# Patient Record
Sex: MALE | Race: OTHER | HISPANIC OR LATINO
[De-identification: names, ages, dates, MRNs, and addresses within clinical notes are randomized per-mention and may not be internally consistent; named-entity substitution may affect disease eponyms.]

---

## 2017-01-05 ENCOUNTER — LABORATORY RESULT (OUTPATIENT)
Age: 42
End: 2017-01-05

## 2017-01-05 LAB
INR PPP: 1.62 RATIO
PT BLD: 18.4 SEC

## 2017-01-06 ENCOUNTER — RX RENEWAL (OUTPATIENT)
Age: 42
End: 2017-01-06

## 2017-01-06 LAB
ALBUMIN SERPL ELPH-MCNC: 2.6 G/DL
ALP BLD-CCNC: 213 U/L
ALT SERPL-CCNC: 31 U/L
ANION GAP SERPL CALC-SCNC: 10 MMOL/L
AST SERPL-CCNC: 74 U/L
BASOPHILS # BLD AUTO: 0.05 K/UL
BASOPHILS NFR BLD AUTO: 0.7 %
BILIRUB SERPL-MCNC: 7.2 MG/DL
BUN SERPL-MCNC: 5 MG/DL
CALCIUM SERPL-MCNC: 7.9 MG/DL
CHLORIDE SERPL-SCNC: 101 MMOL/L
CO2 SERPL-SCNC: 24 MMOL/L
CREAT SERPL-MCNC: 0.67 MG/DL
EOSINOPHIL # BLD AUTO: 0.28 K/UL
EOSINOPHIL NFR BLD AUTO: 4 %
HCT VFR BLD CALC: 36.1 %
HGB BLD-MCNC: 12.4 G/DL
IMM GRANULOCYTES NFR BLD AUTO: 0.1 %
LYMPHOCYTES # BLD AUTO: 1.1 K/UL
LYMPHOCYTES NFR BLD AUTO: 15.7 %
MAN DIFF?: NORMAL
MCHC RBC-ENTMCNC: 32.9 PG
MCHC RBC-ENTMCNC: 34.3 GM/DL
MCV RBC AUTO: 95.8 FL
MONOCYTES # BLD AUTO: 0.62 K/UL
MONOCYTES NFR BLD AUTO: 8.9 %
NEUTROPHILS # BLD AUTO: 4.93 K/UL
NEUTROPHILS NFR BLD AUTO: 70.6 %
PLATELET # BLD AUTO: 83 K/UL
POTASSIUM SERPL-SCNC: 3.8 MMOL/L
PROT SERPL-MCNC: 7.6 G/DL
RBC # BLD: 3.77 M/UL
RBC # FLD: 16.2 %
SODIUM SERPL-SCNC: 135 MMOL/L
WBC # FLD AUTO: 6.99 K/UL

## 2017-01-09 ENCOUNTER — LABORATORY RESULT (OUTPATIENT)
Age: 42
End: 2017-01-09

## 2017-01-09 ENCOUNTER — APPOINTMENT (OUTPATIENT)
Age: 42
End: 2017-01-09

## 2017-01-09 VITALS
RESPIRATION RATE: 17 BRPM | WEIGHT: 206 LBS | SYSTOLIC BLOOD PRESSURE: 122 MMHG | HEIGHT: 68 IN | BODY MASS INDEX: 31.22 KG/M2 | TEMPERATURE: 98.3 F | DIASTOLIC BLOOD PRESSURE: 75 MMHG | HEART RATE: 69 BPM

## 2017-01-09 LAB — AFP-TM SERPL-MCNC: 5.9 NG/ML

## 2017-01-10 LAB
ALBUMIN SERPL ELPH-MCNC: 2.6 G/DL
ALP BLD-CCNC: 241 U/L
ALT SERPL-CCNC: 29 U/L
ANION GAP SERPL CALC-SCNC: 9 MMOL/L
AST SERPL-CCNC: 73 U/L
BASOPHILS # BLD AUTO: 0.05 K/UL
BASOPHILS NFR BLD AUTO: 0.7 %
BILIRUB SERPL-MCNC: 6.3 MG/DL
BUN SERPL-MCNC: 6 MG/DL
CALCIUM SERPL-MCNC: 8 MG/DL
CHLORIDE SERPL-SCNC: 102 MMOL/L
CO2 SERPL-SCNC: 24 MMOL/L
CREAT SERPL-MCNC: 0.65 MG/DL
EOSINOPHIL # BLD AUTO: 0.39 K/UL
EOSINOPHIL NFR BLD AUTO: 5.1 %
HCT VFR BLD CALC: 38.3 %
HGB BLD-MCNC: 12.7 G/DL
IMM GRANULOCYTES NFR BLD AUTO: 0.4 %
INR PPP: 1.58 RATIO
LYMPHOCYTES # BLD AUTO: 0.93 K/UL
LYMPHOCYTES NFR BLD AUTO: 12.2 %
MAN DIFF?: NORMAL
MCHC RBC-ENTMCNC: 32.8 PG
MCHC RBC-ENTMCNC: 33.2 GM/DL
MCV RBC AUTO: 99 FL
MONOCYTES # BLD AUTO: 0.53 K/UL
MONOCYTES NFR BLD AUTO: 7 %
NEUTROPHILS # BLD AUTO: 5.68 K/UL
NEUTROPHILS NFR BLD AUTO: 74.6 %
PLATELET # BLD AUTO: 90 K/UL
POTASSIUM SERPL-SCNC: 4.5 MMOL/L
PROT SERPL-MCNC: 7.8 G/DL
PT BLD: 17.9 SEC
RBC # BLD: 3.87 M/UL
RBC # FLD: 17 %
SODIUM SERPL-SCNC: 135 MMOL/L
WBC # FLD AUTO: 7.61 K/UL

## 2017-01-12 ENCOUNTER — RX RENEWAL (OUTPATIENT)
Age: 42
End: 2017-01-12

## 2017-01-25 ENCOUNTER — FORM ENCOUNTER (OUTPATIENT)
Age: 42
End: 2017-01-25

## 2017-01-26 ENCOUNTER — APPOINTMENT (OUTPATIENT)
Dept: ULTRASOUND IMAGING | Facility: IMAGING CENTER | Age: 42
End: 2017-01-26

## 2017-01-26 ENCOUNTER — APPOINTMENT (OUTPATIENT)
Dept: RADIOLOGY | Facility: IMAGING CENTER | Age: 42
End: 2017-01-26

## 2017-01-26 ENCOUNTER — OUTPATIENT (OUTPATIENT)
Dept: OUTPATIENT SERVICES | Facility: HOSPITAL | Age: 42
LOS: 1 days | End: 2017-01-26
Payer: COMMERCIAL

## 2017-01-26 DIAGNOSIS — K70.11 ALCOHOLIC HEPATITIS WITH ASCITES: ICD-10-CM

## 2017-01-26 PROCEDURE — 76700 US EXAM ABDOM COMPLETE: CPT

## 2017-01-26 PROCEDURE — 71046 X-RAY EXAM CHEST 2 VIEWS: CPT

## 2017-01-30 ENCOUNTER — APPOINTMENT (OUTPATIENT)
Dept: INTERNAL MEDICINE | Facility: CLINIC | Age: 42
End: 2017-01-30

## 2017-01-30 VITALS
WEIGHT: 208 LBS | HEIGHT: 68 IN | BODY MASS INDEX: 31.52 KG/M2 | OXYGEN SATURATION: 95 % | SYSTOLIC BLOOD PRESSURE: 126 MMHG | HEART RATE: 85 BPM | DIASTOLIC BLOOD PRESSURE: 74 MMHG

## 2017-02-01 ENCOUNTER — LABORATORY RESULT (OUTPATIENT)
Age: 42
End: 2017-02-01

## 2017-02-01 ENCOUNTER — APPOINTMENT (OUTPATIENT)
Age: 42
End: 2017-02-01

## 2017-02-01 VITALS
TEMPERATURE: 98.2 F | HEART RATE: 84 BPM | DIASTOLIC BLOOD PRESSURE: 74 MMHG | HEIGHT: 68 IN | BODY MASS INDEX: 31.07 KG/M2 | SYSTOLIC BLOOD PRESSURE: 136 MMHG | WEIGHT: 205 LBS | RESPIRATION RATE: 14 BRPM

## 2017-02-01 LAB
ALBUMIN SERPL ELPH-MCNC: 2.8 G/DL
ALP BLD-CCNC: 217 U/L
ALT SERPL-CCNC: 32 U/L
ANION GAP SERPL CALC-SCNC: 14 MMOL/L
AST SERPL-CCNC: 63 U/L
BILIRUB SERPL-MCNC: 6.2 MG/DL
BUN SERPL-MCNC: 4 MG/DL
CALCIUM SERPL-MCNC: 7.8 MG/DL
CHLORIDE SERPL-SCNC: 100 MMOL/L
CO2 SERPL-SCNC: 21 MMOL/L
CREAT SERPL-MCNC: 0.66 MG/DL
INR PPP: 1.57 RATIO
POTASSIUM SERPL-SCNC: 3.7 MMOL/L
PROT SERPL-MCNC: 7.4 G/DL
PT BLD: 17.8 SEC
SODIUM SERPL-SCNC: 135 MMOL/L

## 2017-02-02 LAB
BASOPHILS # BLD AUTO: 0.03 K/UL
BASOPHILS NFR BLD AUTO: 0.4 %
EOSINOPHIL # BLD AUTO: 0.16 K/UL
EOSINOPHIL NFR BLD AUTO: 2.2 %
HCT VFR BLD CALC: 38.1 %
HGB BLD-MCNC: 12.6 G/DL
IMM GRANULOCYTES NFR BLD AUTO: 0.4 %
LYMPHOCYTES # BLD AUTO: 1 K/UL
LYMPHOCYTES NFR BLD AUTO: 13.9 %
MAN DIFF?: NORMAL
MCHC RBC-ENTMCNC: 32.8 PG
MCHC RBC-ENTMCNC: 33.1 GM/DL
MCV RBC AUTO: 99.2 FL
MONOCYTES # BLD AUTO: 0.73 K/UL
MONOCYTES NFR BLD AUTO: 10.2 %
NEUTROPHILS # BLD AUTO: 5.23 K/UL
NEUTROPHILS NFR BLD AUTO: 72.9 %
PLATELET # BLD AUTO: 76 K/UL
RBC # BLD: 3.84 M/UL
RBC # FLD: 16 %
WBC # FLD AUTO: 7.18 K/UL

## 2017-02-17 ENCOUNTER — APPOINTMENT (OUTPATIENT)
Dept: INTERNAL MEDICINE | Facility: CLINIC | Age: 42
End: 2017-02-17

## 2017-02-17 VITALS
HEIGHT: 68 IN | HEART RATE: 95 BPM | DIASTOLIC BLOOD PRESSURE: 80 MMHG | BODY MASS INDEX: 30.92 KG/M2 | SYSTOLIC BLOOD PRESSURE: 140 MMHG | TEMPERATURE: 98.7 F | OXYGEN SATURATION: 98 % | WEIGHT: 204 LBS

## 2017-02-22 ENCOUNTER — APPOINTMENT (OUTPATIENT)
Age: 42
End: 2017-02-22

## 2017-02-22 VITALS
TEMPERATURE: 98.8 F | HEART RATE: 91 BPM | RESPIRATION RATE: 14 BRPM | BODY MASS INDEX: 31.22 KG/M2 | WEIGHT: 206 LBS | HEIGHT: 68 IN | DIASTOLIC BLOOD PRESSURE: 70 MMHG | SYSTOLIC BLOOD PRESSURE: 122 MMHG

## 2017-02-23 ENCOUNTER — OUTPATIENT (OUTPATIENT)
Dept: OUTPATIENT SERVICES | Facility: HOSPITAL | Age: 42
LOS: 1 days | End: 2017-02-23
Payer: COMMERCIAL

## 2017-02-23 ENCOUNTER — APPOINTMENT (OUTPATIENT)
Dept: RADIOLOGY | Facility: IMAGING CENTER | Age: 42
End: 2017-02-23

## 2017-02-23 ENCOUNTER — APPOINTMENT (OUTPATIENT)
Dept: MRI IMAGING | Facility: IMAGING CENTER | Age: 42
End: 2017-02-23

## 2017-02-23 DIAGNOSIS — Z00.8 ENCOUNTER FOR OTHER GENERAL EXAMINATION: ICD-10-CM

## 2017-02-23 PROCEDURE — 74183 MRI ABD W/O CNTR FLWD CNTR: CPT

## 2017-02-23 PROCEDURE — A9585: CPT

## 2017-02-23 PROCEDURE — 82565 ASSAY OF CREATININE: CPT

## 2017-02-23 PROCEDURE — 77080 DXA BONE DENSITY AXIAL: CPT

## 2017-02-23 PROCEDURE — 71046 X-RAY EXAM CHEST 2 VIEWS: CPT

## 2017-02-25 ENCOUNTER — INPATIENT (INPATIENT)
Facility: HOSPITAL | Age: 42
LOS: 5 days | Discharge: HOME | DRG: 872 | End: 2017-03-03
Attending: INTERNAL MEDICINE | Admitting: HOSPITALIST
Payer: COMMERCIAL

## 2017-02-25 VITALS
RESPIRATION RATE: 18 BRPM | HEART RATE: 111 BPM | SYSTOLIC BLOOD PRESSURE: 130 MMHG | DIASTOLIC BLOOD PRESSURE: 73 MMHG | OXYGEN SATURATION: 93 % | TEMPERATURE: 100 F | HEIGHT: 68 IN | WEIGHT: 205.03 LBS

## 2017-02-25 LAB
ALBUMIN SERPL ELPH-MCNC: 2.7 G/DL — LOW (ref 3.3–5)
ALP SERPL-CCNC: 157 U/L — HIGH (ref 40–120)
ALT FLD-CCNC: 27 U/L RC — SIGNIFICANT CHANGE UP (ref 10–45)
ANION GAP SERPL CALC-SCNC: 13 MMOL/L — SIGNIFICANT CHANGE UP (ref 5–17)
APPEARANCE UR: ABNORMAL
APTT BLD: 43.6 SEC — HIGH (ref 27.5–37.4)
AST SERPL-CCNC: 50 U/L — HIGH (ref 10–40)
BACTERIA # UR AUTO: ABNORMAL /HPF
BASOPHILS # BLD AUTO: 0 K/UL — SIGNIFICANT CHANGE UP (ref 0–0.2)
BASOPHILS NFR BLD AUTO: 0.3 % — SIGNIFICANT CHANGE UP (ref 0–2)
BILIRUB SERPL-MCNC: 8.1 MG/DL — HIGH (ref 0.2–1.2)
BILIRUB UR-MCNC: ABNORMAL
BUN SERPL-MCNC: 11 MG/DL — SIGNIFICANT CHANGE UP (ref 7–23)
CALCIUM SERPL-MCNC: 7.4 MG/DL — LOW (ref 8.4–10.5)
CHLORIDE SERPL-SCNC: 99 MMOL/L — SIGNIFICANT CHANGE UP (ref 96–108)
CO2 SERPL-SCNC: 20 MMOL/L — LOW (ref 22–31)
COLOR SPEC: ABNORMAL
CREAT SERPL-MCNC: 0.73 MG/DL — SIGNIFICANT CHANGE UP (ref 0.5–1.3)
DIFF PNL FLD: ABNORMAL
EOSINOPHIL # BLD AUTO: 0 K/UL — SIGNIFICANT CHANGE UP (ref 0–0.5)
EOSINOPHIL NFR BLD AUTO: 0.2 % — SIGNIFICANT CHANGE UP (ref 0–6)
EPI CELLS # UR: SIGNIFICANT CHANGE UP /HPF
GAS PNL BLDV: SIGNIFICANT CHANGE UP
GLUCOSE SERPL-MCNC: 293 MG/DL — HIGH (ref 70–99)
GLUCOSE UR QL: >1000
HCT VFR BLD CALC: 37.6 % — LOW (ref 39–50)
HGB BLD-MCNC: 13.1 G/DL — SIGNIFICANT CHANGE UP (ref 13–17)
INR BLD: 1.94 RATIO — HIGH (ref 0.88–1.16)
KETONES UR-MCNC: ABNORMAL
LEUKOCYTE ESTERASE UR-ACNC: NEGATIVE — SIGNIFICANT CHANGE UP
LIDOCAIN IGE QN: 23 U/L — SIGNIFICANT CHANGE UP (ref 7–60)
LYMPHOCYTES # BLD AUTO: 1 K/UL — SIGNIFICANT CHANGE UP (ref 1–3.3)
LYMPHOCYTES # BLD AUTO: 7.8 % — LOW (ref 13–44)
MCHC RBC-ENTMCNC: 34.4 PG — HIGH (ref 27–34)
MCHC RBC-ENTMCNC: 35 GM/DL — SIGNIFICANT CHANGE UP (ref 32–36)
MCV RBC AUTO: 98.4 FL — SIGNIFICANT CHANGE UP (ref 80–100)
MONOCYTES # BLD AUTO: 0.8 K/UL — SIGNIFICANT CHANGE UP (ref 0–0.9)
MONOCYTES NFR BLD AUTO: 5.9 % — SIGNIFICANT CHANGE UP (ref 2–14)
NEUTROPHILS # BLD AUTO: 11.1 K/UL — HIGH (ref 1.8–7.4)
NEUTROPHILS NFR BLD AUTO: 85.7 % — HIGH (ref 43–77)
NITRITE UR-MCNC: NEGATIVE — SIGNIFICANT CHANGE UP
PH UR: 6 — SIGNIFICANT CHANGE UP (ref 4.8–8)
PLATELET # BLD AUTO: 45 K/UL — LOW (ref 150–400)
POTASSIUM SERPL-MCNC: 3.7 MMOL/L — SIGNIFICANT CHANGE UP (ref 3.5–5.3)
POTASSIUM SERPL-SCNC: 3.7 MMOL/L — SIGNIFICANT CHANGE UP (ref 3.5–5.3)
PROT SERPL-MCNC: 6.6 G/DL — SIGNIFICANT CHANGE UP (ref 6–8.3)
PROT UR-MCNC: 30 MG/DL
PROTHROM AB SERPL-ACNC: 21.1 SEC — HIGH (ref 10–13.1)
RAPID RVP RESULT: SIGNIFICANT CHANGE UP
RBC # BLD: 3.82 M/UL — LOW (ref 4.2–5.8)
RBC # FLD: 13.3 % — SIGNIFICANT CHANGE UP (ref 10.3–14.5)
RBC CASTS # UR COMP ASSIST: ABNORMAL /HPF (ref 0–2)
SODIUM SERPL-SCNC: 132 MMOL/L — LOW (ref 135–145)
SP GR SPEC: 1.03 — HIGH (ref 1.01–1.02)
UROBILINOGEN FLD QL: 1
WBC # BLD: 13 K/UL — HIGH (ref 3.8–10.5)
WBC # FLD AUTO: 13 K/UL — HIGH (ref 3.8–10.5)
WBC UR QL: SIGNIFICANT CHANGE UP /HPF (ref 0–5)

## 2017-02-25 PROCEDURE — 74177 CT ABD & PELVIS W/CONTRAST: CPT | Mod: 26

## 2017-02-25 PROCEDURE — 99285 EMERGENCY DEPT VISIT HI MDM: CPT

## 2017-02-25 PROCEDURE — 71010: CPT | Mod: 26

## 2017-02-25 RX ORDER — SODIUM CHLORIDE 9 MG/ML
500 INJECTION INTRAMUSCULAR; INTRAVENOUS; SUBCUTANEOUS ONCE
Qty: 0 | Refills: 0 | Status: COMPLETED | OUTPATIENT
Start: 2017-02-25 | End: 2017-02-25

## 2017-02-25 RX ORDER — CEFEPIME 1 G/1
2000 INJECTION, POWDER, FOR SOLUTION INTRAMUSCULAR; INTRAVENOUS ONCE
Qty: 0 | Refills: 0 | Status: COMPLETED | OUTPATIENT
Start: 2017-02-25 | End: 2017-02-25

## 2017-02-25 RX ORDER — MORPHINE SULFATE 50 MG/1
2 CAPSULE, EXTENDED RELEASE ORAL ONCE
Qty: 0 | Refills: 0 | Status: DISCONTINUED | OUTPATIENT
Start: 2017-02-25 | End: 2017-02-25

## 2017-02-25 RX ORDER — IBUPROFEN 200 MG
600 TABLET ORAL ONCE
Qty: 0 | Refills: 0 | Status: COMPLETED | OUTPATIENT
Start: 2017-02-25 | End: 2017-02-25

## 2017-02-25 RX ADMIN — Medication 600 MILLIGRAM(S): at 22:21

## 2017-02-25 RX ADMIN — CEFEPIME 100 MILLIGRAM(S): 1 INJECTION, POWDER, FOR SOLUTION INTRAMUSCULAR; INTRAVENOUS at 22:21

## 2017-02-25 RX ADMIN — SODIUM CHLORIDE 500 MILLILITER(S): 9 INJECTION INTRAMUSCULAR; INTRAVENOUS; SUBCUTANEOUS at 22:21

## 2017-02-25 RX ADMIN — MORPHINE SULFATE 2 MILLIGRAM(S): 50 CAPSULE, EXTENDED RELEASE ORAL at 22:21

## 2017-02-25 NOTE — ED PROVIDER NOTE - ATTENDING CONTRIBUTION TO CARE
Agree with resident history  Denies GIbleeding, occasional nose bleeds,  mild cough  no travel, sick contacts, abx, + diarrhea, + chills  No unsusal foods  Nohx of SBP  last tap was 2 years ago  on transplant list  On exam, low grade fever, cta bl, s1, s,2 rrr, soft nd adbomen, no tense ascites, ttp diffusely with no r/g/r no cvat, icteric sclera unchanged

## 2017-02-25 NOTE — ED PROVIDER NOTE - PROGRESS NOTE DETAILS
Trace moriah-hepatic fluid on POCUS Abd Ultrasound.  No appreciable collection of ascites for diagnostic sampling.  Given findings as well as CT, symptoms more likely attributed to colitits. Sign-out follow-up: Fever + Liver cirrohsis r/o SBP. +Colitis on CT a/p. Abx given. Ascites collection too small for bedside diagnostic paracentesis. Admitted to medicine. [facility].

## 2017-02-25 NOTE — ED ADULT NURSE NOTE - PMH
Alcoholic cirrhosis    Diabetes mellitus type 2, insulin dependent    Esophageal varices    Latent tuberculosis    Vitiligo

## 2017-02-25 NOTE — ED PROVIDER NOTE - OBJECTIVE STATEMENT
41M hx Alcoholic Cirrhosis c/b esophageal varices s/p banding, DM on insulin, remote hx latent TB s/p treatment presents with abdominal pain beginning today associated with fever.  Pt states fever has been intermittent over the past 5 weeks, beginning at night with cough.  Additionally reports diarrhea.

## 2017-02-25 NOTE — ED ADULT NURSE NOTE - OBJECTIVE STATEMENT
42 y/o male presents to ED c/o abdominal pain/diarrhea x 1 day. Pt states sudden onset abdominal pain for past day, assoc w/ fever. Pt has liver cirrhosis secondary to [facility] ETOH abuse. Pt abdomen tender, slightly distended. Pt states fever has been intermittent x 5 weeks. + nausea, no vomiting. Pt denies CP, SOB, numbness/tingling in extremities.

## 2017-02-26 DIAGNOSIS — I85.00 ESOPHAGEAL VARICES WITHOUT BLEEDING: ICD-10-CM

## 2017-02-26 DIAGNOSIS — Z41.8 ENCOUNTER FOR OTHER PROCEDURES FOR PURPOSES OTHER THAN REMEDYING HEALTH STATE: ICD-10-CM

## 2017-02-26 DIAGNOSIS — D69.6 THROMBOCYTOPENIA, UNSPECIFIED: ICD-10-CM

## 2017-02-26 DIAGNOSIS — K70.30 ALCOHOLIC CIRRHOSIS OF LIVER WITHOUT ASCITES: ICD-10-CM

## 2017-02-26 DIAGNOSIS — R10.9 UNSPECIFIED ABDOMINAL PAIN: ICD-10-CM

## 2017-02-26 DIAGNOSIS — R10.13 EPIGASTRIC PAIN: ICD-10-CM

## 2017-02-26 DIAGNOSIS — E87.1 HYPO-OSMOLALITY AND HYPONATREMIA: ICD-10-CM

## 2017-02-26 DIAGNOSIS — A41.9 SEPSIS, UNSPECIFIED ORGANISM: ICD-10-CM

## 2017-02-26 DIAGNOSIS — E11.9 TYPE 2 DIABETES MELLITUS WITHOUT COMPLICATIONS: ICD-10-CM

## 2017-02-26 DIAGNOSIS — R60.0 LOCALIZED EDEMA: ICD-10-CM

## 2017-02-26 DIAGNOSIS — K52.9 NONINFECTIVE GASTROENTERITIS AND COLITIS, UNSPECIFIED: ICD-10-CM

## 2017-02-26 LAB
ALBUMIN SERPL ELPH-MCNC: 2.3 G/DL — LOW (ref 3.3–5)
ALP SERPL-CCNC: 116 U/L — SIGNIFICANT CHANGE UP (ref 40–120)
ALT FLD-CCNC: 25 U/L RC — SIGNIFICANT CHANGE UP (ref 10–45)
ANION GAP SERPL CALC-SCNC: 7 MMOL/L — SIGNIFICANT CHANGE UP (ref 5–17)
AST SERPL-CCNC: 40 U/L — SIGNIFICANT CHANGE UP (ref 10–40)
BILIRUB DIRECT SERPL-MCNC: 3.9 MG/DL — HIGH (ref 0–0.2)
BILIRUB SERPL-MCNC: 8.1 MG/DL — HIGH (ref 0.2–1.2)
BUN SERPL-MCNC: 15 MG/DL — SIGNIFICANT CHANGE UP (ref 7–23)
C DIFF BY PCR RESULT: SIGNIFICANT CHANGE UP
C DIFF TOX GENS STL QL NAA+PROBE: SIGNIFICANT CHANGE UP
CALCIUM SERPL-MCNC: 7 MG/DL — LOW (ref 8.4–10.5)
CHLORIDE SERPL-SCNC: 103 MMOL/L — SIGNIFICANT CHANGE UP (ref 96–108)
CO2 SERPL-SCNC: 24 MMOL/L — SIGNIFICANT CHANGE UP (ref 22–31)
CREAT SERPL-MCNC: 0.58 MG/DL — SIGNIFICANT CHANGE UP (ref 0.5–1.3)
CULTURE RESULTS: SIGNIFICANT CHANGE UP
ETHANOL SERPL-MCNC: SIGNIFICANT CHANGE UP MG/DL (ref 0–10)
GLUCOSE SERPL-MCNC: 164 MG/DL — HIGH (ref 70–99)
GRAM STN FLD: SIGNIFICANT CHANGE UP
HCT VFR BLD CALC: 36.5 % — LOW (ref 39–50)
HGB BLD-MCNC: 12.1 G/DL — LOW (ref 13–17)
MAGNESIUM SERPL-MCNC: 1.5 MG/DL — LOW (ref 1.6–2.6)
MCHC RBC-ENTMCNC: 33.2 GM/DL — SIGNIFICANT CHANGE UP (ref 32–36)
MCHC RBC-ENTMCNC: 33.3 PG — SIGNIFICANT CHANGE UP (ref 27–34)
MCV RBC AUTO: 100 FL — SIGNIFICANT CHANGE UP (ref 80–100)
PHOSPHATE SERPL-MCNC: 2.8 MG/DL — SIGNIFICANT CHANGE UP (ref 2.5–4.5)
PLATELET # BLD AUTO: 40 K/UL — LOW (ref 150–400)
POTASSIUM SERPL-MCNC: 3.6 MMOL/L — SIGNIFICANT CHANGE UP (ref 3.5–5.3)
POTASSIUM SERPL-SCNC: 3.6 MMOL/L — SIGNIFICANT CHANGE UP (ref 3.5–5.3)
PROT SERPL-MCNC: 5.9 G/DL — LOW (ref 6–8.3)
RBC # BLD: 3.64 M/UL — LOW (ref 4.2–5.8)
RBC # FLD: 12.8 % — SIGNIFICANT CHANGE UP (ref 10.3–14.5)
SODIUM SERPL-SCNC: 134 MMOL/L — LOW (ref 135–145)
SPECIMEN SOURCE: SIGNIFICANT CHANGE UP
WBC # BLD: 7.4 K/UL — SIGNIFICANT CHANGE UP (ref 3.8–10.5)
WBC # FLD AUTO: 7.4 K/UL — SIGNIFICANT CHANGE UP (ref 3.8–10.5)

## 2017-02-26 PROCEDURE — 78227 HEPATOBIL SYST IMAGE W/DRUG: CPT | Mod: 26

## 2017-02-26 PROCEDURE — 99223 1ST HOSP IP/OBS HIGH 75: CPT | Mod: GC

## 2017-02-26 RX ORDER — SODIUM CHLORIDE 9 MG/ML
1000 INJECTION, SOLUTION INTRAVENOUS
Qty: 0 | Refills: 0 | Status: DISCONTINUED | OUTPATIENT
Start: 2017-02-26 | End: 2017-02-26

## 2017-02-26 RX ORDER — MAGNESIUM SULFATE 500 MG/ML
1 VIAL (ML) INJECTION ONCE
Qty: 0 | Refills: 0 | Status: COMPLETED | OUTPATIENT
Start: 2017-02-26 | End: 2017-02-26

## 2017-02-26 RX ORDER — INSULIN GLARGINE 100 [IU]/ML
20 INJECTION, SOLUTION SUBCUTANEOUS AT BEDTIME
Qty: 0 | Refills: 0 | Status: DISCONTINUED | OUTPATIENT
Start: 2017-02-26 | End: 2017-02-26

## 2017-02-26 RX ORDER — MORPHINE SULFATE 50 MG/1
4 CAPSULE, EXTENDED RELEASE ORAL EVERY 4 HOURS
Qty: 0 | Refills: 0 | Status: DISCONTINUED | OUTPATIENT
Start: 2017-02-26 | End: 2017-02-26

## 2017-02-26 RX ORDER — PANTOPRAZOLE SODIUM 20 MG/1
40 TABLET, DELAYED RELEASE ORAL
Qty: 0 | Refills: 0 | Status: DISCONTINUED | OUTPATIENT
Start: 2017-02-26 | End: 2017-03-03

## 2017-02-26 RX ORDER — INFLUENZA VIRUS VACCINE 15; 15; 15; 15 UG/.5ML; UG/.5ML; UG/.5ML; UG/.5ML
0.5 SUSPENSION INTRAMUSCULAR ONCE
Qty: 0 | Refills: 0 | Status: DISCONTINUED | OUTPATIENT
Start: 2017-02-26 | End: 2017-03-03

## 2017-02-26 RX ORDER — NADOLOL 80 MG/1
20 TABLET ORAL DAILY
Qty: 0 | Refills: 0 | Status: DISCONTINUED | OUTPATIENT
Start: 2017-02-26 | End: 2017-03-03

## 2017-02-26 RX ORDER — DEXTROSE 50 % IN WATER 50 %
25 SYRINGE (ML) INTRAVENOUS ONCE
Qty: 0 | Refills: 0 | Status: DISCONTINUED | OUTPATIENT
Start: 2017-02-26 | End: 2017-02-26

## 2017-02-26 RX ORDER — CEFOTAXIME SODIUM 1 G
2 VIAL (EA) INJECTION EVERY 8 HOURS
Qty: 0 | Refills: 0 | Status: DISCONTINUED | OUTPATIENT
Start: 2017-02-26 | End: 2017-02-26

## 2017-02-26 RX ORDER — INSULIN GLARGINE 100 [IU]/ML
30 INJECTION, SOLUTION SUBCUTANEOUS AT BEDTIME
Qty: 0 | Refills: 0 | Status: DISCONTINUED | OUTPATIENT
Start: 2017-02-26 | End: 2017-02-28

## 2017-02-26 RX ORDER — CEFOTAXIME SODIUM 1 G
VIAL (EA) INJECTION
Qty: 0 | Refills: 0 | Status: DISCONTINUED | OUTPATIENT
Start: 2017-02-26 | End: 2017-02-26

## 2017-02-26 RX ORDER — GLUCAGON INJECTION, SOLUTION 0.5 MG/.1ML
1 INJECTION, SOLUTION SUBCUTANEOUS ONCE
Qty: 0 | Refills: 0 | Status: DISCONTINUED | OUTPATIENT
Start: 2017-02-26 | End: 2017-02-26

## 2017-02-26 RX ORDER — MORPHINE SULFATE 50 MG/1
2 CAPSULE, EXTENDED RELEASE ORAL
Qty: 0 | Refills: 0 | Status: DISCONTINUED | OUTPATIENT
Start: 2017-02-26 | End: 2017-02-28

## 2017-02-26 RX ORDER — PIPERACILLIN AND TAZOBACTAM 4; .5 G/20ML; G/20ML
3.38 INJECTION, POWDER, LYOPHILIZED, FOR SOLUTION INTRAVENOUS EVERY 8 HOURS
Qty: 0 | Refills: 0 | Status: DISCONTINUED | OUTPATIENT
Start: 2017-02-26 | End: 2017-03-01

## 2017-02-26 RX ORDER — INSULIN LISPRO 100/ML
VIAL (ML) SUBCUTANEOUS EVERY 6 HOURS
Qty: 0 | Refills: 0 | Status: DISCONTINUED | OUTPATIENT
Start: 2017-02-26 | End: 2017-02-26

## 2017-02-26 RX ORDER — SODIUM CHLORIDE 9 MG/ML
1000 INJECTION INTRAMUSCULAR; INTRAVENOUS; SUBCUTANEOUS
Qty: 0 | Refills: 0 | Status: DISCONTINUED | OUTPATIENT
Start: 2017-02-26 | End: 2017-02-27

## 2017-02-26 RX ORDER — PIPERACILLIN AND TAZOBACTAM 4; .5 G/20ML; G/20ML
3.38 INJECTION, POWDER, LYOPHILIZED, FOR SOLUTION INTRAVENOUS ONCE
Qty: 0 | Refills: 0 | Status: COMPLETED | OUTPATIENT
Start: 2017-02-26 | End: 2017-02-26

## 2017-02-26 RX ORDER — MORPHINE SULFATE 50 MG/1
4 CAPSULE, EXTENDED RELEASE ORAL ONCE
Qty: 0 | Refills: 0 | Status: DISCONTINUED | OUTPATIENT
Start: 2017-02-26 | End: 2017-02-26

## 2017-02-26 RX ORDER — METRONIDAZOLE 500 MG
500 TABLET ORAL EVERY 8 HOURS
Qty: 0 | Refills: 0 | Status: DISCONTINUED | OUTPATIENT
Start: 2017-02-26 | End: 2017-02-26

## 2017-02-26 RX ORDER — PIPERACILLIN AND TAZOBACTAM 4; .5 G/20ML; G/20ML
3.38 INJECTION, POWDER, LYOPHILIZED, FOR SOLUTION INTRAVENOUS EVERY 8 HOURS
Qty: 0 | Refills: 0 | Status: DISCONTINUED | OUTPATIENT
Start: 2017-02-26 | End: 2017-02-26

## 2017-02-26 RX ORDER — GLUCAGON INJECTION, SOLUTION 0.5 MG/.1ML
1 INJECTION, SOLUTION SUBCUTANEOUS ONCE
Qty: 0 | Refills: 0 | Status: DISCONTINUED | OUTPATIENT
Start: 2017-02-26 | End: 2017-02-28

## 2017-02-26 RX ORDER — DEXTROSE 50 % IN WATER 50 %
12.5 SYRINGE (ML) INTRAVENOUS ONCE
Qty: 0 | Refills: 0 | Status: DISCONTINUED | OUTPATIENT
Start: 2017-02-26 | End: 2017-02-26

## 2017-02-26 RX ORDER — CEFOTAXIME SODIUM 1 G
2 VIAL (EA) INJECTION ONCE
Qty: 0 | Refills: 0 | Status: COMPLETED | OUTPATIENT
Start: 2017-02-26 | End: 2017-02-26

## 2017-02-26 RX ORDER — DEXTROSE 50 % IN WATER 50 %
25 SYRINGE (ML) INTRAVENOUS ONCE
Qty: 0 | Refills: 0 | Status: DISCONTINUED | OUTPATIENT
Start: 2017-02-26 | End: 2017-02-28

## 2017-02-26 RX ORDER — PIPERACILLIN AND TAZOBACTAM 4; .5 G/20ML; G/20ML
3.38 INJECTION, POWDER, LYOPHILIZED, FOR SOLUTION INTRAVENOUS ONCE
Qty: 0 | Refills: 0 | Status: DISCONTINUED | OUTPATIENT
Start: 2017-02-26 | End: 2017-02-26

## 2017-02-26 RX ORDER — INSULIN LISPRO 100/ML
VIAL (ML) SUBCUTANEOUS AT BEDTIME
Qty: 0 | Refills: 0 | Status: DISCONTINUED | OUTPATIENT
Start: 2017-02-26 | End: 2017-02-28

## 2017-02-26 RX ORDER — DEXTROSE 50 % IN WATER 50 %
1 SYRINGE (ML) INTRAVENOUS ONCE
Qty: 0 | Refills: 0 | Status: DISCONTINUED | OUTPATIENT
Start: 2017-02-26 | End: 2017-02-28

## 2017-02-26 RX ORDER — INSULIN LISPRO 100/ML
VIAL (ML) SUBCUTANEOUS
Qty: 0 | Refills: 0 | Status: DISCONTINUED | OUTPATIENT
Start: 2017-02-26 | End: 2017-02-28

## 2017-02-26 RX ORDER — DEXTROSE 50 % IN WATER 50 %
12.5 SYRINGE (ML) INTRAVENOUS ONCE
Qty: 0 | Refills: 0 | Status: DISCONTINUED | OUTPATIENT
Start: 2017-02-26 | End: 2017-02-28

## 2017-02-26 RX ORDER — DEXTROSE 50 % IN WATER 50 %
1 SYRINGE (ML) INTRAVENOUS ONCE
Qty: 0 | Refills: 0 | Status: DISCONTINUED | OUTPATIENT
Start: 2017-02-26 | End: 2017-02-26

## 2017-02-26 RX ORDER — SODIUM CHLORIDE 9 MG/ML
1000 INJECTION, SOLUTION INTRAVENOUS
Qty: 0 | Refills: 0 | Status: DISCONTINUED | OUTPATIENT
Start: 2017-02-26 | End: 2017-02-28

## 2017-02-26 RX ORDER — METRONIDAZOLE 500 MG
500 TABLET ORAL ONCE
Qty: 0 | Refills: 0 | Status: COMPLETED | OUTPATIENT
Start: 2017-02-26 | End: 2017-02-26

## 2017-02-26 RX ORDER — SODIUM CHLORIDE 9 MG/ML
1000 INJECTION INTRAMUSCULAR; INTRAVENOUS; SUBCUTANEOUS
Qty: 0 | Refills: 0 | Status: DISCONTINUED | OUTPATIENT
Start: 2017-02-26 | End: 2017-02-26

## 2017-02-26 RX ADMIN — PIPERACILLIN AND TAZOBACTAM 25 GRAM(S): 4; .5 INJECTION, POWDER, LYOPHILIZED, FOR SOLUTION INTRAVENOUS at 23:09

## 2017-02-26 RX ADMIN — SODIUM CHLORIDE 100 MILLILITER(S): 9 INJECTION INTRAMUSCULAR; INTRAVENOUS; SUBCUTANEOUS at 19:01

## 2017-02-26 RX ADMIN — NADOLOL 20 MILLIGRAM(S): 80 TABLET ORAL at 09:12

## 2017-02-26 RX ADMIN — Medication: at 09:10

## 2017-02-26 RX ADMIN — Medication 100 MILLIGRAM(S): at 03:42

## 2017-02-26 RX ADMIN — PIPERACILLIN AND TAZOBACTAM 200 GRAM(S): 4; .5 INJECTION, POWDER, LYOPHILIZED, FOR SOLUTION INTRAVENOUS at 14:31

## 2017-02-26 RX ADMIN — Medication 2: at 23:08

## 2017-02-26 RX ADMIN — Medication 1: at 18:26

## 2017-02-26 RX ADMIN — Medication 100 GRAM(S): at 17:30

## 2017-02-26 RX ADMIN — MORPHINE SULFATE 4 MILLIGRAM(S): 50 CAPSULE, EXTENDED RELEASE ORAL at 03:38

## 2017-02-26 RX ADMIN — MORPHINE SULFATE 2 MILLIGRAM(S): 50 CAPSULE, EXTENDED RELEASE ORAL at 00:43

## 2017-02-26 RX ADMIN — Medication 112 GRAM(S): at 06:21

## 2017-02-26 RX ADMIN — PANTOPRAZOLE SODIUM 40 MILLIGRAM(S): 20 TABLET, DELAYED RELEASE ORAL at 14:31

## 2017-02-26 RX ADMIN — Medication 600 MILLIGRAM(S): at 00:43

## 2017-02-26 RX ADMIN — INSULIN GLARGINE 30 UNIT(S): 100 INJECTION, SOLUTION SUBCUTANEOUS at 23:09

## 2017-02-26 RX ADMIN — MORPHINE SULFATE 4 MILLIGRAM(S): 50 CAPSULE, EXTENDED RELEASE ORAL at 08:03

## 2017-02-26 NOTE — H&P ADULT. - PROBLEM SELECTOR PLAN 9
- there is asymmetric left lower extremity edema concerning for thrombus as this swelling is acute, will investigate with left lower extremity US - chronic stable, no signs of bleeding   - likely due to cirrhosis and diminished liver capacity to produce thrombopoetin, will hold off on DVT prophylaxis

## 2017-02-26 NOTE — H&P ADULT. - PROBLEM SELECTOR PLAN 4
- resume home lantus but dose reduce from 30 qhs to 20 qhs while NPO   - hold his metformin   - ISS q6h while NPO - pain control with IV morphine 4mg q4h IVP prn moderate to severe with q2h breakthrough at 2mg

## 2017-02-26 NOTE — H&P ADULT. - PROBLEM SELECTOR PLAN 1
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   - resume Cefepime   - C diff negative   - will check stool culture and ova/parasites   - IV fluids should be used judiciously in his fluid overloaded state, albumin can be considered with  paracentesis   - follow up blood culture   - paracentesis for SBP - differential diagnosis includes SBP, GB disease, Colitis , PVT   - continue with Cefotaxime and Flagyl to cover enteric organisms   - US to rule out PVT in setting of elevated LFTs and Bili   - repeat C diff in case false negative   - HIDA scan with finding of GB distention in setting of RUQ/epigastric pain and elevated bilirubin - differential diagnosis includes SBP, GB disease, Colitis , PVT   - continue with Cefotaxime and Flagyl to cover enteric organisms   - US to rule out PVT in setting of elevated LFTs and Bili   - repeat C diff in case false negative   - HIDA scan with finding of GB distention in setting of RUQ/epigastric pain and elevated bilirubin  - pain control with IV morphine 4mg q4h IVP prn moderate to severe with q2h breakthrough at 2mg - differential diagnosis includes SBP, GB disease, Colitis , PVT   - continue with Cefotaxime and Flagyl to cover enteric organisms   - US to rule out PVT in setting of elevated LFTs and Bili   - check C diff  - HIDA scan with finding of GB distention in setting of RUQ/epigastric pain and elevated bilirubin  - pain control with IV morphine 4mg q4h IVP prn moderate to severe with q2h breakthrough at 2mg

## 2017-02-26 NOTE — H&P ADULT. - PROBLEM SELECTOR PLAN 10
- there is asymmetric left lower extremity edema concerning for thrombus as this swelling is acute, will investigate with left lower extremity US no AC due to thrombocytopenia

## 2017-02-26 NOTE — H&P ADULT. - PROBLEM SELECTOR PLAN 3
- pain control with IV morphine 4mg q4h IVP prn moderate to severe with q2h breakthrough at 2mg - hold NPO   - GI consultation   - continue with Cefepime   - follow up blood cultures , had E coli bacteremia in past - hold NPO   - GI consultation   - continue with Cefotaxime and flagyl as above  - C diff negative, but will repeat in case of false negative  - follow up blood cultures , had E coli bacteremia in past - hold NPO   - GI consultation   - continue with Cefotaxime and flagyl as above  - check c. diff  - follow up blood cultures , had E coli bacteremia in past

## 2017-02-26 NOTE — H&P ADULT. - LAB RESULTS AND INTERPRETATION
13 wbcs, neutrophil predominant and no bandemia   creatinine at baseline   chronic thrombocytopenia now at 43   hyponatremia Labs personally reviewed and interpreted:  13 wbcs, neutrophil predominant and no bandemia   creatinine at baseline   chronic thrombocytopenia now at 43   hyponatremia Labs personally reviewed and interpreted:  13 wbcs, neutrophil predominant and no bandemia   creatinine at baseline   chronic thrombocytopenia now at 43   hyponatremia  elevated LFTs, bilirubin 8.1

## 2017-02-26 NOTE — H&P ADULT. - ASSESSMENT
41 year old man with alcoholic cirrhosis presents with abdominal pain, distension and diarrhea with fever and leukocytosis in the setting of ascites. This presentation is highly suspicious for SBP and merits SBP coverage with IV antibiotics pending result of diagnostic paracentesis.

## 2017-02-26 NOTE — H&P ADULT. - RS GEN PE MLT RESP DETAILS PC
normal/breath sounds equal/airway patent/good air movement/clear to auscultation bilaterally/respirations non-labored

## 2017-02-26 NOTE — H&P ADULT. - OTHER
Old records reviewed:  MRI abd/pelv 2/23/17 no hepatocellular cancer, there is mild to moderate ascites.   CXR reviewed 2/23/16: no focal consolidation  US abdomen 1/2017 cirrotic liver, echogenic lesion on spleen. No ascites  Labs reviewed: August 2016 sodium was normal  EKG May 2016: sinus rhythm @102bpm  echo April 2016: nomal LV systolic function

## 2017-02-26 NOTE — H&P ADULT. - PROBLEM SELECTOR PLAN 8
- chronic stable, no signs of bleeding   - likely due to cirrhosis and diminished liver capacity to produce thrombopoetin, will hold off on DVT prophylaxis - likely due to decompensated cirrhosis, will continue to follow for the time being, if dropping lower below 130 can fluid restrict

## 2017-02-26 NOTE — ED ADULT NURSE REASSESSMENT NOTE - NS ED NURSE REASSESS COMMENT FT1
Patient resting comfortably in the stretcher with no complaints at this time.  Patient has no needs.  Explained plan of care to patient and that his mag will be repleted and why and what his level  is compared to the normal ranges.  Family member educated on contact precautions and given a gown.  Patient safety ensured.

## 2017-02-26 NOTE — H&P ADULT. - GIT ABD PE PAL DETAILS PC
distended/tender diffusely but worst in periumbillical area/ascites/guarding distended/ascites/guarding/tender epigstric area and in periumbillical area/bowel sounds hyperactive

## 2017-02-26 NOTE — H&P ADULT. - NEUROLOGICAL DETAILS
cranial nerves intact/responds to pain/alert and oriented x 3/sensation intact/responds to verbal commands

## 2017-02-26 NOTE — ED ADULT NURSE REASSESSMENT NOTE - NS ED NURSE REASSESS COMMENT FT1
Pt return from nuclear medicine. Pt return from nuclear medicine.  finger stick 146.  Stool sample and additional blood specimens sent to lab.

## 2017-02-26 NOTE — H&P ADULT. - PROBLEM SELECTOR PLAN 7
- likely due to decompensated cirrhosis, will continue to follow for the time being, if dropping lower below 130 can fluid restrict - likely underlying illness predisposing to current presentation - likely due to decompensated cirrhosis, will continue to follow for the time being, if dropping lower below 130 can fluid restrict  - monitor BMP

## 2017-02-26 NOTE — H&P ADULT. - NEGATIVE ENMT SYMPTOMS
no nasal discharge/no hearing difficulty/no nasal obstruction no nasal discharge/no hearing difficulty/no nasal obstruction/no tinnitus/no ear pain

## 2017-02-26 NOTE — H&P ADULT. - RADIOLOGY RESULTS AND INTERPRETATION
CT A: ascites, severe colitis CT A reviewed: ascites, severe colitis vs. portal hypertensive colopathy.   CXR reviewed: no focal consolidation

## 2017-02-26 NOTE — H&P ADULT. - MUSCULOSKELETAL
details… not examined no calf tenderness/ROM intact/no joint swelling/normal strength/normal detailed exam

## 2017-02-26 NOTE — H&P ADULT. - PROBLEM SELECTOR PLAN 2
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   - resume Cefepime   - C diff negative   - will check stool culture and ova/parasites   - IV fluids should be used judiciously in his fluid overloaded state, albumin can be considered with  paracentesis   - follow up blood culture   - paracentesis for SBP 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 diff   - HIDA scan  - will check stool culture and ova/parasites   - IV fluids should be used judiciously in his fluid overloaded state, albumin can be considered with  paracentesis   - follow up blood culture   - paracentesis for SBP if US shows large ascites

## 2017-02-26 NOTE — ED ADULT NURSE REASSESSMENT NOTE - NS ED NURSE REASSESS COMMENT FT1
Report received from REMBERTO Waddell.  Patient alert and oriented x4 sitting in the stretcher in no acute distress.  Patient denies pain at this time and resting with call bell within reach.  Patient reminded that we need a stool sample and a cup is at the bedside with toilet accessible.  Patient appears jaundice.  Safety ensured. Report received from REMBERTO Waddell.  Patient alert and oriented x4 sitting in the stretcher in no acute distress.  Patient denies pain at this time and resting with call bell within reach.  Patient reminded that we need a stool sample and a cup is at the bedside with toilet accessible.  Patient's last BM was yesterday at 2300 and was diarrhea.  Patient denies any bowel movements since and says BM was free of blood. Patient appears jaundice.  Safety ensured.

## 2017-02-26 NOTE — ED ADULT NURSE REASSESSMENT NOTE - NS ED NURSE REASSESS COMMENT FT1
Patient resting comfortably in the stretcher and given yellow jello and Gingerale.  Patient safety ensured.

## 2017-02-26 NOTE — H&P ADULT. - PROBLEM SELECTOR PLAN 6
- likely underlying illness predisposing to current presentation - resume nadalol   - no overt signs of bleeding

## 2017-02-26 NOTE — ED ADULT NURSE REASSESSMENT NOTE - NS ED NURSE REASSESS COMMENT FT1
Patient resting in stretcher and free of pain.  Patient in no acute distress and has no needs at this time.  Patient informed that we need a second stool sample and he verbalizes understanding.  Cup left next to his toilet bowl.  Safety ensured and call bell next to patient.  IV site free of redness, swelling, pain and flushing well with blood return.

## 2017-02-26 NOTE — H&P ADULT. - PROBLEM SELECTOR PLAN 5
- resume nadalol   - no overt signs of bleeding - resume home lantus but dose reduce from 30 qhs to 20 qhs while NPO   - hold his metformin   - ISS q6h while NPO

## 2017-02-26 NOTE — H&P ADULT. - NEGATIVE OPHTHALMOLOGIC SYMPTOMS
no blurred vision L/no blurred vision R no blurred vision R/no diplopia/no photophobia/no blurred vision L

## 2017-02-26 NOTE — ED ADULT NURSE REASSESSMENT NOTE - NS ED NURSE REASSESS COMMENT FT1
MD at bedside from admitting team.  Patient in no acute distress and states pain in his abdomen is 1/10 and he feels much better.  Safety ensured.  Patient still aware we need a stool sample.

## 2017-02-26 NOTE — H&P ADULT. - HISTORY OF PRESENT ILLNESS
41 year old man with past medical history of DM2, ETOH abuse ( last used 9/2015) , attendant cirrhosis with grade I varices, portal hypertensive gastropathy presents with severe 10/10 abdominal pain and a feeling of distension starting on Friday. He says that the pain is a tense sensation severe, intermittent, relieved by morphine and worse in the moriah-umbillical area. He denies any n/v/d, bleeding per rectum or hematemesis. He says he feels similar to how he felt when he was hospitalized in May of 2016 with C diff and E Coli bacteremia. Additionally he has had fevers and chills at home.     VS in the ED: 101F, 100BPM, 128/74 BP, 16 R , 97% on room air.   He is started on Cefepime and Flagyl by the ED as C diff results negative 41 year old man with past medical history of DM2, ETOH abuse ( last used 9/2015) , attendant cirrhosis with grade I varices, portal hypertensive gastropathy presents with severe 10/10 abdominal pain and a feeling of distension starting on Friday. He says that the pain is a tense sensation severe, intermittent, relieved by morphine and worse in the moriah-umbillical area. He denies any n/v/ but had had diarrhea with 9+ wattery bowel movements daily over the past 2 days, has had no bleeding per rectum or hematemesis. He says he feels similar to how he felt when he was hospitalized in May of 2016 with C diff and E Coli bacteremia. Additionally he has had fevers and chills at home.     VS in the ED: 101F, 100BPM, 128/74 BP, 16 R , 97% on room air.   He is started on Cefepime and Flagyl by the ED as C diff results negative

## 2017-02-27 LAB
ALBUMIN SERPL ELPH-MCNC: 2.2 G/DL — LOW (ref 3.3–5)
ALP SERPL-CCNC: 121 U/L — HIGH (ref 40–120)
ALT FLD-CCNC: 22 U/L RC — SIGNIFICANT CHANGE UP (ref 10–45)
ANION GAP SERPL CALC-SCNC: 11 MMOL/L — SIGNIFICANT CHANGE UP (ref 5–17)
APTT BLD: 48.3 SEC — HIGH (ref 27.5–37.4)
AST SERPL-CCNC: 34 U/L — SIGNIFICANT CHANGE UP (ref 10–40)
BASOPHILS # BLD AUTO: 0 K/UL — SIGNIFICANT CHANGE UP (ref 0–0.2)
BASOPHILS NFR BLD AUTO: 0.8 % — SIGNIFICANT CHANGE UP (ref 0–2)
BILIRUB SERPL-MCNC: 4.4 MG/DL — HIGH (ref 0.2–1.2)
BUN SERPL-MCNC: 9 MG/DL — SIGNIFICANT CHANGE UP (ref 7–23)
CALCIUM SERPL-MCNC: 7 MG/DL — LOW (ref 8.4–10.5)
CHLORIDE SERPL-SCNC: 103 MMOL/L — SIGNIFICANT CHANGE UP (ref 96–108)
CO2 SERPL-SCNC: 23 MMOL/L — SIGNIFICANT CHANGE UP (ref 22–31)
CREAT SERPL-MCNC: 0.57 MG/DL — SIGNIFICANT CHANGE UP (ref 0.5–1.3)
EOSINOPHIL # BLD AUTO: 0.1 K/UL — SIGNIFICANT CHANGE UP (ref 0–0.5)
EOSINOPHIL NFR BLD AUTO: 2.5 % — SIGNIFICANT CHANGE UP (ref 0–6)
GLUCOSE SERPL-MCNC: 197 MG/DL — HIGH (ref 70–99)
HBA1C BLD-MCNC: 7.2 % — HIGH (ref 4–5.6)
HCT VFR BLD CALC: 33.1 % — LOW (ref 39–50)
HGB BLD-MCNC: 11 G/DL — LOW (ref 13–17)
INR BLD: 1.96 RATIO — HIGH (ref 0.88–1.16)
LYMPHOCYTES # BLD AUTO: 0.9 K/UL — LOW (ref 1–3.3)
LYMPHOCYTES # BLD AUTO: 16.9 % — SIGNIFICANT CHANGE UP (ref 13–44)
MAGNESIUM SERPL-MCNC: 1.7 MG/DL — SIGNIFICANT CHANGE UP (ref 1.6–2.6)
MCHC RBC-ENTMCNC: 33.1 PG — SIGNIFICANT CHANGE UP (ref 27–34)
MCHC RBC-ENTMCNC: 33.2 GM/DL — SIGNIFICANT CHANGE UP (ref 32–36)
MCV RBC AUTO: 99.7 FL — SIGNIFICANT CHANGE UP (ref 80–100)
MONOCYTES # BLD AUTO: 0.4 K/UL — SIGNIFICANT CHANGE UP (ref 0–0.9)
MONOCYTES NFR BLD AUTO: 8.1 % — SIGNIFICANT CHANGE UP (ref 2–14)
NEUTROPHILS # BLD AUTO: 3.9 K/UL — SIGNIFICANT CHANGE UP (ref 1.8–7.4)
NEUTROPHILS NFR BLD AUTO: 71.6 % — SIGNIFICANT CHANGE UP (ref 43–77)
PHOSPHATE SERPL-MCNC: 2.2 MG/DL — LOW (ref 2.5–4.5)
PLATELET # BLD AUTO: 44 K/UL — LOW (ref 150–400)
POTASSIUM SERPL-MCNC: 3.5 MMOL/L — SIGNIFICANT CHANGE UP (ref 3.5–5.3)
POTASSIUM SERPL-SCNC: 3.5 MMOL/L — SIGNIFICANT CHANGE UP (ref 3.5–5.3)
PROT SERPL-MCNC: 5.3 G/DL — LOW (ref 6–8.3)
PROTHROM AB SERPL-ACNC: 21.5 SEC — HIGH (ref 10–13.1)
RBC # BLD: 3.32 M/UL — LOW (ref 4.2–5.8)
RBC # FLD: 12.9 % — SIGNIFICANT CHANGE UP (ref 10.3–14.5)
SODIUM SERPL-SCNC: 137 MMOL/L — SIGNIFICANT CHANGE UP (ref 135–145)
WBC # BLD: 5.4 K/UL — SIGNIFICANT CHANGE UP (ref 3.8–10.5)
WBC # FLD AUTO: 5.4 K/UL — SIGNIFICANT CHANGE UP (ref 3.8–10.5)

## 2017-02-27 PROCEDURE — 76700 US EXAM ABDOM COMPLETE: CPT | Mod: 26,59

## 2017-02-27 PROCEDURE — 99233 SBSQ HOSP IP/OBS HIGH 50: CPT | Mod: GC

## 2017-02-27 PROCEDURE — 93975 VASCULAR STUDY: CPT | Mod: 26

## 2017-02-27 RX ORDER — SODIUM,POTASSIUM PHOSPHATES 278-250MG
1 POWDER IN PACKET (EA) ORAL
Qty: 0 | Refills: 0 | Status: COMPLETED | OUTPATIENT
Start: 2017-02-27 | End: 2017-02-28

## 2017-02-27 RX ADMIN — NADOLOL 20 MILLIGRAM(S): 80 TABLET ORAL at 05:29

## 2017-02-27 RX ADMIN — PANTOPRAZOLE SODIUM 40 MILLIGRAM(S): 20 TABLET, DELAYED RELEASE ORAL at 05:29

## 2017-02-27 RX ADMIN — INSULIN GLARGINE 30 UNIT(S): 100 INJECTION, SOLUTION SUBCUTANEOUS at 21:59

## 2017-02-27 RX ADMIN — PIPERACILLIN AND TAZOBACTAM 25 GRAM(S): 4; .5 INJECTION, POWDER, LYOPHILIZED, FOR SOLUTION INTRAVENOUS at 13:41

## 2017-02-27 RX ADMIN — PIPERACILLIN AND TAZOBACTAM 25 GRAM(S): 4; .5 INJECTION, POWDER, LYOPHILIZED, FOR SOLUTION INTRAVENOUS at 21:59

## 2017-02-27 RX ADMIN — Medication 1 TABLET(S): at 21:59

## 2017-02-27 RX ADMIN — Medication 1 TABLET(S): at 18:10

## 2017-02-27 RX ADMIN — Medication 1 TABLET(S): at 12:58

## 2017-02-27 RX ADMIN — Medication 2: at 18:10

## 2017-02-27 RX ADMIN — PIPERACILLIN AND TAZOBACTAM 25 GRAM(S): 4; .5 INJECTION, POWDER, LYOPHILIZED, FOR SOLUTION INTRAVENOUS at 05:29

## 2017-02-27 RX ADMIN — Medication 2: at 22:42

## 2017-02-28 ENCOUNTER — APPOINTMENT (OUTPATIENT)
Dept: CV DIAGNOSTICS | Facility: HOSPITAL | Age: 42
End: 2017-02-28

## 2017-02-28 ENCOUNTER — TRANSCRIPTION ENCOUNTER (OUTPATIENT)
Age: 42
End: 2017-02-28

## 2017-02-28 LAB
-  AMIKACIN: SIGNIFICANT CHANGE UP
-  AMPICILLIN/SULBACTAM: SIGNIFICANT CHANGE UP
-  AMPICILLIN: SIGNIFICANT CHANGE UP
-  AZTREONAM: SIGNIFICANT CHANGE UP
-  CEFAZOLIN: SIGNIFICANT CHANGE UP
-  CEFEPIME: SIGNIFICANT CHANGE UP
-  CEFOXITIN: SIGNIFICANT CHANGE UP
-  CEFTAZIDIME: SIGNIFICANT CHANGE UP
-  CEFTRIAXONE: SIGNIFICANT CHANGE UP
-  CIPROFLOXACIN: SIGNIFICANT CHANGE UP
-  ERTAPENEM: SIGNIFICANT CHANGE UP
-  GENTAMICIN: SIGNIFICANT CHANGE UP
-  IMIPENEM: SIGNIFICANT CHANGE UP
-  LEVOFLOXACIN: SIGNIFICANT CHANGE UP
-  MEROPENEM: SIGNIFICANT CHANGE UP
-  PIPERACILLIN/TAZOBACTAM: SIGNIFICANT CHANGE UP
-  TOBRAMYCIN: SIGNIFICANT CHANGE UP
-  TRIMETHOPRIM/SULFAMETHOXAZOLE: SIGNIFICANT CHANGE UP
ALBUMIN SERPL ELPH-MCNC: 2.1 G/DL — LOW (ref 3.3–5)
ALP SERPL-CCNC: 148 U/L — HIGH (ref 40–120)
ALT FLD-CCNC: 20 U/L RC — SIGNIFICANT CHANGE UP (ref 10–45)
ANION GAP SERPL CALC-SCNC: 8 MMOL/L — SIGNIFICANT CHANGE UP (ref 5–17)
AST SERPL-CCNC: 36 U/L — SIGNIFICANT CHANGE UP (ref 10–40)
BILIRUB SERPL-MCNC: 3.5 MG/DL — HIGH (ref 0.2–1.2)
BUN SERPL-MCNC: 7 MG/DL — SIGNIFICANT CHANGE UP (ref 7–23)
CALCIUM SERPL-MCNC: 6.9 MG/DL — LOW (ref 8.4–10.5)
CHLORIDE SERPL-SCNC: 104 MMOL/L — SIGNIFICANT CHANGE UP (ref 96–108)
CO2 SERPL-SCNC: 23 MMOL/L — SIGNIFICANT CHANGE UP (ref 22–31)
CREAT SERPL-MCNC: 0.59 MG/DL — SIGNIFICANT CHANGE UP (ref 0.5–1.3)
CULTURE RESULTS: SIGNIFICANT CHANGE UP
GLUCOSE SERPL-MCNC: 265 MG/DL — HIGH (ref 70–99)
HCT VFR BLD CALC: 34.4 % — LOW (ref 39–50)
HGB BLD-MCNC: 11.2 G/DL — LOW (ref 13–17)
INR BLD: 1.71 RATIO — HIGH (ref 0.88–1.16)
MAGNESIUM SERPL-MCNC: 1.7 MG/DL — SIGNIFICANT CHANGE UP (ref 1.6–2.6)
MCHC RBC-ENTMCNC: 32.5 PG — SIGNIFICANT CHANGE UP (ref 27–34)
MCHC RBC-ENTMCNC: 32.7 GM/DL — SIGNIFICANT CHANGE UP (ref 32–36)
MCV RBC AUTO: 99.5 FL — SIGNIFICANT CHANGE UP (ref 80–100)
METHOD TYPE: SIGNIFICANT CHANGE UP
PHOSPHATE SERPL-MCNC: 2.2 MG/DL — LOW (ref 2.5–4.5)
PLATELET # BLD AUTO: 54 K/UL — LOW (ref 150–400)
POTASSIUM SERPL-MCNC: 4.2 MMOL/L — SIGNIFICANT CHANGE UP (ref 3.5–5.3)
POTASSIUM SERPL-SCNC: 4.2 MMOL/L — SIGNIFICANT CHANGE UP (ref 3.5–5.3)
PROT SERPL-MCNC: 5.6 G/DL — LOW (ref 6–8.3)
PROTHROM AB SERPL-ACNC: 18.6 SEC — HIGH (ref 10–13.1)
RBC # BLD: 3.46 M/UL — LOW (ref 4.2–5.8)
RBC # FLD: 13.1 % — SIGNIFICANT CHANGE UP (ref 10.3–14.5)
SODIUM SERPL-SCNC: 135 MMOL/L — SIGNIFICANT CHANGE UP (ref 135–145)
SPECIMEN SOURCE: SIGNIFICANT CHANGE UP
WBC # BLD: 4.3 K/UL — SIGNIFICANT CHANGE UP (ref 3.8–10.5)
WBC # FLD AUTO: 4.3 K/UL — SIGNIFICANT CHANGE UP (ref 3.8–10.5)

## 2017-02-28 PROCEDURE — 99233 SBSQ HOSP IP/OBS HIGH 50: CPT | Mod: GC

## 2017-02-28 PROCEDURE — 99254 IP/OBS CNSLTJ NEW/EST MOD 60: CPT | Mod: GC

## 2017-02-28 PROCEDURE — 99232 SBSQ HOSP IP/OBS MODERATE 35: CPT | Mod: GC

## 2017-02-28 RX ORDER — INSULIN LISPRO 100/ML
VIAL (ML) SUBCUTANEOUS
Qty: 0 | Refills: 0 | Status: DISCONTINUED | OUTPATIENT
Start: 2017-02-28 | End: 2017-03-02

## 2017-02-28 RX ORDER — INSULIN LISPRO 100/ML
VIAL (ML) SUBCUTANEOUS AT BEDTIME
Qty: 0 | Refills: 0 | Status: DISCONTINUED | OUTPATIENT
Start: 2017-02-28 | End: 2017-03-02

## 2017-02-28 RX ORDER — DEXTROSE 50 % IN WATER 50 %
25 SYRINGE (ML) INTRAVENOUS ONCE
Qty: 0 | Refills: 0 | Status: DISCONTINUED | OUTPATIENT
Start: 2017-02-28 | End: 2017-03-03

## 2017-02-28 RX ORDER — DEXTROSE 50 % IN WATER 50 %
1 SYRINGE (ML) INTRAVENOUS ONCE
Qty: 0 | Refills: 0 | Status: DISCONTINUED | OUTPATIENT
Start: 2017-02-28 | End: 2017-03-03

## 2017-02-28 RX ORDER — INSULIN LISPRO 100/ML
VIAL (ML) SUBCUTANEOUS AT BEDTIME
Qty: 0 | Refills: 0 | Status: DISCONTINUED | OUTPATIENT
Start: 2017-02-28 | End: 2017-02-28

## 2017-02-28 RX ORDER — SODIUM,POTASSIUM PHOSPHATES 278-250MG
1 POWDER IN PACKET (EA) ORAL
Qty: 0 | Refills: 0 | Status: COMPLETED | OUTPATIENT
Start: 2017-02-28 | End: 2017-02-28

## 2017-02-28 RX ORDER — INSULIN LISPRO 100/ML
VIAL (ML) SUBCUTANEOUS
Qty: 0 | Refills: 0 | Status: DISCONTINUED | OUTPATIENT
Start: 2017-02-28 | End: 2017-02-28

## 2017-02-28 RX ORDER — INSULIN GLARGINE 100 [IU]/ML
30 INJECTION, SOLUTION SUBCUTANEOUS AT BEDTIME
Qty: 0 | Refills: 0 | Status: DISCONTINUED | OUTPATIENT
Start: 2017-02-28 | End: 2017-03-03

## 2017-02-28 RX ORDER — DEXTROSE 50 % IN WATER 50 %
12.5 SYRINGE (ML) INTRAVENOUS ONCE
Qty: 0 | Refills: 0 | Status: DISCONTINUED | OUTPATIENT
Start: 2017-02-28 | End: 2017-03-03

## 2017-02-28 RX ORDER — GLUCAGON INJECTION, SOLUTION 0.5 MG/.1ML
1 INJECTION, SOLUTION SUBCUTANEOUS ONCE
Qty: 0 | Refills: 0 | Status: DISCONTINUED | OUTPATIENT
Start: 2017-02-28 | End: 2017-03-03

## 2017-02-28 RX ORDER — SODIUM CHLORIDE 9 MG/ML
1000 INJECTION, SOLUTION INTRAVENOUS
Qty: 0 | Refills: 0 | Status: DISCONTINUED | OUTPATIENT
Start: 2017-02-28 | End: 2017-03-03

## 2017-02-28 RX ADMIN — PANTOPRAZOLE SODIUM 40 MILLIGRAM(S): 20 TABLET, DELAYED RELEASE ORAL at 05:37

## 2017-02-28 RX ADMIN — Medication 1 TABLET(S): at 17:55

## 2017-02-28 RX ADMIN — Medication 1 TABLET(S): at 08:44

## 2017-02-28 RX ADMIN — PIPERACILLIN AND TAZOBACTAM 25 GRAM(S): 4; .5 INJECTION, POWDER, LYOPHILIZED, FOR SOLUTION INTRAVENOUS at 13:28

## 2017-02-28 RX ADMIN — NADOLOL 20 MILLIGRAM(S): 80 TABLET ORAL at 05:37

## 2017-02-28 RX ADMIN — Medication 2: at 21:52

## 2017-02-28 RX ADMIN — Medication 4: at 12:54

## 2017-02-28 RX ADMIN — Medication 2: at 17:55

## 2017-02-28 RX ADMIN — INSULIN GLARGINE 30 UNIT(S): 100 INJECTION, SOLUTION SUBCUTANEOUS at 21:52

## 2017-02-28 RX ADMIN — PIPERACILLIN AND TAZOBACTAM 25 GRAM(S): 4; .5 INJECTION, POWDER, LYOPHILIZED, FOR SOLUTION INTRAVENOUS at 21:51

## 2017-02-28 RX ADMIN — Medication 1 TABLET(S): at 21:54

## 2017-02-28 RX ADMIN — Medication 2: at 08:44

## 2017-02-28 RX ADMIN — PIPERACILLIN AND TAZOBACTAM 25 GRAM(S): 4; .5 INJECTION, POWDER, LYOPHILIZED, FOR SOLUTION INTRAVENOUS at 05:37

## 2017-02-28 RX ADMIN — Medication 1 TABLET(S): at 13:28

## 2017-02-28 NOTE — DISCHARGE NOTE ADULT - MEDICATION SUMMARY - MEDICATIONS TO TAKE
I will START or STAY ON the medications listed below when I get home from the hospital:    metFORMIN 500 mg oral tablet  -- 1 tab(s) by mouth 2 times a day  -- Indication: For Diabetes mellitus type 2, insulin dependent    nadolol 20 mg oral tablet  -- 1 tab(s) by mouth once a day  -- Indication: For Esophageal varices    Lasix 20 mg oral tablet  -- 1 tab(s) by mouth 2 times a day  -- Indication: For Alcoholic cirrhosis    pantoprazole 40 mg oral delayed release tablet  -- 1 tab(s) by mouth once a day  -- Indication: For Need for prophylactic measure    ciprofloxacin 500 mg oral tablet  -- 1 tab(s) by mouth every 12 hours  -- Indication: For Gram-    multivitamin  -- 1 tab(s) by mouth once a day  -- Indication: For Need for prophylactic measure

## 2017-02-28 NOTE — DISCHARGE NOTE ADULT - CARE PLAN
Principal Discharge DX:	Gram-negative bacteremia  Goal:	stable  Instructions for follow-up, activity and diet:	You were admitted to the hospital for abdominal pain and fevers. You were found to have a bacterial infection in your blood. You were treated with IV fluids and IV antibiotics. Your abdominal pain and fevers subsided. Please continue to take ciprofloxacin 500 two times a day until 03/12/17.  Secondary Diagnosis:	Diabetes mellitus type 2, insulin dependent  Goal:	stable  Instructions for follow-up, activity and diet:	Please continue taking metformin 500 and Lantus 30 U at bedtime  Secondary Diagnosis:	Secondary esophageal varices without bleeding  Goal:	stable  Instructions for follow-up, activity and diet:	Please continue your home Lasix and nadolol Principal Discharge DX:	Gram-negative bacteremia  Goal:	stable  Instructions for follow-up, activity and diet:	You were admitted to the hospital for abdominal pain and fevers. You were found to have a bacterial infection in your blood. You were treated with IV fluids and IV antibiotics. Your abdominal pain and fevers subsided. A procedure was done to find a source of your infection, but it was normal. No gallstones or infections were found. Imaging of your abdomen showed infection in your colon. Please continue to take ciprofloxacin 500 two times a day until 03/12/17. Please return to the ED if you have abdominal pain, fevers, diarrhea. Please follow up with Dr. Thakkar in 2-4 weeks.  Secondary Diagnosis:	Diabetes mellitus type 2, insulin dependent  Goal:	stable  Instructions for follow-up, activity and diet:	Please continue taking metformin 500 and Lantus 30 U at bedtime  Secondary Diagnosis:	Secondary esophageal varices without bleeding  Goal:	stable  Instructions for follow-up, activity and diet:	Please continue your home Lasix and nadolol

## 2017-02-28 NOTE — DISCHARGE NOTE ADULT - CARE PROVIDER_API CALL
Andre Thakkar), Gastroenterology; Internal Medicine  [address] [place] [address]  Phone: [phone]  Fax: [phone]

## 2017-02-28 NOTE — DISCHARGE NOTE ADULT - CARE PROVIDERS DIRECT ADDRESSES
[email or web address]

## 2017-02-28 NOTE — DISCHARGE NOTE ADULT - HOSPITAL COURSE
41 year old man with past medical history of DM2, ETOH abuse ( last used 9/2015) , attendant cirrhosis with grade I varices, portal hypertensive gastropathy presents with severe 10/10 abdominal pain and a feeling of distension starting on Friday. He says that the pain is a tense sensation severe, intermittent, relieved by morphine and worse in the moriah-umbillical area. He denies any n/v/ but had had diarrhea with 9+ wattery bowel movements daily over the past 2 days, has had no bleeding per rectum or hematemesis. He says he feels similar to how he felt when he was hospitalized in May of 2016 with C diff and E Coli bacteremia. Additionally he has had fevers and chills at home.     VS in the ED: 101F, 100BPM, 128/74 BP, 16 R , 97% on room air. Paracentesis was attempted, however, there was inadequate fluid in abdomen to get a specimen.   He was started on Cefepime and Flagyl by the ED as C diff results negative.     On the medicine floors patient was started on zosyn for blood cultures positive for gram negative bacteremia. CT A/P showed portal hypertensive colopathy versus colitis involving the ascending and proximal transverse colon, liver cirrhosis with evidence of portal hypertension and a small amount of ascites, unchanged from prior study, distended gallbladder, unchanged from prior study. HIDA scan was within normal limits. Abdominal Doppler was negative for PVT. Blood cultures grew E coli sensitive to ciprofloxacin. ID team was consulted and agreed to switch to ciprofloxacin. QTc 483.     Patient was continued on Lantus 30 at bedtime with a moderate insulin sliding scale. Metformin was held.     Patient remained afebrile and stable throughout hospital stay. 41 year old man with past medical history of DM2, ETOH abuse ( last used 9/2015) , attendant cirrhosis with grade I varices, portal hypertensive gastropathy presents with severe 10/10 abdominal pain and a feeling of distension starting on Friday. He says that the pain is a tense sensation severe, intermittent, relieved by morphine and worse in the moriah-umbillical area. He denies any n/v/ but had had diarrhea with 9+ wattery bowel movements daily over the past 2 days, has had no bleeding per rectum or hematemesis. He says he feels similar to how he felt when he was hospitalized in May of 2016 with C diff and E Coli bacteremia. Additionally he has had fevers and chills at home.     VS in the ED: 101F, 100BPM, 128/74 BP, 16 R , 97% on room air. Paracentesis was attempted, however, there was inadequate fluid in abdomen to get a specimen.   He was started on Cefepime and Flagyl by the ED as C diff results negative.     On the medicine floors patient was started on zosyn for blood cultures positive for gram negative bacteremia. CT A/P showed portal hypertensive colopathy versus colitis involving the ascending and proximal transverse colon, liver cirrhosis with evidence of portal hypertension and a small amount of ascites, unchanged from prior study, distended gallbladder, unchanged from prior study. HIDA scan was within normal limits. Abdominal Doppler was negative for PVT. Blood cultures grew E coli sensitive to ciprofloxacin. ID team was consulted and agreed to switch to ciprofloxacin. QTc 483. Patient was seen by GI team who recommended EUS and possible ERCP to see if a source of gram negative bactermia can be located. Hepatology team did not recommend SBP ppx at this time.     Patient was continued on Lantus 30 at bedtime with a moderate insulin sliding scale. Metformin was held.     Patient remained afebrile and stable throughout hospital stay. 41 year old man with past medical history of DM2, ETOH abuse ( last used 9/2015) , attendant cirrhosis with grade I varices, portal hypertensive gastropathy presents with severe 10/10 abdominal pain and a feeling of distension starting on Friday. He says that the pain is a tense sensation severe, intermittent, relieved by morphine and worse in the moriah-umbillical area. He denies any n/v/ but had had diarrhea with 9+ wattery bowel movements daily over the past 2 days, has had no bleeding per rectum or hematemesis. He says he feels similar to how he felt when he was hospitalized in May of 2016 with C diff and E Coli bacteremia. Additionally he has had fevers and chills at home.     VS in the ED: 101F, 100BPM, 128/74 BP, 16 R , 97% on room air. Paracentesis was attempted, however, there was inadequate fluid in abdomen to get a specimen. He was started on Cefepime and Flagyl by the ED as C diff results negative.     On the medicine floors patient was started on zosyn for blood cultures positive for gram negative bacteremia. CT A/P showed portal hypertensive colopathy versus colitis involving the ascending and proximal transverse colon, liver cirrhosis with evidence of portal hypertension and a small amount of ascites, unchanged from prior study, distended gallbladder, unchanged from prior study. HIDA scan was within normal limits. Abdominal Doppler was negative for PVT. Blood cultures grew E coli sensitive to ciprofloxacin. ID team was consulted and agreed to switch to ciprofloxacin. QTc 483. Patient was seen by GI team who recommended EUS and possible ERCP to see if a source of gram negative bactermia can be located. Hepatology team did not recommend SBP ppx at this time.     Patient was continued on Lantus 30 at bedtime with a moderate insulin sliding scale. Metformin was held.     Patient remained afebrile and stable throughout hospital stay. 41 year old man with past medical history of DM2, ETOH abuse ( last used 9/2015) , attendant cirrhosis with grade I varices, portal hypertensive gastropathy presents with severe 10/10 abdominal pain and a feeling of distension starting on Friday. He says that the pain is a tense sensation severe, intermittent, relieved by morphine and worse in the moriah-umbillical area. He denies any n/v/ but had had diarrhea with 9+ wattery bowel movements daily over the past 2 days, has had no bleeding per rectum or hematemesis. He says he feels similar to how he felt when he was hospitalized in May of 2016 with C diff and E Coli bacteremia. Additionally he has had fevers and chills at home.     VS in the ED: 101F, 100BPM, 128/74 BP, 16 R , 97% on room air. Paracentesis was attempted, however, there was inadequate fluid in abdomen to get a specimen. He was started on Cefepime and Flagyl by the ED as C diff results negative.     On the medicine floors patient was started on zosyn for blood cultures positive for gram negative bacteremia. CT A/P showed portal hypertensive colopathy versus colitis involving the ascending and proximal transverse colon, liver cirrhosis with evidence of portal hypertension and a small amount of ascites, unchanged from prior study, distended gallbladder, unchanged from prior study. HIDA scan was within normal limits. Abdominal Doppler was negative for PVT. Blood cultures grew E coli sensitive to ciprofloxacin. ID team was consulted and agreed to switch to ciprofloxacin. QTc 483. Patient was seen by GI team who recommended EUS and possible ERCP to see if a source of gram negative bactermia can be located. Hepatology team did not recommend SBP ppx at this time. Negative for gallstones or source of infection.     Patient was continued on Lantus 30 at bedtime with a moderate insulin sliding scale. Metformin was held.     Patient remained afebrile and stable throughout hospital stay.

## 2017-02-28 NOTE — DISCHARGE NOTE ADULT - PATIENT PORTAL LINK FT
“You can access the [employer] Patient Portal, offered by [facility], by registering with the following website: [email or web address]”

## 2017-02-28 NOTE — DISCHARGE NOTE ADULT - MEDICATION SUMMARY - MEDICATIONS TO STOP TAKING
I will STOP taking the medications listed below when I get home from the hospital:    Flagyl 500 mg oral tablet  -- 1 tab(s) by mouth every 8 hours until medication is finished.

## 2017-02-28 NOTE — DISCHARGE NOTE ADULT - PLAN OF CARE
stable You were admitted to the hospital for abdominal pain and fevers. You were found to have a bacterial infection in your blood. You were treated with IV fluids and IV antibiotics. Your abdominal pain and fevers subsided. Please continue to take ciprofloxacin 500 two times a day until 03/12/17. Please continue taking metformin 500 and Lantus 30 U at bedtime Please continue your home Lasix and nadolol You were admitted to the hospital for abdominal pain and fevers. You were found to have a bacterial infection in your blood. You were treated with IV fluids and IV antibiotics. Your abdominal pain and fevers subsided. A procedure was done to find a source of your infection, but it was normal. No gallstones or infections were found. Imaging of your abdomen showed infection in your colon. Please continue to take ciprofloxacin 500 two times a day until 03/12/17. Please return to the ED if you have abdominal pain, fevers, diarrhea. Please follow up with Dr. Thakkar in 2-4 weeks.

## 2017-03-01 LAB
ALBUMIN SERPL ELPH-MCNC: 2 G/DL — LOW (ref 3.3–5)
ALP SERPL-CCNC: 184 U/L — HIGH (ref 40–120)
ALT FLD-CCNC: 20 U/L RC — SIGNIFICANT CHANGE UP (ref 10–45)
ANION GAP SERPL CALC-SCNC: 9 MMOL/L — SIGNIFICANT CHANGE UP (ref 5–17)
AST SERPL-CCNC: 36 U/L — SIGNIFICANT CHANGE UP (ref 10–40)
BILIRUB SERPL-MCNC: 3 MG/DL — HIGH (ref 0.2–1.2)
BUN SERPL-MCNC: 6 MG/DL — LOW (ref 7–23)
CALCIUM SERPL-MCNC: 7.1 MG/DL — LOW (ref 8.4–10.5)
CHLORIDE SERPL-SCNC: 105 MMOL/L — SIGNIFICANT CHANGE UP (ref 96–108)
CO2 SERPL-SCNC: 22 MMOL/L — SIGNIFICANT CHANGE UP (ref 22–31)
CREAT SERPL-MCNC: 0.63 MG/DL — SIGNIFICANT CHANGE UP (ref 0.5–1.3)
GLUCOSE SERPL-MCNC: 230 MG/DL — HIGH (ref 70–99)
HCT VFR BLD CALC: 32.6 % — LOW (ref 39–50)
HGB BLD-MCNC: 11 G/DL — LOW (ref 13–17)
INR BLD: 1.76 RATIO — HIGH (ref 0.88–1.16)
MAGNESIUM SERPL-MCNC: 1.7 MG/DL — SIGNIFICANT CHANGE UP (ref 1.6–2.6)
MCHC RBC-ENTMCNC: 33.7 PG — SIGNIFICANT CHANGE UP (ref 27–34)
MCHC RBC-ENTMCNC: 33.8 GM/DL — SIGNIFICANT CHANGE UP (ref 32–36)
MCV RBC AUTO: 99.8 FL — SIGNIFICANT CHANGE UP (ref 80–100)
PHOSPHATE SERPL-MCNC: 2.7 MG/DL — SIGNIFICANT CHANGE UP (ref 2.5–4.5)
PLATELET # BLD AUTO: 55 K/UL — LOW (ref 150–400)
POTASSIUM SERPL-MCNC: 4.1 MMOL/L — SIGNIFICANT CHANGE UP (ref 3.5–5.3)
POTASSIUM SERPL-SCNC: 4.1 MMOL/L — SIGNIFICANT CHANGE UP (ref 3.5–5.3)
PROT SERPL-MCNC: 5.5 G/DL — LOW (ref 6–8.3)
PROTHROM AB SERPL-ACNC: 19.3 SEC — HIGH (ref 10–13.1)
RBC # BLD: 3.27 M/UL — LOW (ref 4.2–5.8)
RBC # FLD: 12.8 % — SIGNIFICANT CHANGE UP (ref 10.3–14.5)
SODIUM SERPL-SCNC: 136 MMOL/L — SIGNIFICANT CHANGE UP (ref 135–145)
WBC # BLD: 3.6 K/UL — LOW (ref 3.8–10.5)
WBC # FLD AUTO: 3.6 K/UL — LOW (ref 3.8–10.5)

## 2017-03-01 PROCEDURE — 99233 SBSQ HOSP IP/OBS HIGH 50: CPT | Mod: GC

## 2017-03-01 PROCEDURE — 99232 SBSQ HOSP IP/OBS MODERATE 35: CPT

## 2017-03-01 PROCEDURE — 99232 SBSQ HOSP IP/OBS MODERATE 35: CPT | Mod: GC

## 2017-03-01 RX ORDER — FUROSEMIDE 40 MG
20 TABLET ORAL DAILY
Qty: 0 | Refills: 0 | Status: DISCONTINUED | OUTPATIENT
Start: 2017-03-01 | End: 2017-03-03

## 2017-03-01 RX ORDER — CIPROFLOXACIN LACTATE 400MG/40ML
500 VIAL (ML) INTRAVENOUS EVERY 12 HOURS
Qty: 0 | Refills: 0 | Status: DISCONTINUED | OUTPATIENT
Start: 2017-03-01 | End: 2017-03-03

## 2017-03-01 RX ADMIN — PANTOPRAZOLE SODIUM 40 MILLIGRAM(S): 20 TABLET, DELAYED RELEASE ORAL at 05:19

## 2017-03-01 RX ADMIN — NADOLOL 20 MILLIGRAM(S): 80 TABLET ORAL at 05:18

## 2017-03-01 RX ADMIN — Medication 4: at 08:12

## 2017-03-01 RX ADMIN — Medication 4: at 22:02

## 2017-03-01 RX ADMIN — Medication 20 MILLIGRAM(S): at 11:58

## 2017-03-01 RX ADMIN — Medication 4: at 17:21

## 2017-03-01 RX ADMIN — PIPERACILLIN AND TAZOBACTAM 25 GRAM(S): 4; .5 INJECTION, POWDER, LYOPHILIZED, FOR SOLUTION INTRAVENOUS at 05:20

## 2017-03-01 RX ADMIN — Medication 500 MILLIGRAM(S): at 17:21

## 2017-03-01 RX ADMIN — Medication 4: at 11:58

## 2017-03-01 RX ADMIN — INSULIN GLARGINE 30 UNIT(S): 100 INJECTION, SOLUTION SUBCUTANEOUS at 22:01

## 2017-03-02 ENCOUNTER — RX RENEWAL (OUTPATIENT)
Age: 42
End: 2017-03-02

## 2017-03-02 LAB
ALBUMIN SERPL ELPH-MCNC: 2.1 G/DL — LOW (ref 3.3–5)
ALP SERPL-CCNC: 201 U/L — HIGH (ref 40–120)
ALT FLD-CCNC: 22 U/L RC — SIGNIFICANT CHANGE UP (ref 10–45)
ANION GAP SERPL CALC-SCNC: 12 MMOL/L — SIGNIFICANT CHANGE UP (ref 5–17)
APTT BLD: 42.8 SEC — HIGH (ref 27.5–37.4)
AST SERPL-CCNC: 39 U/L — SIGNIFICANT CHANGE UP (ref 10–40)
BILIRUB SERPL-MCNC: 3.1 MG/DL — HIGH (ref 0.2–1.2)
BUN SERPL-MCNC: 7 MG/DL — SIGNIFICANT CHANGE UP (ref 7–23)
CALCIUM SERPL-MCNC: 7.4 MG/DL — LOW (ref 8.4–10.5)
CHLORIDE SERPL-SCNC: 104 MMOL/L — SIGNIFICANT CHANGE UP (ref 96–108)
CO2 SERPL-SCNC: 23 MMOL/L — SIGNIFICANT CHANGE UP (ref 22–31)
CREAT SERPL-MCNC: 0.64 MG/DL — SIGNIFICANT CHANGE UP (ref 0.5–1.3)
CULTURE RESULTS: SIGNIFICANT CHANGE UP
GLUCOSE SERPL-MCNC: 247 MG/DL — HIGH (ref 70–99)
HCT VFR BLD CALC: 32.8 % — LOW (ref 39–50)
HGB BLD-MCNC: 11.1 G/DL — LOW (ref 13–17)
INR BLD: 1.66 RATIO — HIGH (ref 0.88–1.16)
MAGNESIUM SERPL-MCNC: 1.6 MG/DL — SIGNIFICANT CHANGE UP (ref 1.6–2.6)
MCHC RBC-ENTMCNC: 33.9 GM/DL — SIGNIFICANT CHANGE UP (ref 32–36)
MCHC RBC-ENTMCNC: 33.9 PG — SIGNIFICANT CHANGE UP (ref 27–34)
MCV RBC AUTO: 100 FL — SIGNIFICANT CHANGE UP (ref 80–100)
ORGANISM # SPEC MICROSCOPIC CNT: SIGNIFICANT CHANGE UP
ORGANISM # SPEC MICROSCOPIC CNT: SIGNIFICANT CHANGE UP
PHOSPHATE SERPL-MCNC: 3 MG/DL — SIGNIFICANT CHANGE UP (ref 2.5–4.5)
PLATELET # BLD AUTO: 53 K/UL — LOW (ref 150–400)
POTASSIUM SERPL-MCNC: 4.1 MMOL/L — SIGNIFICANT CHANGE UP (ref 3.5–5.3)
POTASSIUM SERPL-SCNC: 4.1 MMOL/L — SIGNIFICANT CHANGE UP (ref 3.5–5.3)
PROT SERPL-MCNC: 5.5 G/DL — LOW (ref 6–8.3)
PROTHROM AB SERPL-ACNC: 18 SEC — HIGH (ref 10–13.1)
RBC # BLD: 3.27 M/UL — LOW (ref 4.2–5.8)
RBC # FLD: 13.1 % — SIGNIFICANT CHANGE UP (ref 10.3–14.5)
SODIUM SERPL-SCNC: 139 MMOL/L — SIGNIFICANT CHANGE UP (ref 135–145)
SPECIMEN SOURCE: SIGNIFICANT CHANGE UP
WBC # BLD: 3.8 K/UL — SIGNIFICANT CHANGE UP (ref 3.8–10.5)
WBC # FLD AUTO: 3.8 K/UL — SIGNIFICANT CHANGE UP (ref 3.8–10.5)

## 2017-03-02 PROCEDURE — 99232 SBSQ HOSP IP/OBS MODERATE 35: CPT | Mod: GC

## 2017-03-02 PROCEDURE — 99233 SBSQ HOSP IP/OBS HIGH 50: CPT | Mod: GC

## 2017-03-02 PROCEDURE — 99232 SBSQ HOSP IP/OBS MODERATE 35: CPT

## 2017-03-02 PROCEDURE — 99231 SBSQ HOSP IP/OBS SF/LOW 25: CPT | Mod: GC

## 2017-03-02 PROCEDURE — 93970 EXTREMITY STUDY: CPT | Mod: 26

## 2017-03-02 RX ORDER — INSULIN LISPRO 100/ML
VIAL (ML) SUBCUTANEOUS EVERY 6 HOURS
Qty: 0 | Refills: 0 | Status: DISCONTINUED | OUTPATIENT
Start: 2017-03-02 | End: 2017-03-03

## 2017-03-02 RX ADMIN — Medication 500 MILLIGRAM(S): at 05:14

## 2017-03-02 RX ADMIN — Medication 8: at 17:09

## 2017-03-02 RX ADMIN — Medication 4: at 23:58

## 2017-03-02 RX ADMIN — Medication 500 MILLIGRAM(S): at 17:10

## 2017-03-02 RX ADMIN — Medication 4: at 06:13

## 2017-03-02 RX ADMIN — Medication 20 MILLIGRAM(S): at 05:14

## 2017-03-02 RX ADMIN — INSULIN GLARGINE 30 UNIT(S): 100 INJECTION, SOLUTION SUBCUTANEOUS at 23:58

## 2017-03-02 RX ADMIN — NADOLOL 20 MILLIGRAM(S): 80 TABLET ORAL at 05:15

## 2017-03-02 RX ADMIN — PANTOPRAZOLE SODIUM 40 MILLIGRAM(S): 20 TABLET, DELAYED RELEASE ORAL at 05:15

## 2017-03-03 VITALS
DIASTOLIC BLOOD PRESSURE: 70 MMHG | RESPIRATION RATE: 18 BRPM | OXYGEN SATURATION: 95 % | TEMPERATURE: 98 F | SYSTOLIC BLOOD PRESSURE: 105 MMHG | HEART RATE: 69 BPM

## 2017-03-03 LAB
ALBUMIN SERPL ELPH-MCNC: 2.2 G/DL — LOW (ref 3.3–5)
ALP SERPL-CCNC: 206 U/L — HIGH (ref 40–120)
ALT FLD-CCNC: 23 U/L RC — SIGNIFICANT CHANGE UP (ref 10–45)
ANION GAP SERPL CALC-SCNC: 10 MMOL/L — SIGNIFICANT CHANGE UP (ref 5–17)
AST SERPL-CCNC: 42 U/L — HIGH (ref 10–40)
BILIRUB SERPL-MCNC: 3.5 MG/DL — HIGH (ref 0.2–1.2)
BUN SERPL-MCNC: 6 MG/DL — LOW (ref 7–23)
CALCIUM SERPL-MCNC: 7.8 MG/DL — LOW (ref 8.4–10.5)
CHLORIDE SERPL-SCNC: 104 MMOL/L — SIGNIFICANT CHANGE UP (ref 96–108)
CO2 SERPL-SCNC: 23 MMOL/L — SIGNIFICANT CHANGE UP (ref 22–31)
CREAT SERPL-MCNC: 0.61 MG/DL — SIGNIFICANT CHANGE UP (ref 0.5–1.3)
CULTURE RESULTS: SIGNIFICANT CHANGE UP
GLUCOSE SERPL-MCNC: 247 MG/DL — HIGH (ref 70–99)
HCT VFR BLD CALC: 35 % — LOW (ref 39–50)
HGB BLD-MCNC: 12 G/DL — LOW (ref 13–17)
INR BLD: 1.61 RATIO — HIGH (ref 0.88–1.16)
MAGNESIUM SERPL-MCNC: 1.6 MG/DL — SIGNIFICANT CHANGE UP (ref 1.6–2.6)
MCHC RBC-ENTMCNC: 33.9 PG — SIGNIFICANT CHANGE UP (ref 27–34)
MCHC RBC-ENTMCNC: 34.4 GM/DL — SIGNIFICANT CHANGE UP (ref 32–36)
MCV RBC AUTO: 98.6 FL — SIGNIFICANT CHANGE UP (ref 80–100)
PHOSPHATE SERPL-MCNC: 3.5 MG/DL — SIGNIFICANT CHANGE UP (ref 2.5–4.5)
PLATELET # BLD AUTO: 60 K/UL — LOW (ref 150–400)
POTASSIUM SERPL-MCNC: 4.1 MMOL/L — SIGNIFICANT CHANGE UP (ref 3.5–5.3)
POTASSIUM SERPL-SCNC: 4.1 MMOL/L — SIGNIFICANT CHANGE UP (ref 3.5–5.3)
PROT SERPL-MCNC: 6 G/DL — SIGNIFICANT CHANGE UP (ref 6–8.3)
PROTHROM AB SERPL-ACNC: 17.6 SEC — HIGH (ref 10–13.1)
RBC # BLD: 3.55 M/UL — LOW (ref 4.2–5.8)
RBC # FLD: 13.5 % — SIGNIFICANT CHANGE UP (ref 10.3–14.5)
SODIUM SERPL-SCNC: 137 MMOL/L — SIGNIFICANT CHANGE UP (ref 135–145)
SPECIMEN SOURCE: SIGNIFICANT CHANGE UP
WBC # BLD: 4.5 K/UL — SIGNIFICANT CHANGE UP (ref 3.8–10.5)
WBC # FLD AUTO: 4.5 K/UL — SIGNIFICANT CHANGE UP (ref 3.8–10.5)

## 2017-03-03 PROCEDURE — 96374 THER/PROPH/DIAG INJ IV PUSH: CPT | Mod: XU

## 2017-03-03 PROCEDURE — 85014 HEMATOCRIT: CPT

## 2017-03-03 PROCEDURE — 80053 COMPREHEN METABOLIC PANEL: CPT

## 2017-03-03 PROCEDURE — 80307 DRUG TEST PRSMV CHEM ANLYZR: CPT

## 2017-03-03 PROCEDURE — 83036 HEMOGLOBIN GLYCOSYLATED A1C: CPT

## 2017-03-03 PROCEDURE — 87086 URINE CULTURE/COLONY COUNT: CPT

## 2017-03-03 PROCEDURE — 99239 HOSP IP/OBS DSCHRG MGMT >30: CPT

## 2017-03-03 PROCEDURE — 87045 FECES CULTURE AEROBIC BACT: CPT

## 2017-03-03 PROCEDURE — 87040 BLOOD CULTURE FOR BACTERIA: CPT

## 2017-03-03 PROCEDURE — 74177 CT ABD & PELVIS W/CONTRAST: CPT

## 2017-03-03 PROCEDURE — 81001 URINALYSIS AUTO W/SCOPE: CPT

## 2017-03-03 PROCEDURE — 99285 EMERGENCY DEPT VISIT HI MDM: CPT | Mod: 25

## 2017-03-03 PROCEDURE — 76700 US EXAM ABDOM COMPLETE: CPT

## 2017-03-03 PROCEDURE — 84295 ASSAY OF SERUM SODIUM: CPT

## 2017-03-03 PROCEDURE — 87046 STOOL CULTR AEROBIC BACT EA: CPT

## 2017-03-03 PROCEDURE — 82330 ASSAY OF CALCIUM: CPT

## 2017-03-03 PROCEDURE — 84100 ASSAY OF PHOSPHORUS: CPT

## 2017-03-03 PROCEDURE — 85027 COMPLETE CBC AUTOMATED: CPT

## 2017-03-03 PROCEDURE — 87486 CHLMYD PNEUM DNA AMP PROBE: CPT

## 2017-03-03 PROCEDURE — 87493 C DIFF AMPLIFIED PROBE: CPT

## 2017-03-03 PROCEDURE — 71045 X-RAY EXAM CHEST 1 VIEW: CPT

## 2017-03-03 PROCEDURE — 93970 EXTREMITY STUDY: CPT

## 2017-03-03 PROCEDURE — 85610 PROTHROMBIN TIME: CPT

## 2017-03-03 PROCEDURE — 82803 BLOOD GASES ANY COMBINATION: CPT

## 2017-03-03 PROCEDURE — 85730 THROMBOPLASTIN TIME PARTIAL: CPT

## 2017-03-03 PROCEDURE — 83735 ASSAY OF MAGNESIUM: CPT

## 2017-03-03 PROCEDURE — 87177 OVA AND PARASITES SMEARS: CPT

## 2017-03-03 PROCEDURE — 87581 M.PNEUMON DNA AMP PROBE: CPT

## 2017-03-03 PROCEDURE — 43237 ENDOSCOPIC US EXAM ESOPH: CPT | Mod: GC

## 2017-03-03 PROCEDURE — 78227 HEPATOBIL SYST IMAGE W/DRUG: CPT

## 2017-03-03 PROCEDURE — 84132 ASSAY OF SERUM POTASSIUM: CPT

## 2017-03-03 PROCEDURE — 82947 ASSAY GLUCOSE BLOOD QUANT: CPT

## 2017-03-03 PROCEDURE — A9537: CPT

## 2017-03-03 PROCEDURE — 93975 VASCULAR STUDY: CPT

## 2017-03-03 PROCEDURE — 96375 TX/PRO/DX INJ NEW DRUG ADDON: CPT | Mod: XU

## 2017-03-03 PROCEDURE — 82435 ASSAY OF BLOOD CHLORIDE: CPT

## 2017-03-03 PROCEDURE — 87633 RESP VIRUS 12-25 TARGETS: CPT

## 2017-03-03 PROCEDURE — 99232 SBSQ HOSP IP/OBS MODERATE 35: CPT | Mod: GC

## 2017-03-03 PROCEDURE — 99232 SBSQ HOSP IP/OBS MODERATE 35: CPT

## 2017-03-03 PROCEDURE — 87186 SC STD MICRODIL/AGAR DIL: CPT

## 2017-03-03 PROCEDURE — 83605 ASSAY OF LACTIC ACID: CPT

## 2017-03-03 PROCEDURE — 82248 BILIRUBIN DIRECT: CPT

## 2017-03-03 PROCEDURE — 87798 DETECT AGENT NOS DNA AMP: CPT

## 2017-03-03 PROCEDURE — 83690 ASSAY OF LIPASE: CPT

## 2017-03-03 RX ORDER — INSULIN GLARGINE 100 [IU]/ML
30 INJECTION, SOLUTION SUBCUTANEOUS
Qty: 0 | Refills: 0 | COMMUNITY

## 2017-03-03 RX ORDER — CIPROFLOXACIN LACTATE 400MG/40ML
1 VIAL (ML) INTRAVENOUS
Qty: 18 | Refills: 0 | OUTPATIENT
Start: 2017-03-03 | End: 2017-03-12

## 2017-03-03 RX ADMIN — Medication 500 MILLIGRAM(S): at 06:20

## 2017-03-03 RX ADMIN — PANTOPRAZOLE SODIUM 40 MILLIGRAM(S): 20 TABLET, DELAYED RELEASE ORAL at 06:20

## 2017-03-03 RX ADMIN — Medication 4: at 06:33

## 2017-03-03 RX ADMIN — Medication 6: at 17:06

## 2017-03-03 RX ADMIN — Medication 500 MILLIGRAM(S): at 17:06

## 2017-03-03 RX ADMIN — NADOLOL 20 MILLIGRAM(S): 80 TABLET ORAL at 06:20

## 2017-03-03 RX ADMIN — Medication 20 MILLIGRAM(S): at 06:20

## 2017-03-04 LAB
CULTURE RESULTS: SIGNIFICANT CHANGE UP
CULTURE RESULTS: SIGNIFICANT CHANGE UP
SPECIMEN SOURCE: SIGNIFICANT CHANGE UP
SPECIMEN SOURCE: SIGNIFICANT CHANGE UP

## 2017-03-13 ENCOUNTER — RX RENEWAL (OUTPATIENT)
Age: 42
End: 2017-03-13

## 2017-03-15 ENCOUNTER — LABORATORY RESULT (OUTPATIENT)
Age: 42
End: 2017-03-15

## 2017-03-15 LAB
ALBUMIN SERPL ELPH-MCNC: 2.7 G/DL
ALP BLD-CCNC: 282 U/L
ALT SERPL-CCNC: 30 U/L
ANION GAP SERPL CALC-SCNC: 13 MMOL/L
AST SERPL-CCNC: 60 U/L
BASOPHILS # BLD AUTO: 0.04 K/UL
BASOPHILS NFR BLD AUTO: 0.8 %
BILIRUB SERPL-MCNC: 4.8 MG/DL
BUN SERPL-MCNC: 7 MG/DL
CALCIUM SERPL-MCNC: 8.3 MG/DL
CHLORIDE SERPL-SCNC: 103 MMOL/L
CO2 SERPL-SCNC: 23 MMOL/L
CREAT SERPL-MCNC: 0.74 MG/DL
EOSINOPHIL # BLD AUTO: 0.13 K/UL
EOSINOPHIL NFR BLD AUTO: 2.8 %
HCT VFR BLD CALC: 36.2 %
HGB BLD-MCNC: 12.5 G/DL
IMM GRANULOCYTES NFR BLD AUTO: 0.2 %
INR PPP: 1.48 RATIO
LYMPHOCYTES # BLD AUTO: 0.97 K/UL
LYMPHOCYTES NFR BLD AUTO: 20.6 %
MAN DIFF?: NORMAL
MCHC RBC-ENTMCNC: 32.6 PG
MCHC RBC-ENTMCNC: 34.5 GM/DL
MCV RBC AUTO: 94.5 FL
MONOCYTES # BLD AUTO: 0.43 K/UL
MONOCYTES NFR BLD AUTO: 9.1 %
NEUTROPHILS # BLD AUTO: 3.13 K/UL
NEUTROPHILS NFR BLD AUTO: 66.5 %
PLATELET # BLD AUTO: 65 K/UL
POTASSIUM SERPL-SCNC: 4.7 MMOL/L
PROT SERPL-MCNC: 6.9 G/DL
PT BLD: 16.8 SEC
RBC # BLD: 3.83 M/UL
RBC # FLD: 15.1 %
SODIUM SERPL-SCNC: 139 MMOL/L
WBC # FLD AUTO: 4.71 K/UL

## 2017-03-16 ENCOUNTER — APPOINTMENT (OUTPATIENT)
Age: 42
End: 2017-03-16

## 2017-03-16 VITALS
DIASTOLIC BLOOD PRESSURE: 68 MMHG | SYSTOLIC BLOOD PRESSURE: 111 MMHG | TEMPERATURE: 99.2 F | WEIGHT: 198 LBS | BODY MASS INDEX: 30.01 KG/M2 | HEART RATE: 73 BPM | HEIGHT: 68 IN | RESPIRATION RATE: 14 BRPM

## 2017-03-22 ENCOUNTER — APPOINTMENT (OUTPATIENT)
Dept: INTERNAL MEDICINE | Facility: CLINIC | Age: 42
End: 2017-03-22

## 2017-03-22 ENCOUNTER — LABORATORY RESULT (OUTPATIENT)
Age: 42
End: 2017-03-22

## 2017-03-22 VITALS
BODY MASS INDEX: 30.77 KG/M2 | DIASTOLIC BLOOD PRESSURE: 70 MMHG | HEART RATE: 82 BPM | SYSTOLIC BLOOD PRESSURE: 118 MMHG | HEIGHT: 68 IN | WEIGHT: 203 LBS | TEMPERATURE: 98.3 F | OXYGEN SATURATION: 95 %

## 2017-03-22 DIAGNOSIS — Z87.09 PERSONAL HISTORY OF OTHER DISEASES OF THE RESPIRATORY SYSTEM: ICD-10-CM

## 2017-03-22 DIAGNOSIS — R49.0 DYSPHONIA: ICD-10-CM

## 2017-03-24 LAB
ALBUMIN SERPL ELPH-MCNC: 2.7 G/DL
ALP BLD-CCNC: 325 U/L
ALT SERPL-CCNC: 29 U/L
ANION GAP SERPL CALC-SCNC: 11 MMOL/L
AST SERPL-CCNC: 57 U/L
BACTERIA UR CULT: NORMAL
BASOPHILS # BLD AUTO: 0.04 K/UL
BASOPHILS NFR BLD AUTO: 1 %
BILIRUB SERPL-MCNC: 4.5 MG/DL
BUN SERPL-MCNC: 8 MG/DL
CALCIUM SERPL-MCNC: 7.7 MG/DL
CHLORIDE SERPL-SCNC: 98 MMOL/L
CO2 SERPL-SCNC: 22 MMOL/L
CREAT SERPL-MCNC: 0.8 MG/DL
EOSINOPHIL # BLD AUTO: 0.16 K/UL
EOSINOPHIL NFR BLD AUTO: 4 %
GLUCOSE SERPL-MCNC: 436 MG/DL
HBA1C MFR BLD HPLC: 8.4 %
HCT VFR BLD CALC: 35 %
HGB BLD-MCNC: 12 G/DL
IMM GRANULOCYTES NFR BLD AUTO: 0.3 %
LYMPHOCYTES # BLD AUTO: 0.76 K/UL
LYMPHOCYTES NFR BLD AUTO: 19 %
MAN DIFF?: NORMAL
MCHC RBC-ENTMCNC: 32.1 PG
MCHC RBC-ENTMCNC: 34.3 GM/DL
MCV RBC AUTO: 93.6 FL
MONOCYTES # BLD AUTO: 0.42 K/UL
MONOCYTES NFR BLD AUTO: 10.5 %
NEUTROPHILS # BLD AUTO: 2.6 K/UL
NEUTROPHILS NFR BLD AUTO: 65.2 %
PLATELET # BLD AUTO: 54 K/UL
POTASSIUM SERPL-SCNC: 4.4 MMOL/L
PROT SERPL-MCNC: 6.8 G/DL
RBC # BLD: 3.74 M/UL
RBC # FLD: 14.9 %
SODIUM SERPL-SCNC: 131 MMOL/L
WBC # FLD AUTO: 3.99 K/UL

## 2017-03-28 LAB — BACTERIA BLD CULT: NORMAL

## 2017-04-10 ENCOUNTER — APPOINTMENT (OUTPATIENT)
Dept: CV DIAGNOSITCS | Facility: HOSPITAL | Age: 42
End: 2017-04-10

## 2017-04-10 ENCOUNTER — OUTPATIENT (OUTPATIENT)
Dept: OUTPATIENT SERVICES | Facility: HOSPITAL | Age: 42
LOS: 1 days | End: 2017-04-10
Payer: COMMERCIAL

## 2017-04-10 DIAGNOSIS — I25.10 ATHEROSCLEROTIC HEART DISEASE OF NATIVE CORONARY ARTERY WITHOUT ANGINA PECTORIS: ICD-10-CM

## 2017-04-10 PROCEDURE — 93325 DOPPLER ECHO COLOR FLOW MAPG: CPT | Mod: 26

## 2017-04-10 PROCEDURE — 93320 DOPPLER ECHO COMPLETE: CPT | Mod: 26

## 2017-04-10 PROCEDURE — 93325 DOPPLER ECHO COLOR FLOW MAPG: CPT

## 2017-04-10 PROCEDURE — 93351 STRESS TTE COMPLETE: CPT

## 2017-04-10 PROCEDURE — 76376 3D RENDER W/INTRP POSTPROCES: CPT

## 2017-04-10 PROCEDURE — 93320 DOPPLER ECHO COMPLETE: CPT

## 2017-04-10 PROCEDURE — 93018 CV STRESS TEST I&R ONLY: CPT

## 2017-04-10 PROCEDURE — 93016 CV STRESS TEST SUPVJ ONLY: CPT

## 2017-04-10 PROCEDURE — 76376 3D RENDER W/INTRP POSTPROCES: CPT | Mod: 26

## 2017-04-10 PROCEDURE — 93350 STRESS TTE ONLY: CPT | Mod: 26

## 2017-04-12 ENCOUNTER — INPATIENT (INPATIENT)
Facility: HOSPITAL | Age: 42
LOS: 6 days | Discharge: HOME | DRG: 372 | End: 2017-04-19
Attending: INTERNAL MEDICINE | Admitting: EMERGENCY MEDICINE
Payer: COMMERCIAL

## 2017-04-12 VITALS
RESPIRATION RATE: 18 BRPM | DIASTOLIC BLOOD PRESSURE: 75 MMHG | OXYGEN SATURATION: 93 % | HEART RATE: 88 BPM | SYSTOLIC BLOOD PRESSURE: 137 MMHG | TEMPERATURE: 100 F

## 2017-04-12 PROCEDURE — 99285 EMERGENCY DEPT VISIT HI MDM: CPT | Mod: 25

## 2017-04-12 PROCEDURE — 93010 ELECTROCARDIOGRAM REPORT: CPT

## 2017-04-12 NOTE — ED ADULT NURSE NOTE - EENT WDL
Eyes with no visual disturbances. Jaundice noted to sclera.  Ears clean and dry and no hearing difficulties. Nose with pink mucosa and no drainage.  Mouth mucous membranes moist and pink.  No tenderness or swelling to throat or neck.

## 2017-04-12 NOTE — ED ADULT NURSE NOTE - OBJECTIVE STATEMENT
41 year old male patient presents to ED c/o generalized abd pain, and fever x 2 weeks. Patient d/c from [facility] following stay for similar presentation. Pt reports fever Tmax of 102F today, last took tylenol at 9pm. Pt reports multiple episodes of watery diarrhea since yesterday. Abdomen slightly distended, worse than usual according to patient and tender upon palpation throughout. VSS

## 2017-04-13 DIAGNOSIS — I85.00 ESOPHAGEAL VARICES WITHOUT BLEEDING: ICD-10-CM

## 2017-04-13 DIAGNOSIS — R50.9 FEVER, UNSPECIFIED: ICD-10-CM

## 2017-04-13 DIAGNOSIS — K70.31 ALCOHOLIC CIRRHOSIS OF LIVER WITH ASCITES: ICD-10-CM

## 2017-04-13 DIAGNOSIS — Z71.89 OTHER SPECIFIED COUNSELING: ICD-10-CM

## 2017-04-13 DIAGNOSIS — E80.6 OTHER DISORDERS OF BILIRUBIN METABOLISM: ICD-10-CM

## 2017-04-13 DIAGNOSIS — E11.9 TYPE 2 DIABETES MELLITUS WITHOUT COMPLICATIONS: ICD-10-CM

## 2017-04-13 DIAGNOSIS — K52.9 NONINFECTIVE GASTROENTERITIS AND COLITIS, UNSPECIFIED: ICD-10-CM

## 2017-04-13 LAB
ALBUMIN SERPL ELPH-MCNC: 2.6 G/DL — LOW (ref 3.3–5)
ALP SERPL-CCNC: 218 U/L — HIGH (ref 40–120)
ALT FLD-CCNC: 26 U/L RC — SIGNIFICANT CHANGE UP (ref 10–45)
ANION GAP SERPL CALC-SCNC: 13 MMOL/L — SIGNIFICANT CHANGE UP (ref 5–17)
APPEARANCE UR: CLEAR — SIGNIFICANT CHANGE UP
APTT BLD: 39.1 SEC — HIGH (ref 27.5–37.4)
AST SERPL-CCNC: 53 U/L — HIGH (ref 10–40)
BASOPHILS # BLD AUTO: 0 K/UL — SIGNIFICANT CHANGE UP (ref 0–0.2)
BASOPHILS NFR BLD AUTO: 0.4 % — SIGNIFICANT CHANGE UP (ref 0–2)
BILIRUB DIRECT SERPL-MCNC: 2.4 MG/DL — HIGH (ref 0–0.2)
BILIRUB SERPL-MCNC: 6 MG/DL — HIGH (ref 0.2–1.2)
BILIRUB UR-MCNC: NEGATIVE — SIGNIFICANT CHANGE UP
BUN SERPL-MCNC: 8 MG/DL — SIGNIFICANT CHANGE UP (ref 7–23)
CALCIUM SERPL-MCNC: 7.4 MG/DL — LOW (ref 8.4–10.5)
CHLORIDE SERPL-SCNC: 102 MMOL/L — SIGNIFICANT CHANGE UP (ref 96–108)
CO2 SERPL-SCNC: 20 MMOL/L — LOW (ref 22–31)
COLOR SPEC: YELLOW — SIGNIFICANT CHANGE UP
CREAT SERPL-MCNC: 0.68 MG/DL — SIGNIFICANT CHANGE UP (ref 0.5–1.3)
DIFF PNL FLD: ABNORMAL
EOSINOPHIL # BLD AUTO: 0.1 K/UL — SIGNIFICANT CHANGE UP (ref 0–0.5)
EOSINOPHIL NFR BLD AUTO: 0.7 % — SIGNIFICANT CHANGE UP (ref 0–6)
GLUCOSE SERPL-MCNC: 284 MG/DL — HIGH (ref 70–99)
GLUCOSE UR QL: 500
HCT VFR BLD CALC: 31.3 % — LOW (ref 39–50)
HGB BLD-MCNC: 11.1 G/DL — LOW (ref 13–17)
INR BLD: 1.61 RATIO — HIGH (ref 0.88–1.16)
KETONES UR-MCNC: NEGATIVE — SIGNIFICANT CHANGE UP
LEUKOCYTE ESTERASE UR-ACNC: ABNORMAL
LYMPHOCYTES # BLD AUTO: 1.2 K/UL — SIGNIFICANT CHANGE UP (ref 1–3.3)
LYMPHOCYTES # BLD AUTO: 13.5 % — SIGNIFICANT CHANGE UP (ref 13–44)
MCHC RBC-ENTMCNC: 35 PG — HIGH (ref 27–34)
MCHC RBC-ENTMCNC: 35.6 GM/DL — SIGNIFICANT CHANGE UP (ref 32–36)
MCV RBC AUTO: 98.5 FL — SIGNIFICANT CHANGE UP (ref 80–100)
MONOCYTES # BLD AUTO: 0.7 K/UL — SIGNIFICANT CHANGE UP (ref 0–0.9)
MONOCYTES NFR BLD AUTO: 8.5 % — SIGNIFICANT CHANGE UP (ref 2–14)
NEUTROPHILS # BLD AUTO: 6.8 K/UL — SIGNIFICANT CHANGE UP (ref 1.8–7.4)
NEUTROPHILS NFR BLD AUTO: 76.9 % — SIGNIFICANT CHANGE UP (ref 43–77)
NITRITE UR-MCNC: NEGATIVE — SIGNIFICANT CHANGE UP
PH UR: 6.5 — SIGNIFICANT CHANGE UP (ref 4.8–8)
PLATELET # BLD AUTO: 57 K/UL — LOW (ref 150–400)
POTASSIUM SERPL-MCNC: 4.2 MMOL/L — SIGNIFICANT CHANGE UP (ref 3.5–5.3)
POTASSIUM SERPL-SCNC: 4.2 MMOL/L — SIGNIFICANT CHANGE UP (ref 3.5–5.3)
PROT SERPL-MCNC: 6.2 G/DL — SIGNIFICANT CHANGE UP (ref 6–8.3)
PROT UR-MCNC: SIGNIFICANT CHANGE UP
PROTHROM AB SERPL-ACNC: 17.7 SEC — HIGH (ref 9.8–12.7)
RAPID RVP RESULT: SIGNIFICANT CHANGE UP
RBC # BLD: 3.18 M/UL — LOW (ref 4.2–5.8)
RBC # FLD: 14.1 % — SIGNIFICANT CHANGE UP (ref 10.3–14.5)
SODIUM SERPL-SCNC: 135 MMOL/L — SIGNIFICANT CHANGE UP (ref 135–145)
SP GR SPEC: 1.02 — SIGNIFICANT CHANGE UP (ref 1.01–1.02)
UROBILINOGEN FLD QL: NEGATIVE — SIGNIFICANT CHANGE UP
WBC # BLD: 8.8 K/UL — SIGNIFICANT CHANGE UP (ref 3.8–10.5)
WBC # FLD AUTO: 8.8 K/UL — SIGNIFICANT CHANGE UP (ref 3.8–10.5)

## 2017-04-13 PROCEDURE — 73130 X-RAY EXAM OF HAND: CPT | Mod: 26,LT

## 2017-04-13 PROCEDURE — 71020: CPT | Mod: 26

## 2017-04-13 PROCEDURE — 99223 1ST HOSP IP/OBS HIGH 75: CPT | Mod: GC

## 2017-04-13 PROCEDURE — 93971 EXTREMITY STUDY: CPT | Mod: 26

## 2017-04-13 PROCEDURE — 74177 CT ABD & PELVIS W/CONTRAST: CPT | Mod: 26

## 2017-04-13 RX ORDER — SODIUM CHLORIDE 9 MG/ML
3 INJECTION INTRAMUSCULAR; INTRAVENOUS; SUBCUTANEOUS ONCE
Qty: 0 | Refills: 0 | Status: COMPLETED | OUTPATIENT
Start: 2017-04-13 | End: 2017-04-13

## 2017-04-13 RX ORDER — GLUCAGON INJECTION, SOLUTION 0.5 MG/.1ML
1 INJECTION, SOLUTION SUBCUTANEOUS ONCE
Qty: 0 | Refills: 0 | Status: DISCONTINUED | OUTPATIENT
Start: 2017-04-13 | End: 2017-04-19

## 2017-04-13 RX ORDER — MORPHINE SULFATE 50 MG/1
2 CAPSULE, EXTENDED RELEASE ORAL ONCE
Qty: 0 | Refills: 0 | Status: DISCONTINUED | OUTPATIENT
Start: 2017-04-13 | End: 2017-04-13

## 2017-04-13 RX ORDER — METRONIDAZOLE 500 MG
500 TABLET ORAL ONCE
Qty: 0 | Refills: 0 | Status: COMPLETED | OUTPATIENT
Start: 2017-04-13 | End: 2017-04-13

## 2017-04-13 RX ORDER — ONDANSETRON 8 MG/1
4 TABLET, FILM COATED ORAL ONCE
Qty: 0 | Refills: 0 | Status: COMPLETED | OUTPATIENT
Start: 2017-04-13 | End: 2017-04-13

## 2017-04-13 RX ORDER — SODIUM CHLORIDE 9 MG/ML
1000 INJECTION INTRAMUSCULAR; INTRAVENOUS; SUBCUTANEOUS ONCE
Qty: 0 | Refills: 0 | Status: COMPLETED | OUTPATIENT
Start: 2017-04-13 | End: 2017-04-13

## 2017-04-13 RX ORDER — FUROSEMIDE 40 MG
20 TABLET ORAL
Qty: 0 | Refills: 0 | Status: DISCONTINUED | OUTPATIENT
Start: 2017-04-13 | End: 2017-04-19

## 2017-04-13 RX ORDER — INSULIN LISPRO 100/ML
VIAL (ML) SUBCUTANEOUS AT BEDTIME
Qty: 0 | Refills: 0 | Status: DISCONTINUED | OUTPATIENT
Start: 2017-04-13 | End: 2017-04-19

## 2017-04-13 RX ORDER — SODIUM CHLORIDE 9 MG/ML
1000 INJECTION, SOLUTION INTRAVENOUS
Qty: 0 | Refills: 0 | Status: DISCONTINUED | OUTPATIENT
Start: 2017-04-13 | End: 2017-04-19

## 2017-04-13 RX ORDER — DEXTROSE 50 % IN WATER 50 %
25 SYRINGE (ML) INTRAVENOUS ONCE
Qty: 0 | Refills: 0 | Status: DISCONTINUED | OUTPATIENT
Start: 2017-04-13 | End: 2017-04-19

## 2017-04-13 RX ORDER — PROPRANOLOL HCL 160 MG
20 CAPSULE, EXTENDED RELEASE 24HR ORAL
Qty: 0 | Refills: 0 | Status: DISCONTINUED | OUTPATIENT
Start: 2017-04-13 | End: 2017-04-14

## 2017-04-13 RX ORDER — MORPHINE SULFATE 50 MG/1
4 CAPSULE, EXTENDED RELEASE ORAL ONCE
Qty: 0 | Refills: 0 | Status: DISCONTINUED | OUTPATIENT
Start: 2017-04-13 | End: 2017-04-13

## 2017-04-13 RX ORDER — CEFEPIME 1 G/1
2000 INJECTION, POWDER, FOR SOLUTION INTRAMUSCULAR; INTRAVENOUS ONCE
Qty: 0 | Refills: 0 | Status: COMPLETED | OUTPATIENT
Start: 2017-04-13 | End: 2017-04-13

## 2017-04-13 RX ORDER — PANTOPRAZOLE SODIUM 20 MG/1
40 TABLET, DELAYED RELEASE ORAL
Qty: 0 | Refills: 0 | Status: DISCONTINUED | OUTPATIENT
Start: 2017-04-13 | End: 2017-04-19

## 2017-04-13 RX ORDER — DEXTROSE 50 % IN WATER 50 %
12.5 SYRINGE (ML) INTRAVENOUS ONCE
Qty: 0 | Refills: 0 | Status: DISCONTINUED | OUTPATIENT
Start: 2017-04-13 | End: 2017-04-19

## 2017-04-13 RX ORDER — INSULIN LISPRO 100/ML
VIAL (ML) SUBCUTANEOUS
Qty: 0 | Refills: 0 | Status: DISCONTINUED | OUTPATIENT
Start: 2017-04-13 | End: 2017-04-19

## 2017-04-13 RX ORDER — DEXTROSE 50 % IN WATER 50 %
1 SYRINGE (ML) INTRAVENOUS ONCE
Qty: 0 | Refills: 0 | Status: DISCONTINUED | OUTPATIENT
Start: 2017-04-13 | End: 2017-04-19

## 2017-04-13 RX ADMIN — MORPHINE SULFATE 2 MILLIGRAM(S): 50 CAPSULE, EXTENDED RELEASE ORAL at 06:20

## 2017-04-13 RX ADMIN — MORPHINE SULFATE 4 MILLIGRAM(S): 50 CAPSULE, EXTENDED RELEASE ORAL at 03:11

## 2017-04-13 RX ADMIN — Medication 1: at 13:12

## 2017-04-13 RX ADMIN — MORPHINE SULFATE 4 MILLIGRAM(S): 50 CAPSULE, EXTENDED RELEASE ORAL at 03:40

## 2017-04-13 RX ADMIN — MORPHINE SULFATE 2 MILLIGRAM(S): 50 CAPSULE, EXTENDED RELEASE ORAL at 07:05

## 2017-04-13 RX ADMIN — PANTOPRAZOLE SODIUM 40 MILLIGRAM(S): 20 TABLET, DELAYED RELEASE ORAL at 12:34

## 2017-04-13 RX ADMIN — Medication 100 MILLIGRAM(S): at 05:10

## 2017-04-13 RX ADMIN — SODIUM CHLORIDE 3 MILLILITER(S): 9 INJECTION INTRAMUSCULAR; INTRAVENOUS; SUBCUTANEOUS at 00:50

## 2017-04-13 RX ADMIN — ONDANSETRON 4 MILLIGRAM(S): 8 TABLET, FILM COATED ORAL at 01:06

## 2017-04-13 RX ADMIN — Medication 1: at 18:08

## 2017-04-13 RX ADMIN — Medication 20 MILLIGRAM(S): at 17:38

## 2017-04-13 RX ADMIN — SODIUM CHLORIDE 1000 MILLILITER(S): 9 INJECTION INTRAMUSCULAR; INTRAVENOUS; SUBCUTANEOUS at 00:50

## 2017-04-13 RX ADMIN — MORPHINE SULFATE 4 MILLIGRAM(S): 50 CAPSULE, EXTENDED RELEASE ORAL at 01:06

## 2017-04-13 RX ADMIN — CEFEPIME 100 MILLIGRAM(S): 1 INJECTION, POWDER, FOR SOLUTION INTRAMUSCULAR; INTRAVENOUS at 07:00

## 2017-04-13 RX ADMIN — Medication 1 TABLET(S): at 12:34

## 2017-04-13 RX ADMIN — MORPHINE SULFATE 4 MILLIGRAM(S): 50 CAPSULE, EXTENDED RELEASE ORAL at 02:00

## 2017-04-13 NOTE — ED PROVIDER NOTE - MEDICAL DECISION MAKING DETAILS
40 yo male with alcoholic cirrhosis and recurrent fevers/abd pain/diarrhea; similar recent admission for same with bacteremia without clear source.  Unable to perform paracentesis 2/2 small ascites volume and inpatient team/hepatology did not appear to pursue further.  Febrile but nontoxic on exam here.  Will check labs, CT abdomen/pelvis, repeat blood cultures, likely admit for further management.

## 2017-04-13 NOTE — H&P ADULT. - PROBLEM SELECTOR PLAN 1
DDx: includes infective colitis vs SBP  Pt not currently meeting septic criteria  ED unable to tap ascites, pt given antibiotics.  Will try to tap bedside, if not will consult IR for tap.   Will consult hepatology for further input.   Will continue treating with cefepime for possible SBP.  Blood and urine cultures pending.   Given recent GNR bacteremia, elevated direct bilirubin, some concern for endocarditis  Recent stress echo showed no valvular disease, but may consider KYA. DDx: includes infectious colitis vs SBP vs unknown etiology of previous bactermia  Pt not currently meeting septic criteria  ED unable to tap ascites, pt given antibiotics so yield for SBP will be low  Will try to tap bedside, if unsucessful, will consult IR for tap if still requested by hepatology   Will consult hepatology for further input.   Will continue treating with cefepime for possible SBP.  Blood and urine cultures pending.   -send cdif, stool cx, o and p  Recent stress echo showed no valvular disease, but may consider KYA if no other source found.

## 2017-04-13 NOTE — H&P ADULT. - LAB RESULTS AND INTERPRETATION
Significant for normal white count, thrombocytopenia, elevated alk phos, AST and Bilirubin of 6.0 (2.4 direct) Personally reviewed labs:  Significant for normal white count, thrombocytopenia (57 baseline) anemia (11.1), glucose 284, elevated alk phos 218, AST 53 and Bilirubin of 6.0 (2.4 direct), hypoalbumin 2.6  INR 1.6  RVP negative

## 2017-04-13 NOTE — ED PROVIDER NOTE - OBJECTIVE STATEMENT
41y Male PMH alcoholic cirrhosis, DM2, esophageal complaining of fever of 102.2 and generalized abdominal pain with swelling. +nausea and diarrhea, but not as severe as Cdiff in past. Possible fever for two weeks, but only measured today. Recent discharged in March for abdominal pain with 1/2 gram negative rods in blood culture. + increased BLE edema, on lasix, feeling fluid overloaded. Has decreased appetite. Denies vomiting, constipation, chest pain, or dyspnea. 41y Male PMH alcoholic cirrhosis, DM2, esophageal complaining of fever of 102.2 and generalized abdominal pain with swelling. +nausea and diarrhea, but not as severe as Cdiff in past. Possible fever for two weeks, but only measured today. Recent discharged in March for abdominal pain with 1/2 gram negative rods in blood culture. + increased BLE edema, on lasix, feeling fluid overloaded. Has decreased appetite. Denies vomiting, constipation, chest pain, or dyspnea.    PCP: Dr. Rj Woods  Hepatologist: Dr. Thakkar

## 2017-04-13 NOTE — ED PROVIDER NOTE - PROGRESS NOTE DETAILS
Patient without significant ascites, unable to perform a paracentesis for peritoneal fluid sample.  - Felix Rosa MD

## 2017-04-13 NOTE — H&P ADULT. - PROBLEM SELECTOR PLAN 2
Colitis shown on CT a/p. Will send for C. diff, stool O+P and stool cultures.   continue treating with cefepime. Will add flagyl if c. diff positive. -colitis shown on CT a/p.   -Will send for C. diff, stool O+P and stool cultures.   -continue treating with cefepime. Will add flagyl if c. diff positive or if rec by hepatology

## 2017-04-13 NOTE — ED PROVIDER NOTE - PHYSICAL EXAMINATION
PE: CONSTITUTIONAL: Nontoxic, in mild apparent distress. ENMT: Airway patent, nasal mucosa clear, mouth with normal mucosa. HEAD: NCAT EYES: PERRL, EOMI bilaterally CARDIAC: RRR, no m/r/g, 2+ pedal edema bilaterally RESPIRATORY: CTA bilaterally, no adventitious sounds GI: Abdomen distended, mild-moderate generalized tenderness to palpation MSK: Spine appears normal, range of motion is not limited, no muscle/joint tenderness NEURO: CNII-XII grossly intact, 5/5 strength, full sensation all extremities, gait intact SKIN: Skin tone normal in color, warm and dry. No evidence of rash.

## 2017-04-13 NOTE — H&P ADULT. - PROBLEM SELECTOR PLAN 5
small, stable. Will continue pt on non selective beta blocker.  Given thrombocytpenia -small, stable. Will continue pt on non selective beta blocker.  -Given thrombocytopenia and varices will give venodynes and ambulation

## 2017-04-13 NOTE — H&P ADULT. - RS GEN PE MLT RESP DETAILS PC
good air movement/airway patent/respirations non-labored/breath sounds equal/clear to auscultation bilaterally

## 2017-04-13 NOTE — H&P ADULT. - GASTROINTESTINAL DETAILS
no guarding/no rebound tenderness/no distention/soft/no masses palpable no rebound tenderness/soft/no guarding/no masses palpable

## 2017-04-13 NOTE — H&P ADULT. - PROBLEM SELECTOR PLAN 4
Pt has hasn't had a drink in 20 months.   He's being evaluated at [facility] for liver transplant, supposed to have appointment tomorrow.   Hepatology consulted.

## 2017-04-13 NOTE — H&P ADULT. - RADIOLOGY RESULTS AND INTERPRETATION
CXR shows clear lungs  Hepatic cirrhosis with sequela of portal hypertension.   Increase in intra-abdominal ascites, small volume.  Small bowel and right colonic wall thickening, personally reviewed CXR: shows clear lungs  CT abd/pelvis:  Hepatic cirrhosis with sequela of portal hypertension. Increase in intra-abdominal ascites, small volume. Small bowel and right colonic wall thickening, nonspecific. Differential includes enterocolitis versus portal enterocolopathy.

## 2017-04-13 NOTE — H&P ADULT. - HISTORY OF PRESENT ILLNESS
Patient is a 41 year old male with a history of alcoholic cirrhosis, esophageal varices, DM2, severe c. diff,  vitiligo, corneal transplant, recently admitted with GNR bactermia who is presenting with abdominal pain, diarrhea and fever. Per patient, fever began 2 weeks ago (although he never measured his temperature) and was accompanied by nausea and anorexia. He said that his symptoms improved with Tylenol, but they would return a couple days later. Two days ago patient began having chills and non-bloody diarrhea, and he experienced worsening sharp, epigastric pain. He states he had 6 BM per day, but hasn't had any since last night. He said the pain did not radiate anywhere, and it was worsened by walking. Due to the worsening pain and chills, he decided to come to the ED. Patient also endorses having lower leg edema for the last 1 week. Patient denies any recent travel, sick contacts, dietary changes, confusion, vision changes, chest pain, SOB, vomiting, dysuria, or recent alcohol use.   Patient also had L hand edema. Pt states accidently hit himself at work accident a few days ago.   Of note, he was admitted to the hospital (2/26-3/3) for gram-negative [place] bacteremia (E. Coli). Unable to tap ascites at that time, EUS only showed small esophageal varices. Pt was treated with Ciprofloxacin. GI did not recommend SBP ppx.   ED Course: VS- Temp 37.7, /75, HR 88, RR 18, pO2 93% (RA). Patient was given Cefepime IVPB 2000mg and Flagyl 500mg for presumed SBP coverage as well as 1L NS bolus. CMP returned significant for Ca 7.4, Alb 2.6, T-Bili 6.0, Alk Phos 218, and AST 53; CT A/P showed small bowel and right colonic wall thickening, suggestive of possible enterocolitis vs. enterocolopathy. ED team looked for pocket of fluid for drainage, but was unable to find adequate pocket with ultrasound.

## 2017-04-13 NOTE — H&P ADULT. - ASSESSMENT
Patient is a 41 year old male with a history of alcoholic cirrhosis, esophageal varices, DM2, severe c. diff,  vitiligo, corneal transplant, recently admitted with GNR bactermia who is presenting with abdominal pain, diarrhea and fever. Patient is a 41 year old male with a history of alcoholic cirrhosis, esophageal varices, DM2, hx of severe c. diff,  vitiligo, corneal transplant, recently admitted with GNR bactermia who is presenting with abdominal pain, diarrhea and fever.

## 2017-04-13 NOTE — H&P ADULT. - PROBLEM SELECTOR PLAN 3
likely related to ETOH cirrhosis consumption, but some concern for endocarditis.  Will continue to monitor. -likely related to ETOH cirrhosis, elevated from baseline  -hepatology follow

## 2017-04-13 NOTE — ED PROVIDER NOTE - NS ED ROS FT
ROS: GENERAL: + fevers, + chills, +malaise, + decreased appetite HEENT: no epistaxis, no eye pain, no ear pain, no throat pain CARDIAC: no chest pain, no shortness of breath PULM: no cough, no shortness of breath, +BLE edema GI: + nausea, no vomiting, + diarrhea, no abdominal pain, no hematemesis, no bright red blood per rectum : no dysuria, no hematuria EXTREMITIES: no arm pain, no leg pain, no back pain SKIN: no purpura, no petechiae NEURO: no headache, no neck pain, no loss of strength/sensation HEME: no easy bruising, no easy bleeding

## 2017-04-13 NOTE — ED PROVIDER NOTE - INTERPRETATION
normal sinus rhythm, Normal axis, Normal CT interval and QRS complex. There are no acute ischemic ST or T-wave changes.

## 2017-04-14 ENCOUNTER — RX RENEWAL (OUTPATIENT)
Age: 42
End: 2017-04-14

## 2017-04-14 LAB
ALBUMIN FLD-MCNC: <1 G/DL — SIGNIFICANT CHANGE UP
ALBUMIN SERPL ELPH-MCNC: 1.8 G/DL — LOW (ref 3.3–5)
ALP SERPL-CCNC: 137 U/L — HIGH (ref 40–120)
ALT FLD-CCNC: 22 U/L — SIGNIFICANT CHANGE UP (ref 10–45)
ANION GAP SERPL CALC-SCNC: 10 MMOL/L — SIGNIFICANT CHANGE UP (ref 5–17)
APTT BLD: 41.8 SEC — HIGH (ref 27.5–37.4)
AST SERPL-CCNC: 46 U/L — HIGH (ref 10–40)
B PERT IGG+IGM PNL SER: ABNORMAL
BILIRUB DIRECT SERPL-MCNC: 2.3 MG/DL — HIGH (ref 0–0.2)
BILIRUB SERPL-MCNC: 5.6 MG/DL — HIGH (ref 0.2–1.2)
BUN SERPL-MCNC: 7 MG/DL — SIGNIFICANT CHANGE UP (ref 7–23)
C DIFF BY PCR RESULT: SIGNIFICANT CHANGE UP
C DIFF TOX GENS STL QL NAA+PROBE: SIGNIFICANT CHANGE UP
CALCIUM SERPL-MCNC: 7.2 MG/DL — LOW (ref 8.4–10.5)
CHLORIDE SERPL-SCNC: 102 MMOL/L — SIGNIFICANT CHANGE UP (ref 96–108)
CO2 SERPL-SCNC: 20 MMOL/L — LOW (ref 22–31)
COLOR FLD: YELLOW — SIGNIFICANT CHANGE UP
CREAT SERPL-MCNC: 0.51 MG/DL — SIGNIFICANT CHANGE UP (ref 0.5–1.3)
CULTURE RESULTS: SIGNIFICANT CHANGE UP
FLUID INTAKE SUBSTANCE CLASS: SIGNIFICANT CHANGE UP
FLUID SEGMENTED GRANULOCYTES: 11 % — SIGNIFICANT CHANGE UP
GLUCOSE FLD-MCNC: 269 MG/DL — SIGNIFICANT CHANGE UP
GLUCOSE SERPL-MCNC: 148 MG/DL — HIGH (ref 70–99)
GRAM STN FLD: SIGNIFICANT CHANGE UP
HCT VFR BLD CALC: 29.4 % — LOW (ref 39–50)
HGB BLD-MCNC: 10.1 G/DL — LOW (ref 13–17)
LDH SERPL L TO P-CCNC: 48 U/L — SIGNIFICANT CHANGE UP
LYMPHOCYTES # FLD: 65 % — SIGNIFICANT CHANGE UP
MAGNESIUM SERPL-MCNC: 1.6 MG/DL — SIGNIFICANT CHANGE UP (ref 1.6–2.6)
MCHC RBC-ENTMCNC: 32.9 PG — SIGNIFICANT CHANGE UP (ref 27–34)
MCHC RBC-ENTMCNC: 34.4 GM/DL — SIGNIFICANT CHANGE UP (ref 32–36)
MCV RBC AUTO: 95.8 FL — SIGNIFICANT CHANGE UP (ref 80–100)
MONOS+MACROS # FLD: 24 % — SIGNIFICANT CHANGE UP
PHOSPHATE SERPL-MCNC: 3.3 MG/DL — SIGNIFICANT CHANGE UP (ref 2.5–4.5)
PLATELET # BLD AUTO: 55 K/UL — LOW (ref 150–400)
POTASSIUM SERPL-MCNC: 3.9 MMOL/L — SIGNIFICANT CHANGE UP (ref 3.5–5.3)
POTASSIUM SERPL-SCNC: 3.9 MMOL/L — SIGNIFICANT CHANGE UP (ref 3.5–5.3)
PROT FLD-MCNC: 0.6 G/DL — SIGNIFICANT CHANGE UP
PROT SERPL-MCNC: 5.4 G/DL — LOW (ref 6–8.3)
RBC # BLD: 3.07 M/UL — LOW (ref 4.2–5.8)
RBC # FLD: 15 % — HIGH (ref 10.3–14.5)
RCV VOL RI: 128 /UL — HIGH (ref 0–5)
SODIUM SERPL-SCNC: 133 MMOL/L — LOW (ref 135–145)
SPECIMEN SOURCE: SIGNIFICANT CHANGE UP
SPECIMEN SOURCE: SIGNIFICANT CHANGE UP
TOTAL NUCLEATED CELL COUNT, BODY FLUID: 366 /UL — HIGH (ref 0–5)
TUBE TYPE: SIGNIFICANT CHANGE UP
WBC # BLD: 4.82 K/UL — SIGNIFICANT CHANGE UP (ref 3.8–10.5)
WBC # FLD AUTO: 4.82 K/UL — SIGNIFICANT CHANGE UP (ref 3.8–10.5)

## 2017-04-14 PROCEDURE — 99233 SBSQ HOSP IP/OBS HIGH 50: CPT | Mod: GC

## 2017-04-14 PROCEDURE — 99222 1ST HOSP IP/OBS MODERATE 55: CPT | Mod: GC

## 2017-04-14 PROCEDURE — 49083 ABD PARACENTESIS W/IMAGING: CPT

## 2017-04-14 PROCEDURE — 88112 CYTOPATH CELL ENHANCE TECH: CPT | Mod: 26

## 2017-04-14 PROCEDURE — 88305 TISSUE EXAM BY PATHOLOGIST: CPT | Mod: 26

## 2017-04-14 RX ORDER — NADOLOL 80 MG/1
20 TABLET ORAL DAILY
Qty: 0 | Refills: 0 | Status: DISCONTINUED | OUTPATIENT
Start: 2017-04-14 | End: 2017-04-19

## 2017-04-14 RX ADMIN — Medication 2: at 17:55

## 2017-04-14 RX ADMIN — PANTOPRAZOLE SODIUM 40 MILLIGRAM(S): 20 TABLET, DELAYED RELEASE ORAL at 05:47

## 2017-04-14 RX ADMIN — Medication 20 MILLIGRAM(S): at 17:55

## 2017-04-14 RX ADMIN — Medication 1 TABLET(S): at 12:32

## 2017-04-14 RX ADMIN — Medication 20 MILLIGRAM(S): at 05:45

## 2017-04-14 RX ADMIN — Medication 1: at 12:32

## 2017-04-14 RX ADMIN — Medication 2: at 22:24

## 2017-04-15 ENCOUNTER — TRANSCRIPTION ENCOUNTER (OUTPATIENT)
Age: 42
End: 2017-04-15

## 2017-04-15 LAB
ALBUMIN SERPL ELPH-MCNC: 2.1 G/DL — LOW (ref 3.3–5)
ALP SERPL-CCNC: 171 U/L — HIGH (ref 40–120)
ALT FLD-CCNC: 21 U/L — SIGNIFICANT CHANGE UP (ref 10–45)
ANION GAP SERPL CALC-SCNC: 13 MMOL/L — SIGNIFICANT CHANGE UP (ref 5–17)
AST SERPL-CCNC: 41 U/L — HIGH (ref 10–40)
BASOPHILS # BLD AUTO: 0.02 K/UL — SIGNIFICANT CHANGE UP (ref 0–0.2)
BASOPHILS NFR BLD AUTO: 0.5 % — SIGNIFICANT CHANGE UP (ref 0–2)
BILIRUB SERPL-MCNC: 3.7 MG/DL — HIGH (ref 0.2–1.2)
BUN SERPL-MCNC: 7 MG/DL — SIGNIFICANT CHANGE UP (ref 7–23)
CALCIUM SERPL-MCNC: 7.3 MG/DL — LOW (ref 8.4–10.5)
CHLORIDE SERPL-SCNC: 99 MMOL/L — SIGNIFICANT CHANGE UP (ref 96–108)
CO2 SERPL-SCNC: 20 MMOL/L — LOW (ref 22–31)
CREAT SERPL-MCNC: 0.69 MG/DL — SIGNIFICANT CHANGE UP (ref 0.5–1.3)
EOSINOPHIL # BLD AUTO: 0.13 K/UL — SIGNIFICANT CHANGE UP (ref 0–0.5)
EOSINOPHIL NFR BLD AUTO: 3.5 % — SIGNIFICANT CHANGE UP (ref 0–6)
GLUCOSE SERPL-MCNC: 285 MG/DL — HIGH (ref 70–99)
HCT VFR BLD CALC: 29.2 % — LOW (ref 39–50)
HGB BLD-MCNC: 10.2 G/DL — LOW (ref 13–17)
IMM GRANULOCYTES NFR BLD AUTO: 0.5 % — SIGNIFICANT CHANGE UP (ref 0–1.5)
LYMPHOCYTES # BLD AUTO: 0.84 K/UL — LOW (ref 1–3.3)
LYMPHOCYTES # BLD AUTO: 22.8 % — SIGNIFICANT CHANGE UP (ref 13–44)
MAGNESIUM SERPL-MCNC: 1.7 MG/DL — SIGNIFICANT CHANGE UP (ref 1.6–2.6)
MCHC RBC-ENTMCNC: 33 PG — SIGNIFICANT CHANGE UP (ref 27–34)
MCHC RBC-ENTMCNC: 34.9 GM/DL — SIGNIFICANT CHANGE UP (ref 32–36)
MCV RBC AUTO: 94.5 FL — SIGNIFICANT CHANGE UP (ref 80–100)
MONOCYTES # BLD AUTO: 0.32 K/UL — SIGNIFICANT CHANGE UP (ref 0–0.9)
MONOCYTES NFR BLD AUTO: 8.7 % — SIGNIFICANT CHANGE UP (ref 2–14)
NEUTROPHILS # BLD AUTO: 2.36 K/UL — SIGNIFICANT CHANGE UP (ref 1.8–7.4)
NEUTROPHILS NFR BLD AUTO: 64 % — SIGNIFICANT CHANGE UP (ref 43–77)
NIGHT BLUE STAIN TISS: SIGNIFICANT CHANGE UP
PHOSPHATE SERPL-MCNC: 2.7 MG/DL — SIGNIFICANT CHANGE UP (ref 2.5–4.5)
PLATELET # BLD AUTO: 62 K/UL — LOW (ref 150–400)
POTASSIUM SERPL-MCNC: 4 MMOL/L — SIGNIFICANT CHANGE UP (ref 3.5–5.3)
POTASSIUM SERPL-SCNC: 4 MMOL/L — SIGNIFICANT CHANGE UP (ref 3.5–5.3)
PROT SERPL-MCNC: 5.5 G/DL — LOW (ref 6–8.3)
RBC # BLD: 3.09 M/UL — LOW (ref 4.2–5.8)
RBC # FLD: 14.8 % — HIGH (ref 10.3–14.5)
SODIUM SERPL-SCNC: 132 MMOL/L — LOW (ref 135–145)
SPECIMEN SOURCE: SIGNIFICANT CHANGE UP
WBC # BLD: 3.69 K/UL — LOW (ref 3.8–10.5)
WBC # FLD AUTO: 3.69 K/UL — LOW (ref 3.8–10.5)

## 2017-04-15 PROCEDURE — 99233 SBSQ HOSP IP/OBS HIGH 50: CPT | Mod: GC

## 2017-04-15 RX ORDER — INSULIN GLARGINE 100 [IU]/ML
5 INJECTION, SOLUTION SUBCUTANEOUS AT BEDTIME
Qty: 0 | Refills: 0 | Status: DISCONTINUED | OUTPATIENT
Start: 2017-04-15 | End: 2017-04-19

## 2017-04-15 RX ORDER — INSULIN LISPRO 100/ML
2 VIAL (ML) SUBCUTANEOUS
Qty: 0 | Refills: 0 | Status: DISCONTINUED | OUTPATIENT
Start: 2017-04-15 | End: 2017-04-16

## 2017-04-15 RX ADMIN — Medication 2: at 08:11

## 2017-04-15 RX ADMIN — Medication 1 TABLET(S): at 11:54

## 2017-04-15 RX ADMIN — Medication 3: at 11:54

## 2017-04-15 RX ADMIN — INSULIN GLARGINE 5 UNIT(S): 100 INJECTION, SOLUTION SUBCUTANEOUS at 21:52

## 2017-04-15 RX ADMIN — PANTOPRAZOLE SODIUM 40 MILLIGRAM(S): 20 TABLET, DELAYED RELEASE ORAL at 05:34

## 2017-04-15 RX ADMIN — Medication 20 MILLIGRAM(S): at 05:34

## 2017-04-15 RX ADMIN — Medication 2 UNIT(S): at 17:08

## 2017-04-15 RX ADMIN — NADOLOL 20 MILLIGRAM(S): 80 TABLET ORAL at 05:34

## 2017-04-15 RX ADMIN — Medication 2: at 21:52

## 2017-04-15 RX ADMIN — Medication 20 MILLIGRAM(S): at 17:08

## 2017-04-15 RX ADMIN — Medication 3: at 17:08

## 2017-04-15 NOTE — DISCHARGE NOTE ADULT - PATIENT PORTAL LINK FT
“You can access the [employer] Patient Portal, offered by [facility], by registering with the following website: [email or web address]”

## 2017-04-15 NOTE — DISCHARGE NOTE ADULT - CARE PLAN
Principal Discharge DX:	Fever  Goal:	Resolution of fever  Instructions for follow-up, activity and diet:	You came to the hospital for fever and diarrhea. No cause was found for your diarrhea, but the important causes of diarrhea as been ruled out. There was no bacterial cause for your diarrhea was found and it resolved. You continued to have fever while in the hospital. The main concern was for spontaneous bacterial peritonitis especially considering your history of this type of infection. You will complete an antibiotic course for SBP and be started on a daily antibiotic to help prevent this type of infection to in the future. Please follow up with the liver doctors to continue to monitor your progress while on this medication and for continued monitoring of your liver disease.  Secondary Diagnosis:	Alcoholic cirrhosis of liver with ascites  Goal:	Prevention of complications  Instructions for follow-up, activity and diet:	You have known complications of cirrhosis. Please continue to follow up with liver doctors upon discharge to monitor your progress. Please refrain from drinking alcohol as this will worsen your condition. If you experience any bleeding that is difficult to control, abdominal pain, worsening abdominal distension, please call your liver doctors or return to the ED for evaluation.  Secondary Diagnosis:	Diabetes mellitus type 2, insulin dependent  Goal:	Blood sugar control.  Instructions for follow-up, activity and diet:	You have a history of diabetes. Your metformin was not given in the hospital as it makes it more difficult to control your blood sugar while in the hospital. You have been well controlled on insulin while in the hospital. You can restart your metformin upon leaving the hospital. Please follow up with your primary care doctor for continued management of your diabetes.  Secondary Diagnosis:	Esophageal varices  Goal:	Prevention of bleeding from varicies.  Instructions for follow-up, activity and diet:	You were found to have esophageal varicies (enlargement of veins in the esophagus) on a previous admission. This is common complication of cirrhosis. Please continue to take nadolol as prescribed as this can help prevent these abnormal vessels from rupturing. Please follow up with your liver doctor for continued management of this condition. Principal Discharge DX:	Fever  Goal:	Resolution of fever  Instructions for follow-up, activity and diet:	You came to the hospital for fever and diarrhea. No cause was found for your diarrhea, but the important causes of diarrhea as been ruled out. There was no bacterial cause for your diarrhea was found and it resolved. You continued to have fever while in the hospital. The main concern was for spontaneous bacterial peritonitis (SBP) especially considering your history of this type of infection. You will complete an antibiotic course for SBP and be started on a daily antibiotic to help prevent this type of infection to in the future. Please follow up with the liver doctors to continue to monitor your progress while on this medication and for continued monitoring of your liver disease.  Secondary Diagnosis:	Alcoholic cirrhosis of liver with ascites  Goal:	Prevention of complications  Instructions for follow-up, activity and diet:	You have known complications of cirrhosis. Please continue to follow up with liver doctors upon discharge to monitor your progress. Please refrain from drinking alcohol as this will worsen your condition. If you experience any bleeding that is difficult to control, abdominal pain, worsening abdominal distension, please call your liver doctors or return to the ED for evaluation.  Secondary Diagnosis:	Diabetes mellitus type 2, insulin dependent  Goal:	Blood sugar control.  Instructions for follow-up, activity and diet:	You have a history of diabetes. Your metformin was not given in the hospital as it makes it more difficult to control your blood sugar while in the hospital. You have been well controlled on insulin while in the hospital. You can restart your metformin upon leaving the hospital. Please follow up with your primary care doctor for continued management of your diabetes.  Secondary Diagnosis:	Esophageal varices  Goal:	Prevention of bleeding from varicies.  Instructions for follow-up, activity and diet:	You were found to have esophageal varices (enlargement of veins in the esophagus) on a previous admission. This is common complication of cirrhosis. Please continue to take nadolol as prescribed as this can help prevent these abnormal vessels from rupturing. Please follow up with your liver doctor for continued management of this condition. Principal Discharge DX:	Fever  Goal:	Resolution of fever  Instructions for follow-up, activity and diet:	You came to the hospital for fever and diarrhea. No cause was found for your diarrhea, but the important causes of diarrhea as been ruled out. There was no bacterial cause for your diarrhea was found and it resolved. You continued to have fever while in the hospital. The main concern was for spontaneous bacterial peritonitis (SBP) especially considering your history of this type of infection. You will complete an antibiotic course for SBP and will be started on a daily antibiotic to help prevent this type of infection to in the future. Please follow up with the liver doctors to continue to monitor your progress while on this medication and for continued monitoring of your liver disease.  Secondary Diagnosis:	Alcoholic cirrhosis of liver with ascites  Goal:	Prevention of complications  Instructions for follow-up, activity and diet:	You have known complications of cirrhosis. Please continue to follow up with liver doctors upon discharge to monitor your progress. Please refrain from drinking alcohol as this will worsen your condition. If you experience any bleeding that is difficult to control, abdominal pain, worsening abdominal distension, please call your liver doctors or return to the ED for evaluation. You were found to have low magnesium due acute illness in liver disease. Please take the magnesium supplement as prescribed for 5 days.  Secondary Diagnosis:	Diabetes mellitus type 2, insulin dependent  Goal:	Blood sugar control.  Instructions for follow-up, activity and diet:	You have a history of diabetes. Your metformin was not given in the hospital as it makes it more difficult to control your blood sugar while in the hospital. You have been well controlled on insulin while in the hospital. You can restart your metformin upon leaving the hospital. Please follow up with your primary care doctor for continued management of your diabetes.  Secondary Diagnosis:	Esophageal varices  Goal:	Prevention of bleeding from varicies.  Instructions for follow-up, activity and diet:	You were found to have esophageal varices (enlargement of veins in the esophagus) on a previous admission. This is common complication of cirrhosis. Please continue to take nadolol as prescribed as this can help prevent these abnormal vessels from rupturing. Please follow up with your liver doctor for continued management of this condition.

## 2017-04-15 NOTE — DISCHARGE NOTE ADULT - CARE PROVIDERS DIRECT ADDRESSES
[email or web address]

## 2017-04-15 NOTE — DISCHARGE NOTE ADULT - PLAN OF CARE
Resolution of fever You came to the hospital for fever and diarrhea. No cause was found for your diarrhea, but the important causes of diarrhea as been ruled out. There was no bacterial cause for your diarrhea was found and it resolved. You continued to have fever while in the hospital. The main concern was for spontaneous bacterial peritonitis especially considering your history of this type of infection. You will complete an antibiotic course for SBP and be started on a daily antibiotic to help prevent this type of infection to in the future. Please follow up with the liver doctors to continue to monitor your progress while on this medication and for continued monitoring of your liver disease. Prevention of complications You have known complications of cirrhosis. Please continue to follow up with liver doctors upon discharge to monitor your progress. Please refrain from drinking alcohol as this will worsen your condition. If you experience any bleeding that is difficult to control, abdominal pain, worsening abdominal distension, please call your liver doctors or return to the ED for evaluation. Blood sugar control. You have a history of diabetes. Your metformin was not given in the hospital as it makes it more difficult to control your blood sugar while in the hospital. You have been well controlled on insulin while in the hospital. You can restart your metformin upon leaving the hospital. Please follow up with your primary care doctor for continued management of your diabetes. Prevention of bleeding from varicies. You were found to have esophageal varicies (enlargement of veins in the esophagus) on a previous admission. This is common complication of cirrhosis. Please continue to take nadolol as prescribed as this can help prevent these abnormal vessels from rupturing. Please follow up with your liver doctor for continued management of this condition. You came to the hospital for fever and diarrhea. No cause was found for your diarrhea, but the important causes of diarrhea as been ruled out. There was no bacterial cause for your diarrhea was found and it resolved. You continued to have fever while in the hospital. The main concern was for spontaneous bacterial peritonitis (SBP) especially considering your history of this type of infection. You will complete an antibiotic course for SBP and be started on a daily antibiotic to help prevent this type of infection to in the future. Please follow up with the liver doctors to continue to monitor your progress while on this medication and for continued monitoring of your liver disease. You were found to have esophageal varices (enlargement of veins in the esophagus) on a previous admission. This is common complication of cirrhosis. Please continue to take nadolol as prescribed as this can help prevent these abnormal vessels from rupturing. Please follow up with your liver doctor for continued management of this condition. You have known complications of cirrhosis. Please continue to follow up with liver doctors upon discharge to monitor your progress. Please refrain from drinking alcohol as this will worsen your condition. If you experience any bleeding that is difficult to control, abdominal pain, worsening abdominal distension, please call your liver doctors or return to the ED for evaluation. You were found to have low magnesium due acute illness in liver disease. Please take the magnesium supplement as prescribed for 5 days. You came to the hospital for fever and diarrhea. No cause was found for your diarrhea, but the important causes of diarrhea as been ruled out. There was no bacterial cause for your diarrhea was found and it resolved. You continued to have fever while in the hospital. The main concern was for spontaneous bacterial peritonitis (SBP) especially considering your history of this type of infection. You will complete an antibiotic course for SBP and will be started on a daily antibiotic to help prevent this type of infection to in the future. Please follow up with the liver doctors to continue to monitor your progress while on this medication and for continued monitoring of your liver disease.

## 2017-04-15 NOTE — DISCHARGE NOTE ADULT - HOSPITAL COURSE
Patient is a 41 year old male with a history of alcoholic cirrhosis, esophageal varices, DM2, severe c. diff,  vitiligo, corneal transplant, recently admitted with GNR bactermia who is presenting with abdominal pain, diarrhea and fever. Per patient, fever began 2 weeks ago (although he never measured his temperature) and was accompanied by nausea and anorexia. He said that his symptoms improved with Tylenol, but they would return a couple days later. Two days ago patient began having chills and non-bloody diarrhea, and he experienced worsening sharp, epigastric pain. He states he had 6 BM per day, but hasn't had any since last night. He said the pain did not radiate anywhere, and it was worsened by walking. Due to the worsening pain and chills, he decided to come to the ED. Patient also endorses having lower leg edema for the last 1 week. Patient denies any recent travel, sick contacts, dietary changes, confusion, vision changes, chest pain, SOB, vomiting, dysuria, or recent alcohol use.   Patient also had L hand edema. Pt states accidently hit himself at work accident a few days ago.   Of note, he was admitted to the hospital (2/26-3/3) for gram-negative [place] bacteremia (E. Coli). Unable to tap ascites at that time, EUS only showed small esophageal varices. Pt was treated with Ciprofloxacin. GI did not recommend SBP ppx.   ED Course: VS- Temp 37.7, /75, HR 88, RR 18, pO2 93% (RA). Patient was given Cefepime IVPB 2000mg and Flagyl 500mg for presumed SBP coverage as well as 1L NS bolus. CMP returned significant for Ca 7.4, Alb 2.6, T-Bili 6.0, Alk Phos 218, and AST 53; CT A/P showed small bowel and right colonic wall thickening, suggestive of possible enterocolitis vs. enterocolopathy. ED team looked for pocket of fluid for drainage, but was unable to find adequate pocket with ultrasound.    Pt admitted to medicine. Hepatology consulted. Medicine again tried to do diagnostic paracentesis, but was not able to find pocket. Antibiotics were held as per hepatology. Blood and urine cultures negative. C. diff was negative. IR was consulted for diagnostic paracentesis and were able to tap. PMN's were 90 and SBP was ruled out. SAAG >1.1 consistent with known cirrhosis. Hepatology was concerned for prostatitis, but prostate was smooth, not enlarged and non tender on exam. Pt continued to have diarrhea. Hepatology planned for colonoscopy for further work up of colitis. Patient is a 41 year old male with a history of alcoholic cirrhosis, esophageal varices, DM2, severe c. diff, vitiligo, corneal transplant, s/p recent admission for GNR bacteremia for which he received Cipro, who presented to the ED with 2 weeks of fever, nausea, anorexia, diarrhea, and sharp epigastric pain. Additionally, he was presenting with left hand edema after injuring himself at work. In the ED, he was afebrile and was given 1 dose of Cefepime and Flagyl for empiric SBP coverage. ED team looked for pocket of fluid for drainage, but was unable to find adequate pocket with ultrasound. CT A/P showed small bowel and right colonic wall thickening, suggestive of enterocolitis vs. portal enterocolopathy. Left hand X-ray returned negative for fractures.    Patient was admitted to medicine for further work-up. Blood and urine cultures were sent and came back negative, and C. diff was negative. Medicine attempted to do US-guided diagnostic paracentesis, but was unable to find a pocket. Hepatology was consulted and recommended discontinuation of antibiotics, prostate exam to r/o prostatitis, and colonoscopy for r/o enterocolitis. Prostate exam was unremarkable, and colonoscopy only showed mild portal colopathy. IR was consulted for diagnostic paracentesis, and ascites fluid sample PMNs were 90, inconsistent with SBP. SAAG > 1.1, consistent with known cirrhosis.  Cytology showed scattered benign reactive mesothelial cells and lymphocytes, but no organisms.     Patient had recurrence of fevers and malaise on hospital day 4, and was given one dose of cefepime and vancomycin. Repeat blood and urine cultures were negative. ID was consulted and recommended RVP, which returned positive for influenza B. Patient was started on Tamiflu 75 mg, and his symptoms improved by the next day. ID and hepatology suggested empiric treatment of SBP despite negative findings on ascites fluid, and patient was given cefepime initially and later switched to Cipro 500mg for PO treatment bid. Hepatology also recommended Cipro 500mg qd for SBP prophylaxis after treatment with current course. Additionally, patient’s Mg was mildly decreased (1.3-1.5) the last couple days, and he was given magnesium sulfate. He will resume his home medication regimen after discharge and re-schedule appointment for liver transplant work-up. 41yoM PMHx alcoholic cirrhosis, esophageal varices, DM2, severe c. diff, vitiligo, corneal transplant, recent admission for GNR bacteremia (or which he received Cipro) presented to the ED with 2 weeks of fever, nausea, anorexia, diarrhea, and sharp epigastric pain.Additionally, he was presenting with left hand edema after injuring himself at work. In the ED, he was afebrile and was given 1 dose of Cefepime and Flagyl for empiric SBP coverage. ED team looked for pocket of fluid for drainage, but was unable to find adequate pocket with ultrasound. CT A/P showed small bowel and right colonic wall thickening, suggestive of enterocolitis vs. portal enterocolopathy. Left hand X-ray returned negative for fractures.    Patient was admitted to medicine for further work-up. Blood and urine cultures were sent and came back negative, and C. diff was negative. Medicine attempted to do US-guided diagnostic paracentesis, but was unable to find a pocket. Hepatology was consulted and recommended discontinuation of antibiotics, prostate exam to r/o prostatitis, and colonoscopy for r/o enterocolitis. Prostate exam was unremarkable, and colonoscopy only showed mild portal colopathy. IR was consulted for diagnostic paracentesis, and PMNs on the ascites fluid sample were 90, inconsistent with SBP. SAAG > 1.1, consistent with known cirrhosis.  Cytology showed scattered benign reactive mesothelial cells and lymphocytes, but no organisms.     Patient had recurrence of fevers and malaise on hospital day 4, and was given one dose of cefepime and vancomycin. Repeat blood and urine cultures were negative. ID was consulted and recommended RVP, which returned positive for influenza B. Patient was started on Tamiflu 75 mg, and his symptoms began improving by the next day. ID and hepatology suggested empiric treatment of SBP despite negative findings on ascites fluid, and patient was given cefepime initially and later switched to Cipro 500mg BID for treatment. Hepatology recommended 1 tablet of Bactrim DS daily for SBP prophylaxis after treatment with current course. Additionally, patient’s magnesium level was mildly decreased (1.3-1.5) during the last few days of his admission, and he was given magnesium sulfate repletion - he will be discharged with magnesium oxide BID and should have his magnesium levels repeated at his primary care doctor's office. He will resume his home medication regimen after discharge and re-schedule appointment for liver transplant work-up. 41yoM PMHx alcoholic cirrhosis, esophageal varices, DM2, severe c. diff, vitiligo, corneal transplant, recent admission for GNR bacteremia (or which he received Cipro) presented to the ED with 2 weeks of fever, nausea, anorexia, diarrhea, and sharp epigastric pain.Additionally, he was presenting with left hand edema after injuring himself at work. In the ED, he was afebrile and was given 1 dose of Cefepime and Flagyl for empiric SBP coverage. ED team looked for pocket of fluid for drainage, but was unable to find adequate pocket with ultrasound. CT A/P showed small bowel and right colonic wall thickening, suggestive of enterocolitis vs. portal enterocolopathy. Left hand X-ray returned negative for fractures.    Patient was admitted to medicine for further work-up. Blood and urine cultures were sent and came back negative, and C. diff was negative. Medicine attempted to do US-guided diagnostic paracentesis, but was unable to find a pocket. Hepatology was consulted and recommended discontinuation of antibiotics, prostate exam to r/o prostatitis, and colonoscopy for r/o enterocolitis. Prostate exam was unremarkable, and colonoscopy only showed mild portal colopathy. IR was consulted for diagnostic paracentesis, and PMNs on the ascites fluid sample were 90, inconsistent with SBP. SAAG > 1.1, consistent with known cirrhosis.  Cytology showed scattered benign reactive mesothelial cells and lymphocytes, but no organisms.     Patient had recurrence of fevers and malaise on hospital day 4, and was given one dose of cefepime and vancomycin. Repeat blood and urine cultures were negative. ID was consulted and recommended RVP, which returned positive for influenza B. Patient was started on Tamiflu 75 mg, and his symptoms began improving by the next day. ID and hepatology suggested empiric treatment of SBP despite negative findings on ascites fluid, and patient was given cefepime initially and later switched to Cipro 500mg BID for treatment. Hepatology recommended 1 tablet of Bactrim DS daily for SBP prophylaxis after treatment with current course of Cipro (will complete 4/20/17) - this was discussed with Dr. Ribera from the hepatology team. Additionally, patient’s magnesium level was mildly decreased (1.3-1.5) during the last few days of his admission, and he was given magnesium sulfate repletion - he will be discharged with magnesium oxide BID and should have his magnesium levels repeated at his primary care doctor's office. He will resume his home medication regimen after discharge and re-schedule appointment for liver transplant work-up. 41yoM PMHx alcoholic cirrhosis, esophageal varices, DM2, severe c. diff, vitiligo, corneal transplant, recent admission for GNR bacteremia (or which he received Cipro) presented to the ED with 2 weeks of fever, nausea, anorexia, diarrhea, and sharp epigastric pain.Additionally, he was presenting with left hand edema after injuring himself at work. In the ED, he was afebrile and was given 1 dose of Cefepime and Flagyl for empiric SBP coverage. ED team looked for pocket of fluid for drainage, but was unable to find adequate pocket with ultrasound. CT A/P showed small bowel and right colonic wall thickening, suggestive of enterocolitis vs. portal enterocolopathy. Left hand X-ray returned negative for fractures.    Patient was admitted to medicine for further work-up. Blood and urine cultures were sent and came back negative, and C. diff was negative. Medicine attempted to do US-guided diagnostic paracentesis, but was unable to find a pocket. Hepatology was consulted and recommended discontinuation of antibiotics, prostate exam to r/o prostatitis, and colonoscopy for r/o enterocolitis. Prostate exam was unremarkable, and colonoscopy only showed mild portal colopathy. IR was consulted for diagnostic paracentesis, and PMNs on the ascites fluid sample were 90, inconsistent with SBP. SAAG > 1.1, consistent with known cirrhosis.  Cytology showed scattered benign reactive mesothelial cells and lymphocytes, but no organisms.     Patient had recurrence of fevers and malaise on hospital day 4, and was given one dose of cefepime and vancomycin. Repeat blood and urine cultures were negative. ID was consulted and recommended RVP, which returned positive for influenza B. Patient was started on Tamiflu 75 mg, and his symptoms began improving by the next day. ID and hepatology suggested empiric treatment of SBP despite negative findings on ascites fluid, and patient was given cefepime initially and later switched to Cipro 500mg BID for treatment. Hepatology recommended 1 tablet of Bactrim DS daily for SBP prophylaxis after treatment with current course of Cipro (will complete 4/20/17) - this was discussed with Dr. Ribera from the hepatology team. Additionally, patient’s magnesium level was mildly decreased (1.3-1.5) during the last few days of his admission, and he was given magnesium sulfate repletion - he will be discharged with magnesium oxide BID and should have his magnesium levels repeated at his primary care doctor's office. He will resume his home medication regimen after discharge and re-schedule appointment for liver transplant work-up.  Medicine addendum: 4/24/17 at 4.45 PM- Patient initially treated for suspected Spontaneous Bacterial peritonitis with IV cefepime and his abdominal pain  was improved. He was recommended by the GI for prophylactic antibiotic for SBP. Course was complicated by fever and running nose and he was found to have Influenza. Overall, he was improved and Hemodynamically stable to be discharged.

## 2017-04-15 NOTE — DISCHARGE NOTE ADULT - SECONDARY DIAGNOSIS.
Alcoholic cirrhosis of liver with ascites Diabetes mellitus type 2, insulin dependent Esophageal varices

## 2017-04-15 NOTE — DISCHARGE NOTE ADULT - CARE PROVIDER_API CALL
Andre Thakkar), Gastroenterology; Internal Medicine  [address] [place] [address]  Phone: [phone]  Fax: [phone]    Rj Woods), Medicine  Gen [address] [address]  Phone: [phone]  Fax: [phone]

## 2017-04-15 NOTE — DISCHARGE NOTE ADULT - CONDITIONS AT DISCHARGE
Vital signs, Temp 99.1, HR 75, /68, 95% room air. Dr. Hernandez aware of recent vital signs and patient stable for discharge.

## 2017-04-15 NOTE — DISCHARGE NOTE ADULT - MEDICATION SUMMARY - MEDICATIONS TO TAKE
I will START or STAY ON the medications listed below when I get home from the hospital:    metFORMIN 500 mg oral tablet  -- 1 tab(s) by mouth 2 times a day  -- Indication: For Diabetes mellitus    oseltamivir 75 mg oral capsule  -- 1 cap(s) by mouth 2 times a day  -- Indication: For Influenza    nadolol 20 mg oral tablet  -- 1 tab(s) by mouth once a day  -- Indication: For Esophageal varices    furosemide 20 mg oral tablet  -- 1 tab(s) by mouth 2 times a day  -- Indication: For Cirrhosis    Bactrim  mg-160 mg oral tablet  -- 1 tab(s) by mouth once a day. Please start on 4/21  -- Avoid prolonged or excessive exposure to direct and/or artificial sunlight while taking this medication.  Finish all this medication unless otherwise directed by prescriber.  Medication should be taken with plenty of water.    -- Indication: For SBP Prophylaxis    pantoprazole 40 mg oral delayed release tablet  -- 1 tab(s) by mouth once a day  -- Indication: For Esophageal varices    multivitamin  -- 1 tab(s) by mouth once a day  -- Indication: For Nutrition I will START or STAY ON the medications listed below when I get home from the hospital:    metFORMIN 500 mg oral tablet  -- 1 tab(s) by mouth 2 times a day  -- Indication: For Diabetes mellitus    oseltamivir 75 mg oral capsule  -- 1 cap(s) by mouth 2 times a day  -- Indication: For Influenza    nadolol 20 mg oral tablet  -- 1 tab(s) by mouth once a day  -- Indication: For Esophageal varices    furosemide 20 mg oral tablet  -- 1 tab(s) by mouth 2 times a day  -- Indication: For Cirrhosis    magnesium oxide 400 mg (241.3 mg elemental magnesium) oral tablet  -- 1 tab(s) by mouth 2 times a day  -- Indication: For Low magnessium    Bactrim  mg-160 mg oral tablet  -- 1 tab(s) by mouth once a day. Please start on 4/21  -- Avoid prolonged or excessive exposure to direct and/or artificial sunlight while taking this medication.  Finish all this medication unless otherwise directed by prescriber.  Medication should be taken with plenty of water.    -- Indication: For SBP Prophylaxis    pantoprazole 40 mg oral delayed release tablet  -- 1 tab(s) by mouth once a day  -- Indication: For Esophageal varices    ciprofloxacin 500 mg oral tablet  -- 1 tab(s) by mouth every 12 hours. Please start on 4/20  -- Indication: For Fever    multivitamin  -- 1 tab(s) by mouth once a day  -- Indication: For Nutrition

## 2017-04-16 LAB
ALBUMIN SERPL ELPH-MCNC: 2 G/DL — LOW (ref 3.3–5)
ALP SERPL-CCNC: 177 U/L — HIGH (ref 40–120)
ALT FLD-CCNC: 17 U/L — SIGNIFICANT CHANGE UP (ref 10–45)
ANION GAP SERPL CALC-SCNC: 9 MMOL/L — SIGNIFICANT CHANGE UP (ref 5–17)
AST SERPL-CCNC: 40 U/L — SIGNIFICANT CHANGE UP (ref 10–40)
BILIRUB SERPL-MCNC: 3.5 MG/DL — HIGH (ref 0.2–1.2)
BUN SERPL-MCNC: 7 MG/DL — SIGNIFICANT CHANGE UP (ref 7–23)
CALCIUM SERPL-MCNC: 7.5 MG/DL — LOW (ref 8.4–10.5)
CHLORIDE SERPL-SCNC: 103 MMOL/L — SIGNIFICANT CHANGE UP (ref 96–108)
CO2 SERPL-SCNC: 20 MMOL/L — LOW (ref 22–31)
CREAT SERPL-MCNC: 0.63 MG/DL — SIGNIFICANT CHANGE UP (ref 0.5–1.3)
GLUCOSE SERPL-MCNC: 295 MG/DL — HIGH (ref 70–99)
HCT VFR BLD CALC: 29.9 % — LOW (ref 39–50)
HGB BLD-MCNC: 10.3 G/DL — LOW (ref 13–17)
HIV 1+2 AB+HIV1 P24 AG SERPL QL IA: SIGNIFICANT CHANGE UP
MAGNESIUM SERPL-MCNC: 1.6 MG/DL — SIGNIFICANT CHANGE UP (ref 1.6–2.6)
MCHC RBC-ENTMCNC: 32.7 PG — SIGNIFICANT CHANGE UP (ref 27–34)
MCHC RBC-ENTMCNC: 34.4 GM/DL — SIGNIFICANT CHANGE UP (ref 32–36)
MCV RBC AUTO: 94.9 FL — SIGNIFICANT CHANGE UP (ref 80–100)
PHOSPHATE SERPL-MCNC: 3 MG/DL — SIGNIFICANT CHANGE UP (ref 2.5–4.5)
PLATELET # BLD AUTO: 71 K/UL — LOW (ref 150–400)
POTASSIUM SERPL-MCNC: 4.2 MMOL/L — SIGNIFICANT CHANGE UP (ref 3.5–5.3)
POTASSIUM SERPL-SCNC: 4.2 MMOL/L — SIGNIFICANT CHANGE UP (ref 3.5–5.3)
PROT SERPL-MCNC: 5.7 G/DL — LOW (ref 6–8.3)
RBC # BLD: 3.15 M/UL — LOW (ref 4.2–5.8)
RBC # FLD: 14.9 % — HIGH (ref 10.3–14.5)
SODIUM SERPL-SCNC: 132 MMOL/L — LOW (ref 135–145)
WBC # BLD: 4.23 K/UL — SIGNIFICANT CHANGE UP (ref 3.8–10.5)
WBC # FLD AUTO: 4.23 K/UL — SIGNIFICANT CHANGE UP (ref 3.8–10.5)

## 2017-04-16 PROCEDURE — 99232 SBSQ HOSP IP/OBS MODERATE 35: CPT | Mod: GC

## 2017-04-16 RX ORDER — VANCOMYCIN HCL 1 G
1250 VIAL (EA) INTRAVENOUS EVERY 12 HOURS
Qty: 0 | Refills: 0 | Status: DISCONTINUED | OUTPATIENT
Start: 2017-04-17 | End: 2017-04-17

## 2017-04-16 RX ORDER — CEFEPIME 1 G/1
1000 INJECTION, POWDER, FOR SOLUTION INTRAMUSCULAR; INTRAVENOUS EVERY 12 HOURS
Qty: 0 | Refills: 0 | Status: DISCONTINUED | OUTPATIENT
Start: 2017-04-17 | End: 2017-04-18

## 2017-04-16 RX ORDER — SOD SULF/SODIUM/NAHCO3/KCL/PEG
1000 SOLUTION, RECONSTITUTED, ORAL ORAL EVERY 4 HOURS
Qty: 0 | Refills: 0 | Status: COMPLETED | OUTPATIENT
Start: 2017-04-16 | End: 2017-04-16

## 2017-04-16 RX ORDER — INSULIN LISPRO 100/ML
4 VIAL (ML) SUBCUTANEOUS
Qty: 0 | Refills: 0 | Status: DISCONTINUED | OUTPATIENT
Start: 2017-04-16 | End: 2017-04-19

## 2017-04-16 RX ORDER — CEFEPIME 1 G/1
1000 INJECTION, POWDER, FOR SOLUTION INTRAMUSCULAR; INTRAVENOUS ONCE
Qty: 0 | Refills: 0 | Status: COMPLETED | OUTPATIENT
Start: 2017-04-16 | End: 2017-04-16

## 2017-04-16 RX ORDER — CEFEPIME 1 G/1
INJECTION, POWDER, FOR SOLUTION INTRAMUSCULAR; INTRAVENOUS
Qty: 0 | Refills: 0 | Status: DISCONTINUED | OUTPATIENT
Start: 2017-04-16 | End: 2017-04-18

## 2017-04-16 RX ORDER — VANCOMYCIN HCL 1 G
1250 VIAL (EA) INTRAVENOUS ONCE
Qty: 0 | Refills: 0 | Status: COMPLETED | OUTPATIENT
Start: 2017-04-16 | End: 2017-04-16

## 2017-04-16 RX ORDER — VANCOMYCIN HCL 1 G
VIAL (EA) INTRAVENOUS
Qty: 0 | Refills: 0 | Status: DISCONTINUED | OUTPATIENT
Start: 2017-04-16 | End: 2017-04-17

## 2017-04-16 RX ADMIN — Medication 2 UNIT(S): at 08:32

## 2017-04-16 RX ADMIN — CEFEPIME 100 MILLIGRAM(S): 1 INJECTION, POWDER, FOR SOLUTION INTRAMUSCULAR; INTRAVENOUS at 21:05

## 2017-04-16 RX ADMIN — Medication 4 UNIT(S): at 17:49

## 2017-04-16 RX ADMIN — PANTOPRAZOLE SODIUM 40 MILLIGRAM(S): 20 TABLET, DELAYED RELEASE ORAL at 05:02

## 2017-04-16 RX ADMIN — INSULIN GLARGINE 5 UNIT(S): 100 INJECTION, SOLUTION SUBCUTANEOUS at 22:44

## 2017-04-16 RX ADMIN — Medication 1 TABLET(S): at 12:00

## 2017-04-16 RX ADMIN — Medication 20 MILLIGRAM(S): at 05:02

## 2017-04-16 RX ADMIN — Medication 10 MILLIGRAM(S): at 17:49

## 2017-04-16 RX ADMIN — Medication 4: at 12:00

## 2017-04-16 RX ADMIN — Medication 20 MILLIGRAM(S): at 17:49

## 2017-04-16 RX ADMIN — Medication 1000 MILLILITER(S): at 17:49

## 2017-04-16 RX ADMIN — Medication 3: at 17:49

## 2017-04-16 RX ADMIN — Medication 2 UNIT(S): at 12:00

## 2017-04-16 RX ADMIN — Medication 1000 MILLILITER(S): at 21:06

## 2017-04-16 RX ADMIN — Medication 10 MILLIGRAM(S): at 21:05

## 2017-04-16 RX ADMIN — Medication 2: at 08:32

## 2017-04-16 RX ADMIN — Medication 166.67 MILLIGRAM(S): at 22:19

## 2017-04-16 RX ADMIN — NADOLOL 20 MILLIGRAM(S): 80 TABLET ORAL at 05:02

## 2017-04-17 LAB
ALBUMIN SERPL ELPH-MCNC: 2 G/DL — LOW (ref 3.3–5)
ALP SERPL-CCNC: 137 U/L — HIGH (ref 40–120)
ALT FLD-CCNC: 26 U/L — SIGNIFICANT CHANGE UP (ref 10–45)
ANION GAP SERPL CALC-SCNC: 13 MMOL/L — SIGNIFICANT CHANGE UP (ref 5–17)
APTT BLD: 40.5 SEC — HIGH (ref 27.5–37.4)
AST SERPL-CCNC: 54 U/L — HIGH (ref 10–40)
BILIRUB SERPL-MCNC: 4.4 MG/DL — HIGH (ref 0.2–1.2)
BUN SERPL-MCNC: 7 MG/DL — SIGNIFICANT CHANGE UP (ref 7–23)
CALCIUM SERPL-MCNC: 7.8 MG/DL — LOW (ref 8.4–10.5)
CHLORIDE SERPL-SCNC: 100 MMOL/L — SIGNIFICANT CHANGE UP (ref 96–108)
CO2 SERPL-SCNC: 22 MMOL/L — SIGNIFICANT CHANGE UP (ref 22–31)
CREAT SERPL-MCNC: 0.65 MG/DL — SIGNIFICANT CHANGE UP (ref 0.5–1.3)
CULTURE RESULTS: NO GROWTH — SIGNIFICANT CHANGE UP
FLUBV RNA SPEC QL NAA+PROBE: DETECTED
GLUCOSE SERPL-MCNC: 190 MG/DL — HIGH (ref 70–99)
HCT VFR BLD CALC: 30.1 % — LOW (ref 39–50)
HGB BLD-MCNC: 10.4 G/DL — LOW (ref 13–17)
INR BLD: 1.53 RATIO — HIGH (ref 0.88–1.16)
MAGNESIUM SERPL-MCNC: 1.3 MG/DL — LOW (ref 1.6–2.6)
MCHC RBC-ENTMCNC: 32.6 PG — SIGNIFICANT CHANGE UP (ref 27–34)
MCHC RBC-ENTMCNC: 34.6 GM/DL — SIGNIFICANT CHANGE UP (ref 32–36)
MCV RBC AUTO: 94.4 FL — SIGNIFICANT CHANGE UP (ref 80–100)
NON-GYNECOLOGICAL CYTOLOGY STUDY: SIGNIFICANT CHANGE UP
PHOSPHATE SERPL-MCNC: 3.4 MG/DL — SIGNIFICANT CHANGE UP (ref 2.5–4.5)
PLATELET # BLD AUTO: 67 K/UL — LOW (ref 150–400)
POTASSIUM SERPL-MCNC: 4.2 MMOL/L — SIGNIFICANT CHANGE UP (ref 3.5–5.3)
POTASSIUM SERPL-SCNC: 4.2 MMOL/L — SIGNIFICANT CHANGE UP (ref 3.5–5.3)
PROT SERPL-MCNC: 5.5 G/DL — LOW (ref 6–8.3)
PROTHROM AB SERPL-ACNC: 17.4 SEC — HIGH (ref 10–13.1)
RAPID RVP RESULT: DETECTED
RBC # BLD: 3.19 M/UL — LOW (ref 4.2–5.8)
RBC # FLD: 15.1 % — HIGH (ref 10.3–14.5)
SODIUM SERPL-SCNC: 135 MMOL/L — SIGNIFICANT CHANGE UP (ref 135–145)
SPECIMEN SOURCE: SIGNIFICANT CHANGE UP
WBC # BLD: 3.21 K/UL — LOW (ref 3.8–10.5)
WBC # FLD AUTO: 3.21 K/UL — LOW (ref 3.8–10.5)

## 2017-04-17 PROCEDURE — 99254 IP/OBS CNSLTJ NEW/EST MOD 60: CPT

## 2017-04-17 PROCEDURE — 45378 DIAGNOSTIC COLONOSCOPY: CPT | Mod: GC

## 2017-04-17 PROCEDURE — 99233 SBSQ HOSP IP/OBS HIGH 50: CPT | Mod: GC

## 2017-04-17 RX ORDER — MAGNESIUM SULFATE 500 MG/ML
2 VIAL (ML) INJECTION ONCE
Qty: 0 | Refills: 0 | Status: COMPLETED | OUTPATIENT
Start: 2017-04-17 | End: 2017-04-17

## 2017-04-17 RX ADMIN — CEFEPIME 100 MILLIGRAM(S): 1 INJECTION, POWDER, FOR SOLUTION INTRAMUSCULAR; INTRAVENOUS at 10:14

## 2017-04-17 RX ADMIN — Medication 50 GRAM(S): at 10:46

## 2017-04-17 RX ADMIN — Medication 20 MILLIGRAM(S): at 05:02

## 2017-04-17 RX ADMIN — Medication 20 MILLIGRAM(S): at 17:29

## 2017-04-17 RX ADMIN — Medication 4 UNIT(S): at 18:32

## 2017-04-17 RX ADMIN — Medication 1: at 18:32

## 2017-04-17 RX ADMIN — Medication 166.67 MILLIGRAM(S): at 15:27

## 2017-04-17 RX ADMIN — Medication 1 TABLET(S): at 15:27

## 2017-04-17 RX ADMIN — CEFEPIME 100 MILLIGRAM(S): 1 INJECTION, POWDER, FOR SOLUTION INTRAMUSCULAR; INTRAVENOUS at 21:46

## 2017-04-17 RX ADMIN — INSULIN GLARGINE 5 UNIT(S): 100 INJECTION, SOLUTION SUBCUTANEOUS at 21:45

## 2017-04-17 RX ADMIN — NADOLOL 20 MILLIGRAM(S): 80 TABLET ORAL at 05:02

## 2017-04-17 RX ADMIN — Medication 2: at 21:46

## 2017-04-17 RX ADMIN — PANTOPRAZOLE SODIUM 40 MILLIGRAM(S): 20 TABLET, DELAYED RELEASE ORAL at 05:02

## 2017-04-17 RX ADMIN — Medication 75 MILLIGRAM(S): at 21:52

## 2017-04-17 NOTE — DIETITIAN INITIAL EVALUATION ADULT. - PERTINENT LABORATORY DATA
Glucose 190, Total bilirubin 4.4, magnesium 1.3, finger sticks 127-319 (4/16-17), HbA1c 7.2 (2/27/17)

## 2017-04-17 NOTE — DIETITIAN INITIAL EVALUATION ADULT. - NS AS NUTRI INTERV ED CONTENT
Purpose of the nutrition education/Provided and discussed CHO counting and label reading. Reviewed pairing foods CHO +protein foods. Pt receptive to education, although stated it will be difficult outside of hospital.

## 2017-04-17 NOTE — DIETITIAN INITIAL EVALUATION ADULT. - PROBLEM SELECTOR PLAN 2
-colitis shown on CT a/p.   -Will send for C. diff, stool O+P and stool cultures.   -continue treating with cefepime. Will add flagyl if c. diff positive or if rec by hepatology

## 2017-04-17 NOTE — DIETITIAN INITIAL EVALUATION ADULT. - NUTRITION INTERVENTION
Vitamin/Meals and Snack/Nutrition Education/Collaboration and Referral of Nutrition Care/Feeding Assistance/Medical Food Supplements

## 2017-04-17 NOTE — DIETITIAN INITIAL EVALUATION ADULT. - OTHER INFO
Pt seen for HbA1c 7.2 consult. Pt reports UBW of 219-220, with recent wt of 205pound 1 month ago. Note most recent wt in chart 218.6 pounds, ?accuracy of bed weight d/t ascites. Pt reports decreased appetite x2 weeks with his wife encouraging him to eat. Pt denies any N/V or constipation. Pt endorsed diarrhea x2days (4/10-4/11). Pt most recent BM this morning. Pt denies any chewing/swallowing difficulties. Pt denies nutritional shake supplementation at this time.

## 2017-04-17 NOTE — DIETITIAN INITIAL EVALUATION ADULT. - ENERGY NEEDS
ht.68inches wt. 218.6pounds BMI:33.3 kg/m2 IBW:154pounds (+/-10%) %IBW: 142%  Pt is 40 yo M with hx of alcoholic cirrhosis, esophageal varices, T2DM, hx of severe c.diff, vitiligo, corneal transplant, recent admit with GNR Bacteriemia p/w abdominal pain, diarrhea and fever.   Pt s/p colonoscopy 4/17.  +2 left hand and BLE Edema noted  No Pressure Ulcers noted

## 2017-04-17 NOTE — DIETITIAN INITIAL EVALUATION ADULT. - ADHERENCE
Pt reports checking blood glucose levels 1x/day typically ranging 220-230 and takes insulin PTA. Pt reports financial reasons for poor diet compliance/poor

## 2017-04-17 NOTE — DIETITIAN INITIAL EVALUATION ADULT. - PROBLEM SELECTOR PLAN 4
Pt has hasn't had a drink in 20 months.   He's being evaluated at [facility] for liver transplant, supposed to have appointment tomorrow.   Hepatology consulted.

## 2017-04-17 NOTE — DIETITIAN INITIAL EVALUATION ADULT. - PROBLEM SELECTOR PLAN 1
DDx: includes infectious colitis vs SBP vs unknown etiology of previous bactermia  Pt not currently meeting septic criteria  ED unable to tap ascites, pt given antibiotics so yield for SBP will be low  Will try to tap bedside, if unsucessful, will consult IR for tap if still requested by hepatology   Will consult hepatology for further input.   Will continue treating with cefepime for possible SBP.  Blood and urine cultures pending.   -send cdif, stool cx, o and p  Recent stress echo showed no valvular disease, but may consider KYA if no other source found.

## 2017-04-17 NOTE — DIETITIAN INITIAL EVALUATION ADULT. - NS AS NUTRI INTERV MEALS SNACK
Continue with Consistent CHO diet with evening snacks. Encourage small frequent meals to promote good po intake./General/healthful diet

## 2017-04-18 LAB
ALBUMIN SERPL ELPH-MCNC: 2 G/DL — LOW (ref 3.3–5)
ALP SERPL-CCNC: 154 U/L — HIGH (ref 40–120)
ALT FLD-CCNC: 29 U/L — SIGNIFICANT CHANGE UP (ref 10–45)
ANION GAP SERPL CALC-SCNC: 12 MMOL/L — SIGNIFICANT CHANGE UP (ref 5–17)
AST SERPL-CCNC: 62 U/L — HIGH (ref 10–40)
BILIRUB SERPL-MCNC: 3.3 MG/DL — HIGH (ref 0.2–1.2)
BUN SERPL-MCNC: 7 MG/DL — SIGNIFICANT CHANGE UP (ref 7–23)
CALCIUM SERPL-MCNC: 7.3 MG/DL — LOW (ref 8.4–10.5)
CHLORIDE SERPL-SCNC: 100 MMOL/L — SIGNIFICANT CHANGE UP (ref 96–108)
CO2 SERPL-SCNC: 20 MMOL/L — LOW (ref 22–31)
CREAT SERPL-MCNC: 0.68 MG/DL — SIGNIFICANT CHANGE UP (ref 0.5–1.3)
CULTURE RESULTS: SIGNIFICANT CHANGE UP
CULTURE RESULTS: SIGNIFICANT CHANGE UP
GLUCOSE SERPL-MCNC: 273 MG/DL — HIGH (ref 70–99)
HCT VFR BLD CALC: 29.4 % — LOW (ref 39–50)
HGB BLD-MCNC: 10.2 G/DL — LOW (ref 13–17)
MAGNESIUM SERPL-MCNC: 1.5 MG/DL — LOW (ref 1.6–2.6)
MCHC RBC-ENTMCNC: 32.9 PG — SIGNIFICANT CHANGE UP (ref 27–34)
MCHC RBC-ENTMCNC: 34.7 GM/DL — SIGNIFICANT CHANGE UP (ref 32–36)
MCV RBC AUTO: 94.8 FL — SIGNIFICANT CHANGE UP (ref 80–100)
PHOSPHATE SERPL-MCNC: 3.6 MG/DL — SIGNIFICANT CHANGE UP (ref 2.5–4.5)
PLATELET # BLD AUTO: 64 K/UL — LOW (ref 150–400)
POTASSIUM SERPL-MCNC: 4.1 MMOL/L — SIGNIFICANT CHANGE UP (ref 3.5–5.3)
POTASSIUM SERPL-SCNC: 4.1 MMOL/L — SIGNIFICANT CHANGE UP (ref 3.5–5.3)
PROT SERPL-MCNC: 5.6 G/DL — LOW (ref 6–8.3)
RBC # BLD: 3.1 M/UL — LOW (ref 4.2–5.8)
RBC # FLD: 15.2 % — HIGH (ref 10.3–14.5)
SODIUM SERPL-SCNC: 132 MMOL/L — LOW (ref 135–145)
SPECIMEN SOURCE: SIGNIFICANT CHANGE UP
SPECIMEN SOURCE: SIGNIFICANT CHANGE UP
WBC # BLD: 3.74 K/UL — LOW (ref 3.8–10.5)
WBC # FLD AUTO: 3.74 K/UL — LOW (ref 3.8–10.5)

## 2017-04-18 PROCEDURE — 99233 SBSQ HOSP IP/OBS HIGH 50: CPT | Mod: GC

## 2017-04-18 PROCEDURE — 99232 SBSQ HOSP IP/OBS MODERATE 35: CPT

## 2017-04-18 PROCEDURE — 99232 SBSQ HOSP IP/OBS MODERATE 35: CPT | Mod: GC

## 2017-04-18 RX ORDER — CEFEPIME 1 G/1
1000 INJECTION, POWDER, FOR SOLUTION INTRAMUSCULAR; INTRAVENOUS EVERY 12 HOURS
Qty: 0 | Refills: 0 | Status: COMPLETED | OUTPATIENT
Start: 2017-04-18 | End: 2017-04-18

## 2017-04-18 RX ORDER — CIPROFLOXACIN LACTATE 400MG/40ML
500 VIAL (ML) INTRAVENOUS EVERY 12 HOURS
Qty: 0 | Refills: 0 | Status: DISCONTINUED | OUTPATIENT
Start: 2017-04-19 | End: 2017-04-19

## 2017-04-18 RX ORDER — MAGNESIUM SULFATE 500 MG/ML
2 VIAL (ML) INJECTION ONCE
Qty: 0 | Refills: 0 | Status: COMPLETED | OUTPATIENT
Start: 2017-04-18 | End: 2017-04-18

## 2017-04-18 RX ADMIN — Medication 2: at 18:08

## 2017-04-18 RX ADMIN — Medication 2: at 08:30

## 2017-04-18 RX ADMIN — Medication 4 UNIT(S): at 18:08

## 2017-04-18 RX ADMIN — PANTOPRAZOLE SODIUM 40 MILLIGRAM(S): 20 TABLET, DELAYED RELEASE ORAL at 04:33

## 2017-04-18 RX ADMIN — CEFEPIME 100 MILLIGRAM(S): 1 INJECTION, POWDER, FOR SOLUTION INTRAMUSCULAR; INTRAVENOUS at 09:57

## 2017-04-18 RX ADMIN — NADOLOL 20 MILLIGRAM(S): 80 TABLET ORAL at 04:33

## 2017-04-18 RX ADMIN — Medication 20 MILLIGRAM(S): at 04:33

## 2017-04-18 RX ADMIN — Medication 20 MILLIGRAM(S): at 17:16

## 2017-04-18 RX ADMIN — Medication 4 UNIT(S): at 13:19

## 2017-04-18 RX ADMIN — Medication 1 TABLET(S): at 13:19

## 2017-04-18 RX ADMIN — Medication 2: at 13:19

## 2017-04-18 RX ADMIN — Medication 75 MILLIGRAM(S): at 04:33

## 2017-04-18 RX ADMIN — Medication 4 UNIT(S): at 08:30

## 2017-04-18 RX ADMIN — INSULIN GLARGINE 5 UNIT(S): 100 INJECTION, SOLUTION SUBCUTANEOUS at 22:08

## 2017-04-18 RX ADMIN — CEFEPIME 100 MILLIGRAM(S): 1 INJECTION, POWDER, FOR SOLUTION INTRAMUSCULAR; INTRAVENOUS at 21:43

## 2017-04-18 RX ADMIN — Medication 50 GRAM(S): at 13:49

## 2017-04-18 RX ADMIN — Medication 75 MILLIGRAM(S): at 17:16

## 2017-04-18 NOTE — PROVIDER CONTACT NOTE (OTHER) - ACTION/TREATMENT ORDERED:
Dr Camarillo aware of above, tylenol and motrin not to be ordered/admin due to cirrhosis. ice packs to be used for fever.

## 2017-04-19 VITALS
RESPIRATION RATE: 18 BRPM | HEART RATE: 75 BPM | SYSTOLIC BLOOD PRESSURE: 112 MMHG | TEMPERATURE: 99 F | OXYGEN SATURATION: 95 % | DIASTOLIC BLOOD PRESSURE: 68 MMHG

## 2017-04-19 LAB
ALBUMIN SERPL ELPH-MCNC: 1.9 G/DL — LOW (ref 3.3–5)
ALP SERPL-CCNC: 155 U/L — HIGH (ref 40–120)
ALT FLD-CCNC: 31 U/L — SIGNIFICANT CHANGE UP (ref 10–45)
ANION GAP SERPL CALC-SCNC: 13 MMOL/L — SIGNIFICANT CHANGE UP (ref 5–17)
AST SERPL-CCNC: 72 U/L — HIGH (ref 10–40)
BILIRUB SERPL-MCNC: 3.1 MG/DL — HIGH (ref 0.2–1.2)
BUN SERPL-MCNC: 7 MG/DL — SIGNIFICANT CHANGE UP (ref 7–23)
CALCIUM SERPL-MCNC: 7 MG/DL — LOW (ref 8.4–10.5)
CHLORIDE SERPL-SCNC: 98 MMOL/L — SIGNIFICANT CHANGE UP (ref 96–108)
CO2 SERPL-SCNC: 20 MMOL/L — LOW (ref 22–31)
CREAT SERPL-MCNC: 0.64 MG/DL — SIGNIFICANT CHANGE UP (ref 0.5–1.3)
CULTURE RESULTS: SIGNIFICANT CHANGE UP
CULTURE RESULTS: SIGNIFICANT CHANGE UP
GLUCOSE SERPL-MCNC: 223 MG/DL — HIGH (ref 70–99)
HCT VFR BLD CALC: 30.4 % — LOW (ref 39–50)
HGB BLD-MCNC: 10.7 G/DL — LOW (ref 13–17)
MAGNESIUM SERPL-MCNC: 1.4 MG/DL — LOW (ref 1.6–2.6)
MCHC RBC-ENTMCNC: 32.9 PG — SIGNIFICANT CHANGE UP (ref 27–34)
MCHC RBC-ENTMCNC: 35.2 GM/DL — SIGNIFICANT CHANGE UP (ref 32–36)
MCV RBC AUTO: 93.5 FL — SIGNIFICANT CHANGE UP (ref 80–100)
PHOSPHATE SERPL-MCNC: 3 MG/DL — SIGNIFICANT CHANGE UP (ref 2.5–4.5)
PLATELET # BLD AUTO: 58 K/UL — LOW (ref 150–400)
POTASSIUM SERPL-MCNC: 3.8 MMOL/L — SIGNIFICANT CHANGE UP (ref 3.5–5.3)
POTASSIUM SERPL-SCNC: 3.8 MMOL/L — SIGNIFICANT CHANGE UP (ref 3.5–5.3)
PROT SERPL-MCNC: 5.8 G/DL — LOW (ref 6–8.3)
RBC # BLD: 3.25 M/UL — LOW (ref 4.2–5.8)
RBC # FLD: 15 % — HIGH (ref 10.3–14.5)
SODIUM SERPL-SCNC: 131 MMOL/L — LOW (ref 135–145)
SPECIMEN SOURCE: SIGNIFICANT CHANGE UP
SPECIMEN SOURCE: SIGNIFICANT CHANGE UP
WBC # BLD: 3.69 K/UL — LOW (ref 3.8–10.5)
WBC # FLD AUTO: 3.69 K/UL — LOW (ref 3.8–10.5)

## 2017-04-19 PROCEDURE — 87493 C DIFF AMPLIFIED PROBE: CPT

## 2017-04-19 PROCEDURE — 89051 BODY FLUID CELL COUNT: CPT

## 2017-04-19 PROCEDURE — 88305 TISSUE EXAM BY PATHOLOGIST: CPT

## 2017-04-19 PROCEDURE — 87116 MYCOBACTERIA CULTURE: CPT

## 2017-04-19 PROCEDURE — 87633 RESP VIRUS 12-25 TARGETS: CPT

## 2017-04-19 PROCEDURE — 71046 X-RAY EXAM CHEST 2 VIEWS: CPT

## 2017-04-19 PROCEDURE — 84157 ASSAY OF PROTEIN OTHER: CPT

## 2017-04-19 PROCEDURE — 85027 COMPLETE CBC AUTOMATED: CPT

## 2017-04-19 PROCEDURE — 88112 CYTOPATH CELL ENHANCE TECH: CPT

## 2017-04-19 PROCEDURE — 82042 OTHER SOURCE ALBUMIN QUAN EA: CPT

## 2017-04-19 PROCEDURE — 87486 CHLMYD PNEUM DNA AMP PROBE: CPT

## 2017-04-19 PROCEDURE — 87798 DETECT AGENT NOS DNA AMP: CPT

## 2017-04-19 PROCEDURE — C1729: CPT

## 2017-04-19 PROCEDURE — 87581 M.PNEUMON DNA AMP PROBE: CPT

## 2017-04-19 PROCEDURE — 83615 LACTATE (LD) (LDH) ENZYME: CPT

## 2017-04-19 PROCEDURE — 85610 PROTHROMBIN TIME: CPT

## 2017-04-19 PROCEDURE — 87086 URINE CULTURE/COLONY COUNT: CPT

## 2017-04-19 PROCEDURE — 87177 OVA AND PARASITES SMEARS: CPT

## 2017-04-19 PROCEDURE — 87015 SPECIMEN INFECT AGNT CONCNTJ: CPT

## 2017-04-19 PROCEDURE — 87070 CULTURE OTHR SPECIMN AEROBIC: CPT

## 2017-04-19 PROCEDURE — 87389 HIV-1 AG W/HIV-1&-2 AB AG IA: CPT

## 2017-04-19 PROCEDURE — 87205 SMEAR GRAM STAIN: CPT

## 2017-04-19 PROCEDURE — 87102 FUNGUS ISOLATION CULTURE: CPT

## 2017-04-19 PROCEDURE — 80053 COMPREHEN METABOLIC PANEL: CPT

## 2017-04-19 PROCEDURE — 84100 ASSAY OF PHOSPHORUS: CPT

## 2017-04-19 PROCEDURE — 87206 SMEAR FLUORESCENT/ACID STAI: CPT

## 2017-04-19 PROCEDURE — 96374 THER/PROPH/DIAG INJ IV PUSH: CPT | Mod: XU

## 2017-04-19 PROCEDURE — 73130 X-RAY EXAM OF HAND: CPT

## 2017-04-19 PROCEDURE — 85730 THROMBOPLASTIN TIME PARTIAL: CPT

## 2017-04-19 PROCEDURE — 96376 TX/PRO/DX INJ SAME DRUG ADON: CPT

## 2017-04-19 PROCEDURE — 93971 EXTREMITY STUDY: CPT

## 2017-04-19 PROCEDURE — 87040 BLOOD CULTURE FOR BACTERIA: CPT

## 2017-04-19 PROCEDURE — 99239 HOSP IP/OBS DSCHRG MGMT >30: CPT

## 2017-04-19 PROCEDURE — 99285 EMERGENCY DEPT VISIT HI MDM: CPT | Mod: 25

## 2017-04-19 PROCEDURE — 93005 ELECTROCARDIOGRAM TRACING: CPT

## 2017-04-19 PROCEDURE — 83735 ASSAY OF MAGNESIUM: CPT

## 2017-04-19 PROCEDURE — 82945 GLUCOSE OTHER FLUID: CPT

## 2017-04-19 PROCEDURE — 96375 TX/PRO/DX INJ NEW DRUG ADDON: CPT

## 2017-04-19 PROCEDURE — 87075 CULTR BACTERIA EXCEPT BLOOD: CPT

## 2017-04-19 PROCEDURE — 81001 URINALYSIS AUTO W/SCOPE: CPT

## 2017-04-19 PROCEDURE — 49083 ABD PARACENTESIS W/IMAGING: CPT

## 2017-04-19 PROCEDURE — 99232 SBSQ HOSP IP/OBS MODERATE 35: CPT

## 2017-04-19 PROCEDURE — 74177 CT ABD & PELVIS W/CONTRAST: CPT

## 2017-04-19 PROCEDURE — 82248 BILIRUBIN DIRECT: CPT

## 2017-04-19 RX ORDER — MAGNESIUM OXIDE 400 MG ORAL TABLET 241.3 MG
1 TABLET ORAL
Qty: 10 | Refills: 0 | OUTPATIENT
Start: 2017-04-19 | End: 2017-04-24

## 2017-04-19 RX ORDER — AZTREONAM 2 G
1 VIAL (EA) INJECTION
Qty: 10 | Refills: 0 | OUTPATIENT
Start: 2017-04-19 | End: 2017-04-29

## 2017-04-19 RX ORDER — FUROSEMIDE 40 MG
1 TABLET ORAL
Qty: 0 | Refills: 0 | COMMUNITY
Start: 2017-04-19

## 2017-04-19 RX ORDER — MAGNESIUM SULFATE 500 MG/ML
2 VIAL (ML) INJECTION ONCE
Qty: 0 | Refills: 0 | Status: COMPLETED | OUTPATIENT
Start: 2017-04-19 | End: 2017-04-19

## 2017-04-19 RX ADMIN — Medication 4 UNIT(S): at 08:51

## 2017-04-19 RX ADMIN — Medication 75 MILLIGRAM(S): at 17:34

## 2017-04-19 RX ADMIN — Medication 1 TABLET(S): at 11:44

## 2017-04-19 RX ADMIN — Medication 20 MILLIGRAM(S): at 17:34

## 2017-04-19 RX ADMIN — Medication 50 GRAM(S): at 09:38

## 2017-04-19 RX ADMIN — Medication 1: at 08:51

## 2017-04-19 RX ADMIN — NADOLOL 20 MILLIGRAM(S): 80 TABLET ORAL at 05:30

## 2017-04-19 RX ADMIN — Medication 2: at 12:48

## 2017-04-19 RX ADMIN — Medication 500 MILLIGRAM(S): at 05:30

## 2017-04-19 RX ADMIN — Medication 75 MILLIGRAM(S): at 05:30

## 2017-04-19 RX ADMIN — Medication 4 UNIT(S): at 12:48

## 2017-04-19 RX ADMIN — Medication 20 MILLIGRAM(S): at 05:30

## 2017-04-19 RX ADMIN — Medication 500 MILLIGRAM(S): at 17:34

## 2017-04-19 RX ADMIN — PANTOPRAZOLE SODIUM 40 MILLIGRAM(S): 20 TABLET, DELAYED RELEASE ORAL at 05:30

## 2017-04-20 RX ORDER — CIPROFLOXACIN LACTATE 400MG/40ML
1 VIAL (ML) INTRAVENOUS
Qty: 2 | Refills: 0 | OUTPATIENT
Start: 2017-04-20 | End: 2017-04-21

## 2017-04-21 RX ORDER — AZTREONAM 2 G
1 VIAL (EA) INJECTION
Qty: 10 | Refills: 0
Start: 2017-04-21 | End: 2017-05-01

## 2017-04-21 RX ORDER — AZTREONAM 2 G
1 VIAL (EA) INJECTION
Qty: 10 | Refills: 0 | OUTPATIENT
Start: 2017-04-21 | End: 2017-05-01

## 2017-05-01 ENCOUNTER — APPOINTMENT (OUTPATIENT)
Dept: INTERNAL MEDICINE | Facility: CLINIC | Age: 42
End: 2017-05-01

## 2017-05-01 VITALS
DIASTOLIC BLOOD PRESSURE: 76 MMHG | OXYGEN SATURATION: 94 % | BODY MASS INDEX: 32.58 KG/M2 | HEIGHT: 68 IN | HEART RATE: 65 BPM | TEMPERATURE: 98.5 F | SYSTOLIC BLOOD PRESSURE: 118 MMHG | WEIGHT: 215 LBS

## 2017-05-02 LAB
ALBUMIN SERPL ELPH-MCNC: 2.7 G/DL
ALP BLD-CCNC: 260 U/L
ALT SERPL-CCNC: 30 U/L
ANION GAP SERPL CALC-SCNC: 16 MMOL/L
AST SERPL-CCNC: 66 U/L
BILIRUB SERPL-MCNC: 4.4 MG/DL
BUN SERPL-MCNC: 9 MG/DL
CALCIUM SERPL-MCNC: 8.6 MG/DL
CHLORIDE SERPL-SCNC: 100 MMOL/L
CO2 SERPL-SCNC: 20 MMOL/L
CREAT SERPL-MCNC: 0.8 MG/DL
GLUCOSE SERPL-MCNC: 180 MG/DL
HBA1C MFR BLD HPLC: 7.1 %
MAGNESIUM SERPL-MCNC: 1.6 MG/DL
POTASSIUM SERPL-SCNC: 4.8 MMOL/L
PROT SERPL-MCNC: 6.9 G/DL
SODIUM SERPL-SCNC: 136 MMOL/L

## 2017-05-13 LAB
CULTURE RESULTS: SIGNIFICANT CHANGE UP
SPECIMEN SOURCE: SIGNIFICANT CHANGE UP

## 2017-05-20 ENCOUNTER — INPATIENT (INPATIENT)
Facility: HOSPITAL | Age: 42
LOS: 1 days | Discharge: HOME | DRG: 872 | End: 2017-05-22
Attending: HOSPITALIST | Admitting: INTERNAL MEDICINE
Payer: COMMERCIAL

## 2017-05-20 VITALS
HEART RATE: 127 BPM | RESPIRATION RATE: 20 BRPM | DIASTOLIC BLOOD PRESSURE: 53 MMHG | SYSTOLIC BLOOD PRESSURE: 102 MMHG | OXYGEN SATURATION: 90 % | TEMPERATURE: 99 F

## 2017-05-20 DIAGNOSIS — E11.9 TYPE 2 DIABETES MELLITUS WITHOUT COMPLICATIONS: ICD-10-CM

## 2017-05-20 DIAGNOSIS — N17.9 ACUTE KIDNEY FAILURE, UNSPECIFIED: ICD-10-CM

## 2017-05-20 DIAGNOSIS — K52.9 NONINFECTIVE GASTROENTERITIS AND COLITIS, UNSPECIFIED: ICD-10-CM

## 2017-05-20 DIAGNOSIS — K70.31 ALCOHOLIC CIRRHOSIS OF LIVER WITH ASCITES: ICD-10-CM

## 2017-05-20 DIAGNOSIS — A41.9 SEPSIS, UNSPECIFIED ORGANISM: ICD-10-CM

## 2017-05-20 DIAGNOSIS — Z29.9 ENCOUNTER FOR PROPHYLACTIC MEASURES, UNSPECIFIED: ICD-10-CM

## 2017-05-20 LAB
ALBUMIN SERPL ELPH-MCNC: 2.7 G/DL — LOW (ref 3.3–5)
ALP SERPL-CCNC: 274 U/L — HIGH (ref 40–120)
ALT FLD-CCNC: 38 U/L RC — SIGNIFICANT CHANGE UP (ref 10–45)
ANION GAP SERPL CALC-SCNC: 16 MMOL/L — SIGNIFICANT CHANGE UP (ref 5–17)
APPEARANCE UR: ABNORMAL
APTT BLD: 37.5 SEC — HIGH (ref 27.5–37.4)
AST SERPL-CCNC: 63 U/L — HIGH (ref 10–40)
BASOPHILS # BLD AUTO: 0 K/UL — SIGNIFICANT CHANGE UP (ref 0–0.2)
BASOPHILS NFR BLD AUTO: 0.1 % — SIGNIFICANT CHANGE UP (ref 0–2)
BILIRUB SERPL-MCNC: 6.2 MG/DL — HIGH (ref 0.2–1.2)
BILIRUB UR-MCNC: ABNORMAL
BUN SERPL-MCNC: 16 MG/DL — SIGNIFICANT CHANGE UP (ref 7–23)
CALCIUM SERPL-MCNC: 7.6 MG/DL — LOW (ref 8.4–10.5)
CHLORIDE SERPL-SCNC: 100 MMOL/L — SIGNIFICANT CHANGE UP (ref 96–108)
CO2 SERPL-SCNC: 18 MMOL/L — LOW (ref 22–31)
COLOR SPEC: ABNORMAL
CREAT SERPL-MCNC: 1.17 MG/DL — SIGNIFICANT CHANGE UP (ref 0.5–1.3)
DIFF PNL FLD: ABNORMAL
EOSINOPHIL # BLD AUTO: 0 K/UL — SIGNIFICANT CHANGE UP (ref 0–0.5)
EOSINOPHIL NFR BLD AUTO: 0 % — SIGNIFICANT CHANGE UP (ref 0–6)
EPI CELLS # UR: SIGNIFICANT CHANGE UP /HPF
GAS PNL BLDV: SIGNIFICANT CHANGE UP
GAS PNL BLDV: SIGNIFICANT CHANGE UP
GLUCOSE SERPL-MCNC: 342 MG/DL — HIGH (ref 70–99)
GLUCOSE UR QL: ABNORMAL
HCT VFR BLD CALC: 32.4 % — LOW (ref 39–50)
HGB BLD-MCNC: 11.5 G/DL — LOW (ref 13–17)
INR BLD: 1.53 RATIO — HIGH (ref 0.88–1.16)
KETONES UR-MCNC: ABNORMAL
LACTATE BLDV-MCNC: 2 MMOL/L — SIGNIFICANT CHANGE UP (ref 0.7–2)
LEUKOCYTE ESTERASE UR-ACNC: NEGATIVE — SIGNIFICANT CHANGE UP
LYMPHOCYTES # BLD AUTO: 0.7 K/UL — LOW (ref 1–3.3)
LYMPHOCYTES # BLD AUTO: 5.5 % — LOW (ref 13–44)
MCHC RBC-ENTMCNC: 35.4 GM/DL — SIGNIFICANT CHANGE UP (ref 32–36)
MCHC RBC-ENTMCNC: 35.4 PG — HIGH (ref 27–34)
MCV RBC AUTO: 100 FL — SIGNIFICANT CHANGE UP (ref 80–100)
MONOCYTES # BLD AUTO: 0.8 K/UL — SIGNIFICANT CHANGE UP (ref 0–0.9)
MONOCYTES NFR BLD AUTO: 6.3 % — SIGNIFICANT CHANGE UP (ref 2–14)
NEUTROPHILS # BLD AUTO: 11.7 K/UL — HIGH (ref 1.8–7.4)
NEUTROPHILS NFR BLD AUTO: 88.1 % — HIGH (ref 43–77)
NITRITE UR-MCNC: NEGATIVE — SIGNIFICANT CHANGE UP
PH UR: 6 — SIGNIFICANT CHANGE UP (ref 5–8)
PLATELET # BLD AUTO: 46 K/UL — LOW (ref 150–400)
POTASSIUM SERPL-MCNC: 4 MMOL/L — SIGNIFICANT CHANGE UP (ref 3.5–5.3)
POTASSIUM SERPL-SCNC: 4 MMOL/L — SIGNIFICANT CHANGE UP (ref 3.5–5.3)
PROT SERPL-MCNC: 6.6 G/DL — SIGNIFICANT CHANGE UP (ref 6–8.3)
PROT UR-MCNC: 30 MG/DL
PROTHROM AB SERPL-ACNC: 16.7 SEC — HIGH (ref 9.8–12.7)
RAPID RVP RESULT: SIGNIFICANT CHANGE UP
RBC # BLD: 3.24 M/UL — LOW (ref 4.2–5.8)
RBC # FLD: 14 % — SIGNIFICANT CHANGE UP (ref 10.3–14.5)
RBC CASTS # UR COMP ASSIST: ABNORMAL /HPF (ref 0–2)
SODIUM SERPL-SCNC: 134 MMOL/L — LOW (ref 135–145)
SP GR SPEC: >1.03 — HIGH (ref 1.01–1.02)
UROBILINOGEN FLD QL: 1
WBC # BLD: 13.3 K/UL — HIGH (ref 3.8–10.5)
WBC # FLD AUTO: 13.3 K/UL — HIGH (ref 3.8–10.5)
WBC UR QL: SIGNIFICANT CHANGE UP /HPF (ref 0–5)

## 2017-05-20 PROCEDURE — 99291 CRITICAL CARE FIRST HOUR: CPT | Mod: 25

## 2017-05-20 PROCEDURE — 71010: CPT | Mod: 26

## 2017-05-20 PROCEDURE — 93010 ELECTROCARDIOGRAM REPORT: CPT

## 2017-05-20 PROCEDURE — 74177 CT ABD & PELVIS W/CONTRAST: CPT | Mod: 26

## 2017-05-20 PROCEDURE — 99223 1ST HOSP IP/OBS HIGH 75: CPT | Mod: GC

## 2017-05-20 RX ORDER — HYDROMORPHONE HYDROCHLORIDE 2 MG/ML
0.5 INJECTION INTRAMUSCULAR; INTRAVENOUS; SUBCUTANEOUS ONCE
Qty: 0 | Refills: 0 | Status: DISCONTINUED | OUTPATIENT
Start: 2017-05-20 | End: 2017-05-20

## 2017-05-20 RX ORDER — KETOROLAC TROMETHAMINE 30 MG/ML
15 SYRINGE (ML) INJECTION ONCE
Qty: 0 | Refills: 0 | Status: DISCONTINUED | OUTPATIENT
Start: 2017-05-20 | End: 2017-05-20

## 2017-05-20 RX ORDER — ACETAMINOPHEN 500 MG
650 TABLET ORAL EVERY 6 HOURS
Qty: 0 | Refills: 0 | Status: DISCONTINUED | OUTPATIENT
Start: 2017-05-20 | End: 2017-05-20

## 2017-05-20 RX ORDER — SODIUM CHLORIDE 9 MG/ML
3 INJECTION INTRAMUSCULAR; INTRAVENOUS; SUBCUTANEOUS ONCE
Qty: 0 | Refills: 0 | Status: COMPLETED | OUTPATIENT
Start: 2017-05-20 | End: 2017-05-20

## 2017-05-20 RX ORDER — VANCOMYCIN HCL 1 G
1000 VIAL (EA) INTRAVENOUS ONCE
Qty: 0 | Refills: 0 | Status: COMPLETED | OUTPATIENT
Start: 2017-05-20 | End: 2017-05-20

## 2017-05-20 RX ORDER — HEPARIN SODIUM 5000 [USP'U]/ML
5000 INJECTION INTRAVENOUS; SUBCUTANEOUS EVERY 8 HOURS
Qty: 0 | Refills: 0 | Status: DISCONTINUED | OUTPATIENT
Start: 2017-05-20 | End: 2017-05-20

## 2017-05-20 RX ORDER — TRAMADOL HYDROCHLORIDE 50 MG/1
50 TABLET ORAL EVERY 8 HOURS
Qty: 0 | Refills: 0 | Status: DISCONTINUED | OUTPATIENT
Start: 2017-05-20 | End: 2017-05-22

## 2017-05-20 RX ORDER — INSULIN LISPRO 100/ML
VIAL (ML) SUBCUTANEOUS AT BEDTIME
Qty: 0 | Refills: 0 | Status: DISCONTINUED | OUTPATIENT
Start: 2017-05-20 | End: 2017-05-22

## 2017-05-20 RX ORDER — MORPHINE SULFATE 50 MG/1
2 CAPSULE, EXTENDED RELEASE ORAL ONCE
Qty: 0 | Refills: 0 | Status: DISCONTINUED | OUTPATIENT
Start: 2017-05-20 | End: 2017-05-20

## 2017-05-20 RX ORDER — SODIUM CHLORIDE 9 MG/ML
1500 INJECTION INTRAMUSCULAR; INTRAVENOUS; SUBCUTANEOUS ONCE
Qty: 0 | Refills: 0 | Status: COMPLETED | OUTPATIENT
Start: 2017-05-20 | End: 2017-05-20

## 2017-05-20 RX ORDER — PANTOPRAZOLE SODIUM 20 MG/1
40 TABLET, DELAYED RELEASE ORAL
Qty: 0 | Refills: 0 | Status: DISCONTINUED | OUTPATIENT
Start: 2017-05-20 | End: 2017-05-22

## 2017-05-20 RX ORDER — HYDROMORPHONE HYDROCHLORIDE 2 MG/ML
0.25 INJECTION INTRAMUSCULAR; INTRAVENOUS; SUBCUTANEOUS EVERY 6 HOURS
Qty: 0 | Refills: 0 | Status: DISCONTINUED | OUTPATIENT
Start: 2017-05-20 | End: 2017-05-20

## 2017-05-20 RX ORDER — INSULIN LISPRO 100/ML
VIAL (ML) SUBCUTANEOUS
Qty: 0 | Refills: 0 | Status: DISCONTINUED | OUTPATIENT
Start: 2017-05-20 | End: 2017-05-22

## 2017-05-20 RX ORDER — METRONIDAZOLE 500 MG
500 TABLET ORAL EVERY 8 HOURS
Qty: 0 | Refills: 0 | Status: DISCONTINUED | OUTPATIENT
Start: 2017-05-20 | End: 2017-05-22

## 2017-05-20 RX ORDER — ACETAMINOPHEN 500 MG
650 TABLET ORAL EVERY 8 HOURS
Qty: 0 | Refills: 0 | Status: DISCONTINUED | OUTPATIENT
Start: 2017-05-20 | End: 2017-05-22

## 2017-05-20 RX ORDER — METRONIDAZOLE 500 MG
500 TABLET ORAL ONCE
Qty: 0 | Refills: 0 | Status: COMPLETED | OUTPATIENT
Start: 2017-05-20 | End: 2017-05-20

## 2017-05-20 RX ORDER — ALBUMIN HUMAN 25 %
50 VIAL (ML) INTRAVENOUS EVERY 6 HOURS
Qty: 0 | Refills: 0 | Status: DISCONTINUED | OUTPATIENT
Start: 2017-05-20 | End: 2017-05-21

## 2017-05-20 RX ORDER — SODIUM CHLORIDE 9 MG/ML
500 INJECTION INTRAMUSCULAR; INTRAVENOUS; SUBCUTANEOUS ONCE
Qty: 0 | Refills: 0 | Status: COMPLETED | OUTPATIENT
Start: 2017-05-20 | End: 2017-05-20

## 2017-05-20 RX ORDER — MAGNESIUM SULFATE 500 MG/ML
2 VIAL (ML) INJECTION ONCE
Qty: 0 | Refills: 0 | Status: COMPLETED | OUTPATIENT
Start: 2017-05-20 | End: 2017-05-20

## 2017-05-20 RX ORDER — INFLUENZA VIRUS VACCINE 15; 15; 15; 15 UG/.5ML; UG/.5ML; UG/.5ML; UG/.5ML
0.5 SUSPENSION INTRAMUSCULAR ONCE
Qty: 0 | Refills: 0 | Status: DISCONTINUED | OUTPATIENT
Start: 2017-05-20 | End: 2017-05-22

## 2017-05-20 RX ORDER — HYDROMORPHONE HYDROCHLORIDE 2 MG/ML
0.25 INJECTION INTRAMUSCULAR; INTRAVENOUS; SUBCUTANEOUS EVERY 6 HOURS
Qty: 0 | Refills: 0 | Status: DISCONTINUED | OUTPATIENT
Start: 2017-05-20 | End: 2017-05-22

## 2017-05-20 RX ORDER — CEFEPIME 1 G/1
1000 INJECTION, POWDER, FOR SOLUTION INTRAMUSCULAR; INTRAVENOUS EVERY 12 HOURS
Qty: 0 | Refills: 0 | Status: DISCONTINUED | OUTPATIENT
Start: 2017-05-20 | End: 2017-05-22

## 2017-05-20 RX ADMIN — HYDROMORPHONE HYDROCHLORIDE 0.5 MILLIGRAM(S): 2 INJECTION INTRAMUSCULAR; INTRAVENOUS; SUBCUTANEOUS at 12:03

## 2017-05-20 RX ADMIN — CEFEPIME 100 MILLIGRAM(S): 1 INJECTION, POWDER, FOR SOLUTION INTRAMUSCULAR; INTRAVENOUS at 18:17

## 2017-05-20 RX ADMIN — Medication 100 MILLIGRAM(S): at 06:38

## 2017-05-20 RX ADMIN — HYDROMORPHONE HYDROCHLORIDE 0.5 MILLIGRAM(S): 2 INJECTION INTRAMUSCULAR; INTRAVENOUS; SUBCUTANEOUS at 09:29

## 2017-05-20 RX ADMIN — SODIUM CHLORIDE 666.67 MILLILITER(S): 9 INJECTION INTRAMUSCULAR; INTRAVENOUS; SUBCUTANEOUS at 02:00

## 2017-05-20 RX ADMIN — Medication 100 MILLIGRAM(S): at 23:35

## 2017-05-20 RX ADMIN — Medication 2: at 19:02

## 2017-05-20 RX ADMIN — HYDROMORPHONE HYDROCHLORIDE 0.5 MILLIGRAM(S): 2 INJECTION INTRAMUSCULAR; INTRAVENOUS; SUBCUTANEOUS at 11:33

## 2017-05-20 RX ADMIN — CEFEPIME 100 MILLIGRAM(S): 1 INJECTION, POWDER, FOR SOLUTION INTRAMUSCULAR; INTRAVENOUS at 03:13

## 2017-05-20 RX ADMIN — SODIUM CHLORIDE 3 MILLILITER(S): 9 INJECTION INTRAMUSCULAR; INTRAVENOUS; SUBCUTANEOUS at 01:54

## 2017-05-20 RX ADMIN — SODIUM CHLORIDE 666.67 MILLILITER(S): 9 INJECTION INTRAMUSCULAR; INTRAVENOUS; SUBCUTANEOUS at 04:58

## 2017-05-20 RX ADMIN — TRAMADOL HYDROCHLORIDE 50 MILLIGRAM(S): 50 TABLET ORAL at 16:29

## 2017-05-20 RX ADMIN — Medication 15 MILLIGRAM(S): at 01:59

## 2017-05-20 RX ADMIN — Medication 1: at 23:42

## 2017-05-20 RX ADMIN — Medication 15 MILLIGRAM(S): at 03:00

## 2017-05-20 RX ADMIN — Medication 250 MILLIGRAM(S): at 03:47

## 2017-05-20 RX ADMIN — Medication 50 MILLILITER(S): at 19:03

## 2017-05-20 RX ADMIN — SODIUM CHLORIDE 3000 MILLILITER(S): 9 INJECTION INTRAMUSCULAR; INTRAVENOUS; SUBCUTANEOUS at 03:14

## 2017-05-20 RX ADMIN — MORPHINE SULFATE 2 MILLIGRAM(S): 50 CAPSULE, EXTENDED RELEASE ORAL at 03:47

## 2017-05-20 RX ADMIN — MORPHINE SULFATE 2 MILLIGRAM(S): 50 CAPSULE, EXTENDED RELEASE ORAL at 06:29

## 2017-05-20 RX ADMIN — HYDROMORPHONE HYDROCHLORIDE 0.5 MILLIGRAM(S): 2 INJECTION INTRAMUSCULAR; INTRAVENOUS; SUBCUTANEOUS at 08:59

## 2017-05-20 RX ADMIN — Medication 50 GRAM(S): at 15:53

## 2017-05-20 NOTE — H&P ADULT. - PROBLEM SELECTOR PLAN 2
- know history and seen on CT scan   - CT scan shows small ascites   - Treated with nadolol and spironolactone for varices and ascites respectively. will hold in the setting of sepsis   - Started on albumin challenge to help retain intervascular fluid  - MELD score of 22,   - Will monitor MELD labs ( Cr, Bili, PT/INR)

## 2017-05-20 NOTE — ED ADULT NURSE REASSESSMENT NOTE - NS ED NURSE REASSESS COMMENT FT1
Patient BP 94/55 patient asymptomatic mentating well MD made aware will monitor. support safety provided.

## 2017-05-20 NOTE — H&P ADULT. - PROBLEM SELECTOR PLAN 3
- Cr is 1.17, previous baseline was .64  - Mostly likely pre-renal due to sepsis and diarrhea   - Rehydrate with IV fluids and albumin   - continue to monitor Cr

## 2017-05-20 NOTE — ED PROVIDER NOTE - SKIN NEGATIVE STATEMENT, MLM
no abrasions, no jaundice, no lesions, no pruritis, and no rashes. no abrasions, + jaundice, no lesions, no pruritis, and no rashes.

## 2017-05-20 NOTE — H&P ADULT. - ASSESSMENT
41 year old male with a past medical history of alcoholic cirrhosis, esophageal varices, DM2, severe c. diff,  vitiligo, corneal transplant, admitted on 4/2017 for enterocolitis and possible SBP,  who is presenting with abdominal pain, diarrhea and fever with CT A/P consitent with enterocolitis vs. hepatic colopathy concerning for acute on chronic enterolcolitis in the setting of hepatic cirrhosis. 41 year old male with a past medical history of alcoholic cirrhosis, esophageal varices, DM2, severe c. diff,  vitiligo, corneal transplant, admitted on 4/2017 for enterocolitis and possible SBP,  who is presenting with abdominal pain, diarrhea and fever with CT A/P showing small bowel and right colon wall thickening. Patient admitted for Sepsis secondary to enterolcolitis in the setting of hepatic cirrhosis vs colitis.

## 2017-05-20 NOTE — ED ADULT NURSE NOTE - OBJECTIVE STATEMENT
42 y/o male AXOX3, PMH DMT2, and cirrhosis of liver c/o midline abdominal pain and fever that occurred at work and worsen with increased SOB. patient placed on 2 L via nasal canula with SpO2 100%. lung field clear bilaterally, at this time decrease SOB noted. Pt denies headache, dizziness, chest pain, palpitations, cough, n/v/, chills, weakness at this time. abdomen soft, tender, nondistended, patient stated last BM was at 11 pm, associated with diarrhea. plan of care explained to patient. will monitor. Support and safety provided. 40 y/o male AXOX3, PMH DMT2, and cirrhosis of liver c/o midline abdominal pain and fever that occurred at work and worsen with increased SOB. patient stated was in ER about a month ago fore similar symptoms without relief. patient placed on 2 L via nasal canula with SpO2 100%. lung field clear bilaterally, at this time decrease SOB noted. Denies headache, dizziness, chest pain, palpitations, cough, nausea, vomiting chills, weakness at this time. abdomen soft, tender, nondistended, patient stated last BM was at 11 pm, associated with diarrhea. plan of care explained to patient. will monitor. Support and safety provided.

## 2017-05-20 NOTE — ED PROCEDURE NOTE - DETAILS:
ATTENDING MD:  I, Kel Ortega, was available in the Emergency Department throughout the entire procedure and I was present during the key portions. I agree with the procedure as documented.

## 2017-05-20 NOTE — ED PROVIDER NOTE - OBJECTIVE STATEMENT
41 year old male with a history of alcoholic cirrhosis, esophageal varices, DM2, severe c. diff,  vitiligo, corneal transplant, recently admitted with GNR bactermia who is presenting with abdominal pain, diarrhea and fever x 1 day. Deneis any urinary sx, cough,cp, or nausea or vomiting.  Cedric Woods

## 2017-05-20 NOTE — H&P ADULT. - PROBLEM SELECTOR PLAN 1
- On presentation the patient was febrile, leukocytotic, tachycardiac and tachypneic , meeting SIRS criteria   - Previous history of GNR bacteremia and SBP on previous admissions. Treated on last admission for SBP with bactrim and ciprofloxacin   - p/w diarrhea, fever and abdominal pain   - Differential includes SBP, colitis, or c. difficile   - treat with Flagyl and Cefepime   - Blood, Stool, and Urine Cx  - C. diff PCR in the setting of recent abx use   - will assess pt for ascitic pocket for diagnostic paracentesis

## 2017-05-20 NOTE — H&P ADULT. - HISTORY OF PRESENT ILLNESS
41 year old male with a history of alcoholic cirrhosis, esophageal varices, DM2, severe c. diff,  vitiligo, corneal transplant, admitted on 4/2017 for  who is presenting with abdominal pain, diarrhea and fever. Per patient, fever began 2 weeks ago (although he never measured his temperature) and was accompanied by nausea and anorexia. He said that his symptoms improved with Tylenol, but they would return a couple days later. Two days ago patient began having chills and non-bloody diarrhea, and he experienced worsening sharp, epigastric pain. He states he had 6 BM per day, but hasn't had any since last night. He said the pain did not radiate anywhere, and it was worsened by walking. Due to the worsening pain and chills, he decided to come to the ED. Patient also endorses having lower leg edema for the last 1 week. Patient denies any recent travel, sick contacts, dietary changes, confusion, vision changes, chest pain, SOB, vomiting, dysuria, or recent alcohol use.   Patient also had L hand edema. Pt states accidently hit himself at work accident a few days ago.   Of note, he was admitted to the hospital (2/26-3/3) for gram-negative [place] bacteremia (E. Coli). Unable to tap ascites at that time, EUS only showed small esophageal varices. Pt was treated with Ciprofloxacin. GI did not recommend SBP ppx.   ED Course: VS- Temp 37.7, /75, HR 88, RR 18, pO2 93% (RA). Patient was given Cefepime IVPB 2000mg and Flagyl 500mg for presumed SBP coverage as well as 1L NS bolus. CMP returned significant for Ca 7.4, Alb 2.6, T-Bili 6.0, Alk Phos 218, and AST 53; CT A/P showed small bowel and right colonic wall thickening, suggestive of possible enterocolitis vs. enterocolopathy. ED team looked for pocket of fluid for drainage, but was unable to find adequate pocket with ultrasound. 41 year old male with a history of alcoholic cirrhosis, esophageal varices, DM2, severe c. diff,  vitiligo, corneal transplant, admitted on 4/2017 for  who is presenting with abdominal pain, diarrhea and fever. Per patient, abdominal pain started one day ago located in the middle of his abdomen.      CT A/P showed small bowel and right colonic wall thickening, suggestive of possible enterocolitis vs. enterocolopathy. ED team looked for pocket of fluid for drainage, but was unable to find adequate pocket with ultrasound. 41 year old male with a past medical history of alcoholic cirrhosis, esophageal varices, DM2, severe c. diff,  vitiligo, corneal transplant, admitted on 4/2017 for enterocolitis and possible SBP,  who is presenting with abdominal pain, diarrhea and fever. Per patient, abdominal pain started one day ago located in the middle of his abdomen that was band like across the two upper quadrants, that was sharp in quality and 10/10 in severity. Also was short of breath with difficulty taking in deep breaths. Also had 4 episodes of loose stools, that was clear and green in color, denied melena or BRBPR. Temperature was taken at home, which was 101 to 103 degrees F, with chills but no night sweats. The patient was discharged from [facility] on 4/19/2017 after being treated for similar complaints and was treated for SBP, the patient was discharged on Ciprofloxacin and Bactrim DS completing a one week course.  Patient denies headache, dizziness, nausea, vomiting, dysuria, or chest pain.   In the ED:  VS: T 102.2  108/62 RR 35 So2 98%  Patient was given a total of 2.5L of normal saline, with flagyl and vancomycin. VBG showed a lactate of 3.4 and elevated WBC of 13.3. CT A/P showed small bowel and right colonic wall thickening, suggestive of possible enterocolitis vs. enterocolopathy. 41 year old male with a past medical history of alcoholic cirrhosis, esophageal varices, DM2, severe c. diff,  vitiligo, corneal transplant, previous admission for e.coli bacteremia, as well as 4/2017 for enterocolitis and possible SBP,  who is presenting with abdominal pain, diarrhea and fever. Per patient, abdominal pain started one day ago located in the middle of his abdomen that was band like across the two upper quadrants, that was sharp in quality and 10/10 in severity. Also was short of breath with difficulty taking in deep breaths. Also had 4 episodes of loose stools, that was clear and green in color, denied melena or BRBPR. Temperature was taken at home, which was 101 to 103 degrees F, with chills but no night sweats. The patient was discharged from [facility] on 4/19/2017 after being treated for similar complaints and was treated for SBP, the patient was discharged on Ciprofloxacin and Bactrim DS completing a one week course.  Patient denies headache, dizziness, nausea, vomiting, dysuria, or chest pain.   In the ED:  VS: T 102.2  108/62 RR 35 So2 98%  Patient was given a total of 2.5L of normal saline, with flagyl and vancomycin. VBG showed a lactate of 3.4 and elevated WBC of 13.3. CT A/P showed small bowel and right colonic wall thickening, suggestive of possible enterocolitis vs. enterocolopathy.

## 2017-05-20 NOTE — ED PROVIDER NOTE - CARE PLAN
Principal Discharge DX:	Enterocolitis Principal Discharge DX:	Enterocolitis  Goal:	Sepsis from abdominal source

## 2017-05-20 NOTE — H&P ADULT. - LAB RESULTS AND INTERPRETATION
Elevated lactate on admission with leukocytosis. Also hypomagnesium. personally reviewed, elevated lactate on admission with leukocytosis, hypomagnesemia, elevated bilirubin above baseline, ezra

## 2017-05-20 NOTE — H&P ADULT. - ATTENDING COMMENTS
41 year old male with a past medical history of alcoholic cirrhosis, esophageal varices, DM2, severe c. diff,  vitiligo, corneal transplant, previous admission for e.coli bacteremia, as well as 4/2017 for enterocolitis and possible SBP,  who is presenting with abdominal pain, diarrhea and fever.    #sepsis with unclear source- fevers, abdominal pain, diarrhea- ct a/p unremarkable- old nonspecific colonic thickening, unchanged, no safe tappable pocket on exam.  ddx cdiff- recent abx course, recurrent bacteremia, sbp- for now continue cefepime/flagyl, f/u urine culture, blood culture, stool cx, c.diff    #alcoholic cirrhosis- MELD 21, hold diuretics, continue ppi, bb, hepatology consult     #SAM: likely prerenal in setting of infection.  hold diuretics, iv albumin, monitor creatinine, avoid nephrotoxic agents, renally dose medications

## 2017-05-20 NOTE — ED PROVIDER NOTE - CRITICAL CARE PROVIDED
interpretation of diagnostic studies/documentation/additional history taking/direct patient care (not related to procedure)/consult w/ pt's family directly relating to pts condition

## 2017-05-20 NOTE — ED PROVIDER NOTE - ATTENDING CONTRIBUTION TO CARE
ATTENDING MD:  Kel BENJAMIN, personally have seen and examined this patient.  I have discussed all aspects of care with the resident physician. Resident note reviewed and agree on plan of care and except where noted.  See HPI, PE, and MDM for details.    EXAM: Gen: ill appearing, jaundiced; HEENT: icteric sclerae, MM dry, NCAT, EOMI, no tongue fasiculations; CHEST: nontneder, lungs CTAB; CV: tachycardic, regular, 2+ distal pulses; ABD: diffusely tender, nondistended, no rebound or guarding; : no CVAT; MSK: no spinal tenderness, byron xtremitys welling or edema; NEURO: no asterixus, normal mentation, alert, moving all extremities, sensation and coordination grossly intact; SKIN: jaundiced    MDM: sepsis + abdominal pain. will get pan-culture, ABx, sepsis protocol, admit. currently floor stable, will involve ICU PRN. Neg FAST (no tappable ascites). No asterixus

## 2017-05-21 LAB
ALBUMIN SERPL ELPH-MCNC: 2.3 G/DL — LOW (ref 3.3–5)
ALP SERPL-CCNC: 154 U/L — HIGH (ref 40–120)
ALT FLD-CCNC: 33 U/L — SIGNIFICANT CHANGE UP (ref 10–45)
ANION GAP SERPL CALC-SCNC: 11 MMOL/L — SIGNIFICANT CHANGE UP (ref 5–17)
AST SERPL-CCNC: 45 U/L — HIGH (ref 10–40)
BILIRUB SERPL-MCNC: 5.5 MG/DL — HIGH (ref 0.2–1.2)
BUN SERPL-MCNC: 19 MG/DL — SIGNIFICANT CHANGE UP (ref 7–23)
CALCIUM SERPL-MCNC: 7.6 MG/DL — LOW (ref 8.4–10.5)
CHLORIDE SERPL-SCNC: 101 MMOL/L — SIGNIFICANT CHANGE UP (ref 96–108)
CO2 SERPL-SCNC: 19 MMOL/L — LOW (ref 22–31)
CREAT SERPL-MCNC: 0.69 MG/DL — SIGNIFICANT CHANGE UP (ref 0.5–1.3)
GLUCOSE SERPL-MCNC: 297 MG/DL — HIGH (ref 70–99)
INR BLD: 1.61 RATIO — HIGH (ref 0.88–1.16)
MAGNESIUM SERPL-MCNC: 2 MG/DL — SIGNIFICANT CHANGE UP (ref 1.6–2.6)
PHOSPHATE SERPL-MCNC: 2.6 MG/DL — SIGNIFICANT CHANGE UP (ref 2.5–4.5)
POTASSIUM SERPL-MCNC: 4.3 MMOL/L — SIGNIFICANT CHANGE UP (ref 3.5–5.3)
POTASSIUM SERPL-SCNC: 4.3 MMOL/L — SIGNIFICANT CHANGE UP (ref 3.5–5.3)
PROT SERPL-MCNC: 5.9 G/DL — LOW (ref 6–8.3)
PROTHROM AB SERPL-ACNC: 18.4 SEC — HIGH (ref 10–13.1)
SODIUM SERPL-SCNC: 131 MMOL/L — LOW (ref 135–145)

## 2017-05-21 PROCEDURE — 99233 SBSQ HOSP IP/OBS HIGH 50: CPT | Mod: GC

## 2017-05-21 RX ORDER — INSULIN GLARGINE 100 [IU]/ML
17 INJECTION, SOLUTION SUBCUTANEOUS AT BEDTIME
Qty: 0 | Refills: 0 | Status: DISCONTINUED | OUTPATIENT
Start: 2017-05-21 | End: 2017-05-22

## 2017-05-21 RX ADMIN — INSULIN GLARGINE 17 UNIT(S): 100 INJECTION, SOLUTION SUBCUTANEOUS at 22:53

## 2017-05-21 RX ADMIN — Medication 2: at 22:53

## 2017-05-21 RX ADMIN — Medication 3: at 09:29

## 2017-05-21 RX ADMIN — Medication 3: at 13:45

## 2017-05-21 RX ADMIN — Medication 100 MILLIGRAM(S): at 08:45

## 2017-05-21 RX ADMIN — Medication 100 MILLIGRAM(S): at 16:46

## 2017-05-21 RX ADMIN — CEFEPIME 100 MILLIGRAM(S): 1 INJECTION, POWDER, FOR SOLUTION INTRAMUSCULAR; INTRAVENOUS at 17:51

## 2017-05-21 RX ADMIN — Medication 100 MILLIGRAM(S): at 22:54

## 2017-05-21 RX ADMIN — Medication 50 MILLILITER(S): at 07:47

## 2017-05-21 RX ADMIN — Medication 2: at 19:56

## 2017-05-21 RX ADMIN — PANTOPRAZOLE SODIUM 40 MILLIGRAM(S): 20 TABLET, DELAYED RELEASE ORAL at 08:45

## 2017-05-21 RX ADMIN — Medication 50 MILLILITER(S): at 01:00

## 2017-05-21 RX ADMIN — CEFEPIME 100 MILLIGRAM(S): 1 INJECTION, POWDER, FOR SOLUTION INTRAMUSCULAR; INTRAVENOUS at 06:31

## 2017-05-22 VITALS
RESPIRATION RATE: 18 BRPM | DIASTOLIC BLOOD PRESSURE: 77 MMHG | SYSTOLIC BLOOD PRESSURE: 126 MMHG | HEART RATE: 490 BPM | TEMPERATURE: 98 F | OXYGEN SATURATION: 95 %

## 2017-05-22 LAB
ALBUMIN SERPL ELPH-MCNC: 2.1 G/DL — LOW (ref 3.3–5)
ALP SERPL-CCNC: 137 U/L — HIGH (ref 40–120)
ALT FLD-CCNC: 28 U/L — SIGNIFICANT CHANGE UP (ref 10–45)
ANION GAP SERPL CALC-SCNC: 11 MMOL/L — SIGNIFICANT CHANGE UP (ref 5–17)
AST SERPL-CCNC: 37 U/L — SIGNIFICANT CHANGE UP (ref 10–40)
BILIRUB SERPL-MCNC: 3.1 MG/DL — HIGH (ref 0.2–1.2)
BUN SERPL-MCNC: 13 MG/DL — SIGNIFICANT CHANGE UP (ref 7–23)
CALCIUM SERPL-MCNC: 7.4 MG/DL — LOW (ref 8.4–10.5)
CHLORIDE SERPL-SCNC: 104 MMOL/L — SIGNIFICANT CHANGE UP (ref 96–108)
CO2 SERPL-SCNC: 19 MMOL/L — LOW (ref 22–31)
CREAT SERPL-MCNC: 0.6 MG/DL — SIGNIFICANT CHANGE UP (ref 0.5–1.3)
CULTURE RESULTS: NO GROWTH — SIGNIFICANT CHANGE UP
GLUCOSE SERPL-MCNC: 275 MG/DL — HIGH (ref 70–99)
HCT VFR BLD CALC: 27 % — LOW (ref 39–50)
HCT VFR BLD CALC: 30.6 % — LOW (ref 39–50)
HGB BLD-MCNC: 10.6 G/DL — LOW (ref 13–17)
HGB BLD-MCNC: 9.3 G/DL — LOW (ref 13–17)
INR BLD: 1.6 RATIO — HIGH (ref 0.88–1.16)
MAGNESIUM SERPL-MCNC: 1.7 MG/DL — SIGNIFICANT CHANGE UP (ref 1.6–2.6)
MCHC RBC-ENTMCNC: 32.2 PG — SIGNIFICANT CHANGE UP (ref 27–34)
MCHC RBC-ENTMCNC: 34.4 GM/DL — SIGNIFICANT CHANGE UP (ref 32–36)
MCV RBC AUTO: 93.4 FL — SIGNIFICANT CHANGE UP (ref 80–100)
PHOSPHATE SERPL-MCNC: 3.3 MG/DL — SIGNIFICANT CHANGE UP (ref 2.5–4.5)
PLATELET # BLD AUTO: 50 K/UL — LOW (ref 150–400)
POTASSIUM SERPL-MCNC: 4.2 MMOL/L — SIGNIFICANT CHANGE UP (ref 3.5–5.3)
POTASSIUM SERPL-SCNC: 4.2 MMOL/L — SIGNIFICANT CHANGE UP (ref 3.5–5.3)
PROT SERPL-MCNC: 5.3 G/DL — LOW (ref 6–8.3)
PROTHROM AB SERPL-ACNC: 18.3 SEC — HIGH (ref 10–13.1)
RBC # BLD: 2.89 M/UL — LOW (ref 4.2–5.8)
RBC # FLD: 15 % — HIGH (ref 10.3–14.5)
SODIUM SERPL-SCNC: 134 MMOL/L — LOW (ref 135–145)
SPECIMEN SOURCE: SIGNIFICANT CHANGE UP
WBC # BLD: 4.37 K/UL — SIGNIFICANT CHANGE UP (ref 3.8–10.5)
WBC # FLD AUTO: 4.37 K/UL — SIGNIFICANT CHANGE UP (ref 3.8–10.5)

## 2017-05-22 PROCEDURE — P9047: CPT

## 2017-05-22 PROCEDURE — 87177 OVA AND PARASITES SMEARS: CPT

## 2017-05-22 PROCEDURE — 71045 X-RAY EXAM CHEST 1 VIEW: CPT

## 2017-05-22 PROCEDURE — 87581 M.PNEUMON DNA AMP PROBE: CPT

## 2017-05-22 PROCEDURE — 87045 FECES CULTURE AEROBIC BACT: CPT

## 2017-05-22 PROCEDURE — 82803 BLOOD GASES ANY COMBINATION: CPT

## 2017-05-22 PROCEDURE — 84132 ASSAY OF SERUM POTASSIUM: CPT

## 2017-05-22 PROCEDURE — 85027 COMPLETE CBC AUTOMATED: CPT

## 2017-05-22 PROCEDURE — 74177 CT ABD & PELVIS W/CONTRAST: CPT

## 2017-05-22 PROCEDURE — 93005 ELECTROCARDIOGRAM TRACING: CPT

## 2017-05-22 PROCEDURE — 82947 ASSAY GLUCOSE BLOOD QUANT: CPT

## 2017-05-22 PROCEDURE — 83735 ASSAY OF MAGNESIUM: CPT

## 2017-05-22 PROCEDURE — 82435 ASSAY OF BLOOD CHLORIDE: CPT

## 2017-05-22 PROCEDURE — 85610 PROTHROMBIN TIME: CPT

## 2017-05-22 PROCEDURE — 96375 TX/PRO/DX INJ NEW DRUG ADDON: CPT | Mod: XU

## 2017-05-22 PROCEDURE — 99285 EMERGENCY DEPT VISIT HI MDM: CPT | Mod: 25

## 2017-05-22 PROCEDURE — 99223 1ST HOSP IP/OBS HIGH 75: CPT | Mod: GC

## 2017-05-22 PROCEDURE — 83880 ASSAY OF NATRIURETIC PEPTIDE: CPT

## 2017-05-22 PROCEDURE — 99239 HOSP IP/OBS DSCHRG MGMT >30: CPT

## 2017-05-22 PROCEDURE — 80053 COMPREHEN METABOLIC PANEL: CPT

## 2017-05-22 PROCEDURE — 84100 ASSAY OF PHOSPHORUS: CPT

## 2017-05-22 PROCEDURE — 87040 BLOOD CULTURE FOR BACTERIA: CPT

## 2017-05-22 PROCEDURE — 87486 CHLMYD PNEUM DNA AMP PROBE: CPT

## 2017-05-22 PROCEDURE — 87798 DETECT AGENT NOS DNA AMP: CPT

## 2017-05-22 PROCEDURE — 87086 URINE CULTURE/COLONY COUNT: CPT

## 2017-05-22 PROCEDURE — 84295 ASSAY OF SERUM SODIUM: CPT

## 2017-05-22 PROCEDURE — 82040 ASSAY OF SERUM ALBUMIN: CPT

## 2017-05-22 PROCEDURE — 83605 ASSAY OF LACTIC ACID: CPT

## 2017-05-22 PROCEDURE — 87046 STOOL CULTR AEROBIC BACT EA: CPT

## 2017-05-22 PROCEDURE — 96376 TX/PRO/DX INJ SAME DRUG ADON: CPT

## 2017-05-22 PROCEDURE — 85014 HEMATOCRIT: CPT

## 2017-05-22 PROCEDURE — 84484 ASSAY OF TROPONIN QUANT: CPT

## 2017-05-22 PROCEDURE — 85018 HEMOGLOBIN: CPT

## 2017-05-22 PROCEDURE — 87633 RESP VIRUS 12-25 TARGETS: CPT

## 2017-05-22 PROCEDURE — 82330 ASSAY OF CALCIUM: CPT

## 2017-05-22 PROCEDURE — 96374 THER/PROPH/DIAG INJ IV PUSH: CPT | Mod: XU

## 2017-05-22 PROCEDURE — 85730 THROMBOPLASTIN TIME PARTIAL: CPT

## 2017-05-22 PROCEDURE — 81001 URINALYSIS AUTO W/SCOPE: CPT

## 2017-05-22 RX ORDER — METRONIDAZOLE 500 MG
1 TABLET ORAL
Qty: 15 | Refills: 0 | OUTPATIENT
Start: 2017-05-22 | End: 2017-05-27

## 2017-05-22 RX ORDER — CIPROFLOXACIN LACTATE 400MG/40ML
1 VIAL (ML) INTRAVENOUS
Qty: 10 | Refills: 0 | OUTPATIENT
Start: 2017-05-22 | End: 2017-05-27

## 2017-05-22 RX ORDER — INSULIN LISPRO 100/ML
4 VIAL (ML) SUBCUTANEOUS
Qty: 0 | Refills: 0 | Status: DISCONTINUED | OUTPATIENT
Start: 2017-05-22 | End: 2017-05-22

## 2017-05-22 RX ADMIN — CEFEPIME 100 MILLIGRAM(S): 1 INJECTION, POWDER, FOR SOLUTION INTRAMUSCULAR; INTRAVENOUS at 05:45

## 2017-05-22 RX ADMIN — Medication 2: at 17:38

## 2017-05-22 RX ADMIN — Medication 100 MILLIGRAM(S): at 12:48

## 2017-05-22 RX ADMIN — PANTOPRAZOLE SODIUM 40 MILLIGRAM(S): 20 TABLET, DELAYED RELEASE ORAL at 06:32

## 2017-05-22 RX ADMIN — Medication 4: at 12:47

## 2017-05-22 RX ADMIN — Medication 3: at 08:53

## 2017-05-22 RX ADMIN — Medication 4 UNIT(S): at 12:48

## 2017-05-22 RX ADMIN — Medication 100 MILLIGRAM(S): at 06:31

## 2017-05-22 NOTE — DISCHARGE NOTE ADULT - MEDICATION SUMMARY - MEDICATIONS TO STOP TAKING
I will STOP taking the medications listed below when I get home from the hospital:    magnesium oxide 400 mg (241.3 mg elemental magnesium) oral tablet  -- 1 tab(s) by mouth 2 times a day    ciprofloxacin 500 mg oral tablet  -- 1 tab(s) by mouth every 12 hours. Please start on 4/20    oseltamivir 75 mg oral capsule  -- 1 cap(s) by mouth 2 times a day    Bactrim  mg-160 mg oral tablet  -- 1 tab(s) by mouth once a day. Please start on 4/21  -- Avoid prolonged or excessive exposure to direct and/or artificial sunlight while taking this medication.  Finish all this medication unless otherwise directed by prescriber.  Medication should be taken with plenty of water.

## 2017-05-22 NOTE — DISCHARGE NOTE ADULT - CARE PLAN
Principal Discharge DX:	Gastroenteritis  Goal:	To continue your antibiotics  Instructions for follow-up, activity and diet:	You were admitted for fever, diarrhea and abdominal pain. You were treated with fluids and antibiotics. After treatment your pain subsided and diarrhea subsided as well.   You will be treated with Ciprofloxacin and Flagyl for a total of 7 days for treatment.   Please follow up with your primary care doctor in one to two weeks for routine medical care  Secondary Diagnosis:	Alcoholic cirrhosis of liver with ascites  Instructions for follow-up, activity and diet:	You have a history of alcoholic cirrhosis. You are being evaluated for liver transplant at the [facility]. Please continue to take your nadolol and spirolactone for symptom relief.   Follow up with your Hepatologist, Dr. Thakkar in one to two weeks. Principal Discharge DX:	Gastroenteritis  Goal:	To continue your antibiotics  Instructions for follow-up, activity and diet:	You were admitted for fever, diarrhea and abdominal pain. You were treated with fluids and antibiotics. After treatment your pain subsided and diarrhea subsided as well.   You will be treated with Ciprofloxacin and Flagyl for a total of 7 days for treatment.   Please follow up with your primary care doctor in one to two weeks for routine medical care  Secondary Diagnosis:	Alcoholic cirrhosis of liver with ascites  Instructions for follow-up, activity and diet:	You have a history of alcoholic cirrhosis. You are being evaluated for liver transplant at the [facility]. Please continue to take your nadolol and spirolactone for symptom relief.   Follow up with your Hepatologist, Dr. Thakkar in one to two weeks. Principal Discharge DX:	Gastroenteritis  Goal:	To continue your antibiotics  Instructions for follow-up, activity and diet:	You were admitted for fever, diarrhea and abdominal pain. You were treated with fluids and antibiotics. After treatment your pain subsided and diarrhea subsided as well.   You will be treated with Ciprofloxacin and Flagyl for a total of 7 days for treatment.   Please follow up with your primary care doctor in one to two weeks for routine medical care  Secondary Diagnosis:	Alcoholic cirrhosis of liver with ascites  Instructions for follow-up, activity and diet:	You have a history of alcoholic cirrhosis. You are being evaluated for liver transplant at the [facility]. Please continue to take your nadolol and spirolactone for symptom relief.   Follow up with your Hepatologist, Dr. Thakkar in one to two weeks. Principal Discharge DX:	Gastroenteritis  Goal:	To continue your antibiotics  Instructions for follow-up, activity and diet:	You were admitted for fever, diarrhea and abdominal pain. You were treated with fluids and antibiotics. After treatment your pain subsided and diarrhea subsided as well.   You will be treated with Ciprofloxacin and Flagyl for a total of 7 days for treatment.   Please follow up with your primary care doctor in one to two weeks for routine medical care  Secondary Diagnosis:	Alcoholic cirrhosis of liver with ascites  Instructions for follow-up, activity and diet:	You have a history of alcoholic cirrhosis. You are being evaluated for liver transplant at the [facility]. Please continue to take your nadolol and spirolactone for symptom relief.   Follow up with your Hepatologist, Dr. Thakkar in one to two weeks. Principal Discharge DX:	Gastroenteritis  Goal:	To continue your antibiotics  Instructions for follow-up, activity and diet:	You were admitted for fever, diarrhea and abdominal pain. You were treated with fluids and antibiotics. After treatment your pain subsided and diarrhea subsided as well.   You will be treated with Ciprofloxacin and Flagyl for a total of 7 days for treatment.   Please follow up with your primary care doctor in one to two weeks for routine medical care  Secondary Diagnosis:	Alcoholic cirrhosis of liver with ascites  Instructions for follow-up, activity and diet:	You have a history of alcoholic cirrhosis. You are being evaluated for liver transplant at the [facility]. Please continue to take your nadolol and spirolactone for symptom relief.   Follow up with your Hepatologist, Dr. Thakkar in one to two weeks. Principal Discharge DX:	Gastroenteritis  Goal:	To continue your antibiotics  Instructions for follow-up, activity and diet:	You were admitted for fever, diarrhea and abdominal pain. You were treated with fluids and antibiotics. After treatment your pain subsided and diarrhea subsided as well.   You will be treated with Ciprofloxacin and Flagyl for a total of 7 days for treatment.   Please follow up with your primary care doctor in one to two weeks for routine medical care  Secondary Diagnosis:	Alcoholic cirrhosis of liver with ascites  Instructions for follow-up, activity and diet:	You have a history of alcoholic cirrhosis. You are being evaluated for liver transplant at the [facility]. Please continue to take your nadolol and spirolactone for symptom relief.   Follow up with your Hepatologist, Dr. Thakkar in one to two weeks.

## 2017-05-22 NOTE — DISCHARGE NOTE ADULT - HOSPITAL COURSE
41 year old male with a past medical history of alcoholic cirrhosis, esophageal varices, DM2, severe c. diff,  vitiligo, corneal transplant, previous admission for e.coli bacteremia, as well as 4/2017 for enterocolitis and possible SBP,  who is presenting with abdominal pain, diarrhea and fever. Per patient, abdominal pain started one day ago located in the middle of his abdomen that was band like across the two upper quadrants, that was sharp in quality and 10/10 in severity. Also was short of breath with difficulty taking in deep breaths. Also had 4 episodes of loose stools, that was clear and green in color, denied melena or BRBPR. Temperature was taken at home, which was 101 to 103 degrees F, with chills but no night sweats. The patient was discharged from [facility] on 4/19/2017 after being treated for similar complaints and was treated for SBP, the patient was discharged on Ciprofloxacin and Bactrim DS completing a one week course.  Patient denies headache, dizziness, nausea, vomiting, dysuria, or chest pain.   In the ED:  VS: T 102.2  108/62 RR 35 So2 98%  Patient was given a total of 2.5L of normal saline, with flagyl and vancomycin. VBG showed a lactate of 3.4 and elevated WBC of 13.3. CT A/P showed small bowel and right colonic wall thickening, suggestive of possible enterocolitis vs. enterocolopathy.     In Hospital Course:    The patient was treated with Cefepime and Flagyl, given the concern for colitis vs SBP. The patient underwent bedside US which showed no tapeable pocket for paracentesis for diagnostic purpose. Blood and urine cultures were collected and there was no growth during admission. Stool cultures were also collected, which also had NGTD. The patient continued to improve on antibiotics and IV albumin. The patient abdominal pain also improved and the patient had well formed stools on DOA#3. Seen by hepatology who agreed with the management of suspected colitis, though SBP was unlikley given the improving clinical picture The patient was deemed stable for discharge on PO antibiotics of Cipro and flagyl with appropriate follow up. 41 year old male with a past medical history of alcoholic cirrhosis, esophageal varices, DM2, severe c. diff,  vitiligo, corneal transplant, previous admission for e.coli bacteremia, as well as 4/2017 for enterocolitis and possible SBP,  who is presented with abdominal pain, diarrhea and fever. Per patient, abdominal pain started one day pta located in the middle of his abdomen that was band like across the two upper quadrants, that was sharp in quality and 10/10 in severity. Also was short of breath with difficulty taking in deep breaths. Also had 4 episodes of loose stools, that was clear and green in color, denied melena or BRBPR. Temperature was taken at home, which was 101 to 103 degrees F, with chills but no night sweats. The patient was discharged from [facility] on 4/19/2017 after being treated for similar complaints and was treated for SBP, the patient was discharged on Ciprofloxacin and Bactrim DS completing a one week course.  Patient denied headache, dizziness, nausea, vomiting, dysuria, or chest pain.   In the ED:  VS: T 102.2  108/62 RR 35 So2 98%  Patient was given a total of 2.5L of normal saline, with flagyl and vancomycin. VBG showed a lactate of 3.4 and elevated WBC of 13.3. CT A/P showed small bowel and right colonic wall thickening, suggestive of possible enterocolitis vs. enterocolopathy.     In Hospital Course:    The patient was treated with Cefepime and Flagyl, given the concern for colitis vs SBP. The patient underwent bedside US which showed no tapeable pocket for paracentesis for diagnostic purpose. Blood and urine cultures were collected and there was no growth during admission. Stool cultures were also collected, which also had NGTD. The patient continued to improve on antibiotics and IV albumin. The patient abdominal pain also improved and the patient had well formed stools on DOA#3. Seen by hepatology who agreed with the management of suspected colitis, though SBP was unlikley given the improving clinical picture The patient was deemed stable for discharge on PO antibiotics of Cipro and flagyl with appropriate follow up.

## 2017-05-22 NOTE — DISCHARGE NOTE ADULT - PATIENT PORTAL LINK FT
“You can access the Follow[facility] Patient Portal, offered by [facility], by registering with the following website: [email or web address]”

## 2017-05-22 NOTE — DISCHARGE NOTE ADULT - PLAN OF CARE
To continue your antibiotics You were admitted for fever, diarrhea and abdominal pain. You were treated with fluids and antibiotics. After treatment your pain subsided and diarrhea subsided as well.   You will be treated with Ciprofloxacin and Flagyl for a total of 7 days for treatment.   Please follow up with your primary care doctor in one to two weeks for routine medical care You have a history of alcoholic cirrhosis. You are being evaluated for liver transplant at the [facility]. Please continue to take your nadolol and spirolactone for symptom relief.   Follow up with your Hepatologist, Dr. Thakkar in one to two weeks.

## 2017-05-22 NOTE — DISCHARGE NOTE ADULT - CARE PROVIDER_API CALL
Andre Thakkar), Gastroenterology; Internal Medicine  [address] [place] [address]  Phone: [phone]  Fax: [phone]

## 2017-05-22 NOTE — PROVIDER CONTACT NOTE (OTHER) - SITUATION
Pt new admit from ED.  As per sliding scale pt is to receive 2units of insulin at bedtime.  Pt had received 2units of humalog at 1956 for  prior to dinner.

## 2017-05-22 NOTE — DISCHARGE NOTE ADULT - MEDICATION SUMMARY - MEDICATIONS TO TAKE
I will START or STAY ON the medications listed below when I get home from the hospital:    spironolactone 50 mg oral tablet  -- 1 tab(s) by mouth once a day  -- Indication: For Alcoholic cirrhosis of liver with ascites    metroNIDAZOLE 500 mg oral tablet  -- 1 tab(s) by mouth 3 times a day  -- Do not drink alcoholic beverages when taking this medication.  Finish all this medication unless otherwise directed by prescriber.  May discolor urine or feces.    -- Indication: For Gastroenteritis    Lantus 100 units/mL subcutaneous solution  -- 35 unit(s) subcutaneous once a day (at bedtime)  -- Indication: For Diabetes mellitus type 2, insulin dependent    metFORMIN 500 mg oral tablet  -- 1 tab(s) by mouth 2 times a day  -- Indication: For Diabetes mellitus type 2, insulin dependent    nadolol 20 mg oral tablet  -- 1 tab(s) by mouth once a day  -- Indication: For Alcoholic cirrhosis of liver with ascites    furosemide 20 mg oral tablet  -- 1 tab(s) by mouth 2 times a day  -- Indication: For Alcoholic cirrhosis of liver with ascites    pantoprazole 40 mg oral delayed release tablet  -- 1 tab(s) by mouth once a day  -- Indication: For Alcoholic cirrhosis of liver with ascites    ciprofloxacin 500 mg oral tablet  -- 1 tab(s) by mouth 2 times a day  -- Avoid prolonged or excessive exposure to direct and/or artificial sunlight while taking this medication.  Check with your doctor before becoming pregnant.  Do not take dairy products, antacids, or iron preparations within one hour of this medication.  Finish all this medication unless otherwise directed by prescriber.  Medication should be taken with plenty of water.    -- Indication: For Gastroenteritis    multivitamin  -- 1 tab(s) by mouth once a day  -- Indication: For Supplementation

## 2017-05-22 NOTE — DISCHARGE NOTE ADULT - CARE PROVIDERS DIRECT ADDRESSES
[email or web address]

## 2017-05-23 LAB
CULTURE RESULTS: SIGNIFICANT CHANGE UP
SPECIMEN SOURCE: SIGNIFICANT CHANGE UP

## 2017-05-24 LAB
CULTURE RESULTS: SIGNIFICANT CHANGE UP
SPECIMEN SOURCE: SIGNIFICANT CHANGE UP

## 2017-05-25 ENCOUNTER — APPOINTMENT (OUTPATIENT)
Age: 42
End: 2017-05-25

## 2017-05-25 VITALS
RESPIRATION RATE: 14 BRPM | SYSTOLIC BLOOD PRESSURE: 119 MMHG | HEIGHT: 68 IN | TEMPERATURE: 98.1 F | DIASTOLIC BLOOD PRESSURE: 72 MMHG | HEART RATE: 71 BPM

## 2017-05-27 LAB
CULTURE RESULTS: SIGNIFICANT CHANGE UP
SPECIMEN SOURCE: SIGNIFICANT CHANGE UP

## 2017-05-30 ENCOUNTER — MEDICATION RENEWAL (OUTPATIENT)
Age: 42
End: 2017-05-30

## 2017-06-20 ENCOUNTER — LABORATORY RESULT (OUTPATIENT)
Age: 42
End: 2017-06-20

## 2017-06-20 ENCOUNTER — APPOINTMENT (OUTPATIENT)
Dept: INTERNAL MEDICINE | Facility: CLINIC | Age: 42
End: 2017-06-20

## 2017-06-20 VITALS
HEART RATE: 88 BPM | DIASTOLIC BLOOD PRESSURE: 78 MMHG | HEIGHT: 68 IN | SYSTOLIC BLOOD PRESSURE: 120 MMHG | OXYGEN SATURATION: 94 % | BODY MASS INDEX: 31.67 KG/M2 | WEIGHT: 209 LBS

## 2017-06-20 RX ORDER — IPRATROPIUM BROMIDE 42 UG/1
0.06 SPRAY NASAL
Qty: 1 | Refills: 2 | Status: DISCONTINUED | COMMUNITY
Start: 2017-01-30 | End: 2017-06-20

## 2017-06-21 ENCOUNTER — INPATIENT (INPATIENT)
Facility: HOSPITAL | Age: 42
LOS: 8 days | Discharge: HOME | DRG: 872 | End: 2017-06-30
Attending: INTERNAL MEDICINE | Admitting: HOSPITALIST
Payer: COMMERCIAL

## 2017-06-21 VITALS
TEMPERATURE: 100 F | DIASTOLIC BLOOD PRESSURE: 75 MMHG | OXYGEN SATURATION: 93 % | RESPIRATION RATE: 18 BRPM | HEART RATE: 94 BPM | SYSTOLIC BLOOD PRESSURE: 123 MMHG

## 2017-06-21 DIAGNOSIS — E11.9 TYPE 2 DIABETES MELLITUS WITHOUT COMPLICATIONS: ICD-10-CM

## 2017-06-21 DIAGNOSIS — R50.9 FEVER, UNSPECIFIED: ICD-10-CM

## 2017-06-21 DIAGNOSIS — K70.30 ALCOHOLIC CIRRHOSIS OF LIVER WITHOUT ASCITES: ICD-10-CM

## 2017-06-21 LAB
-  CANDIDA ALBICANS: SIGNIFICANT CHANGE UP
-  CANDIDA GLABRATA: SIGNIFICANT CHANGE UP
-  CANDIDA KRUSEI: SIGNIFICANT CHANGE UP
-  CANDIDA PARAPSILOSIS: SIGNIFICANT CHANGE UP
-  CANDIDA TROPICALIS: SIGNIFICANT CHANGE UP
-  COAGULASE NEGATIVE STAPHYLOCOCCUS: SIGNIFICANT CHANGE UP
-  K. PNEUMONIAE GROUP: SIGNIFICANT CHANGE UP
-  KPC RESISTANCE GENE: SIGNIFICANT CHANGE UP
-  STREPTOCOCCUS SP. (NOT GRP A, B OR S PNEUMONIAE): SIGNIFICANT CHANGE UP
A BAUMANNII DNA SPEC QL NAA+PROBE: SIGNIFICANT CHANGE UP
ALBUMIN SERPL ELPH-MCNC: 2.8 G/DL — LOW (ref 3.3–5)
ALP SERPL-CCNC: 231 U/L — HIGH (ref 40–120)
ALT FLD-CCNC: 37 U/L RC — SIGNIFICANT CHANGE UP (ref 10–45)
ANION GAP SERPL CALC-SCNC: 11 MMOL/L — SIGNIFICANT CHANGE UP (ref 5–17)
APTT BLD: 37.9 SEC — HIGH (ref 27.5–37.4)
AST SERPL-CCNC: 55 U/L — HIGH (ref 10–40)
BASOPHILS # BLD AUTO: 0 K/UL — SIGNIFICANT CHANGE UP (ref 0–0.2)
BASOPHILS NFR BLD AUTO: 0.4 % — SIGNIFICANT CHANGE UP (ref 0–2)
BILIRUB SERPL-MCNC: 4.2 MG/DL — HIGH (ref 0.2–1.2)
BUN SERPL-MCNC: 13 MG/DL — SIGNIFICANT CHANGE UP (ref 7–23)
CALCIUM SERPL-MCNC: 7.5 MG/DL — LOW (ref 8.4–10.5)
CHLORIDE SERPL-SCNC: 102 MMOL/L — SIGNIFICANT CHANGE UP (ref 96–108)
CO2 SERPL-SCNC: 20 MMOL/L — LOW (ref 22–31)
CREAT SERPL-MCNC: 0.92 MG/DL — SIGNIFICANT CHANGE UP (ref 0.5–1.3)
E CLOAC COMP DNA BLD POS QL NAA+PROBE: SIGNIFICANT CHANGE UP
E COLI DNA BLD POS QL NAA+NON-PROBE: SIGNIFICANT CHANGE UP
ENTEROCOC DNA BLD POS QL NAA+NON-PROBE: SIGNIFICANT CHANGE UP
ENTEROCOC DNA BLD POS QL NAA+NON-PROBE: SIGNIFICANT CHANGE UP
EOSINOPHIL # BLD AUTO: 0.1 K/UL — SIGNIFICANT CHANGE UP (ref 0–0.5)
EOSINOPHIL NFR BLD AUTO: 0.7 % — SIGNIFICANT CHANGE UP (ref 0–6)
GAS PNL BLDV: SIGNIFICANT CHANGE UP
GLUCOSE SERPL-MCNC: 109 MG/DL — HIGH (ref 70–99)
GP B STREP DNA BLD POS QL NAA+NON-PROBE: SIGNIFICANT CHANGE UP
GRAM STN FLD: SIGNIFICANT CHANGE UP
HAEM INFLU DNA BLD POS QL NAA+NON-PROBE: SIGNIFICANT CHANGE UP
HCT VFR BLD CALC: 32.4 % — LOW (ref 39–50)
HGB BLD-MCNC: 11.2 G/DL — LOW (ref 13–17)
INR BLD: 1.48 RATIO — HIGH (ref 0.88–1.16)
K OXYTOCA DNA BLD POS QL NAA+NON-PROBE: SIGNIFICANT CHANGE UP
L MONOCYTOG DNA BLD POS QL NAA+NON-PROBE: SIGNIFICANT CHANGE UP
LYMPHOCYTES # BLD AUTO: 1.2 K/UL — SIGNIFICANT CHANGE UP (ref 1–3.3)
LYMPHOCYTES # BLD AUTO: 10.3 % — LOW (ref 13–44)
MCHC RBC-ENTMCNC: 33.6 PG — SIGNIFICANT CHANGE UP (ref 27–34)
MCHC RBC-ENTMCNC: 34.4 GM/DL — SIGNIFICANT CHANGE UP (ref 32–36)
MCV RBC AUTO: 97.7 FL — SIGNIFICANT CHANGE UP (ref 80–100)
METHOD TYPE: SIGNIFICANT CHANGE UP
MONOCYTES # BLD AUTO: 0.9 K/UL — SIGNIFICANT CHANGE UP (ref 0–0.9)
MONOCYTES NFR BLD AUTO: 8.1 % — SIGNIFICANT CHANGE UP (ref 2–14)
MRSA SPEC QL CULT: SIGNIFICANT CHANGE UP
MSSA DNA SPEC QL NAA+PROBE: SIGNIFICANT CHANGE UP
N MEN ISLT CULT: SIGNIFICANT CHANGE UP
NEUTROPHILS # BLD AUTO: 9.4 K/UL — HIGH (ref 1.8–7.4)
NEUTROPHILS NFR BLD AUTO: 80.5 % — HIGH (ref 43–77)
P AERUGINOSA DNA BLD POS NAA+NON-PROBE: SIGNIFICANT CHANGE UP
PLATELET # BLD AUTO: 75 K/UL — LOW (ref 150–400)
POTASSIUM SERPL-MCNC: 4.2 MMOL/L — SIGNIFICANT CHANGE UP (ref 3.5–5.3)
POTASSIUM SERPL-SCNC: 4.2 MMOL/L — SIGNIFICANT CHANGE UP (ref 3.5–5.3)
PROT SERPL-MCNC: 6.7 G/DL — SIGNIFICANT CHANGE UP (ref 6–8.3)
PROTEUS SP DNA BLD POS QL NAA+NON-PROBE: SIGNIFICANT CHANGE UP
PROTHROM AB SERPL-ACNC: 16.3 SEC — HIGH (ref 9.8–12.7)
RAPID RVP RESULT: SIGNIFICANT CHANGE UP
RBC # BLD: 3.32 M/UL — LOW (ref 4.2–5.8)
RBC # FLD: 13.3 % — SIGNIFICANT CHANGE UP (ref 10.3–14.5)
S MARCESCENS DNA BLD POS NAA+NON-PROBE: SIGNIFICANT CHANGE UP
S PNEUM DNA BLD POS QL NAA+NON-PROBE: SIGNIFICANT CHANGE UP
S PYO DNA BLD POS QL NAA+NON-PROBE: SIGNIFICANT CHANGE UP
SODIUM SERPL-SCNC: 133 MMOL/L — LOW (ref 135–145)
SPECIMEN SOURCE: SIGNIFICANT CHANGE UP
SPECIMEN SOURCE: SIGNIFICANT CHANGE UP
WBC # BLD: 11.7 K/UL — HIGH (ref 3.8–10.5)
WBC # FLD AUTO: 11.7 K/UL — HIGH (ref 3.8–10.5)

## 2017-06-21 PROCEDURE — 99223 1ST HOSP IP/OBS HIGH 75: CPT

## 2017-06-21 PROCEDURE — 71010: CPT | Mod: 26

## 2017-06-21 PROCEDURE — 99285 EMERGENCY DEPT VISIT HI MDM: CPT | Mod: 25

## 2017-06-21 RX ORDER — INSULIN GLARGINE 100 [IU]/ML
50 INJECTION, SOLUTION SUBCUTANEOUS AT BEDTIME
Qty: 0 | Refills: 0 | Status: DISCONTINUED | OUTPATIENT
Start: 2017-06-21 | End: 2017-06-21

## 2017-06-21 RX ORDER — SPIRONOLACTONE 25 MG/1
50 TABLET, FILM COATED ORAL DAILY
Qty: 0 | Refills: 0 | Status: DISCONTINUED | OUTPATIENT
Start: 2017-06-21 | End: 2017-06-30

## 2017-06-21 RX ORDER — OXYCODONE HYDROCHLORIDE 5 MG/1
5 TABLET ORAL ONCE
Qty: 0 | Refills: 0 | Status: DISCONTINUED | OUTPATIENT
Start: 2017-06-21 | End: 2017-06-21

## 2017-06-21 RX ORDER — FUROSEMIDE 40 MG
20 TABLET ORAL
Qty: 0 | Refills: 0 | Status: DISCONTINUED | OUTPATIENT
Start: 2017-06-21 | End: 2017-06-30

## 2017-06-21 RX ORDER — DEXTROSE 50 % IN WATER 50 %
1 SYRINGE (ML) INTRAVENOUS ONCE
Qty: 0 | Refills: 0 | Status: DISCONTINUED | OUTPATIENT
Start: 2017-06-21 | End: 2017-06-30

## 2017-06-21 RX ORDER — INSULIN GLARGINE 100 [IU]/ML
40 INJECTION, SOLUTION SUBCUTANEOUS AT BEDTIME
Qty: 0 | Refills: 0 | Status: DISCONTINUED | OUTPATIENT
Start: 2017-06-21 | End: 2017-06-30

## 2017-06-21 RX ORDER — SODIUM CHLORIDE 9 MG/ML
1000 INJECTION, SOLUTION INTRAVENOUS
Qty: 0 | Refills: 0 | Status: DISCONTINUED | OUTPATIENT
Start: 2017-06-21 | End: 2017-06-30

## 2017-06-21 RX ORDER — SODIUM CHLORIDE 9 MG/ML
1000 INJECTION INTRAMUSCULAR; INTRAVENOUS; SUBCUTANEOUS ONCE
Qty: 0 | Refills: 0 | Status: COMPLETED | OUTPATIENT
Start: 2017-06-21 | End: 2017-06-21

## 2017-06-21 RX ORDER — METFORMIN HYDROCHLORIDE 850 MG/1
1 TABLET ORAL
Qty: 0 | Refills: 0 | COMMUNITY

## 2017-06-21 RX ORDER — NADOLOL 80 MG/1
20 TABLET ORAL DAILY
Qty: 0 | Refills: 0 | Status: DISCONTINUED | OUTPATIENT
Start: 2017-06-21 | End: 2017-06-30

## 2017-06-21 RX ORDER — GLUCAGON INJECTION, SOLUTION 0.5 MG/.1ML
1 INJECTION, SOLUTION SUBCUTANEOUS ONCE
Qty: 0 | Refills: 0 | Status: DISCONTINUED | OUTPATIENT
Start: 2017-06-21 | End: 2017-06-30

## 2017-06-21 RX ORDER — DEXTROSE 50 % IN WATER 50 %
25 SYRINGE (ML) INTRAVENOUS ONCE
Qty: 0 | Refills: 0 | Status: DISCONTINUED | OUTPATIENT
Start: 2017-06-21 | End: 2017-06-30

## 2017-06-21 RX ORDER — ALBENDAZOLE 200 MG/1
200 TABLET, FILM COATED ORAL
Qty: 0 | Refills: 0 | Status: DISCONTINUED | OUTPATIENT
Start: 2017-06-21 | End: 2017-06-26

## 2017-06-21 RX ORDER — INSULIN LISPRO 100/ML
VIAL (ML) SUBCUTANEOUS
Qty: 0 | Refills: 0 | Status: DISCONTINUED | OUTPATIENT
Start: 2017-06-21 | End: 2017-06-30

## 2017-06-21 RX ORDER — MORPHINE SULFATE 50 MG/1
2 CAPSULE, EXTENDED RELEASE ORAL ONCE
Qty: 0 | Refills: 0 | Status: DISCONTINUED | OUTPATIENT
Start: 2017-06-21 | End: 2017-06-21

## 2017-06-21 RX ORDER — PANTOPRAZOLE SODIUM 20 MG/1
40 TABLET, DELAYED RELEASE ORAL
Qty: 0 | Refills: 0 | Status: DISCONTINUED | OUTPATIENT
Start: 2017-06-21 | End: 2017-06-30

## 2017-06-21 RX ORDER — PIPERACILLIN AND TAZOBACTAM 4; .5 G/20ML; G/20ML
3.38 INJECTION, POWDER, LYOPHILIZED, FOR SOLUTION INTRAVENOUS ONCE
Qty: 0 | Refills: 0 | Status: COMPLETED | OUTPATIENT
Start: 2017-06-21 | End: 2017-06-21

## 2017-06-21 RX ORDER — IBUPROFEN 200 MG
600 TABLET ORAL ONCE
Qty: 0 | Refills: 0 | Status: COMPLETED | OUTPATIENT
Start: 2017-06-21 | End: 2017-06-21

## 2017-06-21 RX ORDER — DEXTROSE 50 % IN WATER 50 %
12.5 SYRINGE (ML) INTRAVENOUS ONCE
Qty: 0 | Refills: 0 | Status: DISCONTINUED | OUTPATIENT
Start: 2017-06-21 | End: 2017-06-30

## 2017-06-21 RX ORDER — PIPERACILLIN AND TAZOBACTAM 4; .5 G/20ML; G/20ML
3.38 INJECTION, POWDER, LYOPHILIZED, FOR SOLUTION INTRAVENOUS EVERY 8 HOURS
Qty: 0 | Refills: 0 | Status: DISCONTINUED | OUTPATIENT
Start: 2017-06-21 | End: 2017-06-22

## 2017-06-21 RX ORDER — ENOXAPARIN SODIUM 100 MG/ML
40 INJECTION SUBCUTANEOUS EVERY 24 HOURS
Qty: 0 | Refills: 0 | Status: DISCONTINUED | OUTPATIENT
Start: 2017-06-21 | End: 2017-06-30

## 2017-06-21 RX ORDER — INSULIN GLARGINE 100 [IU]/ML
35 INJECTION, SOLUTION SUBCUTANEOUS
Qty: 0 | Refills: 0 | COMMUNITY

## 2017-06-21 RX ADMIN — MORPHINE SULFATE 2 MILLIGRAM(S): 50 CAPSULE, EXTENDED RELEASE ORAL at 14:37

## 2017-06-21 RX ADMIN — PANTOPRAZOLE SODIUM 40 MILLIGRAM(S): 20 TABLET, DELAYED RELEASE ORAL at 18:27

## 2017-06-21 RX ADMIN — MORPHINE SULFATE 2 MILLIGRAM(S): 50 CAPSULE, EXTENDED RELEASE ORAL at 19:00

## 2017-06-21 RX ADMIN — MORPHINE SULFATE 2 MILLIGRAM(S): 50 CAPSULE, EXTENDED RELEASE ORAL at 13:53

## 2017-06-21 RX ADMIN — INSULIN GLARGINE 40 UNIT(S): 100 INJECTION, SOLUTION SUBCUTANEOUS at 23:02

## 2017-06-21 RX ADMIN — Medication 600 MILLIGRAM(S): at 09:38

## 2017-06-21 RX ADMIN — SODIUM CHLORIDE 2000 MILLILITER(S): 9 INJECTION INTRAMUSCULAR; INTRAVENOUS; SUBCUTANEOUS at 08:21

## 2017-06-21 RX ADMIN — ALBENDAZOLE 200 MILLIGRAM(S): 200 TABLET, FILM COATED ORAL at 22:01

## 2017-06-21 RX ADMIN — Medication 20 MILLIGRAM(S): at 18:27

## 2017-06-21 RX ADMIN — OXYCODONE HYDROCHLORIDE 5 MILLIGRAM(S): 5 TABLET ORAL at 12:09

## 2017-06-21 RX ADMIN — Medication 600 MILLIGRAM(S): at 12:10

## 2017-06-21 RX ADMIN — PIPERACILLIN AND TAZOBACTAM 200 GRAM(S): 4; .5 INJECTION, POWDER, LYOPHILIZED, FOR SOLUTION INTRAVENOUS at 23:00

## 2017-06-21 RX ADMIN — MORPHINE SULFATE 2 MILLIGRAM(S): 50 CAPSULE, EXTENDED RELEASE ORAL at 19:30

## 2017-06-21 RX ADMIN — SPIRONOLACTONE 50 MILLIGRAM(S): 25 TABLET, FILM COATED ORAL at 18:27

## 2017-06-21 NOTE — H&P ADULT - RS GEN PE MLT RESP DETAILS PC
respirations non-labored/clear to auscultation bilaterally/airway patent/breath sounds equal/good air movement

## 2017-06-21 NOTE — ED PROVIDER NOTE - MEDICAL DECISION MAKING DETAILS
MD Garf:  40 yo M, c/o acute (days) on chronic (months) fever.  Tm 103 at home.  Also c/o acute (days) on chronic (years) upper abdominal pain.  patient has had multiple admits for fevers of various sources (influenza B, SBP).  Has Hx of latent TB; but AFB smears are negative (5/2017)  Mhx of alcoholic cirrhosis.  VS - wnl.  Physical Exam: adult M, chronically ill-appearing, NCAT, PERRL, skin:  vitiligo.  Abd:  slightly distended, mild upper abd TTP; no R/G.  Impression:  febrile illness in chronically ill male.  Plan:  fever w/u, reassess, IVF.

## 2017-06-21 NOTE — H&P ADULT - PROBLEM SELECTOR PLAN 2
-continue lantus and sliding scale  -check A1c  -consider Endocrine consult in AM if A1c is uncontrolled

## 2017-06-21 NOTE — ED PROVIDER NOTE - PROGRESS NOTE DETAILS
bedside ED US pending; if able to see pocket of ascites, we will send diagnostic paracentesis. Bedside US demonstrates no appreciable ascites

## 2017-06-21 NOTE — CHART NOTE - NSCHARTNOTEFT_GEN_A_CORE
Night Medicine PA    Notified by RN; preliminary blood culture results came back indicating growth in both the anaerobic and aerobic bottles with gram negative rods. IV Zosyn started. Notified night hospitalist Dr. Velasquez and he agrees with the plan. ID has already been consulted by Dr. Reyes. Will follow up with primary team in AM.    -Cyndi Campbell PA-C. #[id number]

## 2017-06-21 NOTE — H&P ADULT - PROBLEM SELECTOR PLAN 1
-? hydatid cyst? per patient history and medication reconcilliation. Try to get [facility] physician information from patient's PCP in AM. Check liver u/s in interim.   -check blood cultures   -continue albendazole   -will place ID consult   -does not meet sepsis criteria, no indication of peritonitis or gastritis. Hold off on antibiotics for now. Observe fever curve.   -patient has no pulmonary symptoms, history of latent TB. Current active TB infection is unlikely. CXR is clear. -? hydatid cyst? per patient history and medication reconcilliation. Try to get [facility] physician information from patient's PCP in AM.   -Check liver u/s    -check blood cultures   -continue albendazole   -will place ID consult   -does not meet sepsis criteria, no indication of peritonitis or colitis. Hold off on antibiotics for now. Observe fever curve.   -patient has no pulmonary symptoms, history of latent TB. Current active TB infection is unlikely. CXR is clear. -? hydatid cyst? per patient history and medication reconcilliation. Try to get [facility] physician information from patient's PCP in AM.   -Check liver u/s to evaluate for cyst and cyst size or for liver abscess  -Cyst rupture/peritonitis unlikely as patient has minimal GI signs and symptoms   -check blood cultures   -continue albendazole   -will place ID consult   -does not meet sepsis criteria, no indication of peritonitis or colitis. Hold off on antibiotics for now. Observe fever curve.   -patient has no pulmonary symptoms, history of latent TB. Current active TB infection is unlikely. CXR is clear.

## 2017-06-21 NOTE — H&P ADULT - HISTORY OF PRESENT ILLNESS
41 year old male with history of alcoholic cirrhosis, esophageal varices, DM Type 2, vitiligo, s/p corneal transplant, h/o severe c.diff, h/o e.coli bacteremia, recently treated for colitis/SBP with full course of antibiotics, returning with ongoing fevers. The patient states that he started to have fevers yesterday. Initially his temperature was 100.3 orally, but then increased to 103.7 when he woke up after sleeping. He reports chills and weight loss. He has had loose stools, but no abdominal pain/cramping, diarrhea, melena, hematochezia, nausea, or vomiting. He denies any joint pain or myalgias. He does work outside and is exposed to wooded areas - he has never found a tick on himself. 41 year old male with history of alcoholic cirrhosis, esophageal varices, DM Type 2, vitiligo, s/p corneal transplant, h/o severe c.diff, h/o e.coli bacteremia, recently treated for colitis/SBP with full course of antibiotics, returning with ongoing fevers. The patient states that he started to have fevers yesterday. Initially his temperature was 100.3 orally, but then increased to 103.7 when he woke up after sleeping. He reports chills and weight loss. He has had loose stools, but no abdominal pain/cramping, diarrhea, melena, hematochezia, nausea, or vomiting. He denies any joint pain or myalgias. He does work outside and is exposed to wooded areas - he has never found a tick on himself.     He states that he saw a doctor at [facility] for a cyst in his liver, and that he is taking medication for this now.

## 2017-06-21 NOTE — H&P ADULT - ASSESSMENT
41 year old male with history of alcoholic cirrhosis, esophageal varices, DM Type 2, vitiligo, s/p corneal transplant, h/o severe c.diff, h/o e.coli bacteremia, recently treated for colitis/SBP with full course of antibiotics admitted with fever:

## 2017-06-21 NOTE — ED PROVIDER NOTE - OBJECTIVE STATEMENT
41yom pmhx alcoholic cirrhosis, esophageal varices, DM2, severe c. diff,  vitiligo, corneal transplant, previous admission for e.coli bacteremia, as well as 4/2017 for enterocolitis and possible SBP here for today with fever 41yom pmhx alcoholic cirrhosis, esophageal varices, DM2, severe c. diff,  vitiligo, corneal transplant, previous admission for e.coli bacteremia, as well as 4/2017 for enterocolitis and possible SBP here for today with fever since Friday. reports fever mostly at night as high as 103 at home with mild R sided abd pain along with dry cough with sob and bolaños. NO leg swelling. Seen by Dr. Rj Woods in clinic yesterday with bloodwork done. No nausea or vomiting. No rash. No travel hx. No headache or neck pain. No sick contacts. Pt reports admission on monthly basis with fever. pt reports hx of +PPD over 10 years ago and was treated.

## 2017-06-21 NOTE — ED ADULT NURSE NOTE - OBJECTIVE STATEMENT
40 y/o male hx of tb came in c/o fevers cough and sob on exertion since friday. pt states he has hx of cirosis of the liver and diabetes. pt has been taking tylenol for fever. pt states dry non productive cough temp of 100.7 no chest pain no n/v/d abd pain after tyenol ruq tenderness. no fatigue no body aches, vs stable afebrile. last drink 2 years ago

## 2017-06-22 DIAGNOSIS — R78.81 BACTEREMIA: ICD-10-CM

## 2017-06-22 DIAGNOSIS — B89 UNSPECIFIED PARASITIC DISEASE: ICD-10-CM

## 2017-06-22 DIAGNOSIS — A41.51 SEPSIS DUE TO ESCHERICHIA COLI [E. COLI]: ICD-10-CM

## 2017-06-22 LAB
ALBUMIN SERPL ELPH-MCNC: 1.9 G/DL — LOW (ref 3.3–5)
ALBUMIN SERPL ELPH-MCNC: 2.7 G/DL
ALP BLD-CCNC: 252 U/L
ALP SERPL-CCNC: 135 U/L — HIGH (ref 40–120)
ALT FLD-CCNC: 26 U/L — SIGNIFICANT CHANGE UP (ref 10–45)
ALT SERPL-CCNC: 34 U/L
ANION GAP SERPL CALC-SCNC: 10 MMOL/L
ANION GAP SERPL CALC-SCNC: 11 MMOL/L — SIGNIFICANT CHANGE UP (ref 5–17)
APPEARANCE: CLEAR
AST SERPL-CCNC: 45 U/L — HIGH (ref 10–40)
AST SERPL-CCNC: 58 U/L
BACTERIA: NEGATIVE
BASOPHILS # BLD AUTO: 0.02 K/UL
BASOPHILS NFR BLD AUTO: 0.3 %
BILIRUB SERPL-MCNC: 3 MG/DL
BILIRUB SERPL-MCNC: 3.8 MG/DL — HIGH (ref 0.2–1.2)
BILIRUBIN URINE: ABNORMAL
BLOOD URINE: ABNORMAL
BUN SERPL-MCNC: 14 MG/DL — SIGNIFICANT CHANGE UP (ref 7–23)
BUN SERPL-MCNC: 8 MG/DL
CALCIUM SERPL-MCNC: 6.8 MG/DL — LOW (ref 8.4–10.5)
CALCIUM SERPL-MCNC: 7.7 MG/DL
CHLORIDE SERPL-SCNC: 100 MMOL/L — SIGNIFICANT CHANGE UP (ref 96–108)
CHLORIDE SERPL-SCNC: 101 MMOL/L
CHOLEST SERPL-MCNC: 177 MG/DL
CHOLEST/HDLC SERPL: 3.6 RATIO
CO2 SERPL-SCNC: 19 MMOL/L — LOW (ref 22–31)
CO2 SERPL-SCNC: 22 MMOL/L
COLOR: ABNORMAL
CREAT SERPL-MCNC: 0.74 MG/DL
CREAT SERPL-MCNC: 0.84 MG/DL — SIGNIFICANT CHANGE UP (ref 0.5–1.3)
EOSINOPHIL # BLD AUTO: 0.17 K/UL
EOSINOPHIL NFR BLD AUTO: 2.9 %
GLUCOSE QUALITATIVE U: NORMAL MG/DL
GLUCOSE SERPL-MCNC: 191 MG/DL
GLUCOSE SERPL-MCNC: 254 MG/DL — HIGH (ref 70–99)
HBA1C BLD-MCNC: 7.4 % — HIGH (ref 4–5.6)
HBA1C MFR BLD HPLC: 7.7 %
HCT VFR BLD CALC: 25.2 % — LOW (ref 39–50)
HCT VFR BLD CALC: 30.4 %
HDLC SERPL-MCNC: 49 MG/DL
HGB BLD-MCNC: 10.6 G/DL
HGB BLD-MCNC: 9 G/DL — LOW (ref 13–17)
HYALINE CASTS: 1 /LPF
IMM GRANULOCYTES NFR BLD AUTO: 0.3 %
KETONES URINE: NEGATIVE
LDLC SERPL CALC-MCNC: 101 MG/DL
LEUKOCYTE ESTERASE URINE: NEGATIVE
LIDOCAIN IGE QN: 36 U/L — SIGNIFICANT CHANGE UP (ref 7–60)
LYMPHOCYTES # BLD AUTO: 0.87 K/UL
LYMPHOCYTES NFR BLD AUTO: 14.8 %
MAN DIFF?: NORMAL
MCHC RBC-ENTMCNC: 32 PG — SIGNIFICANT CHANGE UP (ref 27–34)
MCHC RBC-ENTMCNC: 32.9 PG
MCHC RBC-ENTMCNC: 34.9 GM/DL
MCHC RBC-ENTMCNC: 35.7 GM/DL — SIGNIFICANT CHANGE UP (ref 32–36)
MCV RBC AUTO: 89.7 FL — SIGNIFICANT CHANGE UP (ref 80–100)
MCV RBC AUTO: 94.4 FL
MICROSCOPIC-UA: NORMAL
MONOCYTES # BLD AUTO: 0.46 K/UL
MONOCYTES NFR BLD AUTO: 7.8 %
NEUTROPHILS # BLD AUTO: 4.33 K/UL
NEUTROPHILS NFR BLD AUTO: 73.9 %
NITRITE URINE: NEGATIVE
PH URINE: 6.5
PLATELET # BLD AUTO: 53 K/UL — LOW (ref 150–400)
PLATELET # BLD AUTO: 83 K/UL
POTASSIUM SERPL-MCNC: 4.2 MMOL/L — SIGNIFICANT CHANGE UP (ref 3.5–5.3)
POTASSIUM SERPL-SCNC: 4.2 MMOL/L — SIGNIFICANT CHANGE UP (ref 3.5–5.3)
POTASSIUM SERPL-SCNC: 4.6 MMOL/L
PROT SERPL-MCNC: 5.8 G/DL — LOW (ref 6–8.3)
PROT SERPL-MCNC: 7 G/DL
PROTEIN URINE: ABNORMAL MG/DL
RBC # BLD: 2.81 M/UL — LOW (ref 4.2–5.8)
RBC # BLD: 3.22 M/UL
RBC # FLD: 13.9 % — SIGNIFICANT CHANGE UP (ref 10.3–14.5)
RBC # FLD: 14.9 %
RED BLOOD CELLS URINE: 76 /HPF
SODIUM SERPL-SCNC: 130 MMOL/L — LOW (ref 135–145)
SODIUM SERPL-SCNC: 133 MMOL/L
SPECIFIC GRAVITY URINE: 1.02
SQUAMOUS EPITHELIAL CELLS: 1 /HPF
TRIGL SERPL-MCNC: 135 MG/DL
TSH SERPL-ACNC: 2.88 UIU/ML
UROBILINOGEN URINE: 1 MG/DL
WBC # BLD: 7.64 K/UL — SIGNIFICANT CHANGE UP (ref 3.8–10.5)
WBC # FLD AUTO: 5.87 K/UL
WBC # FLD AUTO: 7.64 K/UL — SIGNIFICANT CHANGE UP (ref 3.8–10.5)
WHITE BLOOD CELLS URINE: 2 /HPF

## 2017-06-22 PROCEDURE — 76700 US EXAM ABDOM COMPLETE: CPT | Mod: 26

## 2017-06-22 PROCEDURE — 99233 SBSQ HOSP IP/OBS HIGH 50: CPT

## 2017-06-22 PROCEDURE — 99254 IP/OBS CNSLTJ NEW/EST MOD 60: CPT | Mod: GC

## 2017-06-22 RX ORDER — ACETAMINOPHEN 500 MG
650 TABLET ORAL ONCE
Qty: 0 | Refills: 0 | Status: COMPLETED | OUTPATIENT
Start: 2017-06-22 | End: 2017-06-22

## 2017-06-22 RX ORDER — PIPERACILLIN AND TAZOBACTAM 4; .5 G/20ML; G/20ML
3.38 INJECTION, POWDER, LYOPHILIZED, FOR SOLUTION INTRAVENOUS EVERY 8 HOURS
Qty: 0 | Refills: 0 | Status: DISCONTINUED | OUTPATIENT
Start: 2017-06-22 | End: 2017-06-26

## 2017-06-22 RX ORDER — SODIUM CHLORIDE 9 MG/ML
1000 INJECTION INTRAMUSCULAR; INTRAVENOUS; SUBCUTANEOUS
Qty: 0 | Refills: 0 | Status: DISCONTINUED | OUTPATIENT
Start: 2017-06-22 | End: 2017-06-23

## 2017-06-22 RX ADMIN — PIPERACILLIN AND TAZOBACTAM 25 GRAM(S): 4; .5 INJECTION, POWDER, LYOPHILIZED, FOR SOLUTION INTRAVENOUS at 13:23

## 2017-06-22 RX ADMIN — PANTOPRAZOLE SODIUM 40 MILLIGRAM(S): 20 TABLET, DELAYED RELEASE ORAL at 05:26

## 2017-06-22 RX ADMIN — SPIRONOLACTONE 50 MILLIGRAM(S): 25 TABLET, FILM COATED ORAL at 05:25

## 2017-06-22 RX ADMIN — PIPERACILLIN AND TAZOBACTAM 25 GRAM(S): 4; .5 INJECTION, POWDER, LYOPHILIZED, FOR SOLUTION INTRAVENOUS at 05:26

## 2017-06-22 RX ADMIN — NADOLOL 20 MILLIGRAM(S): 80 TABLET ORAL at 05:25

## 2017-06-22 RX ADMIN — ALBENDAZOLE 200 MILLIGRAM(S): 200 TABLET, FILM COATED ORAL at 17:21

## 2017-06-22 RX ADMIN — Medication 4: at 12:07

## 2017-06-22 RX ADMIN — Medication 3: at 09:17

## 2017-06-22 RX ADMIN — Medication 20 MILLIGRAM(S): at 17:21

## 2017-06-22 RX ADMIN — Medication 650 MILLIGRAM(S): at 05:25

## 2017-06-22 RX ADMIN — Medication 3: at 17:21

## 2017-06-22 RX ADMIN — ALBENDAZOLE 200 MILLIGRAM(S): 200 TABLET, FILM COATED ORAL at 12:07

## 2017-06-22 RX ADMIN — Medication 20 MILLIGRAM(S): at 05:44

## 2017-06-22 RX ADMIN — INSULIN GLARGINE 40 UNIT(S): 100 INJECTION, SOLUTION SUBCUTANEOUS at 22:15

## 2017-06-22 RX ADMIN — PIPERACILLIN AND TAZOBACTAM 25 GRAM(S): 4; .5 INJECTION, POWDER, LYOPHILIZED, FOR SOLUTION INTRAVENOUS at 21:34

## 2017-06-22 RX ADMIN — SODIUM CHLORIDE 100 MILLILITER(S): 9 INJECTION INTRAMUSCULAR; INTRAVENOUS; SUBCUTANEOUS at 16:30

## 2017-06-22 NOTE — PROVIDER CONTACT NOTE (CRITICAL VALUE NOTIFICATION) - SITUATION
Positive blood culture 6/21 prelim: growth in aerobic bottle
Positive blood culture 6/21 prelim: growth in aerobic bottle aerobic bottle gram negative rods
Positive blood culture 6/21 prelim: growth in anerobic bottle

## 2017-06-22 NOTE — PROGRESS NOTE ADULT - SUBJECTIVE AND OBJECTIVE BOX
Patient is a 41y old  Male who presents with a chief complaint of PCP: Dr. Rj Cox (21 Jun 2017 15:36)      SUBJECTIVE / OVERNIGHT EVENTS: Patient spiking fevers, complaining of weakness. No hx dyspnea/ chest pain/ headache/ diarrhea.     MEDICATIONS  (STANDING):  spironolactone 50milliGRAM(s) Oral daily  albendazole 200milliGRAM(s) Oral two times a day with meals  furosemide    Tablet 20milliGRAM(s) Oral two times a day  pantoprazole    Tablet 40milliGRAM(s) Oral before breakfast  enoxaparin Injectable 40milliGRAM(s) SubCutaneous every 24 hours  insulin lispro (HumaLOG) corrective regimen sliding scale  SubCutaneous three times a day before meals  dextrose 5%. 1000milliLiter(s) IV Continuous <Continuous>  dextrose 50% Injectable 12.5Gram(s) IV Push once  dextrose 50% Injectable 25Gram(s) IV Push once  dextrose 50% Injectable 25Gram(s) IV Push once  nadolol 20milliGRAM(s) Oral daily  insulin glargine Injectable (LANTUS) 40Unit(s) SubCutaneous at bedtime  piperacillin/tazobactam IVPB. 3.375Gram(s) IV Intermittent every 8 hours  sodium chloride 0.9%. 1000milliLiter(s) IV Continuous <Continuous>    MEDICATIONS  (PRN):  dextrose Gel 1Dose(s) Oral once PRN Blood Glucose LESS THAN 70 milliGRAM(s)/deciliter  glucagon  Injectable 1milliGRAM(s) IntraMuscular once PRN Glucose LESS THAN 70 milligrams/deciliter     Vital Signs Last 24 Hrs  T(C): 36.9, Max: 39.3 (06-22 @ 05:00)  T(F): 98.4, Max: 102.8 (06-22 @ 05:00)  HR: 86 (86 - 105)  BP: 112/68 (112/68 - 148/71)  BP(mean): --  RR: 18 (18 - 20)  SpO2: 97% (93% - 97%)          PHYSICAL EXAM:  GENERAL: NAD, well-developed  HEAD:  Atraumatic, Normocephalic  NECK: Supple, No JVD  CHEST/LUNG: Clear to auscultation bilaterally; No wheeze  HEART: Regular rate and rhythm; No murmurs, rubs, or gallops  ABDOMEN: Soft, Nontender, Nondistended; Bowel sounds present  EXTREMITIES:  2+ Peripheral Pulses, SKIN: No rashes or lesions                          9.0    7.64  )-----------( 53       ( 22 Jun 2017 08:35 )             25.2           LIVER FUNCTIONS - ( 22 Jun 2017 08:35 )  Alb: 1.9 g/dL / Pro: 5.8 g/dL / ALK PHOS: 135 U/L / ALT: 26 U/L / AST: 45 U/L / GGT: x           PT/INR - ( 21 Jun 2017 08:21 )   PT: 16.3 sec;   INR: 1.48 ratio         PTT - ( 21 Jun 2017 08:21 )  PTT:37.9 sec  130|100|14<254  4.2|19|0.84  6.8,--,--  06-22 @ 08:35          RADIOLOGY & ADDITIONAL TESTS:    Imaging Personally Reviewed:    Consultant(s) Notes Reviewed:      Care Discussed with Consultants/Other Providers:

## 2017-06-22 NOTE — PROGRESS NOTE ADULT - PROBLEM SELECTOR PLAN 1
Patient septic, spiking fevers, ID follow up noted.   Source r/o SBP vs Liver Abscess vs Endocarditis.   Patient for IR guided Paracentesis tomorrow.

## 2017-06-22 NOTE — CONSULT NOTE ADULT - PROBLEM SELECTOR RECOMMENDATION 2
Recently initiated on Albendazole for unclear reason. Patient denies being told he has a cyst or parasitic infection  - Obtain collateral from ID physician at [facility] Recently initiated on Albendazole for unclear reason. Patient denies being told he has a cyst or parasitic infection  - Obtain collateral from ID physician at [facility]  - c/w Albendazole for now Recently initiated on Albendazole for unclear reason. Patient denies being told he has a cyst or parasitic infection  - Obtain collateral from ID physician at [facility]  - c/w Albendazole for now  - Check Echinococcal Ab  - Liver MRI w/ contrast to eval for cyst/abscess

## 2017-06-22 NOTE — CONSULT NOTE ADULT - ATTENDING COMMENTS
42 yo with h/o ETOH abuse,cirrhosis.  Recurrent E coli bacteremia-has had atleast 2 episodes in past.  No obvious focus apparent clinically  No abdominal tenderness.  ?Flow murmur  ?Liver cyst-Ct here not suggestive but Ultrasound with ?changed density leison.  On albendazole for ?echinococcal cyst  Hemodynamically stable.    Rec:  1) follow ID sensi of isolate  2) paracentesis if feasible  3) repeat blood cultures  4) Continue zosyn ,albendazolefor now  5) MRI liver,check echinococcus serology-obtain outpatient records.  6) Echo(has had KYA in past negative),may need nuclear scan to determine etiology of recurrence  6) Tailor per results,course  Other plan per primary  Will Follow.  Beeper [id number]-rolb/afterhours/No response-[id number]

## 2017-06-22 NOTE — PROGRESS NOTE ADULT - PROBLEM SELECTOR PLAN 2
Sepsis from E .coli Bacteremia, ?? Source   Patient for Paracentesis tomorrow.   Work up in progress- ECHO, MRI Liver.

## 2017-06-22 NOTE — CONSULT NOTE ADULT - SUBJECTIVE AND OBJECTIVE BOX
Patient is a 41y old  Male who presents with a chief complaint of fevers    HPI:  Patient is a 40 yo M w/ alcoholic liver cirrhosis, esophageal varices, T2DM, vitiligo, s/p corneal transplant, h/o severe c.diff, h/o E. coli bacteremia, recently treated for colitis/SBP now presenting with ongoing fevers. As per patient, he started to have fevers the day PTA. Initially his temperature was 100.3 orally, but then increased to Tmax of 103.7. Patient endorsed chills, weight loss, loose stools. Patient denied abdominal pain/cramping, melena, hematochezia, nausea, or vomiting, joint pain or myalgias. He does work outside and is exposed to wooded areas - he has never found a tick on himself. Of note, patient reports that he saw a doctor at [facility] for a cyst in his liver, and that he is taking medication for this now (Albendazole). Patient was continued on albendazole and initiated on Zosyn on admission. Blood cultures on admission growing E coli. Of note, blood cultures from 2/26/17 grew E coli resistant to Gentamicin, Ampicillin, Bactrim and intermediate to Unasyn and Tobramycin. Blood cultures from 5/10/16 grew E coli resistant to Cipro and Levaquin.     Patient was last admitted 5/20/17 to 5/22/17 for colitis vs SBP less likely and completed a course of Cipro and Flagyl.       PAST MEDICAL & SURGICAL HISTORY:  Vitiligo  Latent tuberculosis  Diabetes mellitus type 2, insulin dependent  Esophageal varices  Alcoholic cirrhosis  Status post corneal transplant  Alcoholic Cirrhosis  Esophageal Varices      Social history:    FAMILY HISTORY:  Family history of uterine cancer  Family history of diabetes mellitus (Mother)    REVIEW OF SYSTEMS  General:	Denies any malaise fatigue or chills. Fevers absent    Skin:No rash  	  Ophthalmologic:Denies any visual complaints,discharge redness or photophobia  	  ENMT:No nasal discharge,headache,sinus congestion or throat pain.No dental complaints    Respiratory and Thorax:No cough,sputum or chest pain.Denies shortness of breath  	  Cardiovascular:	No chest pain,palpitaions or dizziness    Gastrointestinal:	NO nausea,abdominal pain or diarrhea.    Genitourinary:	No dysuria,frequency. No flank pain    Musculoskeletal:	No joint swelling or pain.No weakness    Neurological:No confusion,diziness.No extremity weakness.No bladder or bowel incontinence	    Psychiatric:No delusions or hallucinations	    Hematology/Lymphatics:	No LN swelling.No gum bleeding     Endocrine:	No recent weight gain or loss.No abnormal heat/cold intolerance    Allergic/Immunologic:	No hives or rash   Allergies    No Known Allergies    Intolerances        Antimicrobials:    albendazole 200milliGRAM(s) Oral two times a day with meals  piperacillin/tazobactam IVPB. 3.375Gram(s) IV Intermittent every 8 hours        Vital Signs Last 24 Hrs  T(C): 37.5, Max: 39.3 (06-22 @ 05:00)  T(F): 99.5, Max: 102.8 (06-22 @ 05:00)  HR: 105 (66 - 105)  BP: 124/63 (102/60 - 148/84)  BP(mean): --  RR: 18 (17 - 20)  SpO2: 93% (93% - 99%)    PHYSICAL EXAM:Pleasant patient in no acute distress.      Constitutional:Comfortable.Awake and alert  No cachexia     Eyes:PERRL EOMI.NO discharge or conjunctival injection    ENMT:No sinus tenderness.No thrush.No pharyngeal exudate or erythema.Fair dental hygiene    Neck:Supple,No LN,no JVD      Respiratory:Good air entry bilaterally,CTA    Cardiovascular:S1 S2 wnl, No murmurs,rub or gallops    Gastrointestinal:Soft BS(+) no tenderness no masses ,No rebound or guarding    Genitourinary:No CVA tendereness     Rectal:    Extremities:No cyanosis,clubbing or edema.    Vascular:peripheral pulses felt    Neurological:AAO X 3,No grossly focal deficits    Skin:No rash     Lymph Nodes:No palpable LNs    Musculoskeletal:No joint swelling or LOM    Psychiatric:Affect normal.                                11.2   11.7  )-----------( 75       ( 21 Jun 2017 08:21 )             32.4         06-21    133<L>  |  102  |  13  ----------------------------<  109<H>  4.2   |  20<L>  |  0.92    Ca    7.5<L>      21 Jun 2017 08:21    TPro  6.7  /  Alb  2.8<L>  /  TBili  4.2<H>  /  DBili  x   /  AST  55<H>  /  ALT  37  /  AlkPhos  231<H>  06-21      RECENT CULTURES:  06-21 @ 12:56 .Blood Blood   PCR   Growth in aerobic bottle: Gram Negative Rods  Growth in anaerobic bottle: Gram Negative Rods   Blood Culture PCR  Blood Culture PCR   Growth in aerobic bottle: Gram Negative Rods  ***Blood Panel PCR results on this specimen are available  approximately 3 hours after the Gram stain result.***  Gram stain, PCR, and/or culture results may not always  correspond due to difference in m                        Rapid RVP Result: NotDetec (06-21 @ 08:38)      MICROBIOLOGY:  Culture Results:   Growth in aerobic bottle: Gram Negative Rods  ***Blood Panel PCR results on this specimen are available  approximately 3 hours after the Gram stain result.***  Gram stain, PCR, and/or culture results may not always  correspond due to difference in m (06-21 @ 12:56)  Culture Results:   Growth in anaerobic bottle: Gram Negative Rods (06-21 @ 12:56)          Radiology: CXR clear lungs      Assessment:        Recommendations and Plan: Patient is a 41y old  Male who presents with a chief complaint of fevers    HPI:  Patient is a 42 yo M w/ alcoholic liver cirrhosis, esophageal varices, T2DM, vitiligo, s/p corneal transplant, h/o severe c.diff, h/o E. coli bacteremia, recently treated for colitis/SBP now presenting with ongoing fevers. As per patient, he started to have fever 5 days PTA, about 100.4 but went away for a day with tylenol. Patient was feeling ok and saw his PMD 2 days PTA. At the PMD office his temp was 99 but when he went home, he found his temperature was 100.3 orally, but then increased to Tmax of 103.7. Patient endorsed chills, weight loss, soft stools. Patient denied abdominal pain/cramping, melena, hematochezia, nausea, or vomiting, joint pain or myalgias, bug bites, sick contacts, recent travel. Of note, patient reports that an infectious disease doctor [facility] recently prescribed him Albendazole which he began taking earlier this week, but denies any knowledge of being told he has a cyst or parasite infection. Patient also reports he has been on Cipro daily for prophylaxis since last hospital discharge Patient was continued on albendazole and initiated on Zosyn on admission. Blood cultures on admission growing E coli. Of note, blood cultures from 2/26/17 grew E coli resistant to Gentamicin, Ampicillin, Bactrim and intermediate to Unasyn and Tobramycin. Blood cultures from 5/10/16 grew E coli resistant to Cipro and Levaquin.     Patient was last admitted 5/20/17 to 5/22/17 for colitis vs SBP less likely and completed a course of Cipro and Flagyl.       PAST MEDICAL & SURGICAL HISTORY:  Vitiligo  Latent tuberculosis  Diabetes mellitus type 2, insulin dependent  Esophageal varices  Alcoholic cirrhosis  Status post corneal transplant  Alcoholic Cirrhosis  Esophageal Varices      Social history:    FAMILY HISTORY:  Family history of uterine cancer  Family history of diabetes mellitus (Mother)    REVIEW OF SYSTEMS  General: Endorsed fevers this morning. Denies any malaise, fatigue or chills.    Skin: No rash  	  Ophthalmologic: Denies any visual complaints, discharge redness or photophobia  	  ENMT: No nasal discharge, headache, sinus congestion or throat pain. No dental complaints    Respiratory and Thorax: No cough, sputum or chest pain. Denies shortness of breath  	  Cardiovascular:	No chest pain, palpitaions or dizziness    Gastrointestinal:	NO nausea, abdominal pain or diarrhea.    Genitourinary: Endorses darker looking urine since hospitalization. No dysuria, frequency. No flank pain    Musculoskeletal: No joint swelling or pain. No weakness    Neurological: No confusion, dizziness No extremity weakness. No bladder or bowel incontinence	    Psychiatric: No delusions or hallucinations	    Hematology/Lymphatics: No LN swelling. No gum bleeding     Endocrine: No abnormal heat/cold intolerance    Allergic/Immunologic: No hives or rash   Allergies    No Known Allergies    Intolerances        Antimicrobials:    albendazole 200milliGRAM(s) Oral two times a day with meals  piperacillin/tazobactam IVPB. 3.375Gram(s) IV Intermittent every 8 hours        Vital Signs Last 24 Hrs  T(C): 37.5, Max: 39.3 (06-22 @ 05:00)  T(F): 99.5, Max: 102.8 (06-22 @ 05:00)  HR: 105 (66 - 105)  BP: 124/63 (102/60 - 148/84)  BP(mean): --  RR: 18 (17 - 20)  SpO2: 93% (93% - 99%)    PHYSICAL EXAM: .      Constitutional: NAD Comfortable. Awake and alert. No cachexia     Eyes: PERRL EOMI. No discharge or conjunctival injection    ENMT: No pharyngeal exudate or erythema. Fair dental hygiene    Neck: Supple, No LN, no JVD      Respiratory: Good air entry bilaterally, CTA    Cardiovascular:S1 S2 wnl, No murmurs, rub or gallops    Gastrointestinal: Distended, Soft BS(+) no tenderness no masses, no rebound or guarding    Genitourinary: No CVA tendereness     Rectal:    Extremities: No cyanosis, clubbing or edema.    Vascular: Peripheral pulses felt    Neurological: AAO X 3,No grossly focal deficits    Skin: No rash     Lymph Nodes: No palpable LNs    Musculoskeletal: No joint swelling or LOM    Psychiatric: Affect normal.                                11.2   11.7  )-----------( 75       ( 21 Jun 2017 08:21 )             32.4         06-21    133<L>  |  102  |  13  ----------------------------<  109<H>  4.2   |  20<L>  |  0.92    Ca    7.5<L>      21 Jun 2017 08:21    TPro  6.7  /  Alb  2.8<L>  /  TBili  4.2<H>  /  DBili  x   /  AST  55<H>  /  ALT  37  /  AlkPhos  231<H>  06-21      RECENT CULTURES:  06-21 @ 12:56 .Blood Blood   PCR   Growth in aerobic bottle: Gram Negative Rods  Growth in anaerobic bottle: Gram Negative Rods   Blood Culture PCR  Blood Culture PCR   Growth in aerobic bottle: Gram Negative Rods  ***Blood Panel PCR results on this specimen are available  approximately 3 hours after the Gram stain result.***  Gram stain, PCR, and/or culture results may not always  correspond due to difference in m                        Rapid RVP Result: NotDetec (06-21 @ 08:38)      MICROBIOLOGY:  Culture Results:   Growth in aerobic bottle: Gram Negative Rods  ***Blood Panel PCR results on this specimen are available  approximately 3 hours after the Gram stain result.***  Gram stain, PCR, and/or culture results may not always  correspond due to difference in m (06-21 @ 12:56)  Culture Results:   Growth in anaerobic bottle: Gram Negative Rods (06-21 @ 12:56)          Radiology: CXR clear lungs      Assessment:        Recommendations and Plan: Patient is a 41y old  Male who presents with a chief complaint of fevers    HPI:  Patient is a 42 yo M w/ alcoholic liver cirrhosis, esophageal varices, T2DM, vitiligo, s/p corneal transplant, h/o severe c.diff, h/o E. coli bacteremia, recently treated for colitis/SBP now presenting with ongoing fevers. As per patient, he started to have fever 5 days PTA, about 100.4 but went away for a day with tylenol. Patient was feeling ok and saw his PMD 2 days PTA. At the PMD office his temp was 99 but when he went home, he found his temperature was 100.3 orally, but then increased to Tmax of 103.7. Patient endorsed chills, weight loss, soft stools. Patient denied abdominal pain/cramping, melena, hematochezia, nausea, or vomiting, joint pain or myalgias, bug bites, sick contacts, recent travel. Of note, patient reports that an infectious disease doctor [facility] recently prescribed him Albendazole which he began taking earlier this week, but denies any knowledge of being told he has a cyst or parasite infection. Patient also reports he has been on Cipro daily for prophylaxis since last hospital discharge Patient was continued on albendazole and initiated on Zosyn on admission. Blood cultures on admission growing E coli. Of note, blood cultures from 2/26/17 grew E coli resistant to Gentamicin, Ampicillin, Bactrim and intermediate to Unasyn and Tobramycin. Blood cultures from 5/10/16 grew E coli resistant to Cipro and Levaquin.     Patient was last admitted 5/20/17 to 5/22/17 for colitis vs SBP less likely and completed a course of Cipro and Flagyl.       PAST MEDICAL & SURGICAL HISTORY:  Vitiligo  Latent tuberculosis  Diabetes mellitus type 2, insulin dependent  Esophageal varices  Alcoholic cirrhosis  Status post corneal transplant  Alcoholic Cirrhosis  Esophageal Varices      Social history:    FAMILY HISTORY:  Family history of uterine cancer  Family history of diabetes mellitus (Mother)    REVIEW OF SYSTEMS  General: Endorsed fevers this morning. Denies any malaise, fatigue or chills.    Skin: No rash  	  Ophthalmologic: Denies any visual complaints, discharge redness or photophobia  	  ENMT: No nasal discharge, headache, sinus congestion or throat pain. No dental complaints    Respiratory and Thorax: No cough, sputum or chest pain. Denies shortness of breath  	  Cardiovascular:	No chest pain, palpitaions or dizziness    Gastrointestinal:	NO nausea, abdominal pain or diarrhea.    Genitourinary: Endorses darker looking urine since hospitalization. No dysuria, frequency. No flank pain    Musculoskeletal: No joint swelling or pain. No weakness    Neurological: No confusion, dizziness No extremity weakness. No bladder or bowel incontinence	    Psychiatric: No delusions or hallucinations	    Hematology/Lymphatics: No LN swelling. No gum bleeding     Endocrine: No abnormal heat/cold intolerance    Allergic/Immunologic: No hives or rash   Allergies    No Known Allergies    Intolerances        Antimicrobials:    albendazole 200milliGRAM(s) Oral two times a day with meals  piperacillin/tazobactam IVPB. 3.375Gram(s) IV Intermittent every 8 hours        Vital Signs Last 24 Hrs  T(C): 37.5, Max: 39.3 (06-22 @ 05:00)  T(F): 99.5, Max: 102.8 (06-22 @ 05:00)  HR: 105 (66 - 105)  BP: 124/63 (102/60 - 148/84)  BP(mean): --  RR: 18 (17 - 20)  SpO2: 93% (93% - 99%)    PHYSICAL EXAM: .      Constitutional: NAD Comfortable. Awake and alert. No cachexia     Eyes: PERRL EOMI. No discharge or conjunctival injection    ENMT: No pharyngeal exudate or erythema. Fair dental hygiene    Neck: Supple, No LN, no JVD      Respiratory: Good air entry bilaterally, CTA    Cardiovascular:S1 S2 wnl, No murmurs, rub or gallops    Gastrointestinal: Distended, Soft BS(+) no tenderness no masses, no rebound or guarding    Genitourinary: No CVA tendereness     Rectal:    Extremities: No cyanosis, clubbing or edema.    Vascular: Peripheral pulses felt    Neurological: AAO X 3,No grossly focal deficits    Skin: No rash     Lymph Nodes: No palpable LNs    Musculoskeletal: No joint swelling or LOM    Psychiatric: Affect normal.                                11.2   11.7  )-----------( 75       ( 21 Jun 2017 08:21 )             32.4         06-21    133<L>  |  102  |  13  ----------------------------<  109<H>  4.2   |  20<L>  |  0.92    Ca    7.5<L>      21 Jun 2017 08:21    TPro  6.7  /  Alb  2.8<L>  /  TBili  4.2<H>  /  DBili  x   /  AST  55<H>  /  ALT  37  /  AlkPhos  231<H>  06-21      RECENT CULTURES:  06-21 @ 12:56 .Blood Blood   PCR   Growth in aerobic bottle: Gram Negative Rods  Growth in anaerobic bottle: Gram Negative Rods   Blood Culture PCR  Blood Culture PCR   Growth in aerobic bottle: Gram Negative Rods  ***Blood Panel PCR results on this specimen are available  approximately 3 hours after the Gram stain result.***  Gram stain, PCR, and/or culture results may not always  correspond due to difference in m                        Rapid RVP Result: NotDetec (06-21 @ 08:38)      MICROBIOLOGY:  Culture Results:   Growth in aerobic bottle: Gram Negative Rods  ***Blood Panel PCR results on this specimen are available  approximately 3 hours after the Gram stain result.***  Gram stain, PCR, and/or culture results may not always  correspond due to difference in m (06-21 @ 12:56)  Culture Results:   Growth in anaerobic bottle: Gram Negative Rods (06-21 @ 12:56)          Radiology:     CXR clear lungs    EXAM:  US ABDOMEN COMPLETE  1. Hepatic cirrhosis with signs of portal hypertension including   splenomegaly, ascites, and recannulization of the paraumbilical vein.    2. No focal liver lesion or liver cyst is visualized. CT dated 5/20/2017   demonstrated a hypoattenuating lesion in the left hepatic lobe that was   not present on MRI dated 4/15/2016. If indicated, MRI may be obtained for   additional evaluation.    3. Gallbladder sludge.      Assessment:        Recommendations and Plan: Patient is a 41y old  Male who presents with a chief complaint of fevers    HPI:  Patient is a 40 yo M w/ alcoholic liver cirrhosis, esophageal varices, T2DM, vitiligo, s/p corneal transplant, h/o severe c.diff, h/o E. coli bacteremia, recently treated for colitis/SBP now presenting with ongoing fevers. As per patient, he started to have fever 5 days PTA, about 100.4 but went away for a day with tylenol. Patient was feeling ok and saw his PMD 2 days PTA. At the PMD office his temp was 99 but when he went home, he found his temperature was 100.3 orally, but then increased to Tmax of 103.7. Patient endorsed chills, weight loss, soft stools. Patient denied abdominal pain/cramping, melena, hematochezia, nausea, or vomiting, joint pain or myalgias, bug bites, sick contacts, recent travel. Of note, patient reports that an infectious disease doctor [facility] recently prescribed him Albendazole which he began taking earlier this week, but denies any knowledge of being told he has a cyst or parasite infection. Patient also reports he has been on Cipro daily for prophylaxis since last hospital discharge Patient was continued on albendazole and initiated on Zosyn on admission. Blood cultures on admission growing E coli. Of note, blood cultures from 2/26/17 grew E coli resistant to Gentamicin, Ampicillin, Bactrim and intermediate to Unasyn and Tobramycin. Blood cultures from 5/10/16 grew E coli resistant to Cipro and Levaquin.     Patient was last admitted 5/20/17 to 5/22/17 for colitis vs SBP less likely and completed a course of Cipro and Flagyl.     ID consulted      PAST MEDICAL & SURGICAL HISTORY:  Vitiligo  Latent tuberculosis  Diabetes mellitus type 2, insulin dependent  Esophageal varices  Alcoholic cirrhosis  Status post corneal transplant  Alcoholic Cirrhosis  Esophageal Varices      Social history:denies smoking,ETOH or IVDU at present    FAMILY HISTORY:  Family history of uterine cancer  Family history of diabetes mellitus (Mother)    REVIEW OF SYSTEMS  General: Endorsed fevers this morning. Denies any malaise, fatigue or chills.    Skin: No rash  	  Ophthalmologic: Denies any visual complaints, discharge redness or photophobia  	  ENMT: No nasal discharge, headache, sinus congestion or throat pain. No dental complaints    Respiratory and Thorax: No cough, sputum or chest pain. Denies shortness of breath  	  Cardiovascular:	No chest pain, palpitaions or dizziness    Gastrointestinal:	NO nausea, abdominal pain or diarrhea.    Genitourinary: Endorses darker looking urine since hospitalization. No dysuria, frequency. No flank pain    Musculoskeletal: No joint swelling or pain. No weakness    Neurological: No confusion, dizziness No extremity weakness. No bladder or bowel incontinence	    Psychiatric: No delusions or hallucinations	    Hematology/Lymphatics: No LN swelling. No gum bleeding     Endocrine: No abnormal heat/cold intolerance    Allergic/Immunologic: No hives or rash   Allergies    No Known Allergies    Intolerances        Antimicrobials:    albendazole 200milliGRAM(s) Oral two times a day with meals  piperacillin/tazobactam IVPB. 3.375Gram(s) IV Intermittent every 8 hours        Vital Signs Last 24 Hrs  T(C): 37.5, Max: 39.3 (06-22 @ 05:00)  T(F): 99.5, Max: 102.8 (06-22 @ 05:00)  HR: 105 (66 - 105)  BP: 124/63 (102/60 - 148/84)  BP(mean): --  RR: 18 (17 - 20)  SpO2: 93% (93% - 99%)    PHYSICAL EXAM: .      Constitutional: NAD Comfortable. Awake and alert. No cachexia     Eyes: PERRL EOMI. No discharge or conjunctival injection    ENMT: No pharyngeal exudate or erythema. Fair dental hygiene    Neck: Supple, No LN, no JVD      Respiratory: Good air entry bilaterally, CTA    Cardiovascular:S1 S2 wnl, No murmurs, rub or gallops    Gastrointestinal: Distended, Soft BS(+),minimal fluid no tenderness no masses, no rebound or guarding    Genitourinary: No CVA tendereness     Rectal:    Extremities: No cyanosis, clubbing or edema.    Vascular: Peripheral pulses felt    Neurological: AAO X 3,No grossly focal deficits    Skin: No rash     Lymph Nodes: No palpable LNs    Musculoskeletal: No joint swelling or LOM    Psychiatric: Affect normal.                                11.2   11.7  )-----------( 75       ( 21 Jun 2017 08:21 )             32.4         06-21    133<L>  |  102  |  13  ----------------------------<  109<H>  4.2   |  20<L>  |  0.92    Ca    7.5<L>      21 Jun 2017 08:21    TPro  6.7  /  Alb  2.8<L>  /  TBili  4.2<H>  /  DBili  x   /  AST  55<H>  /  ALT  37  /  AlkPhos  231<H>  06-21      RECENT CULTURES:  06-21 @ 12:56 .Blood Blood   PCR   Growth in aerobic bottle: Gram Negative Rods  Growth in anaerobic bottle: Gram Negative Rods   Blood Culture PCR  Blood Culture PCR   Growth in aerobic bottle: Gram Negative Rods  ***Blood Panel PCR results on this specimen are available  approximately 3 hours after the Gram stain result.***  Gram stain, PCR, and/or culture results may not always  correspond due to difference in m                        Rapid RVP Result: NotDetec (06-21 @ 08:38)      MICROBIOLOGY:  Culture Results:   Growth in aerobic bottle: Gram Negative Rods  ***Blood Panel PCR results on this specimen are available  approximately 3 hours after the Gram stain result.***  Gram stain, PCR, and/or culture results may not always  correspond due to difference in m (06-21 @ 12:56)  Culture Results:   Growth in anaerobic bottle: Gram Negative Rods (06-21 @ 12:56)          Radiology:     CXR clear lungs    EXAM:  US ABDOMEN COMPLETE  1. Hepatic cirrhosis with signs of portal hypertension including   splenomegaly, ascites, and recannulization of the paraumbilical vein.    2. No focal liver lesion or liver cyst is visualized. CT dated 5/20/2017   demonstrated a hypoattenuating lesion in the left hepatic lobe that was   not present on MRI dated 4/15/2016. If indicated, MRI may be obtained for   additional evaluation.    3. Gallbladder sludge.      Assessment:        Recommendations and Plan:

## 2017-06-22 NOTE — CONSULT NOTE ADULT - ASSESSMENT
Patient is a 40 yo M w/ alcoholic liver cirrhosis, esophageal varices, T2DM, vitiligo, s/p corneal transplant, h/o severe c.diff, h/o E. coli bacteremia, recently treated for colitis/SBP now presenting with ongoing fevers, found to have E. Coli bacteremia.

## 2017-06-22 NOTE — CONSULT NOTE ADULT - PROBLEM SELECTOR RECOMMENDATION 9
Sepsis 2/2 E coli bacteremia in context of recurrent E. coli bacteremia. Of note, blood cultures from 2/26/17 grew E coli resistant to Gentamicin, Ampicillin, Bactrim and intermediate to Unasyn and Tobramycin. Blood cultures from 5/10/16 grew E coli resistant to Cipro and Levaquin. Endorses being on Cipro for prophylaxis  - Continue with Zosyn for now (day 2), no demonstrated prior resistance  - f/u blood cultures for antibiotic sensitivities  - Check UA and urine culture if UA positive Sepsis 2/2 E coli bacteremia in context of recurrent E. coli bacteremia. Of note, blood cultures from 2/26/17 grew E coli resistant to Gentamicin, Ampicillin, Bactrim and intermediate to Unasyn and Tobramycin. Blood cultures from 5/10/16 grew E coli resistant to Cipro and Levaquin. Endorses being on Cipro for prophylaxis  - Continue with Zosyn for now (day 2), no demonstrated prior resistance  - f/u blood cultures for antibiotic sensitivities  - Repeat Blood cultures until cleared  - Check UA and urine culture if UA positive Sepsis 2/2 E coli bacteremia in context of recurrent E. coli bacteremia. Of note, blood cultures from 2/26/17 grew E coli resistant to Gentamicin, Ampicillin, Bactrim and intermediate to Unasyn and Tobramycin. Blood cultures from 5/10/16 grew E coli resistant to Cipro and Levaquin. Endorses being on Cipro for prophylaxis. No adequate fluid for paracentesis as per ED POCUS.  - Continue with Zosyn for now (day 2), no demonstrated prior resistance  - f/u blood cultures for antibiotic sensitivities  - Repeat Blood cultures until cleared  - Check UA and urine culture if UA positive Sepsis 2/2 E coli bacteremia in context of recurrent E. coli bacteremia. Of note, blood cultures from 2/26/17 grew E coli resistant to Gentamicin, Ampicillin, Bactrim and intermediate to Unasyn and Tobramycin. Blood cultures from 5/10/16 grew E coli resistant to Cipro and Levaquin. Endorses being on Cipro for prophylaxis. No adequate fluid for paracentesis as per ED POCUS.  - Continue with Zosyn for now (day 2), no demonstrated prior resistance  - f/u blood cultures for antibiotic sensitivities  - Repeat Blood cultures until cleared  - Check UA and urine culture if UA positive  - Liver MRI w/ contrast to eval for cyst/abscess  - TTE given recurrent bacteremia  - Paracentesis if possible

## 2017-06-23 LAB
-  AMIKACIN: SIGNIFICANT CHANGE UP
-  AMPICILLIN/SULBACTAM: SIGNIFICANT CHANGE UP
-  AMPICILLIN: SIGNIFICANT CHANGE UP
-  AZTREONAM: SIGNIFICANT CHANGE UP
-  CEFAZOLIN: SIGNIFICANT CHANGE UP
-  CEFEPIME: SIGNIFICANT CHANGE UP
-  CEFOXITIN: SIGNIFICANT CHANGE UP
-  CEFTAZIDIME: SIGNIFICANT CHANGE UP
-  CEFTRIAXONE: SIGNIFICANT CHANGE UP
-  CIPROFLOXACIN: SIGNIFICANT CHANGE UP
-  ERTAPENEM: SIGNIFICANT CHANGE UP
-  GENTAMICIN: SIGNIFICANT CHANGE UP
-  IMIPENEM: SIGNIFICANT CHANGE UP
-  LEVOFLOXACIN: SIGNIFICANT CHANGE UP
-  MEROPENEM: SIGNIFICANT CHANGE UP
-  PIPERACILLIN/TAZOBACTAM: SIGNIFICANT CHANGE UP
-  TOBRAMYCIN: SIGNIFICANT CHANGE UP
-  TRIMETHOPRIM/SULFAMETHOXAZOLE: SIGNIFICANT CHANGE UP
ANION GAP SERPL CALC-SCNC: 10 MMOL/L — SIGNIFICANT CHANGE UP (ref 5–17)
BACTERIA UR CULT: ABNORMAL
BASOPHILS # BLD AUTO: 0 K/UL — SIGNIFICANT CHANGE UP (ref 0–0.2)
BASOPHILS NFR BLD AUTO: 0.5 % — SIGNIFICANT CHANGE UP (ref 0–2)
BUN SERPL-MCNC: 11 MG/DL — SIGNIFICANT CHANGE UP (ref 7–23)
CALCIUM SERPL-MCNC: 7.1 MG/DL — LOW (ref 8.4–10.5)
CHLORIDE SERPL-SCNC: 105 MMOL/L — SIGNIFICANT CHANGE UP (ref 96–108)
CO2 SERPL-SCNC: 21 MMOL/L — LOW (ref 22–31)
CREAT SERPL-MCNC: 0.79 MG/DL — SIGNIFICANT CHANGE UP (ref 0.5–1.3)
CULTURE RESULTS: SIGNIFICANT CHANGE UP
CULTURE RESULTS: SIGNIFICANT CHANGE UP
EOSINOPHIL # BLD AUTO: 0.3 K/UL — SIGNIFICANT CHANGE UP (ref 0–0.5)
EOSINOPHIL NFR BLD AUTO: 4.6 % — SIGNIFICANT CHANGE UP (ref 0–6)
GLUCOSE SERPL-MCNC: 104 MG/DL — HIGH (ref 70–99)
HCT VFR BLD CALC: 26.7 % — LOW (ref 39–50)
HGB BLD-MCNC: 9.5 G/DL — LOW (ref 13–17)
LYMPHOCYTES # BLD AUTO: 1.1 K/UL — SIGNIFICANT CHANGE UP (ref 1–3.3)
LYMPHOCYTES # BLD AUTO: 20.5 % — SIGNIFICANT CHANGE UP (ref 13–44)
MCHC RBC-ENTMCNC: 35.1 PG — HIGH (ref 27–34)
MCHC RBC-ENTMCNC: 35.7 GM/DL — SIGNIFICANT CHANGE UP (ref 32–36)
MCV RBC AUTO: 98.5 FL — SIGNIFICANT CHANGE UP (ref 80–100)
METHOD TYPE: SIGNIFICANT CHANGE UP
MONOCYTES # BLD AUTO: 0.5 K/UL — SIGNIFICANT CHANGE UP (ref 0–0.9)
MONOCYTES NFR BLD AUTO: 9.3 % — SIGNIFICANT CHANGE UP (ref 2–14)
NEUTROPHILS # BLD AUTO: 3.6 K/UL — SIGNIFICANT CHANGE UP (ref 1.8–7.4)
NEUTROPHILS NFR BLD AUTO: 65.2 % — SIGNIFICANT CHANGE UP (ref 43–77)
ORGANISM # SPEC MICROSCOPIC CNT: SIGNIFICANT CHANGE UP
PLATELET # BLD AUTO: 60 K/UL — LOW (ref 150–400)
POTASSIUM SERPL-MCNC: 3.5 MMOL/L — SIGNIFICANT CHANGE UP (ref 3.5–5.3)
POTASSIUM SERPL-SCNC: 3.5 MMOL/L — SIGNIFICANT CHANGE UP (ref 3.5–5.3)
RBC # BLD: 2.71 M/UL — LOW (ref 4.2–5.8)
RBC # FLD: 12.7 % — SIGNIFICANT CHANGE UP (ref 10.3–14.5)
SODIUM SERPL-SCNC: 136 MMOL/L — SIGNIFICANT CHANGE UP (ref 135–145)
SPECIMEN SOURCE: SIGNIFICANT CHANGE UP
SPECIMEN SOURCE: SIGNIFICANT CHANGE UP
WBC # BLD: 5.6 K/UL — SIGNIFICANT CHANGE UP (ref 3.8–10.5)
WBC # FLD AUTO: 5.6 K/UL — SIGNIFICANT CHANGE UP (ref 3.8–10.5)

## 2017-06-23 PROCEDURE — 99233 SBSQ HOSP IP/OBS HIGH 50: CPT

## 2017-06-23 RX ORDER — SODIUM CHLORIDE 9 MG/ML
1000 INJECTION INTRAMUSCULAR; INTRAVENOUS; SUBCUTANEOUS
Qty: 0 | Refills: 0 | Status: DISCONTINUED | OUTPATIENT
Start: 2017-06-23 | End: 2017-06-30

## 2017-06-23 RX ORDER — INSULIN LISPRO 100/ML
VIAL (ML) SUBCUTANEOUS AT BEDTIME
Qty: 0 | Refills: 0 | Status: DISCONTINUED | OUTPATIENT
Start: 2017-06-23 | End: 2017-06-30

## 2017-06-23 RX ADMIN — PIPERACILLIN AND TAZOBACTAM 25 GRAM(S): 4; .5 INJECTION, POWDER, LYOPHILIZED, FOR SOLUTION INTRAVENOUS at 14:07

## 2017-06-23 RX ADMIN — SODIUM CHLORIDE 50 MILLILITER(S): 9 INJECTION INTRAMUSCULAR; INTRAVENOUS; SUBCUTANEOUS at 14:06

## 2017-06-23 RX ADMIN — Medication 2: at 17:08

## 2017-06-23 RX ADMIN — PANTOPRAZOLE SODIUM 40 MILLIGRAM(S): 20 TABLET, DELAYED RELEASE ORAL at 05:11

## 2017-06-23 RX ADMIN — PIPERACILLIN AND TAZOBACTAM 25 GRAM(S): 4; .5 INJECTION, POWDER, LYOPHILIZED, FOR SOLUTION INTRAVENOUS at 22:42

## 2017-06-23 RX ADMIN — SPIRONOLACTONE 50 MILLIGRAM(S): 25 TABLET, FILM COATED ORAL at 05:11

## 2017-06-23 RX ADMIN — NADOLOL 20 MILLIGRAM(S): 80 TABLET ORAL at 09:07

## 2017-06-23 RX ADMIN — INSULIN GLARGINE 40 UNIT(S): 100 INJECTION, SOLUTION SUBCUTANEOUS at 21:58

## 2017-06-23 RX ADMIN — ALBENDAZOLE 200 MILLIGRAM(S): 200 TABLET, FILM COATED ORAL at 12:51

## 2017-06-23 RX ADMIN — Medication 20 MILLIGRAM(S): at 05:11

## 2017-06-23 RX ADMIN — Medication 3: at 22:57

## 2017-06-23 RX ADMIN — ALBENDAZOLE 200 MILLIGRAM(S): 200 TABLET, FILM COATED ORAL at 17:09

## 2017-06-23 RX ADMIN — PIPERACILLIN AND TAZOBACTAM 25 GRAM(S): 4; .5 INJECTION, POWDER, LYOPHILIZED, FOR SOLUTION INTRAVENOUS at 05:12

## 2017-06-23 RX ADMIN — Medication 2: at 08:28

## 2017-06-23 RX ADMIN — Medication 3: at 12:50

## 2017-06-23 RX ADMIN — Medication 20 MILLIGRAM(S): at 17:09

## 2017-06-23 NOTE — PROGRESS NOTE ADULT - ASSESSMENT
Patient is a 42 yo M w/ alcoholic liver cirrhosis, esophageal varices, T2DM, vitiligo, s/p corneal transplant, h/o severe c.diff, h/o E. coli bacteremia, recently treated for colitis/SBP now presenting with ongoing fevers, found to have E. Coli bacteremia.

## 2017-06-23 NOTE — PROGRESS NOTE ADULT - SUBJECTIVE AND OBJECTIVE BOX
Patient is a 41y old  Male who presents with a chief complaint of PCP: Dr. Rj Cox     SUBJECTIVE / OVERNIGHT EVENTS: Patient afebrile, feels better. No hx dyspnea/ a/ chest pain/ headache/ diarrhea.     MEDICATIONS  (STANDING):  spironolactone 50milliGRAM(s) Oral daily  albendazole 200milliGRAM(s) Oral two times a day with meals  furosemide    Tablet 20milliGRAM(s) Oral two times a day  pantoprazole    Tablet 40milliGRAM(s) Oral before breakfast  enoxaparin Injectable 40milliGRAM(s) SubCutaneous every 24 hours  insulin lispro (HumaLOG) corrective regimen sliding scale  SubCutaneous three times a day before meals  dextrose 5%. 1000milliLiter(s) IV Continuous <Continuous>  dextrose 50% Injectable 12.5Gram(s) IV Push once  dextrose 50% Injectable 25Gram(s) IV Push once  dextrose 50% Injectable 25Gram(s) IV Push once  nadolol 20milliGRAM(s) Oral daily  insulin glargine Injectable (LANTUS) 40Unit(s) SubCutaneous at bedtime  piperacillin/tazobactam IVPB. 3.375Gram(s) IV Intermittent every 8 hours  sodium chloride 0.9%. 1000milliLiter(s) IV Continuous <Continuous>    MEDICATIONS  (PRN):  dextrose Gel 1Dose(s) Oral once PRN Blood Glucose LESS THAN 70 milliGRAM(s)/deciliter  glucagon  Injectable 1milliGRAM(s) IntraMuscular once PRN Glucose LESS THAN 70 milligrams/deciliter    T(C): 37, Max: 37 (06-23 @ 13:32)  HR: 72 (72 - 88)  BP: 100/61 (100/60 - 111/66)  RR: 18 (18 - 18)  SpO2: 94% (94% - 94%)  Wt(kg): --    PHYSICAL EXAM:  GENERAL: NAD, well-developed  HEAD:  Atraumatic, Normocephalic  NECK: Supple, No JVD  CHEST/LUNG: Clear to auscultation bilaterally; No wheeze  HEART: Regular rate and rhythm  ABDOMEN: Soft, Nontender, Nondistended; Bowel sounds present  EXTREMITIES:  2+ Peripheral Pulses, SKIN: No rashes or lesions                          9.5    5.6   )-----------( 60       ( 23 Jun 2017 06:12 )             26.7           LIVER FUNCTIONS - ( 22 Jun 2017 08:35 )  Alb: 1.9 g/dL / Pro: 5.8 g/dL / ALK PHOS: 135 U/L / ALT: 26 U/L / AST: 45 U/L / GGT: x             136|105|11<104  3.5|21|0.79  7.1,--,--  06-23 @ 06:12   Blood Cultures MDR  E.coli             RADIOLOGY & ADDITIONAL TESTS:    Imaging Personally Reviewed:    Consultant(s) Notes Reviewed:      Care Discussed with Consultants/Other Providers:

## 2017-06-23 NOTE — PROGRESS NOTE ADULT - PROBLEM SELECTOR PLAN 1
Sepsis 2/2 E coli bacteremia in context of recurrent E. coli bacteremia. Of note, blood cultures from 2/26/17 grew E coli resistant to Gentamicin, Ampicillin, Bactrim and intermediate to Unasyn and Tobramycin. Blood cultures from 5/10/16 grew E coli resistant to Cipro and Levaquin. Endorses being on Cipro for prophylaxis.  - IR paracentesis scheduled today  - Continue with Zosyn (day 3), no demonstrated prior resistance  - f/u blood cultures for antibiotic sensitivities  - Repeat Blood cultures  - Check UA and urine culture if UA positive  - f/u Liver MRI w/ contrast to eval for cyst/abscess  - TTE given recurrent bacteremia Sepsis 2/2 E coli bacteremia in context of recurrent E. coli bacteremia. Of note, blood cultures from 2/26/17 grew E coli resistant to Gentamicin, Ampicillin, Bactrim and intermediate to Unasyn and Tobramycin. Blood cultures from 5/10/16 grew E coli resistant to Cipro and Levaquin. Endorses being on Cipro for prophylaxis.  - IR paracentesis scheduled today?  - Continue with Zosyn (day 3), no demonstrated prior resistance  - f/u blood cultures for antibiotic sensitivities  - Repeat Blood cultures  - Check UA and urine culture if UA positive  - f/u Liver MRI w/ contrast to eval for cyst/abscess  - f/u TTE given recurrent bacteremia  - f/u primary team regarding transfer to [facility]?

## 2017-06-23 NOTE — CHART NOTE - NSCHARTNOTEFT_GEN_A_CORE
paracentesis cancelled due to very minimal fluid seen on ultrasound in IR . d/w Dr. Saleem ( IR ) and Dr. Angel

## 2017-06-23 NOTE — PROGRESS NOTE ADULT - SUBJECTIVE AND OBJECTIVE BOX
41yPatient is a 41y old  Male who presents with a chief complaint of PCP: Dr. Rj Cox (21 Jun 2017 15:36)      Interval history:         Antimicrobials:    albendazole 200milliGRAM(s) Oral two times a day with meals  piperacillin/tazobactam IVPB. 3.375Gram(s) IV Intermittent every 8 hours      Vital Signs Last 24 Hrs  T(C): 36.8, Max: 37.4 (06-22 @ 22:16)  T(F): 98.3, Max: 99.4 (06-22 @ 22:16)  HR: 88 (76 - 88)  BP: 111/66 (100/60 - 112/68)  BP(mean): --  RR: 18 (18 - 18)  SpO2: 94% (94% - 97%)                            9.5    5.6   )-----------( 60       ( 23 Jun 2017 06:12 )             26.7   06-23    136  |  105  |  11  ----------------------------<  104<H>  3.5   |  21<L>  |  0.79    Ca    7.1<L>      23 Jun 2017 06:12    TPro  5.8<L>  /  Alb  1.9<L>  /  TBili  3.8<H>  /  DBili  x   /  AST  45<H>  /  ALT  26  /  AlkPhos  135<H>  06-22      LIVER FUNCTIONS - ( 22 Jun 2017 08:35 )  Alb: 1.9 g/dL / Pro: 5.8 g/dL / ALK PHOS: 135 U/L / ALT: 26 U/L / AST: 45 U/L / GGT: x             Microbiology:  Culture Results:   Growth in aerobic and anaerobic bottles: Escherichia coli (06-21 @ 12:56)  Culture Results:   Growth in anaerobic bottle: Escherichia coli  See previous culture [id number] (06-21 @ 12:56)      Radiology: 41yPatient is a 41y old  Male who presents with a chief complaint of PCP: Dr. Rj Cox (21 Jun 2017 15:36)      Interval history: No acute events overnight. Endorsed some soft stools. No fevers, chills overnight.       Antimicrobials:    albendazole 200milliGRAM(s) Oral two times a day with meals  piperacillin/tazobactam IVPB. 3.375Gram(s) IV Intermittent every 8 hours      Vital Signs Last 24 Hrs  T(C): 36.8, Max: 37.4 (06-22 @ 22:16)  T(F): 98.3, Max: 99.4 (06-22 @ 22:16)  HR: 88 (76 - 88)  BP: 111/66 (100/60 - 112/68)  BP(mean): --  RR: 18 (18 - 18)  SpO2: 94% (94% - 97%)                            9.5    5.6   )-----------( 60       ( 23 Jun 2017 06:12 )             26.7   06-23    136  |  105  |  11  ----------------------------<  104<H>  3.5   |  21<L>  |  0.79    Ca    7.1<L>      23 Jun 2017 06:12    TPro  5.8<L>  /  Alb  1.9<L>  /  TBili  3.8<H>  /  DBili  x   /  AST  45<H>  /  ALT  26  /  AlkPhos  135<H>  06-22      LIVER FUNCTIONS - ( 22 Jun 2017 08:35 )  Alb: 1.9 g/dL / Pro: 5.8 g/dL / ALK PHOS: 135 U/L / ALT: 26 U/L / AST: 45 U/L / GGT: x             Microbiology:  Culture Results:   Growth in aerobic and anaerobic bottles: Escherichia coli (06-21 @ 12:56)  Culture Results:   Growth in anaerobic bottle: Escherichia coli  See previous culture [id number] (06-21 @ 12:56)      Radiology: 41yPatient is a 41y old  Male who presents with a chief complaint of PCP: Dr. Rj Cox (21 Jun 2017 15:36)      Interval history: No acute events overnight. Endorsed some soft stools. No fevers, chills overnight.       Antimicrobials:    albendazole 200milliGRAM(s) Oral two times a day with meals  piperacillin/tazobactam IVPB. 3.375Gram(s) IV Intermittent every 8 hours      Vital Signs Last 24 Hrs  T(C): 36.8, Max: 37.4 (06-22 @ 22:16)  T(F): 98.3, Max: 99.4 (06-22 @ 22:16)  HR: 88 (76 - 88)  BP: 111/66 (100/60 - 112/68)  BP(mean): --  RR: 18 (18 - 18)  SpO2: 94% (94% - 97%)    PHYSICAL EXAM:    GEN: NAD, resting comfortably  ENT: EOMI, PERRLA  CV: S1, S2, no murmurs  RESP: CTAB  ABD: Mildly distended, Soft, NT, BS +  EXT: Trace pitting edema b/l ankles  SKIN: Vitiligo                        9.5    5.6   )-----------( 60       ( 23 Jun 2017 06:12 )             26.7   06-23    136  |  105  |  11  ----------------------------<  104<H>  3.5   |  21<L>  |  0.79    Ca    7.1<L>      23 Jun 2017 06:12    TPro  5.8<L>  /  Alb  1.9<L>  /  TBili  3.8<H>  /  DBili  x   /  AST  45<H>  /  ALT  26  /  AlkPhos  135<H>  06-22      LIVER FUNCTIONS - ( 22 Jun 2017 08:35 )  Alb: 1.9 g/dL / Pro: 5.8 g/dL / ALK PHOS: 135 U/L / ALT: 26 U/L / AST: 45 U/L / GGT: x             Microbiology:  Culture Results:   Growth in aerobic and anaerobic bottles: Escherichia coli (06-21 @ 12:56)  Culture Results:   Growth in anaerobic bottle: Escherichia coli  See previous culture [id number] (06-21 @ 12:56)      Radiology:

## 2017-06-23 NOTE — PROGRESS NOTE ADULT - PROBLEM SELECTOR PLAN 1
Patient s/p septic with fevers, ID follow up noted. Continue with Zosyn.   Source r/o SBP vs Liver Abscess vs Endocarditis.   Patient for IR guided Paracentesis today.

## 2017-06-23 NOTE — PROGRESS NOTE ADULT - PROBLEM SELECTOR PLAN 2
Sepsis from E .coli Bacteremia, ?? Source   Patient for Paracentesis today.   Work up in progress- ECHO, MRI Liver.

## 2017-06-23 NOTE — PROGRESS NOTE ADULT - PROBLEM SELECTOR PLAN 2
Recently initiated on Albendazole for unclear reason.   - Obtain collateral from ID physician at [facility]  - c/w Albendazole for now  - f/u Echinococcal Ab  - f/u liver MRI w/ contrast to eval for cyst/abscess Recently initiated on Albendazole for unclear reason.   - Obtain collateral from ID physician at [facility]  - c/w Albendazole for now  - f/u Echinococcal Ab  - f/u liver MRI w/ contrast to eval for cyst/abscess  - f/u primary team regarding transfer to [facility]?

## 2017-06-24 DIAGNOSIS — B67.90 ECHINOCOCCOSIS, UNSPECIFIED: ICD-10-CM

## 2017-06-24 PROCEDURE — 99233 SBSQ HOSP IP/OBS HIGH 50: CPT

## 2017-06-24 PROCEDURE — 74183 MRI ABD W/O CNTR FLWD CNTR: CPT | Mod: 26

## 2017-06-24 RX ORDER — SENNA PLUS 8.6 MG/1
2 TABLET ORAL AT BEDTIME
Qty: 0 | Refills: 0 | Status: DISCONTINUED | OUTPATIENT
Start: 2017-06-24 | End: 2017-06-30

## 2017-06-24 RX ORDER — DOCUSATE SODIUM 100 MG
100 CAPSULE ORAL THREE TIMES A DAY
Qty: 0 | Refills: 0 | Status: DISCONTINUED | OUTPATIENT
Start: 2017-06-24 | End: 2017-06-30

## 2017-06-24 RX ADMIN — Medication 20 MILLIGRAM(S): at 12:21

## 2017-06-24 RX ADMIN — Medication 20 MILLIGRAM(S): at 21:28

## 2017-06-24 RX ADMIN — ALBENDAZOLE 200 MILLIGRAM(S): 200 TABLET, FILM COATED ORAL at 08:14

## 2017-06-24 RX ADMIN — PIPERACILLIN AND TAZOBACTAM 25 GRAM(S): 4; .5 INJECTION, POWDER, LYOPHILIZED, FOR SOLUTION INTRAVENOUS at 23:57

## 2017-06-24 RX ADMIN — SPIRONOLACTONE 50 MILLIGRAM(S): 25 TABLET, FILM COATED ORAL at 08:15

## 2017-06-24 RX ADMIN — PIPERACILLIN AND TAZOBACTAM 25 GRAM(S): 4; .5 INJECTION, POWDER, LYOPHILIZED, FOR SOLUTION INTRAVENOUS at 14:23

## 2017-06-24 RX ADMIN — Medication 100 MILLIGRAM(S): at 21:29

## 2017-06-24 RX ADMIN — SENNA PLUS 2 TABLET(S): 8.6 TABLET ORAL at 21:29

## 2017-06-24 RX ADMIN — ALBENDAZOLE 200 MILLIGRAM(S): 200 TABLET, FILM COATED ORAL at 19:01

## 2017-06-24 RX ADMIN — INSULIN GLARGINE 40 UNIT(S): 100 INJECTION, SOLUTION SUBCUTANEOUS at 21:41

## 2017-06-24 RX ADMIN — PANTOPRAZOLE SODIUM 40 MILLIGRAM(S): 20 TABLET, DELAYED RELEASE ORAL at 05:43

## 2017-06-24 RX ADMIN — PIPERACILLIN AND TAZOBACTAM 25 GRAM(S): 4; .5 INJECTION, POWDER, LYOPHILIZED, FOR SOLUTION INTRAVENOUS at 05:43

## 2017-06-24 NOTE — PROGRESS NOTE ADULT - SUBJECTIVE AND OBJECTIVE BOX
Patient is a 41y old  Male who presents with a chief complaint of fevers.    SUBJECTIVE / OVERNIGHT EVENTS: Patient afebrile, feels better. No hx dyspnea/ a/ chest pain/ headache/ diarrhea. No events Overnight.     MEDICATIONS  (STANDING):  spironolactone 50milliGRAM(s) Oral daily  albendazole 200milliGRAM(s) Oral two times a day with meals  furosemide    Tablet 20milliGRAM(s) Oral two times a day  pantoprazole    Tablet 40milliGRAM(s) Oral before breakfast  enoxaparin Injectable 40milliGRAM(s) SubCutaneous every 24 hours  insulin lispro (HumaLOG) corrective regimen sliding scale  SubCutaneous three times a day before meals  dextrose 5%. 1000milliLiter(s) IV Continuous <Continuous>  dextrose 50% Injectable 12.5Gram(s) IV Push once  dextrose 50% Injectable 25Gram(s) IV Push once  dextrose 50% Injectable 25Gram(s) IV Push once  nadolol 20milliGRAM(s) Oral daily  insulin glargine Injectable (LANTUS) 40Unit(s) SubCutaneous at bedtime  piperacillin/tazobactam IVPB. 3.375Gram(s) IV Intermittent every 8 hours  sodium chloride 0.9%. 1000milliLiter(s) IV Continuous <Continuous>  insulin lispro (HumaLOG) corrective regimen sliding scale  SubCutaneous at bedtime    MEDICATIONS  (PRN):  dextrose Gel 1Dose(s) Oral once PRN Blood Glucose LESS THAN 70 milliGRAM(s)/deciliter  glucagon  Injectable 1milliGRAM(s) IntraMuscular once PRN Glucose LESS THAN 70 milligrams/deciliter    T(C): 36.6, Max: 37.2 (06-24 @ 05:26)  HR: 79 (74 - 79)  BP: 124/77 (99/59 - 124/77)  RR: 18 (18 - 18)  SpO2: 93% (93% - 93%)  Wt(kg): --    PHYSICAL EXAM:  GENERAL: NAD, well-developed  HEAD:  Atraumatic, Normocephalic  NECK: Supple, No JVD  CHEST/LUNG: Clear to auscultation bilaterally; No wheeze  HEART: Regular rate and rhythm  ABDOMEN: Soft, Nontender, Nondistended; Bowel sounds present  EXTREMITIES:  2+ Peripheral Pulses, SKIN: No rashes or lesions                          9.5    5.6   )-----------( 60       ( 23 Jun 2017 06:12 )             26.7               RADIOLOGY & ADDITIONAL TESTS:    Imaging Personally Reviewed:    Consultant(s) Notes Reviewed:      Care Discussed with Consultants/Other Providers:

## 2017-06-24 NOTE — PROGRESS NOTE ADULT - PROBLEM SELECTOR PLAN 1
Patient s/p septic with fevers, continue with Zosyn.   Source r/o Liver Abscess vs Endocarditis, SBP - less likely as no fluid found on ultrasound for tap.     Awaiting ID follow up.   Called MRI -patient to get MRI this evening. Follow up results.

## 2017-06-25 DIAGNOSIS — I85.00 ESOPHAGEAL VARICES WITHOUT BLEEDING: ICD-10-CM

## 2017-06-25 LAB
ALBUMIN SERPL ELPH-MCNC: 2 G/DL — LOW (ref 3.3–5)
ALP SERPL-CCNC: 179 U/L — HIGH (ref 40–120)
ALT FLD-CCNC: 25 U/L — SIGNIFICANT CHANGE UP (ref 10–45)
ANION GAP SERPL CALC-SCNC: 10 MMOL/L — SIGNIFICANT CHANGE UP (ref 5–17)
APPEARANCE UR: CLEAR — SIGNIFICANT CHANGE UP
AST SERPL-CCNC: 39 U/L — SIGNIFICANT CHANGE UP (ref 10–40)
BASOPHILS # BLD AUTO: 0.03 K/UL — SIGNIFICANT CHANGE UP (ref 0–0.2)
BASOPHILS NFR BLD AUTO: 0.8 % — SIGNIFICANT CHANGE UP (ref 0–2)
BILIRUB DIRECT SERPL-MCNC: 1.1 MG/DL — HIGH (ref 0–0.2)
BILIRUB INDIRECT FLD-MCNC: 1.2 MG/DL — HIGH (ref 0.2–1)
BILIRUB SERPL-MCNC: 2.3 MG/DL — HIGH (ref 0.2–1.2)
BILIRUB UR-MCNC: NEGATIVE — SIGNIFICANT CHANGE UP
BUN SERPL-MCNC: 10 MG/DL — SIGNIFICANT CHANGE UP (ref 7–23)
CALCIUM SERPL-MCNC: 7.9 MG/DL — LOW (ref 8.4–10.5)
CHLORIDE SERPL-SCNC: 105 MMOL/L — SIGNIFICANT CHANGE UP (ref 96–108)
CO2 SERPL-SCNC: 19 MMOL/L — LOW (ref 22–31)
COLOR SPEC: YELLOW — SIGNIFICANT CHANGE UP
CREAT SERPL-MCNC: 0.88 MG/DL — SIGNIFICANT CHANGE UP (ref 0.5–1.3)
DIFF PNL FLD: ABNORMAL
EOSINOPHIL # BLD AUTO: 0.2 K/UL — SIGNIFICANT CHANGE UP (ref 0–0.5)
EOSINOPHIL NFR BLD AUTO: 5.1 % — SIGNIFICANT CHANGE UP (ref 0–6)
GLUCOSE SERPL-MCNC: 284 MG/DL — HIGH (ref 70–99)
GLUCOSE UR QL: NEGATIVE MG/DL — SIGNIFICANT CHANGE UP
HCT VFR BLD CALC: 25.9 % — LOW (ref 39–50)
HGB BLD-MCNC: 9.2 G/DL — LOW (ref 13–17)
IMM GRANULOCYTES NFR BLD AUTO: 0.8 % — SIGNIFICANT CHANGE UP (ref 0–1.5)
KETONES UR-MCNC: NEGATIVE — SIGNIFICANT CHANGE UP
LEUKOCYTE ESTERASE UR-ACNC: NEGATIVE — SIGNIFICANT CHANGE UP
LYMPHOCYTES # BLD AUTO: 0.91 K/UL — LOW (ref 1–3.3)
LYMPHOCYTES # BLD AUTO: 23.3 % — SIGNIFICANT CHANGE UP (ref 13–44)
MCHC RBC-ENTMCNC: 32.6 PG — SIGNIFICANT CHANGE UP (ref 27–34)
MCHC RBC-ENTMCNC: 35.5 GM/DL — SIGNIFICANT CHANGE UP (ref 32–36)
MCV RBC AUTO: 91.8 FL — SIGNIFICANT CHANGE UP (ref 80–100)
MONOCYTES # BLD AUTO: 0.37 K/UL — SIGNIFICANT CHANGE UP (ref 0–0.9)
MONOCYTES NFR BLD AUTO: 9.5 % — SIGNIFICANT CHANGE UP (ref 2–14)
NEUTROPHILS # BLD AUTO: 2.36 K/UL — SIGNIFICANT CHANGE UP (ref 1.8–7.4)
NEUTROPHILS NFR BLD AUTO: 60.5 % — SIGNIFICANT CHANGE UP (ref 43–77)
NITRITE UR-MCNC: NEGATIVE — SIGNIFICANT CHANGE UP
PH UR: 6.5 — SIGNIFICANT CHANGE UP (ref 5–8)
PLATELET # BLD AUTO: 73 K/UL — LOW (ref 150–400)
POTASSIUM SERPL-MCNC: 3.9 MMOL/L — SIGNIFICANT CHANGE UP (ref 3.5–5.3)
POTASSIUM SERPL-SCNC: 3.9 MMOL/L — SIGNIFICANT CHANGE UP (ref 3.5–5.3)
PROT SERPL-MCNC: 5.6 G/DL — LOW (ref 6–8.3)
PROT UR-MCNC: NEGATIVE MG/DL — SIGNIFICANT CHANGE UP
RBC # BLD: 2.82 M/UL — LOW (ref 4.2–5.8)
RBC # FLD: 14 % — SIGNIFICANT CHANGE UP (ref 10.3–14.5)
SODIUM SERPL-SCNC: 134 MMOL/L — LOW (ref 135–145)
SP GR SPEC: 1.01 — SIGNIFICANT CHANGE UP (ref 1.01–1.02)
UROBILINOGEN FLD QL: NEGATIVE MG/DL — SIGNIFICANT CHANGE UP
WBC # BLD: 3.9 K/UL — SIGNIFICANT CHANGE UP (ref 3.8–10.5)
WBC # FLD AUTO: 3.9 K/UL — SIGNIFICANT CHANGE UP (ref 3.8–10.5)

## 2017-06-25 PROCEDURE — 99232 SBSQ HOSP IP/OBS MODERATE 35: CPT

## 2017-06-25 PROCEDURE — 99232 SBSQ HOSP IP/OBS MODERATE 35: CPT | Mod: GC

## 2017-06-25 RX ADMIN — PANTOPRAZOLE SODIUM 40 MILLIGRAM(S): 20 TABLET, DELAYED RELEASE ORAL at 06:17

## 2017-06-25 RX ADMIN — Medication 20 MILLIGRAM(S): at 05:48

## 2017-06-25 RX ADMIN — PIPERACILLIN AND TAZOBACTAM 25 GRAM(S): 4; .5 INJECTION, POWDER, LYOPHILIZED, FOR SOLUTION INTRAVENOUS at 05:47

## 2017-06-25 RX ADMIN — Medication 1: at 12:40

## 2017-06-25 RX ADMIN — PIPERACILLIN AND TAZOBACTAM 25 GRAM(S): 4; .5 INJECTION, POWDER, LYOPHILIZED, FOR SOLUTION INTRAVENOUS at 21:49

## 2017-06-25 RX ADMIN — SPIRONOLACTONE 50 MILLIGRAM(S): 25 TABLET, FILM COATED ORAL at 05:47

## 2017-06-25 RX ADMIN — Medication 20 MILLIGRAM(S): at 17:23

## 2017-06-25 RX ADMIN — Medication 1: at 21:50

## 2017-06-25 RX ADMIN — ALBENDAZOLE 200 MILLIGRAM(S): 200 TABLET, FILM COATED ORAL at 17:23

## 2017-06-25 RX ADMIN — PIPERACILLIN AND TAZOBACTAM 25 GRAM(S): 4; .5 INJECTION, POWDER, LYOPHILIZED, FOR SOLUTION INTRAVENOUS at 14:16

## 2017-06-25 RX ADMIN — Medication 2: at 08:19

## 2017-06-25 RX ADMIN — ALBENDAZOLE 200 MILLIGRAM(S): 200 TABLET, FILM COATED ORAL at 08:19

## 2017-06-25 RX ADMIN — Medication 1: at 17:23

## 2017-06-25 RX ADMIN — INSULIN GLARGINE 40 UNIT(S): 100 INJECTION, SOLUTION SUBCUTANEOUS at 21:50

## 2017-06-25 NOTE — PROGRESS NOTE ADULT - PROBLEM SELECTOR PLAN 5
Avoid hepatotoxins.  Reviewed Abdominal Ultrasound. Avoid hepatotoxins.  Reviewed Abdominal Ultrasound.  MRI demonstrates cirrhosis and portal hypertentsion; pt should have outpt hep f/u

## 2017-06-25 NOTE — PROGRESS NOTE ADULT - ASSESSMENT
41 year old male with history of alcoholic cirrhosis, esophageal varices, DM Type 2, vitiligo, s/p corneal transplant, h/o severe c.diff, h/o e.coli bacteremia, recently treated for colitis/SBP with full course of antibiotics admitted with fever, found to have E. coli bacteremia

## 2017-06-25 NOTE — PROGRESS NOTE ADULT - PROBLEM SELECTOR PLAN 1
Patient s/p septic with fevers, continue with Zosyn (on day 4)  Source r/o Liver Abscess vs Endocarditis, SBP - less likely as no fluid found on ultrasound for tap.     Awaiting ID follow up.   MRI of abdomen showed liver cirrhosis with portal hypertension, but not mass or cysts Patient s/p septic with fevers, continue with Zosyn (on day 4)  Source r/o Liver Abscess vs Endocarditis, SBP - less likely as no fluid found on ultrasound for tap.     As per ID recs - repeat blood cultures ordered, will f/u results, cont pt on zosyn  MRI of abdomen showed liver cirrhosis with portal hypertension, but not mass or cysts

## 2017-06-25 NOTE — PROGRESS NOTE ADULT - PROBLEM SELECTOR PLAN 2
Sepsis from E .coli Bacteremia, but unclear source  TTE ordered; MRI of abdomen negative for liver mass or intrahepatic cysts

## 2017-06-25 NOTE — PROGRESS NOTE ADULT - ASSESSMENT
40 yo man with hisotyr of alcohol related cirrhosis, admitted with recurrent e. coli bacteremia. The E. coli is sens. to Pip/Tazo. He is feeling improved, eating no complaints, no dysuria or abdominal pain. He underwent an MRI of the abdomen with results pending.    Follow up blood cultures to be sent today  Continue zosyn  would obtain TTE given heart murmur

## 2017-06-25 NOTE — PROGRESS NOTE ADULT - SUBJECTIVE AND OBJECTIVE BOX
INFECTIOUS DISEASES FOLLOW UP-- Merlene Wilkerson  [phone]    This is a follow up note for this  41yMale with cirrhosis and recurrent E. coli bacteremia.        ROS:  CONSTITUTIONAL:  No fever, good appetite  CARDIOVASCULAR:  No chest pain or palpitations  RESPIRATORY:  No dyspnea  GASTROINTESTINAL:  No nausea, vomiting, diarrhea, or abdominal pain  GENITOURINARY:  No dysuria  NEUROLOGIC:  No headache,     Allergies    No Known Allergies    Intolerances        ANTIBIOTICS/RELEVANT:  antimicrobials  albendazole 200milliGRAM(s) Oral two times a day with meals  piperacillin/tazobactam IVPB. 3.375Gram(s) IV Intermittent every 8 hours    immunologic:    OTHER:  spironolactone 50milliGRAM(s) Oral daily  furosemide    Tablet 20milliGRAM(s) Oral two times a day  pantoprazole    Tablet 40milliGRAM(s) Oral before breakfast  enoxaparin Injectable 40milliGRAM(s) SubCutaneous every 24 hours  insulin lispro (HumaLOG) corrective regimen sliding scale  SubCutaneous three times a day before meals  dextrose 5%. 1000milliLiter(s) IV Continuous <Continuous>  dextrose Gel 1Dose(s) Oral once PRN  dextrose 50% Injectable 12.5Gram(s) IV Push once  dextrose 50% Injectable 25Gram(s) IV Push once  dextrose 50% Injectable 25Gram(s) IV Push once  glucagon  Injectable 1milliGRAM(s) IntraMuscular once PRN  nadolol 20milliGRAM(s) Oral daily  insulin glargine Injectable (LANTUS) 40Unit(s) SubCutaneous at bedtime  sodium chloride 0.9%. 1000milliLiter(s) IV Continuous <Continuous>  insulin lispro (HumaLOG) corrective regimen sliding scale  SubCutaneous at bedtime  docusate sodium 100milliGRAM(s) Oral three times a day  senna 2Tablet(s) Oral at bedtime      Objective:  Vital Signs Last 24 Hrs  T(C): 36.8, Max: 36.8 (06-25 @ 05:05)  T(F): 98.2, Max: 98.2 (06-25 @ 05:05)  HR: 97 (76 - 97)  BP: 100/56 (100/56 - 124/77)  BP(mean): --  RR: 18 (18 - 18)  SpO2: 92% (92% - 93%)    PHYSICAL EXAM:  Constitutional:no acute distress  Eyes:SOREN, EOMI  skin with vitiligo  Ear/Nose/Throat: no oral lesions, 	  Respiratory: clear BL  Cardiovascular: S1S2 but with 2/6 systolic murmur audible  Gastrointestinal:soft, (+) BS, no tenderness, no obvious fluid wave  Extremities:no e/e/c  No Lymphadenopathy  IV sites not inflammed.    LABS:                        9.2    3.90  )-----------( 73       ( 25 Jun 2017 08:50 )             25.9     06-25    134<L>  |  105  |  10  ----------------------------<  284<H>  3.9   |  19<L>  |  0.88    Ca    7.9<L>      25 Jun 2017 08:29    TPro  5.6<L>  /  Alb  2.0<L>  /  TBili  2.3<H>  /  DBili  1.1<H>  /  AST  39  /  ALT  25  /  AlkPhos  179<H>  06-25          MICROBIOLOGY:            RECENT CULTURES:  06-21 @ 12:56  .Blood Blood  Blood Culture PCR  Escherichia coli  Blood Culture PCR  PCR    Growth in aerobic and anaerobic bottles: Escherichia coli  --      RADIOLOGY & ADDITIONAL STUDIES:

## 2017-06-25 NOTE — PROGRESS NOTE ADULT - SUBJECTIVE AND OBJECTIVE BOX
SUBJECTIVE:    N acute events overnight, afebrile, hds.  Pt states that he is feeling fell, with no sob, cp, n/v, or abd pn.    VITAL SIGNS:    Vital Signs Last 24 Hrs  T(C): 37.1, Max: 37.1 (06-25 @ 13:11)  T(F): 98.8, Max: 98.8 (06-25 @ 13:11)  HR: 72 (72 - 97)  BP: 135/76 (100/56 - 135/76)  BP(mean): --  RR: 18 (18 - 18)  SpO2: 94% (92% - 94%)    PHYSICAL EXAM:     GENERAL: no acute distress  HEENT: NC/AT, EOMI, neck supple, MMM  RESPIRATORY: LCTAB/L, no rhonchi, rales, or wheezing  CARDIOVASCULAR: RRR, no murmurs, gallops, rubs  ABDOMINAL: soft, non-tender, non-distended, positive bowel sounds   EXTREMITIES: no clubbing, cyanosis, or edema  NEUROLOGICAL: alert and oriented x 3, non-focal                          9.2    3.90  )-----------( 73       ( 25 Jun 2017 08:50 )             25.9     06-25    134<L>  |  105  |  10  ----------------------------<  284<H>  3.9   |  19<L>  |  0.88    Ca    7.9<L>      25 Jun 2017 08:29    TPro  5.6<L>  /  Alb  2.0<L>  /  TBili  2.3<H>  /  DBili  1.1<H>  /  AST  39  /  ALT  25  /  AlkPhos  179<H>  06-25      IMAGING:    MRI of abdomen - liver cirrhosis with portal hypertension, but no mass seen or intrahepatic cysts seen      CAPILLARY BLOOD GLUCOSE  195 (25 Jun 2017 11:45)  220 (25 Jun 2017 07:45)  275 (24 Jun 2017 21:26)  172 (24 Jun 2017 19:02)  210 (24 Jun 2017 16:50)      MEDICATIONS  (STANDING):  spironolactone 50milliGRAM(s) Oral daily  albendazole 200milliGRAM(s) Oral two times a day with meals  furosemide    Tablet 20milliGRAM(s) Oral two times a day  pantoprazole    Tablet 40milliGRAM(s) Oral before breakfast  enoxaparin Injectable 40milliGRAM(s) SubCutaneous every 24 hours  insulin lispro (HumaLOG) corrective regimen sliding scale  SubCutaneous three times a day before meals  dextrose 5%. 1000milliLiter(s) IV Continuous <Continuous>  dextrose 50% Injectable 12.5Gram(s) IV Push once  dextrose 50% Injectable 25Gram(s) IV Push once  dextrose 50% Injectable 25Gram(s) IV Push once  nadolol 20milliGRAM(s) Oral daily  insulin glargine Injectable (LANTUS) 40Unit(s) SubCutaneous at bedtime  piperacillin/tazobactam IVPB. 3.375Gram(s) IV Intermittent every 8 hours  sodium chloride 0.9%. 1000milliLiter(s) IV Continuous <Continuous>  insulin lispro (HumaLOG) corrective regimen sliding scale  SubCutaneous at bedtime  docusate sodium 100milliGRAM(s) Oral three times a day  senna 2Tablet(s) Oral at bedtime SUBJECTIVE:    No acute events overnight, afebrile, hds.  Pt states that he is feeling fell, with no sob, cp, n/v, or abd pn.    VITAL SIGNS:    Vital Signs Last 24 Hrs  T(C): 37.1, Max: 37.1 (06-25 @ 13:11)  T(F): 98.8, Max: 98.8 (06-25 @ 13:11)  HR: 72 (72 - 97)  BP: 135/76 (100/56 - 135/76)  BP(mean): --  RR: 18 (18 - 18)  SpO2: 94% (92% - 94%)    PHYSICAL EXAM:     GENERAL: no acute distress  HEENT: NC/AT, EOMI, neck supple, MMM  RESPIRATORY: LCTAB/L, no rhonchi, rales, or wheezing  CARDIOVASCULAR: RRR, + murmur  ABDOMINAL: soft, non-tender, non-distended, positive bowel sounds   EXTREMITIES: no clubbing, cyanosis, or edema  NEUROLOGICAL: alert and oriented x 3, non-focal                          9.2    3.90  )-----------( 73       ( 25 Jun 2017 08:50 )             25.9     06-25    134<L>  |  105  |  10  ----------------------------<  284<H>  3.9   |  19<L>  |  0.88    Ca    7.9<L>      25 Jun 2017 08:29    TPro  5.6<L>  /  Alb  2.0<L>  /  TBili  2.3<H>  /  DBili  1.1<H>  /  AST  39  /  ALT  25  /  AlkPhos  179<H>  06-25      IMAGING:    MRI of abdomen - liver cirrhosis with portal hypertension, but no mass seen or intrahepatic cysts seen      CAPILLARY BLOOD GLUCOSE  195 (25 Jun 2017 11:45)  220 (25 Jun 2017 07:45)  275 (24 Jun 2017 21:26)  172 (24 Jun 2017 19:02)  210 (24 Jun 2017 16:50)      MEDICATIONS  (STANDING):  spironolactone 50milliGRAM(s) Oral daily  albendazole 200milliGRAM(s) Oral two times a day with meals  furosemide    Tablet 20milliGRAM(s) Oral two times a day  pantoprazole    Tablet 40milliGRAM(s) Oral before breakfast  enoxaparin Injectable 40milliGRAM(s) SubCutaneous every 24 hours  insulin lispro (HumaLOG) corrective regimen sliding scale  SubCutaneous three times a day before meals  dextrose 5%. 1000milliLiter(s) IV Continuous <Continuous>  dextrose 50% Injectable 12.5Gram(s) IV Push once  dextrose 50% Injectable 25Gram(s) IV Push once  dextrose 50% Injectable 25Gram(s) IV Push once  nadolol 20milliGRAM(s) Oral daily  insulin glargine Injectable (LANTUS) 40Unit(s) SubCutaneous at bedtime  piperacillin/tazobactam IVPB. 3.375Gram(s) IV Intermittent every 8 hours  sodium chloride 0.9%. 1000milliLiter(s) IV Continuous <Continuous>  insulin lispro (HumaLOG) corrective regimen sliding scale  SubCutaneous at bedtime  docusate sodium 100milliGRAM(s) Oral three times a day  senna 2Tablet(s) Oral at bedtime

## 2017-06-25 NOTE — PROGRESS NOTE ADULT - PROBLEM SELECTOR PLAN 3
Patient on Albendazole, ?hx Hydatid Disease as per patient history.  MRI liver negative for mass or intrahepatic systs Patient on Albendazole, ?hx Hydatid Disease as per patient history.  MRI liver negative for mass or intrahepatic systs  echinococcal ab ordered, will f/u results

## 2017-06-26 LAB
ALBUMIN SERPL ELPH-MCNC: 2.3 G/DL — LOW (ref 3.3–5)
ALP SERPL-CCNC: 170 U/L — HIGH (ref 40–120)
ALT FLD-CCNC: 26 U/L — SIGNIFICANT CHANGE UP (ref 10–45)
ANION GAP SERPL CALC-SCNC: 12 MMOL/L — SIGNIFICANT CHANGE UP (ref 5–17)
APTT BLD: 37.8 SEC — HIGH (ref 27.5–37.4)
AST SERPL-CCNC: 42 U/L — HIGH (ref 10–40)
BILIRUB SERPL-MCNC: 2.6 MG/DL — HIGH (ref 0.2–1.2)
BUN SERPL-MCNC: 7 MG/DL — SIGNIFICANT CHANGE UP (ref 7–23)
CALCIUM SERPL-MCNC: 8 MG/DL — LOW (ref 8.4–10.5)
CHLORIDE SERPL-SCNC: 105 MMOL/L — SIGNIFICANT CHANGE UP (ref 96–108)
CO2 SERPL-SCNC: 20 MMOL/L — LOW (ref 22–31)
CREAT SERPL-MCNC: 0.72 MG/DL — SIGNIFICANT CHANGE UP (ref 0.5–1.3)
CULTURE RESULTS: NO GROWTH — SIGNIFICANT CHANGE UP
GLUCOSE SERPL-MCNC: 154 MG/DL — HIGH (ref 70–99)
HCT VFR BLD CALC: 28.1 % — LOW (ref 39–50)
HGB BLD-MCNC: 9.9 G/DL — LOW (ref 13–17)
INR BLD: 1.29 RATIO — HIGH (ref 0.88–1.16)
MCHC RBC-ENTMCNC: 32.2 PG — SIGNIFICANT CHANGE UP (ref 27–34)
MCHC RBC-ENTMCNC: 35.2 GM/DL — SIGNIFICANT CHANGE UP (ref 32–36)
MCV RBC AUTO: 91.5 FL — SIGNIFICANT CHANGE UP (ref 80–100)
PLATELET # BLD AUTO: 80 K/UL — LOW (ref 150–400)
POTASSIUM SERPL-MCNC: 3.6 MMOL/L — SIGNIFICANT CHANGE UP (ref 3.5–5.3)
POTASSIUM SERPL-SCNC: 3.6 MMOL/L — SIGNIFICANT CHANGE UP (ref 3.5–5.3)
PROT SERPL-MCNC: 6.2 G/DL — SIGNIFICANT CHANGE UP (ref 6–8.3)
PROTHROM AB SERPL-ACNC: 14.6 SEC — HIGH (ref 10–13.1)
RBC # BLD: 3.07 M/UL — LOW (ref 4.2–5.8)
RBC # FLD: 13.9 % — SIGNIFICANT CHANGE UP (ref 10.3–14.5)
SODIUM SERPL-SCNC: 137 MMOL/L — SIGNIFICANT CHANGE UP (ref 135–145)
SPECIMEN SOURCE: SIGNIFICANT CHANGE UP
WBC # BLD: 4.98 K/UL — SIGNIFICANT CHANGE UP (ref 3.8–10.5)
WBC # FLD AUTO: 4.98 K/UL — SIGNIFICANT CHANGE UP (ref 3.8–10.5)

## 2017-06-26 PROCEDURE — 93306 TTE W/DOPPLER COMPLETE: CPT | Mod: 26

## 2017-06-26 PROCEDURE — 99233 SBSQ HOSP IP/OBS HIGH 50: CPT | Mod: GC

## 2017-06-26 PROCEDURE — 99233 SBSQ HOSP IP/OBS HIGH 50: CPT

## 2017-06-26 RX ORDER — CEFTRIAXONE 500 MG/1
1 INJECTION, POWDER, FOR SOLUTION INTRAMUSCULAR; INTRAVENOUS EVERY 24 HOURS
Qty: 0 | Refills: 0 | Status: DISCONTINUED | OUTPATIENT
Start: 2017-06-27 | End: 2017-06-30

## 2017-06-26 RX ORDER — CEFTRIAXONE 500 MG/1
INJECTION, POWDER, FOR SOLUTION INTRAMUSCULAR; INTRAVENOUS
Qty: 0 | Refills: 0 | Status: DISCONTINUED | OUTPATIENT
Start: 2017-06-26 | End: 2017-06-30

## 2017-06-26 RX ORDER — CEFTRIAXONE 500 MG/1
1 INJECTION, POWDER, FOR SOLUTION INTRAMUSCULAR; INTRAVENOUS ONCE
Qty: 0 | Refills: 0 | Status: COMPLETED | OUTPATIENT
Start: 2017-06-26 | End: 2017-06-26

## 2017-06-26 RX ADMIN — PANTOPRAZOLE SODIUM 40 MILLIGRAM(S): 20 TABLET, DELAYED RELEASE ORAL at 05:23

## 2017-06-26 RX ADMIN — Medication 2: at 17:18

## 2017-06-26 RX ADMIN — Medication 3: at 12:24

## 2017-06-26 RX ADMIN — ALBENDAZOLE 200 MILLIGRAM(S): 200 TABLET, FILM COATED ORAL at 08:52

## 2017-06-26 RX ADMIN — NADOLOL 20 MILLIGRAM(S): 80 TABLET ORAL at 05:23

## 2017-06-26 RX ADMIN — INSULIN GLARGINE 40 UNIT(S): 100 INJECTION, SOLUTION SUBCUTANEOUS at 21:51

## 2017-06-26 RX ADMIN — SPIRONOLACTONE 50 MILLIGRAM(S): 25 TABLET, FILM COATED ORAL at 05:24

## 2017-06-26 RX ADMIN — PIPERACILLIN AND TAZOBACTAM 25 GRAM(S): 4; .5 INJECTION, POWDER, LYOPHILIZED, FOR SOLUTION INTRAVENOUS at 05:24

## 2017-06-26 RX ADMIN — CEFTRIAXONE 100 GRAM(S): 500 INJECTION, POWDER, FOR SOLUTION INTRAMUSCULAR; INTRAVENOUS at 12:24

## 2017-06-26 RX ADMIN — Medication 20 MILLIGRAM(S): at 17:18

## 2017-06-26 RX ADMIN — Medication 20 MILLIGRAM(S): at 05:24

## 2017-06-26 NOTE — PROGRESS NOTE ADULT - PROBLEM SELECTOR PLAN 3
Patient on Albendazole, ?hx Hydatid Disease as per patient history.  MRI liver negative for mass or intrahepatic systs  echinococcal ab ordered, will f/u results

## 2017-06-26 NOTE — PROGRESS NOTE ADULT - PROBLEM SELECTOR PLAN 2
Recently initiated on Albendazole for unclear reason.   - Obtain collateral from ID physician at [facility]  -  liver MRI no cyst  Would hold albendazole for now

## 2017-06-26 NOTE — PROGRESS NOTE ADULT - PROBLEM SELECTOR PLAN 5
Avoid hepatotoxins.  Reviewed Abdominal Ultrasound.  MRI demonstrates cirrhosis and portal hypertentsion; pt should have outpt hep f/u

## 2017-06-26 NOTE — PROGRESS NOTE ADULT - SUBJECTIVE AND OBJECTIVE BOX
Patient is a 41y old  Male who presents with a chief complaint fever(21 Jun 2017 15:36)    Being followed by ID for e coli bacteremia    Interval history:  feels ok  Not enough ascitic fluid to tap  No other acute events      ROS:  No cough,SOB,CP  No N/V/D  No abd pain  No urinary complaints  No HA  No joint or limb pain  No other complaints      Antimicrobials:    cefTRIAXone   IVPB  IV Intermittent       Vital Signs Last 24 Hrs  T(C): 37.2, Max: 37.2 (06-26 @ 04:56)  T(F): 98.9, Max: 98.9 (06-26 @ 04:56)  HR: 74 (72 - 75)  BP: 111/61 (100/61 - 135/76)  BP(mean): --  RR: 18 (18 - 18)  SpO2: 91% (91% - 94%)    Physical Exam:    Constitutional well preserved,comfortable,pleasant    HEENT PERRLA EOMI,No pallor or icterus    No oral exudate or erythema    Neck supple no JVD or LN    Chest Good AE,CTA    CVS RRR S1 S2 WNl No murmur or rub or gallop    Abd distended ?FF soft BS normal No tenderness no masses    Ext No cyanosis clubbing,someedema    IV site no erythema tenderness or discharge    Joints no swelling or LOM    CNS AAO X 3 no focal  Skin vitiligo    Lab Data:                          9.9    4.98  )-----------( 80       ( 26 Jun 2017 06:53 )             28.1       06-26    137  |  105  |  7   ----------------------------<  154<H>  3.6   |  20<L>  |  0.72    Ca    8.0<L>      26 Jun 2017 07:27    TPro  6.2  /  Alb  2.3<L>  /  TBili  2.6<H>  /  DBili  x   /  AST  42<H>  /  ALT  26  /  AlkPhos  170<H>  06-26      LIVER FUNCTIONS - ( 26 Jun 2017 07:27 )  Alb: 2.3 g/dL / Pro: 6.2 g/dL / ALK PHOS: 170 U/L / ALT: 26 U/L / AST: 42 U/L / GGT: x             Culture - Blood (06.21.17 @ 12:56)    Gram Stain:   Growth in anaerobic bottle: Gram Negative Rods    Specimen Source: .Blood Blood    Culture Results:   Growth in anaerobic bottle: Escherichia coli  See previous culture [id number]    Culture - Blood (06.21.17 @ 12:56)    -  Cefazolin: S <=2    -  Ceftriaxone: S <=1    -  Gentamicin: S <=1    -  Methicillin resistant Staphylococcus aureus (MRSA): Nondet    -  Piperacillin/Tazobactam: S <=8    -  Proteus species: Nondet    -  Serratia marcescens: Nondet    -  Streptococcus pneumoniae: Nondet    -  Streptococcus pyogenes (Group A): Nondet    -  Amikacin: S <=8    -  Ampicillin: S 4    -  Ampicillin/Sulbactam: S <=4/2    -  Ciprofloxacin: R >2    -  Ertapenem: S <=0.5    -  Multidrug (KPC pos) resistant organism: Nondet    -  Pseudomonas aeruginosa: Nondet    -  Staphylococcus aureus: Nondet    -  Streptococcus sp. (Not Grp A, B or S pneumoniae): Nondet    -  Tobramycin: S <=2    -  Vancomycin resistant Enterococcus sp.: Nondet    Gram Stain:   Growth in aerobic bottle: Gram Negative Rods  Growth in anaerobic bottle: Gram Negative Rods    -  Aztreonam: S <=4    -  Candida albicans: Nondet    -  Candida glabrata: Nondet    -  Candida parapsilosis: Nondet    -  Candida tropicalis: Nondet    -  Ceftazidime: S <=1    -  Enterobacter cloacae complex: Nondet    -  Enterococcus species: Nondet    -  Haemophilus influenzae: Nondet    -  Klebsiella oxytoca: Nondet    -  Listeria monocytogenes: Nondet    -  Acinetobacter baumanii: Nondet    -  Candida krusei: Nondet    -  Cefepime: S <=2    -  Cefoxitin: S <=4    -  Coagulase negative Staphylococcus: Nondet    -  Escherichia coli: Detec    -  Imipenem: S <=1    -  Klebsiella pneumoniae: Nondet    -  Levofloxacin: R >4    -  Meropenem: S <=1    -  Neisseria meningitidis: Nondet    -  Streptococcus agalactiae (Group B): Nondet    -  Trimethoprim/Sulfamethoxazole: S <=0.5/9.5        EXAM:  MRI ABDOMEN W & W O CONTRAST                            PROCEDURE DATE:  06/24/2017      IMPRESSION:    Cirrhotic morphology to the liver with sequela of portal hypertension. No   solid mass or intrahepatic cysts seen.      Echinococcal ab pending

## 2017-06-26 NOTE — PROGRESS NOTE ADULT - PROBLEM SELECTOR PLAN 1
Patient s/p septic with fevers, continue with Zosyn (on day 5 )  No Liver Abscess on MRI, SBP - less likely as no fluid found on ultrasound for tap.     As per ID recs - repeat blood cultures ordered, will f/u results, cont pt on zosyn  Follow up Echo r/o Endocarditis.

## 2017-06-26 NOTE — PROGRESS NOTE ADULT - ASSESSMENT
Patient is a 40 yo M w/ alcoholic liver cirrhosis, esophageal varices, T2DM, vitiligo, s/p corneal transplant, h/o severe c.diff, h/o E. coli bacteremia, recently treated for colitis/SBP now presenting with ongoing fevers, found to have E. Coli bacteremia. Not enough ascitic fluid to tap.Clinically better  No masses or cyst on MRI abdomen.  No other obvious focus apparent

## 2017-06-26 NOTE — PROGRESS NOTE ADULT - PROBLEM SELECTOR PLAN 1
Isolate with sensitivities as above.  Unclear what the etiology and reason for reccurence is.?Cirrhosis   Unable to obtain paracentesis as not enough fluid  rec  Follow rpt Cx  ?Nuclear scan and echo, definitely if repeat cultures are positive.  Agree with changing to ceftriaxone  14 day course if rpt cx stay negative ,may consider bactrim suppression later

## 2017-06-26 NOTE — PROGRESS NOTE ADULT - SUBJECTIVE AND OBJECTIVE BOX
Patient is a 41y old  Male who presents with a chief complaint of fevers.   SUBJECTIVE/ OVERNIGHT EVENTS: No events Over Night. No fevers. Patient feels better.     T(C): 37.2, Max: 37.2 (06-26 @ 04:56)  HR: 74 (74 - 74)  BP: 111/61 (111/61 - 111/61)  RR: 18 (18 - 18)  SpO2: 91% (91% - 91%)  Wt(kg): --      PHYSICAL EXAM:  GENERAL: NAD, well-developed  HEAD:  Atraumatic, Normocephalic  NECK: Supple, No JVD  CHEST/LUNG: Clear to auscultation bilaterally; No wheeze  HEART: Regular rate and rhythm; No murmurs, rubs, or gallops  ABDOMEN: Soft, Nontender, Nondistended; Bowel sounds present  EXTREMITIES:  2+ Peripheral Pulses, No clubbing, cyanosis, or edema  PSYCH: AAOx3  NEUROLOGY: non-focal  SKIN: No rashes or lesions                          9.9    4.98  )-----------( 80       ( 26 Jun 2017 06:53 )             28.1           LIVER FUNCTIONS - ( 26 Jun 2017 07:27 )  Alb: 2.3 g/dL / Pro: 6.2 g/dL / ALK PHOS: 170 U/L / ALT: 26 U/L / AST: 42 U/L / GGT: x           PT/INR - ( 26 Jun 2017 06:53 )   PT: 14.6 sec;   INR: 1.29 ratio         PTT - ( 26 Jun 2017 06:53 )  PTT:37.8 sec  137|105|7<154  3.6|20|0.72  8.0,--,--  06-26 @ 07:27      RADIOLOGY & ADDITIONAL TESTS:    Imaging Personally Reviewed: MRI Abdomen showed Cirrhotic Liver with no masses/ cysts.      Consultant(s) Notes Reviewed:      Care Discussed with Consultants/Other Providers:

## 2017-06-27 LAB
ALBUMIN SERPL ELPH-MCNC: 2.1 G/DL — LOW (ref 3.3–5)
ALP SERPL-CCNC: 130 U/L — HIGH (ref 40–120)
ALT FLD-CCNC: 23 U/L — SIGNIFICANT CHANGE UP (ref 10–45)
ANION GAP SERPL CALC-SCNC: 10 MMOL/L — SIGNIFICANT CHANGE UP (ref 5–17)
AST SERPL-CCNC: 45 U/L — HIGH (ref 10–40)
BASOPHILS # BLD AUTO: 0.04 K/UL — SIGNIFICANT CHANGE UP (ref 0–0.2)
BASOPHILS NFR BLD AUTO: 0.7 % — SIGNIFICANT CHANGE UP (ref 0–2)
BILIRUB SERPL-MCNC: 2.6 MG/DL — HIGH (ref 0.2–1.2)
BUN SERPL-MCNC: 7 MG/DL — SIGNIFICANT CHANGE UP (ref 7–23)
CALCIUM SERPL-MCNC: 8.1 MG/DL — LOW (ref 8.4–10.5)
CHLORIDE SERPL-SCNC: 104 MMOL/L — SIGNIFICANT CHANGE UP (ref 96–108)
CO2 SERPL-SCNC: 24 MMOL/L — SIGNIFICANT CHANGE UP (ref 22–31)
CREAT SERPL-MCNC: 0.63 MG/DL — SIGNIFICANT CHANGE UP (ref 0.5–1.3)
ECHINOCOCCUS AB TITR SER: NEGATIVE — SIGNIFICANT CHANGE UP
EOSINOPHIL # BLD AUTO: 0.27 K/UL — SIGNIFICANT CHANGE UP (ref 0–0.5)
EOSINOPHIL NFR BLD AUTO: 4.5 % — SIGNIFICANT CHANGE UP (ref 0–6)
GLUCOSE SERPL-MCNC: 71 MG/DL — SIGNIFICANT CHANGE UP (ref 70–99)
HCT VFR BLD CALC: 28.2 % — LOW (ref 39–50)
HGB BLD-MCNC: 10 G/DL — LOW (ref 13–17)
IMM GRANULOCYTES NFR BLD AUTO: 0.3 % — SIGNIFICANT CHANGE UP (ref 0–1.5)
LYMPHOCYTES # BLD AUTO: 1.33 K/UL — SIGNIFICANT CHANGE UP (ref 1–3.3)
LYMPHOCYTES # BLD AUTO: 22.3 % — SIGNIFICANT CHANGE UP (ref 13–44)
MCHC RBC-ENTMCNC: 31.5 PG — SIGNIFICANT CHANGE UP (ref 27–34)
MCHC RBC-ENTMCNC: 35.5 GM/DL — SIGNIFICANT CHANGE UP (ref 32–36)
MCV RBC AUTO: 89 FL — SIGNIFICANT CHANGE UP (ref 80–100)
MONOCYTES # BLD AUTO: 0.43 K/UL — SIGNIFICANT CHANGE UP (ref 0–0.9)
MONOCYTES NFR BLD AUTO: 7.2 % — SIGNIFICANT CHANGE UP (ref 2–14)
NEUTROPHILS # BLD AUTO: 3.88 K/UL — SIGNIFICANT CHANGE UP (ref 1.8–7.4)
NEUTROPHILS NFR BLD AUTO: 65 % — SIGNIFICANT CHANGE UP (ref 43–77)
PLATELET # BLD AUTO: 94 K/UL — LOW (ref 150–400)
POTASSIUM SERPL-MCNC: 3.5 MMOL/L — SIGNIFICANT CHANGE UP (ref 3.5–5.3)
POTASSIUM SERPL-SCNC: 3.5 MMOL/L — SIGNIFICANT CHANGE UP (ref 3.5–5.3)
PROT SERPL-MCNC: 5.9 G/DL — LOW (ref 6–8.3)
RBC # BLD: 3.17 M/UL — LOW (ref 4.2–5.8)
RBC # FLD: 13.9 % — SIGNIFICANT CHANGE UP (ref 10.3–14.5)
SODIUM SERPL-SCNC: 138 MMOL/L — SIGNIFICANT CHANGE UP (ref 135–145)
WBC # BLD: 5.97 K/UL — SIGNIFICANT CHANGE UP (ref 3.8–10.5)
WBC # FLD AUTO: 5.97 K/UL — SIGNIFICANT CHANGE UP (ref 3.8–10.5)

## 2017-06-27 PROCEDURE — 99232 SBSQ HOSP IP/OBS MODERATE 35: CPT

## 2017-06-27 RX ADMIN — PANTOPRAZOLE SODIUM 40 MILLIGRAM(S): 20 TABLET, DELAYED RELEASE ORAL at 05:46

## 2017-06-27 RX ADMIN — INSULIN GLARGINE 40 UNIT(S): 100 INJECTION, SOLUTION SUBCUTANEOUS at 22:08

## 2017-06-27 RX ADMIN — Medication 1: at 22:07

## 2017-06-27 RX ADMIN — Medication 3: at 12:27

## 2017-06-27 RX ADMIN — Medication 20 MILLIGRAM(S): at 17:26

## 2017-06-27 RX ADMIN — SPIRONOLACTONE 50 MILLIGRAM(S): 25 TABLET, FILM COATED ORAL at 12:27

## 2017-06-27 RX ADMIN — Medication 20 MILLIGRAM(S): at 05:46

## 2017-06-27 RX ADMIN — Medication 1: at 17:26

## 2017-06-27 RX ADMIN — CEFTRIAXONE 100 GRAM(S): 500 INJECTION, POWDER, FOR SOLUTION INTRAMUSCULAR; INTRAVENOUS at 12:50

## 2017-06-27 RX ADMIN — NADOLOL 20 MILLIGRAM(S): 80 TABLET ORAL at 05:46

## 2017-06-27 NOTE — PROGRESS NOTE ADULT - PROBLEM SELECTOR PLAN 1
Patient s/p septic with fevers, continue with Zosyn (on day 5 )  No Liver Abscess on MRI, SBP - less likely as no fluid found on ultrasound for tap.     As per ID recs - repeat blood cultures ordered, will f/u results, cont pt on zosyn  Follow up Echo r/o Endocarditis. Patient s/p septic with fevers, on Ceftriaxone as per ID.  No Liver Abscess on MRI, SBP - less likely as no fluid found on ultrasound for tap.     Echo with no vegetations. Awaiting old records from [facility].  Follow up Echo r/o Endocarditis.

## 2017-06-27 NOTE — DIETITIAN INITIAL EVALUATION ADULT. - ORAL INTAKE PTA
good/Patient reports having a good appetite at home PTA.  He consumes 3 meals on most days - oatmeal, tea, egg whites at breakfast, stated he will add apple juice or bread when his fingerstick level is low in the morning.  He eats salad with chicken or cheese at lunch and meat with rice and vegetables at dinner.  Patient consumes fruit during the day and will eat cereal or flan for evening snack.  Patient does not eat fish or pork.

## 2017-06-27 NOTE — PROGRESS NOTE ADULT - PROBLEM SELECTOR PLAN 2
Sepsis from E .coli Bacteremia, but unclear source  TTE ordered; MRI of abdomen negative for liver mass or intrahepatic cysts Sepsis from E .coli Bacteremia, but unclear source

## 2017-06-27 NOTE — PROGRESS NOTE ADULT - PROBLEM SELECTOR PLAN 2
Recently initiated on Albendazole for unclear reason.   - Await records from ID physician at [facility]  -  liver MRI no cyst  Would hold albendazole for now

## 2017-06-27 NOTE — DIETITIAN INITIAL EVALUATION ADULT. - ADHERENCE
Patient avoids sugar and concentrated sweets, however, states that he does not follow diabetic restrictions all of the time.   He will allow himself a donut once in a while and admits he loves rice and his portions are not always well controlled.  Denies consumption of alcohol./fair

## 2017-06-27 NOTE — PROGRESS NOTE ADULT - PROBLEM SELECTOR PLAN 3
Patient on Albendazole, ?hx Hydatid Disease as per patient history.  MRI liver negative for mass or intrahepatic systs  echinococcal ab ordered, will f/u results d/c Albendazole. No evidence of Hydatid disease on MRI Liver.

## 2017-06-27 NOTE — DIETITIAN INITIAL EVALUATION ADULT. - ENERGY NEEDS
Height: 5' 8"      Weight: 203.9 lbs     BMI: 31.0 kg/m2      IBW: 154 lbs  (+/- 10%)    % IBW: 132 %          Edema:  none       Skin: no impairment     BM:  last BM 6/26/17    Other pertinent information: Patient admitted with fever, T2DM, alcoholic cirrhosis, weight loss.  Noted PMH includes alcoholic cirrhosis, esophageal varices, vertigo, h/o c diff and e coli bacteremia, colitis.

## 2017-06-27 NOTE — PROGRESS NOTE ADULT - SUBJECTIVE AND OBJECTIVE BOX
Patient is a 41y old  Male who presents with a chief complaint of fevers     SUBJECTIVE / OVERNIGHT EVENTS: no events overnight.     MEDICATIONS  (STANDING):  spironolactone 50 milliGRAM(s) Oral daily  furosemide    Tablet 20 milliGRAM(s) Oral two times a day  pantoprazole    Tablet 40 milliGRAM(s) Oral before breakfast  enoxaparin Injectable 40 milliGRAM(s) SubCutaneous every 24 hours  insulin lispro (HumaLOG) corrective regimen sliding scale   SubCutaneous three times a day before meals  dextrose 5%. 1000 milliLiter(s) (50 mL/Hr) IV Continuous <Continuous>  dextrose 50% Injectable 12.5 Gram(s) IV Push once  dextrose 50% Injectable 25 Gram(s) IV Push once  dextrose 50% Injectable 25 Gram(s) IV Push once  nadolol 20 milliGRAM(s) Oral daily  insulin glargine Injectable (LANTUS) 40 Unit(s) SubCutaneous at bedtime  sodium chloride 0.9%. 1000 milliLiter(s) (50 mL/Hr) IV Continuous <Continuous>  insulin lispro (HumaLOG) corrective regimen sliding scale   SubCutaneous at bedtime  docusate sodium 100 milliGRAM(s) Oral three times a day  senna 2 Tablet(s) Oral at bedtime  cefTRIAXone   IVPB   IV Intermittent   cefTRIAXone   IVPB 1 Gram(s) IV Intermittent every 24 hours    MEDICATIONS  (PRN):  dextrose Gel 1 Dose(s) Oral once PRN Blood Glucose LESS THAN 70 milliGRAM(s)/deciliter  glucagon  Injectable 1 milliGRAM(s) IntraMuscular once PRN Glucose LESS THAN 70 milligrams/deciliter                T(C): 36.8 (17 @ 14:01), Max: 36.8 (17 @ 04:56)  HR: 68 (17 @ 14:01) (60 - 68)  BP: 91/44 (17 @ 14:01) (91/44 - 110/66)  RR: 18 (17 @ 14:01) (18 - 18)  SpO2: 96% (17 @ 14:01) (96% - 97%)    PHYSICAL EXAM:  GENERAL: NAD, well-developed  HEAD:  Atraumatic, Normocephalic  EYES: EOMI, PERRLA, conjunctiva and sclera clear  NECK: Supple, No JVD  CHEST/LUNG: Clear to auscultation bilaterally; No wheeze  HEART: Regular rate and rhythm; No murmurs, rubs, or gallops  ABDOMEN: Soft, Nontender, Nondistended; Bowel sounds present  EXTREMITIES:  2+ Peripheral Pulses, No clubbing, cyanosis, or edema  PSYCH: AAOx3  NEUROLOGY: non-focal  SKIN: No rashes or lesions    LABS:                        10.0   5.97  )-----------( 94       ( 2017 08:30 )             28.2         138  |  104  |  7   ----------------------------<  71  3.5   |  24  |  0.63    Ca    8.1<L>      2017 08:30    TPro  5.9<L>  /  Alb  2.1<L>  /  TBili  2.6<H>  /  DBili  x   /  AST  45<H>  /  ALT  23  /  AlkPhos  130<H>      PT/INR - ( 2017 06:53 )   PT: 14.6 sec;   INR: 1.29 ratio         PTT - ( 2017 06:53 )  PTT:37.8 sec      Urinalysis Basic - ( 2017 14:49 )    Color: Yellow / Appearance: Clear / S.010 / pH: x  Gluc: x / Ketone: Negative  / Bili: Negative / Urobili: Negative mg/dL   Blood: x / Protein: Negative mg/dL / Nitrite: Negative   Leuk Esterase: Negative / RBC: 10 /HPF / WBC 1 /HPF   Sq Epi: x / Non Sq Epi: 2 /HPF / Bacteria: x        RADIOLOGY & ADDITIONAL TESTS:    Imaging Personally Reviewed:    Consultant(s) Notes Reviewed:      Care Discussed with Consultants/Other Providers:

## 2017-06-27 NOTE — DIETITIAN INITIAL EVALUATION ADULT. - OTHER INFO
Patient seen by nutrition for length of stay. Denies any difficulty chewing or swallowing, no nausea/emesis or GI discomfort. Patient seen by nutrition for length of stay.  Patient reports his appetite has been good since his admission and he completes 75 - 100% of meals.  Denies any difficulty chewing or swallowing, no nausea/emesis or GI discomfort.   Reports no recent changes in appetite, po intakes or weight.  Multivitamin taken PTA.  Confirmed NKFA  Patient taking DM medications - metformin and levemir.  He monitors fingerstick glucose levels once a day, usually in the morning, (stating test strips are too expensive) and levels range 78 - 280.  Patient able to verbalize knowledge and understanding of carbohydrates and diabetic diet guidelines.  Patient receptive to review of T2DM nutrition therapy handout.

## 2017-06-27 NOTE — PROGRESS NOTE ADULT - SUBJECTIVE AND OBJECTIVE BOX
Patient is a 41y old  Male who presents with a chief complaint of fever (21 Jun 2017 15:36)    Being followed by ID for bacteremia,E coli    Interval history:  feels well  No acute events      ROS:  No cough,SOB,CP  No N/V/D./abd pain  No other complaints      Antimicrobials:      cefTRIAXone   IVPB 1 Gram(s) IV Intermittent every 24 hours      Vital Signs Last 24 Hrs  T(C): 36.8 (06-27-17 @ 04:56), Max: 36.8 (06-27-17 @ 04:56)  T(F): 98.2 (06-27-17 @ 04:56), Max: 98.2 (06-27-17 @ 04:56)  HR: 64 (06-27-17 @ 08:28) (60 - 64)  BP: 98/63 (06-27-17 @ 08:28) (90/52 - 108/65)  BP(mean): --  RR: 18 (06-27-17 @ 04:56) (18 - 18)  SpO2: 97% (06-27-17 @ 04:56) (94% - 97%)    Physical Exam:    Constitutional well preserved,comfortable,pleasant    HEENT PERRLA EOMI,No pallor or icterus    No oral exudate or erythema    Neck supple no JVD or LN    Chest Good AE,CTA    CVS RRR S1 S2 WNl No murmur or rub or gallop    Abd soft mild distention BS normal No tenderness no masses    Ext No cyanosis clubbing or edema    IV site no erythema tenderness or discharge    Joints no swelling or LOM    CNS AAO X 3 no focal    Skin vitiligo    Lab Data:                          10.0   5.97  )-----------( 94       ( 27 Jun 2017 08:30 )             28.2       06-27    138  |  104  |  7   ----------------------------<  71  3.5   |  24  |  0.63    Ca    8.1<L>      27 Jun 2017 08:30    TPro  5.9<L>  /  Alb  2.1<L>  /  TBili  2.6<H>  /  DBili  x   /  AST  45<H>  /  ALT  23  /  AlkPhos  130<H>  06-27      LIVER FUNCTIONS - ( 27 Jun 2017 08:30 )  Alb: 2.1 g/dL / Pro: 5.9 g/dL / ALK PHOS: 130 U/L / ALT: 23 U/L / AST: 45 U/L / GGT: x       Culture - Urine (06.25.17 @ 15:01)    Specimen Source: .Urine Clean Catch (Midstream)    Culture Results:   No growth    Culture - Blood (06.25.17 @ 14:59)    Specimen Source: .Blood Blood    Culture Results:   No growth to date.      Culture - Blood (06.25.17 @ 14:59)    Specimen Source: .Blood Blood    Culture Results:   No growth to date.        < from: Transthoracic Echocardiogram (06.26.17 @ 16:04) >  Conclusions:  1. Normal left ventricular internal dimensions and wall  thicknesses.  2. Normal left ventricular systolic function. No segmental  wall motion abnormalities.

## 2017-06-27 NOTE — PROGRESS NOTE ADULT - PROBLEM SELECTOR PLAN 1
Isolate with sensitivities as above.  Nuclear scan ordered  14 day course if rpt cx stay negative ,may consider bactrim suppression later

## 2017-06-27 NOTE — PROGRESS NOTE ADULT - PROBLEM SELECTOR PROBLEM 5
Alcoholic cirrhosis of liver without ascites

## 2017-06-27 NOTE — DIETITIAN INITIAL EVALUATION ADULT. - NS AS NUTRI INTERV ED CONTENT
Purpose of the nutrition education/Discussed T2DM diet education with emphasis on which foods contain carbohydrates, portion sizes, portion control especially of carbohydrate rich foods, combining protein and carbohydrates at meal and snack times, and need for consistency in carbohydrate intake.  Reinforced importance of  checking blood glucose levels.  Discussed how to modify previous diet to better control blood glucose levels./Recommended modifications

## 2017-06-27 NOTE — PROGRESS NOTE ADULT - ASSESSMENT
Patient is a 42 yo M w/ alcoholic liver cirrhosis, esophageal varices, T2DM, vitiligo, s/p corneal transplant, h/o severe c.diff, h/o E. coli bacteremia, recently treated for colitis/SBP now presenting with ongoing fevers, found to have E. Coli bacteremia. Not enough ascitic fluid to tap.Clinically better  No masses or cyst on MRI abdomen.  No other obvious focus apparent

## 2017-06-28 LAB
ALBUMIN SERPL ELPH-MCNC: 1.9 G/DL — LOW (ref 3.3–5)
ALP SERPL-CCNC: 149 U/L — HIGH (ref 40–120)
ALT FLD-CCNC: 22 U/L — SIGNIFICANT CHANGE UP (ref 10–45)
AST SERPL-CCNC: 39 U/L — SIGNIFICANT CHANGE UP (ref 10–40)
BASOPHILS # BLD AUTO: 0.03 K/UL — SIGNIFICANT CHANGE UP (ref 0–0.2)
BASOPHILS NFR BLD AUTO: 0.7 % — SIGNIFICANT CHANGE UP (ref 0–2)
BILIRUB DIRECT SERPL-MCNC: 0.9 MG/DL — HIGH (ref 0–0.2)
BILIRUB INDIRECT FLD-MCNC: 1.1 MG/DL — HIGH (ref 0.2–1)
BILIRUB SERPL-MCNC: 2 MG/DL — HIGH (ref 0.2–1.2)
EOSINOPHIL # BLD AUTO: 0.18 K/UL — SIGNIFICANT CHANGE UP (ref 0–0.5)
EOSINOPHIL NFR BLD AUTO: 4.3 % — SIGNIFICANT CHANGE UP (ref 0–6)
HCT VFR BLD CALC: 26.2 % — LOW (ref 39–50)
HGB BLD-MCNC: 9.1 G/DL — LOW (ref 13–17)
IMM GRANULOCYTES NFR BLD AUTO: 0.7 % — SIGNIFICANT CHANGE UP (ref 0–1.5)
LYMPHOCYTES # BLD AUTO: 0.88 K/UL — LOW (ref 1–3.3)
LYMPHOCYTES # BLD AUTO: 21.3 % — SIGNIFICANT CHANGE UP (ref 13–44)
MANUAL SMEAR VERIFICATION: SIGNIFICANT CHANGE UP
MCHC RBC-ENTMCNC: 31.3 PG — SIGNIFICANT CHANGE UP (ref 27–34)
MCHC RBC-ENTMCNC: 34.7 GM/DL — SIGNIFICANT CHANGE UP (ref 32–36)
MCV RBC AUTO: 90 FL — SIGNIFICANT CHANGE UP (ref 80–100)
MONOCYTES # BLD AUTO: 0.37 K/UL — SIGNIFICANT CHANGE UP (ref 0–0.9)
MONOCYTES NFR BLD AUTO: 8.9 % — SIGNIFICANT CHANGE UP (ref 2–14)
NEUTROPHILS # BLD AUTO: 2.65 K/UL — SIGNIFICANT CHANGE UP (ref 1.8–7.4)
NEUTROPHILS NFR BLD AUTO: 64.1 % — SIGNIFICANT CHANGE UP (ref 43–77)
PLAT MORPH BLD: NORMAL — SIGNIFICANT CHANGE UP
PLATELET # BLD AUTO: 70 K/UL — LOW (ref 150–400)
PROT SERPL-MCNC: 5.9 G/DL — LOW (ref 6–8.3)
RBC # BLD: 2.91 M/UL — LOW (ref 4.2–5.8)
RBC # FLD: 13.9 % — SIGNIFICANT CHANGE UP (ref 10.3–14.5)
RBC BLD AUTO: SIGNIFICANT CHANGE UP
WBC # BLD: 4.14 K/UL — SIGNIFICANT CHANGE UP (ref 3.8–10.5)
WBC # FLD AUTO: 4.14 K/UL — SIGNIFICANT CHANGE UP (ref 3.8–10.5)

## 2017-06-28 PROCEDURE — 99233 SBSQ HOSP IP/OBS HIGH 50: CPT

## 2017-06-28 RX ORDER — IVERMECTIN 3 MG/1
15 TABLET ORAL DAILY
Qty: 0 | Refills: 0 | Status: COMPLETED | OUTPATIENT
Start: 2017-06-28 | End: 2017-06-29

## 2017-06-28 RX ADMIN — Medication 20 MILLIGRAM(S): at 17:54

## 2017-06-28 RX ADMIN — INSULIN GLARGINE 40 UNIT(S): 100 INJECTION, SOLUTION SUBCUTANEOUS at 21:57

## 2017-06-28 RX ADMIN — NADOLOL 20 MILLIGRAM(S): 80 TABLET ORAL at 08:34

## 2017-06-28 RX ADMIN — Medication 20 MILLIGRAM(S): at 05:48

## 2017-06-28 RX ADMIN — PANTOPRAZOLE SODIUM 40 MILLIGRAM(S): 20 TABLET, DELAYED RELEASE ORAL at 05:48

## 2017-06-28 RX ADMIN — IVERMECTIN 15 MILLIGRAM(S): 3 TABLET ORAL at 14:45

## 2017-06-28 RX ADMIN — Medication 1: at 21:57

## 2017-06-28 RX ADMIN — Medication 3: at 17:54

## 2017-06-28 RX ADMIN — SPIRONOLACTONE 50 MILLIGRAM(S): 25 TABLET, FILM COATED ORAL at 08:34

## 2017-06-28 RX ADMIN — CEFTRIAXONE 100 GRAM(S): 500 INJECTION, POWDER, FOR SOLUTION INTRAMUSCULAR; INTRAVENOUS at 12:35

## 2017-06-28 NOTE — PROGRESS NOTE ADULT - ASSESSMENT
Patient is a 40 yo M w/ alcoholic liver cirrhosis, esophageal varices, T2DM, vitiligo, s/p corneal transplant, h/o severe c.diff, h/o E. coli bacteremia, recently treated for colitis/SBP now presenting with ongoing fevers, found to have E. Coli bacteremia. Not enough ascitic fluid to tap.Clinically better  No masses or cyst on MRI abdomen.  No other obvious focus apparent  Echo negative  Rpt blood Cx negative since 6/25  Nuclear scan negative  Records from [facility] reviewed-pt with positive strongyloides serology-though no diarrhea.was empirically started on albendazole due to this as thought that the recurrent bacteremia could be related to it.No diarrhea.Patient was given this 5/23 but took only 2 days after admission. Patient is a 40 yo M w/ alcoholic liver cirrhosis, esophageal varices, T2DM, vitiligo, s/p corneal transplant, h/o severe c.diff, h/o E. coli bacteremia, recently treated for colitis/SBP now presenting with ongoing fevers, found to have E. Coli bacteremia. Not enough ascitic fluid to tap.Clinically better  No masses or cyst on MRI abdomen.  No other obvious focus apparent  Echo negative  Rpt blood Cx negative since 6/25  Nuclear scan pending.  Records from [facility] reviewed-pt with positive strongyloides serology-though no diarrhea.was empirically started on albendazole due to this as thought that the recurrent bacteremia could be related to it.No diarrhea.Patient was given this 5/23 but took only 2 days after admission.

## 2017-06-28 NOTE — PROGRESS NOTE ADULT - PROBLEM SELECTOR PLAN 1
Isolate with sensitivities as above.  Nuclear scan pending  No other foci  14 day course if rpt cx stay negative ,may consider bactrim suppression later  PICC if stable and OPAT

## 2017-06-28 NOTE — PROGRESS NOTE ADULT - PROBLEM SELECTOR PLAN 1
Afebrile, no c/o abdominal pain, WBC normal, not septic, repeat blood c/s -ve 6/25.  - I f/u plan noted, c/w IV ceftriaxone for 2 weeks. PICC will be ordered once ID agrees

## 2017-06-28 NOTE — PROGRESS NOTE ADULT - SUBJECTIVE AND OBJECTIVE BOX
Patient is a 41y old  Male who presents with a chief complaint of fever(21 Jun 2017 15:36)    Being followed by ID for E coli bacteremia,cirrhosis    Interval history:  feels better  No other acute events      ROS:  No cough,SOB,CP  No N/V/D  No abd pain  No urinary complaints  No HA  No joint or limb pain  No other complaints      Antimicrobials:      cefTRIAXone   IVPB 1 Gram(s) IV Intermittent every 24 hours        Vital Signs Last 24 Hrs  T(C): 37 (06-28-17 @ 05:05), Max: 37 (06-28-17 @ 05:05)  T(F): 98.6 (06-28-17 @ 05:05), Max: 98.6 (06-28-17 @ 05:05)  HR: 76 (06-28-17 @ 08:32) (64 - 76)  BP: 119/69 (06-28-17 @ 08:32) (91/44 - 119/69)  BP(mean): --  RR: 18 (06-28-17 @ 05:05) (18 - 18)  SpO2: 95% (06-28-17 @ 05:05) (92% - 96%)    Physical Exam:    Constitutional well preserved,comfortable,pleasant    HEENT PERRLA EOMI,No pallor or icterus    No oral exudate or erythema    Neck supple no JVD or LN    Chest Good AE,CTA    CVS RRR S1 S2 WNl No murmur or rub or gallop    Abd distended soft BS normal No tenderness no masses    Ext No cyanosis clubbing or edema    IV site no erythema tenderness or discharge    Joints no swelling or LOM    CNS AAO X 3 no focal    Skin vitiligo    Lab Data:                          9.1    4.14  )-----------( 70       ( 28 Jun 2017 08:45 )             26.2       06-27    138  |  104  |  7   ----------------------------<  71  3.5   |  24  |  0.63    Ca    8.1<L>      27 Jun 2017 08:30    TPro  5.9<L>  /  Alb  1.9<L>  /  TBili  2.0<H>  /  DBili  0.9<H>  /  AST  39  /  ALT  22  /  AlkPhos  149<H>  06-28      LIVER FUNCTIONS - ( 28 Jun 2017 08:45 )  Alb: 1.9 g/dL / Pro: 5.9 g/dL / ALK PHOS: 149 U/L / ALT: 22 U/L / AST: 39 U/L / GGT: x           Culture - Urine (06.25.17 @ 15:01)    Specimen Source: .Urine Clean Catch (Midstream)    Culture Results:   No growth    Culture - Blood (06.25.17 @ 14:59)    Specimen Source: .Blood Blood    Culture Results:   No growth to date.    Culture - Blood (06.25.17 @ 14:59)    Specimen Source: .Blood Blood    Culture Results:   No growth to date.                Culture - Blood (06.21.17 @ 12:56)    Gram Stain:   Growth in anaerobic bottle: Gram Negative Rods    Specimen Source: .Blood Blood    Culture Results:   Growth in anaerobic bottle: Escherichia coli  See previous culture [id number]          < from: Transthoracic Echocardiogram (06.26.17 @ 16:04) >  Conclusions:  1. Normal left ventricular internal dimensions and wall  thicknesses.  2. Normal left ventricular systolic function. No segmental  wall motion abnormalities.    < end of copied text >

## 2017-06-28 NOTE — PROGRESS NOTE ADULT - SUBJECTIVE AND OBJECTIVE BOX
SUBJECTIVE / OVERNIGHT EVENTS:      MEDICATIONS  (STANDING):  spironolactone 50 milliGRAM(s) Oral daily  furosemide    Tablet 20 milliGRAM(s) Oral two times a day  pantoprazole    Tablet 40 milliGRAM(s) Oral before breakfast  enoxaparin Injectable 40 milliGRAM(s) SubCutaneous every 24 hours  insulin lispro (HumaLOG) corrective regimen sliding scale   SubCutaneous three times a day before meals  dextrose 5%. 1000 milliLiter(s) (50 mL/Hr) IV Continuous <Continuous>  dextrose 50% Injectable 12.5 Gram(s) IV Push once  dextrose 50% Injectable 25 Gram(s) IV Push once  dextrose 50% Injectable 25 Gram(s) IV Push once  nadolol 20 milliGRAM(s) Oral daily  insulin glargine Injectable (LANTUS) 40 Unit(s) SubCutaneous at bedtime  sodium chloride 0.9%. 1000 milliLiter(s) (50 mL/Hr) IV Continuous <Continuous>  insulin lispro (HumaLOG) corrective regimen sliding scale   SubCutaneous at bedtime  docusate sodium 100 milliGRAM(s) Oral three times a day  senna 2 Tablet(s) Oral at bedtime  cefTRIAXone   IVPB   IV Intermittent   cefTRIAXone   IVPB 1 Gram(s) IV Intermittent every 24 hours    MEDICATIONS  (PRN):  dextrose Gel 1 Dose(s) Oral once PRN Blood Glucose LESS THAN 70 milliGRAM(s)/deciliter  glucagon  Injectable 1 milliGRAM(s) IntraMuscular once PRN Glucose LESS THAN 70 milligrams/deciliter      Vital Signs Last 24 Hrs  T(C): 37 (06-28-17 @ 05:05), Max: 37 (06-28-17 @ 05:05)  HR: 76 (06-28-17 @ 08:32) (64 - 76)  BP: 119/69 (06-28-17 @ 08:32) (91/44 - 119/69)  RR: 18 (06-28-17 @ 05:05) (18 - 18)  SpO2: 95% (06-28-17 @ 05:05) (92% - 96%)        PHYSICAL EXAM  GENERAL: NAD, well-developed  HEAD:  Atraumatic, Normocephalic  EYES: EOMI, PERRLA, conjunctiva and sclera clear  NECK: Supple, No JVD  CHEST/LUNG: Clear to auscultation bilaterally; No wheeze  HEART: Regular rate and rhythm; No murmurs, rubs, or gallops  ABDOMEN: Soft, Nontender, Nondistended; Bowel sounds present  EXTREMITIES:  2+ Peripheral Pulses, No clubbing, cyanosis, or edema  PSYCH: AAOx3  SKIN: No rashes or lesions    LABS:                        10.0   5.97  )-----------( 94       ( 27 Jun 2017 08:30 )             28.2     06-27    138  |  104  |  7   ----------------------------<  71  3.5   |  24  |  0.63    Ca    8.1<L>      27 Jun 2017 08:30    TPro  5.9<L>  /  Alb  1.9<L>  /  TBili  2.0<H>  /  DBili  0.9<H>  /  AST  39  /  ALT  22  /  AlkPhos  149<H>  06-28              RADIOLOGY & ADDITIONAL TESTS:    Imaging Personally Reviewed:  Consultant(s) Notes Reviewed:    Care Discussed with Consultants/Other Providers:

## 2017-06-28 NOTE — PROGRESS NOTE ADULT - PROBLEM SELECTOR PLAN 2
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

## 2017-06-29 LAB
ALBUMIN SERPL ELPH-MCNC: 2.3 G/DL — LOW (ref 3.3–5)
ALP SERPL-CCNC: 122 U/L — HIGH (ref 40–120)
ALT FLD-CCNC: 25 U/L — SIGNIFICANT CHANGE UP (ref 10–45)
ANION GAP SERPL CALC-SCNC: 9 MMOL/L — SIGNIFICANT CHANGE UP (ref 5–17)
AST SERPL-CCNC: 44 U/L — HIGH (ref 10–40)
BILIRUB SERPL-MCNC: 3.1 MG/DL — HIGH (ref 0.2–1.2)
BUN SERPL-MCNC: 9 MG/DL — SIGNIFICANT CHANGE UP (ref 7–23)
CALCIUM SERPL-MCNC: 7.6 MG/DL — LOW (ref 8.4–10.5)
CHLORIDE SERPL-SCNC: 106 MMOL/L — SIGNIFICANT CHANGE UP (ref 96–108)
CO2 SERPL-SCNC: 22 MMOL/L — SIGNIFICANT CHANGE UP (ref 22–31)
CREAT SERPL-MCNC: 0.62 MG/DL — SIGNIFICANT CHANGE UP (ref 0.5–1.3)
GLUCOSE SERPL-MCNC: 93 MG/DL — SIGNIFICANT CHANGE UP (ref 70–99)
HCT VFR BLD CALC: 28.9 % — LOW (ref 39–50)
HGB BLD-MCNC: 10.1 G/DL — LOW (ref 13–17)
MCHC RBC-ENTMCNC: 32.2 PG — SIGNIFICANT CHANGE UP (ref 27–34)
MCHC RBC-ENTMCNC: 34.9 GM/DL — SIGNIFICANT CHANGE UP (ref 32–36)
MCV RBC AUTO: 92 FL — SIGNIFICANT CHANGE UP (ref 80–100)
PLATELET # BLD AUTO: 73 K/UL — LOW (ref 150–400)
POTASSIUM SERPL-MCNC: 4.1 MMOL/L — SIGNIFICANT CHANGE UP (ref 3.5–5.3)
POTASSIUM SERPL-SCNC: 4.1 MMOL/L — SIGNIFICANT CHANGE UP (ref 3.5–5.3)
PROT SERPL-MCNC: 6.2 G/DL — SIGNIFICANT CHANGE UP (ref 6–8.3)
RBC # BLD: 3.14 M/UL — LOW (ref 4.2–5.8)
RBC # FLD: 14 % — SIGNIFICANT CHANGE UP (ref 10.3–14.5)
SODIUM SERPL-SCNC: 137 MMOL/L — SIGNIFICANT CHANGE UP (ref 135–145)
WBC # BLD: 4.94 K/UL — SIGNIFICANT CHANGE UP (ref 3.8–10.5)
WBC # FLD AUTO: 4.94 K/UL — SIGNIFICANT CHANGE UP (ref 3.8–10.5)

## 2017-06-29 PROCEDURE — 78806: CPT | Mod: 26

## 2017-06-29 PROCEDURE — 99232 SBSQ HOSP IP/OBS MODERATE 35: CPT

## 2017-06-29 PROCEDURE — 99233 SBSQ HOSP IP/OBS HIGH 50: CPT | Mod: GC

## 2017-06-29 RX ADMIN — Medication 2: at 17:19

## 2017-06-29 RX ADMIN — IVERMECTIN 15 MILLIGRAM(S): 3 TABLET ORAL at 13:59

## 2017-06-29 RX ADMIN — Medication 3: at 12:44

## 2017-06-29 RX ADMIN — PANTOPRAZOLE SODIUM 40 MILLIGRAM(S): 20 TABLET, DELAYED RELEASE ORAL at 06:42

## 2017-06-29 RX ADMIN — SPIRONOLACTONE 50 MILLIGRAM(S): 25 TABLET, FILM COATED ORAL at 07:32

## 2017-06-29 RX ADMIN — NADOLOL 20 MILLIGRAM(S): 80 TABLET ORAL at 06:42

## 2017-06-29 RX ADMIN — CEFTRIAXONE 100 GRAM(S): 500 INJECTION, POWDER, FOR SOLUTION INTRAMUSCULAR; INTRAVENOUS at 12:43

## 2017-06-29 RX ADMIN — INSULIN GLARGINE 40 UNIT(S): 100 INJECTION, SOLUTION SUBCUTANEOUS at 21:27

## 2017-06-29 RX ADMIN — Medication 20 MILLIGRAM(S): at 10:47

## 2017-06-29 RX ADMIN — Medication 20 MILLIGRAM(S): at 17:19

## 2017-06-29 RX ADMIN — SODIUM CHLORIDE 50 MILLILITER(S): 9 INJECTION INTRAMUSCULAR; INTRAVENOUS; SUBCUTANEOUS at 03:12

## 2017-06-29 NOTE — PROVIDER CONTACT NOTE (MEDICATION) - ACTION/TREATMENT ORDERED:
NP Zeinab Tim aware; no further actions at this time.
MARIA M Heath notified; stated no further action required.
MARIA M Tim made aware; no further actions at this time.
enc pt  to take lovenox
pt educated on importance of Lovenox and risk of not taking it, will continue to monitor pt.

## 2017-06-29 NOTE — PROVIDER CONTACT NOTE (MEDICATION) - ASSESSMENT
Patient is ambulating independently; educated on Lovenox use.
Pt AxOx4; VSS; no complaints at this time; no S/S of DVT or PE.
Pt ambulating
pt A&O X4, OOB independently, pt states he doesn't want Lovenox since he walks frequently and doesn't feel is necessary
patient ambulates independently

## 2017-06-29 NOTE — PROGRESS NOTE ADULT - ASSESSMENT
Patient is a 40 yo M w/ alcoholic liver cirrhosis, esophageal varices, T2DM, vitiligo, s/p corneal transplant, h/o severe c.diff, h/o E. coli bacteremia, recently treated for colitis/SBP now presenting with ongoing fevers, found to have E. Coli bacteremia. Not enough ascitic fluid to tap.Clinically better  No masses or cyst on MRI abdomen.  No other obvious focus apparent  Echo negative  Rpt blood Cx negative since 6/25  Records from [facility]  with positive strongyloides serology-though no diarrhea.was empirically started on albendazole due to this as thought that the recurrent bacteremia could be related to it.No diarrhea.Patient was given this 5/23 but took only 2 days after admission.

## 2017-06-29 NOTE — PROVIDER CONTACT NOTE (MEDICATION) - REASON
Pt refusing Lovenox
Patient refused Lovenox.
Patient refused Lovenox.

## 2017-06-29 NOTE — PROGRESS NOTE ADULT - PROBLEM SELECTOR PLAN 2
Report from [facility] reveal Strongyloid Ab +ve  - Held Albendazole, ID plans for Ivermectin 15mg once now, then 1 more tomorrow  - Stool O+P sent Report from [facility] reveal Strongyloid Ab +ve  Patient completed IVERMECTIN x 2 doses.

## 2017-06-29 NOTE — PROVIDER CONTACT NOTE (MEDICATION) - BACKGROUND
Patient admitted to [facility] on 6/21/17 with fevers.
Pt admitted to [facility] on 6/21 for fever.
liver cirrhosis
pt has history pf DM2, alc cirrhosis, esophageal varices and vitiligo
Patient admitted to [facility] on 6/21/17 with fevers.

## 2017-06-29 NOTE — PROGRESS NOTE ADULT - SUBJECTIVE AND OBJECTIVE BOX
Patient is a 41y old  Male who presents with a chief complaint of fevers, cough  SUBJECTIVE / OVERNIGHT EVENTS: no events over night. Patient feels better.     MEDICATIONS  (STANDING):  spironolactone 50 milliGRAM(s) Oral daily  furosemide    Tablet 20 milliGRAM(s) Oral two times a day  pantoprazole    Tablet 40 milliGRAM(s) Oral before breakfast  enoxaparin Injectable 40 milliGRAM(s) SubCutaneous every 24 hours  insulin lispro (HumaLOG) corrective regimen sliding scale   SubCutaneous three times a day before meals  dextrose 5%. 1000 milliLiter(s) (50 mL/Hr) IV Continuous <Continuous>  dextrose 50% Injectable 12.5 Gram(s) IV Push once  dextrose 50% Injectable 25 Gram(s) IV Push once  dextrose 50% Injectable 25 Gram(s) IV Push once  nadolol 20 milliGRAM(s) Oral daily  insulin glargine Injectable (LANTUS) 40 Unit(s) SubCutaneous at bedtime  sodium chloride 0.9%. 1000 milliLiter(s) (50 mL/Hr) IV Continuous <Continuous>  insulin lispro (HumaLOG) corrective regimen sliding scale   SubCutaneous at bedtime  docusate sodium 100 milliGRAM(s) Oral three times a day  senna 2 Tablet(s) Oral at bedtime  cefTRIAXone   IVPB   IV Intermittent   cefTRIAXone   IVPB 1 Gram(s) IV Intermittent every 24 hours    MEDICATIONS  (PRN):  dextrose Gel 1 Dose(s) Oral once PRN Blood Glucose LESS THAN 70 milliGRAM(s)/deciliter  glucagon  Injectable 1 milliGRAM(s) IntraMuscular once PRN Glucose LESS THAN 70 milligrams/deciliter    T(C): 37.1 (06-29-17 @ 13:46), Max: 37.1 (06-29-17 @ 13:46)  HR: 64 (06-29-17 @ 13:46) (63 - 66)  BP: 102/64 (06-29-17 @ 13:46) (97/60 - 104/65)  RR: 20 (06-29-17 @ 13:46) (18 - 20)  SpO2: 97% (06-29-17 @ 13:46) (96% - 97%)    PHYSICAL EXAM:  GENERAL: NAD, well-developed  HEAD:  Atraumatic, Normocephalic  EYES: EOMI, conjunctiva and sclera clear  NECK: Supple, No JVD  CHEST/LUNG: Clear to auscultation bilaterally; No wheeze  HEART: Regular rate and rhythm; No murmurs, rubs, or gallops  ABDOMEN: Soft, Nontender, Nondistended; Bowel sounds present  EXTREMITIES:  2+ Peripheral Pulses, No clubbing, cyanosis, or edema  PSYCH: AAOx3  NEUROLOGY: non-focal  SKIN: No rashes or lesions                          10.1   4.94  )-----------( 73       ( 29 Jun 2017 07:08 )             28.9           LIVER FUNCTIONS - ( 29 Jun 2017 07:19 )  Alb: 2.3 g/dL / Pro: 6.2 g/dL / ALK PHOS: 122 U/L / ALT: 25 U/L / AST: 44 U/L / GGT: x             137|106|9<93  4.1|22|0.62  7.6,--,--  06-29 @ 07:19      RADIOLOGY & ADDITIONAL TESTS:    Imaging Personally Reviewed:    Consultant(s) Notes Reviewed:      Care Discussed with Consultants/Other Providers:

## 2017-06-29 NOTE — PROGRESS NOTE ADULT - PROBLEM SELECTOR PLAN 1
Isolate with sensitivities as above.  Follow nuclear scan  No other foci  14 day course if rpt cx stay negative ,may consider bactrim suppression later  PICC if stable and OPAT

## 2017-06-29 NOTE — PROGRESS NOTE ADULT - SUBJECTIVE AND OBJECTIVE BOX
Patient is a 41y old  Male who presents with a chief complaint of fever(21 Jun 2017 15:36)    Being followed by ID for bacteremia    Interval history:  No acute events      ROS:  No cough,SOB,CP  No N/V/D./abd pain  No other complaints      Antimicrobials:    cefTRIAXone   IVPB   IV Intermittent   cefTRIAXone   IVPB 1 Gram(s) IV Intermittent every 24 hours  ivermectin 15 milliGRAM(s) Oral daily      Vital Signs Last 24 Hrs  T(C): 36.7 (06-29-17 @ 06:01), Max: 37.1 (06-28-17 @ 21:34)  T(F): 98.1 (06-29-17 @ 06:01), Max: 98.7 (06-28-17 @ 21:34)  HR: 66 (06-29-17 @ 10:25) (63 - 76)  BP: 102/62 (06-29-17 @ 10:43) (97/60 - 119/68)  BP(mean): --  RR: 18 (06-29-17 @ 06:01) (18 - 18)  SpO2: 96% (06-29-17 @ 06:01) (93% - 96%)    Physical Exam:  Skin Vitiligo    Constitutional well preserved,comfortable,pleasant    HEENT PERRLA EOMI,No pallor or icterus    No oral exudate or erythema    Neck supple no JVD or LN    Chest Good AE,CTA    CVS RRR S1 S2 WNl No murmur or rub or gallop    Abd distended soft BS normal No tenderness no masses    Ext No cyanosis clubbing or edema    IV site no erythema tenderness or discharge    Joints no swelling or LOM    CNS AAO X 3 no focal    Lab Data:                          10.1   4.94  )-----------( 73       ( 29 Jun 2017 07:08 )             28.9       06-29    137  |  106  |  9   ----------------------------<  93  4.1   |  22  |  0.62    Ca    7.6<L>      29 Jun 2017 07:19    TPro  6.2  /  Alb  2.3<L>  /  TBili  3.1<H>  /  DBili  x   /  AST  44<H>  /  ALT  25  /  AlkPhos  122<H>  06-29      LIVER FUNCTIONS - ( 29 Jun 2017 07:19 )  Alb: 2.3 g/dL / Pro: 6.2 g/dL / ALK PHOS: 122 U/L / ALT: 25 U/L / AST: 44 U/L / GGT: x           Culture - Urine (06.25.17 @ 15:01)    Specimen Source: .Urine Clean Catch (Midstream)    Culture Results:   No growth    Culture - Blood (06.25.17 @ 14:59)    Specimen Source: .Blood Blood    Culture Results:   No growth to date.                      Culture - Blood (06.25.17 @ 14:59)    Specimen Source: .Blood Blood    Culture Results:   No growth to date.

## 2017-06-29 NOTE — PROGRESS NOTE ADULT - PROBLEM SELECTOR PLAN 1
Afebrile, no c/o abdominal pain, WBC normal, not septic, repeat blood c/s -ve 6/25.  ID  f/u plan noted, c/w IV ceftriaxone for 2 weeks. PICC line placement.

## 2017-06-29 NOTE — PROVIDER CONTACT NOTE (MEDICATION) - SITUATION
Patient refused Lovenox.
Patient refused Lovenox.
Pt refusing lovenox, has been refusing for past 2 days.
Refused Lovenox
pt admitted with bacteremia, refusing Lovenox

## 2017-06-29 NOTE — PROVIDER CONTACT NOTE (MEDICATION) - RECOMMENDATIONS
will continue to monitor- patient aware of use of Lovenox in hospital.
continue to monitor pt
enc pt  to take lovenox
will continue to monitor.

## 2017-06-30 ENCOUNTER — TRANSCRIPTION ENCOUNTER (OUTPATIENT)
Age: 42
End: 2017-06-30

## 2017-06-30 VITALS
RESPIRATION RATE: 18 BRPM | OXYGEN SATURATION: 98 % | SYSTOLIC BLOOD PRESSURE: 96 MMHG | HEART RATE: 75 BPM | DIASTOLIC BLOOD PRESSURE: 54 MMHG | TEMPERATURE: 98 F

## 2017-06-30 PROCEDURE — 74183 MRI ABD W/O CNTR FLWD CNTR: CPT

## 2017-06-30 PROCEDURE — 71010: CPT | Mod: 26

## 2017-06-30 PROCEDURE — 85027 COMPLETE CBC AUTOMATED: CPT

## 2017-06-30 PROCEDURE — 86682 HELMINTH ANTIBODY: CPT

## 2017-06-30 PROCEDURE — 71045 X-RAY EXAM CHEST 1 VIEW: CPT

## 2017-06-30 PROCEDURE — 87040 BLOOD CULTURE FOR BACTERIA: CPT

## 2017-06-30 PROCEDURE — 78802 RP LOCLZJ TUM WHBDY 1 D IMG: CPT

## 2017-06-30 PROCEDURE — 76700 US EXAM ABDOM COMPLETE: CPT

## 2017-06-30 PROCEDURE — A9570: CPT

## 2017-06-30 PROCEDURE — 85610 PROTHROMBIN TIME: CPT

## 2017-06-30 PROCEDURE — 83036 HEMOGLOBIN GLYCOSYLATED A1C: CPT

## 2017-06-30 PROCEDURE — 80076 HEPATIC FUNCTION PANEL: CPT

## 2017-06-30 PROCEDURE — 87633 RESP VIRUS 12-25 TARGETS: CPT

## 2017-06-30 PROCEDURE — 87186 SC STD MICRODIL/AGAR DIL: CPT

## 2017-06-30 PROCEDURE — 80053 COMPREHEN METABOLIC PANEL: CPT

## 2017-06-30 PROCEDURE — 84295 ASSAY OF SERUM SODIUM: CPT

## 2017-06-30 PROCEDURE — 87798 DETECT AGENT NOS DNA AMP: CPT

## 2017-06-30 PROCEDURE — 82947 ASSAY GLUCOSE BLOOD QUANT: CPT

## 2017-06-30 PROCEDURE — 87486 CHLMYD PNEUM DNA AMP PROBE: CPT

## 2017-06-30 PROCEDURE — 99239 HOSP IP/OBS DSCHRG MGMT >30: CPT

## 2017-06-30 PROCEDURE — 82330 ASSAY OF CALCIUM: CPT

## 2017-06-30 PROCEDURE — 82803 BLOOD GASES ANY COMBINATION: CPT

## 2017-06-30 PROCEDURE — 84132 ASSAY OF SERUM POTASSIUM: CPT

## 2017-06-30 PROCEDURE — C1751: CPT

## 2017-06-30 PROCEDURE — 93306 TTE W/DOPPLER COMPLETE: CPT

## 2017-06-30 PROCEDURE — 93005 ELECTROCARDIOGRAM TRACING: CPT

## 2017-06-30 PROCEDURE — 99232 SBSQ HOSP IP/OBS MODERATE 35: CPT

## 2017-06-30 PROCEDURE — 87581 M.PNEUMON DNA AMP PROBE: CPT

## 2017-06-30 PROCEDURE — 87086 URINE CULTURE/COLONY COUNT: CPT

## 2017-06-30 PROCEDURE — 83690 ASSAY OF LIPASE: CPT

## 2017-06-30 PROCEDURE — 99285 EMERGENCY DEPT VISIT HI MDM: CPT | Mod: 25

## 2017-06-30 PROCEDURE — 83605 ASSAY OF LACTIC ACID: CPT

## 2017-06-30 PROCEDURE — 85730 THROMBOPLASTIN TIME PARTIAL: CPT

## 2017-06-30 PROCEDURE — A9585: CPT

## 2017-06-30 PROCEDURE — 36569 INSJ PICC 5 YR+ W/O IMAGING: CPT

## 2017-06-30 PROCEDURE — 82435 ASSAY OF BLOOD CHLORIDE: CPT

## 2017-06-30 PROCEDURE — 80048 BASIC METABOLIC PNL TOTAL CA: CPT

## 2017-06-30 PROCEDURE — 85014 HEMATOCRIT: CPT

## 2017-06-30 PROCEDURE — 81001 URINALYSIS AUTO W/SCOPE: CPT

## 2017-06-30 RX ORDER — INSULIN DETEMIR 100/ML (3)
65 INSULIN PEN (ML) SUBCUTANEOUS
Qty: 0 | Refills: 0 | COMMUNITY

## 2017-06-30 RX ORDER — ALBENDAZOLE 200 MG/1
2 TABLET, FILM COATED ORAL
Qty: 0 | Refills: 0 | COMMUNITY

## 2017-06-30 RX ORDER — MOXIFLOXACIN HYDROCHLORIDE TABLETS, 400 MG 400 MG/1
1 TABLET, FILM COATED ORAL
Qty: 0 | Refills: 0 | COMMUNITY

## 2017-06-30 RX ORDER — CEFTRIAXONE 500 MG/1
1 INJECTION, POWDER, FOR SOLUTION INTRAMUSCULAR; INTRAVENOUS
Qty: 0 | Refills: 0 | COMMUNITY
Start: 2017-06-30

## 2017-06-30 RX ORDER — CEFTRIAXONE 500 MG/1
1 INJECTION, POWDER, FOR SOLUTION INTRAMUSCULAR; INTRAVENOUS EVERY 24 HOURS
Qty: 0 | Refills: 0 | Status: DISCONTINUED | OUTPATIENT
Start: 2017-06-30 | End: 2017-06-30

## 2017-06-30 RX ORDER — AZTREONAM 2 G
1 VIAL (EA) INJECTION
Qty: 28 | Refills: 0
Start: 2017-06-30 | End: 2017-07-28

## 2017-06-30 RX ADMIN — Medication 3: at 11:56

## 2017-06-30 RX ADMIN — Medication 1: at 08:06

## 2017-06-30 RX ADMIN — CEFTRIAXONE 100 GRAM(S): 500 INJECTION, POWDER, FOR SOLUTION INTRAMUSCULAR; INTRAVENOUS at 11:55

## 2017-06-30 RX ADMIN — Medication 20 MILLIGRAM(S): at 05:37

## 2017-06-30 RX ADMIN — NADOLOL 20 MILLIGRAM(S): 80 TABLET ORAL at 11:55

## 2017-06-30 RX ADMIN — Medication 2: at 17:13

## 2017-06-30 RX ADMIN — Medication 20 MILLIGRAM(S): at 18:27

## 2017-06-30 RX ADMIN — SPIRONOLACTONE 50 MILLIGRAM(S): 25 TABLET, FILM COATED ORAL at 11:55

## 2017-06-30 RX ADMIN — PANTOPRAZOLE SODIUM 40 MILLIGRAM(S): 20 TABLET, DELAYED RELEASE ORAL at 05:37

## 2017-06-30 NOTE — PROGRESS NOTE ADULT - SUBJECTIVE AND OBJECTIVE BOX
Patient is a 41y old  Male who presents with a chief complaint of fevers, cough  SUBJECTIVE / OVERNIGHT EVENTS: no events over night. Patient feels better.     MEDICATIONS  (STANDING):  spironolactone 50 milliGRAM(s) Oral daily  furosemide    Tablet 20 milliGRAM(s) Oral two times a day  pantoprazole    Tablet 40 milliGRAM(s) Oral before breakfast  enoxaparin Injectable 40 milliGRAM(s) SubCutaneous every 24 hours  insulin lispro (HumaLOG) corrective regimen sliding scale   SubCutaneous three times a day before meals  dextrose 5%. 1000 milliLiter(s) (50 mL/Hr) IV Continuous <Continuous>  dextrose 50% Injectable 12.5 Gram(s) IV Push once  dextrose 50% Injectable 25 Gram(s) IV Push once  dextrose 50% Injectable 25 Gram(s) IV Push once  nadolol 20 milliGRAM(s) Oral daily  insulin glargine Injectable (LANTUS) 40 Unit(s) SubCutaneous at bedtime  sodium chloride 0.9%. 1000 milliLiter(s) (50 mL/Hr) IV Continuous <Continuous>  insulin lispro (HumaLOG) corrective regimen sliding scale   SubCutaneous at bedtime  docusate sodium 100 milliGRAM(s) Oral three times a day  senna 2 Tablet(s) Oral at bedtime  cefTRIAXone   IVPB   IV Intermittent   cefTRIAXone   IVPB 1 Gram(s) IV Intermittent every 24 hours    MEDICATIONS  (PRN):  dextrose Gel 1 Dose(s) Oral once PRN Blood Glucose LESS THAN 70 milliGRAM(s)/deciliter  glucagon  Injectable 1 milliGRAM(s) IntraMuscular once PRN Glucose LESS THAN 70 milligrams/deciliter    T(C): 36.8 (06-30-17 @ 13:27), Max: 36.8 (06-30-17 @ 13:27)  HR: 79 (06-30-17 @ 13:27) (58 - 79)  BP: 96/59 (06-30-17 @ 13:27) (96/59 - 102/63)  RR: 18 (06-30-17 @ 13:27) (18 - 18)  SpO2: 97% (06-30-17 @ 13:27) (95% - 97%)PHYSICAL EXAM:    GENERAL: NAD, well-developed  HEAD:  Atraumatic, Normocephalic  EYES: EOMI, conjunctiva and sclera clear  NECK: Supple, No JVD  CHEST/LUNG: Clear to auscultation bilaterally; No wheeze  HEART: Regular rate and rhythm  ABDOMEN: Soft, Nontender, Nondistended; Bowel sounds present  EXTREMITIES:  2+ Peripheral Pulses, No clubbing, cyanosis, or edema  PSYCH: AAOx3  NEUROLOGY: non-focal  SKIN: No rashes or lesions                           10.1   4.94  )-----------( 73       ( 29 Jun 2017 07:08 )             28.9           LIVER FUNCTIONS - ( 29 Jun 2017 07:19 )  Alb: 2.3 g/dL / Pro: 6.2 g/dL / ALK PHOS: 122 U/L / ALT: 25 U/L / AST: 44 U/L / GGT: x               RADIOLOGY & ADDITIONAL TESTS:    Imaging Personally Reviewed:    Consultant(s) Notes Reviewed:      Care Discussed with Consultants/Other Providers:

## 2017-06-30 NOTE — PROGRESS NOTE ADULT - PROBLEM SELECTOR PROBLEM 4
Diabetes mellitus type 2, insulin dependent
Diabetes mellitus type 2, insulin dependent
Alcoholic cirrhosis of liver without ascites
Alcoholic cirrhosis of liver without ascites
Diabetes mellitus type 2, insulin dependent
Esophageal varices

## 2017-06-30 NOTE — DISCHARGE NOTE ADULT - CARE PROVIDERS DIRECT ADDRESSES
[email or web address]

## 2017-06-30 NOTE — DISCHARGE NOTE ADULT - CARE PROVIDER_API CALL
Satya Boss), Infectious Disease; Internal Medicine  [address] [place] [address]  Phone: [phone]  Fax: [phone] Satya Boss), Infectious Disease; Internal Medicine  [address] [place] [id number]  Phone: [phone]  Fax: [phone]    Rj Woods), Medicine  Gen [address] [address]  Phone: [phone]  Fax: [phone]

## 2017-06-30 NOTE — PROGRESS NOTE ADULT - PROBLEM SELECTOR PLAN 2
Report from [facility] reveal Strongyloid Ab +ve  Patient completed IVERMECTIN x 2 doses.

## 2017-06-30 NOTE — PROGRESS NOTE ADULT - PROBLEM SELECTOR PROBLEM 3
Hydatid cyst
Hydatid cyst
Diabetes mellitus type 2, insulin dependent
Diabetes mellitus type 2, insulin dependent
Esophageal varices
Esophageal varices
Hydatid cyst
Hydatid cyst
Diabetes mellitus type 2, insulin dependent
Esophageal varices

## 2017-06-30 NOTE — DISCHARGE NOTE ADULT - HOSPITAL COURSE
To be done by MD 42 yo M w/ alcoholic liver cirrhosis, esophageal varices, T2DM, vitiligo, s/p corneal transplant, h/o severe c.diff, h/o E. coli bacteremia, recently treated for colitis/ SBP now presenting with ongoing fevers, found to have E. Coli bacteremia , started on Zosyn. Not enough ascitic fluid to tap.    Seen by ID who recommend MRI Liver to evaluate for ? Hydatid Cyst given bacteremia,.     MRI negative for Hydatid Cyst, Echo no vegetations. Nuclear scan negative.     Records from [facility]  with positive strongyloides serology-though no diarrhea.was empirically started on albendazole due to this as thought that the recurrent bacteremia could be related to it. No diarrhea. Patient was given this 5/23 but took only 2 days after admission.  Repeat Blood cultures negative.   Patient was discharged on Ceftriaxone x 1 week via PICC line to complete 14 day course and Bactrim for chronic Suppression.   Discharge Diagnosis  Sepsis   Bacteremia   Liver Cirrhosis  Esophageal Varices  DM type 2  Hx C.diff

## 2017-06-30 NOTE — DISCHARGE NOTE ADULT - SECONDARY DIAGNOSIS.
Esophageal varices Parasite infection Diabetes mellitus type 2, insulin dependent Alcoholic cirrhosis

## 2017-06-30 NOTE — PROGRESS NOTE ADULT - PROVIDER SPECIALTY LIST ADULT
Hospitalist
Infectious Disease
Internal Medicine
Hospitalist

## 2017-06-30 NOTE — DISCHARGE NOTE ADULT - PATIENT PORTAL LINK FT
“You can access the Follow[facility] Patient Portal, offered by [facility], by registering with the following website: [email or web address]”

## 2017-06-30 NOTE — PROGRESS NOTE ADULT - PROBLEM SELECTOR PROBLEM 2
Sepsis due to Escherichia coli
Sepsis due to Escherichia coli
Parasite infection
Sepsis due to Escherichia coli
Alcoholic cirrhosis of liver without ascites
Parasite infection
Parasite infection

## 2017-06-30 NOTE — DISCHARGE NOTE ADULT - MEDICATION SUMMARY - MEDICATIONS TO STOP TAKING
I will STOP taking the medications listed below when I get home from the hospital:    Albenza 200 mg oral tablet  -- 2 tab(s) by mouth 2 times a day    Cipro 500 mg oral tablet  -- 1 tab(s) by mouth once a day

## 2017-06-30 NOTE — DISCHARGE NOTE ADULT - MEDICATION SUMMARY - MEDICATIONS TO TAKE
I will START or STAY ON the medications listed below when I get home from the hospital:    spironolactone 50 mg oral tablet  -- 1 tab(s) by mouth once a day  -- Indication: For Liver cirrhosis    metFORMIN 500 mg oral tablet  -- 1 tab(s) by mouth once a day  -- Indication: For Diabetes mellitus type 2, insulin dependent    Levemir 100 units/mL subcutaneous solution  -- 40 unit(s) subcutaneous once a day (at bedtime)  -- Indication: For Diabetes mellitus type 2, insulin dependent    nadolol 20 mg oral tablet  -- 1 tab(s) by mouth once a day  -- Indication: For Esophageal varices    cefTRIAXone  -- 1 gram(s) intravenous once a day Until 7/8/2017  -- Indication: For Bacteremia due to Escherichia coli    furosemide 20 mg oral tablet  -- 1 tab(s) by mouth 2 times a day  -- Indication: For Esophageal varices    Bactrim  mg-160 mg oral tablet  -- 1 tab(s) by mouth once a day( start after IV abtibiotic )  -- Avoid prolonged or excessive exposure to direct and/or artificial sunlight while taking this medication.  Finish all this medication unless otherwise directed by prescriber.  Medication should be taken with plenty of water.    -- Indication: For Antibiotic. start after complete IV antibiotic    pantoprazole 40 mg oral delayed release tablet  -- 1 tab(s) by mouth once a day  -- Indication: For Esophageal varices    multivitamin  -- 1 tab(s) by mouth once a day  -- Indication: For Supplement

## 2017-06-30 NOTE — CHART NOTE - NSCHARTNOTEFT_GEN_A_CORE
[facility] pharmacy reports drug- drug interaction between aldactone and Bactrim DS- hyperkalemia. Discussed with Dr. Kathleen VAUGHAN. pt instructed to check blood work BMP in 2 weeks after discharge.    Zeinab Tim NP-C  [id number]

## 2017-06-30 NOTE — CHART NOTE - NSCHARTNOTEFT_GEN_A_CORE
Spoke with Dr. Contreras ID regarding discharge recommendation- continue Ceftriaxone until July 8 th, CBC and CMP next week, then continue Bactrim DS daily for 4 weeks. Follow up with ID doctor within 3-4 weeks    Zeinab Tim NP-C  [id number]

## 2017-06-30 NOTE — DISCHARGE NOTE ADULT - PLAN OF CARE
[facility] Please complete the course of antibiotic ( ceftriaxone IV until July 8 th followed by prophylactic antibiotic Bactrim DS daily )  Blood work CBC and CMP next week  Follow up with ID doctor within 3-4 weeks  Please seek for immediate medical advice for fever or diarrhea Report from [facility] reveal Strongyloid Ab +  Patient completed IVERMECTIN x 2 doses.  Follow up with ID doctor No bleeding currently  Continue current medications as directed HgA1C this admission 7.4  Make sure you get your HgA1c checked every three months.  If you take oral diabetes medications, check your blood glucose two times a day.  If you take insulin, check your blood glucose before meals and at bedtime.  It's important not to skip any meals.  Keep a log of your blood glucose results and always take it with you to your doctor appointments.  Keep a list of your current medications including injectables and over the counter medications and bring this medication list with you to all your doctor appointments.  If you have not seen your ophthalmologist this year call for appointment.  Check your feet daily for redness, sores, or openings. Do not self treat. If no improvement in two days call your primary care physician for an appointment.  Low blood sugar (hypoglycemia) is a blood sugar below 70mg/dl. Check your blood sugar if you feel signs/symptoms of hypoglycemia. If your blood sugar is below 70 take 15 grams of carbohydrates (ex 4 oz of apple juice, 3-4 glucose tablets, or 4-6 oz of regular soda) wait 15 minutes and repeat blood sugar to make sure it comes up above 70.  If your blood sugar is above 70 and you are due for a meal, have a meal.  If you are not due for a meal have a snack.  This snack helps keeps your blood sugar at a safe range. Alcohol cessation   Follow up with your Hepatologist HgA1C this admission 7.4  Follow up with your primary care physician  Make sure you get your HgA1c checked every three months.  If you take oral diabetes medications, check your blood glucose two times a day.  If you take insulin, check your blood glucose before meals and at bedtime.  It's important not to skip any meals.  Keep a log of your blood glucose results and always take it with you to your doctor appointments.  Keep a list of your current medications including injectables and over the counter medications and bring this medication list with you to all your doctor appointments.  If you have not seen your ophthalmologist this year call for appointment.  Check your feet daily for redness, sores, or openings. Do not self treat. If no improvement in two days call your primary care physician for an appointment.  Low blood sugar (hypoglycemia) is a blood sugar below 70mg/dl. Check your blood sugar if you feel signs/symptoms of hypoglycemia. If your blood sugar is below 70 take 15 grams of carbohydrates (ex 4 oz of apple juice, 3-4 glucose tablets, or 4-6 oz of regular soda) wait 15 minutes and repeat blood sugar to make sure it comes up above 70.  If your blood sugar is above 70 and you are due for a meal, have a meal.  If you are not due for a meal have a snack.  This snack helps keeps your blood sugar at a safe range. Please complete the course of antibiotic ( ceftriaxone IV until July 8 th followed by prophylactic antibiotic Bactrim DS daily )  Blood work CBC and CMP next week  Follow up with ID doctor within 3-4 weeks  Please seek for immediate medical advice for fever or diarrhea  BMP blood work in 2 weeks to monitor potassium level ( drug- drug interaction between aldactone and Bactrim)

## 2017-06-30 NOTE — DISCHARGE NOTE ADULT - CARE PLAN
Principal Discharge DX:	Bacteremia due to Escherichia coli  Goal:	Resolution  Instructions for follow-up, activity and diet:	Please complete the course of antibiotic ( ceftriaxone IV until July 8 th followed by prophylactic antibiotic Bactrim DS daily )  Blood work CBC and CMP next week  Follow up with ID doctor within 3-4 weeks  Please seek for immediate medical advice for fever or diarrhea  Secondary Diagnosis:	Parasite infection  Instructions for follow-up, activity and diet:	Report from [facility] reveal Strongyloid Ab +  Patient completed IVERMECTIN x 2 doses.  Follow up with ID doctor  Secondary Diagnosis:	Esophageal varices  Instructions for follow-up, activity and diet:	No bleeding currently  Continue current medications as directed  Secondary Diagnosis:	Diabetes mellitus type 2, insulin dependent  Instructions for follow-up, activity and diet:	HgA1C this admission 7.4  Make sure you get your HgA1c checked every three months.  If you take oral diabetes medications, check your blood glucose two times a day.  If you take insulin, check your blood glucose before meals and at bedtime.  It's important not to skip any meals.  Keep a log of your blood glucose results and always take it with you to your doctor appointments.  Keep a list of your current medications including injectables and over the counter medications and bring this medication list with you to all your doctor appointments.  If you have not seen your ophthalmologist this year call for appointment.  Check your feet daily for redness, sores, or openings. Do not self treat. If no improvement in two days call your primary care physician for an appointment.  Low blood sugar (hypoglycemia) is a blood sugar below 70mg/dl. Check your blood sugar if you feel signs/symptoms of hypoglycemia. If your blood sugar is below 70 take 15 grams of carbohydrates (ex 4 oz of apple juice, 3-4 glucose tablets, or 4-6 oz of regular soda) wait 15 minutes and repeat blood sugar to make sure it comes up above 70.  If your blood sugar is above 70 and you are due for a meal, have a meal.  If you are not due for a meal have a snack.  This snack helps keeps your blood sugar at a safe range.  Secondary Diagnosis:	Alcoholic cirrhosis  Instructions for follow-up, activity and diet:	Alcohol cessation   Follow up with your Hepatologist Principal Discharge DX:	Bacteremia due to Escherichia coli  Goal:	Resolution  Instructions for follow-up, activity and diet:	Please complete the course of antibiotic ( ceftriaxone IV until July 8 th followed by prophylactic antibiotic Bactrim DS daily )  Blood work CBC and CMP next week  Follow up with ID doctor within 3-4 weeks  Please seek for immediate medical advice for fever or diarrhea  Secondary Diagnosis:	Parasite infection  Instructions for follow-up, activity and diet:	Report from [facility] reveal Strongyloid Ab +  Patient completed IVERMECTIN x 2 doses.  Follow up with ID doctor  Secondary Diagnosis:	Esophageal varices  Instructions for follow-up, activity and diet:	No bleeding currently  Continue current medications as directed  Secondary Diagnosis:	Diabetes mellitus type 2, insulin dependent  Instructions for follow-up, activity and diet:	HgA1C this admission 7.4  Make sure you get your HgA1c checked every three months.  If you take oral diabetes medications, check your blood glucose two times a day.  If you take insulin, check your blood glucose before meals and at bedtime.  It's important not to skip any meals.  Keep a log of your blood glucose results and always take it with you to your doctor appointments.  Keep a list of your current medications including injectables and over the counter medications and bring this medication list with you to all your doctor appointments.  If you have not seen your ophthalmologist this year call for appointment.  Check your feet daily for redness, sores, or openings. Do not self treat. If no improvement in two days call your primary care physician for an appointment.  Low blood sugar (hypoglycemia) is a blood sugar below 70mg/dl. Check your blood sugar if you feel signs/symptoms of hypoglycemia. If your blood sugar is below 70 take 15 grams of carbohydrates (ex 4 oz of apple juice, 3-4 glucose tablets, or 4-6 oz of regular soda) wait 15 minutes and repeat blood sugar to make sure it comes up above 70.  If your blood sugar is above 70 and you are due for a meal, have a meal.  If you are not due for a meal have a snack.  This snack helps keeps your blood sugar at a safe range.  Secondary Diagnosis:	Alcoholic cirrhosis  Instructions for follow-up, activity and diet:	Alcohol cessation   Follow up with your Hepatologist Principal Discharge DX:	Bacteremia due to Escherichia coli  Goal:	Resolution  Instructions for follow-up, activity and diet:	Please complete the course of antibiotic ( ceftriaxone IV until July 8 th followed by prophylactic antibiotic Bactrim DS daily )  Blood work CBC and CMP next week  Follow up with ID doctor within 3-4 weeks  Please seek for immediate medical advice for fever or diarrhea  Secondary Diagnosis:	Parasite infection  Instructions for follow-up, activity and diet:	Report from [facility] reveal Strongyloid Ab +  Patient completed IVERMECTIN x 2 doses.  Follow up with ID doctor  Secondary Diagnosis:	Esophageal varices  Instructions for follow-up, activity and diet:	No bleeding currently  Continue current medications as directed  Secondary Diagnosis:	Diabetes mellitus type 2, insulin dependent  Instructions for follow-up, activity and diet:	HgA1C this admission 7.4  Follow up with your primary care physician  Make sure you get your HgA1c checked every three months.  If you take oral diabetes medications, check your blood glucose two times a day.  If you take insulin, check your blood glucose before meals and at bedtime.  It's important not to skip any meals.  Keep a log of your blood glucose results and always take it with you to your doctor appointments.  Keep a list of your current medications including injectables and over the counter medications and bring this medication list with you to all your doctor appointments.  If you have not seen your ophthalmologist this year call for appointment.  Check your feet daily for redness, sores, or openings. Do not self treat. If no improvement in two days call your primary care physician for an appointment.  Low blood sugar (hypoglycemia) is a blood sugar below 70mg/dl. Check your blood sugar if you feel signs/symptoms of hypoglycemia. If your blood sugar is below 70 take 15 grams of carbohydrates (ex 4 oz of apple juice, 3-4 glucose tablets, or 4-6 oz of regular soda) wait 15 minutes and repeat blood sugar to make sure it comes up above 70.  If your blood sugar is above 70 and you are due for a meal, have a meal.  If you are not due for a meal have a snack.  This snack helps keeps your blood sugar at a safe range.  Secondary Diagnosis:	Alcoholic cirrhosis  Instructions for follow-up, activity and diet:	Alcohol cessation   Follow up with your Hepatologist Principal Discharge DX:	Bacteremia due to Escherichia coli  Goal:	Resolution  Instructions for follow-up, activity and diet:	Please complete the course of antibiotic ( ceftriaxone IV until July 8 th followed by prophylactic antibiotic Bactrim DS daily )  Blood work CBC and CMP next week  Follow up with ID doctor within 3-4 weeks  Please seek for immediate medical advice for fever or diarrhea  Secondary Diagnosis:	Parasite infection  Instructions for follow-up, activity and diet:	Report from [facility] reveal Strongyloid Ab +  Patient completed IVERMECTIN x 2 doses.  Follow up with ID doctor  Secondary Diagnosis:	Esophageal varices  Instructions for follow-up, activity and diet:	No bleeding currently  Continue current medications as directed  Secondary Diagnosis:	Diabetes mellitus type 2, insulin dependent  Instructions for follow-up, activity and diet:	HgA1C this admission 7.4  Follow up with your primary care physician  Make sure you get your HgA1c checked every three months.  If you take oral diabetes medications, check your blood glucose two times a day.  If you take insulin, check your blood glucose before meals and at bedtime.  It's important not to skip any meals.  Keep a log of your blood glucose results and always take it with you to your doctor appointments.  Keep a list of your current medications including injectables and over the counter medications and bring this medication list with you to all your doctor appointments.  If you have not seen your ophthalmologist this year call for appointment.  Check your feet daily for redness, sores, or openings. Do not self treat. If no improvement in two days call your primary care physician for an appointment.  Low blood sugar (hypoglycemia) is a blood sugar below 70mg/dl. Check your blood sugar if you feel signs/symptoms of hypoglycemia. If your blood sugar is below 70 take 15 grams of carbohydrates (ex 4 oz of apple juice, 3-4 glucose tablets, or 4-6 oz of regular soda) wait 15 minutes and repeat blood sugar to make sure it comes up above 70.  If your blood sugar is above 70 and you are due for a meal, have a meal.  If you are not due for a meal have a snack.  This snack helps keeps your blood sugar at a safe range.  Secondary Diagnosis:	Alcoholic cirrhosis  Instructions for follow-up, activity and diet:	Alcohol cessation   Follow up with your Hepatologist Principal Discharge DX:	Bacteremia due to Escherichia coli  Goal:	Resolution  Instructions for follow-up, activity and diet:	Please complete the course of antibiotic ( ceftriaxone IV until July 8 th followed by prophylactic antibiotic Bactrim DS daily )  Blood work CBC and CMP next week  Follow up with ID doctor within 3-4 weeks  Please seek for immediate medical advice for fever or diarrhea  BMP blood work in 2 weeks to monitor potassium level ( drug- drug interaction between aldactone and Bactrim)  Secondary Diagnosis:	Parasite infection  Instructions for follow-up, activity and diet:	Report from [facility] reveal Strongyloid Ab +  Patient completed IVERMECTIN x 2 doses.  Follow up with ID doctor  Secondary Diagnosis:	Esophageal varices  Instructions for follow-up, activity and diet:	No bleeding currently  Continue current medications as directed  Secondary Diagnosis:	Diabetes mellitus type 2, insulin dependent  Instructions for follow-up, activity and diet:	HgA1C this admission 7.4  Follow up with your primary care physician  Make sure you get your HgA1c checked every three months.  If you take oral diabetes medications, check your blood glucose two times a day.  If you take insulin, check your blood glucose before meals and at bedtime.  It's important not to skip any meals.  Keep a log of your blood glucose results and always take it with you to your doctor appointments.  Keep a list of your current medications including injectables and over the counter medications and bring this medication list with you to all your doctor appointments.  If you have not seen your ophthalmologist this year call for appointment.  Check your feet daily for redness, sores, or openings. Do not self treat. If no improvement in two days call your primary care physician for an appointment.  Low blood sugar (hypoglycemia) is a blood sugar below 70mg/dl. Check your blood sugar if you feel signs/symptoms of hypoglycemia. If your blood sugar is below 70 take 15 grams of carbohydrates (ex 4 oz of apple juice, 3-4 glucose tablets, or 4-6 oz of regular soda) wait 15 minutes and repeat blood sugar to make sure it comes up above 70.  If your blood sugar is above 70 and you are due for a meal, have a meal.  If you are not due for a meal have a snack.  This snack helps keeps your blood sugar at a safe range.  Secondary Diagnosis:	Alcoholic cirrhosis  Instructions for follow-up, activity and diet:	Alcohol cessation   Follow up with your Hepatologist Principal Discharge DX:	Bacteremia due to Escherichia coli  Goal:	Resolution  Instructions for follow-up, activity and diet:	Please complete the course of antibiotic ( ceftriaxone IV until July 8 th followed by prophylactic antibiotic Bactrim DS daily )  Blood work CBC and CMP next week  Follow up with ID doctor within 3-4 weeks  Please seek for immediate medical advice for fever or diarrhea  BMP blood work in 2 weeks to monitor potassium level ( drug- drug interaction between aldactone and Bactrim)  Secondary Diagnosis:	Parasite infection  Instructions for follow-up, activity and diet:	Report from [facility] reveal Strongyloid Ab +  Patient completed IVERMECTIN x 2 doses.  Follow up with ID doctor  Secondary Diagnosis:	Esophageal varices  Instructions for follow-up, activity and diet:	No bleeding currently  Continue current medications as directed  Secondary Diagnosis:	Diabetes mellitus type 2, insulin dependent  Instructions for follow-up, activity and diet:	HgA1C this admission 7.4  Follow up with your primary care physician  Make sure you get your HgA1c checked every three months.  If you take oral diabetes medications, check your blood glucose two times a day.  If you take insulin, check your blood glucose before meals and at bedtime.  It's important not to skip any meals.  Keep a log of your blood glucose results and always take it with you to your doctor appointments.  Keep a list of your current medications including injectables and over the counter medications and bring this medication list with you to all your doctor appointments.  If you have not seen your ophthalmologist this year call for appointment.  Check your feet daily for redness, sores, or openings. Do not self treat. If no improvement in two days call your primary care physician for an appointment.  Low blood sugar (hypoglycemia) is a blood sugar below 70mg/dl. Check your blood sugar if you feel signs/symptoms of hypoglycemia. If your blood sugar is below 70 take 15 grams of carbohydrates (ex 4 oz of apple juice, 3-4 glucose tablets, or 4-6 oz of regular soda) wait 15 minutes and repeat blood sugar to make sure it comes up above 70.  If your blood sugar is above 70 and you are due for a meal, have a meal.  If you are not due for a meal have a snack.  This snack helps keeps your blood sugar at a safe range.  Secondary Diagnosis:	Alcoholic cirrhosis  Instructions for follow-up, activity and diet:	Alcohol cessation   Follow up with your Hepatologist Principal Discharge DX:	Bacteremia due to Escherichia coli  Goal:	Resolution  Instructions for follow-up, activity and diet:	Please complete the course of antibiotic ( ceftriaxone IV until July 8 th followed by prophylactic antibiotic Bactrim DS daily )  Blood work CBC and CMP next week  Follow up with ID doctor within 3-4 weeks  Please seek for immediate medical advice for fever or diarrhea  BMP blood work in 2 weeks to monitor potassium level ( drug- drug interaction between aldactone and Bactrim)  Secondary Diagnosis:	Parasite infection  Instructions for follow-up, activity and diet:	Report from [facility] reveal Strongyloid Ab +  Patient completed IVERMECTIN x 2 doses.  Follow up with ID doctor  Secondary Diagnosis:	Esophageal varices  Instructions for follow-up, activity and diet:	No bleeding currently  Continue current medications as directed  Secondary Diagnosis:	Diabetes mellitus type 2, insulin dependent  Instructions for follow-up, activity and diet:	HgA1C this admission 7.4  Follow up with your primary care physician  Make sure you get your HgA1c checked every three months.  If you take oral diabetes medications, check your blood glucose two times a day.  If you take insulin, check your blood glucose before meals and at bedtime.  It's important not to skip any meals.  Keep a log of your blood glucose results and always take it with you to your doctor appointments.  Keep a list of your current medications including injectables and over the counter medications and bring this medication list with you to all your doctor appointments.  If you have not seen your ophthalmologist this year call for appointment.  Check your feet daily for redness, sores, or openings. Do not self treat. If no improvement in two days call your primary care physician for an appointment.  Low blood sugar (hypoglycemia) is a blood sugar below 70mg/dl. Check your blood sugar if you feel signs/symptoms of hypoglycemia. If your blood sugar is below 70 take 15 grams of carbohydrates (ex 4 oz of apple juice, 3-4 glucose tablets, or 4-6 oz of regular soda) wait 15 minutes and repeat blood sugar to make sure it comes up above 70.  If your blood sugar is above 70 and you are due for a meal, have a meal.  If you are not due for a meal have a snack.  This snack helps keeps your blood sugar at a safe range.  Secondary Diagnosis:	Alcoholic cirrhosis  Instructions for follow-up, activity and diet:	Alcohol cessation   Follow up with your Hepatologist Principal Discharge DX:	Bacteremia due to Escherichia coli  Goal:	Resolution  Instructions for follow-up, activity and diet:	Please complete the course of antibiotic ( ceftriaxone IV until July 8 th followed by prophylactic antibiotic Bactrim DS daily )  Blood work CBC and CMP next week  Follow up with ID doctor within 3-4 weeks  Please seek for immediate medical advice for fever or diarrhea  BMP blood work in 2 weeks to monitor potassium level ( drug- drug interaction between aldactone and Bactrim)  Secondary Diagnosis:	Parasite infection  Instructions for follow-up, activity and diet:	Report from [facility] reveal Strongyloid Ab +  Patient completed IVERMECTIN x 2 doses.  Follow up with ID doctor  Secondary Diagnosis:	Esophageal varices  Instructions for follow-up, activity and diet:	No bleeding currently  Continue current medications as directed  Secondary Diagnosis:	Diabetes mellitus type 2, insulin dependent  Instructions for follow-up, activity and diet:	HgA1C this admission 7.4  Follow up with your primary care physician  Make sure you get your HgA1c checked every three months.  If you take oral diabetes medications, check your blood glucose two times a day.  If you take insulin, check your blood glucose before meals and at bedtime.  It's important not to skip any meals.  Keep a log of your blood glucose results and always take it with you to your doctor appointments.  Keep a list of your current medications including injectables and over the counter medications and bring this medication list with you to all your doctor appointments.  If you have not seen your ophthalmologist this year call for appointment.  Check your feet daily for redness, sores, or openings. Do not self treat. If no improvement in two days call your primary care physician for an appointment.  Low blood sugar (hypoglycemia) is a blood sugar below 70mg/dl. Check your blood sugar if you feel signs/symptoms of hypoglycemia. If your blood sugar is below 70 take 15 grams of carbohydrates (ex 4 oz of apple juice, 3-4 glucose tablets, or 4-6 oz of regular soda) wait 15 minutes and repeat blood sugar to make sure it comes up above 70.  If your blood sugar is above 70 and you are due for a meal, have a meal.  If you are not due for a meal have a snack.  This snack helps keeps your blood sugar at a safe range.  Secondary Diagnosis:	Alcoholic cirrhosis  Instructions for follow-up, activity and diet:	Alcohol cessation   Follow up with your Hepatologist

## 2017-06-30 NOTE — PROGRESS NOTE ADULT - PROBLEM SELECTOR PLAN 1
Afebrile, no c/o abdominal pain, WBC normal, not septic, repeat blood c/s -ve 6/25.  ID  f/u plan noted, c/w IV ceftriaxone x 1 week 07/08 and Bactrim suppression x 3- 4 weeks as per ID, PICC line placement today, d/c planning.

## 2017-06-30 NOTE — PROGRESS NOTE ADULT - PROBLEM SELECTOR PLAN 4
-continue lantus and sliding scale
Avoid hepatotoxins.  Reviewed Abdominal Ultrasound.
FSBS has been better  - c/w Lantus 40units qhs abd HSS
Avoid hepatotoxins.  Reviewed Abdominal Ultrasound.

## 2017-06-30 NOTE — PROGRESS NOTE ADULT - PROBLEM SELECTOR PROBLEM 1
Bacteremia due to Escherichia coli
Sepsis due to Escherichia coli

## 2017-06-30 NOTE — DISCHARGE NOTE ADULT - ADDITIONAL INSTRUCTIONS
Please complete the course of antibiotic ( ceftriaxone IV until July 8 th followed by prophylactic antibiotic Bactrim DS daily )  Blood work CBC and CMP next week  Follow up with ID doctor within 3-4 weeks  Follow up with your primary care physician within one week of discharge  Make appointments to follow up with your out patient physicians.  Bring all discharge paperwork including discharge medication list to your follow up appointments.

## 2017-06-30 NOTE — PROGRESS NOTE ADULT - SUBJECTIVE AND OBJECTIVE BOX
Patient is a 41y old  Male who presents with a chief complaint of fever (30 Jun 2017 13:09)    Being followed by ID for bacteremia    Interval history:  feels well  No acute events      ROS:  No cough,SOB,CP  No N/V/D./abd pain  No other complaints      Antimicrobials:    cefTRIAXone   IVPB 1 Gram(s) IV Intermittent every 24 hours      Vital Signs Last 24 Hrs  T(C): 36.8 (06-30-17 @ 13:27), Max: 36.9 (06-29-17 @ 21:14)  T(F): 98.2 (06-30-17 @ 13:27), Max: 98.4 (06-29-17 @ 21:14)  HR: 79 (06-30-17 @ 13:27) (58 - 79)  BP: 96/59 (06-30-17 @ 13:27) (96/59 - 104/65)  BP(mean): --  RR: 18 (06-30-17 @ 13:27) (18 - 18)  SpO2: 97% (06-30-17 @ 13:27) (95% - 97%)    Physical Exam:    Constitutional well preserved,comfortable,pleasant    HEENT PERRLA EOMI,No pallor or icterus    No oral exudate or erythema    Neck supple no JVD or LN    Chest Good AE,CTA    CVS RRR S1 S2 WNl No murmur or rub or gallop    Abd soft Mild distention  BS normal No tenderness no masses    Ext No cyanosis clubbing or edema    IV site no erythema tenderness or discharge    Joints no swelling or LOM    CNS AAO X 3 no focal    Skin vitiligo    Lab Data:                          10.1   4.94  )-----------( 73       ( 29 Jun 2017 07:08 )             28.9       06-29    137  |  106  |  9   ----------------------------<  93  4.1   |  22  |  0.62    Ca    7.6<L>      29 Jun 2017 07:19    TPro  6.2  /  Alb  2.3<L>  /  TBili  3.1<H>  /  DBili  x   /  AST  44<H>  /  ALT  25  /  AlkPhos  122<H>  06-29      LIVER FUNCTIONS - ( 29 Jun 2017 07:19 )  Alb: 2.3 g/dL / Pro: 6.2 g/dL / ALK PHOS: 122 U/L / ALT: 25 U/L / AST: 44 U/L / GGT: x           Culture - Blood (06.25.17 @ 14:59)    Specimen Source: .Blood Blood    Culture Results:   No growth at 5 days.    < from: [facility] Multiple Day Procedure (06.28.17 @ 15:24) >  IMPRESSION: Normal Indium-111 labeled leukocyte scan.    No radionuclide evidence of infection. Etiology of bacteremia is not   elucidated in this study.      < end of copied text >

## 2017-06-30 NOTE — PROGRESS NOTE ADULT - ATTENDING COMMENTS
Infectious Diseases Service will cover over weekend.  Please call [phone] if issues
Other plan per primary team   Will Follow.  Beeper [id number]-cmds/afterhours/No response-[id number]
Other plan per primary team   Will Follow.  Beeper [id number]-lacu/afterhours/No response-[id number]
Will Follow.  Beeper [id number]-aifa/afterhours/No response-[id number]
Will Follow.  Beeper [id number]-jzby/afterhours/No response-[id number]
Patients seen and examined  Above verified.  For paracentesis today.await MRI,outside records  Plan as detailed in resident note    Infectious Diseases Service will cover over weekend.  Please call [phone] if issues

## 2017-06-30 NOTE — PROGRESS NOTE ADULT - ASSESSMENT
Patient is a 40 yo M w/ alcoholic liver cirrhosis, esophageal varices, T2DM, vitiligo, s/p corneal transplant, h/o severe c.diff, h/o E. coli bacteremia, recently treated for colitis/SBP now presenting with ongoing fevers, found to have E. Coli bacteremia. Not enough ascitic fluid to tap.Clinically better  No masses or cyst on MRI abdomen.  No other obvious focus apparent  Echo negative  Nuc scan negative  Rpt blood Cx negative since 6/25  Records from [facility]  with positive strongyloides serology-though no diarrhea.was empirically started on albendazole due to this as thought that the recurrent bacteremia could be related to it.No diarrhea.Patient was given this 5/23 but took only 2 days after admission.

## 2017-07-03 ENCOUNTER — RX RENEWAL (OUTPATIENT)
Age: 42
End: 2017-07-03

## 2017-07-05 ENCOUNTER — INPATIENT (INPATIENT)
Facility: HOSPITAL | Age: 42
LOS: 4 days | Discharge: HOME | DRG: 872 | End: 2017-07-10
Attending: HOSPITALIST | Admitting: HOSPITALIST
Payer: COMMERCIAL

## 2017-07-05 VITALS
RESPIRATION RATE: 24 BRPM | TEMPERATURE: 101 F | DIASTOLIC BLOOD PRESSURE: 74 MMHG | HEART RATE: 109 BPM | SYSTOLIC BLOOD PRESSURE: 127 MMHG | OXYGEN SATURATION: 91 %

## 2017-07-05 DIAGNOSIS — Z29.9 ENCOUNTER FOR PROPHYLACTIC MEASURES, UNSPECIFIED: ICD-10-CM

## 2017-07-05 DIAGNOSIS — A41.9 SEPSIS, UNSPECIFIED ORGANISM: ICD-10-CM

## 2017-07-05 DIAGNOSIS — I85.00 ESOPHAGEAL VARICES WITHOUT BLEEDING: ICD-10-CM

## 2017-07-05 DIAGNOSIS — R10.9 UNSPECIFIED ABDOMINAL PAIN: ICD-10-CM

## 2017-07-05 DIAGNOSIS — D64.9 ANEMIA, UNSPECIFIED: ICD-10-CM

## 2017-07-05 DIAGNOSIS — K70.30 ALCOHOLIC CIRRHOSIS OF LIVER WITHOUT ASCITES: ICD-10-CM

## 2017-07-05 DIAGNOSIS — D69.6 THROMBOCYTOPENIA, UNSPECIFIED: ICD-10-CM

## 2017-07-05 DIAGNOSIS — E11.9 TYPE 2 DIABETES MELLITUS WITHOUT COMPLICATIONS: ICD-10-CM

## 2017-07-05 LAB
ALBUMIN SERPL ELPH-MCNC: 2.9 G/DL — LOW (ref 3.3–5)
ALP SERPL-CCNC: 247 U/L — HIGH (ref 40–120)
ALT FLD-CCNC: 29 U/L RC — SIGNIFICANT CHANGE UP (ref 10–45)
ANION GAP SERPL CALC-SCNC: 14 MMOL/L — SIGNIFICANT CHANGE UP (ref 5–17)
APPEARANCE UR: ABNORMAL
AST SERPL-CCNC: 51 U/L — HIGH (ref 10–40)
BACTERIA BLD CULT: NORMAL
BASOPHILS # BLD AUTO: 0 K/UL — SIGNIFICANT CHANGE UP (ref 0–0.2)
BASOPHILS NFR BLD AUTO: 0.4 % — SIGNIFICANT CHANGE UP (ref 0–2)
BILIRUB SERPL-MCNC: 4 MG/DL — HIGH (ref 0.2–1.2)
BILIRUB UR-MCNC: ABNORMAL
BUN SERPL-MCNC: 13 MG/DL — SIGNIFICANT CHANGE UP (ref 7–23)
CALCIUM SERPL-MCNC: 7.2 MG/DL — LOW (ref 8.4–10.5)
CHLORIDE SERPL-SCNC: 100 MMOL/L — SIGNIFICANT CHANGE UP (ref 96–108)
CO2 SERPL-SCNC: 19 MMOL/L — LOW (ref 22–31)
COLOR SPEC: YELLOW — SIGNIFICANT CHANGE UP
CREAT SERPL-MCNC: 0.82 MG/DL — SIGNIFICANT CHANGE UP (ref 0.5–1.3)
DIFF PNL FLD: ABNORMAL
EOSINOPHIL # BLD AUTO: 0.1 K/UL — SIGNIFICANT CHANGE UP (ref 0–0.5)
EOSINOPHIL NFR BLD AUTO: 0.9 % — SIGNIFICANT CHANGE UP (ref 0–6)
GAS PNL BLDV: SIGNIFICANT CHANGE UP
GLUCOSE SERPL-MCNC: 230 MG/DL — HIGH (ref 70–99)
GLUCOSE UR QL: NEGATIVE — SIGNIFICANT CHANGE UP
HCT VFR BLD CALC: 29.7 % — LOW (ref 39–50)
HGB BLD-MCNC: 10.5 G/DL — LOW (ref 13–17)
KETONES UR-MCNC: ABNORMAL
LEUKOCYTE ESTERASE UR-ACNC: NEGATIVE — SIGNIFICANT CHANGE UP
LYMPHOCYTES # BLD AUTO: 0.8 K/UL — LOW (ref 1–3.3)
LYMPHOCYTES # BLD AUTO: 9.1 % — LOW (ref 13–44)
MCHC RBC-ENTMCNC: 34.7 PG — HIGH (ref 27–34)
MCHC RBC-ENTMCNC: 35.3 GM/DL — SIGNIFICANT CHANGE UP (ref 32–36)
MCV RBC AUTO: 98.3 FL — SIGNIFICANT CHANGE UP (ref 80–100)
MONOCYTES # BLD AUTO: 0.5 K/UL — SIGNIFICANT CHANGE UP (ref 0–0.9)
MONOCYTES NFR BLD AUTO: 6.3 % — SIGNIFICANT CHANGE UP (ref 2–14)
NEUTROPHILS # BLD AUTO: 7 K/UL — SIGNIFICANT CHANGE UP (ref 1.8–7.4)
NEUTROPHILS NFR BLD AUTO: 83.2 % — HIGH (ref 43–77)
NITRITE UR-MCNC: NEGATIVE — SIGNIFICANT CHANGE UP
PH UR: 6 — SIGNIFICANT CHANGE UP (ref 5–8)
PLATELET # BLD AUTO: 66 K/UL — LOW (ref 150–400)
POTASSIUM SERPL-MCNC: 4.4 MMOL/L — SIGNIFICANT CHANGE UP (ref 3.5–5.3)
POTASSIUM SERPL-SCNC: 4.4 MMOL/L — SIGNIFICANT CHANGE UP (ref 3.5–5.3)
PROT SERPL-MCNC: 6.8 G/DL — SIGNIFICANT CHANGE UP (ref 6–8.3)
PROT UR-MCNC: 30 MG/DL
RBC # BLD: 3.03 M/UL — LOW (ref 4.2–5.8)
RBC # FLD: 13.7 % — SIGNIFICANT CHANGE UP (ref 10.3–14.5)
SODIUM SERPL-SCNC: 133 MMOL/L — LOW (ref 135–145)
SP GR SPEC: 1.03 — HIGH (ref 1.01–1.02)
UROBILINOGEN FLD QL: NEGATIVE — SIGNIFICANT CHANGE UP
WBC # BLD: 8.5 K/UL — SIGNIFICANT CHANGE UP (ref 3.8–10.5)
WBC # FLD AUTO: 8.5 K/UL — SIGNIFICANT CHANGE UP (ref 3.8–10.5)

## 2017-07-05 PROCEDURE — 99223 1ST HOSP IP/OBS HIGH 75: CPT | Mod: GC

## 2017-07-05 PROCEDURE — 74177 CT ABD & PELVIS W/CONTRAST: CPT | Mod: 26

## 2017-07-05 PROCEDURE — 71010: CPT | Mod: 26

## 2017-07-05 PROCEDURE — 99285 EMERGENCY DEPT VISIT HI MDM: CPT

## 2017-07-05 RX ORDER — MORPHINE SULFATE 50 MG/1
2 CAPSULE, EXTENDED RELEASE ORAL EVERY 4 HOURS
Qty: 0 | Refills: 0 | Status: DISCONTINUED | OUTPATIENT
Start: 2017-07-05 | End: 2017-07-09

## 2017-07-05 RX ORDER — HEPARIN SODIUM 5000 [USP'U]/ML
5000 INJECTION INTRAVENOUS; SUBCUTANEOUS EVERY 12 HOURS
Qty: 0 | Refills: 0 | Status: DISCONTINUED | OUTPATIENT
Start: 2017-07-05 | End: 2017-07-10

## 2017-07-05 RX ORDER — SODIUM CHLORIDE 9 MG/ML
1000 INJECTION, SOLUTION INTRAVENOUS
Qty: 0 | Refills: 0 | Status: DISCONTINUED | OUTPATIENT
Start: 2017-07-05 | End: 2017-07-10

## 2017-07-05 RX ORDER — DEXTROSE 50 % IN WATER 50 %
1 SYRINGE (ML) INTRAVENOUS ONCE
Qty: 0 | Refills: 0 | Status: DISCONTINUED | OUTPATIENT
Start: 2017-07-05 | End: 2017-07-10

## 2017-07-05 RX ORDER — MORPHINE SULFATE 50 MG/1
4 CAPSULE, EXTENDED RELEASE ORAL ONCE
Qty: 0 | Refills: 0 | Status: DISCONTINUED | OUTPATIENT
Start: 2017-07-05 | End: 2017-07-05

## 2017-07-05 RX ORDER — FUROSEMIDE 40 MG
20 TABLET ORAL
Qty: 0 | Refills: 0 | Status: DISCONTINUED | OUTPATIENT
Start: 2017-07-05 | End: 2017-07-10

## 2017-07-05 RX ORDER — FAMOTIDINE 10 MG/ML
20 INJECTION INTRAVENOUS ONCE
Qty: 0 | Refills: 0 | Status: COMPLETED | OUTPATIENT
Start: 2017-07-05 | End: 2017-07-05

## 2017-07-05 RX ORDER — PIPERACILLIN AND TAZOBACTAM 4; .5 G/20ML; G/20ML
3.38 INJECTION, POWDER, LYOPHILIZED, FOR SOLUTION INTRAVENOUS ONCE
Qty: 0 | Refills: 0 | Status: COMPLETED | OUTPATIENT
Start: 2017-07-05 | End: 2017-07-05

## 2017-07-05 RX ORDER — INSULIN LISPRO 100/ML
VIAL (ML) SUBCUTANEOUS
Qty: 0 | Refills: 0 | Status: DISCONTINUED | OUTPATIENT
Start: 2017-07-05 | End: 2017-07-10

## 2017-07-05 RX ORDER — PIPERACILLIN AND TAZOBACTAM 4; .5 G/20ML; G/20ML
3.38 INJECTION, POWDER, LYOPHILIZED, FOR SOLUTION INTRAVENOUS EVERY 8 HOURS
Qty: 0 | Refills: 0 | Status: DISCONTINUED | OUTPATIENT
Start: 2017-07-05 | End: 2017-07-10

## 2017-07-05 RX ORDER — VANCOMYCIN HCL 1 G
1000 VIAL (EA) INTRAVENOUS ONCE
Qty: 0 | Refills: 0 | Status: COMPLETED | OUTPATIENT
Start: 2017-07-05 | End: 2017-07-05

## 2017-07-05 RX ORDER — ACETAMINOPHEN 500 MG
650 TABLET ORAL EVERY 6 HOURS
Qty: 0 | Refills: 0 | Status: DISCONTINUED | OUTPATIENT
Start: 2017-07-05 | End: 2017-07-05

## 2017-07-05 RX ORDER — VANCOMYCIN HCL 1 G
1000 VIAL (EA) INTRAVENOUS EVERY 12 HOURS
Qty: 0 | Refills: 0 | Status: DISCONTINUED | OUTPATIENT
Start: 2017-07-06 | End: 2017-07-09

## 2017-07-05 RX ORDER — GLUCAGON INJECTION, SOLUTION 0.5 MG/.1ML
1 INJECTION, SOLUTION SUBCUTANEOUS ONCE
Qty: 0 | Refills: 0 | Status: DISCONTINUED | OUTPATIENT
Start: 2017-07-05 | End: 2017-07-10

## 2017-07-05 RX ORDER — ONDANSETRON 8 MG/1
4 TABLET, FILM COATED ORAL ONCE
Qty: 0 | Refills: 0 | Status: COMPLETED | OUTPATIENT
Start: 2017-07-05 | End: 2017-07-05

## 2017-07-05 RX ORDER — OXYCODONE HYDROCHLORIDE 5 MG/1
10 TABLET ORAL EVERY 6 HOURS
Qty: 0 | Refills: 0 | Status: DISCONTINUED | OUTPATIENT
Start: 2017-07-05 | End: 2017-07-05

## 2017-07-05 RX ORDER — DEXTROSE 50 % IN WATER 50 %
12.5 SYRINGE (ML) INTRAVENOUS ONCE
Qty: 0 | Refills: 0 | Status: DISCONTINUED | OUTPATIENT
Start: 2017-07-05 | End: 2017-07-10

## 2017-07-05 RX ORDER — DEXTROSE 50 % IN WATER 50 %
25 SYRINGE (ML) INTRAVENOUS ONCE
Qty: 0 | Refills: 0 | Status: DISCONTINUED | OUTPATIENT
Start: 2017-07-05 | End: 2017-07-10

## 2017-07-05 RX ORDER — SPIRONOLACTONE 25 MG/1
50 TABLET, FILM COATED ORAL DAILY
Qty: 0 | Refills: 0 | Status: DISCONTINUED | OUTPATIENT
Start: 2017-07-05 | End: 2017-07-10

## 2017-07-05 RX ORDER — PANTOPRAZOLE SODIUM 20 MG/1
40 TABLET, DELAYED RELEASE ORAL
Qty: 0 | Refills: 0 | Status: DISCONTINUED | OUTPATIENT
Start: 2017-07-05 | End: 2017-07-10

## 2017-07-05 RX ORDER — INSULIN GLARGINE 100 [IU]/ML
25 INJECTION, SOLUTION SUBCUTANEOUS AT BEDTIME
Qty: 0 | Refills: 0 | Status: DISCONTINUED | OUTPATIENT
Start: 2017-07-05 | End: 2017-07-09

## 2017-07-05 RX ORDER — NADOLOL 80 MG/1
20 TABLET ORAL DAILY
Qty: 0 | Refills: 0 | Status: DISCONTINUED | OUTPATIENT
Start: 2017-07-05 | End: 2017-07-10

## 2017-07-05 RX ORDER — OXYCODONE HYDROCHLORIDE 5 MG/1
5 TABLET ORAL EVERY 6 HOURS
Qty: 0 | Refills: 0 | Status: DISCONTINUED | OUTPATIENT
Start: 2017-07-05 | End: 2017-07-05

## 2017-07-05 RX ORDER — ACETAMINOPHEN 500 MG
1000 TABLET ORAL ONCE
Qty: 0 | Refills: 0 | Status: COMPLETED | OUTPATIENT
Start: 2017-07-05 | End: 2017-07-05

## 2017-07-05 RX ORDER — INSULIN LISPRO 100/ML
VIAL (ML) SUBCUTANEOUS AT BEDTIME
Qty: 0 | Refills: 0 | Status: DISCONTINUED | OUTPATIENT
Start: 2017-07-05 | End: 2017-07-10

## 2017-07-05 RX ADMIN — Medication 400 MILLIGRAM(S): at 16:18

## 2017-07-05 RX ADMIN — MORPHINE SULFATE 2 MILLIGRAM(S): 50 CAPSULE, EXTENDED RELEASE ORAL at 23:00

## 2017-07-05 RX ADMIN — MORPHINE SULFATE 4 MILLIGRAM(S): 50 CAPSULE, EXTENDED RELEASE ORAL at 18:00

## 2017-07-05 RX ADMIN — MORPHINE SULFATE 4 MILLIGRAM(S): 50 CAPSULE, EXTENDED RELEASE ORAL at 17:42

## 2017-07-05 RX ADMIN — Medication 250 MILLIGRAM(S): at 19:20

## 2017-07-05 RX ADMIN — FAMOTIDINE 20 MILLIGRAM(S): 10 INJECTION INTRAVENOUS at 17:43

## 2017-07-05 RX ADMIN — INSULIN GLARGINE 25 UNIT(S): 100 INJECTION, SOLUTION SUBCUTANEOUS at 22:41

## 2017-07-05 RX ADMIN — MORPHINE SULFATE 4 MILLIGRAM(S): 50 CAPSULE, EXTENDED RELEASE ORAL at 19:59

## 2017-07-05 RX ADMIN — PIPERACILLIN AND TAZOBACTAM 200 GRAM(S): 4; .5 INJECTION, POWDER, LYOPHILIZED, FOR SOLUTION INTRAVENOUS at 17:53

## 2017-07-05 RX ADMIN — ONDANSETRON 4 MILLIGRAM(S): 8 TABLET, FILM COATED ORAL at 17:42

## 2017-07-05 NOTE — H&P ADULT - ASSESSMENT
41M PMH alcoholic cirrhosis c/b varices, DMII, h/o c.diff, strongyloides, latent TB, vitiligo, recent admission for e.coli bacteremia (discharged 6/27 on ceftriaxone via PICC) who presents with fever and abdominal pain consistent with prior admissions and concerning for repeated bacteremia with unknown source.

## 2017-07-05 NOTE — ED PROVIDER NOTE - OBJECTIVE STATEMENT
Pt is a 41M PMH EtOH cirrhosis c/b varices, DMII, h/o c.diff, strongyloides recent admission for e.coli bacteremia d/c on ceftriaxone via PICC p/w acute onset abd pain and feversX1 day. Pt states that he was in good health following d/c up until today where he awoke with nausea, fevers, and severe abd pain prompting him to present to ED. Pt denies vomiting, diarrhea, dysuria, urinary frequency. Has had mild h/a but denies photophobia or neck stiffness.

## 2017-07-05 NOTE — H&P ADULT - NSHPREVIEWOFSYSTEMS_GEN_ALL_CORE
Review of Systems:   CONSTITUTIONAL: Fever/chills, fatigue  EYES: No visual changes or discharge  ENMT: No sinus or throat pain  NECK: No pain or stiffness  RESPIRATORY: No cough, wheezing; No shortness of breath  CARDIOVASCULAR: Leg swelling. No chest pain, palpitations, dizziness.   GASTROINTESTINAL: Severe abdominal pain from the naval to the mid epigastric region. No nausea, vomiting, or hematemesis; No diarrhea or constipation. No melena or hematochezia.  GENITOURINARY: No dysuria, frequency, hematuria  NEUROLOGICAL: No numbness, or tremors  SKIN: No itching, burning, rashes, or lesions   MUSCULOSKELETAL: No joint pain or swelling; No muscle, back, or extremity pain  PSYCHIATRIC: No depression, anxiety, mood swings, or difficulty sleeping  ALLERY AND IMMUNOLOGIC: No hives or eczema

## 2017-07-05 NOTE — H&P ADULT - ATTENDING COMMENTS
agree with excellent PGY-2 note with the following additions:   This is a 41 year old gentleman with EtOH cirrhosis c/b varices, history of C. Diff, strongyloides infection, and recurrent recent admissions for fevers and abdominal pain found to have recurrent E. Coli bacteremia, multiple imaging studies including CT A/P, tagged WBC scan, TTE, upper and lower endoscopy with only finding of portal hypertensive colopathy.  Here, he is febrile to 100.7, , /70, satting well on RA.  On exam, patient is comfortable in NAD, lungs are clear to auscuyltation, no murmurs on cardiac exam, abdomen is benign with only mild TTP LLQ, no rebound or guarding, only evidence of scant ascites, no edema, no rashes, spider angiomata or caput medusae. Labs notable for WBC 8.5, though with PMN predominance, Hgb 10.5 which is normocytic at MCV 92, Plt 66 (chronic, but slowly down-trending), Na 133, Cr .82, Alb 2.9, Bili 4.0, Alk P 247, AST/ALT 51/29, VBG lactate 2.0.  Work-up at last admission  Imaging with clear CXR, CT A/P pending. Sepsis in patient with recurrent E. Coli bacteremia, suspect GI source most likely hematogenous translocation from gut 2/2 portal hypertensive colopathy.  Normal lactate in cirrhotic is perhaps more convincing of continued perfusion (lactate cleared by liver, so would expect to be falsely elevated if present in blood). No diarrhea to suggest C. Diff, patient received ivermectin x 2 on last admission as treatment for strongyloides, patient also with history of hepatic cyst, echinococcus antibody negative and no sign of cyst on recent imaging. Final possible source is new PICC, though no signs of thrombophlebitis to require acute removal, will maintain pending blood cultures.  Plan to continue broad spectrum ABx with Vanc/Zosyn pending blood cultures, no significant ascites to tap, though SBP should also be on DDx. Recommend ID and GI c/s, should also address with liver transplant team. Patient is currently being worked up for liver xplant, though if this does not seem imminent, may need to consider TIPS or other relief of portal HTN. TTE last hospitalization was negative, patient without new murmur.  Would await culture results, consider KYA if positive, though E. Coli is not a classically sticky bug. Anemia and thrombocytopenia likely both 2/2 liver failure, though anemia is normocytic which could suggest mixed picture, iron studies pending, B12, folate.

## 2017-07-05 NOTE — ED ADULT NURSE NOTE - OBJECTIVE STATEMENT
41 year old male presents to ED complaining of chest pain radiating to his pelvis.  Pt states he is nausea, slight headache, and feels sick, PICC line in place. Reports fevers/chills at home. Pt was discharged from hospital for e.coli bacteremia and PICC line was placed.  Pt is 10/10 pain 41 year old male presents to ED complaining of chest pain radiating to his pelvis.  Pt states he is nausea, slight headache, and feels sick, PICC line in place. Reports fevers/chills at home. Pt was discharged from hospital for e.coli bacteremia and PICC line was placed.  Pt is 10/10 pain to abdominal area. Abdomen extremely tender, nondistended. Reports nausea, no vomiting. Last BM today , normal . no diarrhea/constipation.  Hx of having the fluid from his abd area tapped in the past. Pt presents febrile 102.5 orally. Pt reports that he has been feeling feverish at home with no documented temperatures with a thermometer. Lungs CTA unlabored, no coughing no sob. Pt states that he was recently discharged from the hospital. PICC line dressing changed as per pt request stating that it was last changed 2 days ago. Dried blood present to catheter sight. Skin hot, dry and intact.

## 2017-07-05 NOTE — ED PROVIDER NOTE - MEDICAL DECISION MAKING DETAILS
41M PMH cirrhosis, recently d/c following admission for e.coli p/w fevers, abd painX1 day. Given presence of fluid wave, concern for SBP, also possible infection 2/2 PICC line. Will cover with broad spectrum ABx, check UA, Bcx, CXR, labs. Pain control

## 2017-07-05 NOTE — ED ADULT NURSE NOTE - ED STAT RN HANDOFF DETAILS
report to change of shift rn. pt complaining of abd pain and requesting more morphine. dispo pending. Urine sample sent

## 2017-07-05 NOTE — ED PROVIDER NOTE - ATTENDING CONTRIBUTION TO CARE
57 year-old female patient, recently discharged from hospital due infectious cause. Patient referred to ED due to hypotension that responded adequately to IVF's. I agree with PA assessment and plan. Will admit to Medicine. Dr. Archie Gilbert

## 2017-07-05 NOTE — H&P ADULT - NSHPLABSRESULTS_GEN_ALL_CORE
All labs personally reviewed: WBC 8.5, Hgb 10.5, MCV 92, Plt 66, Na 133, Cr .82, Alb 2.9, Bili 4.0, Alk P 247, AST/ALT 51/29, VBG lactate 2.0    All imaging personally reviewed: CXR official read pending. Lungs appear clear.

## 2017-07-05 NOTE — ED ADULT NURSE REASSESSMENT NOTE - NS ED NURSE REASSESS COMMENT FT1
Received report from REMBERTO Churchill. Pt A&Ox3, resting, VS stable. Safety checked. c/o midepigastric pain, MD notified, will administer pain med as per MD order. Comfort care provided. Pt encouraged to call for assist when needed. pending MD reassessment

## 2017-07-05 NOTE — H&P ADULT - PROBLEM SELECTOR PLAN 1
Febrile, tachycardic, tachypneic with recently demonstrated E. coli bacteremia. VBG lactate 2.0.  - Unclear source, likely abdominal given location of symptoms  - Follow-up CT A/P  - Would consider ID consult in AM  - Would consider GI/Hepatology consult in AM Febrile, tachycardic, tachypneic with recently demonstrated E. coli bacteremia. VBG lactate 2.0. Unclear source, likely abdominal given location of symptoms. May be related to translocation from portal HTN with colopathy.  - Follow-up CT A/P  - Recommend considering KYA  - C/w Vanc/Zosyn at this time  - Would consider ID consult in AM  - Would consider GI/Hepatology consult in AM

## 2017-07-05 NOTE — H&P ADULT - NSHPPHYSICALEXAM_GEN_ALL_CORE
.  VITAL SIGNS:  T(C): 37.8 (07-05-17 @ 19:57), Max: 39.2 (07-05-17 @ 16:14)  T(F): 100.1 (07-05-17 @ 19:57), Max: 102.5 (07-05-17 @ 16:14)  HR: 83 (07-05-17 @ 19:57) (83 - 109)  BP: 112/68 (07-05-17 @ 19:57) (110/69 - 129/69)  BP(mean): --  RR: 18 (07-05-17 @ 19:57) (18 - 26)  SpO2: 97% (07-05-17 @ 19:57) (91% - 98%)  Wt(kg): --    PHYSICAL EXAM:    Constitutional: WDWN resting comfortably in bed; NAD  Head: NC/AT  Eyes: PERRL, EOMI  ENT: MMM  Neck: supple; no JVD  Respiratory: CTA B/L; no retractions  Cardiac: +S1/S2; RRR  Gastrointestinal: soft, non-tender at this time s/p IV morphine; no rebound or guarding; +BSx4  Extremities: no clubbing or cyanosis; no peripheral edema  Musculoskeletal: no joint swelling, tenderness or erythema  Vascular: 2+ radial, femoral, DP/PT pulses B/L  Dermatologic: skin warm, dry and intact; no rashes, wounds. Diffuse vitiligo noted.  Lymphatic: no submandibular or cervical LAD  Neurologic: AAOx3; no focal deficits  Psychiatric: affect and characteristics of appearance, verbalizations, behaviors are appropriate

## 2017-07-05 NOTE — H&P ADULT - PROBLEM SELECTOR PLAN 3
- Continue with home lasix, spironolactone, nadolol  - Trend LFTs, Alk P, bilirubin  - Monitor for changes in ascites  - Recommend hepatology consult in AM

## 2017-07-05 NOTE — H&P ADULT - PROBLEM SELECTOR PLAN 2
Undetermined etiology. Patient s/p numerous diagnostic studies. Portal hypertensive colopathy vs colitis involving the ascending and proximal transverse colon demonstrated on previous imaging. No signs of hepatic abscess.  - Continue with pain control w/ Oxy IR  - Follow-up CT A/P  - Continue with infectious work-up. F/u BCx.  - Would consider ID and GI consult in AM

## 2017-07-05 NOTE — H&P ADULT - PROBLEM SELECTOR PLAN 7
Patient on Metformin and levemir 40 U at home. Reports recently taking 25-30 levemir because his sugars have gone down when he is not sick.   - Hold PO hypoglycemics  - Continue with lantus 25U  - pre-meal and bedtime sliding scale

## 2017-07-05 NOTE — ED PROVIDER NOTE - CONSTITUTIONAL, MLM
normal... Well appearing, well nourished, awake, alert, oriented to person, place, time/situation and in distress due to pain.

## 2017-07-05 NOTE — ED PROVIDER NOTE - PROGRESS NOTE DETAILS
Patient was evaluated, found in no acute distress, but febrile, and with history of recent admission due to bacteremia, concerning for persistent infection. Labs including blood cultures were drawn to assess for persistent bacteremia. Patient will be admitted to Medicine service. I agree with resident assessment and plan. Dr. Archie Gilbert

## 2017-07-05 NOTE — H&P ADULT - NSHPOUTPATIENTPROVIDERS_GEN_ALL_CORE
Rj Woods (PMD)  Satya Boss (Infectious Disease) Dr. Rj Woods (PMD)  Dr. Satya Boss (Infectious Disease)  Dr. Burgos (Liver transplant, [facility])  Dr. Andre Thakkar (Gastroenterology)

## 2017-07-05 NOTE — H&P ADULT - HISTORY OF PRESENT ILLNESS
41M PMH alcoholic cirrhosis c/b varices, DMII, h/o c.diff, strongyloides, latent TB, vitiligo, recent admission for e.coli bacteremia (discharged 6/27 on ceftriaxone via PICC)     p/w acute onset abd pain and feversX1 day. Pt states that he was in good health following d/c up until today where he awoke with nausea, fevers, and severe abd pain prompting him to present to ED. Pt denies vomiting, diarrhea, dysuria, urinary frequency. Has had mild h/a but denies photophobia or neck stiffness.      Initial ED Vitals: T 100.7, , /74, RR 24, SpO2 91% on RA (became 98% on 2L NC)  Initial Labs: WBC 8.5, Hgb 10.5, MCV 92, Plt 66, Na 133, Cr .82, Alb 2.9, Bili 4.0, Alk P 247, AST/ALT 51/29, VBG lactate 2.0  Initial Imaging: CXR official read pending. Lungs appear clear.    In the ED, the patient was given Vanc/Zosyn, IV tylenol, pepcid, zofran, and morphine 4 mg. Blood cultures x3 were drawn (2 peripherally, 1 through PICC), UCx taken, CT Abd/Pelv ordered. 41M PMH alcoholic cirrhosis c/b varices, DMII, h/o c.diff, strongyloides, latent TB, vitiligo, recent admission for e.coli bacteremia (discharged 6/27 on ceftriaxone via PICC).     p/w acute onset abd pain and feversX1 day. Pt states that he was in good health following d/c up until today where he awoke with nausea, fevers, and severe abd pain prompting him to present to ED. Pt denies vomiting, diarrhea, dysuria, urinary frequency. Has had mild h/a but denies photophobia or neck stiffness.      Initial ED Vitals: T 100.7, , /74, RR 24, SpO2 91% on RA (became 98% on 2L NC)  Initial Labs: WBC 8.5, Hgb 10.5, MCV 92, Plt 66, Na 133, Cr .82, Alb 2.9, Bili 4.0, Alk P 247, AST/ALT 51/29, VBG lactate 2.0  Initial Imaging: CXR official read pending. Lungs appear clear.    In the ED, the patient was given Vanc/Zosyn, IV tylenol, pepcid, zofran, and morphine 4 mg. Blood cultures x3 were drawn (2 peripherally, 1 through PICC), UCx taken, CT Abd/Pelv ordered. 41M PMH alcoholic cirrhosis c/b varices, DMII, h/o c.diff, strongyloides, latent TB, vitiligo, recent admission for e.coli bacteremia (discharged 6/27 on ceftriaxone via PICC).     p/w acute onset abd pain and feversX1 day. Pt states that he was in good health following d/c up until today where he awoke with nausea, fevers, and severe abd pain prompting him to present to ED. Pt denies vomiting, diarrhea, dysuria, urinary frequency. Has had mild h/a but denies photophobia or neck stiffness.      Initial ED Vitals: T 100.7, , /74, RR 24, SpO2 91% on RA (became 98% on 2L NC)  Initial Labs: WBC 8.5, Hgb 10.5, MCV 92, Plt 66, Na 133, Cr .82, Alb 2.9, Bili 4.0, Alk P 247, AST/ALT 51/29, VBG lactate 2.0  Initial Imaging: CXR official read pending. Lungs appear clear.    In the ED, the patient was given Vanc/Zosyn, IV tylenol, pepcid, zofran, and morphine 4 mg. Blood cultures x3 were drawn (2 peripherally, 1 through PICC), UCx taken, CT Abd/Pelv ordered.      Last alcoholic drink was in Sept 2015. Prior to that he was drinking about 3-4 beers every day.   He was hospitalized the beginning of November 2015 and was found to have SBP treated with Cipro.   He was hospital from 2/26/17-3/3/17 at [facility]. He went to ED with complaint of abdominal pain and fevers. He was found to have gram negative bacteremia and was treated with ciprofloxacin. CT showed portal hypertensive colopathy vs colitis involving the ascending and proximal transverse colon. HIDA scan was within normal limits. EUS done to find source of gram negative bactermia but was negative.   He presented on April 13th to the ED with 2 weeks of fever, nausea, anorexia, diarrhea, and sharp epigastric pain  CT A/P showed small bowel and right colonic wall thickening, suggestive of enterocolitis vs. portal enterocolopathy. Blood and urine cultures were sent and came back negative, and C. diff was negative. Hepatology was consulted and recommended discontinuation of antibiotics, prostate exam to r/o prostatitis, and colonoscopy for r/o enterocolitis. Prostate exam was unremarkable, and colonoscopy only showed mild portal colopathy. IR was consulted for diagnostic paracentesis, and PMNs on the ascites fluid sample were 90, inconsistent with SBP. SAAG > 1.1, consistent with known cirrhosis.  Cytology showed scattered benign reactive mesothelial cells and lymphocytes, but no organisms. RVP did return positive for influenza B during admission. Patient was started on Tamiflu 75 mg, and his symptoms began improving by the next day. ID and hepatology suggested empiric treatment of SBP despite negative findings on ascites fluid, and patient was given cefepime initially and later switched to Cipro 500mg BID for treatment. Hepatology recommended 1 tablet of Bactrim DS daily for SBP prophylaxis after treatment with cipro.  He was Hospitalized at [facility] for 4 days in May, presenting with fever, chills, vomiting, and abdominal pain. He had a CT scan of the abdomen which showed him to have a cirrhotic liver with small amount of ascites and thickened colon. The colon was unchanged from previous imaging in April 2017 and he had undergone a colonoscopy April 17, 2017 which revealed portal colopathy. He has been taking Cipro and Flagyl and has been doing well since discharge. He has been seen at the transplant Center at [facility] over the past few months  Patient was hospitalized again on June 21 - 30th with similar symptoms of fevers and abdominal pain, was found to have E. coli bacteremia. During this hospitalization he had a NM tagged WBC scan that demonstrated no source for infection. TTE did not report endocarditis. MRI abdomen and abdominal US did not demonstrate source of infection. Patient had PICC placed and was planned for 2 weeks daily IV Cipro followed by bactrim prophylaxis. 41M PMH alcoholic cirrhosis c/b varices, DMII, h/o c.diff, strongyloides, latent TB, vitiligo, recent admission for e.coli bacteremia (discharged 6/27 on ceftriaxone via PICC). As noted and described below, the patient has a history notable for recurrent hospitalizations over the past 6 months for consistent complaints of abdominal pain, fevers, and E. coli bacteremia. The patient was last discharged on June 30th with a PICC placed and put on daily IV ciprofloxacin, with plan to switch to bactrim prophylaxis pending course completion. The patient reports being compliant with his ceftriaxone (although has not yet had it today). He arrives with chills, subjective fever, and profuse abdominal pain that started today. His pain and overall symptom constellation are consistent with his prior hospitalizations. The patient denies any N/V, diarrhea, constipation, hemetemesis, hematochezia, melena, urinary symptoms, cough or increased mucus production, SOB, CP. He noted that his legs started swelling today, as sometimes happens when he does not walk all day (which he hasn't because of the pain). Patient came in today when his symptoms returned.    Initial ED Vitals: T 100.7, , /74, RR 24, SpO2 91% on RA (became 98% on 2L NC)  Initial Labs: WBC 8.5, Hgb 10.5, MCV 92, Plt 66, Na 133, Cr .82, Alb 2.9, Bili 4.0, Alk P 247, AST/ALT 51/29, VBG lactate 2.0  Initial Imaging: CXR official read pending. Lungs appear clear.    In the ED, the patient was given Vanc/Zosyn, IV tylenol, pepcid, zofran, and morphine 4 mg. Blood cultures x3 were drawn (2 peripherally, 1 through PICC), UCx taken, CT Abd/Pelv ordered.      History of current disease process:  Last alcoholic drink was in Sept 2015. Prior to that he was drinking about 3-4 beers every day.   He was hospitalized the beginning of November 2015 and was found to have SBP treated with Cipro.   He was hospital from 2/26/17-3/3/17 at [facility]. He went to ED with complaint of abdominal pain and fevers. He was found to have gram negative bacteremia and was treated with ciprofloxacin. CT showed portal hypertensive colopathy vs colitis involving the ascending and proximal transverse colon. HIDA scan was within normal limits. EUS done to find source of gram negative bactermia but was negative.   He presented on April 13th to the ED with 2 weeks of fever, nausea, anorexia, diarrhea, and sharp epigastric pain  CT A/P showed small bowel and right colonic wall thickening, suggestive of enterocolitis vs. portal enterocolopathy. Blood and urine cultures were sent and came back negative, and C. diff was negative. Hepatology was consulted and recommended discontinuation of antibiotics, prostate exam to r/o prostatitis, and colonoscopy for r/o enterocolitis. Prostate exam was unremarkable, and colonoscopy only showed mild portal colopathy. IR was consulted for diagnostic paracentesis, and PMNs on the ascites fluid sample were 90, inconsistent with SBP. SAAG > 1.1, consistent with known cirrhosis.  Cytology showed scattered benign reactive mesothelial cells and lymphocytes, but no organisms. RVP did return positive for influenza B during admission. Patient was started on Tamiflu 75 mg, and his symptoms began improving by the next day. ID and hepatology suggested empiric treatment of SBP despite negative findings on ascites fluid, and patient was given cefepime initially and later switched to Cipro 500mg BID for treatment. Hepatology recommended 1 tablet of Bactrim DS daily for SBP prophylaxis after treatment with cipro.  He was Hospitalized at [facility] for 4 days in May, presenting with fever, chills, vomiting, and abdominal pain. He had a CT scan of the abdomen which showed him to have a cirrhotic liver with small amount of ascites and thickened colon. The colon was unchanged from previous imaging in April 2017 and he had undergone a colonoscopy April 17, 2017 which revealed portal colopathy. He has been taking Cipro and Flagyl and has been doing well since discharge. He has been seen at the transplant Center at [facility] over the past few months  Patient was hospitalized again on June 21 - 30th with similar symptoms of fevers and abdominal pain, was found to have E. coli bacteremia. During this hospitalization he had a NM tagged WBC scan that demonstrated no source for infection. TTE did not report endocarditis. MRI abdomen and abdominal US did not demonstrate source of infection. Patient had PICC placed and was planned for 2 weeks daily IV Cipro followed by bactrim prophylaxis. 41M PMH alcoholic cirrhosis c/b varices, DMII, h/o c.diff, strongyloides, latent TB, vitiligo, recent admission for e.coli bacteremia (discharged 6/27 on ceftriaxone via PICC) who presents with fever and abdominal pain. As noted and described below, the patient has a history notable for recurrent hospitalizations over the past 6 months for consistent complaints of abdominal pain, fevers, and E. coli bacteremia. The patient was last discharged on June 30th with a PICC placed and put on daily IV ciprofloxacin, with plan to switch to bactrim prophylaxis pending course completion. The patient reports being compliant with his ceftriaxone (although has not yet had it today). He arrives with chills, subjective fever, and profuse abdominal pain that started today. His pain and overall symptom constellation are consistent with his prior hospitalizations. The patient denies any N/V, diarrhea, constipation, hemetemesis, hematochezia, melena, urinary symptoms, cough or increased mucus production, SOB, CP. He noted that his legs started swelling today, as sometimes happens when he does not walk all day (which he hasn't because of the pain). Patient came in today when his symptoms returned.    Initial ED Vitals: T 100.7, , /74, RR 24, SpO2 91% on RA (became 98% on 2L NC)  Initial Labs: WBC 8.5, Hgb 10.5, MCV 92, Plt 66, Na 133, Cr .82, Alb 2.9, Bili 4.0, Alk P 247, AST/ALT 51/29, VBG lactate 2.0  Initial Imaging: CXR official read pending. Lungs appear clear.    In the ED, the patient was given Vanc/Zosyn, IV tylenol, pepcid, zofran, and morphine 4 mg. Blood cultures x3 were drawn (2 peripherally, 1 through PICC), UCx taken, CT Abd/Pelv ordered.      History of current disease process:  Last alcoholic drink was in Sept 2015. Prior to that he was drinking about 3-4 beers every day.   He was hospitalized the beginning of November 2015 and was found to have SBP treated with Cipro.   He was hospital from 2/26/17-3/3/17 at [facility]. He went to ED with complaint of abdominal pain and fevers. He was found to have gram negative bacteremia and was treated with ciprofloxacin. CT showed portal hypertensive colopathy vs colitis involving the ascending and proximal transverse colon. HIDA scan was within normal limits. EUS done to find source of gram negative bactermia but was negative.   He presented on April 13th to the ED with 2 weeks of fever, nausea, anorexia, diarrhea, and sharp epigastric pain  CT A/P showed small bowel and right colonic wall thickening, suggestive of enterocolitis vs. portal enterocolopathy. Blood and urine cultures were sent and came back negative, and C. diff was negative. Hepatology was consulted and recommended discontinuation of antibiotics, prostate exam to r/o prostatitis, and colonoscopy for r/o enterocolitis. Prostate exam was unremarkable, and colonoscopy only showed mild portal colopathy. IR was consulted for diagnostic paracentesis, and PMNs on the ascites fluid sample were 90, inconsistent with SBP. SAAG > 1.1, consistent with known cirrhosis.  Cytology showed scattered benign reactive mesothelial cells and lymphocytes, but no organisms. RVP did return positive for influenza B during admission. Patient was started on Tamiflu 75 mg, and his symptoms began improving by the next day. ID and hepatology suggested empiric treatment of SBP despite negative findings on ascites fluid, and patient was given cefepime initially and later switched to Cipro 500mg BID for treatment. Hepatology recommended 1 tablet of Bactrim DS daily for SBP prophylaxis after treatment with cipro.  He was Hospitalized at [facility] for 4 days in May, presenting with fever, chills, vomiting, and abdominal pain. He had a CT scan of the abdomen which showed him to have a cirrhotic liver with small amount of ascites and thickened colon. The colon was unchanged from previous imaging in April 2017 and he had undergone a colonoscopy April 17, 2017 which revealed portal colopathy. He has been taking Cipro and Flagyl and has been doing well since discharge. He has been seen at the transplant Center at [facility] over the past few months  Patient was hospitalized again on June 21 - 30th with similar symptoms of fevers and abdominal pain, was found to have E. coli bacteremia. During this hospitalization he had a NM tagged WBC scan that demonstrated no source for infection. TTE did not report endocarditis. MRI abdomen and abdominal US did not demonstrate source of infection. Patient had PICC placed and was planned for 2 weeks daily IV Cipro followed by bactrim prophylaxis. 41M PMH alcoholic cirrhosis c/b varices, DMII, h/o c.diff, strongyloides, latent TB, vitiligo, recent admission for e.coli bacteremia (discharged 6/27 on ceftriaxone via PICC) who presents with fever and abdominal pain. As noted and described below, the patient has a history notable for recurrent hospitalizations over the past 6 months for consistent complaints of abdominal pain, fevers, and E. coli bacteremia. The patient was last discharged on June 30th with a PICC placed and put on daily IV ceftriaxone, with plan to switch to bactrim prophylaxis pending course completion. The patient reports being compliant with his ceftriaxone (although has not yet had it today). He arrives with chills, subjective fever, and profuse abdominal pain that started today. His pain and overall symptom constellation are consistent with his prior hospitalizations. The patient denies any N/V, diarrhea, constipation, hemetemesis, hematochezia, melena, urinary symptoms, cough or increased mucus production, SOB, CP. He noted that his legs started swelling today, as sometimes happens when he does not walk all day (which he hasn't because of the pain). Patient came in today when his symptoms returned.    Initial ED Vitals: T 100.7, , /74, RR 24, SpO2 91% on RA (became 98% on 2L NC)  Initial Labs: WBC 8.5, Hgb 10.5, MCV 92, Plt 66, Na 133, Cr .82, Alb 2.9, Bili 4.0, Alk P 247, AST/ALT 51/29, VBG lactate 2.0  Initial Imaging: CXR official read pending. Lungs appear clear.    In the ED, the patient was given Vanc/Zosyn, IV tylenol, pepcid, zofran, and morphine 4 mg. Blood cultures x3 were drawn (2 peripherally, 1 through PICC), UCx taken, CT Abd/Pelv ordered.      History of current disease process:  Last alcoholic drink was in Sept 2015. Prior to that he was drinking about 3-4 beers every day.   He was hospitalized the beginning of November 2015 and was found to have SBP treated with Cipro.   He was hospital from 2/26/17-3/3/17 at [facility]. He went to ED with complaint of abdominal pain and fevers. He was found to have gram negative bacteremia and was treated with ciprofloxacin. CT showed portal hypertensive colopathy vs colitis involving the ascending and proximal transverse colon. HIDA scan was within normal limits. EUS done to find source of gram negative bactermia but was negative.   He presented on April 13th to the ED with 2 weeks of fever, nausea, anorexia, diarrhea, and sharp epigastric pain  CT A/P showed small bowel and right colonic wall thickening, suggestive of enterocolitis vs. portal enterocolopathy. Blood and urine cultures were sent and came back negative, and C. diff was negative. Hepatology was consulted and recommended discontinuation of antibiotics, prostate exam to r/o prostatitis, and colonoscopy for r/o enterocolitis. Prostate exam was unremarkable, and colonoscopy only showed mild portal colopathy. IR was consulted for diagnostic paracentesis, and PMNs on the ascites fluid sample were 90, inconsistent with SBP. SAAG > 1.1, consistent with known cirrhosis.  Cytology showed scattered benign reactive mesothelial cells and lymphocytes, but no organisms. RVP did return positive for influenza B during admission. Patient was started on Tamiflu 75 mg, and his symptoms began improving by the next day. ID and hepatology suggested empiric treatment of SBP despite negative findings on ascites fluid, and patient was given cefepime initially and later switched to Cipro 500mg BID for treatment. Hepatology recommended 1 tablet of Bactrim DS daily for SBP prophylaxis after treatment with cipro.  He was Hospitalized at [facility] for 4 days in May, presenting with fever, chills, vomiting, and abdominal pain. He had a CT scan of the abdomen which showed him to have a cirrhotic liver with small amount of ascites and thickened colon. The colon was unchanged from previous imaging in April 2017 and he had undergone a colonoscopy April 17, 2017 which revealed portal colopathy. He has been taking Cipro and Flagyl and has been doing well since discharge. He has been seen at the transplant Center at [facility] over the past few months  Patient was hospitalized again on June 21 - 30th with similar symptoms of fevers and abdominal pain, was found to have E. coli bacteremia. During this hospitalization he had a NM tagged WBC scan that demonstrated no source for infection. TTE did not report endocarditis. MRI abdomen and abdominal US did not demonstrate source of infection. Patient had PICC placed and was planned for 2 weeks daily IV Cipro followed by bactrim prophylaxis. 41M PMH alcoholic cirrhosis c/b varices, DMII, h/o c.diff, strongyloides, latent TB, vitiligo, recent admission for e.coli bacteremia (discharged 6/27 on ceftriaxone via PICC) who presents with fever and abdominal pain. As noted and described below, the patient has a history notable for recurrent hospitalizations over the past 6 months for consistent complaints of abdominal pain, fevers, and E. coli bacteremia. The patient was last discharged on June 30th with a PICC placed and put on daily IV ceftriaxone, with plan to switch to bactrim prophylaxis pending course completion. The patient reports being compliant with his ceftriaxone (although has not yet had it today). He arrives with chills, subjective fever, and profuse abdominal pain that started today. His pain and overall symptom constellation are consistent with his prior hospitalizations. The patient denies any N/V, diarrhea, constipation, hemetemesis, hematochezia, melena, urinary symptoms, cough or increased mucus production, SOB, CP. He noted that his legs started swelling today, as sometimes happens when he does not walk all day (which he hasn't because of the pain). Patient came in today when his symptoms returned.    Initial ED Vitals: T 100.7, , /74, RR 24, SpO2 91% on RA (became 98% on 2L NC)  Initial Labs: WBC 8.5, Hgb 10.5, MCV 92, Plt 66, Na 133, Cr .82, Alb 2.9, Bili 4.0, Alk P 247, AST/ALT 51/29, VBG lactate 2.0  Initial Imaging: CXR official read pending. Lungs appear clear.    In the ED, the patient was given Vanc/Zosyn, IV tylenol, pepcid, zofran, and morphine 4 mg. Blood cultures x3 were drawn (2 peripherally, 1 through PICC), UCx taken, CT Abd/Pelv ordered.    History of current disease process:  Last alcoholic drink was in Sept 2015. Prior to that he was drinking about 3-4 beers every day.     Prior hospitalizations summarized:   11/2015 for SBP treated with Cipro  2/26/17-3/3/17 at [facility], p/w abdominal pain and fevers, found to have gram negative bacteremia and was treated with ciprofloxacin. CT showed portal hypertensive colopathy vs colitis involving the ascending and proximal transverse colon. HIDA scan was within normal limits. EUS done to find source of gram negative bactermia but was negative.   4/13 p/w 2 weeks of fever, nausea, anorexia, diarrhea, and sharp epigasrtic pain, CT A/P showed small bowel and right colonic wall thickening, suggestive of enterocolitis vs. portal enterocolopathy. Blood and urine cultures were sent and came back negative, and C. diff was negative. Hepatology was consulted and recommended discontinuation of antibiotics, prostate exam to r/o prostatitis, and colonoscopy for r/o enterocolitis. Prostate exam was unremarkable, and colonoscopy only showed mild portal colopathy. IR was consulted for diagnostic paracentesis, and PMNs on the ascites fluid sample were 90, inconsistent with SBP. SAAG > 1.1, consistent with known cirrhosis.  Cytology showed scattered benign reactive mesothelial cells and lymphocytes, but no organisms. RVP did return positive for influenza B during admission. Patient was started on Tamiflu 75 mg, and his symptoms began improving by the next day. ID and hepatology suggested empiric treatment of SBP despite negative findings on ascites fluid, and patient was given cefepime initially and later switched to Cipro 500mg BID for treatment. Hepatology recommended 1 tablet of Bactrim DS daily for SBP prophylaxis after treatment with cipro.  5/2017 p/w fever, chills, vomiting, and abdominal pain, CT scan of the abdomen showed cirrhotic liver with small amount of ascites and thickened colon, unchanged from previous imaging in 4/2017. Colonoscopy April 17, 2017 revealed portal colopathy.   6/21-30 p/w fevers and abdominal pain, was found to have E. Coli bacteremia. NM tagged WBC scan demonstrated no source for infection.TTE did not report endocarditis. MRI abdomen and abdominal US did not demonstrate source of infection. Patient had PICC placed and was planned for 2 weeks daily IV Cipro followed by bactrim prophylaxis.

## 2017-07-06 LAB
ALBUMIN SERPL ELPH-MCNC: 1.9 G/DL — LOW (ref 3.3–5)
ALP SERPL-CCNC: 150 U/L — HIGH (ref 40–120)
ALT FLD-CCNC: 24 U/L — SIGNIFICANT CHANGE UP (ref 10–45)
ANION GAP SERPL CALC-SCNC: 10 MMOL/L — SIGNIFICANT CHANGE UP (ref 5–17)
AST SERPL-CCNC: 39 U/L — SIGNIFICANT CHANGE UP (ref 10–40)
BASOPHILS # BLD AUTO: 0.03 K/UL — SIGNIFICANT CHANGE UP (ref 0–0.2)
BASOPHILS NFR BLD AUTO: 0.5 % — SIGNIFICANT CHANGE UP (ref 0–2)
BILIRUB SERPL-MCNC: 3.6 MG/DL — HIGH (ref 0.2–1.2)
BUN SERPL-MCNC: 10 MG/DL — SIGNIFICANT CHANGE UP (ref 7–23)
CALCIUM SERPL-MCNC: 6.9 MG/DL — LOW (ref 8.4–10.5)
CHLORIDE SERPL-SCNC: 103 MMOL/L — SIGNIFICANT CHANGE UP (ref 96–108)
CO2 SERPL-SCNC: 21 MMOL/L — LOW (ref 22–31)
CREAT SERPL-MCNC: 0.68 MG/DL — SIGNIFICANT CHANGE UP (ref 0.5–1.3)
CULTURE RESULTS: NO GROWTH — SIGNIFICANT CHANGE UP
EOSINOPHIL # BLD AUTO: 0.19 K/UL — SIGNIFICANT CHANGE UP (ref 0–0.5)
EOSINOPHIL NFR BLD AUTO: 3.2 % — SIGNIFICANT CHANGE UP (ref 0–6)
FERRITIN SERPL-MCNC: 120 NG/ML — SIGNIFICANT CHANGE UP (ref 30–400)
FOLATE SERPL-MCNC: 16.8 NG/ML — SIGNIFICANT CHANGE UP (ref 4.8–24.2)
GLUCOSE SERPL-MCNC: 204 MG/DL — HIGH (ref 70–99)
HCT VFR BLD CALC: 25.3 % — LOW (ref 39–50)
HGB BLD-MCNC: 9 G/DL — LOW (ref 13–17)
IMM GRANULOCYTES NFR BLD AUTO: 0.2 % — SIGNIFICANT CHANGE UP (ref 0–1.5)
IRON SATN MFR SERPL: 38 % — SIGNIFICANT CHANGE UP (ref 16–55)
IRON SATN MFR SERPL: 81 UG/DL — SIGNIFICANT CHANGE UP (ref 45–165)
LYMPHOCYTES # BLD AUTO: 0.9 K/UL — LOW (ref 1–3.3)
LYMPHOCYTES # BLD AUTO: 15.3 % — SIGNIFICANT CHANGE UP (ref 13–44)
MAGNESIUM SERPL-MCNC: 1.8 MG/DL — SIGNIFICANT CHANGE UP (ref 1.6–2.6)
MCHC RBC-ENTMCNC: 32.7 PG — SIGNIFICANT CHANGE UP (ref 27–34)
MCHC RBC-ENTMCNC: 35.6 GM/DL — SIGNIFICANT CHANGE UP (ref 32–36)
MCV RBC AUTO: 92 FL — SIGNIFICANT CHANGE UP (ref 80–100)
MONOCYTES # BLD AUTO: 0.7 K/UL — SIGNIFICANT CHANGE UP (ref 0–0.9)
MONOCYTES NFR BLD AUTO: 11.9 % — SIGNIFICANT CHANGE UP (ref 2–14)
NEUTROPHILS # BLD AUTO: 4.07 K/UL — SIGNIFICANT CHANGE UP (ref 1.8–7.4)
NEUTROPHILS NFR BLD AUTO: 68.9 % — SIGNIFICANT CHANGE UP (ref 43–77)
PHOSPHATE SERPL-MCNC: 3.6 MG/DL — SIGNIFICANT CHANGE UP (ref 2.5–4.5)
PLATELET # BLD AUTO: 61 K/UL — LOW (ref 150–400)
POTASSIUM SERPL-MCNC: 3.9 MMOL/L — SIGNIFICANT CHANGE UP (ref 3.5–5.3)
POTASSIUM SERPL-SCNC: 3.9 MMOL/L — SIGNIFICANT CHANGE UP (ref 3.5–5.3)
PROT SERPL-MCNC: 5.8 G/DL — LOW (ref 6–8.3)
RBC # BLD: 2.75 M/UL — LOW (ref 4.2–5.8)
RBC # FLD: 14.9 % — HIGH (ref 10.3–14.5)
SODIUM SERPL-SCNC: 134 MMOL/L — LOW (ref 135–145)
SPECIMEN SOURCE: SIGNIFICANT CHANGE UP
TIBC SERPL-MCNC: 212 UG/DL — LOW (ref 220–430)
UIBC SERPL-MCNC: 131 UG/DL — SIGNIFICANT CHANGE UP (ref 110–370)
VIT B12 SERPL-MCNC: 1591 PG/ML — HIGH (ref 243–894)
WBC # BLD: 5.9 K/UL — SIGNIFICANT CHANGE UP (ref 3.8–10.5)
WBC # FLD AUTO: 5.9 K/UL — SIGNIFICANT CHANGE UP (ref 3.8–10.5)

## 2017-07-06 PROCEDURE — 99222 1ST HOSP IP/OBS MODERATE 55: CPT | Mod: GC

## 2017-07-06 PROCEDURE — 99232 SBSQ HOSP IP/OBS MODERATE 35: CPT | Mod: GC

## 2017-07-06 PROCEDURE — 36584 COMPL RPLCMT PICC RS&I: CPT

## 2017-07-06 PROCEDURE — 77001 FLUOROGUIDE FOR VEIN DEVICE: CPT | Mod: 26

## 2017-07-06 PROCEDURE — 99254 IP/OBS CNSLTJ NEW/EST MOD 60: CPT

## 2017-07-06 RX ADMIN — Medication 1 TABLET(S): at 18:01

## 2017-07-06 RX ADMIN — INSULIN GLARGINE 25 UNIT(S): 100 INJECTION, SOLUTION SUBCUTANEOUS at 22:14

## 2017-07-06 RX ADMIN — PANTOPRAZOLE SODIUM 40 MILLIGRAM(S): 20 TABLET, DELAYED RELEASE ORAL at 05:22

## 2017-07-06 RX ADMIN — PIPERACILLIN AND TAZOBACTAM 25 GRAM(S): 4; .5 INJECTION, POWDER, LYOPHILIZED, FOR SOLUTION INTRAVENOUS at 02:03

## 2017-07-06 RX ADMIN — Medication 20 MILLIGRAM(S): at 05:22

## 2017-07-06 RX ADMIN — Medication 250 MILLIGRAM(S): at 18:02

## 2017-07-06 RX ADMIN — NADOLOL 20 MILLIGRAM(S): 80 TABLET ORAL at 05:22

## 2017-07-06 RX ADMIN — Medication 2: at 18:29

## 2017-07-06 RX ADMIN — Medication 250 MILLIGRAM(S): at 06:03

## 2017-07-06 RX ADMIN — PIPERACILLIN AND TAZOBACTAM 25 GRAM(S): 4; .5 INJECTION, POWDER, LYOPHILIZED, FOR SOLUTION INTRAVENOUS at 18:01

## 2017-07-06 RX ADMIN — PIPERACILLIN AND TAZOBACTAM 25 GRAM(S): 4; .5 INJECTION, POWDER, LYOPHILIZED, FOR SOLUTION INTRAVENOUS at 21:09

## 2017-07-06 RX ADMIN — SPIRONOLACTONE 50 MILLIGRAM(S): 25 TABLET, FILM COATED ORAL at 05:22

## 2017-07-06 RX ADMIN — Medication: at 18:00

## 2017-07-06 NOTE — PROGRESS NOTE ADULT - PROBLEM SELECTOR PLAN 2
- ? 2/2 portal hypertensive colopathy vs colitis, pain now resolved   - patient status post numerous imaging studies  - CT Abd/Pelvis: no evidence of significant change in cirrhotic sequelae, persistent right colon wall thickening, no evidence of pelvic or abdominal fluid collections   - B/C x 3 pending, c/w Vanc/Zosyn  - Will f/u w/ GI and ID

## 2017-07-06 NOTE — CONSULT NOTE ADULT - SUBJECTIVE AND OBJECTIVE BOX
GI/Hepatology consult note    HPI: 41M hx EtOH cirrhosis c/b varices, recurrent E. coli bacteremia, p/w abd pain x 1 day.  Pt reports abd pain, located centrally above umbilicus.  Describes pain as sharp, constant, 10/10 in severity. Pain is aggravated by movement.  Pt did not try anything to alleviate pain at home. He denies N, V, hematochezia, melena, subjective fevers or chills, increased abd girth, tremors, periods of confusion.  He reports increased LE edema & chronic fatigue and SOB on exertion, but reports being able to 'walk several miles'.  Pt was recently hospitalized fever, abd pain, found to have E. coli bacteremia w/ no clear source found He was d/c'ed on ceftriaxone, being administered being PICC, w/ end date of abx 7/8.  Pt reports compliance w/ the IV abx.  Of note, pt had 2 prior episodes of E. coli bacteremia (2/2017, 6/2016) & another episode of Strep infantanius bacteremia (4/2016). He also has multiple admissions this year for abd pain (2/2017, 4/2017, 5/2017, 6/2017) w/ CT scan showing persistent portal colopathy. His last paracentesis was in 4/2017 which was not c/w SBP, but pt was treated empirically per hepatology recommendations. He has documented hx of SBP in 11/2015 that was treated w/ cipro.     He has had 3 prior EGDs (2013, 4/2016, 3/2017) which has showed esophageal varices & portal hypertensive gastropathy.  Colonoscopy in 5/2016 showed small non-bleeding rectal varices. MRI in 2/2017 was negative for HCC.  Of note, pt had liver biopsy in 2012 which confirmed cirrhosis & also showed mod-severe inflammation & hepatocyte apoptosis.  He follows w/ Dr. Thakkar for hepatology and says he is currently on liver transplant list at [facility] but has missed recent appointments due to recent hospitalization.     Outpatient GI Provider: Dr. Diaz   Last Colonoscopy: 5/2016 small, non-bleeding rectal varices   Last EGD: 3/2017 showed small <5mm esophageal varices, mild portal hypertensive, gastropathy     PAST MEDICAL & SURGICAL HISTORY:  Alcoholic cirrhosis  Esophageal varices   Vitiligo  Latent tuberculosis (1990s)  Diabetes mellitus type 2, insulin dependent  Status post corneal transplant    HOME MEDICATIONS:   Ceftriaxone 1g IV until 7/8/17  Nadolol 20mg  Furosemide 20mg BID  Pantoprazole 40mg  MVI  Spironolactone 50mg  Metformin 500mg BID    MEDICATIONS  (STANDING):  insulin glargine Injectable (LANTUS) 25 Unit(s) SubCutaneous at bedtime  insulin lispro (HumaLOG) corrective regimen sliding scale   SubCutaneous three times a day before meals  insulin lispro (HumaLOG) corrective regimen sliding scale   SubCutaneous at bedtime  dextrose 5%. 1000 milliLiter(s) (50 mL/Hr) IV Continuous <Continuous>  dextrose 50% Injectable 12.5 Gram(s) IV Push once  dextrose 50% Injectable 25 Gram(s) IV Push once  dextrose 50% Injectable 25 Gram(s) IV Push once  heparin  Injectable 5000 Unit(s) SubCutaneous every 12 hours  spironolactone 50 milliGRAM(s) Oral daily  furosemide    Tablet 20 milliGRAM(s) Oral two times a day  pantoprazole    Tablet 40 milliGRAM(s) Oral before breakfast  nadolol 20 milliGRAM(s) Oral daily  multivitamin 1 Tablet(s) Oral daily  vancomycin  IVPB 1000 milliGRAM(s) IV Intermittent every 12 hours  piperacillin/tazobactam IVPB. 3.375 Gram(s) IV Intermittent every 8 hours    MEDICATIONS  (PRN):  dextrose Gel 1 Dose(s) Oral once PRN Blood Glucose LESS THAN 70 milliGRAM(s)/deciliter  glucagon  Injectable 1 milliGRAM(s) IntraMuscular once PRN Glucose LESS THAN 70 milligrams/deciliter  morphine  - Injectable 2 milliGRAM(s) IV Push every 4 hours PRN Moderate and severe pain      ALLERGIES: NKDA    SOCIAL HISTORY: , lives w/ wife & kids, former heavy EtOH use (>2 packs beer daily for 10-15 years), last use in 2014 or 2015. Remote social cigarette use (1 weekly) about 25 years ago. Denies IVDU or other illicit drug use.       FAMILY HISTORY:  Family history of diabetes mellitus (Mother)      Vital Signs Last 24 Hrs  T(C): 36.7 (06 Jul 2017 05:12), Max: 39.2 (05 Jul 2017 16:14)  T(F): 98 (06 Jul 2017 05:12), Max: 102.5 (05 Jul 2017 16:14)  HR: 71 (06 Jul 2017 05:12) (71 - 109)  BP: 103/63 (06 Jul 2017 05:12) (103/63 - 129/69)  BP(mean): --  RR: 18 (06 Jul 2017 05:12) (17 - 26)  SpO2: 95% (06 Jul 2017 05:12) (91% - 98%)      REVIEW OF SYSTEMS  General: No subjective fevers or chills	  Skin/Breast: Vitiligo   ENMT:	  Respiratory and Thorax: Chronic SOB on exertion  Cardiovascular:	No CP or palpitations   Gastrointestinal:	 localized abdomina pain around umbilicus, no nausea, vomiting, melena, hematochezia   Genitourinary: No dysuria or hematuria   Musculoskeletal: No joint swelling	  Neurological: No tremor   Psychiatric: No confusion   Hematology/Lymphatics:	  Endocrine:	  Allergic/Immunologic:	    PHYSICAL EXAM:  Constitutional: NAD, vitiligo on face  Eyes: Scleral icterus  Neck:   Respiratory:   Cardiovascular: Regular rate and rhythm   Gastrointestinal: Soft, non-distended, non-tender to deep palpation (s/p pain meds)  Extremities: Pitting LE edema  Neurological: Awake, alert, conversant, no asterixis   Skin: Patches of vitiligo on face, chest, abdomen, extremities     LABS:                       RADIOLOGY & ADDITIONAL STUDIES: GI/Hepatology consult note    HPI: 41M hx EtOH cirrhosis c/b varices, recurrent E. coli bacteremia, p/w abd pain x 1 day.  Pt reports abd pain, located centrally above umbilicus.  Describes pain as sharp, constant, 10/10 in severity. Pain is aggravated by movement.  Pt did not try anything to alleviate pain at home. He denies N, V, hematochezia, melena, subjective fevers or chills, increased abd girth, tremors, periods of confusion.  He reports increased LE edema & chronic fatigue and SOB on exertion, but reports being able to 'walk several miles'.  Pt was recently hospitalized fever, abd pain, found to have E. coli bacteremia w/ no clear source found He was d/c'ed on ceftriaxone, being administered being PICC, w/ end date of abx 7/8.  Pt reports compliance w/ the IV abx.  Of note, pt had 2 prior episodes of E. coli bacteremia (2/2017, 6/2016) & another episode of Strep infantanius bacteremia (4/2016). He also has multiple admissions this year for abd pain (2/2017, 4/2017, 5/2017, 6/2017) w/ CT scan showing persistent portal colopathy. His last paracentesis was in 4/2017 which was not c/w SBP, but pt was treated empirically per hepatology recommendations. He has documented hx of SBP in 11/2015 that was treated w/ cipro.     He has had 3 prior EGDs (2013, 4/2016, 3/2017) which has showed esophageal varices & portal hypertensive gastropathy.  Colonoscopy in 5/2016 showed small non-bleeding rectal varices. MRI in 2/2017 was negative for HCC.  Of note, pt had liver biopsy in 2012 which confirmed cirrhosis & also showed mod-severe inflammation & hepatocyte apoptosis.  He follows w/ Dr. Thakkar for hepatology and says he is currently on liver transplant list at [facility] but has missed recent appointments due to recent hospitalization.     Outpatient GI Provider: Dr. Diaz   Last Colonoscopy: 5/2016 small, non-bleeding rectal varices   Last EGD: 3/2017 showed small <5mm esophageal varices, mild portal hypertensive, gastropathy     PAST MEDICAL & SURGICAL HISTORY:  Alcoholic cirrhosis  Esophageal varices   Vitiligo  Latent tuberculosis (1990s)  Diabetes mellitus type 2, insulin dependent  Status post corneal transplant    HOME MEDICATIONS:   Ceftriaxone 1g IV until 7/8/17  Nadolol 20mg  Furosemide 20mg BID  Pantoprazole 40mg  MVI  Spironolactone 50mg  Metformin 500mg BID    MEDICATIONS  (STANDING):  insulin glargine Injectable (LANTUS) 25 Unit(s) SubCutaneous at bedtime  insulin lispro (HumaLOG) corrective regimen sliding scale   SubCutaneous three times a day before meals  insulin lispro (HumaLOG) corrective regimen sliding scale   SubCutaneous at bedtime  dextrose 5%. 1000 milliLiter(s) (50 mL/Hr) IV Continuous <Continuous>  dextrose 50% Injectable 12.5 Gram(s) IV Push once  dextrose 50% Injectable 25 Gram(s) IV Push once  dextrose 50% Injectable 25 Gram(s) IV Push once  heparin  Injectable 5000 Unit(s) SubCutaneous every 12 hours  spironolactone 50 milliGRAM(s) Oral daily  furosemide    Tablet 20 milliGRAM(s) Oral two times a day  pantoprazole    Tablet 40 milliGRAM(s) Oral before breakfast  nadolol 20 milliGRAM(s) Oral daily  multivitamin 1 Tablet(s) Oral daily  vancomycin  IVPB 1000 milliGRAM(s) IV Intermittent every 12 hours  piperacillin/tazobactam IVPB. 3.375 Gram(s) IV Intermittent every 8 hours    MEDICATIONS  (PRN):  dextrose Gel 1 Dose(s) Oral once PRN Blood Glucose LESS THAN 70 milliGRAM(s)/deciliter  glucagon  Injectable 1 milliGRAM(s) IntraMuscular once PRN Glucose LESS THAN 70 milligrams/deciliter  morphine  - Injectable 2 milliGRAM(s) IV Push every 4 hours PRN Moderate and severe pain      ALLERGIES: NKDA    SOCIAL HISTORY:   , lives w/ wife & kids  former heavy EtOH use (>2 packs beer daily for 10-15 years), last use in 2014 or 2015.   Remote social cigarette use (1 weekly) about 25 years ago.   Denies IVDU or other illicit drug use.       FAMILY HISTORY:  Family history of diabetes mellitus (Mother)      Vital Signs Last 24 Hrs  T(C): 36.7 (06 Jul 2017 05:12), Max: 39.2 (05 Jul 2017 16:14)  T(F): 98 (06 Jul 2017 05:12), Max: 102.5 (05 Jul 2017 16:14)  HR: 71 (06 Jul 2017 05:12) (71 - 109)  BP: 103/63 (06 Jul 2017 05:12) (103/63 - 129/69)  BP(mean): --  RR: 18 (06 Jul 2017 05:12) (17 - 26)  SpO2: 95% (06 Jul 2017 05:12) (91% - 98%)      REVIEW OF SYSTEMS  General: No subjective fevers or chills	  Skin/Breast: Vitiligo   Respiratory and Thorax: Chronic SOB on exertion  Cardiovascular:	No CP or palpitations   Gastrointestinal:	 localized abdomina pain around umbilicus, no nausea, vomiting, melena, hematochezia   Genitourinary: No dysuria or hematuria   Musculoskeletal: No joint swelling	  Neurological: No tremor   Psychiatric: No confusion   Hematology/Lymphatics: No easy bruising or bleeding    PHYSICAL EXAM:  Constitutional: NAD, non-toxic appearing, vitiligo on face  Eyes: Scleral icterus  Neck: Supple  Chest: Mild gynecomastia   Respiratory: Faint crackles at bases, otherwise mid & upper lung fields clear  Cardiovascular: Regular rate and rhythm, no murmurs appreciated   Gastrointestinal: Soft, non-distended, non-tender to deep palpation (s/p pain meds)  Extremities: RUE PICC w/ partial catheter exposed, saturated gauze, No palmar erythema, Pitting LE edema  Neurological: Awake, alert, conversant, no asterixis   Skin: Spider angioma, Patches of vitiligo on face, chest, abdomen, extremities     LABS:                        10.5   8.5   )-----------( 66       ( 05 Jul 2017 16:57 )             29.7             07-05    133<L>  |  100  |  13  ----------------------------<  230<H>  4.4   |  19<L>  |  0.82    Ca    7.2<L>      05 Jul 2017 16:57    TPro  6.8  /  Alb  2.9<L>  /  TBili  4.0<H>  /  DBili  x   /  AST  51<H>  /  ALT  29  /  AlkPhos  247<H>  07-05      RADIOLOGY & ADDITIONAL STUDIES: Hepatology Consult Note    HISTORY OF PRESENT ILLNESS:  41M hx EtOH cirrhosis c/b varices, recurrent E. coli bacteremia, p/w abd pain x 1 day.  Pt reports abd pain, located centrally above umbilicus.  Describes pain as sharp, constant, 10/10 in severity. Pain is aggravated by movement.  Pt did not try anything to alleviate pain at home. He denies N, V, hematochezia, melena, subjective fevers or chills, increased abd girth, tremors, periods of confusion.  He reports increased LE edema & chronic fatigue and SOB on exertion, but reports being able to 'walk several miles'.  Pt was recently hospitalized fever, abd pain, found to have E. coli bacteremia w/ no clear source found He was d/c'ed on ceftriaxone, being administered being PICC, w/ end date of abx 7/8.  Pt reports compliance w/ the IV abx.  Of note, pt had 2 prior episodes of E. coli bacteremia (2/2017, 6/2016) & another episode of Strep infantanius bacteremia (4/2016). He also has multiple admissions this year for abd pain (2/2017, 4/2017, 5/2017, 6/2017) w/ CT scan showing persistent portal colopathy. His last paracentesis was in 4/2017 which was not c/w SBP, but pt was treated empirically per hepatology recommendations. He has documented hx of SBP in 11/2015 that was treated w/ cipro.     He has had 3 prior EGDs (2013, 4/2016, 3/2017) which has showed esophageal varices & portal hypertensive gastropathy.  Colonoscopy in 5/2016 showed small non-bleeding rectal varices. MRI in 2/2017 was negative for HCC.  Of note, pt had liver biopsy in 2012 which confirmed cirrhosis & also showed mod-severe inflammation & hepatocyte apoptosis.  He follows w/ Dr. Thakkar for hepatology and says he is currently on liver transplant list at [facility] but has missed recent appointments due to recent hospitalization.     Outpatient GI Provider: Dr. Diaz   Last Colonoscopy: 5/2016 small, non-bleeding rectal varices   Last EGD: 3/2017 showed small <5mm esophageal varices, mild portal hypertensive, gastropathy     PAST MEDICAL & SURGICAL HISTORY:  Alcoholic cirrhosis  Esophageal varices   Vitiligo  Latent tuberculosis (1990s)  Diabetes mellitus type 2, insulin dependent  Status post corneal transplant    HOME MEDICATIONS:   Ceftriaxone 1g IV until 7/8/17  Nadolol 20mg  Furosemide 20mg BID  Pantoprazole 40mg  MVI  Spironolactone 50mg  Metformin 500mg BID    MEDICATIONS  (STANDING):  insulin glargine Injectable (LANTUS) 25 Unit(s) SubCutaneous at bedtime  insulin lispro (HumaLOG) corrective regimen sliding scale   SubCutaneous three times a day before meals  insulin lispro (HumaLOG) corrective regimen sliding scale   SubCutaneous at bedtime  dextrose 5%. 1000 milliLiter(s) (50 mL/Hr) IV Continuous <Continuous>  dextrose 50% Injectable 12.5 Gram(s) IV Push once  dextrose 50% Injectable 25 Gram(s) IV Push once  dextrose 50% Injectable 25 Gram(s) IV Push once  heparin  Injectable 5000 Unit(s) SubCutaneous every 12 hours  spironolactone 50 milliGRAM(s) Oral daily  furosemide    Tablet 20 milliGRAM(s) Oral two times a day  pantoprazole    Tablet 40 milliGRAM(s) Oral before breakfast  nadolol 20 milliGRAM(s) Oral daily  multivitamin 1 Tablet(s) Oral daily  vancomycin  IVPB 1000 milliGRAM(s) IV Intermittent every 12 hours  piperacillin/tazobactam IVPB. 3.375 Gram(s) IV Intermittent every 8 hours    MEDICATIONS  (PRN):  dextrose Gel 1 Dose(s) Oral once PRN Blood Glucose LESS THAN 70 milliGRAM(s)/deciliter  glucagon  Injectable 1 milliGRAM(s) IntraMuscular once PRN Glucose LESS THAN 70 milligrams/deciliter  morphine  - Injectable 2 milliGRAM(s) IV Push every 4 hours PRN Moderate and severe pain      ALLERGIES: NKDA    SOCIAL HISTORY:   , lives w/ wife & kids  former heavy EtOH use (>2 packs beer daily for 10-15 years), last use in 2014 or 2015.   Remote social cigarette use (1 weekly) about 25 years ago.   Denies IVDU or other illicit drug use.       FAMILY HISTORY:  Family history of diabetes mellitus (Mother)      Vital Signs Last 24 Hrs  T(C): 36.7 (06 Jul 2017 05:12), Max: 39.2 (05 Jul 2017 16:14)  T(F): 98 (06 Jul 2017 05:12), Max: 102.5 (05 Jul 2017 16:14)  HR: 71 (06 Jul 2017 05:12) (71 - 109)  BP: 103/63 (06 Jul 2017 05:12) (103/63 - 129/69)  BP(mean): --  RR: 18 (06 Jul 2017 05:12) (17 - 26)  SpO2: 95% (06 Jul 2017 05:12) (91% - 98%)      REVIEW OF SYSTEMS  General: No subjective fevers or chills	  Skin/Breast: Vitiligo   Respiratory and Thorax: Chronic SOB on exertion  Cardiovascular:	No CP or palpitations   Gastrointestinal:	 localized abdomina pain around umbilicus, no nausea, vomiting, melena, hematochezia   Genitourinary: No dysuria or hematuria   Musculoskeletal: No joint swelling	  Neurological: No tremor   Psychiatric: No confusion   Hematology/Lymphatics: No easy bruising or bleeding    PHYSICAL EXAM:  Constitutional: NAD, non-toxic appearing, vitiligo on face  Eyes: Scleral icterus  Neck: Supple  Chest: Mild gynecomastia   Respiratory: Faint crackles at bases, otherwise mid & upper lung fields clear  Cardiovascular: Regular rate and rhythm, no murmurs appreciated   Gastrointestinal: Soft, non-distended, non-tender to deep palpation (s/p pain meds)  Extremities: RUE PICC w/ partial catheter exposed, saturated gauze, No palmar erythema, Pitting LE edema  Neurological: Awake, alert, conversant, no asterixis   Skin: Spider angioma, Patches of vitiligo on face, chest, abdomen, extremities     LABS:                        10.5   8.5   )-----------( 66       ( 05 Jul 2017 16:57 )             29.7             07-05    133<L>  |  100  |  13  ----------------------------<  230<H>  4.4   |  19<L>  |  0.82    Ca    7.2<L>      05 Jul 2017 16:57    TPro  6.8  /  Alb  2.9<L>  /  TBili  4.0<H>  /  DBili  x   /  AST  51<H>  /  ALT  29  /  AlkPhos  247<H>  07-05      RADIOLOGY & ADDITIONAL STUDIES: Hepatology Consult Note    HISTORY OF PRESENT ILLNESS:  41M hx EtOH cirrhosis c/b varices, recurrent E. coli bacteremia, p/w abd pain x 1 day.  Pt reports abd pain, located centrally above umbilicus.  Describes pain as sharp, constant, 10/10 in severity. Pain is aggravated by movement.  Pt did not try anything to alleviate pain at home. He denies N, V, hematochezia, melena, subjective fevers or chills, increased abd girth, tremors, periods of confusion.  He reports increased LE edema & chronic fatigue and SOB on exertion, but reports being able to 'walk several miles'.  Pt was recently hospitalized fever, abd pain, found to have E. coli bacteremia w/ no clear source found He was d/c'ed on ceftriaxone, being administered being PICC, w/ end date of abx 7/8.  Pt reports compliance w/ the IV abx.  Of note, pt had 2 prior episodes of E. coli bacteremia (2/2017, 6/2016) & another episode of Strep infantanius bacteremia (4/2016). He also has multiple admissions this year for abd pain (2/2017, 4/2017, 5/2017, 6/2017) w/ CT scan showing persistent portal colopathy & colonoscopy in 4/2017 showing colopathy. His last paracentesis was in 4/2017 which was not c/w SBP, but pt was treated empirically per hepatology recommendations. He has documented hx of SBP in 11/2015 that was treated w/ cipro.     He has had 3 prior EGDs (2013, 4/2016, 3/2017) which has showed esophageal varices & portal hypertensive gastropathy.  Colonoscopy in 5/2016 showed small non-bleeding rectal varices. MRI in 2/2017 was negative for HCC.  Of note, pt had liver biopsy in 2012 which confirmed cirrhosis & also showed mod-severe inflammation & hepatocyte apoptosis.  He follows w/ Dr. Thakkar for hepatology and says he is currently on liver transplant list at [facility] but has missed recent appointments due to recent hospitalization.     Outpatient GI Provider: Dr. Diaz   Last Colonoscopy: 5/2016 small, non-bleeding rectal varices   Last EGD: 3/2017 showed small <5mm esophageal varices, mild portal hypertensive, gastropathy     PAST MEDICAL & SURGICAL HISTORY:  Alcoholic cirrhosis  Esophageal varices   Vitiligo  Latent tuberculosis (1990s)  Diabetes mellitus type 2, insulin dependent  Status post corneal transplant    HOME MEDICATIONS:   Ceftriaxone 1g IV until 7/8/17  Nadolol 20mg  Furosemide 20mg BID  Pantoprazole 40mg  MVI  Spironolactone 50mg  Metformin 500mg BID    MEDICATIONS  (STANDING):  insulin glargine Injectable (LANTUS) 25 Unit(s) SubCutaneous at bedtime  insulin lispro (HumaLOG) corrective regimen sliding scale   SubCutaneous three times a day before meals  insulin lispro (HumaLOG) corrective regimen sliding scale   SubCutaneous at bedtime  dextrose 5%. 1000 milliLiter(s) (50 mL/Hr) IV Continuous <Continuous>  dextrose 50% Injectable 12.5 Gram(s) IV Push once  dextrose 50% Injectable 25 Gram(s) IV Push once  dextrose 50% Injectable 25 Gram(s) IV Push once  heparin  Injectable 5000 Unit(s) SubCutaneous every 12 hours  spironolactone 50 milliGRAM(s) Oral daily  furosemide    Tablet 20 milliGRAM(s) Oral two times a day  pantoprazole    Tablet 40 milliGRAM(s) Oral before breakfast  nadolol 20 milliGRAM(s) Oral daily  multivitamin 1 Tablet(s) Oral daily  vancomycin  IVPB 1000 milliGRAM(s) IV Intermittent every 12 hours  piperacillin/tazobactam IVPB. 3.375 Gram(s) IV Intermittent every 8 hours    MEDICATIONS  (PRN):  dextrose Gel 1 Dose(s) Oral once PRN Blood Glucose LESS THAN 70 milliGRAM(s)/deciliter  glucagon  Injectable 1 milliGRAM(s) IntraMuscular once PRN Glucose LESS THAN 70 milligrams/deciliter  morphine  - Injectable 2 milliGRAM(s) IV Push every 4 hours PRN Moderate and severe pain      ALLERGIES: NKDA    SOCIAL HISTORY:   , lives w/ wife & kids  former heavy EtOH use (>2 packs beer daily for 10-15 years), last use in 2014 or 2015.   Remote social cigarette use (1 weekly) about 25 years ago.   Denies IVDU or other illicit drug use.       FAMILY HISTORY:  Family history of diabetes mellitus (Mother)      Vital Signs Last 24 Hrs  T(C): 36.7 (06 Jul 2017 05:12), Max: 39.2 (05 Jul 2017 16:14)  T(F): 98 (06 Jul 2017 05:12), Max: 102.5 (05 Jul 2017 16:14)  HR: 71 (06 Jul 2017 05:12) (71 - 109)  BP: 103/63 (06 Jul 2017 05:12) (103/63 - 129/69)  BP(mean): --  RR: 18 (06 Jul 2017 05:12) (17 - 26)  SpO2: 95% (06 Jul 2017 05:12) (91% - 98%)      REVIEW OF SYSTEMS  General: No subjective fevers or chills	  Skin/Breast: Vitiligo   Respiratory and Thorax: Chronic SOB on exertion  Cardiovascular:	No CP or palpitations   Gastrointestinal:	 localized abdomina pain around umbilicus, no nausea, vomiting, melena, hematochezia   Genitourinary: No dysuria or hematuria   Musculoskeletal: No joint swelling	  Neurological: No tremor   Psychiatric: No confusion   Hematology/Lymphatics: No easy bruising or bleeding    PHYSICAL EXAM:  Constitutional: NAD, non-toxic appearing, vitiligo on face  Eyes: Scleral icterus  Neck: Supple  Chest: Mild gynecomastia   Respiratory: Faint crackles at bases, otherwise mid & upper lung fields clear  Cardiovascular: Regular rate and rhythm, no murmurs appreciated   Gastrointestinal: Soft, non-distended, non-tender to deep palpation (s/p pain meds)  Extremities: RUE PICC w/ partial catheter exposed, saturated gauze, No palmar erythema, Pitting LE edema  Neurological: Awake, alert, conversant, no asterixis   Skin: Spider angioma, Patches of vitiligo on face, chest, abdomen, extremities     LABS:                        10.5   8.5   )-----------( 66       ( 05 Jul 2017 16:57 )             29.7             07-05    133<L>  |  100  |  13  ----------------------------<  230<H>  4.4   |  19<L>  |  0.82    Ca    7.2<L>      05 Jul 2017 16:57    TPro  6.8  /  Alb  2.9<L>  /  TBili  4.0<H>  /  DBili  x   /  AST  51<H>  /  ALT  29  /  AlkPhos  247<H>  07-05      RADIOLOGY & ADDITIONAL STUDIES: Hepatology Consult Note    HISTORY OF PRESENT ILLNESS:  41M hx EtOH cirrhosis c/b varices, recurrent E. coli bacteremia, p/w abd pain x 1 day.  Pt reports abd pain, located centrally above umbilicus.  Describes pain as sharp, constant, 10/10 in severity. Pain is aggravated by movement.  Pt did not try anything to alleviate pain at home. He denies N, V, hematochezia, melena, subjective fevers or chills, increased abd girth, tremors, periods of confusion.  He reports increased LE edema & chronic fatigue and SOB on exertion, but reports being able to 'walk several miles'.  Pt was recently hospitalized fever, abd pain, found to have E. coli bacteremia w/ no clear source found on imaging including CT A/P, tagged WBC scan, TTE, endoscopy. He was d/c'ed on ceftriaxone, being administered via PICC, w/ end date of abx on 7/8.  Pt reports compliance w/ the IV abx.  Of note, pt had 2 prior episodes of E. coli bacteremia (2/2017, 6/2016) & another episode of Strep infantanius bacteremia (4/2016). He also has multiple admissions this year for abd pain (2/2017, 4/2017, 5/2017, 6/2017) w/ CT scan showing persistent portal colopathy & colonoscopy in 4/2017 showing colopathy. His last paracentesis was in 4/2017 which was not c/w SBP, but pt was treated empirically per hepatology recommendations. He has documented hx of SBP in 11/2015 that was treated w/ cipro.     He has had 3 prior EGDs (2013, 4/2016, 3/2017) which has showed esophageal varices & portal hypertensive gastropathy.  Colonoscopy in 5/2016 showed small non-bleeding rectal varices. MRI in 2/2017 was negative for HCC.  Of note, pt had liver biopsy in 2012 which confirmed cirrhosis & also showed mod-severe inflammation & hepatocyte apoptosis.  He follows w/ Dr. Thakkar for hepatology and says he is currently on liver transplant list at [facility] but has missed recent appointments due to recent hospitalization.     Outpatient GI Provider: Dr. Diaz   Last Colonoscopy: 5/2016 small, non-bleeding rectal varices   Last EGD: 3/2017 showed small <5mm esophageal varices, mild portal hypertensive, gastropathy     PAST MEDICAL & SURGICAL HISTORY:  Alcoholic cirrhosis  Esophageal varices   Vitiligo  Latent tuberculosis (1990s)  Diabetes mellitus type 2, insulin dependent  Status post corneal transplant    HOME MEDICATIONS:   Ceftriaxone 1g IV until 7/8/17  Nadolol 20mg  Furosemide 20mg BID  Pantoprazole 40mg  MVI  Spironolactone 50mg  Metformin 500mg BID    MEDICATIONS  (STANDING):  insulin glargine Injectable (LANTUS) 25 Unit(s) SubCutaneous at bedtime  insulin lispro (HumaLOG) corrective regimen sliding scale   SubCutaneous three times a day before meals  insulin lispro (HumaLOG) corrective regimen sliding scale   SubCutaneous at bedtime  dextrose 5%. 1000 milliLiter(s) (50 mL/Hr) IV Continuous <Continuous>  dextrose 50% Injectable 12.5 Gram(s) IV Push once  dextrose 50% Injectable 25 Gram(s) IV Push once  dextrose 50% Injectable 25 Gram(s) IV Push once  heparin  Injectable 5000 Unit(s) SubCutaneous every 12 hours  spironolactone 50 milliGRAM(s) Oral daily  furosemide    Tablet 20 milliGRAM(s) Oral two times a day  pantoprazole    Tablet 40 milliGRAM(s) Oral before breakfast  nadolol 20 milliGRAM(s) Oral daily  multivitamin 1 Tablet(s) Oral daily  vancomycin  IVPB 1000 milliGRAM(s) IV Intermittent every 12 hours  piperacillin/tazobactam IVPB. 3.375 Gram(s) IV Intermittent every 8 hours    MEDICATIONS  (PRN):  dextrose Gel 1 Dose(s) Oral once PRN Blood Glucose LESS THAN 70 milliGRAM(s)/deciliter  glucagon  Injectable 1 milliGRAM(s) IntraMuscular once PRN Glucose LESS THAN 70 milligrams/deciliter  morphine  - Injectable 2 milliGRAM(s) IV Push every 4 hours PRN Moderate and severe pain      ALLERGIES: NKDA    SOCIAL HISTORY:   , lives w/ wife & kids  former heavy EtOH use (>2 packs beer daily for 10-15 years), last use in 2014 or 2015.   Remote social cigarette use (1 weekly) about 25 years ago.   Denies IVDU or other illicit drug use.       FAMILY HISTORY:  Family history of diabetes mellitus (Mother)      Vital Signs Last 24 Hrs  T(C): 36.7 (06 Jul 2017 05:12), Max: 39.2 (05 Jul 2017 16:14)  T(F): 98 (06 Jul 2017 05:12), Max: 102.5 (05 Jul 2017 16:14)  HR: 71 (06 Jul 2017 05:12) (71 - 109)  BP: 103/63 (06 Jul 2017 05:12) (103/63 - 129/69)  BP(mean): --  RR: 18 (06 Jul 2017 05:12) (17 - 26)  SpO2: 95% (06 Jul 2017 05:12) (91% - 98%)      REVIEW OF SYSTEMS  General: No subjective fevers or chills	  Skin/Breast: Vitiligo   Respiratory and Thorax: Chronic SOB on exertion  Cardiovascular:	No CP or palpitations   Gastrointestinal:	 localized abdomina pain around umbilicus, no nausea, vomiting, melena, hematochezia   Genitourinary: No dysuria or hematuria   Musculoskeletal: No joint swelling	  Neurological: No tremor   Psychiatric: No confusion   Hematology/Lymphatics: No easy bruising or bleeding    PHYSICAL EXAM:  Constitutional: NAD, non-toxic appearing, vitiligo on face  Eyes: Scleral icterus  Neck: Supple  Chest: Mild gynecomastia   Respiratory: Faint crackles at bases, otherwise mid & upper lung fields clear  Cardiovascular: Regular rate and rhythm, no murmurs appreciated   Gastrointestinal: Soft, non-distended, non-tender to deep palpation (s/p pain meds)  Extremities: RUE PICC w/ partial catheter exposed, saturated gauze, No palmar erythema, Pitting LE edema  Neurological: Awake, alert, conversant, no asterixis   Skin: Spider angioma, Patches of vitiligo on face, chest, abdomen, extremities     LABS:                        10.5   8.5   )-----------( 66       ( 05 Jul 2017 16:57 )             29.7             07-05    133<L>  |  100  |  13  ----------------------------<  230<H>  4.4   |  19<L>  |  0.82    Ca    7.2<L>      05 Jul 2017 16:57    TPro  6.8  /  Alb  2.9<L>  /  TBili  4.0<H>  /  DBili  x   /  AST  51<H>  /  ALT  29  /  AlkPhos  247<H>  07-05      RADIOLOGY & ADDITIONAL STUDIES: Hepatology Consult Note    HISTORY OF PRESENT ILLNESS:  41M hx EtOH cirrhosis c/b varices, recurrent E. coli bacteremia, p/w abd pain x 1 day.  Pt reports abd pain, located centrally above umbilicus.  Describes pain as sharp, constant, 10/10 in severity. Pain is aggravated by movement.  Pt did not try anything to alleviate pain at home. He denies N, V, hematochezia, melena, subjective fevers or chills, increased abd girth, tremors, periods of confusion.  He reports increased LE edema & chronic fatigue and SOB on exertion, but reports being able to 'walk several miles'.  Pt was recently hospitalized fever, abd pain, found to have E. coli bacteremia w/ no clear source found on imaging including CT A/P, tagged WBC scan, TTE, endoscopy. He was d/c'ed on ceftriaxone, being administered via PICC, w/ end date of abx on 7/8.  Pt reports compliance w/ the IV abx.  Of note, pt had 2 prior episodes of E. coli bacteremia (2/2017, 6/2016) & another episode of Strep infantanius bacteremia (4/2016). He also has multiple admissions this year for abd pain (2/2017, 4/2017, 5/2017, 6/2017) w/ CT scan showing persistent portal colopathy & colonoscopy in 4/2017 showing colopathy. His last paracentesis was in 4/2017 which was not c/w SBP, but pt was treated empirically per hepatology recommendations. He has documented hx of SBP in 11/2015 that was treated w/ cipro.     He has had 3 prior EGDs (2013, 4/2016, 3/2017) which has showed esophageal varices & portal hypertensive gastropathy.  Colonoscopy in 5/2016 showed small non-bleeding rectal varices. MRI in 2/2017 was negative for HCC.  Of note, pt had liver biopsy in 2012 which confirmed cirrhosis & also showed mod-severe inflammation & hepatocyte apoptosis.  He follows w/ Dr. Thakkar for hepatology and says he is currently on liver transplant list at [facility] but has missed recent appointments due to recent hospitalization.     Outpatient GI Provider: Dr. Diaz   Last Colonoscopy: 4/2017 congestion of colonic mucosa in transverse colon   Last EGD: 3/2017 showed small <5mm esophageal varices, mild portal hypertensive, gastropathy     PAST MEDICAL & SURGICAL HISTORY:  Alcoholic cirrhosis  Esophageal varices   Vitiligo  Latent tuberculosis (1990s)  Diabetes mellitus type 2, insulin dependent  Status post corneal transplant    HOME MEDICATIONS:   Ceftriaxone 1g IV until 7/8/17  Nadolol 20mg  Furosemide 20mg BID  Pantoprazole 40mg  MVI  Spironolactone 50mg  Metformin 500mg BID    MEDICATIONS  (STANDING):  insulin glargine Injectable (LANTUS) 25 Unit(s) SubCutaneous at bedtime  insulin lispro (HumaLOG) corrective regimen sliding scale   SubCutaneous three times a day before meals  insulin lispro (HumaLOG) corrective regimen sliding scale   SubCutaneous at bedtime  dextrose 5%. 1000 milliLiter(s) (50 mL/Hr) IV Continuous <Continuous>  dextrose 50% Injectable 12.5 Gram(s) IV Push once  dextrose 50% Injectable 25 Gram(s) IV Push once  dextrose 50% Injectable 25 Gram(s) IV Push once  heparin  Injectable 5000 Unit(s) SubCutaneous every 12 hours  spironolactone 50 milliGRAM(s) Oral daily  furosemide    Tablet 20 milliGRAM(s) Oral two times a day  pantoprazole    Tablet 40 milliGRAM(s) Oral before breakfast  nadolol 20 milliGRAM(s) Oral daily  multivitamin 1 Tablet(s) Oral daily  vancomycin  IVPB 1000 milliGRAM(s) IV Intermittent every 12 hours  piperacillin/tazobactam IVPB. 3.375 Gram(s) IV Intermittent every 8 hours    MEDICATIONS  (PRN):  dextrose Gel 1 Dose(s) Oral once PRN Blood Glucose LESS THAN 70 milliGRAM(s)/deciliter  glucagon  Injectable 1 milliGRAM(s) IntraMuscular once PRN Glucose LESS THAN 70 milligrams/deciliter  morphine  - Injectable 2 milliGRAM(s) IV Push every 4 hours PRN Moderate and severe pain      ALLERGIES: NKDA    SOCIAL HISTORY:   , lives w/ wife & kids  former heavy EtOH use (>2 packs beer daily for 10-15 years), last use in 2014 or 2015.   Remote social cigarette use (1 weekly) about 25 years ago.   Denies IVDU or other illicit drug use.       FAMILY HISTORY:  Family history of diabetes mellitus (Mother)      Vital Signs Last 24 Hrs  T(C): 36.7 (06 Jul 2017 05:12), Max: 39.2 (05 Jul 2017 16:14)  T(F): 98 (06 Jul 2017 05:12), Max: 102.5 (05 Jul 2017 16:14)  HR: 71 (06 Jul 2017 05:12) (71 - 109)  BP: 103/63 (06 Jul 2017 05:12) (103/63 - 129/69)  BP(mean): --  RR: 18 (06 Jul 2017 05:12) (17 - 26)  SpO2: 95% (06 Jul 2017 05:12) (91% - 98%)      REVIEW OF SYSTEMS  General: No subjective fevers or chills	  Skin/Breast: Vitiligo   Respiratory and Thorax: Chronic SOB on exertion  Cardiovascular:	No CP or palpitations   Gastrointestinal:	 localized abdomina pain around umbilicus, no nausea, vomiting, melena, hematochezia   Genitourinary: No dysuria or hematuria   Musculoskeletal: No joint swelling	  Neurological: No tremor   Psychiatric: No confusion   Hematology/Lymphatics: No easy bruising or bleeding    PHYSICAL EXAM:  Constitutional: NAD, non-toxic appearing, vitiligo on face  Eyes: Scleral icterus  Neck: Supple  Chest: Mild gynecomastia   Respiratory: Faint crackles at bases, otherwise mid & upper lung fields clear  Cardiovascular: Regular rate and rhythm, no murmurs appreciated   Gastrointestinal: Soft, non-distended, non-tender to deep palpation (s/p pain meds)  Extremities: RUE PICC w/ partial catheter exposed, saturated gauze, No palmar erythema, Pitting LE edema  Neurological: Awake, alert, conversant, no asterixis   Skin: Spider angioma, Patches of vitiligo on face, chest, abdomen, extremities     LABS:                        10.5   8.5   )-----------( 66       ( 05 Jul 2017 16:57 )             29.7             07-05    133<L>  |  100  |  13  ----------------------------<  230<H>  4.4   |  19<L>  |  0.82    Ca    7.2<L>      05 Jul 2017 16:57    TPro  6.8  /  Alb  2.9<L>  /  TBili  4.0<H>  /  DBili  x   /  AST  51<H>  /  ALT  29  /  AlkPhos  247<H>  07-05      RADIOLOGY & ADDITIONAL STUDIES: Hepatology Consult Note    HISTORY OF PRESENT ILLNESS:  41M hx EtOH cirrhosis c/b varices, recurrent E. coli bacteremia, p/w abd pain x 1 day.  Pt reports abd pain, located centrally above umbilicus.  Describes pain as sharp, constant, 10/10 in severity. Pain is aggravated by movement.  Pt did not try anything to alleviate pain at home. He denies N, V, hematochezia, melena, subjective fevers or chills, increased abd girth, tremors, periods of confusion.  He reports increased LE edema & chronic fatigue and SOB on exertion, but reports being able to 'walk several miles'.  Pt was recently hospitalized fever, abd pain, found to have E. coli bacteremia w/ no clear source found on imaging including CT A/P, tagged WBC scan, TTE, endoscopy. He was d/c'ed on ceftriaxone, being administered via PICC, w/ end date of abx on 7/8.  Pt reports compliance w/ the IV abx.  Of note, pt had 2 prior episodes of E. coli bacteremia (2/2017, 6/2016) & another episode of Strep infantanius bacteremia (4/2016). He also has multiple admissions this year for abd pain (2/2017, 4/2017, 5/2017, 6/2017) w/ CT scan showing persistent portal colopathy & colonoscopy in 4/2017 showing colopathy. His last paracentesis was in 4/2017 which was not c/w SBP, but pt was treated empirically per hepatology recommendations. He has documented hx of SBP in 11/2015 that was treated w/ cipro.     He has had 3 prior EGDs (2013, 4/2016, 3/2017) which has showed esophageal varices & portal hypertensive gastropathy.  Colonoscopy in 5/2016 showed small non-bleeding rectal varices. MRI in 2/2017 was negative for HCC.  Of note, pt had liver biopsy in 2012 which confirmed cirrhosis & also showed mod-severe inflammation & hepatocyte apoptosis.  He follows w/ Dr. Thakkar for hepatology and says he is currently on liver transplant list at [facility] but has missed recent appointments due to recent hospitalization.     Outpatient GI Provider: Dr. Diaz   Last Colonoscopy: 4/2017 congestion of colonic mucosa in transverse colon   Last EGD: 3/2017 showed small <5mm esophageal varices, mild portal hypertensive, gastropathy     PAST MEDICAL & SURGICAL HISTORY:  Alcoholic cirrhosis  Esophageal varices   Vitiligo  Latent tuberculosis (1990s)  Diabetes mellitus type 2, insulin dependent  Status post corneal transplant    HOME MEDICATIONS:   Ceftriaxone 1g IV until 7/8/17  Nadolol 20mg  Furosemide 20mg BID  Pantoprazole 40mg  MVI  Spironolactone 50mg  Metformin 500mg BID    MEDICATIONS  (STANDING):  insulin glargine Injectable (LANTUS) 25 Unit(s) SubCutaneous at bedtime  insulin lispro (HumaLOG) corrective regimen sliding scale   SubCutaneous three times a day before meals  insulin lispro (HumaLOG) corrective regimen sliding scale   SubCutaneous at bedtime  dextrose 5%. 1000 milliLiter(s) (50 mL/Hr) IV Continuous <Continuous>  dextrose 50% Injectable 12.5 Gram(s) IV Push once  dextrose 50% Injectable 25 Gram(s) IV Push once  dextrose 50% Injectable 25 Gram(s) IV Push once  heparin  Injectable 5000 Unit(s) SubCutaneous every 12 hours  spironolactone 50 milliGRAM(s) Oral daily  furosemide    Tablet 20 milliGRAM(s) Oral two times a day  pantoprazole    Tablet 40 milliGRAM(s) Oral before breakfast  nadolol 20 milliGRAM(s) Oral daily  multivitamin 1 Tablet(s) Oral daily  vancomycin  IVPB 1000 milliGRAM(s) IV Intermittent every 12 hours  piperacillin/tazobactam IVPB. 3.375 Gram(s) IV Intermittent every 8 hours    MEDICATIONS  (PRN):  dextrose Gel 1 Dose(s) Oral once PRN Blood Glucose LESS THAN 70 milliGRAM(s)/deciliter  glucagon  Injectable 1 milliGRAM(s) IntraMuscular once PRN Glucose LESS THAN 70 milligrams/deciliter  morphine  - Injectable 2 milliGRAM(s) IV Push every 4 hours PRN Moderate and severe pain      ALLERGIES: NKDA    SOCIAL HISTORY:   , lives w/ wife & kids  former heavy EtOH use (>2 packs beer daily for 10-15 years), last use in 2014 or 2015.   Remote social cigarette use (1 weekly) about 25 years ago.   Denies IVDU or other illicit drug use.       FAMILY HISTORY:  Family history of diabetes mellitus (Mother)      Vital Signs Last 24 Hrs  T(C): 36.7 (06 Jul 2017 05:12), Max: 39.2 (05 Jul 2017 16:14)  T(F): 98 (06 Jul 2017 05:12), Max: 102.5 (05 Jul 2017 16:14)  HR: 71 (06 Jul 2017 05:12) (71 - 109)  BP: 103/63 (06 Jul 2017 05:12) (103/63 - 129/69)  BP(mean): --  RR: 18 (06 Jul 2017 05:12) (17 - 26)  SpO2: 95% (06 Jul 2017 05:12) (91% - 98%)      REVIEW OF SYSTEMS  General: No subjective fevers or chills	  Skin/Breast: Vitiligo   Respiratory and Thorax: Chronic SOB on exertion  Cardiovascular:	No CP or palpitations   Gastrointestinal:	 localized abdomina pain around umbilicus, no nausea, vomiting, melena, hematochezia   Genitourinary: No dysuria or hematuria   Musculoskeletal: No joint swelling	  Neurological: No tremor   Psychiatric: No confusion   Hematology/Lymphatics: No easy bruising or bleeding    PHYSICAL EXAM:  Constitutional: NAD, non-toxic appearing, vitiligo on face  Eyes: Scleral icterus  Neck: Supple  Chest: Mild gynecomastia   Respiratory: Faint crackles at bases, otherwise mid & upper lung fields clear  Cardiovascular: Regular rate and rhythm, no murmurs appreciated   Gastrointestinal: Soft, non-distended, no appreciable ascites, non-tender to deep palpation (s/p pain meds)  Extremities: RUE PICC w/ partial catheter exposed, gauze around PICC saturated w/ blood & area of induration near catheter insertion, No palmar erythema, Pitting LE edema  Neurological: Awake, alert, conversant, no asterixis   Skin: Spider angioma, Patches of vitiligo on face, chest, abdomen, extremities     LABS:                        10.5   8.5   )-----------( 66       ( 05 Jul 2017 16:57 )             29.7             07-05    133<L>  |  100  |  13  ----------------------------<  230<H>  4.4   |  19<L>  |  0.82    Ca    7.2<L>      05 Jul 2017 16:57    TPro  6.8  /  Alb    2.9<L>  /  TBili  4.0<H>  /  DBili  x   /  AST  51<H>  /  ALT  29  /  AlkPhos  247<H>  07-05      RADIOLOGY & ADDITIONAL STUDIES:    < from: CT Abdomen and Pelvis w/ Oral Cont and w/ IV Cont (07.05.17 @ 21:12) >    EXAM:  CT ABDOMEN AND PELVIS OC IC                            PROCEDURE DATE:  07/05/2017            INTERPRETATION:  CLINICAL INFORMATION: 41-year-old male with cirrhosis   presents with abdominal pain and fever.    COMPARISON: CT abdomen pelvis 5/20/2017.    PROCEDURE:   CT of the Abdomen and Pelvis was performed with intravenous contrast.   Intravenous contrast: 90 ml Omnipaque 350. 10 ml discarded.  Oral contrast: positive contrast was administered.  Sagittal and coronal reformats were performed.    FINDINGS:    LOWER CHEST: Within normal limits.    LIVER: Cirrhotic liver.  BILE DUCTS: Normal caliber.  GALLBLADDER: Within normal limits.  SPLEEN: Splenomegaly.  PANCREAS: Within normal limits.  ADRENALS: Within normal limits.  KIDNEYS/URETERS: Within normal limits.    BLADDER: Within normal limits.  REPRODUCTIVE ORGANS: The prostate is within normal limits.    BOWEL: Circumferential right colonic wall thickening, unchanged. No bowel   obstruction or free air.  PERITONEUM: Mild mesenteric edema. Mild perihepatic ascites. No abscess.  VESSELS:  Mild atheromatous disease of the abdominal aorta and its   branches. Recannulized umbilical vein and multiple perigastric,   perisplenic, and abdominal wall varices consistent with portal   hypertension. Recanalized umbilical vein measures 16 mm in diameter   proximally.  RETROPERITONEUM: No lymphadenopathy.    ABDOMINAL WALL: Within normal limits.  BONES: Degenerative changes of the lumbar spine. Bilateral L5 pars   defects with grade 1 anterolisthesis of L5 on S1.    IMPRESSION:    1.  No significant change in sequelae of cirrhosis and portal   hypertension including splenomegaly, abdominal varices, and ascites  2.  Persistent right colonic wall thickening, likely reactive from portal   colopathy. Colitis may have a similar appearance.  3.  No abdominal or pelvic fluid collection to suggest abscess formation.        FERNANDA SÁNCHEZ M.D., RADIOLOGY RESIDENT  This document has been electronically signed.  ISAI ROBLERO M.D., ATTENDING RADIOLOGIST  This document has been electronically signed. Jul 5 2017 10:43PM Hepatology Consult Note    HISTORY OF PRESENT ILLNESS:  41M hx EtOH cirrhosis c/b varices, recurrent E. coli bacteremia, p/w abd pain x 1 day.  Pt reports abd pain, located centrally above umbilicus.  Describes pain as sharp, constant, 10/10 in severity. Pain is aggravated by movement.  Pt did not try anything to alleviate pain at home. He denies N, V, hematochezia, melena, subjective fevers or chills, increased abd girth, tremors, periods of confusion.  He reports increased LE edema & chronic fatigue and SOB on exertion, but reports being able to 'walk several miles'.  Pt was recently hospitalized fever, abd pain, found to have E. coli bacteremia w/ no clear source found on imaging including CT A/P, tagged WBC scan, TTE, endoscopy. He was d/c'ed on ceftriaxone, being administered via PICC, w/ end date of abx on 7/8.  Pt reports compliance w/ the IV abx.  Of note, pt had 2 prior episodes of E. coli bacteremia (2/2017, 6/2016) & another episode of Strep infantanius bacteremia (4/2016). He also has multiple admissions this year for abd pain (2/2017, 4/2017, 5/2017, 6/2017) w/ CT scan showing persistent portal colopathy & colonoscopy in 4/2017 showing colopathy. His last paracentesis was in 4/2017 which was not c/w SBP, but pt was treated empirically per hepatology recommendations. He has documented hx of SBP in 11/2015 that was treated w/ cipro.     He has had 3 prior EGDs (2013, 4/2016, 3/2017) which has showed esophageal varices & portal hypertensive gastropathy.  Colonoscopy in 5/2016 showed small non-bleeding rectal varices. MRI in 2/2017 was negative for HCC.  Of note, pt had liver biopsy in 2012 which confirmed cirrhosis & also showed mod-severe inflammation & hepatocyte apoptosis.  He follows w/ Dr. Thakkar for hepatology and says he is currently on liver transplant list at [facility] but has missed recent appointments due to recent hospitalization.     Outpatient GI Provider: Dr. Diaz   Last Colonoscopy: 4/2017 congestion of colonic mucosa in transverse colon   Last EGD: 3/2017 showed small <5mm esophageal varices, mild portal hypertensive, gastropathy     PAST MEDICAL & SURGICAL HISTORY:  Alcoholic cirrhosis  Esophageal varices   Vitiligo  Latent tuberculosis (1990s)  Diabetes mellitus type 2, insulin dependent  Status post corneal transplant    HOME MEDICATIONS:   Ceftriaxone 1g IV until 7/8/17  Nadolol 20mg  Furosemide 20mg BID  Pantoprazole 40mg  MVI  Spironolactone 50mg  Metformin 500mg BID    MEDICATIONS  (STANDING):  insulin glargine Injectable (LANTUS) 25 Unit(s) SubCutaneous at bedtime  insulin lispro (HumaLOG) corrective regimen sliding scale   SubCutaneous three times a day before meals  insulin lispro (HumaLOG) corrective regimen sliding scale   SubCutaneous at bedtime  dextrose 5%. 1000 milliLiter(s) (50 mL/Hr) IV Continuous <Continuous>  dextrose 50% Injectable 12.5 Gram(s) IV Push once  dextrose 50% Injectable 25 Gram(s) IV Push once  dextrose 50% Injectable 25 Gram(s) IV Push once  heparin  Injectable 5000 Unit(s) SubCutaneous every 12 hours  spironolactone 50 milliGRAM(s) Oral daily  furosemide    Tablet 20 milliGRAM(s) Oral two times a day  pantoprazole    Tablet 40 milliGRAM(s) Oral before breakfast  nadolol 20 milliGRAM(s) Oral daily  multivitamin 1 Tablet(s) Oral daily  vancomycin  IVPB 1000 milliGRAM(s) IV Intermittent every 12 hours  piperacillin/tazobactam IVPB. 3.375 Gram(s) IV Intermittent every 8 hours    MEDICATIONS  (PRN):  dextrose Gel 1 Dose(s) Oral once PRN Blood Glucose LESS THAN 70 milliGRAM(s)/deciliter  glucagon  Injectable 1 milliGRAM(s) IntraMuscular once PRN Glucose LESS THAN 70 milligrams/deciliter  morphine  - Injectable 2 milliGRAM(s) IV Push every 4 hours PRN Moderate and severe pain      ALLERGIES: NKDA    SOCIAL HISTORY:   , lives w/ wife & kids  former heavy EtOH use (>2 packs beer daily for 10-15 years), last use in 2014 or 2015.   Remote social cigarette use (1 weekly) about 25 years ago.   Denies IVDU or other illicit drug use.       FAMILY HISTORY:  Family history of diabetes mellitus (Mother)      Vital Signs Last 24 Hrs  T(C): 36.7 (06 Jul 2017 05:12), Max: 39.2 (05 Jul 2017 16:14)  T(F): 98 (06 Jul 2017 05:12), Max: 102.5 (05 Jul 2017 16:14)  HR: 71 (06 Jul 2017 05:12) (71 - 109)  BP: 103/63 (06 Jul 2017 05:12) (103/63 - 129/69)  BP(mean): --  RR: 18 (06 Jul 2017 05:12) (17 - 26)  SpO2: 95% (06 Jul 2017 05:12) (91% - 98%)      REVIEW OF SYSTEMS  General: No subjective fevers or chills	  Skin/Breast: Vitiligo   Respiratory and Thorax: Chronic SOB on exertion  Cardiovascular:	No CP or palpitations   Gastrointestinal:	 localized abdomina pain around umbilicus, no nausea, vomiting, melena, hematochezia   Genitourinary: No dysuria or hematuria   Musculoskeletal: No joint swelling	  Neurological: No tremor   Psychiatric: No confusion   Hematology/Lymphatics: No easy bruising or bleeding    PHYSICAL EXAM:  Constitutional: NAD, non-toxic appearing, vitiligo on face  Eyes: Scleral icterus  Neck: Supple  Chest: Mild gynecomastia   Respiratory: Faint crackles at bases, otherwise mid & upper lung fields clear  Cardiovascular: Regular rate and rhythm, no murmurs appreciated   Gastrointestinal: Soft, non-distended, no appreciable ascites, non-tender to deep palpation (s/p pain meds), ?able to palpable liver edge, liver edge non-tender  Extremities: RUE PICC w/ partial catheter exposed, gauze around PICC saturated w/ blood & area of induration near catheter insertion, No palmar erythema, Pitting LE edema  Neurological: Awake, alert, conversant, no asterixis   Skin: Spider angioma, Patches of vitiligo on face, chest, abdomen, extremities     LABS:                        10.5   8.5   )-----------( 66       ( 05 Jul 2017 16:57 )             29.7             07-05    133<L>  |  100  |  13  ----------------------------<  230<H>  4.4   |  19<L>  |  0.82    Ca    7.2<L>      05 Jul 2017 16:57    TPro  6.8  /  Alb    2.9<L>  /  TBili  4.0<H>  /  DBili  x   /  AST  51<H>  /  ALT  29  /  AlkPhos  247<H>  07-05      RADIOLOGY & ADDITIONAL STUDIES:    < from: CT Abdomen and Pelvis w/ Oral Cont and w/ IV Cont (07.05.17 @ 21:12) >    EXAM:  CT ABDOMEN AND PELVIS OC IC                            PROCEDURE DATE:  07/05/2017            INTERPRETATION:  CLINICAL INFORMATION: 41-year-old male with cirrhosis   presents with abdominal pain and fever.    COMPARISON: CT abdomen pelvis 5/20/2017.    PROCEDURE:   CT of the Abdomen and Pelvis was performed with intravenous contrast.   Intravenous contrast: 90 ml Omnipaque 350. 10 ml discarded.  Oral contrast: positive contrast was administered.  Sagittal and coronal reformats were performed.    FINDINGS:    LOWER CHEST: Within normal limits.    LIVER: Cirrhotic liver.  BILE DUCTS: Normal caliber.  GALLBLADDER: Within normal limits.  SPLEEN: Splenomegaly.  PANCREAS: Within normal limits.  ADRENALS: Within normal limits.  KIDNEYS/URETERS: Within normal limits.    BLADDER: Within normal limits.  REPRODUCTIVE ORGANS: The prostate is within normal limits.    BOWEL: Circumferential right colonic wall thickening, unchanged. No bowel   obstruction or free air.  PERITONEUM: Mild mesenteric edema. Mild perihepatic ascites. No abscess.  VESSELS:  Mild atheromatous disease of the abdominal aorta and its   branches. Recannulized umbilical vein and multiple perigastric,   perisplenic, and abdominal wall varices consistent with portal   hypertension. Recanalized umbilical vein measures 16 mm in diameter   proximally.  RETROPERITONEUM: No lymphadenopathy.    ABDOMINAL WALL: Within normal limits.  BONES: Degenerative changes of the lumbar spine. Bilateral L5 pars   defects with grade 1 anterolisthesis of L5 on S1.    IMPRESSION:    1.  No significant change in sequelae of cirrhosis and portal   hypertension including splenomegaly, abdominal varices, and ascites  2.  Persistent right colonic wall thickening, likely reactive from portal   colopathy. Colitis may have a similar appearance.  3.  No abdominal or pelvic fluid collection to suggest abscess formation.        FERANNDA SÁNCHEZ M.D., RADIOLOGY RESIDENT  This document has been electronically signed.  ISAI ROBLERO M.D., ATTENDING RADIOLOGIST  This document has been electronically signed. Jul 5 2017 10:43PM Hepatology Consult Note    HISTORY OF PRESENT ILLNESS:  41M hx EtOH cirrhosis c/b varices, recurrent E. coli bacteremia, p/w abd pain x 1 day.  Pt reports abd pain, located centrally above umbilicus.  Describes pain as sharp, constant, 10/10 in severity. Pain is aggravated by movement.  Pt did not try anything to alleviate pain at home. He denies N, V, hematochezia, melena, subjective fevers or chills, increased abd girth, tremors, periods of confusion.  He reports increased LE edema & chronic fatigue and SOB on exertion, but reports being able to 'walk several miles'.  Pt was recently hospitalized fever, abd pain, found to have E. coli bacteremia w/ no clear source found on imaging including CT A/P, tagged WBC scan, TTE, endoscopy. He was d/c'ed on ceftriaxone, being administered via PICC, w/ end date of abx on 7/8.  Pt reports compliance w/ the IV abx.  Of note, pt had 2 prior episodes of E. coli bacteremia (2/2017, 6/2016) & another episode of Strep infantanius bacteremia (4/2016). He also has multiple admissions this year for abd pain (2/2017, 4/2017, 5/2017, 6/2017) w/ CT scan showing persistent portal colopathy & colonoscopy in 4/2017 showing colopathy. His last paracentesis was in 4/2017 which was not c/w SBP, but pt was treated empirically per hepatology recommendations. He has documented hx of SBP in 11/2015 that was treated w/ cipro.     He has had 3 prior EGDs (2013, 4/2016, 3/2017) which has showed esophageal varices & portal hypertensive gastropathy.  Colonoscopy in 5/2016 showed small non-bleeding rectal varices. MRI in 6/2017 was negative for focal hepatic masses.  Of note, pt had liver biopsy in 2012 which confirmed cirrhosis & also showed mod-severe inflammation & hepatocyte apoptosis.  He follows w/ Dr. Thakkar for hepatology and says he is currently on liver transplant list at [facility] but has missed recent appointments due to recent hospitalization.     Outpatient GI Provider: Dr. Diaz   Last Colonoscopy: 4/2017 congestion of colonic mucosa in transverse colon   Last EGD: 3/2017 showed small <5mm esophageal varices, mild portal hypertensive, gastropathy     PAST MEDICAL & SURGICAL HISTORY:  Alcoholic cirrhosis  Esophageal varices   Vitiligo  Latent tuberculosis (1990s)  Diabetes mellitus type 2, insulin dependent  Status post corneal transplant    HOME MEDICATIONS:   Ceftriaxone 1g IV until 7/8/17  Nadolol 20mg  Furosemide 20mg BID  Pantoprazole 40mg  MVI  Spironolactone 50mg  Metformin 500mg BID    MEDICATIONS  (STANDING):  insulin glargine Injectable (LANTUS) 25 Unit(s) SubCutaneous at bedtime  insulin lispro (HumaLOG) corrective regimen sliding scale   SubCutaneous three times a day before meals  insulin lispro (HumaLOG) corrective regimen sliding scale   SubCutaneous at bedtime  dextrose 5%. 1000 milliLiter(s) (50 mL/Hr) IV Continuous <Continuous>  dextrose 50% Injectable 12.5 Gram(s) IV Push once  dextrose 50% Injectable 25 Gram(s) IV Push once  dextrose 50% Injectable 25 Gram(s) IV Push once  heparin  Injectable 5000 Unit(s) SubCutaneous every 12 hours  spironolactone 50 milliGRAM(s) Oral daily  furosemide    Tablet 20 milliGRAM(s) Oral two times a day  pantoprazole    Tablet 40 milliGRAM(s) Oral before breakfast  nadolol 20 milliGRAM(s) Oral daily  multivitamin 1 Tablet(s) Oral daily  vancomycin  IVPB 1000 milliGRAM(s) IV Intermittent every 12 hours  piperacillin/tazobactam IVPB. 3.375 Gram(s) IV Intermittent every 8 hours    MEDICATIONS  (PRN):  dextrose Gel 1 Dose(s) Oral once PRN Blood Glucose LESS THAN 70 milliGRAM(s)/deciliter  glucagon  Injectable 1 milliGRAM(s) IntraMuscular once PRN Glucose LESS THAN 70 milligrams/deciliter  morphine  - Injectable 2 milliGRAM(s) IV Push every 4 hours PRN Moderate and severe pain      ALLERGIES: NKDA    SOCIAL HISTORY:   , lives w/ wife & kids  former heavy EtOH use (>2 packs beer daily for 10-15 years), last use in 2014 or 2015.   Remote social cigarette use (1 weekly) about 25 years ago.   Denies IVDU or other illicit drug use.       FAMILY HISTORY:  Family history of diabetes mellitus (Mother)      Vital Signs Last 24 Hrs  T(C): 36.7 (06 Jul 2017 05:12), Max: 39.2 (05 Jul 2017 16:14)  T(F): 98 (06 Jul 2017 05:12), Max: 102.5 (05 Jul 2017 16:14)  HR: 71 (06 Jul 2017 05:12) (71 - 109)  BP: 103/63 (06 Jul 2017 05:12) (103/63 - 129/69)  BP(mean): --  RR: 18 (06 Jul 2017 05:12) (17 - 26)  SpO2: 95% (06 Jul 2017 05:12) (91% - 98%)      REVIEW OF SYSTEMS  General: No subjective fevers or chills	  Skin/Breast: Vitiligo   Respiratory and Thorax: Chronic SOB on exertion  Cardiovascular:	No CP or palpitations   Gastrointestinal:	 localized abdomina pain around umbilicus, no nausea, vomiting, melena, hematochezia   Genitourinary: No dysuria or hematuria   Musculoskeletal: No joint swelling	  Neurological: No tremor   Psychiatric: No confusion   Hematology/Lymphatics: No easy bruising or bleeding    PHYSICAL EXAM:  Constitutional: NAD, non-toxic appearing, vitiligo on face  Eyes: Scleral icterus  Neck: Supple  Chest: Mild gynecomastia   Respiratory: Faint crackles at bases, otherwise mid & upper lung fields clear  Cardiovascular: Regular rate and rhythm, no murmurs appreciated   Gastrointestinal: Soft, non-distended, no appreciable ascites, non-tender to deep palpation (s/p pain meds), ?able to palpable liver edge, liver edge non-tender  Extremities: RUE PICC w/ partial catheter exposed, gauze around PICC saturated w/ blood & area of induration near catheter insertion, No palmar erythema, Pitting LE edema  Neurological: Awake, alert, conversant, no asterixis   Skin: Spider angioma, Patches of vitiligo on face, chest, abdomen, extremities     LABS:                        10.5   8.5   )-----------( 66       ( 05 Jul 2017 16:57 )             29.7             07-05    133<L>  |  100  |  13  ----------------------------<  230<H>  4.4   |  19<L>  |  0.82    Ca    7.2<L>      05 Jul 2017 16:57    TPro  6.8  /  Alb    2.9<L>  /  TBili  4.0<H>  /  DBili  x   /  AST  51<H>  /  ALT  29  /  AlkPhos  247<H>  07-05      RADIOLOGY & ADDITIONAL STUDIES:    < from: CT Abdomen and Pelvis w/ Oral Cont and w/ IV Cont (07.05.17 @ 21:12) >    EXAM:  CT ABDOMEN AND PELVIS OC IC                            PROCEDURE DATE:  07/05/2017            INTERPRETATION:  CLINICAL INFORMATION: 41-year-old male with cirrhosis   presents with abdominal pain and fever.    COMPARISON: CT abdomen pelvis 5/20/2017.    PROCEDURE:   CT of the Abdomen and Pelvis was performed with intravenous contrast.   Intravenous contrast: 90 ml Omnipaque 350. 10 ml discarded.  Oral contrast: positive contrast was administered.  Sagittal and coronal reformats were performed.    FINDINGS:    LOWER CHEST: Within normal limits.    LIVER: Cirrhotic liver.  BILE DUCTS: Normal caliber.  GALLBLADDER: Within normal limits.  SPLEEN: Splenomegaly.  PANCREAS: Within normal limits.  ADRENALS: Within normal limits.  KIDNEYS/URETERS: Within normal limits.    BLADDER: Within normal limits.  REPRODUCTIVE ORGANS: The prostate is within normal limits.    BOWEL: Circumferential right colonic wall thickening, unchanged. No bowel   obstruction or free air.  PERITONEUM: Mild mesenteric edema. Mild perihepatic ascites. No abscess.  VESSELS:  Mild atheromatous disease of the abdominal aorta and its   branches. Recannulized umbilical vein and multiple perigastric,   perisplenic, and abdominal wall varices consistent with portal   hypertension. Recanalized umbilical vein measures 16 mm in diameter   proximally.  RETROPERITONEUM: No lymphadenopathy.    ABDOMINAL WALL: Within normal limits.  BONES: Degenerative changes of the lumbar spine. Bilateral L5 pars   defects with grade 1 anterolisthesis of L5 on S1.    IMPRESSION:    1.  No significant change in sequelae of cirrhosis and portal   hypertension including splenomegaly, abdominal varices, and ascites  2.  Persistent right colonic wall thickening, likely reactive from portal   colopathy. Colitis may have a similar appearance.  3.  No abdominal or pelvic fluid collection to suggest abscess formation.        FERNANDA SÁNCHEZ M.D., RADIOLOGY RESIDENT  This document has been electronically signed.  ISAI ROBLERO M.D., ATTENDING RADIOLOGIST  This document has been electronically signed. Jul 5 2017 10:43PM Hepatology Consult Note    HISTORY OF PRESENT ILLNESS:  41M hx EtOH cirrhosis c/b varices, recurrent E. coli bacteremia, p/w abd pain x 1 day.  Pt reports abd pain, located centrally above umbilicus.  Describes pain as sharp, constant, 10/10 in severity. Pain is aggravated by movement.  Pt did not try anything to alleviate pain at home. He denies N, V, hematochezia, melena, subjective fevers or chills, increased abd girth, tremors, periods of confusion.  He reports increased LE edema & chronic fatigue and SOB on exertion, but reports being able to 'walk several miles'.  Pt was recently hospitalized fever, abd pain, found to have E. coli bacteremia w/ no clear source found on imaging including CT A/P, tagged WBC scan, TTE, endoscopy. He was d/c'ed on ceftriaxone, being administered via PICC, w/ end date of abx on 7/8.  Pt reports compliance w/ the IV abx.  Of note, pt had 2 prior episodes of E. coli bacteremia (2/2017, 6/2016) & another episode of Strep infantanius bacteremia (4/2016). He also has multiple admissions this year for abd pain (2/2017, 4/2017, 5/2017, 6/2017) w/ CT scan showing persistent portal colopathy & colonoscopy in 4/2017 showing colopathy. His last paracentesis was in 4/2017 which was not c/w SBP, but pt was treated empirically per hepatology recommendations. He has documented hx of SBP in 11/2015 that was treated w/ cipro.     He has had 3 prior EGDs (2013, 4/2016, 3/2017) which has showed esophageal varices & portal hypertensive gastropathy.  Colonoscopy in 5/2016 showed small non-bleeding rectal varices. MRI in 6/2017 was negative for focal hepatic masses.  Of note, pt had liver biopsy in 2012 which confirmed cirrhosis & also showed mod-severe inflammation & hepatocyte apoptosis.  He follows w/ Dr. Thakkar for hepatology and says he is currently on liver transplant list at [facility] but has missed recent appointments due to recent hospitalization.     Outpatient GI Provider: Dr. Diaz   Last Colonoscopy: 4/2017 congestion of colonic mucosa in transverse colon   Last EGD: 3/2017 showed small <5mm esophageal varices, mild portal hypertensive, gastropathy     PAST MEDICAL & SURGICAL HISTORY:  Alcoholic cirrhosis  Esophageal varices   Vitiligo  C. diff (5/2016)  +Strongyloides Ab, per documentation  Latent tuberculosis (1990s)  Diabetes mellitus type 2, insulin dependent  Status post corneal transplant    HOME MEDICATIONS:   Ceftriaxone 1g IV until 7/8/17  Nadolol 20mg  Furosemide 20mg BID  Pantoprazole 40mg  MVI  Spironolactone 50mg  Metformin 500mg BID    MEDICATIONS  (STANDING):  insulin glargine Injectable (LANTUS) 25 Unit(s) SubCutaneous at bedtime  insulin lispro (HumaLOG) corrective regimen sliding scale   SubCutaneous three times a day before meals  insulin lispro (HumaLOG) corrective regimen sliding scale   SubCutaneous at bedtime  dextrose 5%. 1000 milliLiter(s) (50 mL/Hr) IV Continuous <Continuous>  dextrose 50% Injectable 12.5 Gram(s) IV Push once  dextrose 50% Injectable 25 Gram(s) IV Push once  dextrose 50% Injectable 25 Gram(s) IV Push once  heparin  Injectable 5000 Unit(s) SubCutaneous every 12 hours  spironolactone 50 milliGRAM(s) Oral daily  furosemide    Tablet 20 milliGRAM(s) Oral two times a day  pantoprazole    Tablet 40 milliGRAM(s) Oral before breakfast  nadolol 20 milliGRAM(s) Oral daily  multivitamin 1 Tablet(s) Oral daily  vancomycin  IVPB 1000 milliGRAM(s) IV Intermittent every 12 hours  piperacillin/tazobactam IVPB. 3.375 Gram(s) IV Intermittent every 8 hours    MEDICATIONS  (PRN):  dextrose Gel 1 Dose(s) Oral once PRN Blood Glucose LESS THAN 70 milliGRAM(s)/deciliter  glucagon  Injectable 1 milliGRAM(s) IntraMuscular once PRN Glucose LESS THAN 70 milligrams/deciliter  morphine  - Injectable 2 milliGRAM(s) IV Push every 4 hours PRN Moderate and severe pain      ALLERGIES: NKDA    SOCIAL HISTORY:   , lives w/ wife & kids  former heavy EtOH use (>2 packs beer daily for 10-15 years), last use in 2014 or 2015.   Remote social cigarette use (1 weekly) about 25 years ago.   Denies IVDU or other illicit drug use.       FAMILY HISTORY:  Family history of diabetes mellitus (Mother)      Vital Signs Last 24 Hrs  T(C): 36.7 (06 Jul 2017 05:12), Max: 39.2 (05 Jul 2017 16:14)  T(F): 98 (06 Jul 2017 05:12), Max: 102.5 (05 Jul 2017 16:14)  HR: 71 (06 Jul 2017 05:12) (71 - 109)  BP: 103/63 (06 Jul 2017 05:12) (103/63 - 129/69)  BP(mean): --  RR: 18 (06 Jul 2017 05:12) (17 - 26)  SpO2: 95% (06 Jul 2017 05:12) (91% - 98%)      REVIEW OF SYSTEMS  General: No subjective fevers or chills	  Skin/Breast: Vitiligo   Respiratory and Thorax: Chronic SOB on exertion  Cardiovascular:	No CP or palpitations   Gastrointestinal:	 localized abdomina pain around umbilicus, no nausea, vomiting, melena, hematochezia   Genitourinary: No dysuria or hematuria   Musculoskeletal: No joint swelling	  Neurological: No tremor   Psychiatric: No confusion   Hematology/Lymphatics: No easy bruising or bleeding    PHYSICAL EXAM:  Constitutional: NAD, non-toxic appearing, vitiligo on face  Eyes: Scleral icterus  Neck: Supple  Chest: Mild gynecomastia   Respiratory: Faint crackles at bases, otherwise mid & upper lung fields clear  Cardiovascular: Regular rate and rhythm, no murmurs appreciated   Gastrointestinal: Soft, non-distended, no appreciable ascites, non-tender to deep palpation (s/p pain meds), ?able to palpable liver edge, liver edge non-tender  Extremities: RUE PICC w/ partial catheter exposed, gauze around PICC saturated w/ blood & area of induration near catheter insertion, No palmar erythema, Pitting LE edema  Neurological: Awake, alert, conversant, no asterixis   Skin: Spider angioma, Patches of vitiligo on face, chest, abdomen, extremities     LABS:                        10.5   8.5   )-----------( 66       ( 05 Jul 2017 16:57 )             29.7             07-05    133<L>  |  100  |  13  ----------------------------<  230<H>  4.4   |  19<L>  |  0.82    Ca    7.2<L>      05 Jul 2017 16:57    TPro  6.8  /  Alb    2.9<L>  /  TBili  4.0<H>  /  DBili  x   /  AST  51<H>  /  ALT  29  /  AlkPhos  247<H>  07-05      RADIOLOGY & ADDITIONAL STUDIES:    < from: CT Abdomen and Pelvis w/ Oral Cont and w/ IV Cont (07.05.17 @ 21:12) >    EXAM:  CT ABDOMEN AND PELVIS OC IC                            PROCEDURE DATE:  07/05/2017            INTERPRETATION:  CLINICAL INFORMATION: 41-year-old male with cirrhosis   presents with abdominal pain and fever.    COMPARISON: CT abdomen pelvis 5/20/2017.    PROCEDURE:   CT of the Abdomen and Pelvis was performed with intravenous contrast.   Intravenous contrast: 90 ml Omnipaque 350. 10 ml discarded.  Oral contrast: positive contrast was administered.  Sagittal and coronal reformats were performed.    FINDINGS:    LOWER CHEST: Within normal limits.    LIVER: Cirrhotic liver.  BILE DUCTS: Normal caliber.  GALLBLADDER: Within normal limits.  SPLEEN: Splenomegaly.  PANCREAS: Within normal limits.  ADRENALS: Within normal limits.  KIDNEYS/URETERS: Within normal limits.    BLADDER: Within normal limits.  REPRODUCTIVE ORGANS: The prostate is within normal limits.    BOWEL: Circumferential right colonic wall thickening, unchanged. No bowel   obstruction or free air.  PERITONEUM: Mild mesenteric edema. Mild perihepatic ascites. No abscess.  VESSELS:  Mild atheromatous disease of the abdominal aorta and its   branches. Recannulized umbilical vein and multiple perigastric,   perisplenic, and abdominal wall varices consistent with portal   hypertension. Recanalized umbilical vein measures 16 mm in diameter   proximally.  RETROPERITONEUM: No lymphadenopathy.    ABDOMINAL WALL: Within normal limits.  BONES: Degenerative changes of the lumbar spine. Bilateral L5 pars   defects with grade 1 anterolisthesis of L5 on S1.    IMPRESSION:    1.  No significant change in sequelae of cirrhosis and portal   hypertension including splenomegaly, abdominal varices, and ascites  2.  Persistent right colonic wall thickening, likely reactive from portal   colopathy. Colitis may have a similar appearance.  3.  No abdominal or pelvic fluid collection to suggest abscess formation.        FERNANDA SÁNCHEZ M.D., RADIOLOGY RESIDENT  This document has been electronically signed.  ISAI ROBLERO M.D., ATTENDING RADIOLOGIST  This document has been electronically signed. Jul 5 2017 10:43PM Hepatology Consult Note    HISTORY OF PRESENT ILLNESS:  41M hx EtOH cirrhosis c/b varices, recurrent E. coli bacteremia, p/w abd pain x 1 day.  Pt reports abd pain, located centrally above umbilicus.  Describes pain as sharp, constant, 10/10 in severity. Pain is aggravated by movement.  Pt did not try anything to alleviate pain at home. He denies N, V, hematochezia, melena, subjective fevers or chills, increased abd girth, tremors, periods of confusion.  He reports increased LE edema & chronic fatigue and SOB on exertion, but reports being able to 'walk several miles'.  Pt was recently hospitalized fever, abd pain, found to have E. coli bacteremia w/ no clear source found on imaging including CT A/P, tagged WBC scan, TTE, endoscopy. He was d/c'ed on ceftriaxone, being administered via PICC, w/ end date of abx on 7/8.  Pt reports compliance w/ the IV abx.  Of note, pt had 2 prior episodes of E. coli bacteremia (2/2017, 6/2016) & another episode of Strep infantanius bacteremia (4/2016). He also has multiple admissions this year for abd pain (2/2017, 4/2017, 5/2017, 6/2017) w/ CT scan showing persistent portal colopathy & colonoscopy in 4/2017 showing colopathy. His last paracentesis was in 4/2017 which was not c/w SBP, but pt was treated empirically per hepatology recommendations. He has documented hx of SBP in 11/2015 that was treated w/ cipro.     He has had 3 prior EGDs (2013, 4/2016, 3/2017) which has showed esophageal varices & portal hypertensive gastropathy.  Colonoscopy in 5/2016 showed small non-bleeding rectal varices. MRI in 6/2017 was negative for focal hepatic masses.  Of note, pt had liver biopsy in 2012 which confirmed cirrhosis & also showed mod-severe inflammation & hepatocyte apoptosis.  He follows w/ Dr. Thakkar for hepatology and says he is currently on liver transplant list at [facility] but has missed recent appointments due to recent hospitalization.     Outpatient GI Provider: Dr. Diaz   Last Colonoscopy: 4/2017 congestion of colonic mucosa in transverse colon   Last EGD: 3/2017 showed small <5mm esophageal varices, mild portal hypertensive gastropathy     PAST MEDICAL & SURGICAL HISTORY:  Alcoholic cirrhosis  Esophageal varices   Vitiligo  C. diff (5/2016)  +Strongyloides Ab, per documentation  Latent tuberculosis (1990s)  Diabetes mellitus type 2, insulin dependent  Status post corneal transplant    HOME MEDICATIONS:   Ceftriaxone 1g IV until 7/8/17  Nadolol 20mg  Furosemide 20mg BID  Pantoprazole 40mg  MVI  Spironolactone 50mg  Metformin 500mg BID    MEDICATIONS  (STANDING):  insulin glargine Injectable (LANTUS) 25 Unit(s) SubCutaneous at bedtime  insulin lispro (HumaLOG) corrective regimen sliding scale   SubCutaneous three times a day before meals  insulin lispro (HumaLOG) corrective regimen sliding scale   SubCutaneous at bedtime  dextrose 5%. 1000 milliLiter(s) (50 mL/Hr) IV Continuous <Continuous>  dextrose 50% Injectable 12.5 Gram(s) IV Push once  dextrose 50% Injectable 25 Gram(s) IV Push once  dextrose 50% Injectable 25 Gram(s) IV Push once  heparin  Injectable 5000 Unit(s) SubCutaneous every 12 hours  spironolactone 50 milliGRAM(s) Oral daily  furosemide    Tablet 20 milliGRAM(s) Oral two times a day  pantoprazole    Tablet 40 milliGRAM(s) Oral before breakfast  nadolol 20 milliGRAM(s) Oral daily  multivitamin 1 Tablet(s) Oral daily  vancomycin  IVPB 1000 milliGRAM(s) IV Intermittent every 12 hours  piperacillin/tazobactam IVPB. 3.375 Gram(s) IV Intermittent every 8 hours    MEDICATIONS  (PRN):  dextrose Gel 1 Dose(s) Oral once PRN Blood Glucose LESS THAN 70 milliGRAM(s)/deciliter  glucagon  Injectable 1 milliGRAM(s) IntraMuscular once PRN Glucose LESS THAN 70 milligrams/deciliter  morphine  - Injectable 2 milliGRAM(s) IV Push every 4 hours PRN Moderate and severe pain      ALLERGIES: NKDA    SOCIAL HISTORY:   , lives w/ wife & kids  former heavy EtOH use (>2 packs beer daily for 10-15 years), last use in 2014 or 2015.   Remote social cigarette use (1 weekly) about 25 years ago.   Denies IVDU or other illicit drug use.       FAMILY HISTORY:  Family history of diabetes mellitus (Mother)      Vital Signs Last 24 Hrs  T(C): 36.7 (06 Jul 2017 05:12), Max: 39.2 (05 Jul 2017 16:14)  T(F): 98 (06 Jul 2017 05:12), Max: 102.5 (05 Jul 2017 16:14)  HR: 71 (06 Jul 2017 05:12) (71 - 109)  BP: 103/63 (06 Jul 2017 05:12) (103/63 - 129/69)  BP(mean): --  RR: 18 (06 Jul 2017 05:12) (17 - 26)  SpO2: 95% (06 Jul 2017 05:12) (91% - 98%)      REVIEW OF SYSTEMS  General: No subjective fevers or chills	  Skin/Breast: Vitiligo   Respiratory and Thorax: Chronic SOB on exertion  Cardiovascular:	No CP or palpitations   Gastrointestinal:	 localized abdomina pain around umbilicus, no nausea, vomiting, melena, hematochezia   Genitourinary: No dysuria or hematuria   Musculoskeletal: No joint swelling	  Neurological: No tremor   Psychiatric: No confusion   Hematology/Lymphatics: No easy bruising or bleeding    PHYSICAL EXAM:  Constitutional: NAD, non-toxic appearing, vitiligo on face  Eyes: Scleral icterus  Neck: Supple  Chest: Mild gynecomastia   Respiratory: Faint crackles at bases, otherwise mid & upper lung fields clear  Cardiovascular: Regular rate and rhythm, no murmurs appreciated   Gastrointestinal: Soft, non-distended, no appreciable ascites, non-tender to deep palpation (s/p pain meds), ?able to palpable liver edge, liver edge non-tender  Extremities: RUE PICC w/ partial catheter exposed, gauze around PICC saturated w/ blood & area of induration near catheter insertion, No palmar erythema, Pitting LE edema  Neurological: Awake, alert, conversant, no asterixis   Skin: Spider angioma, Patches of vitiligo on face, chest, abdomen, extremities     LABS:                        10.5   8.5   )-----------( 66       ( 05 Jul 2017 16:57 )             29.7             07-05    133<L>  |  100  |  13  ----------------------------<  230<H>  4.4   |  19<L>  |  0.82    Ca    7.2<L>      05 Jul 2017 16:57    TPro  6.8  /  Alb    2.9<L>  /  TBili  4.0<H>  /  DBili  x   /  AST  51<H>  /  ALT  29  /  AlkPhos  247<H>  07-05      RADIOLOGY & ADDITIONAL STUDIES:    < from: CT Abdomen and Pelvis w/ Oral Cont and w/ IV Cont (07.05.17 @ 21:12) >    EXAM:  CT ABDOMEN AND PELVIS OC IC                            PROCEDURE DATE:  07/05/2017            INTERPRETATION:  CLINICAL INFORMATION: 41-year-old male with cirrhosis   presents with abdominal pain and fever.    COMPARISON: CT abdomen pelvis 5/20/2017.    PROCEDURE:   CT of the Abdomen and Pelvis was performed with intravenous contrast.   Intravenous contrast: 90 ml Omnipaque 350. 10 ml discarded.  Oral contrast: positive contrast was administered.  Sagittal and coronal reformats were performed.    FINDINGS:    LOWER CHEST: Within normal limits.    LIVER: Cirrhotic liver.  BILE DUCTS: Normal caliber.  GALLBLADDER: Within normal limits.  SPLEEN: Splenomegaly.  PANCREAS: Within normal limits.  ADRENALS: Within normal limits.  KIDNEYS/URETERS: Within normal limits.    BLADDER: Within normal limits.  REPRODUCTIVE ORGANS: The prostate is within normal limits.    BOWEL: Circumferential right colonic wall thickening, unchanged. No bowel   obstruction or free air.  PERITONEUM: Mild mesenteric edema. Mild perihepatic ascites. No abscess.  VESSELS:  Mild atheromatous disease of the abdominal aorta and its   branches. Recannulized umbilical vein and multiple perigastric,   perisplenic, and abdominal wall varices consistent with portal   hypertension. Recanalized umbilical vein measures 16 mm in diameter   proximally.  RETROPERITONEUM: No lymphadenopathy.    ABDOMINAL WALL: Within normal limits.  BONES: Degenerative changes of the lumbar spine. Bilateral L5 pars   defects with grade 1 anterolisthesis of L5 on S1.    IMPRESSION:    1.  No significant change in sequelae of cirrhosis and portal   hypertension including splenomegaly, abdominal varices, and ascites  2.  Persistent right colonic wall thickening, likely reactive from portal   colopathy. Colitis may have a similar appearance.  3.  No abdominal or pelvic fluid collection to suggest abscess formation.        FERNANDA SÁNCHEZ M.D., RADIOLOGY RESIDENT  This document has been electronically signed.  ISAI ROBLERO M.D., ATTENDING RADIOLOGIST  This document has been electronically signed. Jul 5 2017 10:43PM

## 2017-07-06 NOTE — CONSULT NOTE ADULT - ASSESSMENT
41M hx EtOH cirrhosis c/b varices, recurrent E. coli bacteremia, w/ last episode in 6/2017 currently on IV ceftriaxone p/w severe abd pain x 1 day, meeting SIRS criteria on admission (fever, tachycardia, tachypnea), w/ CT A/P showing cirrhosis, portal HTN, varices, unchanged from prior studies, perihepatic ascites & persistent colonic wall thickening suggestive of    1. Sepsis w/ suspected GI vs. line source- In setting of cirrhosis history concern for SBP vs. line given recent PICC placement. No over ascites on clinical exam but mild perihepatic ascites on imaging.  Will likely need IR guided paracentesis for peritoneal studies. Agree w/ broad spectrum antibiotics.  Await blood cultures from peripheral & line.    2. Cirrhosis- appears compensated as no evidence of ascites, variceal hemorrhage or hepatic encephalopathy. Agree w/ continuing nadolol w/ hold parameters. Check coags & trend MELD-Na score daily 41M hx EtOH cirrhosis c/b varices, recurrent E. coli bacteremia, w/ last episode in 6/2017 currently on IV ceftriaxone p/w severe abd pain x 1 day, meeting SIRS criteria on admission (fever, tachycardia, tachypnea), w/ CT A/P showing cirrhosis, portal HTN, varices, unchanged from prior studies, perihepatic ascites & persistent colonic wall thickening suggestive of    1. Sepsis w/ suspected GI vs. line source- Given cirrhosis history concern for SBP vs. line infection given recent PICC placement. No over ascites on clinical exam but mild perihepatic ascites on imaging. so will likely need IR guided paracentesis for peritoneal studies. Agree w/ vanco & zosyn for now.  Await blood cultures from peripheral & line.    2. Abd pain- concern for SBP as above.  Given acuity of abd pain would recommend abd US w/ doppler to assess for portal vein thrombosis.     3. Cirrhosis- appears compensated as no evidence of ascites, variceal hemorrhage or hepatic encephalopathy. Agree w/ continuing nadolol & diuretics w/ hold parameters, but caution w/ diuretics & fluid status in setting of infection. Check coags & trend MELD-Na score daily. 41M hx EtOH cirrhosis c/b varices, recurrent E. coli bacteremia, w/ last episode in 6/2017 currently on IV ceftriaxone p/w severe abd pain x 1 day, meeting SIRS criteria on admission (fever, tachycardia, tachypnea), w/ CT A/P showing cirrhosis, portal HTN, varices, unchanged from prior studies, perihepatic ascites & persistent colonic wall thickening suggestive of    1. Sepsis w/ suspected GI vs. line source- Given cirrhosis history concern for SBP vs. line infection given recent PICC placement. No over ascites on clinical exam but mild perihepatic ascites on imaging. so will likely need IR guided paracentesis for peritoneal studies. Agree w/ vanco & zosyn for now.  Await blood cultures from peripheral & line.    2. Abd pain- concern for SBP as above.  Given acuity of abd pain would recommend abd US w/ doppler to assess for portal vein thrombosis.     3. Cirrhosis- appears mostly compensated as no evidence of ascites on exam, variceal hemorrhage or hepatic encephalopathy. Agree w/ continuing nadolol & diuretics w/ hold parameters, but caution w/ diuretics & fluid status in setting of infection. Check coags & trend MELD-Na score daily. 41M hx EtOH cirrhosis c/b varices, recurrent E. coli bacteremia, w/ last episode in 6/2017 currently on IV ceftriaxone p/w severe abd pain x 1 day, meeting SIRS criteria on admission (fever, tachycardia, tachypnea), w/ CT A/P showing cirrhosis, portal HTN, varices, unchanged from prior studies, perihepatic ascites & persistent colonic wall thickening suggestive of portal colopathy    1. Sepsis w/ suspected GI vs. line source- Given cirrhosis history concern for SBP vs. line infection given recent PICC placement. No over ascites on clinical exam but mild perihepatic ascites on imaging. so will likely need IR guided paracentesis for peritoneal studies. Agree w/ vanco & zosyn for now.  Await blood cultures from peripheral & line.    2. Abd pain- concern for SBP as above.  Given acuity of abd pain would recommend abd US w/ doppler to assess for portal vein thrombosis.     3. Cirrhosis- appears mostly compensated as no evidence of ascites on exam, variceal hemorrhage or hepatic encephalopathy. Agree w/ continuing nadolol & diuretics w/ hold parameters, but caution w/ diuretics & fluid status in setting of infection. Last MELD-Na was 15 on 6/26. Check coags & trend MELD-Na score daily. 41M hx EtOH cirrhosis c/b varices, recurrent E. coli bacteremia, w/ last episode in 6/2017 currently on IV ceftriaxone p/w severe abd pain x 1 day, meeting SIRS criteria on admission (fever, tachycardia, tachypnea), w/ CT A/P showing cirrhosis, portal HTN, varices, unchanged from prior studies, perihepatic ascites & persistent colonic wall thickening suggestive of portal colopathy    1. Sepsis w/ suspected GI vs. line source- Given cirrhosis history concern for SBP vs. line infection given recent PICC placement. No over ascites on clinical exam but mild perihepatic ascites on imaging. so will likely need IR guided paracentesis for peritoneal studies. Agree w/ vanco & zosyn for now.  Await blood cultures from periphery & line.    2. Abd pain- concern for SBP as above.  Given acuity of abd pain would recommend abd US w/ doppler to assess for portal vein thrombosis.     3. Cirrhosis- appears mostly compensated as no evidence of ascites on exam, variceal hemorrhage or hepatic encephalopathy. Agree w/ continuing nadolol & diuretics w/ hold parameters, but caution w/ diuretics & fluid status in setting of infection. Last MELD-Na was 15 on 6/26. Check coags & trend MELD-Na score daily. 41M hx EtOH cirrhosis c/b varices, recurrent E. coli bacteremia, w/ last episode in 6/2017 currently on IV ceftriaxone p/w severe abd pain x 1 day, meeting SIRS criteria on admission (fever, tachycardia, tachypnea), w/ CT A/P showing cirrhosis, portal HTN, varices, unchanged from prior studies, perihepatic ascites & persistent colonic wall thickening suggestive of portal colopathy    1. Sepsis w/ suspected GI vs. line source- Given cirrhosis history concern for SBP vs. line infection given recent PICC placement. No over ascites on clinical exam but mild perihepatic ascites on imaging. so will likely need IR guided paracentesis for peritoneal studies. Agree w/ vanco & zosyn for now.  Await blood cultures from periphery & line.    2. Abd pain- concern for SBP as above.  Given acuity of abd pain would recommend abd US w/ doppler to assess for portal vein thrombosis. ?relationship between reoccurrence of abd pain & portal colopathy.    3. Cirrhosis- appears mostly compensated as no evidence of ascites on exam, variceal hemorrhage or hepatic encephalopathy. Agree w/ continuing nadolol & diuretics w/ hold parameters, but caution w/ diuretics & fluid status in setting of infection. Last MELD-Na was 15 on 6/26. Check coags & trend MELD-Na score daily. 41M hx EtOH cirrhosis c/b varices, recurrent E. coli bacteremia, w/ last episode in 6/2017 currently on IV ceftriaxone p/w severe abd pain x 1 day, meeting SIRS criteria on admission (fever, tachycardia, tachypnea), w/ CT A/P showing cirrhosis, portal HTN, varices, unchanged from prior studies, perihepatic ascites & persistent colonic wall thickening suggestive of portal colopathy    1. Sepsis w/ suspected GI vs. line source- Given cirrhosis history concern for SBP vs. line infection given recent PICC placement. No over ascites on clinical exam but mild perihepatic ascites on imaging. so can consider IR guided paracentesis for peritoneal studies. Agree w/ vanco & zosyn for now.  Await blood cultures from periphery & line.    2. Abd pain- concern for SBP as above.  Given acuity of abd pain would recommend abd US w/ doppler to assess for portal vein thrombosis. ?relationship between reoccurrence of abd pain & portal colopathy.    3. Cirrhosis- appears mostly compensated as no evidence of ascites on exam, variceal hemorrhage or hepatic encephalopathy. Agree w/ continuing nadolol & diuretics w/ hold parameters, but caution w/ diuretics & fluid status in setting of infection. Last MELD-Na was 15 on 6/26. Check coags & trend MELD-Na score daily. 41M hx EtOH cirrhosis c/b varices, recurrent E. coli bacteremia, w/ last episode in 6/2017 currently on IV ceftriaxone p/w severe abd pain x 1 day, meeting SIRS criteria on admission (fever, tachycardia, tachypnea), w/ CT A/P showing cirrhosis, portal HTN, varices, unchanged from prior studies, perihepatic ascites & persistent colonic wall thickening suggestive of portal colopathy    1. Sepsis w/ suspected GI vs. line source vs. recurrent bacteremia- Given cirrhosis history concern for SBP vs. line infection given recent PICC placement. No overt ascites on clinical exam but mild perihepatic ascites on imaging, so will discuss w/ radiology if ascites if complex or changed from prior to warrant paracentesis. Agree w/ vanco & zosyn for now.  Await urine cultures & blood cultures from periphery & line.    2. Recurrent abd pain- ?2/2 portal colopathy vs. SBP as outlined above. Given acuity of abd pain would recommend abd US w/ doppler to assess for portal vein thrombosis.     3. Decompensated cirrhosis given ascites, varices & other evidence of portal HTN. No evidence of hepatic encephalopathy at this time. Agree w/ continuing nadolol & diuretics w/ hold parameters, but caution w/ diuretics & fluid status in setting of sepsis. Would stop nadolol & diuretics if pt becomes hypotensive. Last MELD-Na was 15 on 6/26. Check coags & trend MELD-Na score daily. 41M hx EtOH cirrhosis c/b varices, recurrent E. coli bacteremia, w/ last episode in 6/2017 currently on IV ceftriaxone p/w severe abd pain x 1 day, meeting SIRS criteria on admission (fever, tachycardia, tachypnea), w/ CT A/P showing cirrhosis, portal HTN, varices, unchanged from prior studies, perihepatic ascites & persistent colonic wall thickening suggestive of portal colopathy    1. Sepsis w/ suspected GI vs. line source vs. recurrent bacteremia- Given cirrhosis history concern for SBP vs. line infection given recent PICC placement. No overt ascites on clinical exam but mild perihepatic ascites on imaging, so will discuss w/ radiology if ascites if complex or changed from prior to warrant diagnositc paracentesis. Agree w/ vanco & zosyn for now.  Await urine cultures & blood cultures from periphery & line.    2. Recurrent abd pain- ?2/2 portal colopathy vs. SBP as outlined above. Given acuity of abd pain would consider abd US w/ doppler to assess for portal vein thrombosis.    3. Decompensated cirrhosis given ascites, varices & other evidence of portal HTN. No evidence of hepatic encephalopathy at this time. Agree w/ continuing nadolol & diuretics w/ hold parameters, but caution w/ diuretics & fluid status in setting of sepsis. Would stop nadolol & diuretics if pt becomes hypotensive. Last MELD-Na was 15 on 6/26. Check coags & trend MELD-Na score daily.    4. Elevated LFTs- likely 2/2 cirrhosis but t-bili alk phos higher than prior values. Pt not reporting biliary colic type of pain. Would trend for now & if persistently elevated may consider MRCP to assess for biliary obstruction. 41M hx EtOH cirrhosis c/b varices, recurrent E. coli bacteremia, w/ last episode in 6/2017 currently on IV ceftriaxone p/w severe abd pain x 1 day, meeting SIRS criteria on admission (fever, tachycardia, tachypnea), w/ CT A/P showing cirrhosis, portal HTN, varices, unchanged from prior studies, perihepatic ascites & persistent colonic wall thickening suggestive of portal colopathy    1. Sepsis w/ suspected GI vs. line source vs. recurrent bacteremia- Given cirrhosis history concern for SBP vs. line infection given recent PICC placement. No overt ascites on clinical exam but mild perihepatic ascites on imaging, so will discuss w/ radiology if ascites if complex or changed from prior to warrant diagnositc paracentesis. Agree w/ vanco & zosyn for now.  Await urine cultures & blood cultures from periphery & line.    2. Recurrent abd pain- ?2/2 portal colopathy vs. SBP as outlined above. In setting of portal HTN & colopathy, will consider role of TIPS & d/w ID service if recurrent bacteremia is contraindication. Given acuity of abd pain would consider abd US w/ doppler to assess for portal vein thrombosis.    3. Decompensated cirrhosis given ascites, varices & other evidence of portal HTN. No evidence of hepatic encephalopathy at this time. Agree w/ continuing nadolol & diuretics w/ hold parameters, but caution w/ diuretics & fluid status in setting of sepsis. Would stop nadolol & diuretics if pt becomes hypotensive. Last MELD-Na was 15 on 6/26. Check coags & trend MELD-Na score daily.    4. Elevated LFTs- likely 2/2 cirrhosis but t-bili alk phos higher than prior values. Pt not reporting biliary colic type of pain. Would trend for now & if persistently elevated may consider MRCP to assess for biliary obstruction.

## 2017-07-06 NOTE — PROGRESS NOTE ADULT - PROBLEM SELECTOR PLAN 1
- on admission febrile, tachycardic and tachypneic 2/2 likely colopathy vs colitis, now resolved  - c/w Vanc/Zosyn  - Blood cultures x 3 pending

## 2017-07-06 NOTE — CONSULT NOTE ADULT - ATTENDING COMMENTS
I am away next 3 days,Infectious Diseases Service will cover .  Please call [phone] if issues
Patient with recurrent episodes of abdominal pain and associated bacteremia in past.  Now returns with similar abdominal pain and has clinically responded to antibiotics.  Has imaging findings of congested bowel, possible related to portal hypertension.  Await bacterial cultures, although patient was on antibiotics.  Consideration may be given to measure of portal pressure and consideration of TIPS if pressure high .  Source of infection not clear , but may relate to bowel congestion.  Will follow infectious evaluation.

## 2017-07-06 NOTE — CONSULT NOTE ADULT - SUBJECTIVE AND OBJECTIVE BOX
Patient is a 41y old  Male who presents with a chief complaint of Fever, Abdominal pain (05 Jul 2017 19:48)    HPI:  41M PMH alcoholic cirrhosis c/b varices, DMII, h/o c.diff, strongyloides, latent TB, vitiligo, recent admission for e.coli bacteremia (discharged 6/27 on ceftriaxone via PICC) who presents with fever and abdominal pain. As noted and described below, the patient has a history notable for recurrent hospitalizations over the past 6 months for consistent complaints of abdominal pain, fevers, and E. coli bacteremia. The patient was last discharged on June 30th with a PICC placed and put on daily IV ceftriaxone, with plan to switch to bactrim prophylaxis pending course completion. The patient reports being compliant with his ceftriaxone (although has not yet had it today).   Initial ED Vitals: T 100.7, , /74, RR 24, SpO2 91% on RA (became 98% on 2L NC)  Initial Labs: WBC 8.5, Hgb 10.5, MCV 92, Plt 66, Na 133, Cr .82, Alb 2.9, Bili 4.0, Alk P 247, AST/ALT 51/29, VBG lactate 2.0  Initial Imaging: CXR official read pending. Lungs appear clear.    In the ED, the patient was given Vanc/Zosyn, IV tylenol, pepcid, zofran, and morphine 4 mg. Blood cultures x3 were drawn (2 peripherally, 1 through PICC), UCx taken, CT Abd/Pelv ordered.    History of current disease process:  Last alcoholic drink was in Sept 2015. Prior to that he was drinking about 3-4 beers every day.     Prior hospitalizations include  11/2015 for SBP treated with Cipro  2/26/17-3/3/17 at [facility], p/w abdominal pain and fevers, found to have gram negative bacteremia and was treated with ciprofloxacin. CT showed portal hypertensive colopathy vs colitis involving the ascending and proximal transverse colon. HIDA scan was within normal limits. EUS done to find source of gram negative bactermia but was negative.   4/13 p/w enterocolitis vs. portal enterocolopathy. Blood and urine cultures were sent and came back negative, and C. diff was negative. diagnostic paracentesis, and PMNs on the ascites fluid sample were 90, inconsistent with SBP. SAAG > 1.1, consistent with known cirrhosis.    5/2017 p/w fever, chills, vomiting, and abdominal pain, CT scan of the abdomen showed cirrhotic liver with small amount of ascites and thickened colon, unchanged from previous imaging in 4/2017. Colonoscopy April 17, 2017 revealed portal colopathy.   6/21-30 p/w fevers and abdominal pain, was found to have E. Coli bacteremia. NM tagged WBC scan demonstrated no source for infection.TTE did not report endocarditis. MRI abdomen and abdominal US did not demonstrate source of infection. Patient had PICC placed and was planned for 2 weeks daily IV Cipro followed by bactrim prophylaxis. (05 Jul 2017 19:48)During this admission he was also given 2 doses of Ivermectin  and positive strongyloidiasis serology though did not have any active symptoms and stool wasnegative. It was thought that this may have been  may be predisposing him to recurrent bacteremia.    Infectious disease consultation was requested    PAST MEDICAL & SURGICAL HISTORY:  Vitiligo  Latent tuberculosis  Diabetes mellitus type 2, insulin dependent  Esophageal varices  Alcoholic cirrhosis  Status post corneal transplant      Social history:  no    Smoking,  rest as above   FAMILY HISTORY:  Family history of diabetes mellitus (Mother)  - Reviewed,Non contributory       REVIEW OF SYSTEMS  General:	Denies any malaise fatigue or chills. Fevers present     Skin:No rash  	  	  ENMT:No nasal discharge,headache,sinus congestion or throat pain.No dental complaints    Respiratory and Thorax:No cough,sputum or chest pain.Denies shortness of breath  	  Cardiovascular:	No chest pain,palpitaions or dizziness    Gastrointestinal:	NO nausea,Abdominal pain as above. Says that is midline. It does not radiate to the back. Denies any associated symptoms. No diarrhea.    Genitourinary:	No dysuria,frequency. No flank pain    Musculoskeletal:	No joint swelling or pain.No weakness    Neurological:No confusion,diziness.No extremity weakness.No bladder or bowel incontinence	    Psychiatric:No delusions or hallucinations	    Hematology/Lymphatics:	No LN swelling.No gum bleeding     Endocrine:	No recent weight gain or loss.No abnormal heat/cold intolerance    Allergic/Immunologic:	No hives or rash   Allergies    No Known Allergies    Intolerances        Antimicrobials:    vancomycin  IVPB 1000 milliGRAM(s) IV Intermittent every 12 hours  piperacillin/tazobactam IVPB. 3.375 Gram(s) IV Intermittent every 8 hours        Vital Signs Last 24 Hrs  T(C): 36.7 (06 Jul 2017 05:12), Max: 39.2 (05 Jul 2017 16:14)  T(F): 98 (06 Jul 2017 05:12), Max: 102.5 (05 Jul 2017 16:14)  HR: 71 (06 Jul 2017 05:12) (71 - 109)  BP: 103/63 (06 Jul 2017 05:12) (103/63 - 129/69)  BP(mean): --  RR: 18 (06 Jul 2017 05:12) (17 - 26)  SpO2: 95% (06 Jul 2017 05:12) (91% - 98%)    PHYSICAL EXAM:Pleasant patient in no acute distress.    Multiple vitiligo lesions       Constitutional:Comfortable.Awake and alert  No cachexia     Eyes:PERRL EOMI.NO discharge or conjunctival injection    ENMT:No sinus tenderness.No thrush.No pharyngeal exudate or erythema.Fair dental hygiene    Neck:Supple,No LN,no JVD      Respiratory:Good air entry bilaterally,CTA    Cardiovascular:S1 S2 wnl, No murmurs,rub or gallops    Gastrointestinal:Soft Mild distention no BS(+) minimal  tenderness no masses ,No rebound or guarding    Genitourinary:No CVA tendereness         Extremities:No cyanosis,clubbing or edema.    There is ecchymosis  message at the right upper extremity PICC line site. There is some  bleeding from the PICC line. There is mild tenderness present    Vascular:peripheral pulses felt    Neurological:AAO X 3,No grossly focal deficits    Musculoskeletal:No joint swelling or LOM    Psychiatric:Affect normal.          Labs:                            10.5   8.5   )-----------( 66       ( 05 Jul 2017 16:57 )             29.7         07-05    133<L>  |  100  |  13  ----------------------------<  230<H>  4.4   |  19<L>  |  0.82    Ca    7.2<L>      05 Jul 2017 16:57    TPro  6.8  /  Alb  2.9<L>  /  TBili  4.0<H>  /  DBili  x   /  AST  51<H>  /  ALT  29  /  AlkPhos  247<H>  07-05        Urine Microscopic-Add On (NC) (07.05.17 @ 19:34)    White Blood Cell - Urine: 2-5 /HPF    Red Blood Cell - Urine: 10-25 /HPF    Epithelial Cells: Occasional /HPF    Culture - Blood (06.25.17 @ 14:59)    Specimen Source: .Blood Blood    Culture Results:   No growth at 5 days.    < from: CT Abdomen and Pelvis w/ Oral Cont and w/ IV Cont (07.05.17 @ 21:12) >  IMPRESSION:    1.  No significant change in sequelae of cirrhosis and portal   hypertension including splenomegaly, abdominal varices, and ascites  2.  Persistent right colonic wall thickening, likely reactive from portal   colopathy. Colitis may have a similar appearance.  3.  No abdominal or pelvic fluid collection to suggest abscess formation.          < from: Xray Chest 1 View AP- PORTABLE-Urgent (06.30.17 @ 12:25) >  IMPRESSION:   Clear lungs.      < end of copied text >          < end of copied text >

## 2017-07-06 NOTE — PROGRESS NOTE ADULT - SUBJECTIVE AND OBJECTIVE BOX
41 year old male with prior history of alcoholic cirrhosis, recurrent infections with E-coli Bacteremia who presented with abdominal pain and fevers    SUBJECTIVE / OVERNIGHT EVENTS: Patient seen at bedside, reports that the pain has resolved now. Denies any fevers, chills, nausea or vomiting. Reports that his last bowel movement was yesterday prior to admission and was formed and nonbloody. Patient had spoke with someone from [facility] and reports that they are working on his transfer to their hospital so that he can be followed by his hepatologist.     Reports no other issues or concerns at this time.     MEDICATIONS  (STANDING):  insulin glargine Injectable (LANTUS) 25 Unit(s) SubCutaneous at bedtime  insulin lispro (HumaLOG) corrective regimen sliding scale   SubCutaneous three times a day before meals  insulin lispro (HumaLOG) corrective regimen sliding scale   SubCutaneous at bedtime  dextrose 5%. 1000 milliLiter(s) (50 mL/Hr) IV Continuous <Continuous>  dextrose 50% Injectable 12.5 Gram(s) IV Push once  dextrose 50% Injectable 25 Gram(s) IV Push once  dextrose 50% Injectable 25 Gram(s) IV Push once  heparin  Injectable 5000 Unit(s) SubCutaneous every 12 hours  spironolactone 50 milliGRAM(s) Oral daily  furosemide    Tablet 20 milliGRAM(s) Oral two times a day  pantoprazole    Tablet 40 milliGRAM(s) Oral before breakfast  nadolol 20 milliGRAM(s) Oral daily  multivitamin 1 Tablet(s) Oral daily  vancomycin  IVPB 1000 milliGRAM(s) IV Intermittent every 12 hours  piperacillin/tazobactam IVPB. 3.375 Gram(s) IV Intermittent every 8 hours    MEDICATIONS  (PRN):  dextrose Gel 1 Dose(s) Oral once PRN Blood Glucose LESS THAN 70 milliGRAM(s)/deciliter  glucagon  Injectable 1 milliGRAM(s) IntraMuscular once PRN Glucose LESS THAN 70 milligrams/deciliter  morphine  - Injectable 2 milliGRAM(s) IV Push every 4 hours PRN Moderate and severe pain    Vital Signs Last 24 Hrs  T(C): 36.7 (17 @ 05:12), Max: 39.2 (17 @ 16:14)  HR: 71 (17 @ 05:12) (71 - 109)  BP: 103/63 (17 @ 05:12) (103/63 - 129/69)  RR: 18 (17 @ 05:12) (17 - 26)  SpO2: 95% (17 @ 05:12) (91% - 98%)  CAPILLARY BLOOD GLUCOSE  209 (2017 22:44)    I&O's Summary    2017 07:01  -  2017 07:00  --------------------------------------------------------  IN: 240 mL / OUT: 675 mL / NET: -435 mL    PHYSICAL EXAM  GENERAL: NAD, well-developed, middle aged male  HEAD:  Atraumatic, Normocephalic  EYES: EOMI, PERRLA, conjunctiva and sclera clear  CHEST/LUNG: Clear to auscultation bilaterally; No wheezes, rales or rhonchi  HEART: Regular rate and rhythm; No murmurs, rubs, or gallops  ABDOMEN: Soft, distended abdomen, nontender to palpation. No evidence of caput medusa, fluid shift test negative. No organomegaly. Bowel sounds present   EXTREMITIES:  2+ Peripheral Pulses, No clubbing, or cyanosis. 1+ edema, bilaterally.   SKIN: Diffuse whole body pigment discoloration     LABS:                        10.5   8.5   )-----------( 66       ( 2017 16:57 )             29.7         133<L>  |  100  |  13  ----------------------------<  230<H>  4.4   |  19<L>  |  0.82    Ca    7.2<L>      2017 16:57    TPro  6.8  /  Alb  2.9<L>  /  TBili  4.0<H>  /  DBili  x   /  AST  51<H>  /  ALT  29  /  AlkPhos  247<H>      Urinalysis Basic - ( 2017 19:34 )  Color: Yellow / Appearance: x / S.028 / pH: x  Gluc: x / Ketone: Trace  / Bili: Small / Urobili: Negative   Blood: x / Protein: 30 mg/dL / Nitrite: Negative   Leuk Esterase: Negative / RBC: 10-25 /HPF / WBC 2-5 /HPF   Sq Epi: x / Non Sq Epi: x / Bacteria: x    CT abdomen: As per radiology  - no evidence of significant change from cirrhotic sequelae  - persistent right colonic wall thickening  - No fluid collection or abscesses in the abdomen or pelvis    Blood cultures negative x 3    RADIOLOGY & ADDITIONAL TESTS:  No additional testing ordered

## 2017-07-06 NOTE — CONSULT NOTE ADULT - ASSESSMENT
41-year-old man with history of alcoholic cirrhosis ,varices, diabetes mellitus, history of C. difficile, vitiligo.  History of recurrent Escherichia coli bacteremia. Etiology is unclear as patient has had an extensive workup including paracentesis,though  the most recent admission he did not have enough fluid to tapped.He  also had imaging, nuclear scan, echocardiogram. He had a positive strongyloidiasis serology  in February. Did not have active symptoms but it was thought this may be predisposing him to recurrent  Escherichia coli bacteremia and patient received 2 doses of ivermectin during the last admission.    Patient bacteremia had cleared and he was being treated with intravenous ceftriaxone through her PICC line which he is still getting.Subsequently  Bactrim suppression was planned  Now admitted with fevers and increased abdominal pain.  Clinically the abdomen has  mild tenderness and is benign. The CT scan is unchanged and has chronic colitis.  Patient does not have any diarrhea to suggest C. difficile.  He does have some swelling and bleeding from the PICC line site.  Blood cultures and urine cultures are pending.    DDs will include PICC line related complication,infection/thrombus,recurrent bacteremia,C diff or other occult process.    Recommended following  1) follow pending blood and urine cultures  2) ultrasound Doppler of the PICC line and if continues to bleed or  there is  evidence of any PICC line related infection or thrombosis to remove line.  3)Agree with empiric coverage with  vancomycin and Zosyn.Monitor vanco levels  Monitor for C. difficile ,if diarrhea start oral vancomycin  5) monitor for any signs and symptoms of infection clinically.    tailor plan per results as well as the clinical course.

## 2017-07-07 ENCOUNTER — APPOINTMENT (OUTPATIENT)
Dept: INTERNAL MEDICINE | Facility: CLINIC | Age: 42
End: 2017-07-07

## 2017-07-07 LAB
ALBUMIN SERPL ELPH-MCNC: 2.3 G/DL — LOW (ref 3.3–5)
ALP SERPL-CCNC: 139 U/L — HIGH (ref 40–120)
ALT FLD-CCNC: 24 U/L RC — SIGNIFICANT CHANGE UP (ref 10–45)
ANION GAP SERPL CALC-SCNC: 8 MMOL/L — SIGNIFICANT CHANGE UP (ref 5–17)
AST SERPL-CCNC: 39 U/L — SIGNIFICANT CHANGE UP (ref 10–40)
BILIRUB SERPL-MCNC: 1.9 MG/DL — HIGH (ref 0.2–1.2)
BUN SERPL-MCNC: 12 MG/DL — SIGNIFICANT CHANGE UP (ref 7–23)
CALCIUM SERPL-MCNC: 7.3 MG/DL — LOW (ref 8.4–10.5)
CHLORIDE SERPL-SCNC: 103 MMOL/L — SIGNIFICANT CHANGE UP (ref 96–108)
CO2 SERPL-SCNC: 22 MMOL/L — SIGNIFICANT CHANGE UP (ref 22–31)
CREAT SERPL-MCNC: 0.72 MG/DL — SIGNIFICANT CHANGE UP (ref 0.5–1.3)
GLUCOSE SERPL-MCNC: 268 MG/DL — HIGH (ref 70–99)
HCT VFR BLD CALC: 27.4 % — LOW (ref 39–50)
HGB BLD-MCNC: 9.4 G/DL — LOW (ref 13–17)
INR BLD: 1.51 RATIO — HIGH (ref 0.88–1.16)
MAGNESIUM SERPL-MCNC: 1.7 MG/DL — SIGNIFICANT CHANGE UP (ref 1.6–2.6)
MCHC RBC-ENTMCNC: 33.9 PG — SIGNIFICANT CHANGE UP (ref 27–34)
MCHC RBC-ENTMCNC: 34.2 GM/DL — SIGNIFICANT CHANGE UP (ref 32–36)
MCV RBC AUTO: 99.3 FL — SIGNIFICANT CHANGE UP (ref 80–100)
PHOSPHATE SERPL-MCNC: 3.2 MG/DL — SIGNIFICANT CHANGE UP (ref 2.5–4.5)
PLATELET # BLD AUTO: 60 K/UL — LOW (ref 150–400)
POTASSIUM SERPL-MCNC: 3.9 MMOL/L — SIGNIFICANT CHANGE UP (ref 3.5–5.3)
POTASSIUM SERPL-SCNC: 3.9 MMOL/L — SIGNIFICANT CHANGE UP (ref 3.5–5.3)
PROT SERPL-MCNC: 5.7 G/DL — LOW (ref 6–8.3)
PROTHROM AB SERPL-ACNC: 16.4 SEC — HIGH (ref 9.8–12.7)
RBC # BLD: 2.76 M/UL — LOW (ref 4.2–5.8)
RBC # FLD: 13.6 % — SIGNIFICANT CHANGE UP (ref 10.3–14.5)
SODIUM SERPL-SCNC: 133 MMOL/L — LOW (ref 135–145)
WBC # BLD: 4.4 K/UL — SIGNIFICANT CHANGE UP (ref 3.8–10.5)
WBC # FLD AUTO: 4.4 K/UL — SIGNIFICANT CHANGE UP (ref 3.8–10.5)

## 2017-07-07 PROCEDURE — 99232 SBSQ HOSP IP/OBS MODERATE 35: CPT | Mod: GC

## 2017-07-07 PROCEDURE — 93975 VASCULAR STUDY: CPT | Mod: 26

## 2017-07-07 PROCEDURE — 99232 SBSQ HOSP IP/OBS MODERATE 35: CPT

## 2017-07-07 PROCEDURE — 76882 US LMTD JT/FCL EVL NVASC XTR: CPT | Mod: 26,RT

## 2017-07-07 RX ADMIN — PIPERACILLIN AND TAZOBACTAM 25 GRAM(S): 4; .5 INJECTION, POWDER, LYOPHILIZED, FOR SOLUTION INTRAVENOUS at 05:05

## 2017-07-07 RX ADMIN — Medication 1: at 21:48

## 2017-07-07 RX ADMIN — Medication 2: at 17:50

## 2017-07-07 RX ADMIN — PANTOPRAZOLE SODIUM 40 MILLIGRAM(S): 20 TABLET, DELAYED RELEASE ORAL at 06:23

## 2017-07-07 RX ADMIN — INSULIN GLARGINE 25 UNIT(S): 100 INJECTION, SOLUTION SUBCUTANEOUS at 21:49

## 2017-07-07 RX ADMIN — NADOLOL 20 MILLIGRAM(S): 80 TABLET ORAL at 05:05

## 2017-07-07 RX ADMIN — Medication 250 MILLIGRAM(S): at 17:25

## 2017-07-07 RX ADMIN — Medication 1: at 13:09

## 2017-07-07 RX ADMIN — PIPERACILLIN AND TAZOBACTAM 25 GRAM(S): 4; .5 INJECTION, POWDER, LYOPHILIZED, FOR SOLUTION INTRAVENOUS at 20:34

## 2017-07-07 RX ADMIN — Medication 250 MILLIGRAM(S): at 06:24

## 2017-07-07 RX ADMIN — PIPERACILLIN AND TAZOBACTAM 25 GRAM(S): 4; .5 INJECTION, POWDER, LYOPHILIZED, FOR SOLUTION INTRAVENOUS at 12:15

## 2017-07-07 RX ADMIN — Medication 1 TABLET(S): at 11:46

## 2017-07-07 NOTE — PROGRESS NOTE ADULT - SUBJECTIVE AND OBJECTIVE BOX
41y old  Male who presents with a chief complaint of Fever, Abdominal pain      Interval history:  Afebrile, no N/V/D, no abdominal pain, no cough, denies dysuria.        Antimicrobials:    vancomycin  IVPB 1000 milliGRAM(s) IV Intermittent every 12 hours  piperacillin/tazobactam IVPB. 3.375 Gram(s) IV Intermittent every 8 hours    REVIEW OF SYSTEMS:  As above.    Vital Signs Last 24 Hrs  T(C): 36.6 (07-07-17 @ 12:17), Max: 37.4 (07-07-17 @ 04:20)  T(F): 97.9 (07-07-17 @ 12:17), Max: 99.4 (07-07-17 @ 04:20)  HR: 65 (07-07-17 @ 17:20) (59 - 76)  BP: 95/59 (07-07-17 @ 17:20) (91/50 - 99/59)  BP(mean): --  RR: 18 (07-07-17 @ 12:17) (18 - 18)  SpO2: 94% (07-07-17 @ 12:17) (93% - 94%)    PHYSICAL EXAM:  Pleasant patient in no acute distress. AAOX3. Vitiligo.  Cardiovascular: S1S2 normal.  Lungs: Good air entry B/L lung fields.  Gastrointestinal: soft, nontender, nondistended.  Extremities: trace edema b/l LE.  Rt arm picc                               9.4    4.4   )-----------( 60       ( 07 Jul 2017 07:11 )             27.4   07-07    133<L>  |  103  |  12  ----------------------------<  268<H>  3.9   |  22  |  0.72    Ca    7.3<L>      07 Jul 2017 07:11  Phos  3.2     07-07  Mg     1.7     07-07    TPro  5.7<L>  /  Alb  2.3<L>  /  TBili  1.9<H>  /  DBili  x   /  AST  39  /  ALT  24  /  AlkPhos  139<H>  07-07      LIVER FUNCTIONS - ( 07 Jul 2017 07:11 )  Alb: 2.3 g/dL / Pro: 5.7 g/dL / ALK PHOS: 139 U/L / ALT: 24 U/L RC / AST: 39 U/L / GGT: x           RECENT CULTURES:  Culture - Urine (07.05.17 @ 23:15)    Specimen Source: .Urine Clean Catch (Midstream)  No growth    Culture - Blood (07.05.17 @ 21:34)    Specimen Source: .Blood Blood-Venous  No growth to date.      Radiology:  < from: US Abdomen Doppler (07.07.17 @ 11:30) >    Impression: Hepatic cirrhosis with evidence of portal hypertension.    No evidence of portal or hepatic venous thrombosis.      < from: US Extremity Nonvasc Limited, Right (07.07.17 @ 11:30) >    Impression: No sonographic evidence of perivenous fluid collection to   suggest abscess.

## 2017-07-07 NOTE — PROGRESS NOTE ADULT - ASSESSMENT
41-year-old man with history of alcoholic cirrhosis ,varices, diabetes mellitus, history of C. difficile, vitiligo.  History of recurrent Escherichia coli bacteremia. Etiology is unclear as patient has had an extensive workup in past including paracentesis, imaging, nuclear scan, echocardiogram.   Now admitted with fevers and increased abdominal pain.  Clinically the abdomen is benign. The CT scan is unchanged and has chronic colitis.  Patient does not have any diarrhea to suggest C. difficile.  He had some swelling and bleeding from the PICC line site.  Blood cultures and urine cultures negative so far. Pt now afebrile.      Recommended following  1) follow prelim blood and urine cultures  2) ultrasound Doppler of the PICC line with no collection, PICC changed by IR over guidewire.  3) Continue with empiric coverage with  vancomycin and Zosyn for now while awaiting cx results.  4) Monitor vanco levels  Monitor for C. difficile ,if diarrhea start oral vancomycin  5) monitor for any signs and symptoms of infection clinically.

## 2017-07-07 NOTE — PROGRESS NOTE ADULT - ASSESSMENT
41M hx EtOH cirrhosis c/b varices, recurrent E. coli bacteremia, w/ last episode in 6/2017 currently on IV ceftriaxone p/w severe abd pain x 1 day, meeting SIRS criteria on admission (fever, tachycardia, tachypnea), w/ CT A/P showing cirrhosis, portal HTN, varices, unchanged from prior studies, perihepatic ascites & persistent colonic wall thickening suggestive of portal colopathy    1. Sepsis w/ suspected line vs. GI source- Blood cx & urine cx negative thus far, spoke w/ radiology team who reports that mild hepatic ascites is simple fluid & unchanged from prior imaging, so do not have high clinical suspicion for SBP to warrant paracentesis. Source could be PICC given recent placement. Sepsis & abx management as per primary/ID team.     2. Recurrent abd pain- unclear etiology, possible that portal colopathy playing a role. May ongoing risk/benefit discussion as outpatient regarding utilization of TIPS procedure for portal colopathy as it is not common indication for TIPS.    3. Decompensated cirrhosis- C/w nadolol & diuretics. Trend MELD-Na.    4. Elevated LFTs- likely 2/2 cirrhosis. T-bili & alk phos trended down to baseline values. Do not suspect biliary obstruction. 41M hx EtOH cirrhosis c/b varices, recurrent E. coli bacteremia, w/ last episode in 6/2017 currently on IV ceftriaxone p/w severe abd pain x 1 day, meeting SIRS criteria on admission (fever, tachycardia, tachypnea), w/ CT A/P showing cirrhosis, portal HTN, varices, unchanged from prior studies, perihepatic ascites & persistent colonic wall thickening suggestive of portal colopathy    1. Sepsis w/ suspected line vs. GI source- Blood cx & urine cx negative thus far, spoke w/ radiology resident (Brandon Morocho) who reports that mild hepatic ascites is simple fluid & unchanged from prior imaging, so do not have high clinical suspicion for SBP to warrant paracentesis. Source could be PICC given recent placement. Also discussed w/ ID (Dr. Zana arcos) regarding gut translocation as source of recurrent bacteremia, but since organisms have been primarily E. coli, it is unlikely that this is 2/2 to gut translocation as different organisms would be involved. Sepsis & further abx management as per primary & ID team.     2. Recurrent abd pain- unclear etiology, possible that portal colopathy playing a role. May need ongoing risk/benefit discussion as outpatient regarding utilization of TIPS procedure for portal colopathy as it is not typical indication for TIPS.    3. Decompensated cirrhosis- C/w nadolol & diuretics. Trend MELD-Na.    4. Elevated LFTs- likely 2/2 cirrhosis. T-bili & alk phos trended down to baseline values. Do not suspect biliary obstruction.

## 2017-07-07 NOTE — PROGRESS NOTE ADULT - SUBJECTIVE AND OBJECTIVE BOX
CHIEF COMPLAINT:  Patient is a 41y old  Male who presents with a chief complaint of Fever, Abdominal pain (05 Jul 2017 19:48)    INTERVAL HISTORY:    	    MEDICATIONS  (STANDING):  insulin glargine Injectable (LANTUS) 25 Unit(s) SubCutaneous at bedtime  insulin lispro (HumaLOG) corrective regimen sliding scale   SubCutaneous three times a day before meals  insulin lispro (HumaLOG) corrective regimen sliding scale   SubCutaneous at bedtime  dextrose 5%. 1000 milliLiter(s) (50 mL/Hr) IV Continuous <Continuous>  dextrose 50% Injectable 12.5 Gram(s) IV Push once  dextrose 50% Injectable 25 Gram(s) IV Push once  dextrose 50% Injectable 25 Gram(s) IV Push once  heparin  Injectable 5000 Unit(s) SubCutaneous every 12 hours  spironolactone 50 milliGRAM(s) Oral daily  furosemide    Tablet 20 milliGRAM(s) Oral two times a day  pantoprazole    Tablet 40 milliGRAM(s) Oral before breakfast  multivitamin 1 Tablet(s) Oral daily  nadolol 20 milliGRAM(s) Oral daily  vancomycin  IVPB 1000 milliGRAM(s) IV Intermittent every 12 hours  piperacillin/tazobactam IVPB. 3.375 Gram(s) IV Intermittent every 8 hours    MEDICATIONS  (PRN):  dextrose Gel 1 Dose(s) Oral once PRN Blood Glucose LESS THAN 70 milliGRAM(s)/deciliter  glucagon  Injectable 1 milliGRAM(s) IntraMuscular once PRN Glucose LESS THAN 70 milligrams/deciliter  morphine  - Injectable 2 milliGRAM(s) IV Push every 4 hours PRN Moderate and severe pain      ALLERGIES:      SOCIAL HISTORY:  - Alcohol: denies  - Recreational drug use: denies    FAMILY HISTORY:  Family history of diabetes mellitus (Mother)      Vital Signs Last 24 Hrs  T(C): 37.4 (07 Jul 2017 04:20), Max: 37.4 (07 Jul 2017 04:20)  T(F): 99.4 (07 Jul 2017 04:20), Max: 99.4 (07 Jul 2017 04:20)  HR: 72 (07 Jul 2017 04:20) (63 - 76)  BP: 99/59 (07 Jul 2017 04:20) (91/50 - 99/59)  BP(mean): --  RR: 18 (07 Jul 2017 04:20) (17 - 18)  SpO2: 93% (07 Jul 2017 04:20) (93% - 94%)    PHYSICAL EXAM:  Constitutional: no acute distress  Eyes: no icterus  Neck: no masses, no LAD  Respiratory: normal inspiratory effort; no wheezing or crackles  Cardiovascular: RRR, normal S1/S2, no murmurs/rubs/gallops  Gastrointestinal: soft, nondistended, nontender, +BS  Rectal:   Extremities: no LE edema  Neurological: AAOx3, no asterixis  Skin: no rashes, bruises, petechiae    LABS:                        9.4    4.4   )-----------( 60       ( 07 Jul 2017 07:11 )             27.4     07-07    133<L>  |  103  |  12  ----------------------------<  268<H>  3.9   |  22  |  0.72    Ca    7.3<L>      07 Jul 2017 07:11  Phos  3.2     07-07  Mg     1.7     07-07    TPro  5.7<L>  /  Alb  2.3<L>  /  TBili  1.9<H>  /  DBili  x   /  AST  39  /  ALT  24  /  AlkPhos  139<H>  07-07    PT/INR - ( 07 Jul 2017 07:11 )   PT: 16.4 sec;   INR: 1.51 ratio           LIVER FUNCTIONS - ( 07 Jul 2017 07:11 )  Alb: 2.3 g/dL / Pro: 5.7 g/dL / ALK PHOS: 139 U/L / ALT: 24 U/L RC / AST: 39 U/L / GGT: x             RADIOLOGY & ADDITIONAL STUDIES: CHIEF COMPLAINT:  Patient is a 41y old  Male who presents with a chief complaint of Fever, Abdominal pain (05 Jul 2017 19:48)    INTERVAL HISTORY:    	    MEDICATIONS  (STANDING):  insulin glargine Injectable (LANTUS) 25 Unit(s) SubCutaneous at bedtime  insulin lispro (HumaLOG) corrective regimen sliding scale   SubCutaneous three times a day before meals  insulin lispro (HumaLOG) corrective regimen sliding scale   SubCutaneous at bedtime  dextrose 5%. 1000 milliLiter(s) (50 mL/Hr) IV Continuous <Continuous>  dextrose 50% Injectable 12.5 Gram(s) IV Push once  dextrose 50% Injectable 25 Gram(s) IV Push once  dextrose 50% Injectable 25 Gram(s) IV Push once  heparin  Injectable 5000 Unit(s) SubCutaneous every 12 hours  spironolactone 50 milliGRAM(s) Oral daily  furosemide    Tablet 20 milliGRAM(s) Oral two times a day  pantoprazole    Tablet 40 milliGRAM(s) Oral before breakfast  multivitamin 1 Tablet(s) Oral daily  nadolol 20 milliGRAM(s) Oral daily  vancomycin  IVPB 1000 milliGRAM(s) IV Intermittent every 12 hours  piperacillin/tazobactam IVPB. 3.375 Gram(s) IV Intermittent every 8 hours    MEDICATIONS  (PRN):  dextrose Gel 1 Dose(s) Oral once PRN Blood Glucose LESS THAN 70 milliGRAM(s)/deciliter  glucagon  Injectable 1 milliGRAM(s) IntraMuscular once PRN Glucose LESS THAN 70 milligrams/deciliter  morphine  - Injectable 2 milliGRAM(s) IV Push every 4 hours PRN Moderate and severe pain      Vital Signs Last 24 Hrs  T(C): 37.4 (07 Jul 2017 04:20), Max: 37.4 (07 Jul 2017 04:20)  T(F): 99.4 (07 Jul 2017 04:20), Max: 99.4 (07 Jul 2017 04:20)  HR: 72 (07 Jul 2017 04:20) (63 - 76)  BP: 99/59 (07 Jul 2017 04:20) (91/50 - 99/59)  BP(mean): --  RR: 18 (07 Jul 2017 04:20) (17 - 18)  SpO2: 93% (07 Jul 2017 04:20) (93% - 94%)    PHYSICAL EXAM:  Constitutional:  Eyes:   Neck:   Respiratory:   Cardiovascular:   Gastrointestinal:   Extremities:   Neurological:   Skin:     LABS:                        9.4    4.4   )-----------( 60       ( 07 Jul 2017 07:11 )             27.4     07-07    133<L>  |  103  |  12  ----------------------------<  268<H>  3.9   |  22  |  0.72    Ca    7.3<L>      07 Jul 2017 07:11  Phos  3.2     07-07  Mg     1.7     07-07    TPro  5.7<L>  /  Alb  2.3<L>  /  TBili  1.9<H>  /  DBili  x   /  AST  39  /  ALT  24  /  AlkPhos  139<H>  07-07    PT/INR - ( 07 Jul 2017 07:11 )   PT: 16.4 sec;   INR: 1.51 ratio           LIVER FUNCTIONS - ( 07 Jul 2017 07:11 )  Alb: 2.3 g/dL / Pro: 5.7 g/dL / ALK PHOS: 139 U/L / ALT: 24 U/L RC / AST: 39 U/L / GGT: x             RADIOLOGY & ADDITIONAL STUDIES: CHIEF COMPLAINT:  Patient is a 41y old  Male who presents with a chief complaint of Fever, Abdominal pain (05 Jul 2017 19:48)    INTERVAL HISTORY:  Overall, pt says he is feeling well. He reports resolution of abd pain.  Denies fever, chills, N, V, diarrhea.  	    MEDICATIONS  (STANDING):  insulin glargine Injectable (LANTUS) 25 Unit(s) SubCutaneous at bedtime  insulin lispro (HumaLOG) corrective regimen sliding scale   SubCutaneous three times a day before meals  insulin lispro (HumaLOG) corrective regimen sliding scale   SubCutaneous at bedtime  dextrose 5%. 1000 milliLiter(s) (50 mL/Hr) IV Continuous <Continuous>  dextrose 50% Injectable 12.5 Gram(s) IV Push once  dextrose 50% Injectable 25 Gram(s) IV Push once  dextrose 50% Injectable 25 Gram(s) IV Push once  heparin  Injectable 5000 Unit(s) SubCutaneous every 12 hours  spironolactone 50 milliGRAM(s) Oral daily  furosemide    Tablet 20 milliGRAM(s) Oral two times a day  pantoprazole    Tablet 40 milliGRAM(s) Oral before breakfast  multivitamin 1 Tablet(s) Oral daily  nadolol 20 milliGRAM(s) Oral daily  vancomycin  IVPB 1000 milliGRAM(s) IV Intermittent every 12 hours  piperacillin/tazobactam IVPB. 3.375 Gram(s) IV Intermittent every 8 hours    MEDICATIONS  (PRN):  dextrose Gel 1 Dose(s) Oral once PRN Blood Glucose LESS THAN 70 milliGRAM(s)/deciliter  glucagon  Injectable 1 milliGRAM(s) IntraMuscular once PRN Glucose LESS THAN 70 milligrams/deciliter  morphine  - Injectable 2 milliGRAM(s) IV Push every 4 hours PRN Moderate and severe pain      Vital Signs Last 24 Hrs  T(C): 37.4 (07 Jul 2017 04:20), Max: 37.4 (07 Jul 2017 04:20)  T(F): 99.4 (07 Jul 2017 04:20), Max: 99.4 (07 Jul 2017 04:20)  HR: 72 (07 Jul 2017 04:20) (63 - 76)  BP: 99/59 (07 Jul 2017 04:20) (91/50 - 99/59)  BP(mean): --  RR: 18 (07 Jul 2017 04:20) (17 - 18)  SpO2: 93% (07 Jul 2017 04:20) (93% - 94%)    PHYSICAL EXAM:  Constitutional: NAD, well-appearing  Eyes: Mild scleral icterus  Neck: Supple  Respiratory: Non-labored breathing, lung clear bilaterally   Cardiovascular: S1, S2, RRR, no murmurs appreciated   Gastrointestinal:  Soft, not distended, non-tender to deep palpation in all 4 quadrants, liver edge palpable, but non-tender   Extremities: No LE edema   Neurological: Non-focal   Skin:  Scattered hypopigmented patches on face, extremities, chest, abd c/w vitiligo     LABS:                        9.4    4.4   )-----------( 60       ( 07 Jul 2017 07:11 )             27.4     07-07    133<L>  |  103  |  12  ----------------------------<  268<H>  3.9   |  22  |  0.72    Ca    7.3<L>      07 Jul 2017 07:11  Phos  3.2     07-07  Mg     1.7     07-07    TPro  5.7<L>  /  Alb  2.3<L>  /  TBili  1.9<H>  /  DBili  x   /  AST  39  /  ALT  24  /  AlkPhos  139<H>  07-07    PT/INR - ( 07 Jul 2017 07:11 )   PT: 16.4 sec;   INR: 1.51 ratio           LIVER FUNCTIONS - ( 07 Jul 2017 07:11 )  Alb: 2.3 g/dL / Pro: 5.7 g/dL / ALK PHOS: 139 U/L / ALT: 24 U/L RC / AST: 39 U/L / GGT: x             RADIOLOGY & ADDITIONAL STUDIES: CHIEF COMPLAINT:  Patient is a 41y old  Male who presents with a chief complaint of Fever, Abdominal pain (05 Jul 2017 19:48)    INTERVAL HISTORY:  Overall, pt says he is feeling well. He reports resolution of abd pain.  Denies fever, chills, N, V, diarrhea.   	    MEDICATIONS  (STANDING):  insulin glargine Injectable (LANTUS) 25 Unit(s) SubCutaneous at bedtime  insulin lispro (HumaLOG) corrective regimen sliding scale   SubCutaneous three times a day before meals  insulin lispro (HumaLOG) corrective regimen sliding scale   SubCutaneous at bedtime  dextrose 5%. 1000 milliLiter(s) (50 mL/Hr) IV Continuous <Continuous>  dextrose 50% Injectable 12.5 Gram(s) IV Push once  dextrose 50% Injectable 25 Gram(s) IV Push once  dextrose 50% Injectable 25 Gram(s) IV Push once  heparin  Injectable 5000 Unit(s) SubCutaneous every 12 hours  spironolactone 50 milliGRAM(s) Oral daily  furosemide    Tablet 20 milliGRAM(s) Oral two times a day  pantoprazole    Tablet 40 milliGRAM(s) Oral before breakfast  multivitamin 1 Tablet(s) Oral daily  nadolol 20 milliGRAM(s) Oral daily  vancomycin  IVPB 1000 milliGRAM(s) IV Intermittent every 12 hours  piperacillin/tazobactam IVPB. 3.375 Gram(s) IV Intermittent every 8 hours    MEDICATIONS  (PRN):  dextrose Gel 1 Dose(s) Oral once PRN Blood Glucose LESS THAN 70 milliGRAM(s)/deciliter  glucagon  Injectable 1 milliGRAM(s) IntraMuscular once PRN Glucose LESS THAN 70 milligrams/deciliter  morphine  - Injectable 2 milliGRAM(s) IV Push every 4 hours PRN Moderate and severe pain      Vital Signs Last 24 Hrs  T(C): 37.4 (07 Jul 2017 04:20), Max: 37.4 (07 Jul 2017 04:20)  T(F): 99.4 (07 Jul 2017 04:20), Max: 99.4 (07 Jul 2017 04:20)  HR: 72 (07 Jul 2017 04:20) (63 - 76)  BP: 99/59 (07 Jul 2017 04:20) (91/50 - 99/59)  BP(mean): --  RR: 18 (07 Jul 2017 04:20) (17 - 18)  SpO2: 93% (07 Jul 2017 04:20) (93% - 94%)    PHYSICAL EXAM:  Constitutional: NAD, well-appearing  Eyes: Mild scleral icterus  Neck: Supple  Respiratory: Non-labored breathing, lung clear bilaterally   Cardiovascular: S1, S2, RRR, no murmurs appreciated   Gastrointestinal:  Soft, not distended, non-tender to deep palpation in all 4 quadrants, liver edge palpable, but non-tender   Extremities: No LE edema   Neurological: Non-focal   Skin:  Scattered hypopigmented patches on face, extremities, chest, abd c/w vitiligo     LABS:                        9.4    4.4   )-----------( 60       ( 07 Jul 2017 07:11 )             27.4     07-07    133<L>  |  103  |  12  ----------------------------<  268<H>  3.9   |  22  |  0.72    Ca    7.3<L>      07 Jul 2017 07:11  Phos  3.2     07-07  Mg     1.7     07-07    TPro  5.7<L>  /  Alb  2.3<L>  /  TBili  1.9<H>  /  DBili  x   /  AST  39  /  ALT  24  /  AlkPhos  139<H>  07-07    PT/INR - ( 07 Jul 2017 07:11 )   PT: 16.4 sec;   INR: 1.51 ratio           LIVER FUNCTIONS - ( 07 Jul 2017 07:11 )  Alb: 2.3 g/dL / Pro: 5.7 g/dL / ALK PHOS: 139 U/L / ALT: 24 U/L RC / AST: 39 U/L / GGT: x             RADIOLOGY & ADDITIONAL STUDIES:

## 2017-07-07 NOTE — PROGRESS NOTE ADULT - SUBJECTIVE AND OBJECTIVE BOX
Patient is a 41y old  Male who presents with a chief complaint of Fever, Abdominal pain (2017 19:48)        SUBJECTIVE / OVERNIGHT EVENTS:      MEDICATIONS  (STANDING):  insulin glargine Injectable (LANTUS) 25 Unit(s) SubCutaneous at bedtime  insulin lispro (HumaLOG) corrective regimen sliding scale   SubCutaneous three times a day before meals  insulin lispro (HumaLOG) corrective regimen sliding scale   SubCutaneous at bedtime  dextrose 5%. 1000 milliLiter(s) (50 mL/Hr) IV Continuous <Continuous>  dextrose 50% Injectable 12.5 Gram(s) IV Push once  dextrose 50% Injectable 25 Gram(s) IV Push once  dextrose 50% Injectable 25 Gram(s) IV Push once  heparin  Injectable 5000 Unit(s) SubCutaneous every 12 hours  spironolactone 50 milliGRAM(s) Oral daily  furosemide    Tablet 20 milliGRAM(s) Oral two times a day  pantoprazole    Tablet 40 milliGRAM(s) Oral before breakfast  multivitamin 1 Tablet(s) Oral daily  nadolol 20 milliGRAM(s) Oral daily  vancomycin  IVPB 1000 milliGRAM(s) IV Intermittent every 12 hours  piperacillin/tazobactam IVPB. 3.375 Gram(s) IV Intermittent every 8 hours    MEDICATIONS  (PRN):  dextrose Gel 1 Dose(s) Oral once PRN Blood Glucose LESS THAN 70 milliGRAM(s)/deciliter  glucagon  Injectable 1 milliGRAM(s) IntraMuscular once PRN Glucose LESS THAN 70 milligrams/deciliter  morphine  - Injectable 2 milliGRAM(s) IV Push every 4 hours PRN Moderate and severe pain      Vital Signs Last 24 Hrs  T(C): 37.4 (17 @ 04:20), Max: 37.4 (17 @ 04:20)  HR: 72 (17 @ 04:20) (63 - 76)  BP: 99/59 (17 @ 04:20) (91/50 - 99/59)  RR: 18 (17 @ 04:20) (17 - 18)  SpO2: 93% (17 @ 04:20) (93% - 94%)  CAPILLARY BLOOD GLUCOSE  209 (2017 21:37)  204 (2017 17:46)  175 (2017 14:00)  178 (2017 08:45)        I&O's Summary    2017 07:01  -  2017 07:00  --------------------------------------------------------  IN: 350 mL / OUT: 0 mL / NET: 350 mL        PHYSICAL EXAM  GENERAL: NAD, well-developed  HEAD:  Atraumatic, Normocephalic  EYES: EOMI, PERRLA, conjunctiva and sclera clear  NECK: Supple, No JVD  CHEST/LUNG: Clear to auscultation bilaterally; No wheeze  HEART: Regular rate and rhythm; No murmurs, rubs, or gallops  ABDOMEN: Soft, Nontender, Nondistended; Bowel sounds present  EXTREMITIES:  2+ Peripheral Pulses, No clubbing, cyanosis, or edema  PSYCH: AAOx3  SKIN: No rashes or lesions    LABS:                        9.0    5.90  )-----------( 61       ( 2017 08:44 )             25.3     07-06    134<L>  |  103  |  10  ----------------------------<  204<H>  3.9   |  21<L>  |  0.68    Ca    6.9<L>      2017 08:51  Phos  3.6     07-  Mg     1.8     -    TPro  5.8<L>  /  Alb  1.9<L>  /  TBili  3.6<H>  /  DBili  x   /  AST  39  /  ALT  24  /  AlkPhos  150<H>            Urinalysis Basic - ( 2017 19:34 )    Color: Yellow / Appearance: x / S.028 / pH: x  Gluc: x / Ketone: Trace  / Bili: Small / Urobili: Negative   Blood: x / Protein: 30 mg/dL / Nitrite: Negative   Leuk Esterase: Negative / RBC: 10-25 /HPF / WBC 2-5 /HPF   Sq Epi: x / Non Sq Epi: x / Bacteria: x        RADIOLOGY & ADDITIONAL TESTS:    Imaging Personally Reviewed:  Consultant(s) Notes Reviewed:    Care Discussed with Consultants/Other Providers: Patient is a 41y old  Male who presents with a chief complaint of Fever, Abdominal pain (2017 19:48)    SUBJECTIVE / OVERNIGHT EVENTS: Patient seen at bedside, reports no issues or concerns at this time.     MEDICATIONS  (STANDING):  insulin glargine Injectable (LANTUS) 25 Unit(s) SubCutaneous at bedtime  insulin lispro (HumaLOG) corrective regimen sliding scale   SubCutaneous three times a day before meals  insulin lispro (HumaLOG) corrective regimen sliding scale   SubCutaneous at bedtime  dextrose 5%. 1000 milliLiter(s) (50 mL/Hr) IV Continuous <Continuous>  dextrose 50% Injectable 12.5 Gram(s) IV Push once  dextrose 50% Injectable 25 Gram(s) IV Push once  dextrose 50% Injectable 25 Gram(s) IV Push once  heparin  Injectable 5000 Unit(s) SubCutaneous every 12 hours  spironolactone 50 milliGRAM(s) Oral daily  furosemide    Tablet 20 milliGRAM(s) Oral two times a day  pantoprazole    Tablet 40 milliGRAM(s) Oral before breakfast  multivitamin 1 Tablet(s) Oral daily  nadolol 20 milliGRAM(s) Oral daily  vancomycin  IVPB 1000 milliGRAM(s) IV Intermittent every 12 hours  piperacillin/tazobactam IVPB. 3.375 Gram(s) IV Intermittent every 8 hours    MEDICATIONS  (PRN):  dextrose Gel 1 Dose(s) Oral once PRN Blood Glucose LESS THAN 70 milliGRAM(s)/deciliter  glucagon  Injectable 1 milliGRAM(s) IntraMuscular once PRN Glucose LESS THAN 70 milligrams/deciliter  morphine  - Injectable 2 milliGRAM(s) IV Push every 4 hours PRN Moderate and severe pain    Vital Signs Last 24 Hrs  T(C): 37.4 (17 @ 04:20), Max: 37.4 (17 @ 04:20)  HR: 72 (17 @ 04:20) (63 - 76)  BP: 99/59 (17 @ 04:20) (91/50 - 99/59)  RR: 18 (17 @ 04:20) (17 - 18)  SpO2: 93% (17 @ 04:20) (93% - 94%)  CAPILLARY BLOOD GLUCOSE  209 (2017 21:37)  204 (2017 17:46)  175 (2017 14:00)  178 (2017 08:45)    I&O's Summary    2017 07:01  -  2017 07:00  --------------------------------------------------------  IN: 350 mL / OUT: 0 mL / NET: 350 mL    PHYSICAL EXAM  GENERAL: NAD, middle aged male  CHEST/LUNG: Clear to auscultation bilaterally; No wheezes, rales or rhonchi  HEART: Regular rate and rhythm; No murmurs, rubs, or gallops  ABDOMEN: Soft, Nontender, Nondistended; Bowel sounds present, no organomegaly  EXTREMITIES:  2+ Peripheral Pulses, No clubbing, cyanosis, or edema      LABS:                        9.4    4.4   )-----------( 60       ( 2017 07:11 )             27.4   07-07    133<L>  |  103  |  12  ----------------------------<  268<H>  3.9   |  22  |  0.72    Ca    7.3<L>      2017 07:11  Phos  3.2     07-  Mg     1.7     07-    TPro  5.7<L>  /  Alb  2.3<L>  /  TBili  1.9<H>  /  DBili  x   /  AST  39  /  ALT  24  /  AlkPhos  139<H>  07-    Blood culture negative x 3, Urine culture negative     Urinalysis Basic - ( 2017 19:34 )    Color: Yellow / Appearance: x / S.028 / pH: x  Gluc: x / Ketone: Trace  / Bili: Small / Urobili: Negative   Blood: x / Protein: 30 mg/dL / Nitrite: Negative   Leuk Esterase: Negative / RBC: 10-25 /HPF / WBC 2-5 /HPF   Sq Epi: x / Non Sq Epi: x / Bacteria: x    RADIOLOGY & ADDITIONAL TESTS:  U/S PICC site pending  U/S Abdomen pending

## 2017-07-07 NOTE — PROGRESS NOTE ADULT - PROBLEM SELECTOR PLAN 1
- on admission febrile, tachycardic and tachypneic 2/2 likely colopathy vs colitis, now resolved  - c/w Vanc/Zosyn  - Blood cultures x 3 pending - on admission febrile, tachycardic and tachypneic 2/2 likely colopathy vs colitis, now resolved  - c/w Vanc/Zosyn  - Blood cultures x 3 negative  - will await ID recommendations for antibiotic course and duration  - GI will not perform TIPS at this time

## 2017-07-07 NOTE — PROGRESS NOTE ADULT - PROBLEM SELECTOR PLAN 2
- ? 2/2 portal hypertensive colopathy vs colitis, pain now resolved   - patient status post numerous imaging studies  - CT Abd/Pelvis: no evidence of significant change in cirrhotic sequelae, persistent right colon wall thickening, no evidence of pelvic or abdominal fluid collections   - B/C x 3 pending, c/w Vanc/Zosyn  - Will f/u w/ GI and ID - ? 2/2 portal hypertensive colopathy vs colitis, pain now resolved   - patient status post numerous imaging studies  - CT Abd/Pelvis: no evidence of significant change in cirrhotic sequelae, persistent right colon wall thickening, no evidence of pelvic or abdominal fluid collections   - B/C x 3 negative, c/w Vanc/Zosyn  - GI will not do TIPS at this time, awaiting recommendations from ID in regards to antibiotics course and duration

## 2017-07-08 LAB
ALBUMIN SERPL ELPH-MCNC: 2.3 G/DL — LOW (ref 3.3–5)
ALP SERPL-CCNC: 116 U/L — SIGNIFICANT CHANGE UP (ref 40–120)
ALT FLD-CCNC: 19 U/L RC — SIGNIFICANT CHANGE UP (ref 10–45)
ANION GAP SERPL CALC-SCNC: 9 MMOL/L — SIGNIFICANT CHANGE UP (ref 5–17)
AST SERPL-CCNC: 39 U/L — SIGNIFICANT CHANGE UP (ref 10–40)
BILIRUB SERPL-MCNC: 2 MG/DL — HIGH (ref 0.2–1.2)
BUN SERPL-MCNC: 8 MG/DL — SIGNIFICANT CHANGE UP (ref 7–23)
CALCIUM SERPL-MCNC: 7.8 MG/DL — LOW (ref 8.4–10.5)
CHLORIDE SERPL-SCNC: 106 MMOL/L — SIGNIFICANT CHANGE UP (ref 96–108)
CO2 SERPL-SCNC: 23 MMOL/L — SIGNIFICANT CHANGE UP (ref 22–31)
CREAT SERPL-MCNC: 0.62 MG/DL — SIGNIFICANT CHANGE UP (ref 0.5–1.3)
GLUCOSE SERPL-MCNC: 136 MG/DL — HIGH (ref 70–99)
HCT VFR BLD CALC: 26.6 % — LOW (ref 39–50)
HGB BLD-MCNC: 9.3 G/DL — LOW (ref 13–17)
MCHC RBC-ENTMCNC: 34.4 PG — HIGH (ref 27–34)
MCHC RBC-ENTMCNC: 34.9 GM/DL — SIGNIFICANT CHANGE UP (ref 32–36)
MCV RBC AUTO: 98.5 FL — SIGNIFICANT CHANGE UP (ref 80–100)
PLATELET # BLD AUTO: 62 K/UL — LOW (ref 150–400)
POTASSIUM SERPL-MCNC: 3.8 MMOL/L — SIGNIFICANT CHANGE UP (ref 3.5–5.3)
POTASSIUM SERPL-SCNC: 3.8 MMOL/L — SIGNIFICANT CHANGE UP (ref 3.5–5.3)
PROT SERPL-MCNC: 5.6 G/DL — LOW (ref 6–8.3)
RBC # BLD: 2.7 M/UL — LOW (ref 4.2–5.8)
RBC # FLD: 13.5 % — SIGNIFICANT CHANGE UP (ref 10.3–14.5)
SODIUM SERPL-SCNC: 138 MMOL/L — SIGNIFICANT CHANGE UP (ref 135–145)
WBC # BLD: 3.8 K/UL — SIGNIFICANT CHANGE UP (ref 3.8–10.5)
WBC # FLD AUTO: 3.8 K/UL — SIGNIFICANT CHANGE UP (ref 3.8–10.5)

## 2017-07-08 PROCEDURE — 99232 SBSQ HOSP IP/OBS MODERATE 35: CPT | Mod: GC

## 2017-07-08 RX ORDER — DOCUSATE SODIUM 100 MG
100 CAPSULE ORAL THREE TIMES A DAY
Qty: 0 | Refills: 0 | Status: DISCONTINUED | OUTPATIENT
Start: 2017-07-08 | End: 2017-07-08

## 2017-07-08 RX ORDER — POLYETHYLENE GLYCOL 3350 17 G/17G
17 POWDER, FOR SOLUTION ORAL DAILY
Qty: 0 | Refills: 0 | Status: DISCONTINUED | OUTPATIENT
Start: 2017-07-08 | End: 2017-07-08

## 2017-07-08 RX ADMIN — PIPERACILLIN AND TAZOBACTAM 25 GRAM(S): 4; .5 INJECTION, POWDER, LYOPHILIZED, FOR SOLUTION INTRAVENOUS at 13:05

## 2017-07-08 RX ADMIN — INSULIN GLARGINE 25 UNIT(S): 100 INJECTION, SOLUTION SUBCUTANEOUS at 21:57

## 2017-07-08 RX ADMIN — Medication 20 MILLIGRAM(S): at 17:11

## 2017-07-08 RX ADMIN — Medication 250 MILLIGRAM(S): at 17:12

## 2017-07-08 RX ADMIN — SPIRONOLACTONE 50 MILLIGRAM(S): 25 TABLET, FILM COATED ORAL at 06:09

## 2017-07-08 RX ADMIN — Medication 250 MILLIGRAM(S): at 06:09

## 2017-07-08 RX ADMIN — NADOLOL 20 MILLIGRAM(S): 80 TABLET ORAL at 06:09

## 2017-07-08 RX ADMIN — Medication 2: at 13:03

## 2017-07-08 RX ADMIN — PIPERACILLIN AND TAZOBACTAM 25 GRAM(S): 4; .5 INJECTION, POWDER, LYOPHILIZED, FOR SOLUTION INTRAVENOUS at 07:22

## 2017-07-08 RX ADMIN — Medication 20 MILLIGRAM(S): at 06:16

## 2017-07-08 RX ADMIN — Medication 4: at 17:11

## 2017-07-08 RX ADMIN — PIPERACILLIN AND TAZOBACTAM 25 GRAM(S): 4; .5 INJECTION, POWDER, LYOPHILIZED, FOR SOLUTION INTRAVENOUS at 21:58

## 2017-07-08 RX ADMIN — Medication 1 TABLET(S): at 11:15

## 2017-07-08 RX ADMIN — PANTOPRAZOLE SODIUM 40 MILLIGRAM(S): 20 TABLET, DELAYED RELEASE ORAL at 06:10

## 2017-07-08 NOTE — PROGRESS NOTE ADULT - PROBLEM SELECTOR PLAN 1
- on admission febrile, tachycardic and tachypneic 2/2 likely colopathy vs colitis, now resolved  - c/w Vanc/Zosyn  - Blood cultures x 3 negative  - will await ID recommendations for antibiotic course and duration  - GI will not perform TIPS at this time - blood cultures negative x 3 to date, PICC d/janell   - c/w vancomycin and zosyn  - as per ID need final blood cultures   - as per , will not be able to set up visiting nurse services over weekend, patient will need to stay till monday.   - NO TIPs as per GI

## 2017-07-08 NOTE — PROGRESS NOTE ADULT - SUBJECTIVE AND OBJECTIVE BOX
Patient is a 41y old  Male who presents with a chief complaint of Fever, Abdominal pain (05 Jul 2017 19:48)        SUBJECTIVE / OVERNIGHT EVENTS:      MEDICATIONS  (STANDING):  insulin glargine Injectable (LANTUS) 25 Unit(s) SubCutaneous at bedtime  insulin lispro (HumaLOG) corrective regimen sliding scale   SubCutaneous three times a day before meals  insulin lispro (HumaLOG) corrective regimen sliding scale   SubCutaneous at bedtime  dextrose 5%. 1000 milliLiter(s) (50 mL/Hr) IV Continuous <Continuous>  dextrose 50% Injectable 12.5 Gram(s) IV Push once  dextrose 50% Injectable 25 Gram(s) IV Push once  dextrose 50% Injectable 25 Gram(s) IV Push once  heparin  Injectable 5000 Unit(s) SubCutaneous every 12 hours  spironolactone 50 milliGRAM(s) Oral daily  furosemide    Tablet 20 milliGRAM(s) Oral two times a day  pantoprazole    Tablet 40 milliGRAM(s) Oral before breakfast  multivitamin 1 Tablet(s) Oral daily  nadolol 20 milliGRAM(s) Oral daily  vancomycin  IVPB 1000 milliGRAM(s) IV Intermittent every 12 hours  piperacillin/tazobactam IVPB. 3.375 Gram(s) IV Intermittent every 8 hours    MEDICATIONS  (PRN):  dextrose Gel 1 Dose(s) Oral once PRN Blood Glucose LESS THAN 70 milliGRAM(s)/deciliter  glucagon  Injectable 1 milliGRAM(s) IntraMuscular once PRN Glucose LESS THAN 70 milligrams/deciliter  morphine  - Injectable 2 milliGRAM(s) IV Push every 4 hours PRN Moderate and severe pain      Vital Signs Last 24 Hrs  T(C): 36.3 (07-08-17 @ 04:39), Max: 36.6 (07-07-17 @ 12:17)  HR: 68 (07-08-17 @ 05:40) (59 - 68)  BP: 104/61 (07-08-17 @ 05:40) (92/64 - 104/62)  RR: 18 (07-08-17 @ 04:39) (18 - 18)  SpO2: 100% (07-08-17 @ 04:39) (94% - 100%)  CAPILLARY BLOOD GLUCOSE  254 (07 Jul 2017 21:37)  242 (07 Jul 2017 17:08)  169 (07 Jul 2017 12:17)  182 (07 Jul 2017 08:18)        I&O's Summary    07 Jul 2017 07:01  -  08 Jul 2017 07:00  --------------------------------------------------------  IN: 450 mL / OUT: 0 mL / NET: 450 mL        PHYSICAL EXAM  GENERAL: NAD, well-developed  HEAD:  Atraumatic, Normocephalic  EYES: EOMI, PERRLA, conjunctiva and sclera clear  NECK: Supple, No JVD  CHEST/LUNG: Clear to auscultation bilaterally; No wheeze  HEART: Regular rate and rhythm; No murmurs, rubs, or gallops  ABDOMEN: Soft, Nontender, Nondistended; Bowel sounds present  EXTREMITIES:  2+ Peripheral Pulses, No clubbing, cyanosis, or edema  PSYCH: AAOx3  SKIN: No rashes or lesions    LABS:                        9.3    3.8   )-----------( 62       ( 08 Jul 2017 07:12 )             26.6     07-08    138  |  106  |  8   ----------------------------<  136<H>  3.8   |  23  |  0.62    Ca    7.8<L>      08 Jul 2017 07:10  Phos  3.2     07-07  Mg     1.7     07-07    TPro  5.6<L>  /  Alb  2.3<L>  /  TBili  2.0<H>  /  DBili  x   /  AST  39  /  ALT  19  /  AlkPhos  116  07-08    PT/INR - ( 07 Jul 2017 07:11 )   PT: 16.4 sec;   INR: 1.51 ratio                   RADIOLOGY & ADDITIONAL TESTS:    Imaging Personally Reviewed:  Consultant(s) Notes Reviewed:    Care Discussed with Consultants/Other Providers: 41 year old male who presented with worsening abdominal pain and fevers in the setting or recent E-coli Colitis with continued treatment on ceftriaxone    SUBJECTIVE / OVERNIGHT EVENTS: Patient seen at bedside, reports no issues or concerns at this time. Denies fevers, chills, nausea or vomiting or diarrhea.    MEDICATIONS  (STANDING):  insulin glargine Injectable (LANTUS) 25 Unit(s) SubCutaneous at bedtime  insulin lispro (HumaLOG) corrective regimen sliding scale   SubCutaneous three times a day before meals  insulin lispro (HumaLOG) corrective regimen sliding scale   SubCutaneous at bedtime  dextrose 5%. 1000 milliLiter(s) (50 mL/Hr) IV Continuous <Continuous>  dextrose 50% Injectable 12.5 Gram(s) IV Push once  dextrose 50% Injectable 25 Gram(s) IV Push once  dextrose 50% Injectable 25 Gram(s) IV Push once  heparin  Injectable 5000 Unit(s) SubCutaneous every 12 hours  spironolactone 50 milliGRAM(s) Oral daily  furosemide    Tablet 20 milliGRAM(s) Oral two times a day  pantoprazole    Tablet 40 milliGRAM(s) Oral before breakfast  multivitamin 1 Tablet(s) Oral daily  nadolol 20 milliGRAM(s) Oral daily  vancomycin  IVPB 1000 milliGRAM(s) IV Intermittent every 12 hours  piperacillin/tazobactam IVPB. 3.375 Gram(s) IV Intermittent every 8 hours    MEDICATIONS  (PRN):  dextrose Gel 1 Dose(s) Oral once PRN Blood Glucose LESS THAN 70 milliGRAM(s)/deciliter  glucagon  Injectable 1 milliGRAM(s) IntraMuscular once PRN Glucose LESS THAN 70 milligrams/deciliter  morphine  - Injectable 2 milliGRAM(s) IV Push every 4 hours PRN Moderate and severe pain    Vital Signs Last 24 Hrs  T(C): 36.3 (07-08-17 @ 04:39), Max: 36.6 (07-07-17 @ 12:17)  HR: 68 (07-08-17 @ 05:40) (59 - 68)  BP: 104/61 (07-08-17 @ 05:40) (92/64 - 104/62)  RR: 18 (07-08-17 @ 04:39) (18 - 18)  SpO2: 100% (07-08-17 @ 04:39) (94% - 100%)  CAPILLARY BLOOD GLUCOSE  254 (07 Jul 2017 21:37)  242 (07 Jul 2017 17:08)  169 (07 Jul 2017 12:17)  182 (07 Jul 2017 08:18)    I&O's Summary    07 Jul 2017 07:01  -  08 Jul 2017 07:00  --------------------------------------------------------  IN: 450 mL / OUT: 0 mL / NET: 450 mL    PHYSICAL EXAM  GENERAL: NAD, well-developed  CHEST/LUNG: Clear to auscultation bilaterally; No wheezes, rales or rhonchi  HEART: Regular rate and rhythm; No murmurs, rubs, or gallops  ABDOMEN: Soft, Nontender, Nondistended; Bowel sounds present. No organomegaly  EXTREMITIES:  2+ Peripheral Pulses, No clubbing, cyanosis, or edema    LABS:                        9.3    3.8   )-----------( 62       ( 08 Jul 2017 07:12 )             26.6     07-08    138  |  106  |  8   ----------------------------<  136<H>  3.8   |  23  |  0.62    Ca    7.8<L>      08 Jul 2017 07:10  Phos  3.2     07-07  Mg     1.7     07-07    TPro  5.6<L>  /  Alb  2.3<L>  /  TBili  2.0<H>  /  DBili  x   /  AST  39  /  ALT  19  /  AlkPhos  116  07-08    PT/INR - ( 07 Jul 2017 07:11 )   PT: 16.4 sec;   INR: 1.51 ratio      Blood cultures negative x 3  Urine culture negative     RADIOLOGY & ADDITIONAL TESTS:  No new imaging performed

## 2017-07-08 NOTE — PROGRESS NOTE ADULT - PROBLEM SELECTOR PLAN 2
- ? 2/2 portal hypertensive colopathy vs colitis, pain now resolved   - patient status post numerous imaging studies  - CT Abd/Pelvis: no evidence of significant change in cirrhotic sequelae, persistent right colon wall thickening, no evidence of pelvic or abdominal fluid collections   - B/C x 3 negative, c/w Vanc/Zosyn  - GI will not do TIPS at this time, awaiting recommendations from ID in regards to antibiotics course and duration - ? 2/2 portal hypertensive colopathy vs colitis, pain now resolved   - patient status post numerous imaging studies  - CT Abd/Pelvis: no evidence of significant change in cirrhotic sequelae, persistent right colon wall thickening, no evidence of pelvic or abdominal fluid collections   - B/C x 3 negative, c/w Vanc/Zosyn  - GI will not do TIPS at this time, will need to wait for 72 hrs of blood cultures to be negative

## 2017-07-08 NOTE — PROGRESS NOTE ADULT - PROBLEM SELECTOR PLAN 5
Stable. Likely in setting of cirrhosis  - Continue to trend platelets  - Hold heparin for Plt < 50k Stable. Likely in setting of cirrhosis  - Continue to trend platelets  - Hold heparin for Plt < 50k, patient currently refusing heparing injections. Educated on it and understands risks

## 2017-07-09 ENCOUNTER — TRANSCRIPTION ENCOUNTER (OUTPATIENT)
Age: 42
End: 2017-07-09

## 2017-07-09 LAB
ALBUMIN SERPL ELPH-MCNC: 2.3 G/DL — LOW (ref 3.3–5)
ALP SERPL-CCNC: 141 U/L — HIGH (ref 40–120)
ALT FLD-CCNC: 24 U/L RC — SIGNIFICANT CHANGE UP (ref 10–45)
ANION GAP SERPL CALC-SCNC: 7 MMOL/L — SIGNIFICANT CHANGE UP (ref 5–17)
AST SERPL-CCNC: 40 U/L — SIGNIFICANT CHANGE UP (ref 10–40)
BILIRUB SERPL-MCNC: 1.8 MG/DL — HIGH (ref 0.2–1.2)
BUN SERPL-MCNC: 10 MG/DL — SIGNIFICANT CHANGE UP (ref 7–23)
CALCIUM SERPL-MCNC: 7.6 MG/DL — LOW (ref 8.4–10.5)
CHLORIDE SERPL-SCNC: 105 MMOL/L — SIGNIFICANT CHANGE UP (ref 96–108)
CO2 SERPL-SCNC: 25 MMOL/L — SIGNIFICANT CHANGE UP (ref 22–31)
CREAT SERPL-MCNC: 0.66 MG/DL — SIGNIFICANT CHANGE UP (ref 0.5–1.3)
GLUCOSE SERPL-MCNC: 234 MG/DL — HIGH (ref 70–99)
HCT VFR BLD CALC: 25.7 % — LOW (ref 39–50)
HGB BLD-MCNC: 9.1 G/DL — LOW (ref 13–17)
MCHC RBC-ENTMCNC: 34.9 PG — HIGH (ref 27–34)
MCHC RBC-ENTMCNC: 35.5 GM/DL — SIGNIFICANT CHANGE UP (ref 32–36)
MCV RBC AUTO: 98.5 FL — SIGNIFICANT CHANGE UP (ref 80–100)
PLATELET # BLD AUTO: 60 K/UL — LOW (ref 150–400)
POTASSIUM SERPL-MCNC: 4 MMOL/L — SIGNIFICANT CHANGE UP (ref 3.5–5.3)
POTASSIUM SERPL-SCNC: 4 MMOL/L — SIGNIFICANT CHANGE UP (ref 3.5–5.3)
PROT SERPL-MCNC: 5.4 G/DL — LOW (ref 6–8.3)
RBC # BLD: 2.61 M/UL — LOW (ref 4.2–5.8)
RBC # FLD: 13.5 % — SIGNIFICANT CHANGE UP (ref 10.3–14.5)
SODIUM SERPL-SCNC: 137 MMOL/L — SIGNIFICANT CHANGE UP (ref 135–145)
VANCOMYCIN FLD-MCNC: 5.5 UG/ML — SIGNIFICANT CHANGE UP
WBC # BLD: 3.6 K/UL — LOW (ref 3.8–10.5)
WBC # FLD AUTO: 3.6 K/UL — LOW (ref 3.8–10.5)

## 2017-07-09 PROCEDURE — 99233 SBSQ HOSP IP/OBS HIGH 50: CPT | Mod: GC

## 2017-07-09 RX ORDER — VANCOMYCIN HCL 1 G
1250 VIAL (EA) INTRAVENOUS EVERY 12 HOURS
Qty: 0 | Refills: 0 | Status: DISCONTINUED | OUTPATIENT
Start: 2017-07-09 | End: 2017-07-10

## 2017-07-09 RX ORDER — INSULIN GLARGINE 100 [IU]/ML
30 INJECTION, SOLUTION SUBCUTANEOUS AT BEDTIME
Qty: 0 | Refills: 0 | Status: DISCONTINUED | OUTPATIENT
Start: 2017-07-09 | End: 2017-07-10

## 2017-07-09 RX ADMIN — PANTOPRAZOLE SODIUM 40 MILLIGRAM(S): 20 TABLET, DELAYED RELEASE ORAL at 05:38

## 2017-07-09 RX ADMIN — PIPERACILLIN AND TAZOBACTAM 25 GRAM(S): 4; .5 INJECTION, POWDER, LYOPHILIZED, FOR SOLUTION INTRAVENOUS at 08:20

## 2017-07-09 RX ADMIN — SPIRONOLACTONE 50 MILLIGRAM(S): 25 TABLET, FILM COATED ORAL at 08:22

## 2017-07-09 RX ADMIN — Medication 250 MILLIGRAM(S): at 05:38

## 2017-07-09 RX ADMIN — Medication 20 MILLIGRAM(S): at 05:38

## 2017-07-09 RX ADMIN — NADOLOL 20 MILLIGRAM(S): 80 TABLET ORAL at 05:38

## 2017-07-09 RX ADMIN — Medication 20 MILLIGRAM(S): at 17:47

## 2017-07-09 RX ADMIN — INSULIN GLARGINE 30 UNIT(S): 100 INJECTION, SOLUTION SUBCUTANEOUS at 22:16

## 2017-07-09 RX ADMIN — PIPERACILLIN AND TAZOBACTAM 25 GRAM(S): 4; .5 INJECTION, POWDER, LYOPHILIZED, FOR SOLUTION INTRAVENOUS at 22:16

## 2017-07-09 RX ADMIN — Medication 2: at 09:29

## 2017-07-09 RX ADMIN — Medication 166.67 MILLIGRAM(S): at 17:48

## 2017-07-09 RX ADMIN — Medication 1 TABLET(S): at 11:27

## 2017-07-09 RX ADMIN — Medication 3: at 13:09

## 2017-07-09 RX ADMIN — PIPERACILLIN AND TAZOBACTAM 25 GRAM(S): 4; .5 INJECTION, POWDER, LYOPHILIZED, FOR SOLUTION INTRAVENOUS at 15:37

## 2017-07-09 RX ADMIN — Medication 2: at 22:16

## 2017-07-09 NOTE — PROGRESS NOTE ADULT - PROBLEM SELECTOR PLAN 3
- Continue with home lasix, spironolactone, nadolol  - Trend LFTs, Alk P, bilirubin  - Monitor for changes in ascites

## 2017-07-09 NOTE — PROGRESS NOTE ADULT - PROBLEM SELECTOR PLAN 5
- 2/2 cirrhosis; continue to trend platelets  - Hold heparin for Plt < 50k, patient currently refusing heparin injections. Educated on it and understands risks

## 2017-07-09 NOTE — DISCHARGE NOTE ADULT - ADDITIONAL INSTRUCTIONS
Please continue to take the Bactrim as instructed for four weeks and follow up with Dr. Boss, the infectious disease doctor 4 weeks from now.  Please follow up with your hepatologist at [facility] to reassess your status on the liver transplant list.   Please follow up with your Primary care doctor a week from now for blood work to assess your kidney function while on the antibiotic and to follow up on your blood sugar. Please continue to take the Bactrim as instructed for four weeks and follow up with Dr. Boss, the infectious disease doctor 4 weeks from now.  Please follow up with Dr. Thakkar on 7/18/2017 at 130pm  Please follow up with your Primary care doctor a week from now for blood work to assess your kidney function while on the antibiotic and to follow up on your blood sugar.

## 2017-07-09 NOTE — DISCHARGE NOTE ADULT - PATIENT PORTAL LINK FT
“You can access the Follow[facility] Patient Portal, offered by [facility], by registering with the following website: [email or web address]”

## 2017-07-09 NOTE — DISCHARGE NOTE ADULT - CARE PLAN
Principal Discharge DX:	Sepsis  Goal:	Continue with IV antibiotics.  Secondary Diagnosis:	Abdominal pain  Secondary Diagnosis:	Diabetes mellitus type 2, insulin dependent  Secondary Diagnosis:	Esophageal varices Principal Discharge DX:	Sepsis  Goal:	Continue with Bactrim for 4 weeks  Instructions for follow-up, activity and diet:	You came in here with abdominal pain and diarrhea which was suggestive of recurring infection while you were on antibiotics. During your stay, you completed a course on a stronger antibiotic and all infectious workup continues to be negative to this day. Please take oral antibiotics, Bactrim, as instructed for four weeks and follow up with Dr. Boss, the infectious disease doctor, 4 weeks from now.  Secondary Diagnosis:	Abdominal pain  Instructions for follow-up, activity and diet:	You came in here with abdominal pain. Imaging has shown persistent inflammation in your colon, likely due to a breakthrough infection. Since you were on the antibiotics the pain has resolved.  Secondary Diagnosis:	Diabetes mellitus type 2, insulin dependent  Instructions for follow-up, activity and diet:	Please continue with your insulin regimen as it was at home and follow up with your primary care doctor a week from now to reassess your blood sugar levels.  Secondary Diagnosis:	Esophageal varices  Instructions for follow-up, activity and diet:	Please continue the nadolol as instructed and follow up with your hepatologist at [facility] to reassess your status on the liver transplant list. Principal Discharge DX:	Sepsis  Goal:	Continue with Bactrim for 4 weeks  Instructions for follow-up, activity and diet:	You came in here with abdominal pain and diarrhea which was suggestive of recurring infection while you were on antibiotics. During your stay, you completed a course on a stronger antibiotic and all infectious workup continues to be negative to this day. Please take oral antibiotics, Bactrim, as instructed for four weeks and follow up with Dr. Boss, the infectious disease doctor, 4 weeks from now.  Secondary Diagnosis:	Abdominal pain  Instructions for follow-up, activity and diet:	You came in here with abdominal pain. Imaging has shown persistent inflammation in your colon, likely due to a breakthrough infection. Since you were on the antibiotics the pain has resolved.  Secondary Diagnosis:	Diabetes mellitus type 2, insulin dependent  Instructions for follow-up, activity and diet:	Please continue with your insulin regimen as it was at home and follow up with your primary care doctor a week from now to reassess your blood sugar levels.  Secondary Diagnosis:	Esophageal varices  Instructions for follow-up, activity and diet:	Please continue the nadolol as instructed and follow up with your hepatologist at [facility] to reassess your status on the liver transplant list. Principal Discharge DX:	Sepsis  Goal:	Continue with Bactrim for 4 weeks  Instructions for follow-up, activity and diet:	You came in here with abdominal pain and diarrhea which was suggestive of recurring infection while you were on antibiotics. During your stay, you completed a course on a stronger antibiotic and all infectious workup continues to be negative to this day. Please take oral antibiotics, Bactrim, as instructed for four weeks and follow up with Dr. Boss, the infectious disease doctor, 4 weeks from now.  Secondary Diagnosis:	Abdominal pain  Instructions for follow-up, activity and diet:	You came in here with abdominal pain. Imaging has shown persistent inflammation in your colon, likely due to a breakthrough infection. Since you were on the antibiotics the pain has resolved.  Secondary Diagnosis:	Diabetes mellitus type 2, insulin dependent  Instructions for follow-up, activity and diet:	Please continue with your insulin regimen as it was at home and follow up with your primary care doctor a week from now to reassess your blood sugar levels.  Secondary Diagnosis:	Esophageal varices  Instructions for follow-up, activity and diet:	Please continue the nadolol as instructed and follow up with your hepatologist at [facility] to reassess your status on the liver transplant list.

## 2017-07-09 NOTE — PROGRESS NOTE ADULT - ASSESSMENT
41M with alcoholic cirrhosis c/b varices, DMII, vitiligo, recent admission for e.coli bacteremia (discharged 6/27 on ceftriaxone via PICC) who presents with fever and abdominal pain consistent with prior admissions and concerning for repeated bacteremia with source possibly 2/2 PICC vs bacterial translocation.

## 2017-07-09 NOTE — DISCHARGE NOTE ADULT - MEDICATION SUMMARY - MEDICATIONS TO STOP TAKING
I will STOP taking the medications listed below when I get home from the hospital:    cefTRIAXone  -- 1 gram(s) intravenous once a day Until 7/8/2017

## 2017-07-09 NOTE — PROGRESS NOTE ADULT - PROBLEM SELECTOR PLAN 7
- home med: Metformin and levemir 40 U at home.   - Hold PO hypoglycemics, - lantus increased to 30 U qhs, c/w ISS

## 2017-07-09 NOTE — DISCHARGE NOTE ADULT - HOSPITAL COURSE
Patient is a 41 year old man with alcoholic cirrhosis c/b varices, T2DM, h/o c.diff, strongyloides, latent TB, vitiligo, recent admission for e.coli bacteremia (discharged 6/27 on ceftriaxone via PICC) who presents with fever and abdominal pain consistent with prior admissions and concerning for repeated bacteremia with source bacterial translocation vs PICC associated infection.     Prior hospitalizations summarized:   11/2015 for SBP treated with Cipro  2/26/17-3/3/17: Admitted with abdominal pain and fever. Found to have BCx + for gram negative bacteremia speciated to E. coli sens to cipro. Started on zosyn. CT A/P showed portal hypertensive colopathy versus colitis involving the ascending and proximal transverse colon, liver cirrhosis with evidence of portal hypertension and a small amount of ascites; attempted paracentesis however too small fluid. HIDA scan done for distended galbladder on CT; WNL. Abdominal Doppler was negative for PVT. Patient was seen by GI team who recommended EUS and possible ERCP to see if a source of gram negative bacteremia can be located. Hepatology team did not recommend SBP ppx at this time. Negative for gallstones or source of infection.  at [facility], p/w abdominal pain and fevers, found to have gram negative bacteremia and was treated with ciprofloxacin. CT showed portal hypertensive colopathy vs colitis involving the ascending and proximal transverse colon. HIDA scan was within normal limits. EUS done to find source of gram negative bactermia but was negative.   4/13 p/w 2 weeks of fever, nausea, anorexia, diarrhea, and sharp epigasrtic pain, CT A/P showed small bowel and right colonic wall thickening, suggestive of enterocolitis vs. portal enterocolopathy. Blood and urine cultures were sent and came back negative, and C. diff was negative. Hepatology was consulted and recommended discontinuation of antibiotics, prostate exam to r/o prostatitis, and colonoscopy for r/o enterocolitis. Prostate exam was unremarkable, and colonoscopy only showed mild portal colopathy. IR was consulted for diagnostic paracentesis, and PMNs on the ascites fluid sample were 90, inconsistent with SBP. SAAG > 1.1, consistent with known cirrhosis.  Cytology showed scattered benign reactive mesothelial cells and lymphocytes, but no organisms. RVP did return positive for influenza B during admission. Patient was started on Tamiflu 75 mg, and his symptoms began improving by the next day. ID and hepatology suggested empiric treatment of SBP despite negative findings on ascites fluid, and patient was given cefepime initially and later switched to Cipro 500mg BID for treatment. Hepatology recommended 1 tablet of Bactrim DS daily for SBP prophylaxis after treatment with cipro.  5/2017 p/w fever, chills, vomiting, and abdominal pain, CT scan of the abdomen showed cirrhotic liver with small amount of ascites and thickened colon, unchanged from previous imaging in 4/2017. Colonoscopy April 17, 2017 revealed portal colopathy.   6/21-30 p/w fevers and abdominal pain, was found to have E. Coli bacteremia. NM tagged WBC scan demonstrated no source for infection.TTE did not report endocarditis. MRI abdomen and abdominal US did not demonstrate source of infection. Patient had PICC placed and was planned for 2 weeks daily IV Cipro followed by bactrim prophylaxis.    On admission patient with PICC in RUE had PICC 4 cm out and blood under bandage. Site showed no signs of infection and US of RUE was not concerning for hematoma or for abscess. Patient received Vancomycin and Zosyn and had resolution of fever/abdominal pain. CT A/P showed no significant change in sequelae of cirrhosis and portal hypertension including splenomegaly, abdominal varices, and ascites Persistent right colonic wall thickening, likely reactive from portal colopathy. Colitis may have a similar appearance. No abdominal or pelvic fluid collection to suggest abscess formation. Blood cultures were negative and patient did not have fever on vanc/zosyn. Call made to [facility] where patient follows hepatology for liver transplant work up and no plans made for transfer to [facility].     Gastroenterology followed the patient. They did not intervene. They did not feel abdominal pain was related to portal colopathy. PICC was removed given concern for line associated infection. Patient to be discharged home with IV antibiotics and should follow up with hepatology and PMD. Patient is a 41 year old man with alcoholic cirrhosis c/b varices, T2DM, h/o c.diff, strongyloides, latent TB, vitiligo, recent admission for e.coli bacteremia (discharged 6/27 on ceftriaxone via PICC) who presents with fever and abdominal pain consistent with prior admissions and concerning for repeated bacteremia with source bacterial translocation vs PICC associated infection. In the ED patient was found to be septic and in pain. Cultures were drawn from the PICC site and from peripheral source and were negative to date. Patient was started on Vancomycin and Zosyn and GI and ID were consulted and were following. CT of the abdomen and pelvis showed persistent right sided colonic inflammation and unclear if this was a colitis vs hypertensive colopathy with bacterial translocation. U/S of the RUQ was done and had shown no evidence of perihepatic ascites for a diagnostic tap to rule it out as a possible infective source. TIPS procedure was discussed but given the setting of acute inflammation of the colon at this time, deferred to medical management. ID has been following the patient. As per their recommendations, PICC was discontinued and patient had remained on the Zosyn for 6 days. No infectious source was identified and patient has remained asymptomatic with resolution of abdominal pain and diarrhea. Will go home on Bactrim for prophylaxis for 4 weeks and will have to follow with ID 4 weeks from now. Patient is a 41 year old man with alcoholic cirrhosis c/b varices, T2DM, h/o c.diff, strongyloides, latent TB, vitiligo, recent admission for e.coli bacteremia (discharged 6/27 on ceftriaxone via PICC) who presents with fever and abdominal pain consistent with prior admissions and concerning for repeated bacteremia with source bacterial translocation vs PICC associated infection. In the ED patient was found to be septic and in pain. Cultures were drawn from the PICC site and from peripheral source and were negative to date. Patient was started on Vancomycin and Zosyn and GI and ID were consulted and were following. CT of the abdomen and pelvis showed persistent right sided colonic inflammation and unclear if this was a colitis vs hypertensive colopathy with bacterial translocation. U/S of the RUQ was done and had shown no evidence of perihepatic ascites for a diagnostic tap to rule it out as a possible infective source. TIPS procedure was discussed but given the setting of acute inflammation of the colon at this time, deferred to medical management. ID has been following the patient. As per their recommendations, PICC was discontinued and patient had remained on the Zosyn for 6 days. No infectious source was identified and patient has remained asymptomatic with resolution of abdominal pain and diarrhea. Will go home on Bactrim for prophylaxis for 4 weeks and will have to follow with ID 4 weeks from now.     Discharge time spent: 40 min

## 2017-07-09 NOTE — DISCHARGE NOTE ADULT - CARE PROVIDER_API CALL
Andre Thakkar), Gastroenterology; Internal Medicine  [address] [place] [address]  Phone: [phone]  Fax: [phone] Andre Thakkar), Gastroenterology; Internal Medicine  [address] [place] [address]  Phone: [phone]  Fax: [phone]    Satya Boss), Infectious Disease; Internal Medicine  [address] [place] [address]  Phone: [phone]  Fax: [phone] Andre Thakkar), Gastroenterology; Internal Medicine  [address] [place] [address]  Phone: [phone]  Fax: [phone]    Satya Boss), Infectious Disease; Internal Medicine  [address] [place] [address]  Phone: [phone]  Fax: [phone]    Rj Woods), Medicine  Gen [address] [address]  Phone: [phone]  Fax: [phone]

## 2017-07-09 NOTE — DISCHARGE NOTE ADULT - CARE PROVIDERS DIRECT ADDRESSES
,[email or web address]

## 2017-07-09 NOTE — DISCHARGE NOTE ADULT - PROVIDER TOKENS
TOKEN:'[id number]' TOKEN:'[id number]',TOKEN:'[id number]' TOKEN:'[id number]',TOKEN:'[id number]',TOKEN:'[id number]'

## 2017-07-09 NOTE — PROGRESS NOTE ADULT - PROBLEM SELECTOR PLAN 1
- blood cultures negative x 3 to date, PICC d/janell   - c/w vancomycin and zosyn  - as per ID need final blood cultures - blood cultures negative x 3 to date, PICC d/janell   - c/w vancomycin and zosyn  - per ID - likely d/c home with one week additional of antibiotics via IV line at home; will need to set up home care and home visiting RN services with case management prior to discharge possibly tomorrow  -will clarify abx regiment with ID in am

## 2017-07-09 NOTE — DISCHARGE NOTE ADULT - PLAN OF CARE
Continue with IV antibiotics. You came in here with abdominal pain and diarrhea which was suggestive of recurring infection while you were on antibiotics. During your stay, you completed a course on a stronger antibiotic and all infectious workup continues to be negative to this day. Please take oral antibiotics, Bactrim, as instructed for four weeks and follow up with Dr. Boss, the infectious disease doctor, 4 weeks from now. You came in here with abdominal pain. Imaging has shown persistent inflammation in your colon, likely due to a breakthrough infection. Since you were on the antibiotics the pain has resolved. Please continue with your insulin regimen as it was at home and follow up with your primary care doctor a week from now to reassess your blood sugar levels. Please continue the nadolol as instructed and follow up with your hepatologist at [facility] to reassess your status on the liver transplant list. Continue with Bactrim for 4 weeks

## 2017-07-09 NOTE — PROGRESS NOTE ADULT - SUBJECTIVE AND OBJECTIVE BOX
Patient is a 41y old  Male who presents with a chief complaint of Fever, Abdominal pain (05 Jul 2017 19:48)      SUBJECTIVE / OVERNIGHT EVENTS:    MEDICATIONS  (STANDING):  insulin glargine Injectable (LANTUS) 25 Unit(s) SubCutaneous at bedtime  insulin lispro (HumaLOG) corrective regimen sliding scale   SubCutaneous three times a day before meals  insulin lispro (HumaLOG) corrective regimen sliding scale   SubCutaneous at bedtime  dextrose 5%. 1000 milliLiter(s) (50 mL/Hr) IV Continuous <Continuous>  dextrose 50% Injectable 12.5 Gram(s) IV Push once  dextrose 50% Injectable 25 Gram(s) IV Push once  dextrose 50% Injectable 25 Gram(s) IV Push once  heparin  Injectable 5000 Unit(s) SubCutaneous every 12 hours  spironolactone 50 milliGRAM(s) Oral daily  furosemide    Tablet 20 milliGRAM(s) Oral two times a day  pantoprazole    Tablet 40 milliGRAM(s) Oral before breakfast  multivitamin 1 Tablet(s) Oral daily  nadolol 20 milliGRAM(s) Oral daily  piperacillin/tazobactam IVPB. 3.375 Gram(s) IV Intermittent every 8 hours  vancomycin  IVPB 1250 milliGRAM(s) IV Intermittent every 12 hours    MEDICATIONS  (PRN):  dextrose Gel 1 Dose(s) Oral once PRN Blood Glucose LESS THAN 70 milliGRAM(s)/deciliter  glucagon  Injectable 1 milliGRAM(s) IntraMuscular once PRN Glucose LESS THAN 70 milligrams/deciliter  morphine  - Injectable 2 milliGRAM(s) IV Push every 4 hours PRN Moderate and severe pain      Vital Signs Last 24 Hrs  T(C): 36.8 (07-09-17 @ 04:23), Max: 36.8 (07-08-17 @ 21:25)  HR: 73 (07-09-17 @ 04:23) (64 - 74)  BP: 109/66 (07-09-17 @ 04:23) (105/67 - 109/66)  RR: 18 (07-09-17 @ 04:23) (18 - 18)  SpO2: 99% (07-09-17 @ 04:23) (98% - 99%)  CAPILLARY BLOOD GLUCOSE  229 (08 Jul 2017 21:25)  304 (08 Jul 2017 17:01)  203 (08 Jul 2017 12:23)        I&O's Summary    08 Jul 2017 07:01  -  09 Jul 2017 07:00  --------------------------------------------------------  IN: 920 mL / OUT: 0 mL / NET: 920 mL        PHYSICAL EXAM:  GENERAL: NAD  EYES: clear conjunctiva  NECK: no masses palpated  CHEST/LUNG: CTA b/l  HEART: S1, S2, RR  ABDOMEN: Soft, NT, ND  EXTREMITIES:  No LE edema  PSYCH: Normal Affect  NEUROLOGY: AAO x 3  SKIN: Warm, Dry    LABS:                        9.1    3.6   )-----------( 60       ( 09 Jul 2017 06:49 )             25.7     07-09    137  |  105  |  10  ----------------------------<  234<H>  4.0   |  25  |  0.66    Ca    7.6<L>      09 Jul 2017 06:43    TPro  5.4<L>  /  Alb  2.3<L>  /  TBili  1.8<H>  /  DBili  x   /  AST  40  /  ALT  24  /  AlkPhos  141<H>  07-09              RADIOLOGY & ADDITIONAL TESTS:    Imaging Personally Reviewed:  Yes    Consultant(s) Notes Reviewed:      Care Discussed with Consultants/Other Providers: Patient is a 41y old  Male who presents with a chief complaint of Fever, Abdominal pain (05 Jul 2017 19:48)      SUBJECTIVE / OVERNIGHT EVENTS: no overnight events, pt concerned over why he had fever on abx, pt denies abdominal pain or further fever.     MEDICATIONS  (STANDING):  insulin glargine Injectable (LANTUS) 25 Unit(s) SubCutaneous at bedtime  insulin lispro (HumaLOG) corrective regimen sliding scale   SubCutaneous three times a day before meals  insulin lispro (HumaLOG) corrective regimen sliding scale   SubCutaneous at bedtime  dextrose 5%. 1000 milliLiter(s) (50 mL/Hr) IV Continuous <Continuous>  dextrose 50% Injectable 12.5 Gram(s) IV Push once  dextrose 50% Injectable 25 Gram(s) IV Push once  dextrose 50% Injectable 25 Gram(s) IV Push once  heparin  Injectable 5000 Unit(s) SubCutaneous every 12 hours  spironolactone 50 milliGRAM(s) Oral daily  furosemide    Tablet 20 milliGRAM(s) Oral two times a day  pantoprazole    Tablet 40 milliGRAM(s) Oral before breakfast  multivitamin 1 Tablet(s) Oral daily  nadolol 20 milliGRAM(s) Oral daily  piperacillin/tazobactam IVPB. 3.375 Gram(s) IV Intermittent every 8 hours  vancomycin  IVPB 1250 milliGRAM(s) IV Intermittent every 12 hours    MEDICATIONS  (PRN):  dextrose Gel 1 Dose(s) Oral once PRN Blood Glucose LESS THAN 70 milliGRAM(s)/deciliter  glucagon  Injectable 1 milliGRAM(s) IntraMuscular once PRN Glucose LESS THAN 70 milligrams/deciliter  morphine  - Injectable 2 milliGRAM(s) IV Push every 4 hours PRN Moderate and severe pain      Vital Signs Last 24 Hrs  T(C): 36.8 (07-09-17 @ 04:23), Max: 36.8 (07-08-17 @ 21:25)  HR: 73 (07-09-17 @ 04:23) (64 - 74)  BP: 109/66 (07-09-17 @ 04:23) (105/67 - 109/66)  RR: 18 (07-09-17 @ 04:23) (18 - 18)  SpO2: 99% (07-09-17 @ 04:23) (98% - 99%)  CAPILLARY BLOOD GLUCOSE  229 (08 Jul 2017 21:25)  304 (08 Jul 2017 17:01)  203 (08 Jul 2017 12:23)        I&O's Summary    08 Jul 2017 07:01  -  09 Jul 2017 07:00  --------------------------------------------------------  IN: 920 mL / OUT: 0 mL / NET: 920 mL        PHYSICAL EXAM:  GENERAL: NAD  EYES: clear conjunctiva  NECK: no masses palpated  CHEST/LUNG: CTA b/l  HEART: S1, S2, RR  ABDOMEN: Soft, NT, ND. BS+  EXTREMITIES:  No LE edema. RUE bruising is improving, no tenderness, no erythema, no signs of infection.   PSYCH: Normal Affect  NEUROLOGY: AAO x 3  SKIN: Warm, Dry    LABS:                        9.1    3.6   )-----------( 60       ( 09 Jul 2017 06:49 )             25.7     07-09    137  |  105  |  10  ----------------------------<  234<H>  4.0   |  25  |  0.66    Ca    7.6<L>      09 Jul 2017 06:43    TPro  5.4<L>  /  Alb  2.3<L>  /  TBili  1.8<H>  /  DBili  x   /  AST  40  /  ALT  24  /  AlkPhos  141<H>  07-09              RADIOLOGY & ADDITIONAL TESTS:    Imaging Personally Reviewed:  Yes    Consultant(s) Notes Reviewed:      Care Discussed with Consultants/Other Providers:

## 2017-07-09 NOTE — PROGRESS NOTE ADULT - PROBLEM SELECTOR PLAN 2
- ? 2/2 portal hypertensive colopathy vs colitis, pain now resolved   - patient status post numerous imaging studies  - CT Abd/Pelvis: no evidence of significant change in cirrhotic sequelae, persistent right colon wall thickening, no evidence of pelvic or abdominal fluid collections   - B/C x 3 negative, c/w Vanc/Zosyn  - GI will not do TIPS at this time

## 2017-07-09 NOTE — DISCHARGE NOTE ADULT - MEDICATION SUMMARY - MEDICATIONS TO TAKE
I will START or STAY ON the medications listed below when I get home from the hospital:    spironolactone 50 mg oral tablet  -- 1 tab(s) by mouth once a day  -- Indication: For hypertension    metFORMIN 500 mg oral tablet  -- 1 tab(s) by mouth once a day  -- Indication: For Diabetes mellitus type 2, insulin dependent    Levemir 100 units/mL subcutaneous solution  -- 40 unit(s) subcutaneous once a day (at bedtime)  -- Indication: For Diabetes mellitus type 2, insulin dependent    nadolol 20 mg oral tablet  -- 1 tab(s) by mouth once a day  -- Indication: For Esophageal varices    furosemide 20 mg oral tablet  -- 1 tab(s) by mouth 2 times a day  -- Indication: For Hypertension    Bactrim  mg-160 mg oral tablet  -- 1 tab(s) by mouth once a day MDD:1  -- Avoid prolonged or excessive exposure to direct and/or artificial sunlight while taking this medication.  Finish all this medication unless otherwise directed by prescriber.  Medication should be taken with plenty of water.    -- Indication: For Antibiotic Prophylaxis    pantoprazole 40 mg oral delayed release tablet  -- 1 tab(s) by mouth once a day  -- Indication: For GERD    multivitamin  -- 1 tab(s) by mouth once a day  -- Indication: For Supplement

## 2017-07-10 VITALS
DIASTOLIC BLOOD PRESSURE: 75 MMHG | OXYGEN SATURATION: 95 % | RESPIRATION RATE: 18 BRPM | SYSTOLIC BLOOD PRESSURE: 118 MMHG | TEMPERATURE: 98 F | HEART RATE: 66 BPM

## 2017-07-10 LAB
CULTURE RESULTS: SIGNIFICANT CHANGE UP
SPECIMEN SOURCE: SIGNIFICANT CHANGE UP

## 2017-07-10 PROCEDURE — 81001 URINALYSIS AUTO W/SCOPE: CPT

## 2017-07-10 PROCEDURE — 84295 ASSAY OF SERUM SODIUM: CPT

## 2017-07-10 PROCEDURE — 87086 URINE CULTURE/COLONY COUNT: CPT

## 2017-07-10 PROCEDURE — 83605 ASSAY OF LACTIC ACID: CPT

## 2017-07-10 PROCEDURE — 85610 PROTHROMBIN TIME: CPT

## 2017-07-10 PROCEDURE — 99233 SBSQ HOSP IP/OBS HIGH 50: CPT

## 2017-07-10 PROCEDURE — 93975 VASCULAR STUDY: CPT

## 2017-07-10 PROCEDURE — C1751: CPT

## 2017-07-10 PROCEDURE — 84100 ASSAY OF PHOSPHORUS: CPT

## 2017-07-10 PROCEDURE — 87040 BLOOD CULTURE FOR BACTERIA: CPT

## 2017-07-10 PROCEDURE — 99239 HOSP IP/OBS DSCHRG MGMT >30: CPT

## 2017-07-10 PROCEDURE — 82803 BLOOD GASES ANY COMBINATION: CPT

## 2017-07-10 PROCEDURE — 82947 ASSAY GLUCOSE BLOOD QUANT: CPT

## 2017-07-10 PROCEDURE — 99232 SBSQ HOSP IP/OBS MODERATE 35: CPT | Mod: GC

## 2017-07-10 PROCEDURE — 82330 ASSAY OF CALCIUM: CPT

## 2017-07-10 PROCEDURE — 85027 COMPLETE CBC AUTOMATED: CPT

## 2017-07-10 PROCEDURE — 82607 VITAMIN B-12: CPT

## 2017-07-10 PROCEDURE — 82746 ASSAY OF FOLIC ACID SERUM: CPT

## 2017-07-10 PROCEDURE — 96375 TX/PRO/DX INJ NEW DRUG ADDON: CPT

## 2017-07-10 PROCEDURE — 76882 US LMTD JT/FCL EVL NVASC XTR: CPT

## 2017-07-10 PROCEDURE — 83735 ASSAY OF MAGNESIUM: CPT

## 2017-07-10 PROCEDURE — 96376 TX/PRO/DX INJ SAME DRUG ADON: CPT

## 2017-07-10 PROCEDURE — 82435 ASSAY OF BLOOD CHLORIDE: CPT

## 2017-07-10 PROCEDURE — 74177 CT ABD & PELVIS W/CONTRAST: CPT

## 2017-07-10 PROCEDURE — 84132 ASSAY OF SERUM POTASSIUM: CPT

## 2017-07-10 PROCEDURE — 80053 COMPREHEN METABOLIC PANEL: CPT

## 2017-07-10 PROCEDURE — 71045 X-RAY EXAM CHEST 1 VIEW: CPT

## 2017-07-10 PROCEDURE — 85014 HEMATOCRIT: CPT

## 2017-07-10 PROCEDURE — C1769: CPT

## 2017-07-10 PROCEDURE — 77001 FLUOROGUIDE FOR VEIN DEVICE: CPT

## 2017-07-10 PROCEDURE — 82728 ASSAY OF FERRITIN: CPT

## 2017-07-10 PROCEDURE — 83550 IRON BINDING TEST: CPT

## 2017-07-10 PROCEDURE — 99285 EMERGENCY DEPT VISIT HI MDM: CPT | Mod: 25

## 2017-07-10 PROCEDURE — 36584 COMPL RPLCMT PICC RS&I: CPT

## 2017-07-10 PROCEDURE — 96374 THER/PROPH/DIAG INJ IV PUSH: CPT | Mod: XU

## 2017-07-10 RX ORDER — AZTREONAM 2 G
1 VIAL (EA) INJECTION
Qty: 28 | Refills: 0
Start: 2017-07-10 | End: 2017-08-07

## 2017-07-10 RX ADMIN — Medication 3: at 12:41

## 2017-07-10 RX ADMIN — Medication 1 TABLET(S): at 12:42

## 2017-07-10 RX ADMIN — PANTOPRAZOLE SODIUM 40 MILLIGRAM(S): 20 TABLET, DELAYED RELEASE ORAL at 05:58

## 2017-07-10 RX ADMIN — Medication 166.67 MILLIGRAM(S): at 05:14

## 2017-07-10 RX ADMIN — NADOLOL 20 MILLIGRAM(S): 80 TABLET ORAL at 05:57

## 2017-07-10 RX ADMIN — PIPERACILLIN AND TAZOBACTAM 25 GRAM(S): 4; .5 INJECTION, POWDER, LYOPHILIZED, FOR SOLUTION INTRAVENOUS at 05:58

## 2017-07-10 NOTE — PROGRESS NOTE ADULT - SUBJECTIVE AND OBJECTIVE BOX
Patient is a 41y old  Male who presents with a chief complaint of Fever, Abdominal pain (09 Jul 2017 15:50)    Being followed by ID for recurrent bacteremia,fever    Interval history:  s/p PICC removal  feels well  No acute events      ROS:  No cough,SOB,CP  No N/V/D./abd pain resolved  No other complaints      Antimicrobials:    piperacillin/tazobactam IVPB. 3.375 Gram(s) IV Intermittent every 8 hours  vancomycin  IVPB 1250 milliGRAM(s) IV Intermittent every 12 hours      Vital Signs Last 24 Hrs  T(C): 36.7 (07-10-17 @ 13:52), Max: 36.8 (07-10-17 @ 05:42)  T(F): 98.1 (07-10-17 @ 13:52), Max: 98.3 (07-10-17 @ 05:42)  HR: 66 (07-10-17 @ 13:52) (60 - 67)  BP: 118/75 (07-10-17 @ 13:52) (97/55 - 118/75)  BP(mean): --  RR: 18 (07-10-17 @ 13:52) (18 - 18)  SpO2: 95% (07-10-17 @ 13:52) (94% - 99%)    Physical Exam:    Constitutional well preserved,comfortable,pleasant    HEENT PERRLA EOMI,No pallor or icterus    No oral exudate or erythema    Neck supple no JVD or LN    Chest Good AE,CTA    CVS RRR S1 S2 WNl No murmur or rub or gallop    Abd soft BS normal No tenderness no masses mild fluid    Ext No cyanosis clubbing or edema    IV site no erythema tenderness or discharge    removed PICC site no tenderness or erythema or swelling    Joints no swelling or LOM    CNS AAO X 3 no focal      skin Vitiligo    Lab Data:                          9.1    3.6   )-----------( 60       ( 09 Jul 2017 06:49 )             25.7       07-09    137  |  105  |  10  ----------------------------<  234<H>  4.0   |  25  |  0.66    Ca    7.6<L>      09 Jul 2017 06:43    TPro  5.4<L>  /  Alb  2.3<L>  /  TBili  1.8<H>  /  DBili  x   /  AST  40  /  ALT  24  /  AlkPhos  141<H>  07-09                Vancomycin Level, Random: 5.5 ug/mL (07-09-17 @ 06:43)    Culture - Urine (07.05.17 @ 23:15)    Specimen Source: .Urine Clean Catch (Midstream)    Culture Results:   No growth              Culture - Blood (07.05.17 @ 21:34)    Specimen Source: .Blood Blood-Venous    Culture Results:   No growth to date.    Culture - Blood (07.05.17 @ 21:32)    Specimen Source: .Blood PICC/PERC Single Lumen    Culture Results:   No growth to date.          < from: US Extremity Nonvasc Limited, Right (07.07.17 @ 11:30) >  Impression: No sonographic evidence of perivenous fluid collection to   suggest abscess.      < end of copied text >

## 2017-07-10 NOTE — PROGRESS NOTE ADULT - PROBLEM SELECTOR PLAN 1
- blood cultures negative x 3 to date, PICC d/janell   - c/w vancomycin and zosyn  - per ID - likely d/c home with one week additional of antibiotics via IV line at home; will need to set up home care and home visiting RN services with case management prior to discharge possibly tomorrow  -will clarify abx regiment with ID in am - B/C negative x 3 to date  - As per ID, finished IV course of Abx, c/w Bactrim DS for 4 weeks with outpatient follow up - B/C negative x 3 to date. Culture - Blood (07.05.17 @ 21:34)    Specimen Source: .Blood Blood-Venous    Culture Results:   No growth to date.    - As per ID, finished IV course of Abx, c/w Bactrim DS for 4 weeks with outpatient follow up

## 2017-07-10 NOTE — PROGRESS NOTE ADULT - SUBJECTIVE AND OBJECTIVE BOX
Patient is a 41y old  Male who presents with a chief complaint of Fever, Abdominal pain (09 Jul 2017 15:50)        SUBJECTIVE / OVERNIGHT EVENTS:      MEDICATIONS  (STANDING):  insulin lispro (HumaLOG) corrective regimen sliding scale   SubCutaneous three times a day before meals  insulin lispro (HumaLOG) corrective regimen sliding scale   SubCutaneous at bedtime  dextrose 5%. 1000 milliLiter(s) (50 mL/Hr) IV Continuous <Continuous>  dextrose 50% Injectable 12.5 Gram(s) IV Push once  dextrose 50% Injectable 25 Gram(s) IV Push once  dextrose 50% Injectable 25 Gram(s) IV Push once  heparin  Injectable 5000 Unit(s) SubCutaneous every 12 hours  spironolactone 50 milliGRAM(s) Oral daily  furosemide    Tablet 20 milliGRAM(s) Oral two times a day  pantoprazole    Tablet 40 milliGRAM(s) Oral before breakfast  multivitamin 1 Tablet(s) Oral daily  nadolol 20 milliGRAM(s) Oral daily  piperacillin/tazobactam IVPB. 3.375 Gram(s) IV Intermittent every 8 hours  vancomycin  IVPB 1250 milliGRAM(s) IV Intermittent every 12 hours  insulin glargine Injectable (LANTUS) 30 Unit(s) SubCutaneous at bedtime    MEDICATIONS  (PRN):  dextrose Gel 1 Dose(s) Oral once PRN Blood Glucose LESS THAN 70 milliGRAM(s)/deciliter  glucagon  Injectable 1 milliGRAM(s) IntraMuscular once PRN Glucose LESS THAN 70 milligrams/deciliter      Vital Signs Last 24 Hrs  T(C): 36.8 (07-10-17 @ 05:42), Max: 36.8 (07-10-17 @ 05:42)  HR: 60 (07-10-17 @ 05:42) (60 - 71)  BP: 99/60 (07-10-17 @ 05:42) (97/55 - 113/69)  RR: 18 (07-10-17 @ 05:42) (18 - 18)  SpO2: 99% (07-10-17 @ 05:42) (93% - 99%)  CAPILLARY BLOOD GLUCOSE  321 (09 Jul 2017 22:06)  120 (09 Jul 2017 17:39)  272 (09 Jul 2017 12:45)  232 (09 Jul 2017 08:39)        I&O's Summary    09 Jul 2017 07:01  -  10 Jul 2017 07:00  --------------------------------------------------------  IN: 690 mL / OUT: 0 mL / NET: 690 mL        PHYSICAL EXAM  GENERAL: NAD, well-developed  HEAD:  Atraumatic, Normocephalic  EYES: EOMI, PERRLA, conjunctiva and sclera clear  NECK: Supple, No JVD  CHEST/LUNG: Clear to auscultation bilaterally; No wheeze  HEART: Regular rate and rhythm; No murmurs, rubs, or gallops  ABDOMEN: Soft, Nontender, Nondistended; Bowel sounds present  EXTREMITIES:  2+ Peripheral Pulses, No clubbing, cyanosis, or edema  PSYCH: AAOx3  SKIN: No rashes or lesions    LABS:                        9.1    3.6   )-----------( 60       ( 09 Jul 2017 06:49 )             25.7     07-09    137  |  105  |  10  ----------------------------<  234<H>  4.0   |  25  |  0.66    Ca    7.6<L>      09 Jul 2017 06:43    TPro  5.4<L>  /  Alb  2.3<L>  /  TBili  1.8<H>  /  DBili  x   /  AST  40  /  ALT  24  /  AlkPhos  141<H>  07-09              RADIOLOGY & ADDITIONAL TESTS:    Imaging Personally Reviewed:  Consultant(s) Notes Reviewed:    Care Discussed with Consultants/Other Providers: Patient is a 41y old  Male who presents with a chief complaint of Fever, Abdominal pain (09 Jul 2017 15:50)    SUBJECTIVE / OVERNIGHT EVENTS:  Patient seen at bedside. Reports no issues or concerns.    ID has seen the patient. Recommend patient go home on Bactrim for prophylaxis for 4 weeks with outpatient follow up.     MEDICATIONS  (STANDING):  insulin lispro (HumaLOG) corrective regimen sliding scale   SubCutaneous three times a day before meals  insulin lispro (HumaLOG) corrective regimen sliding scale   SubCutaneous at bedtime  dextrose 5%. 1000 milliLiter(s) (50 mL/Hr) IV Continuous <Continuous>  dextrose 50% Injectable 12.5 Gram(s) IV Push once  dextrose 50% Injectable 25 Gram(s) IV Push once  dextrose 50% Injectable 25 Gram(s) IV Push once  heparin  Injectable 5000 Unit(s) SubCutaneous every 12 hours  spironolactone 50 milliGRAM(s) Oral daily  furosemide    Tablet 20 milliGRAM(s) Oral two times a day  pantoprazole    Tablet 40 milliGRAM(s) Oral before breakfast  multivitamin 1 Tablet(s) Oral daily  nadolol 20 milliGRAM(s) Oral daily  piperacillin/tazobactam IVPB. 3.375 Gram(s) IV Intermittent every 8 hours  vancomycin  IVPB 1250 milliGRAM(s) IV Intermittent every 12 hours  insulin glargine Injectable (LANTUS) 30 Unit(s) SubCutaneous at bedtime    MEDICATIONS  (PRN):  dextrose Gel 1 Dose(s) Oral once PRN Blood Glucose LESS THAN 70 milliGRAM(s)/deciliter  glucagon  Injectable 1 milliGRAM(s) IntraMuscular once PRN Glucose LESS THAN 70 milligrams/deciliter    Vital Signs Last 24 Hrs  T(C): 36.8 (07-10-17 @ 05:42), Max: 36.8 (07-10-17 @ 05:42)  HR: 60 (07-10-17 @ 05:42) (60 - 71)  BP: 99/60 (07-10-17 @ 05:42) (97/55 - 113/69)  RR: 18 (07-10-17 @ 05:42) (18 - 18)  SpO2: 99% (07-10-17 @ 05:42) (93% - 99%)  CAPILLARY BLOOD GLUCOSE  321 (09 Jul 2017 22:06)  120 (09 Jul 2017 17:39)  272 (09 Jul 2017 12:45)  232 (09 Jul 2017 08:39)    I&O's Summary    09 Jul 2017 07:01  -  10 Jul 2017 07:00  --------------------------------------------------------  IN: 690 mL / OUT: 0 mL / NET: 690 mL    PHYSICAL EXAM  GENERAL: NAD, well-developed, young male  CHEST/LUNG: Clear to auscultation bilaterally; No wheezes, rales or rhonchi  HEART: Regular rate and rhythm; No murmurs, rubs, or gallops  ABDOMEN: Soft, Nontender, Nondistended; Bowel sounds present, no organomegaly  EXTREMITIES:  2+ Peripheral Pulses, No clubbing, cyanosis, or edema    LABS:                        9.1    3.6   )-----------( 60       ( 09 Jul 2017 06:49 )             25.7     07-09    137  |  105  |  10  ----------------------------<  234<H>  4.0   |  25  |  0.66    Ca    7.6<L>      09 Jul 2017 06:43    TPro  5.4<L>  /  Alb  2.3<L>  /  TBili  1.8<H>  /  DBili  x   /  AST  40  /  ALT  24  /  AlkPhos  141<H>  07-09    Blood culture negative x 3 to date    RADIOLOGY & ADDITIONAL TESTS:  No new imaging performed. Patient is a 41y old  Male who presents with a chief complaint of Fever, Abdominal pain (09 Jul 2017 15:50)    SUBJECTIVE / OVERNIGHT EVENTS:  Patient seen at bedside. Reports no issues or concerns.    ID has seen the patient. Recommend patient go home on Bactrim for prophylaxis for 4 weeks with outpatient follow up.     MEDICATIONS  (STANDING):  insulin lispro (HumaLOG) corrective regimen sliding scale   SubCutaneous three times a day before meals  insulin lispro (HumaLOG) corrective regimen sliding scale   SubCutaneous at bedtime  dextrose 5%. 1000 milliLiter(s) (50 mL/Hr) IV Continuous <Continuous>  dextrose 50% Injectable 12.5 Gram(s) IV Push once  dextrose 50% Injectable 25 Gram(s) IV Push once  dextrose 50% Injectable 25 Gram(s) IV Push once  heparin  Injectable 5000 Unit(s) SubCutaneous every 12 hours  spironolactone 50 milliGRAM(s) Oral daily  furosemide    Tablet 20 milliGRAM(s) Oral two times a day  pantoprazole    Tablet 40 milliGRAM(s) Oral before breakfast  multivitamin 1 Tablet(s) Oral daily  nadolol 20 milliGRAM(s) Oral daily  piperacillin/tazobactam IVPB. 3.375 Gram(s) IV Intermittent every 8 hours  vancomycin  IVPB 1250 milliGRAM(s) IV Intermittent every 12 hours  insulin glargine Injectable (LANTUS) 30 Unit(s) SubCutaneous at bedtime    MEDICATIONS  (PRN):  dextrose Gel 1 Dose(s) Oral once PRN Blood Glucose LESS THAN 70 milliGRAM(s)/deciliter  glucagon  Injectable 1 milliGRAM(s) IntraMuscular once PRN Glucose LESS THAN 70 milligrams/deciliter    Vital Signs Last 24 Hrs  T(C): 36.8 (07-10-17 @ 05:42), Max: 36.8 (07-10-17 @ 05:42)  HR: 60 (07-10-17 @ 05:42) (60 - 71)  BP: 99/60 (07-10-17 @ 05:42) (97/55 - 113/69)  RR: 18 (07-10-17 @ 05:42) (18 - 18)  SpO2: 99% (07-10-17 @ 05:42) (93% - 99%)  CAPILLARY BLOOD GLUCOSE  321 (09 Jul 2017 22:06)  120 (09 Jul 2017 17:39)  272 (09 Jul 2017 12:45)  232 (09 Jul 2017 08:39)    I&O's Summary    09 Jul 2017 07:01  -  10 Jul 2017 07:00  --------------------------------------------------------  IN: 690 mL / OUT: 0 mL / NET: 690 mL    PHYSICAL EXAM  GENERAL: NAD, well-developed, young male  CHEST/LUNG: Clear to auscultation bilaterally; No wheezes, rales or rhonchi  HEART: Regular rate and rhythm; No murmurs, rubs, or gallops  ABDOMEN: Soft, Nontender, Nondistended; Bowel sounds present, no organomegaly  EXTREMITIES:  2+ Peripheral Pulses, No clubbing, cyanosis, or edema    LABS:                        9.1    3.6   )-----------( 60       ( 09 Jul 2017 06:49 )             25.7     07-09    137  |  105  |  10  ----------------------------<  234<H>  4.0   |  25  |  0.66    Ca    7.6<L>      09 Jul 2017 06:43    TPro  5.4<L>  /  Alb  2.3<L>  /  TBili  1.8<H>  /  DBili  x   /  AST  40  /  ALT  24  /  AlkPhos  141<H>  07-09    Blood culture negative x 3 to date    RADIOLOGY & ADDITIONAL TESTS:  No new imaging performed.     Case d/w Consultant: Dr. Boss--> can d/c IV abx and should continue 4 wks of Bactrim DS

## 2017-07-10 NOTE — PROGRESS NOTE ADULT - SUBJECTIVE AND OBJECTIVE BOX
HEPATOLOGY SERVICE PROGRESS NOTE    Patient is a 41y old  Male who presents with a chief complaint of Fever, Abdominal pain (09 Jul 2017 15:50)      SUBJECTIVE / OVERNIGHT EVENTS: Pt feels well. No further fever and abdominal pain.     MEDICATIONS  (STANDING):  insulin lispro (HumaLOG) corrective regimen sliding scale   SubCutaneous three times a day before meals  insulin lispro (HumaLOG) corrective regimen sliding scale   SubCutaneous at bedtime  dextrose 5%. 1000 milliLiter(s) (50 mL/Hr) IV Continuous <Continuous>  dextrose 50% Injectable 12.5 Gram(s) IV Push once  dextrose 50% Injectable 25 Gram(s) IV Push once  dextrose 50% Injectable 25 Gram(s) IV Push once  heparin  Injectable 5000 Unit(s) SubCutaneous every 12 hours  spironolactone 50 milliGRAM(s) Oral daily  furosemide    Tablet 20 milliGRAM(s) Oral two times a day  pantoprazole    Tablet 40 milliGRAM(s) Oral before breakfast  multivitamin 1 Tablet(s) Oral daily  nadolol 20 milliGRAM(s) Oral daily  piperacillin/tazobactam IVPB. 3.375 Gram(s) IV Intermittent every 8 hours  vancomycin  IVPB 1250 milliGRAM(s) IV Intermittent every 12 hours  insulin glargine Injectable (LANTUS) 30 Unit(s) SubCutaneous at bedtime    MEDICATIONS  (PRN):  dextrose Gel 1 Dose(s) Oral once PRN Blood Glucose LESS THAN 70 milliGRAM(s)/deciliter  glucagon  Injectable 1 milliGRAM(s) IntraMuscular once PRN Glucose LESS THAN 70 milligrams/deciliter      Vital Signs Last 24 Hrs  T(C): 36.8 (07-10-17 @ 05:42), Max: 36.8 (07-10-17 @ 05:42)  HR: 60 (07-10-17 @ 05:42) (60 - 71)  BP: 99/60 (07-10-17 @ 05:42) (97/55 - 113/69)  RR: 18 (07-10-17 @ 05:42) (18 - 18)  SpO2: 99% (07-10-17 @ 05:42) (93% - 99%)  CAPILLARY BLOOD GLUCOSE  321 (09 Jul 2017 22:06)  120 (09 Jul 2017 17:39)  272 (09 Jul 2017 12:45)  232 (09 Jul 2017 08:39)        I&O's Summary    09 Jul 2017 07:01  -  10 Jul 2017 07:00  --------------------------------------------------------  IN: 690 mL / OUT: 0 mL / NET: 690 mL        ROS:  General: no fevers chills  Neuro: No headache  MSK: No back pain  Cardiac: No chest pain, palpitations  Pulmonary: No SOB or cough  GI: As per HPI  : No dysuria or hesitancy  Skin: No rash or pruritis      PHYSICAL EXAM:  GENERAL: NAD, well-developed  HEAD:  Atraumatic, Normocephalic  EYES: EOMI, PERRLA, conjunctiva and sclera anicteric  NECK: Supple, No JVD  CHEST/LUNG: Clear to auscultation bilaterally; No wheeze  HEART: Regular rate and rhythm; No murmurs, rubs, or gallops  ABDOMEN: Soft, Nontender, mildly distended; Bowel sounds present, no hepatosplenomegaly, no rebound or guarding  EXTREMITIES:  2+ Peripheral Pulses, No clubbing, cyanosis, or edema  PSYCH: AAOx3  NEUROLOGY: non-focal, no asterixis  SKIN: No rashes or lesions, no palmar erythema or Dupuytren's contracture, no gynecomastia. Diffuse vitiligo.    LABS:                        9.1    3.6   )-----------( 60       ( 09 Jul 2017 06:49 )             25.7     07-09    137  |  105  |  10  ----------------------------<  234<H>  4.0   |  25  |  0.66    Ca    7.6<L>      09 Jul 2017 06:43    TPro  5.4<L>  /  Alb  2.3<L>  /  TBili  1.8<H>  /  DBili  x   /  AST  40  /  ALT  24  /  AlkPhos  141<H>  07-09    LIVER FUNCTIONS - ( 09 Jul 2017 06:43 )  Alb: 2.3 g/dL / Pro: 5.4 g/dL / ALK PHOS: 141 U/L / ALT: 24 U/L RC / AST: 40 U/L / GGT: x                     RADIOLOGY & ADDITIONAL TESTS:    < from: CT Abdomen and Pelvis w/ Oral Cont and w/ IV Cont (07.05.17 @ 21:12) >  IMPRESSION:    1.  No significant change in sequelae of cirrhosis and portal   hypertension including splenomegaly, abdominal varices, and ascites  2.  Persistent right colonic wall thickening, likely reactive from portal   colopathy. Colitis may have a similar appearance.  3.  No abdominal or pelvic fluid collection to suggest abscess formation.                < end of copied text >  < from: CT Abdomen and Pelvis w/ Oral Cont and w/ IV Cont (07.05.17 @ 21:12) >

## 2017-07-10 NOTE — PROGRESS NOTE ADULT - PROBLEM SELECTOR PLAN 2
- ? 2/2 portal hypertensive colopathy vs colitis, pain now resolved   - patient status post numerous imaging studies  - CT Abd/Pelvis: no evidence of significant change in cirrhotic sequelae, persistent right colon wall thickening, no evidence of pelvic or abdominal fluid collections   - B/C x 3 negative, c/w Vanc/Zosyn  - GI will not do TIPS at this time - ? 2/2 portal hypertensive colopathy vs colitis, pain now resolved   - patient status post numerous imaging studies  - CT Abd/Pelvis: no evidence of significant change in cirrhotic sequelae, persistent right colon wall thickening, no evidence of pelvic or abdominal fluid collections

## 2017-07-10 NOTE — PROGRESS NOTE ADULT - ATTENDING COMMENTS
Bismark Adkins  Pager: [phone]. If no response or past 5 pm call [phone].     Please call ID service for questions over weekend at [phone].
Patient with cultures negative to date, clinically responding to antibiotics.  Await ID input to plan follow up antibiotic course.  Question persists concerning etiology of episodes of illness.  If transudation of bacteria from congested gut wall is operative, portal pressure measurements and consideration of TIPS could be an option.
as above  stable for D/C home today with PO abx's
Case discussed with GI, they do not suspect SBP, and do not believe in utility of TIPS at this time.  ID recommends f/u blood cultures aft least 72 hrs and ensure negative for growth, but for now continue pt on zosyn, as pt spiked through ceftriaxone.  Picc line to be removed, as per d/w GI, it seems better to remove given it might be a source of infection; however, site is not tender or erythematous.  US of picc site and US of abd with doppler were discussed with radiologist who felt there was some subcutaneous edema at site of picc, but was not very significant and likely 2/2 inflammation in area; US doppler abd showed significant pulm HTN.  Will f/u GI review of US, and f/u recs.    Per ID, upon discharge, likely recommended course of antibiotics will be one additional week of zosyn, which can be given by IV at home with visiting nurse, but will await f/u of blood cultures.    RU Stewart, Attending Physician (7/7 - 5:45pm)
Case discussed with ID, recommend continue IV vanco, zosyn for now.  Blood cultures remain negative for growth.  Plan to set up with case management for pt to receive antibiotic by IV x 1 week at home with visiting RN, but this can't be set up until Monday.    Will also f/u GI recs on results of abd us showing significant portal hypertension.     Picc line removed yesterday, as it was suspected to be possible source of infection.  US of picc site however revealed no abscess.    RU Stewart, Attending Physician (7/8 - 6:00 pm)

## 2017-07-10 NOTE — PROGRESS NOTE ADULT - ASSESSMENT
41-year-old man with history of alcoholic cirrhosis ,varices, diabetes mellitus, history of C. difficile, vitiligo.  History of recurrent Escherichia coli bacteremia. Etiology is unclear as patient has had an extensive workup in past including paracentesis, imaging, nuclear scan, echocardiogram.   Now admitted with fevers and increased abdominal pain.  He had some swelling and bleeding from the PICC line site.PICC removed  Blood cultures and urine cultures negative . Pt afebrile since admission  No clinical s/s of any other etiology.  Given this would rec DC vanco and zosyn,patient has finished >2 weeks of IV abx for the EColi bacteremia and has negative cultures.  Start PO suppressive bactrim.  Other plan per primary.  If Dced to follow in ID clinic in 4 weeks  Advised about adverse effects  Asked to contact us or [facility] if any worsening       Beeper [id number]-ezyz/afterhours/No response-[id number]

## 2017-07-18 ENCOUNTER — LABORATORY RESULT (OUTPATIENT)
Age: 42
End: 2017-07-18

## 2017-07-18 ENCOUNTER — APPOINTMENT (OUTPATIENT)
Age: 42
End: 2017-07-18

## 2017-07-18 VITALS
HEIGHT: 68 IN | BODY MASS INDEX: 30.01 KG/M2 | DIASTOLIC BLOOD PRESSURE: 74 MMHG | HEART RATE: 80 BPM | TEMPERATURE: 98.6 F | SYSTOLIC BLOOD PRESSURE: 111 MMHG | WEIGHT: 198 LBS | RESPIRATION RATE: 14 BRPM

## 2017-07-19 LAB
AFP-TM SERPL-MCNC: 4.2 NG/ML
ALBUMIN SERPL ELPH-MCNC: 2.7 G/DL
ALP BLD-CCNC: 222 U/L
ALT SERPL-CCNC: 32 U/L
ANION GAP SERPL CALC-SCNC: 14 MMOL/L
AST SERPL-CCNC: 51 U/L
BASOPHILS # BLD AUTO: 0.02 K/UL
BASOPHILS NFR BLD AUTO: 0.4 %
BILIRUB SERPL-MCNC: 2.6 MG/DL
BUN SERPL-MCNC: 13 MG/DL
CALCIUM SERPL-MCNC: 8.1 MG/DL
CHLORIDE SERPL-SCNC: 99 MMOL/L
CO2 SERPL-SCNC: 18 MMOL/L
CREAT SERPL-MCNC: 1.04 MG/DL
EOSINOPHIL # BLD AUTO: 0.16 K/UL
EOSINOPHIL NFR BLD AUTO: 3.6 %
HCT VFR BLD CALC: 28.8 %
HGB BLD-MCNC: 9.8 G/DL
IMM GRANULOCYTES NFR BLD AUTO: 0.2 %
LYMPHOCYTES # BLD AUTO: 0.88 K/UL
LYMPHOCYTES NFR BLD AUTO: 19.6 %
MAN DIFF?: NORMAL
MCHC RBC-ENTMCNC: 31.8 PG
MCHC RBC-ENTMCNC: 34 GM/DL
MCV RBC AUTO: 93.5 FL
MONOCYTES # BLD AUTO: 0.35 K/UL
MONOCYTES NFR BLD AUTO: 7.8 %
NEUTROPHILS # BLD AUTO: 3.07 K/UL
NEUTROPHILS NFR BLD AUTO: 68.4 %
PLATELET # BLD AUTO: 66 K/UL
POTASSIUM SERPL-SCNC: 4.6 MMOL/L
PROT SERPL-MCNC: 6.6 G/DL
RBC # BLD: 3.08 M/UL
RBC # FLD: 14.7 %
SODIUM SERPL-SCNC: 131 MMOL/L
WBC # FLD AUTO: 4.49 K/UL

## 2017-07-24 LAB — BACTERIA BLD CULT: NORMAL

## 2017-07-26 ENCOUNTER — EMERGENCY (EMERGENCY)
Facility: HOSPITAL | Age: 42
LOS: 1 days | Discharge: ANOTHER HOSPITAL | End: 2017-07-26
Attending: EMERGENCY MEDICINE | Admitting: EMERGENCY MEDICINE
Payer: COMMERCIAL

## 2017-07-26 VITALS
RESPIRATION RATE: 18 BRPM | SYSTOLIC BLOOD PRESSURE: 137 MMHG | OXYGEN SATURATION: 95 % | DIASTOLIC BLOOD PRESSURE: 74 MMHG | HEART RATE: 96 BPM | TEMPERATURE: 100 F

## 2017-07-26 VITALS
TEMPERATURE: 100 F | HEIGHT: 68 IN | OXYGEN SATURATION: 94 % | DIASTOLIC BLOOD PRESSURE: 72 MMHG | SYSTOLIC BLOOD PRESSURE: 124 MMHG | RESPIRATION RATE: 20 BRPM | HEART RATE: 96 BPM

## 2017-07-26 LAB
ALBUMIN SERPL ELPH-MCNC: 3.1 G/DL — LOW (ref 3.3–5)
ALP SERPL-CCNC: 254 U/L — HIGH (ref 40–120)
ALT FLD-CCNC: 36 U/L RC — SIGNIFICANT CHANGE UP (ref 10–45)
ANION GAP SERPL CALC-SCNC: 14 MMOL/L — SIGNIFICANT CHANGE UP (ref 5–17)
APTT BLD: 37.5 SEC — HIGH (ref 27.5–37.4)
AST SERPL-CCNC: 55 U/L — HIGH (ref 10–40)
BASE EXCESS BLDV CALC-SCNC: -1.8 MMOL/L — SIGNIFICANT CHANGE UP (ref -2–2)
BASOPHILS # BLD AUTO: 0 K/UL — SIGNIFICANT CHANGE UP (ref 0–0.2)
BASOPHILS NFR BLD AUTO: 0.4 % — SIGNIFICANT CHANGE UP (ref 0–2)
BILIRUB SERPL-MCNC: 3.4 MG/DL — HIGH (ref 0.2–1.2)
BUN SERPL-MCNC: 12 MG/DL — SIGNIFICANT CHANGE UP (ref 7–23)
CA-I SERPL-SCNC: 1.15 MMOL/L — SIGNIFICANT CHANGE UP (ref 1.12–1.3)
CALCIUM SERPL-MCNC: 8.5 MG/DL — SIGNIFICANT CHANGE UP (ref 8.4–10.5)
CHLORIDE BLDV-SCNC: 103 MMOL/L — SIGNIFICANT CHANGE UP (ref 96–108)
CHLORIDE SERPL-SCNC: 99 MMOL/L — SIGNIFICANT CHANGE UP (ref 96–108)
CO2 BLDV-SCNC: 23 MMOL/L — SIGNIFICANT CHANGE UP (ref 22–30)
CO2 SERPL-SCNC: 19 MMOL/L — LOW (ref 22–31)
CREAT SERPL-MCNC: 0.86 MG/DL — SIGNIFICANT CHANGE UP (ref 0.5–1.3)
EOSINOPHIL # BLD AUTO: 0.1 K/UL — SIGNIFICANT CHANGE UP (ref 0–0.5)
EOSINOPHIL NFR BLD AUTO: 0.8 % — SIGNIFICANT CHANGE UP (ref 0–6)
GAS PNL BLDV: 129 MMOL/L — LOW (ref 136–145)
GAS PNL BLDV: SIGNIFICANT CHANGE UP
GAS PNL BLDV: SIGNIFICANT CHANGE UP
GLUCOSE BLDV-MCNC: 330 MG/DL — HIGH (ref 70–99)
GLUCOSE SERPL-MCNC: 345 MG/DL — HIGH (ref 70–99)
HCO3 BLDV-SCNC: 22 MMOL/L — SIGNIFICANT CHANGE UP (ref 21–29)
HCT VFR BLD CALC: 32.5 % — LOW (ref 39–50)
HCT VFR BLDA CALC: 37 % — LOW (ref 39–50)
HGB BLD CALC-MCNC: 11.9 G/DL — LOW (ref 13–17)
HGB BLD-MCNC: 11.2 G/DL — LOW (ref 13–17)
INR BLD: 1.3 RATIO — HIGH (ref 0.88–1.16)
LACTATE BLDV-MCNC: 2.3 MMOL/L — HIGH (ref 0.7–2)
LIDOCAIN IGE QN: 40 U/L — SIGNIFICANT CHANGE UP (ref 7–60)
LYMPHOCYTES # BLD AUTO: 0.7 K/UL — LOW (ref 1–3.3)
LYMPHOCYTES # BLD AUTO: 8 % — LOW (ref 13–44)
MCHC RBC-ENTMCNC: 32.9 PG — SIGNIFICANT CHANGE UP (ref 27–34)
MCHC RBC-ENTMCNC: 34.6 GM/DL — SIGNIFICANT CHANGE UP (ref 32–36)
MCV RBC AUTO: 95.1 FL — SIGNIFICANT CHANGE UP (ref 80–100)
MONOCYTES # BLD AUTO: 0.5 K/UL — SIGNIFICANT CHANGE UP (ref 0–0.9)
MONOCYTES NFR BLD AUTO: 5.7 % — SIGNIFICANT CHANGE UP (ref 2–14)
NEUTROPHILS # BLD AUTO: 7.1 K/UL — SIGNIFICANT CHANGE UP (ref 1.8–7.4)
NEUTROPHILS NFR BLD AUTO: 85 % — HIGH (ref 43–77)
OTHER CELLS CSF MANUAL: 9 ML/DL — LOW (ref 18–22)
PCO2 BLDV: 37 MMHG — SIGNIFICANT CHANGE UP (ref 35–50)
PH BLDV: 7.4 — SIGNIFICANT CHANGE UP (ref 7.35–7.45)
PLATELET # BLD AUTO: 56 K/UL — LOW (ref 150–400)
PO2 BLDV: 36 MMHG — SIGNIFICANT CHANGE UP (ref 25–45)
POTASSIUM BLDV-SCNC: 3.9 MMOL/L — SIGNIFICANT CHANGE UP (ref 3.5–5)
POTASSIUM SERPL-MCNC: 4.2 MMOL/L — SIGNIFICANT CHANGE UP (ref 3.5–5.3)
POTASSIUM SERPL-SCNC: 4.2 MMOL/L — SIGNIFICANT CHANGE UP (ref 3.5–5.3)
PROT SERPL-MCNC: 7.4 G/DL — SIGNIFICANT CHANGE UP (ref 6–8.3)
PROTHROM AB SERPL-ACNC: 14.3 SEC — HIGH (ref 9.8–12.7)
RBC # BLD: 3.42 M/UL — LOW (ref 4.2–5.8)
RBC # FLD: 12.9 % — SIGNIFICANT CHANGE UP (ref 10.3–14.5)
SAO2 % BLDV: 56 % — LOW (ref 67–88)
SODIUM SERPL-SCNC: 132 MMOL/L — LOW (ref 135–145)
WBC # BLD: 8.3 K/UL — SIGNIFICANT CHANGE UP (ref 3.8–10.5)
WBC # FLD AUTO: 8.3 K/UL — SIGNIFICANT CHANGE UP (ref 3.8–10.5)

## 2017-07-26 PROCEDURE — 99285 EMERGENCY DEPT VISIT HI MDM: CPT

## 2017-07-26 PROCEDURE — 80053 COMPREHEN METABOLIC PANEL: CPT

## 2017-07-26 PROCEDURE — 82435 ASSAY OF BLOOD CHLORIDE: CPT

## 2017-07-26 PROCEDURE — 82803 BLOOD GASES ANY COMBINATION: CPT

## 2017-07-26 PROCEDURE — 85014 HEMATOCRIT: CPT

## 2017-07-26 PROCEDURE — 85730 THROMBOPLASTIN TIME PARTIAL: CPT

## 2017-07-26 PROCEDURE — 84132 ASSAY OF SERUM POTASSIUM: CPT

## 2017-07-26 PROCEDURE — 84295 ASSAY OF SERUM SODIUM: CPT

## 2017-07-26 PROCEDURE — 96376 TX/PRO/DX INJ SAME DRUG ADON: CPT

## 2017-07-26 PROCEDURE — 82330 ASSAY OF CALCIUM: CPT

## 2017-07-26 PROCEDURE — 85027 COMPLETE CBC AUTOMATED: CPT

## 2017-07-26 PROCEDURE — 83690 ASSAY OF LIPASE: CPT

## 2017-07-26 PROCEDURE — 96374 THER/PROPH/DIAG INJ IV PUSH: CPT

## 2017-07-26 PROCEDURE — 85610 PROTHROMBIN TIME: CPT

## 2017-07-26 PROCEDURE — 82947 ASSAY GLUCOSE BLOOD QUANT: CPT

## 2017-07-26 PROCEDURE — 83605 ASSAY OF LACTIC ACID: CPT

## 2017-07-26 PROCEDURE — 99285 EMERGENCY DEPT VISIT HI MDM: CPT | Mod: 25

## 2017-07-26 RX ORDER — SODIUM CHLORIDE 9 MG/ML
1000 INJECTION INTRAMUSCULAR; INTRAVENOUS; SUBCUTANEOUS ONCE
Qty: 0 | Refills: 0 | Status: COMPLETED | OUTPATIENT
Start: 2017-07-26 | End: 2017-07-26

## 2017-07-26 RX ORDER — MORPHINE SULFATE 50 MG/1
2 CAPSULE, EXTENDED RELEASE ORAL ONCE
Qty: 0 | Refills: 0 | Status: DISCONTINUED | OUTPATIENT
Start: 2017-07-26 | End: 2017-07-26

## 2017-07-26 RX ORDER — MORPHINE SULFATE 50 MG/1
4 CAPSULE, EXTENDED RELEASE ORAL ONCE
Qty: 0 | Refills: 0 | Status: DISCONTINUED | OUTPATIENT
Start: 2017-07-26 | End: 2017-07-26

## 2017-07-26 RX ADMIN — MORPHINE SULFATE 2 MILLIGRAM(S): 50 CAPSULE, EXTENDED RELEASE ORAL at 19:35

## 2017-07-26 RX ADMIN — MORPHINE SULFATE 2 MILLIGRAM(S): 50 CAPSULE, EXTENDED RELEASE ORAL at 17:01

## 2017-07-26 RX ADMIN — MORPHINE SULFATE 4 MILLIGRAM(S): 50 CAPSULE, EXTENDED RELEASE ORAL at 17:18

## 2017-07-26 RX ADMIN — MORPHINE SULFATE 4 MILLIGRAM(S): 50 CAPSULE, EXTENDED RELEASE ORAL at 18:07

## 2017-07-26 RX ADMIN — MORPHINE SULFATE 2 MILLIGRAM(S): 50 CAPSULE, EXTENDED RELEASE ORAL at 15:48

## 2017-07-26 RX ADMIN — SODIUM CHLORIDE 1000 MILLILITER(S): 9 INJECTION INTRAMUSCULAR; INTRAVENOUS; SUBCUTANEOUS at 15:47

## 2017-07-26 NOTE — ED PROVIDER NOTE - ATTENDING CONTRIBUTION TO CARE
Attending MD Julian:   I personally have seen and examined this patient.  Physician assistant note reviewed and agree on plan of care and except where noted.  See below for details.     41M with PMH including DM, EtOHic liver cirrhosis, esophageal varices, and latent TB presents to the ED with abdominal pain.  reports that has been having abdominal pain since 9am and it is sharp stabbing severe and localized in the moriah-umbilical region with radiation cephalad.  Reports he has had many issues for similar pain and he is awaiting liver transplant at [facility].  Reports fever Tmax99.9. Denies bloody emesis, blood in stools, diarrhea, nausea, vomiting. Denies chest pain, shortness of breath, palpitations. Denies dysuria, hematuria, change in urinary habits including frequency, urgency. Denies recent travel.  Reports he is on prophylactic Bactrim.  On exam, NAD, head NCAT, PERRL, FROM at neck, no tenderness to palpation or stepoffs along length of spine, lungs CTAB with good inspiratory effort, +S1S2, no m/r/g, abdomen soft with +BS, +tenderness to palpation in epigastrum extending to periumbilical area,  no CVAT, moving all extremities with 5/5 strength bilateral upper and lower extremities, good and equal  strength bilaterally; A/P: 41M with abdominal pain on liver transplant list at [facility], IVFs, pain control, labs, CT A/P, call [facility] Hepatologist

## 2017-07-26 NOTE — ED PROVIDER NOTE - OBJECTIVE STATEMENT
41 y.o. male hx of DM, alcoholic cirrhosis, esophageal varices, latent TB, vitiligo p/w abdominal pain. Patient states that he has been having pain since this morning, severe and sharp in nature. Located in the moriah-umbilical region radiating upwards. No history of abdominal surgeries. Denies fevers, chills, dysuria.. States he has been admitted frequently for the same issue. Patient is on the liver transplant list at [facility]. Denies hematemesis, diarrhea, constipation, melena, nausea/vomiting.

## 2017-07-26 NOTE — ED PROVIDER NOTE - PROGRESS NOTE DETAILS
Hepatology fellow spoke with ED attending here, he was in contact with the hepatology fellow at [facility], they have agreed to an ED to ED transfer, would like the patient admitted there. Conrad Zamudio PA-C. Attd:  Patient signed out pending possible transfer.  Heptatology fellow talked with Hepatology at [facility] who accepted patient for ED to ED transfer (Hepatology at [facility] Fellow Dr. Palacios, Attending Dr. Tucker Burgos).  Contacted [facility] who will accept transfer, discussed with Attending Dr. Breaux in the ED who will accept patient for transfer to the ED.

## 2017-07-26 NOTE — ED ADULT TRIAGE NOTE - CHIEF COMPLAINT QUOTE
abd pain since this morning, now worsening, +chills/nausea, no vomiting, was seen here 2 wks ago for same c/o, dx: liver "not functioning properly"

## 2017-07-26 NOTE — ED ADULT NURSE NOTE - OBJECTIVE STATEMENT
Patient  is  alert  and  oriented x3.   Color  is  good and  skin warm to  touch.  He  is  c/o  abdominal pain, chills  and  low  grade  temperature.

## 2017-08-03 ENCOUNTER — APPOINTMENT (OUTPATIENT)
Dept: INFECTIOUS DISEASE | Facility: CLINIC | Age: 42
End: 2017-08-03

## 2017-08-07 ENCOUNTER — LABORATORY RESULT (OUTPATIENT)
Age: 42
End: 2017-08-07

## 2017-08-07 ENCOUNTER — APPOINTMENT (OUTPATIENT)
Dept: INTERNAL MEDICINE | Facility: CLINIC | Age: 42
End: 2017-08-07
Payer: COMMERCIAL

## 2017-08-07 VITALS
DIASTOLIC BLOOD PRESSURE: 60 MMHG | TEMPERATURE: 98.4 F | OXYGEN SATURATION: 98 % | HEART RATE: 72 BPM | HEIGHT: 68 IN | SYSTOLIC BLOOD PRESSURE: 110 MMHG | BODY MASS INDEX: 30.31 KG/M2 | WEIGHT: 200 LBS

## 2017-08-07 PROCEDURE — 99214 OFFICE O/P EST MOD 30 MIN: CPT

## 2017-08-09 LAB
ALBUMIN SERPL ELPH-MCNC: 3 G/DL
ALP BLD-CCNC: 244 U/L
ALT SERPL-CCNC: 31 U/L
ANION GAP SERPL CALC-SCNC: 10 MMOL/L
APPEARANCE: CLEAR
AST SERPL-CCNC: 44 U/L
BACTERIA UR CULT: NORMAL
BACTERIA: NEGATIVE
BASOPHILS # BLD AUTO: 0.04 K/UL
BASOPHILS NFR BLD AUTO: 0.9 %
BILIRUB SERPL-MCNC: 2.1 MG/DL
BILIRUBIN URINE: NEGATIVE
BLOOD URINE: ABNORMAL
BUN SERPL-MCNC: 13 MG/DL
CALCIUM OXALATE CRYSTALS: NEGATIVE
CALCIUM SERPL-MCNC: 8.5 MG/DL
CHLORIDE SERPL-SCNC: 99 MMOL/L
CHOLEST SERPL-MCNC: 175 MG/DL
CHOLEST/HDLC SERPL: 3.1 RATIO
CO2 SERPL-SCNC: 21 MMOL/L
COLOR: YELLOW
CREAT SERPL-MCNC: 0.87 MG/DL
EOSINOPHIL # BLD AUTO: 0.16 K/UL
EOSINOPHIL NFR BLD AUTO: 3.6 %
GLUCOSE QUALITATIVE U: 500 MG/DL
GLUCOSE SERPL-MCNC: 287 MG/DL
GRANULAR CASTS: 0 /LPF
HBA1C MFR BLD HPLC: 8.6 %
HCT VFR BLD CALC: 30.4 %
HDLC SERPL-MCNC: 57 MG/DL
HGB BLD-MCNC: 10.4 G/DL
HYALINE CASTS: 0 /LPF
IMM GRANULOCYTES NFR BLD AUTO: 0 %
KETONES URINE: NEGATIVE
LDLC SERPL CALC-MCNC: 87 MG/DL
LEUKOCYTE ESTERASE URINE: NEGATIVE
LYMPHOCYTES # BLD AUTO: 0.8 K/UL
LYMPHOCYTES NFR BLD AUTO: 18.1 %
MAN DIFF?: NORMAL
MCHC RBC-ENTMCNC: 30 PG
MCHC RBC-ENTMCNC: 34.2 GM/DL
MCV RBC AUTO: 87.6 FL
MICROSCOPIC-UA: NORMAL
MONOCYTES # BLD AUTO: 0.27 K/UL
MONOCYTES NFR BLD AUTO: 6.1 %
NEUTROPHILS # BLD AUTO: 3.14 K/UL
NEUTROPHILS NFR BLD AUTO: 71.3 %
NITRITE URINE: NEGATIVE
PH URINE: 6.5
PLATELET # BLD AUTO: 65 K/UL
POTASSIUM SERPL-SCNC: 4.5 MMOL/L
PROT SERPL-MCNC: 6.7 G/DL
PROTEIN URINE: NEGATIVE MG/DL
RBC # BLD: 3.47 M/UL
RBC # FLD: 13.8 %
RED BLOOD CELLS URINE: 15 /HPF
SODIUM SERPL-SCNC: 130 MMOL/L
SPECIFIC GRAVITY URINE: 1.01
SQUAMOUS EPITHELIAL CELLS: 0 /HPF
TRIGL SERPL-MCNC: 154 MG/DL
TRIPLE PHOSPHATE CRYSTALS: NEGATIVE
TSH SERPL-ACNC: 3.26 UIU/ML
URIC ACID CRYSTALS: NEGATIVE
UROBILINOGEN URINE: 1 MG/DL
WBC # FLD AUTO: 4.41 K/UL
WHITE BLOOD CELLS URINE: 1 /HPF

## 2017-08-10 ENCOUNTER — APPOINTMENT (OUTPATIENT)
Dept: INFECTIOUS DISEASE | Facility: CLINIC | Age: 42
End: 2017-08-10
Payer: COMMERCIAL

## 2017-08-10 VITALS
DIASTOLIC BLOOD PRESSURE: 69 MMHG | BODY MASS INDEX: 30.01 KG/M2 | SYSTOLIC BLOOD PRESSURE: 116 MMHG | TEMPERATURE: 98.4 F | OXYGEN SATURATION: 91 % | WEIGHT: 198 LBS | HEIGHT: 68 IN | HEART RATE: 78 BPM | RESPIRATION RATE: 16 BRPM

## 2017-08-10 PROCEDURE — 99214 OFFICE O/P EST MOD 30 MIN: CPT

## 2017-08-10 RX ORDER — SULFAMETHOXAZOLE AND TRIMETHOPRIM 400; 80 MG/1; MG/1
400-80 TABLET ORAL
Qty: 30 | Refills: 1 | Status: DISCONTINUED | COMMUNITY
Start: 2017-08-03 | End: 2017-08-10

## 2017-08-14 LAB — BACTERIA BLD CULT: NORMAL

## 2017-08-24 ENCOUNTER — APPOINTMENT (OUTPATIENT)
Age: 42
End: 2017-08-24
Payer: COMMERCIAL

## 2017-08-24 VITALS
RESPIRATION RATE: 17 BRPM | SYSTOLIC BLOOD PRESSURE: 133 MMHG | HEART RATE: 102 BPM | HEIGHT: 68 IN | DIASTOLIC BLOOD PRESSURE: 75 MMHG | BODY MASS INDEX: 29.4 KG/M2 | TEMPERATURE: 98.7 F | WEIGHT: 194 LBS

## 2017-08-24 PROCEDURE — 99213 OFFICE O/P EST LOW 20 MIN: CPT

## 2017-08-24 RX ORDER — CEFTRIAXONE 2 G/1
2 INJECTION, POWDER, FOR SOLUTION INTRAMUSCULAR; INTRAVENOUS
Qty: 4 | Refills: 0 | Status: DISCONTINUED | COMMUNITY
Start: 2017-06-30

## 2017-08-24 RX ORDER — METRONIDAZOLE 500 MG/1
500 TABLET ORAL
Qty: 15 | Refills: 0 | Status: DISCONTINUED | COMMUNITY
Start: 2017-05-22

## 2017-08-24 RX ORDER — CIPROFLOXACIN HYDROCHLORIDE 500 MG/1
500 TABLET, FILM COATED ORAL
Qty: 18 | Refills: 0 | Status: DISCONTINUED | COMMUNITY
Start: 2017-03-03

## 2017-08-24 RX ORDER — ALBENDAZOLE 200 MG/1
200 TABLET, FILM COATED ORAL
Qty: 28 | Refills: 0 | Status: DISCONTINUED | COMMUNITY
Start: 2017-06-12

## 2017-08-24 RX ORDER — OSELTAMIVIR PHOSPHATE 75 MG/1
75 CAPSULE ORAL
Qty: 6 | Refills: 0 | Status: DISCONTINUED | COMMUNITY
Start: 2017-04-19

## 2017-08-29 ENCOUNTER — RX RENEWAL (OUTPATIENT)
Age: 42
End: 2017-08-29

## 2017-08-30 ENCOUNTER — APPOINTMENT (OUTPATIENT)
Age: 42
End: 2017-08-30

## 2017-10-12 ENCOUNTER — APPOINTMENT (OUTPATIENT)
Age: 42
End: 2017-10-12
Payer: COMMERCIAL

## 2017-10-12 ENCOUNTER — LABORATORY RESULT (OUTPATIENT)
Age: 42
End: 2017-10-12

## 2017-10-12 VITALS
SYSTOLIC BLOOD PRESSURE: 117 MMHG | BODY MASS INDEX: 30.16 KG/M2 | RESPIRATION RATE: 17 BRPM | TEMPERATURE: 98.5 F | WEIGHT: 199 LBS | DIASTOLIC BLOOD PRESSURE: 68 MMHG | HEIGHT: 68 IN | HEART RATE: 101 BPM

## 2017-10-12 PROCEDURE — 99214 OFFICE O/P EST MOD 30 MIN: CPT

## 2017-10-13 LAB
AFP-TM SERPL-MCNC: 6.3 NG/ML
ALBUMIN SERPL ELPH-MCNC: 3.1 G/DL
ALP BLD-CCNC: 269 U/L
ALT SERPL-CCNC: 32 U/L
ANION GAP SERPL CALC-SCNC: 14 MMOL/L
AST SERPL-CCNC: 45 U/L
BASOPHILS # BLD AUTO: 0.02 K/UL
BASOPHILS NFR BLD AUTO: 0.4 %
BILIRUB SERPL-MCNC: 1.9 MG/DL
BUN SERPL-MCNC: 10 MG/DL
CALCIUM SERPL-MCNC: 8.5 MG/DL
CHLORIDE SERPL-SCNC: 98 MMOL/L
CO2 SERPL-SCNC: 20 MMOL/L
CREAT SERPL-MCNC: 0.94 MG/DL
EOSINOPHIL # BLD AUTO: 0.12 K/UL
EOSINOPHIL NFR BLD AUTO: 2.6 %
HCT VFR BLD CALC: 30.7 %
HGB BLD-MCNC: 10.5 G/DL
IMM GRANULOCYTES NFR BLD AUTO: 0 %
INR PPP: 1.21 RATIO
LYMPHOCYTES # BLD AUTO: 0.9 K/UL
LYMPHOCYTES NFR BLD AUTO: 19.6 %
MAN DIFF?: NORMAL
MCHC RBC-ENTMCNC: 27.9 PG
MCHC RBC-ENTMCNC: 34.2 GM/DL
MCV RBC AUTO: 81.4 FL
MONOCYTES # BLD AUTO: 0.48 K/UL
MONOCYTES NFR BLD AUTO: 10.5 %
NEUTROPHILS # BLD AUTO: 3.07 K/UL
NEUTROPHILS NFR BLD AUTO: 66.9 %
PLATELET # BLD AUTO: 68 K/UL
POTASSIUM SERPL-SCNC: 4.6 MMOL/L
PROT SERPL-MCNC: 7 G/DL
PT BLD: 13.7 SEC
RBC # BLD: 3.77 M/UL
RBC # FLD: 15.3 %
SODIUM SERPL-SCNC: 132 MMOL/L
WBC # FLD AUTO: 4.59 K/UL

## 2017-10-23 LAB
C DIFF TOX GENS STL QL NAA+PROBE: NORMAL
CDIFF BY PCR: DETECTED

## 2017-10-23 RX ORDER — SULFAMETHOXAZOLE AND TRIMETHOPRIM 800; 160 MG/1; MG/1
800-160 TABLET ORAL
Qty: 30 | Refills: 3 | Status: DISCONTINUED | COMMUNITY
Start: 2017-08-10 | End: 2017-10-23

## 2017-10-24 LAB
BACTERIA STL CULT: NORMAL
DEPRECATED O AND P PREP STL: NORMAL

## 2017-11-01 ENCOUNTER — APPOINTMENT (OUTPATIENT)
Dept: INFECTIOUS DISEASE | Facility: CLINIC | Age: 42
End: 2017-11-01
Payer: COMMERCIAL

## 2017-11-01 VITALS
OXYGEN SATURATION: 92 % | TEMPERATURE: 97.1 F | SYSTOLIC BLOOD PRESSURE: 124 MMHG | BODY MASS INDEX: 30.62 KG/M2 | HEART RATE: 82 BPM | HEIGHT: 68 IN | WEIGHT: 202 LBS | DIASTOLIC BLOOD PRESSURE: 76 MMHG

## 2017-11-01 PROCEDURE — 99214 OFFICE O/P EST MOD 30 MIN: CPT

## 2017-11-13 ENCOUNTER — APPOINTMENT (OUTPATIENT)
Dept: INTERNAL MEDICINE | Facility: CLINIC | Age: 42
End: 2017-11-13
Payer: COMMERCIAL

## 2017-11-13 VITALS
OXYGEN SATURATION: 92 % | DIASTOLIC BLOOD PRESSURE: 70 MMHG | WEIGHT: 200 LBS | SYSTOLIC BLOOD PRESSURE: 118 MMHG | HEIGHT: 68 IN | BODY MASS INDEX: 30.31 KG/M2 | HEART RATE: 81 BPM

## 2017-11-13 PROCEDURE — 99214 OFFICE O/P EST MOD 30 MIN: CPT

## 2017-11-16 LAB
ALBUMIN SERPL ELPH-MCNC: 3.4 G/DL
ALP BLD-CCNC: 160 U/L
ALT SERPL-CCNC: 30 U/L
ANION GAP SERPL CALC-SCNC: 14 MMOL/L
AST SERPL-CCNC: 61 U/L
BASOPHILS # BLD AUTO: 0.02 K/UL
BASOPHILS NFR BLD AUTO: 0.5 %
BILIRUB SERPL-MCNC: 3 MG/DL
BUN SERPL-MCNC: 8 MG/DL
CALCIUM SERPL-MCNC: 8 MG/DL
CHLORIDE SERPL-SCNC: 100 MMOL/L
CHOLEST SERPL-MCNC: 174 MG/DL
CHOLEST/HDLC SERPL: 2.8 RATIO
CO2 SERPL-SCNC: 21 MMOL/L
CREAT SERPL-MCNC: 0.83 MG/DL
EOSINOPHIL # BLD AUTO: 0.09 K/UL
EOSINOPHIL NFR BLD AUTO: 2.4 %
GLUCOSE SERPL-MCNC: 247 MG/DL
HBA1C MFR BLD HPLC: 9 %
HCT VFR BLD CALC: 31.7 %
HDLC SERPL-MCNC: 63 MG/DL
HGB BLD-MCNC: 10.5 G/DL
IMM GRANULOCYTES NFR BLD AUTO: 0 %
LDLC SERPL CALC-MCNC: 85 MG/DL
LYMPHOCYTES # BLD AUTO: 0.87 K/UL
LYMPHOCYTES NFR BLD AUTO: 23.6 %
MAN DIFF?: NORMAL
MCHC RBC-ENTMCNC: 26.7 PG
MCHC RBC-ENTMCNC: 33.1 GM/DL
MCV RBC AUTO: 80.7 FL
MONOCYTES # BLD AUTO: 0.27 K/UL
MONOCYTES NFR BLD AUTO: 7.3 %
NEUTROPHILS # BLD AUTO: 2.44 K/UL
NEUTROPHILS NFR BLD AUTO: 66.2 %
PLATELET # BLD AUTO: 74 K/UL
POTASSIUM SERPL-SCNC: 4.2 MMOL/L
PROT SERPL-MCNC: 6.6 G/DL
RBC # BLD: 3.93 M/UL
RBC # FLD: 16.3 %
SODIUM SERPL-SCNC: 135 MMOL/L
TRIGL SERPL-MCNC: 130 MG/DL
WBC # FLD AUTO: 3.69 K/UL

## 2017-11-20 LAB — BACTERIA BLD CULT: NORMAL

## 2017-12-19 ENCOUNTER — APPOINTMENT (OUTPATIENT)
Dept: INFECTIOUS DISEASE | Facility: CLINIC | Age: 42
End: 2017-12-19
Payer: COMMERCIAL

## 2017-12-19 VITALS
WEIGHT: 206 LBS | SYSTOLIC BLOOD PRESSURE: 101 MMHG | RESPIRATION RATE: 17 BRPM | OXYGEN SATURATION: 94 % | BODY MASS INDEX: 31.22 KG/M2 | HEIGHT: 68 IN | TEMPERATURE: 98.3 F | DIASTOLIC BLOOD PRESSURE: 62 MMHG | HEART RATE: 77 BPM

## 2017-12-19 PROCEDURE — 99215 OFFICE O/P EST HI 40 MIN: CPT

## 2018-01-11 ENCOUNTER — MEDICATION RENEWAL (OUTPATIENT)
Age: 43
End: 2018-01-11

## 2018-01-23 NOTE — DISCHARGE NOTE ADULT - LAUNCH MEDICATION RECONCILIATION
H and P updated-yes, patient placed on cardiac monitor   Anesthesia Plan:  conscious sedation   ASA verified-yes  Airway exam performed-yes  Personal or Family history of anesthesia complications-No  Consent signed and witnessed, Lesley Sprague RN      
Specimens obtained:  BAL RML 90 ml nacl in 60 ml return also send for Galactomannan, TBBX RLL, [place] micro L main.  Verbal report given to DOSC  to include documentation charted in procedural sedation documentation.  Patient to be NPO 1 hour post procedure and place in PO tolerance at 1430, CXR needed at bedside .  Moderate concious sedation was performed and cardiorespiratory functions were monitored the entire procedure by Lesley Sprague RN.  Sedation began at 1317  and concluded at 1400.  Lesley Sprague RN     
<<-----Click here for Discharge Medication Review

## 2018-01-25 ENCOUNTER — LABORATORY RESULT (OUTPATIENT)
Age: 43
End: 2018-01-25

## 2018-01-25 LAB
ALBUMIN SERPL ELPH-MCNC: 3.2 G/DL
ALP BLD-CCNC: 214 U/L
ALT SERPL-CCNC: 24 U/L
ANION GAP SERPL CALC-SCNC: 14 MMOL/L
AST SERPL-CCNC: 45 U/L
BASOPHILS # BLD AUTO: 0.02 K/UL
BASOPHILS NFR BLD AUTO: 0.4 %
BILIRUB SERPL-MCNC: 1.8 MG/DL
BUN SERPL-MCNC: 8 MG/DL
CALCIUM SERPL-MCNC: 8.5 MG/DL
CHLORIDE SERPL-SCNC: 103 MMOL/L
CO2 SERPL-SCNC: 21 MMOL/L
CREAT SERPL-MCNC: 0.72 MG/DL
EOSINOPHIL # BLD AUTO: 0.14 K/UL
EOSINOPHIL NFR BLD AUTO: 3 %
HCT VFR BLD CALC: 33.8 %
HGB BLD-MCNC: 10.7 G/DL
IMM GRANULOCYTES NFR BLD AUTO: 0.2 %
INR PPP: 1.16 RATIO
LYMPHOCYTES # BLD AUTO: 0.93 K/UL
LYMPHOCYTES NFR BLD AUTO: 20 %
MAN DIFF?: NORMAL
MCHC RBC-ENTMCNC: 25.1 PG
MCHC RBC-ENTMCNC: 31.7 GM/DL
MCV RBC AUTO: 79.2 FL
MONOCYTES # BLD AUTO: 0.41 K/UL
MONOCYTES NFR BLD AUTO: 8.8 %
NEUTROPHILS # BLD AUTO: 3.14 K/UL
NEUTROPHILS NFR BLD AUTO: 67.6 %
PLATELET # BLD AUTO: 84 K/UL
POTASSIUM SERPL-SCNC: 4.4 MMOL/L
PROT SERPL-MCNC: 6.6 G/DL
PT BLD: 13.2 SEC
RBC # BLD: 4.27 M/UL
RBC # FLD: 17.6 %
SODIUM SERPL-SCNC: 138 MMOL/L
WBC # FLD AUTO: 4.65 K/UL

## 2018-01-26 LAB — AFP-TM SERPL-MCNC: 5.2 NG/ML

## 2018-01-29 ENCOUNTER — APPOINTMENT (OUTPATIENT)
Age: 43
End: 2018-01-29
Payer: COMMERCIAL

## 2018-01-29 VITALS
RESPIRATION RATE: 14 BRPM | HEART RATE: 76 BPM | TEMPERATURE: 98 F | WEIGHT: 194 LBS | BODY MASS INDEX: 29.4 KG/M2 | HEIGHT: 68 IN | SYSTOLIC BLOOD PRESSURE: 128 MMHG | DIASTOLIC BLOOD PRESSURE: 71 MMHG

## 2018-01-29 PROCEDURE — 99214 OFFICE O/P EST MOD 30 MIN: CPT

## 2018-02-08 ENCOUNTER — RX RENEWAL (OUTPATIENT)
Age: 43
End: 2018-02-08

## 2018-02-11 ENCOUNTER — EMERGENCY (EMERGENCY)
Facility: HOSPITAL | Age: 43
LOS: 1 days | Discharge: HOME | End: 2018-02-11
Attending: EMERGENCY MEDICINE | Admitting: EMERGENCY MEDICINE
Payer: COMMERCIAL

## 2018-02-11 VITALS
HEART RATE: 96 BPM | OXYGEN SATURATION: 92 % | DIASTOLIC BLOOD PRESSURE: 68 MMHG | TEMPERATURE: 98 F | WEIGHT: 199.96 LBS | RESPIRATION RATE: 16 BRPM | SYSTOLIC BLOOD PRESSURE: 114 MMHG

## 2018-02-11 VITALS
RESPIRATION RATE: 16 BRPM | OXYGEN SATURATION: 94 % | HEART RATE: 107 BPM | SYSTOLIC BLOOD PRESSURE: 108 MMHG | DIASTOLIC BLOOD PRESSURE: 61 MMHG

## 2018-02-11 LAB
ALBUMIN SERPL ELPH-MCNC: 3.5 G/DL — SIGNIFICANT CHANGE UP (ref 3.3–5)
ALP SERPL-CCNC: 191 U/L — HIGH (ref 40–120)
ALT FLD-CCNC: 34 U/L RC — SIGNIFICANT CHANGE UP (ref 10–45)
ANION GAP SERPL CALC-SCNC: 14 MMOL/L — SIGNIFICANT CHANGE UP (ref 5–17)
AST SERPL-CCNC: 54 U/L — HIGH (ref 10–40)
BASE EXCESS BLDV CALC-SCNC: -3.1 MMOL/L — LOW (ref -2–2)
BASOPHILS # BLD AUTO: 0 K/UL — SIGNIFICANT CHANGE UP (ref 0–0.2)
BASOPHILS NFR BLD AUTO: 0.5 % — SIGNIFICANT CHANGE UP (ref 0–2)
BILIRUB SERPL-MCNC: 2.7 MG/DL — HIGH (ref 0.2–1.2)
BUN SERPL-MCNC: 12 MG/DL — SIGNIFICANT CHANGE UP (ref 7–23)
CA-I SERPL-SCNC: 1.09 MMOL/L — LOW (ref 1.12–1.3)
CALCIUM SERPL-MCNC: 8.7 MG/DL — SIGNIFICANT CHANGE UP (ref 8.4–10.5)
CHLORIDE BLDV-SCNC: 100 MMOL/L — SIGNIFICANT CHANGE UP (ref 96–108)
CHLORIDE SERPL-SCNC: 97 MMOL/L — SIGNIFICANT CHANGE UP (ref 96–108)
CO2 BLDV-SCNC: 21 MMOL/L — LOW (ref 22–30)
CO2 SERPL-SCNC: 19 MMOL/L — LOW (ref 22–31)
CREAT SERPL-MCNC: 0.71 MG/DL — SIGNIFICANT CHANGE UP (ref 0.5–1.3)
EOSINOPHIL # BLD AUTO: 0.1 K/UL — SIGNIFICANT CHANGE UP (ref 0–0.5)
EOSINOPHIL NFR BLD AUTO: 0.7 % — SIGNIFICANT CHANGE UP (ref 0–6)
GAS PNL BLDV: 128 MMOL/L — LOW (ref 136–145)
GAS PNL BLDV: SIGNIFICANT CHANGE UP
GAS PNL BLDV: SIGNIFICANT CHANGE UP
GLUCOSE BLDV-MCNC: 176 MG/DL — HIGH (ref 70–99)
GLUCOSE SERPL-MCNC: 183 MG/DL — HIGH (ref 70–99)
HCO3 BLDV-SCNC: 20 MMOL/L — LOW (ref 21–29)
HCT VFR BLD CALC: 34.7 % — LOW (ref 39–50)
HCT VFR BLDA CALC: 36 % — LOW (ref 39–50)
HGB BLD CALC-MCNC: 11.8 G/DL — LOW (ref 13–17)
HGB BLD-MCNC: 11.8 G/DL — LOW (ref 13–17)
LACTATE BLDV-MCNC: 2.1 MMOL/L — HIGH (ref 0.7–2)
LYMPHOCYTES # BLD AUTO: 0.5 K/UL — LOW (ref 1–3.3)
LYMPHOCYTES # BLD AUTO: 5.8 % — LOW (ref 13–44)
MCHC RBC-ENTMCNC: 26.8 PG — LOW (ref 27–34)
MCHC RBC-ENTMCNC: 33.9 GM/DL — SIGNIFICANT CHANGE UP (ref 32–36)
MCV RBC AUTO: 79.1 FL — LOW (ref 80–100)
MONOCYTES # BLD AUTO: 0.4 K/UL — SIGNIFICANT CHANGE UP (ref 0–0.9)
MONOCYTES NFR BLD AUTO: 4.3 % — SIGNIFICANT CHANGE UP (ref 2–14)
NEUTROPHILS # BLD AUTO: 7.9 K/UL — HIGH (ref 1.8–7.4)
NEUTROPHILS NFR BLD AUTO: 88.6 % — HIGH (ref 43–77)
OTHER CELLS CSF MANUAL: 14 ML/DL — LOW (ref 18–22)
PCO2 BLDV: 33 MMHG — LOW (ref 35–50)
PH BLDV: 7.41 — SIGNIFICANT CHANGE UP (ref 7.35–7.45)
PLAT MORPH BLD: NORMAL — SIGNIFICANT CHANGE UP
PLATELET # BLD AUTO: 87 K/UL — LOW (ref 150–400)
PO2 BLDV: 52 MMHG — HIGH (ref 25–45)
POTASSIUM BLDV-SCNC: 5 MMOL/L — SIGNIFICANT CHANGE UP (ref 3.5–5)
POTASSIUM SERPL-MCNC: 4.2 MMOL/L — SIGNIFICANT CHANGE UP (ref 3.5–5.3)
POTASSIUM SERPL-SCNC: 4.2 MMOL/L — SIGNIFICANT CHANGE UP (ref 3.5–5.3)
PROT SERPL-MCNC: 7.1 G/DL — SIGNIFICANT CHANGE UP (ref 6–8.3)
RBC # BLD: 4.38 M/UL — SIGNIFICANT CHANGE UP (ref 4.2–5.8)
RBC # FLD: 15.7 % — HIGH (ref 10.3–14.5)
RBC BLD AUTO: NORMAL — SIGNIFICANT CHANGE UP
SAO2 % BLDV: 84 % — SIGNIFICANT CHANGE UP (ref 67–88)
SODIUM SERPL-SCNC: 130 MMOL/L — LOW (ref 135–145)
WBC # BLD: 8.9 K/UL — SIGNIFICANT CHANGE UP (ref 3.8–10.5)
WBC # FLD AUTO: 8.9 K/UL — SIGNIFICANT CHANGE UP (ref 3.8–10.5)

## 2018-02-11 PROCEDURE — 71046 X-RAY EXAM CHEST 2 VIEWS: CPT | Mod: 26

## 2018-02-11 PROCEDURE — 71046 X-RAY EXAM CHEST 2 VIEWS: CPT

## 2018-02-11 PROCEDURE — 80053 COMPREHEN METABOLIC PANEL: CPT

## 2018-02-11 PROCEDURE — 85014 HEMATOCRIT: CPT

## 2018-02-11 PROCEDURE — 96376 TX/PRO/DX INJ SAME DRUG ADON: CPT

## 2018-02-11 PROCEDURE — 83690 ASSAY OF LIPASE: CPT

## 2018-02-11 PROCEDURE — 83605 ASSAY OF LACTIC ACID: CPT

## 2018-02-11 PROCEDURE — 85027 COMPLETE CBC AUTOMATED: CPT

## 2018-02-11 PROCEDURE — 74177 CT ABD & PELVIS W/CONTRAST: CPT

## 2018-02-11 PROCEDURE — 82947 ASSAY GLUCOSE BLOOD QUANT: CPT

## 2018-02-11 PROCEDURE — 96374 THER/PROPH/DIAG INJ IV PUSH: CPT | Mod: XU

## 2018-02-11 PROCEDURE — 99284 EMERGENCY DEPT VISIT MOD MDM: CPT | Mod: 25

## 2018-02-11 PROCEDURE — 84132 ASSAY OF SERUM POTASSIUM: CPT

## 2018-02-11 PROCEDURE — 93005 ELECTROCARDIOGRAM TRACING: CPT

## 2018-02-11 PROCEDURE — 84295 ASSAY OF SERUM SODIUM: CPT

## 2018-02-11 PROCEDURE — 99285 EMERGENCY DEPT VISIT HI MDM: CPT | Mod: 25

## 2018-02-11 PROCEDURE — 74177 CT ABD & PELVIS W/CONTRAST: CPT | Mod: 26

## 2018-02-11 PROCEDURE — 96375 TX/PRO/DX INJ NEW DRUG ADDON: CPT

## 2018-02-11 PROCEDURE — 93010 ELECTROCARDIOGRAM REPORT: CPT

## 2018-02-11 PROCEDURE — 82330 ASSAY OF CALCIUM: CPT

## 2018-02-11 PROCEDURE — 82435 ASSAY OF BLOOD CHLORIDE: CPT

## 2018-02-11 PROCEDURE — 82803 BLOOD GASES ANY COMBINATION: CPT

## 2018-02-11 RX ORDER — MORPHINE SULFATE 50 MG/1
4 CAPSULE, EXTENDED RELEASE ORAL ONCE
Qty: 0 | Refills: 0 | Status: DISCONTINUED | OUTPATIENT
Start: 2018-02-11 | End: 2018-02-11

## 2018-02-11 RX ORDER — SODIUM CHLORIDE 9 MG/ML
3 INJECTION INTRAMUSCULAR; INTRAVENOUS; SUBCUTANEOUS EVERY 8 HOURS
Qty: 0 | Refills: 0 | Status: DISCONTINUED | OUTPATIENT
Start: 2018-02-11 | End: 2018-02-15

## 2018-02-11 RX ORDER — FAMOTIDINE 10 MG/ML
20 INJECTION INTRAVENOUS ONCE
Qty: 0 | Refills: 0 | Status: COMPLETED | OUTPATIENT
Start: 2018-02-11 | End: 2018-02-11

## 2018-02-11 RX ORDER — SODIUM CHLORIDE 9 MG/ML
2000 INJECTION INTRAMUSCULAR; INTRAVENOUS; SUBCUTANEOUS ONCE
Qty: 0 | Refills: 0 | Status: COMPLETED | OUTPATIENT
Start: 2018-02-11 | End: 2018-02-11

## 2018-02-11 RX ORDER — MORPHINE SULFATE 50 MG/1
8 CAPSULE, EXTENDED RELEASE ORAL ONCE
Qty: 0 | Refills: 0 | Status: DISCONTINUED | OUTPATIENT
Start: 2018-02-11 | End: 2018-02-11

## 2018-02-11 RX ORDER — ONDANSETRON 8 MG/1
4 TABLET, FILM COATED ORAL ONCE
Qty: 0 | Refills: 0 | Status: COMPLETED | OUTPATIENT
Start: 2018-02-11 | End: 2018-02-11

## 2018-02-11 RX ORDER — TRAMADOL HYDROCHLORIDE 50 MG/1
1 TABLET ORAL
Qty: 18 | Refills: 0 | OUTPATIENT
Start: 2018-02-11 | End: 2018-02-13

## 2018-02-11 RX ORDER — ONDANSETRON 8 MG/1
1 TABLET, FILM COATED ORAL
Qty: 9 | Refills: 0 | OUTPATIENT
Start: 2018-02-11 | End: 2018-02-13

## 2018-02-11 RX ADMIN — ONDANSETRON 4 MILLIGRAM(S): 8 TABLET, FILM COATED ORAL at 08:29

## 2018-02-11 RX ADMIN — SODIUM CHLORIDE 4000 MILLILITER(S): 9 INJECTION INTRAMUSCULAR; INTRAVENOUS; SUBCUTANEOUS at 08:29

## 2018-02-11 RX ADMIN — SODIUM CHLORIDE 3 MILLILITER(S): 9 INJECTION INTRAMUSCULAR; INTRAVENOUS; SUBCUTANEOUS at 08:34

## 2018-02-11 RX ADMIN — FAMOTIDINE 20 MILLIGRAM(S): 10 INJECTION INTRAVENOUS at 08:33

## 2018-02-11 RX ADMIN — MORPHINE SULFATE 8 MILLIGRAM(S): 50 CAPSULE, EXTENDED RELEASE ORAL at 10:42

## 2018-02-11 RX ADMIN — ONDANSETRON 4 MILLIGRAM(S): 8 TABLET, FILM COATED ORAL at 12:14

## 2018-02-11 RX ADMIN — MORPHINE SULFATE 4 MILLIGRAM(S): 50 CAPSULE, EXTENDED RELEASE ORAL at 10:00

## 2018-02-11 RX ADMIN — MORPHINE SULFATE 4 MILLIGRAM(S): 50 CAPSULE, EXTENDED RELEASE ORAL at 08:28

## 2018-02-11 NOTE — ED ADULT NURSE NOTE - OBJECTIVE STATEMENT
43 y/o male hx cirrhosis, DM2 presents to the ED c/o epigastric pain since 3am yesterday. As per pt, pain began at 3am, associated with watery diarrhea. Denies fever, chills, n/v, blood in stool, urinary s/s, SOB, diff breathing. Pt A&Ox3, in no respiratory distress, no CP, abdomen soft, tender to palpitation in epigastric area, nondistended. PT safety maintained with family at bedside.

## 2018-02-11 NOTE — ED PROVIDER NOTE - RESPIRATORY NEGATIVE STATEMENT, MLM
no chest pain, no cough, and no shortness of breath. **ATTENDING ADDENDUM (Dr. Dennis Ruvalcaba): POSITIVE history of latent tuberculosis

## 2018-02-11 NOTE — ED PROVIDER NOTE - CARE PLAN
Principal Discharge DX:	Enteritis  Assessment and plan of treatment:	1. Return to ED for worsening, progressive or any other concerning symptoms   2. Follow up with your primary care doctor in 2-3days   3. Take 4mg Zofran every 6-8 hours as needed for nausea   4. Take 50mg of tramadol every 4 hours for severe pain Principal Discharge DX:	Enteritis  Goal:	ATTENDING ADDENDUM (Dr. Dennis Ruvalcaba): Goals of care include resolution of emergent/urgent symptoms and concerns, and restoration to baseline level of homeostasis.  Assessment and plan of treatment:	1. Return to ED for worsening, progressive or any other concerning symptoms   2. Follow up with your primary care doctor in 2-3days   3. Take 4mg Zofran every 6-8 hours as needed for nausea   4. Take 50mg of tramadol every 4 hours for severe pain

## 2018-02-11 NOTE — ED PROVIDER NOTE - MEDICAL DECISION MAKING DETAILS
**ATTENDING MEDICAL DECISION MAKING/SYNTHESIS (Dr. Dennis Ruvalcaba): I have reviewed the Chief Complaint, the HPI, the ROS, and have directly performed and confirmed the findings on the Physical Examination. I have reviewed the medical decision making with all providers, as applicable. The PROBLEM REPRESENTATION at this time is: 42-year-old man with history of alcoholic cirrhosis and diabetes (on levemir and metformin) now presenting with progressively worsening nausea, vomiting, epigastric abdominal pain, and diarrhea (without hematemesis, melena, hematochezia, or coffee-ground emesis) over the past two days. DENIES recent travel. NO obvious sick contacts. POSITIVE chills, but without fevers. The MOST LIKELY DIAGNOSIS, and the LIST OF DIFFERENTIAL DIAGNOSES, includes (but is not limited to) the following: enteritis/gastroenteritis, colitis, gastritis, dehydration, electrolyte-metabolic-endocrine derangements, acute coronary syndrome (unlikely given presentation and clinical findings), acute surgical abdomen e.g. perforation, peritonitis (less likely but considered). The likelihood of each of these diagnoses has been appropriately considered in the context of this patient's presentation and my evaluation. PLAN: as described in EMR, including diagnostics, therapeutics and consultation as clinically warranted. I will continue to reevaluate the patient, including the results of all testing, and monitor response to therapy throughout the patient's course in the ED. **ATTENDING MEDICAL DECISION MAKING/SYNTHESIS (Dr. Dennis Ruvalcaba): I have reviewed the Chief Complaint, the HPI, the ROS, and have directly performed and confirmed the findings on the Physical Examination. I have reviewed the medical decision making with all providers, as applicable. The PROBLEM REPRESENTATION at this time is: 42-year-old man with history of alcoholic cirrhosis and diabetes (on levemir and metformin) now presenting with progressively worsening nausea, vomiting, epigastric abdominal pain, and diarrhea (without hematemesis, melena, hematochezia, or coffee-ground emesis) over the past two days. DENIES recent travel. NO obvious sick contacts. POSITIVE chills, but without fevers. The MOST LIKELY DIAGNOSIS, and the LIST OF DIFFERENTIAL DIAGNOSES, includes (but is not limited to) the following: enteritis/gastroenteritis, colitis, gastritis, dehydration, electrolyte-metabolic-endocrine derangements, acute coronary syndrome (unlikely given presentation and clinical findings), acute surgical abdomen e.g. perforation, peritonitis (less likely but considered), varices (possible but NO hemorrhage), SBP (considered, unlikely). The likelihood of each of these diagnoses has been appropriately considered in the context of this patient's presentation and my evaluation. PLAN: as described in EMR, including diagnostics, therapeutics and consultation as clinically warranted. I will continue to reevaluate the patient, including the results of all testing, and monitor response to therapy throughout the patient's course in the ED.

## 2018-02-11 NOTE — ED PROVIDER NOTE - RESPIRATORY, MLM
Breath sounds clear and equal bilaterally. **ATTENDING ADDENDUM (Dr. Dennis Ruvalcaba): NO wheezing, rales, rhonchi, crackles, stridor, drooling, retractions, nasal flaring, or tripoding.

## 2018-02-11 NOTE — ED ADULT NURSE NOTE - CHPI ED SYMPTOMS NEG
no hematuria/no vomiting/no dysuria/no fever/no chills/no nausea/no blood in stool/no abdominal distension/no burning urination

## 2018-02-11 NOTE — ED PROVIDER NOTE - PHYSICAL EXAMINATION
Gen: NAD, AOx3  Head: NCAT  HEENT: PERRL, oral mucosa moist, +scleral icterus, normal conjunctiva, neck supple  Lung: CTAB, no respiratory distress  CV: rrr, no murmur, Normal perfusion  Abd: soft, +epigastric ttp with voluntary guarding, no rebound, ND  MSK: No edema, no visible deformities  Neuro: No focal neurologic deficits  Skin: No rash   Psych: normal affect Gen: NAD, AOx3  Head: NCAT  HEENT: PERRL, oral mucosa moist, +scleral icterus, normal conjunctiva, neck supple  Lung: CTAB, no respiratory distress  CV: rrr, no murmur, Normal perfusion  Abd: soft, +epigastric ttp with voluntary guarding, no rebound, ND  MSK: No edema, no visible deformities  Neuro: No focal neurologic deficits  Skin: No rash   Psych: normal affect  **ATTENDING ADDENDUM (Dr. Dennis Ruvalcaba): I have reviewed and substantially contributed to the elements of the PE as documented above. I have directly performed an examination of this patient in conjunction with the other members (EM resident/PA/NP) of the patient care team.

## 2018-02-11 NOTE — ED PROVIDER NOTE - ATTENDING CONTRIBUTION TO CARE
**ATTENDING ADDENDUM (Dr. Dennis Ruvalcaba): I attest that I have directly examined this patient and reviewed and formulated the diagnostic and therapeutic management plan in collaboration with the EM resident. Please see MDM note and remainder of EMR for findings from CC, HPI, ROS, and PE. (Javier)

## 2018-02-11 NOTE — ED PROVIDER NOTE - ABDOMINAL EXAM
**ATTENDING ADDENDUM (Dr. Dennis Ruvalcaba): NO rebound or rigidity. Slight guarding with deep palpation. POSITIVE tenderness in the epigastric area, extending diffusely across the anterior abdomen./tender.../POSITIVE FOR GUARDING/soft

## 2018-02-11 NOTE — ED PROVIDER NOTE - NS ED ROS FT
ROS: no CP +SOB. no cough. no fever. +n/v/d. +abd pain. no rash. no bleeding. no urinary complaints. no weakness. no vision changes. no HA. no neck/back pain. no extremity swelling/deformity. No change in mental status. ROS: no CP +SOB. no cough. no fever. +n/v/d. +abd pain. no rash. no bleeding. no urinary complaints. no weakness. no vision changes. no HA. no neck/back pain. no extremity swelling/deformity. No change in mental status.  **ATTENDING ADDENDUM (Dr. Dennis Ruvalcaba): agree with above notation by EM resident Javier

## 2018-02-11 NOTE — ED PROVIDER NOTE - OBJECTIVE STATEMENT
41yo M with alcoholic cirrhosis, no current EtOH use. with abd pain since 3am epigastric, nonradiating. +loose stool. self induced vomiting NBNB. no bloody stools/black stools. h/o varices in past. told 2 weeks ago has small amount of ascites, has not been drained recently. no fever. +chills. no recent travel or sick contacts. chronic SOB no acute change. no CP. no weakness.     GI- [place]   PCP- 41yo M with alcoholic cirrhosis, no current EtOH use. with abd pain since 3am epigastric, nonradiating. +loose stool. self induced vomiting NBNB. no bloody stools/black stools. h/o varices in past. told 2 weeks ago has small amount of ascites, has not been drained recently. no fever. +chills. no recent travel or sick contacts. chronic SOB no acute change. no CP. no weakness.   GI- [place]   PCP- 41yo M with alcoholic cirrhosis, no current EtOH use. with abd pain since 3am epigastric, nonradiating. +loose stool. self induced vomiting NBNB. no bloody stools/black stools. h/o varices in past. told 2 weeks ago has small amount of ascites, has not been drained recently. no fever. +chills. no recent travel or sick contacts. chronic SOB no acute change. no CP. no weakness.   GI- [place]   **ATTENDING ADDENDUM (Dr. Dennis Ruvalcaba): I attest that I have directly and personally interviewed and examined this patient and elicited a comparable history of present illness and review of systems as documented, along with my EM resident. I attest that I have made significant contributions to the documentation where necessary and as noted in the EMR. Of note, and in addition to the above patient is a 42-year-old man with history of alcoholic cirrhosis and diabetes (on levemir and metformin) now presenting with progressively worsening nausea, vomiting, epigastric abdominal pain, and diarrhea (without hematemesis, melena, hematochezia, or coffee-ground emesis) over the past two days. DENIES recent travel. NO obvious sick contacts. POSITIVE chills, but without fevers. NO movement-associated, pleuritic, positional, or exertional component to the symptoms, but pain is mild-to-moderate in nature, moves to the generalized anterior abdomen at times, and is worse with deep palpation. NO medications. VAS 3-4/10.

## 2018-02-11 NOTE — ED PROVIDER NOTE - EYES, MLM
Clear bilaterally, pupils equal, round and reactive to light. **ATTENDING ADDENDUM (Dr. Dennis Ruvalcaba): Extraocular muscle movements intact. Clear corneas bilaterally, pupils equal and round. NO nystagmus. POSITIVE scleral icterus.

## 2018-02-11 NOTE — ED PROVIDER NOTE - PLAN OF CARE
1. Return to ED for worsening, progressive or any other concerning symptoms   2. Follow up with your primary care doctor in 2-3days   3. Take 4mg Zofran every 6-8 hours as needed for nausea   4. Take 50mg of tramadol every 4 hours for severe pain ATTENDING ADDENDUM (Dr. Dennis Ruvalcaba): Goals of care include resolution of emergent/urgent symptoms and concerns, and restoration to baseline level of homeostasis.

## 2018-02-11 NOTE — ED PROVIDER NOTE - PROGRESS NOTE DETAILS
**ATTENDING ADDENDUM (Dr. Dennis Ruvalcaba): patient serially evaluated throughout ED course. NO acute deterioration up to this time in the ED. Agree with goals/plan of ED care as described in EMR, including diagnostics, therapeutics and consultation as clinically warranted. Will continue to observe and monitor closely. Anticipatory guidance provided. **ATTENDING ADDENDUM (Dr. Dennis Ruvalcaba): ECG noted. WIll refrain from additional ondansetron. Will continue to observe and monitor closely. Will order CT to assess for other etiologies of pain. Anticipatory guidance provided. **ATTENDING ADDENDUM (Dr. Dennis Ruvalcaba): ECG noted. WIll refrain from additional ondansetron. Will continue to observe and monitor closely. Will consider CT to assess for other etiologies of pain if symptoms worsen. Anticipatory guidance provided. pain resolved, tolerating PO, CT with enteritis, afebrile. likely viral. will d/c with tramadol and zofran. instructions to return for worsening pain, fevers or bloody vomit. -Javier DO pain resolved, tolerating PO, CT with enteritis, afebrile. likely viral. will d/c with tramadol and zofran. instructions to return for worsening pain, fevers or bloody vomit. -Javier DO  **ATTENDING ADDENDUM (Dr. Dennis Ruvalcaba): patient serially evaluated throughout ED course. NO acute deterioration up to this time in the ED. Marked improvement following ED therapeutics. Feels better. ED diagnostics up to this time acknowledged and noted. Agree with discharge home with close outpatient followup with primary care physician/provider and with medications (if appropriate, if clinically indicated, and as prescribed, as noted in EMR). NO findings suggestive of peritonitis, perforation, significant GI hemorrhage, or other worrisome signs and symptoms warranting inpatient observation and/or admission. Anticipatory guidance provided.

## 2018-02-11 NOTE — ED PROVIDER NOTE - CONTEXT
**ATTENDING ADDENDUM (Dr. Dennis Ruvalcaba): POSITIVE known alcoholic cirrhosis with varices (NO hemorrhage reported)/unknown

## 2018-02-11 NOTE — ED PROVIDER NOTE - CHIEF COMPLAINT
The patient is a 42y Male complaining of The patient is a 42y Male complaining of abdominal pain, nausea, vomiting, and diarrhea.

## 2018-02-11 NOTE — ED PROVIDER NOTE - CONDUCTED A DETAILED DISCUSSION WITH PATIENT AND/OR GUARDIAN REGARDING, MDM
lab results/need for outpatient follow-up/**ATTENDING ADDENDUM (Dr. Dennis Ruvalcaba): Anticipatory guidance provided./return to ED if symptoms worsen, persist or questions arise

## 2018-02-11 NOTE — ED PROVIDER NOTE - RECENT EXPOSURE TO
**ATTENDING ADDENDUM (Dr. Dennis Ruvalcaba): POSITIVE history of latent tuberculosis. DENIES recent travel. NO obvious sick contacts./none known

## 2018-02-13 ENCOUNTER — APPOINTMENT (OUTPATIENT)
Dept: INTERNAL MEDICINE | Facility: CLINIC | Age: 43
End: 2018-02-13
Payer: COMMERCIAL

## 2018-02-13 VITALS
WEIGHT: 204 LBS | HEIGHT: 68 IN | HEART RATE: 81 BPM | BODY MASS INDEX: 30.92 KG/M2 | DIASTOLIC BLOOD PRESSURE: 64 MMHG | SYSTOLIC BLOOD PRESSURE: 124 MMHG | OXYGEN SATURATION: 93 %

## 2018-02-13 DIAGNOSIS — R19.7 DIARRHEA, UNSPECIFIED: ICD-10-CM

## 2018-02-13 DIAGNOSIS — Z86.19 PERSONAL HISTORY OF OTHER INFECTIOUS AND PARASITIC DISEASES: ICD-10-CM

## 2018-02-13 DIAGNOSIS — Z87.891 PERSONAL HISTORY OF NICOTINE DEPENDENCE: ICD-10-CM

## 2018-02-13 DIAGNOSIS — R78.81 BACTEREMIA: ICD-10-CM

## 2018-02-13 LAB
GLUCOSE BLDC GLUCOMTR-MCNC: 297
HBA1C MFR BLD HPLC: 7.6

## 2018-02-13 PROCEDURE — 99214 OFFICE O/P EST MOD 30 MIN: CPT | Mod: 25

## 2018-02-13 PROCEDURE — 83036 HEMOGLOBIN GLYCOSYLATED A1C: CPT | Mod: QW

## 2018-02-13 PROCEDURE — 82962 GLUCOSE BLOOD TEST: CPT

## 2018-02-13 RX ORDER — VANCOMYCIN HYDROCHLORIDE 5 G/100ML
5000 INJECTION, POWDER, LYOPHILIZED, FOR SOLUTION INTRAVENOUS
Qty: 4 | Refills: 0 | Status: DISCONTINUED | COMMUNITY
Start: 2017-11-01 | End: 2018-02-13

## 2018-02-13 RX ORDER — VANCOMYCIN HYDROCHLORIDE 125 MG/1
125 CAPSULE ORAL
Qty: 126 | Refills: 2 | Status: DISCONTINUED | COMMUNITY
Start: 2017-11-01 | End: 2018-02-13

## 2018-02-13 RX ORDER — METRONIDAZOLE 500 MG/1
500 TABLET ORAL 3 TIMES DAILY
Qty: 42 | Refills: 0 | Status: DISCONTINUED | COMMUNITY
Start: 2017-10-23 | End: 2018-02-13

## 2018-03-01 ENCOUNTER — RX RENEWAL (OUTPATIENT)
Age: 43
End: 2018-03-01

## 2018-03-20 ENCOUNTER — APPOINTMENT (OUTPATIENT)
Dept: INFECTIOUS DISEASE | Facility: CLINIC | Age: 43
End: 2018-03-20
Payer: COMMERCIAL

## 2018-03-20 VITALS
DIASTOLIC BLOOD PRESSURE: 74 MMHG | TEMPERATURE: 97.6 F | WEIGHT: 195 LBS | RESPIRATION RATE: 18 BRPM | SYSTOLIC BLOOD PRESSURE: 124 MMHG | BODY MASS INDEX: 29.55 KG/M2 | HEIGHT: 68 IN | OXYGEN SATURATION: 90 % | HEART RATE: 78 BPM

## 2018-03-20 PROCEDURE — 99214 OFFICE O/P EST MOD 30 MIN: CPT

## 2018-04-23 ENCOUNTER — RX RENEWAL (OUTPATIENT)
Age: 43
End: 2018-04-23

## 2018-05-01 ENCOUNTER — APPOINTMENT (OUTPATIENT)
Dept: UROLOGY | Facility: CLINIC | Age: 43
End: 2018-05-01
Payer: COMMERCIAL

## 2018-05-01 ENCOUNTER — MEDICATION RENEWAL (OUTPATIENT)
Age: 43
End: 2018-05-01

## 2018-05-01 PROCEDURE — 99204 OFFICE O/P NEW MOD 45 MIN: CPT

## 2018-05-09 ENCOUNTER — RX RENEWAL (OUTPATIENT)
Age: 43
End: 2018-05-09

## 2018-05-15 ENCOUNTER — APPOINTMENT (OUTPATIENT)
Dept: INTERNAL MEDICINE | Facility: CLINIC | Age: 43
End: 2018-05-15
Payer: COMMERCIAL

## 2018-05-15 VITALS
BODY MASS INDEX: 30.31 KG/M2 | HEART RATE: 75 BPM | HEIGHT: 68 IN | SYSTOLIC BLOOD PRESSURE: 100 MMHG | OXYGEN SATURATION: 98 % | WEIGHT: 200 LBS | DIASTOLIC BLOOD PRESSURE: 60 MMHG

## 2018-05-15 PROCEDURE — 36415 COLL VENOUS BLD VENIPUNCTURE: CPT

## 2018-05-15 PROCEDURE — 99214 OFFICE O/P EST MOD 30 MIN: CPT | Mod: 25

## 2018-05-15 NOTE — ASSESSMENT
[FreeTextEntry1] : 41 y/o M here for f/u\par DM: check A1c today, c/w insulin regimen; advised him to obtain ophthal eval\par Cirrhosis: f/u Dr. Thakkar's team, hepatology office.  Encouraged continued alcohol abstinence, f/u with SBIRT team\par \par ID input appreciated. completed Vanco for Cdiff. Now continues to be on Bactrim for bacteremia.\par \par Ophtho referral given for eye exam previous visit.

## 2018-05-15 NOTE — HISTORY OF PRESENT ILLNESS
[FreeTextEntry1] : 41 y/o male presents to office for follow up of cirrhosis.\par Denies any N/V/D. Denies any fever, body aches or chills. Appetite has increased. Working full time. Patient is on donor list for a liver. Received flu vaccine earlier this season. On Bactrim per ID for hx bacteremia.

## 2018-05-15 NOTE — REASON FOR VISIT
[Follow-Up] : a follow-up visit [Pre-Visit Preparation] : pre-visit preparation was done [Intercurrent Specialty/Sub-specialty Visits] : the patient has intercurrent specialty/sub-specialty visits [FreeTextEntry3] : Hepatology

## 2018-05-15 NOTE — PHYSICAL EXAM
[General Appearance - Alert] : alert [General Appearance - In No Acute Distress] : in no acute distress [General Appearance - Well Nourished] : well nourished [General Appearance - Well Developed] : well developed [Sclera] : the sclera and conjunctiva were normal [PERRL With Normal Accommodation] : pupils were equal in size, round, and reactive to light [Extraocular Movements] : extraocular movements were intact [Outer Ear] : the ears and nose were normal in appearance [Both Tympanic Membranes Were Examined] : both tympanic membranes were normal [Neck Appearance] : the appearance of the neck was normal [Neck Cervical Mass (___cm)] : no neck mass was observed [] : no respiratory distress [Respiration, Rhythm And Depth] : normal respiratory rhythm and effort [Auscultation Breath Sounds / Voice Sounds] : lungs were clear to auscultation bilaterally [Apical Impulse] : the apical impulse was normal [Heart Rate And Rhythm] : heart rate was normal and rhythm regular [Heart Sounds] : normal S1 and S2 [Edema] : there was no peripheral edema [Bowel Sounds] : normal bowel sounds [Abdomen Soft] : soft [Abdomen Tenderness] : non-tender [Cervical Lymph Nodes Enlarged Posterior Bilaterally] : posterior cervical [Cervical Lymph Nodes Enlarged Anterior Bilaterally] : anterior cervical [Supraclavicular Lymph Nodes Enlarged Bilaterally] : supraclavicular [Oriented To Time, Place, And Person] : oriented to person, place, and time [Impaired Insight] : insight and judgment were intact [Affect] : the affect was normal [FreeTextEntry1] : erythematous nasal mucosa with clear rhinorrhea

## 2018-05-15 NOTE — REVIEW OF SYSTEMS
[Shortness Of Breath] : shortness of breath [SOB on Exertion] : shortness of breath during exertion [Negative] : Heme/Lymph

## 2018-05-18 ENCOUNTER — FORM ENCOUNTER (OUTPATIENT)
Age: 43
End: 2018-05-18

## 2018-05-19 ENCOUNTER — OUTPATIENT (OUTPATIENT)
Dept: OUTPATIENT SERVICES | Facility: HOSPITAL | Age: 43
LOS: 1 days | End: 2018-05-19
Payer: COMMERCIAL

## 2018-05-19 ENCOUNTER — APPOINTMENT (OUTPATIENT)
Dept: MRI IMAGING | Facility: IMAGING CENTER | Age: 43
End: 2018-05-19
Payer: COMMERCIAL

## 2018-05-19 DIAGNOSIS — K74.60 UNSPECIFIED CIRRHOSIS OF LIVER: ICD-10-CM

## 2018-05-19 PROCEDURE — A9585: CPT

## 2018-05-19 PROCEDURE — 74183 MRI ABD W/O CNTR FLWD CNTR: CPT | Mod: 26

## 2018-05-19 PROCEDURE — 74183 MRI ABD W/O CNTR FLWD CNTR: CPT

## 2018-05-21 NOTE — H&P ADULT - PROBLEM SELECTOR PROBLEM 3
Notes Recorded by Cassie Ramirez LPN on 5/21/2018 at 2:10 PM  Called and informed patient of results. Patient verbalized understanding and had no further questions or concerns. Patient scheduled to follow up in September.     Cassie Ramirez LPN    ------    Notes Recorded by Gabriel Martinez MD on 5/21/2018 at 1:13 PM  Precancerous, recheck within 4 months        11d ago       Copath Report Patient Name: MERVAT CARVER   MR#: [id number]   Specimen #: [id number]   Collected: 5/10/2018   Received: 5/11/2018   Reported: 5/16/2018 13:26   Ordering Phy(s): GABRIEL MARTINEZ     For improved result formatting, select 'View Enhanced Report Format' under    Linked Documents section.     SPECIMEN(S):   A: Skin, left preauricular   B: Skin, mid back   C: Skin, right dorsal hand     FINAL DIAGNOSIS:   A. Skin, left preauricular:   - Hypertrophic actinic keratosis - (see comment)     B. Skin, mid back:   - Actinic keratosis - (see description)     C. Skin, right dorsal hand:   - Hypertrophic actinic keratosis - (see comment)                Cassie Ramirez LPN    
Alcoholic cirrhosis

## 2018-05-23 LAB
ALBUMIN SERPL ELPH-MCNC: 3.2 G/DL
ALP BLD-CCNC: 199 U/L
ALT SERPL-CCNC: 33 U/L
ANION GAP SERPL CALC-SCNC: 15 MMOL/L
AST SERPL-CCNC: 64 U/L
BILIRUB SERPL-MCNC: 1.4 MG/DL
BUN SERPL-MCNC: 12 MG/DL
CALCIUM SERPL-MCNC: 8.5 MG/DL
CHLORIDE SERPL-SCNC: 105 MMOL/L
CO2 SERPL-SCNC: 16 MMOL/L
CREAT SERPL-MCNC: 0.91 MG/DL
GLUCOSE SERPL-MCNC: 280 MG/DL
HBA1C MFR BLD HPLC: 8.1 %
POTASSIUM SERPL-SCNC: 4.3 MMOL/L
PROT SERPL-MCNC: 6.6 G/DL
SODIUM SERPL-SCNC: 136 MMOL/L

## 2018-05-30 ENCOUNTER — APPOINTMENT (OUTPATIENT)
Dept: HEPATOLOGY | Facility: CLINIC | Age: 43
End: 2018-05-30
Payer: COMMERCIAL

## 2018-05-30 ENCOUNTER — LABORATORY RESULT (OUTPATIENT)
Age: 43
End: 2018-05-30

## 2018-05-30 VITALS
HEIGHT: 68 IN | BODY MASS INDEX: 29.7 KG/M2 | TEMPERATURE: 98 F | DIASTOLIC BLOOD PRESSURE: 69 MMHG | HEART RATE: 72 BPM | RESPIRATION RATE: 15 BRPM | WEIGHT: 196 LBS | SYSTOLIC BLOOD PRESSURE: 108 MMHG

## 2018-05-30 PROCEDURE — 99214 OFFICE O/P EST MOD 30 MIN: CPT

## 2018-05-31 ENCOUNTER — OTHER (OUTPATIENT)
Age: 43
End: 2018-05-31

## 2018-05-31 LAB
AFP-TM SERPL-MCNC: 4.4 NG/ML
ALBUMIN SERPL ELPH-MCNC: 3.5 G/DL
ALP BLD-CCNC: 180 U/L
ALT SERPL-CCNC: 29 U/L
ANION GAP SERPL CALC-SCNC: 10 MMOL/L
AST SERPL-CCNC: 48 U/L
BASOPHILS # BLD AUTO: 0.02 K/UL
BASOPHILS NFR BLD AUTO: 0.5 %
BILIRUB SERPL-MCNC: 1.5 MG/DL
BUN SERPL-MCNC: 10 MG/DL
CALCIUM SERPL-MCNC: 8.4 MG/DL
CHLORIDE SERPL-SCNC: 107 MMOL/L
CO2 SERPL-SCNC: 19 MMOL/L
CREAT SERPL-MCNC: 0.8 MG/DL
EOSINOPHIL # BLD AUTO: 0.12 K/UL
EOSINOPHIL NFR BLD AUTO: 3 %
HCT VFR BLD CALC: 33.2 %
HGB BLD-MCNC: 10.6 G/DL
IMM GRANULOCYTES NFR BLD AUTO: 0 %
INR PPP: 1.23 RATIO
LYMPHOCYTES # BLD AUTO: 1.11 K/UL
LYMPHOCYTES NFR BLD AUTO: 27.8 %
MAN DIFF?: NORMAL
MCHC RBC-ENTMCNC: 22.9 PG
MCHC RBC-ENTMCNC: 31.9 GM/DL
MCV RBC AUTO: 71.9 FL
MONOCYTES # BLD AUTO: 0.31 K/UL
MONOCYTES NFR BLD AUTO: 7.8 %
NEUTROPHILS # BLD AUTO: 2.43 K/UL
NEUTROPHILS NFR BLD AUTO: 60.9 %
PLATELET # BLD AUTO: 81 K/UL
POTASSIUM SERPL-SCNC: 4.1 MMOL/L
PROT SERPL-MCNC: 6.8 G/DL
PT BLD: 14 SEC
RBC # BLD: 4.62 M/UL
RBC # FLD: 17.8 %
SODIUM SERPL-SCNC: 136 MMOL/L
WBC # FLD AUTO: 3.99 K/UL

## 2018-06-10 ENCOUNTER — FORM ENCOUNTER (OUTPATIENT)
Age: 43
End: 2018-06-10

## 2018-06-11 ENCOUNTER — OUTPATIENT (OUTPATIENT)
Dept: OUTPATIENT SERVICES | Facility: HOSPITAL | Age: 43
LOS: 1 days | End: 2018-06-11
Payer: COMMERCIAL

## 2018-06-11 ENCOUNTER — APPOINTMENT (OUTPATIENT)
Dept: CT IMAGING | Facility: IMAGING CENTER | Age: 43
End: 2018-06-11
Payer: COMMERCIAL

## 2018-06-11 DIAGNOSIS — K74.60 UNSPECIFIED CIRRHOSIS OF LIVER: ICD-10-CM

## 2018-06-11 PROCEDURE — 74177 CT ABD & PELVIS W/CONTRAST: CPT

## 2018-06-11 PROCEDURE — 74177 CT ABD & PELVIS W/CONTRAST: CPT | Mod: 26

## 2018-06-15 ENCOUNTER — RX RENEWAL (OUTPATIENT)
Age: 43
End: 2018-06-15

## 2018-06-28 ENCOUNTER — APPOINTMENT (OUTPATIENT)
Dept: HEPATOLOGY | Facility: CLINIC | Age: 43
End: 2018-06-28
Payer: COMMERCIAL

## 2018-06-28 VITALS
TEMPERATURE: 98.3 F | DIASTOLIC BLOOD PRESSURE: 73 MMHG | WEIGHT: 192 LBS | HEART RATE: 71 BPM | HEIGHT: 68 IN | RESPIRATION RATE: 15 BRPM | BODY MASS INDEX: 29.1 KG/M2 | SYSTOLIC BLOOD PRESSURE: 125 MMHG

## 2018-06-28 PROCEDURE — 99214 OFFICE O/P EST MOD 30 MIN: CPT

## 2018-07-08 ENCOUNTER — INPATIENT (INPATIENT)
Facility: HOSPITAL | Age: 43
LOS: 3 days | Discharge: HOME | DRG: 872 | End: 2018-07-12
Attending: HOSPITALIST | Admitting: HOSPITALIST
Payer: COMMERCIAL

## 2018-07-08 VITALS
HEART RATE: 96 BPM | OXYGEN SATURATION: 92 % | TEMPERATURE: 99 F | DIASTOLIC BLOOD PRESSURE: 73 MMHG | RESPIRATION RATE: 18 BRPM | SYSTOLIC BLOOD PRESSURE: 119 MMHG

## 2018-07-08 DIAGNOSIS — E11.9 TYPE 2 DIABETES MELLITUS WITHOUT COMPLICATIONS: ICD-10-CM

## 2018-07-08 DIAGNOSIS — R10.9 UNSPECIFIED ABDOMINAL PAIN: ICD-10-CM

## 2018-07-08 DIAGNOSIS — K70.30 ALCOHOLIC CIRRHOSIS OF LIVER WITHOUT ASCITES: ICD-10-CM

## 2018-07-08 DIAGNOSIS — I85.00 ESOPHAGEAL VARICES WITHOUT BLEEDING: ICD-10-CM

## 2018-07-08 DIAGNOSIS — E11.69 TYPE 2 DIABETES MELLITUS WITH OTHER SPECIFIED COMPLICATION: ICD-10-CM

## 2018-07-08 DIAGNOSIS — Z29.9 ENCOUNTER FOR PROPHYLACTIC MEASURES, UNSPECIFIED: ICD-10-CM

## 2018-07-08 DIAGNOSIS — R76.11 NONSPECIFIC REACTION TO TUBERCULIN SKIN TEST WITHOUT ACTIVE TUBERCULOSIS: ICD-10-CM

## 2018-07-08 DIAGNOSIS — A41.9 SEPSIS, UNSPECIFIED ORGANISM: ICD-10-CM

## 2018-07-08 LAB
ALBUMIN SERPL ELPH-MCNC: 4 G/DL — SIGNIFICANT CHANGE UP (ref 3.3–5)
ALP SERPL-CCNC: 171 U/L — HIGH (ref 40–120)
ALT FLD-CCNC: 39 U/L — SIGNIFICANT CHANGE UP (ref 10–45)
ANION GAP SERPL CALC-SCNC: 17 MMOL/L — SIGNIFICANT CHANGE UP (ref 5–17)
APPEARANCE UR: ABNORMAL
APTT BLD: 37.5 SEC — HIGH (ref 27.5–37.4)
AST SERPL-CCNC: 60 U/L — HIGH (ref 10–40)
BASOPHILS # BLD AUTO: 0 K/UL — SIGNIFICANT CHANGE UP (ref 0–0.2)
BASOPHILS NFR BLD AUTO: 0 % — SIGNIFICANT CHANGE UP (ref 0–2)
BILIRUB SERPL-MCNC: 2.9 MG/DL — HIGH (ref 0.2–1.2)
BILIRUB UR-MCNC: NEGATIVE — SIGNIFICANT CHANGE UP
BUN SERPL-MCNC: 11 MG/DL — SIGNIFICANT CHANGE UP (ref 7–23)
CALCIUM SERPL-MCNC: 8.2 MG/DL — LOW (ref 8.4–10.5)
CHLORIDE SERPL-SCNC: 101 MMOL/L — SIGNIFICANT CHANGE UP (ref 96–108)
CO2 SERPL-SCNC: 17 MMOL/L — LOW (ref 22–31)
COLOR SPEC: YELLOW — SIGNIFICANT CHANGE UP
COMMENT - URINE: SIGNIFICANT CHANGE UP
CREAT SERPL-MCNC: 0.96 MG/DL — SIGNIFICANT CHANGE UP (ref 0.5–1.3)
DIFF PNL FLD: ABNORMAL
EOSINOPHIL # BLD AUTO: 0 K/UL — SIGNIFICANT CHANGE UP (ref 0–0.5)
EOSINOPHIL NFR BLD AUTO: 0 % — SIGNIFICANT CHANGE UP (ref 0–6)
GAS PNL BLDV: SIGNIFICANT CHANGE UP
GAS PNL BLDV: SIGNIFICANT CHANGE UP
GLUCOSE BLDC GLUCOMTR-MCNC: 149 MG/DL — HIGH (ref 70–99)
GLUCOSE BLDC GLUCOMTR-MCNC: 150 MG/DL — HIGH (ref 70–99)
GLUCOSE BLDC GLUCOMTR-MCNC: 245 MG/DL — HIGH (ref 70–99)
GLUCOSE SERPL-MCNC: 258 MG/DL — HIGH (ref 70–99)
GLUCOSE UR QL: 150 MG/DL
GP B STREP DNA BLD POS QL NAA+NON-PROBE: SIGNIFICANT CHANGE UP
GRAM STN FLD: SIGNIFICANT CHANGE UP
HCT VFR BLD CALC: 39.7 % — SIGNIFICANT CHANGE UP (ref 39–50)
HGB BLD-MCNC: 13.2 G/DL — SIGNIFICANT CHANGE UP (ref 13–17)
INR BLD: 1.3 RATIO — HIGH (ref 0.88–1.16)
KETONES UR-MCNC: NEGATIVE — SIGNIFICANT CHANGE UP
LEUKOCYTE ESTERASE UR-ACNC: NEGATIVE — SIGNIFICANT CHANGE UP
LIDOCAIN IGE QN: 23 U/L — SIGNIFICANT CHANGE UP (ref 7–60)
LYMPHOCYTES # BLD AUTO: 0.6 K/UL — LOW (ref 1–3.3)
LYMPHOCYTES # BLD AUTO: 3 % — LOW (ref 13–44)
MCHC RBC-ENTMCNC: 25.3 PG — LOW (ref 27–34)
MCHC RBC-ENTMCNC: 33.3 GM/DL — SIGNIFICANT CHANGE UP (ref 32–36)
MCV RBC AUTO: 75.9 FL — LOW (ref 80–100)
METHOD TYPE: SIGNIFICANT CHANGE UP
MONOCYTES # BLD AUTO: 0.8 K/UL — SIGNIFICANT CHANGE UP (ref 0–0.9)
MONOCYTES NFR BLD AUTO: 3 % — SIGNIFICANT CHANGE UP (ref 2–14)
NEUTROPHILS # BLD AUTO: 14.8 K/UL — HIGH (ref 1.8–7.4)
NEUTROPHILS NFR BLD AUTO: 91 % — HIGH (ref 43–77)
NITRITE UR-MCNC: NEGATIVE — SIGNIFICANT CHANGE UP
PH UR: 6 — SIGNIFICANT CHANGE UP (ref 5–8)
PLATELET # BLD AUTO: 66 K/UL — LOW (ref 150–400)
POTASSIUM SERPL-MCNC: 4 MMOL/L — SIGNIFICANT CHANGE UP (ref 3.5–5.3)
POTASSIUM SERPL-SCNC: 4 MMOL/L — SIGNIFICANT CHANGE UP (ref 3.5–5.3)
PROT SERPL-MCNC: 6.5 G/DL — SIGNIFICANT CHANGE UP (ref 6–8.3)
PROT UR-MCNC: SIGNIFICANT CHANGE UP
PROTHROM AB SERPL-ACNC: 14.1 SEC — HIGH (ref 9.8–12.7)
RAPID RVP RESULT: SIGNIFICANT CHANGE UP
RBC # BLD: 5.23 M/UL — SIGNIFICANT CHANGE UP (ref 4.2–5.8)
RBC # FLD: 17.4 % — HIGH (ref 10.3–14.5)
RBC CASTS # UR COMP ASSIST: SIGNIFICANT CHANGE UP /HPF (ref 0–2)
SODIUM SERPL-SCNC: 135 MMOL/L — SIGNIFICANT CHANGE UP (ref 135–145)
SP GR SPEC: 1.02 — SIGNIFICANT CHANGE UP (ref 1.01–1.02)
SPECIMEN SOURCE: SIGNIFICANT CHANGE UP
UROBILINOGEN FLD QL: NEGATIVE — SIGNIFICANT CHANGE UP
WBC # BLD: 17.2 K/UL — HIGH (ref 3.8–10.5)
WBC # FLD AUTO: 17.2 K/UL — HIGH (ref 3.8–10.5)
WBC UR QL: SIGNIFICANT CHANGE UP /HPF (ref 0–5)

## 2018-07-08 PROCEDURE — 76705 ECHO EXAM OF ABDOMEN: CPT | Mod: 26,RT

## 2018-07-08 PROCEDURE — 99252 IP/OBS CONSLTJ NEW/EST SF 35: CPT

## 2018-07-08 PROCEDURE — 93010 ELECTROCARDIOGRAM REPORT: CPT

## 2018-07-08 PROCEDURE — 71045 X-RAY EXAM CHEST 1 VIEW: CPT | Mod: 26

## 2018-07-08 PROCEDURE — 99223 1ST HOSP IP/OBS HIGH 75: CPT | Mod: GC

## 2018-07-08 PROCEDURE — 99285 EMERGENCY DEPT VISIT HI MDM: CPT | Mod: 25

## 2018-07-08 RX ORDER — NADOLOL 80 MG/1
20 TABLET ORAL DAILY
Qty: 0 | Refills: 0 | Status: DISCONTINUED | OUTPATIENT
Start: 2018-07-08 | End: 2018-07-12

## 2018-07-08 RX ORDER — SODIUM CHLORIDE 9 MG/ML
1000 INJECTION, SOLUTION INTRAVENOUS
Qty: 0 | Refills: 0 | Status: DISCONTINUED | OUTPATIENT
Start: 2018-07-08 | End: 2018-07-12

## 2018-07-08 RX ORDER — DEXTROSE 50 % IN WATER 50 %
25 SYRINGE (ML) INTRAVENOUS ONCE
Qty: 0 | Refills: 0 | Status: DISCONTINUED | OUTPATIENT
Start: 2018-07-08 | End: 2018-07-12

## 2018-07-08 RX ORDER — MORPHINE SULFATE 50 MG/1
8 CAPSULE, EXTENDED RELEASE ORAL ONCE
Qty: 0 | Refills: 0 | Status: DISCONTINUED | OUTPATIENT
Start: 2018-07-08 | End: 2018-07-08

## 2018-07-08 RX ORDER — VANCOMYCIN HCL 1 G
1000 VIAL (EA) INTRAVENOUS ONCE
Qty: 0 | Refills: 0 | Status: COMPLETED | OUTPATIENT
Start: 2018-07-08 | End: 2018-07-08

## 2018-07-08 RX ORDER — FLUTICASONE PROPIONATE 50 MCG
1 SPRAY, SUSPENSION NASAL DAILY
Qty: 0 | Refills: 0 | Status: DISCONTINUED | OUTPATIENT
Start: 2018-07-08 | End: 2018-07-12

## 2018-07-08 RX ORDER — DEXTROSE 50 % IN WATER 50 %
25 SYRINGE (ML) INTRAVENOUS ONCE
Qty: 0 | Refills: 0 | Status: DISCONTINUED | OUTPATIENT
Start: 2018-07-08 | End: 2018-07-08

## 2018-07-08 RX ORDER — CEFTRIAXONE 500 MG/1
1 INJECTION, POWDER, FOR SOLUTION INTRAMUSCULAR; INTRAVENOUS EVERY 24 HOURS
Qty: 0 | Refills: 0 | Status: DISCONTINUED | OUTPATIENT
Start: 2018-07-08 | End: 2018-07-09

## 2018-07-08 RX ORDER — DEXTROSE 50 % IN WATER 50 %
15 SYRINGE (ML) INTRAVENOUS ONCE
Qty: 0 | Refills: 0 | Status: DISCONTINUED | OUTPATIENT
Start: 2018-07-08 | End: 2018-07-12

## 2018-07-08 RX ORDER — INSULIN GLARGINE 100 [IU]/ML
25 INJECTION, SOLUTION SUBCUTANEOUS AT BEDTIME
Qty: 0 | Refills: 0 | Status: DISCONTINUED | OUTPATIENT
Start: 2018-07-08 | End: 2018-07-08

## 2018-07-08 RX ORDER — TRAMADOL HYDROCHLORIDE 50 MG/1
50 TABLET ORAL EVERY 6 HOURS
Qty: 0 | Refills: 0 | Status: DISCONTINUED | OUTPATIENT
Start: 2018-07-08 | End: 2018-07-12

## 2018-07-08 RX ORDER — INSULIN GLARGINE 100 [IU]/ML
30 INJECTION, SOLUTION SUBCUTANEOUS AT BEDTIME
Qty: 0 | Refills: 0 | Status: DISCONTINUED | OUTPATIENT
Start: 2018-07-08 | End: 2018-07-12

## 2018-07-08 RX ORDER — CEFTRIAXONE 500 MG/1
1 INJECTION, POWDER, FOR SOLUTION INTRAMUSCULAR; INTRAVENOUS ONCE
Qty: 0 | Refills: 0 | Status: COMPLETED | OUTPATIENT
Start: 2018-07-08 | End: 2018-07-08

## 2018-07-08 RX ORDER — INSULIN LISPRO 100/ML
VIAL (ML) SUBCUTANEOUS
Qty: 0 | Refills: 0 | Status: DISCONTINUED | OUTPATIENT
Start: 2018-07-08 | End: 2018-07-12

## 2018-07-08 RX ORDER — ACETAMINOPHEN 500 MG
975 TABLET ORAL ONCE
Qty: 0 | Refills: 0 | Status: COMPLETED | OUTPATIENT
Start: 2018-07-08 | End: 2018-07-08

## 2018-07-08 RX ORDER — SODIUM CHLORIDE 9 MG/ML
1000 INJECTION INTRAMUSCULAR; INTRAVENOUS; SUBCUTANEOUS ONCE
Qty: 0 | Refills: 0 | Status: COMPLETED | OUTPATIENT
Start: 2018-07-08 | End: 2018-07-08

## 2018-07-08 RX ORDER — GLUCAGON INJECTION, SOLUTION 0.5 MG/.1ML
1 INJECTION, SOLUTION SUBCUTANEOUS ONCE
Qty: 0 | Refills: 0 | Status: DISCONTINUED | OUTPATIENT
Start: 2018-07-08 | End: 2018-07-12

## 2018-07-08 RX ORDER — MORPHINE SULFATE 50 MG/1
4 CAPSULE, EXTENDED RELEASE ORAL ONCE
Qty: 0 | Refills: 0 | Status: DISCONTINUED | OUTPATIENT
Start: 2018-07-08 | End: 2018-07-08

## 2018-07-08 RX ORDER — ONDANSETRON 8 MG/1
4 TABLET, FILM COATED ORAL ONCE
Qty: 0 | Refills: 0 | Status: COMPLETED | OUTPATIENT
Start: 2018-07-08 | End: 2018-07-08

## 2018-07-08 RX ORDER — DEXTROSE 50 % IN WATER 50 %
15 SYRINGE (ML) INTRAVENOUS ONCE
Qty: 0 | Refills: 0 | Status: DISCONTINUED | OUTPATIENT
Start: 2018-07-08 | End: 2018-07-08

## 2018-07-08 RX ORDER — GLUCAGON INJECTION, SOLUTION 0.5 MG/.1ML
1 INJECTION, SOLUTION SUBCUTANEOUS ONCE
Qty: 0 | Refills: 0 | Status: DISCONTINUED | OUTPATIENT
Start: 2018-07-08 | End: 2018-07-08

## 2018-07-08 RX ORDER — DEXTROSE 50 % IN WATER 50 %
12.5 SYRINGE (ML) INTRAVENOUS ONCE
Qty: 0 | Refills: 0 | Status: DISCONTINUED | OUTPATIENT
Start: 2018-07-08 | End: 2018-07-08

## 2018-07-08 RX ORDER — DEXTROSE 50 % IN WATER 50 %
12.5 SYRINGE (ML) INTRAVENOUS ONCE
Qty: 0 | Refills: 0 | Status: DISCONTINUED | OUTPATIENT
Start: 2018-07-08 | End: 2018-07-12

## 2018-07-08 RX ORDER — PANTOPRAZOLE SODIUM 20 MG/1
40 TABLET, DELAYED RELEASE ORAL
Qty: 0 | Refills: 0 | Status: DISCONTINUED | OUTPATIENT
Start: 2018-07-08 | End: 2018-07-12

## 2018-07-08 RX ORDER — FAMOTIDINE 10 MG/ML
20 INJECTION INTRAVENOUS ONCE
Qty: 0 | Refills: 0 | Status: COMPLETED | OUTPATIENT
Start: 2018-07-08 | End: 2018-07-08

## 2018-07-08 RX ORDER — SODIUM CHLORIDE 9 MG/ML
1000 INJECTION, SOLUTION INTRAVENOUS
Qty: 0 | Refills: 0 | Status: DISCONTINUED | OUTPATIENT
Start: 2018-07-08 | End: 2018-07-08

## 2018-07-08 RX ORDER — SPIRONOLACTONE 25 MG/1
1 TABLET, FILM COATED ORAL
Qty: 0 | Refills: 0 | COMMUNITY

## 2018-07-08 RX ORDER — INSULIN LISPRO 100/ML
VIAL (ML) SUBCUTANEOUS AT BEDTIME
Qty: 0 | Refills: 0 | Status: DISCONTINUED | OUTPATIENT
Start: 2018-07-08 | End: 2018-07-12

## 2018-07-08 RX ORDER — INSULIN DETEMIR 100/ML (3)
40 INSULIN PEN (ML) SUBCUTANEOUS
Qty: 0 | Refills: 0 | COMMUNITY

## 2018-07-08 RX ORDER — FUROSEMIDE 40 MG
20 TABLET ORAL DAILY
Qty: 0 | Refills: 0 | Status: DISCONTINUED | OUTPATIENT
Start: 2018-07-08 | End: 2018-07-12

## 2018-07-08 RX ADMIN — ONDANSETRON 4 MILLIGRAM(S): 8 TABLET, FILM COATED ORAL at 03:42

## 2018-07-08 RX ADMIN — CEFTRIAXONE 100 GRAM(S): 500 INJECTION, POWDER, FOR SOLUTION INTRAMUSCULAR; INTRAVENOUS at 04:58

## 2018-07-08 RX ADMIN — Medication 975 MILLIGRAM(S): at 04:44

## 2018-07-08 RX ADMIN — MORPHINE SULFATE 4 MILLIGRAM(S): 50 CAPSULE, EXTENDED RELEASE ORAL at 06:14

## 2018-07-08 RX ADMIN — INSULIN GLARGINE 30 UNIT(S): 100 INJECTION, SOLUTION SUBCUTANEOUS at 21:57

## 2018-07-08 RX ADMIN — MORPHINE SULFATE 8 MILLIGRAM(S): 50 CAPSULE, EXTENDED RELEASE ORAL at 08:29

## 2018-07-08 RX ADMIN — SODIUM CHLORIDE 100 MILLILITER(S): 9 INJECTION INTRAMUSCULAR; INTRAVENOUS; SUBCUTANEOUS at 15:41

## 2018-07-08 RX ADMIN — Medication 30 MILLILITER(S): at 06:19

## 2018-07-08 RX ADMIN — SODIUM CHLORIDE 1000 MILLILITER(S): 9 INJECTION INTRAMUSCULAR; INTRAVENOUS; SUBCUTANEOUS at 03:41

## 2018-07-08 RX ADMIN — MORPHINE SULFATE 4 MILLIGRAM(S): 50 CAPSULE, EXTENDED RELEASE ORAL at 04:45

## 2018-07-08 RX ADMIN — MORPHINE SULFATE 4 MILLIGRAM(S): 50 CAPSULE, EXTENDED RELEASE ORAL at 11:08

## 2018-07-08 RX ADMIN — Medication 20 MILLIGRAM(S): at 17:21

## 2018-07-08 RX ADMIN — MORPHINE SULFATE 4 MILLIGRAM(S): 50 CAPSULE, EXTENDED RELEASE ORAL at 11:23

## 2018-07-08 RX ADMIN — FAMOTIDINE 100 MILLIGRAM(S): 10 INJECTION INTRAVENOUS at 06:19

## 2018-07-08 RX ADMIN — MORPHINE SULFATE 8 MILLIGRAM(S): 50 CAPSULE, EXTENDED RELEASE ORAL at 09:10

## 2018-07-08 RX ADMIN — MORPHINE SULFATE 4 MILLIGRAM(S): 50 CAPSULE, EXTENDED RELEASE ORAL at 03:42

## 2018-07-08 RX ADMIN — Medication 250 MILLIGRAM(S): at 20:44

## 2018-07-08 NOTE — ED PROVIDER NOTE - GASTROINTESTINAL [+], MLM
**ATTENDING ADDENDUM (Dr. Dennis Ruvalcaba): history of esophageal varices, history of alcoholic hepatitis, on transplant list/ABDOMINAL PAIN

## 2018-07-08 NOTE — H&P ADULT - PROBLEM SELECTOR PLAN 5
- Monitor CXR in outpatient setting DVT: SubQ heparin  Diet: NPO pending ANTOINETTE DVT: SCDs  Diet: NPO pending ANTOINETTE

## 2018-07-08 NOTE — ED PROVIDER NOTE - CHPI ED SYMPTOMS POS
**ATTENDING ADDENDUM (Dr. Dennis Ruvalcaba): generalized malaise, aches, myalgias/PAIN/TENDERNESS/FEVER/DECREASED EATING/DRINKING

## 2018-07-08 NOTE — ED PROVIDER NOTE - ATTENDING CONTRIBUTION TO CARE
**ATTENDING ADDENDUM (Dr. Dennis Ruvalcaba): I attest that I have directly examined this patient and reviewed and formulated the diagnostic and therapeutic management plan in collaboration with the EM resident. Please see MDM note and remainder of EMR for findings from CC, HPI, ROS, and PE. (Kya)

## 2018-07-08 NOTE — ED PROVIDER NOTE - EYES, MLM
Clear bilaterally, pupils equal, round and reactive to light. Clear bilaterally, pupils equal, round and reactive to light. **ATTENDING ADDENDUM (Dr. Dennis Ruvalcaba): Extraocular muscle movements intact. Clear corneas bilaterally, pupils equal and round.

## 2018-07-08 NOTE — H&P ADULT - NSHPPHYSICALEXAM_GEN_ALL_CORE
CONSTITUTIONAL: Well appearing, well nourished  EYES: Clear bilaterally  HEAD: Normocephalic, atraumatic, moist mucous membranes  CARDIAC: Normal rate, regular rhythm.  Heart sounds S1, S2.  RESPIRATORY: Breath sounds clear and equal bilaterally.  GASTROINTESTINAL: Soft, epigastric tenderness, non distended, no rebound or guarding, - Zamudio's sign  MUSCULOSKELETAL: range of motion is not limited, no muscle or joint tenderness  NEUROLOGICAL: no focal deficits, no motor or sensory deficits.  PSYCH: A&O x3  SKIN: No evidence of rash. Vitiligo Vital Signs Last 24 Hrs  T(C): 36.7 (08 Jul 2018 11:03), Max: 38.1 (08 Jul 2018 04:01)  T(F): 98.1 (08 Jul 2018 11:03), Max: 100.6 (08 Jul 2018 04:01)  HR: 64 (08 Jul 2018 11:03) (64 - 98)  BP: 108/68 (08 Jul 2018 11:03) (108/64 - 124/78)  RR: 20 (08 Jul 2018 11:03) (16 - 20)  SpO2: 93% (08 Jul 2018 11:03) (92% - 96%)    CONSTITUTIONAL: Well appearing, well nourished  EYES: Clear bilaterally  HEAD: Normocephalic, atraumatic, moist mucous membranes  CARDIAC: Normal rate, regular rhythm.  Heart sounds S1, S2.  RESPIRATORY: Breath sounds clear and equal bilaterally.  GASTROINTESTINAL: Soft, epigastric tenderness, non distended, no rebound or guarding, - Zamudio's sign  MUSCULOSKELETAL: range of motion is not limited, no muscle or joint tenderness  NEUROLOGICAL: no focal deficits, no motor or sensory deficits.  PSYCH: A&O x3  SKIN: No evidence of rash. Vitiligo Vital Signs Last 24 Hrs  T(F): 98.1 (08 Jul 2018 11:03), Max: 100.6 (08 Jul 2018 04:01)  HR: 64 (08 Jul 2018 11:03) (64 - 98)  BP: 108/68 (08 Jul 2018 11:03) (108/64 - 124/78)  RR: 20 (08 Jul 2018 11:03) (16 - 20)  SpO2: 93% (08 Jul 2018 11:03) (92% - 96%)    CONSTITUTIONAL: Well appearing, well nourished  EYES: Clear bilaterally  HEAD: Normocephalic, atraumatic, moist mucous membranes  CARDIAC: Normal rate, regular rhythm.  Heart sounds S1, S2.  RESPIRATORY: Breath sounds clear and equal bilaterally.  GASTROINTESTINAL: Soft, epigastric tenderness, non distended, no rebound or guarding, - Zamudio's sign  RECTAL: Negative guaic, brown stool in rectal vault  MUSCULOSKELETAL: range of motion is not limited, no muscle or joint tenderness  NEUROLOGICAL: no focal deficits, no motor or sensory deficits.  PSYCH: A&O x3  SKIN: No evidence of rash. Vitiligo

## 2018-07-08 NOTE — H&P ADULT - NSHPREVIEWOFSYSTEMS_GEN_ALL_CORE
CONST: no fevers, no chills, no trauma  EYES: no pain, no visual disturbances  ENT: no sore throat, no epistaxis, no rhinorrhea, no hearing changes  CV: no chest pain, no palpitations, no orthopnea, no extremity pain or swelling  RESP: no shortness of breath, no cough, no sputum, no pleurisy, no wheezing  ABD: + abdominal pain  : no dysuria, no hematuria, no frequency, no urgency  MSK: no back pain, no neck pain, no extremity pain  NEURO: no headache, no sensory disturbances, no focal weakness, no dizziness  HEME: no easy bleeding or bruising  SKIN: no diaphoresis, no rash CONST: +fever, no chills, no trauma  EYES: no pain, no visual disturbances  ENT: no sore throat, no epistaxis, no rhinorrhea, no hearing changes  CV: no chest pain, no palpitations, no orthopnea, no extremity pain or swelling  RESP: no shortness of breath, no cough, no sputum, no pleurisy, no wheezing  ABD: + abdominal pain  : no dysuria, no hematuria, no frequency, no urgency  MSK: no back pain, no neck pain, no extremity pain  NEURO: no headache, no sensory disturbances, no focal weakness, no dizziness  HEME: no easy bleeding or bruising  SKIN: no diaphoresis, no rash

## 2018-07-08 NOTE — ED PROVIDER NOTE - MEDICAL DECISION MAKING DETAILS
**ATTENDING MEDICAL DECISION MAKING/SYNTHESIS (Dr. Dennis Ruvalcaba): I have reviewed the Chief Complaint, the HPI, the ROS, and have directly performed and confirmed the findings on the Physical Examination. I have reviewed the medical decision making with all providers, as applicable. The PROBLEM REPRESENTATION at this time is: 42-year-old man with a history of Diabetes Mellitus t2, alcoholic cirrhosis (on liver transplant list), esophageal varices, latent tuberculosos, vitiligo, and pancreatitis now presents with constant sharp and burning moderate-to-severe epigastric abdominal pain radiating to back since 1400 yesterday, associated with unquantified fevers, malaise, myalgias, and malaise. NO nausea, vomiting, hematemesis, melena, hematochezia, or coffee-ground emesis.  The MOST LIKELY DIAGNOSIS, and the LIST OF DIFFERENTIAL DIAGNOSES, includes (but is not limited to) the following: gastritis, gastroenteritis, acute surgical process e.g. acute biliary disease (e.g. cholelithiasis, cholecystitis, or cholangitis), acute appendicitis or equivalent (considered), SBP (considered), other medical infectious etiology, other inflammatory condition e.g. recurrent pancreatitis, hemorrhagic sequela e.g. variceal bleeding (NO evidence), dehydration, electrolyte-metabolic-endocrine derangements, Acute Kidney Injury or equivalent, DKA or other diabetic sequela (considered, NO evidence). The likelihood of each of these diagnoses has been appropriately considered in the context of this patient's presentation and my evaluation. PLAN: as described in EMR, including diagnostics, therapeutics and consultation as clinically warranted. I will continue to reevaluate the patient, including the results of all testing, and monitor response to therapy throughout the patient's course in the ED.

## 2018-07-08 NOTE — ED PROVIDER NOTE - PLAN OF CARE
ATTENDING ADDENDUM (Dr. Dennis Ruvalcaba): Goals of care include resolution of emergent/urgent symptoms and concerns, and restoration to baseline level of homeostasis.

## 2018-07-08 NOTE — ED PROVIDER NOTE - CONDUCTED A DETAILED DISCUSSION WITH PATIENT AND/OR GUARDIAN REGARDING, MDM
**ATTENDING ADDENDUM (Dr. Dennis Ruvalcaba): Anticipatory guidance provided./radiology results/need to admit/lab results

## 2018-07-08 NOTE — ED PROVIDER NOTE - SKIN, MLM
Skin normal color for race, warm, dry and intact. No evidence of rash. Skin normal color for race, hot to touch, dry and intact. No evidence of rash. Vitiligo Skin normal color for race, hot to touch, dry and intact. No evidence of rash. Vitiligo **ATTENDING ADDENDUM (Dr. Dennis Ruvalcaba): agree with notation by EM resident Boland.

## 2018-07-08 NOTE — H&P ADULT - PROBLEM SELECTOR PLAN 4
- Continue nadolol - ISS - ISS while NPO - at home pt on Levemir 40units QHS; will continue with Lantus 25units QHS and ISS w/ serial FS monitoring at this time

## 2018-07-08 NOTE — ED PROVIDER NOTE - ABDOMINAL EXAM
tender.../soft tender.../DISTENDED/**ATTENDING ADDENDUM (Dr. Dennis Ruvalcaba): NO guarding, rebound, or rigidity. NO masses. POSITIVE epigastric tenderness noted with palpation. Some distension noted./soft

## 2018-07-08 NOTE — H&P ADULT - PROBLEM SELECTOR PLAN 1
Patient with abdominal pain, radiating to back with past history of pancreatitis. Elevated Alk phos, AST, T bili, no recent alcohol use and alcoholic cirrhosis pending transplant.  - CT abdomen  - Lipase, amylase  - HIDA or RUQ ultrasound  - 1 l NS Patient with abdominal pain, radiating to back with past history of pancreatitis. Elevated Alk phos, AST, bili, no recent alcohol use and alcoholic cirrhosis pending transplant.  - CT abdomen  - Lipase is normal  - HIDA after normal RUQ u/s, due to high suspicion for cholecystitis  - 1 l NS Patient with abdominal pain, radiating to back with past history of pancreatitis and alcoholic cirrhosis on transplant list.  - CT abdomen  - HIDA after normal RUQ u/s  - 100 cc/hr NS  - 50 mg prn tramadol Patient with abdominal pain, radiating to back with past history of pancreatitis and alcoholic cirrhosis on transplant list. No ascites on exam, leukocytosis and febrile in ED.  - CT abdomen  - Consider HIDA after normal RUQ u/s, but low suspicion of cholecystitis. Will defer to GI recommendations.  - 1L NS  - 50 mg prn tramadol Patient with abdominal pain, radiating to back with past history of recurrent pancreatitis and alcoholic cirrhosis on transplant list. No ascites on exam, leukocytosis and febrile in ED. WNL lipase. Still, differential includes pancreatitis, along with lower suspicion of acute cholecystitis or PUD.   - will obtain CT abdomen  - can consider HIDA after normal RUQ u/s, but low suspicion of acute cholecystitis; will consult GI and await their recommendations  - IVF as below  - analgesia with Tramadol PO 50mg PRN for severe pain (avoid Acetaminophen given liver disease, and NSAIDs given varices)

## 2018-07-08 NOTE — H&P ADULT - PROBLEM SELECTOR PLAN 3
- ISS Patient follows transplant in [facility]  - Request medical records Alcoholic cirrhosis c/b esophageal varices. MELD = 15, Maddrey = 9.3 Patient follows transplant in [facility].  - House GI c/s

## 2018-07-08 NOTE — H&P ADULT - ASSESSMENT
41 yo M hx IDDM2, alcoholic cirrhosis (on liver transplant list), esophageal varices, latent TB, vitiligo, pancreatitis, presenting with sharp, constant 10/10 epigastric abdominal pain, concerning for pancreatitis vs. cholecystitis. 41 yo M hx IDDM2, alcoholic cirrhosis (on liver transplant list), esophageal varices, latent TB, vitiligo, pancreatitis, presenting with sharp, constant 10/10 epigastric abdominal pain, concerning for pancreatitis. 41 yo M hx IDDM2, alcoholic cirrhosis (on liver transplant list) c/b esophageal varices (recent EGD in 2017 confirming), vitiligo, recurrent pancreatitis, presenting with sharp, constant 10/10 epigastric abdominal pain.

## 2018-07-08 NOTE — ED PROVIDER NOTE - OBJECTIVE STATEMENT
41 yo M hx IDDM2, alcoholic cirrhosis (on liver transplant list), esophageal varices, latent TB, vitiligo, pancreatitis, presenting with sharp, constant 10/10 epigastric abdominal pain radiating to back that started yesterday around 2pm, subjective fevers, no nausea/vomiting, no diarrhea/constipation/dysuria/hematuria. Pt c/o generalized body aches. No chest pain, no exertional pain, pain unrelated to food intake. No ETOH use.    Gastroenterologist: Dr. Thakkar  PCP: Dr. Rj Tadeo 41 yo M hx IDDM2, alcoholic cirrhosis (on liver transplant list), esophageal varices, latent TB, vitiligo, pancreatitis, presenting with sharp, constant 10/10 epigastric abdominal pain radiating to back that started yesterday around 2pm, subjective fevers, no nausea/vomiting, no diarrhea/constipation/dysuria/hematuria. Pt c/o generalized body aches. No chest pain, no exertional pain, pain unrelated to food intake. No ETOH use.  Gastroenterologist: Dr. Thakkar  PCP: Dr. Rj Tadeo  **ATTENDING ADDENDUM (Dr. Dennis Ruvalcaba): I attest that I have directly and personally interviewed and examined this patient and elicited a comparable history of present illness and review of systems as documented, along with my EM resident. I attest that I have made significant contributions to the documentation where necessary and as noted in the EMR.

## 2018-07-08 NOTE — CONSULT NOTE ADULT - SUBJECTIVE AND OBJECTIVE BOX
ACUTE CARE SURGERY CONSULT    Consulting surgical team: [facility] - Acute Care Surgery (pager [phone])  Consulting attending: Kaveh Hall      HPI:  Patient is a 42y Male - h/o alcohol-induced cirrhosis, esophageal varices, recurrent pancreatitis - presenting with one day of acute-onset epigastric pain and subjective fever. Denies any nausea/emesis, any constipation/obstipation/diarrhea. Reports no timely association of his pain with PO intake. He states that his current pain is consistent with the recurrent pain he's had in the past, secondary to his recurrent pancreatitis and/or cirrhosis.     Of note, he follows with Dr. Thakkar (Hepatology/GI) and is on the liver transplant list at [facility]. He reports a 2.5 year period of sobriety.      PAST MEDICAL HISTORY:  Vitiligo  Latent tuberculosis  Diabetes mellitus type 2, insulin dependent  Esophageal varices  Alcoholic cirrhosis      PAST SURGICAL HISTORY:  Status post corneal transplant  Alcoholic Cirrhosis  Esophageal Varices      ALLERGIES:  levofloxacin (Other (Moderate))      MEDICATIONS  (STANDING):  dextrose 5%. 1000 milliLiter(s) (50 mL/Hr) IV Continuous <Continuous>  dextrose 50% Injectable 12.5 Gram(s) IV Push once  dextrose 50% Injectable 25 Gram(s) IV Push once  dextrose 50% Injectable 25 Gram(s) IV Push once  fluticasone propionate 50 MICROgram(s)/spray Nasal Spray 1 Spray(s) Both Nostrils daily  furosemide    Tablet 20 milliGRAM(s) Oral daily  insulin lispro (HumaLOG) corrective regimen sliding scale   SubCutaneous three times a day before meals  insulin lispro (HumaLOG) corrective regimen sliding scale   SubCutaneous at bedtime  nadolol 20 milliGRAM(s) Oral daily  pantoprazole    Tablet 40 milliGRAM(s) Oral before breakfast    MEDICATIONS  (PRN):  dextrose 40% Gel 15 Gram(s) Oral once PRN Blood Glucose LESS THAN 70 milliGRAM(s)/deciliter  glucagon  Injectable 1 milliGRAM(s) IntraMuscular once PRN Glucose LESS THAN 70 milligrams/deciliter      VITALS & I/Os:    T(C): 36.7 (max 38.1)  T(F): 98.1  HR: 64  BP: 108/68  RR: 20  SpO2: 93%        GEN: NAD, alert and oriented x 3  HEENT: WNL  CHEST: Symmetrical chest rise, breath sounds CTAB  HEART: RRR, non-muffled heart sounds  ABD: Soft, mildly distended, some epigastric tenderness to palpation, no guarding or rebound tenderness.  EXT/VASC: No edema/erythema or deformity. Warm, cap refill < 2 sec. Motor and sensory function intact.         LABS:                        13.2   17.2  )-----------( 66       ( 2018 03:28 )             39.7     07-08    135  |  101  |  11  ----------------------------<  258<H>  4.0   |  17<L>  |  0.96    Ca    8.2<L>      2018 03:28  Phos  3.3     -  Mg     1.4     -08    TPro  6.5  /  Alb  4.0  /  TBili  2.9<H>  /  DBili  1.0<H>  /  AST  60<H>  /  ALT  39  /  AlkPhos  171<H>  -    Lactate:   PT/INR - ( 2018 03:28 )   PT: 14.1 sec;   INR: 1.30 ratio         PTT - ( 2018 03:28 )  PTT:37.5 sec        - @ 08:08  1.7  -08 @ 03:28  3.2    Urinalysis Basic - ( 2018 05:55 )    Color: Yellow / Appearance: SL Turbid / S.017 / pH: x  Gluc: x / Ketone: Negative  / Bili: Negative / Urobili: Negative   Blood: x / Protein: Trace / Nitrite: Negative   Leuk Esterase: Negative / RBC: 3-5 /HPF / WBC 0-2 /HPF   Sq Epi: x / Non Sq Epi: x / Bacteria: x        IMAGING:  < from: US Abdomen Upper Quadrant Right (18 @ 05:41) >  IMPRESSION:     Nonspecific distended gallbladder demonstrating mild wall thickening   and/or edema. Cholelithiasis or sonographic Zamudio's sign. If clinical   suspicion for acute cholecystitis persists, consider further evaluation   with dynamic HIDA.    Cirrhosis.    < end of copied text >          ASSESSMENT & PLAN   42y male - PMH/PSH as above - with epigastric pain and fever, RUQ US without cholelithiasis or Zamudio sign. Suspicion for acute cholecystitis is very low. HIDA may be obtained to rule out acute cholecystitis, however patient - due to his concomitant liver disease and splanchnic varices - is not an operative candidate.       Discussed with Dr. Isabel Vick  R4, General Surgery

## 2018-07-08 NOTE — H&P ADULT - NSHPSOCIALHISTORY_GEN_ALL_CORE
No alcohol use for 2 years.  No cigarette use.  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 Wife lives [facility].

## 2018-07-08 NOTE — ED PROVIDER NOTE - PROGRESS NOTE DETAILS
**ATTENDING ADDENDUM (Dr. Dennis Ruvalcaba): patient serially evaluated throughout ED course by ED team. NO acute deterioration up to this time in the ED. Agree with goals/plan of ED care as described in EMR, including diagnostics, therapeutics and consultation as clinically warranted. ED diagnostics up to this time reviewed and noted. Agree with antibiotics for possibility of SBP. Will continue to observe and monitor closely. Likely admission. Frank SYED: pt signed out to me by Dr. Boland. Per Dr. Boland, case d/w hospitalist, Dr. Sawant for admission for HIDA. Dr. Sawant requesting surgery to see pt prior to admission. Discussed case with surgery, Dr. Fuentes who will come see pt. Frank SYED: pt eval by surgery. no surgical indication given pt is a transplant candidate, high risk. Repaged hospitalist for admission. Frank SYED: pt eval by surgery. no surgical indication given pt is a transplant candidate, high risk. Possible pancreatitis. Repaged hospitalist for admission. Frank Gonzalez: admission under Dr. Nuno. **ATTENDING ADDENDUM (Dr. Dennis Ruvalcaba): patient serially evaluated throughout ED course by ED team. NO acute deterioration up to this time in the ED. Agree with goals/plan of ED care as described in EMR, including diagnostics, therapeutics and consultation as clinically warranted. ED diagnostics up to this time reviewed and noted. Agree with antibiotics for possibility of SBP (though this is unlikely). Will continue to observe and monitor closely. Likely admission. Frank SYED: pt signed out to me by Dr. Boland. Per Dr. Boland, case d/w hospitalist, Dr. Sawant for admission for HIDA. Dr. Sawant requesting surgery to see pt prior to admission. Discussed case with surgery, Dr. Fuentes who will come see pt.  **ATTENDING ADDENDUM (Dr. Dennis Ruvalcaba): agree with above notation by EM resident Frank. All ED diagnostics reviewed and noted throughout ED course. Of most concern is patient's leukocytosis, which is not normally present. Occult infection? CT without obvious evidence of surgical pathology at this time. Will continue to observe and monitor closely; likely admission for diagnostic and therapeutic intensity. Frank SYED: pt eval by surgery. no surgical indication given pt is a transplant candidate, high risk. Possible pancreatitis. Repaged hospitalist for admission.  **ATTENDING ADDENDUM (Dr. Dennis Ruvalcaba): agree with above notation by EM resident Frank. Recommendations by General Surgery acknowledged and noted. Will continue to observe and monitor patient closely until definitive disposition is made. Frank Gonzalez: admission under Dr. Nuno.  **ATTENDING ADDENDUM (Dr. Dennis Ruvalcaba): agree with above notation by EM resident Frank. Daytime ED team Will continue to observe and monitor closely until definitive placement is made.

## 2018-07-08 NOTE — ED PROVIDER NOTE - NS ED ROS FT
CONST: no fevers, no chills, no trauma  EYES: no pain, no visual disturbances  ENT: no sore throat, no epistaxis, no rhinorrhea, no hearing changes  CV: no chest pain, no palpitations, no orthopnea, no extremity pain or swelling  RESP: no shortness of breath, no cough, no sputum, no pleurisy, no wheezing  ABD: + abdominal pain  : no dysuria, no hematuria, no frequency, no urgency  MSK: no back pain, no neck pain, no extremity pain  NEURO: no headache, no sensory disturbances, no focal weakness, no dizziness  HEME: no easy bleeding or bruising  SKIN: no diaphoresis, no rash

## 2018-07-08 NOTE — H&P ADULT - PROBLEM SELECTOR PLAN 2
Patient follows transplant in [facility]  - Request medical reconrds Patient follows transplant in [facility]  - Request medical records Unknown source of infection. Lactate was 3.2 on admission and decreased to 1.7 after fluids.  - Continue ceftriaxone Unknown source of infection. Lactate was 3.2 on admission and decreased to 1.7 after fluids.  - Continue ceftriaxone  - trend lactate  - 1L NS Unclear source of infection though intrabdominal source suspected. Lactate was 3.2 on admission and decreased to 1.7 after fluids.  - Continue IV Ceftriaxone  - improved lactate s/p 1L NS in ED, continue IVF

## 2018-07-08 NOTE — ED PROVIDER NOTE - RESPIRATORY, MLM
Breath sounds clear and equal bilaterally. Breath sounds clear and equal bilaterally. **ATTENDING ADDENDUM (Dr. Dennis Ruvalcaba): NO wheezing, rales, rhonchi, crackles, stridor, drooling, retractions, nasal flaring, or tripoding.

## 2018-07-08 NOTE — ED PROVIDER NOTE - PHYSICAL EXAMINATION
**ATTENDING ADDENDUM (Dr. Dennis Ruvalcaba): I have reviewed and substantially contributed to the elements of the PE as documented above. I have directly performed an examination of this patient in conjunction with the other members (EM resident/PA/NP) of the patient care team.

## 2018-07-08 NOTE — ED PROVIDER NOTE - CARE PLAN
Goal:	ATTENDING ADDENDUM (Dr. Dennis Ruvalcaba): Goals of care include resolution of emergent/urgent symptoms and concerns, and restoration to baseline level of homeostasis. Principal Discharge DX:	Abdominal pain  Goal:	ATTENDING ADDENDUM (Dr. Dennis Ruvalcaba): Goals of care include resolution of emergent/urgent symptoms and concerns, and restoration to baseline level of homeostasis.

## 2018-07-08 NOTE — ED PROVIDER NOTE - ENMT, MLM
Airway patent, Nasal mucosa clear. Mouth with normal mucosa. Airway patent, Nasal mucosa clear. **ATTENDING ADDENDUM (Dr. Dennis Ruvalcaba): slightly dry mucous membranes, lips, and tongue.

## 2018-07-08 NOTE — H&P ADULT - PROBLEM SELECTOR PROBLEM 4
Esophageal varices Diabetes mellitus type 2, insulin dependent Type 2 diabetes mellitus with other specified complication, with long-term current use of insulin

## 2018-07-08 NOTE — H&P ADULT - NSHPLABSRESULTS_GEN_ALL_CORE
13.2   17.2  )-----------( 66       ( 08 Jul 2018 03:28 )             39.7   07-08    135  |  101  |  11  ----------------------------<  258<H>  4.0   |  17<L>  |  0.96    Ca    8.2<L>      08 Jul 2018 03:28  Phos  3.3     07-08  Mg     1.4     07-08    TPro  6.5  /  Alb  4.0  /  TBili  2.9<H>  /  DBili  1.0<H>  /  AST  60<H>  /  ALT  39  /  AlkPhos  171<H>  07-08 13.2   17.2  )-----------( 66       ( 08 Jul 2018 03:28 )             39.7   07-08    135  |  101  |  11  ----------------------------<  258<H>  4.0   |  17<L>  |  0.96    Ca    8.2<L>      08 Jul 2018 03:28  Phos  3.3     07-08  Mg     1.4     07-08    TPro  6.5  /  Alb  4.0  /  TBili  2.9<H>  /  DBili  1.0<H>  /  AST  60<H>  /  ALT  39  /  AlkPhos  171<H>  07-08    Reviewed RUQ ultrasound, and CXR Findings:  Neutrophilic predominant leukocytosis  Thrombocytopenia  Hypomagnesemia  Elevated lactate  Abnormal coags in setting of known liver disease  Hyperbilirubinemia, mainly indirect    13.2   17.2  )-----------( 66       ( 08 Jul 2018 03:28 )             39.7     07-08    135  |  101  |  11  ----------------------------<  258<H>  4.0   |  17<L>  |  0.96    Ca    8.2<L>      08 Jul 2018 03:28  Phos  3.3     07-08  Mg     1.4     07-08    TPro  6.5  /  Alb  4.0  /  TBili  2.9<H>  /  DBili  1.0<H>  /  AST  60<H>  /  ALT  39  /  AlkPhos  171<H>  07-08  PT/INR - ( 08 Jul 2018 03:28 )   PT: 14.1 sec;   INR: 1.30 ratio    PTT - ( 08 Jul 2018 03:28 )  PTT:37.5 sec    RUQ ultrasound:    CXR: Findings:  Neutrophilic predominant leukocytosis  Thrombocytopenia  Hypomagnesemia  Elevated lactate  Abnormal coags in setting of known liver disease  Hyperbilirubinemia, mainly indirect    13.2   17.2  )-----------( 66       ( 08 Jul 2018 03:28 )             39.7     07-08    135  |  101  |  11  ----------------------------<  258<H>  4.0   |  17<L>  |  0.96    Ca    8.2<L>      08 Jul 2018 03:28  Phos  3.3     07-08  Mg     1.4     07-08    Negative guaic  Lactate: 3.2 --> 1.7 on VBG    TPro  6.5  /  Alb  4.0  /  TBili  2.9<H>  /  DBili  1.0<H>  /  AST  60<H>  /  ALT  39  /  AlkPhos  171<H>  07-08  PT/INR - ( 08 Jul 2018 03:28 )   PT: 14.1 sec;   INR: 1.30 ratio    PTT - ( 08 Jul 2018 03:28 )  PTT:37.5 sec    RUQ ultrasound:    CXR: Findings:  Neutrophilic predominant leukocytosis  Thrombocytopenia  Hypomagnesemia  Elevated lactate  Abnormal coags in setting of known liver disease  Hyperbilirubinemia, mainly indirect    13.2   17.2  )-----------( 66       ( 08 Jul 2018 03:28 )             39.7     07-08    135  |  101  |  11  ----------------------------<  258<H>  4.0   |  17<L>  |  0.96    Ca    8.2<L>      08 Jul 2018 03:28  Phos  3.3     07-08  Mg     1.4     07-08    Negative guaic  Lactate: 3.2 --> 1.7 on VBG    TPro  6.5  /  Alb  4.0  /  TBili  2.9<H>  /  DBili  1.0<H>  /  AST  60<H>  /  ALT  39  /  AlkPhos  171<H>  07-08  PT/INR - ( 08 Jul 2018 03:28 )   PT: 14.1 sec;   INR: 1.30 ratio    PTT - ( 08 Jul 2018 03:28 )  PTT:37.5 sec    RUQ ultrasound:  Nonspecific distended gallbladder demonstrating mild wall thickening and/or   edema. Cholelithiasis or sonographic Zamudio's sign. If clinical suspicion   for acute cholecystitis persists, consider further evaluation with dynamic   HIDA.   Cirrhosis.     CXR: Clear lungs Findings:  Neutrophilic predominant leukocytosis  Thrombocytopenia  Hypomagnesemia  Elevated lactate  Abnormal coags in setting of known liver disease  Hyperbilirubinemia, mainly indirect    13.2   17.2  )-----------( 66       ( 08 Jul 2018 03:28 )             39.7     07-08    135  |  101  |  11  ----------------------------<  258<H>  4.0   |  17<L>  |  0.96    Ca    8.2<L>      08 Jul 2018 03:28  Phos  3.3     07-08  Mg     1.4     07-08    Negative guaic  Lactate: 3.2 --> 1.7 on VBG    TPro  6.5  /  Alb  4.0  /  TBili  2.9<H>  /  DBili  1.0<H>  /  AST  60<H>  /  ALT  39  /  AlkPhos  171<H>  07-08  PT/INR - ( 08 Jul 2018 03:28 )   PT: 14.1 sec;   INR: 1.30 ratio    PTT - ( 08 Jul 2018 03:28 )  PTT:37.5 sec    RUQ ultrasound:  Nonspecific distended gallbladder demonstrating mild wall thickening and/or   edema. Cholelithiasis or sonographic Zamudio's sign. If clinical suspicion   for acute cholecystitis persists, consider further evaluation with dynamic   HIDA.   Cirrhosis.     CXR: Clear lungs    EGD on 3/2017:  Small (<5mm) varices found in lower third of esophagus.  Mild portal hypertensive gastropathy was found in gastric body and antrum.  Duodenal bulb and the 2nd part of duodenum were normal. Major papilla was mildly edematous.  There was no sign of significant endosonographic abnormality in the CBD. The maximum diameter of the duct was 3 mm. No stones, no biliary sludge identified. Ampulla was normal.    Colonscopy on 5/2016:  Small non-bleeding rectal varices found. Non-bleeding internal hemorrhoids found during retroflexion. Hemorrhoids were small. Labs and imaging personally reviewed.    Pertinent Findings:  Neutrophilic predominant leukocytosis  Thrombocytopenia  Hypomagnesemia  Elevated lactate                13.2   17.2  )-----------( 66       ( 08 Jul 2018 03:28 )             39.7     07-08    135  |  101  |  11  ----------------------------<  258<H>  4.0   |  17<L>  |  0.96    Ca    8.2<L>      08 Jul 2018 03:28  Phos  3.3     07-08  Mg     1.4     07-08    Lactate: 3.2 --> 1.7 on VBG    TPro  6.5  /  Alb  4.0  /  TBili  2.9<H>  /  DBili  1.0<H>  /  AST  60<H>  /  ALT  39  /  AlkPhos  171<H>  07-08  PT/INR - ( 08 Jul 2018 03:28 )   PT: 14.1 sec;   INR: 1.30 ratio    PTT - ( 08 Jul 2018 03:28 )  PTT:37.5 sec    RUQ ultrasound:  Nonspecific distended gallbladder demonstrating mild wall thickening and/or   edema. Cholelithiasis or sonographic Zamudio's sign. If clinical suspicion   for acute cholecystitis persists, consider further evaluation with dynamic   HIDA.   Cirrhosis.     CXR: Clear lungs    EGD on 3/2017:  Small (<5mm) varices found in lower third of esophagus.  Mild portal hypertensive gastropathy was found in gastric body and antrum.  Duodenal bulb and the 2nd part of duodenum were normal. Major papilla was mildly edematous.  There was no sign of significant endosonographic abnormality in the CBD. The maximum diameter of the duct was 3 mm. No stones, no biliary sludge identified. Ampulla was normal.    Colonscopy on 5/2016:  Small non-bleeding rectal varices found. Non-bleeding internal hemorrhoids found during retroflexion. Hemorrhoids were small.

## 2018-07-08 NOTE — H&P ADULT - HISTORY OF PRESENT ILLNESS
fsd 41 yo M hx IDDM2, alcoholic cirrhosis (on liver transplant list), esophageal varices, latent TB, vitiligo, pancreatitis, presenting with sharp, constant 10/10 epigastric abdominal pain radiating to back that started yesterday around 2pm, subjective fevers, no nausea/vomiting, no diarrhea/constipation/dysuria/hematuria. Pt c/o generalized body aches. No chest pain, no exertional pain, pain unrelated to food intake. No ETOH use. Patient follows liver transplant in [facility], no alcohol use for 2 years and is currently a transplant candidate. Pain is consistent since yesterday and he has no't tried anything to relieve his symptoms.    In ED, patient was hemodynamically stable, with vitals of 41 yo M hx IDDM2, alcoholic cirrhosis (on liver transplant list), esophageal varices, latent TB, vitiligo, pancreatitis, presenting with sharp, constant 10/10 epigastric abdominal pain radiating to back that started yesterday around 2pm, subjective fevers, no nausea/vomiting, no diarrhea/constipation/dysuria/hematuria. Pt c/o generalized body aches. No chest pain, no exertional pain, pain unrelated to food intake. No ETOH use. Patient follows liver transplant in [facility], no alcohol use for 2 years and is currently a transplant candidate. Pain is consistent since yesterday and he has no't tried anything to relieve his symptoms.    In ED, patient was hemodynamically stable, with vitals of 99.3F, HR 96, 119/73 BP, 18RR, 92% SpO2 RA. 41 yo M hx IDDM2, alcoholic cirrhosis (on liver transplant list), esophageal varices, latent TB, vitiligo, pancreatitis, presenting with sharp, constant 10/10 epigastric abdominal pain radiating to back that started yesterday around 2pm, subjective fevers, no nausea/vomiting, no diarrhea/constipation/dysuria/hematuria. Pt c/o generalized body aches. No chest pain, no exertional pain, pain unrelated to food intake. No ETOH use. Patient follows liver transplant in [facility], no alcohol use for 2 years and is currently a transplant candidate. Pain is consistent since yesterday and he has not tried anything to relieve his symptoms.    In ED, patient was hemodynamically stable, with vitals of 99.3F, HR 96, 119/73 BP, 18RR, 92% SpO2 RA. 43 yo M hx IDDM2, alcoholic cirrhosis (on liver transplant list), esophageal varices, latent TB, vitiligo, pancreatitis, presenting with sharp, constant 10/10 epigastric abdominal pain radiating to back that started yesterday around 2pm, subjective fevers, no nausea/vomiting, no diarrhea/constipation/dysuria/hematuria. Pt c/o generalized body aches. No chest pain, no exertional pain, pain unrelated to food intake. No ETOH use. Patient follows liver transplant in [facility], no alcohol use for 2 years and is currently a transplant candidate. Pain is consistent since yesterday and he has not tried anything to relieve his symptoms.    In ED, patient was febrile up to 100.6F hemodynamically stable, with vitals , HR 96, 119/73 BP, 18RR, 92% SpO2 RA. 41 yo M hx IDDM2, alcoholic cirrhosis (on liver transplant list), esophageal varices, vitiligo, recurrent pancreatitis, presenting with sharp, constant 10/10 epigastric abdominal pain. Pain radiates to back, started yesterday around 2pm, subjective fevers, no nausea/vomiting, no diarrhea/constipation/dysuria/hematuria. Pt c/o generalized body aches. No chest pain, no exertional pain, pain unrelated to food intake. No ETOH use. Patient follows liver transplant in [facility], no alcohol use for 2 years and is currently a transplant candidate. Pain was acute in onset while he was sitting, unrelated to food intake and patient has not tried anything to relieve his symptoms.    In ED, patient was febrile up to 100.6F. He was hemodynamically stable, with vitals of HR 96, 119/73 BP, 18RR, 92% SpO2 RA.    Last EGD was in 2017, showed--- 41 yo M hx IDDM2, alcoholic cirrhosis (on liver transplant list), esophageal varices, vitiligo, recurrent pancreatitis, presenting with sharp, constant 10/10 epigastric abdominal pain. Pain radiates to back, started yesterday around 2pm, subjective fevers, no nausea/vomiting, no diarrhea/constipation/dysuria/hematuria. Pt c/o generalized body aches. No chest pain, no exertional pain, pain unrelated to food intake. No ETOH use. Patient follows liver transplant in [facility], no alcohol use for 2 years and is currently a transplant candidate. Pain was acute in onset while he was sitting, unrelated to food intake and patient has not tried anything to relieve his symptoms.    In ED, patient was febrile up to 100.6F. He was hemodynamically stable, with vitals of HR 96, 119/73 BP, 18RR, 92% SpO2 RA. Patient was given 1L bolus of NS and single dose ceftriaxone IVPB. 41 yo M hx IDDM2, alcoholic cirrhosis (on liver transplant list), esophageal varices, vitiligo, recurrent pancreatitis, presenting with sharp, constant 10/10 epigastric abdominal pain. Pain radiates to back, started yesterday around 2pm, subjective fevers, no nausea/vomiting, no diarrhea/constipation/dysuria/hematuria. Pt c/o generalized body aches. No chest pain, no exertional pain, pain unrelated to food intake. No ETOH use. Patient follows liver transplant in [facility], no alcohol use for 2 years and is currently a transplant candidate. Pain was acute in onset while he was sitting, unrelated to food intake and patient has not tried anything to relieve his symptoms.    In ED, patient was febrile up to 100.6F. He was hemodynamically stable, with vitals of HR 96, 119/73 BP, 18RR, 92% SpO2 RA. Patient was given 1L bolus of NS and 1g IV ceftriaxone. 43 yo M hx IDDM2, alcoholic cirrhosis (on liver transplant list), esophageal varices, vitiligo, recurrent pancreatitis, presenting with sharp, constant 10/10 epigastric abdominal pain. Pain radiates to back, started yesterday around 2pm, subjective fevers, no nausea/vomiting, no diarrhea/constipation/dysuria/hematuria. Pt c/o generalized body aches. No chest pain, no exertional pain, pain unrelated to food intake. No ETOH use. Patient follows liver transplant in [facility], no alcohol use for 2 years and is currently a transplant candidate. Pain was acute in onset while he was sitting, unrelated to food intake and he did not try anything to relieve his symptoms.    In ED, patient was febrile up to 100.6F. He was hemodynamically stable, with vitals of HR 96, 119/73 BP, 18RR, 92% SpO2 RA. Patient was given 1L bolus of NS and 1g IV ceftriaxone. 41 yo M hx IDDM2, alcoholic cirrhosis (on liver transplant list) c/b esophageal varices (recent EGD in 2017 confirming), vitiligo, recurrent pancreatitis, presenting with sharp, constant 10/10 epigastric abdominal pain. Pain radiates to back, started yesterday around 2pm, subjective fevers, no nausea/vomiting, no diarrhea/constipation/dysuria/hematuria. Pt c/o generalized body aches. No chest pain, no exertional pain, pain unrelated to food intake. No ETOH use. Patient follows liver transplant team at [facility], no alcohol use for 2 years and is currently a transplant candidate. Pain was acute in onset while he was sitting, unrelated to food intake and he did not try anything to relieve his symptoms.    In ED, patient was febrile up to 100.6F. He was hemodynamically stable, with vitals of HR 96, 119/73 BP, 18RR, 92% SpO2 RA. Patient was given 1L bolus of NS and 1g IV Ceftriaxone.

## 2018-07-08 NOTE — ED PROVIDER NOTE - DURATION
hour(s)/**ATTENDING ADDENDUM (Dr. Dennis Ruvalcaba): has reported prior episodes of the same in the recent past

## 2018-07-08 NOTE — ED ADULT NURSE NOTE - OBJECTIVE STATEMENT
Received a 42M aaox4 ambulatory with h/o Alcoholic cirrhosis  Diabetes mellitus type 2, insulin dependent  Esophageal varices  p/w c/o epigastric pain started yesterday afternoon, no chills or fever, nausea, vomiting, diarrhea or constipation. Sober for 4 years now, awaiting liver transplant. Abd flat, TTP in epigastric region, non distended. Denies blood in urine or bowel. Received a 42M aaox4 ambulatory with h/o Alcoholic cirrhosis  Diabetes mellitus type 2, insulin dependent  Esophageal varices  p/w c/o epigastric pain started yesterday afternoon, reports fever last night and took Tylenol at home for a total of 6 tabs snce yesterday, no  nausea, vomiting, diarrhea or constipation. Sober for 4 years now, awaiting liver transplant. Abd flat, TTP in epigastric region, non distended. Denies blood in urine or bowel.  Noted tremors and warm to touch.

## 2018-07-09 DIAGNOSIS — R78.81 BACTEREMIA: ICD-10-CM

## 2018-07-09 LAB
ALBUMIN SERPL ELPH-MCNC: 3 G/DL — LOW (ref 3.3–5)
ALP SERPL-CCNC: 100 U/L — SIGNIFICANT CHANGE UP (ref 40–120)
ALT FLD-CCNC: 35 U/L — SIGNIFICANT CHANGE UP (ref 10–45)
AMYLASE P1 CFR SERPL: 50 U/L — SIGNIFICANT CHANGE UP (ref 25–125)
ANION GAP SERPL CALC-SCNC: 13 MMOL/L — SIGNIFICANT CHANGE UP (ref 5–17)
AST SERPL-CCNC: 53 U/L — HIGH (ref 10–40)
BASE EXCESS BLDV CALC-SCNC: -0.7 MMOL/L — SIGNIFICANT CHANGE UP (ref -2–2)
BASOPHILS # BLD AUTO: 0.02 K/UL — SIGNIFICANT CHANGE UP (ref 0–0.2)
BASOPHILS NFR BLD AUTO: 0.5 % — SIGNIFICANT CHANGE UP (ref 0–2)
BILIRUB DIRECT SERPL-MCNC: 0.6 MG/DL — HIGH (ref 0–0.2)
BILIRUB INDIRECT FLD-MCNC: 0.9 MG/DL — SIGNIFICANT CHANGE UP (ref 0.2–1)
BILIRUB SERPL-MCNC: 1.5 MG/DL — HIGH (ref 0.2–1.2)
BUN SERPL-MCNC: 8 MG/DL — SIGNIFICANT CHANGE UP (ref 7–23)
CALCIUM SERPL-MCNC: 7 MG/DL — LOW (ref 8.4–10.5)
CHLORIDE SERPL-SCNC: 104 MMOL/L — SIGNIFICANT CHANGE UP (ref 96–108)
CO2 BLDV-SCNC: 23 MMOL/L — SIGNIFICANT CHANGE UP (ref 22–30)
CO2 SERPL-SCNC: 19 MMOL/L — LOW (ref 22–31)
CREAT SERPL-MCNC: 0.85 MG/DL — SIGNIFICANT CHANGE UP (ref 0.5–1.3)
EOSINOPHIL # BLD AUTO: 0.03 K/UL — SIGNIFICANT CHANGE UP (ref 0–0.5)
EOSINOPHIL NFR BLD AUTO: 0.7 % — SIGNIFICANT CHANGE UP (ref 0–6)
GAS PNL BLDV: SIGNIFICANT CHANGE UP
GLUCOSE BLDC GLUCOMTR-MCNC: 113 MG/DL — HIGH (ref 70–99)
GLUCOSE BLDC GLUCOMTR-MCNC: 133 MG/DL — HIGH (ref 70–99)
GLUCOSE BLDC GLUCOMTR-MCNC: 159 MG/DL — HIGH (ref 70–99)
GLUCOSE BLDC GLUCOMTR-MCNC: 184 MG/DL — HIGH (ref 70–99)
GLUCOSE BLDC GLUCOMTR-MCNC: 185 MG/DL — HIGH (ref 70–99)
GLUCOSE SERPL-MCNC: 145 MG/DL — HIGH (ref 70–99)
HBA1C BLD-MCNC: 7.1 % — HIGH (ref 4–5.6)
HCO3 BLDV-SCNC: 22 MMOL/L — SIGNIFICANT CHANGE UP (ref 21–29)
HCT VFR BLD CALC: 31.1 % — LOW (ref 39–50)
HGB BLD-MCNC: 10.1 G/DL — LOW (ref 13–17)
IMM GRANULOCYTES NFR BLD AUTO: 0.2 % — SIGNIFICANT CHANGE UP (ref 0–1.5)
LACTATE SERPL-SCNC: 1.3 MMOL/L — SIGNIFICANT CHANGE UP (ref 0.7–2)
LIDOCAIN IGE QN: 24 U/L — SIGNIFICANT CHANGE UP (ref 7–60)
LYMPHOCYTES # BLD AUTO: 0.67 K/UL — LOW (ref 1–3.3)
LYMPHOCYTES # BLD AUTO: 16.3 % — SIGNIFICANT CHANGE UP (ref 13–44)
MAGNESIUM SERPL-MCNC: 1.6 MG/DL — SIGNIFICANT CHANGE UP (ref 1.6–2.6)
MCHC RBC-ENTMCNC: 23.6 PG — LOW (ref 27–34)
MCHC RBC-ENTMCNC: 32.5 GM/DL — SIGNIFICANT CHANGE UP (ref 32–36)
MCV RBC AUTO: 72.7 FL — LOW (ref 80–100)
MONOCYTES # BLD AUTO: 0.5 K/UL — SIGNIFICANT CHANGE UP (ref 0–0.9)
MONOCYTES NFR BLD AUTO: 12.1 % — SIGNIFICANT CHANGE UP (ref 2–14)
NEUTROPHILS # BLD AUTO: 2.89 K/UL — SIGNIFICANT CHANGE UP (ref 1.8–7.4)
NEUTROPHILS NFR BLD AUTO: 70.2 % — SIGNIFICANT CHANGE UP (ref 43–77)
PCO2 BLDV: 32 MMHG — LOW (ref 35–50)
PH BLDV: 7.46 — HIGH (ref 7.35–7.45)
PHOSPHATE SERPL-MCNC: 2.2 MG/DL — LOW (ref 2.5–4.5)
PLATELET # BLD AUTO: 46 K/UL — LOW (ref 150–400)
PO2 BLDV: 75 MMHG — HIGH (ref 25–45)
POTASSIUM SERPL-MCNC: 3.8 MMOL/L — SIGNIFICANT CHANGE UP (ref 3.5–5.3)
POTASSIUM SERPL-SCNC: 3.8 MMOL/L — SIGNIFICANT CHANGE UP (ref 3.5–5.3)
PROT SERPL-MCNC: 5.7 G/DL — LOW (ref 6–8.3)
RBC # BLD: 4.28 M/UL — SIGNIFICANT CHANGE UP (ref 4.2–5.8)
RBC # FLD: 19 % — HIGH (ref 10.3–14.5)
SAO2 % BLDV: 96 % — HIGH (ref 67–88)
SODIUM SERPL-SCNC: 136 MMOL/L — SIGNIFICANT CHANGE UP (ref 135–145)
WBC # BLD: 4.12 K/UL — SIGNIFICANT CHANGE UP (ref 3.8–10.5)
WBC # FLD AUTO: 4.12 K/UL — SIGNIFICANT CHANGE UP (ref 3.8–10.5)

## 2018-07-09 PROCEDURE — 93306 TTE W/DOPPLER COMPLETE: CPT | Mod: 26

## 2018-07-09 PROCEDURE — 99222 1ST HOSP IP/OBS MODERATE 55: CPT | Mod: GC

## 2018-07-09 PROCEDURE — 99233 SBSQ HOSP IP/OBS HIGH 50: CPT | Mod: GC

## 2018-07-09 PROCEDURE — 99254 IP/OBS CNSLTJ NEW/EST MOD 60: CPT

## 2018-07-09 PROCEDURE — 78226 HEPATOBILIARY SYSTEM IMAGING: CPT | Mod: 26

## 2018-07-09 RX ORDER — CEFTRIAXONE 500 MG/1
2 INJECTION, POWDER, FOR SOLUTION INTRAMUSCULAR; INTRAVENOUS EVERY 24 HOURS
Qty: 0 | Refills: 0 | Status: DISCONTINUED | OUTPATIENT
Start: 2018-07-09 | End: 2018-07-12

## 2018-07-09 RX ORDER — AMPICILLIN TRIHYDRATE 250 MG
2 CAPSULE ORAL EVERY 6 HOURS
Qty: 0 | Refills: 0 | Status: DISCONTINUED | OUTPATIENT
Start: 2018-07-09 | End: 2018-07-09

## 2018-07-09 RX ORDER — AMOXICILLIN 250 MG/5ML
500 SUSPENSION, RECONSTITUTED, ORAL (ML) ORAL EVERY 8 HOURS
Qty: 0 | Refills: 0 | Status: DISCONTINUED | OUTPATIENT
Start: 2018-07-09 | End: 2018-07-09

## 2018-07-09 RX ADMIN — NADOLOL 20 MILLIGRAM(S): 80 TABLET ORAL at 05:45

## 2018-07-09 RX ADMIN — INSULIN GLARGINE 30 UNIT(S): 100 INJECTION, SOLUTION SUBCUTANEOUS at 21:59

## 2018-07-09 RX ADMIN — PANTOPRAZOLE SODIUM 40 MILLIGRAM(S): 20 TABLET, DELAYED RELEASE ORAL at 05:46

## 2018-07-09 RX ADMIN — Medication 216 GRAM(S): at 12:40

## 2018-07-09 RX ADMIN — Medication 2: at 12:40

## 2018-07-09 RX ADMIN — Medication 20 MILLIGRAM(S): at 19:07

## 2018-07-09 RX ADMIN — CEFTRIAXONE 100 GRAM(S): 500 INJECTION, POWDER, FOR SOLUTION INTRAMUSCULAR; INTRAVENOUS at 03:40

## 2018-07-09 RX ADMIN — CEFTRIAXONE 100 GRAM(S): 500 INJECTION, POWDER, FOR SOLUTION INTRAMUSCULAR; INTRAVENOUS at 12:12

## 2018-07-09 NOTE — PROGRESS NOTE ADULT - PROBLEM SELECTOR PROBLEM 5
Prophylactic measure Type 2 diabetes mellitus with other specified complication, with long-term current use of insulin

## 2018-07-09 NOTE — CONSULT NOTE ADULT - SUBJECTIVE AND OBJECTIVE BOX
Chief Complaint:  Patient is a 42y old  Male who presents with a chief complaint of Abdominal pain (2018 10:23)      HPI:  42 year old male with DM type 2, cirrhosis (2/2 alcohol use, on liver transplant list) c/b esophageal varices (recent EGD in 2017 confirming), vitiligo, recurrent pancreatitis, presenting with sharp, constant 10/10 epigastric abdominal pain.     Pain located in the epigastrium, non radiating, sharp, associated with subjective fever, He denies nausea, vomiting, diarrhea, constipation, CP, SOB. Pain had resolved by the time I saw the patient.     Of note, patient has had no alcohol is >2 years, is on transplant list at [facility].   EGD 2017: with small varices and portal hypertensive gastropathy   Colonoscopy 2017: - Mild congestion of colonic mucosa in the transverse colon, likely from portal colopathy. Colon was otherwise normal. No source of bacteremia noted on colonoscopy. The examined portion of the ileum was normal.    In ED, patient was febrile up to 100.6F. HR 96, 119/73 BP, 18RR, 92% SpO2 RA.   Patient was given 1L bolus of NS and 1g IV Ceftriaxone.    Allergies:  levofloxacin (Other (Moderate))      Home Medications:   Patient Currently Takes Medications as of 2018 14:39 documented in Structured Notes  · 	pantoprazole 40 mg oral delayed release tablet: 1 tab(s) orally once a day, Last Dose Taken:    · 	multivitamin: 1 tab(s) orally once a day, Last Dose Taken:    · 	nadolol 20 mg oral tablet: 1 tab(s) orally once a day, Last Dose Taken:    · 	metFORMIN 500 mg oral tablet: 1 tab(s) orally once a day, Last Dose Taken:     Hospital Medications:  amoxicillin      Capsule 500 milliGRAM(s) Oral every 8 hours  dextrose 40% Gel 15 Gram(s) Oral once PRN  dextrose 5%. 1000 milliLiter(s) IV Continuous <Continuous>  dextrose 50% Injectable 12.5 Gram(s) IV Push once  dextrose 50% Injectable 25 Gram(s) IV Push once  dextrose 50% Injectable 25 Gram(s) IV Push once  fluticasone propionate 50 MICROgram(s)/spray Nasal Spray 1 Spray(s) Both Nostrils daily  furosemide    Tablet 20 milliGRAM(s) Oral daily  glucagon  Injectable 1 milliGRAM(s) IntraMuscular once PRN  insulin glargine Injectable (LANTUS) 30 Unit(s) SubCutaneous at bedtime  insulin lispro (HumaLOG) corrective regimen sliding scale   SubCutaneous three times a day before meals  insulin lispro (HumaLOG) corrective regimen sliding scale   SubCutaneous at bedtime  nadolol 20 milliGRAM(s) Oral daily  pantoprazole    Tablet 40 milliGRAM(s) Oral before breakfast  traMADol 50 milliGRAM(s) Oral every 6 hours PRN      PMHX/PSHX:  E. coli bacteremia  Vitiligo  Latent tuberculosis  Diabetes mellitus type 2, insulin dependent  Esophageal varices  Alcoholic cirrhosis  Status post corneal transplant  Alcoholic Cirrhosis  Esophageal Varices      Family history:  Family history of uterine cancer  Family history of diabetes mellitus      Social History:   denies alcohol, drug use.     ROS:     General:  No wt loss, fevers, chills, night sweats, fatigue,   Eyes:  Good vision, no reported pain  ENT:  No sore throat, pain, runny nose, dysphagia  CV:  No pain, palpitations, hypo/hypertension  Resp:  No dyspnea, cough, tachypnea, wheezing  GI:  See HPI  :  No pain, bleeding, incontinence, nocturia  Muscle:  No pain, weakness  Neuro:  No weakness, tingling, memory problems  Psych:  No fatigue, insomnia, mood problems, depression  Endocrine:  No polyuria, polydipsia, cold/heat intolerance  Heme:  No petechiae, ecchymosis, easy bruisability  Skin:  No rash, edema      PHYSICAL EXAM:     GENERAL:  Appears stated age, well-groomed, well-nourished, no distress  HEENT:  NC/AT,  conjunctivae clear and pink,  no JVD  CHEST:  Full & symmetric excursion, no increased effort, breath sounds clear  HEART:  Regular rhythm, S1, S2, no murmur/rub/S3/S4, no abdominal bruit, no edema  ABDOMEN:  Soft, non-tender, non-distended, normoactive bowel sounds,  no masses ,  EXTREMITIES:  no cyanosis,clubbing or edema  SKIN:  No rash/erythema/ecchymoses/petechiae/wounds/abscess/warm/dry  NEURO:  Alert, oriented    Vital Signs:  Vital Signs Last 24 Hrs  T(C): 37.7 (2018 08:15), Max: 37.7 (2018 23:48)  T(F): 99.9 (2018 08:15), Max: 99.9 (2018 23:48)  HR: 66 (2018 08:15) (64 - 88)  BP: 105/58 (2018 08:15) (103/56 - 159/83)  BP(mean): --  RR: 18 (2018 08:15) (18 - 20)  SpO2: 91% (2018 08:15) (91% - 96%)  Daily Height in cm: 172.72 (2018 11:22)    Daily     LABS:                        13.2   17.2  )-----------( 66       ( 2018 03:28 )             39.7         136  |  104  |  8   ----------------------------<  145<H>  3.8   |  19<L>  |  0.85    Ca    7.0<L>      2018 07:18  Phos  2.2       Mg     1.6     0709    TPro  5.7<L>  /  Alb  3.0<L>  /  TBili  1.5<H>  /  DBili  0.6<H>  /  AST  53<H>  /  ALT  35  /  AlkPhos  100  07-09    LIVER FUNCTIONS - ( 2018 07:18 )  Alb: 3.0 g/dL / Pro: 5.7 g/dL / ALK PHOS: 100 U/L / ALT: 35 U/L / AST: 53 U/L / GGT: x           PT/INR - ( 2018 03:28 )   PT: 14.1 sec;   INR: 1.30 ratio         PTT - ( 2018 03:28 )  PTT:37.5 sec  Urinalysis Basic - ( 2018 05:55 )    Color: Yellow / Appearance: SL Turbid / S.017 / pH: x  Gluc: x / Ketone: Negative  / Bili: Negative / Urobili: Negative   Blood: x / Protein: Trace / Nitrite: Negative   Leuk Esterase: Negative / RBC: 3-5 /HPF / WBC 0-2 /HPF   Sq Epi: x / Non Sq Epi: x / Bacteria: x      Amylase Serum50      Lipase serum24       Ammonia--      Imaging:    < from: US Abdomen Upper Quadrant Right (18 @ 05:41) >  IMPRESSION:   Nonspecific distended gallbladder demonstrating mild wall thickening  and/or edema. Cholelithiasis or sonographic Zamudio's sign. If clinical   suspicion for acute cholecystitis persists, consider further evaluation with dynamic HIDA.  Cirrhosis.   < end of copied text >    < from: CT Abdomen and Pelvis w/ Oral Cont and w/ IV Cont (18 @ 08:42) >  IMPRESSION:   Cirrhosis with evidence of portal hypertension.   Extensive varices in the left rectus musculature and anterior abdominal wall as at recent prior imaging.  No acute pathology.  < end of copied text >    < from: MR Abdomen w/wo IV Cont (18 @ 09:37) >  IMPRESSION:   Cirrhosis with evidence of portal hypertension. No evidence of hepatocellular carcinoma.  < end of copied text >    < from: Colonoscopy (17 @ 13:13) >  Impression:          - Mild congestion of colonic mucosa in the transverse colon, likely from                        portal colopathy.                       - Colon was otherwise normal. No source of bacteremia noted on colonoscopy.                       - The examined portion of the ileum was normal.  Recommendation:      - Recommend Infectious Disease specialists evaluation.                       - Consider prostate exam followed by urine analysis/culture.     - Could consider tagged white blood cell scan.                       - Repeat colonoscopy is not applicable (Non-Screening/Non-Surveillance                        Colonoscopy).    < end of copied text >      < from: Upper EUS (17 @ 14:54) >                                                                                 Impression:          EGD:                       - Small (< 5 mm) esophageal varices.                       - Portal hypertensive gastropathy.                       - The major papilla was mildly edematous                       EUS:                       - Normal common bile duct measuring up to 3 mm in diameter, without sludge or                        stone.                       - Normal ampulla, without evidence of mass.  Recommendation:      - [facility].                       - Resume previous diet.                       - Care as per Hepatology and primary team.                       - The findings and recommendations werediscussed with the patient, their                        spouse and their primary physician.    < end of copied text > Chief Complaint:  Patient is a 42y old  Male who presents with a chief complaint of Abdominal pain (2018 10:23)      HPI:  42 year old male with DM type 2, cirrhosis (2/2 alcohol use, on liver transplant list) c/b esophageal varices (recent EGD in 2017 confirming), vitiligo, recurrent pancreatitis, presenting with sharp, constant 10/10 epigastric abdominal pain.     Pain located in the epigastrium, non radiating, sharp, associated with subjective fever, He denies nausea, vomiting, diarrhea, constipation, CP, SOB. Pain had resolved by the time I saw the patient.     Of note, patient has had no alcohol is >2 years, is on transplant list at [facility].   EGD 2017: with small varices and portal hypertensive gastropathy   Colonoscopy 2017: - Mild congestion of colonic mucosa in the transverse colon, likely from portal colopathy. Colon was otherwise normal. No source of bacteremia noted on colonoscopy. The examined portion of the ileum was normal.    In ED, patient was febrile up to 100.6F. HR 96, 119/73 BP, 18RR, 92% SpO2 RA.   Patient was given 1L bolus of NS and 1g IV Ceftriaxone.    Allergies:  levofloxacin (Other (Moderate))      Home Medications:   Patient Currently Takes Medications as of 2018 14:39 documented in Structured Notes  · 	pantoprazole 40 mg oral delayed release tablet: 1 tab(s) orally once a day, Last Dose Taken:    · 	multivitamin: 1 tab(s) orally once a day, Last Dose Taken:    · 	nadolol 20 mg oral tablet: 1 tab(s) orally once a day, Last Dose Taken:    · 	metFORMIN 500 mg oral tablet: 1 tab(s) orally once a day, Last Dose Taken:     Hospital Medications:  amoxicillin      Capsule 500 milliGRAM(s) Oral every 8 hours  dextrose 40% Gel 15 Gram(s) Oral once PRN  dextrose 5%. 1000 milliLiter(s) IV Continuous <Continuous>  dextrose 50% Injectable 12.5 Gram(s) IV Push once  dextrose 50% Injectable 25 Gram(s) IV Push once  dextrose 50% Injectable 25 Gram(s) IV Push once  fluticasone propionate 50 MICROgram(s)/spray Nasal Spray 1 Spray(s) Both Nostrils daily  furosemide    Tablet 20 milliGRAM(s) Oral daily  glucagon  Injectable 1 milliGRAM(s) IntraMuscular once PRN  insulin glargine Injectable (LANTUS) 30 Unit(s) SubCutaneous at bedtime  insulin lispro (HumaLOG) corrective regimen sliding scale   SubCutaneous three times a day before meals  insulin lispro (HumaLOG) corrective regimen sliding scale   SubCutaneous at bedtime  nadolol 20 milliGRAM(s) Oral daily  pantoprazole    Tablet 40 milliGRAM(s) Oral before breakfast  traMADol 50 milliGRAM(s) Oral every 6 hours PRN      PMHX/PSHX:  E. coli bacteremia  Vitiligo  Latent tuberculosis  Diabetes mellitus type 2, insulin dependent  Esophageal varices  Alcoholic cirrhosis  Status post corneal transplant  Alcoholic Cirrhosis  Esophageal Varices      Family history:  Family history of uterine cancer  Family history of diabetes mellitus      Social History:   denies alcohol, drug use.     ROS:     General:  No wt loss, fevers, chills, night sweats, fatigue,   Eyes:  Good vision, no reported pain  ENT:  No sore throat, pain, runny nose, dysphagia  CV:  No pain, palpitations, hypo/hypertension  Resp:  No dyspnea, cough, tachypnea, wheezing  GI:  See HPI  :  No pain, bleeding, incontinence, nocturia  Muscle:  No pain, weakness  Neuro:  No weakness, tingling, memory problems  Psych:  No fatigue, insomnia, mood problems, depression  Endocrine:  No polyuria, polydipsia, cold/heat intolerance  Heme:  No petechiae, ecchymosis, easy bruisability  Skin:  No rash, edema      PHYSICAL EXAM:     GENERAL:  Appears stated age, well-groomed, well-nourished, no distress  HEENT:  NC/AT,  conjunctivae clear and pink,  no JVD  CHEST:  Full & symmetric excursion, no increased effort, breath sounds clear  HEART:  Regular rhythm, S1, S2, no murmur/rub/S3/S4, no abdominal bruit, no edema  ABDOMEN:  Soft, non-tender, non-distended, normoactive bowel sounds,  no masses ,  EXTREMITIES:  no cyanosis,clubbing or edema  SKIN:  No rash/erythema/ecchymoses/petechiae/wounds/abscess/warm/dry  NEURO:  Alert, oriented    Vital Signs:  Vital Signs Last 24 Hrs  T(C): 37.7 (2018 08:15), Max: 37.7 (2018 23:48)  T(F): 99.9 (2018 08:15), Max: 99.9 (2018 23:48)  HR: 66 (2018 08:15) (64 - 88)  BP: 105/58 (2018 08:15) (103/56 - 159/83)  BP(mean): --  RR: 18 (2018 08:15) (18 - 20)  SpO2: 91% (2018 08:15) (91% - 96%)  Daily Height in cm: 172.72 (2018 11:22)    Daily     LABS:                        13.2   17.2  )-----------( 66       ( 2018 03:28 )             39.7         136  |  104  |  8   ----------------------------<  145<H>  3.8   |  19<L>  |  0.85    Ca    7.0<L>      2018 07:18  Phos  2.2       Mg     1.6     0709    TPro  5.7<L>  /  Alb  3.0<L>  /  TBili  1.5<H>  /  DBili  0.6<H>  /  AST  53<H>  /  ALT  35  /  AlkPhos  100  07-09    LIVER FUNCTIONS - ( 2018 07:18 )  Alb: 3.0 g/dL / Pro: 5.7 g/dL / ALK PHOS: 100 U/L / ALT: 35 U/L / AST: 53 U/L / GGT: x           PT/INR - ( 2018 03:28 )   PT: 14.1 sec;   INR: 1.30 ratio         PTT - ( 2018 03:28 )  PTT:37.5 sec  Urinalysis Basic - ( 2018 05:55 )    Color: Yellow / Appearance: SL Turbid / S.017 / pH: x  Gluc: x / Ketone: Negative  / Bili: Negative / Urobili: Negative   Blood: x / Protein: Trace / Nitrite: Negative   Leuk Esterase: Negative / RBC: 3-5 /HPF / WBC 0-2 /HPF   Sq Epi: x / Non Sq Epi: x / Bacteria: x      Amylase Serum50      Lipase serum24       Ammonia--      Imaging:    < from: US Abdomen Upper Quadrant Right (18 @ 05:41) >  IMPRESSION:   Nonspecific distended gallbladder demonstrating mild wall thickening  and/or edema. Cholelithiasis or sonographic Zamudio's sign. If clinical   suspicion for acute cholecystitis persists, consider further evaluation with dynamic HIDA.  Cirrhosis.   < end of copied text >    < from: CT Abdomen and Pelvis w/ Oral Cont and w/ IV Cont (18 @ 08:42) >  IMPRESSION:   Cirrhosis with evidence of portal hypertension.   Extensive varices in the left rectus musculature and anterior abdominal wall as at recent prior imaging.  No acute pathology.  < end of copied text >    < from: MR Abdomen w/wo IV Cont (18 @ 09:37) >  IMPRESSION:   Cirrhosis with evidence of portal hypertension. No evidence of hepatocellular carcinoma.  < end of copied text >    < from: Colonoscopy (17 @ 13:13) >  Impression:          - Mild congestion of colonic mucosa in the transverse colon, likely from                        portal colopathy.                       - Colon was otherwise normal. No source of bacteremia noted on colonoscopy.                       - The examined portion of the ileum was normal.  Recommendation:      - Recommend Infectious Disease specialists evaluation.                       - Consider prostate exam followed by urine analysis/culture.     - Could consider tagged white blood cell scan.                       - Repeat colonoscopy is not applicable (Non-Screening/Non-Surveillance                        Colonoscopy).    < end of copied text >      < from: Upper EUS (17 @ 14:54) >                                                                                 Impression:          EGD:                       - Small (< 5 mm) esophageal varices.                       - Portal hypertensive gastropathy.                       - The major papilla was mildly edematous                       EUS:                       - Normal common bile duct measuring up to 3 mm in diameter, without sludge or                        stone.                       - Normal ampulla, without evidence of mass.  Recommendation:      - [facility].                       - Resume previous diet.                       - Care as per Hepatology and primary team.                       - The findings and recommendations werediscussed with the patient, their                        spouse and their primary physician.    < end of copied text >

## 2018-07-09 NOTE — PROGRESS NOTE ADULT - PROBLEM SELECTOR PLAN 3
Alcoholic cirrhosis c/b esophageal varices. MELD = 15, Maddrey = 9.3 Patient follows transplant in [facility].  - House GI c/s - Continue IV ceftriaxone 2mg  - Appreciate ID recs

## 2018-07-09 NOTE — PROGRESS NOTE ADULT - PROBLEM SELECTOR PLAN 2
Unclear source of infection though intrabdominal source suspected. Lactate was 3.2 on admission and decreased to 1.7 after fluids.  - Continue IV Ceftriaxone  - improved lactate s/p 1L NS in ED, continue IVF Unclear source of infection though intrabdominal source suspected. Lactate was 3.2 on admission and decreased to 1.7 after fluids.  - improved lactate s/p 1L NS in ED, continue IVF  - Started on 2mg IV ceftriaxone

## 2018-07-09 NOTE — CONSULT NOTE ADULT - ASSESSMENT
43 yo M hx IDDM2, alcoholic cirrhosis (on liver transplant list) c/b esophageal varices (recent EGD in 2017 confirming), vitiligo, recurrent pancreatitis, presenting with sharp, constant 10/10 epigastric abdominal pain.  Patient with h/o recurrent e coli bacteremia in past  Thought to be from biliary source but workup  had been negative  had been on suppressive bactrim with no breakthroughs recently  Stopped taking medications 1 month ago  Now with strep bacteremia  ? Occult abdominal/biliary source  USa s above though no clinical s/s cholecytitis-no murphys or tenderness  No other s/s SSTI  REc:  1) Repeat Cx  2) Follow ID sensi of isolate  3) Consider Ct abd/pelvis or further abdominal imaging(HIDA scheduled for now per surgery)  4) Empiric Ceftriaxone  5) Check Echo  Will tailor plan for ID issues  per course,results.Will defer to primary team on management of other issues.  case d/w Dr Johnson  Will Follow.  Beeper [id number]/afterhours/No [id number]

## 2018-07-09 NOTE — PROGRESS NOTE ADULT - PROBLEM SELECTOR PLAN 4
- at home pt on Levemir 40units QHS; will continue with Lantus 25units QHS and ISS w/ serial FS monitoring at this time Alcoholic cirrhosis c/b esophageal varices. MELD = 15, Maddrey = 9.3 Patient follows transplant in [facility].  - Appreciate GI recs  - Plan for ANTOINETTE

## 2018-07-09 NOTE — PROGRESS NOTE ADULT - ASSESSMENT
43 yo M hx IDDM2, alcoholic cirrhosis (on liver transplant list) c/b esophageal varices (recent EGD in 2017 confirming), vitiligo, recurrent pancreatitis, presenting with sharp, constant 10/10 epigastric abdominal pain. 43 yo M hx IDDM2, alcoholic cirrhosis (on liver transplant list) c/b esophageal varices (recent EGD in 2017 confirming), vitiligo, recurrent pancreatitis, presenting with sharp, constant 10/10 epigastric abdominal pain, now resolved after ceftriaxone.

## 2018-07-09 NOTE — CONSULT NOTE ADULT - ASSESSMENT
Impression:  - Abdominal pain: WBC 17  US abdomen 7/2018 with nonspecific gallbladder edema, gallstones    - Cirrhosis 2/2 alcohol use      PSE: none       Ascites: none       Varices: EGD 3/2017 with small varices, CT 6/2018 with portal HTN and extensive varices in anterior abdominal wall       HCC: MRI 5/2018 with no evidence of HCC      Transplant: on transplant list at [facility], no alcohol for >2 years     Plan:    - trend CBC, CMP, INR  - 42 year old male with DM type 2, cirrhosis (2/2 alcohol use, on liver transplant list) c/b esophageal varices (recent EGD in 2017 confirming), vitiligo, recurrent pancreatitis, presenting with sharp, constant 10/10 epigastric abdominal pain.     Impression:  - Epigastric abdominal pain: Temp 100.6, WBC 17, Blood cultures with Group B strep  UA-, CXR with clear lungs, US abdomen 7/2018 with nonspecific gallbladder edema, gallstones+  Previously with recurrent E coli bacteremia and gallstones, EUS in 2017 with no choledocholithiasis and cholangitis   Given vancomycin and CTX, now on amoxicillin    - Decompensated Cirrhosis 2/2 alcohol use (MELD Na 11)      PSE: none       Ascites: none (on lasix 20mg po and spironolactone 50mg po at home)      Varices: EGD 3/2017 with small varices, CT 6/2018 with portal HTN and extensive varices in anterior abdominal wall, on nadolol 20mg po at home       HCC: MRI 5/2018 with no evidence of HCC      Transplant: on transplant list at [facility], no alcohol for >2 years     Plan:    - trend CBC, CMP, INR  - continue lasix 20mg po and spironolactone 50mg po qday  - continue nadolol 20mg po qday  - continue amoxicillin for bacteremia  - recommend HIDA scan to r/o cholecystitis   - supportive care as per primary team 42 year old male with DM type 2, cirrhosis (2/2 alcohol use, on liver transplant list) c/b esophageal varices (recent EGD in 2017 confirming), vitiligo, recurrent pancreatitis, presenting with sharp, constant 10/10 epigastric abdominal pain.     Impression:  - Epigastric abdominal pain: Temp 100.6, WBC 17, Blood cultures with Group B strep, this could be 2/2 cholecystitis vs PUD (low suspicion of pancreatitis)   UA-, CXR with clear lungs, US abdomen 7/2018 with nonspecific gallbladder edema, gallstones+  Previously with recurrent E coli bacteremia and gallstones, EUS in 2017 with no choledocholithiasis and cholangitis   Given vancomycin and CTX, now on amoxicillin    - Decompensated Cirrhosis 2/2 alcohol use (MELD Na 11)      PSE: none       Ascites: none (on lasix 20mg po and spironolactone 50mg po at home)      Varices: EGD 3/2017 with small varices, CT 6/2018 with portal HTN and extensive varices in anterior abdominal wall, on nadolol 20mg po at home       HCC: MRI 5/2018 with no evidence of HCC      Transplant: on transplant list at [facility], no alcohol for >2 years     Plan:    - trend CBC, CMP, INR  - continue lasix 20mg po and spironolactone 50mg po qday  - continue nadolol 20mg po qday  - continue amoxicillin for bacteremia  - recommend HIDA scan to r/o cholecystitis   - patient will follow with Dr Andre Thakkar (Hepatology office) as outpatient on discharge

## 2018-07-09 NOTE — PROGRESS NOTE ADULT - SUBJECTIVE AND OBJECTIVE BOX
CARINALILLIAN  42y  Male      Patient is a 42y old  Male who presents with a chief complaint of Abdominal pain (08 Jul 2018 10:23)      INTERVAL HPI/OVERNIGHT EVENTS:    MEDICATIONS:    VITALS:    PHYSICAL EXAM:    LABS: CARINALILLIAN VALENZUELA  42y  Male    Patient is a 42y old  Male who presents with a chief complaint of Abdominal pain (08 Jul 2018 10:23)    INTERVAL HPI/OVERNIGHT EVENTS: No acute events overnight. Abdominal pain has resolved. Patient has no nausea, vomiting, diarrhea, CP, palpitations or SOB. Blood cultures were positive for group B strep overnight, and patient received a single dose of vancomycin.    MEDICATIONS  (STANDING):  cefTRIAXone   IVPB 2 Gram(s) IV Intermittent every 24 hours  dextrose 5%. 1000 milliLiter(s) (50 mL/Hr) IV Continuous <Continuous>  dextrose 50% Injectable 12.5 Gram(s) IV Push once  dextrose 50% Injectable 25 Gram(s) IV Push once  dextrose 50% Injectable 25 Gram(s) IV Push once  fluticasone propionate 50 MICROgram(s)/spray Nasal Spray 1 Spray(s) Both Nostrils daily  furosemide    Tablet 20 milliGRAM(s) Oral daily  insulin glargine Injectable (LANTUS) 30 Unit(s) SubCutaneous at bedtime  insulin lispro (HumaLOG) corrective regimen sliding scale   SubCutaneous three times a day before meals  insulin lispro (HumaLOG) corrective regimen sliding scale   SubCutaneous at bedtime  nadolol 20 milliGRAM(s) Oral daily  pantoprazole    Tablet 40 milliGRAM(s) Oral before breakfast    MEDICATIONS  (PRN):  dextrose 40% Gel 15 Gram(s) Oral once PRN Blood Glucose LESS THAN 70 milliGRAM(s)/deciliter  glucagon  Injectable 1 milliGRAM(s) IntraMuscular once PRN Glucose LESS THAN 70 milligrams/deciliter  traMADol 50 milliGRAM(s) Oral every 6 hours PRN Moderate to Severe pain    Vital Signs Last 24 Hrs  T(C): 37.7 (09 Jul 2018 08:15), Max: 37.7 (08 Jul 2018 23:48)  T(F): 99.9 (09 Jul 2018 08:15), Max: 99.9 (08 Jul 2018 23:48)  HR: 66 (09 Jul 2018 08:15) (66 - 88)  BP: 105/58 (09 Jul 2018 08:15) (103/56 - 159/83)  BP(mean): --  RR: 18 (09 Jul 2018 08:15) (18 - 19)  SpO2: 91% (09 Jul 2018 08:15) (91% - 96%)    PHYSICAL EXAM:      LABS:                        10.1   4.12  )-----------( 46       ( 09 Jul 2018 09:24 )             31.1     07-09    136  |  104  |  8   ----------------------------<  145<H>  3.8   |  19<L>  |  0.85    Ca    7.0<L>      09 Jul 2018 07:18  Phos  2.2     07-09  Mg     1.6     07-09    TPro  5.7<L>  /  Alb  3.0<L>  /  TBili  1.5<H>  /  DBili  0.6<H>  /  AST  53<H>  /  ALT  35  /  AlkPhos  100  07-09 CARINALILLIAN VALENZUELA  42y  Male    Patient is a 42y old  Male who presents with a chief complaint of Abdominal pain (08 Jul 2018 10:23)    INTERVAL HPI/OVERNIGHT EVENTS: No acute events overnight. Abdominal pain has resolved. Patient has no nausea, vomiting, diarrhea, CP, palpitations or SOB. Blood cultures were positive for group B strep overnight, and patient received a single dose of vancomycin.    MEDICATIONS  (STANDING):  cefTRIAXone   IVPB 2 Gram(s) IV Intermittent every 24 hours  dextrose 5%. 1000 milliLiter(s) (50 mL/Hr) IV Continuous <Continuous>  dextrose 50% Injectable 12.5 Gram(s) IV Push once  dextrose 50% Injectable 25 Gram(s) IV Push once  dextrose 50% Injectable 25 Gram(s) IV Push once  fluticasone propionate 50 MICROgram(s)/spray Nasal Spray 1 Spray(s) Both Nostrils daily  furosemide    Tablet 20 milliGRAM(s) Oral daily  insulin glargine Injectable (LANTUS) 30 Unit(s) SubCutaneous at bedtime  insulin lispro (HumaLOG) corrective regimen sliding scale   SubCutaneous three times a day before meals  insulin lispro (HumaLOG) corrective regimen sliding scale   SubCutaneous at bedtime  nadolol 20 milliGRAM(s) Oral daily  pantoprazole    Tablet 40 milliGRAM(s) Oral before breakfast    MEDICATIONS  (PRN):  dextrose 40% Gel 15 Gram(s) Oral once PRN Blood Glucose LESS THAN 70 milliGRAM(s)/deciliter  glucagon  Injectable 1 milliGRAM(s) IntraMuscular once PRN Glucose LESS THAN 70 milligrams/deciliter  traMADol 50 milliGRAM(s) Oral every 6 hours PRN Moderate to Severe pain    Vital Signs Last 24 Hrs  T(C): 37.7 (09 Jul 2018 08:15), Max: 37.7 (08 Jul 2018 23:48)  T(F): 99.9 (09 Jul 2018 08:15), Max: 99.9 (08 Jul 2018 23:48)  HR: 66 (09 Jul 2018 08:15) (66 - 88)  BP: 105/58 (09 Jul 2018 08:15) (103/56 - 159/83)  BP(mean): --  RR: 18 (09 Jul 2018 08:15) (18 - 19)  SpO2: 91% (09 Jul 2018 08:15) (91% - 96%)    PHYSICAL EXAM:    LABS:                        10.1   4.12  )-----------( 46       ( 09 Jul 2018 09:24 )             31.1     07-09    136  |  104  |  8   ----------------------------<  145<H>  3.8   |  19<L>  |  0.85    Ca    7.0<L>      09 Jul 2018 07:18  Phos  2.2     07-09  Mg     1.6     07-09    TPro  5.7<L>  /  Alb  3.0<L>  /  TBili  1.5<H>  /  DBili  0.6<H>  /  AST  53<H>  /  ALT  35  /  AlkPhos  100  07-09 CARINALILLIAN VALENZUELA  42y  Male    Patient is a 42y old  Male who presents with a chief complaint of Abdominal pain (08 Jul 2018 10:23)    INTERVAL HPI/OVERNIGHT EVENTS: No acute events overnight. Abdominal pain has resolved. Patient has no nausea, vomiting, diarrhea, CP, palpitations or SOB. Blood cultures were positive for group B strep overnight, and patient received a single dose of vancomycin.    MEDICATIONS  (STANDING):  cefTRIAXone   IVPB 2 Gram(s) IV Intermittent every 24 hours  dextrose 5%. 1000 milliLiter(s) (50 mL/Hr) IV Continuous <Continuous>  dextrose 50% Injectable 12.5 Gram(s) IV Push once  dextrose 50% Injectable 25 Gram(s) IV Push once  dextrose 50% Injectable 25 Gram(s) IV Push once  fluticasone propionate 50 MICROgram(s)/spray Nasal Spray 1 Spray(s) Both Nostrils daily  furosemide    Tablet 20 milliGRAM(s) Oral daily  insulin glargine Injectable (LANTUS) 30 Unit(s) SubCutaneous at bedtime  insulin lispro (HumaLOG) corrective regimen sliding scale   SubCutaneous three times a day before meals  insulin lispro (HumaLOG) corrective regimen sliding scale   SubCutaneous at bedtime  nadolol 20 milliGRAM(s) Oral daily  pantoprazole    Tablet 40 milliGRAM(s) Oral before breakfast    MEDICATIONS  (PRN):  dextrose 40% Gel 15 Gram(s) Oral once PRN Blood Glucose LESS THAN 70 milliGRAM(s)/deciliter  glucagon  Injectable 1 milliGRAM(s) IntraMuscular once PRN Glucose LESS THAN 70 milligrams/deciliter  traMADol 50 milliGRAM(s) Oral every 6 hours PRN Moderate to Severe pain    Vital Signs Last 24 Hrs  T(C): 37.7 (09 Jul 2018 08:15), Max: 37.7 (08 Jul 2018 23:48)  T(F): 99.9 (09 Jul 2018 08:15), Max: 99.9 (08 Jul 2018 23:48)  HR: 66 (09 Jul 2018 08:15) (66 - 88)  BP: 105/58 (09 Jul 2018 08:15) (103/56 - 159/83)  BP(mean): --  RR: 18 (09 Jul 2018 08:15) (18 - 19)  SpO2: 91% (09 Jul 2018 08:15) (91% - 96%)    PHYSICAL EXAM:  CONSTITUTIONAL: NAD  HEAD: Normocephalic, atraumatic, moist mucous membranes  CARDIAC: Normal rate, regular rhythm.  Heart sounds S1, S2.  RESPIRATORY: Breath sounds clear and equal bilaterally.  GASTROINTESTINAL: Soft, NTND, no rebound or guarding, - Zamudio's sign  MUSCULOSKELETAL: range of motion is not limited, no muscle or joint tenderness  NEUROLOGICAL: no focal deficits, no motor or sensory deficits.  PSYCH: A&O x3  SKIN: No evidence of rash. Vitiligo    LABS:                        10.1   4.12  )-----------( 46       ( 09 Jul 2018 09:24 )             31.1     07-09    136  |  104  |  8   ----------------------------<  145<H>  3.8   |  19<L>  |  0.85    Ca    7.0<L>      09 Jul 2018 07:18  Phos  2.2     07-09  Mg     1.6     07-09    TPro  5.7<L>  /  Alb  3.0<L>  /  TBili  1.5<H>  /  DBili  0.6<H>  /  AST  53<H>  /  ALT  35  /  AlkPhos  100  07-09    RUQ ultrasound:  	Nonspecific distended gallbladder demonstrating mild wall thickening and/or   	edema. Cholelithiasis or sonographic Zamudio's sign. If clinical suspicion   	for acute cholecystitis persists, consider further evaluation with dynamic   	HIDA.   	Cirrhosis.

## 2018-07-09 NOTE — PROGRESS NOTE ADULT - PROBLEM SELECTOR PLAN 5
DVT: SCDs  Diet: NPO pending ANTOINETTE - at home pt on Levemir 40units QHS;  - Lantus 30 units QHS and ISS w/ serial FS monitoring at this time

## 2018-07-09 NOTE — PROGRESS NOTE ADULT - PROBLEM SELECTOR PLAN 1
Patient with abdominal pain, radiating to back with past history of recurrent pancreatitis and alcoholic cirrhosis on transplant list. No ascites on exam, leukocytosis and febrile in ED. WNL lipase. Still, differential includes pancreatitis, along with lower suspicion of acute cholecystitis or PUD.   - will obtain CT abdomen  - can consider HIDA after normal RUQ u/s, but low suspicion of acute cholecystitis; will consult GI and await their recommendations  - IVF as below  - analgesia with Tramadol PO 50mg PRN for severe pain (avoid Acetaminophen given liver disease, and NSAIDs given varices) Patient with abdominal pain with past history of recurrent pancreatitis and alcoholic cirrhosis on transplant list. No ascites on exam, leukocytosis and febrile in ED. WNL lipase. Still, differential includes acute cholecystitis, with lower suspicion for pancreatitis or PUD.  - Plan for HIDA after normal RUQ u/s, but low suspicion of acute cholecystitis  - Completed IVF  - analgesia with Tramadol PO 50mg PRN for severe pain (avoid Acetaminophen given liver disease, and NSAIDs given varices)

## 2018-07-09 NOTE — CONSULT NOTE ADULT - SUBJECTIVE AND OBJECTIVE BOX
Patient is a 42y old  Male who presents with a chief complaint of Abdominal pain (08 Jul 2018 10:23)    From HPI" HPI:  41 yo M hx IDDM2, alcoholic cirrhosis (on liver transplant list) c/b esophageal varices (recent EGD in 2017 confirming), vitiligo, recurrent pancreatitis, presenting with sharp, constant 10/10 epigastric abdominal pain. Pain radiates to back, started yesterday around 2pm, subjective fevers, no nausea/vomiting, no diarrhea/constipation/dysuria/hematuria. Pt c/o generalized body aches. No chest pain, no exertional pain, pain unrelated to food intake. No ETOH use. Patient follows liver transplant team at [facility], no alcohol use for 2 years and is currently a transplant candidate. Pain was acute in onset while he was sitting, unrelated to food intake and he did not try anything to relieve his symptoms.    In ED, patient was febrile up to 100.6F. He was hemodynamically stable, with vitals of HR 96, 119/73 BP, 18RR, 92% SpO2 RA. Patient was given 1L bolus of NS and 1g IV Ceftriaxone. (08 Jul 2018 10:23)  "    Above verified-agree with above unless noted below.  Patient well known to me-prior episodes of E coli bacteremia-workup for nidus negative  was on suppressive bactrim  He stopped taking the bactrim "as he felt well"  Started on vanco and ceftriaxone  feels better now    ID consulted     PAST MEDICAL & SURGICAL HISTORY:  E. coli bacteremia: Recurrent  Vitiligo  Latent tuberculosis  Diabetes mellitus type 2, insulin dependent  Esophageal varices  Alcoholic cirrhosis  Status post corneal transplant      Social history:    denies  Smoking,    ETOH,      IVDU       FAMILY HISTORY:  Family history of diabetes mellitus  - Reviewed,Non contributory     REVIEW OF SYSTEMS  General:	Denies any malaise fatigue or chills. Fevers as above     Skin:No rash  	  Ophthalmologic:Denies any visual complaints,discharge redness or photophobia  	  ENMT:No nasal discharge,headache,sinus congestion or throat pain.No dental complaints    Respiratory and Thorax:No cough,sputum or chest pain.Denies shortness of breath  	  Cardiovascular:	No chest pain,palpitaions or dizziness    Gastrointestinal:	NO nausea,abdominal pain or diarrhea.    Genitourinary:	No dysuria,frequency. No flank pain    Musculoskeletal:	No joint swelling or pain.No weakness    Neurological:No confusion,diziness.No extremity weakness.No bladder or bowel incontinence	    Psychiatric:No delusions or hallucinations	    Hematology/Lymphatics:	No LN swelling.No gum bleeding     Endocrine:	No recent weight gain or loss.No abnormal heat/cold intolerance    Allergic/Immunologic:	No hives or rash   Allergies    levofloxacin (Other (Moderate))    Intolerances        Antimicrobials:    cefTRIAXone   IVPB 2 Gram(s) IV Intermittent every 24 hours    Other medications reviewed      Vital Signs Last 24 Hrs  T(C): 37.7 (09 Jul 2018 08:15), Max: 37.7 (08 Jul 2018 23:48)  T(F): 99.9 (09 Jul 2018 08:15), Max: 99.9 (08 Jul 2018 23:48)  HR: 66 (09 Jul 2018 08:15) (66 - 88)  BP: 105/58 (09 Jul 2018 08:15) (103/56 - 159/83)  BP(mean): --  RR: 18 (09 Jul 2018 08:15) (18 - 19)  SpO2: 91% (09 Jul 2018 08:15) (91% - 96%)    Skin vitiligo multiple lesions  Left elbow psoriasis but no discharge or tenderness    PHYSICAL EXAM:Pleasant patient in no acute distress.      Constitutional:Comfortable.Awake and alert  No cachexia     Eyes:PERRL EOMI.NO discharge or conjunctival injection    ENMT:No sinus tenderness.No thrush.No pharyngeal exudate or erythema.Fair dental hygiene    Neck:Supple,No LN,no JVD      Respiratory:Good air entry bilaterally,CTA    Cardiovascular:S1 S2 wnl, No murmurs,rub or gallops    Gastrointestinal:Soft BS(+)  distended FF no tenderness no masses ,No rebound or guarding  No RUQ tenderness     Genitourinary:No CVA tendereness         Extremities:No cyanosis,clubbing or edema.    Vascular:peripheral pulses felt    Neurological:AAO X 3,No grossly focal deficits    Skin:No rash     Lymph Nodes:No palpable LNs    Musculoskeletal:No joint swelling or LOM    Psychiatric:Affect normal.          Labs:                            10.1   4.12  )-----------( 46       ( 09 Jul 2018 09:24 )             31.1     WBC Count: 4.12 (07-09-18 @ 09:24)  WBC Count: 17.2 (07-08-18 @ 03:28)      07-09    136  |  104  |  8   ----------------------------<  145<H>  3.8   |  19<L>  |  0.85    Ca    7.0<L>      09 Jul 2018 07:18  Phos  2.2     07-09  Mg     1.6     07-09    TPro  5.7<L>  /  Alb  3.0<L>  /  TBili  1.5<H>  /  DBili  0.6<H>  /  AST  53<H>  /  ALT  35  /  AlkPhos  100  07-09            Culture - Blood (collected 08 Jul 2018 07:11)  Source: .Blood Blood-Venous  Preliminary Report (09 Jul 2018 08:01):    No growth to date.    Culture - Blood (collected 08 Jul 2018 07:11)  Source: .Blood Blood-Peripheral  Gram Stain (08 Jul 2018 18:57):    Growth in aerobic bottle: Gram Positive Cocci in Pairs and Chains  Preliminary Report (08 Jul 2018 18:58):    Growth in aerobic bottle: Gram Positive Cocci in Pairs and Chains         -  Streptococcus agalactiae (Group B): Detec      Method Type: PCR        < from: Xray Chest 1 View- PORTABLE-Urgent (07.08.18 @ 04:50) >    IMPRESSION:   Clear lungs.     < end of copied text >      < from: US Abdomen Upper Quadrant Right (07.08.18 @ 05:41) >  IMPRESSION:     Nonspecific distended gallbladder demonstrating mild wall thickening   and/or edema. Cholelithiasis or sonographic Zamudio's sign. If clinical   suspicion for acute cholecystitis persists, consider further evaluation   with dynamic HIDA.    Cirrhosis.       < end of copied text >    Urinalysis + Microscopic Examination (07.08.18 @ 05:55)    Specific Gravity: 1.017    Urobilinogen: Negative    Urine Appearance: SL Turbid    Leukocyte Esterase Concentration: Negative    Nitrite: Negative    Ketone - Urine: Negative    Protein, Urine: Trace    pH Urine: 6.0    Blood, Urine: Small    Glucose Qualitative, Urine: 150 mg/dL    Bilirubin: Negative    Color: Yellow    Red Blood Cell - Urine: 3-5 /HPF    White Blood Cell - Urine: 0-2 /HPF    Comment - Urine: Few Mucous Strands

## 2018-07-09 NOTE — PROGRESS NOTE ADULT - PROBLEM SELECTOR PLAN 6
DVT: SCDs  Dispo: pending HIDA and plan for antibiotic regimen DVT: SCDs  Dispo: pending HIDA and plan for antibiotic regimen  Diet: Carb consistent

## 2018-07-09 NOTE — PROGRESS NOTE ADULT - PROBLEM SELECTOR PROBLEM 4
Type 2 diabetes mellitus with other specified complication, with long-term current use of insulin Alcoholic cirrhosis

## 2018-07-09 NOTE — CONSULT NOTE ADULT - ATTENDING COMMENTS
seen and examined 7/8/2018 @ 1430    periumbilical / epigastric pain and low-grade fevers yesterday which have since resolved.  EtOH cirrhosis with portal hypertension and gastric varices, on transplant list at [facility].    abd soft / NT / ND.  no evidence of ascites.  no jaundice or scleral icterus.    WBC = 17  BCx - gram positive cocci in pairs and chains    RUQ U/S (today) - no gallstones, no CBD dilatation, nonspecific mild gallbladder wall thickening    no evidence of gallbladder or gallstone related disease  -consider hepatology consult as current illness may be related to advanced cirrhosis  -reconsult surgery PRN
Same pain as during past episodes of biliary infection and bacteremia, now again severe epigastric pain, mild temperature, leukocytosis WBC 17, LFTs abnormal but at baseline; GBS bacteremia, much improved on Abx. Still got morphine overnight. US shows dilated GB with mild wall thickening. Lipase normal. Recent MRCP on 5/19 showed normal bile ducts.  Prior extensive workup including EGD and colonoscopy x 2 in 2016 and 2017, and EUS 2017, as well as prior CT did not show a source.      - agree with HIDA scan

## 2018-07-10 LAB
-  CEFTRIAXONE: SIGNIFICANT CHANGE UP
-  CLINDAMYCIN: SIGNIFICANT CHANGE UP
-  LEVOFLOXACIN: SIGNIFICANT CHANGE UP
-  PENICILLIN: SIGNIFICANT CHANGE UP
-  VANCOMYCIN: SIGNIFICANT CHANGE UP
ALBUMIN SERPL ELPH-MCNC: 2.9 G/DL — LOW (ref 3.3–5)
ALP SERPL-CCNC: 108 U/L — SIGNIFICANT CHANGE UP (ref 40–120)
ALT FLD-CCNC: 31 U/L — SIGNIFICANT CHANGE UP (ref 10–45)
ANION GAP SERPL CALC-SCNC: 12 MMOL/L — SIGNIFICANT CHANGE UP (ref 5–17)
AST SERPL-CCNC: 50 U/L — HIGH (ref 10–40)
BASOPHILS # BLD AUTO: 0.01 K/UL — SIGNIFICANT CHANGE UP (ref 0–0.2)
BASOPHILS NFR BLD AUTO: 0.3 % — SIGNIFICANT CHANGE UP (ref 0–2)
BILIRUB SERPL-MCNC: 1.1 MG/DL — SIGNIFICANT CHANGE UP (ref 0.2–1.2)
BUN SERPL-MCNC: 8 MG/DL — SIGNIFICANT CHANGE UP (ref 7–23)
CALCIUM SERPL-MCNC: 7.7 MG/DL — LOW (ref 8.4–10.5)
CHLORIDE SERPL-SCNC: 106 MMOL/L — SIGNIFICANT CHANGE UP (ref 96–108)
CO2 SERPL-SCNC: 22 MMOL/L — SIGNIFICANT CHANGE UP (ref 22–31)
CREAT SERPL-MCNC: 0.83 MG/DL — SIGNIFICANT CHANGE UP (ref 0.5–1.3)
CULTURE RESULTS: SIGNIFICANT CHANGE UP
EOSINOPHIL # BLD AUTO: 0.14 K/UL — SIGNIFICANT CHANGE UP (ref 0–0.5)
EOSINOPHIL NFR BLD AUTO: 3.6 % — SIGNIFICANT CHANGE UP (ref 0–6)
GLUCOSE BLDC GLUCOMTR-MCNC: 153 MG/DL — HIGH (ref 70–99)
GLUCOSE BLDC GLUCOMTR-MCNC: 166 MG/DL — HIGH (ref 70–99)
GLUCOSE BLDC GLUCOMTR-MCNC: 197 MG/DL — HIGH (ref 70–99)
GLUCOSE BLDC GLUCOMTR-MCNC: 215 MG/DL — HIGH (ref 70–99)
GLUCOSE SERPL-MCNC: 130 MG/DL — HIGH (ref 70–99)
HCT VFR BLD CALC: 31 % — LOW (ref 39–50)
HGB BLD-MCNC: 10.3 G/DL — LOW (ref 13–17)
IMM GRANULOCYTES NFR BLD AUTO: 0.3 % — SIGNIFICANT CHANGE UP (ref 0–1.5)
LYMPHOCYTES # BLD AUTO: 1.08 K/UL — SIGNIFICANT CHANGE UP (ref 1–3.3)
LYMPHOCYTES # BLD AUTO: 27.6 % — SIGNIFICANT CHANGE UP (ref 13–44)
MAGNESIUM SERPL-MCNC: 1.9 MG/DL — SIGNIFICANT CHANGE UP (ref 1.6–2.6)
MCHC RBC-ENTMCNC: 24 PG — LOW (ref 27–34)
MCHC RBC-ENTMCNC: 33.2 GM/DL — SIGNIFICANT CHANGE UP (ref 32–36)
MCV RBC AUTO: 72.1 FL — LOW (ref 80–100)
METHOD TYPE: SIGNIFICANT CHANGE UP
MONOCYTES # BLD AUTO: 0.37 K/UL — SIGNIFICANT CHANGE UP (ref 0–0.9)
MONOCYTES NFR BLD AUTO: 9.4 % — SIGNIFICANT CHANGE UP (ref 2–14)
NEUTROPHILS # BLD AUTO: 2.31 K/UL — SIGNIFICANT CHANGE UP (ref 1.8–7.4)
NEUTROPHILS NFR BLD AUTO: 58.8 % — SIGNIFICANT CHANGE UP (ref 43–77)
ORGANISM # SPEC MICROSCOPIC CNT: SIGNIFICANT CHANGE UP
PHOSPHATE SERPL-MCNC: 3.5 MG/DL — SIGNIFICANT CHANGE UP (ref 2.5–4.5)
PLATELET # BLD AUTO: 59 K/UL — LOW (ref 150–400)
POTASSIUM SERPL-MCNC: 3.6 MMOL/L — SIGNIFICANT CHANGE UP (ref 3.5–5.3)
POTASSIUM SERPL-SCNC: 3.6 MMOL/L — SIGNIFICANT CHANGE UP (ref 3.5–5.3)
PROT SERPL-MCNC: 6 G/DL — SIGNIFICANT CHANGE UP (ref 6–8.3)
RBC # BLD: 4.3 M/UL — SIGNIFICANT CHANGE UP (ref 4.2–5.8)
RBC # FLD: 19 % — HIGH (ref 10.3–14.5)
SODIUM SERPL-SCNC: 140 MMOL/L — SIGNIFICANT CHANGE UP (ref 135–145)
SPECIMEN SOURCE: SIGNIFICANT CHANGE UP
WBC # BLD: 3.92 K/UL — SIGNIFICANT CHANGE UP (ref 3.8–10.5)
WBC # FLD AUTO: 3.92 K/UL — SIGNIFICANT CHANGE UP (ref 3.8–10.5)

## 2018-07-10 PROCEDURE — 74177 CT ABD & PELVIS W/CONTRAST: CPT | Mod: 26

## 2018-07-10 PROCEDURE — 99233 SBSQ HOSP IP/OBS HIGH 50: CPT | Mod: GC

## 2018-07-10 PROCEDURE — 99233 SBSQ HOSP IP/OBS HIGH 50: CPT

## 2018-07-10 PROCEDURE — 99232 SBSQ HOSP IP/OBS MODERATE 35: CPT | Mod: GC

## 2018-07-10 RX ADMIN — Medication 4: at 17:29

## 2018-07-10 RX ADMIN — PANTOPRAZOLE SODIUM 40 MILLIGRAM(S): 20 TABLET, DELAYED RELEASE ORAL at 06:24

## 2018-07-10 RX ADMIN — Medication 2: at 08:41

## 2018-07-10 RX ADMIN — CEFTRIAXONE 100 GRAM(S): 500 INJECTION, POWDER, FOR SOLUTION INTRAMUSCULAR; INTRAVENOUS at 13:00

## 2018-07-10 RX ADMIN — INSULIN GLARGINE 30 UNIT(S): 100 INJECTION, SOLUTION SUBCUTANEOUS at 22:07

## 2018-07-10 RX ADMIN — Medication 1 SPRAY(S): at 12:31

## 2018-07-10 RX ADMIN — NADOLOL 20 MILLIGRAM(S): 80 TABLET ORAL at 08:41

## 2018-07-10 RX ADMIN — Medication 2: at 12:30

## 2018-07-10 NOTE — PROGRESS NOTE ADULT - PROBLEM SELECTOR PLAN 1
Patient with abdominal pain with past history of recurrent pancreatitis and alcoholic cirrhosis on transplant list. No ascites on exam, leukocytosis and febrile in ED. WNL lipase. Still, differential includes acute cholecystitis, with lower suspicion for pancreatitis or PUD.  - Plan for HIDA after normal RUQ u/s, but low suspicion of acute cholecystitis  - Completed IVF  - analgesia with Tramadol PO 50mg PRN for severe pain (avoid Acetaminophen given liver disease, and NSAIDs given varices) Patient with abdominal pain with past history of recurrent pancreatitis and alcoholic cirrhosis on transplant list. No ascites on exam, leukocytosis and febrile in ED. WNL lipase.  -RUQ u/s and HIDA ruled out acute cholecystitis  - Completed IVF  - analgesia with Tramadol PO 50mg PRN for severe pain (avoid Acetaminophen given liver disease, and NSAIDs given varices)

## 2018-07-10 NOTE — PROGRESS NOTE ADULT - PROBLEM SELECTOR PLAN 3
- Continue IV ceftriaxone 2mg  - Appreciate ID recs - Continue IV ceftriaxone 2mg  - Appreciate ID recs  - CT abdomen/pelvis, to look for sources of infection. Patient has had recurrent bacteremia in the past.  - If repeat blood cx is negative, can transition to oral antibiotics  - No TTE at the time, but can consider if blood cx remains positive

## 2018-07-10 NOTE — PROGRESS NOTE ADULT - ASSESSMENT
43 yo M hx IDDM2, alcoholic cirrhosis (on liver transplant list) c/b esophageal varices (recent EGD in 2017 confirming), vitiligo, recurrent pancreatitis, presenting with sharp, constant 10/10 epigastric abdominal pain, now resolved after ceftriaxone.

## 2018-07-10 NOTE — PROGRESS NOTE ADULT - PROBLEM SELECTOR PLAN 6
DVT: SCDs  Dispo: pending HIDA and plan for antibiotic regimen  Diet: Carb consistent DVT: SCDs  Dispo: pending transition to oral antibiotics  Diet: Carb consistent

## 2018-07-10 NOTE — PROGRESS NOTE ADULT - PROBLEM SELECTOR PLAN 5
- at home pt on Levemir 40units QHS;  - Lantus 30 units QHS and ISS w/ serial FS monitoring at this time

## 2018-07-10 NOTE — PROGRESS NOTE ADULT - SUBJECTIVE AND OBJECTIVE BOX
CARINA LILLIAN  42y  Male    Patient is a 42y old  Male who presents with a chief complaint of Abdominal pain (08 Jul 2018 10:23)    INTERVAL HPI/OVERNIGHT EVENTS: No acute events overnight. Patient has no nausea, vomiting, diarrhea, CP, palpitations or SOB.    MEDICATIONS  (STANDING):    Vital Signs Last 24 Hrs      PHYSICAL EXAM:  CONSTITUTIONAL: NAD  HEAD: Normocephalic, atraumatic, moist mucous membranes  CARDIAC: Normal rate, regular rhythm.  Heart sounds S1, S2.  RESPIRATORY: Breath sounds clear and equal bilaterally.  GASTROINTESTINAL: Soft, NTND, no rebound or guarding, - Zamudio's sign  MUSCULOSKELETAL: range of motion is not limited, no muscle or joint tenderness  NEUROLOGICAL: no focal deficits, no motor or sensory deficits.  PSYCH: A&O x3  SKIN: No evidence of rash. Vitiligo    LABS:      RUQ ultrasound:  	Nonspecific distended gallbladder demonstrating mild wall thickening and/or   	edema. Cholelithiasis or sonographic Zamudio's sign. If clinical suspicion   	for acute cholecystitis persists, consider further evaluation with dynamic   	HIDA.   	Cirrhosis. CARINALILLIAN VALENZUELA  42y  Male    Patient is a 42y old  Male who presents with a chief complaint of Abdominal pain (08 Jul 2018 10:23)    INTERVAL HPI/OVERNIGHT EVENTS: Abdominal pains is resolved and patient is afebrile. No other acute events overnight - no nausea, vomiting, diarrhea, CP, palpitations or SOB.    MEDICATIONS  (STANDING):  cefTRIAXone   IVPB 2 Gram(s) IV Intermittent every 24 hours  dextrose 5%. 1000 milliLiter(s) (50 mL/Hr) IV Continuous <Continuous>  dextrose 50% Injectable 12.5 Gram(s) IV Push once  dextrose 50% Injectable 25 Gram(s) IV Push once  dextrose 50% Injectable 25 Gram(s) IV Push once  fluticasone propionate 50 MICROgram(s)/spray Nasal Spray 1 Spray(s) Both Nostrils daily  furosemide    Tablet 20 milliGRAM(s) Oral daily  insulin glargine Injectable (LANTUS) 30 Unit(s) SubCutaneous at bedtime  insulin lispro (HumaLOG) corrective regimen sliding scale   SubCutaneous three times a day before meals  insulin lispro (HumaLOG) corrective regimen sliding scale   SubCutaneous at bedtime  nadolol 20 milliGRAM(s) Oral daily  pantoprazole    Tablet 40 milliGRAM(s) Oral before breakfast    MEDICATIONS  (PRN):  dextrose 40% Gel 15 Gram(s) Oral once PRN Blood Glucose LESS THAN 70 milliGRAM(s)/deciliter  glucagon  Injectable 1 milliGRAM(s) IntraMuscular once PRN Glucose LESS THAN 70 milligrams/deciliter  traMADol 50 milliGRAM(s) Oral every 6 hours PRN Moderate to Severe pain    Vital Signs Last 24 Hrs  T(C): 37.1 (10 Jul 2018 07:34), Max: 37.6 (09 Jul 2018 23:45)  T(F): 98.8 (10 Jul 2018 07:34), Max: 99.7 (09 Jul 2018 23:45)  HR: 62 (10 Jul 2018 07:34) (59 - 85)  BP: 104/63 (10 Jul 2018 07:34) (102/59 - 131/77)  BP(mean): --  RR: 18 (10 Jul 2018 07:34) (12 - 18)  SpO2: 92% (10 Jul 2018 07:34) (92% - 94%)	    PHYSICAL EXAM:  CONSTITUTIONAL: NAD  HEAD: Normocephalic, atraumatic, moist mucous membranes  CARDIAC: Normal rate, regular rhythm.  Heart sounds S1, S2.  RESPIRATORY: Breath sounds clear and equal bilaterally.  GASTROINTESTINAL: Soft, NTND, no rebound or guarding, - Zamudio's sign  MUSCULOSKELETAL: range of motion is not limited, no muscle or joint tenderness  NEUROLOGICAL: no focal deficits, no motor or sensory deficits.  PSYCH: A&O x3  SKIN: No evidence of rash. Vitiligo    LABS:                        10.3   3.92  )-----------( 59       ( 10 Jul 2018 08:10 )             31.0   07-10    140  |  106  |  8   ----------------------------<  130<H>  3.6   |  22  |  0.83    Ca    7.7<L>      10 Jul 2018 07:11  Phos  3.5     07-10  Mg     1.9     07-10    TPro  6.0  /  Alb  2.9<L>  /  TBili  1.1  /  DBili  x   /  AST  50<H>  /  ALT  31  /  AlkPhos  108  07-10    RUQ ultrasound:  	Nonspecific distended gallbladder demonstrating mild wall thickening and/or   	edema. Cholelithiasis or sonographic Zamudio's sign. If clinical suspicion   	for acute cholecystitis persists, consider further evaluation with dynamic   	HIDA.   	Cirrhosis.

## 2018-07-10 NOTE — PROGRESS NOTE ADULT - SUBJECTIVE AND OBJECTIVE BOX
Chief Complaint:  Patient is a 42y old  Male who presents with a chief complaint of Abdominal pain (08 Jul 2018 10:23)    Interval Events:   No acute overnight events. Patient feels well. Reports abdominal pain has resolved. No fevers    Allergies:  levofloxacin (Other (Moderate))    Hospital Medications:  cefTRIAXone   IVPB 2 Gram(s) IV Intermittent every 24 hours  dextrose 40% Gel 15 Gram(s) Oral once PRN  dextrose 5%. 1000 milliLiter(s) IV Continuous <Continuous>  dextrose 50% Injectable 12.5 Gram(s) IV Push once  dextrose 50% Injectable 25 Gram(s) IV Push once  dextrose 50% Injectable 25 Gram(s) IV Push once  fluticasone propionate 50 MICROgram(s)/spray Nasal Spray 1 Spray(s) Both Nostrils daily  furosemide    Tablet 20 milliGRAM(s) Oral daily  glucagon  Injectable 1 milliGRAM(s) IntraMuscular once PRN  insulin glargine Injectable (LANTUS) 30 Unit(s) SubCutaneous at bedtime  insulin lispro (HumaLOG) corrective regimen sliding scale   SubCutaneous three times a day before meals  insulin lispro (HumaLOG) corrective regimen sliding scale   SubCutaneous at bedtime  nadolol 20 milliGRAM(s) Oral daily  pantoprazole    Tablet 40 milliGRAM(s) Oral before breakfast  traMADol 50 milliGRAM(s) Oral every 6 hours PRN    PMHX/PSHX:  E. coli bacteremia  Vitiligo  Latent tuberculosis  Diabetes mellitus type 2, insulin dependent  Esophageal varices  Alcoholic cirrhosis  Status post corneal transplant  Alcoholic Cirrhosis  Esophageal Varices    Family history:  Family history of uterine cancer  Family history of diabetes mellitus    ROS:   General:  No fevers, chills or night sweats  Eyes:  No blurry vision or pain  ENT:  No sore throat or dysphagia  CV:  No pain or palpitations  Resp:  No dyspnea, cough or  wheezing  GI:  No pain, No nausea, No vomiting, No diarrhea, No constipation, No weight loss,No pruritis, No rectal bleeding, No tarry stools,  Neuro:  No weakness or tingling  Psych:  No fatigue, insomnia, mood problems, depression  Heme:  No petechiae or bruising  Skin:  No rash or edema      PHYSICAL EXAM:   Vital Signs:  Vital Signs Last 24 Hrs  T(C): 37.1 (10 Jul 2018 07:34), Max: 37.6 (09 Jul 2018 23:45)  T(F): 98.8 (10 Jul 2018 07:34), Max: 99.7 (09 Jul 2018 23:45)  HR: 62 (10 Jul 2018 07:34) (59 - 85)  BP: 104/63 (10 Jul 2018 07:34) (102/59 - 131/77)  BP(mean): --  RR: 18 (10 Jul 2018 07:34) (12 - 18)  SpO2: 92% (10 Jul 2018 07:34) (92% - 94%)  Daily     Daily     GENERAL:  NAD, Appears stated age  HEENT:  NC/AT,  conjunctivae clear and pink, sclera -anicteric  CHEST:  Normal Effort, Breath sounds clear  HEART:  RRR, S1 + S2, no murmurs  ABDOMEN:  Soft, non-tender, non-distended, normoactive bowel sounds,  no masses  EXTEREMITIES:  no cyanosis or edema  SKIN:  Warm & Dry. Areas of hypopigmented skin  NEURO:  Alert, oriented    LABS:                        10.3   3.92  )-----------( 59       ( 10 Jul 2018 08:10 )             31.0     Mean Cell Volume: 72.1 fl (07-10-18 @ 08:10)    07-10    140  |  106  |  8   ----------------------------<  130<H>  3.6   |  22  |  0.83    Ca    7.7<L>      10 Jul 2018 07:11  Phos  3.5     07-10  Mg     1.9     07-10    TPro  6.0  /  Alb  2.9<L>  /  TBili  1.1  /  DBili  x   /  AST  50<H>  /  ALT  31  /  AlkPhos  108  07-10    LIVER FUNCTIONS - ( 10 Jul 2018 07:11 )  Alb: 2.9 g/dL / Pro: 6.0 g/dL / ALK PHOS: 108 U/L / ALT: 31 U/L / AST: 50 U/L / GGT: x           Imaging:  < from: NM Hepatobiliary Scan w/wo Gall Bladder (07.09.18 @ 15:56) >  Normal morphine-augmented hepatobiliary scan.  No evidence of acute cholecystitis.  < end of copied text >

## 2018-07-10 NOTE — PROGRESS NOTE ADULT - SUBJECTIVE AND OBJECTIVE BOX
Patient is a 42y old  Male who presents with a chief complaint of Abdominal pain (08 Jul 2018 10:23)    Being followed by ID for GBS bacteremia     Interval history:feels well   No acute events      ROS:  No cough,SOB,CP  No N/V/D./abd pain  No other complaints      Antimicrobials:    cefTRIAXone   IVPB 2 Gram(s) IV Intermittent every 24 hours    Other medications reviewed    Vital Signs Last 24 Hrs  T(C): 37.1 (07-10-18 @ 07:34), Max: 37.6 (07-09-18 @ 23:45)  T(F): 98.8 (07-10-18 @ 07:34), Max: 99.7 (07-09-18 @ 23:45)  HR: 62 (07-10-18 @ 07:34) (59 - 85)  BP: 104/63 (07-10-18 @ 07:34) (102/59 - 131/77)  BP(mean): --  RR: 18 (07-10-18 @ 07:34) (12 - 18)  SpO2: 92% (07-10-18 @ 07:34) (92% - 94%)    Physical Exam:    Vitiligo     HEENT PERRLA EOMI    No oral exudate or erythema    Chest Good AE,CTA    CVS RRR S1 S2 WNl No murmur or rub or gallop    Abd soft BS normal No tenderness no masses    IV site no erythema tenderness or discharge    CNS AAO X 3 no focal    Lab Data:                          10.3   3.92  )-----------( 59       ( 10 Jul 2018 08:10 )             31.0       07-10    140  |  106  |  8   ----------------------------<  130<H>  3.6   |  22  |  0.83    Ca    7.7<L>      10 Jul 2018 07:11  Phos  3.5     07-10  Mg     1.9     07-10    TPro  6.0  /  Alb  2.9<L>  /  TBili  1.1  /  DBili  x   /  AST  50<H>  /  ALT  31  /  AlkPhos  108  07-10        Culture - Blood (collected 08 Jul 2018 07:11)  Source: .Blood Blood-Venous  Preliminary Report (09 Jul 2018 08:01):    No growth to date.    Culture - Blood (collected 08 Jul 2018 07:11)  Source: .Blood Blood-Peripheral  Gram Stain (08 Jul 2018 18:57):    Growth in aerobic bottle: Gram Positive Cocci in Pairs and Chains  Preliminary Report (09 Jul 2018 17:37):    Growth in aerobic bottle: Streptococcus agalactiae (Group B)   Organism: Blood Culture PCR (08 Jul 2018 21:03)      -  Streptococcus agalactiae (Group B): Detec      Method Type: PCR

## 2018-07-10 NOTE — PROGRESS NOTE ADULT - ASSESSMENT
41 yo M hx IDDM2, alcoholic cirrhosis (on liver transplant list) c/b esophageal varices (recent EGD in 2017 confirming), vitiligo, recurrent pancreatitis, presenting with sharp, constant 10/10 epigastric abdominal pain.  Patient with h/o recurrent e coli bacteremia in past  Thought to be from biliary source but workup  had been negative  had been on suppressive bactrim with no breakthroughs recently  Stopped taking medications 1 month ago  Now with strep bacteremia  ? Occult abdominal/biliary source  USa s above though no clinical s/s cholecytitis-no murphys or tenderness  No other s/s SSTI  REc:  1) Repeat Cx  2) Follow ID sensi of isolate  3) Consider Ct abd/pelvis or further abdominal imaging(HIDA negative )  4) Empiric Ceftriaxone  5) Check Echo  Will tailor plan for ID issues  per course,results.Will defer to primary team on management of other issues.  case d/w primary team   Will Follow.  Beeper [id number]-fopu/afterhours/No response-[id number]

## 2018-07-10 NOTE — PROGRESS NOTE ADULT - ASSESSMENT
42 year old male with DM type 2, cirrhosis (2/2 alcohol use, on liver transplant list) c/b esophageal varices (recent EGD in 2017 confirming), vitiligo, recurrent pancreatitis, presenting with sharp, constant 10/10 epigastric abdominal pain.     Impression:  - Epigastric abdominal pain: . Abdominal Sono inconclusive and HIDA negative for acute cholecystits.  Abdominal pain now resolved, patient has not required any pain meds since 7/8.  - Group B Strep Bacteremia: Temp 100.6, WBC 17. Afebrile since admission to the floors with improved WBC count. Unclear source of infection at this time with negative HIDA. TTE negative, UA negative, Chest X-Ray negative, CT A/P pending  -Decompensated Cirrhosis 2/2 alcohol use (MELD Na 11 on admission)      PSE: No history      Ascites: None on US (7/8). (on lasix 20mg po BID and spironolactone 50mg po at home)      Varices: EGD 3/2017 with small varices, CT 6/2018 with portal HTN and extensive varices in anterior abdominal wall, on nadolol 20mg po at home       HCC: MRI 5/2018 with no evidence of HCC      Transplant: on transplant list at [facility], no alcohol for >2 years     Plan:  - Trend CBC, CMP, INR  - Continue with Lasix and Spironolactone for Ascites  - Continue nadolol 20mg po qday for portal Hypertension  - Continue Ceftriaxone for bacteremia. Follow up CT A/P. ID follow up     Sanaz Kumar MD  [phone] [id number] 42 year old male with DM type 2, cirrhosis (2/2 alcohol use, on liver transplant list) c/b esophageal varices (recent EGD in 2017 confirming), vitiligo, recurrent pancreatitis, presenting with sharp, constant 10/10 epigastric abdominal pain.     Impression:  - Epigastric abdominal pain: . Abdominal Sono inconclusive and HIDA negative for acute cholecystits.  Abdominal pain now resolved, patient has not required any pain meds since 7/8.  - Group B Strep Bacteremia: Temp 100.6, WBC 17. Afebrile since admission to the floors with improved WBC count. Unclear source of infection at this time with negative HIDA. TTE negative, UA negative, Chest X-Ray negative, CT A/P pending     Recent MRCP on 5/19 showed normal bile ducts. Prior extensive workup including EGD and colonoscopy x 2 in 2016 and 2017, and EUS 2017, as well as prior CT 6/2018 did not show a source.    -Decompensated Cirrhosis 2/2 alcohol use (MELD Na 11 on admission)      PSE: No history      Ascites: None on US (7/8). (on lasix 20mg po BID and spironolactone 50mg po at home)      Varices: EGD 3/2017 with small varices, CT 6/2018 with portal HTN and extensive varices in anterior abdominal wall, on nadolol 20mg po at home       HCC: MRI 5/2018 with no evidence of HCC      Transplant: on transplant list at [facility], no alcohol for >2 years     Plan:  - Trend CBC, CMP, INR  - Continue with Lasix and Spironolactone for Ascites  - Continue nadolol 20mg po qday for portal Hypertension  - Continue Ceftriaxone for bacteremia. Follow up CT A/P. ID follow up     Sanaz Kumar MD  [phone]

## 2018-07-10 NOTE — PROGRESS NOTE ADULT - PROBLEM SELECTOR PLAN 4
Alcoholic cirrhosis c/b esophageal varices. MELD = 15, Maddrey = 9.3 Patient follows transplant in [facility].  - Appreciate GI recs  - Plan for ANTOINETTE

## 2018-07-10 NOTE — PROGRESS NOTE ADULT - PROBLEM SELECTOR PLAN 2
Unclear source of infection though intrabdominal source suspected. Lactate was 3.2 on admission and decreased to 1.7 after fluids.  - improved lactate s/p 1L NS in ED, continue IVF  - Started on 2mg IV ceftriaxone Unclear source of infection though intra-abdominal source suspected. Lactate was 3.2 on admission and decreased to 1.7 after fluids.  - improved lactate s/p 1L NS in ED  - Continue 2mg IV ceftriaxone

## 2018-07-11 ENCOUNTER — TRANSCRIPTION ENCOUNTER (OUTPATIENT)
Age: 43
End: 2018-07-11

## 2018-07-11 LAB
ALBUMIN SERPL ELPH-MCNC: 3.2 G/DL — LOW (ref 3.3–5)
ALP SERPL-CCNC: 132 U/L — HIGH (ref 40–120)
ALT FLD-CCNC: 30 U/L — SIGNIFICANT CHANGE UP (ref 10–45)
ANION GAP SERPL CALC-SCNC: 14 MMOL/L — SIGNIFICANT CHANGE UP (ref 5–17)
AST SERPL-CCNC: 41 U/L — HIGH (ref 10–40)
BASOPHILS # BLD AUTO: 0.03 K/UL — SIGNIFICANT CHANGE UP (ref 0–0.2)
BASOPHILS NFR BLD AUTO: 0.7 % — SIGNIFICANT CHANGE UP (ref 0–2)
BILIRUB SERPL-MCNC: 0.9 MG/DL — SIGNIFICANT CHANGE UP (ref 0.2–1.2)
BUN SERPL-MCNC: 8 MG/DL — SIGNIFICANT CHANGE UP (ref 7–23)
CALCIUM SERPL-MCNC: 8 MG/DL — LOW (ref 8.4–10.5)
CHLORIDE SERPL-SCNC: 106 MMOL/L — SIGNIFICANT CHANGE UP (ref 96–108)
CO2 SERPL-SCNC: 20 MMOL/L — LOW (ref 22–31)
CREAT SERPL-MCNC: 0.75 MG/DL — SIGNIFICANT CHANGE UP (ref 0.5–1.3)
EOSINOPHIL # BLD AUTO: 0.16 K/UL — SIGNIFICANT CHANGE UP (ref 0–0.5)
EOSINOPHIL NFR BLD AUTO: 4 % — SIGNIFICANT CHANGE UP (ref 0–6)
FERRITIN SERPL-MCNC: 50 NG/ML — SIGNIFICANT CHANGE UP (ref 30–400)
GLUCOSE BLDC GLUCOMTR-MCNC: 118 MG/DL — HIGH (ref 70–99)
GLUCOSE BLDC GLUCOMTR-MCNC: 170 MG/DL — HIGH (ref 70–99)
GLUCOSE BLDC GLUCOMTR-MCNC: 190 MG/DL — HIGH (ref 70–99)
GLUCOSE BLDC GLUCOMTR-MCNC: 237 MG/DL — HIGH (ref 70–99)
GLUCOSE SERPL-MCNC: 141 MG/DL — HIGH (ref 70–99)
HCT VFR BLD CALC: 31.2 % — LOW (ref 39–50)
HGB BLD-MCNC: 10 G/DL — LOW (ref 13–17)
IMM GRANULOCYTES NFR BLD AUTO: 0.5 % — SIGNIFICANT CHANGE UP (ref 0–1.5)
IRON SATN MFR SERPL: 19 UG/DL — LOW (ref 45–165)
IRON SATN MFR SERPL: 6 % — LOW (ref 16–55)
LYMPHOCYTES # BLD AUTO: 1.13 K/UL — SIGNIFICANT CHANGE UP (ref 1–3.3)
LYMPHOCYTES # BLD AUTO: 28 % — SIGNIFICANT CHANGE UP (ref 13–44)
MCHC RBC-ENTMCNC: 22.9 PG — LOW (ref 27–34)
MCHC RBC-ENTMCNC: 32.1 GM/DL — SIGNIFICANT CHANGE UP (ref 32–36)
MCV RBC AUTO: 71.6 FL — LOW (ref 80–100)
MONOCYTES # BLD AUTO: 0.3 K/UL — SIGNIFICANT CHANGE UP (ref 0–0.9)
MONOCYTES NFR BLD AUTO: 7.4 % — SIGNIFICANT CHANGE UP (ref 2–14)
NEUTROPHILS # BLD AUTO: 2.4 K/UL — SIGNIFICANT CHANGE UP (ref 1.8–7.4)
NEUTROPHILS NFR BLD AUTO: 59.4 % — SIGNIFICANT CHANGE UP (ref 43–77)
PLATELET # BLD AUTO: 68 K/UL — LOW (ref 150–400)
POTASSIUM SERPL-MCNC: 3.7 MMOL/L — SIGNIFICANT CHANGE UP (ref 3.5–5.3)
POTASSIUM SERPL-SCNC: 3.7 MMOL/L — SIGNIFICANT CHANGE UP (ref 3.5–5.3)
PROT SERPL-MCNC: 5.8 G/DL — LOW (ref 6–8.3)
RBC # BLD: 4.36 M/UL — SIGNIFICANT CHANGE UP (ref 4.2–5.8)
RBC # FLD: 19 % — HIGH (ref 10.3–14.5)
SODIUM SERPL-SCNC: 140 MMOL/L — SIGNIFICANT CHANGE UP (ref 135–145)
TIBC SERPL-MCNC: 321 UG/DL — SIGNIFICANT CHANGE UP (ref 220–430)
UIBC SERPL-MCNC: 302 UG/DL — SIGNIFICANT CHANGE UP (ref 110–370)
WBC # BLD: 4.04 K/UL — SIGNIFICANT CHANGE UP (ref 3.8–10.5)
WBC # FLD AUTO: 4.04 K/UL — SIGNIFICANT CHANGE UP (ref 3.8–10.5)

## 2018-07-11 PROCEDURE — 99232 SBSQ HOSP IP/OBS MODERATE 35: CPT

## 2018-07-11 PROCEDURE — 99233 SBSQ HOSP IP/OBS HIGH 50: CPT | Mod: GC

## 2018-07-11 RX ORDER — CEFTRIAXONE 500 MG/1
2 INJECTION, POWDER, FOR SOLUTION INTRAMUSCULAR; INTRAVENOUS
Qty: 1 | Refills: 0 | OUTPATIENT
Start: 2018-07-11 | End: 2018-07-24

## 2018-07-11 RX ADMIN — PANTOPRAZOLE SODIUM 40 MILLIGRAM(S): 20 TABLET, DELAYED RELEASE ORAL at 06:10

## 2018-07-11 RX ADMIN — NADOLOL 20 MILLIGRAM(S): 80 TABLET ORAL at 06:10

## 2018-07-11 RX ADMIN — INSULIN GLARGINE 30 UNIT(S): 100 INJECTION, SOLUTION SUBCUTANEOUS at 22:33

## 2018-07-11 RX ADMIN — CEFTRIAXONE 100 GRAM(S): 500 INJECTION, POWDER, FOR SOLUTION INTRAMUSCULAR; INTRAVENOUS at 12:06

## 2018-07-11 RX ADMIN — Medication 20 MILLIGRAM(S): at 06:10

## 2018-07-11 RX ADMIN — Medication 4: at 12:05

## 2018-07-11 RX ADMIN — Medication 2: at 17:21

## 2018-07-11 NOTE — DISCHARGE NOTE ADULT - MEDICATION SUMMARY - MEDICATIONS TO TAKE
I will START or STAY ON the medications listed below when I get home from the hospital:    traMADol 50 mg oral tablet  -- 1 tab(s) by mouth , As Needed  -- Caution federal law prohibits the transfer of this drug to any person other  than the person for whom it was prescribed.  May cause drowsiness.  Alcohol may intensify this effect.  Use care when operating dangerous machinery.  Obtain medical advice before taking any non-prescription drugs as some may affect the action of this medication.    -- Indication: For Abdominal pain    metFORMIN 500 mg oral tablet  -- 1 tab(s) by mouth once a day  -- Indication: For Diabetes mellitus type 2, insulin dependent    Levemir 100 units/mL subcutaneous solution  -- 42 unit(s) subcutaneous once a day (at bedtime) per blood sugar level  -- Indication: For Diabetes mellitus type 2, insulin dependent    cetirizine 10 mg oral tablet  -- 1 tab(s) by mouth once a day, As Needed  -- Indication: For Prophylactic measure    nadolol 20 mg oral tablet  -- 1 tab(s) by mouth once a day  -- Indication: For Alcoholic cirrhosis    cefTRIAXone 2 g injection  -- 2 gram(s) intravenously once a day.    Please take this antibiotic until 7/24/18  -- Indication: For Bacteremia due to group B Streptococcus    furosemide 20 mg oral tablet  -- 1 tab(s) by mouth once a day  -- Indication: For Alcoholic cirrhosis    Flonase 50 mcg/inh nasal spray  -- 1 spray(s) into nose once a day, As Needed  -- Indication: For Prophylactic measure    pantoprazole 40 mg oral delayed release tablet  -- 1 tab(s) by mouth once a day  -- Indication: For Prophylactic measure    multivitamin  -- 1 tab(s) by mouth once a day  -- Indication: For Prophylactic measure    Vitamin B12  -- Indication: For Prophylactic measure

## 2018-07-11 NOTE — DISCHARGE NOTE ADULT - SECONDARY DIAGNOSIS.
Epigastric pain Alcoholic cirrhosis Diabetes mellitus type 2, insulin dependent Esophageal varices Severe sepsis

## 2018-07-11 NOTE — DISCHARGE NOTE ADULT - ADDITIONAL INSTRUCTIONS
IV Ceftriaxone 2g q24h for 14 days til 7/24......d/c PICC on 7/24..........then begin Keflex 500mg PO BID for 3 weeks IV Ceftriaxone 2g q24h for 14 days til 7/24......d/c PICC on 7/24..........then begin Keflex 500mg PO BID for 3 weeks    CBC CMP next week Please continue with intravenous Ceftriaxone 2g daily until 7/24. We will remove your PICC on 7/24, after which you will begin Keflex 500mg twice a day (which is an oral antibiotic) for 3 weeks.    You need the following labs done next week: CBC and CMP

## 2018-07-11 NOTE — DISCHARGE NOTE ADULT - HOSPITAL COURSE
42 M with PMH of alcoholic cirrhosis (on transplant list) c/b esophageal varices, vitiligo, recurrent pancreatitis, presenting with severe 10/10 epigastric pain. In the ED, RUQ ultrasound was negative for acute cholecystitis but Zamudio's sign was positive. 42 M with PMH of alcoholic cirrhosis (on transplant list) c/b esophageal varices, vitiligo, recurrent pancreatitis, presenting with severe 10/10 epigastric pain. In the ED, RUQ ultrasound was negative for acute cholecystitis. Patient was admitted to medicine floors for management of abdominal pain, in the setting of fever to -- and lactate of --. Blood cultures were positive for group B strep. He was started on IV ceftriaxone 2 mg, and his pain resolved a few hours after beginning antibiotics. Repeat blood cultures were clear. TTE was negative for vegetations and CT abdomen/pelvis showed no possible source of infection. GI was consulted, and recommended continuing antibiotics and monitoring bilirubin, leukocytosis and HIDA. Patient was followed by Dr. Boss (ID) who recommends the following antibiotic regimen post-discharge: IV Ceftriaxone 2g q24h for 14 days until 7/24, d/c PICC on 7/24 and begin Keflex 500mg PO BID for 3 weeks. Following resolution of bacteremia, patient should restart Bactrim prophylaxis. He is now hemodynamically stable for discharge with PICC. 42 M with PMH of alcoholic cirrhosis (on transplant list) c/b esophageal varices, vitiligo, recurrent pancreatitis, presenting with severe 10/10 epigastric pain. In the ED, RUQ ultrasound was negative for acute cholecystitis. Patient was admitted to medicine floors for management of abdominal pain, in the setting of fever and lactic acidosis. Blood cultures were positive for group B strep. He was started on IV ceftriaxone 2 mg, and his pain resolved a few hours after beginning antibiotics. Repeat blood cultures were clear. TTE was negative for vegetations and CT abdomen/pelvis showed no possible source of infection. GI was consulted, and recommended continuing antibiotics and monitoring bilirubin, leukocytosis and HIDA. Patient was followed by Dr. Boss (ID) who recommends the following antibiotic regimen post-discharge: IV Ceftriaxone 2g q24h for 14 days until 7/24, d/c PICC on 7/24 and begin Keflex 500mg PO BID for 3 weeks. Following resolution of bacteremia, patient should restart Bactrim prophylaxis. He is now hemodynamically stable for discharge with PICC.

## 2018-07-11 NOTE — PROGRESS NOTE ADULT - PROBLEM SELECTOR PLAN 2
Unclear source of infection though intra-abdominal source suspected. Lactate was 3.2 on admission and decreased to 1.7 after fluids.  - improved lactate s/p 1L NS in ED  - Continue 2mg IV ceftriaxone Unclear source of infection though intra-abdominal source suspected. Lactate was 3.2 on admission and decreased to 1.7 after fluids.  - improved lactate s/p 1L NS in ED  - Continue 2mg IV ceftriaxone. ID f/u for antibiotic duration and plan for d/c

## 2018-07-11 NOTE — PROGRESS NOTE ADULT - PROBLEM SELECTOR PLAN 3
- Continue IV ceftriaxone 2mg  - Appreciate ID recs  - CT abdomen/pelvis, to look for sources of infection. Patient has had recurrent bacteremia in the past.  - If repeat blood cx is negative, can transition to oral antibiotics  - No TTE at the time, but can consider if blood cx remains positive - Continue IV ceftriaxone 2mg  - Appreciate ID recs  - CT abdomen/pelvis was negative for source of infection. Patient has had recurrent bacteremia in the past.  - If repeat blood cx is negative, can transition to oral antibiotics  - No TTE at the time, but can consider if blood cx remains positive - Continue IV ceftriaxone 2mg  - Appreciate ID recs  - CT abdomen/pelvis was negative for source of infection. Patient has had recurrent bacteremia in the past.  -I/D f/u for duration of antibiotics and antibiotic regimen for d/c  -TTE negative for vegetation  - Bactrim prophylaxis after resolution of bacteremia

## 2018-07-11 NOTE — DISCHARGE NOTE ADULT - CARE PLAN
Principal Discharge DX:	Bacteremia due to group B Streptococcus  Secondary Diagnosis:	Epigastric pain  Secondary Diagnosis:	Alcoholic cirrhosis  Secondary Diagnosis:	Diabetes mellitus type 2, insulin dependent  Secondary Diagnosis:	Esophageal varices  Secondary Diagnosis:	Severe sepsis Principal Discharge DX:	Bacteremia due to group B Streptococcus  Goal:	Management of condition  Assessment and plan of treatment:	You came to the hospital with a blood infection with bacteria called Group B streptococcus. We started you an antibiotic through your veins. We looked for possible sources of this infection by doing a CT scan, which did not show additional information. We also did an ultrasound of your heart to ensure that there were no bacteria in your heart valves. The ultrasound was also normal. We want you continue your current antibiotic regimen as follows. Please continue intravenous Ceftriaxone 2g daily until 7/24. We will remove your PICC on 7/24, after which you will begin Keflex 500mg twice a day for 3 weeks.  Secondary Diagnosis:	Epigastric pain  Goal:	Management of condition  Assessment and plan of treatment:	You came to the ED with abdominal pain. We did an abdominal ultrasound and a HIDA scan, both of which showed that you do not have an infection of your gallbladder. We also did tests (amylase and lipase) to ensure that you did not have pancreatitis, all of which were negative. We finally found a bacterial infection in your bloodstream, which was most likely causing your symptoms.  Secondary Diagnosis:	Alcoholic cirrhosis  Goal:	Management of condition  Assessment and plan of treatment:	We continued you on your home medications of furosemide and nadolol in the hospital. Please continue to take them after going home and follow-up with your liver doctor.  Secondary Diagnosis:	Diabetes mellitus type 2, insulin dependent  Goal:	Management of condition  Assessment and plan of treatment:	We stopped your metformin in the hospital and put you on a correctional dose of insulin. Your blood sugars were well controlled here. You can continue taking your metformin and home dose of insulin after discharge.  Secondary Diagnosis:	Severe sepsis  Goal:	Management of condition  Assessment and plan of treatment:	When you came to the hospital, you had a sever infection called sepsis. We gave you fluids through your veins and started you on antibiotics. Your infection became less severe and you were stable enough to go home.

## 2018-07-11 NOTE — PROGRESS NOTE ADULT - SUBJECTIVE AND OBJECTIVE BOX
Patient is a 42y old  Male who presents with a chief complaint of Abdominal pain (08 Jul 2018 10:23)    Being followed by ID for bacteremia,cirrhosis    Interval history:feels well  Willing to consider PICC   No acute events      ROS:  No cough,SOB,CP  No N/V/D./abd pain  No other complaints      Antimicrobials:    cefTRIAXone   IVPB 2 Gram(s) IV Intermittent every 24 hours    Other medications reviewed    Vital Signs Last 24 Hrs  T(C): 36.6 (07-11-18 @ 10:43), Max: 37.3 (07-10-18 @ 23:39)  T(F): 97.8 (07-11-18 @ 10:43), Max: 99.2 (07-10-18 @ 23:39)  HR: 56 (07-11-18 @ 10:43) (56 - 73)  BP: 97/61 (07-11-18 @ 10:43) (97/61 - 117/70)  BP(mean): --  RR: 18 (07-11-18 @ 10:43) (16 - 18)  SpO2: 94% (07-11-18 @ 10:43) (92% - 94%)    Physical Exam:    Vitiligo     HEENT PERRLA EOMI    No oral exudate or erythema    Chest Good AE,CTA    CVS RRR S1 S2 WNl No murmur or rub or gallop    Abd soft BS normal No tenderness no masses    IV site no erythema tenderness or discharge    CNS AAO X 3 no focal    Lab Data:                          10.0   4.04  )-----------( 68       ( 11 Jul 2018 08:08 )             31.2       07-11    140  |  106  |  8   ----------------------------<  141<H>  3.7   |  20<L>  |  0.75    Ca    8.0<L>      11 Jul 2018 07:11  Phos  3.5     07-10  Mg     1.9     07-10    TPro  5.8<L>  /  Alb  3.2<L>  /  TBili  0.9  /  DBili  x   /  AST  41<H>  /  ALT  30  /  AlkPhos  132<H>  07-11        Culture - Blood (collected 10 Jul 2018 08:49)  Source: .Blood Blood-Venous  Preliminary Report (11 Jul 2018 09:01):    No growth to date.    Culture - Blood (collected 10 Jul 2018 08:49)  Source: .Blood Blood-Peripheral  Preliminary Report (11 Jul 2018 09:01):    No growth to date.    Culture - Blood (collected 08 Jul 2018 07:11)  Source: .Blood Blood-Venous  Preliminary Report (09 Jul 2018 08:01):    No growth to date.    Culture - Blood (collected 08 Jul 2018 07:11)  Source: .Blood Blood-Peripheral  Gram Stain (08 Jul 2018 18:57):    Growth in aerobic bottle: Gram Positive Cocci in Pairs and Chains  Final Report (10 Jul 2018 18:26):    Growth in aerobic bottle: Streptococcus agalactiae (Group B)    "Due to technical problems, Proteus sp. will Not be reported as part of    the BCID panel until further notice"    ***Blood Panel PCR results on this specimen are available    approximately 3 hours after the Gram stain result.***    Gram stain, PCR, and/or culture results may not always    correspond due to difference in methodologies.    ************************************************************    This PCR assay was performed using [employer].    The following targets are tested for: Enterococcus,    vancomycin resistant enterococci, Listeria monocytogenes,    coagulase negative staphylococci, S. aureus,    methicillin resistant S. aureus, Streptococcus agalactiae    (Group B), S. pneumoniae,S. pyogenes (Group A),    Acinetobacter baumannii, Enterobacter cloacae, E. coli,    Klebsiella oxytoca, K. pneumoniae, Proteus sp.,    Serratia marcescens, Haemophilus influenzae,    Neisseria meningitidis, Pseudomonas aeruginosa, Candida    albicans, C. glabrata, C krusei, C parapsilosis,    C. tropicalis and the KPC resistance gene.  Organism: Blood Culture PCR  Streptococcus agalactiae (Group B) (10 Jul 2018 18:26)  Organism: Streptococcus agalactiae (Group B) (10 Jul 2018 18:26)      -  Ceftriaxone: S      -  Clindamycin: S      -  Levofloxacin: S      -  Penicillin: S Predicts results for ampicillin, amoxicillin, amoxicillin/clavulanate, ampicillin/sulbactam, 1st, 2nd and 3rd generation cephalosporins and carbapenems.      -  Vancomycin: S      Method Type: KB  Organism: Blood Culture PCR (10 Jul 2018 18:26)      -  Streptococcus agalactiae (Group B): Detec      Method Type: PCR      < from: CT Abdomen and Pelvis w/ IV Cont (07.10.18 @ 16:59) >    IMPRESSION: No source of infection identified in the abdomen.  Stable cirrhosis and evidence of portal hypertension. 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        < end of copied text >      < from: Transthoracic Echocardiogram (07.09.18 @ 20:08) >  Conclusions:  1. Severely dilated left atrium.  LA volume index = 55  cc/m2.  2. Left ventricular ejection fraction, by visual  estimation, is 55-60%.  No wall motion abnormality.  3. Normal diastolic function.  4. Mild right atrial enlargement.  5. Normal right ventricular size and function.  6. Normal pericardium with no pericardial effusion.  7. No obvious evidence of valvular vegetation is seen.  *** Compared with echocardiogram of 6/26/2017, no  significant changes noted.    < end of copied text >

## 2018-07-11 NOTE — DISCHARGE NOTE ADULT - CARE PROVIDER_API CALL
Satya Boss), Infectious Disease; Internal Medicine  [address] [place] [id number]  Phone: [phone]  Fax: [phone]    Andre Thakkar), Gastroenterology; Internal Medicine  [address] [place] [id number]  Phone: [phone]  Fax: [phone]

## 2018-07-11 NOTE — PROGRESS NOTE ADULT - SUBJECTIVE AND OBJECTIVE BOX
CARINALILLIAN  42y  Male    Patient is a 42y old  Male who presents with a chief complaint of Abdominal pain (08 Jul 2018 10:23)    INTERVAL HPI/OVERNIGHT EVENTS: Abdominal pains is resolved and patient is afebrile. No other acute events overnight - no nausea, vomiting, diarrhea, CP, palpitations or SOB.    MEDICATIONS  (STANDING):    Vital Signs Last 24 Hrs      PHYSICAL EXAM:  CONSTITUTIONAL: NAD  HEAD: Normocephalic, atraumatic, moist mucous membranes  CARDIAC: Normal rate, regular rhythm.  Heart sounds S1, S2.  RESPIRATORY: Breath sounds clear and equal bilaterally.  GASTROINTESTINAL: Soft, NTND, no rebound or guarding, - Zamudio's sign  MUSCULOSKELETAL: range of motion is not limited, no muscle or joint tenderness  NEUROLOGICAL: no focal deficits, no motor or sensory deficits.  PSYCH: A&O x3  SKIN: No evidence of rash. Vitiligo    LABS:  RUQ ultrasound:  	Nonspecific distended gallbladder demonstrating mild wall thickening and/or   	edema. Cholelithiasis or sonographic Zamudio's sign. If clinical suspicion   	for acute cholecystitis persists, consider further evaluation with dynamic   	HIDA.   	Cirrhosis. LILLIAN LIM  42y  Male    Patient is a 42y old  Male who presents with a chief complaint of Abdominal pain (08 Jul 2018 10:23)    INTERVAL HPI/OVERNIGHT EVENTS: Abdominal pain is resolved and patient is afebrile. No other acute events overnight - no nausea, vomiting, diarrhea, CP, palpitations or SOB.    MEDICATIONS  (STANDING):  cefTRIAXone   IVPB 2 Gram(s) IV Intermittent every 24 hours  dextrose 5%. 1000 milliLiter(s) (50 mL/Hr) IV Continuous <Continuous>  dextrose 50% Injectable 12.5 Gram(s) IV Push once  dextrose 50% Injectable 25 Gram(s) IV Push once  dextrose 50% Injectable 25 Gram(s) IV Push once  fluticasone propionate 50 MICROgram(s)/spray Nasal Spray 1 Spray(s) Both Nostrils daily  furosemide    Tablet 20 milliGRAM(s) Oral daily  insulin glargine Injectable (LANTUS) 30 Unit(s) SubCutaneous at bedtime  insulin lispro (HumaLOG) corrective regimen sliding scale   SubCutaneous three times a day before meals  insulin lispro (HumaLOG) corrective regimen sliding scale   SubCutaneous at bedtime  nadolol 20 milliGRAM(s) Oral daily  pantoprazole    Tablet 40 milliGRAM(s) Oral before breakfast    MEDICATIONS  (PRN):  dextrose 40% Gel 15 Gram(s) Oral once PRN Blood Glucose LESS THAN 70 milliGRAM(s)/deciliter  glucagon  Injectable 1 milliGRAM(s) IntraMuscular once PRN Glucose LESS THAN 70 milligrams/deciliter  traMADol 50 milliGRAM(s) Oral every 6 hours PRN Moderate to Severe pain    Vital Signs Last 24 Hrs  T(C): 37.3 (10 Jul 2018 23:39), Max: 37.3 (10 Jul 2018 23:39)  T(F): 99.2 (10 Jul 2018 23:39), Max: 99.2 (10 Jul 2018 23:39)  HR: 73 (11 Jul 2018 06:08) (65 - 73)  BP: 110/69 (11 Jul 2018 06:08) (105/67 - 117/70)  BP(mean): --  RR: 16 (10 Jul 2018 23:39) (16 - 18)  SpO2: 92% (10 Jul 2018 23:39) (92% - 93%)    PHYSICAL EXAM:  CONSTITUTIONAL: NAD  HEAD: Normocephalic, atraumatic, moist mucous membranes  CARDIAC: Normal rate, regular rhythm.  Heart sounds S1, S2.  RESPIRATORY: Breath sounds clear and equal bilaterally.  GASTROINTESTINAL: Soft, NTND, no rebound or guarding, - Zamudio's sign  MUSCULOSKELETAL: range of motion is not limited, no muscle or joint tenderness  NEUROLOGICAL: no focal deficits, no motor or sensory deficits.  PSYCH: A&O x3  SKIN: No evidence of rash. Vitiligo    LABS:  RUQ ultrasound:  	Nonspecific distended gallbladder demonstrating mild wall thickening and/or   	edema. Cholelithiasis or sonographic Zamudio's sign. If clinical suspicion   	for acute cholecystitis persists, consider further evaluation with dynamic   	HIDA.   	Cirrhosis.     HIDA:      CT abdomen/pelvis: LILLIAN LIM  42y  Male    Patient is a 42y old  Male who presents with a chief complaint of Abdominal pain (08 Jul 2018 10:23)    INTERVAL HPI/OVERNIGHT EVENTS: Abdominal pain is resolved and patient is afebrile. No other acute events overnight - no nausea, vomiting, diarrhea, CP, palpitations or SOB.    MEDICATIONS  (STANDING):  cefTRIAXone   IVPB 2 Gram(s) IV Intermittent every 24 hours  dextrose 5%. 1000 milliLiter(s) (50 mL/Hr) IV Continuous <Continuous>  dextrose 50% Injectable 12.5 Gram(s) IV Push once  dextrose 50% Injectable 25 Gram(s) IV Push once  dextrose 50% Injectable 25 Gram(s) IV Push once  fluticasone propionate 50 MICROgram(s)/spray Nasal Spray 1 Spray(s) Both Nostrils daily  furosemide    Tablet 20 milliGRAM(s) Oral daily  insulin glargine Injectable (LANTUS) 30 Unit(s) SubCutaneous at bedtime  insulin lispro (HumaLOG) corrective regimen sliding scale   SubCutaneous three times a day before meals  insulin lispro (HumaLOG) corrective regimen sliding scale   SubCutaneous at bedtime  nadolol 20 milliGRAM(s) Oral daily  pantoprazole    Tablet 40 milliGRAM(s) Oral before breakfast    MEDICATIONS  (PRN):  dextrose 40% Gel 15 Gram(s) Oral once PRN Blood Glucose LESS THAN 70 milliGRAM(s)/deciliter  glucagon  Injectable 1 milliGRAM(s) IntraMuscular once PRN Glucose LESS THAN 70 milligrams/deciliter  traMADol 50 milliGRAM(s) Oral every 6 hours PRN Moderate to Severe pain    Vital Signs Last 24 Hrs  T(C): 37.3 (10 Jul 2018 23:39), Max: 37.3 (10 Jul 2018 23:39)  T(F): 99.2 (10 Jul 2018 23:39), Max: 99.2 (10 Jul 2018 23:39)  HR: 73 (11 Jul 2018 06:08) (65 - 73)  BP: 110/69 (11 Jul 2018 06:08) (105/67 - 117/70)  BP(mean): --  RR: 16 (10 Jul 2018 23:39) (16 - 18)  SpO2: 92% (10 Jul 2018 23:39) (92% - 93%)    PHYSICAL EXAM:  CONSTITUTIONAL: NAD  HEAD: Normocephalic, atraumatic, moist mucous membranes  CARDIAC: Normal rate, regular rhythm.  Heart sounds S1, S2.  RESPIRATORY: Breath sounds clear and equal bilaterally.  GASTROINTESTINAL: Soft, NTND, no rebound or guarding, - Zamudio's sign  MUSCULOSKELETAL: range of motion is not limited, no muscle or joint tenderness  NEUROLOGICAL: no focal deficits, no motor or sensory deficits.  PSYCH: A&O x3  SKIN: No evidence of rash. Vitiligo    LABS:                        10.3   3.92  )-----------( 59       ( 10 Jul 2018 08:10 )             31.0   07-11    140  |  106  |  8   ----------------------------<  141<H>  3.7   |  20<L>  |  0.75    Ca    8.0<L>      11 Jul 2018 07:11  Phos  3.5     07-10  Mg     1.9     07-10    TPro  5.8<L>  /  Alb  3.2<L>  /  TBili  0.9  /  DBili  x   /  AST  41<H>  /  ALT  30  /  AlkPhos  132<H>  07-11    CAPILLARY BLOOD GLUCOSE      POCT Blood Glucose.: 118 mg/dL (11 Jul 2018 08:13)  POCT Blood Glucose.: 153 mg/dL (10 Jul 2018 21:54)  POCT Blood Glucose.: 215 mg/dL (10 Jul 2018 17:14)  POCT Blood Glucose.: 197 mg/dL (10 Jul 2018 12:01)  I&O's Summary    10 Jul 2018 07:01  -  11 Jul 2018 07:00  --------------------------------------------------------  IN: 1120 mL / OUT: 0 mL / NET: 1120 mL    RUQ ultrasound:  	Nonspecific distended gallbladder demonstrating mild wall thickening and/or   	edema. Cholelithiasis or sonographic Zamudio's sign. If clinical suspicion   	for acute cholecystitis persists, consider further evaluation with dynamic   	HIDA.   	Cirrhosis.     HIDA:      CT abdomen/pelvis: LILLIAN LIM  42y  Male    Patient is a 42y old  Male who presents with a chief complaint of Abdominal pain (08 Jul 2018 10:23)    INTERVAL HPI/OVERNIGHT EVENTS: Abdominal pain is resolved and patient is afebrile. No other acute events overnight - no nausea, vomiting, diarrhea, CP, palpitations or SOB.    MEDICATIONS  (STANDING):  cefTRIAXone   IVPB 2 Gram(s) IV Intermittent every 24 hours  dextrose 5%. 1000 milliLiter(s) (50 mL/Hr) IV Continuous <Continuous>  dextrose 50% Injectable 12.5 Gram(s) IV Push once  dextrose 50% Injectable 25 Gram(s) IV Push once  dextrose 50% Injectable 25 Gram(s) IV Push once  fluticasone propionate 50 MICROgram(s)/spray Nasal Spray 1 Spray(s) Both Nostrils daily  furosemide    Tablet 20 milliGRAM(s) Oral daily  insulin glargine Injectable (LANTUS) 30 Unit(s) SubCutaneous at bedtime  insulin lispro (HumaLOG) corrective regimen sliding scale   SubCutaneous three times a day before meals  insulin lispro (HumaLOG) corrective regimen sliding scale   SubCutaneous at bedtime  nadolol 20 milliGRAM(s) Oral daily  pantoprazole    Tablet 40 milliGRAM(s) Oral before breakfast    MEDICATIONS  (PRN):  dextrose 40% Gel 15 Gram(s) Oral once PRN Blood Glucose LESS THAN 70 milliGRAM(s)/deciliter  glucagon  Injectable 1 milliGRAM(s) IntraMuscular once PRN Glucose LESS THAN 70 milligrams/deciliter  traMADol 50 milliGRAM(s) Oral every 6 hours PRN Moderate to Severe pain    Vital Signs Last 24 Hrs  T(C): 37.3 (10 Jul 2018 23:39), Max: 37.3 (10 Jul 2018 23:39)  T(F): 99.2 (10 Jul 2018 23:39), Max: 99.2 (10 Jul 2018 23:39)  HR: 73 (11 Jul 2018 06:08) (65 - 73)  BP: 110/69 (11 Jul 2018 06:08) (105/67 - 117/70)  BP(mean): --  RR: 16 (10 Jul 2018 23:39) (16 - 18)  SpO2: 92% (10 Jul 2018 23:39) (92% - 93%)    PHYSICAL EXAM:  CONSTITUTIONAL: NAD  HEAD: Normocephalic, atraumatic, moist mucous membranes  CARDIAC: Normal rate, regular rhythm.  Heart sounds S1, S2.  RESPIRATORY: Breath sounds clear and equal bilaterally.  GASTROINTESTINAL: Soft, NTND, no rebound or guarding, - Zamudio's sign  MUSCULOSKELETAL: range of motion is not limited, no muscle or joint tenderness  NEUROLOGICAL: no focal deficits, no motor or sensory deficits.  PSYCH: A&O x3  SKIN: No evidence of rash. Vitiligo    LABS:                        10.3   3.92  )-----------( 59       ( 10 Jul 2018 08:10 )             31.0   07-11    140  |  106  |  8   ----------------------------<  141<H>  3.7   |  20<L>  |  0.75    Ca    8.0<L>      11 Jul 2018 07:11  Phos  3.5     07-10  Mg     1.9     07-10    TPro  5.8<L>  /  Alb  3.2<L>  /  TBili  0.9  /  DBili  x   /  AST  41<H>  /  ALT  30  /  AlkPhos  132<H>  07-11    CAPILLARY BLOOD GLUCOSE      POCT Blood Glucose.: 118 mg/dL (11 Jul 2018 08:13)  POCT Blood Glucose.: 153 mg/dL (10 Jul 2018 21:54)  POCT Blood Glucose.: 215 mg/dL (10 Jul 2018 17:14)  POCT Blood Glucose.: 197 mg/dL (10 Jul 2018 12:01)  I&O's Summary    10 Jul 2018 07:01  -  11 Jul 2018 07:00  --------------------------------------------------------  IN: 1120 mL / OUT: 0 mL / NET: 1120 mL    RUQ ultrasound:  	Nonspecific distended gallbladder demonstrating mild wall thickening and/or   	edema. Cholelithiasis or sonographic Zamudio's sign. If clinical suspicion   	for acute cholecystitis persists, consider further evaluation with dynamic   	HIDA.   	Cirrhosis.     HIDA:      CT abdomen/pelvis:   No source of infection identified in the abdomen.  Stable cirrhosis and evidence of portal hypertension. 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 LILLIAN LIM  42y  Male    Patient is a 42y old  Male who presents with a chief complaint of Abdominal pain (08 Jul 2018 10:23)    INTERVAL HPI/OVERNIGHT EVENTS: Abdominal pain is resolved and patient is afebrile. No other acute events overnight - no nausea, vomiting, diarrhea, CP, palpitations or SOB.    MEDICATIONS  (STANDING):  cefTRIAXone   IVPB 2 Gram(s) IV Intermittent every 24 hours  dextrose 5%. 1000 milliLiter(s) (50 mL/Hr) IV Continuous <Continuous>  dextrose 50% Injectable 12.5 Gram(s) IV Push once  dextrose 50% Injectable 25 Gram(s) IV Push once  dextrose 50% Injectable 25 Gram(s) IV Push once  fluticasone propionate 50 MICROgram(s)/spray Nasal Spray 1 Spray(s) Both Nostrils daily  furosemide    Tablet 20 milliGRAM(s) Oral daily  insulin glargine Injectable (LANTUS) 30 Unit(s) SubCutaneous at bedtime  insulin lispro (HumaLOG) corrective regimen sliding scale   SubCutaneous three times a day before meals  insulin lispro (HumaLOG) corrective regimen sliding scale   SubCutaneous at bedtime  nadolol 20 milliGRAM(s) Oral daily  pantoprazole    Tablet 40 milliGRAM(s) Oral before breakfast    MEDICATIONS  (PRN):  dextrose 40% Gel 15 Gram(s) Oral once PRN Blood Glucose LESS THAN 70 milliGRAM(s)/deciliter  glucagon  Injectable 1 milliGRAM(s) IntraMuscular once PRN Glucose LESS THAN 70 milligrams/deciliter  traMADol 50 milliGRAM(s) Oral every 6 hours PRN Moderate to Severe pain    Vital Signs Last 24 Hrs  T(C): 37.3 (10 Jul 2018 23:39), Max: 37.3 (10 Jul 2018 23:39)  T(F): 99.2 (10 Jul 2018 23:39), Max: 99.2 (10 Jul 2018 23:39)  HR: 73 (11 Jul 2018 06:08) (65 - 73)  BP: 110/69 (11 Jul 2018 06:08) (105/67 - 117/70)  BP(mean): --  RR: 16 (10 Jul 2018 23:39) (16 - 18)  SpO2: 92% (10 Jul 2018 23:39) (92% - 93%)    PHYSICAL EXAM:  CONSTITUTIONAL: NAD  HEAD: Normocephalic, atraumatic, moist mucous membranes  CARDIAC: Normal rate, regular rhythm.  Heart sounds S1, S2.  RESPIRATORY: Breath sounds clear and equal bilaterally.  GASTROINTESTINAL: Soft, NTND, no rebound or guarding, - Zamudio's sign  MUSCULOSKELETAL: range of motion is not limited, no muscle or joint tenderness  NEUROLOGICAL: no focal deficits, no motor or sensory deficits.  PSYCH: A&O x3  SKIN: No evidence of rash. Vitiligo    LABS:                        10.3   3.92  )-----------( 59       ( 10 Jul 2018 08:10 )             31.0   07-11    140  |  106  |  8   ----------------------------<  141<H>  3.7   |  20<L>  |  0.75    Ca    8.0<L>      11 Jul 2018 07:11  Phos  3.5     07-10  Mg     1.9     07-10    TPro  5.8<L>  /  Alb  3.2<L>  /  TBili  0.9  /  DBili  x   /  AST  41<H>  /  ALT  30  /  AlkPhos  132<H>  07-11    CAPILLARY BLOOD GLUCOSE      POCT Blood Glucose.: 118 mg/dL (11 Jul 2018 08:13)  POCT Blood Glucose.: 153 mg/dL (10 Jul 2018 21:54)  POCT Blood Glucose.: 215 mg/dL (10 Jul 2018 17:14)  POCT Blood Glucose.: 197 mg/dL (10 Jul 2018 12:01)  I&O's Summary    10 Jul 2018 07:01  -  11 Jul 2018 07:00  --------------------------------------------------------  IN: 1120 mL / OUT: 0 mL / NET: 1120 mL    RUQ ultrasound:  	Nonspecific distended gallbladder demonstrating mild wall thickening and/or   	edema. Cholelithiasis or sonographic Zamudio's sign. If clinical suspicion   	for acute cholecystitis persists, consider further evaluation with dynamic   	HIDA.   	Cirrhosis.     HIDA:        CT abdomen/pelvis:     No source of infection identified in the abdomen.  Stable cirrhosis and evidence of portal hypertension. 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    TTE:    1. Severely dilated left atrium.  LA volume index = 55  cc/m2.  2. Left ventricular ejection fraction, by visual  estimation, is 55-60%.  No wall motion abnormality.  3. Normal diastolic function.  4. Mild right atrial enlargement.  5. Normal right ventricular size and function.  6. Normal pericardium with no pericardial effusion.  7. No obvious evidence of valvular vegetation is seen. LILLIAN LIM  42y  Male    Patient is a 42y old  Male who presents with a chief complaint of Abdominal pain (08 Jul 2018 10:23)    INTERVAL HPI/OVERNIGHT EVENTS: Abdominal pain is resolved and patient is afebrile. No other acute events overnight - no nausea, vomiting, diarrhea, CP, palpitations or SOB.    MEDICATIONS  (STANDING):  cefTRIAXone   IVPB 2 Gram(s) IV Intermittent every 24 hours  dextrose 5%. 1000 milliLiter(s) (50 mL/Hr) IV Continuous <Continuous>  dextrose 50% Injectable 12.5 Gram(s) IV Push once  dextrose 50% Injectable 25 Gram(s) IV Push once  dextrose 50% Injectable 25 Gram(s) IV Push once  fluticasone propionate 50 MICROgram(s)/spray Nasal Spray 1 Spray(s) Both Nostrils daily  furosemide    Tablet 20 milliGRAM(s) Oral daily  insulin glargine Injectable (LANTUS) 30 Unit(s) SubCutaneous at bedtime  insulin lispro (HumaLOG) corrective regimen sliding scale   SubCutaneous three times a day before meals  insulin lispro (HumaLOG) corrective regimen sliding scale   SubCutaneous at bedtime  nadolol 20 milliGRAM(s) Oral daily  pantoprazole    Tablet 40 milliGRAM(s) Oral before breakfast    MEDICATIONS  (PRN):  dextrose 40% Gel 15 Gram(s) Oral once PRN Blood Glucose LESS THAN 70 milliGRAM(s)/deciliter  glucagon  Injectable 1 milliGRAM(s) IntraMuscular once PRN Glucose LESS THAN 70 milligrams/deciliter  traMADol 50 milliGRAM(s) Oral every 6 hours PRN Moderate to Severe pain    Vital Signs Last 24 Hrs  T(C): 37.3 (10 Jul 2018 23:39), Max: 37.3 (10 Jul 2018 23:39)  T(F): 99.2 (10 Jul 2018 23:39), Max: 99.2 (10 Jul 2018 23:39)  HR: 73 (11 Jul 2018 06:08) (65 - 73)  BP: 110/69 (11 Jul 2018 06:08) (105/67 - 117/70)  BP(mean): --  RR: 16 (10 Jul 2018 23:39) (16 - 18)  SpO2: 92% (10 Jul 2018 23:39) (92% - 93%)    PHYSICAL EXAM:  CONSTITUTIONAL: NAD  HEAD: Normocephalic, atraumatic, moist mucous membranes  CARDIAC: Normal rate, regular rhythm.  Heart sounds S1, S2.  RESPIRATORY: Breath sounds clear and equal bilaterally.  GASTROINTESTINAL: Soft, NTND, no rebound or guarding, - Zamudio's sign  MUSCULOSKELETAL: range of motion is not limited, no muscle or joint tenderness  NEUROLOGICAL: no focal deficits, no motor or sensory deficits.  PSYCH: A&O x3  SKIN: No evidence of rash. Vitiligo    LABS:                        10.0   4.04  )-----------( 68       ( 11 Jul 2018 08:08 )             31.2       07-11    140  |  106  |  8   ----------------------------<  141<H>  3.7   |  20<L>  |  0.75    Ca    8.0<L>      11 Jul 2018 07:11  Phos  3.5     07-10  Mg     1.9     07-10    TPro  5.8<L>  /  Alb  3.2<L>  /  TBili  0.9  /  DBili  x   /  AST  41<H>  /  ALT  30  /  AlkPhos  132<H>  07-11    CAPILLARY BLOOD GLUCOSE      POCT Blood Glucose.: 118 mg/dL (11 Jul 2018 08:13)  POCT Blood Glucose.: 153 mg/dL (10 Jul 2018 21:54)  POCT Blood Glucose.: 215 mg/dL (10 Jul 2018 17:14)  POCT Blood Glucose.: 197 mg/dL (10 Jul 2018 12:01)  I&O's Summary    10 Jul 2018 07:01  -  11 Jul 2018 07:00  --------------------------------------------------------  IN: 1120 mL / OUT: 0 mL / NET: 1120 mL    RUQ ultrasound:  	Nonspecific distended gallbladder demonstrating mild wall thickening and/or   	edema. Cholelithiasis or sonographic Zamudio's sign. If clinical suspicion   	for acute cholecystitis persists, consider further evaluation with dynamic   	HIDA.   	Cirrhosis.     HIDA:        CT abdomen/pelvis:     No source of infection identified in the abdomen.  Stable cirrhosis and evidence of portal hypertension. 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    TTE:    1. Severely dilated left atrium.  LA volume index = 55  cc/m2.  2. Left ventricular ejection fraction, by visual  estimation, is 55-60%.  No wall motion abnormality.  3. Normal diastolic function.  4. Mild right atrial enlargement.  5. Normal right ventricular size and function.  6. Normal pericardium with no pericardial effusion.  7. No obvious evidence of valvular vegetation is seen. LILLIAN LIM  42y  Male    Patient is a 42y old  Male who presents with a chief complaint of Abdominal pain (08 Jul 2018 10:23)    INTERVAL HPI/OVERNIGHT EVENTS: Abdominal pain is resolved and patient is afebrile. No other acute events overnight - no nausea, vomiting, diarrhea, CP, palpitations or SOB.    MEDICATIONS  (STANDING):  cefTRIAXone   IVPB 2 Gram(s) IV Intermittent every 24 hours  dextrose 5%. 1000 milliLiter(s) (50 mL/Hr) IV Continuous <Continuous>  dextrose 50% Injectable 12.5 Gram(s) IV Push once  dextrose 50% Injectable 25 Gram(s) IV Push once  dextrose 50% Injectable 25 Gram(s) IV Push once  fluticasone propionate 50 MICROgram(s)/spray Nasal Spray 1 Spray(s) Both Nostrils daily  furosemide    Tablet 20 milliGRAM(s) Oral daily  insulin glargine Injectable (LANTUS) 30 Unit(s) SubCutaneous at bedtime  insulin lispro (HumaLOG) corrective regimen sliding scale   SubCutaneous three times a day before meals  insulin lispro (HumaLOG) corrective regimen sliding scale   SubCutaneous at bedtime  nadolol 20 milliGRAM(s) Oral daily  pantoprazole    Tablet 40 milliGRAM(s) Oral before breakfast    MEDICATIONS  (PRN):  dextrose 40% Gel 15 Gram(s) Oral once PRN Blood Glucose LESS THAN 70 milliGRAM(s)/deciliter  glucagon  Injectable 1 milliGRAM(s) IntraMuscular once PRN Glucose LESS THAN 70 milligrams/deciliter  traMADol 50 milliGRAM(s) Oral every 6 hours PRN Moderate to Severe pain    Vital Signs Last 24 Hrs  T(C): 37.3 (10 Jul 2018 23:39), Max: 37.3 (10 Jul 2018 23:39)  T(F): 99.2 (10 Jul 2018 23:39), Max: 99.2 (10 Jul 2018 23:39)  HR: 73 (11 Jul 2018 06:08) (65 - 73)  BP: 110/69 (11 Jul 2018 06:08) (105/67 - 117/70)  BP(mean): --  RR: 16 (10 Jul 2018 23:39) (16 - 18)  SpO2: 92% (10 Jul 2018 23:39) (92% - 93%)    PHYSICAL EXAM:  CONSTITUTIONAL: NAD  HEAD: Normocephalic, atraumatic, moist mucous membranes  CARDIAC: Normal rate, regular rhythm.  Heart sounds S1, S2.  RESPIRATORY: Breath sounds clear and equal bilaterally.  GASTROINTESTINAL: Soft, NTND, no rebound or guarding, - Zamudio's sign  MUSCULOSKELETAL: range of motion is not limited, no muscle or joint tenderness  NEUROLOGICAL: no focal deficits, no motor or sensory deficits.  PSYCH: A&O x3  SKIN: No evidence of rash. Vitiligo    LABS:                        10.0   4.04  )-----------( 68       ( 11 Jul 2018 08:08 )             31.2       07-11    140  |  106  |  8   ----------------------------<  141<H>  3.7   |  20<L>  |  0.75    Ca    8.0<L>      11 Jul 2018 07:11  Phos  3.5     07-10  Mg     1.9     07-10    TPro  5.8<L>  /  Alb  3.2<L>  /  TBili  0.9  /  DBili  x   /  AST  41<H>  /  ALT  30  /  AlkPhos  132<H>  07-11    CAPILLARY BLOOD GLUCOSE      POCT Blood Glucose.: 118 mg/dL (11 Jul 2018 08:13)  POCT Blood Glucose.: 153 mg/dL (10 Jul 2018 21:54)  POCT Blood Glucose.: 215 mg/dL (10 Jul 2018 17:14)  POCT Blood Glucose.: 197 mg/dL (10 Jul 2018 12:01)  I&O's Summary    10 Jul 2018 07:01  -  11 Jul 2018 07:00  --------------------------------------------------------  IN: 1120 mL / OUT: 0 mL / NET: 1120 mL    RUQ ultrasound:    	Nonspecific distended gallbladder demonstrating mild wall thickening and/or   	edema. Cholelithiasis or sonographic Zamudio's sign. If clinical suspicion   	for acute cholecystitis persists, consider further evaluation with dynamic   	HIDA.   	Cirrhosis.     HIDA:    Normal morphine-augmented hepatobiliary scan.  No evidence of acute cholecystitis.      CT abdomen/pelvis:     No source of infection identified in the abdomen.  Stable cirrhosis and evidence of portal hypertension. 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    TTE:    1. Severely dilated left atrium.  LA volume index = 55  cc/m2.  2. Left ventricular ejection fraction, by visual  estimation, is 55-60%.  No wall motion abnormality.  3. Normal diastolic function.  4. Mild right atrial enlargement.  5. Normal right ventricular size and function.  6. Normal pericardium with no pericardial effusion.  7. No obvious evidence of valvular vegetation is seen.

## 2018-07-11 NOTE — PROGRESS NOTE ADULT - ASSESSMENT
41 yo M hx IDDM2, alcoholic cirrhosis (on liver transplant list) c/b esophageal varices (recent EGD in 2017 confirming), vitiligo, recurrent pancreatitis, presenting with sharp, constant 10/10 epigastric abdominal pain.  Patient with h/o recurrent e coli bacteremia in past  Thought to be from biliary source but workup  had been negative  had been on suppressive bactrim with no breakthroughs recently  Stopped taking medications 1 month ago  Now with strep bacteremia  ? Occult abdominal/biliary source  workup negative  Repeat cx negative  willing for PICC=-understands risks including infection  If repeat cultures stay negative may limit to 14 days  PICC if repeat cx stay negative  Will tailor plan for ID issues  per course,results.Will defer to primary team on management of other issues.  Will Follow.  Beeper [id number]/afterhours/No [id number]

## 2018-07-11 NOTE — PROGRESS NOTE ADULT - PROBLEM SELECTOR PLAN 4
Alcoholic cirrhosis c/b esophageal varices. MELD = 15, Maddrey = 9.3 Patient follows transplant in [facility].  - Appreciate GI recs  - Plan for ANTOINETTE Alcoholic cirrhosis c/b esophageal varices. MELD = 15, Maddrey = 9.3 Patient follows transplant in [facility].  - Appreciate GI recs Alcoholic cirrhosis c/b esophageal varices. MELD = 6, Maddrey = 10.6. Patient follows transplant in [facility].  - Appreciate GI recs

## 2018-07-11 NOTE — PROGRESS NOTE ADULT - PROBLEM SELECTOR PLAN 6
DVT: SCDs  Dispo: pending transition to oral antibiotics  Diet: Carb consistent DVT: SCDs  Dispo: pending blood cultures  Diet: Carb consistent

## 2018-07-11 NOTE — DISCHARGE NOTE ADULT - CARE PROVIDERS DIRECT ADDRESSES
[email or web address]

## 2018-07-11 NOTE — DISCHARGE NOTE ADULT - PATIENT PORTAL LINK FT
You can access the [email or web address] Patient Portal, offered by [facility], by registering with the following website: [email or web address]

## 2018-07-11 NOTE — DISCHARGE NOTE ADULT - PLAN OF CARE
Management of condition You came to the hospital with a blood infection with bacteria called Group B streptococcus. We started you an antibiotic through your veins. We looked for possible sources of this infection by doing a CT scan, which did not show additional information. We also did an ultrasound of your heart to ensure that there were no bacteria in your heart valves. The ultrasound was also normal. We want you continue your current antibiotic regimen as follows. Please continue intravenous Ceftriaxone 2g daily until 7/24. We will remove your PICC on 7/24, after which you will begin Keflex 500mg twice a day for 3 weeks. You came to the ED with abdominal pain. We did an abdominal ultrasound and a HIDA scan, both of which showed that you do not have an infection of your gallbladder. We also did tests (amylase and lipase) to ensure that you did not have pancreatitis, all of which were negative. We finally found a bacterial infection in your bloodstream, which was most likely causing your symptoms. We continued you on your home medications of furosemide and nadolol in the hospital. Please continue to take them after going home and follow-up with your liver doctor. We stopped your metformin in the hospital and put you on a correctional dose of insulin. Your blood sugars were well controlled here. You can continue taking your metformin and home dose of insulin after discharge. When you came to the hospital, you had a sever infection called sepsis. We gave you fluids through your veins and started you on antibiotics. Your infection became less severe and you were stable enough to go home.

## 2018-07-11 NOTE — PROGRESS NOTE ADULT - PROBLEM SELECTOR PLAN 1
Patient with abdominal pain with past history of recurrent pancreatitis and alcoholic cirrhosis on transplant list. No ascites on exam, leukocytosis and febrile in ED. WNL lipase.  -RUQ u/s and HIDA ruled out acute cholecystitis  - Completed IVF  - analgesia with Tramadol PO 50mg PRN for severe pain (avoid Acetaminophen given liver disease, and NSAIDs given varices)

## 2018-07-12 VITALS
RESPIRATION RATE: 18 BRPM | OXYGEN SATURATION: 94 % | HEART RATE: 62 BPM | SYSTOLIC BLOOD PRESSURE: 101 MMHG | TEMPERATURE: 99 F | DIASTOLIC BLOOD PRESSURE: 59 MMHG

## 2018-07-12 LAB
ALBUMIN SERPL ELPH-MCNC: 3.3 G/DL — SIGNIFICANT CHANGE UP (ref 3.3–5)
ALP SERPL-CCNC: 141 U/L — HIGH (ref 40–120)
ALT FLD-CCNC: 30 U/L — SIGNIFICANT CHANGE UP (ref 10–45)
ANION GAP SERPL CALC-SCNC: 11 MMOL/L — SIGNIFICANT CHANGE UP (ref 5–17)
AST SERPL-CCNC: 42 U/L — HIGH (ref 10–40)
BILIRUB SERPL-MCNC: 1.1 MG/DL — SIGNIFICANT CHANGE UP (ref 0.2–1.2)
BUN SERPL-MCNC: 8 MG/DL — SIGNIFICANT CHANGE UP (ref 7–23)
CALCIUM SERPL-MCNC: 7.9 MG/DL — LOW (ref 8.4–10.5)
CHLORIDE SERPL-SCNC: 109 MMOL/L — HIGH (ref 96–108)
CO2 SERPL-SCNC: 20 MMOL/L — LOW (ref 22–31)
CREAT SERPL-MCNC: 0.73 MG/DL — SIGNIFICANT CHANGE UP (ref 0.5–1.3)
GLUCOSE BLDC GLUCOMTR-MCNC: 112 MG/DL — HIGH (ref 70–99)
GLUCOSE BLDC GLUCOMTR-MCNC: 127 MG/DL — HIGH (ref 70–99)
GLUCOSE BLDC GLUCOMTR-MCNC: 172 MG/DL — HIGH (ref 70–99)
GLUCOSE SERPL-MCNC: 151 MG/DL — HIGH (ref 70–99)
HCT VFR BLD CALC: 31.6 % — LOW (ref 39–50)
HGB BLD-MCNC: 10.4 G/DL — LOW (ref 13–17)
MCHC RBC-ENTMCNC: 23.7 PG — LOW (ref 27–34)
MCHC RBC-ENTMCNC: 32.9 GM/DL — SIGNIFICANT CHANGE UP (ref 32–36)
MCV RBC AUTO: 72 FL — LOW (ref 80–100)
PLATELET # BLD AUTO: 71 K/UL — LOW (ref 150–400)
POTASSIUM SERPL-MCNC: 3.8 MMOL/L — SIGNIFICANT CHANGE UP (ref 3.5–5.3)
POTASSIUM SERPL-SCNC: 3.8 MMOL/L — SIGNIFICANT CHANGE UP (ref 3.5–5.3)
PROT SERPL-MCNC: 6.2 G/DL — SIGNIFICANT CHANGE UP (ref 6–8.3)
RBC # BLD: 4.39 M/UL — SIGNIFICANT CHANGE UP (ref 4.2–5.8)
RBC # FLD: 19 % — HIGH (ref 10.3–14.5)
SODIUM SERPL-SCNC: 140 MMOL/L — SIGNIFICANT CHANGE UP (ref 135–145)
WBC # BLD: 4.34 K/UL — SIGNIFICANT CHANGE UP (ref 3.8–10.5)
WBC # FLD AUTO: 4.34 K/UL — SIGNIFICANT CHANGE UP (ref 3.8–10.5)

## 2018-07-12 PROCEDURE — 96376 TX/PRO/DX INJ SAME DRUG ADON: CPT

## 2018-07-12 PROCEDURE — 83036 HEMOGLOBIN GLYCOSYLATED A1C: CPT

## 2018-07-12 PROCEDURE — 87040 BLOOD CULTURE FOR BACTERIA: CPT

## 2018-07-12 PROCEDURE — C1751: CPT

## 2018-07-12 PROCEDURE — 96374 THER/PROPH/DIAG INJ IV PUSH: CPT

## 2018-07-12 PROCEDURE — 99285 EMERGENCY DEPT VISIT HI MDM: CPT | Mod: 25

## 2018-07-12 PROCEDURE — 77001 FLUOROGUIDE FOR VEIN DEVICE: CPT | Mod: 26

## 2018-07-12 PROCEDURE — 85730 THROMBOPLASTIN TIME PARTIAL: CPT

## 2018-07-12 PROCEDURE — 83690 ASSAY OF LIPASE: CPT

## 2018-07-12 PROCEDURE — 76937 US GUIDE VASCULAR ACCESS: CPT

## 2018-07-12 PROCEDURE — 82248 BILIRUBIN DIRECT: CPT

## 2018-07-12 PROCEDURE — 85610 PROTHROMBIN TIME: CPT

## 2018-07-12 PROCEDURE — 87581 M.PNEUMON DNA AMP PROBE: CPT

## 2018-07-12 PROCEDURE — 80076 HEPATIC FUNCTION PANEL: CPT

## 2018-07-12 PROCEDURE — 77001 FLUOROGUIDE FOR VEIN DEVICE: CPT

## 2018-07-12 PROCEDURE — A9537: CPT

## 2018-07-12 PROCEDURE — 83735 ASSAY OF MAGNESIUM: CPT

## 2018-07-12 PROCEDURE — 82150 ASSAY OF AMYLASE: CPT

## 2018-07-12 PROCEDURE — 82728 ASSAY OF FERRITIN: CPT

## 2018-07-12 PROCEDURE — 81001 URINALYSIS AUTO W/SCOPE: CPT

## 2018-07-12 PROCEDURE — 87633 RESP VIRUS 12-25 TARGETS: CPT

## 2018-07-12 PROCEDURE — 99232 SBSQ HOSP IP/OBS MODERATE 35: CPT

## 2018-07-12 PROCEDURE — 82330 ASSAY OF CALCIUM: CPT

## 2018-07-12 PROCEDURE — 78226 HEPATOBILIARY SYSTEM IMAGING: CPT

## 2018-07-12 PROCEDURE — 74177 CT ABD & PELVIS W/CONTRAST: CPT

## 2018-07-12 PROCEDURE — 36569 INSJ PICC 5 YR+ W/O IMAGING: CPT

## 2018-07-12 PROCEDURE — 99239 HOSP IP/OBS DSCHRG MGMT >30: CPT

## 2018-07-12 PROCEDURE — 83550 IRON BINDING TEST: CPT

## 2018-07-12 PROCEDURE — 82435 ASSAY OF BLOOD CHLORIDE: CPT

## 2018-07-12 PROCEDURE — 84132 ASSAY OF SERUM POTASSIUM: CPT

## 2018-07-12 PROCEDURE — 93306 TTE W/DOPPLER COMPLETE: CPT

## 2018-07-12 PROCEDURE — 80053 COMPREHEN METABOLIC PANEL: CPT

## 2018-07-12 PROCEDURE — 76705 ECHO EXAM OF ABDOMEN: CPT

## 2018-07-12 PROCEDURE — 87184 SC STD DISK METHOD PER PLATE: CPT

## 2018-07-12 PROCEDURE — 94640 AIRWAY INHALATION TREATMENT: CPT

## 2018-07-12 PROCEDURE — 84295 ASSAY OF SERUM SODIUM: CPT

## 2018-07-12 PROCEDURE — 96375 TX/PRO/DX INJ NEW DRUG ADDON: CPT

## 2018-07-12 PROCEDURE — 85027 COMPLETE CBC AUTOMATED: CPT

## 2018-07-12 PROCEDURE — 71045 X-RAY EXAM CHEST 1 VIEW: CPT

## 2018-07-12 PROCEDURE — 84100 ASSAY OF PHOSPHORUS: CPT

## 2018-07-12 PROCEDURE — 82962 GLUCOSE BLOOD TEST: CPT

## 2018-07-12 PROCEDURE — 82947 ASSAY GLUCOSE BLOOD QUANT: CPT

## 2018-07-12 PROCEDURE — 83605 ASSAY OF LACTIC ACID: CPT

## 2018-07-12 PROCEDURE — 87486 CHLMYD PNEUM DNA AMP PROBE: CPT

## 2018-07-12 PROCEDURE — 87798 DETECT AGENT NOS DNA AMP: CPT

## 2018-07-12 PROCEDURE — 85014 HEMATOCRIT: CPT

## 2018-07-12 PROCEDURE — 76937 US GUIDE VASCULAR ACCESS: CPT | Mod: 26

## 2018-07-12 PROCEDURE — 80048 BASIC METABOLIC PNL TOTAL CA: CPT

## 2018-07-12 PROCEDURE — 93005 ELECTROCARDIOGRAM TRACING: CPT

## 2018-07-12 PROCEDURE — 87150 DNA/RNA AMPLIFIED PROBE: CPT

## 2018-07-12 PROCEDURE — 36600 WITHDRAWAL OF ARTERIAL BLOOD: CPT

## 2018-07-12 PROCEDURE — 82803 BLOOD GASES ANY COMBINATION: CPT

## 2018-07-12 RX ORDER — SODIUM CHLORIDE 9 MG/ML
20 INJECTION INTRAMUSCULAR; INTRAVENOUS; SUBCUTANEOUS ONCE
Qty: 0 | Refills: 0 | Status: DISCONTINUED | OUTPATIENT
Start: 2018-07-12 | End: 2018-07-12

## 2018-07-12 RX ORDER — SODIUM CHLORIDE 9 MG/ML
10 INJECTION INTRAMUSCULAR; INTRAVENOUS; SUBCUTANEOUS EVERY 12 HOURS
Qty: 0 | Refills: 0 | Status: DISCONTINUED | OUTPATIENT
Start: 2018-07-12 | End: 2018-07-12

## 2018-07-12 RX ORDER — SODIUM CHLORIDE 9 MG/ML
10 INJECTION INTRAMUSCULAR; INTRAVENOUS; SUBCUTANEOUS
Qty: 0 | Refills: 0 | Status: DISCONTINUED | OUTPATIENT
Start: 2018-07-12 | End: 2018-07-12

## 2018-07-12 RX ADMIN — CEFTRIAXONE 100 GRAM(S): 500 INJECTION, POWDER, FOR SOLUTION INTRAMUSCULAR; INTRAVENOUS at 12:55

## 2018-07-12 RX ADMIN — NADOLOL 20 MILLIGRAM(S): 80 TABLET ORAL at 05:35

## 2018-07-12 RX ADMIN — Medication 2: at 12:34

## 2018-07-12 RX ADMIN — PANTOPRAZOLE SODIUM 40 MILLIGRAM(S): 20 TABLET, DELAYED RELEASE ORAL at 05:35

## 2018-07-12 NOTE — PROGRESS NOTE ADULT - SUBJECTIVE AND OBJECTIVE BOX
CARINA LILLIAN  42y  Male    Patient is a 42y old  Male who presents with a chief complaint of Abdominal pain (08 Jul 2018 10:23)    INTERVAL HPI/OVERNIGHT EVENTS: Abdominal pain is resolved and patient remains afebrile. PICC in place. No other acute events overnight - no nausea, vomiting, diarrhea, CP, palpitations or SOB.    MEDICATIONS  (STANDING):  cefTRIAXone   IVPB 2 Gram(s) IV Intermittent every 24 hours  dextrose 5%. 1000 milliLiter(s) (50 mL/Hr) IV Continuous <Continuous>  dextrose 50% Injectable 12.5 Gram(s) IV Push once  dextrose 50% Injectable 25 Gram(s) IV Push once  dextrose 50% Injectable 25 Gram(s) IV Push once  fluticasone propionate 50 MICROgram(s)/spray Nasal Spray 1 Spray(s) Both Nostrils daily  furosemide    Tablet 20 milliGRAM(s) Oral daily  insulin glargine Injectable (LANTUS) 30 Unit(s) SubCutaneous at bedtime  insulin lispro (HumaLOG) corrective regimen sliding scale   SubCutaneous three times a day before meals  insulin lispro (HumaLOG) corrective regimen sliding scale   SubCutaneous at bedtime  nadolol 20 milliGRAM(s) Oral daily  pantoprazole    Tablet 40 milliGRAM(s) Oral before breakfast    MEDICATIONS  (PRN):  dextrose 40% Gel 15 Gram(s) Oral once PRN Blood Glucose LESS THAN 70 milliGRAM(s)/deciliter  glucagon  Injectable 1 milliGRAM(s) IntraMuscular once PRN Glucose LESS THAN 70 milligrams/deciliter  traMADol 50 milliGRAM(s) Oral every 6 hours PRN Moderate to Severe pain    Vital Signs Last 24 Hrs  T(C): 37.3 (10 Jul 2018 23:39), Max: 37.3 (10 Jul 2018 23:39)  T(F): 99.2 (10 Jul 2018 23:39), Max: 99.2 (10 Jul 2018 23:39)  HR: 73 (11 Jul 2018 06:08) (65 - 73)  BP: 110/69 (11 Jul 2018 06:08) (105/67 - 117/70)  BP(mean): --  RR: 16 (10 Jul 2018 23:39) (16 - 18)  SpO2: 92% (10 Jul 2018 23:39) (92% - 93%)    PHYSICAL EXAM:  CONSTITUTIONAL: NAD  HEAD: Normocephalic, atraumatic, moist mucous membranes  CARDIAC: Normal rate, regular rhythm.  Heart sounds S1, S2.  RESPIRATORY: Breath sounds clear and equal bilaterally.  GASTROINTESTINAL: Soft, NTND, no rebound or guarding, - Zamudio's sign  MUSCULOSKELETAL: range of motion is not limited, no muscle or joint tenderness  NEUROLOGICAL: no focal deficits, no motor or sensory deficits.  PSYCH: A&O x3  SKIN: No evidence of rash. Vitiligo    LABS:                        10.0   4.04  )-----------( 68       ( 11 Jul 2018 08:08 )             31.2       07-11    140  |  106  |  8   ----------------------------<  141<H>  3.7   |  20<L>  |  0.75    Ca    8.0<L>      11 Jul 2018 07:11  Phos  3.5     07-10  Mg     1.9     07-10    TPro  5.8<L>  /  Alb  3.2<L>  /  TBili  0.9  /  DBili  x   /  AST  41<H>  /  ALT  30  /  AlkPhos  132<H>  07-11    CAPILLARY BLOOD GLUCOSE      POCT Blood Glucose.: 118 mg/dL (11 Jul 2018 08:13)  POCT Blood Glucose.: 153 mg/dL (10 Jul 2018 21:54)  POCT Blood Glucose.: 215 mg/dL (10 Jul 2018 17:14)  POCT Blood Glucose.: 197 mg/dL (10 Jul 2018 12:01)  I&O's Summary    10 Jul 2018 07:01  -  11 Jul 2018 07:00  --------------------------------------------------------  IN: 1120 mL / OUT: 0 mL / NET: 1120 mL    RUQ ultrasound:    	Nonspecific distended gallbladder demonstrating mild wall thickening and/or   	edema. Cholelithiasis or sonographic Zamudio's sign. If clinical suspicion   	for acute cholecystitis persists, consider further evaluation with dynamic   	HIDA.   	Cirrhosis.     HIDA:    Normal morphine-augmented hepatobiliary scan.  No evidence of acute cholecystitis.      CT abdomen/pelvis:     No source of infection identified in the abdomen.  Stable cirrhosis and evidence of portal hypertension. 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    TTE:    1. Severely dilated left atrium.  LA volume index = 55  cc/m2.  2. Left ventricular ejection fraction, by visual  estimation, is 55-60%.  No wall motion abnormality.  3. Normal diastolic function.  4. Mild right atrial enlargement.  5. Normal right ventricular size and function.  6. Normal pericardium with no pericardial effusion.  7. No obvious evidence of valvular vegetation is seen. CARINA LILLIAN  42y  Male    Patient is a 42y old  Male who presents with a chief complaint of Abdominal pain (08 Jul 2018 10:23)    INTERVAL HPI/OVERNIGHT EVENTS: Abdominal pain is resolved and patient remains afebrile. PICC in place. No other acute events overnight - no nausea, vomiting, diarrhea, CP, palpitations or SOB.     MEDICATIONS  (STANDING):  cefTRIAXone   IVPB 2 Gram(s) IV Intermittent every 24 hours  dextrose 5%. 1000 milliLiter(s) (50 mL/Hr) IV Continuous <Continuous>  dextrose 50% Injectable 12.5 Gram(s) IV Push once  dextrose 50% Injectable 25 Gram(s) IV Push once  dextrose 50% Injectable 25 Gram(s) IV Push once  fluticasone propionate 50 MICROgram(s)/spray Nasal Spray 1 Spray(s) Both Nostrils daily  furosemide    Tablet 20 milliGRAM(s) Oral daily  insulin glargine Injectable (LANTUS) 30 Unit(s) SubCutaneous at bedtime  insulin lispro (HumaLOG) corrective regimen sliding scale   SubCutaneous three times a day before meals  insulin lispro (HumaLOG) corrective regimen sliding scale   SubCutaneous at bedtime  nadolol 20 milliGRAM(s) Oral daily  pantoprazole    Tablet 40 milliGRAM(s) Oral before breakfast    MEDICATIONS  (PRN):  dextrose 40% Gel 15 Gram(s) Oral once PRN Blood Glucose LESS THAN 70 milliGRAM(s)/deciliter  glucagon  Injectable 1 milliGRAM(s) IntraMuscular once PRN Glucose LESS THAN 70 milligrams/deciliter  traMADol 50 milliGRAM(s) Oral every 6 hours PRN Moderate to Severe pain    Vital Signs Last 24 Hrs  T(C): 37.3 (10 Jul 2018 23:39), Max: 37.3 (10 Jul 2018 23:39)  T(F): 99.2 (10 Jul 2018 23:39), Max: 99.2 (10 Jul 2018 23:39)  HR: 73 (11 Jul 2018 06:08) (65 - 73)  BP: 110/69 (11 Jul 2018 06:08) (105/67 - 117/70)  BP(mean): --  RR: 16 (10 Jul 2018 23:39) (16 - 18)  SpO2: 92% (10 Jul 2018 23:39) (92% - 93%)    PHYSICAL EXAM:  CONSTITUTIONAL: NAD  HEAD: Normocephalic, atraumatic, moist mucous membranes  CARDIAC: Normal rate, regular rhythm.  Heart sounds S1, S2.  RESPIRATORY: Breath sounds clear and equal bilaterally.  GASTROINTESTINAL: Soft, NTND, no rebound or guarding, - Zamudio's sign  MUSCULOSKELETAL: range of motion is not limited, no muscle or joint tenderness  NEUROLOGICAL: no focal deficits, no motor or sensory deficits.  PSYCH: A&O x3  SKIN: No evidence of rash. Vitiligo    LABS:                        10.0   4.04  )-----------( 68       ( 11 Jul 2018 08:08 )             31.2       07-11    140  |  106  |  8   ----------------------------<  141<H>  3.7   |  20<L>  |  0.75    Ca    8.0<L>      11 Jul 2018 07:11  Phos  3.5     07-10  Mg     1.9     07-10    TPro  5.8<L>  /  Alb  3.2<L>  /  TBili  0.9  /  DBili  x   /  AST  41<H>  /  ALT  30  /  AlkPhos  132<H>  07-11    CAPILLARY BLOOD GLUCOSE      POCT Blood Glucose.: 118 mg/dL (11 Jul 2018 08:13)  POCT Blood Glucose.: 153 mg/dL (10 Jul 2018 21:54)  POCT Blood Glucose.: 215 mg/dL (10 Jul 2018 17:14)  POCT Blood Glucose.: 197 mg/dL (10 Jul 2018 12:01)  I&O's Summary    10 Jul 2018 07:01  -  11 Jul 2018 07:00  --------------------------------------------------------  IN: 1120 mL / OUT: 0 mL / NET: 1120 mL    RUQ ultrasound:    	Nonspecific distended gallbladder demonstrating mild wall thickening and/or   	edema. Cholelithiasis or sonographic Zamudio's sign. If clinical suspicion   	for acute cholecystitis persists, consider further evaluation with dynamic   	HIDA.   	Cirrhosis.     HIDA:    Normal morphine-augmented hepatobiliary scan.  No evidence of acute cholecystitis.      CT abdomen/pelvis:     No source of infection identified in the abdomen.  Stable cirrhosis and evidence of portal hypertension. 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    TTE:    1. Severely dilated left atrium.  LA volume index = 55  cc/m2.  2. Left ventricular ejection fraction, by visual  estimation, is 55-60%.  No wall motion abnormality.  3. Normal diastolic function.  4. Mild right atrial enlargement.  5. Normal right ventricular size and function.  6. Normal pericardium with no pericardial effusion.  7. No obvious evidence of valvular vegetation is seen. CARINA LILLIAN  42y  Male    Patient is a 42y old  Male who presents with a chief complaint of Abdominal pain (08 Jul 2018 10:23)    INTERVAL HPI/OVERNIGHT EVENTS: Abdominal pain is resolved and patient remains afebrile. PICC in place. No other acute events overnight - no nausea, vomiting, diarrhea, CP, palpitations or SOB.     MEDICATIONS  (STANDING):  cefTRIAXone   IVPB 2 Gram(s) IV Intermittent every 24 hours  dextrose 5%. 1000 milliLiter(s) (50 mL/Hr) IV Continuous <Continuous>  dextrose 50% Injectable 12.5 Gram(s) IV Push once  dextrose 50% Injectable 25 Gram(s) IV Push once  dextrose 50% Injectable 25 Gram(s) IV Push once  fluticasone propionate 50 MICROgram(s)/spray Nasal Spray 1 Spray(s) Both Nostrils daily  furosemide    Tablet 20 milliGRAM(s) Oral daily  insulin glargine Injectable (LANTUS) 30 Unit(s) SubCutaneous at bedtime  insulin lispro (HumaLOG) corrective regimen sliding scale   SubCutaneous three times a day before meals  insulin lispro (HumaLOG) corrective regimen sliding scale   SubCutaneous at bedtime  nadolol 20 milliGRAM(s) Oral daily  pantoprazole    Tablet 40 milliGRAM(s) Oral before breakfast    MEDICATIONS  (PRN):  dextrose 40% Gel 15 Gram(s) Oral once PRN Blood Glucose LESS THAN 70 milliGRAM(s)/deciliter  glucagon  Injectable 1 milliGRAM(s) IntraMuscular once PRN Glucose LESS THAN 70 milligrams/deciliter  traMADol 50 milliGRAM(s) Oral every 6 hours PRN Moderate to Severe pain    Vital Signs Last 24 Hrs  T(C): 36.9 (12 Jul 2018 08:01), Max: 37.2 (11 Jul 2018 15:49)  T(F): 98.5 (12 Jul 2018 08:01), Max: 98.9 (11 Jul 2018 15:49)  HR: 60 (12 Jul 2018 08:01) (60 - 65)  BP: 100/60 (12 Jul 2018 08:01) (100/60 - 105/61)  BP(mean): --  RR: 18 (12 Jul 2018 08:01) (16 - 18)  SpO2: 94% (12 Jul 2018 08:01) (92% - 94%)    PHYSICAL EXAM:  CONSTITUTIONAL: NAD  HEAD: Normocephalic, atraumatic, moist mucous membranes  CARDIAC: Normal rate, regular rhythm.  Heart sounds S1, S2.  RESPIRATORY: Breath sounds clear and equal bilaterally.  GASTROINTESTINAL: Soft, NTND, no rebound or guarding, - Zamudio's sign  MUSCULOSKELETAL: range of motion is not limited, no muscle or joint tenderness  NEUROLOGICAL: no focal deficits, no motor or sensory deficits.  PSYCH: A&O x3  SKIN: No evidence of rash. Vitiligo    LABS:                     10.4   4.34  )-----------( 71       ( 12 Jul 2018 08:12 )             31.6   07-12    140  |  109<H>  |  8   ----------------------------<  151<H>  3.8   |  20<L>  |  0.73    Ca    7.9<L>      12 Jul 2018 06:09    TPro  6.2  /  Alb  3.3  /  TBili  1.1  /  DBili  x   /  AST  42<H>  /  ALT  30  /  AlkPhos  141<H>  07-12    CAPILLARY BLOOD GLUCOSE      POCT Blood Glucose.: 172 mg/dL (12 Jul 2018 12:08)  POCT Blood Glucose.: 127 mg/dL (12 Jul 2018 08:12)  POCT Blood Glucose.: 170 mg/dL (11 Jul 2018 21:40)  POCT Blood Glucose.: 190 mg/dL (11 Jul 2018 17:00)  I&O's Summary    11 Jul 2018 07:01  -  12 Jul 2018 07:00  --------------------------------------------------------  IN: 1070 mL / OUT: 0 mL / NET: 1070 mL    12 Jul 2018 07:01  -  12 Jul 2018 14:36  --------------------------------------------------------  IN: 500 mL / OUT: 0 mL / NET: 500 mL    RUQ ultrasound:    	Nonspecific distended gallbladder demonstrating mild wall thickening and/or   	edema. Cholelithiasis or sonographic Zamudio's sign. If clinical suspicion   	for acute cholecystitis persists, consider further evaluation with dynamic   	HIDA.   	Cirrhosis.     HIDA:    Normal morphine-augmented hepatobiliary scan.  No evidence of acute cholecystitis.      CT abdomen/pelvis:     No source of infection identified in the abdomen.  Stable cirrhosis and evidence of portal hypertension. 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    TTE:    1. Severely dilated left atrium.  LA volume index = 55  cc/m2.  2. Left ventricular ejection fraction, by visual  estimation, is 55-60%.  No wall motion abnormality.  3. Normal diastolic function.  4. Mild right atrial enlargement.  5. Normal right ventricular size and function.  6. Normal pericardium with no pericardial effusion.  7. No obvious evidence of valvular vegetation is seen.

## 2018-07-12 NOTE — PROGRESS NOTE ADULT - SUBJECTIVE AND OBJECTIVE BOX
Patient is a 42y old  Male who presents with a chief complaint of Abdominal pain (11 Jul 2018 16:22)    Being followed by ID for bacteremia     Interval history:feels well  For PICC today   No acute events      ROS:  No cough,SOB,CP  No N/V/D./abd pain  No other complaints      Antimicrobials:    cefTRIAXone   IVPB 2 Gram(s) IV Intermittent every 24 hours    Other medications reviewed    Vital Signs Last 24 Hrs  T(C): 37.1 (07-12-18 @ 16:23), Max: 37.1 (07-12-18 @ 16:23)  T(F): 98.7 (07-12-18 @ 16:23), Max: 98.7 (07-12-18 @ 16:23)  HR: 62 (07-12-18 @ 16:23) (60 - 65)  BP: 101/59 (07-12-18 @ 16:23) (100/60 - 105/61)  BP(mean): --  RR: 18 (07-12-18 @ 16:23) (16 - 18)  SpO2: 94% (07-12-18 @ 16:23) (92% - 94%)    Physical Exam:    Vitiligo    HEENT PERRLA EOMI    No oral exudate or erythema    Chest Good AE,CTA    CVS RRR S1 S2 WNl No murmur or rub or gallop    Abd soft BS normal No tenderness no masses    IV site no erythema tenderness or discharge    CNS AAO X 3 no focal    Lab Data:                          10.4   4.34  )-----------( 71       ( 12 Jul 2018 08:12 )             31.6       07-12    140  |  109<H>  |  8   ----------------------------<  151<H>  3.8   |  20<L>  |  0.73    Ca    7.9<L>      12 Jul 2018 06:09    TPro  6.2  /  Alb  3.3  /  TBili  1.1  /  DBili  x   /  AST  42<H>  /  ALT  30  /  AlkPhos  141<H>  07-12        Culture - Blood (collected 10 Jul 2018 08:49)  Source: .Blood Blood-Venous  Preliminary Report (11 Jul 2018 09:01):    No growth to date.    Culture - Blood (collected 10 Jul 2018 08:49)  Source: .Blood Blood-Peripheral  Preliminary Report (11 Jul 2018 09:01):    No growth to date.    Culture - Blood (collected 08 Jul 2018 07:11)  Source: .Blood Blood-Venous  Preliminary Report (09 Jul 2018 08:01):    No growth to date.    Culture - Blood (collected 08 Jul 2018 07:11)  Source: .Blood Blood-Peripheral  Gram Stain (08 Jul 2018 18:57):    Growth in aerobic bottle: Gram Positive Cocci in Pairs and Chains  Final Report (10 Jul 2018 18:26):    Growth in aerobic bottle: Streptococcus agalactiae (Group B)    "Due to technical problems, Proteus sp. will Not be reported as part of    the BCID panel until further notice"    ***Blood Panel PCR results on this specimen are available    approximately 3 hours after the Gram stain result.***    Gram stain, PCR, and/or culture results may not always    correspond due to difference in methodologies.    ************************************************************    This PCR assay was performed using [employer].    The following targets are tested for: Enterococcus,    vancomycin resistant enterococci, Listeria monocytogenes,    coagulase negative staphylococci, S. aureus,    methicillin resistant S. aureus, Streptococcus agalactiae    (Group B), S. pneumoniae,S. pyogenes (Group A),    Acinetobacter baumannii, Enterobacter cloacae, E. coli,    Klebsiella oxytoca, K. pneumoniae, Proteus sp.,    Serratia marcescens, Haemophilus influenzae,    Neisseria meningitidis, Pseudomonas aeruginosa, Candida    albicans, C. glabrata, C krusei, C parapsilosis,    C. tropicalis and the KPC resistance gene.  Organism: Blood Culture PCR  Streptococcus agalactiae (Group B) (10 Jul 2018 18:26)  Organism: Streptococcus agalactiae (Group B) (10 Jul 2018 18:26)      -  Ceftriaxone: S      -  Clindamycin: S      -  Levofloxacin: S      -  Penicillin: S Predicts results for ampicillin, amoxicillin, amoxicillin/clavulanate, ampicillin/sulbactam, 1st, 2nd and 3rd generation cephalosporins and carbapenems.      -  Vancomycin: S      Method Type: KB  Organism: Blood Culture PCR (10 Jul 2018 18:26)      -  Streptococcus agalactiae (Group B): Detec      Method Type: PCR

## 2018-07-12 NOTE — PROGRESS NOTE ADULT - PROBLEM SELECTOR PLAN 4
Alcoholic cirrhosis c/b esophageal varices. MELD = 6, Maddrey = 10.6. Patient follows transplant in [facility].  - Appreciate GI recs

## 2018-07-12 NOTE — PROGRESS NOTE ADULT - SUBJECTIVE AND OBJECTIVE BOX
41 yo M hx IDDM2, alcoholic cirrhosis (on liver transplant list) c/b esophageal varices (recent EGD in 2017 confirming), vitiligo, recurrent pancreatitis with group B strep bactermia presented [facility] for PICC placement for long term IV antibiotic. recent blood culture on 7/10 negative for 48hrs.     Allergies: levofloxacin (Other (Moderate))      PAST MEDICAL & SURGICAL HISTORY:  E. coli bacteremia: Recurrent  Vitiligo  Latent tuberculosis  Diabetes mellitus type 2, insulin dependent  Esophageal varices  Alcoholic cirrhosis  Status post corneal transplant        Pertinent labs:                      10.4   4.34  )-----------( 71       ( 12 Jul 2018 08:12 )             31.6   07-12    140  |  109<H>  |  8   ----------------------------<  151<H>  3.8   |  20<L>  |  0.73    Ca    7.9<L>      12 Jul 2018 06:09    TPro  6.2  /  Alb  3.3  /  TBili  1.1  /  DBili  x   /  AST  42<H>  /  ALT  30  /  AlkPhos  141<H>  07-12      Consent: Procedure/risks/ Benefits explained. Informed consent obtained. Pt verbalizes understanding.

## 2018-07-12 NOTE — PROGRESS NOTE ADULT - ATTENDING COMMENTS
Alcoholic cirrhosis with ascites, now trace on diuretics, small varices, on transplant list at [facility], h/o pancreatitis, vitiligo, latent TB, gallstones, and recurrent admissions with febrile illness, severe epigastric pain and bacteremia - E. coli, Klebsiella, now GBS, and unchanged abnormal LFTs.  Recent MRCP on 5/19 showed normal bile ducts. Prior extensive workup including EGD and colonoscopy x 2 in 2016 and 2017, and EUS 2017, as well as CT 6/2018, and now HIDA scan did not show a source. Current US showed distended GB with mildly thickened wall.    - agree with repeat CT, but may only see source of infection if he presents again, before antibiotic treatment  - ascites on 4/14/18 showed no SBP, but had very low albumin, predisposing for SBP. Currently only trace ascites, but as even a small ascites can get infected and he has recurrent infections (of possible other location), would give him antibiotic prophylaxis - bactrim or Cipro; would discuss choice of drug with ID.
Patient seen and examined. No further fevers and his abdominal pain has resolved. Case d/w ID (Dr. Boss). Pending CT A/P for further evaluation for GBS bacteremia. For now, will c/w ceftriaxone 2 g IV daily and f/u repeat BCX. Hepatology f/u appreciated, in agreement that patient will need to be restarted on ABX PPX likely with Bactrim when he completes acute therapy for GBS infection (patient self-d/c'ed this medication one month ago).    Plan d/w patient at bedside and team resident (Dr. Moya).    Jw Johnson M.D.  Hospitalist  Pager: [phone]
Patient seen and examined. He has no abdominal pain. s/p PICC by IR this afternoon. He is stable for discharge home this evening, with home infusions of ceftriaxone 2 g IV daily set up by CM until 7/24/18. ID f/u appreciated, plan to f/u with Dr. Boss in 7-10 days as he will need additional 3 week course of PO Keflex and likely chronic suppressive therapy.    Discharge planing time 40 minutes.    Jw Johnson M.D.  Hospitalist  Pager: [phone]
Patient seen and examined. His abdominal pain has completely resolved, he has defervesced and his leukocytosis and lactic acidosis have resolved. HIDA scan reviewed with no evidence of acute cholecystitis. He has GBS bacteremia, which may be secondary to biliary source (no significant skin breakdown/cellulitis on exam). This is likely in the setting of him self-discontinuing his empiric bactrim therapy one month ago, which was given due to recurrent bacteremia in the past. Case d/w hepatology (Dr. Kumar) and ID (Dr. Boss). Will treat with ceftriaxone 2 g IV daily for now (given ease of infusion compared to ampicillin and PCN). Repeat BCX tomorrow AM to ensure clearance. Likely 2 week course. Will hold on further abdominal imaging and TTE for now, although low threshold to pursue if bacteremia does not resolve.    Plan d/w patient at bedside and team resident (Dr. Moya).    Jw Johnson M.D.  Hospitalist  Pager: [phone]
Patient seen and examined. He feels well today, no abdominal pain, fevers or chills. Repeat BCX remain negative. CT A/P reviewed, no clear source of intraabdominal pathology, ?biliary in nature. Plan for PICC placement by IR this evening, will need Ceftriaxone 2 g IV daily until 7/24/18. d/w ID (Dr. Boss). Case d/w case management, will provide script for home infusions. D/C planning.    Plan d/w patient and team resident (Dr. Moya).    Jw Johnson M.D.  Hospitalist  Pager: [phone]

## 2018-07-12 NOTE — PROGRESS NOTE ADULT - PROBLEM SELECTOR PLAN 2
Unclear source of infection though intra-abdominal source suspected. Lactate was 3.2 on admission and decreased to 1.7 after fluids.  - improved lactate s/p 1L NS in ED  - Continue 2mg IV ceftriaxone. ID f/u for antibiotic duration and plan for d/c No resolved - Patient presented with abdominal pain and past history of recurrent pancreatitis and alcoholic cirrhosis on transplant list. No ascites on exam, leukocytosis and febrile in ED. WNL lipase.  -RUQ u/s and HIDA ruled out acute cholecystitis  - Completed IVF  - analgesia with Tramadol PO 50mg PRN for severe pain (avoid Acetaminophen given liver disease, and NSAIDs given varices)

## 2018-07-12 NOTE — PROGRESS NOTE ADULT - PROBLEM SELECTOR PLAN 6
DVT: SCDs  Dispo: pending blood cultures  Diet: Carb consistent DVT: SCDs  Dispo: d/c home with PICC, likely on 7/13/18  Diet: Carb consistent DVT: SCDs  Dispo: d/c home with PICC, likely on 7/12/18  Diet: Carb consistent

## 2018-07-12 NOTE — PROCEDURE NOTE - NSPROCDETAILS_GEN_ALL_CORE
ultrasound assessment/sterile technique, catheter placed/location identified, draped/prepped, sterile technique used/sterile dressing applied/supine position/ultrasound guidance

## 2018-07-12 NOTE — PROGRESS NOTE ADULT - ASSESSMENT
43 yo M hx IDDM2, alcoholic cirrhosis (on liver transplant list) c/b esophageal varices (recent EGD in 2017 confirming), vitiligo, recurrent pancreatitis, presenting with sharp, constant 10/10 epigastric abdominal pain.  Patient with h/o recurrent e coli bacteremia in past  Thought to be from biliary source but workup  had been negative  had been on suppressive bactrim with no breakthroughs recently  Stopped taking medications 1 month ago  Now with strep bacteremia  ? Occult abdominal/biliary source  workup negative  Repeat cx negative  willing for PICC=-understands risks including infection  To be setup for OPAT X 14 days  Suppressive po keflex afterwards  To see me in office in 3-4 weeks  DC Home once OPAT setup  case d/w primary team  I will be away till 7/18/18  ID service will cover and follow patient.  Please call [phone] if any issues or questions

## 2018-07-12 NOTE — PROGRESS NOTE ADULT - ASSESSMENT
41 yo M hx IDDM2, alcoholic cirrhosis (on liver transplant list) c/b esophageal varices (recent EGD in 2017 confirming), vitiligo, recurrent pancreatitis, presenting with sharp, constant 10/10 epigastric abdominal pain, now resolved after ceftriaxone.

## 2018-07-12 NOTE — PROGRESS NOTE ADULT - PROBLEM SELECTOR PLAN 3
- Continue IV ceftriaxone 2mg  - Appreciate ID recs  - CT abdomen/pelvis was negative for source of infection. Patient has had recurrent bacteremia in the past.  -I/D f/u for duration of antibiotics and antibiotic regimen for d/c  -TTE negative for vegetation  - Bactrim prophylaxis after resolution of bacteremia Now resolved - Unclear source of infection though intra-abdominal source suspected. Lactate was 3.2 on admission and decreased to 1.7 after fluids.  - improved lactate s/p 1L NS in ED Now resolved - Unclear source of infection though intra-abdominal source suspected. Lactate was 3.2 on admission and decreased to 1.7 after fluids.  - improved lactate s/p 1L NS in ED  -IV Ceftriaxone 2g q24h for 14 days until 7/24, d/c PICC on 7/24. Begin Keflex 500mg PO BID for 3 weeks

## 2018-07-12 NOTE — PROGRESS NOTE ADULT - PROBLEM SELECTOR PLAN 1
Patient with abdominal pain with past history of recurrent pancreatitis and alcoholic cirrhosis on transplant list. No ascites on exam, leukocytosis and febrile in ED. WNL lipase.  -RUQ u/s and HIDA ruled out acute cholecystitis  - Completed IVF  - analgesia with Tramadol PO 50mg PRN for severe pain (avoid Acetaminophen given liver disease, and NSAIDs given varices) - Continue IV ceftriaxone 2mg  - Appreciate ID recs  - CT abdomen/pelvis was negative for source of infection. Patient has had recurrent bacteremia in the past.  -TTE negative for vegetation  - Bactrim prophylaxis after resolution of bacteremia - IV Ceftriaxone 2g q24h for 14 days until 7/24, d/c PICC on 7/24. Begin Keflex 500mg PO BID for 3 weeks  - Appreciate ID recs  - CT abdomen/pelvis was negative for source of infection. Patient has had recurrent bacteremia in the past.  -TTE negative for vegetation  - Bactrim prophylaxis after resolution of bacteremia

## 2018-07-13 LAB
CULTURE RESULTS: SIGNIFICANT CHANGE UP
SPECIMEN SOURCE: SIGNIFICANT CHANGE UP

## 2018-07-19 PROBLEM — R78.81 BACTEREMIA: Chronic | Status: ACTIVE | Noted: 2018-07-08

## 2018-07-25 ENCOUNTER — APPOINTMENT (OUTPATIENT)
Dept: INFECTIOUS DISEASE | Facility: CLINIC | Age: 43
End: 2018-07-25
Payer: COMMERCIAL

## 2018-07-25 VITALS
DIASTOLIC BLOOD PRESSURE: 68 MMHG | HEART RATE: 67 BPM | SYSTOLIC BLOOD PRESSURE: 109 MMHG | TEMPERATURE: 97.8 F | HEIGHT: 68 IN | BODY MASS INDEX: 29.55 KG/M2 | OXYGEN SATURATION: 93 % | WEIGHT: 195 LBS

## 2018-07-25 PROCEDURE — 99214 OFFICE O/P EST MOD 30 MIN: CPT

## 2018-07-26 ENCOUNTER — MEDICATION RENEWAL (OUTPATIENT)
Age: 43
End: 2018-07-26

## 2018-08-06 ENCOUNTER — APPOINTMENT (OUTPATIENT)
Dept: INTERNAL MEDICINE | Facility: CLINIC | Age: 43
End: 2018-08-06
Payer: COMMERCIAL

## 2018-08-06 VITALS
SYSTOLIC BLOOD PRESSURE: 110 MMHG | BODY MASS INDEX: 28.79 KG/M2 | HEART RATE: 60 BPM | OXYGEN SATURATION: 95 % | DIASTOLIC BLOOD PRESSURE: 66 MMHG | HEIGHT: 68 IN | WEIGHT: 190 LBS

## 2018-08-06 PROCEDURE — 99495 TRANSJ CARE MGMT MOD F2F 14D: CPT

## 2018-08-06 NOTE — PLAN
[FreeTextEntry1] : \par Follow-up with ID Dr. Boss on 8/28/18. Repeat BCx 1 week before appt to have results ready.

## 2018-08-06 NOTE — HISTORY OF PRESENT ILLNESS
[Post-hospitalization from ___ Hospital] : Post-hospitalization from [unfilled] Hospital [Admitted on: ___] : The patient was admitted on [unfilled] [Discharged on ___] : discharged on [unfilled] [Discharge Summary] : discharge summary [Discharge Med List] : discharge medication list [Patient Contacted By: ____] : and contacted by [unfilled] [FreeTextEntry2] : Admitted for bacteremia, started on Abx again and now feeling better. Following with ID Dr. Boss as well.

## 2018-08-21 ENCOUNTER — MEDICATION RENEWAL (OUTPATIENT)
Age: 43
End: 2018-08-21

## 2018-08-21 RX ORDER — TRAMADOL HYDROCHLORIDE 50 MG/1
50 TABLET, COATED ORAL
Qty: 60 | Refills: 0 | Status: DISCONTINUED | COMMUNITY
Start: 2018-05-01 | End: 2018-08-21

## 2018-08-22 NOTE — PATIENT PROFILE ADULT. - PURPOSEFUL PROACTIVE ROUNDING
Hemostasis: [employer] Billing Type: [employer] Body Location Override (Optional - Billing Will Still Be Based On Selected Body Map Location If Applicable): right cheek Silver Nitrate Text: The wound bed was treated with silver nitrate after the biopsy was performed. Dressing: bandage Wound Care: Petrolatum Detail Level: Simple Bill For Surgical Tray: no Electrodesiccation Text: The wound bed was treated with electrodesiccation after the biopsy was performed. Anesthesia Type: 1% lidocaine with epinephrine Biopsy Method: sterile single edge surgical blade Anesthesia Volume In Cc (Will Not Render If 0): 0.5 Consent: Written consent was obtained and risks were reviewed including but not limited to scarring, infection, bleeding, scabbing, incomplete removal, nerve damage and allergy to anesthesia. X Size Of Lesion In Cm: 0 Patient Post-Care Instructions: I reviewed with the patient in detail post-care instructions. Patient is to keep the biopsy site dry overnight, and then apply bacitracin twice daily until healed. Patient may apply hydrogen peroxide soaks to remove any crusting. Type Of Destruction Used: Curettage Lab: [id number] [facility] [address] Depth Of Biopsy: dermis Electrodesiccation And Curettage Text: The wound bed was treated with electrodesiccation and curettage after the biopsy was performed. Biopsy Type: H and E Notification Instructions: Patient will be notified of biopsy results. However, patient instructed to call the office if not contacted within 2 weeks. Was A Bandage Applied: Yes Lab: [id number] Body Location Override (Optional - Billing Will Still Be Based On Selected Body Map Location If Applicable): right thigh Billing Type: Third-Party Bill

## 2018-08-23 ENCOUNTER — LABORATORY RESULT (OUTPATIENT)
Age: 43
End: 2018-08-23

## 2018-08-23 ENCOUNTER — APPOINTMENT (OUTPATIENT)
Dept: UROLOGY | Facility: CLINIC | Age: 43
End: 2018-08-23
Payer: COMMERCIAL

## 2018-08-23 DIAGNOSIS — N53.19 OTHER EJACULATORY DYSFUNCTION: ICD-10-CM

## 2018-08-23 PROCEDURE — 99214 OFFICE O/P EST MOD 30 MIN: CPT

## 2018-08-27 ENCOUNTER — RX RENEWAL (OUTPATIENT)
Age: 43
End: 2018-08-27

## 2018-08-27 LAB — BACTERIA BLD CULT: NORMAL

## 2018-08-28 ENCOUNTER — APPOINTMENT (OUTPATIENT)
Dept: INFECTIOUS DISEASE | Facility: CLINIC | Age: 43
End: 2018-08-28
Payer: COMMERCIAL

## 2018-08-28 VITALS
TEMPERATURE: 98.1 F | OXYGEN SATURATION: 91 % | WEIGHT: 315 LBS | SYSTOLIC BLOOD PRESSURE: 119 MMHG | DIASTOLIC BLOOD PRESSURE: 70 MMHG | BODY MASS INDEX: 291.02 KG/M2 | HEART RATE: 72 BPM

## 2018-08-28 PROCEDURE — 99213 OFFICE O/P EST LOW 20 MIN: CPT

## 2018-09-12 ENCOUNTER — APPOINTMENT (OUTPATIENT)
Dept: HUMAN REPRODUCTION | Facility: CLINIC | Age: 43
End: 2018-09-12
Payer: COMMERCIAL

## 2018-09-12 PROCEDURE — 89322 SEMEN ANAL STRICT CRITERIA: CPT

## 2018-09-20 ENCOUNTER — APPOINTMENT (OUTPATIENT)
Dept: UROLOGY | Facility: CLINIC | Age: 43
End: 2018-09-20
Payer: COMMERCIAL

## 2018-09-20 DIAGNOSIS — N53.14 RETROGRADE EJACULATION: ICD-10-CM

## 2018-09-20 PROCEDURE — 99213 OFFICE O/P EST LOW 20 MIN: CPT

## 2018-09-26 ENCOUNTER — APPOINTMENT (OUTPATIENT)
Dept: UROLOGY | Facility: CLINIC | Age: 43
End: 2018-09-26

## 2018-10-16 ENCOUNTER — RX RENEWAL (OUTPATIENT)
Age: 43
End: 2018-10-16

## 2018-10-29 ENCOUNTER — INPATIENT (INPATIENT)
Facility: HOSPITAL | Age: 43
LOS: 2 days | Discharge: HOME | DRG: 378 | End: 2018-11-01
Attending: HOSPITALIST | Admitting: HOSPITALIST
Payer: COMMERCIAL

## 2018-10-29 VITALS
WEIGHT: 197.98 LBS | TEMPERATURE: 98 F | HEART RATE: 84 BPM | OXYGEN SATURATION: 95 % | SYSTOLIC BLOOD PRESSURE: 129 MMHG | HEIGHT: 68 IN | DIASTOLIC BLOOD PRESSURE: 83 MMHG | RESPIRATION RATE: 18 BRPM

## 2018-10-29 DIAGNOSIS — Z29.9 ENCOUNTER FOR PROPHYLACTIC MEASURES, UNSPECIFIED: ICD-10-CM

## 2018-10-29 DIAGNOSIS — K70.30 ALCOHOLIC CIRRHOSIS OF LIVER WITHOUT ASCITES: ICD-10-CM

## 2018-10-29 DIAGNOSIS — E11.9 TYPE 2 DIABETES MELLITUS WITHOUT COMPLICATIONS: ICD-10-CM

## 2018-10-29 DIAGNOSIS — Z79.899 OTHER LONG TERM (CURRENT) DRUG THERAPY: ICD-10-CM

## 2018-10-29 DIAGNOSIS — K92.2 GASTROINTESTINAL HEMORRHAGE, UNSPECIFIED: ICD-10-CM

## 2018-10-29 DIAGNOSIS — D69.6 THROMBOCYTOPENIA, UNSPECIFIED: ICD-10-CM

## 2018-10-29 DIAGNOSIS — E87.1 HYPO-OSMOLALITY AND HYPONATREMIA: ICD-10-CM

## 2018-10-29 DIAGNOSIS — Z78.9 OTHER SPECIFIED HEALTH STATUS: ICD-10-CM

## 2018-10-29 DIAGNOSIS — N13.30 UNSPECIFIED HYDRONEPHROSIS: ICD-10-CM

## 2018-10-29 DIAGNOSIS — F17.200 NICOTINE DEPENDENCE, UNSPECIFIED, UNCOMPLICATED: ICD-10-CM

## 2018-10-29 LAB
ALBUMIN SERPL ELPH-MCNC: 3.7 G/DL — SIGNIFICANT CHANGE UP (ref 3.3–5)
ALBUMIN SERPL ELPH-MCNC: 3.9 G/DL — SIGNIFICANT CHANGE UP (ref 3.3–5)
ALP SERPL-CCNC: 114 U/L — SIGNIFICANT CHANGE UP (ref 40–120)
ALP SERPL-CCNC: 134 U/L — HIGH (ref 40–120)
ALT FLD-CCNC: 18 U/L — SIGNIFICANT CHANGE UP (ref 10–45)
ALT FLD-CCNC: 21 U/L — SIGNIFICANT CHANGE UP (ref 10–45)
ANION GAP SERPL CALC-SCNC: 13 MMOL/L — SIGNIFICANT CHANGE UP (ref 5–17)
ANION GAP SERPL CALC-SCNC: 13 MMOL/L — SIGNIFICANT CHANGE UP (ref 5–17)
APTT BLD: 38.6 SEC — HIGH (ref 27.5–37.4)
AST SERPL-CCNC: 51 U/L — HIGH (ref 10–40)
AST SERPL-CCNC: 63 U/L — HIGH (ref 10–40)
BASOPHILS # BLD AUTO: 0.1 K/UL — SIGNIFICANT CHANGE UP (ref 0–0.2)
BASOPHILS NFR BLD AUTO: 1 % — SIGNIFICANT CHANGE UP (ref 0–2)
BILIRUB SERPL-MCNC: 1.9 MG/DL — HIGH (ref 0.2–1.2)
BILIRUB SERPL-MCNC: 2.2 MG/DL — HIGH (ref 0.2–1.2)
BUN SERPL-MCNC: 4 MG/DL — LOW (ref 7–23)
BUN SERPL-MCNC: <4 MG/DL — LOW (ref 7–23)
CALCIUM SERPL-MCNC: 8 MG/DL — LOW (ref 8.4–10.5)
CALCIUM SERPL-MCNC: 8.1 MG/DL — LOW (ref 8.4–10.5)
CHLORIDE SERPL-SCNC: 107 MMOL/L — SIGNIFICANT CHANGE UP (ref 96–108)
CHLORIDE SERPL-SCNC: 99 MMOL/L — SIGNIFICANT CHANGE UP (ref 96–108)
CO2 SERPL-SCNC: 20 MMOL/L — LOW (ref 22–31)
CO2 SERPL-SCNC: 20 MMOL/L — LOW (ref 22–31)
CREAT SERPL-MCNC: 0.48 MG/DL — LOW (ref 0.5–1.3)
CREAT SERPL-MCNC: 0.5 MG/DL — SIGNIFICANT CHANGE UP (ref 0.5–1.3)
EOSINOPHIL # BLD AUTO: 0.2 K/UL — SIGNIFICANT CHANGE UP (ref 0–0.5)
EOSINOPHIL NFR BLD AUTO: 3 % — SIGNIFICANT CHANGE UP (ref 0–6)
GAS PNL BLDV: SIGNIFICANT CHANGE UP
GLUCOSE SERPL-MCNC: 132 MG/DL — HIGH (ref 70–99)
GLUCOSE SERPL-MCNC: 74 MG/DL — SIGNIFICANT CHANGE UP (ref 70–99)
HCT VFR BLD CALC: 34 % — LOW (ref 39–50)
HCT VFR BLD CALC: 36.6 % — LOW (ref 39–50)
HGB BLD-MCNC: 11 G/DL — LOW (ref 13–17)
HGB BLD-MCNC: 11.8 G/DL — LOW (ref 13–17)
INR BLD: 1.28 RATIO — HIGH (ref 0.88–1.16)
LIDOCAIN IGE QN: 33 U/L — SIGNIFICANT CHANGE UP (ref 7–60)
LYMPHOCYTES # BLD AUTO: 1.4 K/UL — SIGNIFICANT CHANGE UP (ref 1–3.3)
LYMPHOCYTES # BLD AUTO: 26 % — SIGNIFICANT CHANGE UP (ref 13–44)
MCHC RBC-ENTMCNC: 23.6 PG — LOW (ref 27–34)
MCHC RBC-ENTMCNC: 23.9 PG — LOW (ref 27–34)
MCHC RBC-ENTMCNC: 32.3 GM/DL — SIGNIFICANT CHANGE UP (ref 32–36)
MCHC RBC-ENTMCNC: 32.4 GM/DL — SIGNIFICANT CHANGE UP (ref 32–36)
MCV RBC AUTO: 72.8 FL — LOW (ref 80–100)
MCV RBC AUTO: 73.9 FL — LOW (ref 80–100)
MONOCYTES # BLD AUTO: 0.4 K/UL — SIGNIFICANT CHANGE UP (ref 0–0.9)
MONOCYTES NFR BLD AUTO: 8 % — SIGNIFICANT CHANGE UP (ref 2–14)
NEUTROPHILS # BLD AUTO: 3 K/UL — SIGNIFICANT CHANGE UP (ref 1.8–7.4)
NEUTROPHILS NFR BLD AUTO: 62 % — SIGNIFICANT CHANGE UP (ref 43–77)
OB PNL STL: POSITIVE
PLATELET # BLD AUTO: 41 K/UL — LOW (ref 150–400)
PLATELET # BLD AUTO: 62 K/UL — LOW (ref 150–400)
POTASSIUM SERPL-MCNC: 3.7 MMOL/L — SIGNIFICANT CHANGE UP (ref 3.5–5.3)
POTASSIUM SERPL-MCNC: 4.1 MMOL/L — SIGNIFICANT CHANGE UP (ref 3.5–5.3)
POTASSIUM SERPL-SCNC: 3.7 MMOL/L — SIGNIFICANT CHANGE UP (ref 3.5–5.3)
POTASSIUM SERPL-SCNC: 4.1 MMOL/L — SIGNIFICANT CHANGE UP (ref 3.5–5.3)
PROT SERPL-MCNC: 6.8 G/DL — SIGNIFICANT CHANGE UP (ref 6–8.3)
PROT SERPL-MCNC: 7.2 G/DL — SIGNIFICANT CHANGE UP (ref 6–8.3)
PROTHROM AB SERPL-ACNC: 14 SEC — HIGH (ref 9.8–12.7)
RBC # BLD: 4.6 M/UL — SIGNIFICANT CHANGE UP (ref 4.2–5.8)
RBC # BLD: 5.02 M/UL — SIGNIFICANT CHANGE UP (ref 4.2–5.8)
RBC # FLD: 18.3 % — HIGH (ref 10.3–14.5)
RBC # FLD: 18.3 % — HIGH (ref 10.3–14.5)
RH IG SCN BLD-IMP: POSITIVE — SIGNIFICANT CHANGE UP
SODIUM SERPL-SCNC: 132 MMOL/L — LOW (ref 135–145)
SODIUM SERPL-SCNC: 140 MMOL/L — SIGNIFICANT CHANGE UP (ref 135–145)
WBC # BLD: 2.9 K/UL — LOW (ref 3.8–10.5)
WBC # BLD: 5.2 K/UL — SIGNIFICANT CHANGE UP (ref 3.8–10.5)
WBC # FLD AUTO: 2.9 K/UL — LOW (ref 3.8–10.5)
WBC # FLD AUTO: 5.2 K/UL — SIGNIFICANT CHANGE UP (ref 3.8–10.5)

## 2018-10-29 PROCEDURE — 99223 1ST HOSP IP/OBS HIGH 75: CPT | Mod: GC

## 2018-10-29 PROCEDURE — 74177 CT ABD & PELVIS W/CONTRAST: CPT | Mod: 26

## 2018-10-29 PROCEDURE — 71046 X-RAY EXAM CHEST 2 VIEWS: CPT | Mod: 26

## 2018-10-29 PROCEDURE — 99285 EMERGENCY DEPT VISIT HI MDM: CPT | Mod: 25

## 2018-10-29 PROCEDURE — 99222 1ST HOSP IP/OBS MODERATE 55: CPT | Mod: GC

## 2018-10-29 RX ORDER — SODIUM CHLORIDE 9 MG/ML
1000 INJECTION INTRAMUSCULAR; INTRAVENOUS; SUBCUTANEOUS
Qty: 0 | Refills: 0 | Status: DISCONTINUED | OUTPATIENT
Start: 2018-10-29 | End: 2018-10-30

## 2018-10-29 RX ORDER — PANTOPRAZOLE SODIUM 20 MG/1
40 TABLET, DELAYED RELEASE ORAL
Qty: 0 | Refills: 0 | Status: DISCONTINUED | OUTPATIENT
Start: 2018-10-29 | End: 2018-11-01

## 2018-10-29 RX ORDER — FLUTICASONE PROPIONATE 50 MCG
1 SPRAY, SUSPENSION NASAL
Qty: 0 | Refills: 0 | COMMUNITY

## 2018-10-29 RX ORDER — CEFTRIAXONE 500 MG/1
1 INJECTION, POWDER, FOR SOLUTION INTRAMUSCULAR; INTRAVENOUS ONCE
Qty: 0 | Refills: 0 | Status: DISCONTINUED | OUTPATIENT
Start: 2018-10-29 | End: 2018-10-29

## 2018-10-29 RX ORDER — DEXTROSE 50 % IN WATER 50 %
15 SYRINGE (ML) INTRAVENOUS ONCE
Qty: 0 | Refills: 0 | Status: DISCONTINUED | OUTPATIENT
Start: 2018-10-29 | End: 2018-11-01

## 2018-10-29 RX ORDER — LIDOCAINE 4 G/100G
10 CREAM TOPICAL ONCE
Qty: 0 | Refills: 0 | Status: COMPLETED | OUTPATIENT
Start: 2018-10-29 | End: 2018-10-29

## 2018-10-29 RX ORDER — SODIUM CHLORIDE 9 MG/ML
1000 INJECTION INTRAMUSCULAR; INTRAVENOUS; SUBCUTANEOUS
Qty: 0 | Refills: 0 | Status: DISCONTINUED | OUTPATIENT
Start: 2018-10-29 | End: 2018-10-29

## 2018-10-29 RX ORDER — MORPHINE SULFATE 50 MG/1
4 CAPSULE, EXTENDED RELEASE ORAL ONCE
Qty: 0 | Refills: 0 | Status: DISCONTINUED | OUTPATIENT
Start: 2018-10-29 | End: 2018-10-29

## 2018-10-29 RX ORDER — CETIRIZINE HYDROCHLORIDE 10 MG/1
1 TABLET ORAL
Qty: 0 | Refills: 0 | COMMUNITY

## 2018-10-29 RX ORDER — FUROSEMIDE 40 MG
1 TABLET ORAL
Qty: 0 | Refills: 0 | COMMUNITY

## 2018-10-29 RX ORDER — PANTOPRAZOLE SODIUM 20 MG/1
40 TABLET, DELAYED RELEASE ORAL
Qty: 0 | Refills: 0 | Status: DISCONTINUED | OUTPATIENT
Start: 2018-10-29 | End: 2018-10-29

## 2018-10-29 RX ORDER — SODIUM CHLORIDE 9 MG/ML
1000 INJECTION INTRAMUSCULAR; INTRAVENOUS; SUBCUTANEOUS ONCE
Qty: 0 | Refills: 0 | Status: COMPLETED | OUTPATIENT
Start: 2018-10-29 | End: 2018-10-29

## 2018-10-29 RX ORDER — INSULIN DETEMIR 100/ML (3)
42 INSULIN PEN (ML) SUBCUTANEOUS
Qty: 0 | Refills: 0 | COMMUNITY

## 2018-10-29 RX ORDER — INSULIN LISPRO 100/ML
VIAL (ML) SUBCUTANEOUS
Qty: 0 | Refills: 0 | Status: DISCONTINUED | OUTPATIENT
Start: 2018-10-29 | End: 2018-11-01

## 2018-10-29 RX ORDER — INSULIN GLARGINE 100 [IU]/ML
20 INJECTION, SOLUTION SUBCUTANEOUS AT BEDTIME
Qty: 0 | Refills: 0 | Status: DISCONTINUED | OUTPATIENT
Start: 2018-10-29 | End: 2018-10-30

## 2018-10-29 RX ORDER — TRAMADOL HYDROCHLORIDE 50 MG/1
1 TABLET ORAL
Qty: 0 | Refills: 0 | COMMUNITY

## 2018-10-29 RX ORDER — INSULIN LISPRO 100/ML
VIAL (ML) SUBCUTANEOUS AT BEDTIME
Qty: 0 | Refills: 0 | Status: DISCONTINUED | OUTPATIENT
Start: 2018-10-29 | End: 2018-11-01

## 2018-10-29 RX ORDER — OCTREOTIDE ACETATE 200 UG/ML
50 INJECTION, SOLUTION INTRAVENOUS; SUBCUTANEOUS ONCE
Qty: 0 | Refills: 0 | Status: DISCONTINUED | OUTPATIENT
Start: 2018-10-29 | End: 2018-10-29

## 2018-10-29 RX ORDER — DEXTROSE 50 % IN WATER 50 %
12.5 SYRINGE (ML) INTRAVENOUS ONCE
Qty: 0 | Refills: 0 | Status: DISCONTINUED | OUTPATIENT
Start: 2018-10-29 | End: 2018-11-01

## 2018-10-29 RX ORDER — OCTREOTIDE ACETATE 200 UG/ML
50 INJECTION, SOLUTION INTRAVENOUS; SUBCUTANEOUS
Qty: 500 | Refills: 0 | Status: DISCONTINUED | OUTPATIENT
Start: 2018-10-29 | End: 2018-10-29

## 2018-10-29 RX ORDER — GLUCAGON INJECTION, SOLUTION 0.5 MG/.1ML
1 INJECTION, SOLUTION SUBCUTANEOUS ONCE
Qty: 0 | Refills: 0 | Status: DISCONTINUED | OUTPATIENT
Start: 2018-10-29 | End: 2018-11-01

## 2018-10-29 RX ORDER — DEXTROSE 50 % IN WATER 50 %
25 SYRINGE (ML) INTRAVENOUS ONCE
Qty: 0 | Refills: 0 | Status: DISCONTINUED | OUTPATIENT
Start: 2018-10-29 | End: 2018-11-01

## 2018-10-29 RX ORDER — SODIUM CHLORIDE 9 MG/ML
1000 INJECTION, SOLUTION INTRAVENOUS
Qty: 0 | Refills: 0 | Status: DISCONTINUED | OUTPATIENT
Start: 2018-10-29 | End: 2018-11-01

## 2018-10-29 RX ORDER — PANTOPRAZOLE SODIUM 20 MG/1
80 TABLET, DELAYED RELEASE ORAL ONCE
Qty: 0 | Refills: 0 | Status: COMPLETED | OUTPATIENT
Start: 2018-10-29 | End: 2018-10-29

## 2018-10-29 RX ADMIN — SODIUM CHLORIDE 1000 MILLILITER(S): 9 INJECTION INTRAMUSCULAR; INTRAVENOUS; SUBCUTANEOUS at 05:50

## 2018-10-29 RX ADMIN — MORPHINE SULFATE 4 MILLIGRAM(S): 50 CAPSULE, EXTENDED RELEASE ORAL at 05:09

## 2018-10-29 RX ADMIN — MORPHINE SULFATE 4 MILLIGRAM(S): 50 CAPSULE, EXTENDED RELEASE ORAL at 08:17

## 2018-10-29 RX ADMIN — Medication 30 MILLILITER(S): at 04:09

## 2018-10-29 RX ADMIN — LIDOCAINE 10 MILLILITER(S): 4 CREAM TOPICAL at 05:50

## 2018-10-29 RX ADMIN — PANTOPRAZOLE SODIUM 80 MILLIGRAM(S): 20 TABLET, DELAYED RELEASE ORAL at 04:09

## 2018-10-29 RX ADMIN — SODIUM CHLORIDE 1000 MILLILITER(S): 9 INJECTION INTRAMUSCULAR; INTRAVENOUS; SUBCUTANEOUS at 04:16

## 2018-10-29 RX ADMIN — MORPHINE SULFATE 4 MILLIGRAM(S): 50 CAPSULE, EXTENDED RELEASE ORAL at 05:50

## 2018-10-29 RX ADMIN — MORPHINE SULFATE 4 MILLIGRAM(S): 50 CAPSULE, EXTENDED RELEASE ORAL at 04:09

## 2018-10-29 RX ADMIN — PANTOPRAZOLE SODIUM 40 MILLIGRAM(S): 20 TABLET, DELAYED RELEASE ORAL at 18:34

## 2018-10-29 RX ADMIN — INSULIN GLARGINE 20 UNIT(S): 100 INJECTION, SOLUTION SUBCUTANEOUS at 21:45

## 2018-10-29 RX ADMIN — SODIUM CHLORIDE 1000 MILLILITER(S): 9 INJECTION INTRAMUSCULAR; INTRAVENOUS; SUBCUTANEOUS at 07:58

## 2018-10-29 RX ADMIN — SODIUM CHLORIDE 75 MILLILITER(S): 9 INJECTION INTRAMUSCULAR; INTRAVENOUS; SUBCUTANEOUS at 12:39

## 2018-10-29 RX ADMIN — SODIUM CHLORIDE 75 MILLILITER(S): 9 INJECTION INTRAMUSCULAR; INTRAVENOUS; SUBCUTANEOUS at 17:51

## 2018-10-29 NOTE — ED PROVIDER NOTE - PHYSICAL EXAMINATION
General: uncomfortable appearing male, no acute distress   HEENT: jaundiced scleral icterus   Respiratory: normal work of breathing, lungs clear to auscultation bilaterally   Cardiac: regular rate and rhythm   Abdomen: soft, epigastric tenderness to palpation, voluntary guarding, no fluid shift, no cva tenderness   MSK: no swelling or tenderness of lower extremities   Skin: vitiligo   Neuro: A&Ox3

## 2018-10-29 NOTE — CONSULT NOTE ADULT - ASSESSMENT
43 yo M hx decompensated alcoholic cirrhosis (previously on liver transplant list) c/b esophageal varices, perirectal varices, internal hemorrhoids, h/o C diff colitis, IDDM2, vitiligo, recurrent pancreatitis, h/o recurrent E coli bacteremia from unknown source who presents due to melena.    1) Melena: Hemodynamically stable and Hg 11 (at baseline). No bowel movements since Sunday    2) Decompensated alcoholic cirrhosis (previously on liver transplant list). MELD-Na 17 on admission from MELD 10 (5/2018)  Varices: esophageal, perigastric varices, perisplenic, large perirectal varices, periumbilical varices  HCC: no lesions on CT scan this admission  PVC: CT scan neg  Ascites: none  HE: AO x 3, no asterixis    3) h/o recurrent E coli bacteremia from unknown source    Recommendations:  -Please make NPO at midnight. Patient is scheduled for EGD 10/30 and likely capsule endoscopy to evaluate small bowel for source of bleeding and recurrent bacteremia.  -Can restart nadolol if blood pressure permits and no further GIB  -Please check CBC, CMP, coags daily  -CT A/P significant for right moderate hydronephrosis. Would check UA and PVR. Would check BCx x 2 given recurrent hx of bacteremia. 41 yo M hx decompensated alcoholic cirrhosis (previously on liver transplant list) c/b esophageal varices, perirectal varices, internal hemorrhoids, h/o C diff colitis, IDDM2, vitiligo, recurrent pancreatitis, h/o recurrent E coli bacteremia from unknown source who presents due to melena.    1) Melena: Presents hemodynamically stable and Hg 11 (at baseline) with no bowel movements since Sunday so bleeding has likely stopped. Pt reports daily etoh 1-2 beers/day. Previous EGD has been significant for small esophageal varices (<5mm) and portal hypertensive gastropathy.  -DDx: gastritis, PUD, bleeding esophageal varices, angiodysplasia, malignancy    2) Decompensated alcoholic cirrhosis (previously on liver transplant list). MELD-Na 17 on admission from MELD 10 (5/2018)  Varices: esophageal, perigastric varices, perisplenic, large perirectal varices, periumbilical varices  HCC: no lesions on CT scan this admission  PVC: CT scan neg  Ascites: none  HE: AO x 3, no asterixis    3) h/o recurrent E coli bacteremia from unknown source    Recommendations:  -Please make NPO at midnight. Patient is scheduled for EGD 10/30 and likely capsule endoscopy to evaluate small bowel for source of bleeding and recurrent bacteremia.  -Can restart nadolol if blood pressure permits and no further GIB  -Please check CBC, CMP, coags daily  -CT A/P significant for right moderate hydronephrosis. Would check UA and PVR  -Would check BCx x 2 given recurrent hx of bacteremia. 41 yo M hx decompensated alcoholic cirrhosis (previously on liver transplant list) c/b esophageal varices, perirectal varices, internal hemorrhoids, h/o C diff colitis, IDDM2, vitiligo, recurrent pancreatitis, h/o recurrent E coli bacteremia from unknown source presents with melena.    1) Melena: Presents hemodynamically stable and Hg 11 (at baseline) with no bowel movements since Sunday so bleeding has likely stopped. Pt reports daily etoh 1-2 beers/day. Previous EGD has been significant for small esophageal varices (<5mm) and portal hypertensive gastropathy.  -DDx: gastritis, PUD, bleeding esophageal varices, angiodysplasia, malignancy    2) Decompensated alcoholic cirrhosis (previously on liver transplant list). MELD-Na 17 on admission from MELD 10 (5/2018)  Varices: esophageal, perigastric varices, perisplenic, large perirectal varices, periumbilical varices  HCC: no lesions on CT scan this admission  PVC: CT scan neg  Ascites: none  HE: AO x 3, no asterixis    3) h/o recurrent E coli bacteremia from unknown source    Recommendations:  -Please make NPO at midnight. Patient is scheduled for EGD 10/30 and likely capsule endoscopy to evaluate small bowel for source of bleeding and recurrent bacteremia.  -Can restart nadolol if blood pressure permits and no further GIB  -Please check CBC, CMP, coags daily  -CT A/P significant for right moderate hydronephrosis. Would check UA and PVR  -Would check BCx x 2 given recurrent hx of bacteremia.

## 2018-10-29 NOTE — H&P ADULT - PROBLEM SELECTOR PLAN 8
- pts wife will bring in list of meds later today, per chart review pt appears to be on Nadolol 20, pantoprazole 40, metformin, lantus, keflex 500 daily for prophylaxis; pt also appears to have been taking lasix and spironolactone at some point recently but unsure if still taking so will have to clarify - pts wife will bring in list of meds later today, per chart review pt appears to be on Nadolol 20, pantoprazole 40, metformin, lantus, keflex 500 daily for prophylaxis; pt also appears to have been taking lasix and spironolactone at some point recently but unsure if still taking these so will have to clarify - will check bladder scan and UA

## 2018-10-29 NOTE — H&P ADULT - PROBLEM SELECTOR PLAN 9
- no DVT ppx in setting of active bleed - pts wife will bring in list of meds later today, per chart review pt appears to be on Nadolol 20, pantoprazole 40, metformin, lantus, keflex 500 daily for prophylaxis; pt also appears to have been taking lasix and spironolactone at some point recently but unsure if still taking these so will have to clarify

## 2018-10-29 NOTE — H&P ADULT - NSHPLABSRESULTS_GEN_ALL_CORE
11.8   5.2   )-----------( 62       ( 29 Oct 2018 04:14 )             36.6       10-29    132<L>  |  99  |  <4<L>  ----------------------------<  74  3.7   |  20<L>  |  0.50    Ca    8.1<L>      29 Oct 2018 04:14    TPro  7.2  /  Alb  3.9  /  TBili  1.9<H>  /  DBili  x   /  AST  63<H>  /  ALT  21  /  AlkPhos  134<H>  10-29          PT/INR - ( 29 Oct 2018 04:14 )   PT: 14.0 sec;   INR: 1.28 ratio         PTT - ( 29 Oct 2018 04:14 )  PTT:38.6 sec

## 2018-10-29 NOTE — H&P ADULT - PROBLEM SELECTOR PLAN 5
- likely 2/2 poor PO intake in setting of cirrhosis, will continue to monitor for now with IVF given

## 2018-10-29 NOTE — ED PROVIDER NOTE - PMH
Alcoholic cirrhosis    Diabetes mellitus type 2, insulin dependent    E. coli bacteremia  Recurrent  Esophageal varices    Latent tuberculosis    Vitiligo

## 2018-10-29 NOTE — H&P ADULT - PROBLEM SELECTOR PLAN 2
- MELD score 17  - pt reports he was previously on transplant list at [facility] but was not deemed to have cirrhosis severe enough to be a good candidate for transplant at that time  - currently not a transplant candidate due to active alcohol use

## 2018-10-29 NOTE — ED PROVIDER NOTE - OBJECTIVE STATEMENT
42M, pmh of DM, alcohol abuse, varices, cirrhosis presenting with black stool. Patient reports black stool two days ago. Has had nasuea and epigastric abdominal pain. Wife reports eyes have become more yellow over the past month. Denies any fever, chest pain, difficulty breathing, vomiting, pain or burning with urination, pain or swelling in lower extremities. Patient has restarted smoking and drinking alcohol over the past two months.

## 2018-10-29 NOTE — H&P ADULT - HISTORY OF PRESENT ILLNESS
41 yo M hx IDDM2, alcoholic cirrhosis (on liver transplant list) c/b esophageal varices (recent EGD in 2017 confirming), vitiligo, recurrent pancreatitis, h/o recurrent e coli bacteremia    42M presenting with black stool. h/o varices and bleeding ulcer. epigastric pain, increased jaundice.    Patient reports black stool two days ago. Has had nausea and epigastric abdominal pain. Wife reports eyes have become more yellow over the past month.  Patient has restarted smoking and drinking alcohol over the past two months. 43 yo M hx IDDM2, alcoholic cirrhosis (previously on liver transplant list) c/b esophageal varices (recent EGD in 2017 confirming), vitiligo, recurrent pancreatitis, h/o recurrent e coli bacteremia who presents due to melena.  The pt reports he had a black stool once on Saturday and again on Sunday which prompted him to come to the ED.  He said that this happened to him once before 15+ years ago but states no intervention was needed at that time.  He had not been drinking for roughly 3 years but restarted one month ago.  He had been drinking 1 beer daily for a few weeks and then two weeks ago increase to 2 beers daily.  However, for the past few days he had lowered himself back down to drinking 1 beer daily.  He also states that he had quit smoking but restarted 3 months ago and smoke around 5 cigarettes daily.  Patient reports epigastric pain yesterday associated with nausea and decreased appetite.  No fevers/chills, chest pain, BRBPR or hemoptysis.

## 2018-10-29 NOTE — H&P ADULT - ASSESSMENT
43 yo M hx IDDM2, alcoholic cirrhosis (previously on liver transplant list) c/b esophageal varices (recent EGD in 2017 confirming), vitiligo, recurrent pancreatitis, h/o recurrent e coli bacteremia who presents due to two days of melena.

## 2018-10-29 NOTE — H&P ADULT - NSHPPHYSICALEXAM_GEN_ALL_CORE
T(C): 36.7 (10-29-18 @ 07:57), Max: 36.7 (10-29-18 @ 07:57)  HR: 74 (10-29-18 @ 07:57) (70 - 84)  BP: 120/74 (10-29-18 @ 07:57) (117/76 - 135/71)  RR: 18 (10-29-18 @ 07:57) (16 - 18)  SpO2: 93% (10-29-18 @ 07:57) (93% - 100%)    CONSTITUTIONAL: well appearing, well developed, no apparent distress  HEAD: Head atraumatic, normal cephalic shape  EYES: sclera icteric  CARDIAC: RRR, normal S1/S2, no murmurs  RESPIRATORY: no respiratory distress, normal breath sounds  CHEST: no erythema, warmth, tenderness or crepitus  GASTROINTESTINAL: soft, nontender, bowel sounds present  MUSCULOSKELETAL: normal strength and ROM, no muscle or joint tenderness  NEUROLOGICAL: AAOx3, no focal deficits, full strength   SKIN: vitiligo, red rash on shins/lower extremities  PSYCH: normal mood/affect T(C): 36.7 (10-29-18 @ 07:57), Max: 36.7 (10-29-18 @ 07:57)  HR: 74 (10-29-18 @ 07:57) (70 - 84)  BP: 120/74 (10-29-18 @ 07:57) (117/76 - 135/71)  RR: 18 (10-29-18 @ 07:57) (16 - 18)  SpO2: 93% (10-29-18 @ 07:57) (93% - 100%)    CONSTITUTIONAL: well appearing, well developed, no apparent distress  HEAD: Head atraumatic, normal cephalic shape  EYES: sclera icteric  CARDIAC: RRR, normal S1/S2, no murmurs  RESPIRATORY: no respiratory distress, normal breath sounds  CHEST: no erythema, warmth, tenderness or crepitus  GASTROINTESTINAL: stool noted to be brown in the ED, soft, nontender, bowel sounds present  MUSCULOSKELETAL: normal strength and ROM, no muscle or joint tenderness  NEUROLOGICAL: AAOx3, no focal deficits, full strength   SKIN: vitiligo, red rash on shins/lower extremities  PSYCH: normal mood/affect

## 2018-10-29 NOTE — H&P ADULT - NSHPREVIEWOFSYSTEMS_GEN_ALL_CORE
REVIEW OF SYSTEMS:  CONSTITUTIONAL: No fevers or chills  HEENT: yellow sclera, No visual changes; No vertigo or throat pain   NECK: No pain or stiffness  RESPIRATORY: No cough, wheezing, hemoptysis; chronic SOB  CARDIOVASCULAR: No chest pain or palpitations  GASTROINTESTINAL: occasional epigastric discomfort, dark stool, decreased appetite  GENITOURINARY: No dysuria, frequency or hematuria  NEUROLOGICAL: tingling of the feet  MSK: no joint tenderness  SKIN: No itching, no new rash

## 2018-10-29 NOTE — ED PROVIDER NOTE - MEDICAL DECISION MAKING DETAILS
42M presenting with black stool. h/o varices and bleeding ulcer. epigastric pain, increased jaundice. no ams, headache or dizziness. concern for GI bleed, likely gastric ulcer. plan for cbc, cmp, vbg, lipase, cxr, protonix, fluids, pain control. likely admission.

## 2018-10-29 NOTE — CONSULT NOTE ADULT - ATTENDING COMMENTS
Patient with alcoholic cirrhosis with admissions for bacteremia.  Now admitted for melena.  Bleeding not currently active.  It is possible that GI lesion may be found that was source for prior bacteremia.

## 2018-10-29 NOTE — ED ADULT TRIAGE NOTE - CHIEF COMPLAINT QUOTE
"I am pretty sure I am bleeding inside, my stool is black since Saturday. I also have abdominal pain - center"

## 2018-10-29 NOTE — ED ADULT NURSE NOTE - EXTENSIONS OF SELF_ADULT
ORTHO DISCHARGE INSTRUCTIONS:    DVT PROPHYLAXIS: xarelto 10 mg daily x 2 weeks    WOUND CARE: Dressing changes daily and as needed.     ACTIVITY:  Weightbearing as tolerated    FOLLOWUP:        Future Appointments  Date Time Provider Department Center   4/1/2017 9:45 AM Allegra Whitfield, OT [facility]   4/1/2017 1:45 PM Martell Dennison, PT [facility]   4/11/2017 4:20 PM Michael Crystal MD [facility]   4/14/2017 8:00 AM [facility]   4/17/2017 9:30 AM Nayana M [place], PT [email or web address]   4/21/2017 9:15 AM Nayana M [place], PT [email or web address]   4/24/2017 9:30 AM Nayana M [place], PT [email or web address]   4/26/2017 9:30 AM Nayana M [place], PT [email or web address]   5/2/2017 9:30 AM Nayana M [place], PT [email or web address]   5/5/2017 9:15 AM Nayana M [place], PT [email or web address]   5/8/2017 9:30 AM Nayana M [place], PT [email or web address]   5/10/2017 9:30 AM Nayana M [place], PT [email or web address]   3/6/2018 9:30 AM Angela Sheikh MD [facility]         Discharge Instructions for Total Knee Replacement  You have undergone knee replacement surgery. The knee joint forms where the thighbone, shinbone, and kneecap meet. The knee joint is supported by muscles and ligaments, and is lined with a cushioning called cartilage. Over time, cartilage wears away. This can make the knee feel stiff and painful. Your doctor replaced your painful joint with a knee prosthesis (artificial joint) to relieve pain and restore movement. Here are some instructions to follow once at home.  Home care  · When you are allowed to shower, carefully wash your incision with soap and water. Rinse the incision well. Then gently pat it dry. Don’t rub the incision, or apply creams or lotions. Sit on a shower stool or chair when you shower to keep from falling.  · Take pain medication as directed by your doctor.  Sitting and sleeping  · Sit in chairs with arms. The arms make it easier for you to stand up or sit down.  · Don’t sit for more than 30 to 45 minutes at one time.  · Nap if you are  tired, but don’t stay in bed all day.  · Sleep with a pillow under your ankle, not your knee. Be sure to change the position of your leg during the night.  Moving safely  · The key to successful recovery is movement with walking and exercising your knee as directed by your doctor. You should be able to put full weight on your leg unless your doctor tells you otherwise.   · Walk up and down stairs with support. Try one step at a time--good knee up, bad knee down. Use the railing if possible.  · Don’t drive until your doctor says it’s OK. Most people can start driving about 6 weeks after surgery. Don’t drive while you are taking opioid pain medication.  Other precautions  · Avoid soaking your knee in water (no hot tubs, bathtubs, swimming pools) until your doctor says it’s OK.  · Wear the support stockings you were given in the hospital, as instructed by your doctor. You may wear these stockings for four to six weeks after surgery. If needed, you can place a bandage over the incision to prevent irritation from clothing or support stockings.  · Arrange your household to keep the items you need handy. Keep everything else out of the way. Remove items that may cause you to fall, such as throw rugs and electrical cords.  · Use nonslip bath mats, grab bars, an elevated toilet seat, and a shower chair in your bathroom.  · Until your balance, flexibility, and strength improve, use a cane, crutches, a walker, handrails, or someone to help you.  · Keep your hands free by using a backpack, wang pack, apron, or pockets to carry things.  · Prevent infection. Ask your doctor for instructions if you haven’t already received them. Any infection will need to be treated immediately with antibiotics. Call your doctor right away if you think you might have an infection.  · Tell your dentist that you have an artificial joint and take antibiotics as prescribed before any dental work.  · Tell all your health care providers about your  artificial joint before any medical procedure.  · Maintain a healthy weight. Get help to lose any extra pounds. Added body weight puts stress on the knee.  · Take any medication you may have been given after surgery. This may include blood-thinning medications to prevent blood clots or antibiotics to prevent infection.  Follow-up  You will need to have your staples removed two to three weeks following surgery.     When to seek medical attention  Call 911 right away if you have:  · Chest pain.  · Shortness of breath.  · Any pain or tenderness in your calf.  Otherwise, call your doctor immediately if you have:  · Fever of 100.4 degrees Fahrenheit (38 degrees Celsius) or higher, or shaking chills.  · Stiffness, or inability to move the knee.  · Increased swelling in your leg.  · Increased redness, tenderness, or swelling in or around the knee incision.  · Drainage from the knee incision.  · Increased knee pain.   © 6727-8109 [employer]. [address] [place] [address]. All rights reserved. This information is not intended as a substitute for professional medical care. Always follow your healthcare professional's instructions.      MEDICATIONS:  · See Medication List (bring to your doctor appointments).      VACCINES:  Most Recent Immunizations   Administered Date(s) Administered   • Influenza 10/21/2016   • Pneumococcal Conjugate 13 Valent 11/17/2015   • Pneumococcal Polysaccharide Adult 09/29/2011   • Tdap 10/29/2012   • Zoster Shingles 10/06/2009       ACTIVITY:  · Weight yourself daily (first thing in the morning, with same amount of clothes on) unless told otherwise by your doctor.  · Continue activity as your were in the hospital, slowly increase to what you were doing previously.  · Up as desired / no restrictions.    SMOKING:  · Avoid all tobacco products and second hand smoke.  · Smoking Cessation Counseling offered.  · [place] Toll Free Quit Line: [phone]    DIET:  · Limit  salt and salty foods unless told otherwise by your doctor.  · No Restrictions    · Trouble breathing or chest pain - CALL 911    CONTACT YOUR DOCTOR IF:  · You have symptoms that are not \"normal\" for you.  · You have new or worse symptoms or pain, not relieved by medicine or rest.  · Temperature greater than 101 degrees F, chills or flu like symptoms.  · You gain more than 3 pounds in 2 days.  · Increased swelling, redness or drainage.         None

## 2018-10-29 NOTE — H&P ADULT - NSHPSOCIALHISTORY_GEN_ALL_CORE
- actively drinks alcohol (1 beer daily)  - active smoker  - lives with wife and step-children  - works as [employer]

## 2018-10-29 NOTE — H&P ADULT - ATTENDING COMMENTS
I personally performed the E/M service provided and was physically present during key portions of the service furnished by the resident/fellow. I agree with the history, physical and assessment/plan as stated above. I personally performed the E/M service provided and was physically present during key portions of the service furnished by the resident/fellow. I agree with the history, physical and assessment/plan as stated above.  - Melena: concern for UGIB vs. less likely lower. ddx would include variceal bleed vs. gastritis given recent relapse into alcohol use vs. PUD. Stable at present. Discussed with hepatology at bedside, Dr. Day and team, patient known to have grade I esophageal varices on prior EGD/EUS from March 2017 and is overdue for repeat EGD given hx of cirrhosis. Will f/u hepatology GI recs re: possible scope. Hb stable and patient nontoxic appearing without concern for recurrent bacteremia at this time- will observe off octreotide and abx for now.   - Cirrhosis: pt not a transplant candidate at present given present alcohol use  - Alcohol abuse: daily beer drinking, monitor CIWAs. Current CIWA 0.  - hyponatremia: suspect poor PO intake in setting of gib vs. possibly 2/2 cirrhosis, s/p fluids in ED- would repeat BMP in PM.  - med rec: pt unsure of which medications he is actually taking and may not be taking meds that are prescribed to his pharmacy- awaiting wife to bring in home meds, med rec to be completed thereafter.

## 2018-10-29 NOTE — ED ADULT NURSE NOTE - OBJECTIVE STATEMENT
43 y/o male a+ox3, pmhx esophageal varices, smoker, liver cirrhosis, coming from home c/o abdominal pain with melena since yesterday. Pt reports 2 episodes of "blank stool" beginning yesterday with dull epigastric pain, non-radiating; dizziness overnight after using bathroom. Pt denies blood thinner use, chest pain or discomfort, headache, nausea, vomiting, diarrhea, fever. Room air spo2 89%, oxygen provided via nc at 2L, spo2 increased to 97%. IV established, labs obtained. Pt left in position of comfort, family at bedside. Will reassess

## 2018-10-29 NOTE — ED ADULT NURSE NOTE - NSIMPLEMENTINTERV_GEN_ALL_ED
Implemented All Universal Safety Interventions:  [place] to call system. Call bell, personal items and telephone within reach. Instruct patient to call for assistance. Room bathroom lighting operational. Non-slip footwear when patient is off stretcher. Physically safe environment: no spills, clutter or unnecessary equipment. Stretcher in lowest position, wheels locked, appropriate side rails in place.

## 2018-10-29 NOTE — ED PROVIDER NOTE - ATTENDING CONTRIBUTION TO CARE
41 yo M, pmh of DM, alcohol abuse, cirrhosis with known varices presents for dark stools x 2. Patient reports black stool two days ago. Reports nausea no vomiting. No abd pain. No sudden weight loss/gain. Reports started drinking etoh again after period of abstinence. Denies fever, chills, HA, cp, sob, palpitations, cough, vomiting. urinary complaints, back pain, rash.    GEN: no acute respiratory distress. nontoxic, speaking comfortably in full sentences, ambulating with steady gait.  HEENT: NCAT. face symmetrical. PERRL 4mm, EOMI, anicteric. MMM, oropharynx wnl.  Neck: no JVD, trachea midline, no LAD  CV: RRR. +S1S2, no murmur. 2+ pulses in 4 extremities  Chest: CTA B/l. no wheezing, rales, rhonchi. no retractions. good air movement.   ABD: +BS, soft, non distended, mild epigastric tenderness. No guarding/rebound. No lesions, ecchymosis  : no cva or suprapubic tenderness  MSK: No clubbing, cyanosis, edema. FROM of all extremities. no tenderness to palpation. No midline or paraspinal tenderness.   Neuro: AOOX3.  Sensation intact, motor 5/5 throughout. no ataxia or tremor    dark stools, cirrhosis with varices, recently started etoh again. concern fo GIB. no gross blood on rectal. fobt +.hemodynamically stable. no symptoms of symptomatic anemia. h/h stable. no encephalopathy. labs. admit

## 2018-10-29 NOTE — CONSULT NOTE ADULT - SUBJECTIVE AND OBJECTIVE BOX
41 yo M hx IDDM2, alcoholic cirrhosis (previously on liver transplant list) c/b esophageal varices (recent EGD in 2017 confirming), vitiligo, recurrent pancreatitis, h/o recurrent e coli bacteremia who presents due to melena.    Started drinking 1 beer/daily one month ago. 41 yo M hx decompensated alcoholic cirrhosis (previously on liver transplant list) c/b esophageal varices, perirectal varices, internal hemorrhoids, h/o C diff colitis, IDDM2, vitiligo, recurrent pancreatitis, h/o recurrent e coli bacteremia who presents due to melena. Patient had one liquid black bowel movement 2 days PTA. The next day he had a black formed stool and hasn't had a BM since. Otherwise, pt has no complaints. One month ago, he started drinking 1-2 beers daily after 3 years of sobriety. He reports he was taking nadolol 20mg daily and PPI daily. Denies taking aspirin or NSAIDs. He ate some spicy food, otherwise no change in diet. He had an episode of hemoptysis 15 years ago due to varices, but has not had an issue since. Presented with VS notable for normal heart rate 85 and BP low of 120/72; hemoglobin 11.8 at presentation within his baseline level. Denies abd pain, back pain, nausea, vomiting, hematochezia, dysuria, hematuria, F/C. Per ED provider, rectal exam significant for brown stool that was FOBT+. Follows with hepatologist Dr. Andre Thakkar. Was following with liver transplant at [facility].     Pt has recurrent admissions for bacteremia with extensive negative workup for the source and takes Keflex daily outpatient for prophylaxis. His last admission being July 8-12, 2018 presented with sepsis 2/2 Group B Strep bacteremia that was associated with epigastric pain. No source of infection identified on CT A/P, abd US, and HIDA scan. Previous episodes of bacteremia were due to E coli from unknown source. Colonoscopy 4/2017 positive for likely portal colopathy, colon otherwise normal. EGD/EUS 3/2017 significant for small esophageal varices (<5mm), portal hypertensive gastropathy, normal CBD 3mm diameter without sludge or stone, normal ampulla. Chief Complaint:  Patient is a 42y old  Male who presents with a chief complaint of GI bleed (29 Oct 2018 14:56)      HPI: 43 yo M hx decompensated alcoholic cirrhosis (previously on liver transplant list) c/b esophageal varices, perirectal varices, internal hemorrhoids, h/o C diff colitis, IDDM2, vitiligo, recurrent pancreatitis, h/o recurrent E coli bacteremia from unknown source who presents due to melena. Patient had one liquid black bowel movement 2 days PTA. The next day he had a black formed stool and hasn't had a BM since. Otherwise, pt has no complaints. One month ago, he started drinking 1-2 beers daily after 3 years of sobriety. He reports he was taking nadolol 20mg daily and PPI daily. Denies taking aspirin or NSAIDs. He ate some spicy food, otherwise no change in diet. He had an episode of hemoptysis 15 years ago due to varices, but has not had an issue since. Presented with VS notable for normal heart rate 85 and BP low of 120/72; hemoglobin 11.8 at presentation within his baseline level. Denies abd pain, abd distension, back pain, nausea, vomiting, hematochezia, dysuria, hematuria, F/C. Per ED provider, rectal exam significant for brown stool that was FOBT+. Follows with hepatologist Dr. Andre Thakkar. Was following with liver transplant at [facility].     Pt has recurrent admissions for bacteremia with extensive negative workup for the source and takes Keflex daily outpatient for prophylaxis. His last admission being July 8-12, 2018 presented with sepsis 2/2 Group B Strep bacteremia that was associated with epigastric pain. No source of infection identified on CT A/P, abd US, and HIDA scan. Previous episodes of bacteremia were due to E coli from unknown source. Colonoscopy 4/2017 positive for likely portal colopathy, colon otherwise normal. EGD/EUS 3/2017 significant for small esophageal varices (<5mm), portal hypertensive gastropathy, normal CBD 3mm diameter without sludge or stone, normal ampulla.    Allergies:  levofloxacin (Other (Moderate)): caused drug-induced cholestasis      Home Medications:  Keflex 500mg qD  Levemir 48u qHS  Metformin 500mg qD  multivitamin  Nadolol 20mg qD  Pantoprazole ER 40mg qD  vit B12    Hospital Medications:  dextrose 40% Gel 15 Gram(s) Oral once PRN  dextrose 5%. 1000 milliLiter(s) IV Continuous <Continuous>  dextrose 50% Injectable 12.5 Gram(s) IV Push once  dextrose 50% Injectable 25 Gram(s) IV Push once  dextrose 50% Injectable 25 Gram(s) IV Push once  glucagon  Injectable 1 milliGRAM(s) IntraMuscular once PRN  insulin glargine Injectable (LANTUS) 20 Unit(s) SubCutaneous at bedtime  insulin lispro (HumaLOG) corrective regimen sliding scale   SubCutaneous three times a day before meals  insulin lispro (HumaLOG) corrective regimen sliding scale   SubCutaneous at bedtime  LORazepam     Tablet 2 milliGRAM(s) Oral every 2 hours PRN  LORazepam     Tablet 2 milliGRAM(s) Oral every 1 hour PRN  pantoprazole  Injectable 40 milliGRAM(s) IV Push two times a day  sodium chloride 0.9%. 1000 milliLiter(s) IV Continuous <Continuous>      PMHX/PSHX:  E. coli bacteremia  Vitiligo  Latent tuberculosis  Diabetes mellitus type 2, insulin dependent  Esophageal varices  Alcoholic cirrhosis  Status post corneal transplant  Alcoholic Cirrhosis  Esophageal Varices      Family history:  Family history of uterine cancer  Family history of diabetes mellitus (Father, Mother)      Social History: Current daily drinker 1-2 beer/day since 1 month ago. Previously sober past 3 years. Current daily tobacco smoker. Denies illicit drugs.    ROS:   General:  No fevers, chills, night sweats, fatigue. +weight loss  Eyes:  Good vision, no reported pain  ENT:  No sore throat, pain, runny nose, dysphagia  CV:  No pain, palpitations, hypo/hypertension  Resp:  No dyspnea, cough, tachypnea, wheezing  GI:  See HPI  :  No pain, bleeding, incontinence, nocturia  Muscle:  No pain, weakness  Neuro:  No weakness, tingling, memory problems  Psych:  No fatigue, insomnia, mood problems, depression  Endocrine:  No polyuria, polydipsia, cold/heat intolerance  Heme:  No petechiae, ecchymosis, easy bruisability  Skin:  No rash, edema    Vital Signs:  Vital Signs Last 24 Hrs  T(C): 36.8 (29 Oct 2018 16:05), Max: 36.9 (29 Oct 2018 13:30)  T(F): 98.2 (29 Oct 2018 16:05), Max: 98.4 (29 Oct 2018 13:30)  HR: 82 (29 Oct 2018 16:05) (70 - 85)  BP: 120/70 (29 Oct 2018 16:05) (117/76 - 135/71)  BP(mean): --  RR: 18 (29 Oct 2018 16:05) (16 - 18)  SpO2: 100% (29 Oct 2018 16:05) (93% - 100%)  Daily Height in cm: 172.72 (29 Oct 2018 02:35)      PHYSICAL EXAM:   GENERAL:  Appears stated age, well-groomed, well-nourished, no distress  HEENT:  NC/AT, conjunctivae clear and pink, no JVD  CHEST:  Full & symmetric excursion, no increased effort, breath sounds clear  HEART:  Regular rhythm, S1, S2, no murmur/rub/S3/S4, no abdominal bruit, no edema  ABDOMEN:  Soft, non-tender, no guarding, non-distended, normoactive bowel sounds,  no masses  EXTREMITIES:  no cyanosis, clubbing or edema  SKIN:  No rash/erythema/ecchymoses/petechiae/wounds/abscess/warm/dry. Diffuse areas of hypopigmentation  NEURO:  Alert, oriented x 3, no asterixis on hand       LABS:                        11.0   2.9   )-----------( 41       ( 29 Oct 2018 13:51 )             34.0     10-29    132<L>  |  99  |  <4<L>  ----------------------------<  74  3.7   |  20<L>  |  0.50    Ca    8.1<L>      29 Oct 2018 04:14    TPro  7.2  /  Alb  3.9  /  TBili  1.9<H>  /  DBili  x   /  AST  63<H>  /  ALT  21  /  AlkPhos  134<H>  10-29    LIVER FUNCTIONS - ( 29 Oct 2018 04:14 )  Alb: 3.9 g/dL / Pro: 7.2 g/dL / ALK PHOS: 134 U/L / ALT: 21 U/L / AST: 63 U/L / GGT: x           PT/INR - ( 29 Oct 2018 04:14 )   PT: 14.0 sec;   INR: 1.28 ratio         PTT - ( 29 Oct 2018 04:14 )  PTT:38.6 sec    Amylase Serum--      Lipase serum33       Ammonia--      Imaging:  < from: CT Abdomen and Pelvis w/ IV Cont (10.29.18 @ 08:34) >  Amanda Johnson, PGY2  Internal Medicine, team 1  Pager [phone] / [phone]  After 7PM on weekdays and 12PM on weekends, please page #[phone]:    LOWER CHEST: Within normal limits.    LIVER: Cirrhotic liver again noted.  BILE DUCTS: Normal caliber.  GALLBLADDER: Within normal limits.  SPLEEN: Splenomegaly.  PANCREAS: Within normal limits.  ADRENALS: Within normal limits.  KIDNEYS/URETERS: Mildright hydronephrosis, with dilatation of the   proximal to mid right ureter. A subcentimeter hypodense lesion in the   right kidney is too small to characterize, but unchanged. No   hydronephrosis of the left kidney.    BLADDER: Distended. Normal wall thickness.  REPRODUCTIVE ORGANS: Prostate gland is within normal limits.    BOWEL: No bowel obstruction. Appendix is within normal limits. Mild wall   thickening versus underdistention of the rectum.  PERITONEUM: No ascites.  VESSELS:  Again noted, is a large recanalized paraumbilical vein with   large varicose veins within the left rectus muscle and anterior abdominal   wall. Again noted are paraesophageal, perigastric and perisplenic   varices. Mild atherosclerotic calcification.  RETROPERITONEUM: No lymphadenopathy.    ABDOMINAL WALL: Small fat-containing umbilical hernia.  BONES: Degenerative changes in the spine. Grade 1 spondylolisthesis and   spondylolysis at L5-S1.    IMPRESSION: Cirrhosis, with portal hypertension and large recanalized   paraumbilical vein and extensive varices in the left rectus muscle and   anterior abdominal wall, unchanged.    Distended urinary bladder with mild right hydroureteronephrosis.    Mild wall thickening versus underdistention of the rectum.  < end of copied text >      < from: Colonoscopy (04.17.17 @ 13:13) >  Impression:          - Mild congestion of colonic mucosa in the transverse colon, likely from                        portal colopathy.                       - Colon was otherwise normal. No source of bacteremia noted on colonoscopy.                       - The examined portion of the ileum was normal.  < end of copied text >      < from: Upper EUS (03.03.17 @ 14:54) >  Impression:          EGD:                       - Small (< 5 mm) esophageal varices.                       - Portal hypertensive gastropathy.                       - The major papilla was mildly edematous                       EUS:                       - Normal common bile duct measuring up to 3 mm in diameter, without sludge or                        stone.                       - Normal ampulla, without evidence of mass.  < end of copied text >      Amanda Johnson, PGY2  Internal Medicine  Pager [phone] / [phone] Chief Complaint:  Patient is a 42y old  Male who presents with a chief complaint of GI bleed (29 Oct 2018 14:56)      HPI: 43 yo M hx decompensated alcoholic cirrhosis (previously on liver transplant list) c/b esophageal varices, perirectal varices, internal hemorrhoids, h/o C diff colitis, IDDM2, vitiligo, recurrent pancreatitis, h/o recurrent E coli bacteremia from unknown source presents with melena. Patient had one liquid black bowel movement 2 days PTA. The next day he had a black formed stool and hasn't had a BM since. Otherwise, pt has no complaints. One month ago, he started drinking 1-2 beers daily after 3 years of sobriety. He reports he was taking nadolol 20mg daily and PPI daily. Denies taking aspirin or NSAIDs. He ate some spicy food, otherwise no change in diet. He had an episode of hemoptysis 15 years ago due to varices, but has not had an issue since. Presented with VS notable for normal heart rate 85 and BP low of 120/72; hemoglobin 11.8 at presentation within his baseline level. Denies abd pain, abd distension, back pain, nausea, vomiting, hematochezia, dysuria, hematuria, F/C. Per ED provider, rectal exam significant for brown stool that was FOBT+. Follows with hepatologist Dr. Andre Thakkar. Was following with liver transplant at [facility].     Pt has recurrent admissions for bacteremia with extensive negative workup for the source and takes Keflex daily outpatient for prophylaxis. His last admission being July 8-12, 2018 presented with sepsis 2/2 Group B Strep bacteremia that was associated with epigastric pain. No source of infection identified on CT A/P, abd US, and HIDA scan. Previous episodes of bacteremia were due to E coli from unknown source. Colonoscopy 4/2017 positive for likely portal colopathy, colon otherwise normal. EGD/EUS 3/2017 significant for small esophageal varices (<5mm), portal hypertensive gastropathy, normal CBD 3mm diameter without sludge or stone, normal ampulla.    Allergies:  levofloxacin (Other (Moderate)): caused drug-induced cholestasis      Home Medications:  Keflex 500mg qD  Levemir 48u qHS  Metformin 500mg qD  multivitamin  Nadolol 20mg qD  Pantoprazole ER 40mg qD  vit B12    Hospital Medications:  dextrose 40% Gel 15 Gram(s) Oral once PRN  dextrose 5%. 1000 milliLiter(s) IV Continuous <Continuous>  dextrose 50% Injectable 12.5 Gram(s) IV Push once  dextrose 50% Injectable 25 Gram(s) IV Push once  dextrose 50% Injectable 25 Gram(s) IV Push once  glucagon  Injectable 1 milliGRAM(s) IntraMuscular once PRN  insulin glargine Injectable (LANTUS) 20 Unit(s) SubCutaneous at bedtime  insulin lispro (HumaLOG) corrective regimen sliding scale   SubCutaneous three times a day before meals  insulin lispro (HumaLOG) corrective regimen sliding scale   SubCutaneous at bedtime  LORazepam     Tablet 2 milliGRAM(s) Oral every 2 hours PRN  LORazepam     Tablet 2 milliGRAM(s) Oral every 1 hour PRN  pantoprazole  Injectable 40 milliGRAM(s) IV Push two times a day  sodium chloride 0.9%. 1000 milliLiter(s) IV Continuous <Continuous>      PMHX/PSHX:  E. coli bacteremia  Vitiligo  Latent tuberculosis  Diabetes mellitus type 2, insulin dependent  Esophageal varices  Alcoholic cirrhosis  Status post corneal transplant  Alcoholic Cirrhosis  Esophageal Varices      Family history:  Family history of uterine cancer  Family history of diabetes mellitus (Father, Mother)      Social History: Current daily drinker 1-2 beer/day since 1 month ago. Previously sober past 3 years. Current daily tobacco smoker. Denies illicit drugs.    ROS:   General:  No fevers, chills, night sweats, fatigue. +weight loss  Eyes:  Good vision, no reported pain  ENT:  No sore throat, pain, runny nose, dysphagia  CV:  No pain, palpitations, hypo/hypertension  Resp:  No dyspnea, cough, tachypnea, wheezing  GI:  See HPI  :  No pain, bleeding, incontinence, nocturia  Muscle:  No pain, weakness  Neuro:  No weakness, tingling, memory problems  Psych:  No fatigue, insomnia, mood problems, depression  Endocrine:  No polyuria, polydipsia, cold/heat intolerance  Heme:  No petechiae, ecchymosis, easy bruisability  Skin:  No rash, edema    Vital Signs:  Vital Signs Last 24 Hrs  T(C): 36.8 (29 Oct 2018 16:05), Max: 36.9 (29 Oct 2018 13:30)  T(F): 98.2 (29 Oct 2018 16:05), Max: 98.4 (29 Oct 2018 13:30)  HR: 82 (29 Oct 2018 16:05) (70 - 85)  BP: 120/70 (29 Oct 2018 16:05) (117/76 - 135/71)  BP(mean): --  RR: 18 (29 Oct 2018 16:05) (16 - 18)  SpO2: 100% (29 Oct 2018 16:05) (93% - 100%)  Daily Height in cm: 172.72 (29 Oct 2018 02:35)      PHYSICAL EXAM:   GENERAL:  Appears stated age, well-groomed, well-nourished, no distress  HEENT:  NC/AT, conjunctivae clear and pink, no JVD  CHEST:  Full & symmetric excursion, no increased effort, breath sounds clear  HEART:  Regular rhythm, S1, S2, no murmur/rub/S3/S4, no abdominal bruit, no edema  ABDOMEN:  Soft, non-tender, no guarding, non-distended, normoactive bowel sounds,  no masses  EXTREMITIES:  no cyanosis, clubbing or edema  SKIN:  No rash/erythema/ecchymoses/petechiae/wounds/abscess/warm/dry. Diffuse areas of hypopigmentation  NEURO:  Alert, oriented x 3, no asterixis on hand       LABS:                        11.0   2.9   )-----------( 41       ( 29 Oct 2018 13:51 )             34.0     10-29    132<L>  |  99  |  <4<L>  ----------------------------<  74  3.7   |  20<L>  |  0.50    Ca    8.1<L>      29 Oct 2018 04:14    TPro  7.2  /  Alb  3.9  /  TBili  1.9<H>  /  DBili  x   /  AST  63<H>  /  ALT  21  /  AlkPhos  134<H>  10-29    LIVER FUNCTIONS - ( 29 Oct 2018 04:14 )  Alb: 3.9 g/dL / Pro: 7.2 g/dL / ALK PHOS: 134 U/L / ALT: 21 U/L / AST: 63 U/L / GGT: x           PT/INR - ( 29 Oct 2018 04:14 )   PT: 14.0 sec;   INR: 1.28 ratio         PTT - ( 29 Oct 2018 04:14 )  PTT:38.6 sec    Amylase Serum--      Lipase serum33       Ammonia--      Imaging:  < from: CT Abdomen and Pelvis w/ IV Cont (10.29.18 @ 08:34) >  Amanda Johnson, PGY2  Internal Medicine, team 1  Pager [phone] / [phone]  After 7PM on weekdays and 12PM on weekends, please page #[phone]:    LOWER CHEST: Within normal limits.    LIVER: Cirrhotic liver again noted.  BILE DUCTS: Normal caliber.  GALLBLADDER: Within normal limits.  SPLEEN: Splenomegaly.  PANCREAS: Within normal limits.  ADRENALS: Within normal limits.  KIDNEYS/URETERS: Mildright hydronephrosis, with dilatation of the   proximal to mid right ureter. A subcentimeter hypodense lesion in the   right kidney is too small to characterize, but unchanged. No   hydronephrosis of the left kidney.    BLADDER: Distended. Normal wall thickness.  REPRODUCTIVE ORGANS: Prostate gland is within normal limits.    BOWEL: No bowel obstruction. Appendix is within normal limits. Mild wall   thickening versus underdistention of the rectum.  PERITONEUM: No ascites.  VESSELS:  Again noted, is a large recanalized paraumbilical vein with   large varicose veins within the left rectus muscle and anterior abdominal   wall. Again noted are paraesophageal, perigastric and perisplenic   varices. Mild atherosclerotic calcification.  RETROPERITONEUM: No lymphadenopathy.    ABDOMINAL WALL: Small fat-containing umbilical hernia.  BONES: Degenerative changes in the spine. Grade 1 spondylolisthesis and   spondylolysis at L5-S1.    IMPRESSION: Cirrhosis, with portal hypertension and large recanalized   paraumbilical vein and extensive varices in the left rectus muscle and   anterior abdominal wall, unchanged.    Distended urinary bladder with mild right hydroureteronephrosis.    Mild wall thickening versus underdistention of the rectum.  < end of copied text >      < from: Colonoscopy (04.17.17 @ 13:13) >  Impression:          - Mild congestion of colonic mucosa in the transverse colon, likely from                        portal colopathy.                       - Colon was otherwise normal. No source of bacteremia noted on colonoscopy.                       - The examined portion of the ileum was normal.  < end of copied text >      < from: Upper EUS (03.03.17 @ 14:54) >  Impression:          EGD:                       - Small (< 5 mm) esophageal varices.                       - Portal hypertensive gastropathy.                       - The major papilla was mildly edematous                       EUS:                       - Normal common bile duct measuring up to 3 mm in diameter, without sludge or                        stone.                       - Normal ampulla, without evidence of mass.  < end of copied text >      Amanda Johnson, PGY2  Internal Medicine  Pager [phone] / [phone]

## 2018-10-29 NOTE — H&P ADULT - PROBLEM SELECTOR PLAN 1
- Hepatology consult placed  - NPO for now, will hold off on starting protonix, octreotide drip and ceftriaxone given pts hemoglobin appears to be at baseline  - FOBT +  - CBC Q8H, keep active type and screen  - s/p 2 L NS bolus, will c/w NS @ 75/hr for 12 hours  - s/p maalox and morphine in the ED  - CXR with no intra-abdominal free air and CT showing cirrhosis with portal hypertension and large recanalized paraumbilical vein and extensive varices in the left rectus muscle and anterior abdominal wall, unchanged.  Distended urinary bladder with mild right hydroureteronephrosis. Mild wall thickening versus underdistention of the rectum. - Hepatology consult placed  - NPO for now, starting IV protonix; will hold off on octreotide drip and ceftriaxone given pts hemoglobin appears to be at baseline (possibly hemoconcentrated)  - FOBT +  - CBC Q8H, keep active type and screen  - s/p 2 L NS bolus, will c/w NS @ 75/hr for 12 hours  - s/p maalox and morphine in the ED  - CXR with no intra-abdominal free air and CT showing cirrhosis with portal hypertension and large recanalized paraumbilical vein and extensive varices in the left rectus muscle and anterior abdominal wall, unchanged.  Distended urinary bladder with mild right hydroureteronephrosis. Mild wall thickening versus underdistention of the rectum. - Hepatology consult placed  - NPO for now, starting IV protonix; will hold off on octreotide drip and ceftriaxone given pts hemoglobin appears to be at baseline (possibly hemoconcentrated)  - FOBT +  - CBC Q8H, keep active type and screen  - s/p 2 L NS bolus, will c/w NS @ 75/hr for 12 hours  - s/p maalox and morphine in the ED, lipase wnl  - CXR with no intra-abdominal free air and CT showing cirrhosis with portal hypertension and large recanalized paraumbilical vein and extensive varices in the left rectus muscle and anterior abdominal wall, unchanged.  Distended urinary bladder with mild right hydroureteronephrosis. Mild wall thickening versus underdistention of the rectum. - Hepatology consult placed- f/u recs re: possible endoscopy and need for octreotide/ceftriaxone  - NPO for now, starting IV protonix; will hold off on octreotide drip and ceftriaxone given pts hemoglobin appears to be at baseline (possibly hemoconcentrated)  - FOBT +  - CBC Q8H, keep active type and screen  - s/p 2 L NS bolus, will c/w NS @ 75/hr for 12 hours  - s/p maalox and morphine in the ED, lipase wnl  - CXR with no intra-abdominal free air and CT showing cirrhosis with portal hypertension and large recanalized paraumbilical vein and extensive varices in the left rectus muscle and anterior abdominal wall, unchanged.  Distended urinary bladder with mild right hydroureteronephrosis. Mild wall thickening versus underdistention of the rectum. - Hepatology consult placed- f/u recs re: possible endoscopy and need for octreotide/ceftriaxone  - NPO at midnight, clear liquid diet for now with EGD scheduled for tomorrow  - starting IV protonix; will hold off on octreotide drip and ceftriaxone given pts hemoglobin appears to be at baseline (possibly hemoconcentrated)  - FOBT +  - CBC Q8H, keep active type and screen  - s/p 2 L NS bolus, will c/w NS @ 75/hr for 12 hours  - s/p maalox and morphine in the ED, lipase wnl  - CXR with no intra-abdominal free air and CT showing cirrhosis with portal hypertension and large recanalized paraumbilical vein and extensive varices in the left rectus muscle and anterior abdominal wall, unchanged.  Distended urinary bladder with mild right hydroureteronephrosis. Mild wall thickening versus underdistention of the rectum.  - will check blood cx given hx bacteremia

## 2018-10-30 ENCOUNTER — RESULT REVIEW (OUTPATIENT)
Age: 43
End: 2018-10-30

## 2018-10-30 DIAGNOSIS — R31.29 OTHER MICROSCOPIC HEMATURIA: ICD-10-CM

## 2018-10-30 DIAGNOSIS — D72.819 DECREASED WHITE BLOOD CELL COUNT, UNSPECIFIED: ICD-10-CM

## 2018-10-30 LAB
ALBUMIN SERPL ELPH-MCNC: 3.2 G/DL — LOW (ref 3.3–5)
ALP SERPL-CCNC: 95 U/L — SIGNIFICANT CHANGE UP (ref 40–120)
ALT FLD-CCNC: 18 U/L — SIGNIFICANT CHANGE UP (ref 10–45)
ANION GAP SERPL CALC-SCNC: 12 MMOL/L — SIGNIFICANT CHANGE UP (ref 5–17)
APPEARANCE UR: CLEAR — SIGNIFICANT CHANGE UP
APTT BLD: 43.7 SEC — HIGH (ref 27.5–37.4)
AST SERPL-CCNC: 47 U/L — HIGH (ref 10–40)
BACTERIA # UR AUTO: NEGATIVE — SIGNIFICANT CHANGE UP
BILIRUB SERPL-MCNC: 2.7 MG/DL — HIGH (ref 0.2–1.2)
BILIRUB UR-MCNC: NEGATIVE — SIGNIFICANT CHANGE UP
BUN SERPL-MCNC: 5 MG/DL — LOW (ref 7–23)
CALCIUM SERPL-MCNC: 7.7 MG/DL — LOW (ref 8.4–10.5)
CHLORIDE SERPL-SCNC: 105 MMOL/L — SIGNIFICANT CHANGE UP (ref 96–108)
CO2 SERPL-SCNC: 20 MMOL/L — LOW (ref 22–31)
COLOR SPEC: YELLOW — SIGNIFICANT CHANGE UP
CREAT SERPL-MCNC: 0.55 MG/DL — SIGNIFICANT CHANGE UP (ref 0.5–1.3)
DIFF PNL FLD: ABNORMAL
EPI CELLS # UR: 0 /HPF — SIGNIFICANT CHANGE UP
GLUCOSE SERPL-MCNC: 89 MG/DL — SIGNIFICANT CHANGE UP (ref 70–99)
GLUCOSE UR QL: ABNORMAL
HBA1C BLD-MCNC: 8.2 % — HIGH (ref 4–5.6)
HCT VFR BLD CALC: 31.8 % — LOW (ref 39–50)
HCT VFR BLD CALC: 33.7 % — LOW (ref 39–50)
HGB BLD-MCNC: 10 G/DL — LOW (ref 13–17)
HGB BLD-MCNC: 10.7 G/DL — LOW (ref 13–17)
HYALINE CASTS # UR AUTO: 0 /LPF — SIGNIFICANT CHANGE UP (ref 0–2)
INR BLD: 1.51 RATIO — HIGH (ref 0.88–1.16)
KETONES UR-MCNC: SIGNIFICANT CHANGE UP
LEUKOCYTE ESTERASE UR-ACNC: NEGATIVE — SIGNIFICANT CHANGE UP
MCHC RBC-ENTMCNC: 23.2 PG — LOW (ref 27–34)
MCHC RBC-ENTMCNC: 23.7 PG — LOW (ref 27–34)
MCHC RBC-ENTMCNC: 31.3 GM/DL — LOW (ref 32–36)
MCHC RBC-ENTMCNC: 31.8 GM/DL — LOW (ref 32–36)
MCV RBC AUTO: 74.3 FL — LOW (ref 80–100)
MCV RBC AUTO: 74.7 FL — LOW (ref 80–100)
NITRITE UR-MCNC: NEGATIVE — SIGNIFICANT CHANGE UP
PH UR: 7 — SIGNIFICANT CHANGE UP (ref 5–8)
PLATELET # BLD AUTO: 29 K/UL — LOW (ref 150–400)
PLATELET # BLD AUTO: 41 K/UL — LOW (ref 150–400)
POTASSIUM SERPL-MCNC: 3.6 MMOL/L — SIGNIFICANT CHANGE UP (ref 3.5–5.3)
POTASSIUM SERPL-SCNC: 3.6 MMOL/L — SIGNIFICANT CHANGE UP (ref 3.5–5.3)
PROT SERPL-MCNC: 5.9 G/DL — LOW (ref 6–8.3)
PROT UR-MCNC: ABNORMAL
PROTHROM AB SERPL-ACNC: 16.4 SEC — HIGH (ref 9.8–12.7)
RBC # BLD: 4.29 M/UL — SIGNIFICANT CHANGE UP (ref 4.2–5.8)
RBC # BLD: 4.51 M/UL — SIGNIFICANT CHANGE UP (ref 4.2–5.8)
RBC # FLD: 17.4 % — HIGH (ref 10.3–14.5)
RBC # FLD: 18 % — HIGH (ref 10.3–14.5)
RBC CASTS # UR COMP ASSIST: 51 /HPF — HIGH (ref 0–4)
SODIUM SERPL-SCNC: 137 MMOL/L — SIGNIFICANT CHANGE UP (ref 135–145)
SP GR SPEC: 1.01 — SIGNIFICANT CHANGE UP (ref 1.01–1.02)
UROBILINOGEN FLD QL: ABNORMAL
WBC # BLD: 2.7 K/UL — LOW (ref 3.8–10.5)
WBC # BLD: 2.7 K/UL — LOW (ref 3.8–10.5)
WBC # FLD AUTO: 2.7 K/UL — LOW (ref 3.8–10.5)
WBC # FLD AUTO: 2.7 K/UL — LOW (ref 3.8–10.5)
WBC UR QL: 0 /HPF — SIGNIFICANT CHANGE UP (ref 0–5)

## 2018-10-30 PROCEDURE — 99233 SBSQ HOSP IP/OBS HIGH 50: CPT | Mod: GC

## 2018-10-30 PROCEDURE — 43239 EGD BIOPSY SINGLE/MULTIPLE: CPT | Mod: GC

## 2018-10-30 PROCEDURE — 93010 ELECTROCARDIOGRAM REPORT: CPT

## 2018-10-30 PROCEDURE — 99232 SBSQ HOSP IP/OBS MODERATE 35: CPT | Mod: GC

## 2018-10-30 PROCEDURE — 88112 CYTOPATH CELL ENHANCE TECH: CPT | Mod: 26

## 2018-10-30 RX ORDER — TAMSULOSIN HYDROCHLORIDE 0.4 MG/1
0.4 CAPSULE ORAL AT BEDTIME
Qty: 0 | Refills: 0 | Status: DISCONTINUED | OUTPATIENT
Start: 2018-10-30 | End: 2018-10-30

## 2018-10-30 RX ORDER — SODIUM CHLORIDE 9 MG/ML
1000 INJECTION, SOLUTION INTRAVENOUS
Qty: 0 | Refills: 0 | Status: DISCONTINUED | OUTPATIENT
Start: 2018-10-30 | End: 2018-10-30

## 2018-10-30 RX ORDER — DIPHENHYDRAMINE HCL 50 MG
25 CAPSULE ORAL ONCE
Qty: 0 | Refills: 0 | Status: COMPLETED | OUTPATIENT
Start: 2018-10-30 | End: 2018-10-30

## 2018-10-30 RX ORDER — THIAMINE MONONITRATE (VIT B1) 100 MG
100 TABLET ORAL DAILY
Qty: 0 | Refills: 0 | Status: DISCONTINUED | OUTPATIENT
Start: 2018-10-30 | End: 2018-11-01

## 2018-10-30 RX ORDER — DEXTROSE 50 % IN WATER 50 %
12.5 SYRINGE (ML) INTRAVENOUS ONCE
Qty: 0 | Refills: 0 | Status: COMPLETED | OUTPATIENT
Start: 2018-10-30 | End: 2018-10-30

## 2018-10-30 RX ORDER — DIPHENHYDRAMINE HCL 50 MG
25 CAPSULE ORAL ONCE
Qty: 0 | Refills: 0 | Status: DISCONTINUED | OUTPATIENT
Start: 2018-10-30 | End: 2018-10-30

## 2018-10-30 RX ORDER — NADOLOL 80 MG/1
20 TABLET ORAL DAILY
Qty: 0 | Refills: 0 | Status: DISCONTINUED | OUTPATIENT
Start: 2018-10-30 | End: 2018-11-01

## 2018-10-30 RX ORDER — INSULIN GLARGINE 100 [IU]/ML
15 INJECTION, SOLUTION SUBCUTANEOUS AT BEDTIME
Qty: 0 | Refills: 0 | Status: DISCONTINUED | OUTPATIENT
Start: 2018-10-30 | End: 2018-10-31

## 2018-10-30 RX ORDER — SOD SULF/SODIUM/NAHCO3/KCL/PEG
1000 SOLUTION, RECONSTITUTED, ORAL ORAL ONCE
Qty: 0 | Refills: 0 | Status: COMPLETED | OUTPATIENT
Start: 2018-10-30 | End: 2018-10-30

## 2018-10-30 RX ORDER — DEXTROSE 50 % IN WATER 50 %
1 SYRINGE (ML) INTRAVENOUS ONCE
Qty: 0 | Refills: 0 | Status: DISCONTINUED | OUTPATIENT
Start: 2018-10-30 | End: 2018-10-30

## 2018-10-30 RX ADMIN — PANTOPRAZOLE SODIUM 40 MILLIGRAM(S): 20 TABLET, DELAYED RELEASE ORAL at 18:39

## 2018-10-30 RX ADMIN — Medication 100 MILLIGRAM(S): at 18:38

## 2018-10-30 RX ADMIN — Medication 25 MILLIGRAM(S): at 23:38

## 2018-10-30 RX ADMIN — Medication 25 MILLIGRAM(S): at 14:08

## 2018-10-30 RX ADMIN — NADOLOL 20 MILLIGRAM(S): 80 TABLET ORAL at 18:37

## 2018-10-30 RX ADMIN — PANTOPRAZOLE SODIUM 40 MILLIGRAM(S): 20 TABLET, DELAYED RELEASE ORAL at 05:30

## 2018-10-30 RX ADMIN — INSULIN GLARGINE 15 UNIT(S): 100 INJECTION, SOLUTION SUBCUTANEOUS at 21:59

## 2018-10-30 RX ADMIN — Medication 1000 MILLILITER(S): at 18:39

## 2018-10-30 RX ADMIN — Medication 12.5 GRAM(S): at 08:52

## 2018-10-30 NOTE — PROGRESS NOTE ADULT - PROBLEM SELECTOR PLAN 8
- will check bladder scan and UA Will clarify med list with family hx of ecoli and GBS on previous admission, stable, afebrile, no s/s active infection  -serial bcx pending  -on ppx keflex 500mg daily as outpatient  -outpatient ID followup

## 2018-10-30 NOTE — PROGRESS NOTE ADULT - PROBLEM SELECTOR PLAN 3
- takes lantus 48 with metformin at home  - will start with lantus 20 and low ISS while NPO Takes lantus 48 with metformin at home. Today had asx hypoglycemia with glucose 74.  - 1 amp dextrose given  - lantus 20 and low ISS while NPO  - titrate as necessary after procedure Takes lantus 48 with metformin at home. Today had asx hypoglycemia with glucose 74.  - 1 amp dextrose given  - lantus 15 and low ISS while NPO  - titrate as necessary after procedure Takes lantus 48 with metformin at home. Today had asx hypoglycemia with glucose 74.  - 1 amp dextrose given  - c/w lantus 15 and low ISS while NPO  - titrate as necessary after procedure  -monitor FS

## 2018-10-30 NOTE — PROGRESS NOTE ADULT - PROBLEM SELECTOR PLAN 5
- likely 2/2 poor PO intake in setting of cirrhosis, will continue to monitor for now with IVF given - chronic in setting of cirrhosis  - transfuse plts >50 for procedure, getting 1u plts now, +ptx with benadryl for hx of pruritis for transfusion UA with +moderate blood and 51 RBCS, mild R hydro on Ct, no masses seen, patient denies hematuria, weight loss  -unclear if microscopic hematuria due to severe thrombocytopenia vs other  -will send urine cytology and likely need outpatient urology workup

## 2018-10-30 NOTE — PROGRESS NOTE ADULT - ASSESSMENT
41 yo M hx decompensated alcoholic cirrhosis (previously on liver transplant list) c/b esophageal varices, perirectal varices, internal hemorrhoids, h/o C diff colitis, IDDM2, vitiligo, recurrent pancreatitis, h/o recurrent E coli bacteremia from unknown source presents with melena.    1) Melena: Presents hemodynamically stable and Hg 11 (at baseline) with no bowel movements since Sunday so bleeding has likely stopped. Pt reports daily etoh 1-2 beers/day. Previous EGD has been significant for small esophageal varices (<5mm) and portal hypertensive gastropathy.  -DDx: gastritis, PUD, bleeding esophageal varices, angiodysplasia, malignancy    2) Decompensated alcoholic cirrhosis (previously on liver transplant list). MELD-Na 17 on admission from MELD 10 (5/2018)  Varices: esophageal, perigastric varices, perisplenic, large perirectal varices, periumbilical varices  HCC: no lesions on CT scan this admission  PVC: CT scan neg  Ascites: none  HE: AO x 3, no asterixis    3) h/o recurrent E coli bacteremia from unknown source    Recommendations:  -Please make NPO at midnight. Patient is scheduled for EGD 10/30 and likely capsule endoscopy to evaluate small bowel for source of bleeding and recurrent bacteremia.  -Can restart nadolol if blood pressure permits and no further GIB  -Please check CBC, CMP, coags daily 41 yo M hx alcoholic cirrhosis (previously on liver transplant list) c/b esophageal varices, perirectal varices, internal hemorrhoids, h/o C diff colitis, IDDM2, vitiligo, recurrent pancreatitis, h/o recurrent E coli bacteremia from unknown source presents with melena.    1) Melena: Presents hemodynamically stable and Hg 11 (at baseline), since Sunday so bleeding has likely stopped, now having brown stool. Pt reports daily etoh 1-2 beers/day. Previous EGD has been significant for small esophageal varices (<5mm) and portal hypertensive gastropathy.  -DDx: gastritis, PUD, bleeding esophageal varices, angiodysplasia, malignancy    2) Alcoholic cirrhosis (previously on liver transplant list). MELD-Na 17 on admission from MELD 10 (5/2018)  Varices: esophageal, perigastric varices, perisplenic, large perirectal varices, periumbilical varices  HCC: no lesions on CT scan this admission  PVC: CT scan neg  Ascites: none  HE: AO x 3, no asterixis    3) h/o recurrent E coli bacteremia from unknown source    Recommendations:  -Please transfuse platelets with goal >50 for EGD today, keep NPO  -Can restart nadolol if blood pressure permits and no further GIB  -Please check CBC, CMP, coags daily

## 2018-10-30 NOTE — PROGRESS NOTE ADULT - PROBLEM SELECTOR PLAN 1
- Hepatology consult placed- f/u recs re: possible endoscopy and need for octreotide/ceftriaxone  - NPO at midnight, clear liquid diet for now with EGD scheduled for tomorrow  - starting IV protonix; will hold off on octreotide drip and ceftriaxone given pts hemoglobin appears to be at baseline (possibly hemoconcentrated)  - FOBT +  - CBC Q8H, keep active type and screen  - s/p 2 L NS bolus, will c/w NS @ 75/hr for 12 hours  - s/p maalox and morphine in the ED, lipase wnl  - CXR with no intra-abdominal free air and CT showing cirrhosis with portal hypertension and large recanalized paraumbilical vein and extensive varices in the left rectus muscle and anterior abdominal wall, unchanged.  Distended urinary bladder with mild right hydroureteronephrosis. Mild wall thickening versus underdistention of the rectum.  - will check blood cx given hx bacteremia Melenotic bleed most likely upper GI in origin, possibly 2/2 to known esophageal varices vs portal hypertensive gastropathy in the setting of alcoholic cirrhosis. Hg currently stable, at baseline, melena has now resolved.  - hepatology on board, appreciate recs  - EGD today to localize source, +/- cam  - transfuse plts >50 for procedure, getting 1u plts now, +ptx with benadryl for hx of pruritis for transfusion  - maintain active T+S  - CBC q12hr, transfuse Hg <7 Melenotic bleed most likely upper GI in origin, possibly 2/2 to known esophageal varices vs portal hypertensive gastropathy in the setting of alcoholic cirrhosis. Hg currently stable, at baseline, melena has now resolved.  - hepatology on board, appreciate recs  - EGD today to localize source, +/- cam  - transfuse plts >50 for procedure, getting 1u plts now, +ptx with benadryl for hx of pruritis for transfusion  - maintain active T+S  - CBC q12hr, transfuse Hg <7  - pantoprazole BID Melenotic bleed most likely upper GI in origin, possibly 2/2 to known esophageal varices vs portal hypertensive gastropathy in the setting of alcoholic cirrhosis.   - Hg currently stable, at baseline, melena has now resolved, having brown BMS  - discussed with GI, EGD today  - transfuse plts >50 for procedure, getting 1u plts now, +ptx with benadryl for hx of pruritis for transfusion  - maintain active T+S  - monitor CBC q12hr, transfuse Hg <7  - pantoprazole BID

## 2018-10-30 NOTE — PROGRESS NOTE ADULT - PROBLEM SELECTOR PLAN 9
- pts wife will bring in list of meds later today, per chart review pt appears to be on Nadolol 20, pantoprazole 40, metformin, lantus, keflex 500 daily for prophylaxis; pt also appears to have been taking lasix and spironolactone at some point recently but unsure if still taking these so will have to clarify DVT ppx: none in the setting of active bleed  Diet: NPO for procedure Likely dilutional with underlying  cirrhosis started after getting 2L NS.  - trend CBC q 12hr

## 2018-10-30 NOTE — CHART NOTE - NSCHARTNOTEFT_GEN_A_CORE
Hepatology Brief note    Plan for capsule tomorrow morning   - NPO after midnight   - will give 1 L Movi prep to clear bowel

## 2018-10-30 NOTE — PROGRESS NOTE ADULT - SUBJECTIVE AND OBJECTIVE BOX
Patient is a 42y old  Male who presents with a chief complaint of GI bleed (30 Oct 2018 08:24)      SUBJECTIVE / OVERNIGHT EVENTS: , straight cathed.       REVIEW OF SYSTEMS:  Constitutional: No fever, weight loss or fatigue  Cardiovascular: No chest pain, palpitations, dizziness or leg swelling  Gastrointestinal: No abdominal or epigastric pain. No nausea, vomiting or hematemesis; No diarrhea or constipation. No melena or hematochezia.  Skin: No itching, burning, rashes or lesions       MEDICATIONS:  MEDICATIONS  (STANDING):  dextrose 5%. 1000 milliLiter(s) (50 mL/Hr) IV Continuous <Continuous>  dextrose 50% Injectable 12.5 Gram(s) IV Push once  dextrose 50% Injectable 25 Gram(s) IV Push once  dextrose 50% Injectable 25 Gram(s) IV Push once  insulin glargine Injectable (LANTUS) 20 Unit(s) SubCutaneous at bedtime  insulin lispro (HumaLOG) corrective regimen sliding scale   SubCutaneous three times a day before meals  insulin lispro (HumaLOG) corrective regimen sliding scale   SubCutaneous at bedtime  pantoprazole  Injectable 40 milliGRAM(s) IV Push two times a day  sodium chloride 0.9%. 1000 milliLiter(s) (75 mL/Hr) IV Continuous <Continuous>    MEDICATIONS  (PRN):  dextrose 40% Gel 15 Gram(s) Oral once PRN Blood Glucose LESS THAN 70 milliGRAM(s)/deciliter  glucagon  Injectable 1 milliGRAM(s) IntraMuscular once PRN Glucose LESS THAN 70 milligrams/deciliter  LORazepam     Tablet 2 milliGRAM(s) Oral every 2 hours PRN Symptom-triggered 2 point increase in CIWA-Ar  LORazepam     Tablet 2 milliGRAM(s) Oral every 1 hour PRN Symptom-triggered: each CIWA -Ar score 8 or GREATER      Vital Signs Last 24 Hrs  T(C): 37.2 (30 Oct 2018 04:11), Max: 37.3 (30 Oct 2018 00:11)  T(F): 99 (30 Oct 2018 04:11), Max: 99.2 (30 Oct 2018 00:11)  HR: 64 (30 Oct 2018 04:11) (63 - 85)  BP: 133/73 (30 Oct 2018 04:11) (111/61 - 134/77)  BP(mean): --  RR: 18 (30 Oct 2018 04:11) (16 - 18)  SpO2: 94% (30 Oct 2018 04:11) (90% - 100%)    10-29 @ 07:01  -  10-30 @ 07:00  --------------------------------------------------------  IN: 1140 mL / OUT: 450 mL / NET: 690 mL    10-30 @ 07:01  -  10-30 @ 09:22  --------------------------------------------------------  IN: 0 mL / OUT: 300 mL / NET: -300 mL      I&O's Summary    29 Oct 2018 07:01  -  30 Oct 2018 07:00  --------------------------------------------------------  IN: 1140 mL / OUT: 450 mL / NET: 690 mL    30 Oct 2018 07:01  -  30 Oct 2018 09:22  --------------------------------------------------------  IN: 0 mL / OUT: 300 mL / NET: -300 mL        PHYSICAL EXAM:  General: Well developed; well nourished; in no acute distress  HEENT: MMM, conjunctiva and sclera clear  Gastrointestinal: Soft non-tender non-distended; Normal bowel sounds; No hepatosplenomegaly. No rebound or guarding  Skin: Warm and dry. No obvious rash    LABS:                        10.0   2.7   )-----------( 29       ( 30 Oct 2018 04:47 )             31.8     30 Oct 2018 04:47    137    |  105    |  5      ----------------------------<  89     3.6     |  20     |  0.55     Ca    7.7        30 Oct 2018 04:47    TPro  5.9    /  Alb  3.2    /  TBili  2.7    /  DBili  x      /  AST  47     /  ALT  18     /  AlkPhos  95     30 Oct 2018 04:47    PT/INR - ( 30 Oct 2018 04:47 )   PT: 16.4 sec;   INR: 1.51 ratio         PTT - ( 30 Oct 2018 04:47 )  PTT:43.7 sec    CBC Full  -  ( 30 Oct 2018 04:47 )  WBC Count : 2.7 K/uL  Hemoglobin : 10.0 g/dL  Hematocrit : 31.8 %  Platelet Count - Automated : 29 K/uL  Mean Cell Volume : 74.3 fl  Mean Cell Hemoglobin : 23.2 pg  Mean Cell Hemoglobin Concentration : 31.3 gm/dL  Auto Neutrophil # : x  Auto Lymphocyte # : x  Auto Monocyte # : x  Auto Eosinophil # : x  Auto Basophil # : x  Auto Neutrophil % : x  Auto Lymphocyte % : x  Auto Monocyte % : x  Auto Eosinophil % : x  Auto Basophil % : x    10-30    137  |  105  |  5<L>  ----------------------------<  89  3.6   |  20<L>  |  0.55    Ca    7.7<L>      30 Oct 2018 04:47    TPro  5.9<L>  /  Alb  3.2<L>  /  TBili  2.7<H>  /  DBili  x   /  AST  47<H>  /  ALT  18  /  AlkPhos  95  10-30    PT/INR - ( 30 Oct 2018 04:47 )   PT: 16.4 sec;   INR: 1.51 ratio         PTT - ( 30 Oct 2018 04:47 )  PTT:43.7 sec          RADIOLOGY & ADDITIONAL STUDIES:      Amanda Johnson, PGY2  Internal Medicine  Pager [phone] / [phone] Patient is a 42y old  Male who presents with a chief complaint of GI bleed (30 Oct 2018 08:24)      SUBJECTIVE / OVERNIGHT EVENTS: With right moderate hydronephrosis on CT scan, . Platelets 29 this AM. Pt had a formed brown BM this AM. Denies N/V/D, lightheadedness, CP, SOB, abd pain, melena, hematochezia, F/C.      REVIEW OF SYSTEMS:  Constitutional: No fever, weight loss or fatigue  Cardiovascular: No chest pain, palpitations, dizziness or leg swelling  Gastrointestinal: No abdominal or epigastric pain. No nausea, vomiting or hematemesis; No diarrhea or constipation. No melena or hematochezia.  Skin: No itching, burning, rashes or lesions       MEDICATIONS:  MEDICATIONS  (STANDING):  dextrose 5%. 1000 milliLiter(s) (50 mL/Hr) IV Continuous <Continuous>  dextrose 50% Injectable 12.5 Gram(s) IV Push once  dextrose 50% Injectable 25 Gram(s) IV Push once  dextrose 50% Injectable 25 Gram(s) IV Push once  insulin glargine Injectable (LANTUS) 20 Unit(s) SubCutaneous at bedtime  insulin lispro (HumaLOG) corrective regimen sliding scale   SubCutaneous three times a day before meals  insulin lispro (HumaLOG) corrective regimen sliding scale   SubCutaneous at bedtime  pantoprazole  Injectable 40 milliGRAM(s) IV Push two times a day  sodium chloride 0.9%. 1000 milliLiter(s) (75 mL/Hr) IV Continuous <Continuous>    MEDICATIONS  (PRN):  dextrose 40% Gel 15 Gram(s) Oral once PRN Blood Glucose LESS THAN 70 milliGRAM(s)/deciliter  glucagon  Injectable 1 milliGRAM(s) IntraMuscular once PRN Glucose LESS THAN 70 milligrams/deciliter  LORazepam     Tablet 2 milliGRAM(s) Oral every 2 hours PRN Symptom-triggered 2 point increase in CIWA-Ar  LORazepam     Tablet 2 milliGRAM(s) Oral every 1 hour PRN Symptom-triggered: each CIWA -Ar score 8 or GREATER      Vital Signs Last 24 Hrs  T(C): 37.2 (30 Oct 2018 04:11), Max: 37.3 (30 Oct 2018 00:11)  T(F): 99 (30 Oct 2018 04:11), Max: 99.2 (30 Oct 2018 00:11)  HR: 64 (30 Oct 2018 04:11) (63 - 85)  BP: 133/73 (30 Oct 2018 04:11) (111/61 - 134/77)  BP(mean): --  RR: 18 (30 Oct 2018 04:11) (16 - 18)  SpO2: 94% (30 Oct 2018 04:11) (90% - 100%)    10-29 @ 07:01  -  10-30 @ 07:00  --------------------------------------------------------  IN: 1140 mL / OUT: 450 mL / NET: 690 mL    10-30 @ 07:01  -  10-30 @ 09:22  --------------------------------------------------------  IN: 0 mL / OUT: 300 mL / NET: -300 mL      I&O's Summary    29 Oct 2018 07:01  -  30 Oct 2018 07:00  --------------------------------------------------------  IN: 1140 mL / OUT: 450 mL / NET: 690 mL    30 Oct 2018 07:01  -  30 Oct 2018 09:22  --------------------------------------------------------  IN: 0 mL / OUT: 300 mL / NET: -300 mL        PHYSICAL EXAM:  General: Well developed; well nourished; in no acute distress  HEENT: MMM, conjunctiva and sclera clear  Gastrointestinal: Soft non-tender non-distended; Normal bowel sounds; No hepatosplenomegaly. No rebound or guarding  Skin: Warm and dry. No obvious rash    LABS:                        10.0   2.7   )-----------( 29       ( 30 Oct 2018 04:47 )             31.8     30 Oct 2018 04:47    137    |  105    |  5      ----------------------------<  89     3.6     |  20     |  0.55     Ca    7.7        30 Oct 2018 04:47    TPro  5.9    /  Alb  3.2    /  TBili  2.7    /  DBili  x      /  AST  47     /  ALT  18     /  AlkPhos  95     30 Oct 2018 04:47    PT/INR - ( 30 Oct 2018 04:47 )   PT: 16.4 sec;   INR: 1.51 ratio         PTT - ( 30 Oct 2018 04:47 )  PTT:43.7 sec    CBC Full  -  ( 30 Oct 2018 04:47 )  WBC Count : 2.7 K/uL  Hemoglobin : 10.0 g/dL  Hematocrit : 31.8 %  Platelet Count - Automated : 29 K/uL  Mean Cell Volume : 74.3 fl  Mean Cell Hemoglobin : 23.2 pg  Mean Cell Hemoglobin Concentration : 31.3 gm/dL  Auto Neutrophil # : x  Auto Lymphocyte # : x  Auto Monocyte # : x  Auto Eosinophil # : x  Auto Basophil # : x  Auto Neutrophil % : x  Auto Lymphocyte % : x  Auto Monocyte % : x  Auto Eosinophil % : x  Auto Basophil % : x    10-30    137  |  105  |  5<L>  ----------------------------<  89  3.6   |  20<L>  |  0.55    Ca    7.7<L>      30 Oct 2018 04:47    TPro  5.9<L>  /  Alb  3.2<L>  /  TBili  2.7<H>  /  DBili  x   /  AST  47<H>  /  ALT  18  /  AlkPhos  95  10-30    PT/INR - ( 30 Oct 2018 04:47 )   PT: 16.4 sec;   INR: 1.51 ratio         PTT - ( 30 Oct 2018 04:47 )  PTT:43.7 sec          RADIOLOGY & ADDITIONAL STUDIES:      Amanda Johnson, PGY2  Internal Medicine  Pager [phone] / [phone]

## 2018-10-30 NOTE — PROGRESS NOTE ADULT - PROBLEM SELECTOR PLAN 7
- reports smoking roughly 5 cigarettes daily, offered nicotine patch and refused Symptom triggered CIWAs trending 0-4 now. Last drink Sunday. Will continue to monitor on protocol for another 24-48hrs.  - offered patient naltrexone, he would like to hold off for now  - will continue to provide counselling on ETOH cessation - chronic in setting of cirrhosis  - transfuse plts >50 for procedure, getting 1u plts now, +ptx with benadryl for hx of pruritis for transfusion

## 2018-10-30 NOTE — PROGRESS NOTE ADULT - ASSESSMENT
43 yo M hx IDDM2, alcoholic cirrhosis (previously on liver transplant list) c/b esophageal varices (recent EGD in 2017 confirming), vitiligo, recurrent pancreatitis, h/o recurrent e coli bacteremia who presents due to two days of melena. 43 yo M hx IDDM2, alcoholic cirrhosis (previously on liver transplant list) c/b esophageal varices (recent EGD in 2017 confirming), vitiligo, recurrent pancreatitis, h/o recurrent e coli bacteremia and recent history of Group B strep bacteremia who presents due to two days of melena.    Deanna Velazquez MD  PGY1  Pager: [phone] 43 yo M hx IDDM2, alcoholic cirrhosis (previously on liver transplant list) c/b esophageal varices (recent EGD in 2017 confirming), vitiligo, recurrent pancreatitis, h/o recurrent e coli bacteremia and recent history of Group B strep bacteremia who presents due to two days of melena now resolved, also found to have incidental mild hydronephrosis     Deanna Velazquez MD  PGY1  Pager: [phone]

## 2018-10-30 NOTE — PROGRESS NOTE ADULT - PROBLEM SELECTOR PLAN 2
- MELD score 17  - pt reports he was previously on transplant list at [facility] but was not deemed to have cirrhosis severe enough to be a good candidate for transplant at that time  - currently not a transplant candidate due to active alcohol use MELD score 15. Not a candidate for transplant given active ETOH use. Cirrhosis currently stable on Ct with no changes, no hepatic lesions, clinically no asterixis and A+Ox3.  - appreciate hepatology recs  - continue to monitor MELD score 15. Not a candidate for transplant given active ETOH use. Cirrhosis currently stable on Ct with no changes, no hepatic lesions, clinically no asterixis and A+Ox3.  - appreciate hepatology recs  - continue to monitor  - nicolas wesleye

## 2018-10-30 NOTE — PROGRESS NOTE ADULT - SUBJECTIVE AND OBJECTIVE BOX
CARINALILLIAN  42y  Male      Patient is a 42y old  Male who presents with a chief complaint of GI bleed (29 Oct 2018 14:56)      INTERVAL HPI/OVERNIGHT EVENTS:        REVIEW OF SYSTEMS:  CONSTITUTIONAL: No fever, weight loss, or fatigue  EYES: No eye pain, visual disturbances, or discharge  ENMT:  No difficulty hearing, tinnitus, vertigo; No sinus or throat pain  NECK: No pain or stiffness  BREASTS: No pain, masses, or nipple discharge  RESPIRATORY: No cough, wheezing, chills or hemoptysis; No shortness of breath  CARDIOVASCULAR: No chest pain, palpitations, dizziness, or leg swelling  GASTROINTESTINAL: No abdominal or epigastric pain. No nausea, vomiting, or hematemesis; No diarrhea or constipation. No melena or hematochezia.  GENITOURINARY: No dysuria, frequency, hematuria, or incontinence  NEUROLOGICAL: No headaches, memory loss, loss of strength, numbness, or tremors  SKIN: No itching, burning, rashes, or lesions   LYMPH NODES: No enlarged glands  ENDOCRINE: No heat or cold intolerance; No hair loss  MUSCULOSKELETAL: No joint pain or swelling; No muscle, back, or extremity pain  PSYCHIATRIC: No depression, anxiety, mood swings, or difficulty sleeping  HEME/LYMPH: No easy bruising, or bleeding gums  ALLERY AND IMMUNOLOGIC: No hives or eczema    T(C): 37.2 (10-30-18 @ 04:11), Max: 37.3 (10-30-18 @ 00:11)  HR: 64 (10-30-18 @ 04:11) (63 - 85)  BP: 133/73 (10-30-18 @ 04:11) (111/61 - 134/77)  RR: 18 (10-30-18 @ 04:11) (16 - 18)  SpO2: 94% (10-30-18 @ 04:11) (90% - 100%)  Wt(kg): --    PHYSICAL EXAM:  CONSTITUTIONAL: NAD  HEAD: Normocephalic, atraumatic, moist mucous membranes  CARDIAC: Normal rate, regular rhythm.  Heart sounds S1, S2.  RESPIRATORY: Breath sounds clear and equal bilaterally.  GASTROINTESTINAL: Soft, NTND, no rebound or guarding, - Zamuido's sign  MUSCULOSKELETAL: range of motion is not limited, no muscle or joint tenderness  NEUROLOGICAL: no focal deficits, no motor or sensory deficits.  PSYCH: A&O x3  SKIN: No evidence of rash. Vitiligo    Consultant(s) Notes Reviewed:  [x ] YES  [ ] NO  Care Discussed with Consultants/Other Providers [ x] YES  [ ] NO    LABS:                        10.0   2.7   )-----------( 29       ( 30 Oct 2018 04:47 )             31.8     10-30    137  |  105  |  5<L>  ----------------------------<  89  3.6   |  20<L>  |  0.55    Ca    7.7<L>      30 Oct 2018 04:47    TPro  5.9<L>  /  Alb  3.2<L>  /  TBili  2.7<H>  /  DBili  x   /  AST  47<H>  /  ALT  18  /  AlkPhos  95  10-30    PT/INR - ( 30 Oct 2018 04:47 )   PT: 16.4 sec;   INR: 1.51 ratio         PTT - ( 30 Oct 2018 04:47 )  PTT:43.7 sec    CAPILLARY BLOOD GLUCOSE      POCT Blood Glucose.: 74 mg/dL (30 Oct 2018 06:55)  POCT Blood Glucose.: 182 mg/dL (29 Oct 2018 21:20)  POCT Blood Glucose.: 85 mg/dL (29 Oct 2018 16:53)  POCT Blood Glucose.: 87 mg/dL (29 Oct 2018 15:09)    RADIOLOGY & ADDITIONAL TESTS:  < from: CT Abdomen and Pelvis w/ IV Cont (10.29.18 @ 08:34) >  IMPRESSION: Cirrhosis, with portal hypertension and large recanalized   paraumbilical vein and extensive varices in the left rectus muscle and   anterior abdominal wall, unchanged.    Distended urinary bladder with mild right hydroureteronephrosis.    Mild wall thickening versus underdistention of the rectum.    < end of copied text >    Imaging Personally Reviewed:  [x ] YES  [ ] NO CARINA, LILLIAN  42y  Male      Patient is a 42y old  Male who presents with a chief complaint of GI bleed (29 Oct 2018 14:56)      INTERVAL HPI/OVERNIGHT EVENTS: No acute interval events. This AM, patient has no complaints. ROS negative for fever, chills, chest pain, SOB, epigastric/abdominal pain. Last BM yesterday, it was small but normal in color. He does have some urinary retention, retaining 450cc urine.    MEDICATIONS  (STANDING):  dextrose 5%. 1000 milliLiter(s) (50 mL/Hr) IV Continuous <Continuous>  dextrose 50% Injectable 12.5 Gram(s) IV Push once  dextrose 50% Injectable 25 Gram(s) IV Push once  dextrose 50% Injectable 25 Gram(s) IV Push once  diphenhydrAMINE 25 milliGRAM(s) Oral once  insulin glargine Injectable (LANTUS) 15 Unit(s) SubCutaneous at bedtime  insulin lispro (HumaLOG) corrective regimen sliding scale   SubCutaneous three times a day before meals  insulin lispro (HumaLOG) corrective regimen sliding scale   SubCutaneous at bedtime  pantoprazole  Injectable 40 milliGRAM(s) IV Push two times a day  sodium chloride 0.9%. 1000 milliLiter(s) (75 mL/Hr) IV Continuous <Continuous>  tamsulosin 0.4 milliGRAM(s) Oral at bedtime    MEDICATIONS  (PRN):  dextrose 40% Gel 15 Gram(s) Oral once PRN Blood Glucose LESS THAN 70 milliGRAM(s)/deciliter  glucagon  Injectable 1 milliGRAM(s) IntraMuscular once PRN Glucose LESS THAN 70 milligrams/deciliter  LORazepam     Tablet 2 milliGRAM(s) Oral every 2 hours PRN Symptom-triggered 2 point increase in CIWA-Ar  LORazepam     Tablet 2 milliGRAM(s) Oral every 1 hour PRN Symptom-triggered: each CIWA -Ar score 8 or GREATER    T(C): 37.2 (10-30-18 @ 04:11), Max: 37.3 (10-30-18 @ 00:11)  HR: 64 (10-30-18 @ 04:11) (63 - 85)  BP: 133/73 (10-30-18 @ 04:11) (111/61 - 134/77)  RR: 18 (10-30-18 @ 04:11) (16 - 18)  SpO2: 94% (10-30-18 @ 04:11) (90% - 100%)  Wt(kg): --    PHYSICAL EXAM:  CONSTITUTIONAL: NAD  HEAD: NC/AT. MMM  CARDIAC: Normal rate, regular rhythm.  Heart sounds S1, S2.  RESPIRATORY: Breath sounds clear and equal bilaterally.  GASTROINTESTINAL: Soft, NTND  MUSCULOSKELETAL: range of motion is not limited, no muscle or joint tenderness  NEUROLOGICAL: no asterixis, no coarse tremor or tremor on fingertip to fingertip. no tongue fasciculaitons. A+Ox3  SKIN: Hypopigmented patches in symmetric distribution most notable around the mouth, hands, feet and bilateral LE    Consultant(s) Notes Reviewed:  [x ] YES  [ ] NO  Care Discussed with Consultants/Other Providers [ x] YES  [ ] NO    LABS:                        10.0   2.7   )-----------( 29       ( 30 Oct 2018 04:47 )             31.8     10-30    137  |  105  |  5<L>  ----------------------------<  89  3.6   |  20<L>  |  0.55    Ca    7.7<L>      30 Oct 2018 04:47    TPro  5.9<L>  /  Alb  3.2<L>  /  TBili  2.7<H>  /  DBili  x   /  AST  47<H>  /  ALT  18  /  AlkPhos  95  10-30    PT/INR - ( 30 Oct 2018 04:47 )   PT: 16.4 sec;   INR: 1.51 ratio         PTT - ( 30 Oct 2018 04:47 )  PTT:43.7 sec    CAPILLARY BLOOD GLUCOSE      POCT Blood Glucose.: 74 mg/dL (30 Oct 2018 06:55)  POCT Blood Glucose.: 182 mg/dL (29 Oct 2018 21:20)  POCT Blood Glucose.: 85 mg/dL (29 Oct 2018 16:53)  POCT Blood Glucose.: 87 mg/dL (29 Oct 2018 15:09)    RADIOLOGY & ADDITIONAL TESTS:  < from: CT Abdomen and Pelvis w/ IV Cont (10.29.18 @ 08:34) >  IMPRESSION: Cirrhosis, with portal hypertension and large recanalized   paraumbilical vein and extensive varices in the left rectus muscle and   anterior abdominal wall, unchanged.    Distended urinary bladder with mild right hydroureteronephrosis.    Mild wall thickening versus underdistention of the rectum.    < end of copied text >    Imaging Personally Reviewed:  [x ] YES  [ ] NO CARINA, LILLIAN  42y  Male      Patient is a 42y old  Male who presents with a chief complaint of GI bleed (29 Oct 2018 14:56)      INTERVAL HPI/OVERNIGHT EVENTS: No acute interval events. This AM, patient has no complaints.  Last BM yesterday this morning with brown stool. no melena or hematochezia ROS negative for fever, chills, chest pain, SOB, epigastric/abdominal pain. Denies any urinary retention or LUTS symptoms. Reports his drinking habits have decreased from years ago and denies any alc withdraw symptoms now, last hospitalization for withdrawal was nearly 10 years ago.     MEDICATIONS  (STANDING):  dextrose 5%. 1000 milliLiter(s) (50 mL/Hr) IV Continuous <Continuous>  dextrose 50% Injectable 12.5 Gram(s) IV Push once  dextrose 50% Injectable 25 Gram(s) IV Push once  dextrose 50% Injectable 25 Gram(s) IV Push once  diphenhydrAMINE 25 milliGRAM(s) Oral once  insulin glargine Injectable (LANTUS) 15 Unit(s) SubCutaneous at bedtime  insulin lispro (HumaLOG) corrective regimen sliding scale   SubCutaneous three times a day before meals  insulin lispro (HumaLOG) corrective regimen sliding scale   SubCutaneous at bedtime  pantoprazole  Injectable 40 milliGRAM(s) IV Push two times a day  sodium chloride 0.9%. 1000 milliLiter(s) (75 mL/Hr) IV Continuous <Continuous>  tamsulosin 0.4 milliGRAM(s) Oral at bedtime    MEDICATIONS  (PRN):  dextrose 40% Gel 15 Gram(s) Oral once PRN Blood Glucose LESS THAN 70 milliGRAM(s)/deciliter  glucagon  Injectable 1 milliGRAM(s) IntraMuscular once PRN Glucose LESS THAN 70 milligrams/deciliter  LORazepam     Tablet 2 milliGRAM(s) Oral every 2 hours PRN Symptom-triggered 2 point increase in CIWA-Ar  LORazepam     Tablet 2 milliGRAM(s) Oral every 1 hour PRN Symptom-triggered: each CIWA -Ar score 8 or GREATER    T(C): 37.2 (10-30-18 @ 04:11), Max: 37.3 (10-30-18 @ 00:11)  HR: 64 (10-30-18 @ 04:11) (63 - 85)  BP: 133/73 (10-30-18 @ 04:11) (111/61 - 134/77)  RR: 18 (10-30-18 @ 04:11) (16 - 18)  SpO2: 94% (10-30-18 @ 04:11) (90% - 100%)  Wt(kg): --    PHYSICAL EXAM:  CONSTITUTIONAL: NAD  HEAD: NC/AT. MMM  CARDIAC: Normal rate, regular rhythm.  Heart sounds S1, S2.  RESPIRATORY: Breath sounds clear and equal bilaterally.  GASTROINTESTINAL: Soft, NT, ND  MUSCULOSKELETAL: range of motion is not limited, no muscle or joint tenderness  NEUROLOGICAL: no asterixis, no coarse tremor or tremor on fingertip to fingertip. no tongue fasciculations A+Ox3  SKIN: Hypopigmented patches in symmetric distribution most notable around the mouth, hands, feet and bilateral LE    Consultant(s) Notes Reviewed:  [x ] YES  [ ] NO  Care Discussed with Consultants/Other Providers [ x] YES  [ ] NO    LABS:                        10.0   2.7   )-----------( 29       ( 30 Oct 2018 04:47 )             31.8     10-30    137  |  105  |  5<L>  ----------------------------<  89  3.6   |  20<L>  |  0.55    Ca    7.7<L>      30 Oct 2018 04:47    TPro  5.9<L>  /  Alb  3.2<L>  /  TBili  2.7<H>  /  DBili  x   /  AST  47<H>  /  ALT  18  /  AlkPhos  95  10-30    PT/INR - ( 30 Oct 2018 04:47 )   PT: 16.4 sec;   INR: 1.51 ratio         PTT - ( 30 Oct 2018 04:47 )  PTT:43.7 sec    CAPILLARY BLOOD GLUCOSE      POCT Blood Glucose.: 74 mg/dL (30 Oct 2018 06:55)  POCT Blood Glucose.: 182 mg/dL (29 Oct 2018 21:20)  POCT Blood Glucose.: 85 mg/dL (29 Oct 2018 16:53)  POCT Blood Glucose.: 87 mg/dL (29 Oct 2018 15:09)    RADIOLOGY & ADDITIONAL TESTS:  < from: CT Abdomen and Pelvis w/ IV Cont (10.29.18 @ 08:34) >  IMPRESSION: Cirrhosis, with portal hypertension and large recanalized   paraumbilical vein and extensive varices in the left rectus muscle and   anterior abdominal wall, unchanged.    Distended urinary bladder with mild right hydroureteronephrosis.    Mild wall thickening versus underdistention of the rectum.    < end of copied text >    Imaging Personally Reviewed:  [x ] YES  [ ] NO

## 2018-10-30 NOTE — PROGRESS NOTE ADULT - PROBLEM SELECTOR PLAN 4
- chronic in setting of cirrhosis Unclear etiology of acute urinary retention. UA +blood, could be 2/2 thrombocytopenia, but will investigate further for malignancy. No obvious mass on CT, prostate not enlarged.  - urine cytology  - serial bladder scans  - straight cath prn  - initiate flomax. CT incidentally showed Distended urinary bladder with mild right hydroureteronephrosis, no mass/stones seen, creatinine wnl, denies LUTS  -bladder scan 0 after voiding twice today  -no acute indication for inpatient urology consult, outpatient renal US in 4 week to reassess mild R hydro

## 2018-10-30 NOTE — PROGRESS NOTE ADULT - PROBLEM SELECTOR PLAN 6
- pt on CIWA protocol Ashia dilutional, started after getting 2L NS.  - trend CBC q 12hr Symptom triggered CIWAs trending 0-4 now. Last drink Sunday. Will continue to monitor on protocol for another 24-48hrs.   -no major s/s of withdrawl.   - offered patient naltrexone, he would like to hold off for now  - will continue to provide counselling on ETOH cessation

## 2018-10-31 ENCOUNTER — TRANSCRIPTION ENCOUNTER (OUTPATIENT)
Age: 43
End: 2018-10-31

## 2018-10-31 LAB
ALBUMIN SERPL ELPH-MCNC: 3.5 G/DL — SIGNIFICANT CHANGE UP (ref 3.3–5)
ALP SERPL-CCNC: 95 U/L — SIGNIFICANT CHANGE UP (ref 40–120)
ALT FLD-CCNC: 17 U/L — SIGNIFICANT CHANGE UP (ref 10–45)
ANION GAP SERPL CALC-SCNC: 11 MMOL/L — SIGNIFICANT CHANGE UP (ref 5–17)
APTT BLD: 35 SEC — SIGNIFICANT CHANGE UP (ref 27.5–36.3)
AST SERPL-CCNC: 43 U/L — HIGH (ref 10–40)
BASOPHILS # BLD AUTO: 0 K/UL — SIGNIFICANT CHANGE UP (ref 0–0.2)
BASOPHILS NFR BLD AUTO: 1.6 % — SIGNIFICANT CHANGE UP (ref 0–2)
BILIRUB SERPL-MCNC: 2.7 MG/DL — HIGH (ref 0.2–1.2)
BLD GP AB SCN SERPL QL: NEGATIVE — SIGNIFICANT CHANGE UP
BLD GP AB SCN SERPL QL: NEGATIVE — SIGNIFICANT CHANGE UP
BUN SERPL-MCNC: 6 MG/DL — LOW (ref 7–23)
CALCIUM SERPL-MCNC: 8.5 MG/DL — SIGNIFICANT CHANGE UP (ref 8.4–10.5)
CHLORIDE SERPL-SCNC: 106 MMOL/L — SIGNIFICANT CHANGE UP (ref 96–108)
CO2 SERPL-SCNC: 21 MMOL/L — LOW (ref 22–31)
CREAT SERPL-MCNC: 0.66 MG/DL — SIGNIFICANT CHANGE UP (ref 0.5–1.3)
EOSINOPHIL # BLD AUTO: 0.1 K/UL — SIGNIFICANT CHANGE UP (ref 0–0.5)
EOSINOPHIL NFR BLD AUTO: 3.8 % — SIGNIFICANT CHANGE UP (ref 0–6)
GLUCOSE SERPL-MCNC: 65 MG/DL — LOW (ref 70–99)
HCT VFR BLD CALC: 33.2 % — LOW (ref 39–50)
HGB BLD-MCNC: 10.5 G/DL — LOW (ref 13–17)
INR BLD: 1.42 RATIO — HIGH (ref 0.88–1.16)
LYMPHOCYTES # BLD AUTO: 0.8 K/UL — LOW (ref 1–3.3)
LYMPHOCYTES # BLD AUTO: 26.2 % — SIGNIFICANT CHANGE UP (ref 13–44)
MAGNESIUM SERPL-MCNC: 1.7 MG/DL — SIGNIFICANT CHANGE UP (ref 1.6–2.6)
MCHC RBC-ENTMCNC: 23.8 PG — LOW (ref 27–34)
MCHC RBC-ENTMCNC: 31.8 GM/DL — LOW (ref 32–36)
MCV RBC AUTO: 75 FL — LOW (ref 80–100)
MONOCYTES # BLD AUTO: 0.3 K/UL — SIGNIFICANT CHANGE UP (ref 0–0.9)
MONOCYTES NFR BLD AUTO: 9.2 % — SIGNIFICANT CHANGE UP (ref 2–14)
NEUTROPHILS # BLD AUTO: 1.8 K/UL — SIGNIFICANT CHANGE UP (ref 1.8–7.4)
NEUTROPHILS NFR BLD AUTO: 59.2 % — SIGNIFICANT CHANGE UP (ref 43–77)
PHOSPHATE SERPL-MCNC: 3.5 MG/DL — SIGNIFICANT CHANGE UP (ref 2.5–4.5)
PLATELET # BLD AUTO: 63 K/UL — LOW (ref 150–400)
POTASSIUM SERPL-MCNC: 3.6 MMOL/L — SIGNIFICANT CHANGE UP (ref 3.5–5.3)
POTASSIUM SERPL-SCNC: 3.6 MMOL/L — SIGNIFICANT CHANGE UP (ref 3.5–5.3)
PROT SERPL-MCNC: 6.4 G/DL — SIGNIFICANT CHANGE UP (ref 6–8.3)
PROTHROM AB SERPL-ACNC: 16.4 SEC — HIGH (ref 10–12.9)
RBC # BLD: 4.43 M/UL — SIGNIFICANT CHANGE UP (ref 4.2–5.8)
RBC # FLD: 18.5 % — HIGH (ref 10.3–14.5)
RH IG SCN BLD-IMP: POSITIVE — SIGNIFICANT CHANGE UP
SODIUM SERPL-SCNC: 138 MMOL/L — SIGNIFICANT CHANGE UP (ref 135–145)
WBC # BLD: 3.1 K/UL — LOW (ref 3.8–10.5)
WBC # FLD AUTO: 3.1 K/UL — LOW (ref 3.8–10.5)

## 2018-10-31 PROCEDURE — 99233 SBSQ HOSP IP/OBS HIGH 50: CPT | Mod: GC

## 2018-10-31 PROCEDURE — 99232 SBSQ HOSP IP/OBS MODERATE 35: CPT | Mod: GC

## 2018-10-31 RX ORDER — CEPHALEXIN 500 MG
500 CAPSULE ORAL DAILY
Qty: 0 | Refills: 0 | Status: DISCONTINUED | OUTPATIENT
Start: 2018-10-31 | End: 2018-11-01

## 2018-10-31 RX ORDER — DEXTROSE 50 % IN WATER 50 %
12.5 SYRINGE (ML) INTRAVENOUS ONCE
Qty: 0 | Refills: 0 | Status: COMPLETED | OUTPATIENT
Start: 2018-10-31 | End: 2018-10-31

## 2018-10-31 RX ORDER — INSULIN GLARGINE 100 [IU]/ML
10 INJECTION, SOLUTION SUBCUTANEOUS AT BEDTIME
Qty: 0 | Refills: 0 | Status: DISCONTINUED | OUTPATIENT
Start: 2018-10-31 | End: 2018-11-01

## 2018-10-31 RX ADMIN — Medication 2: at 13:18

## 2018-10-31 RX ADMIN — Medication 12.5 GRAM(S): at 07:05

## 2018-10-31 RX ADMIN — PANTOPRAZOLE SODIUM 40 MILLIGRAM(S): 20 TABLET, DELAYED RELEASE ORAL at 05:19

## 2018-10-31 RX ADMIN — Medication 1: at 18:07

## 2018-10-31 RX ADMIN — Medication 500 MILLIGRAM(S): at 13:49

## 2018-10-31 RX ADMIN — Medication 100 MILLIGRAM(S): at 12:04

## 2018-10-31 RX ADMIN — NADOLOL 20 MILLIGRAM(S): 80 TABLET ORAL at 05:19

## 2018-10-31 RX ADMIN — INSULIN GLARGINE 10 UNIT(S): 100 INJECTION, SOLUTION SUBCUTANEOUS at 22:03

## 2018-10-31 RX ADMIN — PANTOPRAZOLE SODIUM 40 MILLIGRAM(S): 20 TABLET, DELAYED RELEASE ORAL at 17:59

## 2018-10-31 NOTE — DISCHARGE NOTE ADULT - OTHER SIGNIFICANT FINDINGS
< from: Upper Endoscopy (10.30.18 @ 16:25) >  Impression:          - Small (< 5 mm) esophageal varices.                       - Scar in the lower third of the esophagus.                       - Portal hypertensive gastropathy.                       - Normal examined duodenum.                       - Nospecimens collected.  Recommendation:      - Resume regular diet.                       - Plan for video capsule endoscopy tomorrow                       - Please keep NPO after MN                       - Continue present medications.                                                                                         < end of copied text >

## 2018-10-31 NOTE — PROGRESS NOTE ADULT - PROBLEM SELECTOR PLAN 4
CT incidentally showed Distended urinary bladder with mild right hydroureteronephrosis, no mass/stones seen, creatinine wnl, denies LUTS  - straight cath prn  -no acute indication for inpatient urology consult, outpatient renal US in 4 week to reassess mild R hydro CT incidentally showed Distended urinary bladder with mild right hydroureteronephrosis, no mass/stones seen, creatinine wnl, denies LUTS  -no acute indication for inpatient urology consult, outpatient renal US in 4 week to reassess mild R hydro

## 2018-10-31 NOTE — DISCHARGE NOTE ADULT - MEDICATION SUMMARY - MEDICATIONS TO CHANGE
I will SWITCH the dose or number of times a day I take the medications listed below when I get home from the hospital:    Levemir 100 units/mL subcutaneous solution  -- 48 unit(s) subcutaneous once a day (at bedtime) - med confirmed with pharmacy

## 2018-10-31 NOTE — PROGRESS NOTE ADULT - PROBLEM SELECTOR PLAN 2
MELD score . Not a candidate for transplant given active ETOH use. Cirrhosis currently stable on Ct with no changes, no hepatic lesions, clinically no asterixis and A+Ox3.  - appreciate hepatology recs  - continue to monitor MELD score 14. Not a candidate for transplant given active ETOH use. Cirrhosis currently stable on Ct with no changes, no hepatic lesions, clinically no asterixis and A+Ox3.  - appreciate hepatology recs  - continue to monitor

## 2018-10-31 NOTE — CHART NOTE - NSCHARTNOTEFT_GEN_A_CORE
Risks of video capsule endoscopy explained, including risk of retained capsule, potentially requiring surgery for removal.  Patient understands and agrees to risks.    Capsule swallowed at: 9:30    NPO now.   Resume Clear liquid diet at:  11:30   Resume regular consistency diet at: 1:30 pm    Equipment can be removed at: 8:00 pm    GI fellow will retrieve equipment in the morning.    NO MRI UNTIL CAPSULE CLEARANCE CONFIRMED. CAPSULE IS NOT MRI COMPATIBLE.

## 2018-10-31 NOTE — PROGRESS NOTE ADULT - ASSESSMENT
41 yo M hx IDDM2, alcoholic cirrhosis (previously on liver transplant list) c/b esophageal varices (recent EGD in 2017 confirming), vitiligo, recurrent pancreatitis, h/o recurrent e coli bacteremia and recent history of Group B strep bacteremia who presents due to two days of melena now resolved, also found to have incidental mild hydronephrosis, now s/p EGD and due for capsule today.    Deanna Velazquez MD  PGY1  Pager: [phone] 43 yo M hx IDDM2, alcoholic cirrhosis (previously on liver transplant list) c/b esophageal varices (recent EGD in 2017 confirming), vitiligo, recurrent pancreatitis, h/o recurrent e coli bacteremia and recent history of Group B strep bacteremia who presents due to two days of melena now resolved, also found to have incidental mild hydronephrosis, now s/p EGD showed nonbleeding varices and due for capsule today.    Deanna Velazquez MD  PGY1  Pager: [phone]

## 2018-10-31 NOTE — PROGRESS NOTE ADULT - SUBJECTIVE AND OBJECTIVE BOX
ANESTHESIA POSTOP CHECK    42y Male POSTOP DAY 1    Vital Signs Last 24 Hrs  T(C): 37.2 (31 Oct 2018 02:30), Max: 37.2 (31 Oct 2018 02:30)  T(F): 98.9 (31 Oct 2018 02:30), Max: 98.9 (31 Oct 2018 02:30)  HR: 59 (31 Oct 2018 02:30) (54 - 68)  BP: 111/61 (31 Oct 2018 02:30) (103/56 - 138/81)  BP(mean): --  RR: 18 (31 Oct 2018 02:30) (18 - 18)  SpO2: 93% (31 Oct 2018 02:30) (90% - 95%)  I&O's Summary    30 Oct 2018 07:01  -  31 Oct 2018 07:00  --------------------------------------------------------  IN: 300 mL / OUT: 2550 mL / NET: -2250 mL        [X ] NO APPARENT ANESTHESIA COMPLICATIONS      Comments:       Roney DON  Anesthesiology Resident

## 2018-10-31 NOTE — DISCHARGE NOTE ADULT - SECONDARY DIAGNOSIS.
Hydroureteronephrosis Microscopic hematuria E. coli bacteremia Diabetes mellitus type 2, insulin dependent Thrombocytopenia

## 2018-10-31 NOTE — DISCHARGE NOTE ADULT - PATIENT PORTAL LINK FT
You can access the [facility], offered by [facility], by registering with the following website: [email or web address]

## 2018-10-31 NOTE — DISCHARGE NOTE ADULT - CARE PROVIDERS DIRECT ADDRESSES
,[email or web address]

## 2018-10-31 NOTE — DISCHARGE NOTE ADULT - MEDICATION SUMMARY - MEDICATIONS TO TAKE
I will START or STAY ON the medications listed below when I get home from the hospital:    metFORMIN 500 mg oral tablet  -- 1 tab(s) by mouth once a day  -- Indication: For Diabetes mellitus type 2, insulin dependent    insulin glargine 100 units/mL subcutaneous solution  -- 20 unit(s) subcutaneous once a day (at bedtime)   -- Indication: For Diabetes mellitus type 2, insulin dependent    nadolol 20 mg oral tablet  -- 1 tab(s) by mouth once a day  -- Indication: For GI bleed    cephalexin 500 mg oral capsule  -- 1 cap(s) by mouth once a day  -- Indication: For Need for prophylactic measure    pantoprazole 40 mg oral delayed release tablet  -- 1 tab(s) by mouth once a day  -- Indication: For Need for prophylactic measure    multivitamin  -- 1 tab(s) by mouth once a day  -- Indication: For Need for prophylactic measure    Vitamin B12  -- Indication: For Need for prophylactic measure    thiamine 100 mg oral tablet  -- 1 tab(s) by mouth once a day  -- Indication: For Need for prophylactic measure    thiamine 100 mg oral tablet  -- 1 tab(s) by mouth once a day   -- Indication: For Need for prophylactic measure I will START or STAY ON the medications listed below when I get home from the hospital:    metFORMIN 500 mg oral tablet  -- 1 tab(s) by mouth once a day  -- Indication: For Diabetes mellitus type 2, insulin dependent    insulin glargine 100 units/mL subcutaneous solution  -- 20 unit(s) subcutaneous once a day (at bedtime)   -- Indication: For Diabetes mellitus type 2, insulin dependent    nadolol 20 mg oral tablet  -- 1 tab(s) by mouth once a day  -- Indication: For GI bleed    cephalexin 500 mg oral capsule  -- 1 cap(s) by mouth once a day  -- Indication: For Need for prophylactic measure    pantoprazole 40 mg oral delayed release tablet  -- 1 tab(s) by mouth once a day  -- Indication: For Need for prophylactic measure    multivitamin  -- 1 tab(s) by mouth once a day  -- Indication: For Need for prophylactic measure    thiamine 100 mg oral tablet  -- 1 tab(s) by mouth once a day   -- Indication: For Need for prophylactic measure    B-12 1000 mcg sublingual tablet  -- 1 tab(s) under tongue once a day  -- Indication: For Need for prophylactic measure

## 2018-10-31 NOTE — PROGRESS NOTE ADULT - PROBLEM SELECTOR PLAN 9
Likely dilutional with underlying  cirrhosis started after getting 2L NS.  - trend CBC q 12hr Likely dilutional with underlying  cirrhosis started after getting 2L NS.  - trend CBC daily

## 2018-10-31 NOTE — PROGRESS NOTE ADULT - PROBLEM SELECTOR PLAN 10
DVT ppx: venodynes  Dispo: pending capsule study DVT ppx: venodynes  Dispo: pending capsule study, d/c home karen likely

## 2018-10-31 NOTE — DISCHARGE NOTE ADULT - HOSPITAL COURSE
41 yo M hx IDDM2, alcoholic cirrhosis (previously on liver transplant list) c/b esophageal varices (recent EGD in 2017 confirming), vitiligo, recurrent pancreatitis, h/o recurrent e coli bacteremia who presents due to melena, he required inpatient hospitalization for evaluation of melena. Patient's melena spontaneously resolved. He was transfused 2u of plts due to thrombocytopenia 2/2 cirrhosis. Patient underwent upper endoscopy remarkable for nonbleeding varices and portal hypertensive gastropathy. He underwent pill camera endoscopy, the results of which were pending at discharge. 41 yo M hx IDDM2, alcoholic cirrhosis (previously on liver transplant list) c/b esophageal varices (recent EGD in 2017 confirming), vitiligo, recurrent pancreatitis, h/o recurrent e coli bacteremia who presents due to melena, he required inpatient hospitalization for evaluation of melena. Ct abd/pelvis done at admission showed Distended urinary bladder with mild right hydroureteronephrosis however patient was urinating normally with normal creatnine..Patient's melena spontaneously resolved. He was transfused 2u of plts due to thrombocytopenia 2/2 cirrhosis. Patient underwent upper endoscopy remarkable for nonbleeding varices and portal hypertensive gastropathy. He underwent pill camera endoscopy, the results of which were pending at discharge and can be followed out as outpatient per hepatology. His Hgb remained stable and had several days of normal BMs. Of note he had microscopic hematuria with negative urine cytology. He was kept on his po ppx antibiotics for history of bacteremia. Of note his insulin was adjusted for type 2 diabetes. Patient Hd stable with no compliants stable for discharge home with outpatient urology, ID, hepatology followup    Discharge Diagnosis:  Upper GI Bleed  Esophageal Varices  Portal hypertensive gastropathy  Alcoholic Cirrhosis  Diabetes mellitus type 2, insulin dependent.   Microscopic Hematuria  Hydroureteronephrosis on Right  Thrombocytopenia, Leukopenia  History of positive blood cultures

## 2018-10-31 NOTE — PROGRESS NOTE ADULT - PROBLEM SELECTOR PLAN 1
Melenotic bleed most likely upper GI in origin, possibly 2/2 to known esophageal varices vs portal hypertensive gastropathy in the setting of alcoholic cirrhosis. S/p EGD which did not isolate source of bleeding  - Hg currently stable, at baseline, melena has now resolved, having brown BMS  - discussed with GI, capsule today  - transfuse plts >50 for procedure, s/p 2 u, now at 63  - maintain active T+S  - monitor CBC q12hr, transfuse Hg <7  - pantoprazole BID Melenotic bleed most likely upper GI in origin, possibly 2/2 to known esophageal varices vs portal hypertensive gastropathy in the setting of alcoholic cirrhosis. S/p EGD which did not isolate source of bleeding  - Hg currently stable, at baseline, melena has now resolved, having brown BMS  - discussed with GI, capsule today  - transfuse plts >50 for procedure, s/p 2 u, now at 63  - maintain active T+S  - monitor CBC q24hrs given Hb stable  - pantoprazole BID Resolve,d Melenotic bleed most likely upper GI in origin, possibly 2/2 to known esophageal varices vs portal hypertensive gastropathy in the setting of alcoholic cirrhosis. S/p EGD showed nonbleeding varices and portal hypertensive gastropathy without active bleeding  - Hg currently stable, at baseline, melena has now resolved, having brown BMS  - discussed with GI, capsule today  - maintain active T+S  - monitor CBC q24hrs given Hb stable  - c/w pantoprazole BID

## 2018-10-31 NOTE — PROGRESS NOTE ADULT - PROBLEM SELECTOR PLAN 8
hx of ecoli and GBS on previous admission, stable, afebrile, no s/s active infection  - bcx NGTD from 10/29  -on ppx keflex 500mg daily as outpatient  -outpatient ID followup hx of ecoli and GBS on previous admission, stable, afebrile, no s/s active infection. Per outpatient chart review, patient should be on this for 6 months  - bcx NGTD from 10/29  -on ppx keflex 500mg daily as outpatient, will restart.  -outpatient ID followup

## 2018-10-31 NOTE — PROGRESS NOTE ADULT - PROBLEM SELECTOR PLAN 6
Symptom triggered CIWAs trending 0 now. Last drink Sunday. Will continue to monitor on protocol for another 24hrs   - no major s/s of withdrawl.   - offered patient naltrexone, he would like to hold off for now  - will continue to provide counselling on ETOH cessation Symptom triggered CIWAs trending 0 now. Last drink Sunday. Will discontinue CIWA protocol.  - no major s/s of withdrawl.   - offered patient naltrexone, he would like to hold off for now  - will continue to provide counselling on ETOH cessation

## 2018-10-31 NOTE — PROGRESS NOTE ADULT - SUBJECTIVE AND OBJECTIVE BOX
LILLIAN LIM  42y  Male      Patient is a 42y old  Male who presents with a chief complaint of GI bleed (31 Oct 2018 07:31)      INTERVAL HPI/OVERNIGHT EVENTS:    MEDICATIONS  (STANDING):  dextrose 5%. 1000 milliLiter(s) (50 mL/Hr) IV Continuous <Continuous>  dextrose 50% Injectable 12.5 Gram(s) IV Push once  dextrose 50% Injectable 25 Gram(s) IV Push once  dextrose 50% Injectable 25 Gram(s) IV Push once  insulin glargine Injectable (LANTUS) 15 Unit(s) SubCutaneous at bedtime  insulin lispro (HumaLOG) corrective regimen sliding scale   SubCutaneous three times a day before meals  insulin lispro (HumaLOG) corrective regimen sliding scale   SubCutaneous at bedtime  nadolol 20 milliGRAM(s) Oral daily  pantoprazole  Injectable 40 milliGRAM(s) IV Push two times a day  thiamine 100 milliGRAM(s) Oral daily    MEDICATIONS  (PRN):  dextrose 40% Gel 15 Gram(s) Oral once PRN Blood Glucose LESS THAN 70 milliGRAM(s)/deciliter  glucagon  Injectable 1 milliGRAM(s) IntraMuscular once PRN Glucose LESS THAN 70 milligrams/deciliter  LORazepam     Tablet 2 milliGRAM(s) Oral every 2 hours PRN Symptom-triggered 2 point increase in CIWA-Ar  LORazepam     Tablet 2 milliGRAM(s) Oral every 1 hour PRN Symptom-triggered: each CIWA -Ar score 8 or GREATER      T(C): 37.2 (10-31-18 @ 02:30), Max: 37.2 (10-31-18 @ 02:30)  HR: 59 (10-31-18 @ 02:30) (54 - 68)  BP: 111/61 (10-31-18 @ 02:30) (103/56 - 138/81)  RR: 18 (10-31-18 @ 02:30) (18 - 18)  SpO2: 93% (10-31-18 @ 02:30) (90% - 95%)  Wt(kg): --    PHYSICAL EXAM:  GENERAL: NAD, well-groomed, well-developed  HEAD:  Atraumatic, Normocephalic  EYES: EOMI, PERRLA, conjunctiva and sclera clear  ENMT: No tonsillar erythema, exudates, or enlargement; Moist mucous membranes, Good dentition, No lesions  NECK: Supple, No JVD, Normal thyroid  NERVOUS SYSTEM:  Alert & Oriented X3, Good concentration; Motor Strength 5/5 B/L upper and lower extremities; DTRs 2+ intact and symmetric  CHEST/LUNG: Clear to percussion bilaterally; No rales, rhonchi, wheezing, or rubs  HEART: Regular rate and rhythm; No murmurs, rubs, or gallops  ABDOMEN: Soft, Nontender, Nondistended; Bowel sounds present  EXTREMITIES:  2+ Peripheral Pulses, No clubbing, cyanosis, or edema  LYMPH: No lymphadenopathy noted  SKIN: No rashes or lesions    Consultant(s) Notes Reviewed:  [x ] YES  [ ] NO  Care Discussed with Consultants/Other Providers [ x] YES  [ ] NO    LABS:                        10.5   3.1   )-----------( 63       ( 31 Oct 2018 07:08 )             33.2     10-31    138  |  106  |  6<L>  ----------------------------<  65<L>  3.6   |  21<L>  |  0.66    Ca    8.5      31 Oct 2018 07:06  Phos  3.5     10-31  Mg     1.7     10-31    TPro  6.4  /  Alb  3.5  /  TBili  2.7<H>  /  DBili  x   /  AST  43<H>  /  ALT  17  /  AlkPhos  95  10-31    PT/INR - ( 31 Oct 2018 07:08 )   PT: 16.4 sec;   INR: 1.42 ratio         PTT - ( 31 Oct 2018 07:08 )  PTT:35.0 sec  Urinalysis Basic - ( 30 Oct 2018 09:15 )    Color: Yellow / Appearance: Clear / S.015 / pH: x  Gluc: x / Ketone: Trace  / Bili: Negative / Urobili: 2 mg/dL   Blood: x / Protein: Trace / Nitrite: Negative   Leuk Esterase: Negative / RBC: 51 /hpf / WBC 0 /hpf   Sq Epi: x / Non Sq Epi: 0 /hpf / Bacteria: Negative      CAPILLARY BLOOD GLUCOSE      POCT Blood Glucose.: 112 mg/dL (31 Oct 2018 07:27)  POCT Blood Glucose.: 67 mg/dL (31 Oct 2018 06:56)  POCT Blood Glucose.: 183 mg/dL (30 Oct 2018 21:42)  POCT Blood Glucose.: 117 mg/dL (30 Oct 2018 18:25)  POCT Blood Glucose.: 108 mg/dL (30 Oct 2018 11:57)        Urinalysis Basic - ( 30 Oct 2018 09:15 )    Color: Yellow / Appearance: Clear / S.015 / pH: x  Gluc: x / Ketone: Trace  / Bili: Negative / Urobili: 2 mg/dL   Blood: x / Protein: Trace / Nitrite: Negative   Leuk Esterase: Negative / RBC: 51 /hpf / WBC 0 /hpf   Sq Epi: x / Non Sq Epi: 0 /hpf / Bacteria: Negative        RADIOLOGY & ADDITIONAL TESTS:    Imaging Personally Reviewed:  [ ] YES  [ ] NO CARINA, LILLIAN  42y  Male      Patient is a 42y old  Male who presents with a chief complaint of GI bleed (31 Oct 2018 07:31)      INTERVAL HPI/OVERNIGHT EVENTS: No acute overnight events. This AM, he is feeling well, no complaints except he had 20 BMs overnight (in the setting of movi prep)    MEDICATIONS  (STANDING):  dextrose 5%. 1000 milliLiter(s) (50 mL/Hr) IV Continuous <Continuous>  dextrose 50% Injectable 12.5 Gram(s) IV Push once  dextrose 50% Injectable 25 Gram(s) IV Push once  dextrose 50% Injectable 25 Gram(s) IV Push once  insulin glargine Injectable (LANTUS) 15 Unit(s) SubCutaneous at bedtime  insulin lispro (HumaLOG) corrective regimen sliding scale   SubCutaneous three times a day before meals  insulin lispro (HumaLOG) corrective regimen sliding scale   SubCutaneous at bedtime  nadolol 20 milliGRAM(s) Oral daily  pantoprazole  Injectable 40 milliGRAM(s) IV Push two times a day  thiamine 100 milliGRAM(s) Oral daily    MEDICATIONS  (PRN):  dextrose 40% Gel 15 Gram(s) Oral once PRN Blood Glucose LESS THAN 70 milliGRAM(s)/deciliter  glucagon  Injectable 1 milliGRAM(s) IntraMuscular once PRN Glucose LESS THAN 70 milligrams/deciliter  LORazepam     Tablet 2 milliGRAM(s) Oral every 2 hours PRN Symptom-triggered 2 point increase in CIWA-Ar  LORazepam     Tablet 2 milliGRAM(s) Oral every 1 hour PRN Symptom-triggered: each CIWA -Ar score 8 or GREATER      T(C): 37.2 (10-31-18 @ 02:30), Max: 37.2 (10-31-18 @ 02:30)  HR: 59 (10-31-18 @ 02:30) (54 - 68)  BP: 111/61 (10-31-18 @ 02:30) (103/56 - 138/81)  RR: 18 (10-31-18 @ 02:30) (18 - 18)  SpO2: 93% (10-31-18 @ 02:30) (90% - 95%)  Wt(kg): --    PHYSICAL EXAM:  CONSTITUTIONAL: NAD  HEAD: NC/AT. MMM  CARDIAC: Normal rate, regular rhythm.  Heart sounds S1, S2.  RESPIRATORY: Breath sounds clear and equal bilaterally.  GASTROINTESTINAL: Soft, NT, ND  MUSCULOSKELETAL: range of motion is not limited, no muscle or joint tenderness  NEUROLOGICAL: no asterixis, no coarse tremor or tremor on fingertip to fingertip. no tongue fasciculations A+Ox3  SKIN: Hypopigmented patches in symmetric distribution most notable around the mouth, hands, feet and bilateral LE    Consultant(s) Notes Reviewed:  [x ] YES  [ ] NO  Care Discussed with Consultants/Other Providers [ x] YES  [ ] NO    LABS:                        10.5   3.1   )-----------( 63       ( 31 Oct 2018 07:08 )             33.2     10    138  |  106  |  6<L>  ----------------------------<  65<L>  3.6   |  21<L>  |  0.66    Ca    8.5      31 Oct 2018 07:06  Phos  3.5     10-  Mg     1.7     10-31    TPro  6.4  /  Alb  3.5  /  TBili  2.7<H>  /  DBili  x   /  AST  43<H>  /  ALT  17  /  AlkPhos  95  10-    PT/INR - ( 31 Oct 2018 07:08 )   PT: 16.4 sec;   INR: 1.42 ratio         PTT - ( 31 Oct 2018 07:08 )  PTT:35.0 sec  Urinalysis Basic - ( 30 Oct 2018 09:15 )    Color: Yellow / Appearance: Clear / S.015 / pH: x  Gluc: x / Ketone: Trace  / Bili: Negative / Urobili: 2 mg/dL   Blood: x / Protein: Trace / Nitrite: Negative   Leuk Esterase: Negative / RBC: 51 /hpf / WBC 0 /hpf   Sq Epi: x / Non Sq Epi: 0 /hpf / Bacteria: Negative      CAPILLARY BLOOD GLUCOSE      POCT Blood Glucose.: 112 mg/dL (31 Oct 2018 07:27)  POCT Blood Glucose.: 67 mg/dL (31 Oct 2018 06:56)  POCT Blood Glucose.: 183 mg/dL (30 Oct 2018 21:42)  POCT Blood Glucose.: 117 mg/dL (30 Oct 2018 18:25)  POCT Blood Glucose.: 108 mg/dL (30 Oct 2018 11:57)        Urinalysis Basic - ( 30 Oct 2018 09:15 )    Color: Yellow / Appearance: Clear / S.015 / pH: x  Gluc: x / Ketone: Trace  / Bili: Negative / Urobili: 2 mg/dL   Blood: x / Protein: Trace / Nitrite: Negative   Leuk Esterase: Negative / RBC: 51 /hpf / WBC 0 /hpf   Sq Epi: x / Non Sq Epi: 0 /hpf / Bacteria: Negative        RADIOLOGY & ADDITIONAL TESTS:    Imaging Personally Reviewed:  [ ] YES  [ ] NO CARINA, LILLIAN  42y  Male      Patient is a 42y old  Male who presents with a chief complaint of GI bleed (31 Oct 2018 07:31)      INTERVAL HPI/OVERNIGHT EVENTS: No acute overnight events. This AM, he is feeling well, no complaints except he had 20 BMs overnight (in the setting of movi prep), no melena, or hematochezia    MEDICATIONS  (STANDING):  dextrose 5%. 1000 milliLiter(s) (50 mL/Hr) IV Continuous <Continuous>  dextrose 50% Injectable 12.5 Gram(s) IV Push once  dextrose 50% Injectable 25 Gram(s) IV Push once  dextrose 50% Injectable 25 Gram(s) IV Push once  insulin glargine Injectable (LANTUS) 15 Unit(s) SubCutaneous at bedtime  insulin lispro (HumaLOG) corrective regimen sliding scale   SubCutaneous three times a day before meals  insulin lispro (HumaLOG) corrective regimen sliding scale   SubCutaneous at bedtime  nadolol 20 milliGRAM(s) Oral daily  pantoprazole  Injectable 40 milliGRAM(s) IV Push two times a day  thiamine 100 milliGRAM(s) Oral daily    MEDICATIONS  (PRN):  dextrose 40% Gel 15 Gram(s) Oral once PRN Blood Glucose LESS THAN 70 milliGRAM(s)/deciliter  glucagon  Injectable 1 milliGRAM(s) IntraMuscular once PRN Glucose LESS THAN 70 milligrams/deciliter  LORazepam     Tablet 2 milliGRAM(s) Oral every 2 hours PRN Symptom-triggered 2 point increase in CIWA-Ar  LORazepam     Tablet 2 milliGRAM(s) Oral every 1 hour PRN Symptom-triggered: each CIWA -Ar score 8 or GREATER      T(C): 37.2 (10-31-18 @ 02:30), Max: 37.2 (10-31-18 @ 02:30)  HR: 59 (10-31-18 @ 02:30) (54 - 68)  BP: 111/61 (10-31-18 @ 02:30) (103/56 - 138/81)  RR: 18 (10-31-18 @ 02:30) (18 - 18)  SpO2: 93% (10-31-18 @ 02:30) (90% - 95%)  Wt(kg): --    PHYSICAL EXAM:  CONSTITUTIONAL: NAD  HEAD: NC/AT. MMM  CARDIAC: Normal rate, regular rhythm.  Heart sounds S1, S2.  RESPIRATORY: Breath sounds clear and equal bilaterally.  GASTROINTESTINAL: Soft, NT, ND  MUSCULOSKELETAL: range of motion is not limited, no muscle or joint tenderness  NEUROLOGICAL: no asterixis, no coarse tremor or tremor on fingertip to fingertip. no tongue fasciculations A+Ox3  SKIN: Hypopigmented patches in symmetric distribution most notable around the mouth, hands, feet and bilateral LE    Consultant(s) Notes Reviewed:  [x ] YES  [ ] NO  Care Discussed with Consultants/Other Providers [ x] YES  [ ] NO    LABS:                        10.5   3.1   )-----------( 63       ( 31 Oct 2018 07:08 )             33.2     10-31    138  |  106  |  6<L>  ----------------------------<  65<L>  3.6   |  21<L>  |  0.66    Ca    8.5      31 Oct 2018 07:06  Phos  3.5     10-31  Mg     1.7     10-31    TPro  6.4  /  Alb  3.5  /  TBili  2.7<H>  /  DBili  x   /  AST  43<H>  /  ALT  17  /  AlkPhos  95  10-31    PT/INR - ( 31 Oct 2018 07:08 )   PT: 16.4 sec;   INR: 1.42 ratio         PTT - ( 31 Oct 2018 07:08 )  PTT:35.0 sec  Urinalysis Basic - ( 30 Oct 2018 09:15 )    Color: Yellow / Appearance: Clear / S.015 / pH: x  Gluc: x / Ketone: Trace  / Bili: Negative / Urobili: 2 mg/dL   Blood: x / Protein: Trace / Nitrite: Negative   Leuk Esterase: Negative / RBC: 51 /hpf / WBC 0 /hpf   Sq Epi: x / Non Sq Epi: 0 /hpf / Bacteria: Negative      CAPILLARY BLOOD GLUCOSE      POCT Blood Glucose.: 112 mg/dL (31 Oct 2018 07:27)  POCT Blood Glucose.: 67 mg/dL (31 Oct 2018 06:56)  POCT Blood Glucose.: 183 mg/dL (30 Oct 2018 21:42)  POCT Blood Glucose.: 117 mg/dL (30 Oct 2018 18:25)  POCT Blood Glucose.: 108 mg/dL (30 Oct 2018 11:57)        Urinalysis Basic - ( 30 Oct 2018 09:15 )    Color: Yellow / Appearance: Clear / S.015 / pH: x  Gluc: x / Ketone: Trace  / Bili: Negative / Urobili: 2 mg/dL   Blood: x / Protein: Trace / Nitrite: Negative   Leuk Esterase: Negative / RBC: 51 /hpf / WBC 0 /hpf   Sq Epi: x / Non Sq Epi: 0 /hpf / Bacteria: Negative        RADIOLOGY & ADDITIONAL TESTS:    Imaging Personally Reviewed:  [ ] YES  [ ] NO

## 2018-10-31 NOTE — DISCHARGE NOTE ADULT - CARE PLAN
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 People with cirrhosis can bleed from their GI tract. These bleeds can be dangerous, and the risks include death. During your hospitalization, we did not find an active bleed. However, you do have dilated blood vessels in your esophagus and your stomach is inflammed from high blood pressure in your GI tract. Both of these complications are due to alcohol. It is in your best interest to maintain abstinence from alcohol. Please continue taking all your medications as prescribed, including nadolol.  Secondary Diagnosis:	Hydroureteronephrosis  Goal:	Management  Assessment and plan of treatment:	You were found to have hydroureteronephrosis on a CT. This means your kidney is swollen. You should follow up with your urologist about this 1-2 weeks.  Secondary Diagnosis:	Microscopic hematuria  Goal:	Management  Assessment and plan of treatment:	You were found to have a small amount of blood in your urine. You should follow up with your urologist for further workup.  Secondary Diagnosis:	E. coli bacteremia  Goal:	Management  Assessment and plan of treatment:	You had recurrent bacteremia. You were not bacteremic this hospitalization. Please continue taking your antibiotic and follow up with ID in the next month or so. 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 People with cirrhosis can bleed from their GI tract. These bleeds can be dangerous, and the risks include death. During your hospitalization, we did not find an active bleed. However, you do have dilated blood vessels in your esophagus and your stomach is inflammed from high blood pressure in your GI tract. Both of these complications are due to alcohol. It is in your best interest to maintain abstinence from alcohol. Please continue taking all your medications as prescribed, including nadolol. Please follow up with your hepatologist regarding the results of the capsule endoscopy in 1-2 weeks.  Secondary Diagnosis:	Hydroureteronephrosis  Goal:	Management  Assessment and plan of treatment:	You were found to have hydroureteronephrosis on a CT. This means your kidney is swollen. You should follow up with your urologist about this 1-2 weeks.  Secondary Diagnosis:	Microscopic hematuria  Goal:	Management  Assessment and plan of treatment:	You were found to have a small amount of blood in your urine. You should follow up with your urologist for further workup.  Secondary Diagnosis:	E. coli bacteremia  Goal:	Management  Assessment and plan of treatment:	You had recurrent bacteremia. You were not bacteremic this hospitalization. Please continue taking your antibiotic and follow up with ID in the next month or so. 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 People with cirrhosis can bleed from their GI tract. These bleeds can be dangerous, and the risks include death. During your hospitalization, we did not find an active bleed. However, you do have dilated blood vessels in your esophagus and your stomach is inflammed from high blood pressure in your GI tract. Both of these complications are due to alcohol. It is in your best interest to maintain abstinence from alcohol. Please continue taking all your medications as prescribed, including nadolol. Please follow up with your hepatologist regarding the results of the capsule endoscopy in 1-2 weeks.  Secondary Diagnosis:	Hydroureteronephrosis  Goal:	Management  Assessment and plan of treatment:	You were found to have hydroureteronephrosis on a CT. This means your kidney is swollen. You should follow up with your urologist about this 1-2 weeks. Will need repeat renal ultrasound within 1 month  Secondary Diagnosis:	Microscopic hematuria  Goal:	Management  Assessment and plan of treatment:	You were found to have a small amount of blood in your urine. You should follow up with your urologist for further workup.  Secondary Diagnosis:	E. coli bacteremia  Goal:	Management  Assessment and plan of treatment:	You had recurrent bacteremia. You were not bacteremic this hospitalization. Please continue taking your antibiotic and follow up with ID in the next month or so.  Secondary Diagnosis:	Diabetes mellitus type 2, insulin dependent  Assessment and plan of treatment:	continue insulin as prescribed  followup with your PCP  Secondary Diagnosis:	Thrombocytopenia  Assessment and plan of treatment:	followup with your liver doctor

## 2018-10-31 NOTE — DISCHARGE NOTE ADULT - ADDITIONAL INSTRUCTIONS
Please follow up with your hepatologist, infectious disease doctor, and urologist upon discharge within 1-2 weeks.

## 2018-10-31 NOTE — DISCHARGE NOTE ADULT - CARE PROVIDER_API CALL
Satya Boss), Infectious Disease; Internal Medicine  [address] [place] [address]  Phone: [phone]  Fax: [phone]    Andre Thakkar), Gastroenterology; Internal Medicine  [address] [place] [address]  Phone: [phone]  Fax: [phone]    Rj Woods), Medicine  Gen [facility] Medicine  [address]  [address]  [place] [address]  Phone: [phone]  Fax: [phone]    Travis Driver), Urology  [address] [facility]  [address]  [place] [address]  Phone: [phone]  Fax: [phone]

## 2018-10-31 NOTE — PROGRESS NOTE ADULT - PROBLEM SELECTOR PLAN 5
UA with +moderate blood and 51 RBCS, mild R hydro on Ct, no masses seen, patient denies hematuria, weight loss  -unclear if microscopic hematuria due to severe thrombocytopenia vs other  - f/u urine cytology  - likely need outpatient urology workup UA with +moderate blood and 51 RBCS, mild R hydro on Ct, no masses seen, patient denies hematuria, weight loss  -unclear if microscopic hematuria due to severe thrombocytopenia vs other  - f/u urine cytology  - outpatient urology workup

## 2018-10-31 NOTE — DISCHARGE NOTE ADULT - PLAN OF CARE
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 People with cirrhosis can bleed from their GI tract. These bleeds can be dangerous, and the risks include death. During your hospitalization, we did not find an active bleed. However, you do have dilated blood vessels in your esophagus and your stomach is inflammed from high blood pressure in your GI tract. Both of these complications are due to alcohol. It is in your best interest to maintain abstinence from alcohol. Please continue taking all your medications as prescribed, including nadolol. Management You were found to have hydroureteronephrosis on a CT. This means your kidney is swollen. You should follow up with your urologist about this 1-2 weeks. You were found to have a small amount of blood in your urine. You should follow up with your urologist for further workup. You had recurrent bacteremia. You were not bacteremic this hospitalization. Please continue taking your antibiotic and follow up with ID in the next month or so. 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 People with cirrhosis can bleed from their GI tract. These bleeds can be dangerous, and the risks include death. During your hospitalization, we did not find an active bleed. However, you do have dilated blood vessels in your esophagus and your stomach is inflammed from high blood pressure in your GI tract. Both of these complications are due to alcohol. It is in your best interest to maintain abstinence from alcohol. Please continue taking all your medications as prescribed, including nadolol. Please follow up with your hepatologist regarding the results of the capsule endoscopy in 1-2 weeks. You were found to have hydroureteronephrosis on a CT. This means your kidney is swollen. You should follow up with your urologist about this 1-2 weeks. Will need repeat renal ultrasound within 1 month continue insulin as prescribed  followup with your PCP followup with your liver doctor

## 2018-10-31 NOTE — PROGRESS NOTE ADULT - SUBJECTIVE AND OBJECTIVE BOX
Patient is a 42y old  Male who presents with a chief complaint of GI bleed (31 Oct 2018 08:07)      SUBJECTIVE / OVERNIGHT EVENTS: EGD yesterday showed unchanged small esophageal varices (<5mm) and friable mild-mod portal hypertensive gastropathy. Capsule endoscopy dropped this AM. Pt has no complaints. BM with moviprep had no blood. Denies abd pain, N/V/D, F/C, CP.      REVIEW OF SYSTEMS:  Constitutional: No fever, weight loss or fatigue  Cardiovascular: No chest pain, palpitations, dizziness or leg swelling  Gastrointestinal: No abdominal or epigastric pain. No nausea, vomiting or hematemesis; No diarrhea or constipation. No melena or hematochezia.  Skin: No itching, burning, rashes or lesions       MEDICATIONS:  MEDICATIONS  (STANDING):  dextrose 5%. 1000 milliLiter(s) (50 mL/Hr) IV Continuous <Continuous>  dextrose 50% Injectable 12.5 Gram(s) IV Push once  dextrose 50% Injectable 25 Gram(s) IV Push once  dextrose 50% Injectable 25 Gram(s) IV Push once  insulin glargine Injectable (LANTUS) 10 Unit(s) SubCutaneous at bedtime  insulin lispro (HumaLOG) corrective regimen sliding scale   SubCutaneous three times a day before meals  insulin lispro (HumaLOG) corrective regimen sliding scale   SubCutaneous at bedtime  nadolol 20 milliGRAM(s) Oral daily  pantoprazole  Injectable 40 milliGRAM(s) IV Push two times a day  thiamine 100 milliGRAM(s) Oral daily    MEDICATIONS  (PRN):  dextrose 40% Gel 15 Gram(s) Oral once PRN Blood Glucose LESS THAN 70 milliGRAM(s)/deciliter  glucagon  Injectable 1 milliGRAM(s) IntraMuscular once PRN Glucose LESS THAN 70 milligrams/deciliter  LORazepam     Tablet 2 milliGRAM(s) Oral every 2 hours PRN Symptom-triggered 2 point increase in CIWA-Ar  LORazepam     Tablet 2 milliGRAM(s) Oral every 1 hour PRN Symptom-triggered: each CIWA -Ar score 8 or GREATER      Vital Signs Last 24 Hrs  T(C): 37.2 (31 Oct 2018 02:30), Max: 37.2 (31 Oct 2018 02:30)  T(F): 98.9 (31 Oct 2018 02:30), Max: 98.9 (31 Oct 2018 02:30)  HR: 59 (31 Oct 2018 02:30) (54 - 68)  BP: 111/61 (31 Oct 2018 02:30) (103/56 - 138/81)  BP(mean): --  RR: 18 (31 Oct 2018 02:30) (18 - 18)  SpO2: 93% (31 Oct 2018 02:30) (90% - 95%)    10-30 @ 07:01  -  10-31 @ 07:00  --------------------------------------------------------  IN: 300 mL / OUT: 2550 mL / NET: -2250 mL      I&O's Summary    30 Oct 2018 07:01  -  31 Oct 2018 07:00  --------------------------------------------------------  IN: 300 mL / OUT: 2550 mL / NET: -2250 mL        PHYSICAL EXAM:  General: Well developed; well nourished; in no acute distress  HEENT: MMM, conjunctiva and sclera clear  Gastrointestinal: Soft non-tender non-distended; Normal bowel sounds; No hepatosplenomegaly. No rebound or guarding  Skin: Warm and dry. No obvious rash    LABS:                        10.5   3.1   )-----------( 63       ( 31 Oct 2018 07:08 )             33.2     31 Oct 2018 07:06    138    |  106    |  6      ----------------------------<  65     3.6     |  21     |  0.66     Ca    8.5        31 Oct 2018 07:06  Phos  3.5       31 Oct 2018 07:06  Mg     1.7       31 Oct 2018 07:06    TPro  6.4    /  Alb  3.5    /  TBili  2.7    /  DBili  x      /  AST  43     /  ALT  17     /  AlkPhos  95     31 Oct 2018 07:06    PT/INR - ( 31 Oct 2018 07:08 )   PT: 16.4 sec;   INR: 1.42 ratio         PTT - ( 31 Oct 2018 07:08 )  PTT:35.0 sec    CBC Full  -  ( 31 Oct 2018 07:08 )  WBC Count : 3.1 K/uL  Hemoglobin : 10.5 g/dL  Hematocrit : 33.2 %  Platelet Count - Automated : 63 K/uL  Mean Cell Volume : 75.0 fl  Mean Cell Hemoglobin : 23.8 pg  Mean Cell Hemoglobin Concentration : 31.8 gm/dL  Auto Neutrophil # : 1.8 K/uL  Auto Lymphocyte # : 0.8 K/uL  Auto Monocyte # : 0.3 K/uL  Auto Eosinophil # : 0.1 K/uL  Auto Basophil # : 0.0 K/uL  Auto Neutrophil % : 59.2 %  Auto Lymphocyte % : 26.2 %  Auto Monocyte % : 9.2 %  Auto Eosinophil % : 3.8 %  Auto Basophil % : 1.6 %    10-31    138  |  106  |  6<L>  ----------------------------<  65<L>  3.6   |  21<L>  |  0.66    Ca    8.5      31 Oct 2018 07:06  Phos  3.5     10-31  Mg     1.7     10-31    TPro  6.4  /  Alb  3.5  /  TBili  2.7<H>  /  DBili  x   /  AST  43<H>  /  ALT  17  /  AlkPhos  95  10-31    PT/INR - ( 31 Oct 2018 07:08 )   PT: 16.4 sec;   INR: 1.42 ratio         PTT - ( 31 Oct 2018 07:08 )  PTT:35.0 sec    Urinalysis Basic - ( 30 Oct 2018 09:15 )    Color: Yellow / Appearance: Clear / S.015 / pH: x  Gluc: x / Ketone: Trace  / Bili: Negative / Urobili: 2 mg/dL   Blood: x / Protein: Trace / Nitrite: Negative   Leuk Esterase: Negative / RBC: 51 /hpf / WBC 0 /hpf   Sq Epi: x / Non Sq Epi: 0 /hpf / Bacteria: Negative          RADIOLOGY & ADDITIONAL STUDIES:  < from: Upper Endoscopy (10.30.18 @ 16:25) >  Findings:       Small (< 5 mm) varices were found in the lower third of the esophagus.       A post variceal banding scar was found in the lower third of the esophagus.       Mild to moderate portal hypertensive gastropathy with friable mucosa was found in the gastric        body       The exam of the stomach was otherwise normal.       The examined duodenum was normal.                                                                                                        Impression:          - Small (< 5 mm) esophageal varices.                       - Scar in the lower third of the esophagus.                       - Portal hypertensive gastropathy.                       - Normal examined duodenum.                       - Nospecimens collected.    < end of copied text >        Amanda Johnson, PGY2  Internal Medicine  Pager [phone] / [phone] Patient is a 42y old  Male who presents with a chief complaint of GI bleed (31 Oct 2018 08:07)      SUBJECTIVE / OVERNIGHT EVENTS: EGD yesterday showed unchanged small esophageal varices (<5mm) and friable mild-mod portal hypertensive gastropathy. Capsule endoscopy dropped this AM. Pt has no complaints. BM with moviprep had no blood. Denies abd pain, N/V/D, F/C, CP.      REVIEW OF SYSTEMS:  Constitutional: No fever, weight loss or fatigue  Cardiovascular: No chest pain, palpitations, dizziness or leg swelling  Gastrointestinal: No abdominal or epigastric pain. No nausea, vomiting or hematemesis; No diarrhea or constipation. No melena or hematochezia.  Skin: No itching, burning, rashes or lesions       MEDICATIONS:  MEDICATIONS  (STANDING):  dextrose 5%. 1000 milliLiter(s) (50 mL/Hr) IV Continuous <Continuous>  dextrose 50% Injectable 12.5 Gram(s) IV Push once  dextrose 50% Injectable 25 Gram(s) IV Push once  dextrose 50% Injectable 25 Gram(s) IV Push once  insulin glargine Injectable (LANTUS) 10 Unit(s) SubCutaneous at bedtime  insulin lispro (HumaLOG) corrective regimen sliding scale   SubCutaneous three times a day before meals  insulin lispro (HumaLOG) corrective regimen sliding scale   SubCutaneous at bedtime  nadolol 20 milliGRAM(s) Oral daily  pantoprazole  Injectable 40 milliGRAM(s) IV Push two times a day  thiamine 100 milliGRAM(s) Oral daily    MEDICATIONS  (PRN):  dextrose 40% Gel 15 Gram(s) Oral once PRN Blood Glucose LESS THAN 70 milliGRAM(s)/deciliter  glucagon  Injectable 1 milliGRAM(s) IntraMuscular once PRN Glucose LESS THAN 70 milligrams/deciliter  LORazepam     Tablet 2 milliGRAM(s) Oral every 2 hours PRN Symptom-triggered 2 point increase in CIWA-Ar  LORazepam     Tablet 2 milliGRAM(s) Oral every 1 hour PRN Symptom-triggered: each CIWA -Ar score 8 or GREATER      Vital Signs Last 24 Hrs  T(C): 37.2 (31 Oct 2018 02:30), Max: 37.2 (31 Oct 2018 02:30)  T(F): 98.9 (31 Oct 2018 02:30), Max: 98.9 (31 Oct 2018 02:30)  HR: 59 (31 Oct 2018 02:30) (54 - 68)  BP: 111/61 (31 Oct 2018 02:30) (103/56 - 138/81)  BP(mean): --  RR: 18 (31 Oct 2018 02:30) (18 - 18)  SpO2: 93% (31 Oct 2018 02:30) (90% - 95%)    10-30 @ 07:01  -  10-31 @ 07:00  --------------------------------------------------------  IN: 300 mL / OUT: 2550 mL / NET: -2250 mL      I&O's Summary    30 Oct 2018 07:01  -  31 Oct 2018 07:00  --------------------------------------------------------  IN: 300 mL / OUT: 2550 mL / NET: -2250 mL        PHYSICAL EXAM:  General: Well developed; well nourished; in no acute distress  HEENT: MMM, conjunctiva and sclera clear  Gastrointestinal: Soft non-tender non-distended; Normal bowel sounds; No hepatosplenomegaly. No rebound or guarding  Skin: Warm and dry. No obvious rash    LABS:                        10.5   3.1   )-----------( 63       ( 31 Oct 2018 07:08 )             33.2     31 Oct 2018 07:06    138    |  106    |  6      ----------------------------<  65     3.6     |  21     |  0.66     Ca    8.5        31 Oct 2018 07:06  Phos  3.5       31 Oct 2018 07:06  Mg     1.7       31 Oct 2018 07:06    TPro  6.4    /  Alb  3.5    /  TBili  2.7    /  DBili  x      /  AST  43     /  ALT  17     /  AlkPhos  95     31 Oct 2018 07:06    PT/INR - ( 31 Oct 2018 07:08 )   PT: 16.4 sec;   INR: 1.42 ratio         PTT - ( 31 Oct 2018 07:08 )  PTT:35.0 sec    CBC Full  -  ( 31 Oct 2018 07:08 )  WBC Count : 3.1 K/uL  Hemoglobin : 10.5 g/dL  Hematocrit : 33.2 %  Platelet Count - Automated : 63 K/uL  Mean Cell Volume : 75.0 fl  Mean Cell Hemoglobin : 23.8 pg  Mean Cell Hemoglobin Concentration : 31.8 gm/dL  Auto Neutrophil # : 1.8 K/uL  Auto Lymphocyte # : 0.8 K/uL  Auto Monocyte # : 0.3 K/uL  Auto Eosinophil # : 0.1 K/uL  Auto Basophil # : 0.0 K/uL  Auto Neutrophil % : 59.2 %  Auto Lymphocyte % : 26.2 %  Auto Monocyte % : 9.2 %  Auto Eosinophil % : 3.8 %  Auto Basophil % : 1.6 %    10-31    138  |  106  |  6<L>  ----------------------------<  65<L>  3.6   |  21<L>  |  0.66    Ca    8.5      31 Oct 2018 07:06  Phos  3.5     10-31  Mg     1.7     10-31    TPro  6.4  /  Alb  3.5  /  TBili  2.7<H>  /  DBili  x   /  AST  43<H>  /  ALT  17  /  AlkPhos  95  10-31    PT/INR - ( 31 Oct 2018 07:08 )   PT: 16.4 sec;   INR: 1.42 ratio         PTT - ( 31 Oct 2018 07:08 )  PTT:35.0 sec    Urinalysis Basic - ( 30 Oct 2018 09:15 )    Color: Yellow / Appearance: Clear / S.015 / pH: x  Gluc: x / Ketone: Trace  / Bili: Negative / Urobili: 2 mg/dL   Blood: x / Protein: Trace / Nitrite: Negative   Leuk Esterase: Negative / RBC: 51 /hpf / WBC 0 /hpf   Sq Epi: x / Non Sq Epi: 0 /hpf / Bacteria: Negative        RADIOLOGY & ADDITIONAL STUDIES:  < from: Upper Endoscopy (10.30.18 @ 16:25) >  Findings:       Small (< 5 mm) varices were found in the lower third of the esophagus.       A post variceal banding scar was found in the lower third of the esophagus.       Mild to moderate portal hypertensive gastropathy with friable mucosa was found in the gastric        body       The exam of the stomach was otherwise normal.       The examined duodenum was normal.                                                                                                        Impression:          - Small (< 5 mm) esophageal varices.                       - Scar in the lower third of the esophagus.                       - Portal hypertensive gastropathy.                       - Normal examined duodenum.                       - Nospecimens collected.  < end of copied text >        Amanda Johnson, PGY2  Internal Medicine  Pager [phone] / [phone]

## 2018-10-31 NOTE — PROGRESS NOTE ADULT - ASSESSMENT
41 yo M hx alcoholic cirrhosis (previously on liver transplant list) c/b esophageal varices, perirectal varices, internal hemorrhoids, h/o C diff colitis, IDDM2, vitiligo, recurrent pancreatitis, h/o recurrent E coli bacteremia from unknown source presents with melena.    1) Melena: Presents hemodynamically stable and Hg 11 (at baseline), since Sunday so bleeding has likely stopped, now having brown stool. Pt reports daily etoh 1-2 beers/day. Previous EGD has been significant for small esophageal varices (<5mm) and portal hypertensive gastropathy.  -DDx: gastritis, PUD, bleeding esophageal varices, angiodysplasia, malignancy    2) Alcoholic cirrhosis (previously on liver transplant list). MELD-Na 17 on admission from MELD 10 (5/2018)  Varices: esophageal, perigastric varices, perisplenic, large perirectal varices, periumbilical varices  HCC: no lesions on CT scan this admission  PVC: CT scan neg  Ascites: none  HE: AO x 3, no asterixis    3) h/o recurrent E coli bacteremia from unknown source    Recommendations:  -Capsule endoscopy dropped this AM. Pt can drink clear liquids now, any liquids at 11:00am, any food at 1:00pm  -Please check CBC, CMP, coags daily 41 yo M hx alcoholic cirrhosis (previously on liver transplant list) c/b esophageal varices, perirectal varices, internal hemorrhoids, h/o C diff colitis, IDDM2, vitiligo, recurrent pancreatitis, h/o recurrent E coli bacteremia from unknown source presents with melena.    1) Melena: Presents hemodynamically stable and Hg 11 (at baseline), since Sunday so bleeding has likely stopped, now having brown stool. Pt reports daily etoh 1-2 beers/day. Previous EGD has been significant for small esophageal varices (<5mm) and portal hypertensive gastropathy.  -DDx: gastritis, PUD, bleeding esophageal varices, angiodysplasia, malignancy    2) Alcoholic cirrhosis (previously on liver transplant list). MELD-Na 17 on admission from MELD 10 (5/2018)  Varices: esophageal, perigastric varices, perisplenic, large perirectal varices, periumbilical varices  HCC: no lesions on CT scan this admission  PVC: CT scan neg  Ascites: none  HE: AO x 3, no asterixis    3) h/o recurrent E coli bacteremia from unknown source    Recommendations:  -Capsule endoscopy dropped this AM. Starting at 11:00am can have clear liquids, starting 1:00pm can have any food.  -Please check CBC, CMP, coags daily 41 yo M hx alcoholic cirrhosis (previously on liver transplant list) c/b esophageal varices, perirectal varices, internal hemorrhoids, h/o C diff colitis, IDDM2, vitiligo, recurrent pancreatitis, h/o recurrent E coli bacteremia from unknown source presents with melena.    1) Melena: Presents hemodynamically stable and Hg 11 (at baseline), since Sunday so bleeding has likely stopped, now having brown stool. Pt reports daily etoh 1-2 beers/day. Previous EGD has been significant for small esophageal varices (<5mm) and portal hypertensive gastropathy which is unchanged from EGD this admission. Gastric mucosa friable on exam, likely source of melena. Capsule endoscopy in process to evaluate small intestine.  -DDx: gastritis, PUD, angiodysplasia, malignancy    2) Alcoholic cirrhosis (previously on liver transplant list). MELD-Na 17 on admission from MELD 10 (5/2018)  Varices: esophageal, perigastric varices, perisplenic, large perirectal varices, periumbilical varices  HCC: no lesions on CT scan this admission  PVC: CT scan neg  Ascites: none  HE: AO x 3, no asterixis    3) h/o recurrent E coli bacteremia from unknown source    Recommendations:  -Capsule endoscopy dropped this AM. Starting at 11:00am can have clear liquids, starting 1:00pm can have any food. Pt can take off belt and neck piece before sleep.  -Please check CBC, CMP, coags daily  -No hepatology contraindication to discharge tomorrow after capsule endoscopy complete

## 2018-10-31 NOTE — PROGRESS NOTE ADULT - PROBLEM SELECTOR PLAN 3
Takes lantus 80 with metformin at home (previously documented as 48 but verified 80 with pharmacy). Today had asx hypoglycemia with glucose 67. A1C 8.2  - decrease lantus to 10 d/t hypoglycemia and low ISS while NPO  - titrate as necessary after procedure  -monitor FS Takes lantus 80 with metformin at home (previously documented as 48 but verified 80 with pharmacy). Today had asx hypoglycemia with glucose 67. A1C 8.2  - decrease lantus to 10 d/t hypoglycemia and low ISS while NPO  - titrate as necessary after procedure  - monitor FS

## 2018-11-01 VITALS
DIASTOLIC BLOOD PRESSURE: 70 MMHG | SYSTOLIC BLOOD PRESSURE: 110 MMHG | OXYGEN SATURATION: 95 % | TEMPERATURE: 99 F | HEART RATE: 67 BPM

## 2018-11-01 LAB
ALBUMIN SERPL ELPH-MCNC: 3.4 G/DL — SIGNIFICANT CHANGE UP (ref 3.3–5)
ALP SERPL-CCNC: 128 U/L — HIGH (ref 40–120)
ALT FLD-CCNC: 19 U/L — SIGNIFICANT CHANGE UP (ref 10–45)
ANION GAP SERPL CALC-SCNC: 13 MMOL/L — SIGNIFICANT CHANGE UP (ref 5–17)
APTT BLD: 34.9 SEC — SIGNIFICANT CHANGE UP (ref 27.5–36.3)
AST SERPL-CCNC: 43 U/L — HIGH (ref 10–40)
BILIRUB SERPL-MCNC: 1.8 MG/DL — HIGH (ref 0.2–1.2)
BUN SERPL-MCNC: 6 MG/DL — LOW (ref 7–23)
CALCIUM SERPL-MCNC: 8.7 MG/DL — SIGNIFICANT CHANGE UP (ref 8.4–10.5)
CHLORIDE SERPL-SCNC: 101 MMOL/L — SIGNIFICANT CHANGE UP (ref 96–108)
CO2 SERPL-SCNC: 21 MMOL/L — LOW (ref 22–31)
CREAT SERPL-MCNC: 0.65 MG/DL — SIGNIFICANT CHANGE UP (ref 0.5–1.3)
GLUCOSE SERPL-MCNC: 216 MG/DL — HIGH (ref 70–99)
HCT VFR BLD CALC: 34 % — LOW (ref 39–50)
HGB BLD-MCNC: 10.9 G/DL — LOW (ref 13–17)
INR BLD: 1.41 RATIO — HIGH (ref 0.88–1.16)
MAGNESIUM SERPL-MCNC: 1.6 MG/DL — SIGNIFICANT CHANGE UP (ref 1.6–2.6)
MCHC RBC-ENTMCNC: 23.9 PG — LOW (ref 27–34)
MCHC RBC-ENTMCNC: 31.9 GM/DL — LOW (ref 32–36)
MCV RBC AUTO: 74.8 FL — LOW (ref 80–100)
PHOSPHATE SERPL-MCNC: 3.8 MG/DL — SIGNIFICANT CHANGE UP (ref 2.5–4.5)
PLATELET # BLD AUTO: 63 K/UL — LOW (ref 150–400)
POTASSIUM SERPL-MCNC: 4.1 MMOL/L — SIGNIFICANT CHANGE UP (ref 3.5–5.3)
POTASSIUM SERPL-SCNC: 4.1 MMOL/L — SIGNIFICANT CHANGE UP (ref 3.5–5.3)
PROT SERPL-MCNC: 6.6 G/DL — SIGNIFICANT CHANGE UP (ref 6–8.3)
PROTHROM AB SERPL-ACNC: 16.3 SEC — HIGH (ref 10–12.9)
RBC # BLD: 4.54 M/UL — SIGNIFICANT CHANGE UP (ref 4.2–5.8)
RBC # FLD: 18.1 % — HIGH (ref 10.3–14.5)
SODIUM SERPL-SCNC: 135 MMOL/L — SIGNIFICANT CHANGE UP (ref 135–145)
WBC # BLD: 3.8 K/UL — SIGNIFICANT CHANGE UP (ref 3.8–10.5)
WBC # FLD AUTO: 3.8 K/UL — SIGNIFICANT CHANGE UP (ref 3.8–10.5)

## 2018-11-01 PROCEDURE — P9037: CPT

## 2018-11-01 PROCEDURE — 80053 COMPREHEN METABOLIC PANEL: CPT

## 2018-11-01 PROCEDURE — 96375 TX/PRO/DX INJ NEW DRUG ADDON: CPT

## 2018-11-01 PROCEDURE — 99285 EMERGENCY DEPT VISIT HI MDM: CPT | Mod: 25

## 2018-11-01 PROCEDURE — 82962 GLUCOSE BLOOD TEST: CPT

## 2018-11-01 PROCEDURE — 86850 RBC ANTIBODY SCREEN: CPT

## 2018-11-01 PROCEDURE — 86900 BLOOD TYPING SEROLOGIC ABO: CPT

## 2018-11-01 PROCEDURE — 83690 ASSAY OF LIPASE: CPT

## 2018-11-01 PROCEDURE — 96374 THER/PROPH/DIAG INJ IV PUSH: CPT | Mod: XU

## 2018-11-01 PROCEDURE — 82330 ASSAY OF CALCIUM: CPT

## 2018-11-01 PROCEDURE — 85610 PROTHROMBIN TIME: CPT

## 2018-11-01 PROCEDURE — 87040 BLOOD CULTURE FOR BACTERIA: CPT

## 2018-11-01 PROCEDURE — 84100 ASSAY OF PHOSPHORUS: CPT

## 2018-11-01 PROCEDURE — 83036 HEMOGLOBIN GLYCOSYLATED A1C: CPT

## 2018-11-01 PROCEDURE — 36430 TRANSFUSION BLD/BLD COMPNT: CPT

## 2018-11-01 PROCEDURE — 99232 SBSQ HOSP IP/OBS MODERATE 35: CPT | Mod: GC

## 2018-11-01 PROCEDURE — 85730 THROMBOPLASTIN TIME PARTIAL: CPT

## 2018-11-01 PROCEDURE — 85014 HEMATOCRIT: CPT

## 2018-11-01 PROCEDURE — 82272 OCCULT BLD FECES 1-3 TESTS: CPT

## 2018-11-01 PROCEDURE — 83735 ASSAY OF MAGNESIUM: CPT

## 2018-11-01 PROCEDURE — 82435 ASSAY OF BLOOD CHLORIDE: CPT

## 2018-11-01 PROCEDURE — 71046 X-RAY EXAM CHEST 2 VIEWS: CPT

## 2018-11-01 PROCEDURE — 84295 ASSAY OF SERUM SODIUM: CPT

## 2018-11-01 PROCEDURE — 81001 URINALYSIS AUTO W/SCOPE: CPT

## 2018-11-01 PROCEDURE — 96361 HYDRATE IV INFUSION ADD-ON: CPT

## 2018-11-01 PROCEDURE — 82947 ASSAY GLUCOSE BLOOD QUANT: CPT

## 2018-11-01 PROCEDURE — 88112 CYTOPATH CELL ENHANCE TECH: CPT

## 2018-11-01 PROCEDURE — 83605 ASSAY OF LACTIC ACID: CPT

## 2018-11-01 PROCEDURE — 85027 COMPLETE CBC AUTOMATED: CPT

## 2018-11-01 PROCEDURE — 93005 ELECTROCARDIOGRAM TRACING: CPT

## 2018-11-01 PROCEDURE — 86901 BLOOD TYPING SEROLOGIC RH(D): CPT

## 2018-11-01 PROCEDURE — 96376 TX/PRO/DX INJ SAME DRUG ADON: CPT

## 2018-11-01 PROCEDURE — 82803 BLOOD GASES ANY COMBINATION: CPT

## 2018-11-01 PROCEDURE — 84132 ASSAY OF SERUM POTASSIUM: CPT

## 2018-11-01 PROCEDURE — 99239 HOSP IP/OBS DSCHRG MGMT >30: CPT

## 2018-11-01 PROCEDURE — 74177 CT ABD & PELVIS W/CONTRAST: CPT

## 2018-11-01 RX ORDER — PREGABALIN 225 MG/1
1 CAPSULE ORAL
Qty: 0 | Refills: 0 | DISCHARGE
Start: 2018-11-01

## 2018-11-01 RX ORDER — INSULIN DETEMIR 100/ML (3)
48 INSULIN PEN (ML) SUBCUTANEOUS
Qty: 0 | Refills: 0 | COMMUNITY

## 2018-11-01 RX ORDER — THIAMINE MONONITRATE (VIT B1) 100 MG
1 TABLET ORAL
Qty: 0 | Refills: 0 | COMMUNITY
Start: 2018-11-01

## 2018-11-01 RX ORDER — INSULIN GLARGINE 100 [IU]/ML
20 INJECTION, SOLUTION SUBCUTANEOUS
Qty: 1 | Refills: 0 | OUTPATIENT
Start: 2018-11-01 | End: 2018-11-30

## 2018-11-01 RX ORDER — INSULIN GLARGINE 100 [IU]/ML
20 INJECTION, SOLUTION SUBCUTANEOUS
Qty: 0 | Refills: 0 | COMMUNITY
Start: 2018-11-01

## 2018-11-01 RX ORDER — THIAMINE MONONITRATE (VIT B1) 100 MG
1 TABLET ORAL
Qty: 30 | Refills: 0 | OUTPATIENT
Start: 2018-11-01 | End: 2018-11-30

## 2018-11-01 RX ORDER — PREGABALIN 225 MG/1
0 CAPSULE ORAL
Qty: 0 | Refills: 0 | COMMUNITY

## 2018-11-01 RX ORDER — MAGNESIUM SULFATE 500 MG/ML
2 VIAL (ML) INJECTION ONCE
Qty: 0 | Refills: 0 | Status: COMPLETED | OUTPATIENT
Start: 2018-11-01 | End: 2018-11-01

## 2018-11-01 RX ADMIN — PANTOPRAZOLE SODIUM 40 MILLIGRAM(S): 20 TABLET, DELAYED RELEASE ORAL at 05:54

## 2018-11-01 RX ADMIN — Medication 2: at 13:05

## 2018-11-01 RX ADMIN — Medication 50 GRAM(S): at 10:21

## 2018-11-01 RX ADMIN — NADOLOL 20 MILLIGRAM(S): 80 TABLET ORAL at 05:53

## 2018-11-01 RX ADMIN — Medication 100 MILLIGRAM(S): at 12:52

## 2018-11-01 RX ADMIN — Medication 500 MILLIGRAM(S): at 12:52

## 2018-11-01 NOTE — PROGRESS NOTE ADULT - PROBLEM SELECTOR PLAN 3
Takes lantus 80 with metformin at home (previously documented as 48 but verified 80 with pharmacy). Today had asx hypoglycemia with glucose 67. A1C 8.2  - decrease lantus to 10 d/t hypoglycemia and low ISS while NPO  - titrate as necessary after procedure  - monitor FS Takes lantus 80 with metformin at home (previously documented as 48 but verified 80 with pharmacy). Today had asx hypoglycemia with glucose 67. A1C 8.2  - will discharge on lantus 20 units qhs  with outpatient pcp followup

## 2018-11-01 NOTE — PROGRESS NOTE ADULT - PROBLEM SELECTOR PLAN 9
Likely dilutional with underlying  cirrhosis started after getting 2L NS.  - trend CBC daily resolved

## 2018-11-01 NOTE — PROGRESS NOTE ADULT - ASSESSMENT
43 yo M hx IDDM2, alcoholic cirrhosis (previously on liver transplant list) c/b esophageal varices (recent EGD in 2017 confirming), vitiligo, recurrent pancreatitis, h/o recurrent e coli bacteremia and recent history of Group B strep bacteremia who presents due to two days of melena now resolved, also found to have incidental mild hydronephrosis, now s/p EGD showed nonbleeding varices, s/p capsule study, appropriate for discharge today with hepatology, ID, urology follow up.    Deanna Velazquez MD  PGY1  Pager: [phone]

## 2018-11-01 NOTE — PROGRESS NOTE ADULT - PROBLEM SELECTOR PLAN 8
hx of ecoli and GBS on previous admission, stable, afebrile, no s/s active infection. Per outpatient chart review, patient should be on this for 6 months  - bcx NGTD from 10/29  -on ppx keflex 500mg daily as outpatient, will restart.  -outpatient ID followup hx of ecoli and GBS on previous admission, stable, afebrile, no s/s active infection. Per outpatient chart review, patient should be on this for 6 months  - bcx NGTD from 10/29  -c/w ppx keflex 500mg daily as outpatient  -outpatient ID followup

## 2018-11-01 NOTE — PROGRESS NOTE ADULT - ATTENDING COMMENTS
Patient with no further GI bleeding.  advised on need for alcohol abstinence.  Pending capsule study report.
Patient with no further bleeding.  Will have EGD today after platelet transfusion.  Will assess GI tract for possible lesion to explain melena and possibly the past history of multiple episodes of bacteremia.  R hydronephrosis may need further evaluation as new finding.
Patient with varices an dportal hypertension.  small bowel being evaluated by capsule.  No active bleeding found.  agree with continued beta blockers  and patient may be followed as outpatient.  No specific source for prior bacteremia found.

## 2018-11-01 NOTE — PROGRESS NOTE ADULT - PROBLEM SELECTOR PLAN 2
MELD score . Not a candidate for transplant given active ETOH use. Cirrhosis currently stable on Ct with no changes, no hepatic lesions, clinically no asterixis and A+Ox3.  - appreciate hepatology recs  - continue to monitor

## 2018-11-01 NOTE — PROGRESS NOTE ADULT - PROBLEM SELECTOR PLAN 4
CT incidentally showed Distended urinary bladder with mild right hydroureteronephrosis, no mass/stones seen, creatinine wnl, denies LUTS  -no acute indication for inpatient urology consult, outpatient renal US in 4 week to reassess mild R hydro

## 2018-11-01 NOTE — PROGRESS NOTE ADULT - PROBLEM SELECTOR PROBLEM 3
Diabetes mellitus type 2, insulin dependent

## 2018-11-01 NOTE — PROGRESS NOTE ADULT - PROBLEM SELECTOR PLAN 5
UA with +moderate blood and 51 RBCS, mild R hydro on Ct, no masses seen, patient denies hematuria, weight loss  -unclear if microscopic hematuria due to severe thrombocytopenia vs other  - f/u urine cytology  - outpatient urology workup UA with +moderate blood and 51 RBCS, mild R hydro on Ct, no masses seen, patient denies hematuria, weight loss  -unclear if microscopic hematuria due to severe thrombocytopenia vs other ?malignancy  - urine cytology negative x1   - outpatient urology workup

## 2018-11-01 NOTE — PROGRESS NOTE ADULT - SUBJECTIVE AND OBJECTIVE BOX
LILLIAN LIM  42y  Male      Patient is a 42y old  Male who presents with a chief complaint of GI bleed (31 Oct 2018 15:00)      INTERVAL HPI/OVERNIGHT EVENTS: Overnight, no acute events.     MEDICATIONS  (STANDING):  cephalexin 500 milliGRAM(s) Oral daily  dextrose 5%. 1000 milliLiter(s) (50 mL/Hr) IV Continuous <Continuous>  dextrose 50% Injectable 12.5 Gram(s) IV Push once  dextrose 50% Injectable 25 Gram(s) IV Push once  dextrose 50% Injectable 25 Gram(s) IV Push once  insulin glargine Injectable (LANTUS) 10 Unit(s) SubCutaneous at bedtime  insulin lispro (HumaLOG) corrective regimen sliding scale   SubCutaneous three times a day before meals  insulin lispro (HumaLOG) corrective regimen sliding scale   SubCutaneous at bedtime  nadolol 20 milliGRAM(s) Oral daily  pantoprazole  Injectable 40 milliGRAM(s) IV Push two times a day  thiamine 100 milliGRAM(s) Oral daily    MEDICATIONS  (PRN):  dextrose 40% Gel 15 Gram(s) Oral once PRN Blood Glucose LESS THAN 70 milliGRAM(s)/deciliter  glucagon  Injectable 1 milliGRAM(s) IntraMuscular once PRN Glucose LESS THAN 70 milligrams/deciliter    T(C): 36.9 (11-01-18 @ 04:58), Max: 36.9 (11-01-18 @ 04:58)  HR: 68 (11-01-18 @ 04:58) (59 - 69)  BP: 118/66 (11-01-18 @ 04:58) (114/69 - 127/73)  RR: 18 (11-01-18 @ 04:58) (18 - 18)  SpO2: 90% (11-01-18 @ 04:58) (90% - 94%)  Wt(kg): --    PHYSICAL EXAM:  CONSTITUTIONAL: NAD  HEAD: NC/AT. MMM  CARDIAC: Normal rate, regular rhythm.  Heart sounds S1, S2.  RESPIRATORY: Breath sounds clear and equal bilaterally.  GASTROINTESTINAL: Soft, NT, ND  MUSCULOSKELETAL: range of motion is not limited, no muscle or joint tenderness  NEUROLOGICAL: no asterixis, no coarse tremor or tremor on fingertip to fingertip. no tongue fasciculations A+Ox3  SKIN: Hypopigmented patches in symmetric distribution most notable around the mouth, hands, feet and bilateral LE    Consultant(s) Notes Reviewed:  [x ] YES  [ ] NO  Care Discussed with Consultants/Other Providers [ x] YES  [ ] NO    LABS:                        10.9   3.8   )-----------( 63       ( 01 Nov 2018 07:21 )             34.0     Cytopathology - Non Gyn Report (10.30.18 @ 16:30)    Cytopathology - Non Gyn Report:   ACCESSION No:  [id number]    CARINA, LILLIAN                        1        Cytopathology Report            Specimen(s) Submitted  URINE, VOIDED      Clinical History  42 year old male with cirrhosis and hematuria      Gross Description  Received: 80  ml of orangish fluid in CytoLyt  Prepared: 1 ThinPrep slide_      Final Diagnosis  URINE  NEGATIVE FOR HIGH GRADE UROTHELIAL CARCINOMA    Specimen consists of  few groups of urothelial cells. The  differential diagnosis includes mechanical disruption  (instrumentation, lithiasis, vigorous activity, deep palpation)  and papillary urothelial lesion (low-grade or benign).  Clinical-pathological-radiological correlation is essential.    Screened by: Phil CABALLERO(ASCP)  Verified by: Peewee Quintero M.D.  (Electronic Signature)  Reported on: 10/31/18 16:31 EDT, [address] [place] [address]  [id number]  Cytology technical processing performed at [address] [place] [address]  _________________________________________________________________        CAPILLARY BLOOD GLUCOSE        RADIOLOGY & ADDITIONAL TESTS:    Imaging Personally Reviewed:  [ ] YES  [ ] NO LILLIAN LIM  42y  Male      Patient is a 42y old  Male who presents with a chief complaint of GI bleed (31 Oct 2018 15:00)      INTERVAL HPI/OVERNIGHT EVENTS: Overnight, no acute events. no melena, hematochezia, had normal BM, wants to go home.     MEDICATIONS  (STANDING):  cephalexin 500 milliGRAM(s) Oral daily  dextrose 5%. 1000 milliLiter(s) (50 mL/Hr) IV Continuous <Continuous>  dextrose 50% Injectable 12.5 Gram(s) IV Push once  dextrose 50% Injectable 25 Gram(s) IV Push once  dextrose 50% Injectable 25 Gram(s) IV Push once  insulin glargine Injectable (LANTUS) 10 Unit(s) SubCutaneous at bedtime  insulin lispro (HumaLOG) corrective regimen sliding scale   SubCutaneous three times a day before meals  insulin lispro (HumaLOG) corrective regimen sliding scale   SubCutaneous at bedtime  nadolol 20 milliGRAM(s) Oral daily  pantoprazole  Injectable 40 milliGRAM(s) IV Push two times a day  thiamine 100 milliGRAM(s) Oral daily    MEDICATIONS  (PRN):  dextrose 40% Gel 15 Gram(s) Oral once PRN Blood Glucose LESS THAN 70 milliGRAM(s)/deciliter  glucagon  Injectable 1 milliGRAM(s) IntraMuscular once PRN Glucose LESS THAN 70 milligrams/deciliter    T(C): 36.9 (11-01-18 @ 04:58), Max: 36.9 (11-01-18 @ 04:58)  HR: 68 (11-01-18 @ 04:58) (59 - 69)  BP: 118/66 (11-01-18 @ 04:58) (114/69 - 127/73)  RR: 18 (11-01-18 @ 04:58) (18 - 18)  SpO2: 90% (11-01-18 @ 04:58) (90% - 94%)  Wt(kg): --    PHYSICAL EXAM:  CONSTITUTIONAL: NAD  HEAD: NC/AT. MMM  CARDIAC: Normal rate, regular rhythm.  Heart sounds S1, S2.  RESPIRATORY: Breath sounds clear and equal bilaterally.  GASTROINTESTINAL: Soft, NT, ND  MUSCULOSKELETAL: range of motion is not limited, no muscle or joint tenderness  NEUROLOGICAL: no asterixis, no coarse tremor or tremor on fingertip to fingertip. no tongue fasciculations A+Ox3  SKIN: Hypopigmented patches in symmetric distribution most notable around the mouth, hands, feet and bilateral LE    Consultant(s) Notes Reviewed:  [x ] YES  [ ] NO  Care Discussed with Consultants/Other Providers [ x] YES  [ ] NO    LABS:                        10.9   3.8   )-----------( 63       ( 01 Nov 2018 07:21 )             34.0     Cytopathology - Non Gyn Report (10.30.18 @ 16:30)    Cytopathology - Non Gyn Report:   ACCESSION No:  [id number]    LILLIAN LIM                        1        Cytopathology Report            Specimen(s) Submitted  URINE, VOIDED      Clinical History  42 year old male with cirrhosis and hematuria      Gross Description  Received: 80  ml of orangish fluid in CytoLyt  Prepared: 1 ThinPrep slide_      Final Diagnosis  URINE  NEGATIVE FOR HIGH GRADE UROTHELIAL CARCINOMA    Specimen consists of  few groups of urothelial cells. The  differential diagnosis includes mechanical disruption  (instrumentation, lithiasis, vigorous activity, deep palpation)  and papillary urothelial lesion (low-grade or benign).  Clinical-pathological-radiological correlation is essential.    Screened by: Phil CABALLERO(ASCP)  Verified by: Peewee Quintero M.D.  (Electronic Signature)  Reported on: 10/31/18 16:31 EDT, [address] [place] [address]  [id number]  Cytology technical processing performed at [address] [place] [id number]  _________________________________________________________________        CAPILLARY BLOOD GLUCOSE        RADIOLOGY & ADDITIONAL TESTS:    Imaging Personally Reviewed:  [ ] YES  [ ] NO

## 2018-11-01 NOTE — PROGRESS NOTE ADULT - SUBJECTIVE AND OBJECTIVE BOX
Patient is a 42y old  Male who presents with a chief complaint of GI bleed (01 Nov 2018 07:55)      SUBJECTIVE / OVERNIGHT EVENTS: Finished capsule endoscopy yesterday. Pt had a formed light brown BM today. Denies abd pain, F/C, N/V/D, abd distension.      REVIEW OF SYSTEMS:  Constitutional: No fever, weight loss or fatigue  Cardiovascular: No chest pain, palpitations, dizziness or leg swelling  Gastrointestinal: No abdominal or epigastric pain. No nausea, vomiting or hematemesis; No diarrhea or constipation. No melena or hematochezia.  Skin: No itching, burning, rashes or lesions       MEDICATIONS:  MEDICATIONS  (STANDING):  cephalexin 500 milliGRAM(s) Oral daily  dextrose 5%. 1000 milliLiter(s) (50 mL/Hr) IV Continuous <Continuous>  dextrose 50% Injectable 12.5 Gram(s) IV Push once  dextrose 50% Injectable 25 Gram(s) IV Push once  dextrose 50% Injectable 25 Gram(s) IV Push once  insulin glargine Injectable (LANTUS) 10 Unit(s) SubCutaneous at bedtime  insulin lispro (HumaLOG) corrective regimen sliding scale   SubCutaneous three times a day before meals  insulin lispro (HumaLOG) corrective regimen sliding scale   SubCutaneous at bedtime  magnesium sulfate  IVPB 2 Gram(s) IV Intermittent once  nadolol 20 milliGRAM(s) Oral daily  pantoprazole  Injectable 40 milliGRAM(s) IV Push two times a day  thiamine 100 milliGRAM(s) Oral daily    MEDICATIONS  (PRN):  dextrose 40% Gel 15 Gram(s) Oral once PRN Blood Glucose LESS THAN 70 milliGRAM(s)/deciliter  glucagon  Injectable 1 milliGRAM(s) IntraMuscular once PRN Glucose LESS THAN 70 milligrams/deciliter      Vital Signs Last 24 Hrs  T(C): 36.9 (01 Nov 2018 04:58), Max: 36.9 (01 Nov 2018 04:58)  T(F): 98.4 (01 Nov 2018 04:58), Max: 98.4 (01 Nov 2018 04:58)  HR: 68 (01 Nov 2018 04:58) (59 - 69)  BP: 118/66 (01 Nov 2018 04:58) (114/69 - 127/73)  BP(mean): --  RR: 18 (01 Nov 2018 04:58) (18 - 18)  SpO2: 90% (01 Nov 2018 04:58) (90% - 94%)    10-31 @ 07:01  -  11-01 @ 07:00  --------------------------------------------------------  IN: 480 mL / OUT: 0 mL / NET: 480 mL      I&O's Summary    31 Oct 2018 07:01  -  01 Nov 2018 07:00  --------------------------------------------------------  IN: 480 mL / OUT: 0 mL / NET: 480 mL      PHYSICAL EXAM:  General: Well developed; well nourished; in no acute distress  HEENT: MMM, conjunctiva and sclera clear  Gastrointestinal: Soft non-tender non-distended; Normal bowel sounds; No hepatosplenomegaly. No rebound or guarding  Skin: Warm and dry. No obvious rash, +vitiligo    LABS:                        10.9   3.8   )-----------( 63       ( 01 Nov 2018 07:21 )             34.0     01 Nov 2018 07:20    135    |  101    |  6      ----------------------------<  216    4.1     |  21     |  0.65     Ca    8.7        01 Nov 2018 07:20  Phos  3.8       01 Nov 2018 07:20  Mg     1.6       01 Nov 2018 07:20    TPro  6.6    /  Alb  3.4    /  TBili  1.8    /  DBili  x      /  AST  43     /  ALT  19     /  AlkPhos  128    01 Nov 2018 07:20    PT/INR - ( 01 Nov 2018 07:21 )   PT: 16.3 sec;   INR: 1.41 ratio         PTT - ( 01 Nov 2018 07:21 )  PTT:34.9 sec    CBC Full  -  ( 01 Nov 2018 07:21 )  WBC Count : 3.8 K/uL  Hemoglobin : 10.9 g/dL  Hematocrit : 34.0 %  Platelet Count - Automated : 63 K/uL  Mean Cell Volume : 74.8 fl  Mean Cell Hemoglobin : 23.9 pg  Mean Cell Hemoglobin Concentration : 31.9 gm/dL  Auto Neutrophil # : x  Auto Lymphocyte # : x  Auto Monocyte # : x  Auto Eosinophil # : x  Auto Basophil # : x  Auto Neutrophil % : x  Auto Lymphocyte % : x  Auto Monocyte % : x  Auto Eosinophil % : x  Auto Basophil % : x    11-01    135  |  101  |  6<L>  ----------------------------<  216<H>  4.1   |  21<L>  |  0.65    Ca    8.7      01 Nov 2018 07:20  Phos  3.8     11-01  Mg     1.6     11-01    TPro  6.6  /  Alb  3.4  /  TBili  1.8<H>  /  DBili  x   /  AST  43<H>  /  ALT  19  /  AlkPhos  128<H>  11-01    PT/INR - ( 01 Nov 2018 07:21 )   PT: 16.3 sec;   INR: 1.41 ratio         PTT - ( 01 Nov 2018 07:21 )  PTT:34.9 sec          RADIOLOGY & ADDITIONAL STUDIES:      Amanda Johnson, PGY2  Internal Medicine  Pager [phone] / [phone]

## 2018-11-01 NOTE — PROGRESS NOTE ADULT - PROBLEM SELECTOR PLAN 1
Resolved. Melenotic bleed most likely upper GI in origin, possibly 2/2 to known esophageal varices vs portal hypertensive gastropathy in the setting of alcoholic cirrhosis. S/p EGD showed nonbleeding varices and portal hypertensive gastropathy without active bleeding  - Hg currently stable, at baseline, melena has now resolved, having brown BMS  - s/p capsule today, will follow up with hepatology as an outpatient.  - maintain active T+S  - monitor CBC q24hrs  - c/w pantoprazole BID Resolved. Melanotic bleed most likely upper GI in origin, possibly 2/2 to known esophageal varices vs portal hypertensive gastropathy in the setting of alcoholic cirrhosis. S/p EGD showed nonbleeding varices and portal hypertensive gastropathy without active bleeding  - Hg currently stable, at baseline, having brown BMS  - s/p capsule today, will follow up results with hepatology as an outpatient.  - monitor CBC q24hrs  - c/w pantoprazole BID

## 2018-11-01 NOTE — PROGRESS NOTE ADULT - PROBLEM SELECTOR PLAN 7
- chronic in setting of cirrhosis  - transfuse plts >50 for procedure, now 63 s/p 2u plts - chronic in setting of cirrhosis, no bleeding

## 2018-11-01 NOTE — PROGRESS NOTE ADULT - PROBLEM SELECTOR PLAN 6
Symptom triggered CIWAs trending 0 now. Last drink Sunday. Will discontinue CIWA protocol.  - no major s/s of withdrawl.   - offered patient naltrexone, he would like to hold off for now  - will continue to provide counselling on ETOH cessation - no major s/s of withdrawl.   - offered patient naltrexone, he would like to hold off for now

## 2018-11-01 NOTE — PROGRESS NOTE ADULT - PROBLEM SELECTOR PLAN 10
DVT ppx: venodynes  Dispo: pending capsule study, d/c home karen likely DVT ppx: venodynes  Dispo: discharge home today with outpatient hepatology, ID,  followup  spent 45 minutes on discharge process time

## 2018-11-01 NOTE — PROGRESS NOTE ADULT - ASSESSMENT
43 yo M hx alcoholic cirrhosis (previously on liver transplant list) c/b esophageal varices, perirectal varices, internal hemorrhoids, h/o C diff colitis, IDDM2, vitiligo, recurrent pancreatitis, h/o recurrent E coli bacteremia from unknown source presents with melena.    1) Melena: Presents hemodynamically stable and Hg 11 (at baseline), since Sunday so bleeding has likely stopped, now having brown stool. Pt reports daily etoh 1-2 beers/day. Previous EGD has been significant for small esophageal varices (<5mm) and portal hypertensive gastropathy which is unchanged from EGD this admission. Gastric mucosa friable on exam, likely source of melena. Capsule endoscopy in process to evaluate small intestine.  -DDx: friable portal hypertensive gastropathy. Less likely angiodysplasia, malignancy    2) Alcoholic cirrhosis (previously on liver transplant list). MELD-Na 17 on admission from MELD 10 (5/2018)  Varices: esophageal, perigastric varices, perisplenic, large perirectal varices, periumbilical varices  HCC: no lesions on CT scan this admission  PVC: CT scan neg  Ascites: none  HE: AO x 3, no asterixis    3) h/o recurrent E coli bacteremia from unknown source    Recommendations:  -Capsule endoscopy to be read today  -Please check CBC, CMP, coags daily  -No hepatology contraindication to discharge

## 2018-11-05 NOTE — DISCUSSION/SUMMARY
[Home] : patient was discharged to home [FreeTextEntry1] : Spoke to patient briefly about recent hospitalization, states he feels well. Declined to provide further information at this time since he has CPE scheduled with PCP 11/6/18 tomorrow. He understands importance of follow up and bringing in any medication changes that may have occurred.

## 2018-11-06 ENCOUNTER — LABORATORY RESULT (OUTPATIENT)
Age: 43
End: 2018-11-06

## 2018-11-06 ENCOUNTER — APPOINTMENT (OUTPATIENT)
Dept: INTERNAL MEDICINE | Facility: CLINIC | Age: 43
End: 2018-11-06
Payer: COMMERCIAL

## 2018-11-06 VITALS
OXYGEN SATURATION: 94 % | SYSTOLIC BLOOD PRESSURE: 122 MMHG | TEMPERATURE: 98.2 F | WEIGHT: 193 LBS | HEIGHT: 68 IN | HEART RATE: 77 BPM | BODY MASS INDEX: 29.25 KG/M2 | DIASTOLIC BLOOD PRESSURE: 72 MMHG

## 2018-11-06 DIAGNOSIS — Z00.00 ENCOUNTER FOR GENERAL ADULT MEDICAL EXAMINATION W/OUT ABNORMAL FINDINGS: ICD-10-CM

## 2018-11-06 DIAGNOSIS — N13.30 UNSPECIFIED HYDRONEPHROSIS: ICD-10-CM

## 2018-11-06 PROCEDURE — 99396 PREV VISIT EST AGE 40-64: CPT

## 2018-11-06 NOTE — REASON FOR VISIT
[CPE] : a comprehensive physical exam [Pre-Visit Preparation] : pre-visit preparation was done [Intercurrent Specialty/Sub-specialty Visits] : the patient has intercurrent specialty/sub-specialty visits [FreeTextEntry3] : Hepatology

## 2018-11-06 NOTE — PHYSICAL EXAM
[General Appearance - Alert] : alert [General Appearance - In No Acute Distress] : in no acute distress [Sclera] : the sclera and conjunctiva were normal [PERRL With Normal Accommodation] : pupils were equal in size, round, and reactive to light [Extraocular Movements] : extraocular movements were intact [Both Tympanic Membranes Were Examined] : both tympanic membranes were normal [Neck Appearance] : the appearance of the neck was normal [Neck Cervical Mass (___cm)] : no neck mass was observed [Jugular Venous Distention Increased] : there was no jugular-venous distention [Thyroid Diffuse Enlargement] : the thyroid was not enlarged [Thyroid Nodule] : there were no palpable thyroid nodules [Auscultation Breath Sounds / Voice Sounds] : lungs were clear to auscultation bilaterally [Heart Rate And Rhythm] : heart rate was normal and rhythm regular [Heart Sounds] : normal S1 and S2 [Heart Sounds Gallop] : no gallops [Murmurs] : no murmurs [Heart Sounds Pericardial Friction Rub] : no pericardial rub [Edema] : there was no peripheral edema [Bowel Sounds] : normal bowel sounds [Abdomen Soft] : soft [Abdomen Tenderness] : non-tender [] : no hepato-splenomegaly [Abdomen Mass (___ Cm)] : no abdominal mass palpated [Cervical Lymph Nodes Enlarged Posterior Bilaterally] : posterior cervical [Cervical Lymph Nodes Enlarged Anterior Bilaterally] : anterior cervical [Supraclavicular Lymph Nodes Enlarged Bilaterally] : supraclavicular [Axillary Lymph Nodes Enlarged Bilaterally] : axillary [Femoral Lymph Nodes Enlarged Bilaterally] : femoral [Inguinal Lymph Nodes Enlarged Bilaterally] : inguinal [No CVA Tenderness] : no ~M costovertebral angle tenderness [No Spinal Tenderness] : no spinal tenderness [Abnormal Walk] : normal gait [Nail Clubbing] : no clubbing  or cyanosis of the fingernails [Musculoskeletal - Swelling] : no joint swelling seen [Motor Tone] : muscle strength and tone were normal [Normal Appearance] : normal in appearance [Normal in Appearance] : normal in appearance [Cranial Nerves] : cranial nerves 2-12 were intact [Deep Tendon Reflexes (DTR)] : deep tendon reflexes were 2+ and symmetric [No Focal Deficits] : no focal deficits [Oriented To Time, Place, And Person] : oriented to person, place, and time [Impaired Insight] : insight and judgment were intact [Affect] : the affect was normal [Delayed in the Right Toes] : normal in the toes [Delayed in the Left Toes] : normal in the toes

## 2018-11-06 NOTE — HISTORY OF PRESENT ILLNESS
[Health Maintenance] : health maintenance [Spouse] : spouse [Unmarried] : unmarried [Working Full Time] : working full time [Never Smoked Cigarettes] : has never smoked cigarettes [Frequent Use] : frequent alcohol use [___ Drinks/Day] : drinking [unfilled] drinks per day [Never] : has never used illicit drugs [Fair] : fair [Reg. Dental Visits] : He has regular dental visits [Healthy Diet] : He consumes a diverse and healthy diet [Weight Concerns] : He has weight concens [Regular Exercise] : He exercises regularly [Alcohol Use] : He consumes alcohol [Drug Abuse] : He uses illicit drugs [Sexually Active] : the patient is sexually active [Performed Within 5 Years] : lipid profile performed within the past five years [Performed Last Year] : thyroid function test performed last year [Seat Belt] : seat belt [Sunscreen] : sunscreen [Smoke Detector] : smoke detector [Vision Problems] : He denies vision problems [Hearing Loss] : He denies hearing loss [Tobacco Use] : He does not use tobacco [FreeTextEntry1] : 43 y/o M here for CPE\par GI: restarted drinking, feels "no control." Had episode melena after binge and went to ED for eval, which was neg except for varices which were not bleeding at the time. Continues to follow closely with Hepatology. He is interested in getting help for EtOH use. Found to have incidental hydroureteronephrosis on CT imaging in the ED but denies urinary symptoms at this time.

## 2018-11-06 NOTE — DISCUSSION/SUMMARY
[Non - Smoker] : non-smoker [FreeTextEntry1] : 43 y/o M here for CPE\par GI: to f/u Dr. Thakkar. Spoke with pt at length about continued alcohol cessation. Will connect with SB[facility]. 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  Referred to opthalmology.\par Check microalbumin, Lipid profile.\par Renal referral given for follow-up of hydroureteronephrosis\par rto 3 mos\par \par

## 2018-11-07 LAB
ALBUMIN SERPL ELPH-MCNC: 4.1 G/DL
ALP BLD-CCNC: 219 U/L
ALT SERPL-CCNC: 25 U/L
ANION GAP SERPL CALC-SCNC: 11 MMOL/L
AST SERPL-CCNC: 49 U/L
BASOPHILS # BLD AUTO: 0.04 K/UL
BASOPHILS NFR BLD AUTO: 0.8 %
BILIRUB SERPL-MCNC: 1.7 MG/DL
BUN SERPL-MCNC: 8 MG/DL
CALCIUM SERPL-MCNC: 8.7 MG/DL
CHLORIDE SERPL-SCNC: 102 MMOL/L
CHOLEST SERPL-MCNC: 160 MG/DL
CHOLEST/HDLC SERPL: 1.8 RATIO
CO2 SERPL-SCNC: 20 MMOL/L
CREAT SERPL-MCNC: 0.71 MG/DL
EOSINOPHIL # BLD AUTO: 0.13 K/UL
EOSINOPHIL NFR BLD AUTO: 2.7 %
GLUCOSE SERPL-MCNC: 168 MG/DL
HBA1C MFR BLD HPLC: 8 %
HCT VFR BLD CALC: 38.5 %
HDLC SERPL-MCNC: 90 MG/DL
HGB BLD-MCNC: 12.1 G/DL
HIV1+2 AB SPEC QL IA.RAPID: NONREACTIVE
IMM GRANULOCYTES NFR BLD AUTO: 0.2 %
LDLC SERPL CALC-MCNC: 57 MG/DL
LYMPHOCYTES # BLD AUTO: 1.23 K/UL
LYMPHOCYTES NFR BLD AUTO: 25.6 %
MAN DIFF?: NORMAL
MCHC RBC-ENTMCNC: 23.8 PG
MCHC RBC-ENTMCNC: 31.4 GM/DL
MCV RBC AUTO: 75.8 FL
MONOCYTES # BLD AUTO: 0.54 K/UL
MONOCYTES NFR BLD AUTO: 11.2 %
NEUTROPHILS # BLD AUTO: 2.86 K/UL
NEUTROPHILS NFR BLD AUTO: 59.5 %
PLATELET # BLD AUTO: 70 K/UL
POTASSIUM SERPL-SCNC: 4.2 MMOL/L
PROT SERPL-MCNC: 7.8 G/DL
RBC # BLD: 5.08 M/UL
RBC # FLD: 19.2 %
SODIUM SERPL-SCNC: 133 MMOL/L
TRIGL SERPL-MCNC: 63 MG/DL
TSH SERPL-ACNC: 2.09 UIU/ML
WBC # FLD AUTO: 4.81 K/UL

## 2018-11-14 ENCOUNTER — CHART COPY (OUTPATIENT)
Age: 43
End: 2018-11-14

## 2018-11-21 ENCOUNTER — APPOINTMENT (OUTPATIENT)
Dept: INFECTIOUS DISEASE | Facility: CLINIC | Age: 43
End: 2018-11-21

## 2018-11-28 ENCOUNTER — RX RENEWAL (OUTPATIENT)
Age: 43
End: 2018-11-28

## 2018-12-04 ENCOUNTER — MEDICATION RENEWAL (OUTPATIENT)
Age: 43
End: 2018-12-04

## 2018-12-18 ENCOUNTER — APPOINTMENT (OUTPATIENT)
Dept: INFECTIOUS DISEASE | Facility: CLINIC | Age: 43
End: 2018-12-18
Payer: COMMERCIAL

## 2018-12-18 VITALS
SYSTOLIC BLOOD PRESSURE: 125 MMHG | RESPIRATION RATE: 15 BRPM | BODY MASS INDEX: 29.4 KG/M2 | OXYGEN SATURATION: 90 % | HEIGHT: 68 IN | WEIGHT: 194 LBS | DIASTOLIC BLOOD PRESSURE: 73 MMHG | TEMPERATURE: 98.4 F | HEART RATE: 80 BPM

## 2018-12-18 PROCEDURE — 99214 OFFICE O/P EST MOD 30 MIN: CPT

## 2018-12-18 RX ORDER — SULFAMETHOXAZOLE AND TRIMETHOPRIM 400; 80 MG/1; MG/1
400-80 TABLET ORAL
Qty: 15 | Refills: 3 | Status: DISCONTINUED | COMMUNITY
Start: 2017-12-19 | End: 2018-12-18

## 2019-01-01 ENCOUNTER — RX RENEWAL (OUTPATIENT)
Age: 44
End: 2019-01-01

## 2019-01-01 ENCOUNTER — LABORATORY RESULT (OUTPATIENT)
Age: 44
End: 2019-01-01

## 2019-01-01 ENCOUNTER — APPOINTMENT (OUTPATIENT)
Dept: INTERNAL MEDICINE | Facility: CLINIC | Age: 44
End: 2019-01-01
Payer: COMMERCIAL

## 2019-01-01 ENCOUNTER — APPOINTMENT (OUTPATIENT)
Dept: INFECTIOUS DISEASE | Facility: CLINIC | Age: 44
End: 2019-01-01
Payer: COMMERCIAL

## 2019-01-01 VITALS
TEMPERATURE: 98.6 F | DIASTOLIC BLOOD PRESSURE: 62 MMHG | WEIGHT: 206 LBS | HEART RATE: 92 BPM | HEIGHT: 68 IN | BODY MASS INDEX: 31.22 KG/M2 | OXYGEN SATURATION: 91 % | SYSTOLIC BLOOD PRESSURE: 104 MMHG

## 2019-01-01 VITALS
OXYGEN SATURATION: 91 % | SYSTOLIC BLOOD PRESSURE: 128 MMHG | HEART RATE: 81 BPM | RESPIRATION RATE: 14 BRPM | TEMPERATURE: 97.9 F | WEIGHT: 204 LBS | BODY MASS INDEX: 30.92 KG/M2 | HEIGHT: 68 IN | DIASTOLIC BLOOD PRESSURE: 72 MMHG

## 2019-01-01 DIAGNOSIS — E66.9 OBESITY, UNSPECIFIED: ICD-10-CM

## 2019-01-01 DIAGNOSIS — Z23 ENCOUNTER FOR IMMUNIZATION: ICD-10-CM

## 2019-01-01 DIAGNOSIS — L80 VITILIGO: ICD-10-CM

## 2019-01-01 LAB
ALBUMIN SERPL ELPH-MCNC: 3.5 G/DL
ALP BLD-CCNC: 181 U/L
ALT SERPL-CCNC: 27 U/L
ANION GAP SERPL CALC-SCNC: 13 MMOL/L
AST SERPL-CCNC: 32 U/L
BASOPHILS # BLD AUTO: 0.05 K/UL
BASOPHILS NFR BLD AUTO: 1 %
BILIRUB SERPL-MCNC: 2.6 MG/DL
BUN SERPL-MCNC: 8 MG/DL
CALCIUM SERPL-MCNC: 8 MG/DL
CHLORIDE SERPL-SCNC: 94 MMOL/L
CHOLEST SERPL-MCNC: 111 MG/DL
CHOLEST/HDLC SERPL: 1.7 RATIO
CO2 SERPL-SCNC: 19 MMOL/L
CREAT SERPL-MCNC: 0.55 MG/DL
EOSINOPHIL # BLD AUTO: 0.12 K/UL
EOSINOPHIL NFR BLD AUTO: 2.3 %
ESTIMATED AVERAGE GLUCOSE: 180 MG/DL
GLUCOSE SERPL-MCNC: 242 MG/DL
HBA1C MFR BLD HPLC: 7.9 %
HCT VFR BLD CALC: 33.4 %
HDLC SERPL-MCNC: 67 MG/DL
HGB BLD-MCNC: 10.2 G/DL
IMM GRANULOCYTES NFR BLD AUTO: 0.2 %
LDLC SERPL CALC-MCNC: 27 MG/DL
LYMPHOCYTES # BLD AUTO: 0.61 K/UL
LYMPHOCYTES NFR BLD AUTO: 11.8 %
MAN DIFF?: NORMAL
MCHC RBC-ENTMCNC: 20 PG
MCHC RBC-ENTMCNC: 30.5 GM/DL
MCV RBC AUTO: 65.4 FL
MONOCYTES # BLD AUTO: 0.53 K/UL
MONOCYTES NFR BLD AUTO: 10.3 %
NEUTROPHILS # BLD AUTO: 3.83 K/UL
NEUTROPHILS NFR BLD AUTO: 74.4 %
PLATELET # BLD AUTO: 104 K/UL
POTASSIUM SERPL-SCNC: 4.4 MMOL/L
PROT SERPL-MCNC: 6.6 G/DL
RBC # BLD: 5.11 M/UL
RBC # FLD: 23.8 %
SODIUM SERPL-SCNC: 126 MMOL/L
TRIGL SERPL-MCNC: 86 MG/DL
TSH SERPL-ACNC: 3.61 UIU/ML
WBC # FLD AUTO: 5.15 K/UL

## 2019-01-01 PROCEDURE — 99214 OFFICE O/P EST MOD 30 MIN: CPT

## 2019-01-01 PROCEDURE — G0008: CPT

## 2019-01-01 PROCEDURE — G0447 BEHAVIOR COUNSEL OBESITY 15M: CPT

## 2019-01-01 PROCEDURE — 90674 CCIIV4 VAC NO PRSV 0.5 ML IM: CPT

## 2019-01-01 PROCEDURE — 99214 OFFICE O/P EST MOD 30 MIN: CPT | Mod: 25

## 2019-01-03 ENCOUNTER — LABORATORY RESULT (OUTPATIENT)
Age: 44
End: 2019-01-03

## 2019-01-03 ENCOUNTER — APPOINTMENT (OUTPATIENT)
Dept: HEPATOLOGY | Facility: CLINIC | Age: 44
End: 2019-01-03
Payer: COMMERCIAL

## 2019-01-03 VITALS
WEIGHT: 186 LBS | HEIGHT: 68 IN | BODY MASS INDEX: 28.19 KG/M2 | DIASTOLIC BLOOD PRESSURE: 81 MMHG | SYSTOLIC BLOOD PRESSURE: 137 MMHG | RESPIRATION RATE: 16 BRPM | HEART RATE: 74 BPM | TEMPERATURE: 97.4 F

## 2019-01-03 LAB
INR PPP: 1.24 RATIO
PT BLD: 14 SEC

## 2019-01-03 PROCEDURE — 99214 OFFICE O/P EST MOD 30 MIN: CPT

## 2019-01-03 RX ORDER — FLUTICASONE PROPIONATE 50 UG/1
50 SPRAY, METERED NASAL
Qty: 3 | Refills: 1 | Status: DISCONTINUED | COMMUNITY
Start: 2018-04-16 | End: 2019-01-03

## 2019-01-03 RX ORDER — INDOMETHACIN 50 MG/1
50 CAPSULE ORAL
Qty: 60 | Refills: 2 | Status: DISCONTINUED | COMMUNITY
Start: 2018-08-21 | End: 2019-01-03

## 2019-01-03 RX ORDER — CEPHALEXIN 500 MG/1
500 CAPSULE ORAL
Qty: 30 | Refills: 4 | Status: DISCONTINUED | COMMUNITY
Start: 2018-07-25 | End: 2019-01-03

## 2019-01-03 RX ORDER — CETIRIZINE HYDROCHLORIDE 10 MG/1
10 TABLET, FILM COATED ORAL DAILY
Qty: 90 | Refills: 1 | Status: DISCONTINUED | COMMUNITY
Start: 2018-04-16 | End: 2019-01-03

## 2019-01-03 RX ORDER — TADALAFIL 20 MG/1
20 TABLET, FILM COATED ORAL
Qty: 6 | Refills: 11 | Status: DISCONTINUED | COMMUNITY
Start: 2018-05-01 | End: 2019-01-03

## 2019-01-04 LAB
AFP-TM SERPL-MCNC: 4.7 NG/ML
ALBUMIN SERPL ELPH-MCNC: 3.5 G/DL
ALP BLD-CCNC: 184 U/L
ALT SERPL-CCNC: 25 U/L
ANION GAP SERPL CALC-SCNC: 14 MMOL/L
AST SERPL-CCNC: 41 U/L
BASOPHILS # BLD AUTO: 0.03 K/UL
BASOPHILS NFR BLD AUTO: 0.7 %
BILIRUB SERPL-MCNC: 1.6 MG/DL
BUN SERPL-MCNC: 9 MG/DL
CALCIUM SERPL-MCNC: 8.7 MG/DL
CHLORIDE SERPL-SCNC: 101 MMOL/L
CO2 SERPL-SCNC: 21 MMOL/L
CREAT SERPL-MCNC: 0.75 MG/DL
EOSINOPHIL # BLD AUTO: 0.11 K/UL
EOSINOPHIL NFR BLD AUTO: 2.4 %
HCT VFR BLD CALC: 38.1 %
HGB BLD-MCNC: 11.8 G/DL
IMM GRANULOCYTES NFR BLD AUTO: 0.4 %
LYMPHOCYTES # BLD AUTO: 1.13 K/UL
LYMPHOCYTES NFR BLD AUTO: 25 %
MAN DIFF?: NORMAL
MCHC RBC-ENTMCNC: 21.8 PG
MCHC RBC-ENTMCNC: 31 GM/DL
MCV RBC AUTO: 70.4 FL
MONOCYTES # BLD AUTO: 0.39 K/UL
MONOCYTES NFR BLD AUTO: 8.6 %
NEUTROPHILS # BLD AUTO: 2.84 K/UL
NEUTROPHILS NFR BLD AUTO: 62.9 %
PLATELET # BLD AUTO: 90 K/UL
POTASSIUM SERPL-SCNC: 4.1 MMOL/L
PROT SERPL-MCNC: 7.1 G/DL
RBC # BLD: 5.41 M/UL
RBC # FLD: 20.1 %
SODIUM SERPL-SCNC: 136 MMOL/L
WBC # FLD AUTO: 4.54 K/UL

## 2019-01-18 ENCOUNTER — FORM ENCOUNTER (OUTPATIENT)
Age: 44
End: 2019-01-18

## 2019-01-19 ENCOUNTER — OUTPATIENT (OUTPATIENT)
Dept: OUTPATIENT SERVICES | Facility: HOSPITAL | Age: 44
LOS: 1 days | End: 2019-01-19
Payer: COMMERCIAL

## 2019-01-19 ENCOUNTER — APPOINTMENT (OUTPATIENT)
Dept: ULTRASOUND IMAGING | Facility: IMAGING CENTER | Age: 44
End: 2019-01-19
Payer: COMMERCIAL

## 2019-01-19 DIAGNOSIS — K74.60 UNSPECIFIED CIRRHOSIS OF LIVER: ICD-10-CM

## 2019-01-19 PROCEDURE — 76700 US EXAM ABDOM COMPLETE: CPT | Mod: 26

## 2019-01-19 PROCEDURE — 76700 US EXAM ABDOM COMPLETE: CPT

## 2019-02-06 ENCOUNTER — APPOINTMENT (OUTPATIENT)
Dept: NEPHROLOGY | Facility: CLINIC | Age: 44
End: 2019-02-06
Payer: COMMERCIAL

## 2019-02-06 ENCOUNTER — APPOINTMENT (OUTPATIENT)
Dept: INTERNAL MEDICINE | Facility: CLINIC | Age: 44
End: 2019-02-06
Payer: COMMERCIAL

## 2019-02-06 VITALS
TEMPERATURE: 98.5 F | BODY MASS INDEX: 27.74 KG/M2 | OXYGEN SATURATION: 94 % | DIASTOLIC BLOOD PRESSURE: 60 MMHG | SYSTOLIC BLOOD PRESSURE: 110 MMHG | HEIGHT: 68 IN | WEIGHT: 183 LBS | HEART RATE: 74 BPM

## 2019-02-06 VITALS
WEIGHT: 181.88 LBS | SYSTOLIC BLOOD PRESSURE: 127 MMHG | OXYGEN SATURATION: 91 % | HEIGHT: 68 IN | DIASTOLIC BLOOD PRESSURE: 78 MMHG | BODY MASS INDEX: 27.57 KG/M2 | HEART RATE: 64 BPM

## 2019-02-06 DIAGNOSIS — Z13.31 ENCOUNTER FOR SCREENING FOR DEPRESSION: ICD-10-CM

## 2019-02-06 PROCEDURE — G0444 DEPRESSION SCREEN ANNUAL: CPT

## 2019-02-06 PROCEDURE — 99243 OFF/OP CNSLTJ NEW/EST LOW 30: CPT | Mod: GC

## 2019-02-06 PROCEDURE — 99214 OFFICE O/P EST MOD 30 MIN: CPT | Mod: 25

## 2019-02-06 PROCEDURE — G0442 ANNUAL ALCOHOL SCREEN 15 MIN: CPT

## 2019-02-06 PROCEDURE — 83036 HEMOGLOBIN GLYCOSYLATED A1C: CPT | Mod: 59,QW

## 2019-02-06 RX ORDER — SPIRONOLACTONE 50 MG/1
50 TABLET ORAL
Qty: 90 | Refills: 1 | Status: DISCONTINUED | COMMUNITY
Start: 2017-05-25 | End: 2019-02-06

## 2019-02-07 PROBLEM — Z13.31 DEPRESSION SCREENING: Status: ACTIVE | Noted: 2019-02-07

## 2019-02-07 NOTE — REASON FOR VISIT
[Consultation] : a consultation visit [FreeTextEntry1] : Here for management of right hydroureteronephrosis.

## 2019-02-07 NOTE — PHYSICAL EXAM

## 2019-02-07 NOTE — ASSESSMENT
[FreeTextEntry1] : Mr. Hallman is a 43 year old male with history of cirrhosis  secondary to alcohol abuse, severe uncontrolled T2DM, corneal injury s/p right corneal transplant, and incidental finding of right hydroureteonephrosis who presents for management of hydronephrosis findings\par \par Right Hydroureteronephrosis\par - Finding seen on ER visit in 10/2018 however subsequent U/S done in 01/2019 showed no evidence of hydronephrosis with right-sided kidney measuring 12.6 cm, and left-sided 11.9 cm with no evidence of hydronephrosis\par - It is possible that patient has neurogenic bladder from uncontrolled diabetes or had not cleared kidneys/bladder prior to scan that showed hydronephrosis which subsequently resolved with voiding\par - Educated on voiding and if having trouble voiding to seek medical attention\par \par Diabetes, Uncontrolled\par - Patient's A1c - 13.3, uncontrolled and will require intensive mamnagement \par - Creatinine last 0.75 in 01/2019\par - Should be referred to endocrinologist for initiation of management of diabetes with insulin and will require education\par - Urinalysis showed no protein, will ask patient to have microalbumin/creatinine ratio done\par - Will need monitoring of vision as well, educated\par \par Metabolic Acidosis\par - Patient's last Bicarbonate in 01/2019 showed CO2 of 21, mildly decreased, anion gap 14\par - Will continue to monitor, no need for bicarbonate at this time\par \par Patient seen and discussed with Dr. Jones who agrees with assessment and plan as above.

## 2019-02-07 NOTE — HISTORY OF PRESENT ILLNESS
[FreeTextEntry1] : Mr. Hallman is a 43 year old male with history of cirrhosis secondary to alcohol abuse, severe uncontrolled T2DM, corneal injury s/p right corneal transplant, and incidental finding of right hydroureteonephrosis who presents for management of hydronephrosis findings. Patient states that he has been urinating well without any trouble. Otherwise patient has no complaints during visit.

## 2019-02-07 NOTE — ASSESSMENT
[FreeTextEntry1] : \par Diabetes: counselled pt on adhering to low carb diet; encouraged compliance with medication and ordered Metformin and Levemir insulin; check A1c level today\par \par Alcoholic hepatitis: encouraged adherence to AA program; coordinated follow-up care with hepatology as well\par \par Cirrhosis: secondary to history of alcohol intake; encouraged his commitment to remain alcohol-free now; he is going to AA regularly\par \par Vitiligo: stable, no change in symptoms\par \par Depression screen performed today, PHQ2=0\par \par Annual alcohol misuse screen, 15 mins, done; negative

## 2019-02-07 NOTE — PHYSICAL EXAM
[General Appearance - Alert] : alert [General Appearance - In No Acute Distress] : in no acute distress [Sclera] : the sclera and conjunctiva were normal [PERRL With Normal Accommodation] : pupils were equal in size, round, and reactive to light [Hearing Threshold Finger Rub Not [place]] : hearing was normal [Neck Appearance] : the appearance of the neck was normal [Jugular Venous Distention Increased] : there was no jugular-venous distention [Exaggerated Use Of Accessory Muscles For Inspiration] : no accessory muscle use [Auscultation Breath Sounds / Voice Sounds] : lungs were clear to auscultation bilaterally [Apical Impulse] : the apical impulse was normal [Heart Rate And Rhythm] : heart rate was normal and rhythm regular [Heart Sounds] : normal S1 and S2 [Heart Sounds Gallop] : no gallops [Murmurs] : no murmurs [Heart Sounds Pericardial Friction Rub] : no pericardial rub [Edema] : there was no peripheral edema [Bowel Sounds] : normal bowel sounds [Abdomen Tenderness] : non-tender [Abdomen Mass (___ Cm)] : no abdominal mass palpated [Abnormal Walk] : normal gait [Musculoskeletal - Swelling] : no joint swelling seen [Oriented To Time, Place, And Person] : oriented to person, place, and time [Affect] : the affect was normal [Mood] : the mood was normal [FreeTextEntry1] : no ascitics  [] : no rash [No Focal Deficits] : no focal deficits

## 2019-02-07 NOTE — HISTORY OF PRESENT ILLNESS
[FreeTextEntry1] : Presents for follow-up of diabetes, alcoholic hepatitis, cirrhosis and vitiligo. [de-identified] : \par Diabetes: patient admits to finding it difficult to adhere to low carb diet; he has a sweet tooth; but he takes his Metformin and Levemir insulin as prescribed and denies medication side effects\par \par Alcoholic hepatitis: patient says he is going to AA program and has remained alcohol-free since last visit with me; he is following with hepatology as well; he denies jaundice and scleral icterus\par \par Cirrhosis: secondary to history of alcohol intake; patient says he is committed to remaining off alcohol; he denies RUQ abd pains, diarrhea and jaundice\par \par Vitiligo: patient says this is stable, he has had this for years and there is no change in symptoms

## 2019-02-07 NOTE — REVIEW OF SYSTEMS
[Fever] : no fever [Chills] : no chills [Feeling Tired] : not feeling tired [Eye Pain] : no eye pain [Eyesight Problems] : eyesight problems [Loss Of Hearing] : no hearing loss [Chest Pain] : no chest pain [Palpitations] : no palpitations [Lower Ext Edema] : no extremity edema [Shortness Of Breath] : no shortness of breath [Wheezing] : no wheezing [Cough] : no cough [SOB on Exertion] : no shortness of breath during exertion [PND] : no PND [Abdominal Pain] : no abdominal pain [Vomiting] : no vomiting [Constipation] : no constipation [Diarrhea] : no diarrhea [Dysuria] : no dysuria [Arthralgias] : no arthralgias [Joint Pain] : no joint pain [Joint Swelling] : no joint swelling [Skin Lesions] : no skin lesions [Skin Wound] : no skin wound [Confused] : no confusion [Convulsions] : no convulsions [Fainting] : no fainting [Limb Weakness] : no limb weakness [Difficulty Walking] : no difficulty walking [Muscle Weakness] : no muscle weakness [Feelings Of Weakness] : no feelings of weakness [Negative] : Musculoskeletal [FreeTextEntry3] : can not see in R eye

## 2019-02-07 NOTE — END OF VISIT
[] : Fellow [FreeTextEntry3] : minimal proteinuria however very high A1c. discussed with Dr. Woods; he needs intensive DM management,. \par also optho as he is a high risk for Diabetic retinopathy and does not see in R eye

## 2019-02-08 LAB — HBA1C MFR BLD HPLC: 13.3

## 2019-02-11 LAB
APPEARANCE: CLEAR
BACTERIA: NEGATIVE
BILIRUBIN URINE: NEGATIVE
BLOOD URINE: ABNORMAL
COLOR: YELLOW
CREAT SPEC-SCNC: 31 MG/DL
CREAT/PROT UR: 0.3 RATIO
GLUCOSE QUALITATIVE U: 1000 MG/DL
KETONES URINE: NEGATIVE
LEUKOCYTE ESTERASE URINE: NEGATIVE
MICROSCOPIC-UA: NORMAL
NITRITE URINE: NEGATIVE
PH URINE: 6.5
PROT UR-MCNC: 9 MG/DL
PROTEIN URINE: NEGATIVE MG/DL
RED BLOOD CELLS URINE: 1 /HPF
SPECIFIC GRAVITY URINE: 1.02
SQUAMOUS EPITHELIAL CELLS: 2 /HPF
UROBILINOGEN URINE: NEGATIVE MG/DL
WHITE BLOOD CELLS URINE: 0 /HPF

## 2019-02-15 ENCOUNTER — MESSAGE (OUTPATIENT)
Age: 44
End: 2019-02-15

## 2019-03-04 ENCOUNTER — RX RENEWAL (OUTPATIENT)
Age: 44
End: 2019-03-04

## 2019-04-01 ENCOUNTER — RX RENEWAL (OUTPATIENT)
Age: 44
End: 2019-04-01

## 2019-04-05 ENCOUNTER — RX RENEWAL (OUTPATIENT)
Age: 44
End: 2019-04-05

## 2019-04-17 ENCOUNTER — INPATIENT (INPATIENT)
Facility: HOSPITAL | Age: 44
LOS: 2 days | Discharge: HOME | DRG: 378 | End: 2019-04-20
Attending: HOSPITALIST | Admitting: HOSPITALIST
Payer: COMMERCIAL

## 2019-04-17 VITALS
DIASTOLIC BLOOD PRESSURE: 84 MMHG | OXYGEN SATURATION: 82 % | SYSTOLIC BLOOD PRESSURE: 149 MMHG | WEIGHT: 194.01 LBS | RESPIRATION RATE: 18 BRPM | HEART RATE: 94 BPM | HEIGHT: 68 IN | TEMPERATURE: 98 F

## 2019-04-17 LAB
ALBUMIN SERPL ELPH-MCNC: 3.7 G/DL — SIGNIFICANT CHANGE UP (ref 3.3–5)
ALP SERPL-CCNC: 166 U/L — HIGH (ref 40–120)
ALT FLD-CCNC: 25 U/L — SIGNIFICANT CHANGE UP (ref 10–45)
ANION GAP SERPL CALC-SCNC: 13 MMOL/L — SIGNIFICANT CHANGE UP (ref 5–17)
APTT BLD: 38.2 SEC — HIGH (ref 27.5–36.3)
AST SERPL-CCNC: 60 U/L — HIGH (ref 10–40)
BILIRUB SERPL-MCNC: 2.7 MG/DL — HIGH (ref 0.2–1.2)
BUN SERPL-MCNC: 12 MG/DL — SIGNIFICANT CHANGE UP (ref 7–23)
CALCIUM SERPL-MCNC: 8.6 MG/DL — SIGNIFICANT CHANGE UP (ref 8.4–10.5)
CHLORIDE SERPL-SCNC: 98 MMOL/L — SIGNIFICANT CHANGE UP (ref 96–108)
CO2 SERPL-SCNC: 21 MMOL/L — LOW (ref 22–31)
CREAT SERPL-MCNC: 0.5 MG/DL — SIGNIFICANT CHANGE UP (ref 0.5–1.3)
GLUCOSE SERPL-MCNC: 357 MG/DL — HIGH (ref 70–99)
HCT VFR BLD CALC: 35.3 % — LOW (ref 39–50)
HGB BLD-MCNC: 11.6 G/DL — LOW (ref 13–17)
INR BLD: 1.28 RATIO — HIGH (ref 0.88–1.16)
MCHC RBC-ENTMCNC: 24 PG — LOW (ref 27–34)
MCHC RBC-ENTMCNC: 32.7 GM/DL — SIGNIFICANT CHANGE UP (ref 32–36)
MCV RBC AUTO: 73.3 FL — LOW (ref 80–100)
POTASSIUM SERPL-MCNC: 4.4 MMOL/L — SIGNIFICANT CHANGE UP (ref 3.5–5.3)
POTASSIUM SERPL-SCNC: 4.4 MMOL/L — SIGNIFICANT CHANGE UP (ref 3.5–5.3)
PROT SERPL-MCNC: 6.7 G/DL — SIGNIFICANT CHANGE UP (ref 6–8.3)
PROTHROM AB SERPL-ACNC: 14.7 SEC — HIGH (ref 10–12.9)
RBC # BLD: 4.82 M/UL — SIGNIFICANT CHANGE UP (ref 4.2–5.8)
RBC # FLD: 17.7 % — HIGH (ref 10.3–14.5)
SODIUM SERPL-SCNC: 132 MMOL/L — LOW (ref 135–145)
WBC # BLD: 5.2 K/UL — SIGNIFICANT CHANGE UP (ref 3.8–10.5)
WBC # FLD AUTO: 5.2 K/UL — SIGNIFICANT CHANGE UP (ref 3.8–10.5)

## 2019-04-17 PROCEDURE — 99285 EMERGENCY DEPT VISIT HI MDM: CPT | Mod: 25

## 2019-04-17 PROCEDURE — 93010 ELECTROCARDIOGRAM REPORT: CPT

## 2019-04-17 PROCEDURE — 71045 X-RAY EXAM CHEST 1 VIEW: CPT | Mod: 26

## 2019-04-17 RX ORDER — OCTREOTIDE ACETATE 200 UG/ML
50 INJECTION, SOLUTION INTRAVENOUS; SUBCUTANEOUS ONCE
Qty: 0 | Refills: 0 | Status: COMPLETED | OUTPATIENT
Start: 2019-04-17 | End: 2019-04-17

## 2019-04-17 RX ORDER — MORPHINE SULFATE 50 MG/1
4 CAPSULE, EXTENDED RELEASE ORAL ONCE
Qty: 0 | Refills: 0 | Status: DISCONTINUED | OUTPATIENT
Start: 2019-04-17 | End: 2019-04-17

## 2019-04-17 RX ORDER — PANTOPRAZOLE SODIUM 20 MG/1
80 TABLET, DELAYED RELEASE ORAL ONCE
Qty: 0 | Refills: 0 | Status: COMPLETED | OUTPATIENT
Start: 2019-04-17 | End: 2019-04-17

## 2019-04-17 RX ADMIN — MORPHINE SULFATE 4 MILLIGRAM(S): 50 CAPSULE, EXTENDED RELEASE ORAL at 23:55

## 2019-04-17 RX ADMIN — PANTOPRAZOLE SODIUM 80 MILLIGRAM(S): 20 TABLET, DELAYED RELEASE ORAL at 23:17

## 2019-04-17 RX ADMIN — OCTREOTIDE ACETATE 50 MICROGRAM(S): 200 INJECTION, SOLUTION INTRAVENOUS; SUBCUTANEOUS at 23:55

## 2019-04-17 NOTE — ED ADULT NURSE NOTE - NSIMPLEMENTINTERV_GEN_ALL_ED
Implemented All Universal Safety Interventions:  Gladys to call system. Call bell, personal items and telephone within reach. Instruct patient to call for assistance. Room bathroom lighting operational. Non-slip footwear when patient is off stretcher. Physically safe environment: no spills, clutter or unnecessary equipment. Stretcher in lowest position, wheels locked, appropriate side rails in place.

## 2019-04-17 NOTE — ED PROVIDER NOTE - ATTENDING CONTRIBUTION TO CARE
I have seen and evaluated this patient with the resident.   I agree with the findings  unless other wise stated.  I have made appropriate changes in documentations where needed, After my face to face bedside evaluation, I am further  noting: Pt with chronic ethanol abuse problem has cirrhosis of liver developed pain in upper abdomen and black tarry stools 2 episodes No syncope no sz no vomiting alert pale looking tachycardia  s1s2 BHARATH EOM wnl no tremors non focal neuro exam abdomen soft epigastric discomfort palpable liver in epigastric area tender no other masses  extensive psoriatic rash Leucoderma+ patches pedal edema + concern for GIB liver cirrhosis labs type PPI  possible adm to hospital --rina

## 2019-04-17 NOTE — ED PROVIDER NOTE - CLINICAL SUMMARY MEDICAL DECISION MAKING FREE TEXT BOX
Pt with chronic ethanol abuse problem has cirrhosis of liver developed pain in upper abdomen and black tarry stools 2 episodes No syncope no sz no vomiting alert Pt with chronic ethanol abuse problem has cirrhosis of liver developed pain in upper abdomen and black tarry stools 2 episodes No syncope no sz no vomiting alert pale looking tachycardia  s1s2 BHARATH EOM wnl no tremors non focal neuro exam abdomen soft epigastric discomfort palpable liver in epigastric area tender no other masses  extensive psoriatic rash Leucoderma+ patches pedal edema + concern for GIB liver cirrhosis labs type PPI  possible adm to hospital --rina

## 2019-04-17 NOTE — DIETITIAN INITIAL EVALUATION ADULT. - SIGNS/SYMPTOMS
2019  EMPLOYEE INFORMATION: EMPLOYER INFORMATION:   NAME: Lj GONZALEZ   : 2001 [phone]   DATE OF INJURY/EVENT: 2019           [facility]   Treating Provider: Brian West DO  Time In:  3:59 PM Time Out:  4:12 PM      DIAGNOSIS:   1. Contusion of left thumb without damage to nail, initial encounter      STATUS:      RETURN TO WORK:   Employee may return to work without restrictions.  Employee is discharged from care.   Return Date: 2019            RESTRICTIONS:   Restrictions are to be followed at work and at home.  Restrictions are in effect until next follow-up visit.  No Restrictions    Thank you for the privilege of providing medical care for this injury/condition.  If there are any questions, please call the occupational health clinic at Dept: [phone]. Electronically signed on 2019 at 4:12 PM by:   Roxana Qiu MA [facility] On 2019, IRoxana MA scribed the services personally performed by Brian West DO     Pt with 6.81% wt loss [id number], decreased appetite and po intake x2 weeks.

## 2019-04-17 NOTE — ED PROVIDER NOTE - OBJECTIVE STATEMENT
44 yo male with PMH of cirrhosis 2/2 etoh use and viral hepatitis, previous GI bleed, h/o esophageal varices, DM, presents to the ED for melena x several days and epigastric pain. States the abd pain is c/w previous GI bleeds, has had endoscopies and pill camera capsule studies in the past which he says were negative for PUD. Pt denies hematemesis, hemoptysis, fever, chills, BRBPR, rectal pain, rash. No current etoh use. Also noted to be hypoxic in the waiting area to 82% on RA, went to 100% on NRB. No PE risk factors, pt mildly SOB, speaking in full sentences.

## 2019-04-17 NOTE — ED PROVIDER NOTE - CARE PLAN
Principal Discharge DX:	GI bleed  Secondary Diagnosis:	Alcoholic cirrhosis of liver  Secondary Diagnosis:	Esophageal varices Principal Discharge DX:	Gastrointestinal bleeding, upper  Secondary Diagnosis:	Abdominal pain  Secondary Diagnosis:	Alcoholic cirrhosis  Secondary Diagnosis:	Diabetes mellitus type 2, insulin dependent  Secondary Diagnosis:	Hypoxia

## 2019-04-17 NOTE — ED PROVIDER NOTE - SECONDARY DIAGNOSIS.
Alcoholic cirrhosis of liver Esophageal varices Abdominal pain Alcoholic cirrhosis Diabetes mellitus type 2, insulin dependent Hypoxia

## 2019-04-17 NOTE — ED ADULT NURSE NOTE - OBJECTIVE STATEMENT
43y male with hx of liver cirrhosis, htn presents to the ER c/o dark stools. pt is alert and oriented x 4 and speaking coherently. pt was hypoxic in triage to low 80s, but was not acutely short of breath. pt respirations non labored and lung sounds clear. pt states he came today because he noticed dark stools, pt is now c/o abdominal pain. pt denies cp, n/v/d, fevers. pt has two IVL placed. ekg completed. pt on CM. md jama completed, will reassess.

## 2019-04-18 DIAGNOSIS — K92.2 GASTROINTESTINAL HEMORRHAGE, UNSPECIFIED: ICD-10-CM

## 2019-04-18 DIAGNOSIS — Z29.9 ENCOUNTER FOR PROPHYLACTIC MEASURES, UNSPECIFIED: ICD-10-CM

## 2019-04-18 DIAGNOSIS — R09.02 HYPOXEMIA: ICD-10-CM

## 2019-04-18 DIAGNOSIS — K70.30 ALCOHOLIC CIRRHOSIS OF LIVER WITHOUT ASCITES: ICD-10-CM

## 2019-04-18 DIAGNOSIS — R07.9 CHEST PAIN, UNSPECIFIED: ICD-10-CM

## 2019-04-18 DIAGNOSIS — E11.9 TYPE 2 DIABETES MELLITUS WITHOUT COMPLICATIONS: ICD-10-CM

## 2019-04-18 LAB
ALBUMIN SERPL ELPH-MCNC: 3.4 G/DL — SIGNIFICANT CHANGE UP (ref 3.3–5)
ALP SERPL-CCNC: 137 U/L — HIGH (ref 40–120)
ALT FLD-CCNC: 22 U/L — SIGNIFICANT CHANGE UP (ref 10–45)
ANION GAP SERPL CALC-SCNC: 14 MMOL/L — SIGNIFICANT CHANGE UP (ref 5–17)
APPEARANCE UR: CLEAR — SIGNIFICANT CHANGE UP
APTT BLD: 38.8 SEC — HIGH (ref 27.5–36.3)
AST SERPL-CCNC: 46 U/L — HIGH (ref 10–40)
BACTERIA # UR AUTO: NEGATIVE — SIGNIFICANT CHANGE UP
BASOPHILS # BLD AUTO: 0 K/UL — SIGNIFICANT CHANGE UP (ref 0–0.2)
BASOPHILS NFR BLD AUTO: 0.7 % — SIGNIFICANT CHANGE UP (ref 0–2)
BILIRUB DIRECT SERPL-MCNC: 0.9 MG/DL — HIGH (ref 0–0.2)
BILIRUB SERPL-MCNC: 1.9 MG/DL — HIGH (ref 0.2–1.2)
BILIRUB UR-MCNC: NEGATIVE — SIGNIFICANT CHANGE UP
BLD GP AB SCN SERPL QL: NEGATIVE — SIGNIFICANT CHANGE UP
BLD GP AB SCN SERPL QL: NEGATIVE — SIGNIFICANT CHANGE UP
BUN SERPL-MCNC: 9 MG/DL — SIGNIFICANT CHANGE UP (ref 7–23)
CALCIUM SERPL-MCNC: 8.2 MG/DL — LOW (ref 8.4–10.5)
CHLORIDE SERPL-SCNC: 104 MMOL/L — SIGNIFICANT CHANGE UP (ref 96–108)
CO2 SERPL-SCNC: 22 MMOL/L — SIGNIFICANT CHANGE UP (ref 22–31)
COLOR SPEC: YELLOW — SIGNIFICANT CHANGE UP
CREAT SERPL-MCNC: 0.52 MG/DL — SIGNIFICANT CHANGE UP (ref 0.5–1.3)
DIFF PNL FLD: ABNORMAL
EOSINOPHIL # BLD AUTO: 0.1 K/UL — SIGNIFICANT CHANGE UP (ref 0–0.5)
EOSINOPHIL NFR BLD AUTO: 2.1 % — SIGNIFICANT CHANGE UP (ref 0–6)
EPI CELLS # UR: 5 /HPF — SIGNIFICANT CHANGE UP
GLUCOSE SERPL-MCNC: 220 MG/DL — HIGH (ref 70–99)
GLUCOSE UR QL: ABNORMAL
HBA1C BLD-MCNC: 10.5 % — HIGH (ref 4–5.6)
HCT VFR BLD CALC: 33.7 % — LOW (ref 39–50)
HCT VFR BLD CALC: 34.2 % — LOW (ref 39–50)
HGB BLD-MCNC: 10.6 G/DL — LOW (ref 13–17)
HGB BLD-MCNC: 10.6 G/DL — LOW (ref 13–17)
HYALINE CASTS # UR AUTO: 0 /LPF — SIGNIFICANT CHANGE UP (ref 0–2)
INR BLD: 1.25 RATIO — HIGH (ref 0.88–1.16)
KETONES UR-MCNC: NEGATIVE — SIGNIFICANT CHANGE UP
LEUKOCYTE ESTERASE UR-ACNC: NEGATIVE — SIGNIFICANT CHANGE UP
LYMPHOCYTES # BLD AUTO: 1.3 K/UL — SIGNIFICANT CHANGE UP (ref 1–3.3)
LYMPHOCYTES # BLD AUTO: 24.2 % — SIGNIFICANT CHANGE UP (ref 13–44)
MAGNESIUM SERPL-MCNC: 1.5 MG/DL — LOW (ref 1.6–2.6)
MCHC RBC-ENTMCNC: 23.4 PG — LOW (ref 27–34)
MCHC RBC-ENTMCNC: 23.6 PG — LOW (ref 27–34)
MCHC RBC-ENTMCNC: 31.1 GM/DL — LOW (ref 32–36)
MCHC RBC-ENTMCNC: 31.5 GM/DL — LOW (ref 32–36)
MCV RBC AUTO: 74.3 FL — LOW (ref 80–100)
MCV RBC AUTO: 75.9 FL — LOW (ref 80–100)
MONOCYTES # BLD AUTO: 0.5 K/UL — SIGNIFICANT CHANGE UP (ref 0–0.9)
MONOCYTES NFR BLD AUTO: 9.6 % — SIGNIFICANT CHANGE UP (ref 2–14)
NEUTROPHILS # BLD AUTO: 3.3 K/UL — SIGNIFICANT CHANGE UP (ref 1.8–7.4)
NEUTROPHILS NFR BLD AUTO: 63.4 % — SIGNIFICANT CHANGE UP (ref 43–77)
NITRITE UR-MCNC: NEGATIVE — SIGNIFICANT CHANGE UP
OB PNL STL: POSITIVE
PH UR: 6.5 — SIGNIFICANT CHANGE UP (ref 5–8)
PHOSPHATE SERPL-MCNC: 4.4 MG/DL — SIGNIFICANT CHANGE UP (ref 2.5–4.5)
PLATELET # BLD AUTO: 48 K/UL — LOW (ref 150–400)
PLATELET # BLD AUTO: 51 K/UL — LOW (ref 150–400)
PLATELET # BLD AUTO: 62 K/UL — LOW (ref 150–400)
POTASSIUM SERPL-MCNC: 4.3 MMOL/L — SIGNIFICANT CHANGE UP (ref 3.5–5.3)
POTASSIUM SERPL-SCNC: 4.3 MMOL/L — SIGNIFICANT CHANGE UP (ref 3.5–5.3)
PROT SERPL-MCNC: 6.2 G/DL — SIGNIFICANT CHANGE UP (ref 6–8.3)
PROT UR-MCNC: ABNORMAL
PROTHROM AB SERPL-ACNC: 14.5 SEC — HIGH (ref 10–12.9)
RAPID RVP RESULT: SIGNIFICANT CHANGE UP
RBC # BLD: 4.51 M/UL — SIGNIFICANT CHANGE UP (ref 4.2–5.8)
RBC # BLD: 4.53 M/UL — SIGNIFICANT CHANGE UP (ref 4.2–5.8)
RBC # FLD: 18.2 % — HIGH (ref 10.3–14.5)
RBC # FLD: 18.3 % — HIGH (ref 10.3–14.5)
RBC CASTS # UR COMP ASSIST: 12 /HPF — HIGH (ref 0–4)
RH IG SCN BLD-IMP: POSITIVE — SIGNIFICANT CHANGE UP
RH IG SCN BLD-IMP: POSITIVE — SIGNIFICANT CHANGE UP
SODIUM SERPL-SCNC: 140 MMOL/L — SIGNIFICANT CHANGE UP (ref 135–145)
SP GR SPEC: 1.02 — SIGNIFICANT CHANGE UP (ref 1.01–1.02)
UROBILINOGEN FLD QL: NEGATIVE — SIGNIFICANT CHANGE UP
WBC # BLD: 3.4 K/UL — LOW (ref 3.8–10.5)
WBC # BLD: 3.7 K/UL — LOW (ref 3.8–10.5)
WBC # FLD AUTO: 3.4 K/UL — LOW (ref 3.8–10.5)
WBC # FLD AUTO: 3.7 K/UL — LOW (ref 3.8–10.5)
WBC UR QL: 1 /HPF — SIGNIFICANT CHANGE UP (ref 0–5)

## 2019-04-18 PROCEDURE — 71275 CT ANGIOGRAPHY CHEST: CPT | Mod: 26

## 2019-04-18 PROCEDURE — 99232 SBSQ HOSP IP/OBS MODERATE 35: CPT

## 2019-04-18 PROCEDURE — 43235 EGD DIAGNOSTIC BRUSH WASH: CPT

## 2019-04-18 PROCEDURE — 99223 1ST HOSP IP/OBS HIGH 75: CPT | Mod: GC

## 2019-04-18 RX ORDER — DEXTROSE 50 % IN WATER 50 %
25 SYRINGE (ML) INTRAVENOUS ONCE
Qty: 0 | Refills: 0 | Status: DISCONTINUED | OUTPATIENT
Start: 2019-04-18 | End: 2019-04-20

## 2019-04-18 RX ORDER — MORPHINE SULFATE 50 MG/1
2 CAPSULE, EXTENDED RELEASE ORAL ONCE
Qty: 0 | Refills: 0 | Status: DISCONTINUED | OUTPATIENT
Start: 2019-04-18 | End: 2019-04-18

## 2019-04-18 RX ORDER — OCTREOTIDE ACETATE 200 UG/ML
25 INJECTION, SOLUTION INTRAVENOUS; SUBCUTANEOUS
Qty: 500 | Refills: 0 | Status: DISCONTINUED | OUTPATIENT
Start: 2019-04-18 | End: 2019-04-18

## 2019-04-18 RX ORDER — DEXTROSE 50 % IN WATER 50 %
12.5 SYRINGE (ML) INTRAVENOUS ONCE
Qty: 0 | Refills: 0 | Status: DISCONTINUED | OUTPATIENT
Start: 2019-04-18 | End: 2019-04-20

## 2019-04-18 RX ORDER — PANTOPRAZOLE SODIUM 20 MG/1
40 TABLET, DELAYED RELEASE ORAL
Qty: 0 | Refills: 0 | Status: DISCONTINUED | OUTPATIENT
Start: 2019-04-18 | End: 2019-04-18

## 2019-04-18 RX ORDER — MORPHINE SULFATE 50 MG/1
4 CAPSULE, EXTENDED RELEASE ORAL ONCE
Qty: 0 | Refills: 0 | Status: DISCONTINUED | OUTPATIENT
Start: 2019-04-18 | End: 2019-04-18

## 2019-04-18 RX ORDER — GLUCAGON INJECTION, SOLUTION 0.5 MG/.1ML
1 INJECTION, SOLUTION SUBCUTANEOUS ONCE
Qty: 0 | Refills: 0 | Status: DISCONTINUED | OUTPATIENT
Start: 2019-04-18 | End: 2019-04-20

## 2019-04-18 RX ORDER — INSULIN LISPRO 100/ML
VIAL (ML) SUBCUTANEOUS AT BEDTIME
Qty: 0 | Refills: 0 | Status: DISCONTINUED | OUTPATIENT
Start: 2019-04-18 | End: 2019-04-20

## 2019-04-18 RX ORDER — SODIUM CHLORIDE 9 MG/ML
1000 INJECTION INTRAMUSCULAR; INTRAVENOUS; SUBCUTANEOUS
Qty: 0 | Refills: 0 | Status: DISCONTINUED | OUTPATIENT
Start: 2019-04-18 | End: 2019-04-19

## 2019-04-18 RX ORDER — INSULIN GLARGINE 100 [IU]/ML
40 INJECTION, SOLUTION SUBCUTANEOUS ONCE
Qty: 0 | Refills: 0 | Status: COMPLETED | OUTPATIENT
Start: 2019-04-18 | End: 2019-04-18

## 2019-04-18 RX ORDER — MORPHINE SULFATE 50 MG/1
8 CAPSULE, EXTENDED RELEASE ORAL ONCE
Qty: 0 | Refills: 0 | Status: DISCONTINUED | OUTPATIENT
Start: 2019-04-18 | End: 2019-04-18

## 2019-04-18 RX ORDER — DEXTROSE 50 % IN WATER 50 %
15 SYRINGE (ML) INTRAVENOUS ONCE
Qty: 0 | Refills: 0 | Status: DISCONTINUED | OUTPATIENT
Start: 2019-04-18 | End: 2019-04-20

## 2019-04-18 RX ORDER — INSULIN LISPRO 100/ML
VIAL (ML) SUBCUTANEOUS
Qty: 0 | Refills: 0 | Status: DISCONTINUED | OUTPATIENT
Start: 2019-04-18 | End: 2019-04-20

## 2019-04-18 RX ORDER — INSULIN GLARGINE 100 [IU]/ML
40 INJECTION, SOLUTION SUBCUTANEOUS AT BEDTIME
Qty: 0 | Refills: 0 | Status: DISCONTINUED | OUTPATIENT
Start: 2019-04-18 | End: 2019-04-19

## 2019-04-18 RX ORDER — PANTOPRAZOLE SODIUM 20 MG/1
8 TABLET, DELAYED RELEASE ORAL
Qty: 80 | Refills: 0 | Status: DISCONTINUED | OUTPATIENT
Start: 2019-04-18 | End: 2019-04-19

## 2019-04-18 RX ORDER — CEFTRIAXONE 500 MG/1
1 INJECTION, POWDER, FOR SOLUTION INTRAMUSCULAR; INTRAVENOUS EVERY 24 HOURS
Qty: 0 | Refills: 0 | Status: DISCONTINUED | OUTPATIENT
Start: 2019-04-19 | End: 2019-04-20

## 2019-04-18 RX ORDER — METOCLOPRAMIDE HCL 10 MG
10 TABLET ORAL
Qty: 0 | Refills: 0 | Status: DISCONTINUED | OUTPATIENT
Start: 2019-04-18 | End: 2019-04-20

## 2019-04-18 RX ORDER — SODIUM CHLORIDE 9 MG/ML
1000 INJECTION, SOLUTION INTRAVENOUS
Qty: 0 | Refills: 0 | Status: DISCONTINUED | OUTPATIENT
Start: 2019-04-18 | End: 2019-04-20

## 2019-04-18 RX ORDER — CEFTRIAXONE 500 MG/1
1 INJECTION, POWDER, FOR SOLUTION INTRAMUSCULAR; INTRAVENOUS ONCE
Qty: 0 | Refills: 0 | Status: COMPLETED | OUTPATIENT
Start: 2019-04-18 | End: 2019-04-18

## 2019-04-18 RX ORDER — MAGNESIUM SULFATE 500 MG/ML
2 VIAL (ML) INJECTION ONCE
Qty: 0 | Refills: 0 | Status: COMPLETED | OUTPATIENT
Start: 2019-04-18 | End: 2019-04-18

## 2019-04-18 RX ADMIN — SODIUM CHLORIDE 75 MILLILITER(S): 9 INJECTION INTRAMUSCULAR; INTRAVENOUS; SUBCUTANEOUS at 06:39

## 2019-04-18 RX ADMIN — Medication 50 GRAM(S): at 09:42

## 2019-04-18 RX ADMIN — MORPHINE SULFATE 8 MILLIGRAM(S): 50 CAPSULE, EXTENDED RELEASE ORAL at 01:41

## 2019-04-18 RX ADMIN — CEFTRIAXONE 100 GRAM(S): 500 INJECTION, POWDER, FOR SOLUTION INTRAMUSCULAR; INTRAVENOUS at 02:26

## 2019-04-18 RX ADMIN — OCTREOTIDE ACETATE 5 MICROGRAM(S)/HR: 200 INJECTION, SOLUTION INTRAVENOUS; SUBCUTANEOUS at 09:42

## 2019-04-18 RX ADMIN — MORPHINE SULFATE 2 MILLIGRAM(S): 50 CAPSULE, EXTENDED RELEASE ORAL at 07:09

## 2019-04-18 RX ADMIN — MORPHINE SULFATE 2 MILLIGRAM(S): 50 CAPSULE, EXTENDED RELEASE ORAL at 06:39

## 2019-04-18 RX ADMIN — PANTOPRAZOLE SODIUM 10 MG/HR: 20 TABLET, DELAYED RELEASE ORAL at 12:27

## 2019-04-18 RX ADMIN — MORPHINE SULFATE 4 MILLIGRAM(S): 50 CAPSULE, EXTENDED RELEASE ORAL at 04:28

## 2019-04-18 RX ADMIN — SODIUM CHLORIDE 75 MILLILITER(S): 9 INJECTION INTRAMUSCULAR; INTRAVENOUS; SUBCUTANEOUS at 09:43

## 2019-04-18 RX ADMIN — INSULIN GLARGINE 40 UNIT(S): 100 INJECTION, SOLUTION SUBCUTANEOUS at 04:28

## 2019-04-18 RX ADMIN — Medication 10 MILLIGRAM(S): at 13:24

## 2019-04-18 RX ADMIN — Medication 4: at 18:50

## 2019-04-18 RX ADMIN — MORPHINE SULFATE 8 MILLIGRAM(S): 50 CAPSULE, EXTENDED RELEASE ORAL at 03:54

## 2019-04-18 RX ADMIN — MORPHINE SULFATE 4 MILLIGRAM(S): 50 CAPSULE, EXTENDED RELEASE ORAL at 03:53

## 2019-04-18 NOTE — H&P ADULT - PROBLEM SELECTOR PLAN 4
On bedtime lantus 80u and metformin 500qd at home. Last HbA1c in 10/2018 was 8.2%   - s/p lantus 40u overnight in ED  - ISS only while NPO   - check A1c On bedtime lantus 80u and metformin 500qd at home. Last HbA1c in 10/2018 was 8.2%   - s/p lantus 40u overnight in ED  - bedtime Lantus 40u and ISS while NPO   - check A1c

## 2019-04-18 NOTE — H&P ADULT - PROBLEM SELECTOR PLAN 3
Unclear etiology, p/w SOB in ED, has now resolved but continues to desaturate to high 80s off supplemental oxygen. Less likely PE given negative CTA chest, also less likely cardiac cirrhosis but euvolemic on exam with clear lungs on exam and imaging.   - currently stable on 2L NC   - Wean off as tolerated   - Will check pro-BNp  - Will consider TTE if does not improve Unclear etiology, p/w SOB in ED, has now resolved but continues to desaturate to high 80s off supplemental oxygen. Less likely PE given negative CTA chest; possible intrinsic lung disease vs cardiac cirrhosis given last TTE 7/2018 with bordeline pHTN, LA enlargement and preserved EF 55-60%.   - Euvolemic on exam with clear lungs on exam and imaging.   - currently stable on 2L NC   - Wean off O2 as tolerated   - Will check pro-BNp and TTE w/ difinity to r/o shunting Unclear etiology, p/w SOB in ED, has now resolved but continues to desaturate to high 80s off supplemental oxygen. Less likely PE given negative CTA chest; possible intrinsic lung disease vs cardiac cirrhosis given last TTE 7/2018 with bordeline pHTN, LA enlargement and preserved EF 55-60%.   - Euvolemic on exam with clear lungs on exam and imaging.   - currently stable on 2L NC   - Wean off O2 as tolerated   - Will check pro-BNp  - TTE w/ definity and bubble study to r/o shunting

## 2019-04-18 NOTE — CONSULT NOTE ADULT - ATTENDING COMMENTS
Hepatology Staff: Lula Rojas MD  I saw and examined the patient along with Dr. Whitfield on 4/18/2019.     Patient with known alcoholic cirrhosis with hx of esophageal varices, s/p banding, now presents with melena.  Agree with plan for EGD today.  Keep IV Octreotide, IV PPI.  Monitor H/H, and transfuse PRN.

## 2019-04-18 NOTE — H&P ADULT - ATTENDING COMMENTS
Seen, examine the patient with house staff this am  - en-route to GI suit for EGD now    Reviewed labs, imaging  - BP has been stable, not tachycardic, Hb 10.6 from 11.2 after IV hydration    c/w PPI gtt, Octreotide gtt, CBC q 8 hrs and PPX- IV ceftriaxone    will f/u GI plan  - Hypoxemia, unclear etiology ( No PE or Pna in CTA chest, might be cardiac shunt or low flow state)    Reviewed last echo, will do Echo with bubble study  - c/w planned basal Insulin for NPO Seen, examine the patient with house staff this am  - en-route to GI suit for EGD now, no abdominal pain, hematemesis, occult stool positive     Reviewed labs, imaging  - BP has been stable, not tachycardic, Hb 10.6 from 11.2 after IV hydration    c/w PPI gtt, Octreotide gtt, CBC q 8 hrs and PPX- IV ceftriaxone    will f/u GI plan  - Hypoxemia, unclear etiology ( No PE or Pna in CTA chest, might be cardiac shunt or low flow state)    Reviewed last echo, will do Echo with bubble study  - c/w planned basal Insulin for NPO

## 2019-04-18 NOTE — H&P ADULT - HISTORY OF PRESENT ILLNESS
HPI: 42 y/o male w/ DM2 and cirrhosis 2/2 alcohol use w/ previous GI bleeds and h/o esophageal varices presents for 1 day of melena. He describes having 4 dark, loose bowel movements from 5:30PM-10:30PM yesterday evening. He denies any associated abdominal pain, diarrhea, nausea, vomiting, or fevers. He recognized the dark stool as a sign to come to the hospital as he has experienced GI bleeds before. Prior endoscopies and pill camera capsule studies negative for PUD. Prior colonoscopy only showed mild congestion of transverse colonic mucosa, likely from portal colopathy. Pt denies hematemesis, hemoptysis, fever, chills, BRBPR, or rectal pain. He states that he hasn't been drinking since his last hospitalization in October, although he had 1 beer on Monday with spicy food. He endorses starting smoking 2-3 cigarettes a day as of 3 weeks ago. He does not know of any sick contacts and has otherwise felt well.   ED Course: 97.9F, 94, 149/84, 18, 82% RA, 100% NRB. CBC 11.6, 35.5, 73.3, 62. FOBT positive, ECG wnl, Prelim read CXR wnl, RVP negative. CTA showed no PE, mildly distended & thickened esophageal wall, hepatic cirrhosis w/ portal HTN & partially imaged recanalized paraumbilical vein, trace ascites. Given prophylactic ceftriaxone, lantus, morphine, octreotide, pantoprazole, and IV NS. HPI: 44 y/o male w/ DM2 and cirrhosis 2/2 alcohol use w/ previous GI bleeds and h/o esophageal varices presents for 1 day of melena. He describes having 4 dark, loose bowel movements from 5:30PM-10:30PM yesterday evening. He recognized the dark stool as a sign to come to the hospital as he has experienced GI bleeds before. Prior endoscopies and pill camera capsule (last 10/2018) studies negative for PUD. Prior colonoscopy only showed mild congestion of transverse colonic mucosa, likely from portal colopathy.  He states that he hasn't been drinking since his last hospitalization in October, although he had 1 beer on Monday with spicy food. He endorses starting smoking 2-3 cigarettes a day as of 3 weeks ago.    Denies: hematemesis, hemoptysis, fever, chills, BRBPR, rectal pain., associated abdominal pain, diarrhea, nausea, vomiting, or fevers. He does not know of any sick contacts and has otherwise felt well.   ED Course: 97.9F, 94, 149/84, 18, 82% RA, 100% NRB. CBC 11.6, 35.5, 73.3, 62. FOBT positive, ECG wnl, Prelim read CXR wnl, RVP negative. CTA showed no PE, mildly distended & thickened esophageal wall, hepatic cirrhosis w/ portal HTN & partially imaged recanalized paraumbilical vein, splenomegaly, trace ascites. Given prophylactic ceftriaxone 1g x1, lantus 40u, morphine, octreotide, pantoprazole, and IV NS. Mr. Hallman is a 44 y/o male w/ DM2 and cirrhosis 2/2 alcohol use w/ previous GI bleeds and h/o esophageal varices presents for 1 day of melena. He describes having 4 dark, loose bowel movements from 5:30PM-10:30pm yesterday evening. He recognized the dark stool as a sign to come to the hospital as he has experienced GI bleeds before. Prior endoscopies and pill camera capsule (last 10/2018) studies negative for PUD. Prior colonoscopy only showed mild congestion of transverse colonic mucosa, likely from portal colopathy.  He states that he hasn't been drinking since his last hospitalization in October, although he had 1 beer on Monday with spicy food. He endorses starting smoking 2-3 cigarettes a day as of 3 weeks ago.  Denies: hematemesis, hemoptysis, fever, chills, BRBPR, rectal pain., associated abdominal pain, diarrhea, nausea, vomiting, or fevers. He does not know of any sick contacts and has otherwise felt well.   ED Course: 97.9F, 94, 149/84, 18, 82% RA, 100% NRB. CBC 11.6, 35.5, 73.3, 62. FOBT positive, ECG wnl, Prelim read CXR wnl, RVP negative. CTA showed no PE, mildly distended & thickened esophageal wall, hepatic cirrhosis w/ portal HTN & partially imaged recanalized paraumbilical vein, splenomegaly, trace ascites. Given prophylactic ceftriaxone 1g x1, lantus 40u, morphine, octreotide, pantoprazole, and IV NS.

## 2019-04-18 NOTE — H&P ADULT - NSHPLABSRESULTS_GEN_ALL_CORE
10.6   3.7   )-----------( 51       ( 18 Apr 2019 08:43 )             33.7     04-18    140  |  104  |  9   ----------------------------<  220<H>  4.3   |  22  |  0.52    Ca    8.2<L>      18 Apr 2019 08:43  Phos  4.4     04-18  Mg     1.5     04-18    TPro  6.2  /  Alb  3.4  /  TBili  1.9<H>  /  DBili  0.9<H>  /  AST  46<H>  /  ALT  22  /  AlkPhos  137<H>  04-18    PT/INR - ( 18 Apr 2019 08:43 )   PT: 14.5 sec;   INR: 1.25 ratio         PTT - ( 18 Apr 2019 08:43 )  PTT:38.8 sec 10.6   3.7   )-----------( 51       ( 18 Apr 2019 08:43 )             33.7     04-18    140  |  104  |  9   ----------------------------<  220<H>  4.3   |  22  |  0.52    Ca    8.2<L>      18 Apr 2019 08:43  Phos  4.4     04-18  Mg     1.5     04-18    TPro  6.2  /  Alb  3.4  /  TBili  1.9<H>  /  DBili  0.9<H>  /  AST  46<H>  /  ALT  22  /  AlkPhos  137<H>  04-18    PT/INR - ( 18 Apr 2019 08:43 )   PT: 14.5 sec;   INR: 1.25 ratio         PTT - ( 18 Apr 2019 08:43 )  PTT:38.8 sec    < from: CT Angio Chest w/ IV Cont (04.18.19 @ 00:35) >      CHEST:     PULMONARY ARTERY: No pulmonary embolus in the main, left, right, lobar   and segmental pulmonary arteries. Limited evaluation of the subsegmental   pulmonary arteries secondary to motion artifact and suboptimal contrast   bolus timing.    LUNGS AND LARGE AIRWAYS: Patent central airways. No pulmonary nodules.   Mild dependent atelectasis is seen at the lung bases.    PLEURA: No pleural effusion.    VESSELS: Within normal limits.    HEART: Cardiomegaly. No pericardial effusion.    MEDIASTINUM AND KEVIN: No bulky lymphadenopathy. Milddistention and   thickened esophageal wall. This may be on the basis of gastroesophageal   reflux disease or esophagitis. Consider nonemergent endoscopic evaluation   to exclude underlying neoplasm.    CHEST WALL AND LOWER NECK: Mild gynecomastia.    VISUALIZED UPPER ABDOMEN: Hepatic cirrhosis. Splenomegaly. Unchanged   partially imaged mild right hydronephrosis. Too small to characterize   right renal hypodensity. Unchanged partially imaged recanalized   paraumbilical vein. Redemonstrated paraesophageal, perigastric and   perisplenic varices. Trace ascites.    BONES: Degenerative changes.    IMPRESSION:    No pulmonary embolus in the main, left, right, lobar and segmental   pulmonary arteries. Limited evaluation of the subsegmental pulmonary  arteries secondary to motion artifact and suboptimal contrast bolus   timing.    Mild distention and thickened esophageal wall. This may be on the basis   of gastroesophageal reflux disease or esophagitis. Consider nonemergent   endoscopic evaluation to exclude underlying neoplasm.    Hepatic cirrhosis with portal hypertension and partially imaged   recanalized paraumbilical vein. Trace ascites.

## 2019-04-18 NOTE — CONSULT NOTE ADULT - SUBJECTIVE AND OBJECTIVE BOX
Chief Complaint:  Patient is a 43y old  Male who presents with a chief complaint of     HPI:  42 yo male with PMH of cirrhosis 2/2 etoh use, previous GI bleed, h/o esophageal varices, DM, presents to the ED for melena x several days and epigastric pain. States the abd pain is c/w previous GI bleeds, has had endoscopies and pill camera capsule studies in the past that were negative. History of only small varices but last EGD report says that patient had a post-banding scar.     Pt denies hematemesis, hemoptysis, fever, chills, BRBPR, rectal pain, rash. No current etoh use. Also noted to be hypoxic in the waiting area to 82% on RA, went to 100% on NRB. No PE risk factors, pt mildly SOB, speaking in full sentences.    Allergies:  levofloxacin (Other (Moderate))      Home Medications:  cephalexin 500 mg oral capsule: 1 cap(s) orally once a day (18 Apr 2019 09:06)  Levemir 100 units/mL subcutaneous solution: 80 unit(s) subcutaneous once a day (at bedtime) (18 Apr 2019 09:06)  metFORMIN 500 mg oral tablet: 1 tab(s) orally once a day (18 Apr 2019 09:06)  nadolol 20 mg oral tablet: 1 tab(s) orally once a day (18 Apr 2019 09:06)  pantoprazole 40 mg oral delayed release tablet: 1 tab(s) orally once a day (18 Apr 2019 09:06)  traMADol 50 mg oral tablet: 1 tab(s) orally 1 to 2 times a day, As Needed (18 Apr 2019 09:06)    Hospital Medications:  octreotide  Infusion 25 MICROgram(s)/Hr IV Continuous <Continuous>  pantoprazole  Injectable 40 milliGRAM(s) IV Push two times a day  sodium chloride 0.9%. 1000 milliLiter(s) IV Continuous <Continuous>      PMHX/PSHX:  E. coli bacteremia  Vitiligo  Latent tuberculosis  Diabetes mellitus type 2, insulin dependent  Esophageal varices  Alcoholic cirrhosis  Status post corneal transplant  Alcoholic Cirrhosis  Esophageal Varices      Family history:  Family history of uterine cancer  Family history of diabetes mellitus      Social History: no etoh/drugs/tob    ROS:     General:  No wt loss, fevers, chills, night sweats, fatigue,   Eyes:  Good vision, no reported pain  ENT:  No sore throat, pain, runny nose, dysphagia  CV:  No pain, palpitations, hypo/hypertension  Resp:  No dyspnea, cough, tachypnea, wheezing  GI:  See HPI  :  No pain, bleeding, incontinence, nocturia  Muscle:  No pain, weakness  Neuro:  No weakness, tingling, memory problems  Psych:  No fatigue, insomnia, mood problems, depression  Endocrine:  No polyuria, polydipsia, cold/heat intolerance  Heme:  No petechiae, ecchymosis, easy bruisability  Skin:  No rash, edema      PHYSICAL EXAM:     GENERAL: NAD  HEENT:  NC/AT,  conjunctivae clear and pink,  no JVD  CHEST:  Full & symmetric excursion, no increased effort  ABDOMEN:  Soft, non-tender, non-distended, normoactive bowel sounds,  no masses ,  EXTREMITIES:  no cyanosis,clubbing or edema  SKIN:  No rash  NEURO:  Alert, oriented    Vital Signs:  Vital Signs Last 24 Hrs  T(C): 36.8 (18 Apr 2019 09:12), Max: 36.8 (18 Apr 2019 09:12)  T(F): 98.3 (18 Apr 2019 09:12), Max: 98.3 (18 Apr 2019 09:12)  HR: 64 (18 Apr 2019 09:12) (64 - 94)  BP: 122/75 (18 Apr 2019 09:12) (122/75 - 149/84)  BP(mean): --  RR: 18 (18 Apr 2019 09:12) (18 - 20)  SpO2: 94% (18 Apr 2019 09:12) (82% - 98%)  Daily Height in cm: 172.72 (18 Apr 2019 05:17)    Daily     LABS:                        10.6   3.7   )-----------( 51       ( 18 Apr 2019 08:43 )             33.7     04-18    140  |  104  |  9   ----------------------------<  220<H>  4.3   |  22  |  0.52    Ca    8.2<L>      18 Apr 2019 08:43  Phos  4.4     04-18  Mg     1.5     04-18    TPro  6.2  /  Alb  3.4  /  TBili  1.9<H>  /  DBili  0.9<H>  /  AST  46<H>  /  ALT  22  /  AlkPhos  137<H>  04-18    LIVER FUNCTIONS - ( 18 Apr 2019 08:43 )  Alb: 3.4 g/dL / Pro: 6.2 g/dL / ALK PHOS: 137 U/L / ALT: 22 U/L / AST: 46 U/L / GGT: x           PT/INR - ( 18 Apr 2019 08:43 )   PT: 14.5 sec;   INR: 1.25 ratio         PTT - ( 18 Apr 2019 08:43 )  PTT:38.8 sec    Amylase Serum--      Lipase serum22       Ammonia--      Imaging:      < from: Xray Chest 1 View- PORTABLE-Urgent (04.17.19 @ 23:21) >    ******PRELIMINARY REPORT******    ******PRELIMINARY REPORT******          EXAM:  XR CHEST PORTABLE URGENT 1V                            PROCEDURE DATE:  04/17/2019      ******PRELIMINARY REPORT******    ******PRELIMINARY REPORT******              INTERPRETATION:  Clear lungs.    AK  ******PRELIMINARY REPORT******    ******PRELIMINARY REPORT******          JORDON MILLER M.D., RADIOLOGY RESIDENT      < end of copied text >

## 2019-04-18 NOTE — CHART NOTE - NSCHARTNOTEFT_GEN_A_CORE
Pt s/p EGD this morning.  Esophagus with <5mm nonbleeding varices in distal esophagus and mild GEJ esophagitis (nonbleeding).  Endoscopy aborted given large amount of food debris in stomach.  No clearly seen gastric varices.    Please give Reglan BID-TID today to clear stomach.  Clear liquid diet, NPO after midnight.  Will repeat EGD tomorrow for full evaluation

## 2019-04-18 NOTE — H&P ADULT - NSHPPHYSICALEXAM_GEN_ALL_CORE
PHYSICAL EXAM:  GENERAL: NAD, well-developed, well appearing gentleman resting comfortably in bed in NAD  HEAD:  Atraumatic, R side lip dried blood, no yellowing under tongue, nonicteric sclera, MMM, R eye blue, L eye brown  EYES: EOMI, PERRLA, conjunctiva and sclera clear  NECK: Supple, No JVD  CHEST/LUNG: Clear to auscultation bilaterally; No wheeze  HEART: Regular rate and rhythm; No murmurs, rubs, or gallops  ABDOMEN: Soft, Nontender, Nondistended; Bowel sounds present  EXTREMITIES:  2+ Peripheral Pulses, No clubbing, cyanosis, or edema  PSYCH: AAOx3  NEUROLOGY: non-focal  SKIN: vitiligo throughout body, bilateral shins showed pink/red scabs PHYSICAL EXAM:  GENERAL: NAD, well-developed, well appearing gentleman resting comfortably in bed in NAD  HEAD:  Atraumatic, R side lip dried blood, no yellowing under tongue, nonicteric sclera, MMM, R eye blue, L eye brown  EYES: EOMI, PERRLA, conjunctiva and sclera clear  NECK: Supple, No JVD  CHEST/LUNG: Clear to auscultation bilaterally; No wheeze  HEART: Regular rate and rhythm; No murmurs, rubs, or gallops  ABDOMEN: Soft, Nontender, Nondistended; Bowel sounds present, no hepatosplenomegaly  EXTREMITIES:  2+ Peripheral Pulses, No clubbing, cyanosis, or edema  PSYCH: AAOx3  NEUROLOGY: non-focal  SKIN: vitiligo throughout body, bilateral shins showed pink/red scabs

## 2019-04-18 NOTE — H&P ADULT - PROBLEM SELECTOR PLAN 5
GI: Protonix   DVT: Not indicated given low improve score and active bleeding  Dispo: likely home, ambulatory

## 2019-04-18 NOTE — PROGRESS NOTE ADULT - SUBJECTIVE AND OBJECTIVE BOX
Pre-Endoscopy Evaluation      Referring Physician: dr. elizabeth lyons                                   Procedure:  upper gastrointestinal endoscopy     Indication for Procedure: gib    Pertinent History: 42y male with PMH of Alcoholic Cirrhosis  c/b esophageal varices, perirectal varices, internal hemorrhoids, h/o C diff colitis, IDDM2, vitiligo, recurrent pancreatitis, h/o recurrent E coli bacteremia from unknown source presents with melena.      Sedation by Anesthesia [x]    PAST MEDICAL & SURGICAL HISTORY:  E. coli bacteremia: Recurrent  Vitiligo  Latent tuberculosis  Diabetes mellitus type 2, insulin dependent  Esophageal varices  Alcoholic cirrhosis  Status post corneal transplant      PMH of Gastroparesis [ ]  Gastric Surgery [ ]  Gastric Outlet Obstruction [ ]    Allergies:    levofloxacin (Other (Moderate))    Intolerances:    Latex allergy: [ ] yes [x] no    Medications:MEDICATIONS  (STANDING):  octreotide  Infusion 25 MICROgram(s)/Hr (5 mL/Hr) IV Continuous <Continuous>  pantoprazole  Injectable 40 milliGRAM(s) IV Push two times a day  sodium chloride 0.9%. 1000 milliLiter(s) (75 mL/Hr) IV Continuous <Continuous>    MEDICATIONS  (PRN):      Smoking: [ ] yes  [x] no    AICD/PPM: [ ] yes   [x] no    Pertinent lab data:                        10.6   3.7   )-----------( 51       ( 18 Apr 2019 08:43 )             33.7     04-18    140  |  104  |  9   ----------------------------<  220<H>  4.3   |  22  |  0.52    Ca    8.2<L>      18 Apr 2019 08:43  Phos  4.4     04-18  Mg     1.5     04-18    TPro  6.2  /  Alb  3.4  /  TBili  1.9<H>  /  DBili  0.9<H>  /  AST  46<H>  /  ALT  22  /  AlkPhos  137<H>  04-18    PT/INR - ( 18 Apr 2019 08:43 )   PT: 14.5 sec;   INR: 1.25 ratio       PTT - ( 18 Apr 2019 08:43 )  PTT:38.8 sec    < from: Transthoracic Echocardiogram (07.09.18 @ 20:08) >  ------------------------------------------------------------------------  Conclusions:  1. Severely dilated left atrium.  LA volume index = 55  cc/m2.  2. Left ventricular ejection fraction, by visual  estimation, is 55-60%.  No wall motion abnormality.  3. Normal diastolic function.  4. Mild right atrial enlargement.  5. Normal right ventricular size and function.  6. Normal pericardium with no pericardial effusion.  7. No obvious evidence of valvular vegetation is seen.  *** Compared with echocardiogram of 6/26/2017, no  significant changes noted.    < end of copied text >        Physical Examination:  Daily Height in cm: 172.72 (18 Apr 2019 05:17)    Daily   Vital Signs Last 24 Hrs  T(C): 36.8 (18 Apr 2019 09:12), Max: 36.8 (18 Apr 2019 09:12)  T(F): 98.3 (18 Apr 2019 09:12), Max: 98.3 (18 Apr 2019 09:12)  HR: 64 (18 Apr 2019 09:12) (64 - 94)  BP: 122/75 (18 Apr 2019 09:12) (122/75 - 149/84)  BP(mean): --  RR: 18 (18 Apr 2019 09:12) (18 - 20)  SpO2: 94% (18 Apr 2019 09:12) (82% - 98%)    Drug Dosing Weight  Height (cm): 172.72 (18 Apr 2019 05:17)  Weight (kg): 88.88103096 (18 Apr 2019 05:17)  BMI (kg/m2): 29.6 (18 Apr 2019 05:17)  BSA (m2): 2.02 (18 Apr 2019 05:17)    Constitutional: NAD     Neck:  No JVD    Respiratory: CTAB/L    Cardiovascular: S1 and S2    Gastrointestinal: BS+, soft, NT/ND    Extremities: No peripheral edema    Neurological: A/O x 3    : No Real    Skin: No rashes    Comments:    ASA Class: I [ ]  II [ ]  III [X]  IV [ ]    The patient is a suitable candidate for the planned procedure unless box checked [ ]  No, explain:

## 2019-04-18 NOTE — H&P ADULT - PROBLEM SELECTOR PLAN 2
etoh cirrhosis c/b portal htn. CTAP 4/17/19 showed revealing esophageal/perigastric and perisplenic varices, with trace ascites.   - MELD 17 (Cr 0.5, Bili 2.7, INR 1.28, Na 132) --> 6% 3 month mortality.   - Maddrey-DF 15.1 (PT 14.7, PT control 12, total bili 2.7), mild transaminitis and score <32 so no indication for steroids at this time  - c/w home nadolol 20mg  - hold keflex 500mg for now, s/p ctx in ed cirrhosis c/b portal htn. CTAP 4/17/19 showed revealing esophageal/perigastric and perisplenic varices, with trace ascites.   - MELD 17 (Cr 0.5, Bili 2.7, INR 1.28, Na 132) --> 6% 3 month mortality.   - Luisa-DF 15.1 (PT 14.7, PT control 12, total bili 2.7), mild transaminitis and score <32 so no indication for steroids at this time  - c/w home nadolol 20mg and CTX until EGD done   - hold home keflex 500mg for now

## 2019-04-18 NOTE — H&P ADULT - PROBLEM SELECTOR PLAN 1
P/w melena and FOBT positive. Recently admitted in 10/2018 for similar complaints s/p normal UEGD and capsule endoscopy.   - s/p octreotide gtt, IV protonix, CTx 1g x1 in ED   - No more episode of melena since admission and H/H stable   - GI consulted this am - awaiting recommendations  - C/w protonix infusion, daily cbc, transfuse if <7, active T&S P/w melena and FOBT positive. Recently admitted in 10/2018 for similar complaints s/p Upper EGD showing friable gastric mucosa  and capsule endoscopy (negative per pt).   - Likely 2/2 bleeding esophageal varices vs PUD.   - s/p octreotide gtt, IV protonix, CTx 1g x1 in ED   - H/H stable (at baseline, around 10-11)  - GI consulted this am - NPO for EGD today   - C/w protonix infusion, CTX and IVF NS  - daily cbc, transfuse if Hgb <7, active T&S P/w melena and FOBT positive. Recently admitted in 10/2018 for similar complaints s/p Upper EGD showing friable gastric mucosa  and capsule endoscopy (negative per pt).   - Likely 2/2 bleeding esophageal varices vs PUD.   - s/p octreotide gtt, IV protonix, CTx 1g x1 in ED   - H/H stable (at baseline, around 10-11)  - GI consulted this am - NPO for EGD today   - C/w protonix infusion, CTX and IVF NS  - cbc q6-8h, transfuse if Hgb <7, active T&S  - Daily cmp, coag, replete as needed

## 2019-04-18 NOTE — CONSULT NOTE ADULT - ASSESSMENT
Impression:  41 yo M hx alcoholic cirrhosis (previously on liver transplant list) c/b esophageal varices, perirectal varices, internal hemorrhoids, h/o C diff colitis, IDDM2, vitiligo, recurrent pancreatitis, h/o recurrent E coli bacteremia from unknown source presents with melena.    1) Melena, stable Hgb, could be oozing from portal gastropathy as in past, vs variceal bleeding, vs new small bowel source or right sided colon  2) Alcoholic cirrhosis, MELD-Na 11 this admission        Varices: present in past, recently <5mm, on nadolol 20mg daily for ppx       Ascites: none on exam       PSE: no history            HCC: none on MRI from 2018  3) h/o multiple infections (c diff, recurrent e coli bacteremia)    Recs:  - Will plan for EGD today for evaluation  - NPO   - PPI gtt, octreotide gtt, and CFX empirically given could be variceal source  - would obtain baseline blood cultures and UA as well  - hold nadolol for now  - daily CBC, CMP, INR for MELD-NA labs  - avoid hepatotoxic meds  - emphasize continued ETOH abstinence Impression:  41 yo M hx alcoholic cirrhosis (previously on liver transplant list) c/b esophageal varices, perirectal varices, internal hemorrhoids, h/o C diff colitis, IDDM2, vitiligo, recurrent pancreatitis, h/o recurrent E coli bacteremia from unknown source presents with melena.    1) Melena, stable Hgb, could be oozing from portal gastropathy as in past, vs variceal bleeding, vs new small bowel source or right sided colon  2) Alcoholic cirrhosis, MELD-Na 11 this admission        Varices: present in past, recently <5mm, on nadolol 20mg daily for ppx       Ascites: none on exam       PSE: no history            HCC: none on MRI from 2018  3) h/o multiple infections (c diff, recurrent e coli bacteremia)  4) Hypoxia - CTPE negative, CXR negative, consider hepatopulmonary syndrome    Recs:  - Will plan for EGD today for evaluation  - NPO   - PPI gtt, octreotide gtt, and CFX empirically given could be variceal source  - would obtain baseline blood cultures and UA as well  - hold nadolol for now  - please obtain ABG and TTE with bubble study to evaluate for hepatopulmonary syndrome  - daily CBC, CMP, INR for MELD-NA labs  - avoid hepatotoxic meds  - emphasize continued ETOH abstinence

## 2019-04-18 NOTE — H&P ADULT - ASSESSMENT
43M w/ DM2, alcoholic cirrhosis c/b esophageal, perisplenic and perigastric varices, GI bleed (most recent 10/2018 s/p 2u prbcs)  presenting with 1 day hx of melena, found to be hypoxic to 80s on RA and FOBT positive and stable H/H, with CTA chest negative for PE and CTAP revealing hepatic cirrhosis with thickened esophageal wall, portal HTN, and trace ascites. Presentation most likely 2/2 to upper GI bleed due to PUD vs. esophageal varices vs. lower GIB due to diverticulosis or angiodysplasia. 43M w/ DM2, alcoholic cirrhosis c/b esophageal, perisplenic and perigastric varices, GI bleed (most recent 10/2018 s/p 2u prbcs)  presenting with 1 day hx of melena, found to be hypoxic to 80s on RA and FOBT positive and stable H/H, with CTA chest negative for PE and CTAP revealing hepatic cirrhosis with thickened esophageal wall, portal HTN, and trace ascites. Presentation most likely 2/2 to upper GI bleed due to esophageal varices or PUD vs. less likely lower GIB due to diverticulosis or angiodysplasia.

## 2019-04-18 NOTE — H&P ADULT - NSHPREVIEWOFSYSTEMS_GEN_ALL_CORE
REVIEW OF SYSTEMS:    CONSTITUTIONAL: No weakness, fevers or chills  EYES/ENT: No visual changes;  No vertigo or throat pain   NECK: No pain or stiffness  RESPIRATORY: No cough, wheezing, hemoptysis; No shortness of breath  CARDIOVASCULAR: No chest pain or palpitations  GASTROINTESTINAL: +melena, No abdominal or epigastric pain. No nausea, vomiting, or hematemesis; No diarrhea or constipation. No hematochezia.  GENITOURINARY: No dysuria, frequency or hematuria  NEUROLOGICAL: No numbness or weakness  SKIN: No itching, burning, rashes, or lesions   All other review of systems is negative unless indicated above.

## 2019-04-19 ENCOUNTER — TRANSCRIPTION ENCOUNTER (OUTPATIENT)
Age: 44
End: 2019-04-19

## 2019-04-19 LAB
ALBUMIN SERPL ELPH-MCNC: 3.1 G/DL — LOW (ref 3.3–5)
ALP SERPL-CCNC: 106 U/L — SIGNIFICANT CHANGE UP (ref 40–120)
ALT FLD-CCNC: 19 U/L — SIGNIFICANT CHANGE UP (ref 10–45)
ANION GAP SERPL CALC-SCNC: 11 MMOL/L — SIGNIFICANT CHANGE UP (ref 5–17)
APTT BLD: 36.7 SEC — HIGH (ref 27.5–36.3)
AST SERPL-CCNC: 47 U/L — HIGH (ref 10–40)
BILIRUB SERPL-MCNC: 2.5 MG/DL — HIGH (ref 0.2–1.2)
BUN SERPL-MCNC: 9 MG/DL — SIGNIFICANT CHANGE UP (ref 7–23)
CALCIUM SERPL-MCNC: 7.8 MG/DL — LOW (ref 8.4–10.5)
CHLORIDE SERPL-SCNC: 103 MMOL/L — SIGNIFICANT CHANGE UP (ref 96–108)
CHOLEST SERPL-MCNC: 135 MG/DL — SIGNIFICANT CHANGE UP (ref 10–199)
CO2 SERPL-SCNC: 23 MMOL/L — SIGNIFICANT CHANGE UP (ref 22–31)
CREAT SERPL-MCNC: 0.56 MG/DL — SIGNIFICANT CHANGE UP (ref 0.5–1.3)
GLUCOSE BLDC GLUCOMTR-MCNC: 108 MG/DL — HIGH (ref 70–99)
GLUCOSE BLDC GLUCOMTR-MCNC: 115 MG/DL — HIGH (ref 70–99)
GLUCOSE BLDC GLUCOMTR-MCNC: 130 MG/DL — HIGH (ref 70–99)
GLUCOSE BLDC GLUCOMTR-MCNC: 328 MG/DL — HIGH (ref 70–99)
GLUCOSE BLDC GLUCOMTR-MCNC: 74 MG/DL — SIGNIFICANT CHANGE UP (ref 70–99)
GLUCOSE BLDC GLUCOMTR-MCNC: 81 MG/DL — SIGNIFICANT CHANGE UP (ref 70–99)
GLUCOSE SERPL-MCNC: 114 MG/DL — HIGH (ref 70–99)
HCT VFR BLD CALC: 29 % — LOW (ref 39–50)
HCT VFR BLD CALC: 29.8 % — LOW (ref 39–50)
HDLC SERPL-MCNC: 88 MG/DL — SIGNIFICANT CHANGE UP
HGB BLD-MCNC: 9.1 G/DL — LOW (ref 13–17)
HGB BLD-MCNC: 9.2 G/DL — LOW (ref 13–17)
INR BLD: 1.21 RATIO — HIGH (ref 0.88–1.16)
LIPID PNL WITH DIRECT LDL SERPL: 37 MG/DL — SIGNIFICANT CHANGE UP
MCHC RBC-ENTMCNC: 22.9 PG — LOW (ref 27–34)
MCHC RBC-ENTMCNC: 23.7 PG — LOW (ref 27–34)
MCHC RBC-ENTMCNC: 30.5 GM/DL — LOW (ref 32–36)
MCHC RBC-ENTMCNC: 31.7 GM/DL — LOW (ref 32–36)
MCV RBC AUTO: 74.9 FL — LOW (ref 80–100)
MCV RBC AUTO: 75.1 FL — LOW (ref 80–100)
NT-PROBNP SERPL-SCNC: 38 PG/ML — SIGNIFICANT CHANGE UP (ref 0–300)
PLATELET # BLD AUTO: 37 K/UL — LOW (ref 150–400)
PLATELET # BLD AUTO: 48 K/UL — LOW (ref 150–400)
POTASSIUM SERPL-MCNC: 3.5 MMOL/L — SIGNIFICANT CHANGE UP (ref 3.5–5.3)
POTASSIUM SERPL-SCNC: 3.5 MMOL/L — SIGNIFICANT CHANGE UP (ref 3.5–5.3)
PROT SERPL-MCNC: 5.8 G/DL — LOW (ref 6–8.3)
PROTHROM AB SERPL-ACNC: 14 SEC — HIGH (ref 10–13.1)
RBC # BLD: 3.87 M/UL — LOW (ref 4.2–5.8)
RBC # BLD: 3.97 M/UL — LOW (ref 4.2–5.8)
RBC # FLD: 17.8 % — HIGH (ref 10.3–14.5)
RBC # FLD: 19.6 % — HIGH (ref 10.3–14.5)
SODIUM SERPL-SCNC: 137 MMOL/L — SIGNIFICANT CHANGE UP (ref 135–145)
TOTAL CHOLESTEROL/HDL RATIO MEASUREMENT: 1.5 RATIO — LOW (ref 3.4–9.6)
TRIGL SERPL-MCNC: 49 MG/DL — SIGNIFICANT CHANGE UP (ref 10–149)
WBC # BLD: 2.3 K/UL — LOW (ref 3.8–10.5)
WBC # BLD: 2.79 K/UL — LOW (ref 3.8–10.5)
WBC # FLD AUTO: 2.3 K/UL — LOW (ref 3.8–10.5)
WBC # FLD AUTO: 2.79 K/UL — LOW (ref 3.8–10.5)

## 2019-04-19 PROCEDURE — 99233 SBSQ HOSP IP/OBS HIGH 50: CPT | Mod: GC

## 2019-04-19 PROCEDURE — 43235 EGD DIAGNOSTIC BRUSH WASH: CPT

## 2019-04-19 RX ORDER — INSULIN GLARGINE 100 [IU]/ML
20 INJECTION, SOLUTION SUBCUTANEOUS AT BEDTIME
Qty: 0 | Refills: 0 | Status: DISCONTINUED | OUTPATIENT
Start: 2019-04-19 | End: 2019-04-20

## 2019-04-19 RX ORDER — SODIUM CHLORIDE 9 MG/ML
1000 INJECTION INTRAMUSCULAR; INTRAVENOUS; SUBCUTANEOUS
Qty: 0 | Refills: 0 | Status: DISCONTINUED | OUTPATIENT
Start: 2019-04-19 | End: 2019-04-19

## 2019-04-19 RX ORDER — INSULIN GLARGINE 100 [IU]/ML
20 INJECTION, SOLUTION SUBCUTANEOUS ONCE
Qty: 0 | Refills: 0 | Status: COMPLETED | OUTPATIENT
Start: 2019-04-19 | End: 2019-04-19

## 2019-04-19 RX ORDER — POTASSIUM CHLORIDE 20 MEQ
40 PACKET (EA) ORAL ONCE
Qty: 0 | Refills: 0 | Status: COMPLETED | OUTPATIENT
Start: 2019-04-19 | End: 2019-04-19

## 2019-04-19 RX ORDER — PANTOPRAZOLE SODIUM 20 MG/1
40 TABLET, DELAYED RELEASE ORAL
Qty: 0 | Refills: 0 | Status: DISCONTINUED | OUTPATIENT
Start: 2019-04-19 | End: 2019-04-20

## 2019-04-19 RX ORDER — PROPRANOLOL HCL 160 MG
20 CAPSULE, EXTENDED RELEASE 24HR ORAL
Qty: 0 | Refills: 0 | Status: DISCONTINUED | OUTPATIENT
Start: 2019-04-19 | End: 2019-04-20

## 2019-04-19 RX ADMIN — PANTOPRAZOLE SODIUM 10 MG/HR: 20 TABLET, DELAYED RELEASE ORAL at 09:11

## 2019-04-19 RX ADMIN — SODIUM CHLORIDE 75 MILLILITER(S): 9 INJECTION INTRAMUSCULAR; INTRAVENOUS; SUBCUTANEOUS at 09:12

## 2019-04-19 RX ADMIN — INSULIN GLARGINE 20 UNIT(S): 100 INJECTION, SOLUTION SUBCUTANEOUS at 22:41

## 2019-04-19 RX ADMIN — Medication 20 MILLIGRAM(S): at 17:23

## 2019-04-19 RX ADMIN — CEFTRIAXONE 100 GRAM(S): 500 INJECTION, POWDER, FOR SOLUTION INTRAMUSCULAR; INTRAVENOUS at 02:07

## 2019-04-19 RX ADMIN — Medication 4: at 22:40

## 2019-04-19 RX ADMIN — Medication 10 MILLIGRAM(S): at 17:23

## 2019-04-19 RX ADMIN — INSULIN GLARGINE 20 UNIT(S): 100 INJECTION, SOLUTION SUBCUTANEOUS at 04:18

## 2019-04-19 RX ADMIN — Medication 40 MILLIEQUIVALENT(S): at 10:59

## 2019-04-19 NOTE — PROGRESS NOTE ADULT - SUBJECTIVE AND OBJECTIVE BOX
CONTACT Josy Sepulveda MD                                                                                                                                              Internal Medicine PGY-1   [facility] Pager [phone], [facility] Pager [phone]  On weekdays after 7pm and weekends after 12pm, please contact [phone]      Patient is a 43y old  Male who presents with a chief complaint of Melena (2019 09:57)      INTERVAL HPI/OVERNIGHT EVENTS:  - No acute events overnight   - received half of lantus dose given glu <100    T(C): 37.2 (19 @ 05:41), Max: 37.2 (19 @ 01:46)  HR: 70 (19 @ 05:41) (63 - 71)  BP: 111/73 (19 @ 05:41) (106/70 - 130/81)  RR: 18 (19 @ 05:41) (18 - 18)  SpO2: 93% (19 @ 05:41) (92% - 94%)        I&O's Summary    2019 07:01  -  2019 07:00  --------------------------------------------------------  IN: 1180 mL / OUT: 1450 mL / NET: -270 mL        LABS:                        10.6   3.4   )-----------( 48       ( 2019 15:13 )             34.2     -18    140  |  104  |  9   ----------------------------<  220<H>  4.3   |  22  |  0.52    Ca    8.2<L>      2019 08:43  Phos  4.4     18  Mg     1.5     18    TPro  6.2  /  Alb  3.4  /  TBili  1.9<H>  /  DBili  0.9<H>  /  AST  46<H>  /  ALT  22  /  AlkPhos  137<H>  18    PT/INR - ( 2019 08:43 )   PT: 14.5 sec;   INR: 1.25 ratio         PTT - ( 2019 08:43 )  PTT:38.8 sec  Urinalysis Basic - ( 2019 17:59 )    Color: Yellow / Appearance: Clear / S.018 / pH: x  Gluc: x / Ketone: Negative  / Bili: Negative / Urobili: Negative   Blood: x / Protein: Trace / Nitrite: Negative   Leuk Esterase: Negative / RBC: 12 /hpf / WBC 1 /HPF   Sq Epi: x / Non Sq Epi: 5 /hpf / Bacteria: Negative      CAPILLARY BLOOD GLUCOSE      POCT Blood Glucose.: 136 mg/dL (2019 04:23)  POCT Blood Glucose.: 90 mg/dL (2019 21:59)  POCT Blood Glucose.: 232 mg/dL (2019 18:44)  POCT Blood Glucose.: 115 mg/dL (2019 14:02)      Urinalysis Basic - ( 2019 17:59 )    Color: Yellow / Appearance: Clear / S.018 / pH: x  Gluc: x / Ketone: Negative  / Bili: Negative / Urobili: Negative   Blood: x / Protein: Trace / Nitrite: Negative   Leuk Esterase: Negative / RBC: 12 /hpf / WBC 1 /HPF   Sq Epi: x / Non Sq Epi: 5 /hpf / Bacteria: Negative        HOSPITAL MEDICATIONS:    MEDICATIONS  (STANDING):  cefTRIAXone   IVPB 1 Gram(s) IV Intermittent every 24 hours  dextrose 5%. 1000 milliLiter(s) (50 mL/Hr) IV Continuous <Continuous>  dextrose 50% Injectable 12.5 Gram(s) IV Push once  dextrose 50% Injectable 25 Gram(s) IV Push once  dextrose 50% Injectable 25 Gram(s) IV Push once  insulin glargine Injectable (LANTUS) 40 Unit(s) SubCutaneous at bedtime  insulin lispro (HumaLOG) corrective regimen sliding scale   SubCutaneous three times a day before meals  insulin lispro (HumaLOG) corrective regimen sliding scale   SubCutaneous at bedtime  metoclopramide 10 milliGRAM(s) Oral two times a day  pantoprazole Infusion 8 mG/Hr (10 mL/Hr) IV Continuous <Continuous>  sodium chloride 0.9%. 1000 milliLiter(s) (75 mL/Hr) IV Continuous <Continuous>      MEDICATIONS  (PRN):  dextrose 40% Gel 15 Gram(s) Oral once PRN Blood Glucose LESS THAN 70 milliGRAM(s)/deciliter  glucagon  Injectable 1 milliGRAM(s) IntraMuscular once PRN Glucose LESS THAN 70 milligrams/deciliter      HOME MEDICATIONS  Home Medications:  cephalexin 500 mg oral capsule: 1 cap(s) orally once a day (2019 09:06)  Levemir 100 units/mL subcutaneous solution: 80 unit(s) subcutaneous once a day (at bedtime) (2019 09:06)  metFORMIN 500 mg oral tablet: 1 tab(s) orally once a day (2019 09:06)  nadolol 20 mg oral tablet: 1 tab(s) orally once a day (2019 09:06)  pantoprazole 40 mg oral delayed release tablet: 1 tab(s) orally once a day (2019 09:06)  traMADol 50 mg oral tablet: 1 tab(s) orally 1 to 2 times a day, As Needed (2019 09:06) CONTACT Josy Sepulveda MD                                                                                                                                              Internal Medicine PGY-1   [facility] Pager [phone], [facility] Pager [phone]  On weekdays after 7pm and weekends after 12pm, please contact [phone]      Patient is a 43y old  Male who presents with a chief complaint of Melena (2019 09:57)      INTERVAL HPI/OVERNIGHT EVENTS:  - No acute events overnight   - received half of lantus dose given glu <100  - No complaints, denies cp, sob, ha, dizziness, n/v   - NPO for repeat endoscopy today     T(C): 37.2 (19 @ 05:41), Max: 37.2 (19 @ 01:46)  HR: 70 (19 @ 05:41) (63 - 71)  BP: 111/73 (19 @ 05:41) (106/70 - 130/81)  RR: 18 (19 @ 05:41) (18 - 18)  SpO2: 93% (19 @ 05:41) (92% - 94%)    PHYSICAL EXAM:  GENERAL: NAD, well-developed, well appearing gentleman resting comfortably in bed in NAD  HEAD:  Atraumatic, R side lip dried blood, no yellowing under tongue, nonicteric sclera, MMM, R eye blue, L eye brown  EYES: EOMI, PERRLA, conjunctiva and sclera clear  NECK: Supple, No JVD  CHEST/LUNG: Clear to auscultation bilaterally; No wheeze  HEART: Regular rate and rhythm; No murmurs, rubs, or gallops  ABDOMEN: Soft, Nontender, Nondistended; Bowel sounds present, no hepatosplenomegaly  EXTREMITIES:  2+ Peripheral Pulses, No clubbing, cyanosis, or edema  PSYCH: AAOx3  NEUROLOGY: non-focal  SKIN: vitiligo throughout body, bilateral shins showed pink/red scabs    I&O's Summary    2019 07:01  -  2019 07:00  --------------------------------------------------------  IN: 1180 mL / OUT: 1450 mL / NET: -270 mL        LABS:                        10.6   3.4   )-----------( 48       ( 2019 15:13 )             34.2         140  |  104  |  9   ----------------------------<  220<H>  4.3   |  22  |  0.52    Ca    8.2<L>      2019 08:43  Phos  4.4       Mg     1.5         TPro  6.2  /  Alb  3.4  /  TBili  1.9<H>  /  DBili  0.9<H>  /  AST  46<H>  /  ALT  22  /  AlkPhos  137<H>      PT/INR - ( 2019 08:43 )   PT: 14.5 sec;   INR: 1.25 ratio         PTT - ( 2019 08:43 )  PTT:38.8 sec  Urinalysis Basic - ( 2019 17:59 )    Color: Yellow / Appearance: Clear / S.018 / pH: x  Gluc: x / Ketone: Negative  / Bili: Negative / Urobili: Negative   Blood: x / Protein: Trace / Nitrite: Negative   Leuk Esterase: Negative / RBC: 12 /hpf / WBC 1 /HPF   Sq Epi: x / Non Sq Epi: 5 /hpf / Bacteria: Negative      CAPILLARY BLOOD GLUCOSE      POCT Blood Glucose.: 136 mg/dL (2019 04:23)  POCT Blood Glucose.: 90 mg/dL (2019 21:59)  POCT Blood Glucose.: 232 mg/dL (2019 18:44)  POCT Blood Glucose.: 115 mg/dL (2019 14:02)      Urinalysis Basic - ( 2019 17:59 )    Color: Yellow / Appearance: Clear / S.018 / pH: x  Gluc: x / Ketone: Negative  / Bili: Negative / Urobili: Negative   Blood: x / Protein: Trace / Nitrite: Negative   Leuk Esterase: Negative / RBC: 12 /hpf / WBC 1 /HPF   Sq Epi: x / Non Sq Epi: 5 /hpf / Bacteria: Negative        HOSPITAL MEDICATIONS:    MEDICATIONS  (STANDING):  cefTRIAXone   IVPB 1 Gram(s) IV Intermittent every 24 hours  dextrose 5%. 1000 milliLiter(s) (50 mL/Hr) IV Continuous <Continuous>  dextrose 50% Injectable 12.5 Gram(s) IV Push once  dextrose 50% Injectable 25 Gram(s) IV Push once  dextrose 50% Injectable 25 Gram(s) IV Push once  insulin glargine Injectable (LANTUS) 40 Unit(s) SubCutaneous at bedtime  insulin lispro (HumaLOG) corrective regimen sliding scale   SubCutaneous three times a day before meals  insulin lispro (HumaLOG) corrective regimen sliding scale   SubCutaneous at bedtime  metoclopramide 10 milliGRAM(s) Oral two times a day  pantoprazole Infusion 8 mG/Hr (10 mL/Hr) IV Continuous <Continuous>  sodium chloride 0.9%. 1000 milliLiter(s) (75 mL/Hr) IV Continuous <Continuous>      MEDICATIONS  (PRN):  dextrose 40% Gel 15 Gram(s) Oral once PRN Blood Glucose LESS THAN 70 milliGRAM(s)/deciliter  glucagon  Injectable 1 milliGRAM(s) IntraMuscular once PRN Glucose LESS THAN 70 milligrams/deciliter      HOME MEDICATIONS  Home Medications:  cephalexin 500 mg oral capsule: 1 cap(s) orally once a day (2019 09:06)  Levemir 100 units/mL subcutaneous solution: 80 unit(s) subcutaneous once a day (at bedtime) (2019 09:06)  metFORMIN 500 mg oral tablet: 1 tab(s) orally once a day (2019 09:06)  nadolol 20 mg oral tablet: 1 tab(s) orally once a day (2019 09:06)  pantoprazole 40 mg oral delayed release tablet: 1 tab(s) orally once a day (2019 09:06)  traMADol 50 mg oral tablet: 1 tab(s) orally 1 to 2 times a day, As Needed (2019 09:06)    < from: Upper Endoscopy (19 @ 11:03) >                                                                                         Findings:       The upper third of the esophagus and middle third of the esophagus were normal.       Small (< 5 mm)varices were found in the lower third of the esophagus.       Esophagitis with no bleeding was found at the gastroesophageal junction.       A large amount of food (residue) was found in the gastric fundus.       There were no obvious gastric varices.                                                                                                        Impression:          - The procedure was aborted due to presence of food.                       - Normal upper third of esophagus and middle thirdof esophagus.                       - Small (< 5 mm) esophageal varices. No active bleeding or evidence of                        previous bleeding.                       - Mild esophagitis at GEJ without active bleeidng.                       - A large amount of food (residue) in the stomach.  Recommendation:      - [facility]                       - Give Reglan 8mg IV BID today to clear stomach.                       - Clear liquid diet, NPO after midnight.                       - Repeat EGD tomorrow for full evaluation.

## 2019-04-19 NOTE — DISCHARGE NOTE PROVIDER - NSDCCPCAREPLAN_GEN_ALL_CORE_FT
PRINCIPAL DISCHARGE DIAGNOSIS  Diagnosis: Gastrointestinal bleeding, upper  Assessment and Plan of Treatment: You presented with complaints of dark stools, were found to have low red blood cell count. You had an upper endoscopy that did not show any evidence of acute bleeding. Please follow up with your GI dr. Carty within 2 weeks of discharge.      SECONDARY DISCHARGE DIAGNOSES  Diagnosis: Hypoxia  Assessment and Plan of Treatment: You were found have low oxygen levels. You had a CT scan of the chest with contrast that did not show evidence of blood clot in your lungs. Please follow up with your primary care doctor Dr. Woods within 2 week of discharge.    Diagnosis: Diabetes mellitus type 2, insulin dependent  Assessment and Plan of Treatment: You were not allowed to eat solid foods for most of this admission, therefore your lantus requirements were decreased to prevent hypoglycemia or very low glucose levels. Please check your blood sugars at home. Please continue to take your medications at home and follow up with your primary care doctor in 1-2 weeks.    Diagnosis: Alcoholic cirrhosis  Assessment and Plan of Treatment: You had a CT scan of your abdomen that showed cirrhosis in your liver. Please follow up with your GI dr. carty within 2 weeks of discharge. PRINCIPAL DISCHARGE DIAGNOSIS  Diagnosis: Gastrointestinal bleeding, upper  Assessment and Plan of Treatment: You presented with complaints of dark stools, were found to have low red blood cell count. You had an upper endoscopy that did not show any evidence of acute bleeding. Please follow up with your GI dr. Carty within 2 weeks of discharge.      SECONDARY DISCHARGE DIAGNOSES  Diagnosis: Hypoxia  Assessment and Plan of Treatment: You were found have low oxygen levels. You had a CT scan of the chest with contrast that did not show evidence of blood clot in your lungs. Please follow up with your primary care doctor Dr. Woods within 2 week of discharge. You should be evaluated to see if the low oxygen level is related to your liver condition, and may require a transthroactic echocardiogram.    Diagnosis: Diabetes mellitus type 2, insulin dependent  Assessment and Plan of Treatment: You were not allowed to eat solid foods for most of this admission, therefore your lantus requirements were decreased to prevent hypoglycemia or very low glucose levels. Please check your blood sugars at home. Please continue to take your medications at home and follow up with your primary care doctor in 1-2 weeks.    Diagnosis: Alcoholic cirrhosis  Assessment and Plan of Treatment: You had a CT scan of your abdomen that showed cirrhosis in your liver. Please follow up with your GI dr. carty within 2 weeks of discharge. PRINCIPAL DISCHARGE DIAGNOSIS  Diagnosis: Gastrointestinal bleeding, upper  Assessment and Plan of Treatment: You presented with complaints of dark stool. There was concern for possible bleeding from your upper gastrointestinal track but no source or evidence of bleeding was found when you had an endoscopy. You should continue taking pantoprazole and nadolol for prevention of ulcers and bleeding from your varices. Please follow up with gastroenterology doctor within 1-2 weeks. If you notice any further bleeding, dark stools or feel lightheaded or dizzy or have any chest pain please present a physcian to be further evalauted      SECONDARY DISCHARGE DIAGNOSES  Diagnosis: Hypoxia  Assessment and Plan of Treatment: You were found have low oxygen levels. You had a CT scan of the chest with contrast that did not show evidence of blood clot in your lungs. Please follow up with your primary care doctor Dr. Woods within 2 week of discharge. You should be evaluated to see if the low oxygen level is related to your liver condition, and may require a transthroactic echocardiogram.    Diagnosis: Diabetes mellitus type 2, insulin dependent  Assessment and Plan of Treatment: You were not allowed to eat solid foods for most of this admission, therefore your lantus requirements were decreased to prevent hypoglycemia or very low glucose levels. Please check your blood sugars at home. Please continue to take your medications at home and follow up with your primary care doctor in 1-2 weeks.    Diagnosis: Alcoholic cirrhosis  Assessment and Plan of Treatment: You had a CT scan of your abdomen that showed cirrhosis in your liver. Please follow up with your GI dr. carty within 2 weeks of discharge.    Diagnosis: History of bacteremia  Assessment and Plan of Treatment: You have a history of bacteria in your blood and you take cephalexin daily as instructed by your infectious disease doctor. Continue taking your cephalexin as prescribed and please follow up with infectious disease doctor within 2-3 weeks further evaluation of your history of bacteria in your blood and need for prophyalactic antibiotic

## 2019-04-19 NOTE — PROGRESS NOTE ADULT - PROBLEM SELECTOR PLAN 3
Resolved- Unclear etiology, p/w SOB in ED, has now resolved but continues to desaturate to high 80s off supplemental oxygen. Less likely PE given negative CTA chest; possible intrinsic lung disease vs cardiac cirrhosis given last TTE 7/2018 with bordeline pHTN, LA enlargement and preserved EF 55-60%.   - Euvolemic on exam with clear lungs on exam and imaging.   - currently stable on 2L NC   - Wean off O2 as tolerated   - pro-BNP wnl   - pending TTE w/ definity and bubble study to r/o shunting Resolved- Unclear etiology, p/w SOB in ED, has now resolved but continues to desaturate to high 80s off supplemental oxygen. Less likely PE given negative CTA chest; possible intrinsic lung disease vs cardiac cirrhosis given last TTE 7/2018 with bordeline pHTN, LA enlargement and preserved EF 55-60%.   - Euvolemic on exam with clear lungs on exam and imaging.   - Weaned off supplemental O2 and stable >90% on RA  - pending TTE w/ definity and bubble study to r/o shunting  - pro-BNP wnl

## 2019-04-19 NOTE — DISCHARGE NOTE PROVIDER - HOSPITAL COURSE
43M w/ DM2, alcoholic cirrhosis c/b esophageal, perisplenic and perigastric varices, GI bleed (most recent 10/2018 s/p 2u prbcs)  presenting with 1 day hx of melena, found to be hypoxic to 80s on RA and FOBT positive and stable H/H, with CTA chest negative for PE and CTAP revealing hepatic cirrhosis with thickened esophageal wall, portal HTN, and trace ascites. Presentation most likely 2/2 to upper GI bleed but upper EGD did not show evidence of active bleeding. 44 yo male with PMH of cirrhosis 2/2 etoh use, previous GI bleed, h/o esophageal varices, DM, presents to the ED for melena x several days and epigastric pain. States the abd pain is c/w previous GI bleeds, has had endoscopies and pill camera capsule studies in the past that were negative. History of only small varices but last EGD report says that patient had a post-banding scar.         Pt denies hematemesis, hemoptysis, fever, chills, BRBPR, rectal pain, rash. No current etoh use. Also noted to be hypoxic in the waiting area to 82% on RA, CTA showed no PE, CT abdomen w/ mildly distended & thickened esophageal wall, hepatic cirrhosis w/ portal HTN & partially imaged recanalized paraumbilical vein, splenomegaly, trace ascites. Given prophylactic ceftriaxone 1g x1, lantus 40u, morphine, octreotide, pantoprazole, and IV NS.         ED Course: 97.9F, 94, 149/84, 18, 82% RA, 100% NRB. CBC 11.6, 35.5, 73.3, 62. FOBT positive, ECG wnl, Prelim read CXR wnl, RVP negative. , CT abdomen w/ mildly distended & thickened esophageal wall, hepatic cirrhosis w/ portal HTN & partially imaged recanalized paraumbilical vein, splenomegaly, trace ascites. Given prophylactic ceftriaxone 1g x1, lantus 40u, morphine, octreotide, pantoprazole, and IV NS.             43M w/ DM2, alcoholic cirrhosis c/b esophageal, perisplenic and perigastric varices, GI bleed (most recent 10/2018 s/p 2u prbcs)  presenting with 1 day hx of melena, found to be hypoxic to 80s on RA and FOBT positive and stable H/H, with CTA chest negative for PE and CTAP revealing hepatic cirrhosis with thickened esophageal wall, portal HTN, and trace ascites. Presentation most likely 2/2 to upper GI bleed but upper EGD did not show evidence of active bleeding. 44 yo male with PMH of cirrhosis 2/2 etoh use, previous GI bleed, h/o esophageal varices, DM, presents to the ED for melena x several days and epigastric pain. States the abd pain is c/w previous GI bleeds, has had endoscopies and pill camera capsule studies in the past that were negative. History of only small varices but last EGD report says that patient had a post-banding scar. Was also noted to be hypoxic to 82% on RA, CTA showed no PE, CT abdomen w/ mildly distended & thickened esophageal wall, hepatic cirrhosis w/ portal HTN & partially imaged recanalized paraumbilical vein, splenomegaly, trace ascites. Was given prophylactic ceftriaxone started on octreotide and PPI, empirically for bleeding 2/2 portal gastropathy a vs variceal bleeding. Underwent EGD on 4/19 that did not identify any source of bleeding noted, remarkable for small esophageal varices without  evidence of recent bleed. Hgb remained stable. Patient remained hemodynamically stable. Was no longer hypoxic. And was discharged to home. 44 yo male with PMH of cirrhosis 2/2 etoh use, previous GI bleed, h/o esophageal varices, DM, presents to the ED for melena x several days and epigastric pain. States the abd pain is c/w previous GI bleeds, has had endoscopies and pill camera capsule studies in the past that were negative. History of only small varices but last EGD report says that patient had a post-banding scar. Was also noted to be hypoxic to 82% on RA, CTA showed no PE, CT abdomen w/ mildly distended & thickened esophageal wall, hepatic cirrhosis w/ portal HTN & partially imaged recanalized paraumbilical vein, splenomegaly, trace ascites. Was given prophylactic ceftriaxone started on octreotide and PPI, empirically for bleeding 2/2 portal gastropathy a vs variceal bleeding. Underwent EGD on 4/19 that did not identify any source of bleeding noted, remarkable for small esophageal varices without  evidence of recent bleed. Hgb remained stable. Patient remained remained hemodynamically stable. Was no longer hypoxic and ambulator pulse oximetry on room air was noted to be between 93-95%. He is stable for discharge and was instructed to follow up closely with hepatology, gastroenterology, infectious disease and pulmonology. 42 yo male with PMH of cirrhosis 2/2 etoh use, previous GI bleed, h/o esophageal varices, DM, presents to the ED for melena x several days and epigastric pain. States the abd pain is c/w previous GI bleeds, has had endoscopies and pill camera capsule studies in the past that were negative. History of only small varices but last EGD report says that patient had a post-banding scar. Was also noted to be hypoxic to 82% on RA, CTA showed no PE, CT abdomen w/ mildly distended & thickened esophageal wall, hepatic cirrhosis w/ portal HTN & partially imaged recanalized paraumbilical vein, splenomegaly, trace ascites. Was given prophylactic ceftriaxone started on octreotide and PPI, empirically for bleeding 2/2 portal gastropathy a vs variceal bleeding. Underwent EGD on 4/19 that did not identify any source of bleeding noted, remarkable for small esophageal varices without  evidence of recent bleed. Hgb remained stable. Patient remained remained hemodynamically stable. Was no longer hypoxic and ambulator pulse oximetry on room air was noted to be between 93-95% and he was determiend to be stable for discharged, and instructed on close outpatient follow-up.

## 2019-04-19 NOTE — DISCHARGE NOTE PROVIDER - PROVIDER TOKENS
PROVIDER:[TOKEN:[[id number]],FOLLOWUP:[2 weeks]],PROVIDER:[TOKEN:[[id number]],FOLLOWUP:[2 weeks]] PROVIDER:[TOKEN:[[id number]],FOLLOWUP:[2 weeks]],PROVIDER:[TOKEN:[[id number]],FOLLOWUP:[2 weeks]],PROVIDER:[TOKEN:[[id number]]]

## 2019-04-19 NOTE — DISCHARGE NOTE PROVIDER - CARE PROVIDER_API CALL
Ramírez Day (MD)  Gastroenterology; Internal Medicine; Transplant Hepatology  [address] [place] [address]  Phone: [phone]  Fax: [phone]  Follow Up Time: 2 weeks    Rj Woods)  Medicine  [facility] Medicine  [address]  [place] [address]  Phone: [phone]  Fax: [phone]  Follow Up Time: 2 weeks Ramírez Day (MD)  Gastroenterology; Internal Medicine; Transplant Hepatology  [address] [place] [address]  Phone: [phone]  Fax: [phone]  Follow Up Time: 2 weeks    Rj Woods)  Medicine  [facility] Medicine  [address]  [place] [address]  Phone: [phone]  Fax: [phone]  Follow Up Time: 2 weeks Ramírez Day)  Gastroenterology; Internal Medicine; Transplant Hepatology  [address] [place] [address]  Phone: [phone]  Fax: [phone]  Follow Up Time: 2 weeks    Rj Woods)  Medicine  Gen [facility] Medicine  [address]  [place] [address]  Phone: [phone]  Fax: [phone]  Follow Up Time: 2 weeks    Satya Boss)  Infectious Disease; Internal Medicine  [address] [place] [address]  Phone: [phone]  Fax: [phone]  Follow Up Time:

## 2019-04-20 ENCOUNTER — TRANSCRIPTION ENCOUNTER (OUTPATIENT)
Age: 44
End: 2019-04-20

## 2019-04-20 VITALS
TEMPERATURE: 98 F | HEART RATE: 73 BPM | SYSTOLIC BLOOD PRESSURE: 110 MMHG | RESPIRATION RATE: 18 BRPM | DIASTOLIC BLOOD PRESSURE: 68 MMHG | OXYGEN SATURATION: 95 %

## 2019-04-20 DIAGNOSIS — Z87.898 PERSONAL HISTORY OF OTHER SPECIFIED CONDITIONS: ICD-10-CM

## 2019-04-20 LAB
ALBUMIN SERPL ELPH-MCNC: 3.4 G/DL — SIGNIFICANT CHANGE UP (ref 3.3–5)
ALP SERPL-CCNC: 137 U/L — HIGH (ref 40–120)
ALT FLD-CCNC: 29 U/L — SIGNIFICANT CHANGE UP (ref 10–45)
ANION GAP SERPL CALC-SCNC: 10 MMOL/L — SIGNIFICANT CHANGE UP (ref 5–17)
AST SERPL-CCNC: 45 U/L — HIGH (ref 10–40)
BILIRUB SERPL-MCNC: 1.7 MG/DL — HIGH (ref 0.2–1.2)
BUN SERPL-MCNC: 6 MG/DL — LOW (ref 7–23)
CALCIUM SERPL-MCNC: 8.2 MG/DL — LOW (ref 8.4–10.5)
CHLORIDE SERPL-SCNC: 107 MMOL/L — SIGNIFICANT CHANGE UP (ref 96–108)
CO2 SERPL-SCNC: 22 MMOL/L — SIGNIFICANT CHANGE UP (ref 22–31)
CREAT SERPL-MCNC: 0.65 MG/DL — SIGNIFICANT CHANGE UP (ref 0.5–1.3)
GLUCOSE BLDC GLUCOMTR-MCNC: 135 MG/DL — HIGH (ref 70–99)
GLUCOSE BLDC GLUCOMTR-MCNC: 247 MG/DL — HIGH (ref 70–99)
GLUCOSE SERPL-MCNC: 133 MG/DL — HIGH (ref 70–99)
HCT VFR BLD CALC: 30.1 % — LOW (ref 39–50)
HCT VFR BLD CALC: 30.6 % — LOW (ref 39–50)
HGB BLD-MCNC: 9.2 G/DL — LOW (ref 13–17)
HGB BLD-MCNC: 9.8 G/DL — LOW (ref 13–17)
MAGNESIUM SERPL-MCNC: 1.8 MG/DL — SIGNIFICANT CHANGE UP (ref 1.6–2.6)
MCHC RBC-ENTMCNC: 22.9 PG — LOW (ref 27–34)
MCHC RBC-ENTMCNC: 24.8 PG — LOW (ref 27–34)
MCHC RBC-ENTMCNC: 30.1 GM/DL — LOW (ref 32–36)
MCHC RBC-ENTMCNC: 32.6 GM/DL — SIGNIFICANT CHANGE UP (ref 32–36)
MCV RBC AUTO: 76.1 FL — LOW (ref 80–100)
MCV RBC AUTO: 76.3 FL — LOW (ref 80–100)
PHOSPHATE SERPL-MCNC: 3.3 MG/DL — SIGNIFICANT CHANGE UP (ref 2.5–4.5)
PLATELET # BLD AUTO: 36 K/UL — LOW (ref 150–400)
PLATELET # BLD AUTO: 47 K/UL — LOW (ref 150–400)
POTASSIUM SERPL-MCNC: 3.9 MMOL/L — SIGNIFICANT CHANGE UP (ref 3.5–5.3)
POTASSIUM SERPL-SCNC: 3.9 MMOL/L — SIGNIFICANT CHANGE UP (ref 3.5–5.3)
PROT SERPL-MCNC: 6 G/DL — SIGNIFICANT CHANGE UP (ref 6–8.3)
RBC # BLD: 3.95 M/UL — LOW (ref 4.2–5.8)
RBC # BLD: 4.02 M/UL — LOW (ref 4.2–5.8)
RBC # FLD: 18.6 % — HIGH (ref 10.3–14.5)
RBC # FLD: 20.2 % — HIGH (ref 10.3–14.5)
SODIUM SERPL-SCNC: 139 MMOL/L — SIGNIFICANT CHANGE UP (ref 135–145)
WBC # BLD: 2.9 K/UL — LOW (ref 3.8–10.5)
WBC # BLD: 3.1 K/UL — LOW (ref 3.8–10.5)
WBC # FLD AUTO: 2.9 K/UL — LOW (ref 3.8–10.5)
WBC # FLD AUTO: 3.1 K/UL — LOW (ref 3.8–10.5)

## 2019-04-20 PROCEDURE — 85730 THROMBOPLASTIN TIME PARTIAL: CPT

## 2019-04-20 PROCEDURE — 80053 COMPREHEN METABOLIC PANEL: CPT

## 2019-04-20 PROCEDURE — 96374 THER/PROPH/DIAG INJ IV PUSH: CPT | Mod: XU

## 2019-04-20 PROCEDURE — 87581 M.PNEUMON DNA AMP PROBE: CPT

## 2019-04-20 PROCEDURE — 85027 COMPLETE CBC AUTOMATED: CPT

## 2019-04-20 PROCEDURE — 83036 HEMOGLOBIN GLYCOSYLATED A1C: CPT

## 2019-04-20 PROCEDURE — 82962 GLUCOSE BLOOD TEST: CPT

## 2019-04-20 PROCEDURE — 87040 BLOOD CULTURE FOR BACTERIA: CPT

## 2019-04-20 PROCEDURE — 99285 EMERGENCY DEPT VISIT HI MDM: CPT | Mod: 25

## 2019-04-20 PROCEDURE — 99233 SBSQ HOSP IP/OBS HIGH 50: CPT

## 2019-04-20 PROCEDURE — 84100 ASSAY OF PHOSPHORUS: CPT

## 2019-04-20 PROCEDURE — 93005 ELECTROCARDIOGRAM TRACING: CPT

## 2019-04-20 PROCEDURE — 96376 TX/PRO/DX INJ SAME DRUG ADON: CPT | Mod: XU

## 2019-04-20 PROCEDURE — 81001 URINALYSIS AUTO W/SCOPE: CPT

## 2019-04-20 PROCEDURE — 83735 ASSAY OF MAGNESIUM: CPT

## 2019-04-20 PROCEDURE — 87798 DETECT AGENT NOS DNA AMP: CPT

## 2019-04-20 PROCEDURE — 86900 BLOOD TYPING SEROLOGIC ABO: CPT

## 2019-04-20 PROCEDURE — 82272 OCCULT BLD FECES 1-3 TESTS: CPT

## 2019-04-20 PROCEDURE — 87633 RESP VIRUS 12-25 TARGETS: CPT

## 2019-04-20 PROCEDURE — 80061 LIPID PANEL: CPT

## 2019-04-20 PROCEDURE — 96375 TX/PRO/DX INJ NEW DRUG ADDON: CPT | Mod: XU

## 2019-04-20 PROCEDURE — 85610 PROTHROMBIN TIME: CPT

## 2019-04-20 PROCEDURE — 87486 CHLMYD PNEUM DNA AMP PROBE: CPT

## 2019-04-20 PROCEDURE — 71275 CT ANGIOGRAPHY CHEST: CPT

## 2019-04-20 PROCEDURE — 83690 ASSAY OF LIPASE: CPT

## 2019-04-20 PROCEDURE — 71045 X-RAY EXAM CHEST 1 VIEW: CPT

## 2019-04-20 PROCEDURE — 86901 BLOOD TYPING SEROLOGIC RH(D): CPT

## 2019-04-20 PROCEDURE — 83880 ASSAY OF NATRIURETIC PEPTIDE: CPT

## 2019-04-20 PROCEDURE — 86850 RBC ANTIBODY SCREEN: CPT

## 2019-04-20 PROCEDURE — 82248 BILIRUBIN DIRECT: CPT

## 2019-04-20 RX ORDER — TRAMADOL HYDROCHLORIDE 50 MG/1
1 TABLET ORAL
Qty: 0 | Refills: 0 | COMMUNITY

## 2019-04-20 RX ADMIN — Medication 20 MILLIGRAM(S): at 15:01

## 2019-04-20 RX ADMIN — CEFTRIAXONE 100 GRAM(S): 500 INJECTION, POWDER, FOR SOLUTION INTRAMUSCULAR; INTRAVENOUS at 02:04

## 2019-04-20 RX ADMIN — Medication 4: at 12:35

## 2019-04-20 RX ADMIN — Medication 10 MILLIGRAM(S): at 06:28

## 2019-04-20 RX ADMIN — PANTOPRAZOLE SODIUM 40 MILLIGRAM(S): 20 TABLET, DELAYED RELEASE ORAL at 06:28

## 2019-04-20 NOTE — PROGRESS NOTE ADULT - ASSESSMENT
41 yo M hx alcoholic cirrhosis (previously on liver transplant list) c/b esophageal varices, perirectal varices, internal hemorrhoids, h/o C diff colitis, IDDM2, vitiligo, recurrent pancreatitis, h/o recurrent E coli bacteremia from unknown source presents with melena.    IMPRESSION  1) Melena, stable Hgb, s/p EGD on 4/19/2019 with No active bleeding or etiology of bleeding noted. Small varices seen and portal gastropathy present     2) Alcoholic cirrhosis, MELD-Na 11 this admission        Varices: small <5mm, on nadolol 20mg daily for ppx       Ascites: none on exam       PSE: no history            HCC: none on MRI from 2018  3) h/o multiple infections (c diff, recurrent e coli bacteremia)  4) Hypoxia - CTPE negative, CXR negative, consider hepatopulmonary syndrome    RECOMMENDATION    - daily CBC, CMP, INR for MELD-NA labs  - continue PPI gtt, and Ceftriaxone for GI bleeding  - If patient bleeds, will need to go back and band small GEJ varix seen.  - please obtain ABG and TTE with bubble study to evaluate for hepatopulmonary syndrome  - avoid hepatotoxic meds  - emphasize continued ETOH abstinence  - supportive care as per primary team    Cuba Quiroz, PGY-5  GI fellow  B- [id number]/ [phone]  Please call GI fellow on call after 5pm and on weekends
43M w/ DM2, alcoholic cirrhosis c/b esophageal, perisplenic and perigastric varices, GI bleed (most recent 10/2018 s/p 2u prbcs)  presenting with 1 day hx of melena, found to be hypoxic to 80s on RA and FOBT positive and stable H/H, with CTA chest negative for PE and CTAP revealing cirrhosis with thickened esophageal wall, portal HTN, and trace ascites now s/p EGD on 4/20 without any source of bleeding noted, remarkable for small esophageal varices without also evidence of recent bleed
43M w/ DM2, alcoholic cirrhosis c/b esophageal, perisplenic and perigastric varices, GI bleed (most recent 10/2018 s/p 2u prbcs)  presenting with 1 day hx of melena, found to be hypoxic to 80s on RA and FOBT positive and stable H/H, with CTA chest negative for PE and CTAP revealing hepatic cirrhosis with thickened esophageal wall, portal HTN, and trace ascites. Presentation most likely 2/2 to upper GI bleed due to esophageal varices or PUD vs. less likely lower GIB due to diverticulosis or angiodysplasia.

## 2019-04-20 NOTE — PROGRESS NOTE ADULT - SUBJECTIVE AND OBJECTIVE BOX
Chief Complaint:  Patient is a 43y old  Male who presents with a chief complaint of Melena (2019 16:03)      Interval Events:   - patient underwent an upper endoscopy with no complications     HPI: from consult note   44 yo male with PMH of cirrhosis 2/2 etoh use, previous GI bleed, h/o esophageal varices, DM, presents to the ED for melena x several days and epigastric pain. States the abd pain is c/w previous GI bleeds, has had endoscopies and pill camera capsule studies in the past that were negative. History of only small varices but last EGD report says that patient had a post-banding scar.   Pt denies hematemesis, hemoptysis, fever, chills, BRBPR, rectal pain, rash. No current etoh use. Also noted to be hypoxic in the waiting area to 82% on RA, went to 100% on NRB. No PE risk factors, pt mildly SOB, speaking in full sentences.      Allergies:  levofloxacin (Other (Moderate))      Hospital Medications:  cefTRIAXone   IVPB 1 Gram(s) IV Intermittent every 24 hours  dextrose 40% Gel 15 Gram(s) Oral once PRN  dextrose 5%. 1000 milliLiter(s) IV Continuous <Continuous>  dextrose 50% Injectable 12.5 Gram(s) IV Push once  dextrose 50% Injectable 25 Gram(s) IV Push once  dextrose 50% Injectable 25 Gram(s) IV Push once  glucagon  Injectable 1 milliGRAM(s) IntraMuscular once PRN  insulin glargine Injectable (LANTUS) 20 Unit(s) SubCutaneous at bedtime  insulin lispro (HumaLOG) corrective regimen sliding scale   SubCutaneous three times a day before meals  insulin lispro (HumaLOG) corrective regimen sliding scale   SubCutaneous at bedtime  metoclopramide 10 milliGRAM(s) Oral two times a day  pantoprazole    Tablet 40 milliGRAM(s) Oral before breakfast  propranolol 20 milliGRAM(s) Oral two times a day      PMHX/PSHX:  E. coli bacteremia  Vitiligo  Latent tuberculosis  Diabetes mellitus type 2, insulin dependent  Esophageal varices  Alcoholic cirrhosis  Status post corneal transplant  Alcoholic Cirrhosis  Esophageal Varices      Family history:  Family history of uterine cancer  Family history of diabetes mellitus      ROS:     General:  No wt loss, fevers, chills, night sweats, fatigue,   Eyes:  Good vision, no reported pain  ENT:  No sore throat, pain, runny nose, dysphagia  CV:  No pain, palpitations, hypo/hypertension  Resp:  No dyspnea, cough, tachypnea, wheezing  GI:  See HPI  :  No pain, bleeding, incontinence, nocturia  Muscle:  No pain, weakness  Neuro:  No weakness, tingling, memory problems  Psych:  No fatigue, insomnia, mood problems, depression  Endocrine:  No polyuria, polydipsia, cold/heat intolerance  Heme:  No petechiae, ecchymosis, easy bruisability  Skin:  No rash, edema      PHYSICAL EXAM:     GENERAL:  Appears stated age, well-groomed, well-nourished, no distress  HEENT:  NC/AT,  conjunctivae clear, sclera -anicteric  CHEST:  Full & symmetric excursion, no increased effort, breath sounds clear  HEART:  Regular rhythm, S1, S2, no murmur/rub/S3/S4,  no edema  ABDOMEN:  Soft, non-tender, non-distended, normoactive bowel sounds,  no masses ,no hepato-splenomegaly,   EXTREMITIES:  no cyanosis,clubbing or edema  SKIN:  No rash/erythema/ecchymoses/petechiae/wounds/abscess/warm/dry  NEURO:  Alert, oriented    Vital Signs:  Vital Signs Last 24 Hrs  T(C): 36.8 (2019 05:43), Max: 37.4 (2019 01:01)  T(F): 98.3 (2019 05:43), Max: 99.3 (2019 01:01)  HR: 59 (2019 05:43) (59 - 76)  BP: 100/65 (2019 05:43) (100/65 - 119/75)  BP(mean): --  RR: 18 (2019 05:43) (18 - 18)  SpO2: 93% (2019 05:43) (92% - 98%)  Daily     Daily     LABS:                        9.2    2.3   )-----------( 37       ( 2019 15:59 )             29.0     04-19    137  |  103  |  9   ----------------------------<  114<H>  3.5   |  23  |  0.56    Ca    7.8<L>      2019 07:57  Phos  4.4     04-18  Mg     1.5     04-18    TPro  5.8<L>  /  Alb  3.1<L>  /  TBili  2.5<H>  /  DBili  x   /  AST  47<H>  /  ALT  19  /  AlkPhos  106      LIVER FUNCTIONS - ( 2019 07:57 )  Alb: 3.1 g/dL / Pro: 5.8 g/dL / ALK PHOS: 106 U/L / ALT: 19 U/L / AST: 47 U/L / GGT: x           PT/INR - ( 2019 10:28 )   PT: 14.0 sec;   INR: 1.21 ratio         PTT - ( 2019 10:28 )  PTT:36.7 sec  Urinalysis Basic - ( 2019 17:59 )    Color: Yellow / Appearance: Clear / S.018 / pH: x  Gluc: x / Ketone: Negative  / Bili: Negative / Urobili: Negative   Blood: x / Protein: Trace / Nitrite: Negative   Leuk Esterase: Negative / RBC: 12 /hpf / WBC 1 /HPF   Sq Epi: x / Non Sq Epi: 5 /hpf / Bacteria: Negative          Imaging:    < from: Upper Endoscopy (19 @ 12:56) >  [facility]  ____________________________________________________________________________________________________  Patient Name: Emmanuel Hallman                      MRN: [id number]  Account Number: [id number]                     YOB: 1975  Room: Endoscopy Room 3                           Gender: Male  Attending MD: Lula Rojas MD             Procedure Date No Time: 2019  ____________________________________________________________________________________________________     Procedure:           Upper GI endoscopy  Indications:         Melena  Providers:           Lula Rojas MD, Crys Whitfield (Fellow)  Medicines:           Monitored Anesthesia Care  Complications:       No immediate complications.  ____________________________________________________________________________________________________  Procedure:           Pre-Anesthesia Assessment:                       - Prior to the procedure, a History and Physical was performed, andpatient                        medications and allergies were reviewed. The patient is competent. The risks                        and benefits of the procedure and the sedation options and risks were                        discussed with the patient. All questions were answered and informed consent                        was obtained. Patient identification and proposed procedure were verified by                        the physician, the nurse and the anesthesiologist in the endoscopy suite.                      Prophylactic Antibiotics: The patient does not require prophylactic                        antibiotics. Prior Anticoagulants: The patient has taken no previous                        anticoagulant or antiplatelet agents. ASA Grade Assessment: IV - A patient                        with severe systemic disease that is a constant threat to life. After                        reviewing the risks and benefits, the patient was deemed in satisfactory                        condition toundergo the procedure. The anesthesia plan was to use monitored                        anesthesia care (MAC). Immediately prior to administration of medications,                        the patient was re-assessed for adequacy to receive sedatives. The heart                        rate, respiratory rate, oxygen saturations, blood pressure, adequacy of                        pulmonary ventilation, and response to care were monitored throughout the                        procedure. The physical status of the patient was re-assessed after the                        procedure.                       After obtaining informed consent, the endoscope was passed under direct                        vision. Throughout the procedure, the patient's blood pressure, pulse, and                        oxygen saturations were monitored continuously. The Endoscope was introduced                        through the mouth, and advanced to the second part of duodenum. The upper GI                        endoscopywas accomplished without difficulty. The patient had some                        bronchospasm in the beginning but after increasing sedation, he tolerated the                        procedure well.                                                    Findings:       The upper third of the esophagus and middle third of the esophagus were normal.       Grade I, small (< 5 mm) varices were found in the lower third of the esophagus.       Diffuse moderately congested and erythematous snake-skin mucosa was found in the entire        examined stomach. There were some small erosions in the posterior stomach. There was some        mild contact bleeding.       Scattered mild inflammation characterized by erosions and erythema was found in the entire        duodenum.                                                                                                        Impression:          - No active bleeding or etiology of bleeding noted.              - Normal upper third of esophagus and middle third of esophagus.                       - Grade I and small (< 5 mm) esophageal varices. These did not have evidence                        of recent bleeding.                       - Portal gastropathy with erosions, moderate, and mild portal duodenitis  Recommendation:      - [facility] patient to [facility] for [facility] care.                       - Advance diet to full liquids tomorrow.                       - Trend CBC, monitor for melena.                       - If patient bleeds, will need to go back and band small GEJ varix seen.                                                                                                        Attending Participation:       I was present andparticipated during the entire procedure, including non-key portions.                                                                                                          < end of copied text >

## 2019-04-20 NOTE — PROGRESS NOTE ADULT - PROBLEM SELECTOR PLAN 5
GI: Protonix   DVT: Not indicated given low improve score and active bleeding  Dispo: likely home, ambulatory
GI: Protonix   DVT: Not indicated given low improve score and active bleeding  Dispo: likely home, ambulatory

## 2019-04-20 NOTE — PROGRESS NOTE ADULT - SUBJECTIVE AND OBJECTIVE BOX
Patient is a 43y old  Male who presents with a chief complaint of Melena (2019 06:58)      SUBJECTIVE / OVERNIGHT EVENTS: No acute events overnight. S/e at bedside this AM. No complaints this AM. Denies any further melena. Denies any SOB, CP, dizziness. Passing BM. Requesting to be discharged.     MEDICATIONS  (STANDING):  cefTRIAXone   IVPB 1 Gram(s) IV Intermittent every 24 hours  dextrose 5%. 1000 milliLiter(s) (50 mL/Hr) IV Continuous <Continuous>  dextrose 50% Injectable 12.5 Gram(s) IV Push once  dextrose 50% Injectable 25 Gram(s) IV Push once  dextrose 50% Injectable 25 Gram(s) IV Push once  insulin glargine Injectable (LANTUS) 20 Unit(s) SubCutaneous at bedtime  insulin lispro (HumaLOG) corrective regimen sliding scale   SubCutaneous three times a day before meals  insulin lispro (HumaLOG) corrective regimen sliding scale   SubCutaneous at bedtime  metoclopramide 10 milliGRAM(s) Oral two times a day  pantoprazole    Tablet 40 milliGRAM(s) Oral before breakfast  propranolol 20 milliGRAM(s) Oral two times a day    MEDICATIONS  (PRN):  dextrose 40% Gel 15 Gram(s) Oral once PRN Blood Glucose LESS THAN 70 milliGRAM(s)/deciliter  glucagon  Injectable 1 milliGRAM(s) IntraMuscular once PRN Glucose LESS THAN 70 milligrams/deciliter      Vital Signs Last 24 Hrs  T(C): 36.8 (2019 05:43), Max: 37.4 (2019 01:01)  T(F): 98.3 (2019 05:43), Max: 99.3 (2019 01:01)  HR: 59 (2019 05:43) (59 - 76)  BP: 100/65 (2019 05:43) (100/65 - 119/75)  BP(mean): --  RR: 18 (2019 05:43) (18 - 18)  SpO2: 93% (2019 05:43) (92% - 98%)  CAPILLARY BLOOD GLUCOSE      POCT Blood Glucose.: 328 mg/dL (2019 22:21)  POCT Blood Glucose.: 130 mg/dL (2019 19:10)  POCT Blood Glucose.: 108 mg/dL (2019 16:34)  POCT Blood Glucose.: 115 mg/dL (2019 14:08)  POCT Blood Glucose.: 81 mg/dL (2019 10:50)  POCT Blood Glucose.: 74 mg/dL (2019 09:46)    I&O's Summary    2019 07:01  -  2019 07:00  --------------------------------------------------------  IN:  mL / OUT: 2800 mL / NET: -790 mL        PHYSICAL EXAM:  GENERAL: NAD, well-developed, well appearing gentleman resting comfortably in bed in NAD  HEAD:  Atraumatic, nnonicteric sclera, MMM, R eye blue, L eye brown  EYES: EOMI, PERRLA, conjunctiva and sclera clear  NECK: Supple, No JVD  CHEST/LUNG: Clear to auscultation bilaterally; No wheeze  HEART: Regular rate and rhythm; No murmurs, rubs, or gallops  ABDOMEN: Soft, Nontender, Nondistended; Bowel sounds present, no hepatosplenomegaly  EXTREMITIES:  2+ Peripheral Pulses, No clubbing, cyanosis, or edema  PSYCH: AAOx3  NEUROLOGY: non-focal  SKIN: vitiligo throughout body, bilateral shins showed pink/red scabs    LABS:                        9.2    2.3   )-----------( 37       ( 2019 15:59 )             29.0     04-20    139  |  107  |  6<L>  ----------------------------<  133<H>  3.9   |  22  |  0.65    Ca    8.2<L>      2019 08:10  Phos  3.3     04-20  Mg     1.8     04-20    TPro  6.0  /  Alb  3.4  /  TBili  1.7<H>  /  DBili  x   /  AST  45<H>  /  ALT  29  /  AlkPhos  137<H>  04-20    PT/INR - ( 2019 10:28 )   PT: 14.0 sec;   INR: 1.21 ratio         PTT - ( 2019 10:28 )  PTT:36.7 sec      Urinalysis Basic - ( 2019 17:59 )    Color: Yellow / Appearance: Clear / S.018 / pH: x  Gluc: x / Ketone: Negative  / Bili: Negative / Urobili: Negative   Blood: x / Protein: Trace / Nitrite: Negative   Leuk Esterase: Negative / RBC: 12 /hpf / WBC 1 /HPF   Sq Epi: x / Non Sq Epi: 5 /hpf / Bacteria: Negative        RADIOLOGY & ADDITIONAL TESTS:    Imaging Personally Reviewed:    Consultant(s) Notes Reviewed:      Care Discussed with Consultants/Other Providers:

## 2019-04-20 NOTE — DISCHARGE NOTE NURSING/CASE MANAGEMENT/SOCIAL WORK - NSDCDPATPORTLINK_GEN_ALL_CORE
You can access the [facility] Patient Portal, offered by [facility], by registering with the following website: [email or web address]

## 2019-04-20 NOTE — PROGRESS NOTE ADULT - PROBLEM SELECTOR PLAN 2
cirrhosis c/b portal htn. CTAP 4/17/19 showed revealing esophageal/perigastric and perisplenic varices, with trace ascites.   - MELD 17 (6% 3 month mortality) and Maddrey-DF 15.1 on admission  - no indication for steroids at this time  - change home nadolol 20mg to propanolol 20mg BID (equivalent)   - hold home keflex 500mg for now
cirrhosis c/b portal htn. CTAP 4/17/19 showed revealing esophageal/perigastric and perisplenic varices, with trace ascites.   - MELD 17 (6% 3 month mortality) and Maddrey-DF 15.1 on admission  - no indication for steroids at this time  - change home nadolol 20mg to propanolol 20mg BID (equivalent)   - hold home keflex 500mg for now

## 2019-04-20 NOTE — DISCHARGE NOTE NURSING/CASE MANAGEMENT/SOCIAL WORK - CAREGIVER ADDRESS
[facility] [address] [facility] Interpolation Flap Text: A decision was made to reconstruct the defect utilizing an interpolation axial flap and a staged reconstruction.  A telfa template was made of the defect.  This telfa template was then used to outline the interpolation flap.  The donor area for the pedicle flap was then injected with anesthesia.  The flap was excised through the skin and subcutaneous tissue down to the layer of the underlying musculature.  The interpolation flap was carefully excised within this deep plane to maintain its blood supply.  The edges of the donor site were undermined.   The donor site was closed in a primary fashion.  The pedicle was then rotated into position and sutured.  Once the tube was sutured into place, adequate blood supply was confirmed with blanching and refill.  The pedicle was then wrapped with xeroform gauze and dressed appropriately with a telfa and gauze bandage to ensure continued blood supply and protect the attached pedicle.

## 2019-04-20 NOTE — PROGRESS NOTE ADULT - ATTENDING COMMENTS
Seen, examine the patient   - Resting in bed, no N/V, abdominal pain, Doing Ok, No BM, occult stool positive     Reviewed labs, imaging  - Hb 9.1 from 11.2, acute blood loss anemia is likely from bleeding esophageal varices     c/w PPI gtt, Octreotide gtt, CBC q 8- 12 hrs and PPX for SBP- IV ceftriaxone    Yesterday EGD was cancelled for large food bolus seen in stomach, will do EGD today    will f/u GI plan  - Hypoxemia, resolved. CTA chest neg.    - c/w planned basal Insulin for NPO.
Patient seen and examined.  Meds, labs and vitals all reviewed.  Patient with alcoholic cirrhosis, GI bleed, s/p EGD yesterday.  Reports no further bleeding or melena.   Requesting to be discharged and deferring TTE/ABG.  Awaiting CBC from AM to ensure H and H stability.  Will discuss with GI whether patient can follow up with hepatology as an outpatient to receive TTE.

## 2019-04-20 NOTE — PROGRESS NOTE ADULT - PROBLEM SELECTOR PLAN 1
P/w melena and FOBT positive likely 2/2 bleeding esophageal varices vs PUD. Recently admitted in 10/2018 for similar complaints s/p egd showing friable gastric mucosa  and capsule endoscopy (negative   - s/p Upper EGD yesterday revealing esophagitis, <5mm esophageal varices without acute bleeding   - c/w protonix gtt, CTX and IVF while npo  - cbc q8-12h, transfuse if Hgb <7, active T&S  - Daily cmp, coag, replete as needed
P/w melena and FOBT positive likely 2/2 bleeding esophageal varices vs PUD. Recently admitted in 10/2018 for similar complaints s/p egd showing friable gastric mucosa  and capsule endoscopy (nega  - s/p Upper EGD yesterday revealing esophagitis, <5mm esophageal varices without acute bleeding. Incomplete procedure due to large amount of food debris in stomach  - GI recs appreciated - NPO for repeat EGD today. Golytely overnight   - H/H drop 10.6-->9.1 this am, repeat cbc this pm   - c/w protonix gtt, CTX and IVF while npo  - cbc q8-12h, transfuse if Hgb <7, active T&S  - Daily cmp, coag, replete as needed

## 2019-04-20 NOTE — PROGRESS NOTE ADULT - PROBLEM SELECTOR PLAN 4
On bedtime lantus 80u and metformin 500qd at home. Last HbA1c in 10/2018 was 8.2%    c/w lantus 20u, ISS will adjust as needed,   - HbA1c 10.5 this admission
On bedtime lantus 80u and metformin 500qd at home. Last HbA1c in 10/2018 was 8.2%   - s/p lantus 20u overnight instead of 40 because POC glu was 90  - c/w bedtime Lantus 40u and ISS while NPO   - HbA1c 10.5 this admission

## 2019-04-20 NOTE — PROGRESS NOTE ADULT - PROBLEM SELECTOR PLAN 3
Resolved- Unclear etiology, p/w SOB in ED, has now resolved but continues to desaturate to high 80s off supplemental oxygen. Less likely PE given negative CTA chest; possible intrinsic lung disease vs cardiac cirrhosis given last TTE 7/2018 with bordeline pHTN, LA enlargement and preserved EF 55-60%.   - Euvolemic on exam with clear lungs on exam and imaging.   - Weaned off supplemental O2 and stable >90% on RA  - pending TTE w/ definity and bubble study to r/o shunting  - pro-BNP wnl

## 2019-04-22 NOTE — DISCUSSION/SUMMARY
[Home] : patient was discharged to home [FreeTextEntry1] : Spoke to pt regarding recent hospitalization. Stated he is feeling much better. No changes made to his medication regimen. Agreed to schedule f/u within 1-2 wks;  notified.

## 2019-05-01 ENCOUNTER — APPOINTMENT (OUTPATIENT)
Dept: INFECTIOUS DISEASE | Facility: CLINIC | Age: 44
End: 2019-05-01
Payer: COMMERCIAL

## 2019-05-01 VITALS
HEART RATE: 80 BPM | BODY MASS INDEX: 28.49 KG/M2 | OXYGEN SATURATION: 91 % | DIASTOLIC BLOOD PRESSURE: 68 MMHG | HEIGHT: 68 IN | SYSTOLIC BLOOD PRESSURE: 114 MMHG | TEMPERATURE: 98.6 F | WEIGHT: 188 LBS

## 2019-05-01 PROCEDURE — 99214 OFFICE O/P EST MOD 30 MIN: CPT

## 2019-05-01 NOTE — REVIEW OF SYSTEMS
[Skin Lesions] : skin lesion [Fever] : no fever [Chills] : no chills [Body Aches] : no body aches [Feeling Sick] : not feeling sick [Feeling Tired] : not feeling tired [Eye Pain] : no eye pain [Sore Throat] : no sore throat [Earache] : no earache [Chest Pain] : no chest pain [Leg Claudication] : no intermittent leg claudication [Shortness Of Breath] : no shortness of breath [Cough] : no cough [Sputum] : not coughing up ~M sputum [Pleuritic Chest Pain] : no pleuritic chest pain [Abdominal Pain] : no abdominal pain [Vomiting] : no vomiting [Constipation] : no constipation [Dysuria] : no dysuria [Joint Pain] : no joint pain [Joint Swelling] : no joint swelling [de-identified] : vitiligo

## 2019-05-01 NOTE — PHYSICAL EXAM
[General Appearance - Alert] : alert [General Appearance - In No Acute Distress] : in no acute distress [General Appearance - Well Nourished] : well nourished [Extraocular Movements] : extraocular movements were intact [Sclera] : the sclera and conjunctiva were normal [Outer Ear] : the ears and nose were normal in appearance [Neck Appearance] : the appearance of the neck was normal [Heart Sounds] : normal S1 and S2 [Exaggerated Use Of Accessory Muscles For Inspiration] : no accessory muscle use [] : no respiratory distress [Murmurs] : no murmurs [Bowel Sounds] : normal bowel sounds [Abdomen Soft] : soft [Abdomen Tenderness] : non-tender [Musculoskeletal - Swelling] : no joint swelling [Oriented To Time, Place, And Person] : oriented to person, place, and time [FreeTextEntry1] : Vitiligo

## 2019-05-01 NOTE — HISTORY OF PRESENT ILLNESS
[FreeTextEntry1] : Was again  recently hospitalized for GI bleed-s/p EGD colonoscopy-microscopic ulcers\par denies any ETOH\par \par Taking keflex\par Feels well overall now \par

## 2019-05-01 NOTE — ASSESSMENT
[FreeTextEntry1] : Cirrhosis\par past ETOH abuse\par Recurrent bacteremias in past\par No foci found\par On suppression-hasnt had bacteremias for some time now\par Would continue suppressive keflex\par \par Will RTC 3-04 months but asked to contact us earlier if issues\par Verbalized understanding

## 2019-05-08 LAB
ALBUMIN SERPL ELPH-MCNC: 3.8 G/DL
ALP BLD-CCNC: 217 U/L
ALT SERPL-CCNC: 32 U/L
ANION GAP SERPL CALC-SCNC: 11 MMOL/L
AST SERPL-CCNC: 42 U/L
BASOPHILS # BLD AUTO: 0.05 K/UL
BASOPHILS NFR BLD AUTO: 1.1 %
BILIRUB SERPL-MCNC: 1.5 MG/DL
BUN SERPL-MCNC: 13 MG/DL
CALCIUM SERPL-MCNC: 8.7 MG/DL
CHLORIDE SERPL-SCNC: 101 MMOL/L
CO2 SERPL-SCNC: 20 MMOL/L
CREAT SERPL-MCNC: 0.65 MG/DL
EOSINOPHIL # BLD AUTO: 0.1 K/UL
EOSINOPHIL NFR BLD AUTO: 2.1 %
GLUCOSE SERPL-MCNC: 323 MG/DL
HCT VFR BLD CALC: 34.2 %
HGB BLD-MCNC: 10.5 G/DL
IMM GRANULOCYTES NFR BLD AUTO: 0.2 %
LYMPHOCYTES # BLD AUTO: 1.05 K/UL
LYMPHOCYTES NFR BLD AUTO: 22.3 %
MAN DIFF?: NORMAL
MCHC RBC-ENTMCNC: 22.6 PG
MCHC RBC-ENTMCNC: 30.7 GM/DL
MCV RBC AUTO: 73.7 FL
MONOCYTES # BLD AUTO: 0.39 K/UL
MONOCYTES NFR BLD AUTO: 8.3 %
NEUTROPHILS # BLD AUTO: 3.11 K/UL
NEUTROPHILS NFR BLD AUTO: 66 %
PLATELET # BLD AUTO: 101 K/UL
POTASSIUM SERPL-SCNC: 4.4 MMOL/L
PROT SERPL-MCNC: 6.8 G/DL
RBC # BLD: 4.64 M/UL
RBC # FLD: 19 %
SODIUM SERPL-SCNC: 132 MMOL/L
WBC # FLD AUTO: 4.71 K/UL

## 2019-06-03 ENCOUNTER — EMERGENCY (EMERGENCY)
Facility: HOSPITAL | Age: 44
LOS: 1 days | End: 2019-06-03

## 2019-06-03 VITALS
SYSTOLIC BLOOD PRESSURE: 138 MMHG | HEART RATE: 107 BPM | HEIGHT: 68 IN | WEIGHT: 195.11 LBS | RESPIRATION RATE: 16 BRPM | TEMPERATURE: 98 F | DIASTOLIC BLOOD PRESSURE: 78 MMHG | OXYGEN SATURATION: 100 %

## 2019-06-03 NOTE — ED ADULT TRIAGE NOTE - HEART RATE (BEATS/MIN)
Primary Care Physician: Jorden Ryan MD    Reason for Visit:  Med check/SHANIA    Chief Complaint   Patient presents with   • Urinary Frequency   • Urinary Incontinence       Subjective  78 y/o female with a h/o urge incontinence, atrophic vaginitis, and prolapse. Exam at last visit revealed a stable grade 1 cystocele without significant angulation and a PVR of 140 cc, but had not voided in 4-5 hours. Was maintained on Toviaz 4 mg with > 75% improvement, but too costly. She is now maintained on Trospium and here to undergo med check/SHANIA/bladder scan for PVR.           no dysuria.  no gross hematuria, no fevers or chills. no nausea or vomiting. no abdominal pain. no flank pain. no sensation of incomplete bladder emptying. no cloudy urine or malodorous urine.       Bladder control: happy with such, can hold from 2- 6 hours without any significant side effects other than some mild dry mouth    Recent Constipation: none    New Issues/Medication: she has significant right hip pain. She had a previous replacement many years ago. She is going back to orthopedics        Review of systems:     General:  No loss of appetite, no weight loss, no fever, no chills, no sweats, no fatigue.   Eyes: No blurry/double vision, no redness, no swelling, no discharge.  HENT: No Headache, no ear ache, no hearing loss, no tinnitus, no sinus issues, no sore throat.  Respiratory: No cough, no colds, no sputum, no SOB, no wheeze, no post nasal drip.  CV: No chest pain or angina, no SOB, no FERNANDEZ, no palpitations.  GI:  No nausea, no vomiting, no diarrhea, no constipation, no heartburn, no pain.  : See HPI.  Heme: No swollen glands, no bleeding or bruising problems.   MSK:  No swelling, no new joint issues, no new weakness.  Skin: No rash, no insect bites, no itch, no hives.  Neuro:  No dizziness, no lightheadedness, no syncope, no numbness, no tingling.  Psych: No depression, no anxiety, no insomnia, no concentration issues.  All/IMM: No  conjunctivitis, no seasonal allergies.  Endo: No polyuria, no polydipsia, no weight loss or weight gain, no heat intolerance, no cold intolerance.    Medication:  Current Outpatient Prescriptions   Medication Sig Dispense Refill   • amoxicillin (AMOXIL) 500 MG capsule take 4 capsules by mouth 1 hour before dental appointments 8 capsule 0   • carBAMazepine (TEGRETOL) 200 MG tablet Take 200 mg by mouth 3 times daily as needed.     • trospium (SANCTURA XR) 60 MG extended-release capsule Take 1 capsule by mouth daily. 30 capsule 2   • TEMAZepam (RESTORIL) 15 MG capsule Take 1 capsule by mouth nightly as needed for Sleep. 30 capsule 5   • metoPROLOL (LOPRESSOR) 25 MG tablet Take 1 tablet by mouth 2 times daily. Take half tablet by mouth every 12 hours 30 tablet 5   • levothyroxine (SYNTHROID, LEVOTHROID) 75 MCG tablet Take a 1/2 tablet by mouth daily. 45 tablet 4   • ipratropium (ATROVENT) 0.03 % nasal spray INSTILL 2 SPRAYS IN EACH NOSTRIL ONE TO THREE TIMES DAILY 30 mL 6   • FENTANYL CITRATE IJ 50 mcg/day by Intrathecal route. Sagge 40 ml Intrathecal (IT) implantable SynchroMed II infusion pump:  Fentanyl 50 mcg per day; PTM add of 5 mcg of fentanyl over 3 min 4 hr LO and max of 6/day Next pump refill alarm date= 09/16/2017, managed by .     • aspirin (ECOTRIN) 81 MG EC tablet Take 1 tablet by mouth daily.     • Omega-3 Fatty Acids (FISH OIL) 1200 MG capsule Take 1,200 mg by mouth daily.     • acetaminophen 650 MG Tab Take 650 mg by mouth every 4 hours as needed for Pain. 30 tablet 0   • Alpha-D-Galactosidase (BEANO) TABS Take 2 tablets by mouth 3 times daily as needed. Indications: GAS      • Saccharomyces boulardii (FLORASTOR PO) Take 1 capsule by mouth daily.      • Multiple Vitamins-Iron (QC DAILY MULTIVITAMINS/IRON) TABS Take 1 tablet by mouth daily.     • Cholecalciferol (D3-1000) 1000 UNITS capsule Take 1 capsule by mouth daily.       No current facility-administered medications for this visit.         Problem List:  Patient Active Problem List   Diagnosis   • Personal history of malignant neoplasm of large intestine   • Benign neoplasm of colon   • Trigeminal neuralgia   • Back pain with radiation   • Brain cyst   • White matter changes   • Word finding difficulty   • Memory impairment   • Primary localized osteoarthrosis, ankle and foot   • Hallux valgus (acquired)   • Exostosis of unspecified site   • Pain in limb   • Hypothyroidism   • Abnormality of gait   • Cold feet   • SBO (small bowel obstruction) (CMS/HCC)   • Pain--Interventional Pain Management   • Right hand weakness   • Right arm pain         Physical Exam:    There were no vitals filed for this visit.    General: Well nourished, well groomed, NAD, cooperative.  HEENT: Conjunctiva/lids WNL, no erythema, no discharge, PEERLA, EOMI.  Respiratory: Normal chest rise and fall, no cough, no wheeze, no stridor.  Chest: Symmetrical chest rise and fall.  CV: Regular rate and rhythm,capillary refill less than 3 seconds.  Neuro: Cranial nerves II-XII grossly intact and symmetrical,  Psych: Judgement intact, alert and oriented to person, place and time, recent/remote memory appropriate, mood/ affect are WNL.         Imaging:Reviewed    Labs: Reviewed     Urine Panel  Lab Results   Component Value Date    UKET NEGATIVE 01/12/2017    UPROT NEGATIVE 01/12/2017    URBC TRACE (A) 01/12/2017    UBILI NEGATIVE 01/12/2017    UPH 5.5 01/12/2017    USPG 1.022 01/12/2017    UBACTR LARGE (A) 01/12/2017       Procedures:  Bladder scan for PVR:  < 100cc        Assessment and Plan:    77 year old female       1. Urinary urgency, frequency, and urge incontinence:  -The varied etiologies of urge incontinence (or urinary urgency and frequency) were gone over. Options, including but not limited to, conservative management, lifestyle and dietary changes, pelvic floor physical therapy, medication, nerve modulation (both sacral and tibial) and botox injection to the bladder  were gone over in depth particular to this patient.   - Previous UDS revealed no evidence of bladder outlet obstruction an essentially normal evaluation with low pressure voiding. This is suggestive of a DHIC like picture at home instead of bladder outlet obstruction.   - Maintained previously on Fesoterodine 4 mg (extended release) with > 75% improvement, but cost > $100 but now on trospium and doing well            2. Cloudy urine:  - asymptomatic today   - To call our office with all signs and symptoms of infections        3. Atrophic vaginitis:  - Mild and not bothersome, but urine cloudy  - If recurrent infections, consider low dose estrogen        4. H/o tobacco abuse:  - Urinalysis  - If evidence of microhematuria, complete full workup        5. Prolapse:  - Options including but not limited to conservative management, pessary placement and operative repair were gone over in depth. Options for repair including native tissue vs graft placement discussed. The benefits and risks of repair with a graft material, in the form of either a biologic or a synthetic mesh, was gone over in depth. Risk of extrusion with the use of a graft material explained to the patient. Transvaginal vs intraperitoneal approach gone over (open vs minimally invasive) as well.   - Exam previously reveals stable grade 1 without significant angulation  - PVR < 100cc bladder scan  -defers exam today as no symptoms and hip pain  - Do not suspect her mild prolapse is contributing to her symptoms, as no worse, and previously no evidence of YIP on UDS > 3 years ago              RTC in 6 months for follow up after hip issues resolved  She would like to consider alternatives to OAB medication at that time          Face time: 25 minutes, greater than 50% devoted to teaching and plan of care, was spent with this patient.           107

## 2019-06-07 ENCOUNTER — MEDICATION RENEWAL (OUTPATIENT)
Age: 44
End: 2019-06-07

## 2019-06-11 ENCOUNTER — APPOINTMENT (OUTPATIENT)
Dept: INTERNAL MEDICINE | Facility: CLINIC | Age: 44
End: 2019-06-11
Payer: COMMERCIAL

## 2019-06-18 ENCOUNTER — MEDICATION RENEWAL (OUTPATIENT)
Age: 44
End: 2019-06-18

## 2019-07-01 ENCOUNTER — APPOINTMENT (OUTPATIENT)
Dept: INTERNAL MEDICINE | Facility: CLINIC | Age: 44
End: 2019-07-01
Payer: COMMERCIAL

## 2019-07-01 ENCOUNTER — LABORATORY RESULT (OUTPATIENT)
Age: 44
End: 2019-07-01

## 2019-07-01 VITALS
BODY MASS INDEX: 31.52 KG/M2 | DIASTOLIC BLOOD PRESSURE: 74 MMHG | SYSTOLIC BLOOD PRESSURE: 124 MMHG | TEMPERATURE: 99.3 F | WEIGHT: 208 LBS | HEIGHT: 68 IN | HEART RATE: 79 BPM | OXYGEN SATURATION: 90 %

## 2019-07-01 DIAGNOSIS — E83.42 HYPOMAGNESEMIA: ICD-10-CM

## 2019-07-01 PROCEDURE — G0447 BEHAVIOR COUNSEL OBESITY 15M: CPT

## 2019-07-01 PROCEDURE — 99214 OFFICE O/P EST MOD 30 MIN: CPT

## 2019-07-01 NOTE — HISTORY OF PRESENT ILLNESS
[FreeTextEntry1] : Presents for follow-up of diabetes, alcoholic hepatitis, cirrhosis and vitiligo. [de-identified] : \par Diabetes: patient admits to finding it difficult to adhere to low carb diet; he has a sweet tooth; but he takes his Metformin and Levemir insulin as prescribed and denies medication side effects\par \par Alcoholic hepatitis: patient says he is going to AA program and has remained alcohol-free since last visit with me; he is following with hepatology as well; he denies jaundice and scleral icterus\par \par Cirrhosis: secondary to history of alcohol intake; patient says he is committed to remaining off alcohol; he denies RUQ abd pains, diarrhea and jaundice\par \par Vitiligo: patient says this is stable, he has had this for years and there is no change in symptoms

## 2019-07-01 NOTE — PHYSICAL EXAM

## 2019-07-01 NOTE — ASSESSMENT
[FreeTextEntry1] : \par Diabetes: counselled pt on adhering to low carb diet; encouraged compliance with medication and ordered Metformin and Levemir insulin; check A1c level today\par \par Alcoholic hepatitis: encouraged adherence to AA program; coordinated follow-up care with hepatology as well\par \par Cirrhosis: secondary to history of alcohol intake; encouraged his commitment to remain alcohol-free now; he is going to AA regularly\par \par Vitiligo: stable, no change in symptoms

## 2019-07-02 ENCOUNTER — RX RENEWAL (OUTPATIENT)
Age: 44
End: 2019-07-02

## 2019-07-03 LAB
ALBUMIN SERPL ELPH-MCNC: 3.6 G/DL
ALP BLD-CCNC: 153 U/L
ALT SERPL-CCNC: 30 U/L
ANION GAP SERPL CALC-SCNC: 12 MMOL/L
AST SERPL-CCNC: 51 U/L
BASOPHILS # BLD AUTO: 0.04 K/UL
BASOPHILS NFR BLD AUTO: 0.9 %
BILIRUB SERPL-MCNC: 1.8 MG/DL
BUN SERPL-MCNC: 9 MG/DL
CALCIUM SERPL-MCNC: 8.2 MG/DL
CHLORIDE SERPL-SCNC: 102 MMOL/L
CHOLEST SERPL-MCNC: 138 MG/DL
CHOLEST/HDLC SERPL: 1.6 RATIO
CO2 SERPL-SCNC: 20 MMOL/L
CREAT SERPL-MCNC: 0.53 MG/DL
EOSINOPHIL # BLD AUTO: 0.08 K/UL
EOSINOPHIL NFR BLD AUTO: 1.8 %
ESTIMATED AVERAGE GLUCOSE: 269 MG/DL
GLUCOSE SERPL-MCNC: 184 MG/DL
HBA1C MFR BLD HPLC: 11 %
HCT VFR BLD CALC: 33.1 %
HDLC SERPL-MCNC: 84 MG/DL
HGB BLD-MCNC: 9.6 G/DL
HIV1+2 AB SPEC QL IA.RAPID: NONREACTIVE
IMM GRANULOCYTES NFR BLD AUTO: 0.2 %
LDLC SERPL CALC-MCNC: 39 MG/DL
LYMPHOCYTES # BLD AUTO: 0.66 K/UL
LYMPHOCYTES NFR BLD AUTO: 14.8 %
MAGNESIUM SERPL-MCNC: 1.7 MG/DL
MAN DIFF?: NORMAL
MCHC RBC-ENTMCNC: 20.1 PG
MCHC RBC-ENTMCNC: 29 GM/DL
MCV RBC AUTO: 69.2 FL
MONOCYTES # BLD AUTO: 0.36 K/UL
MONOCYTES NFR BLD AUTO: 8.1 %
NEUTROPHILS # BLD AUTO: 3.3 K/UL
NEUTROPHILS NFR BLD AUTO: 74.2 %
PLATELET # BLD AUTO: 82 K/UL
POTASSIUM SERPL-SCNC: 4.6 MMOL/L
PROT SERPL-MCNC: 6.5 G/DL
RBC # BLD: 4.78 M/UL
RBC # FLD: 21.1 %
SODIUM SERPL-SCNC: 134 MMOL/L
TRIGL SERPL-MCNC: 75 MG/DL
TSH SERPL-ACNC: 1.61 UIU/ML
WBC # FLD AUTO: 4.45 K/UL

## 2019-07-11 ENCOUNTER — APPOINTMENT (OUTPATIENT)
Dept: HEPATOLOGY | Facility: CLINIC | Age: 44
End: 2019-07-11

## 2019-07-17 ENCOUNTER — MEDICATION RENEWAL (OUTPATIENT)
Age: 44
End: 2019-07-17

## 2019-08-31 NOTE — ED PROVIDER NOTE - TRANSFER OCCURRED AFTER
40 
Eval/treatment

## 2019-10-04 PROBLEM — Z23 FLU VACCINE NEED: Status: ACTIVE | Noted: 2019-01-01

## 2019-10-04 NOTE — HISTORY OF PRESENT ILLNESS
[FreeTextEntry1] : Presents for follow-up of diabetes, alcoholic hepatitis, cirrhosis and vitiligo. [de-identified] : \par Diabetes: patient admits to finding it difficult to adhere to low carb diet; he has a sweet tooth; but he takes his Metformin and Levemir insulin as prescribed and denies medication side effects\par \par Alcoholic hepatitis: patient says he is going to AA program and has remained alcohol-free since last visit with me; he is following with hepatology as well; he denies jaundice and scleral icterus\par \par Cirrhosis: secondary to history of alcohol intake; patient says he is committed to remaining off alcohol; he denies RUQ abd pains, diarrhea and jaundice\par \par Vitiligo: patient says this is stable, he has had this for years and there is no change in symptoms

## 2019-10-04 NOTE — ASSESSMENT
[FreeTextEntry1] : \par Diabetes: counselled pt on adhering to low carb diet; encouraged compliance with medication and ordered Metformin and Levemir insulin; check A1c level today\par \par Alcoholic hepatitis: encouraged adherence to AA program; coordinated follow-up care with hepatology as well\par \par Cirrhosis: secondary to history of alcohol intake; encouraged his commitment to remain alcohol-free now; he is going to AA regularly\par \par Vitiligo: stable, no change in symptoms\par \par Obesity counselling: 15 mins, assessed BMI at 30 and above now in obese range; advised weight loss, exercise routine and diet; pt agreed to start exercise program for target weight loss 20 lbs; assisted patient with resources including nutrition referral as needed and arranged for follow-up to monitor weight loss progress over next several months\par \par Flu shot today.

## 2019-10-04 NOTE — PHYSICAL EXAM
[No Acute Distress] : no acute distress [Well Nourished] : well nourished [Well Developed] : well developed [Well-Appearing] : well-appearing [Normal Sclera/Conjunctiva] : normal sclera/conjunctiva [PERRL] : pupils equal round and reactive to light [EOMI] : extraocular movements intact [Normal Outer Ear/Nose] : the outer ears and nose were normal in appearance [Normal Oropharynx] : the oropharynx was normal [No JVD] : no jugular venous distention [No Lymphadenopathy] : no lymphadenopathy [Supple] : supple [Thyroid Normal, No Nodules] : the thyroid was normal and there were no nodules present [No Respiratory Distress] : no respiratory distress  [Clear to Auscultation] : lungs were clear to auscultation bilaterally [No Accessory Muscle Use] : no accessory muscle use [Regular Rhythm] : with a regular rhythm [Normal Rate] : normal rate  [No Murmur] : no murmur heard [Normal S1, S2] : normal S1 and S2 [No Carotid Bruits] : no carotid bruits [No Abdominal Bruit] : a ~M bruit was not heard ~T in the abdomen [No Varicosities] : no varicosities [Pedal Pulses Present] : the pedal pulses are present [No Edema] : there was no peripheral edema [No Extremity Clubbing/Cyanosis] : no extremity clubbing/cyanosis [Soft] : abdomen soft [No Palpable Aorta] : no palpable aorta [Non Tender] : non-tender [No Masses] : no abdominal mass palpated [Non-distended] : non-distended [No HSM] : no HSM [Normal Bowel Sounds] : normal bowel sounds [Normal Posterior Cervical Nodes] : no posterior cervical lymphadenopathy [Normal Anterior Cervical Nodes] : no anterior cervical lymphadenopathy [No Spinal Tenderness] : no spinal tenderness [No CVA Tenderness] : no CVA  tenderness [Grossly Normal Strength/Tone] : grossly normal strength/tone [No Joint Swelling] : no joint swelling [No Rash] : no rash [Coordination Grossly Intact] : coordination grossly intact [Normal Gait] : normal gait [Deep Tendon Reflexes (DTR)] : deep tendon reflexes were 2+ and symmetric [No Focal Deficits] : no focal deficits [Normal Insight/Judgement] : insight and judgment were intact [Normal Affect] : the affect was normal [de-identified] : vitiligo noted diffusely throughout face, arms, legs and torso

## 2019-10-16 NOTE — ASSESSMENT
[FreeTextEntry1] : Cirrhosis\par past ETOH abuse\par Recurrent bacteremias in past\par No foci found\par On suppression-hasnt had bacteremias for some time now\par Risks/benefits/adverse effects and potential for resistance with suppressive treatment discussed\par Patient is tolerating well-for now wants to continue but says is thinking about stopping and observing\par risk of recurrence discussed.\par Will continue keflkex for now-he will let us know if anything changes\par Had recent labs stable\par To follow in 4-6 months-asked to contact us earlier if issues\par Other plan per primary team

## 2019-10-16 NOTE — REVIEW OF SYSTEMS
[Skin Lesions] : skin lesion [Fever] : no fever [Chills] : no chills [Body Aches] : no body aches [Feeling Sick] : not feeling sick [Feeling Tired] : not feeling tired [Eye Pain] : no eye pain [Earache] : no earache [Sore Throat] : no sore throat [Chest Pain] : no chest pain [Leg Claudication] : no intermittent leg claudication [Shortness Of Breath] : no shortness of breath [Cough] : no cough [Sputum] : not coughing up ~M sputum [Pleuritic Chest Pain] : no pleuritic chest pain [Abdominal Pain] : no abdominal pain [Vomiting] : no vomiting [Constipation] : no constipation [Dysuria] : no dysuria [Joint Pain] : no joint pain [Joint Swelling] : no joint swelling [de-identified] : vitiligo

## 2019-10-16 NOTE — PHYSICAL EXAM
[General Appearance - Alert] : alert [General Appearance - In No Acute Distress] : in no acute distress [General Appearance - Well Nourished] : well nourished [Sclera] : the sclera and conjunctiva were normal [Extraocular Movements] : extraocular movements were intact [Outer Ear] : the ears and nose were normal in appearance [Neck Appearance] : the appearance of the neck was normal [] : no respiratory distress [Exaggerated Use Of Accessory Muscles For Inspiration] : no accessory muscle use [Heart Sounds] : normal S1 and S2 [Murmurs] : no murmurs [Bowel Sounds] : normal bowel sounds [Abdomen Soft] : soft [Abdomen Tenderness] : non-tender [Musculoskeletal - Swelling] : no joint swelling [Oriented To Time, Place, And Person] : oriented to person, place, and time [FreeTextEntry1] : Vitiligo

## 2019-11-14 NOTE — ED ADULT NURSE NOTE - DOES PATIENT HAVE ADVANCE DIRECTIVE
Pt presents for her 3rd synvisc injections into bilateral knees .   Denies any new complaints.  With sterile technique bilateral knees were asp then injected with 1 vial of synvisc.  Pt tolerated the procedures well.  F/u in 1 month if pain persists, otherwise on an as needed basis.     Nurses notes reviewed and accepted.   No

## 2020-01-01 ENCOUNTER — APPOINTMENT (OUTPATIENT)
Dept: HEPATOLOGY | Facility: CLINIC | Age: 45
End: 2020-01-01
Payer: COMMERCIAL

## 2020-01-01 ENCOUNTER — APPOINTMENT (OUTPATIENT)
Dept: INFUSION THERAPY | Facility: HOSPITAL | Age: 45
End: 2020-01-01

## 2020-01-01 ENCOUNTER — LABORATORY RESULT (OUTPATIENT)
Age: 45
End: 2020-01-01

## 2020-01-01 ENCOUNTER — APPOINTMENT (OUTPATIENT)
Dept: HEPATOLOGY | Facility: CLINIC | Age: 45
End: 2020-01-01

## 2020-01-01 ENCOUNTER — NON-APPOINTMENT (OUTPATIENT)
Age: 45
End: 2020-01-01

## 2020-01-01 ENCOUNTER — RESULT REVIEW (OUTPATIENT)
Age: 45
End: 2020-01-01

## 2020-01-01 ENCOUNTER — INPATIENT (INPATIENT)
Facility: HOSPITAL | Age: 45
LOS: 11 days | Discharge: HOME | DRG: 432 | End: 2020-05-27
Attending: HOSPITALIST | Admitting: HOSPITALIST
Payer: COMMERCIAL

## 2020-01-01 ENCOUNTER — APPOINTMENT (OUTPATIENT)
Dept: TRANSPLANT | Facility: CLINIC | Age: 45
End: 2020-01-01
Payer: COMMERCIAL

## 2020-01-01 ENCOUNTER — APPOINTMENT (OUTPATIENT)
Dept: MRI IMAGING | Facility: IMAGING CENTER | Age: 45
End: 2020-01-01
Payer: COMMERCIAL

## 2020-01-01 ENCOUNTER — OUTPATIENT (OUTPATIENT)
Dept: OUTPATIENT SERVICES | Facility: HOSPITAL | Age: 45
LOS: 1 days | Discharge: HOME | End: 2020-01-01

## 2020-01-01 ENCOUNTER — APPOINTMENT (OUTPATIENT)
Dept: INTERNAL MEDICINE | Facility: CLINIC | Age: 45
End: 2020-01-01
Payer: COMMERCIAL

## 2020-01-01 ENCOUNTER — APPOINTMENT (OUTPATIENT)
Dept: CARDIOLOGY | Facility: CLINIC | Age: 45
End: 2020-01-01
Payer: COMMERCIAL

## 2020-01-01 ENCOUNTER — TRANSCRIPTION ENCOUNTER (OUTPATIENT)
Age: 45
End: 2020-01-01

## 2020-01-01 ENCOUNTER — APPOINTMENT (OUTPATIENT)
Dept: ENDOCRINOLOGY | Facility: CLINIC | Age: 45
End: 2020-01-01

## 2020-01-01 ENCOUNTER — OUTPATIENT (OUTPATIENT)
Dept: OUTPATIENT SERVICES | Facility: HOSPITAL | Age: 45
LOS: 1 days | End: 2020-01-01
Payer: COMMERCIAL

## 2020-01-01 ENCOUNTER — APPOINTMENT (OUTPATIENT)
Dept: HEMATOLOGY ONCOLOGY | Facility: CLINIC | Age: 45
End: 2020-01-01
Payer: COMMERCIAL

## 2020-01-01 ENCOUNTER — APPOINTMENT (OUTPATIENT)
Dept: INFECTIOUS DISEASE | Facility: CLINIC | Age: 45
End: 2020-01-01

## 2020-01-01 ENCOUNTER — APPOINTMENT (OUTPATIENT)
Dept: INFECTIOUS DISEASE | Facility: CLINIC | Age: 45
End: 2020-01-01
Payer: COMMERCIAL

## 2020-01-01 ENCOUNTER — APPOINTMENT (OUTPATIENT)
Dept: HEMATOLOGY ONCOLOGY | Facility: CLINIC | Age: 45
End: 2020-01-01

## 2020-01-01 ENCOUNTER — INPATIENT (INPATIENT)
Facility: HOSPITAL | Age: 45
LOS: 10 days | Discharge: HOME | DRG: 682 | End: 2020-06-21
Attending: HOSPITALIST | Admitting: HOSPITALIST
Payer: COMMERCIAL

## 2020-01-01 ENCOUNTER — APPOINTMENT (OUTPATIENT)
Dept: CV DIAGNOSITCS | Facility: HOSPITAL | Age: 45
End: 2020-01-01

## 2020-01-01 ENCOUNTER — INPATIENT (INPATIENT)
Facility: HOSPITAL | Age: 45
LOS: 5 days | DRG: 423 | End: 2020-07-24
Attending: STUDENT IN AN ORGANIZED HEALTH CARE EDUCATION/TRAINING PROGRAM | Admitting: STUDENT IN AN ORGANIZED HEALTH CARE EDUCATION/TRAINING PROGRAM
Payer: COMMERCIAL

## 2020-01-01 ENCOUNTER — RX RENEWAL (OUTPATIENT)
Age: 45
End: 2020-01-01

## 2020-01-01 VITALS
HEIGHT: 67.99 IN | RESPIRATION RATE: 17 BRPM | HEART RATE: 91 BPM | TEMPERATURE: 98.9 F | WEIGHT: 212.06 LBS | BODY MASS INDEX: 32.14 KG/M2 | DIASTOLIC BLOOD PRESSURE: 75 MMHG | OXYGEN SATURATION: 95 % | SYSTOLIC BLOOD PRESSURE: 131 MMHG

## 2020-01-01 VITALS
TEMPERATURE: 99 F | RESPIRATION RATE: 18 BRPM | DIASTOLIC BLOOD PRESSURE: 57 MMHG | WEIGHT: 190.04 LBS | HEART RATE: 81 BPM | SYSTOLIC BLOOD PRESSURE: 116 MMHG | OXYGEN SATURATION: 98 % | HEIGHT: 68 IN

## 2020-01-01 VITALS
BODY MASS INDEX: 29.55 KG/M2 | RESPIRATION RATE: 14 BRPM | TEMPERATURE: 97.8 F | DIASTOLIC BLOOD PRESSURE: 65 MMHG | HEIGHT: 68 IN | SYSTOLIC BLOOD PRESSURE: 120 MMHG | HEART RATE: 94 BPM | WEIGHT: 195 LBS

## 2020-01-01 VITALS
DIASTOLIC BLOOD PRESSURE: 82 MMHG | HEIGHT: 68 IN | RESPIRATION RATE: 13 BRPM | WEIGHT: 215 LBS | HEART RATE: 79 BPM | BODY MASS INDEX: 32.58 KG/M2 | SYSTOLIC BLOOD PRESSURE: 149 MMHG | TEMPERATURE: 98 F | OXYGEN SATURATION: 99 %

## 2020-01-01 VITALS — HEIGHT: 68 IN | WEIGHT: 215 LBS | BODY MASS INDEX: 32.58 KG/M2

## 2020-01-01 VITALS
TEMPERATURE: 98 F | DIASTOLIC BLOOD PRESSURE: 71 MMHG | SYSTOLIC BLOOD PRESSURE: 108 MMHG | RESPIRATION RATE: 18 BRPM | OXYGEN SATURATION: 100 % | HEART RATE: 63 BPM

## 2020-01-01 VITALS
HEART RATE: 81 BPM | DIASTOLIC BLOOD PRESSURE: 79 MMHG | HEIGHT: 67 IN | OXYGEN SATURATION: 100 % | TEMPERATURE: 98 F | RESPIRATION RATE: 15 BRPM | WEIGHT: 206 LBS | SYSTOLIC BLOOD PRESSURE: 138 MMHG | BODY MASS INDEX: 32.33 KG/M2

## 2020-01-01 VITALS
OXYGEN SATURATION: 95 % | WEIGHT: 206 LBS | RESPIRATION RATE: 15 BRPM | HEART RATE: 76 BPM | SYSTOLIC BLOOD PRESSURE: 130 MMHG | DIASTOLIC BLOOD PRESSURE: 74 MMHG | HEIGHT: 67 IN | TEMPERATURE: 98.2 F | BODY MASS INDEX: 32.33 KG/M2

## 2020-01-01 VITALS
HEART RATE: 102 BPM | DIASTOLIC BLOOD PRESSURE: 73 MMHG | HEIGHT: 68 IN | WEIGHT: 205.91 LBS | RESPIRATION RATE: 19 BRPM | TEMPERATURE: 99 F | SYSTOLIC BLOOD PRESSURE: 111 MMHG | OXYGEN SATURATION: 91 %

## 2020-01-01 VITALS
OXYGEN SATURATION: 96 % | DIASTOLIC BLOOD PRESSURE: 62 MMHG | RESPIRATION RATE: 18 BRPM | HEART RATE: 70 BPM | WEIGHT: 199.96 LBS | TEMPERATURE: 98 F | HEIGHT: 65 IN | SYSTOLIC BLOOD PRESSURE: 100 MMHG

## 2020-01-01 VITALS — HEART RATE: 76 BPM | SYSTOLIC BLOOD PRESSURE: 108 MMHG | DIASTOLIC BLOOD PRESSURE: 56 MMHG

## 2020-01-01 VITALS
BODY MASS INDEX: 31.98 KG/M2 | WEIGHT: 211 LBS | HEART RATE: 86 BPM | SYSTOLIC BLOOD PRESSURE: 112 MMHG | DIASTOLIC BLOOD PRESSURE: 70 MMHG | HEIGHT: 68 IN | TEMPERATURE: 97.2 F | OXYGEN SATURATION: 92 %

## 2020-01-01 DIAGNOSIS — K74.60 UNSPECIFIED CIRRHOSIS OF LIVER: ICD-10-CM

## 2020-01-01 DIAGNOSIS — R17 UNSPECIFIED JAUNDICE: ICD-10-CM

## 2020-01-01 DIAGNOSIS — D64.9 ANEMIA, UNSPECIFIED: ICD-10-CM

## 2020-01-01 DIAGNOSIS — Z01.818 ENCOUNTER FOR OTHER PREPROCEDURAL EXAMINATION: ICD-10-CM

## 2020-01-01 DIAGNOSIS — Z87.898 PERSONAL HISTORY OF OTHER SPECIFIED CONDITIONS: ICD-10-CM

## 2020-01-01 DIAGNOSIS — I85.00 ESOPHAGEAL VARICES W/OUT BLEEDING: ICD-10-CM

## 2020-01-01 DIAGNOSIS — F10.20 ALCOHOL DEPENDENCE, UNCOMPLICATED: ICD-10-CM

## 2020-01-01 DIAGNOSIS — E46 UNSPECIFIED PROTEIN-CALORIE MALNUTRITION: ICD-10-CM

## 2020-01-01 DIAGNOSIS — K70.31 ALCOHOLIC CIRRHOSIS OF LIVER WITH ASCITES: ICD-10-CM

## 2020-01-01 DIAGNOSIS — Z29.9 ENCOUNTER FOR PROPHYLACTIC MEASURES, UNSPECIFIED: ICD-10-CM

## 2020-01-01 DIAGNOSIS — K76.7 HEPATORENAL SYNDROME: ICD-10-CM

## 2020-01-01 DIAGNOSIS — R18.8 OTHER ASCITES: ICD-10-CM

## 2020-01-01 DIAGNOSIS — D69.6 THROMBOCYTOPENIA, UNSPECIFIED: ICD-10-CM

## 2020-01-01 DIAGNOSIS — E11.9 TYPE 2 DIABETES MELLITUS WITHOUT COMPLICATIONS: ICD-10-CM

## 2020-01-01 DIAGNOSIS — N17.9 ACUTE KIDNEY FAILURE, UNSPECIFIED: ICD-10-CM

## 2020-01-01 DIAGNOSIS — Z00.8 ENCOUNTER FOR OTHER GENERAL EXAMINATION: ICD-10-CM

## 2020-01-01 DIAGNOSIS — K70.11 ALCOHOLIC HEPATITIS WITH ASCITES: ICD-10-CM

## 2020-01-01 DIAGNOSIS — Z51.89 ENCOUNTER FOR OTHER SPECIFIED AFTERCARE: ICD-10-CM

## 2020-01-01 DIAGNOSIS — E87.6 HYPOKALEMIA: ICD-10-CM

## 2020-01-01 DIAGNOSIS — E87.2 ACIDOSIS: ICD-10-CM

## 2020-01-01 DIAGNOSIS — E87.1 HYPO-OSMOLALITY AND HYPONATREMIA: ICD-10-CM

## 2020-01-01 DIAGNOSIS — R79.1 ABNORMAL COAGULATION PROFILE: ICD-10-CM

## 2020-01-01 DIAGNOSIS — K72.90 HEPATIC FAILURE, UNSPECIFIED W/OUT COMA: ICD-10-CM

## 2020-01-01 DIAGNOSIS — Z72.89 OTHER PROBLEMS RELATED TO LIFESTYLE: ICD-10-CM

## 2020-01-01 DIAGNOSIS — R11.2 NAUSEA WITH VOMITING, UNSPECIFIED: ICD-10-CM

## 2020-01-01 DIAGNOSIS — Z02.9 ENCOUNTER FOR ADMINISTRATIVE EXAMINATIONS, UNSPECIFIED: ICD-10-CM

## 2020-01-01 DIAGNOSIS — E11.65 TYPE 2 DIABETES MELLITUS WITH HYPERGLYCEMIA: ICD-10-CM

## 2020-01-01 DIAGNOSIS — E11.9 TYPE 2 DIABETES MELLITUS W/OUT COMPLICATIONS: ICD-10-CM

## 2020-01-01 DIAGNOSIS — L29.9 PRURITUS, UNSPECIFIED: ICD-10-CM

## 2020-01-01 DIAGNOSIS — K92.0 HEMATEMESIS: ICD-10-CM

## 2020-01-01 DIAGNOSIS — R94.6 ABNORMAL RESULTS OF THYROID FUNCTION STUDIES: ICD-10-CM

## 2020-01-01 LAB
25(OH)D3 SERPL-MCNC: 21.7 NG/ML
A1C WITH ESTIMATED AVERAGE GLUCOSE RESULT: 6.6 % — HIGH (ref 4–5.6)
A1C WITH ESTIMATED AVERAGE GLUCOSE RESULT: 8.8 % — HIGH (ref 4–5.6)
ABO + RH PNL BLD: NORMAL
AFP-TM SERPL-MCNC: 10.6 NG/ML
ALBUMIN FLD-MCNC: 0.8 G/DL — SIGNIFICANT CHANGE UP
ALBUMIN SERPL ELPH-MCNC: 2.7 G/DL — LOW (ref 3.3–5)
ALBUMIN SERPL ELPH-MCNC: 2.8 G/DL — LOW (ref 3.3–5)
ALBUMIN SERPL ELPH-MCNC: 3 G/DL — LOW (ref 3.3–5)
ALBUMIN SERPL ELPH-MCNC: 3.2 G/DL — LOW (ref 3.3–5)
ALBUMIN SERPL ELPH-MCNC: 3.4 G/DL — SIGNIFICANT CHANGE UP (ref 3.3–5)
ALBUMIN SERPL ELPH-MCNC: 3.5 G/DL — SIGNIFICANT CHANGE UP (ref 3.3–5)
ALBUMIN SERPL ELPH-MCNC: 3.6 G/DL — SIGNIFICANT CHANGE UP (ref 3.3–5)
ALBUMIN SERPL ELPH-MCNC: 3.7 G/DL — SIGNIFICANT CHANGE UP (ref 3.3–5)
ALBUMIN SERPL ELPH-MCNC: 3.8 G/DL — SIGNIFICANT CHANGE UP (ref 3.3–5)
ALBUMIN SERPL ELPH-MCNC: 3.9 G/DL — SIGNIFICANT CHANGE UP (ref 3.3–5)
ALBUMIN SERPL ELPH-MCNC: 3.9 G/DL — SIGNIFICANT CHANGE UP (ref 3.3–5)
ALBUMIN SERPL ELPH-MCNC: 4 G/DL — SIGNIFICANT CHANGE UP (ref 3.3–5)
ALBUMIN SERPL ELPH-MCNC: 4 G/DL — SIGNIFICANT CHANGE UP (ref 3.3–5)
ALBUMIN SERPL ELPH-MCNC: 4.1 G/DL — SIGNIFICANT CHANGE UP (ref 3.3–5)
ALBUMIN SERPL ELPH-MCNC: 4.2 G/DL — SIGNIFICANT CHANGE UP (ref 3.3–5)
ALBUMIN SERPL ELPH-MCNC: 4.5 G/DL
ALBUMIN SERPL ELPH-MCNC: 4.5 G/DL
ALBUMIN SERPL ELPH-MCNC: 4.5 G/DL — SIGNIFICANT CHANGE UP (ref 3.3–5)
ALBUMIN SERPL ELPH-MCNC: 4.6 G/DL
ALBUMIN SERPL ELPH-MCNC: 4.6 G/DL — SIGNIFICANT CHANGE UP (ref 3.3–5)
ALBUMIN SERPL ELPH-MCNC: 4.7 G/DL — SIGNIFICANT CHANGE UP (ref 3.3–5)
ALBUMIN SERPL ELPH-MCNC: 4.7 G/DL — SIGNIFICANT CHANGE UP (ref 3.3–5)
ALBUMIN SERPL ELPH-MCNC: 4.9 G/DL
ALBUMIN SERPL ELPH-MCNC: 4.9 G/DL
ALBUMIN SERPL ELPH-MCNC: 4.9 G/DL — SIGNIFICANT CHANGE UP (ref 3.3–5)
ALBUMIN SERPL ELPH-MCNC: 5 G/DL
ALBUMIN SERPL ELPH-MCNC: 5 G/DL — SIGNIFICANT CHANGE UP (ref 3.3–5)
ALBUMIN SERPL ELPH-MCNC: 5.1 G/DL
ALBUMIN SERPL ELPH-MCNC: 5.2 G/DL — HIGH (ref 3.3–5)
ALBUMIN SERPL ELPH-MCNC: 5.2 G/DL — HIGH (ref 3.3–5)
ALP BLD-CCNC: 108 U/L
ALP BLD-CCNC: 111 U/L
ALP BLD-CCNC: 142 U/L
ALP BLD-CCNC: 65 U/L
ALP BLD-CCNC: 84 U/L
ALP BLD-CCNC: 92 U/L
ALP BLD-CCNC: 95 U/L
ALP SERPL-CCNC: 108 U/L — SIGNIFICANT CHANGE UP (ref 40–120)
ALP SERPL-CCNC: 112 U/L — SIGNIFICANT CHANGE UP (ref 40–120)
ALP SERPL-CCNC: 115 U/L — SIGNIFICANT CHANGE UP (ref 40–120)
ALP SERPL-CCNC: 131 U/L — HIGH (ref 40–120)
ALP SERPL-CCNC: 48 U/L — SIGNIFICANT CHANGE UP (ref 40–120)
ALP SERPL-CCNC: 49 U/L — SIGNIFICANT CHANGE UP (ref 40–120)
ALP SERPL-CCNC: 50 U/L — SIGNIFICANT CHANGE UP (ref 40–120)
ALP SERPL-CCNC: 51 U/L — SIGNIFICANT CHANGE UP (ref 40–120)
ALP SERPL-CCNC: 52 U/L — SIGNIFICANT CHANGE UP (ref 40–120)
ALP SERPL-CCNC: 52 U/L — SIGNIFICANT CHANGE UP (ref 40–120)
ALP SERPL-CCNC: 60 U/L — SIGNIFICANT CHANGE UP (ref 40–120)
ALP SERPL-CCNC: 60 U/L — SIGNIFICANT CHANGE UP (ref 40–120)
ALP SERPL-CCNC: 64 U/L — SIGNIFICANT CHANGE UP (ref 40–120)
ALP SERPL-CCNC: 66 U/L — SIGNIFICANT CHANGE UP (ref 40–120)
ALP SERPL-CCNC: 70 U/L — SIGNIFICANT CHANGE UP (ref 40–120)
ALP SERPL-CCNC: 72 U/L — SIGNIFICANT CHANGE UP (ref 40–120)
ALP SERPL-CCNC: 73 U/L — SIGNIFICANT CHANGE UP (ref 40–120)
ALP SERPL-CCNC: 74 U/L — SIGNIFICANT CHANGE UP (ref 40–120)
ALP SERPL-CCNC: 75 U/L — SIGNIFICANT CHANGE UP (ref 40–120)
ALP SERPL-CCNC: 76 U/L — SIGNIFICANT CHANGE UP (ref 40–120)
ALP SERPL-CCNC: 79 U/L — SIGNIFICANT CHANGE UP (ref 40–120)
ALP SERPL-CCNC: 80 U/L — SIGNIFICANT CHANGE UP (ref 40–120)
ALP SERPL-CCNC: 81 U/L — SIGNIFICANT CHANGE UP (ref 40–120)
ALP SERPL-CCNC: 82 U/L — SIGNIFICANT CHANGE UP (ref 40–120)
ALP SERPL-CCNC: 84 U/L — SIGNIFICANT CHANGE UP (ref 40–120)
ALP SERPL-CCNC: 85 U/L — SIGNIFICANT CHANGE UP (ref 40–120)
ALP SERPL-CCNC: 86 U/L — SIGNIFICANT CHANGE UP (ref 40–120)
ALP SERPL-CCNC: 87 U/L — SIGNIFICANT CHANGE UP (ref 40–120)
ALP SERPL-CCNC: 89 U/L — SIGNIFICANT CHANGE UP (ref 40–120)
ALP SERPL-CCNC: 90 U/L — SIGNIFICANT CHANGE UP (ref 40–120)
ALP SERPL-CCNC: 91 U/L — SIGNIFICANT CHANGE UP (ref 40–120)
ALP SERPL-CCNC: 92 U/L — SIGNIFICANT CHANGE UP (ref 40–120)
ALP SERPL-CCNC: 94 U/L — SIGNIFICANT CHANGE UP (ref 40–120)
ALP SERPL-CCNC: 97 U/L — SIGNIFICANT CHANGE UP (ref 40–120)
ALP SERPL-CCNC: 98 U/L — SIGNIFICANT CHANGE UP (ref 40–120)
ALT FLD-CCNC: 21 U/L — SIGNIFICANT CHANGE UP (ref 10–45)
ALT FLD-CCNC: 22 U/L — SIGNIFICANT CHANGE UP (ref 10–45)
ALT FLD-CCNC: 22 U/L — SIGNIFICANT CHANGE UP (ref 10–45)
ALT FLD-CCNC: 23 U/L — SIGNIFICANT CHANGE UP (ref 10–45)
ALT FLD-CCNC: 23 U/L — SIGNIFICANT CHANGE UP (ref 10–45)
ALT FLD-CCNC: 25 U/L — SIGNIFICANT CHANGE UP (ref 10–45)
ALT FLD-CCNC: 26 U/L — SIGNIFICANT CHANGE UP (ref 10–45)
ALT FLD-CCNC: 27 U/L — SIGNIFICANT CHANGE UP (ref 10–45)
ALT FLD-CCNC: 30 U/L — SIGNIFICANT CHANGE UP (ref 10–45)
ALT FLD-CCNC: 31 U/L — SIGNIFICANT CHANGE UP (ref 10–45)
ALT FLD-CCNC: 32 U/L — SIGNIFICANT CHANGE UP (ref 10–45)
ALT FLD-CCNC: 33 U/L — SIGNIFICANT CHANGE UP (ref 10–45)
ALT FLD-CCNC: 34 U/L — SIGNIFICANT CHANGE UP (ref 10–45)
ALT FLD-CCNC: 35 U/L — SIGNIFICANT CHANGE UP (ref 10–45)
ALT FLD-CCNC: 37 U/L — SIGNIFICANT CHANGE UP (ref 10–45)
ALT FLD-CCNC: 40 U/L — SIGNIFICANT CHANGE UP (ref 10–45)
ALT FLD-CCNC: 42 U/L — SIGNIFICANT CHANGE UP (ref 10–45)
ALT FLD-CCNC: 42 U/L — SIGNIFICANT CHANGE UP (ref 10–45)
ALT FLD-CCNC: 44 U/L — SIGNIFICANT CHANGE UP (ref 10–45)
ALT FLD-CCNC: 45 U/L — SIGNIFICANT CHANGE UP (ref 10–45)
ALT FLD-CCNC: 45 U/L — SIGNIFICANT CHANGE UP (ref 10–45)
ALT FLD-CCNC: 46 U/L — HIGH (ref 10–45)
ALT FLD-CCNC: 47 U/L — HIGH (ref 10–45)
ALT FLD-CCNC: 48 U/L — HIGH (ref 10–45)
ALT FLD-CCNC: 49 U/L — HIGH (ref 10–45)
ALT FLD-CCNC: 49 U/L — HIGH (ref 10–45)
ALT FLD-CCNC: 50 U/L — HIGH (ref 10–45)
ALT FLD-CCNC: 52 U/L — HIGH (ref 10–45)
ALT SERPL-CCNC: 21 U/L
ALT SERPL-CCNC: 28 U/L
ALT SERPL-CCNC: 29 U/L
ALT SERPL-CCNC: 30 U/L
ALT SERPL-CCNC: 32 U/L
ALT SERPL-CCNC: 52 U/L
ALT SERPL-CCNC: 63 U/L
AMMONIA BLD-MCNC: 126 UMOL/L — HIGH (ref 11–55)
AMMONIA BLD-MCNC: 129 UMOL/L — SIGNIFICANT CHANGE UP (ref 11–55)
AMMONIA BLD-MCNC: 175 UMOL/L — HIGH (ref 11–55)
AMMONIA BLD-MCNC: 30 UMOL/L — SIGNIFICANT CHANGE UP (ref 11–55)
AMMONIA BLD-MCNC: 69 UMOL/L — HIGH (ref 11–55)
AMYLASE P1 CFR SERPL: 59 U/L — SIGNIFICANT CHANGE UP (ref 25–125)
AMYLASE P1 CFR SERPL: 92 U/L — SIGNIFICANT CHANGE UP (ref 25–125)
ANION GAP SERPL CALC-SCNC: 16 MMOL/L — SIGNIFICANT CHANGE UP (ref 5–17)
ANION GAP SERPL CALC-SCNC: 17 MMOL/L — SIGNIFICANT CHANGE UP (ref 5–17)
ANION GAP SERPL CALC-SCNC: 18 MMOL/L — HIGH (ref 5–17)
ANION GAP SERPL CALC-SCNC: 19 MMOL/L
ANION GAP SERPL CALC-SCNC: 19 MMOL/L — HIGH (ref 5–17)
ANION GAP SERPL CALC-SCNC: 20 MMOL/L — HIGH (ref 5–17)
ANION GAP SERPL CALC-SCNC: 21 MMOL/L
ANION GAP SERPL CALC-SCNC: 21 MMOL/L — HIGH (ref 5–17)
ANION GAP SERPL CALC-SCNC: 22 MMOL/L — HIGH (ref 5–17)
ANION GAP SERPL CALC-SCNC: 24 MMOL/L — HIGH (ref 5–17)
ANION GAP SERPL CALC-SCNC: 25 MMOL/L
ANION GAP SERPL CALC-SCNC: 25 MMOL/L — HIGH (ref 5–17)
ANION GAP SERPL CALC-SCNC: 26 MMOL/L — HIGH (ref 5–17)
ANISOCYTOSIS BLD QL: SIGNIFICANT CHANGE UP
ANISOCYTOSIS BLD QL: SLIGHT — SIGNIFICANT CHANGE UP
APPEARANCE UR: ABNORMAL
APPEARANCE UR: ABNORMAL
APPEARANCE UR: CLEAR — SIGNIFICANT CHANGE UP
APPEARANCE: ABNORMAL
APPEARANCE: CLEAR
APPEARANCE: CLEAR
APTT BLD: 28.4 SEC — SIGNIFICANT CHANGE UP (ref 27.5–36.3)
APTT BLD: 42.1 SEC — HIGH (ref 27.5–36.3)
APTT BLD: 43.5 SEC — HIGH (ref 27.5–36.3)
APTT BLD: 43.9 SEC — HIGH (ref 27.5–36.3)
APTT BLD: 44.3 SEC — HIGH (ref 27.5–36.3)
APTT BLD: 44.8 SEC — HIGH (ref 27.5–36.3)
APTT BLD: 45.2 SEC — HIGH (ref 27.5–36.3)
APTT BLD: 45.4 SEC — HIGH (ref 27.5–36.3)
APTT BLD: 46.2 SEC — HIGH (ref 27.5–36.3)
APTT BLD: 47 SEC — HIGH (ref 27.5–36.3)
APTT BLD: 47.6 SEC — HIGH (ref 27.5–36.3)
APTT BLD: 49.8 SEC — HIGH (ref 27.5–36.3)
APTT BLD: 50.6 SEC — HIGH (ref 27.5–35.5)
APTT BLD: 53 SEC — HIGH (ref 27.5–35.5)
APTT BLD: 53.9 SEC — HIGH (ref 27.5–35.5)
APTT BLD: 54.5 SEC — HIGH (ref 27.5–35.5)
APTT BLD: 58.9 SEC — HIGH (ref 27.5–35.5)
APTT BLD: 61.2 SEC — HIGH (ref 27.5–35.5)
APTT BLD: 61.4 SEC — HIGH (ref 27.5–35.5)
APTT BLD: 63.8 SEC — HIGH (ref 27.5–35.5)
APTT BLD: 64.1 SEC — HIGH (ref 27.5–35.5)
APTT BLD: 69.4 SEC — HIGH (ref 27.5–35.5)
APTT BLD: 77.2 SEC — HIGH (ref 27.5–35.5)
APTT BLD: 81.5 SEC — HIGH (ref 27.5–35.5)
APTT BLD: 82.3 SEC — HIGH (ref 27.5–36.3)
AST SERPL-CCNC: 101 U/L
AST SERPL-CCNC: 105 U/L — HIGH (ref 10–40)
AST SERPL-CCNC: 128 U/L — HIGH (ref 10–40)
AST SERPL-CCNC: 132 U/L — HIGH (ref 10–40)
AST SERPL-CCNC: 142 U/L — HIGH (ref 10–40)
AST SERPL-CCNC: 143 U/L — HIGH (ref 10–40)
AST SERPL-CCNC: 146 U/L — HIGH (ref 10–40)
AST SERPL-CCNC: 149 U/L — HIGH (ref 10–40)
AST SERPL-CCNC: 152 U/L — HIGH (ref 10–40)
AST SERPL-CCNC: 155 U/L — HIGH (ref 10–40)
AST SERPL-CCNC: 167 U/L — HIGH (ref 10–40)
AST SERPL-CCNC: 46 U/L — HIGH (ref 10–40)
AST SERPL-CCNC: 49 U/L — HIGH (ref 10–40)
AST SERPL-CCNC: 49 U/L — HIGH (ref 10–40)
AST SERPL-CCNC: 51 U/L — HIGH (ref 10–40)
AST SERPL-CCNC: 52 U/L — HIGH (ref 10–40)
AST SERPL-CCNC: 54 U/L — HIGH (ref 10–40)
AST SERPL-CCNC: 54 U/L — HIGH (ref 10–40)
AST SERPL-CCNC: 56 U/L
AST SERPL-CCNC: 58 U/L — HIGH (ref 10–40)
AST SERPL-CCNC: 60 U/L — HIGH (ref 10–40)
AST SERPL-CCNC: 61 U/L — HIGH (ref 10–40)
AST SERPL-CCNC: 61 U/L — HIGH (ref 10–40)
AST SERPL-CCNC: 62 U/L — HIGH (ref 10–40)
AST SERPL-CCNC: 64 U/L — HIGH (ref 10–40)
AST SERPL-CCNC: 66 U/L
AST SERPL-CCNC: 66 U/L — HIGH (ref 10–40)
AST SERPL-CCNC: 67 U/L — HIGH (ref 10–40)
AST SERPL-CCNC: 68 U/L — HIGH (ref 10–40)
AST SERPL-CCNC: 72 U/L — HIGH (ref 10–40)
AST SERPL-CCNC: 74 U/L — HIGH (ref 10–40)
AST SERPL-CCNC: 76 U/L — HIGH (ref 10–40)
AST SERPL-CCNC: 76 U/L — HIGH (ref 10–40)
AST SERPL-CCNC: 77 U/L — HIGH (ref 10–40)
AST SERPL-CCNC: 78 U/L — HIGH (ref 10–40)
AST SERPL-CCNC: 78 U/L — HIGH (ref 10–40)
AST SERPL-CCNC: 79 U/L — HIGH (ref 10–40)
AST SERPL-CCNC: 80 U/L
AST SERPL-CCNC: 83 U/L
AST SERPL-CCNC: 84 U/L
AST SERPL-CCNC: 87 U/L — HIGH (ref 10–40)
AST SERPL-CCNC: 95 U/L
AST SERPL-CCNC: 96 U/L — HIGH (ref 10–40)
AST SERPL-CCNC: 98 U/L — HIGH (ref 10–40)
B PERT IGG+IGM PNL SER: ABNORMAL
B-OH-BUTYR SERPL-SCNC: 0.1 MMOL/L — SIGNIFICANT CHANGE UP
B-OH-BUTYR SERPL-SCNC: 0.1 MMOL/L — SIGNIFICANT CHANGE UP
B-OH-BUTYR SERPL-SCNC: 0.2 MMOL/L — SIGNIFICANT CHANGE UP
BACTERIA # UR AUTO: NEGATIVE — SIGNIFICANT CHANGE UP
BACTERIA # UR AUTO: NEGATIVE — SIGNIFICANT CHANGE UP
BACTERIA BLD CULT: NORMAL
BACTERIA BLD CULT: NORMAL
BACTERIA UR CULT: NORMAL
BACTERIA UR CULT: NORMAL
BACTERIA: NEGATIVE
BASE EXCESS BLDV CALC-SCNC: -14.9 MMOL/L — LOW (ref -2–2)
BASE EXCESS BLDV CALC-SCNC: -15.2 MMOL/L — LOW (ref -2–2)
BASOPHILS # BLD AUTO: 0 K/UL — SIGNIFICANT CHANGE UP (ref 0–0.2)
BASOPHILS # BLD AUTO: 0 K/UL — SIGNIFICANT CHANGE UP (ref 0–0.2)
BASOPHILS # BLD AUTO: 0.01 K/UL — SIGNIFICANT CHANGE UP (ref 0–0.2)
BASOPHILS # BLD AUTO: 0.02 K/UL
BASOPHILS # BLD AUTO: 0.02 K/UL — SIGNIFICANT CHANGE UP (ref 0–0.2)
BASOPHILS # BLD AUTO: 0.03 K/UL
BASOPHILS # BLD AUTO: 0.03 K/UL — SIGNIFICANT CHANGE UP (ref 0–0.2)
BASOPHILS # BLD AUTO: 0.04 K/UL
BASOPHILS # BLD AUTO: 0.04 K/UL
BASOPHILS # BLD AUTO: 0.04 K/UL — SIGNIFICANT CHANGE UP (ref 0–0.2)
BASOPHILS # BLD AUTO: 0.05 K/UL
BASOPHILS # BLD AUTO: 0.05 K/UL
BASOPHILS # BLD AUTO: 0.05 K/UL — SIGNIFICANT CHANGE UP (ref 0–0.2)
BASOPHILS # BLD AUTO: 0.07 K/UL — SIGNIFICANT CHANGE UP (ref 0–0.2)
BASOPHILS # BLD AUTO: 0.08 K/UL — SIGNIFICANT CHANGE UP (ref 0–0.2)
BASOPHILS # BLD AUTO: 0.08 K/UL — SIGNIFICANT CHANGE UP (ref 0–0.2)
BASOPHILS NFR BLD AUTO: 0 % — SIGNIFICANT CHANGE UP (ref 0–2)
BASOPHILS NFR BLD AUTO: 0 % — SIGNIFICANT CHANGE UP (ref 0–2)
BASOPHILS NFR BLD AUTO: 0.1 % — SIGNIFICANT CHANGE UP (ref 0–2)
BASOPHILS NFR BLD AUTO: 0.2 % — SIGNIFICANT CHANGE UP (ref 0–2)
BASOPHILS NFR BLD AUTO: 0.3 % — SIGNIFICANT CHANGE UP (ref 0–2)
BASOPHILS NFR BLD AUTO: 0.4 %
BASOPHILS NFR BLD AUTO: 0.4 % — SIGNIFICANT CHANGE UP (ref 0–2)
BASOPHILS NFR BLD AUTO: 0.4 % — SIGNIFICANT CHANGE UP (ref 0–2)
BASOPHILS NFR BLD AUTO: 0.5 %
BASOPHILS NFR BLD AUTO: 0.5 % — SIGNIFICANT CHANGE UP (ref 0–2)
BASOPHILS NFR BLD AUTO: 0.5 % — SIGNIFICANT CHANGE UP (ref 0–2)
BASOPHILS NFR BLD AUTO: 0.6 %
BASOPHILS NFR BLD AUTO: 0.6 % — SIGNIFICANT CHANGE UP (ref 0–2)
BASOPHILS NFR BLD AUTO: 0.8 % — SIGNIFICANT CHANGE UP (ref 0–2)
BASOPHILS NFR BLD AUTO: 0.9 %
BASOPHILS NFR BLD AUTO: 0.9 % — SIGNIFICANT CHANGE UP (ref 0–2)
BILIRUB DIRECT SERPL-MCNC: 37.1 MG/DL
BILIRUB DIRECT SERPL-MCNC: 40.5 MG/DL
BILIRUB DIRECT SERPL-MCNC: 49.1 MG/DL
BILIRUB DIRECT SERPL-MCNC: >10 MG/DL — HIGH (ref 0–0.2)
BILIRUB DIRECT SERPL-MCNC: >10 MG/DL — HIGH (ref 0–0.2)
BILIRUB DIRECT SERPL-MCNC: >20 MG/DL
BILIRUB INDIRECT FLD-MCNC: <24.5 MG/DL — HIGH (ref 0.2–1)
BILIRUB SERPL-MCNC: 31 MG/DL — HIGH (ref 0.2–1.2)
BILIRUB SERPL-MCNC: 34.5 MG/DL — HIGH (ref 0.2–1.2)
BILIRUB SERPL-MCNC: 34.5 MG/DL — HIGH (ref 0.2–1.2)
BILIRUB SERPL-MCNC: 34.8 MG/DL — HIGH (ref 0.2–1.2)
BILIRUB SERPL-MCNC: 35.8 MG/DL — HIGH (ref 0.2–1.2)
BILIRUB SERPL-MCNC: 36.4 MG/DL — HIGH (ref 0.2–1.2)
BILIRUB SERPL-MCNC: 36.6 MG/DL — HIGH (ref 0.2–1.2)
BILIRUB SERPL-MCNC: 37.1 MG/DL — HIGH (ref 0.2–1.2)
BILIRUB SERPL-MCNC: 37.2 MG/DL — HIGH (ref 0.2–1.2)
BILIRUB SERPL-MCNC: 38.1 MG/DL — HIGH (ref 0.2–1.2)
BILIRUB SERPL-MCNC: 38.2 MG/DL — HIGH (ref 0.2–1.2)
BILIRUB SERPL-MCNC: 38.3 MG/DL — HIGH (ref 0.2–1.2)
BILIRUB SERPL-MCNC: 38.4 MG/DL — HIGH (ref 0.2–1.2)
BILIRUB SERPL-MCNC: 38.6 MG/DL — HIGH (ref 0.2–1.2)
BILIRUB SERPL-MCNC: 38.6 MG/DL — HIGH (ref 0.2–1.2)
BILIRUB SERPL-MCNC: 38.7 MG/DL — HIGH (ref 0.2–1.2)
BILIRUB SERPL-MCNC: 39.1 MG/DL — HIGH (ref 0.2–1.2)
BILIRUB SERPL-MCNC: 39.2 MG/DL — HIGH (ref 0.2–1.2)
BILIRUB SERPL-MCNC: 39.4 MG/DL — HIGH (ref 0.2–1.2)
BILIRUB SERPL-MCNC: 39.5 MG/DL — HIGH (ref 0.2–1.2)
BILIRUB SERPL-MCNC: 39.7 MG/DL — HIGH (ref 0.2–1.2)
BILIRUB SERPL-MCNC: 40 MG/DL — HIGH (ref 0.2–1.2)
BILIRUB SERPL-MCNC: 40.4 MG/DL — HIGH (ref 0.2–1.2)
BILIRUB SERPL-MCNC: 40.8 MG/DL — HIGH (ref 0.2–1.2)
BILIRUB SERPL-MCNC: 41.6 MG/DL — HIGH (ref 0.2–1.2)
BILIRUB SERPL-MCNC: 41.6 MG/DL — HIGH (ref 0.2–1.2)
BILIRUB SERPL-MCNC: 41.9 MG/DL — HIGH (ref 0.2–1.2)
BILIRUB SERPL-MCNC: 41.9 MG/DL — HIGH (ref 0.2–1.2)
BILIRUB SERPL-MCNC: 42 MG/DL — HIGH (ref 0.2–1.2)
BILIRUB SERPL-MCNC: 42.4 MG/DL — HIGH (ref 0.2–1.2)
BILIRUB SERPL-MCNC: 42.6 MG/DL — SIGNIFICANT CHANGE UP (ref 0.2–1.2)
BILIRUB SERPL-MCNC: 44 MG/DL — HIGH (ref 0.2–1.2)
BILIRUB SERPL-MCNC: 44.3 MG/DL — HIGH (ref 0.2–1.2)
BILIRUB SERPL-MCNC: 44.4 MG/DL — HIGH (ref 0.2–1.2)
BILIRUB SERPL-MCNC: 45.5 MG/DL — HIGH (ref 0.2–1.2)
BILIRUB SERPL-MCNC: 45.9 MG/DL — HIGH (ref 0.2–1.2)
BILIRUB SERPL-MCNC: 50 MG/DL
BILIRUB SERPL-MCNC: 50 MG/DL — HIGH (ref 0.2–1.2)
BILIRUB SERPL-MCNC: 52 MG/DL
BILIRUB SERPL-MCNC: 52.7 MG/DL
BILIRUB SERPL-MCNC: 53.3 MG/DL
BILIRUB SERPL-MCNC: 54.6 MG/DL — HIGH (ref 0.2–1.2)
BILIRUB SERPL-MCNC: 54.7 MG/DL
BILIRUB SERPL-MCNC: 57 MG/DL
BILIRUB SERPL-MCNC: 58.8 MG/DL
BILIRUB UR-MCNC: ABNORMAL
BILIRUBIN URINE: ABNORMAL
BLD GP AB SCN SERPL QL: NEGATIVE — SIGNIFICANT CHANGE UP
BLOOD URINE: ABNORMAL
BLOOD URINE: ABNORMAL
BLOOD URINE: NORMAL
BUN SERPL-MCNC: 14 MG/DL
BUN SERPL-MCNC: 17 MG/DL — SIGNIFICANT CHANGE UP (ref 7–23)
BUN SERPL-MCNC: 17 MG/DL — SIGNIFICANT CHANGE UP (ref 7–23)
BUN SERPL-MCNC: 18 MG/DL — SIGNIFICANT CHANGE UP (ref 7–23)
BUN SERPL-MCNC: 18 MG/DL — SIGNIFICANT CHANGE UP (ref 7–23)
BUN SERPL-MCNC: 19 MG/DL
BUN SERPL-MCNC: 19 MG/DL — SIGNIFICANT CHANGE UP (ref 7–23)
BUN SERPL-MCNC: 19 MG/DL — SIGNIFICANT CHANGE UP (ref 7–23)
BUN SERPL-MCNC: 20 MG/DL — SIGNIFICANT CHANGE UP (ref 7–23)
BUN SERPL-MCNC: 20 MG/DL — SIGNIFICANT CHANGE UP (ref 7–23)
BUN SERPL-MCNC: 21 MG/DL
BUN SERPL-MCNC: 21 MG/DL — SIGNIFICANT CHANGE UP (ref 7–23)
BUN SERPL-MCNC: 22 MG/DL — SIGNIFICANT CHANGE UP (ref 7–23)
BUN SERPL-MCNC: 25 MG/DL
BUN SERPL-MCNC: 26 MG/DL
BUN SERPL-MCNC: 26 MG/DL — HIGH (ref 7–23)
BUN SERPL-MCNC: 26 MG/DL — HIGH (ref 7–23)
BUN SERPL-MCNC: 27 MG/DL — HIGH (ref 7–23)
BUN SERPL-MCNC: 28 MG/DL — HIGH (ref 7–23)
BUN SERPL-MCNC: 29 MG/DL — HIGH (ref 7–23)
BUN SERPL-MCNC: 30 MG/DL — HIGH (ref 7–23)
BUN SERPL-MCNC: 31 MG/DL — HIGH (ref 7–23)
BUN SERPL-MCNC: 31 MG/DL — HIGH (ref 7–23)
BUN SERPL-MCNC: 32 MG/DL — HIGH (ref 7–23)
BUN SERPL-MCNC: 32 MG/DL — HIGH (ref 7–23)
BUN SERPL-MCNC: 33 MG/DL — HIGH (ref 7–23)
BUN SERPL-MCNC: 34 MG/DL — HIGH (ref 7–23)
BUN SERPL-MCNC: 35 MG/DL — HIGH (ref 7–23)
BUN SERPL-MCNC: 36 MG/DL — HIGH (ref 7–23)
BUN SERPL-MCNC: 38 MG/DL — HIGH (ref 7–23)
BUN SERPL-MCNC: 39 MG/DL
BUN SERPL-MCNC: 39 MG/DL — HIGH (ref 7–23)
BUN SERPL-MCNC: 40 MG/DL — HIGH (ref 7–23)
BUN SERPL-MCNC: 41 MG/DL — HIGH (ref 7–23)
BUN SERPL-MCNC: 42 MG/DL — HIGH (ref 7–23)
BUN SERPL-MCNC: 43 MG/DL
BUN SERPL-MCNC: 43 MG/DL — HIGH (ref 7–23)
BUN SERPL-MCNC: 44 MG/DL — HIGH (ref 7–23)
BUN SERPL-MCNC: 44 MG/DL — HIGH (ref 7–23)
BUN SERPL-MCNC: 46 MG/DL — HIGH (ref 7–23)
BUN SERPL-MCNC: 50 MG/DL — HIGH (ref 7–23)
BUN SERPL-MCNC: 55 MG/DL — HIGH (ref 7–23)
BUN SERPL-MCNC: 55 MG/DL — HIGH (ref 7–23)
BURR CELLS BLD QL SMEAR: PRESENT — SIGNIFICANT CHANGE UP
BURR CELLS BLD QL SMEAR: PRESENT — SIGNIFICANT CHANGE UP
BURR CELLS BLD QL SMEAR: SIGNIFICANT CHANGE UP
C PEPTIDE SERPL-MCNC: 5.1 NG/ML — HIGH (ref 1.1–4.4)
CA-I SERPL-SCNC: 1.07 MMOL/L — LOW (ref 1.12–1.3)
CALCIUM SERPL-MCNC: 6.7 MG/DL — LOW (ref 8.4–10.5)
CALCIUM SERPL-MCNC: 7.1 MG/DL — LOW (ref 8.4–10.5)
CALCIUM SERPL-MCNC: 7.2 MG/DL — LOW (ref 8.4–10.5)
CALCIUM SERPL-MCNC: 7.2 MG/DL — LOW (ref 8.4–10.5)
CALCIUM SERPL-MCNC: 7.3 MG/DL — LOW (ref 8.4–10.5)
CALCIUM SERPL-MCNC: 7.4 MG/DL — LOW (ref 8.4–10.5)
CALCIUM SERPL-MCNC: 7.7 MG/DL — LOW (ref 8.4–10.5)
CALCIUM SERPL-MCNC: 7.8 MG/DL — LOW (ref 8.4–10.5)
CALCIUM SERPL-MCNC: 7.9 MG/DL — LOW (ref 8.4–10.5)
CALCIUM SERPL-MCNC: 8 MG/DL — LOW (ref 8.4–10.5)
CALCIUM SERPL-MCNC: 8.1 MG/DL — LOW (ref 8.4–10.5)
CALCIUM SERPL-MCNC: 8.2 MG/DL — LOW (ref 8.4–10.5)
CALCIUM SERPL-MCNC: 8.2 MG/DL — LOW (ref 8.4–10.5)
CALCIUM SERPL-MCNC: 8.3 MG/DL — LOW (ref 8.4–10.5)
CALCIUM SERPL-MCNC: 8.4 MG/DL — SIGNIFICANT CHANGE UP (ref 8.4–10.5)
CALCIUM SERPL-MCNC: 8.5 MG/DL — SIGNIFICANT CHANGE UP (ref 8.4–10.5)
CALCIUM SERPL-MCNC: 8.7 MG/DL — SIGNIFICANT CHANGE UP (ref 8.4–10.5)
CALCIUM SERPL-MCNC: 8.8 MG/DL — SIGNIFICANT CHANGE UP (ref 8.4–10.5)
CALCIUM SERPL-MCNC: 8.8 MG/DL — SIGNIFICANT CHANGE UP (ref 8.4–10.5)
CALCIUM SERPL-MCNC: 8.9 MG/DL — SIGNIFICANT CHANGE UP (ref 8.4–10.5)
CALCIUM SERPL-MCNC: 9 MG/DL
CALCIUM SERPL-MCNC: 9 MG/DL — SIGNIFICANT CHANGE UP (ref 8.4–10.5)
CALCIUM SERPL-MCNC: 9.1 MG/DL — SIGNIFICANT CHANGE UP (ref 8.4–10.5)
CALCIUM SERPL-MCNC: 9.1 MG/DL — SIGNIFICANT CHANGE UP (ref 8.4–10.5)
CALCIUM SERPL-MCNC: 9.2 MG/DL
CALCIUM SERPL-MCNC: 9.2 MG/DL — SIGNIFICANT CHANGE UP (ref 8.4–10.5)
CALCIUM SERPL-MCNC: 9.3 MG/DL
CALCIUM SERPL-MCNC: 9.3 MG/DL
CALCIUM SERPL-MCNC: 9.3 MG/DL — SIGNIFICANT CHANGE UP (ref 8.4–10.5)
CALCIUM SERPL-MCNC: 9.4 MG/DL — SIGNIFICANT CHANGE UP (ref 8.4–10.5)
CALCIUM SERPL-MCNC: 9.5 MG/DL
CALCIUM SERPL-MCNC: 9.5 MG/DL — SIGNIFICANT CHANGE UP (ref 8.4–10.5)
CALCIUM SERPL-MCNC: 9.5 MG/DL — SIGNIFICANT CHANGE UP (ref 8.4–10.5)
CALCIUM SERPL-MCNC: 9.6 MG/DL — SIGNIFICANT CHANGE UP (ref 8.4–10.5)
CALCIUM SERPL-MCNC: 9.7 MG/DL
CALCIUM SERPL-MCNC: 9.8 MG/DL
CALCIUM SERPL-MCNC: 9.9 MG/DL — SIGNIFICANT CHANGE UP (ref 8.4–10.5)
CHLORIDE BLDV-SCNC: 109 MMOL/L — HIGH (ref 96–108)
CHLORIDE SERPL-SCNC: 100 MMOL/L
CHLORIDE SERPL-SCNC: 100 MMOL/L — SIGNIFICANT CHANGE UP (ref 96–108)
CHLORIDE SERPL-SCNC: 101 MMOL/L — SIGNIFICANT CHANGE UP (ref 96–108)
CHLORIDE SERPL-SCNC: 102 MMOL/L
CHLORIDE SERPL-SCNC: 102 MMOL/L — SIGNIFICANT CHANGE UP (ref 96–108)
CHLORIDE SERPL-SCNC: 103 MMOL/L
CHLORIDE SERPL-SCNC: 103 MMOL/L — SIGNIFICANT CHANGE UP (ref 96–108)
CHLORIDE SERPL-SCNC: 104 MMOL/L — SIGNIFICANT CHANGE UP (ref 96–108)
CHLORIDE SERPL-SCNC: 105 MMOL/L — SIGNIFICANT CHANGE UP (ref 96–108)
CHLORIDE SERPL-SCNC: 106 MMOL/L
CHLORIDE SERPL-SCNC: 106 MMOL/L — SIGNIFICANT CHANGE UP (ref 96–108)
CHLORIDE SERPL-SCNC: 106 MMOL/L — SIGNIFICANT CHANGE UP (ref 96–108)
CHLORIDE SERPL-SCNC: 107 MMOL/L
CHLORIDE SERPL-SCNC: 107 MMOL/L — SIGNIFICANT CHANGE UP (ref 96–108)
CHLORIDE SERPL-SCNC: 108 MMOL/L — SIGNIFICANT CHANGE UP (ref 96–108)
CHLORIDE SERPL-SCNC: 109 MMOL/L — HIGH (ref 96–108)
CHLORIDE SERPL-SCNC: 79 MMOL/L — LOW (ref 96–108)
CHLORIDE SERPL-SCNC: 85 MMOL/L — LOW (ref 96–108)
CHLORIDE SERPL-SCNC: 86 MMOL/L — LOW (ref 96–108)
CHLORIDE SERPL-SCNC: 89 MMOL/L — LOW (ref 96–108)
CHLORIDE SERPL-SCNC: 90 MMOL/L — LOW (ref 96–108)
CHLORIDE SERPL-SCNC: 90 MMOL/L — LOW (ref 96–108)
CHLORIDE SERPL-SCNC: 92 MMOL/L — LOW (ref 96–108)
CHLORIDE SERPL-SCNC: 93 MMOL/L — LOW (ref 96–108)
CHLORIDE SERPL-SCNC: 96 MMOL/L — SIGNIFICANT CHANGE UP (ref 96–108)
CHLORIDE SERPL-SCNC: 97 MMOL/L — SIGNIFICANT CHANGE UP (ref 96–108)
CHLORIDE SERPL-SCNC: 98 MMOL/L — SIGNIFICANT CHANGE UP (ref 96–108)
CHLORIDE SERPL-SCNC: 99 MMOL/L — SIGNIFICANT CHANGE UP (ref 96–108)
CHLORIDE SERPL-SCNC: 99 MMOL/L — SIGNIFICANT CHANGE UP (ref 96–108)
CHLORIDE UR-SCNC: <35 MMOL/L — SIGNIFICANT CHANGE UP
CHLORIDE UR-SCNC: <35 MMOL/L — SIGNIFICANT CHANGE UP
CHOLEST SERPL-MCNC: 67 MG/DL
CHOLEST SERPL-MCNC: 70 MG/DL
CHOLEST/HDLC SERPL: 10.9 RATIO
CHOLEST/HDLC SERPL: 9.3 RATIO
CMV IGG SERPL QL: 3.5 U/ML
CMV IGG SERPL-IMP: POSITIVE
CO2 BLDV-SCNC: 11 MMOL/L — LOW (ref 22–30)
CO2 BLDV-SCNC: 13 MMOL/L — LOW (ref 22–30)
CO2 SERPL-SCNC: 10 MMOL/L — CRITICAL LOW (ref 22–31)
CO2 SERPL-SCNC: 11 MMOL/L — LOW (ref 22–31)
CO2 SERPL-SCNC: 12 MMOL/L
CO2 SERPL-SCNC: 12 MMOL/L — LOW (ref 22–31)
CO2 SERPL-SCNC: 13 MMOL/L
CO2 SERPL-SCNC: 13 MMOL/L — LOW (ref 22–31)
CO2 SERPL-SCNC: 14 MMOL/L
CO2 SERPL-SCNC: 14 MMOL/L — LOW (ref 22–31)
CO2 SERPL-SCNC: 15 MMOL/L — LOW (ref 22–31)
CO2 SERPL-SCNC: 16 MMOL/L
CO2 SERPL-SCNC: 16 MMOL/L
CO2 SERPL-SCNC: 16 MMOL/L — LOW (ref 22–31)
CO2 SERPL-SCNC: 17 MMOL/L
CO2 SERPL-SCNC: 17 MMOL/L — LOW (ref 22–31)
CO2 SERPL-SCNC: 18 MMOL/L — LOW (ref 22–31)
CO2 SERPL-SCNC: 9 MMOL/L
COLOR FLD: YELLOW — SIGNIFICANT CHANGE UP
COLOR SPEC: ABNORMAL
COLOR: ABNORMAL
COMMENT - URINE: SIGNIFICANT CHANGE UP
CORTIS AM PEAK SERPL-MCNC: 1.8 UG/DL — LOW (ref 6–18.4)
CORTIS AM PEAK SERPL-MCNC: 7.5 UG/DL — SIGNIFICANT CHANGE UP (ref 6–18.4)
CREAT ?TM UR-MCNC: 60 MG/DL — SIGNIFICANT CHANGE UP
CREAT ?TM UR-MCNC: 83 MG/DL — SIGNIFICANT CHANGE UP
CREAT ?TM UR-MCNC: 88 MG/DL — SIGNIFICANT CHANGE UP
CREAT ?TM UR-MCNC: 88 MG/DL — SIGNIFICANT CHANGE UP
CREAT ?TM UR-MCNC: 91 MG/DL — SIGNIFICANT CHANGE UP
CREAT SERPL-MCNC: 1.7 MG/DL
CREAT SERPL-MCNC: 1.83 MG/DL — HIGH (ref 0.5–1.3)
CREAT SERPL-MCNC: 1.85 MG/DL — HIGH (ref 0.5–1.3)
CREAT SERPL-MCNC: 1.85 MG/DL — HIGH (ref 0.5–1.3)
CREAT SERPL-MCNC: 1.87 MG/DL — HIGH (ref 0.5–1.3)
CREAT SERPL-MCNC: 1.92 MG/DL — HIGH (ref 0.5–1.3)
CREAT SERPL-MCNC: 1.94 MG/DL — HIGH (ref 0.5–1.3)
CREAT SERPL-MCNC: 1.94 MG/DL — HIGH (ref 0.5–1.3)
CREAT SERPL-MCNC: 1.95 MG/DL
CREAT SERPL-MCNC: 1.95 MG/DL — HIGH (ref 0.5–1.3)
CREAT SERPL-MCNC: 2 MG/DL — HIGH (ref 0.5–1.3)
CREAT SERPL-MCNC: 2.01 MG/DL
CREAT SERPL-MCNC: 2.03 MG/DL
CREAT SERPL-MCNC: 2.03 MG/DL — HIGH (ref 0.5–1.3)
CREAT SERPL-MCNC: 2.07 MG/DL — HIGH (ref 0.5–1.3)
CREAT SERPL-MCNC: 2.07 MG/DL — HIGH (ref 0.5–1.3)
CREAT SERPL-MCNC: 2.08 MG/DL — HIGH (ref 0.5–1.3)
CREAT SERPL-MCNC: 2.11 MG/DL
CREAT SERPL-MCNC: 2.12 MG/DL — HIGH (ref 0.5–1.3)
CREAT SERPL-MCNC: 2.13 MG/DL — HIGH (ref 0.5–1.3)
CREAT SERPL-MCNC: 2.14 MG/DL — HIGH (ref 0.5–1.3)
CREAT SERPL-MCNC: 2.19 MG/DL — HIGH (ref 0.5–1.3)
CREAT SERPL-MCNC: 2.19 MG/DL — HIGH (ref 0.5–1.3)
CREAT SERPL-MCNC: 2.21 MG/DL — HIGH (ref 0.5–1.3)
CREAT SERPL-MCNC: 2.25 MG/DL — HIGH (ref 0.5–1.3)
CREAT SERPL-MCNC: 2.28 MG/DL — HIGH (ref 0.5–1.3)
CREAT SERPL-MCNC: 2.31 MG/DL
CREAT SERPL-MCNC: 2.4 MG/DL — HIGH (ref 0.5–1.3)
CREAT SERPL-MCNC: 2.45 MG/DL — HIGH (ref 0.5–1.3)
CREAT SERPL-MCNC: 2.54 MG/DL — HIGH (ref 0.5–1.3)
CREAT SERPL-MCNC: 2.68 MG/DL — HIGH (ref 0.5–1.3)
CREAT SERPL-MCNC: 2.7 MG/DL — HIGH (ref 0.5–1.3)
CREAT SERPL-MCNC: 2.77 MG/DL — HIGH (ref 0.5–1.3)
CREAT SERPL-MCNC: 2.85 MG/DL — HIGH (ref 0.5–1.3)
CREAT SERPL-MCNC: 2.9 MG/DL — HIGH (ref 0.5–1.3)
CREAT SERPL-MCNC: 2.99 MG/DL — HIGH (ref 0.5–1.3)
CREAT SERPL-MCNC: 3.02 MG/DL — HIGH (ref 0.5–1.3)
CREAT SERPL-MCNC: 3.11 MG/DL — HIGH (ref 0.5–1.3)
CREAT SERPL-MCNC: 3.24 MG/DL — HIGH (ref 0.5–1.3)
CREAT SERPL-MCNC: 3.32 MG/DL — HIGH (ref 0.5–1.3)
CREAT SERPL-MCNC: 3.32 MG/DL — HIGH (ref 0.5–1.3)
CREAT SERPL-MCNC: 3.39 MG/DL — HIGH (ref 0.5–1.3)
CREAT SERPL-MCNC: 3.42 MG/DL — HIGH (ref 0.5–1.3)
CREAT SERPL-MCNC: 3.43 MG/DL — HIGH (ref 0.5–1.3)
CREAT SERPL-MCNC: 3.46 MG/DL — HIGH (ref 0.5–1.3)
CREAT SERPL-MCNC: 3.52 MG/DL — HIGH (ref 0.5–1.3)
CREAT SERPL-MCNC: 3.56 MG/DL — HIGH (ref 0.5–1.3)
CREAT SERPL-MCNC: 3.56 MG/DL — HIGH (ref 0.5–1.3)
CREAT SERPL-MCNC: 3.59 MG/DL — HIGH (ref 0.5–1.3)
CREAT SERPL-MCNC: 3.68 MG/DL
CREAT SERPL-MCNC: 3.77 MG/DL — HIGH (ref 0.5–1.3)
CREAT SERPL-MCNC: 3.81 MG/DL — HIGH (ref 0.5–1.3)
CREAT SERPL-MCNC: 3.82 MG/DL — HIGH (ref 0.5–1.3)
CREAT SERPL-MCNC: 4.05 MG/DL — HIGH (ref 0.5–1.3)
CREAT SERPL-MCNC: 4.47 MG/DL — HIGH (ref 0.5–1.3)
CREAT SERPL-MCNC: 4.59 MG/DL — HIGH (ref 0.5–1.3)
CREAT SPEC-SCNC: 129 MG/DL
CREAT/PROT UR: 0.4 RATIO
CULTURE RESULTS: SIGNIFICANT CHANGE UP
DACRYOCYTES BLD QL SMEAR: SLIGHT — SIGNIFICANT CHANGE UP
DAT C3-SP REAG RBC QL: POSITIVE — SIGNIFICANT CHANGE UP
DAT POLY-SP REAG RBC QL: POSITIVE — SIGNIFICANT CHANGE UP
DIFF PNL FLD: ABNORMAL
DIFF PNL FLD: ABNORMAL
DIFF PNL FLD: NEGATIVE — SIGNIFICANT CHANGE UP
DIRECT COOMBS IGG: POSITIVE — SIGNIFICANT CHANGE UP
EBV DNA SERPL NAA+PROBE-ACNC: NOT DETECTED IU/ML
EBVPCR LOG: NOT DETECTED LOG10IU/ML
ELLIPTOCYTES BLD QL SMEAR: SIGNIFICANT CHANGE UP
ELLIPTOCYTES BLD QL SMEAR: SLIGHT — SIGNIFICANT CHANGE UP
ELUATE ANTIBODY 1: SIGNIFICANT CHANGE UP
EOSINOPHIL # BLD AUTO: 0.04 K/UL — SIGNIFICANT CHANGE UP (ref 0–0.5)
EOSINOPHIL # BLD AUTO: 0.08 K/UL — SIGNIFICANT CHANGE UP (ref 0–0.5)
EOSINOPHIL # BLD AUTO: 0.1 K/UL — SIGNIFICANT CHANGE UP (ref 0–0.5)
EOSINOPHIL # BLD AUTO: 0.11 K/UL — SIGNIFICANT CHANGE UP (ref 0–0.5)
EOSINOPHIL # BLD AUTO: 0.11 K/UL — SIGNIFICANT CHANGE UP (ref 0–0.5)
EOSINOPHIL # BLD AUTO: 0.12 K/UL
EOSINOPHIL # BLD AUTO: 0.12 K/UL — SIGNIFICANT CHANGE UP (ref 0–0.5)
EOSINOPHIL # BLD AUTO: 0.12 K/UL — SIGNIFICANT CHANGE UP (ref 0–0.5)
EOSINOPHIL # BLD AUTO: 0.13 K/UL
EOSINOPHIL # BLD AUTO: 0.14 K/UL — SIGNIFICANT CHANGE UP (ref 0–0.5)
EOSINOPHIL # BLD AUTO: 0.14 K/UL — SIGNIFICANT CHANGE UP (ref 0–0.5)
EOSINOPHIL # BLD AUTO: 0.15 K/UL
EOSINOPHIL # BLD AUTO: 0.15 K/UL — SIGNIFICANT CHANGE UP (ref 0–0.5)
EOSINOPHIL # BLD AUTO: 0.16 K/UL
EOSINOPHIL # BLD AUTO: 0.19 K/UL — SIGNIFICANT CHANGE UP (ref 0–0.5)
EOSINOPHIL # BLD AUTO: 0.2 K/UL — SIGNIFICANT CHANGE UP (ref 0–0.5)
EOSINOPHIL # BLD AUTO: 0.24 K/UL — SIGNIFICANT CHANGE UP (ref 0–0.5)
EOSINOPHIL # BLD AUTO: 0.25 K/UL — SIGNIFICANT CHANGE UP (ref 0–0.5)
EOSINOPHIL # BLD AUTO: 0.26 K/UL
EOSINOPHIL # BLD AUTO: 0.29 K/UL — SIGNIFICANT CHANGE UP (ref 0–0.5)
EOSINOPHIL # BLD AUTO: 0.4 K/UL
EOSINOPHIL # FLD: 2 % — SIGNIFICANT CHANGE UP
EOSINOPHIL NFR BLD AUTO: 0.4 % — SIGNIFICANT CHANGE UP (ref 0–6)
EOSINOPHIL NFR BLD AUTO: 0.6 % — SIGNIFICANT CHANGE UP (ref 0–6)
EOSINOPHIL NFR BLD AUTO: 0.8 % — SIGNIFICANT CHANGE UP (ref 0–6)
EOSINOPHIL NFR BLD AUTO: 0.8 % — SIGNIFICANT CHANGE UP (ref 0–6)
EOSINOPHIL NFR BLD AUTO: 0.9 % — SIGNIFICANT CHANGE UP (ref 0–6)
EOSINOPHIL NFR BLD AUTO: 0.9 % — SIGNIFICANT CHANGE UP (ref 0–6)
EOSINOPHIL NFR BLD AUTO: 1 % — SIGNIFICANT CHANGE UP (ref 0–6)
EOSINOPHIL NFR BLD AUTO: 1.6 % — SIGNIFICANT CHANGE UP (ref 0–6)
EOSINOPHIL NFR BLD AUTO: 1.7 % — SIGNIFICANT CHANGE UP (ref 0–6)
EOSINOPHIL NFR BLD AUTO: 2.1 %
EOSINOPHIL NFR BLD AUTO: 2.3 % — SIGNIFICANT CHANGE UP (ref 0–6)
EOSINOPHIL NFR BLD AUTO: 2.4 %
EOSINOPHIL NFR BLD AUTO: 2.4 %
EOSINOPHIL NFR BLD AUTO: 2.4 % — SIGNIFICANT CHANGE UP (ref 0–6)
EOSINOPHIL NFR BLD AUTO: 2.4 % — SIGNIFICANT CHANGE UP (ref 0–6)
EOSINOPHIL NFR BLD AUTO: 2.6 % — SIGNIFICANT CHANGE UP (ref 0–6)
EOSINOPHIL NFR BLD AUTO: 2.7 % — SIGNIFICANT CHANGE UP (ref 0–6)
EOSINOPHIL NFR BLD AUTO: 2.8 %
EOSINOPHIL NFR BLD AUTO: 2.8 % — SIGNIFICANT CHANGE UP (ref 0–6)
EOSINOPHIL NFR BLD AUTO: 2.9 % — SIGNIFICANT CHANGE UP (ref 0–6)
EOSINOPHIL NFR BLD AUTO: 3.2 %
EOSINOPHIL NFR BLD AUTO: 4.2 % — SIGNIFICANT CHANGE UP (ref 0–6)
EOSINOPHIL NFR BLD AUTO: 4.5 %
EPI CELLS # UR: 2 /HPF — SIGNIFICANT CHANGE UP (ref 0–5)
EPI CELLS # UR: 4 — SIGNIFICANT CHANGE UP
ESTIMATED AVERAGE GLUCOSE: 143 MG/DL — HIGH (ref 68–114)
ESTIMATED AVERAGE GLUCOSE: 160 MG/DL
ESTIMATED AVERAGE GLUCOSE: 206 MG/DL — HIGH (ref 68–114)
ETHANOL BLD-MCNC: <10 MG/DL
ETHANOL UR, QUAN: NEGATIVE %
ETHYL GLUCURONIDE UR QL SCN: NEGATIVE
ETHYL GLUCURONIDE UR QL SCN: NEGATIVE
FERRITIN SERPL-MCNC: 704 NG/ML
FLUID INTAKE SUBSTANCE CLASS: SIGNIFICANT CHANGE UP
FLUID SEGMENTED GRANULOCYTES: 4 % — SIGNIFICANT CHANGE UP
FOLATE SERPL-MCNC: 14.9 NG/ML
GAS PNL BLDA: SIGNIFICANT CHANGE UP
GAS PNL BLDV: 138 MMOL/L — SIGNIFICANT CHANGE UP (ref 135–145)
GAS PNL BLDV: SIGNIFICANT CHANGE UP
GLUCOSE BLDC GLUCOMTR-MCNC: 102 MG/DL — HIGH (ref 70–99)
GLUCOSE BLDC GLUCOMTR-MCNC: 106 MG/DL — HIGH (ref 70–99)
GLUCOSE BLDC GLUCOMTR-MCNC: 123 MG/DL — HIGH (ref 70–99)
GLUCOSE BLDC GLUCOMTR-MCNC: 126 MG/DL — HIGH (ref 70–99)
GLUCOSE BLDC GLUCOMTR-MCNC: 126 MG/DL — HIGH (ref 70–99)
GLUCOSE BLDC GLUCOMTR-MCNC: 131 MG/DL — HIGH (ref 70–99)
GLUCOSE BLDC GLUCOMTR-MCNC: 132 MG/DL — HIGH (ref 70–99)
GLUCOSE BLDC GLUCOMTR-MCNC: 132 MG/DL — HIGH (ref 70–99)
GLUCOSE BLDC GLUCOMTR-MCNC: 133 MG/DL — HIGH (ref 70–99)
GLUCOSE BLDC GLUCOMTR-MCNC: 134 MG/DL — HIGH (ref 70–99)
GLUCOSE BLDC GLUCOMTR-MCNC: 135 MG/DL — HIGH (ref 70–99)
GLUCOSE BLDC GLUCOMTR-MCNC: 137 MG/DL — HIGH (ref 70–99)
GLUCOSE BLDC GLUCOMTR-MCNC: 139 MG/DL — HIGH (ref 70–99)
GLUCOSE BLDC GLUCOMTR-MCNC: 139 MG/DL — HIGH (ref 70–99)
GLUCOSE BLDC GLUCOMTR-MCNC: 140 MG/DL — HIGH (ref 70–99)
GLUCOSE BLDC GLUCOMTR-MCNC: 142 MG/DL — HIGH (ref 70–99)
GLUCOSE BLDC GLUCOMTR-MCNC: 144 MG/DL — HIGH (ref 70–99)
GLUCOSE BLDC GLUCOMTR-MCNC: 148 MG/DL — HIGH (ref 70–99)
GLUCOSE BLDC GLUCOMTR-MCNC: 149 MG/DL — HIGH (ref 70–99)
GLUCOSE BLDC GLUCOMTR-MCNC: 151 MG/DL — HIGH (ref 70–99)
GLUCOSE BLDC GLUCOMTR-MCNC: 152 MG/DL — HIGH (ref 70–99)
GLUCOSE BLDC GLUCOMTR-MCNC: 156 MG/DL — HIGH (ref 70–99)
GLUCOSE BLDC GLUCOMTR-MCNC: 157 MG/DL — HIGH (ref 70–99)
GLUCOSE BLDC GLUCOMTR-MCNC: 158 MG/DL — HIGH (ref 70–99)
GLUCOSE BLDC GLUCOMTR-MCNC: 158 MG/DL — HIGH (ref 70–99)
GLUCOSE BLDC GLUCOMTR-MCNC: 159 MG/DL — HIGH (ref 70–99)
GLUCOSE BLDC GLUCOMTR-MCNC: 160 MG/DL — HIGH (ref 70–99)
GLUCOSE BLDC GLUCOMTR-MCNC: 161 MG/DL — HIGH (ref 70–99)
GLUCOSE BLDC GLUCOMTR-MCNC: 164 MG/DL — HIGH (ref 70–99)
GLUCOSE BLDC GLUCOMTR-MCNC: 165 MG/DL — HIGH (ref 70–99)
GLUCOSE BLDC GLUCOMTR-MCNC: 166 MG/DL — HIGH (ref 70–99)
GLUCOSE BLDC GLUCOMTR-MCNC: 167 MG/DL — HIGH (ref 70–99)
GLUCOSE BLDC GLUCOMTR-MCNC: 169 MG/DL — HIGH (ref 70–99)
GLUCOSE BLDC GLUCOMTR-MCNC: 170 MG/DL — HIGH (ref 70–99)
GLUCOSE BLDC GLUCOMTR-MCNC: 174 MG/DL — HIGH (ref 70–99)
GLUCOSE BLDC GLUCOMTR-MCNC: 180 MG/DL — HIGH (ref 70–99)
GLUCOSE BLDC GLUCOMTR-MCNC: 180 MG/DL — HIGH (ref 70–99)
GLUCOSE BLDC GLUCOMTR-MCNC: 181 MG/DL — HIGH (ref 70–99)
GLUCOSE BLDC GLUCOMTR-MCNC: 181 MG/DL — HIGH (ref 70–99)
GLUCOSE BLDC GLUCOMTR-MCNC: 183 MG/DL — HIGH (ref 70–99)
GLUCOSE BLDC GLUCOMTR-MCNC: 187 MG/DL — HIGH (ref 70–99)
GLUCOSE BLDC GLUCOMTR-MCNC: 190 MG/DL — HIGH (ref 70–99)
GLUCOSE BLDC GLUCOMTR-MCNC: 191 MG/DL — HIGH (ref 70–99)
GLUCOSE BLDC GLUCOMTR-MCNC: 193 MG/DL — HIGH (ref 70–99)
GLUCOSE BLDC GLUCOMTR-MCNC: 197 MG/DL — HIGH (ref 70–99)
GLUCOSE BLDC GLUCOMTR-MCNC: 198 MG/DL — HIGH (ref 70–99)
GLUCOSE BLDC GLUCOMTR-MCNC: 201 MG/DL — HIGH (ref 70–99)
GLUCOSE BLDC GLUCOMTR-MCNC: 204 MG/DL — HIGH (ref 70–99)
GLUCOSE BLDC GLUCOMTR-MCNC: 207 MG/DL — HIGH (ref 70–99)
GLUCOSE BLDC GLUCOMTR-MCNC: 207 MG/DL — HIGH (ref 70–99)
GLUCOSE BLDC GLUCOMTR-MCNC: 210 MG/DL — HIGH (ref 70–99)
GLUCOSE BLDC GLUCOMTR-MCNC: 211 MG/DL — HIGH (ref 70–99)
GLUCOSE BLDC GLUCOMTR-MCNC: 212 MG/DL — HIGH (ref 70–99)
GLUCOSE BLDC GLUCOMTR-MCNC: 219 MG/DL — HIGH (ref 70–99)
GLUCOSE BLDC GLUCOMTR-MCNC: 220 MG/DL — HIGH (ref 70–99)
GLUCOSE BLDC GLUCOMTR-MCNC: 227 MG/DL — HIGH (ref 70–99)
GLUCOSE BLDC GLUCOMTR-MCNC: 228 MG/DL — HIGH (ref 70–99)
GLUCOSE BLDC GLUCOMTR-MCNC: 232 MG/DL — HIGH (ref 70–99)
GLUCOSE BLDC GLUCOMTR-MCNC: 235 MG/DL — HIGH (ref 70–99)
GLUCOSE BLDC GLUCOMTR-MCNC: 239 MG/DL — HIGH (ref 70–99)
GLUCOSE BLDC GLUCOMTR-MCNC: 241 MG/DL — HIGH (ref 70–99)
GLUCOSE BLDC GLUCOMTR-MCNC: 242 MG/DL — HIGH (ref 70–99)
GLUCOSE BLDC GLUCOMTR-MCNC: 250 MG/DL — HIGH (ref 70–99)
GLUCOSE BLDC GLUCOMTR-MCNC: 256 MG/DL — HIGH (ref 70–99)
GLUCOSE BLDC GLUCOMTR-MCNC: 273 MG/DL — HIGH (ref 70–99)
GLUCOSE BLDC GLUCOMTR-MCNC: 318 MG/DL — HIGH (ref 70–99)
GLUCOSE BLDC GLUCOMTR-MCNC: 327 MG/DL — HIGH (ref 70–99)
GLUCOSE BLDC GLUCOMTR-MCNC: 72 MG/DL — SIGNIFICANT CHANGE UP (ref 70–99)
GLUCOSE BLDC GLUCOMTR-MCNC: 95 MG/DL — SIGNIFICANT CHANGE UP (ref 70–99)
GLUCOSE BLDV-MCNC: 115 MG/DL — HIGH (ref 70–99)
GLUCOSE FLD-MCNC: 133 MG/DL — SIGNIFICANT CHANGE UP
GLUCOSE QUALITATIVE U: ABNORMAL
GLUCOSE QUALITATIVE U: NEGATIVE
GLUCOSE QUALITATIVE U: NEGATIVE
GLUCOSE SERPL-MCNC: 102 MG/DL
GLUCOSE SERPL-MCNC: 102 MG/DL — HIGH (ref 70–99)
GLUCOSE SERPL-MCNC: 103 MG/DL — HIGH (ref 70–99)
GLUCOSE SERPL-MCNC: 104 MG/DL — HIGH (ref 70–99)
GLUCOSE SERPL-MCNC: 115 MG/DL — HIGH (ref 70–99)
GLUCOSE SERPL-MCNC: 115 MG/DL — HIGH (ref 70–99)
GLUCOSE SERPL-MCNC: 116 MG/DL
GLUCOSE SERPL-MCNC: 120 MG/DL — HIGH (ref 70–99)
GLUCOSE SERPL-MCNC: 120 MG/DL — HIGH (ref 70–99)
GLUCOSE SERPL-MCNC: 121 MG/DL
GLUCOSE SERPL-MCNC: 124 MG/DL — HIGH (ref 70–99)
GLUCOSE SERPL-MCNC: 132 MG/DL — HIGH (ref 70–99)
GLUCOSE SERPL-MCNC: 137 MG/DL — HIGH (ref 70–99)
GLUCOSE SERPL-MCNC: 139 MG/DL
GLUCOSE SERPL-MCNC: 141 MG/DL — HIGH (ref 70–99)
GLUCOSE SERPL-MCNC: 142 MG/DL — HIGH (ref 70–99)
GLUCOSE SERPL-MCNC: 144 MG/DL — HIGH (ref 70–99)
GLUCOSE SERPL-MCNC: 146 MG/DL — HIGH (ref 70–99)
GLUCOSE SERPL-MCNC: 147 MG/DL — HIGH (ref 70–99)
GLUCOSE SERPL-MCNC: 150 MG/DL — HIGH (ref 70–99)
GLUCOSE SERPL-MCNC: 154 MG/DL — HIGH (ref 70–99)
GLUCOSE SERPL-MCNC: 156 MG/DL
GLUCOSE SERPL-MCNC: 161 MG/DL — HIGH (ref 70–99)
GLUCOSE SERPL-MCNC: 162 MG/DL
GLUCOSE SERPL-MCNC: 165 MG/DL — HIGH (ref 70–99)
GLUCOSE SERPL-MCNC: 168 MG/DL — HIGH (ref 70–99)
GLUCOSE SERPL-MCNC: 170 MG/DL — HIGH (ref 70–99)
GLUCOSE SERPL-MCNC: 171 MG/DL — HIGH (ref 70–99)
GLUCOSE SERPL-MCNC: 173 MG/DL — HIGH (ref 70–99)
GLUCOSE SERPL-MCNC: 175 MG/DL — HIGH (ref 70–99)
GLUCOSE SERPL-MCNC: 175 MG/DL — HIGH (ref 70–99)
GLUCOSE SERPL-MCNC: 179 MG/DL
GLUCOSE SERPL-MCNC: 196 MG/DL — HIGH (ref 70–99)
GLUCOSE SERPL-MCNC: 197 MG/DL — HIGH (ref 70–99)
GLUCOSE SERPL-MCNC: 197 MG/DL — HIGH (ref 70–99)
GLUCOSE SERPL-MCNC: 199 MG/DL — HIGH (ref 70–99)
GLUCOSE SERPL-MCNC: 214 MG/DL — HIGH (ref 70–99)
GLUCOSE SERPL-MCNC: 226 MG/DL — HIGH (ref 70–99)
GLUCOSE SERPL-MCNC: 229 MG/DL — HIGH (ref 70–99)
GLUCOSE SERPL-MCNC: 232 MG/DL — HIGH (ref 70–99)
GLUCOSE SERPL-MCNC: 257 MG/DL — HIGH (ref 70–99)
GLUCOSE SERPL-MCNC: 258 MG/DL — HIGH (ref 70–99)
GLUCOSE SERPL-MCNC: 259 MG/DL — HIGH (ref 70–99)
GLUCOSE SERPL-MCNC: 259 MG/DL — HIGH (ref 70–99)
GLUCOSE SERPL-MCNC: 261 MG/DL — HIGH (ref 70–99)
GLUCOSE SERPL-MCNC: 272 MG/DL — HIGH (ref 70–99)
GLUCOSE SERPL-MCNC: 278 MG/DL — HIGH (ref 70–99)
GLUCOSE SERPL-MCNC: 330 MG/DL — HIGH (ref 70–99)
GLUCOSE SERPL-MCNC: 349 MG/DL — HIGH (ref 70–99)
GLUCOSE SERPL-MCNC: 373 MG/DL — HIGH (ref 70–99)
GLUCOSE SERPL-MCNC: 376 MG/DL — HIGH (ref 70–99)
GLUCOSE SERPL-MCNC: 464 MG/DL — CRITICAL HIGH (ref 70–99)
GLUCOSE SERPL-MCNC: 54 MG/DL — LOW (ref 70–99)
GLUCOSE SERPL-MCNC: 83 MG/DL — SIGNIFICANT CHANGE UP (ref 70–99)
GLUCOSE SERPL-MCNC: 88 MG/DL — SIGNIFICANT CHANGE UP (ref 70–99)
GLUCOSE SERPL-MCNC: 96 MG/DL — SIGNIFICANT CHANGE UP (ref 70–99)
GLUCOSE UR QL: ABNORMAL
GLUCOSE UR QL: NEGATIVE — SIGNIFICANT CHANGE UP
GLUCOSE UR QL: NEGATIVE — SIGNIFICANT CHANGE UP
GRAM STN FLD: SIGNIFICANT CHANGE UP
HAPTOGLOB SERPL-MCNC: <20 MG/DL
HAV IGM SER QL: NONREACTIVE
HBA1C MFR BLD HPLC: 7.2 %
HBV CORE AB SER-ACNC: SIGNIFICANT CHANGE UP
HBV CORE IGG+IGM SER QL: NONREACTIVE
HBV CORE IGM SER-ACNC: SIGNIFICANT CHANGE UP
HBV DNA # SERPL NAA+PROBE: SIGNIFICANT CHANGE UP
HBV DNA SERPL NAA+PROBE-LOG#: SIGNIFICANT CHANGE UP LOG10IU/ML
HBV SURFACE AB SER QL: NONREACTIVE
HBV SURFACE AB SER-ACNC: SIGNIFICANT CHANGE UP
HBV SURFACE AB SERPL IA-ACNC: <3 MIU/ML
HBV SURFACE AG SER QL: NONREACTIVE
HBV SURFACE AG SER-ACNC: SIGNIFICANT CHANGE UP
HCO3 BLDV-SCNC: 10 MMOL/L — LOW (ref 21–29)
HCO3 BLDV-SCNC: 12 MMOL/L — LOW (ref 21–29)
HCT VFR BLD CALC: 19.5 % — CRITICAL LOW (ref 39–50)
HCT VFR BLD CALC: 19.9 % — CRITICAL LOW (ref 39–50)
HCT VFR BLD CALC: 20.3 % — CRITICAL LOW (ref 39–50)
HCT VFR BLD CALC: 20.5 % — CRITICAL LOW (ref 39–50)
HCT VFR BLD CALC: 20.6 % — CRITICAL LOW (ref 39–50)
HCT VFR BLD CALC: 20.7 % — CRITICAL LOW (ref 39–50)
HCT VFR BLD CALC: 20.7 % — CRITICAL LOW (ref 39–50)
HCT VFR BLD CALC: 20.9 %
HCT VFR BLD CALC: 21 % — CRITICAL LOW (ref 39–50)
HCT VFR BLD CALC: 21.1 % — LOW (ref 39–50)
HCT VFR BLD CALC: 21.2 % — LOW (ref 39–50)
HCT VFR BLD CALC: 21.5 % — LOW (ref 39–50)
HCT VFR BLD CALC: 21.6 %
HCT VFR BLD CALC: 21.9 % — LOW (ref 39–50)
HCT VFR BLD CALC: 22.4 %
HCT VFR BLD CALC: 22.4 % — LOW (ref 39–50)
HCT VFR BLD CALC: 22.4 % — LOW (ref 39–50)
HCT VFR BLD CALC: 22.5 % — LOW (ref 39–50)
HCT VFR BLD CALC: 22.6 % — LOW (ref 39–50)
HCT VFR BLD CALC: 22.7 % — LOW (ref 39–50)
HCT VFR BLD CALC: 22.8 % — LOW (ref 39–50)
HCT VFR BLD CALC: 22.9 % — LOW (ref 39–50)
HCT VFR BLD CALC: 23.1 % — LOW (ref 39–50)
HCT VFR BLD CALC: 23.6 % — LOW (ref 39–50)
HCT VFR BLD CALC: 24.3 % — LOW (ref 39–50)
HCT VFR BLD CALC: 24.3 % — LOW (ref 39–50)
HCT VFR BLD CALC: 24.5 % — LOW (ref 39–50)
HCT VFR BLD CALC: 25.2 % — LOW (ref 39–50)
HCT VFR BLD CALC: 25.2 % — LOW (ref 39–50)
HCT VFR BLD CALC: 25.7 % — LOW (ref 39–50)
HCT VFR BLD CALC: 25.8 % — LOW (ref 39–50)
HCT VFR BLD CALC: 25.9 % — LOW (ref 39–50)
HCT VFR BLD CALC: 26 % — LOW (ref 39–50)
HCT VFR BLD CALC: 27.1 %
HCT VFR BLD CALC: 27.2 % — LOW (ref 39–50)
HCT VFR BLD CALC: 28.2 % — LOW (ref 39–50)
HCT VFR BLD CALC: 28.5 % — LOW (ref 39–50)
HCT VFR BLD CALC: 28.9 % — LOW (ref 39–50)
HCT VFR BLD CALC: 29.5 %
HCT VFR BLD CALC: 29.8 %
HCT VFR BLD CALC: 29.9 % — LOW (ref 39–50)
HCT VFR BLD CALC: 30 % — LOW (ref 39–50)
HCT VFR BLD CALC: 30.5 % — LOW (ref 39–50)
HCT VFR BLD CALC: 31.8 % — LOW (ref 39–50)
HCT VFR BLD CALC: 31.8 % — LOW (ref 39–50)
HCT VFR BLD CALC: 32.5 % — LOW (ref 39–50)
HCT VFR BLD CALC: 33.8 % — LOW (ref 39–50)
HCT VFR BLD CALC: 34.3 % — LOW (ref 39–50)
HCT VFR BLD CALC: 34.4 % — LOW (ref 39–50)
HCT VFR BLD CALC: 35.1 % — LOW (ref 39–50)
HCT VFR BLDA CALC: 20 % — CRITICAL LOW (ref 39–50)
HCV AB SER QL: NONREACTIVE
HCV S/CO RATIO: 0.11 S/CO
HDLC SERPL-MCNC: 6 MG/DL
HDLC SERPL-MCNC: 7 MG/DL
HEPARINASE TEG R TIME: 10 MIN (ref 4.3–8.3)
HEPARINASE TEG R TIME: 11.5 MIN — SIGNIFICANT CHANGE UP (ref 4.3–8.3)
HEPARINASE TEG R TIME: 3.5 MIN (ref 4.3–8.3)
HEPARINASE TEG R TIME: 5.6 MIN — SIGNIFICANT CHANGE UP (ref 4.3–8.3)
HEPARINASE TEG R TIME: 6.5 MIN — SIGNIFICANT CHANGE UP (ref 4.3–8.3)
HEPATITIS A IGG ANTIBODY: REACTIVE
HGB BLD CALC-MCNC: 6.5 G/DL — CRITICAL LOW (ref 13–17)
HGB BLD-MCNC: 10 G/DL — LOW (ref 13–17)
HGB BLD-MCNC: 10.1 G/DL — LOW (ref 13–17)
HGB BLD-MCNC: 10.2 G/DL — LOW (ref 13–17)
HGB BLD-MCNC: 10.2 G/DL — LOW (ref 13–17)
HGB BLD-MCNC: 10.4 G/DL — LOW (ref 13–17)
HGB BLD-MCNC: 11 G/DL — LOW (ref 13–17)
HGB BLD-MCNC: 11.4 G/DL — LOW (ref 13–17)
HGB BLD-MCNC: 11.5 G/DL — LOW (ref 13–17)
HGB BLD-MCNC: 11.7 G/DL — LOW (ref 13–17)
HGB BLD-MCNC: 6.9 G/DL — CRITICAL LOW (ref 13–17)
HGB BLD-MCNC: 6.9 G/DL — CRITICAL LOW (ref 13–17)
HGB BLD-MCNC: 7 G/DL — CRITICAL LOW (ref 13–17)
HGB BLD-MCNC: 7 G/DL — CRITICAL LOW (ref 13–17)
HGB BLD-MCNC: 7.1 G/DL
HGB BLD-MCNC: 7.1 G/DL — LOW (ref 13–17)
HGB BLD-MCNC: 7.1 G/DL — LOW (ref 13–17)
HGB BLD-MCNC: 7.3 G/DL — LOW (ref 13–17)
HGB BLD-MCNC: 7.4 G/DL
HGB BLD-MCNC: 7.4 G/DL — LOW (ref 13–17)
HGB BLD-MCNC: 7.4 G/DL — LOW (ref 13–17)
HGB BLD-MCNC: 7.5 G/DL — LOW (ref 13–17)
HGB BLD-MCNC: 7.5 G/DL — LOW (ref 13–17)
HGB BLD-MCNC: 7.6 G/DL — LOW (ref 13–17)
HGB BLD-MCNC: 7.6 G/DL — LOW (ref 13–17)
HGB BLD-MCNC: 7.7 G/DL
HGB BLD-MCNC: 7.7 G/DL — LOW (ref 13–17)
HGB BLD-MCNC: 7.7 G/DL — LOW (ref 13–17)
HGB BLD-MCNC: 7.8 G/DL — LOW (ref 13–17)
HGB BLD-MCNC: 7.9 G/DL — LOW (ref 13–17)
HGB BLD-MCNC: 7.9 G/DL — LOW (ref 13–17)
HGB BLD-MCNC: 8 G/DL — LOW (ref 13–17)
HGB BLD-MCNC: 8.2 G/DL — LOW (ref 13–17)
HGB BLD-MCNC: 8.3 G/DL — LOW (ref 13–17)
HGB BLD-MCNC: 8.3 G/DL — LOW (ref 13–17)
HGB BLD-MCNC: 8.4 G/DL — LOW (ref 13–17)
HGB BLD-MCNC: 8.4 G/DL — LOW (ref 13–17)
HGB BLD-MCNC: 8.7 G/DL — LOW (ref 13–17)
HGB BLD-MCNC: 9 G/DL — LOW (ref 13–17)
HGB BLD-MCNC: 9 G/DL — LOW (ref 13–17)
HGB BLD-MCNC: 9.2 G/DL — LOW (ref 13–17)
HGB BLD-MCNC: 9.2 G/DL — LOW (ref 13–17)
HGB BLD-MCNC: 9.3 G/DL — LOW (ref 13–17)
HGB BLD-MCNC: 9.5 G/DL — LOW (ref 13–17)
HGB BLD-MCNC: 9.5 G/DL — LOW (ref 13–17)
HGB BLD-MCNC: 9.6 G/DL
HGB BLD-MCNC: 9.8 G/DL
HGB BLD-MCNC: 9.9 G/DL
HIV1+2 AB SPEC QL IA.RAPID: NONREACTIVE
HSV 1+2 IGG SER IA-IMP: NEGATIVE
HSV 1+2 IGG SER IA-IMP: POSITIVE
HSV1 IGG SER QL: 36.4 INDEX
HSV2 IGG SER QL: 0.28 INDEX
HYALINE CASTS # UR AUTO: 0 /LPF — SIGNIFICANT CHANGE UP (ref 0–7)
HYALINE CASTS # UR AUTO: 1 /LPF — SIGNIFICANT CHANGE UP (ref 0–2)
HYALINE CASTS: 0 /LPF
HYALINE CASTS: 1 /LPF
HYALINE CASTS: 1 /LPF
IMM GRANULOCYTES NFR BLD AUTO: 0.2 %
IMM GRANULOCYTES NFR BLD AUTO: 0.2 % — SIGNIFICANT CHANGE UP (ref 0–1.5)
IMM GRANULOCYTES NFR BLD AUTO: 0.3 %
IMM GRANULOCYTES NFR BLD AUTO: 0.4 % — SIGNIFICANT CHANGE UP (ref 0–1.5)
IMM GRANULOCYTES NFR BLD AUTO: 0.5 %
IMM GRANULOCYTES NFR BLD AUTO: 0.5 %
IMM GRANULOCYTES NFR BLD AUTO: 0.6 %
IMM GRANULOCYTES NFR BLD AUTO: 0.6 %
IMM GRANULOCYTES NFR BLD AUTO: 0.6 % — SIGNIFICANT CHANGE UP (ref 0–1.5)
IMM GRANULOCYTES NFR BLD AUTO: 0.6 % — SIGNIFICANT CHANGE UP (ref 0–1.5)
IMM GRANULOCYTES NFR BLD AUTO: 0.7 % — SIGNIFICANT CHANGE UP (ref 0–1.5)
IMM GRANULOCYTES NFR BLD AUTO: 0.8 % — SIGNIFICANT CHANGE UP (ref 0–1.5)
IMM GRANULOCYTES NFR BLD AUTO: 0.8 % — SIGNIFICANT CHANGE UP (ref 0–1.5)
IMM GRANULOCYTES NFR BLD AUTO: 0.9 % — SIGNIFICANT CHANGE UP (ref 0–1.5)
IMM GRANULOCYTES NFR BLD AUTO: 1 % — SIGNIFICANT CHANGE UP (ref 0–1.5)
IMM GRANULOCYTES NFR BLD AUTO: 1.1 % — SIGNIFICANT CHANGE UP (ref 0–1.5)
IMM GRANULOCYTES NFR BLD AUTO: 1.1 % — SIGNIFICANT CHANGE UP (ref 0–1.5)
IMM GRANULOCYTES NFR BLD AUTO: 1.2 % — SIGNIFICANT CHANGE UP (ref 0–1.5)
IMM GRANULOCYTES NFR BLD AUTO: 1.3 % — SIGNIFICANT CHANGE UP (ref 0–1.5)
INR BLD: 1.68 RATIO — HIGH (ref 0.88–1.16)
INR BLD: 1.93 RATIO — HIGH (ref 0.88–1.16)
INR BLD: 2.08 RATIO — HIGH (ref 0.88–1.16)
INR BLD: 2.09 RATIO — HIGH (ref 0.88–1.16)
INR BLD: 2.1 RATIO — HIGH (ref 0.88–1.16)
INR BLD: 2.13 RATIO — HIGH (ref 0.88–1.16)
INR BLD: 2.19 RATIO — HIGH (ref 0.88–1.16)
INR BLD: 2.23 RATIO — HIGH (ref 0.88–1.16)
INR BLD: 2.27 RATIO — HIGH (ref 0.88–1.16)
INR BLD: 2.33 RATIO — HIGH (ref 0.88–1.16)
INR BLD: 2.46 RATIO — HIGH (ref 0.88–1.16)
INR BLD: 2.5 RATIO — HIGH (ref 0.88–1.16)
INR BLD: 2.51 RATIO — HIGH (ref 0.88–1.16)
INR BLD: 2.57 RATIO — HIGH (ref 0.88–1.16)
INR BLD: 2.57 RATIO — HIGH (ref 0.88–1.16)
INR BLD: 2.6 RATIO — HIGH (ref 0.88–1.16)
INR BLD: 2.63 RATIO — HIGH (ref 0.88–1.16)
INR BLD: 2.65 RATIO — HIGH (ref 0.88–1.16)
INR BLD: 2.67 RATIO — HIGH (ref 0.88–1.16)
INR BLD: 2.68 RATIO — HIGH (ref 0.88–1.16)
INR BLD: 2.77 RATIO — HIGH (ref 0.88–1.16)
INR BLD: 2.8 RATIO — HIGH (ref 0.88–1.16)
INR BLD: 2.94 RATIO — HIGH (ref 0.88–1.16)
INR BLD: 2.98 RATIO — HIGH (ref 0.88–1.16)
INR BLD: 3.04 RATIO — HIGH (ref 0.88–1.16)
INR BLD: 3.15 RATIO — HIGH (ref 0.88–1.16)
INR BLD: 3.27 RATIO — HIGH (ref 0.88–1.16)
INR BLD: 3.34 RATIO — HIGH (ref 0.88–1.16)
INR BLD: 3.35 RATIO — HIGH (ref 0.88–1.16)
INR BLD: 3.39 RATIO — HIGH (ref 0.88–1.16)
INR BLD: 3.42 RATIO — HIGH (ref 0.88–1.16)
INR BLD: 3.44 RATIO — HIGH (ref 0.88–1.16)
INR BLD: 3.6 RATIO — HIGH (ref 0.88–1.16)
INR BLD: 3.79 RATIO — HIGH (ref 0.88–1.16)
INR BLD: 4.22 RATIO — HIGH (ref 0.88–1.16)
INR PPP: 2.11 RATIO
INR PPP: 2.58 RATIO
INR PPP: 2.87 RATIO
INR PPP: 2.95 RATIO
INR PPP: 3.08 RATIO
IRON SATN MFR SERPL: 74 %
IRON SERPL-MCNC: 107 UG/DL
KETONES UR-MCNC: NEGATIVE — SIGNIFICANT CHANGE UP
KETONES URINE: NEGATIVE
KETONES URINE: NEGATIVE
KETONES URINE: NORMAL
LACTATE BLDV-MCNC: 2.1 MMOL/L — HIGH (ref 0.7–2)
LACTATE BLDV-MCNC: 5 MMOL/L — CRITICAL HIGH (ref 0.7–2)
LACTATE SERPL-SCNC: 3.9 MMOL/L — HIGH (ref 0.7–2)
LACTATE SERPL-SCNC: 4.7 MMOL/L — CRITICAL HIGH (ref 0.7–2)
LACTATE SERPL-SCNC: 5.3 MMOL/L — CRITICAL HIGH (ref 0.7–2)
LDH SERPL L TO P-CCNC: 58 U/L — SIGNIFICANT CHANGE UP
LDH SERPL-CCNC: 294 U/L
LDLC SERPL CALC-MCNC: 32 MG/DL
LDLC SERPL CALC-MCNC: 36 MG/DL
LEUKOCYTE ESTERASE UR-ACNC: ABNORMAL
LEUKOCYTE ESTERASE UR-ACNC: NEGATIVE — SIGNIFICANT CHANGE UP
LEUKOCYTE ESTERASE UR-ACNC: NEGATIVE — SIGNIFICANT CHANGE UP
LEUKOCYTE ESTERASE URINE: NEGATIVE
LIDOCAIN IGE QN: 498 U/L — HIGH (ref 7–60)
LIDOCAIN IGE QN: 55 U/L — SIGNIFICANT CHANGE UP (ref 7–60)
LIDOCAIN IGE QN: 68 U/L — HIGH (ref 7–60)
LIDOCAIN IGE QN: 89 U/L — HIGH (ref 7–60)
LYMPHOCYTES # BLD AUTO: 0.29 K/UL — LOW (ref 1–3.3)
LYMPHOCYTES # BLD AUTO: 0.37 K/UL
LYMPHOCYTES # BLD AUTO: 0.39 K/UL
LYMPHOCYTES # BLD AUTO: 0.41 K/UL
LYMPHOCYTES # BLD AUTO: 0.41 K/UL — LOW (ref 1–3.3)
LYMPHOCYTES # BLD AUTO: 0.42 K/UL — LOW (ref 1–3.3)
LYMPHOCYTES # BLD AUTO: 0.43 K/UL — LOW (ref 1–3.3)
LYMPHOCYTES # BLD AUTO: 0.43 K/UL — LOW (ref 1–3.3)
LYMPHOCYTES # BLD AUTO: 0.44 K/UL — LOW (ref 1–3.3)
LYMPHOCYTES # BLD AUTO: 0.46 K/UL — LOW (ref 1–3.3)
LYMPHOCYTES # BLD AUTO: 0.47 K/UL — LOW (ref 1–3.3)
LYMPHOCYTES # BLD AUTO: 0.49 K/UL
LYMPHOCYTES # BLD AUTO: 0.5 K/UL
LYMPHOCYTES # BLD AUTO: 0.51 K/UL — LOW (ref 1–3.3)
LYMPHOCYTES # BLD AUTO: 0.52 K/UL
LYMPHOCYTES # BLD AUTO: 0.52 K/UL — LOW (ref 1–3.3)
LYMPHOCYTES # BLD AUTO: 0.61 K/UL — LOW (ref 1–3.3)
LYMPHOCYTES # BLD AUTO: 0.62 K/UL — LOW (ref 1–3.3)
LYMPHOCYTES # BLD AUTO: 0.67 K/UL — LOW (ref 1–3.3)
LYMPHOCYTES # BLD AUTO: 0.76 K/UL — LOW (ref 1–3.3)
LYMPHOCYTES # BLD AUTO: 0.84 K/UL — LOW (ref 1–3.3)
LYMPHOCYTES # BLD AUTO: 1.54 K/UL — SIGNIFICANT CHANGE UP (ref 1–3.3)
LYMPHOCYTES # BLD AUTO: 1.65 K/UL — SIGNIFICANT CHANGE UP (ref 1–3.3)
LYMPHOCYTES # BLD AUTO: 10.2 % — LOW (ref 13–44)
LYMPHOCYTES # BLD AUTO: 10.4 % — LOW (ref 13–44)
LYMPHOCYTES # BLD AUTO: 10.8 % — LOW (ref 13–44)
LYMPHOCYTES # BLD AUTO: 3.5 % — LOW (ref 13–44)
LYMPHOCYTES # BLD AUTO: 4.6 % — LOW (ref 13–44)
LYMPHOCYTES # BLD AUTO: 5 % — LOW (ref 13–44)
LYMPHOCYTES # BLD AUTO: 5.2 % — LOW (ref 13–44)
LYMPHOCYTES # BLD AUTO: 5.7 % — LOW (ref 13–44)
LYMPHOCYTES # BLD AUTO: 5.7 % — LOW (ref 13–44)
LYMPHOCYTES # BLD AUTO: 6 % — LOW (ref 13–44)
LYMPHOCYTES # BLD AUTO: 6.2 % — LOW (ref 13–44)
LYMPHOCYTES # BLD AUTO: 6.8 % — LOW (ref 13–44)
LYMPHOCYTES # BLD AUTO: 7.3 % — LOW (ref 13–44)
LYMPHOCYTES # BLD AUTO: 8.1 % — LOW (ref 13–44)
LYMPHOCYTES # BLD AUTO: 8.1 % — LOW (ref 13–44)
LYMPHOCYTES # BLD AUTO: 8.7 % — LOW (ref 13–44)
LYMPHOCYTES # BLD AUTO: 8.8 % — LOW (ref 13–44)
LYMPHOCYTES # FLD: 11 % — SIGNIFICANT CHANGE UP
LYMPHOCYTES NFR BLD AUTO: 5.5 %
LYMPHOCYTES NFR BLD AUTO: 6.3 %
LYMPHOCYTES NFR BLD AUTO: 6.3 %
LYMPHOCYTES NFR BLD AUTO: 7.1 %
LYMPHOCYTES NFR BLD AUTO: 7.6 %
LYMPHOCYTES NFR BLD AUTO: 7.6 %
M TB IFN-G BLD-IMP: ABNORMAL
MACROCYTES BLD QL: SLIGHT — SIGNIFICANT CHANGE UP
MAGNESIUM SERPL-MCNC: 1.1 MG/DL — LOW (ref 1.6–2.6)
MAGNESIUM SERPL-MCNC: 1.1 MG/DL — LOW (ref 1.6–2.6)
MAGNESIUM SERPL-MCNC: 1.4 MG/DL — LOW (ref 1.6–2.6)
MAGNESIUM SERPL-MCNC: 1.5 MG/DL — LOW (ref 1.6–2.6)
MAGNESIUM SERPL-MCNC: 1.6 MG/DL
MAGNESIUM SERPL-MCNC: 1.6 MG/DL — SIGNIFICANT CHANGE UP (ref 1.6–2.6)
MAGNESIUM SERPL-MCNC: 1.7 MG/DL
MAGNESIUM SERPL-MCNC: 1.7 MG/DL — SIGNIFICANT CHANGE UP (ref 1.6–2.6)
MAGNESIUM SERPL-MCNC: 1.8 MG/DL — SIGNIFICANT CHANGE UP (ref 1.6–2.6)
MAGNESIUM SERPL-MCNC: 1.9 MG/DL — SIGNIFICANT CHANGE UP (ref 1.6–2.6)
MAGNESIUM SERPL-MCNC: 1.9 MG/DL — SIGNIFICANT CHANGE UP (ref 1.6–2.6)
MAGNESIUM SERPL-MCNC: 2 MG/DL
MAGNESIUM SERPL-MCNC: 2 MG/DL — SIGNIFICANT CHANGE UP (ref 1.6–2.6)
MAGNESIUM SERPL-MCNC: 2.1 MG/DL — SIGNIFICANT CHANGE UP (ref 1.6–2.6)
MAGNESIUM SERPL-MCNC: 2.2 MG/DL — SIGNIFICANT CHANGE UP (ref 1.6–2.6)
MAGNESIUM SERPL-MCNC: 2.2 MG/DL — SIGNIFICANT CHANGE UP (ref 1.6–2.6)
MAGNESIUM SERPL-MCNC: 2.3 MG/DL — SIGNIFICANT CHANGE UP (ref 1.6–2.6)
MAGNESIUM SERPL-MCNC: 2.4 MG/DL — SIGNIFICANT CHANGE UP (ref 1.6–2.6)
MAGNESIUM SERPL-MCNC: 34.1 MG/DL — CRITICAL HIGH (ref 1.6–2.6)
MAN DIFF?: NORMAL
MANUAL SMEAR VERIFICATION: SIGNIFICANT CHANGE UP
MANUAL SMEAR VERIFICATION: SIGNIFICANT CHANGE UP
MCHC RBC-ENTMCNC: 24.4 PG — LOW (ref 27–34)
MCHC RBC-ENTMCNC: 24.5 PG — LOW (ref 27–34)
MCHC RBC-ENTMCNC: 24.5 PG — LOW (ref 27–34)
MCHC RBC-ENTMCNC: 24.6 PG — LOW (ref 27–34)
MCHC RBC-ENTMCNC: 24.9 PG — LOW (ref 27–34)
MCHC RBC-ENTMCNC: 25 PG — LOW (ref 27–34)
MCHC RBC-ENTMCNC: 25.2 PG — LOW (ref 27–34)
MCHC RBC-ENTMCNC: 25.6 PG — LOW (ref 27–34)
MCHC RBC-ENTMCNC: 26 PG — LOW (ref 27–34)
MCHC RBC-ENTMCNC: 26 PG — LOW (ref 27–34)
MCHC RBC-ENTMCNC: 26.1 PG — LOW (ref 27–34)
MCHC RBC-ENTMCNC: 26.3 PG — LOW (ref 27–34)
MCHC RBC-ENTMCNC: 26.6 PG — LOW (ref 27–34)
MCHC RBC-ENTMCNC: 26.6 PG — LOW (ref 27–34)
MCHC RBC-ENTMCNC: 26.8 PG — LOW (ref 27–34)
MCHC RBC-ENTMCNC: 26.9 PG — LOW (ref 27–34)
MCHC RBC-ENTMCNC: 26.9 PG — LOW (ref 27–34)
MCHC RBC-ENTMCNC: 27.1 PG
MCHC RBC-ENTMCNC: 27.6 PG — SIGNIFICANT CHANGE UP (ref 27–34)
MCHC RBC-ENTMCNC: 27.7 PG
MCHC RBC-ENTMCNC: 27.8 PG — SIGNIFICANT CHANGE UP (ref 27–34)
MCHC RBC-ENTMCNC: 28 PG — SIGNIFICANT CHANGE UP (ref 27–34)
MCHC RBC-ENTMCNC: 28.3 PG — SIGNIFICANT CHANGE UP (ref 27–34)
MCHC RBC-ENTMCNC: 28.5 PG — SIGNIFICANT CHANGE UP (ref 27–34)
MCHC RBC-ENTMCNC: 28.5 PG — SIGNIFICANT CHANGE UP (ref 27–34)
MCHC RBC-ENTMCNC: 28.7 PG — SIGNIFICANT CHANGE UP (ref 27–34)
MCHC RBC-ENTMCNC: 28.9 PG — SIGNIFICANT CHANGE UP (ref 27–34)
MCHC RBC-ENTMCNC: 29 PG — SIGNIFICANT CHANGE UP (ref 27–34)
MCHC RBC-ENTMCNC: 29 PG — SIGNIFICANT CHANGE UP (ref 27–34)
MCHC RBC-ENTMCNC: 29.1 PG — SIGNIFICANT CHANGE UP (ref 27–34)
MCHC RBC-ENTMCNC: 29.4 PG — SIGNIFICANT CHANGE UP (ref 27–34)
MCHC RBC-ENTMCNC: 29.4 PG — SIGNIFICANT CHANGE UP (ref 27–34)
MCHC RBC-ENTMCNC: 29.6 PG — SIGNIFICANT CHANGE UP (ref 27–34)
MCHC RBC-ENTMCNC: 30 PG
MCHC RBC-ENTMCNC: 31 PG
MCHC RBC-ENTMCNC: 31.4 GM/DL — LOW (ref 32–36)
MCHC RBC-ENTMCNC: 31.4 GM/DL — LOW (ref 32–36)
MCHC RBC-ENTMCNC: 31.7 PG
MCHC RBC-ENTMCNC: 31.8 GM/DL — LOW (ref 32–36)
MCHC RBC-ENTMCNC: 32.1 GM/DL — SIGNIFICANT CHANGE UP (ref 32–36)
MCHC RBC-ENTMCNC: 32.1 PG — SIGNIFICANT CHANGE UP (ref 27–34)
MCHC RBC-ENTMCNC: 32.1 PG — SIGNIFICANT CHANGE UP (ref 27–34)
MCHC RBC-ENTMCNC: 32.2 GM/DL — SIGNIFICANT CHANGE UP (ref 32–36)
MCHC RBC-ENTMCNC: 32.2 PG — SIGNIFICANT CHANGE UP (ref 27–34)
MCHC RBC-ENTMCNC: 32.2 PG — SIGNIFICANT CHANGE UP (ref 27–34)
MCHC RBC-ENTMCNC: 32.3 GM/DL — SIGNIFICANT CHANGE UP (ref 32–36)
MCHC RBC-ENTMCNC: 32.3 PG — SIGNIFICANT CHANGE UP (ref 27–34)
MCHC RBC-ENTMCNC: 32.4 PG — SIGNIFICANT CHANGE UP (ref 27–34)
MCHC RBC-ENTMCNC: 32.5 GM/DL — SIGNIFICANT CHANGE UP (ref 32–36)
MCHC RBC-ENTMCNC: 32.5 GM/DL — SIGNIFICANT CHANGE UP (ref 32–36)
MCHC RBC-ENTMCNC: 32.5 PG — SIGNIFICANT CHANGE UP (ref 27–34)
MCHC RBC-ENTMCNC: 32.5 PG — SIGNIFICANT CHANGE UP (ref 27–34)
MCHC RBC-ENTMCNC: 32.6 GM/DL — SIGNIFICANT CHANGE UP (ref 32–36)
MCHC RBC-ENTMCNC: 32.6 PG — SIGNIFICANT CHANGE UP (ref 27–34)
MCHC RBC-ENTMCNC: 32.7 GM/DL — SIGNIFICANT CHANGE UP (ref 32–36)
MCHC RBC-ENTMCNC: 32.7 PG — SIGNIFICANT CHANGE UP (ref 27–34)
MCHC RBC-ENTMCNC: 32.7 PG — SIGNIFICANT CHANGE UP (ref 27–34)
MCHC RBC-ENTMCNC: 32.8 PG — SIGNIFICANT CHANGE UP (ref 27–34)
MCHC RBC-ENTMCNC: 32.9 GM/DL
MCHC RBC-ENTMCNC: 32.9 PG
MCHC RBC-ENTMCNC: 32.9 PG — SIGNIFICANT CHANGE UP (ref 27–34)
MCHC RBC-ENTMCNC: 33 GM/DL — SIGNIFICANT CHANGE UP (ref 32–36)
MCHC RBC-ENTMCNC: 33.1 PG — SIGNIFICANT CHANGE UP (ref 27–34)
MCHC RBC-ENTMCNC: 33.2 PG — SIGNIFICANT CHANGE UP (ref 27–34)
MCHC RBC-ENTMCNC: 33.3 GM/DL — SIGNIFICANT CHANGE UP (ref 32–36)
MCHC RBC-ENTMCNC: 33.3 GM/DL — SIGNIFICANT CHANGE UP (ref 32–36)
MCHC RBC-ENTMCNC: 33.4 GM/DL — SIGNIFICANT CHANGE UP (ref 32–36)
MCHC RBC-ENTMCNC: 33.4 GM/DL — SIGNIFICANT CHANGE UP (ref 32–36)
MCHC RBC-ENTMCNC: 33.5 GM/DL — SIGNIFICANT CHANGE UP (ref 32–36)
MCHC RBC-ENTMCNC: 33.6 GM/DL
MCHC RBC-ENTMCNC: 33.6 GM/DL — SIGNIFICANT CHANGE UP (ref 32–36)
MCHC RBC-ENTMCNC: 33.7 GM/DL — SIGNIFICANT CHANGE UP (ref 32–36)
MCHC RBC-ENTMCNC: 33.8 GM/DL — SIGNIFICANT CHANGE UP (ref 32–36)
MCHC RBC-ENTMCNC: 34 GM/DL
MCHC RBC-ENTMCNC: 34.1 GM/DL — SIGNIFICANT CHANGE UP (ref 32–36)
MCHC RBC-ENTMCNC: 34.2 GM/DL — SIGNIFICANT CHANGE UP (ref 32–36)
MCHC RBC-ENTMCNC: 34.3 GM/DL
MCHC RBC-ENTMCNC: 34.4 GM/DL
MCHC RBC-ENTMCNC: 34.4 GM/DL — SIGNIFICANT CHANGE UP (ref 32–36)
MCHC RBC-ENTMCNC: 34.5 GM/DL — SIGNIFICANT CHANGE UP (ref 32–36)
MCHC RBC-ENTMCNC: 34.5 GM/DL — SIGNIFICANT CHANGE UP (ref 32–36)
MCHC RBC-ENTMCNC: 34.6 GM/DL — SIGNIFICANT CHANGE UP (ref 32–36)
MCHC RBC-ENTMCNC: 34.6 GM/DL — SIGNIFICANT CHANGE UP (ref 32–36)
MCHC RBC-ENTMCNC: 34.7 GM/DL — SIGNIFICANT CHANGE UP (ref 32–36)
MCHC RBC-ENTMCNC: 34.7 GM/DL — SIGNIFICANT CHANGE UP (ref 32–36)
MCHC RBC-ENTMCNC: 34.8 GM/DL — SIGNIFICANT CHANGE UP (ref 32–36)
MCHC RBC-ENTMCNC: 34.9 GM/DL — SIGNIFICANT CHANGE UP (ref 32–36)
MCHC RBC-ENTMCNC: 34.9 GM/DL — SIGNIFICANT CHANGE UP (ref 32–36)
MCHC RBC-ENTMCNC: 35 GM/DL — SIGNIFICANT CHANGE UP (ref 32–36)
MCHC RBC-ENTMCNC: 35 GM/DL — SIGNIFICANT CHANGE UP (ref 32–36)
MCHC RBC-ENTMCNC: 35.2 GM/DL — SIGNIFICANT CHANGE UP (ref 32–36)
MCHC RBC-ENTMCNC: 35.2 GM/DL — SIGNIFICANT CHANGE UP (ref 32–36)
MCHC RBC-ENTMCNC: 35.4 GM/DL
MCHC RBC-ENTMCNC: 35.4 GM/DL — SIGNIFICANT CHANGE UP (ref 32–36)
MCHC RBC-ENTMCNC: 35.4 GM/DL — SIGNIFICANT CHANGE UP (ref 32–36)
MCHC RBC-ENTMCNC: 35.6 GM/DL — SIGNIFICANT CHANGE UP (ref 32–36)
MCHC RBC-ENTMCNC: 35.6 GM/DL — SIGNIFICANT CHANGE UP (ref 32–36)
MCHC RBC-ENTMCNC: 35.7 GM/DL — SIGNIFICANT CHANGE UP (ref 32–36)
MCHC RBC-ENTMCNC: 36 GM/DL — SIGNIFICANT CHANGE UP (ref 32–36)
MCHC RBC-ENTMCNC: 36.7 GM/DL — HIGH (ref 32–36)
MCV RBC AUTO: 100 FL — SIGNIFICANT CHANGE UP (ref 80–100)
MCV RBC AUTO: 72.7 FL — LOW (ref 80–100)
MCV RBC AUTO: 73.3 FL — LOW (ref 80–100)
MCV RBC AUTO: 75.4 FL — LOW (ref 80–100)
MCV RBC AUTO: 75.5 FL — LOW (ref 80–100)
MCV RBC AUTO: 77.6 FL — LOW (ref 80–100)
MCV RBC AUTO: 78 FL — LOW (ref 80–100)
MCV RBC AUTO: 78.5 FL — LOW (ref 80–100)
MCV RBC AUTO: 78.8 FL — LOW (ref 80–100)
MCV RBC AUTO: 78.9 FL — LOW (ref 80–100)
MCV RBC AUTO: 79.1 FL — LOW (ref 80–100)
MCV RBC AUTO: 79.5 FL — LOW (ref 80–100)
MCV RBC AUTO: 79.6 FL — LOW (ref 80–100)
MCV RBC AUTO: 80.1 FL — SIGNIFICANT CHANGE UP (ref 80–100)
MCV RBC AUTO: 80.1 FL — SIGNIFICANT CHANGE UP (ref 80–100)
MCV RBC AUTO: 80.2 FL — SIGNIFICANT CHANGE UP (ref 80–100)
MCV RBC AUTO: 80.5 FL — SIGNIFICANT CHANGE UP (ref 80–100)
MCV RBC AUTO: 80.7 FL — SIGNIFICANT CHANGE UP (ref 80–100)
MCV RBC AUTO: 81.4 FL — SIGNIFICANT CHANGE UP (ref 80–100)
MCV RBC AUTO: 81.5 FL — SIGNIFICANT CHANGE UP (ref 80–100)
MCV RBC AUTO: 81.8 FL — SIGNIFICANT CHANGE UP (ref 80–100)
MCV RBC AUTO: 81.8 FL — SIGNIFICANT CHANGE UP (ref 80–100)
MCV RBC AUTO: 82.4 FL
MCV RBC AUTO: 82.5 FL
MCV RBC AUTO: 82.6 FL — SIGNIFICANT CHANGE UP (ref 80–100)
MCV RBC AUTO: 82.7 FL — SIGNIFICANT CHANGE UP (ref 80–100)
MCV RBC AUTO: 83.6 FL — SIGNIFICANT CHANGE UP (ref 80–100)
MCV RBC AUTO: 84 FL — SIGNIFICANT CHANGE UP (ref 80–100)
MCV RBC AUTO: 84 FL — SIGNIFICANT CHANGE UP (ref 80–100)
MCV RBC AUTO: 84.1 FL — SIGNIFICANT CHANGE UP (ref 80–100)
MCV RBC AUTO: 84.2 FL — SIGNIFICANT CHANGE UP (ref 80–100)
MCV RBC AUTO: 84.5 FL — SIGNIFICANT CHANGE UP (ref 80–100)
MCV RBC AUTO: 87.4 FL
MCV RBC AUTO: 90.3 FL
MCV RBC AUTO: 90.5 FL — SIGNIFICANT CHANGE UP (ref 80–100)
MCV RBC AUTO: 90.7 FL — SIGNIFICANT CHANGE UP (ref 80–100)
MCV RBC AUTO: 91.2 FL — SIGNIFICANT CHANGE UP (ref 80–100)
MCV RBC AUTO: 91.3 FL — SIGNIFICANT CHANGE UP (ref 80–100)
MCV RBC AUTO: 91.4 FL — SIGNIFICANT CHANGE UP (ref 80–100)
MCV RBC AUTO: 92 FL — SIGNIFICANT CHANGE UP (ref 80–100)
MCV RBC AUTO: 92.8 FL
MCV RBC AUTO: 93 FL — SIGNIFICANT CHANGE UP (ref 80–100)
MCV RBC AUTO: 93.3 FL
MCV RBC AUTO: 93.4 FL — SIGNIFICANT CHANGE UP (ref 80–100)
MCV RBC AUTO: 94.6 FL — SIGNIFICANT CHANGE UP (ref 80–100)
MCV RBC AUTO: 94.8 FL — SIGNIFICANT CHANGE UP (ref 80–100)
MCV RBC AUTO: 96.7 FL — SIGNIFICANT CHANGE UP (ref 80–100)
MCV RBC AUTO: 96.8 FL — SIGNIFICANT CHANGE UP (ref 80–100)
MCV RBC AUTO: 96.8 FL — SIGNIFICANT CHANGE UP (ref 80–100)
MCV RBC AUTO: 97.3 FL — SIGNIFICANT CHANGE UP (ref 80–100)
MCV RBC AUTO: 97.6 FL — SIGNIFICANT CHANGE UP (ref 80–100)
MCV RBC AUTO: 98.3 FL — SIGNIFICANT CHANGE UP (ref 80–100)
MESOTHL CELL # FLD: 1 % — SIGNIFICANT CHANGE UP
MICROCYTES BLD QL: SLIGHT — SIGNIFICANT CHANGE UP
MICROCYTES BLD QL: SLIGHT — SIGNIFICANT CHANGE UP
MICROSCOPIC-UA: NORMAL
MONOCYTES # BLD AUTO: 0.29 K/UL — SIGNIFICANT CHANGE UP (ref 0–0.9)
MONOCYTES # BLD AUTO: 0.33 K/UL — SIGNIFICANT CHANGE UP (ref 0–0.9)
MONOCYTES # BLD AUTO: 0.35 K/UL
MONOCYTES # BLD AUTO: 0.36 K/UL
MONOCYTES # BLD AUTO: 0.4 K/UL — SIGNIFICANT CHANGE UP (ref 0–0.9)
MONOCYTES # BLD AUTO: 0.41 K/UL — SIGNIFICANT CHANGE UP (ref 0–0.9)
MONOCYTES # BLD AUTO: 0.44 K/UL — SIGNIFICANT CHANGE UP (ref 0–0.9)
MONOCYTES # BLD AUTO: 0.44 K/UL — SIGNIFICANT CHANGE UP (ref 0–0.9)
MONOCYTES # BLD AUTO: 0.45 K/UL
MONOCYTES # BLD AUTO: 0.45 K/UL
MONOCYTES # BLD AUTO: 0.45 K/UL — SIGNIFICANT CHANGE UP (ref 0–0.9)
MONOCYTES # BLD AUTO: 0.47 K/UL
MONOCYTES # BLD AUTO: 0.49 K/UL — SIGNIFICANT CHANGE UP (ref 0–0.9)
MONOCYTES # BLD AUTO: 0.54 K/UL
MONOCYTES # BLD AUTO: 0.65 K/UL — SIGNIFICANT CHANGE UP (ref 0–0.9)
MONOCYTES # BLD AUTO: 0.78 K/UL — SIGNIFICANT CHANGE UP (ref 0–0.9)
MONOCYTES # BLD AUTO: 0.85 K/UL — SIGNIFICANT CHANGE UP (ref 0–0.9)
MONOCYTES # BLD AUTO: 0.89 K/UL — SIGNIFICANT CHANGE UP (ref 0–0.9)
MONOCYTES # BLD AUTO: 0.94 K/UL — HIGH (ref 0–0.9)
MONOCYTES # BLD AUTO: 1.07 K/UL — HIGH (ref 0–0.9)
MONOCYTES # BLD AUTO: 1.15 K/UL — HIGH (ref 0–0.9)
MONOCYTES # BLD AUTO: 1.97 K/UL — HIGH (ref 0–0.9)
MONOCYTES # BLD AUTO: 2.03 K/UL — HIGH (ref 0–0.9)
MONOCYTES NFR BLD AUTO: 10 % — SIGNIFICANT CHANGE UP (ref 2–14)
MONOCYTES NFR BLD AUTO: 10.1 % — SIGNIFICANT CHANGE UP (ref 2–14)
MONOCYTES NFR BLD AUTO: 10.1 % — SIGNIFICANT CHANGE UP (ref 2–14)
MONOCYTES NFR BLD AUTO: 3.4 % — SIGNIFICANT CHANGE UP (ref 2–14)
MONOCYTES NFR BLD AUTO: 5.3 %
MONOCYTES NFR BLD AUTO: 6 %
MONOCYTES NFR BLD AUTO: 6 % — SIGNIFICANT CHANGE UP (ref 2–14)
MONOCYTES NFR BLD AUTO: 6.2 % — SIGNIFICANT CHANGE UP (ref 2–14)
MONOCYTES NFR BLD AUTO: 6.3 % — SIGNIFICANT CHANGE UP (ref 2–14)
MONOCYTES NFR BLD AUTO: 6.6 %
MONOCYTES NFR BLD AUTO: 6.6 %
MONOCYTES NFR BLD AUTO: 6.8 %
MONOCYTES NFR BLD AUTO: 7.3 % — SIGNIFICANT CHANGE UP (ref 2–14)
MONOCYTES NFR BLD AUTO: 7.9 % — SIGNIFICANT CHANGE UP (ref 2–14)
MONOCYTES NFR BLD AUTO: 8 % — SIGNIFICANT CHANGE UP (ref 2–14)
MONOCYTES NFR BLD AUTO: 8.2 % — SIGNIFICANT CHANGE UP (ref 2–14)
MONOCYTES NFR BLD AUTO: 8.3 %
MONOCYTES NFR BLD AUTO: 8.5 % — SIGNIFICANT CHANGE UP (ref 2–14)
MONOCYTES NFR BLD AUTO: 8.8 % — SIGNIFICANT CHANGE UP (ref 2–14)
MONOCYTES NFR BLD AUTO: 9.1 % — SIGNIFICANT CHANGE UP (ref 2–14)
MONOCYTES NFR BLD AUTO: 9.2 % — SIGNIFICANT CHANGE UP (ref 2–14)
MONOCYTES NFR BLD AUTO: 9.6 % — SIGNIFICANT CHANGE UP (ref 2–14)
MONOCYTES NFR BLD AUTO: 9.6 % — SIGNIFICANT CHANGE UP (ref 2–14)
MONOS+MACROS # FLD: 82 % — SIGNIFICANT CHANGE UP
NEUTROPHILS # BLD AUTO: 15.76 K/UL — HIGH (ref 1.8–7.4)
NEUTROPHILS # BLD AUTO: 16.25 K/UL — HIGH (ref 1.8–7.4)
NEUTROPHILS # BLD AUTO: 18.48 K/UL — HIGH (ref 1.8–7.4)
NEUTROPHILS # BLD AUTO: 3.06 K/UL — SIGNIFICANT CHANGE UP (ref 1.8–7.4)
NEUTROPHILS # BLD AUTO: 3.69 K/UL — SIGNIFICANT CHANGE UP (ref 1.8–7.4)
NEUTROPHILS # BLD AUTO: 3.91 K/UL — SIGNIFICANT CHANGE UP (ref 1.8–7.4)
NEUTROPHILS # BLD AUTO: 4.22 K/UL — SIGNIFICANT CHANGE UP (ref 1.8–7.4)
NEUTROPHILS # BLD AUTO: 4.34 K/UL
NEUTROPHILS # BLD AUTO: 4.34 K/UL — SIGNIFICANT CHANGE UP (ref 1.8–7.4)
NEUTROPHILS # BLD AUTO: 4.53 K/UL
NEUTROPHILS # BLD AUTO: 4.8 K/UL — SIGNIFICANT CHANGE UP (ref 1.8–7.4)
NEUTROPHILS # BLD AUTO: 4.92 K/UL
NEUTROPHILS # BLD AUTO: 5.4 K/UL
NEUTROPHILS # BLD AUTO: 6.52 K/UL — SIGNIFICANT CHANGE UP (ref 1.8–7.4)
NEUTROPHILS # BLD AUTO: 6.78 K/UL
NEUTROPHILS # BLD AUTO: 6.88 K/UL — SIGNIFICANT CHANGE UP (ref 1.8–7.4)
NEUTROPHILS # BLD AUTO: 6.92 K/UL — SIGNIFICANT CHANGE UP (ref 1.8–7.4)
NEUTROPHILS # BLD AUTO: 7.23 K/UL — SIGNIFICANT CHANGE UP (ref 1.8–7.4)
NEUTROPHILS # BLD AUTO: 7.38 K/UL
NEUTROPHILS # BLD AUTO: 7.7 K/UL — HIGH (ref 1.8–7.4)
NEUTROPHILS # BLD AUTO: 8.03 K/UL — HIGH (ref 1.8–7.4)
NEUTROPHILS # BLD AUTO: 8.71 K/UL — HIGH (ref 1.8–7.4)
NEUTROPHILS # BLD AUTO: 9.7 K/UL — HIGH (ref 1.8–7.4)
NEUTROPHILS NFR BLD AUTO: 75.6 % — SIGNIFICANT CHANGE UP (ref 43–77)
NEUTROPHILS NFR BLD AUTO: 76.6 % — SIGNIFICANT CHANGE UP (ref 43–77)
NEUTROPHILS NFR BLD AUTO: 78.6 % — HIGH (ref 43–77)
NEUTROPHILS NFR BLD AUTO: 79 % — HIGH (ref 43–77)
NEUTROPHILS NFR BLD AUTO: 79.4 % — HIGH (ref 43–77)
NEUTROPHILS NFR BLD AUTO: 79.9 % — HIGH (ref 43–77)
NEUTROPHILS NFR BLD AUTO: 79.9 % — HIGH (ref 43–77)
NEUTROPHILS NFR BLD AUTO: 80 % — HIGH (ref 43–77)
NEUTROPHILS NFR BLD AUTO: 80.5 %
NEUTROPHILS NFR BLD AUTO: 81 % — HIGH (ref 43–77)
NEUTROPHILS NFR BLD AUTO: 82.1 %
NEUTROPHILS NFR BLD AUTO: 82.1 % — HIGH (ref 43–77)
NEUTROPHILS NFR BLD AUTO: 82.2 % — HIGH (ref 43–77)
NEUTROPHILS NFR BLD AUTO: 82.8 %
NEUTROPHILS NFR BLD AUTO: 83 %
NEUTROPHILS NFR BLD AUTO: 83 % — HIGH (ref 43–77)
NEUTROPHILS NFR BLD AUTO: 83.5 %
NEUTROPHILS NFR BLD AUTO: 83.8 % — HIGH (ref 43–77)
NEUTROPHILS NFR BLD AUTO: 84.3 % — HIGH (ref 43–77)
NEUTROPHILS NFR BLD AUTO: 84.4 %
NEUTROPHILS NFR BLD AUTO: 85.8 % — HIGH (ref 43–77)
NEUTROPHILS NFR BLD AUTO: 88 % — HIGH (ref 43–77)
NEUTROPHILS NFR BLD AUTO: 91.4 % — HIGH (ref 43–77)
NITRITE UR-MCNC: NEGATIVE — SIGNIFICANT CHANGE UP
NITRITE URINE: NEGATIVE
NRBC # BLD: 0 /100 WBCS — SIGNIFICANT CHANGE UP (ref 0–0)
NRBC # BLD: 0 /100 — SIGNIFICANT CHANGE UP (ref 0–0)
NRBC # BLD: 1 /100 WBCS — HIGH (ref 0–0)
NT-PROBNP SERPL-SCNC: 224 PG/ML — SIGNIFICANT CHANGE UP (ref 0–300)
OSMOLALITY UR: 159 MOS/KG — LOW (ref 300–900)
OSMOLALITY UR: 297 MOS/KG — LOW (ref 300–900)
OTHER CELLS CSF MANUAL: 8 ML/DL — LOW (ref 18–22)
OVALOCYTES BLD QL SMEAR: SLIGHT — SIGNIFICANT CHANGE UP
PCO2 BLDV: 24 MMHG — LOW (ref 35–50)
PCO2 BLDV: 32 MMHG — LOW (ref 35–50)
PH BLDV: 7.19 — CRITICAL LOW (ref 7.35–7.45)
PH BLDV: 7.26 — LOW (ref 7.35–7.45)
PH UR: 6.5 — SIGNIFICANT CHANGE UP (ref 5–8)
PH URINE: 6
PH URINE: 6
PH URINE: 6.5
PHOSPHATE SERPL-MCNC: 1.8 MG/DL
PHOSPHATE SERPL-MCNC: 1.8 MG/DL
PHOSPHATE SERPL-MCNC: 1.9 MG/DL — LOW (ref 2.5–4.5)
PHOSPHATE SERPL-MCNC: 2 MG/DL — LOW (ref 2.5–4.5)
PHOSPHATE SERPL-MCNC: 2.1 MG/DL — LOW (ref 2.5–4.5)
PHOSPHATE SERPL-MCNC: 2.2 MG/DL — LOW (ref 2.5–4.5)
PHOSPHATE SERPL-MCNC: 2.3 MG/DL — LOW (ref 2.5–4.5)
PHOSPHATE SERPL-MCNC: 2.4 MG/DL — LOW (ref 2.5–4.5)
PHOSPHATE SERPL-MCNC: 2.5 MG/DL — SIGNIFICANT CHANGE UP (ref 2.5–4.5)
PHOSPHATE SERPL-MCNC: 2.7 MG/DL — SIGNIFICANT CHANGE UP (ref 2.5–4.5)
PHOSPHATE SERPL-MCNC: 2.8 MG/DL — SIGNIFICANT CHANGE UP (ref 2.5–4.5)
PHOSPHATE SERPL-MCNC: 2.9 MG/DL — SIGNIFICANT CHANGE UP (ref 2.5–4.5)
PHOSPHATE SERPL-MCNC: 3 MG/DL — SIGNIFICANT CHANGE UP (ref 2.5–4.5)
PHOSPHATE SERPL-MCNC: 3.1 MG/DL — SIGNIFICANT CHANGE UP (ref 2.5–4.5)
PHOSPHATE SERPL-MCNC: 3.2 MG/DL — SIGNIFICANT CHANGE UP (ref 2.5–4.5)
PHOSPHATE SERPL-MCNC: 3.3 MG/DL — SIGNIFICANT CHANGE UP (ref 2.5–4.5)
PHOSPHATE SERPL-MCNC: 3.4 MG/DL
PHOSPHATE SERPL-MCNC: 3.4 MG/DL
PHOSPHATE SERPL-MCNC: 3.4 MG/DL — SIGNIFICANT CHANGE UP (ref 2.5–4.5)
PHOSPHATE SERPL-MCNC: 3.5 MG/DL — SIGNIFICANT CHANGE UP (ref 2.5–4.5)
PHOSPHATE SERPL-MCNC: 3.6 MG/DL — SIGNIFICANT CHANGE UP (ref 2.5–4.5)
PHOSPHATE SERPL-MCNC: 3.7 MG/DL — SIGNIFICANT CHANGE UP (ref 2.5–4.5)
PHOSPHATE SERPL-MCNC: 4.2 MG/DL — SIGNIFICANT CHANGE UP (ref 2.5–4.5)
PHOSPHATE SERPL-MCNC: 4.2 MG/DL — SIGNIFICANT CHANGE UP (ref 2.5–4.5)
PHOSPHATE SERPL-MCNC: 5.7 MG/DL — HIGH (ref 2.5–4.5)
PHOSPHATE SERPL-MCNC: 6.9 MG/DL — HIGH (ref 2.5–4.5)
PHOSPHATIDYETHANOL (PETH), WHOLE BLOOD: 109 NG/ML
PHOSPHATIDYETHANOL (PETH), WHOLE BLOOD: NEGATIVE NG/ML
PLAT MORPH BLD: NORMAL — SIGNIFICANT CHANGE UP
PLAT MORPH BLD: NORMAL — SIGNIFICANT CHANGE UP
PLATELET # BLD AUTO: 103 K/UL — LOW (ref 150–400)
PLATELET # BLD AUTO: 140 K/UL — LOW (ref 150–400)
PLATELET # BLD AUTO: 156 K/UL
PLATELET # BLD AUTO: 22 K/UL — LOW (ref 150–400)
PLATELET # BLD AUTO: 25 K/UL — LOW (ref 150–400)
PLATELET # BLD AUTO: 27 K/UL — LOW (ref 150–400)
PLATELET # BLD AUTO: 27 K/UL — LOW (ref 150–400)
PLATELET # BLD AUTO: 28 K/UL — LOW (ref 150–400)
PLATELET # BLD AUTO: 30 K/UL — LOW (ref 150–400)
PLATELET # BLD AUTO: 31 K/UL — LOW (ref 150–400)
PLATELET # BLD AUTO: 32 K/UL — LOW (ref 150–400)
PLATELET # BLD AUTO: 34 K/UL
PLATELET # BLD AUTO: 34 K/UL — LOW (ref 150–400)
PLATELET # BLD AUTO: 35 K/UL — LOW (ref 150–400)
PLATELET # BLD AUTO: 37 K/UL — LOW (ref 150–400)
PLATELET # BLD AUTO: 40 K/UL
PLATELET # BLD AUTO: 40 K/UL — LOW (ref 150–400)
PLATELET # BLD AUTO: 41 K/UL — LOW (ref 150–400)
PLATELET # BLD AUTO: 42 K/UL — LOW (ref 150–400)
PLATELET # BLD AUTO: 43 K/UL — LOW (ref 150–400)
PLATELET # BLD AUTO: 44 K/UL — LOW (ref 150–400)
PLATELET # BLD AUTO: 45 K/UL — LOW (ref 150–400)
PLATELET # BLD AUTO: 45 K/UL — LOW (ref 150–400)
PLATELET # BLD AUTO: 47 K/UL — LOW (ref 150–400)
PLATELET # BLD AUTO: 49 K/UL — LOW (ref 150–400)
PLATELET # BLD AUTO: 52 K/UL — LOW (ref 150–400)
PLATELET # BLD AUTO: 53 K/UL — LOW (ref 150–400)
PLATELET # BLD AUTO: 54 K/UL — LOW (ref 150–400)
PLATELET # BLD AUTO: 54 K/UL — LOW (ref 150–400)
PLATELET # BLD AUTO: 55 K/UL — LOW (ref 150–400)
PLATELET # BLD AUTO: 55 K/UL — LOW (ref 150–400)
PLATELET # BLD AUTO: 57 K/UL — LOW (ref 150–400)
PLATELET # BLD AUTO: 58 K/UL — LOW (ref 150–400)
PLATELET # BLD AUTO: 58 K/UL — LOW (ref 150–400)
PLATELET # BLD AUTO: 59 K/UL — LOW (ref 150–400)
PLATELET # BLD AUTO: 61 K/UL — LOW (ref 150–400)
PLATELET # BLD AUTO: 62 K/UL
PLATELET # BLD AUTO: 65 K/UL — LOW (ref 150–400)
PLATELET # BLD AUTO: 67 K/UL — LOW (ref 150–400)
PLATELET # BLD AUTO: 69 K/UL — LOW (ref 150–400)
PLATELET # BLD AUTO: 70 K/UL
PLATELET # BLD AUTO: 72 K/UL — LOW (ref 150–400)
PLATELET # BLD AUTO: 83 K/UL
PLATELET # BLD AUTO: 84 K/UL — LOW (ref 150–400)
PO2 BLDV: 73 MMHG — HIGH (ref 25–45)
PO2 BLDV: 84 MMHG — HIGH (ref 25–45)
POIKILOCYTOSIS BLD QL AUTO: SIGNIFICANT CHANGE UP
POIKILOCYTOSIS BLD QL AUTO: SLIGHT — SIGNIFICANT CHANGE UP
POLYCHROMASIA BLD QL SMEAR: SLIGHT — SIGNIFICANT CHANGE UP
POTASSIUM BLDV-SCNC: 3.9 MMOL/L — SIGNIFICANT CHANGE UP (ref 3.5–5.3)
POTASSIUM SERPL-MCNC: 2.2 MMOL/L — CRITICAL LOW (ref 3.5–5.3)
POTASSIUM SERPL-MCNC: 2.6 MMOL/L — CRITICAL LOW (ref 3.5–5.3)
POTASSIUM SERPL-MCNC: 2.7 MMOL/L — CRITICAL LOW (ref 3.5–5.3)
POTASSIUM SERPL-MCNC: 2.7 MMOL/L — CRITICAL LOW (ref 3.5–5.3)
POTASSIUM SERPL-MCNC: 2.9 MMOL/L — CRITICAL LOW (ref 3.5–5.3)
POTASSIUM SERPL-MCNC: 3 MMOL/L — LOW (ref 3.5–5.3)
POTASSIUM SERPL-MCNC: 3 MMOL/L — LOW (ref 3.5–5.3)
POTASSIUM SERPL-MCNC: 3.1 MMOL/L — LOW (ref 3.5–5.3)
POTASSIUM SERPL-MCNC: 3.1 MMOL/L — LOW (ref 3.5–5.3)
POTASSIUM SERPL-MCNC: 3.2 MMOL/L — LOW (ref 3.5–5.3)
POTASSIUM SERPL-MCNC: 3.3 MMOL/L — LOW (ref 3.5–5.3)
POTASSIUM SERPL-MCNC: 3.4 MMOL/L — LOW (ref 3.5–5.3)
POTASSIUM SERPL-MCNC: 3.5 MMOL/L — SIGNIFICANT CHANGE UP (ref 3.5–5.3)
POTASSIUM SERPL-MCNC: 3.6 MMOL/L — SIGNIFICANT CHANGE UP (ref 3.5–5.3)
POTASSIUM SERPL-MCNC: 3.7 MMOL/L — SIGNIFICANT CHANGE UP (ref 3.5–5.3)
POTASSIUM SERPL-MCNC: 3.8 MMOL/L — SIGNIFICANT CHANGE UP (ref 3.5–5.3)
POTASSIUM SERPL-MCNC: 3.8 MMOL/L — SIGNIFICANT CHANGE UP (ref 3.5–5.3)
POTASSIUM SERPL-MCNC: 3.9 MMOL/L — SIGNIFICANT CHANGE UP (ref 3.5–5.3)
POTASSIUM SERPL-MCNC: 4 MMOL/L — SIGNIFICANT CHANGE UP (ref 3.5–5.3)
POTASSIUM SERPL-MCNC: 4 MMOL/L — SIGNIFICANT CHANGE UP (ref 3.5–5.3)
POTASSIUM SERPL-MCNC: 4.3 MMOL/L — SIGNIFICANT CHANGE UP (ref 3.5–5.3)
POTASSIUM SERPL-MCNC: 4.5 MMOL/L — SIGNIFICANT CHANGE UP (ref 3.5–5.3)
POTASSIUM SERPL-SCNC: 2.2 MMOL/L — CRITICAL LOW (ref 3.5–5.3)
POTASSIUM SERPL-SCNC: 2.6 MMOL/L — CRITICAL LOW (ref 3.5–5.3)
POTASSIUM SERPL-SCNC: 2.7 MMOL/L — CRITICAL LOW (ref 3.5–5.3)
POTASSIUM SERPL-SCNC: 2.7 MMOL/L — CRITICAL LOW (ref 3.5–5.3)
POTASSIUM SERPL-SCNC: 2.9 MMOL/L — CRITICAL LOW (ref 3.5–5.3)
POTASSIUM SERPL-SCNC: 3 MMOL/L — LOW (ref 3.5–5.3)
POTASSIUM SERPL-SCNC: 3 MMOL/L — LOW (ref 3.5–5.3)
POTASSIUM SERPL-SCNC: 3.1 MMOL/L
POTASSIUM SERPL-SCNC: 3.1 MMOL/L — LOW (ref 3.5–5.3)
POTASSIUM SERPL-SCNC: 3.1 MMOL/L — LOW (ref 3.5–5.3)
POTASSIUM SERPL-SCNC: 3.2 MMOL/L — LOW (ref 3.5–5.3)
POTASSIUM SERPL-SCNC: 3.3 MMOL/L
POTASSIUM SERPL-SCNC: 3.3 MMOL/L — LOW (ref 3.5–5.3)
POTASSIUM SERPL-SCNC: 3.4 MMOL/L — LOW (ref 3.5–5.3)
POTASSIUM SERPL-SCNC: 3.5 MMOL/L
POTASSIUM SERPL-SCNC: 3.5 MMOL/L — SIGNIFICANT CHANGE UP (ref 3.5–5.3)
POTASSIUM SERPL-SCNC: 3.6 MMOL/L — SIGNIFICANT CHANGE UP (ref 3.5–5.3)
POTASSIUM SERPL-SCNC: 3.7 MMOL/L — SIGNIFICANT CHANGE UP (ref 3.5–5.3)
POTASSIUM SERPL-SCNC: 3.8 MMOL/L — SIGNIFICANT CHANGE UP (ref 3.5–5.3)
POTASSIUM SERPL-SCNC: 3.8 MMOL/L — SIGNIFICANT CHANGE UP (ref 3.5–5.3)
POTASSIUM SERPL-SCNC: 3.9 MMOL/L
POTASSIUM SERPL-SCNC: 3.9 MMOL/L — SIGNIFICANT CHANGE UP (ref 3.5–5.3)
POTASSIUM SERPL-SCNC: 4 MMOL/L — SIGNIFICANT CHANGE UP (ref 3.5–5.3)
POTASSIUM SERPL-SCNC: 4 MMOL/L — SIGNIFICANT CHANGE UP (ref 3.5–5.3)
POTASSIUM SERPL-SCNC: 4.1 MMOL/L
POTASSIUM SERPL-SCNC: 4.2 MMOL/L
POTASSIUM SERPL-SCNC: 4.3 MMOL/L — SIGNIFICANT CHANGE UP (ref 3.5–5.3)
POTASSIUM SERPL-SCNC: 4.4 MMOL/L
POTASSIUM SERPL-SCNC: 4.5 MMOL/L — SIGNIFICANT CHANGE UP (ref 3.5–5.3)
POTASSIUM UR-SCNC: 28 MMOL/L — SIGNIFICANT CHANGE UP
POTASSIUM UR-SCNC: 34 MMOL/L — SIGNIFICANT CHANGE UP
PROCALCITONIN SERPL-MCNC: 16.17 NG/ML — HIGH (ref 0.02–0.1)
PROCALCITONIN SERPL-MCNC: 2.03 NG/ML — HIGH (ref 0.02–0.1)
PROCALCITONIN SERPL-MCNC: 6.32 NG/ML — HIGH (ref 0.02–0.1)
PROT ?TM UR-MCNC: 25 MG/DL — HIGH (ref 0–12)
PROT FLD-MCNC: 0.9 G/DL — SIGNIFICANT CHANGE UP
PROT SERPL-MCNC: 4.7 G/DL — LOW (ref 6–8.3)
PROT SERPL-MCNC: 4.7 G/DL — LOW (ref 6–8.3)
PROT SERPL-MCNC: 4.8 G/DL — LOW (ref 6–8.3)
PROT SERPL-MCNC: 4.8 G/DL — LOW (ref 6–8.3)
PROT SERPL-MCNC: 4.9 G/DL — LOW (ref 6–8.3)
PROT SERPL-MCNC: 5 G/DL — LOW (ref 6–8.3)
PROT SERPL-MCNC: 5.1 G/DL — LOW (ref 6–8.3)
PROT SERPL-MCNC: 5.2 G/DL — LOW (ref 6–8.3)
PROT SERPL-MCNC: 5.3 G/DL — LOW (ref 6–8.3)
PROT SERPL-MCNC: 5.4 G/DL — LOW (ref 6–8.3)
PROT SERPL-MCNC: 5.5 G/DL
PROT SERPL-MCNC: 5.5 G/DL — LOW (ref 6–8.3)
PROT SERPL-MCNC: 5.6 G/DL
PROT SERPL-MCNC: 5.6 G/DL — LOW (ref 6–8.3)
PROT SERPL-MCNC: 5.7 G/DL
PROT SERPL-MCNC: 5.7 G/DL
PROT SERPL-MCNC: 5.7 G/DL — LOW (ref 6–8.3)
PROT SERPL-MCNC: 5.8 G/DL — LOW (ref 6–8.3)
PROT SERPL-MCNC: 5.9 G/DL
PROT SERPL-MCNC: 5.9 G/DL — LOW (ref 6–8.3)
PROT SERPL-MCNC: 5.9 G/DL — LOW (ref 6–8.3)
PROT SERPL-MCNC: 6 G/DL
PROT SERPL-MCNC: 6 G/DL
PROT SERPL-MCNC: 6.2 G/DL — SIGNIFICANT CHANGE UP (ref 6–8.3)
PROT SERPL-MCNC: 6.3 G/DL — SIGNIFICANT CHANGE UP (ref 6–8.3)
PROT SERPL-MCNC: 6.6 G/DL — SIGNIFICANT CHANGE UP (ref 6–8.3)
PROT UR-MCNC: 57 MG/DL
PROT UR-MCNC: ABNORMAL
PROT UR-MCNC: SIGNIFICANT CHANGE UP
PROT UR-MCNC: SIGNIFICANT CHANGE UP
PROTEIN URINE: ABNORMAL
PROTHROM AB SERPL-ACNC: 19.5 SEC — HIGH (ref 10–13.1)
PROTHROM AB SERPL-ACNC: 22.4 SEC — HIGH (ref 10–13.1)
PROTHROM AB SERPL-ACNC: 24.5 SEC — HIGH (ref 10–12.9)
PROTHROM AB SERPL-ACNC: 24.5 SEC — HIGH (ref 10–13.1)
PROTHROM AB SERPL-ACNC: 24.6 SEC — HIGH (ref 10–13.1)
PROTHROM AB SERPL-ACNC: 25.1 SEC — HIGH (ref 10–12.9)
PROTHROM AB SERPL-ACNC: 25.8 SEC — HIGH (ref 10–12.9)
PROTHROM AB SERPL-ACNC: 26.1 SEC — HIGH (ref 10–13.1)
PROTHROM AB SERPL-ACNC: 26.6 SEC — HIGH (ref 10.6–13.6)
PROTHROM AB SERPL-ACNC: 26.6 SEC — HIGH (ref 10–12.9)
PROTHROM AB SERPL-ACNC: 28 SEC — HIGH (ref 10.6–13.6)
PROTHROM AB SERPL-ACNC: 28.5 SEC — HIGH (ref 10.6–13.6)
PROTHROM AB SERPL-ACNC: 28.6 SEC — HIGH (ref 10.6–13.6)
PROTHROM AB SERPL-ACNC: 29.6 SEC — HIGH (ref 10.6–13.6)
PROTHROM AB SERPL-ACNC: 30.2 SEC — HIGH (ref 10–13.1)
PROTHROM AB SERPL-ACNC: 30.3 SEC — HIGH (ref 10.6–13.6)
PROTHROM AB SERPL-ACNC: 30.4 SEC — HIGH (ref 10.6–13.6)
PROTHROM AB SERPL-ACNC: 30.4 SEC — HIGH (ref 10–13.1)
PROTHROM AB SERPL-ACNC: 30.9 SEC — HIGH (ref 10–13.1)
PROTHROM AB SERPL-ACNC: 31.4 SEC — HIGH (ref 10–13.1)
PROTHROM AB SERPL-ACNC: 32.6 SEC — HIGH (ref 10–12.9)
PROTHROM AB SERPL-ACNC: 33.2 SEC — HIGH (ref 10–12.9)
PROTHROM AB SERPL-ACNC: 34.7 SEC — HIGH (ref 10–12.9)
PROTHROM AB SERPL-ACNC: 35.1 SEC — HIGH (ref 10–12.9)
PROTHROM AB SERPL-ACNC: 35.9 SEC — HIGH (ref 10–12.9)
PROTHROM AB SERPL-ACNC: 37.5 SEC — HIGH (ref 10–12.9)
PROTHROM AB SERPL-ACNC: 37.6 SEC — HIGH (ref 10.6–13.6)
PROTHROM AB SERPL-ACNC: 38.1 SEC — HIGH (ref 10.6–13.6)
PROTHROM AB SERPL-ACNC: 39 SEC — HIGH (ref 10–12.9)
PROTHROM AB SERPL-ACNC: 39.5 SEC — HIGH (ref 10–12.9)
PROTHROM AB SERPL-ACNC: 40.3 SEC — HIGH (ref 10.6–13.6)
PROTHROM AB SERPL-ACNC: 40.5 SEC — HIGH (ref 10–12.9)
PROTHROM AB SERPL-ACNC: 40.8 SEC — HIGH (ref 10–12.9)
PROTHROM AB SERPL-ACNC: 42.3 SEC — HIGH (ref 10.6–13.6)
PROTHROM AB SERPL-ACNC: 46.9 SEC — HIGH (ref 10.6–13.6)
PSA SERPL-MCNC: 0.21 NG/ML
PT BLD: 24.6 SEC
PT BLD: 29.3 SEC
PT BLD: 32.2 SEC
PT BLD: 33.1 SEC
PT BLD: 36.3 SEC
QUANTIFERON TB PLUS MITOGEN MINUS NIL: 0.11 IU/ML
QUANTIFERON TB PLUS NIL: 0.01 IU/ML
QUANTIFERON TB PLUS TB1 MINUS NIL: 0 IU/ML
QUANTIFERON TB PLUS TB2 MINUS NIL: 0 IU/ML
RAPIDTEG MAXIMUM AMPLITUDE: 40.5 MM (ref 52–70)
RAPIDTEG MAXIMUM AMPLITUDE: 42.4 MM — SIGNIFICANT CHANGE UP (ref 52–70)
RAPIDTEG MAXIMUM AMPLITUDE: 47.5 MM (ref 52–70)
RAPIDTEG MAXIMUM AMPLITUDE: <40 MM (ref 52–70)
RAPIDTEG MAXIMUM AMPLITUDE: <40 MM — SIGNIFICANT CHANGE UP (ref 52–70)
RBC # BLD: 2.1 M/UL — LOW (ref 4.2–5.8)
RBC # BLD: 2.12 M/UL — LOW (ref 4.2–5.8)
RBC # BLD: 2.14 M/UL — LOW (ref 4.2–5.8)
RBC # BLD: 2.19 M/UL — LOW (ref 4.2–5.8)
RBC # BLD: 2.21 M/UL — LOW (ref 4.2–5.8)
RBC # BLD: 2.22 M/UL — LOW (ref 4.2–5.8)
RBC # BLD: 2.22 M/UL — LOW (ref 4.2–5.8)
RBC # BLD: 2.24 M/UL
RBC # BLD: 2.26 M/UL — LOW (ref 4.2–5.8)
RBC # BLD: 2.27 M/UL — LOW (ref 4.2–5.8)
RBC # BLD: 2.27 M/UL — LOW (ref 4.2–5.8)
RBC # BLD: 2.3 M/UL — LOW (ref 4.2–5.8)
RBC # BLD: 2.31 M/UL — LOW (ref 4.2–5.8)
RBC # BLD: 2.32 M/UL — LOW (ref 4.2–5.8)
RBC # BLD: 2.32 M/UL — LOW (ref 4.2–5.8)
RBC # BLD: 2.44 M/UL — LOW (ref 4.2–5.8)
RBC # BLD: 2.47 M/UL
RBC # BLD: 2.48 M/UL
RBC # BLD: 2.51 M/UL — LOW (ref 4.2–5.8)
RBC # BLD: 2.51 M/UL — LOW (ref 4.2–5.8)
RBC # BLD: 2.6 M/UL — LOW (ref 4.2–5.8)
RBC # BLD: 2.62 M/UL — LOW (ref 4.2–5.8)
RBC # BLD: 2.65 M/UL — LOW (ref 4.2–5.8)
RBC # BLD: 2.65 M/UL — LOW (ref 4.2–5.8)
RBC # BLD: 2.69 M/UL — LOW (ref 4.2–5.8)
RBC # BLD: 2.7 M/UL — LOW (ref 4.2–5.8)
RBC # BLD: 2.71 M/UL — LOW (ref 4.2–5.8)
RBC # BLD: 2.74 M/UL — LOW (ref 4.2–5.8)
RBC # BLD: 2.75 M/UL — LOW (ref 4.2–5.8)
RBC # BLD: 2.77 M/UL — LOW (ref 4.2–5.8)
RBC # BLD: 2.9 M/UL — LOW (ref 4.2–5.8)
RBC # BLD: 2.92 M/UL
RBC # BLD: 3.08 M/UL — LOW (ref 4.2–5.8)
RBC # BLD: 3.2 M/UL — LOW (ref 4.2–5.8)
RBC # BLD: 3.21 M/UL — LOW (ref 4.2–5.8)
RBC # BLD: 3.23 M/UL — LOW (ref 4.2–5.8)
RBC # BLD: 3.24 M/UL — LOW (ref 4.2–5.8)
RBC # BLD: 3.27 M/UL — LOW (ref 4.2–5.8)
RBC # BLD: 3.44 M/UL — LOW (ref 4.2–5.8)
RBC # BLD: 3.52 M/UL — LOW (ref 4.2–5.8)
RBC # BLD: 3.54 M/UL — LOW (ref 4.2–5.8)
RBC # BLD: 3.58 M/UL
RBC # BLD: 3.58 M/UL — LOW (ref 4.2–5.8)
RBC # BLD: 3.61 M/UL
RBC # BLD: 3.77 M/UL — LOW (ref 4.2–5.8)
RBC # BLD: 3.81 M/UL — LOW (ref 4.2–5.8)
RBC # BLD: 3.83 M/UL — LOW (ref 4.2–5.8)
RBC # BLD: 3.99 M/UL — LOW (ref 4.2–5.8)
RBC # BLD: 4.1 M/UL — LOW (ref 4.2–5.8)
RBC # BLD: 4.22 M/UL — SIGNIFICANT CHANGE UP (ref 4.2–5.8)
RBC # BLD: 4.4 M/UL — SIGNIFICANT CHANGE UP (ref 4.2–5.8)
RBC # BLD: 4.65 M/UL — SIGNIFICANT CHANGE UP (ref 4.2–5.8)
RBC # BLD: 4.65 M/UL — SIGNIFICANT CHANGE UP (ref 4.2–5.8)
RBC # BLD: 4.69 M/UL — SIGNIFICANT CHANGE UP (ref 4.2–5.8)
RBC # FLD: 23.2 % — HIGH (ref 10.3–14.5)
RBC # FLD: 23.7 % — HIGH (ref 10.3–14.5)
RBC # FLD: 23.7 % — HIGH (ref 10.3–14.5)
RBC # FLD: 23.8 %
RBC # FLD: 23.8 % — HIGH (ref 10.3–14.5)
RBC # FLD: 24 % — HIGH (ref 10.3–14.5)
RBC # FLD: 24 % — HIGH (ref 10.3–14.5)
RBC # FLD: 24.1 % — HIGH (ref 10.3–14.5)
RBC # FLD: 24.2 % — HIGH (ref 10.3–14.5)
RBC # FLD: 24.3 % — HIGH (ref 10.3–14.5)
RBC # FLD: 24.5 % — HIGH (ref 10.3–14.5)
RBC # FLD: 24.5 % — HIGH (ref 10.3–14.5)
RBC # FLD: 24.8 % — HIGH (ref 10.3–14.5)
RBC # FLD: 24.8 % — HIGH (ref 10.3–14.5)
RBC # FLD: 25 % — HIGH (ref 10.3–14.5)
RBC # FLD: 25.2 % — HIGH (ref 10.3–14.5)
RBC # FLD: 25.2 % — HIGH (ref 10.3–14.5)
RBC # FLD: 25.3 % — HIGH (ref 10.3–14.5)
RBC # FLD: 25.4 % — HIGH (ref 10.3–14.5)
RBC # FLD: 25.5 % — HIGH (ref 10.3–14.5)
RBC # FLD: 25.6 % — HIGH (ref 10.3–14.5)
RBC # FLD: 25.6 % — HIGH (ref 10.3–14.5)
RBC # FLD: 25.7 % — HIGH (ref 10.3–14.5)
RBC # FLD: 25.8 % — HIGH (ref 10.3–14.5)
RBC # FLD: 25.8 % — HIGH (ref 10.3–14.5)
RBC # FLD: 25.9 % — HIGH (ref 10.3–14.5)
RBC # FLD: 26 % — HIGH (ref 10.3–14.5)
RBC # FLD: 26.4 % — HIGH (ref 10.3–14.5)
RBC # FLD: 26.7 % — HIGH (ref 10.3–14.5)
RBC # FLD: 27 % — HIGH (ref 10.3–14.5)
RBC # FLD: 27.4 % — HIGH (ref 10.3–14.5)
RBC # FLD: 27.7 % — HIGH (ref 10.3–14.5)
RBC # FLD: 27.9 % — HIGH (ref 10.3–14.5)
RBC # FLD: 28 % — HIGH (ref 10.3–14.5)
RBC # FLD: 28.1 % — HIGH (ref 10.3–14.5)
RBC # FLD: 28.4 % — HIGH (ref 10.3–14.5)
RBC # FLD: 29.3 % — HIGH (ref 10.3–14.5)
RBC # FLD: 29.4 %
RBC # FLD: 29.4 % — HIGH (ref 10.3–14.5)
RBC # FLD: 29.5 % — HIGH (ref 10.3–14.5)
RBC # FLD: 29.5 % — HIGH (ref 10.3–14.5)
RBC # FLD: 29.6 % — HIGH (ref 10.3–14.5)
RBC # FLD: 29.6 % — HIGH (ref 10.3–14.5)
RBC # FLD: 29.7 % — HIGH (ref 10.3–14.5)
RBC # FLD: 29.7 % — HIGH (ref 10.3–14.5)
RBC # FLD: 30 % — HIGH (ref 10.3–14.5)
RBC # FLD: 30 % — HIGH (ref 10.3–14.5)
RBC # FLD: 30.1 % — HIGH (ref 10.3–14.5)
RBC # FLD: 30.1 % — HIGH (ref 10.3–14.5)
RBC # FLD: 30.2 %
RBC # FLD: 30.3 %
RBC # FLD: 30.4 %
RBC # FLD: 30.6 % — HIGH (ref 10.3–14.5)
RBC # FLD: 30.9 %
RBC BLD AUTO: ABNORMAL
RBC BLD AUTO: ABNORMAL
RBC CASTS # UR COMP ASSIST: 6 /HPF — HIGH (ref 0–4)
RBC CASTS # UR COMP ASSIST: 8 /HPF — HIGH (ref 0–4)
RCV VOL RI: 630 /UL — HIGH (ref 0–0)
RED BLOOD CELLS URINE: 15 /HPF
RED BLOOD CELLS URINE: 20 /HPF
RED BLOOD CELLS URINE: 7 /HPF
RETICS #: 95.9 K/UL — SIGNIFICANT CHANGE UP (ref 25–125)
RETICS/RBC NFR: 4.3 % — HIGH (ref 0.5–2.5)
RH IG SCN BLD-IMP: POSITIVE — SIGNIFICANT CHANGE UP
RUBV IGG FLD-ACNC: 19.8 INDEX
RUBV IGG SER-IMP: POSITIVE
SAO2 % BLDV: 93 % — HIGH (ref 67–88)
SAO2 % BLDV: 96 % — HIGH (ref 67–88)
SARS-COV-2 IGG SERPL IA-ACNC: 0.1 INDEX
SARS-COV-2 IGG SERPL QL IA: NEGATIVE
SARS-COV-2 IGG SERPL QL IA: NEGATIVE — SIGNIFICANT CHANGE UP
SARS-COV-2 IGM SERPL IA-ACNC: 0.09 INDEX — SIGNIFICANT CHANGE UP
SARS-COV-2 RNA SPEC QL NAA+PROBE: SIGNIFICANT CHANGE UP
SCHISTOCYTES BLD QL AUTO: SIGNIFICANT CHANGE UP
SCHISTOCYTES BLD QL AUTO: SLIGHT — SIGNIFICANT CHANGE UP
SODIUM SERPL-SCNC: 115 MMOL/L — CRITICAL LOW (ref 135–145)
SODIUM SERPL-SCNC: 120 MMOL/L — CRITICAL LOW (ref 135–145)
SODIUM SERPL-SCNC: 123 MMOL/L — LOW (ref 135–145)
SODIUM SERPL-SCNC: 124 MMOL/L — LOW (ref 135–145)
SODIUM SERPL-SCNC: 124 MMOL/L — LOW (ref 135–145)
SODIUM SERPL-SCNC: 125 MMOL/L — LOW (ref 135–145)
SODIUM SERPL-SCNC: 127 MMOL/L — LOW (ref 135–145)
SODIUM SERPL-SCNC: 128 MMOL/L — LOW (ref 135–145)
SODIUM SERPL-SCNC: 128 MMOL/L — LOW (ref 135–145)
SODIUM SERPL-SCNC: 129 MMOL/L — LOW (ref 135–145)
SODIUM SERPL-SCNC: 130 MMOL/L — LOW (ref 135–145)
SODIUM SERPL-SCNC: 131 MMOL/L — LOW (ref 135–145)
SODIUM SERPL-SCNC: 133 MMOL/L — LOW (ref 135–145)
SODIUM SERPL-SCNC: 134 MMOL/L — LOW (ref 135–145)
SODIUM SERPL-SCNC: 135 MMOL/L
SODIUM SERPL-SCNC: 135 MMOL/L — SIGNIFICANT CHANGE UP (ref 135–145)
SODIUM SERPL-SCNC: 136 MMOL/L
SODIUM SERPL-SCNC: 136 MMOL/L — SIGNIFICANT CHANGE UP (ref 135–145)
SODIUM SERPL-SCNC: 137 MMOL/L
SODIUM SERPL-SCNC: 137 MMOL/L — SIGNIFICANT CHANGE UP (ref 135–145)
SODIUM SERPL-SCNC: 138 MMOL/L
SODIUM SERPL-SCNC: 138 MMOL/L
SODIUM SERPL-SCNC: 138 MMOL/L — SIGNIFICANT CHANGE UP (ref 135–145)
SODIUM SERPL-SCNC: 139 MMOL/L — SIGNIFICANT CHANGE UP (ref 135–145)
SODIUM SERPL-SCNC: 139 MMOL/L — SIGNIFICANT CHANGE UP (ref 135–145)
SODIUM SERPL-SCNC: 140 MMOL/L
SODIUM SERPL-SCNC: 141 MMOL/L
SODIUM UR-SCNC: 38 MMOL/L — SIGNIFICANT CHANGE UP
SODIUM UR-SCNC: <35 MMOL/L — SIGNIFICANT CHANGE UP
SP GR SPEC: 1.01 — LOW (ref 1.01–1.02)
SP GR SPEC: 1.01 — SIGNIFICANT CHANGE UP (ref 1.01–1.02)
SP GR SPEC: 1.01 — SIGNIFICANT CHANGE UP (ref 1.01–1.02)
SPECIFIC GRAVITY URINE: 1.01
SPECIFIC GRAVITY URINE: 1.01
SPECIFIC GRAVITY URINE: 1.02
SPECIMEN SOURCE: SIGNIFICANT CHANGE UP
SQUAMOUS EPITHELIAL CELLS: 0 /HPF
SQUAMOUS EPITHELIAL CELLS: 0 /HPF
SQUAMOUS EPITHELIAL CELLS: 1 /HPF
T GONDII AB SER-IMP: POSITIVE
T GONDII IGG SER QL: 193 IU/ML
T PALLIDUM AB SER QL IA: NEGATIVE
TARGETS BLD QL SMEAR: SLIGHT — SIGNIFICANT CHANGE UP
TARGETS BLD QL SMEAR: SLIGHT — SIGNIFICANT CHANGE UP
TEG FUNCTIONAL FIBRINOGEN: 12.2 MM (ref 15–32)
TEG FUNCTIONAL FIBRINOGEN: 12.6 MM (ref 15–32)
TEG FUNCTIONAL FIBRINOGEN: 6 MM (ref 15–32)
TEG FUNCTIONAL FIBRINOGEN: 8.6 MM — SIGNIFICANT CHANGE UP (ref 15–32)
TEG FUNCTIONAL FIBRINOGEN: <4 MM — SIGNIFICANT CHANGE UP (ref 15–32)
TEG MAXIMUM AMPLITUDE: 45.4 MM (ref 52–69)
TEG MAXIMUM AMPLITUDE: 47.9 MM (ref 52–69)
TEG MAXIMUM AMPLITUDE: 48.1 MM — SIGNIFICANT CHANGE UP (ref 52–69)
TEG MAXIMUM AMPLITUDE: 49.5 MM (ref 52–69)
TEG MAXIMUM AMPLITUDE: <40 MM — SIGNIFICANT CHANGE UP (ref 52–69)
TEG REACTION TIME: 10.1 MIN (ref 4.6–9.1)
TEG REACTION TIME: 11.1 MIN — SIGNIFICANT CHANGE UP (ref 4.6–9.1)
TEG REACTION TIME: 3.6 MIN (ref 4.6–9.1)
TEG REACTION TIME: 6.1 MIN — SIGNIFICANT CHANGE UP (ref 4.6–9.1)
TEG REACTION TIME: 6.7 MIN — SIGNIFICANT CHANGE UP (ref 4.6–9.1)
TIBC SERPL-MCNC: 144 UG/DL
TOTAL NUCLEATED CELL COUNT, BODY FLUID: 21 /UL — SIGNIFICANT CHANGE UP
TRIGL SERPL-MCNC: 137 MG/DL
TRIGL SERPL-MCNC: 141 MG/DL
TROPONIN T, HIGH SENSITIVITY RESULT: 25 NG/L — SIGNIFICANT CHANGE UP (ref 0–51)
TSH SERPL-MCNC: 0.26 UIU/ML — LOW (ref 0.27–4.2)
TUBE TYPE: SIGNIFICANT CHANGE UP
UIBC SERPL-MCNC: 37 UG/DL
UROBILINOGEN FLD QL: NEGATIVE — SIGNIFICANT CHANGE UP
UROBILINOGEN FLD QL: SIGNIFICANT CHANGE UP
UROBILINOGEN FLD QL: SIGNIFICANT CHANGE UP
UROBILINOGEN URINE: ABNORMAL
UROBILINOGEN URINE: NORMAL
UROBILINOGEN URINE: NORMAL
UUN UR-MCNC: 359 MG/DL — SIGNIFICANT CHANGE UP
UUN UR-MCNC: 417 MG/DL — SIGNIFICANT CHANGE UP
UUN UR-MCNC: 602 MG/DL — SIGNIFICANT CHANGE UP
VIT B12 SERPL-MCNC: >2000 PG/ML
VZV AB TITR SER: POSITIVE
VZV IGG SER IF-ACNC: >4000 INDEX
WBC # BLD: 10.33 K/UL — SIGNIFICANT CHANGE UP (ref 3.8–10.5)
WBC # BLD: 10.37 K/UL — SIGNIFICANT CHANGE UP (ref 3.8–10.5)
WBC # BLD: 10.84 K/UL — HIGH (ref 3.8–10.5)
WBC # BLD: 11.68 K/UL — HIGH (ref 3.8–10.5)
WBC # BLD: 11.79 K/UL — HIGH (ref 3.8–10.5)
WBC # BLD: 12.49 K/UL — HIGH (ref 3.8–10.5)
WBC # BLD: 12.97 K/UL — HIGH (ref 3.8–10.5)
WBC # BLD: 16.48 K/UL — HIGH (ref 3.8–10.5)
WBC # BLD: 18.35 K/UL — HIGH (ref 3.8–10.5)
WBC # BLD: 20.34 K/UL — HIGH (ref 3.8–10.5)
WBC # BLD: 22.5 K/UL — HIGH (ref 3.8–10.5)
WBC # BLD: 24.8 K/UL — HIGH (ref 3.8–10.5)
WBC # BLD: 4.05 K/UL — SIGNIFICANT CHANGE UP (ref 3.8–10.5)
WBC # BLD: 4.56 K/UL — SIGNIFICANT CHANGE UP (ref 3.8–10.5)
WBC # BLD: 4.81 K/UL — SIGNIFICANT CHANGE UP (ref 3.8–10.5)
WBC # BLD: 4.82 K/UL — SIGNIFICANT CHANGE UP (ref 3.8–10.5)
WBC # BLD: 4.85 K/UL — SIGNIFICANT CHANGE UP (ref 3.8–10.5)
WBC # BLD: 4.98 K/UL — SIGNIFICANT CHANGE UP (ref 3.8–10.5)
WBC # BLD: 5.08 K/UL — SIGNIFICANT CHANGE UP (ref 3.8–10.5)
WBC # BLD: 5.09 K/UL — SIGNIFICANT CHANGE UP (ref 3.8–10.5)
WBC # BLD: 5.18 K/UL — SIGNIFICANT CHANGE UP (ref 3.8–10.5)
WBC # BLD: 5.28 K/UL — SIGNIFICANT CHANGE UP (ref 3.8–10.5)
WBC # BLD: 5.31 K/UL — SIGNIFICANT CHANGE UP (ref 3.8–10.5)
WBC # BLD: 5.35 K/UL — SIGNIFICANT CHANGE UP (ref 3.8–10.5)
WBC # BLD: 5.41 K/UL — SIGNIFICANT CHANGE UP (ref 3.8–10.5)
WBC # BLD: 5.61 K/UL — SIGNIFICANT CHANGE UP (ref 3.8–10.5)
WBC # BLD: 6.01 K/UL — SIGNIFICANT CHANGE UP (ref 3.8–10.5)
WBC # BLD: 6.31 K/UL — SIGNIFICANT CHANGE UP (ref 3.8–10.5)
WBC # BLD: 6.41 K/UL — SIGNIFICANT CHANGE UP (ref 3.8–10.5)
WBC # BLD: 6.52 K/UL — SIGNIFICANT CHANGE UP (ref 3.8–10.5)
WBC # BLD: 6.52 K/UL — SIGNIFICANT CHANGE UP (ref 3.8–10.5)
WBC # BLD: 6.9 K/UL — SIGNIFICANT CHANGE UP (ref 3.8–10.5)
WBC # BLD: 7.23 K/UL — SIGNIFICANT CHANGE UP (ref 3.8–10.5)
WBC # BLD: 7.61 K/UL — SIGNIFICANT CHANGE UP (ref 3.8–10.5)
WBC # BLD: 7.66 K/UL — SIGNIFICANT CHANGE UP (ref 3.8–10.5)
WBC # BLD: 7.85 K/UL — SIGNIFICANT CHANGE UP (ref 3.8–10.5)
WBC # BLD: 8.14 K/UL — SIGNIFICANT CHANGE UP (ref 3.8–10.5)
WBC # BLD: 8.22 K/UL — SIGNIFICANT CHANGE UP (ref 3.8–10.5)
WBC # BLD: 8.22 K/UL — SIGNIFICANT CHANGE UP (ref 3.8–10.5)
WBC # BLD: 8.27 K/UL — SIGNIFICANT CHANGE UP (ref 3.8–10.5)
WBC # BLD: 8.42 K/UL — SIGNIFICANT CHANGE UP (ref 3.8–10.5)
WBC # BLD: 8.53 K/UL — SIGNIFICANT CHANGE UP (ref 3.8–10.5)
WBC # BLD: 8.73 K/UL — SIGNIFICANT CHANGE UP (ref 3.8–10.5)
WBC # BLD: 8.74 K/UL — SIGNIFICANT CHANGE UP (ref 3.8–10.5)
WBC # BLD: 8.77 K/UL — SIGNIFICANT CHANGE UP (ref 3.8–10.5)
WBC # BLD: 8.86 K/UL — SIGNIFICANT CHANGE UP (ref 3.8–10.5)
WBC # BLD: 8.89 K/UL — SIGNIFICANT CHANGE UP (ref 3.8–10.5)
WBC # BLD: 9.78 K/UL — SIGNIFICANT CHANGE UP (ref 3.8–10.5)
WBC # FLD AUTO: 10.33 K/UL — SIGNIFICANT CHANGE UP (ref 3.8–10.5)
WBC # FLD AUTO: 10.37 K/UL — SIGNIFICANT CHANGE UP (ref 3.8–10.5)
WBC # FLD AUTO: 10.84 K/UL — HIGH (ref 3.8–10.5)
WBC # FLD AUTO: 11.68 K/UL — HIGH (ref 3.8–10.5)
WBC # FLD AUTO: 11.79 K/UL — HIGH (ref 3.8–10.5)
WBC # FLD AUTO: 12.49 K/UL — HIGH (ref 3.8–10.5)
WBC # FLD AUTO: 12.97 K/UL — HIGH (ref 3.8–10.5)
WBC # FLD AUTO: 16.48 K/UL — HIGH (ref 3.8–10.5)
WBC # FLD AUTO: 18.35 K/UL — HIGH (ref 3.8–10.5)
WBC # FLD AUTO: 20.34 K/UL — HIGH (ref 3.8–10.5)
WBC # FLD AUTO: 22.5 K/UL — HIGH (ref 3.8–10.5)
WBC # FLD AUTO: 24.8 K/UL — HIGH (ref 3.8–10.5)
WBC # FLD AUTO: 4.05 K/UL — SIGNIFICANT CHANGE UP (ref 3.8–10.5)
WBC # FLD AUTO: 4.56 K/UL — SIGNIFICANT CHANGE UP (ref 3.8–10.5)
WBC # FLD AUTO: 4.81 K/UL — SIGNIFICANT CHANGE UP (ref 3.8–10.5)
WBC # FLD AUTO: 4.82 K/UL — SIGNIFICANT CHANGE UP (ref 3.8–10.5)
WBC # FLD AUTO: 4.85 K/UL — SIGNIFICANT CHANGE UP (ref 3.8–10.5)
WBC # FLD AUTO: 4.98 K/UL — SIGNIFICANT CHANGE UP (ref 3.8–10.5)
WBC # FLD AUTO: 5.08 K/UL — SIGNIFICANT CHANGE UP (ref 3.8–10.5)
WBC # FLD AUTO: 5.09 K/UL — SIGNIFICANT CHANGE UP (ref 3.8–10.5)
WBC # FLD AUTO: 5.18 K/UL — SIGNIFICANT CHANGE UP (ref 3.8–10.5)
WBC # FLD AUTO: 5.28 K/UL — SIGNIFICANT CHANGE UP (ref 3.8–10.5)
WBC # FLD AUTO: 5.31 K/UL — SIGNIFICANT CHANGE UP (ref 3.8–10.5)
WBC # FLD AUTO: 5.35 K/UL — SIGNIFICANT CHANGE UP (ref 3.8–10.5)
WBC # FLD AUTO: 5.39 K/UL
WBC # FLD AUTO: 5.41 K/UL — SIGNIFICANT CHANGE UP (ref 3.8–10.5)
WBC # FLD AUTO: 5.46 K/UL
WBC # FLD AUTO: 5.61 K/UL — SIGNIFICANT CHANGE UP (ref 3.8–10.5)
WBC # FLD AUTO: 5.83 K/UL
WBC # FLD AUTO: 6.01 K/UL — SIGNIFICANT CHANGE UP (ref 3.8–10.5)
WBC # FLD AUTO: 6.31 K/UL — SIGNIFICANT CHANGE UP (ref 3.8–10.5)
WBC # FLD AUTO: 6.41 K/UL — SIGNIFICANT CHANGE UP (ref 3.8–10.5)
WBC # FLD AUTO: 6.52 K/UL — SIGNIFICANT CHANGE UP (ref 3.8–10.5)
WBC # FLD AUTO: 6.52 K/UL — SIGNIFICANT CHANGE UP (ref 3.8–10.5)
WBC # FLD AUTO: 6.58 K/UL
WBC # FLD AUTO: 6.9 K/UL — SIGNIFICANT CHANGE UP (ref 3.8–10.5)
WBC # FLD AUTO: 7.23 K/UL — SIGNIFICANT CHANGE UP (ref 3.8–10.5)
WBC # FLD AUTO: 7.61 K/UL — SIGNIFICANT CHANGE UP (ref 3.8–10.5)
WBC # FLD AUTO: 7.66 K/UL — SIGNIFICANT CHANGE UP (ref 3.8–10.5)
WBC # FLD AUTO: 7.85 K/UL — SIGNIFICANT CHANGE UP (ref 3.8–10.5)
WBC # FLD AUTO: 8.14 K/UL — SIGNIFICANT CHANGE UP (ref 3.8–10.5)
WBC # FLD AUTO: 8.19 K/UL
WBC # FLD AUTO: 8.22 K/UL — SIGNIFICANT CHANGE UP (ref 3.8–10.5)
WBC # FLD AUTO: 8.22 K/UL — SIGNIFICANT CHANGE UP (ref 3.8–10.5)
WBC # FLD AUTO: 8.27 K/UL — SIGNIFICANT CHANGE UP (ref 3.8–10.5)
WBC # FLD AUTO: 8.42 K/UL — SIGNIFICANT CHANGE UP (ref 3.8–10.5)
WBC # FLD AUTO: 8.53 K/UL — SIGNIFICANT CHANGE UP (ref 3.8–10.5)
WBC # FLD AUTO: 8.73 K/UL — SIGNIFICANT CHANGE UP (ref 3.8–10.5)
WBC # FLD AUTO: 8.74 K/UL — SIGNIFICANT CHANGE UP (ref 3.8–10.5)
WBC # FLD AUTO: 8.77 K/UL — SIGNIFICANT CHANGE UP (ref 3.8–10.5)
WBC # FLD AUTO: 8.84 K/UL
WBC # FLD AUTO: 8.86 K/UL — SIGNIFICANT CHANGE UP (ref 3.8–10.5)
WBC # FLD AUTO: 8.89 K/UL — SIGNIFICANT CHANGE UP (ref 3.8–10.5)
WBC # FLD AUTO: 9.78 K/UL — SIGNIFICANT CHANGE UP (ref 3.8–10.5)
WBC UR QL: 0 /HPF — SIGNIFICANT CHANGE UP (ref 0–5)
WBC UR QL: 3 /HPF — SIGNIFICANT CHANGE UP (ref 0–5)
WHITE BLOOD CELLS URINE: 0 /HPF
WHITE BLOOD CELLS URINE: 0 /HPF
WHITE BLOOD CELLS URINE: 1 /HPF
ZINC SERPL-MCNC: 63 UG/DL

## 2020-01-01 PROCEDURE — 99232 SBSQ HOSP IP/OBS MODERATE 35: CPT | Mod: GC

## 2020-01-01 PROCEDURE — 86705 HEP B CORE ANTIBODY IGM: CPT

## 2020-01-01 PROCEDURE — 99291 CRITICAL CARE FIRST HOUR: CPT | Mod: 25

## 2020-01-01 PROCEDURE — 87040 BLOOD CULTURE FOR BACTERIA: CPT

## 2020-01-01 PROCEDURE — 99223 1ST HOSP IP/OBS HIGH 75: CPT | Mod: GC

## 2020-01-01 PROCEDURE — 83690 ASSAY OF LIPASE: CPT

## 2020-01-01 PROCEDURE — 86901 BLOOD TYPING SEROLOGIC RH(D): CPT

## 2020-01-01 PROCEDURE — 99233 SBSQ HOSP IP/OBS HIGH 50: CPT | Mod: GC

## 2020-01-01 PROCEDURE — 85027 COMPLETE CBC AUTOMATED: CPT

## 2020-01-01 PROCEDURE — 80053 COMPREHEN METABOLIC PANEL: CPT

## 2020-01-01 PROCEDURE — 84295 ASSAY OF SERUM SODIUM: CPT

## 2020-01-01 PROCEDURE — 86900 BLOOD TYPING SEROLOGIC ABO: CPT

## 2020-01-01 PROCEDURE — 99239 HOSP IP/OBS DSCHRG MGMT >30: CPT | Mod: GC

## 2020-01-01 PROCEDURE — 82947 ASSAY GLUCOSE BLOOD QUANT: CPT

## 2020-01-01 PROCEDURE — 85730 THROMBOPLASTIN TIME PARTIAL: CPT

## 2020-01-01 PROCEDURE — 89051 BODY FLUID CELL COUNT: CPT

## 2020-01-01 PROCEDURE — 99215 OFFICE O/P EST HI 40 MIN: CPT

## 2020-01-01 PROCEDURE — P9011: CPT

## 2020-01-01 PROCEDURE — 85014 HEMATOCRIT: CPT

## 2020-01-01 PROCEDURE — 94002 VENT MGMT INPAT INIT DAY: CPT

## 2020-01-01 PROCEDURE — 84484 ASSAY OF TROPONIN QUANT: CPT

## 2020-01-01 PROCEDURE — 43235 EGD DIAGNOSTIC BRUSH WASH: CPT | Mod: GC

## 2020-01-01 PROCEDURE — 86850 RBC ANTIBODY SCREEN: CPT

## 2020-01-01 PROCEDURE — 83935 ASSAY OF URINE OSMOLALITY: CPT

## 2020-01-01 PROCEDURE — P9037: CPT

## 2020-01-01 PROCEDURE — 84540 ASSAY OF URINE/UREA-N: CPT

## 2020-01-01 PROCEDURE — 76775 US EXAM ABDO BACK WALL LIM: CPT

## 2020-01-01 PROCEDURE — 85396 CLOTTING ASSAY WHOLE BLOOD: CPT

## 2020-01-01 PROCEDURE — 87075 CULTR BACTERIA EXCEPT BLOOD: CPT

## 2020-01-01 PROCEDURE — P9059: CPT

## 2020-01-01 PROCEDURE — 85610 PROTHROMBIN TIME: CPT

## 2020-01-01 PROCEDURE — 76604 US EXAM CHEST: CPT | Mod: 26

## 2020-01-01 PROCEDURE — 99254 IP/OBS CNSLTJ NEW/EST MOD 60: CPT | Mod: GC

## 2020-01-01 PROCEDURE — 99232 SBSQ HOSP IP/OBS MODERATE 35: CPT

## 2020-01-01 PROCEDURE — 93320 DOPPLER ECHO COMPLETE: CPT

## 2020-01-01 PROCEDURE — P9047: CPT

## 2020-01-01 PROCEDURE — 84133 ASSAY OF URINE POTASSIUM: CPT

## 2020-01-01 PROCEDURE — 93018 CV STRESS TEST I&R ONLY: CPT

## 2020-01-01 PROCEDURE — 93010 ELECTROCARDIOGRAM REPORT: CPT

## 2020-01-01 PROCEDURE — 84100 ASSAY OF PHOSPHORUS: CPT

## 2020-01-01 PROCEDURE — 84132 ASSAY OF SERUM POTASSIUM: CPT

## 2020-01-01 PROCEDURE — 74176 CT ABD & PELVIS W/O CONTRAST: CPT | Mod: 26

## 2020-01-01 PROCEDURE — 93975 VASCULAR STUDY: CPT | Mod: 26

## 2020-01-01 PROCEDURE — 36556 INSERT NON-TUNNEL CV CATH: CPT | Mod: 59

## 2020-01-01 PROCEDURE — 93351 STRESS TTE COMPLETE: CPT

## 2020-01-01 PROCEDURE — 71045 X-RAY EXAM CHEST 1 VIEW: CPT | Mod: 26

## 2020-01-01 PROCEDURE — 90739 HEPB VACC 2/4 DOSE ADULT IM: CPT

## 2020-01-01 PROCEDURE — 82248 BILIRUBIN DIRECT: CPT

## 2020-01-01 PROCEDURE — 93975 VASCULAR STUDY: CPT

## 2020-01-01 PROCEDURE — 94003 VENT MGMT INPAT SUBQ DAY: CPT

## 2020-01-01 PROCEDURE — 86769 SARS-COV-2 COVID-19 ANTIBODY: CPT

## 2020-01-01 PROCEDURE — 71045 X-RAY EXAM CHEST 1 VIEW: CPT | Mod: 26,76

## 2020-01-01 PROCEDURE — 99291 CRITICAL CARE FIRST HOUR: CPT

## 2020-01-01 PROCEDURE — C1889: CPT

## 2020-01-01 PROCEDURE — 94640 AIRWAY INHALATION TREATMENT: CPT

## 2020-01-01 PROCEDURE — 86923 COMPATIBILITY TEST ELECTRIC: CPT

## 2020-01-01 PROCEDURE — 71045 X-RAY EXAM CHEST 1 VIEW: CPT

## 2020-01-01 PROCEDURE — 83036 HEMOGLOBIN GLYCOSYLATED A1C: CPT

## 2020-01-01 PROCEDURE — 84681 ASSAY OF C-PEPTIDE: CPT

## 2020-01-01 PROCEDURE — 82962 GLUCOSE BLOOD TEST: CPT

## 2020-01-01 PROCEDURE — 83735 ASSAY OF MAGNESIUM: CPT

## 2020-01-01 PROCEDURE — 99213 OFFICE O/P EST LOW 20 MIN: CPT | Mod: 95

## 2020-01-01 PROCEDURE — 82570 ASSAY OF URINE CREATININE: CPT

## 2020-01-01 PROCEDURE — 90471 IMMUNIZATION ADMIN: CPT

## 2020-01-01 PROCEDURE — 43255 EGD CONTROL BLEEDING ANY: CPT | Mod: GC

## 2020-01-01 PROCEDURE — 93005 ELECTROCARDIOGRAM TRACING: CPT

## 2020-01-01 PROCEDURE — 84156 ASSAY OF PROTEIN URINE: CPT

## 2020-01-01 PROCEDURE — 93320 DOPPLER ECHO COMPLETE: CPT | Mod: 26

## 2020-01-01 PROCEDURE — 82803 BLOOD GASES ANY COMBINATION: CPT

## 2020-01-01 PROCEDURE — 99222 1ST HOSP IP/OBS MODERATE 55: CPT | Mod: GC

## 2020-01-01 PROCEDURE — 74181 MRI ABDOMEN W/O CONTRAST: CPT

## 2020-01-01 PROCEDURE — 99205 OFFICE O/P NEW HI 60 MIN: CPT

## 2020-01-01 PROCEDURE — 82565 ASSAY OF CREATININE: CPT

## 2020-01-01 PROCEDURE — 83615 LACTATE (LD) (LDH) ENZYME: CPT

## 2020-01-01 PROCEDURE — 86922 COMPATIBILITY TEST ANTIGLOB: CPT

## 2020-01-01 PROCEDURE — 76770 US EXAM ABDO BACK WALL COMP: CPT

## 2020-01-01 PROCEDURE — 83605 ASSAY OF LACTIC ACID: CPT

## 2020-01-01 PROCEDURE — 76770 US EXAM ABDO BACK WALL COMP: CPT | Mod: 26

## 2020-01-01 PROCEDURE — P9012: CPT

## 2020-01-01 PROCEDURE — 86704 HEP B CORE ANTIBODY TOTAL: CPT

## 2020-01-01 PROCEDURE — 84157 ASSAY OF PROTEIN OTHER: CPT

## 2020-01-01 PROCEDURE — 99233 SBSQ HOSP IP/OBS HIGH 50: CPT

## 2020-01-01 PROCEDURE — 93308 TTE F-UP OR LMTD: CPT | Mod: 26

## 2020-01-01 PROCEDURE — 87086 URINE CULTURE/COLONY COUNT: CPT

## 2020-01-01 PROCEDURE — 36430 TRANSFUSION BLD/BLD COMPNT: CPT

## 2020-01-01 PROCEDURE — 87340 HEPATITIS B SURFACE AG IA: CPT

## 2020-01-01 PROCEDURE — 87205 SMEAR GRAM STAIN: CPT

## 2020-01-01 PROCEDURE — U0003: CPT

## 2020-01-01 PROCEDURE — 93000 ELECTROCARDIOGRAM COMPLETE: CPT | Mod: NC

## 2020-01-01 PROCEDURE — 82436 ASSAY OF URINE CHLORIDE: CPT

## 2020-01-01 PROCEDURE — 84300 ASSAY OF URINE SODIUM: CPT

## 2020-01-01 PROCEDURE — 86706 HEP B SURFACE ANTIBODY: CPT

## 2020-01-01 PROCEDURE — 82247 BILIRUBIN TOTAL: CPT

## 2020-01-01 PROCEDURE — 99285 EMERGENCY DEPT VISIT HI MDM: CPT

## 2020-01-01 PROCEDURE — 86077 PHYS BLOOD BANK SERV XMATCH: CPT

## 2020-01-01 PROCEDURE — 81001 URINALYSIS AUTO W/SCOPE: CPT

## 2020-01-01 PROCEDURE — 99285 EMERGENCY DEPT VISIT HI MDM: CPT | Mod: 25

## 2020-01-01 PROCEDURE — 96374 THER/PROPH/DIAG INJ IV PUSH: CPT

## 2020-01-01 PROCEDURE — 93005 ELECTROCARDIOGRAM TRACING: CPT | Mod: 76

## 2020-01-01 PROCEDURE — 87070 CULTURE OTHR SPECIMN AEROBIC: CPT

## 2020-01-01 PROCEDURE — 82140 ASSAY OF AMMONIA: CPT

## 2020-01-01 PROCEDURE — 80048 BASIC METABOLIC PNL TOTAL CA: CPT

## 2020-01-01 PROCEDURE — 84443 ASSAY THYROID STIM HORMONE: CPT

## 2020-01-01 PROCEDURE — 80076 HEPATIC FUNCTION PANEL: CPT

## 2020-01-01 PROCEDURE — 99214 OFFICE O/P EST MOD 30 MIN: CPT | Mod: 25

## 2020-01-01 PROCEDURE — 93325 DOPPLER ECHO COLOR FLOW MAPG: CPT | Mod: 26

## 2020-01-01 PROCEDURE — 86880 COOMBS TEST DIRECT: CPT

## 2020-01-01 PROCEDURE — 84145 PROCALCITONIN (PCT): CPT

## 2020-01-01 PROCEDURE — 93306 TTE W/DOPPLER COMPLETE: CPT

## 2020-01-01 PROCEDURE — 82435 ASSAY OF BLOOD CHLORIDE: CPT

## 2020-01-01 PROCEDURE — 90935 HEMODIALYSIS ONE EVALUATION: CPT

## 2020-01-01 PROCEDURE — 82010 KETONE BODYS QUAN: CPT

## 2020-01-01 PROCEDURE — 86860 RBC ANTIBODY ELUTION: CPT

## 2020-01-01 PROCEDURE — 87517 HEPATITIS B DNA QUANT: CPT

## 2020-01-01 PROCEDURE — 74183 MRI ABD W/O CNTR FLWD CNTR: CPT | Mod: 26

## 2020-01-01 PROCEDURE — 74183 MRI ABD W/O CNTR FLWD CNTR: CPT

## 2020-01-01 PROCEDURE — 82150 ASSAY OF AMYLASE: CPT

## 2020-01-01 PROCEDURE — A9585: CPT

## 2020-01-01 PROCEDURE — 74181 MRI ABDOMEN W/O CONTRAST: CPT | Mod: 26

## 2020-01-01 PROCEDURE — 76705 ECHO EXAM OF ABDOMEN: CPT | Mod: 26

## 2020-01-01 PROCEDURE — 93306 TTE W/DOPPLER COMPLETE: CPT | Mod: 26

## 2020-01-01 PROCEDURE — 82330 ASSAY OF CALCIUM: CPT

## 2020-01-01 PROCEDURE — 90472 IMMUNIZATION ADMIN EACH ADD: CPT

## 2020-01-01 PROCEDURE — P9016: CPT

## 2020-01-01 PROCEDURE — 76775 US EXAM ABDO BACK WALL LIM: CPT | Mod: 26,59

## 2020-01-01 PROCEDURE — 82533 TOTAL CORTISOL: CPT

## 2020-01-01 PROCEDURE — 96375 TX/PRO/DX INJ NEW DRUG ADDON: CPT

## 2020-01-01 PROCEDURE — 90670 PCV13 VACCINE IM: CPT

## 2020-01-01 PROCEDURE — 76705 ECHO EXAM OF ABDOMEN: CPT

## 2020-01-01 PROCEDURE — 82042 OTHER SOURCE ALBUMIN QUAN EA: CPT

## 2020-01-01 PROCEDURE — 43244 EGD VARICES LIGATION: CPT | Mod: GC

## 2020-01-01 PROCEDURE — 74176 CT ABD & PELVIS W/O CONTRAST: CPT

## 2020-01-01 PROCEDURE — 99255 IP/OBS CONSLTJ NEW/EST HI 80: CPT | Mod: GC

## 2020-01-01 PROCEDURE — 93325 DOPPLER ECHO COLOR FLOW MAPG: CPT

## 2020-01-01 PROCEDURE — 99223 1ST HOSP IP/OBS HIGH 75: CPT

## 2020-01-01 PROCEDURE — 99204 OFFICE O/P NEW MOD 45 MIN: CPT

## 2020-01-01 PROCEDURE — 93350 STRESS TTE ONLY: CPT | Mod: 26

## 2020-01-01 PROCEDURE — 83880 ASSAY OF NATRIURETIC PEPTIDE: CPT

## 2020-01-01 PROCEDURE — P9040: CPT

## 2020-01-01 PROCEDURE — 82945 GLUCOSE OTHER FLUID: CPT

## 2020-01-01 PROCEDURE — 93016 CV STRESS TEST SUPVJ ONLY: CPT

## 2020-01-01 PROCEDURE — 99215 OFFICE O/P EST HI 40 MIN: CPT | Mod: 95

## 2020-01-01 RX ORDER — MIDODRINE HYDROCHLORIDE 5 MG/1
5 TABLET ORAL 3 TIMES DAILY
Qty: 90 | Refills: 3 | Status: ACTIVE | COMMUNITY
Start: 1900-01-01 | End: 1900-01-01

## 2020-01-01 RX ORDER — CEFTRIAXONE 500 MG/1
2000 INJECTION, POWDER, FOR SOLUTION INTRAMUSCULAR; INTRAVENOUS EVERY 24 HOURS
Refills: 0 | Status: DISCONTINUED | OUTPATIENT
Start: 2020-01-01 | End: 2020-01-01

## 2020-01-01 RX ORDER — DIPHENHYDRAMINE HCL 50 MG
50 CAPSULE ORAL ONCE
Refills: 0 | Status: COMPLETED | OUTPATIENT
Start: 2020-01-01 | End: 2020-01-01

## 2020-01-01 RX ORDER — PIPERACILLIN AND TAZOBACTAM 4; .5 G/20ML; G/20ML
3.38 INJECTION, POWDER, LYOPHILIZED, FOR SOLUTION INTRAVENOUS EVERY 8 HOURS
Refills: 0 | Status: DISCONTINUED | OUTPATIENT
Start: 2020-01-01 | End: 2020-01-01

## 2020-01-01 RX ORDER — ALBUMIN HUMAN 25 %
100 VIAL (ML) INTRAVENOUS EVERY 6 HOURS
Refills: 0 | Status: DISCONTINUED | OUTPATIENT
Start: 2020-01-01 | End: 2020-01-01

## 2020-01-01 RX ORDER — FUROSEMIDE 40 MG
20 TABLET ORAL DAILY
Refills: 0 | Status: DISCONTINUED | OUTPATIENT
Start: 2020-01-01 | End: 2020-01-01

## 2020-01-01 RX ORDER — ONDANSETRON 8 MG/1
4 TABLET, FILM COATED ORAL ONCE
Refills: 0 | Status: COMPLETED | OUTPATIENT
Start: 2020-01-01 | End: 2020-01-01

## 2020-01-01 RX ORDER — SODIUM CHLORIDE 9 MG/ML
1 INJECTION INTRAMUSCULAR; INTRAVENOUS; SUBCUTANEOUS ONCE
Refills: 0 | Status: COMPLETED | OUTPATIENT
Start: 2020-01-01 | End: 2020-01-01

## 2020-01-01 RX ORDER — INSULIN DETEMIR 100 [IU]/ML
100 INJECTION, SOLUTION SUBCUTANEOUS
Qty: 4 | Refills: 3 | Status: DISCONTINUED | COMMUNITY
Start: 2017-03-02 | End: 2020-01-01

## 2020-01-01 RX ORDER — SODIUM CHLORIDE 9 MG/ML
500 INJECTION, SOLUTION INTRAVENOUS ONCE
Refills: 0 | Status: COMPLETED | OUTPATIENT
Start: 2020-01-01 | End: 2020-01-01

## 2020-01-01 RX ORDER — METFORMIN HYDROCHLORIDE 850 MG/1
1 TABLET ORAL
Qty: 0 | Refills: 0 | DISCHARGE

## 2020-01-01 RX ORDER — CHLORHEXIDINE GLUCONATE 213 G/1000ML
1 SOLUTION TOPICAL
Refills: 0 | Status: DISCONTINUED | OUTPATIENT
Start: 2020-01-01 | End: 2020-01-01

## 2020-01-01 RX ORDER — POTASSIUM CHLORIDE 20 MEQ
40 PACKET (EA) ORAL EVERY 4 HOURS
Refills: 0 | Status: COMPLETED | OUTPATIENT
Start: 2020-01-01 | End: 2020-01-01

## 2020-01-01 RX ORDER — THIAMINE MONONITRATE (VIT B1) 100 MG
100 TABLET ORAL DAILY
Refills: 0 | Status: DISCONTINUED | OUTPATIENT
Start: 2020-01-01 | End: 2020-01-01

## 2020-01-01 RX ORDER — MAGNESIUM SULFATE 500 MG/ML
2 VIAL (ML) INJECTION ONCE
Refills: 0 | Status: COMPLETED | OUTPATIENT
Start: 2020-01-01 | End: 2020-01-01

## 2020-01-01 RX ORDER — DEXTROSE 50 % IN WATER 50 %
15 SYRINGE (ML) INTRAVENOUS ONCE
Refills: 0 | Status: DISCONTINUED | OUTPATIENT
Start: 2020-01-01 | End: 2020-01-01

## 2020-01-01 RX ORDER — SODIUM,POTASSIUM PHOSPHATES 278-250MG
2 POWDER IN PACKET (EA) ORAL
Refills: 0 | Status: DISCONTINUED | OUTPATIENT
Start: 2020-01-01 | End: 2020-01-01

## 2020-01-01 RX ORDER — INSULIN GLARGINE 100 [IU]/ML
50 INJECTION, SOLUTION SUBCUTANEOUS AT BEDTIME
Refills: 0 | Status: DISCONTINUED | OUTPATIENT
Start: 2020-01-01 | End: 2020-01-01

## 2020-01-01 RX ORDER — INSULIN GLARGINE 100 [IU]/ML
72 INJECTION, SOLUTION SUBCUTANEOUS AT BEDTIME
Refills: 0 | Status: DISCONTINUED | OUTPATIENT
Start: 2020-01-01 | End: 2020-01-01

## 2020-01-01 RX ORDER — POTASSIUM CHLORIDE 20 MEQ
40 PACKET (EA) ORAL ONCE
Refills: 0 | Status: COMPLETED | OUTPATIENT
Start: 2020-01-01 | End: 2020-01-01

## 2020-01-01 RX ORDER — MAGNESIUM SULFATE 500 MG/ML
1 VIAL (ML) INJECTION ONCE
Refills: 0 | Status: COMPLETED | OUTPATIENT
Start: 2020-01-01 | End: 2020-01-01

## 2020-01-01 RX ORDER — ONDANSETRON 4 MG/1
4 TABLET, ORALLY DISINTEGRATING ORAL EVERY 6 HOURS
Qty: 56 | Refills: 0 | Status: DISCONTINUED | COMMUNITY
Start: 2020-01-01 | End: 2020-01-01

## 2020-01-01 RX ORDER — CHOLESTYRAMINE 4 G/9G
1 POWDER, FOR SUSPENSION ORAL
Qty: 0 | Refills: 0 | DISCHARGE
Start: 2020-01-01 | End: 2020-07-31

## 2020-01-01 RX ORDER — INSULIN LISPRO 100/ML
5 VIAL (ML) SUBCUTANEOUS ONCE
Refills: 0 | Status: COMPLETED | OUTPATIENT
Start: 2020-01-01 | End: 2020-01-01

## 2020-01-01 RX ORDER — GLUCAGON INJECTION, SOLUTION 0.5 MG/.1ML
1 INJECTION, SOLUTION SUBCUTANEOUS ONCE
Refills: 0 | Status: DISCONTINUED | OUTPATIENT
Start: 2020-01-01 | End: 2020-01-01

## 2020-01-01 RX ORDER — MAGNESIUM OXIDE 400 MG ORAL TABLET 241.3 MG
400 TABLET ORAL
Refills: 0 | Status: COMPLETED | OUTPATIENT
Start: 2020-01-01 | End: 2020-01-01

## 2020-01-01 RX ORDER — PROPOFOL 10 MG/ML
180 INJECTION, EMULSION INTRAVENOUS ONCE
Refills: 0 | Status: COMPLETED | OUTPATIENT
Start: 2020-01-01 | End: 2020-01-01

## 2020-01-01 RX ORDER — INSULIN LISPRO 100/ML
VIAL (ML) SUBCUTANEOUS
Refills: 0 | Status: DISCONTINUED | OUTPATIENT
Start: 2020-01-01 | End: 2020-01-01

## 2020-01-01 RX ORDER — ACETYLCYSTEINE 200 MG/ML
16 VIAL (ML) MISCELLANEOUS ONCE
Refills: 0 | Status: COMPLETED | OUTPATIENT
Start: 2020-01-01 | End: 2020-01-01

## 2020-01-01 RX ORDER — ALBUMIN HUMAN 25 %
100 VIAL (ML) INTRAVENOUS ONCE
Refills: 0 | Status: COMPLETED | OUTPATIENT
Start: 2020-01-01 | End: 2020-01-01

## 2020-01-01 RX ORDER — DEXTROSE 50 % IN WATER 50 %
12.5 SYRINGE (ML) INTRAVENOUS ONCE
Refills: 0 | Status: DISCONTINUED | OUTPATIENT
Start: 2020-01-01 | End: 2020-01-01

## 2020-01-01 RX ORDER — PANTOPRAZOLE SODIUM 20 MG/1
40 TABLET, DELAYED RELEASE ORAL
Refills: 0 | Status: DISCONTINUED | OUTPATIENT
Start: 2020-01-01 | End: 2020-01-01

## 2020-01-01 RX ORDER — POTASSIUM CHLORIDE 20 MEQ
10 PACKET (EA) ORAL
Refills: 0 | Status: COMPLETED | OUTPATIENT
Start: 2020-01-01 | End: 2020-01-01

## 2020-01-01 RX ORDER — NOREPINEPHRINE BITARTRATE/D5W 8 MG/250ML
0.05 PLASTIC BAG, INJECTION (ML) INTRAVENOUS
Qty: 16 | Refills: 0 | Status: DISCONTINUED | OUTPATIENT
Start: 2020-01-01 | End: 2020-01-01

## 2020-01-01 RX ORDER — MIDODRINE HYDROCHLORIDE 2.5 MG/1
5 TABLET ORAL EVERY 8 HOURS
Refills: 0 | Status: DISCONTINUED | OUTPATIENT
Start: 2020-01-01 | End: 2020-01-01

## 2020-01-01 RX ORDER — DEXTROSE 50 % IN WATER 50 %
25 SYRINGE (ML) INTRAVENOUS ONCE
Refills: 0 | Status: DISCONTINUED | OUTPATIENT
Start: 2020-01-01 | End: 2020-01-01

## 2020-01-01 RX ORDER — ALBUMIN HUMAN 25 %
50 VIAL (ML) INTRAVENOUS EVERY 6 HOURS
Refills: 0 | Status: DISCONTINUED | OUTPATIENT
Start: 2020-01-01 | End: 2020-01-01

## 2020-01-01 RX ORDER — SODIUM CHLORIDE 9 MG/ML
1000 INJECTION, SOLUTION INTRAVENOUS
Refills: 0 | Status: DISCONTINUED | OUTPATIENT
Start: 2020-01-01 | End: 2020-01-01

## 2020-01-01 RX ORDER — MIDODRINE HYDROCHLORIDE 2.5 MG/1
5 TABLET ORAL ONCE
Refills: 0 | Status: COMPLETED | OUTPATIENT
Start: 2020-01-01 | End: 2020-01-01

## 2020-01-01 RX ORDER — OCTREOTIDE ACETATE 200 UG/ML
50 INJECTION, SOLUTION INTRAVENOUS; SUBCUTANEOUS
Qty: 500 | Refills: 0 | Status: DISCONTINUED | OUTPATIENT
Start: 2020-01-01 | End: 2020-01-01

## 2020-01-01 RX ORDER — ROCURONIUM BROMIDE 10 MG/ML
90 VIAL (ML) INTRAVENOUS ONCE
Refills: 0 | Status: COMPLETED | OUTPATIENT
Start: 2020-01-01 | End: 2020-01-01

## 2020-01-01 RX ORDER — CEFTRIAXONE 500 MG/1
1000 INJECTION, POWDER, FOR SOLUTION INTRAMUSCULAR; INTRAVENOUS EVERY 24 HOURS
Refills: 0 | Status: DISCONTINUED | OUTPATIENT
Start: 2020-01-01 | End: 2020-01-01

## 2020-01-01 RX ORDER — PHYTONADIONE (VIT K1) 5 MG
5 TABLET ORAL ONCE
Refills: 0 | Status: COMPLETED | OUTPATIENT
Start: 2020-01-01 | End: 2020-01-01

## 2020-01-01 RX ORDER — DIVALPROEX SODIUM 500 MG/1
500 TABLET, DELAYED RELEASE ORAL TWICE DAILY
Qty: 60 | Refills: 0 | Status: COMPLETED | COMMUNITY
Start: 2019-07-01 | End: 2020-01-01

## 2020-01-01 RX ORDER — NOREPINEPHRINE BITARTRATE/D5W 8 MG/250ML
0.05 PLASTIC BAG, INJECTION (ML) INTRAVENOUS
Qty: 8 | Refills: 0 | Status: DISCONTINUED | OUTPATIENT
Start: 2020-01-01 | End: 2020-01-01

## 2020-01-01 RX ORDER — SIMETHICONE 80 MG/1
80 TABLET, CHEWABLE ORAL THREE TIMES A DAY
Refills: 0 | Status: COMPLETED | OUTPATIENT
Start: 2020-01-01 | End: 2020-01-01

## 2020-01-01 RX ORDER — INSULIN GLARGINE 100 [IU]/ML
20 INJECTION, SOLUTION SUBCUTANEOUS ONCE
Refills: 0 | Status: COMPLETED | OUTPATIENT
Start: 2020-01-01 | End: 2020-01-01

## 2020-01-01 RX ORDER — PROPOFOL 10 MG/ML
130 INJECTION, EMULSION INTRAVENOUS ONCE
Refills: 0 | Status: DISCONTINUED | OUTPATIENT
Start: 2020-01-01 | End: 2020-01-01

## 2020-01-01 RX ORDER — INSULIN GLARGINE 100 [IU]/ML
20 INJECTION, SOLUTION SUBCUTANEOUS
Refills: 0 | Status: DISCONTINUED | OUTPATIENT
Start: 2020-01-01 | End: 2020-01-01

## 2020-01-01 RX ORDER — PHYTONADIONE (VIT K1) 5 MG
10 TABLET ORAL DAILY
Refills: 0 | Status: COMPLETED | OUTPATIENT
Start: 2020-01-01 | End: 2020-01-01

## 2020-01-01 RX ORDER — METHYLNALTREXONE BROMIDE 12 MG/.6ML
4 INJECTION, SOLUTION SUBCUTANEOUS ONCE
Refills: 0 | Status: COMPLETED | OUTPATIENT
Start: 2020-01-01 | End: 2020-01-01

## 2020-01-01 RX ORDER — CEFTRIAXONE 500 MG/1
2000 INJECTION, POWDER, FOR SOLUTION INTRAMUSCULAR; INTRAVENOUS ONCE
Refills: 0 | Status: COMPLETED | OUTPATIENT
Start: 2020-01-01 | End: 2020-01-01

## 2020-01-01 RX ORDER — VASOPRESSIN 20 [USP'U]/ML
0.02 INJECTION INTRAVENOUS
Qty: 50 | Refills: 0 | Status: DISCONTINUED | OUTPATIENT
Start: 2020-01-01 | End: 2020-01-01

## 2020-01-01 RX ORDER — CIPROFLOXACIN LACTATE 400MG/40ML
500 VIAL (ML) INTRAVENOUS EVERY 12 HOURS
Refills: 0 | Status: DISCONTINUED | OUTPATIENT
Start: 2020-01-01 | End: 2020-01-01

## 2020-01-01 RX ORDER — LACTULOSE 10 G/15ML
15 SOLUTION ORAL
Qty: 450 | Refills: 0
Start: 2020-01-01 | End: 2020-01-01

## 2020-01-01 RX ORDER — CEPHALEXIN 500 MG/1
500 TABLET ORAL
Qty: 28 | Refills: 0 | Status: ACTIVE | COMMUNITY
Start: 2020-01-01 | End: 1900-01-01

## 2020-01-01 RX ORDER — MEROPENEM 1 G/30ML
INJECTION INTRAVENOUS
Refills: 0 | Status: DISCONTINUED | OUTPATIENT
Start: 2020-01-01 | End: 2020-01-01

## 2020-01-01 RX ORDER — MEROPENEM 1 G/30ML
1000 INJECTION INTRAVENOUS EVERY 12 HOURS
Refills: 0 | Status: DISCONTINUED | OUTPATIENT
Start: 2020-01-01 | End: 2020-01-01

## 2020-01-01 RX ORDER — ACETYLCYSTEINE 200 MG/ML
10 VIAL (ML) MISCELLANEOUS
Refills: 0 | Status: DISCONTINUED | OUTPATIENT
Start: 2020-01-01 | End: 2020-01-01

## 2020-01-01 RX ORDER — PANTOPRAZOLE SODIUM 20 MG/1
40 TABLET, DELAYED RELEASE ORAL DAILY
Refills: 0 | Status: DISCONTINUED | OUTPATIENT
Start: 2020-01-01 | End: 2020-01-01

## 2020-01-01 RX ORDER — PHENYLEPHRINE HYDROCHLORIDE 10 MG/ML
0.9 INJECTION INTRAVENOUS
Qty: 160 | Refills: 0 | Status: DISCONTINUED | OUTPATIENT
Start: 2020-01-01 | End: 2020-01-01

## 2020-01-01 RX ORDER — SODIUM,POTASSIUM PHOSPHATES 278-250MG
1 POWDER IN PACKET (EA) ORAL
Refills: 0 | Status: COMPLETED | OUTPATIENT
Start: 2020-01-01 | End: 2020-01-01

## 2020-01-01 RX ORDER — POTASSIUM CHLORIDE 20 MEQ
20 PACKET (EA) ORAL ONCE
Refills: 0 | Status: COMPLETED | OUTPATIENT
Start: 2020-01-01 | End: 2020-01-01

## 2020-01-01 RX ORDER — INSULIN LISPRO 100/ML
8 VIAL (ML) SUBCUTANEOUS
Refills: 0 | Status: DISCONTINUED | OUTPATIENT
Start: 2020-01-01 | End: 2020-01-01

## 2020-01-01 RX ORDER — FUROSEMIDE 40 MG
40 TABLET ORAL ONCE
Refills: 0 | Status: COMPLETED | OUTPATIENT
Start: 2020-01-01 | End: 2020-01-01

## 2020-01-01 RX ORDER — ACETYLCYSTEINE 200 MG/ML
5 VIAL (ML) MISCELLANEOUS ONCE
Refills: 0 | Status: COMPLETED | OUTPATIENT
Start: 2020-01-01 | End: 2020-01-01

## 2020-01-01 RX ORDER — CITRIC ACID/SODIUM CITRATE 300-500 MG
30 SOLUTION, ORAL ORAL THREE TIMES A DAY
Refills: 0 | Status: DISCONTINUED | OUTPATIENT
Start: 2020-01-01 | End: 2020-01-01

## 2020-01-01 RX ORDER — SODIUM CITRATE AND CITRIC ACID 334; 500 MG/5ML; MG/5ML
500-334 SOLUTION ORAL 3 TIMES DAILY
Qty: 2700 | Refills: 2 | Status: ACTIVE | COMMUNITY
Start: 1900-01-01 | End: 1900-01-01

## 2020-01-01 RX ORDER — DIBASIC SODIUM PHOSPHATE, MONOBASIC POTASSIUM PHOSPHATE AND MONOBASIC SODIUM PHOSPHATE 852; 155; 130 MG/1; MG/1; MG/1
155-852-130 TABLET ORAL
Qty: 60 | Refills: 2 | Status: ACTIVE | COMMUNITY
Start: 2020-01-01 | End: 1900-01-01

## 2020-01-01 RX ORDER — VANCOMYCIN HCL 1 G
1250 VIAL (EA) INTRAVENOUS DAILY
Refills: 0 | Status: DISCONTINUED | OUTPATIENT
Start: 2020-01-01 | End: 2020-01-01

## 2020-01-01 RX ORDER — FENTANYL CITRATE 50 UG/ML
0.5 INJECTION INTRAVENOUS
Qty: 2500 | Refills: 0 | Status: DISCONTINUED | OUTPATIENT
Start: 2020-01-01 | End: 2020-01-01

## 2020-01-01 RX ORDER — PHYTONADIONE (VIT K1) 5 MG
10 TABLET ORAL ONCE
Refills: 0 | Status: COMPLETED | OUTPATIENT
Start: 2020-01-01 | End: 2020-01-01

## 2020-01-01 RX ORDER — CITRIC ACID/SODIUM CITRATE 300-500 MG
30 SOLUTION, ORAL ORAL
Refills: 0 | Status: DISCONTINUED | OUTPATIENT
Start: 2020-01-01 | End: 2020-01-01

## 2020-01-01 RX ORDER — LACTULOSE 10 G/15ML
200 SOLUTION ORAL ONCE
Refills: 0 | Status: COMPLETED | OUTPATIENT
Start: 2020-01-01 | End: 2020-01-01

## 2020-01-01 RX ORDER — INSULIN GLARGINE 100 [IU]/ML
52 INJECTION, SOLUTION SUBCUTANEOUS AT BEDTIME
Refills: 0 | Status: DISCONTINUED | OUTPATIENT
Start: 2020-01-01 | End: 2020-01-01

## 2020-01-01 RX ORDER — RIFAXIMIN 550 MG/1
550 TABLET ORAL TWICE DAILY
Qty: 60 | Refills: 2 | Status: ACTIVE | COMMUNITY
Start: 2020-01-01 | End: 1900-01-01

## 2020-01-01 RX ORDER — ACETYLCYSTEINE 200 MG/ML
10 VIAL (ML) MISCELLANEOUS ONCE
Refills: 0 | Status: DISCONTINUED | OUTPATIENT
Start: 2020-01-01 | End: 2020-01-01

## 2020-01-01 RX ORDER — SODIUM BICARBONATE 1 MEQ/ML
0.07 SYRINGE (ML) INTRAVENOUS
Qty: 75 | Refills: 0 | Status: DISCONTINUED | OUTPATIENT
Start: 2020-01-01 | End: 2020-01-01

## 2020-01-01 RX ORDER — REPAGLINIDE 1 MG/1
1 TABLET ORAL 3 TIMES DAILY
Refills: 0 | Status: DISCONTINUED | COMMUNITY
End: 2020-01-01

## 2020-01-01 RX ORDER — ONDANSETRON 4 MG/1
4 TABLET ORAL EVERY 4 HOURS
Qty: 56 | Refills: 0 | Status: ACTIVE | COMMUNITY
Start: 2020-01-01 | End: 1900-01-01

## 2020-01-01 RX ORDER — FENTANYL CITRATE 50 UG/ML
0.5 INJECTION INTRAVENOUS
Qty: 5000 | Refills: 0 | Status: DISCONTINUED | OUTPATIENT
Start: 2020-01-01 | End: 2020-01-01

## 2020-01-01 RX ORDER — CEPHALEXIN 500 MG/1
500 CAPSULE ORAL
Qty: 30 | Refills: 5 | Status: COMPLETED | COMMUNITY
Start: 2019-05-01 | End: 2020-01-01

## 2020-01-01 RX ORDER — IPRATROPIUM/ALBUTEROL SULFATE 18-103MCG
3 AEROSOL WITH ADAPTER (GRAM) INHALATION ONCE
Refills: 0 | Status: COMPLETED | OUTPATIENT
Start: 2020-01-01 | End: 2020-01-01

## 2020-01-01 RX ORDER — POTASSIUM CHLORIDE 1.5 G/1.58G
20 POWDER, FOR SOLUTION ORAL DAILY
Qty: 3 | Refills: 0 | Status: ACTIVE | COMMUNITY
Start: 2020-01-01 | End: 1900-01-01

## 2020-01-01 RX ORDER — INSULIN LISPRO 100/ML
VIAL (ML) SUBCUTANEOUS AT BEDTIME
Refills: 0 | Status: DISCONTINUED | OUTPATIENT
Start: 2020-01-01 | End: 2020-01-01

## 2020-01-01 RX ORDER — INSULIN DETEMIR 100 [IU]/ML
100 INJECTION, SOLUTION SUBCUTANEOUS TWICE DAILY
Refills: 0 | Status: ACTIVE | COMMUNITY

## 2020-01-01 RX ORDER — INSULIN DETEMIR 100/ML (3)
52 INSULIN PEN (ML) SUBCUTANEOUS AT BEDTIME
Refills: 0 | Status: DISCONTINUED | OUTPATIENT
Start: 2020-01-01 | End: 2020-01-01

## 2020-01-01 RX ORDER — VANCOMYCIN HCL 1 G
1250 VIAL (EA) INTRAVENOUS ONCE
Refills: 0 | Status: COMPLETED | OUTPATIENT
Start: 2020-01-01 | End: 2020-01-01

## 2020-01-01 RX ORDER — INSULIN GLARGINE 100 [IU]/ML
66 INJECTION, SOLUTION SUBCUTANEOUS AT BEDTIME
Refills: 0 | Status: DISCONTINUED | OUTPATIENT
Start: 2020-01-01 | End: 2020-01-01

## 2020-01-01 RX ORDER — CLOTRIMAZOLE 10 MG/G
1 CREAM TOPICAL 3 TIMES DAILY
Qty: 1 | Refills: 1 | Status: COMPLETED | COMMUNITY
Start: 2017-09-11 | End: 2020-01-01

## 2020-01-01 RX ORDER — INSULIN LISPRO 100/ML
VIAL (ML) SUBCUTANEOUS EVERY 6 HOURS
Refills: 0 | Status: DISCONTINUED | OUTPATIENT
Start: 2020-01-01 | End: 2020-01-01

## 2020-01-01 RX ORDER — MEROPENEM 1 G/30ML
1000 INJECTION INTRAVENOUS ONCE
Refills: 0 | Status: COMPLETED | OUTPATIENT
Start: 2020-01-01 | End: 2020-01-01

## 2020-01-01 RX ORDER — DIPHENHYDRAMINE HCL 50 MG
25 CAPSULE ORAL ONCE
Refills: 0 | Status: COMPLETED | OUTPATIENT
Start: 2020-01-01 | End: 2020-01-01

## 2020-01-01 RX ORDER — OCTREOTIDE ACETATE 200 UG/ML
100 INJECTION, SOLUTION INTRAVENOUS; SUBCUTANEOUS THREE TIMES A DAY
Refills: 0 | Status: DISCONTINUED | OUTPATIENT
Start: 2020-01-01 | End: 2020-01-01

## 2020-01-01 RX ORDER — PHYTONADIONE (VIT K1) 5 MG
10 TABLET ORAL ONCE
Refills: 0 | Status: DISCONTINUED | OUTPATIENT
Start: 2020-01-01 | End: 2020-01-01

## 2020-01-01 RX ORDER — CEFTRIAXONE 500 MG/1
INJECTION, POWDER, FOR SOLUTION INTRAMUSCULAR; INTRAVENOUS
Refills: 0 | Status: DISCONTINUED | OUTPATIENT
Start: 2020-01-01 | End: 2020-01-01

## 2020-01-01 RX ORDER — CHOLESTYRAMINE 4 G/9G
4 POWDER, FOR SUSPENSION ORAL
Qty: 240 | Refills: 0
Start: 2020-01-01 | End: 2020-01-01

## 2020-01-01 RX ORDER — PANTOPRAZOLE SODIUM 20 MG/1
8 TABLET, DELAYED RELEASE ORAL
Qty: 80 | Refills: 0 | Status: DISCONTINUED | OUTPATIENT
Start: 2020-01-01 | End: 2020-01-01

## 2020-01-01 RX ORDER — CEFTRIAXONE 500 MG/1
1000 INJECTION, POWDER, FOR SOLUTION INTRAMUSCULAR; INTRAVENOUS ONCE
Refills: 0 | Status: COMPLETED | OUTPATIENT
Start: 2020-01-01 | End: 2020-01-01

## 2020-01-01 RX ORDER — LACTULOSE 10 G/15ML
15 SOLUTION ORAL
Qty: 0 | Refills: 0 | DISCHARGE

## 2020-01-01 RX ORDER — FOLIC ACID 0.8 MG
1 TABLET ORAL DAILY
Refills: 0 | Status: DISCONTINUED | OUTPATIENT
Start: 2020-01-01 | End: 2020-01-01

## 2020-01-01 RX ORDER — ACETYLCYSTEINE 200 MG/ML
10 VIAL (ML) MISCELLANEOUS ONCE
Refills: 0 | Status: COMPLETED | OUTPATIENT
Start: 2020-01-01 | End: 2020-01-01

## 2020-01-01 RX ORDER — SODIUM CHLORIDE 9 MG/ML
1000 INJECTION INTRAMUSCULAR; INTRAVENOUS; SUBCUTANEOUS
Refills: 0 | Status: DISCONTINUED | OUTPATIENT
Start: 2020-01-01 | End: 2020-01-01

## 2020-01-01 RX ORDER — LACTULOSE 10 G/15ML
10 SOLUTION ORAL DAILY
Refills: 0 | Status: ACTIVE | COMMUNITY

## 2020-01-01 RX ORDER — MAGNESIUM SULFATE 500 MG/ML
2 VIAL (ML) INJECTION ONCE
Refills: 0 | Status: DISCONTINUED | OUTPATIENT
Start: 2020-01-01 | End: 2020-01-01

## 2020-01-01 RX ORDER — INSULIN LISPRO 100/ML
9 VIAL (ML) SUBCUTANEOUS
Refills: 0 | Status: DISCONTINUED | OUTPATIENT
Start: 2020-01-01 | End: 2020-01-01

## 2020-01-01 RX ORDER — SODIUM,POTASSIUM PHOSPHATES 278-250MG
1 POWDER IN PACKET (EA) ORAL
Refills: 0 | Status: DISCONTINUED | OUTPATIENT
Start: 2020-01-01 | End: 2020-01-01

## 2020-01-01 RX ORDER — CEFTRIAXONE 500 MG/1
1000 INJECTION, POWDER, FOR SOLUTION INTRAMUSCULAR; INTRAVENOUS EVERY 24 HOURS
Refills: 0 | Status: COMPLETED | OUTPATIENT
Start: 2020-01-01 | End: 2020-01-01

## 2020-01-01 RX ORDER — LACTULOSE 10 G/15ML
10 SOLUTION ORAL DAILY
Refills: 0 | Status: DISCONTINUED | OUTPATIENT
Start: 2020-01-01 | End: 2020-01-01

## 2020-01-01 RX ORDER — ALBUMIN HUMAN 25 %
100 VIAL (ML) INTRAVENOUS EVERY 6 HOURS
Refills: 0 | Status: COMPLETED | OUTPATIENT
Start: 2020-01-01 | End: 2020-01-01

## 2020-01-01 RX ORDER — PIPERACILLIN AND TAZOBACTAM 4; .5 G/20ML; G/20ML
3.38 INJECTION, POWDER, LYOPHILIZED, FOR SOLUTION INTRAVENOUS ONCE
Refills: 0 | Status: COMPLETED | OUTPATIENT
Start: 2020-01-01 | End: 2020-01-01

## 2020-01-01 RX ORDER — INSULIN LISPRO 100/ML
16 VIAL (ML) SUBCUTANEOUS
Refills: 0 | Status: DISCONTINUED | OUTPATIENT
Start: 2020-01-01 | End: 2020-01-01

## 2020-01-01 RX ORDER — INSULIN GLARGINE 100 [IU]/ML
87 INJECTION, SOLUTION SUBCUTANEOUS AT BEDTIME
Refills: 0 | Status: DISCONTINUED | OUTPATIENT
Start: 2020-01-01 | End: 2020-01-01

## 2020-01-01 RX ORDER — NOREPINEPHRINE BITARTRATE/D5W 8 MG/250ML
5 PLASTIC BAG, INJECTION (ML) INTRAVENOUS
Qty: 32 | Refills: 0 | Status: DISCONTINUED | OUTPATIENT
Start: 2020-01-01 | End: 2020-01-01

## 2020-01-01 RX ORDER — FAMOTIDINE 10 MG/ML
20 INJECTION INTRAVENOUS ONCE
Refills: 0 | Status: COMPLETED | OUTPATIENT
Start: 2020-01-01 | End: 2020-01-01

## 2020-01-01 RX ORDER — REPAGLINIDE 1 MG/1
1 TABLET ORAL THREE TIMES A DAY
Refills: 0 | Status: DISCONTINUED | OUTPATIENT
Start: 2020-01-01 | End: 2020-01-01

## 2020-01-01 RX ORDER — CHLORHEXIDINE GLUCONATE 213 G/1000ML
15 SOLUTION TOPICAL EVERY 12 HOURS
Refills: 0 | Status: DISCONTINUED | OUTPATIENT
Start: 2020-01-01 | End: 2020-01-01

## 2020-01-01 RX ORDER — NADOLOL 80 MG/1
20 TABLET ORAL DAILY
Refills: 0 | Status: DISCONTINUED | OUTPATIENT
Start: 2020-01-01 | End: 2020-01-01

## 2020-01-01 RX ORDER — INSULIN GLARGINE 100 [IU]/ML
40 INJECTION, SOLUTION SUBCUTANEOUS AT BEDTIME
Refills: 0 | Status: DISCONTINUED | OUTPATIENT
Start: 2020-01-01 | End: 2020-01-01

## 2020-01-01 RX ORDER — INSULIN GLARGINE 100 [IU]/ML
78 INJECTION, SOLUTION SUBCUTANEOUS AT BEDTIME
Refills: 0 | Status: DISCONTINUED | OUTPATIENT
Start: 2020-01-01 | End: 2020-01-01

## 2020-01-01 RX ORDER — LACTULOSE 10 G/15ML
10 SOLUTION ORAL 3 TIMES DAILY
Qty: 3 | Refills: 2 | Status: DISCONTINUED | COMMUNITY
Start: 2020-01-01 | End: 2020-01-01

## 2020-01-01 RX ORDER — POTASSIUM CHLORIDE 20 MEQ
10 PACKET (EA) ORAL ONCE
Refills: 0 | Status: DISCONTINUED | OUTPATIENT
Start: 2020-01-01 | End: 2020-01-01

## 2020-01-01 RX ORDER — MIDODRINE HYDROCHLORIDE 2.5 MG/1
1 TABLET ORAL
Qty: 21 | Refills: 0
Start: 2020-01-01 | End: 2020-01-01

## 2020-01-01 RX ORDER — ALBUMIN HUMAN 25 %
50 VIAL (ML) INTRAVENOUS ONCE
Refills: 0 | Status: COMPLETED | OUTPATIENT
Start: 2020-01-01 | End: 2020-01-01

## 2020-01-01 RX ORDER — ONDANSETRON 8 MG/1
4 TABLET, FILM COATED ORAL EVERY 6 HOURS
Refills: 0 | Status: DISCONTINUED | OUTPATIENT
Start: 2020-01-01 | End: 2020-01-01

## 2020-01-01 RX ORDER — REPAGLINIDE 1 MG/1
1 TABLET ORAL
Qty: 0 | Refills: 0 | DISCHARGE
Start: 2020-01-01

## 2020-01-01 RX ORDER — TRAMADOL HYDROCHLORIDE 50 MG/1
50 TABLET, COATED ORAL
Qty: 30 | Refills: 0 | Status: COMPLETED | COMMUNITY
Start: 2019-02-06 | End: 2020-01-01

## 2020-01-01 RX ORDER — INSULIN LISPRO 100/ML
12 VIAL (ML) SUBCUTANEOUS
Refills: 0 | Status: DISCONTINUED | OUTPATIENT
Start: 2020-01-01 | End: 2020-01-01

## 2020-01-01 RX ORDER — INSULIN DETEMIR 100/ML (3)
80 INSULIN PEN (ML) SUBCUTANEOUS
Qty: 0 | Refills: 0 | DISCHARGE

## 2020-01-01 RX ORDER — REPAGLINIDE 1 MG/1
1 TABLET ORAL
Qty: 90 | Refills: 2
Start: 2020-01-01 | End: 2020-08-24

## 2020-01-01 RX ORDER — MIDODRINE HYDROCHLORIDE 2.5 MG/1
5 TABLET ORAL THREE TIMES A DAY
Refills: 0 | Status: DISCONTINUED | OUTPATIENT
Start: 2020-01-01 | End: 2020-01-01

## 2020-01-01 RX ORDER — SPIRONOLACTONE 25 MG/1
100 TABLET, FILM COATED ORAL DAILY
Refills: 0 | Status: DISCONTINUED | OUTPATIENT
Start: 2020-01-01 | End: 2020-01-01

## 2020-01-01 RX ORDER — ZINC SULFATE TAB 220 MG (50 MG ZINC EQUIVALENT) 220 (50 ZN) MG
220 TAB ORAL
Refills: 0 | Status: DISCONTINUED | OUTPATIENT
Start: 2020-01-01 | End: 2020-01-01

## 2020-01-01 RX ORDER — POTASSIUM CHLORIDE 20 MEQ
40 PACKET (EA) ORAL ONCE
Refills: 0 | Status: DISCONTINUED | OUTPATIENT
Start: 2020-01-01 | End: 2020-01-01

## 2020-01-01 RX ORDER — ZINC SULFATE 50(220)MG
220 (50 ZN) CAPSULE ORAL TWICE DAILY
Refills: 0 | Status: ACTIVE | COMMUNITY

## 2020-01-01 RX ORDER — MEROPENEM 1 G/30ML
500 INJECTION INTRAVENOUS EVERY 24 HOURS
Refills: 0 | Status: DISCONTINUED | OUTPATIENT
Start: 2020-01-01 | End: 2020-01-01

## 2020-01-01 RX ORDER — INSULIN GLARGINE 100 [IU]/ML
60 INJECTION, SOLUTION SUBCUTANEOUS AT BEDTIME
Refills: 0 | Status: DISCONTINUED | OUTPATIENT
Start: 2020-01-01 | End: 2020-01-01

## 2020-01-01 RX ORDER — INSULIN LISPRO 100/ML
11 VIAL (ML) SUBCUTANEOUS
Refills: 0 | Status: DISCONTINUED | OUTPATIENT
Start: 2020-01-01 | End: 2020-01-01

## 2020-01-01 RX ORDER — MIDAZOLAM HYDROCHLORIDE 1 MG/ML
6 INJECTION, SOLUTION INTRAMUSCULAR; INTRAVENOUS ONCE
Refills: 0 | Status: DISCONTINUED | OUTPATIENT
Start: 2020-01-01 | End: 2020-01-01

## 2020-01-01 RX ORDER — INSULIN LISPRO 100/ML
5 VIAL (ML) SUBCUTANEOUS
Refills: 0 | Status: DISCONTINUED | OUTPATIENT
Start: 2020-01-01 | End: 2020-01-01

## 2020-01-01 RX ORDER — LACTULOSE 10 G/15ML
10 SOLUTION ORAL
Refills: 0 | Status: DISCONTINUED | OUTPATIENT
Start: 2020-01-01 | End: 2020-01-01

## 2020-01-01 RX ORDER — CEPHALEXIN 500 MG/1
500 CAPSULE ORAL DAILY
Refills: 0 | Status: COMPLETED | COMMUNITY
Start: 2019-02-06 | End: 2020-01-01

## 2020-01-01 RX ORDER — SULFAMETHOXAZOLE AND TRIMETHOPRIM 800; 160 MG/1; MG/1
800-160 TABLET ORAL DAILY
Refills: 0 | Status: DISCONTINUED | COMMUNITY
End: 2020-01-01

## 2020-01-01 RX ORDER — INSULIN DETEMIR 100/ML (3)
20 INSULIN PEN (ML) SUBCUTANEOUS
Qty: 0 | Refills: 0 | DISCHARGE

## 2020-01-01 RX ORDER — MIDAZOLAM HYDROCHLORIDE 1 MG/ML
4 INJECTION, SOLUTION INTRAMUSCULAR; INTRAVENOUS ONCE
Refills: 0 | Status: DISCONTINUED | OUTPATIENT
Start: 2020-01-01 | End: 2020-01-01

## 2020-01-01 RX ORDER — PHENYLEPHRINE HYDROCHLORIDE 10 MG/ML
0.4 INJECTION INTRAVENOUS
Qty: 40 | Refills: 0 | Status: DISCONTINUED | OUTPATIENT
Start: 2020-01-01 | End: 2020-01-01

## 2020-01-01 RX ORDER — ZINC SULFATE TAB 220 MG (50 MG ZINC EQUIVALENT) 220 (50 ZN) MG
1 TAB ORAL
Qty: 60 | Refills: 0
Start: 2020-01-01 | End: 2020-01-01

## 2020-01-01 RX ORDER — CHLORDIAZEPOXIDE HYDROCHLORIDE 25 MG/1
25 CAPSULE ORAL DAILY
Qty: 30 | Refills: 0 | Status: COMPLETED | COMMUNITY
Start: 2019-07-01 | End: 2020-01-01

## 2020-01-01 RX ORDER — CEPHALEXIN 500 MG
1 CAPSULE ORAL
Qty: 0 | Refills: 0 | DISCHARGE

## 2020-01-01 RX ORDER — PROPOFOL 10 MG/ML
30 INJECTION, EMULSION INTRAVENOUS
Qty: 500 | Refills: 0 | Status: DISCONTINUED | OUTPATIENT
Start: 2020-01-01 | End: 2020-01-01

## 2020-01-01 RX ORDER — CHOLESTYRAMINE 4 G/9G
4 POWDER, FOR SUSPENSION ORAL DAILY
Refills: 0 | Status: DISCONTINUED | OUTPATIENT
Start: 2020-01-01 | End: 2020-01-01

## 2020-01-01 RX ORDER — SPIRONOLACTONE 25 MG/1
50 TABLET, FILM COATED ORAL DAILY
Refills: 0 | Status: DISCONTINUED | OUTPATIENT
Start: 2020-01-01 | End: 2020-01-01

## 2020-01-01 RX ORDER — SODIUM CHLORIDE 9 MG/ML
10 INJECTION INTRAMUSCULAR; INTRAVENOUS; SUBCUTANEOUS
Refills: 0 | Status: DISCONTINUED | OUTPATIENT
Start: 2020-01-01 | End: 2020-01-01

## 2020-01-01 RX ORDER — INSULIN LISPRO 100/ML
4 VIAL (ML) SUBCUTANEOUS ONCE
Refills: 0 | Status: COMPLETED | OUTPATIENT
Start: 2020-01-01 | End: 2020-01-01

## 2020-01-01 RX ORDER — PREDNISOLONE 5 MG
40 TABLET ORAL DAILY
Refills: 0 | Status: DISCONTINUED | OUTPATIENT
Start: 2020-01-01 | End: 2020-01-01

## 2020-01-01 RX ORDER — NADOLOL 80 MG/1
1 TABLET ORAL
Qty: 0 | Refills: 0 | DISCHARGE

## 2020-01-01 RX ORDER — FENTANYL CITRATE 50 UG/ML
50 INJECTION INTRAVENOUS ONCE
Refills: 0 | Status: DISCONTINUED | OUTPATIENT
Start: 2020-01-01 | End: 2020-01-01

## 2020-01-01 RX ORDER — NALTREXONE HYDROCHLORIDE 50 MG/1
50 TABLET, FILM COATED ORAL
Qty: 30 | Refills: 0 | Status: COMPLETED | COMMUNITY
Start: 2019-07-01 | End: 2020-01-01

## 2020-01-01 RX ORDER — POTASSIUM CHLORIDE 20 MEQ
20 PACKET (EA) ORAL
Refills: 0 | Status: COMPLETED | OUTPATIENT
Start: 2020-01-01 | End: 2020-01-01

## 2020-01-01 RX ORDER — VANCOMYCIN HCL 1 G
VIAL (EA) INTRAVENOUS
Refills: 0 | Status: DISCONTINUED | OUTPATIENT
Start: 2020-01-01 | End: 2020-01-01

## 2020-01-01 RX ORDER — CITRIC ACID/SODIUM CITRATE 300-500 MG
30 SOLUTION, ORAL ORAL
Qty: 1000 | Refills: 0
Start: 2020-01-01

## 2020-01-01 RX ORDER — CHOLESTYRAMINE 4 G/9G
4 POWDER, FOR SUSPENSION ORAL
Qty: 30 | Refills: 0 | Status: ACTIVE | COMMUNITY
Start: 2020-01-01 | End: 1900-01-01

## 2020-01-01 RX ORDER — CEFOTETAN DISODIUM 1 G
2 VIAL (EA) INJECTION ONCE
Refills: 0 | Status: COMPLETED | OUTPATIENT
Start: 2020-01-01 | End: 2020-01-01

## 2020-01-01 RX ORDER — INSULIN GLARGINE 100 [IU]/ML
45 INJECTION, SOLUTION SUBCUTANEOUS AT BEDTIME
Refills: 0 | Status: DISCONTINUED | OUTPATIENT
Start: 2020-01-01 | End: 2020-01-01

## 2020-01-01 RX ORDER — URSODIOL 250 MG/1
500 TABLET, FILM COATED ORAL THREE TIMES A DAY
Refills: 0 | Status: DISCONTINUED | OUTPATIENT
Start: 2020-01-01 | End: 2020-01-01

## 2020-01-01 RX ORDER — DEXTROSE 50 % IN WATER 50 %
15 SYRINGE (ML) INTRAVENOUS ONCE
Refills: 0 | Status: COMPLETED | OUTPATIENT
Start: 2020-01-01 | End: 2020-01-01

## 2020-01-01 RX ORDER — INSULIN LISPRO 100/ML
14 VIAL (ML) SUBCUTANEOUS
Refills: 0 | Status: DISCONTINUED | OUTPATIENT
Start: 2020-01-01 | End: 2020-01-01

## 2020-01-01 RX ORDER — FENTANYL CITRATE 50 UG/ML
100 INJECTION INTRAVENOUS ONCE
Refills: 0 | Status: DISCONTINUED | OUTPATIENT
Start: 2020-01-01 | End: 2020-01-01

## 2020-01-01 RX ORDER — SODIUM BICARBONATE 1 MEQ/ML
1300 SYRINGE (ML) INTRAVENOUS THREE TIMES A DAY
Refills: 0 | Status: DISCONTINUED | OUTPATIENT
Start: 2020-01-01 | End: 2020-01-01

## 2020-01-01 RX ORDER — SIMETHICONE 80 MG/1
80 TABLET, CHEWABLE ORAL THREE TIMES A DAY
Refills: 0 | Status: DISCONTINUED | OUTPATIENT
Start: 2020-01-01 | End: 2020-01-01

## 2020-01-01 RX ADMIN — Medication 1 PACKET(S): at 18:03

## 2020-01-01 RX ADMIN — PANTOPRAZOLE SODIUM 40 MILLIGRAM(S): 20 TABLET, DELAYED RELEASE ORAL at 05:25

## 2020-01-01 RX ADMIN — Medication 50 MILLILITER(S): at 18:02

## 2020-01-01 RX ADMIN — URSODIOL 500 MILLIGRAM(S): 250 TABLET, FILM COATED ORAL at 22:26

## 2020-01-01 RX ADMIN — Medication 40 MILLIGRAM(S): at 06:46

## 2020-01-01 RX ADMIN — MIDODRINE HYDROCHLORIDE 5 MILLIGRAM(S): 2.5 TABLET ORAL at 06:09

## 2020-01-01 RX ADMIN — URSODIOL 500 MILLIGRAM(S): 250 TABLET, FILM COATED ORAL at 21:00

## 2020-01-01 RX ADMIN — PANTOPRAZOLE SODIUM 40 MILLIGRAM(S): 20 TABLET, DELAYED RELEASE ORAL at 09:28

## 2020-01-01 RX ADMIN — SIMETHICONE 80 MILLIGRAM(S): 80 TABLET, CHEWABLE ORAL at 21:58

## 2020-01-01 RX ADMIN — Medication 3: at 17:30

## 2020-01-01 RX ADMIN — Medication 2: at 13:18

## 2020-01-01 RX ADMIN — Medication 10 MILLIGRAM(S): at 13:09

## 2020-01-01 RX ADMIN — MEROPENEM 100 MILLIGRAM(S): 1 INJECTION INTRAVENOUS at 14:25

## 2020-01-01 RX ADMIN — Medication 100 MILLIGRAM(S): at 12:35

## 2020-01-01 RX ADMIN — CHLORHEXIDINE GLUCONATE 1 APPLICATION(S): 213 SOLUTION TOPICAL at 05:43

## 2020-01-01 RX ADMIN — Medication 1 TABLET(S): at 13:15

## 2020-01-01 RX ADMIN — Medication 50 MILLILITER(S): at 03:24

## 2020-01-01 RX ADMIN — CHOLESTYRAMINE 4 GRAM(S): 4 POWDER, FOR SUSPENSION ORAL at 09:06

## 2020-01-01 RX ADMIN — MIDODRINE HYDROCHLORIDE 5 MILLIGRAM(S): 2.5 TABLET ORAL at 06:12

## 2020-01-01 RX ADMIN — Medication 100 MILLIEQUIVALENT(S): at 06:33

## 2020-01-01 RX ADMIN — Medication 1 PACKET(S): at 08:27

## 2020-01-01 RX ADMIN — OCTREOTIDE ACETATE 100 MICROGRAM(S): 200 INJECTION, SOLUTION INTRAVENOUS; SUBCUTANEOUS at 17:43

## 2020-01-01 RX ADMIN — Medication 4.23 MICROGRAM(S)/KG/MIN: at 00:39

## 2020-01-01 RX ADMIN — Medication 1: at 18:21

## 2020-01-01 RX ADMIN — OCTREOTIDE ACETATE 100 MICROGRAM(S): 200 INJECTION, SOLUTION INTRAVENOUS; SUBCUTANEOUS at 05:39

## 2020-01-01 RX ADMIN — CHLORHEXIDINE GLUCONATE 15 MILLILITER(S): 213 SOLUTION TOPICAL at 18:18

## 2020-01-01 RX ADMIN — Medication 40 MILLIEQUIVALENT(S): at 15:38

## 2020-01-01 RX ADMIN — Medication 1 TABLET(S): at 08:19

## 2020-01-01 RX ADMIN — Medication 30 MILLILITER(S): at 18:01

## 2020-01-01 RX ADMIN — Medication 30 MILLILITER(S): at 22:16

## 2020-01-01 RX ADMIN — PANTOPRAZOLE SODIUM 40 MILLIGRAM(S): 20 TABLET, DELAYED RELEASE ORAL at 05:39

## 2020-01-01 RX ADMIN — Medication 14 UNIT(S): at 13:07

## 2020-01-01 RX ADMIN — SIMETHICONE 80 MILLIGRAM(S): 80 TABLET, CHEWABLE ORAL at 16:26

## 2020-01-01 RX ADMIN — Medication 50 MILLILITER(S): at 14:29

## 2020-01-01 RX ADMIN — Medication 2: at 14:28

## 2020-01-01 RX ADMIN — MAGNESIUM OXIDE 400 MG ORAL TABLET 400 MILLIGRAM(S): 241.3 TABLET ORAL at 09:20

## 2020-01-01 RX ADMIN — Medication 20 MILLIEQUIVALENT(S): at 03:29

## 2020-01-01 RX ADMIN — OCTREOTIDE ACETATE 100 MICROGRAM(S): 200 INJECTION, SOLUTION INTRAVENOUS; SUBCUTANEOUS at 05:56

## 2020-01-01 RX ADMIN — Medication 4: at 17:33

## 2020-01-01 RX ADMIN — Medication 40 MILLIEQUIVALENT(S): at 18:47

## 2020-01-01 RX ADMIN — Medication 1 PACKET(S): at 22:26

## 2020-01-01 RX ADMIN — OCTREOTIDE ACETATE 10 MICROGRAM(S)/HR: 200 INJECTION, SOLUTION INTRAVENOUS; SUBCUTANEOUS at 19:36

## 2020-01-01 RX ADMIN — FENTANYL CITRATE 100 MICROGRAM(S): 50 INJECTION INTRAVENOUS at 07:30

## 2020-01-01 RX ADMIN — Medication 8 UNIT(S): at 12:30

## 2020-01-01 RX ADMIN — SODIUM CHLORIDE 2000 MILLILITER(S): 9 INJECTION, SOLUTION INTRAVENOUS at 02:05

## 2020-01-01 RX ADMIN — PANTOPRAZOLE SODIUM 40 MILLIGRAM(S): 20 TABLET, DELAYED RELEASE ORAL at 05:55

## 2020-01-01 RX ADMIN — CHLORHEXIDINE GLUCONATE 1 APPLICATION(S): 213 SOLUTION TOPICAL at 05:22

## 2020-01-01 RX ADMIN — SIMETHICONE 80 MILLIGRAM(S): 80 TABLET, CHEWABLE ORAL at 14:05

## 2020-01-01 RX ADMIN — SODIUM CHLORIDE 1 GRAM(S): 9 INJECTION INTRAMUSCULAR; INTRAVENOUS; SUBCUTANEOUS at 02:45

## 2020-01-01 RX ADMIN — Medication 40 MILLIGRAM(S): at 14:27

## 2020-01-01 RX ADMIN — PANTOPRAZOLE SODIUM 40 MILLIGRAM(S): 20 TABLET, DELAYED RELEASE ORAL at 05:49

## 2020-01-01 RX ADMIN — SIMETHICONE 80 MILLIGRAM(S): 80 TABLET, CHEWABLE ORAL at 21:18

## 2020-01-01 RX ADMIN — Medication 50 MILLILITER(S): at 22:25

## 2020-01-01 RX ADMIN — Medication 6: at 13:07

## 2020-01-01 RX ADMIN — URSODIOL 500 MILLIGRAM(S): 250 TABLET, FILM COATED ORAL at 13:06

## 2020-01-01 RX ADMIN — INSULIN GLARGINE 20 UNIT(S): 100 INJECTION, SOLUTION SUBCUTANEOUS at 21:33

## 2020-01-01 RX ADMIN — Medication 30 MILLILITER(S): at 14:18

## 2020-01-01 RX ADMIN — Medication 5 UNIT(S): at 05:50

## 2020-01-01 RX ADMIN — Medication 1 TABLET(S): at 12:43

## 2020-01-01 RX ADMIN — INSULIN GLARGINE 50 UNIT(S): 100 INJECTION, SOLUTION SUBCUTANEOUS at 21:50

## 2020-01-01 RX ADMIN — Medication 20 MILLIEQUIVALENT(S): at 05:07

## 2020-01-01 RX ADMIN — Medication 40 MILLIEQUIVALENT(S): at 12:15

## 2020-01-01 RX ADMIN — Medication 9 UNIT(S): at 12:39

## 2020-01-01 RX ADMIN — SIMETHICONE 80 MILLIGRAM(S): 80 TABLET, CHEWABLE ORAL at 17:31

## 2020-01-01 RX ADMIN — PIPERACILLIN AND TAZOBACTAM 25 GRAM(S): 4; .5 INJECTION, POWDER, LYOPHILIZED, FOR SOLUTION INTRAVENOUS at 01:05

## 2020-01-01 RX ADMIN — Medication 50 MILLILITER(S): at 11:11

## 2020-01-01 RX ADMIN — Medication 1 TABLET(S): at 18:02

## 2020-01-01 RX ADMIN — CHLORHEXIDINE GLUCONATE 15 MILLILITER(S): 213 SOLUTION TOPICAL at 17:22

## 2020-01-01 RX ADMIN — OCTREOTIDE ACETATE 100 MICROGRAM(S): 200 INJECTION, SOLUTION INTRAVENOUS; SUBCUTANEOUS at 12:49

## 2020-01-01 RX ADMIN — Medication 50 MILLILITER(S): at 23:59

## 2020-01-01 RX ADMIN — Medication 75 MEQ/KG/HR: at 16:16

## 2020-01-01 RX ADMIN — Medication 30 MILLILITER(S): at 05:31

## 2020-01-01 RX ADMIN — Medication 50 MILLILITER(S): at 09:26

## 2020-01-01 RX ADMIN — Medication 50 MILLILITER(S): at 23:40

## 2020-01-01 RX ADMIN — Medication 2: at 17:58

## 2020-01-01 RX ADMIN — MIDODRINE HYDROCHLORIDE 5 MILLIGRAM(S): 2.5 TABLET ORAL at 05:55

## 2020-01-01 RX ADMIN — Medication 100 MILLIGRAM(S): at 13:07

## 2020-01-01 RX ADMIN — Medication 40 MILLIEQUIVALENT(S): at 02:18

## 2020-01-01 RX ADMIN — PANTOPRAZOLE SODIUM 40 MILLIGRAM(S): 20 TABLET, DELAYED RELEASE ORAL at 12:35

## 2020-01-01 RX ADMIN — CEFTRIAXONE 100 MILLIGRAM(S): 500 INJECTION, POWDER, FOR SOLUTION INTRAMUSCULAR; INTRAVENOUS at 02:19

## 2020-01-01 RX ADMIN — PANTOPRAZOLE SODIUM 40 MILLIGRAM(S): 20 TABLET, DELAYED RELEASE ORAL at 05:43

## 2020-01-01 RX ADMIN — Medication 12 UNIT(S): at 08:36

## 2020-01-01 RX ADMIN — CHLORHEXIDINE GLUCONATE 15 MILLILITER(S): 213 SOLUTION TOPICAL at 05:43

## 2020-01-01 RX ADMIN — Medication 2: at 21:59

## 2020-01-01 RX ADMIN — Medication 1 PACKET(S): at 17:53

## 2020-01-01 RX ADMIN — PHENYLEPHRINE HYDROCHLORIDE 15.2 MICROGRAM(S)/KG/MIN: 10 INJECTION INTRAVENOUS at 19:35

## 2020-01-01 RX ADMIN — Medication 16 UNIT(S): at 14:27

## 2020-01-01 RX ADMIN — Medication 30 MILLILITER(S): at 06:09

## 2020-01-01 RX ADMIN — Medication 40 MILLIEQUIVALENT(S): at 22:02

## 2020-01-01 RX ADMIN — Medication 8: at 09:09

## 2020-01-01 RX ADMIN — MIDODRINE HYDROCHLORIDE 5 MILLIGRAM(S): 2.5 TABLET ORAL at 05:39

## 2020-01-01 RX ADMIN — Medication 4 UNIT(S): at 02:45

## 2020-01-01 RX ADMIN — Medication 1: at 13:08

## 2020-01-01 RX ADMIN — Medication 100 MILLIGRAM(S): at 12:44

## 2020-01-01 RX ADMIN — Medication 30 MILLILITER(S): at 21:48

## 2020-01-01 RX ADMIN — CEFTRIAXONE 100 MILLIGRAM(S): 500 INJECTION, POWDER, FOR SOLUTION INTRAMUSCULAR; INTRAVENOUS at 12:13

## 2020-01-01 RX ADMIN — Medication 50 MILLILITER(S): at 00:24

## 2020-01-01 RX ADMIN — OCTREOTIDE ACETATE 100 MICROGRAM(S): 200 INJECTION, SOLUTION INTRAVENOUS; SUBCUTANEOUS at 06:07

## 2020-01-01 RX ADMIN — Medication 9 UNIT(S): at 08:25

## 2020-01-01 RX ADMIN — Medication 1 TABLET(S): at 14:18

## 2020-01-01 RX ADMIN — Medication 40 MILLIEQUIVALENT(S): at 13:02

## 2020-01-01 RX ADMIN — CHOLESTYRAMINE 4 GRAM(S): 4 POWDER, FOR SUSPENSION ORAL at 09:42

## 2020-01-01 RX ADMIN — SIMETHICONE 80 MILLIGRAM(S): 80 TABLET, CHEWABLE ORAL at 06:17

## 2020-01-01 RX ADMIN — Medication 50 MILLILITER(S): at 13:15

## 2020-01-01 RX ADMIN — Medication 30 MILLILITER(S): at 13:07

## 2020-01-01 RX ADMIN — Medication 30 MILLILITER(S): at 05:55

## 2020-01-01 RX ADMIN — CEFTRIAXONE 100 MILLIGRAM(S): 500 INJECTION, POWDER, FOR SOLUTION INTRAMUSCULAR; INTRAVENOUS at 02:23

## 2020-01-01 RX ADMIN — FENTANYL CITRATE 2.26 MICROGRAM(S)/KG/HR: 50 INJECTION INTRAVENOUS at 19:00

## 2020-01-01 RX ADMIN — OCTREOTIDE ACETATE 100 MICROGRAM(S): 200 INJECTION, SOLUTION INTRAVENOUS; SUBCUTANEOUS at 22:37

## 2020-01-01 RX ADMIN — Medication 30 MILLILITER(S): at 13:13

## 2020-01-01 RX ADMIN — MIDODRINE HYDROCHLORIDE 5 MILLIGRAM(S): 2.5 TABLET ORAL at 05:47

## 2020-01-01 RX ADMIN — Medication 100 MILLIGRAM(S): at 12:39

## 2020-01-01 RX ADMIN — Medication 8.46 MICROGRAM(S)/KG/MIN: at 04:40

## 2020-01-01 RX ADMIN — Medication 65.63 GRAM(S): at 14:28

## 2020-01-01 RX ADMIN — Medication 50 MILLILITER(S): at 03:31

## 2020-01-01 RX ADMIN — Medication 1 MILLIGRAM(S): at 08:27

## 2020-01-01 RX ADMIN — MIDAZOLAM HYDROCHLORIDE 4 MILLIGRAM(S): 1 INJECTION, SOLUTION INTRAMUSCULAR; INTRAVENOUS at 07:15

## 2020-01-01 RX ADMIN — Medication 40 MILLIGRAM(S): at 12:43

## 2020-01-01 RX ADMIN — Medication 30 MILLILITER(S): at 05:48

## 2020-01-01 RX ADMIN — Medication 2: at 18:08

## 2020-01-01 RX ADMIN — Medication 8 UNIT(S): at 12:16

## 2020-01-01 RX ADMIN — PHENYLEPHRINE HYDROCHLORIDE 15.2 MICROGRAM(S)/KG/MIN: 10 INJECTION INTRAVENOUS at 01:09

## 2020-01-01 RX ADMIN — Medication 30 MILLILITER(S): at 12:47

## 2020-01-01 RX ADMIN — Medication 40 MILLIEQUIVALENT(S): at 08:36

## 2020-01-01 RX ADMIN — PANTOPRAZOLE SODIUM 40 MILLIGRAM(S): 20 TABLET, DELAYED RELEASE ORAL at 12:31

## 2020-01-01 RX ADMIN — Medication 40 MILLIEQUIVALENT(S): at 14:33

## 2020-01-01 RX ADMIN — Medication 65.63 GRAM(S): at 19:42

## 2020-01-01 RX ADMIN — Medication 100 MILLIEQUIVALENT(S): at 23:58

## 2020-01-01 RX ADMIN — LACTULOSE 10 GRAM(S): 10 SOLUTION ORAL at 15:22

## 2020-01-01 RX ADMIN — PANTOPRAZOLE SODIUM 40 MILLIGRAM(S): 20 TABLET, DELAYED RELEASE ORAL at 06:09

## 2020-01-01 RX ADMIN — Medication 40 MILLIEQUIVALENT(S): at 22:14

## 2020-01-01 RX ADMIN — CHLORHEXIDINE GLUCONATE 1 APPLICATION(S): 213 SOLUTION TOPICAL at 03:30

## 2020-01-01 RX ADMIN — PROPOFOL 16.2 MICROGRAM(S)/KG/MIN: 10 INJECTION, EMULSION INTRAVENOUS at 23:35

## 2020-01-01 RX ADMIN — Medication 1: at 09:10

## 2020-01-01 RX ADMIN — CHOLESTYRAMINE 4 GRAM(S): 4 POWDER, FOR SUSPENSION ORAL at 09:13

## 2020-01-01 RX ADMIN — Medication 50 MILLILITER(S): at 00:07

## 2020-01-01 RX ADMIN — SIMETHICONE 80 MILLIGRAM(S): 80 TABLET, CHEWABLE ORAL at 14:26

## 2020-01-01 RX ADMIN — OCTREOTIDE ACETATE 100 MICROGRAM(S): 200 INJECTION, SOLUTION INTRAVENOUS; SUBCUTANEOUS at 16:16

## 2020-01-01 RX ADMIN — Medication 50 GRAM(S): at 23:52

## 2020-01-01 RX ADMIN — Medication 101 MILLIGRAM(S): at 17:00

## 2020-01-01 RX ADMIN — Medication 2 TABLET(S): at 11:41

## 2020-01-01 RX ADMIN — Medication 8 UNIT(S): at 17:34

## 2020-01-01 RX ADMIN — PANTOPRAZOLE SODIUM 40 MILLIGRAM(S): 20 TABLET, DELAYED RELEASE ORAL at 06:00

## 2020-01-01 RX ADMIN — SIMETHICONE 80 MILLIGRAM(S): 80 TABLET, CHEWABLE ORAL at 05:55

## 2020-01-01 RX ADMIN — Medication 1: at 09:02

## 2020-01-01 RX ADMIN — Medication 2: at 13:05

## 2020-01-01 RX ADMIN — OCTREOTIDE ACETATE 100 MICROGRAM(S): 200 INJECTION, SOLUTION INTRAVENOUS; SUBCUTANEOUS at 23:58

## 2020-01-01 RX ADMIN — Medication 50 MILLILITER(S): at 21:18

## 2020-01-01 RX ADMIN — Medication 1 PACKET(S): at 09:20

## 2020-01-01 RX ADMIN — Medication 500 MILLIGRAM(S): at 05:45

## 2020-01-01 RX ADMIN — Medication 1 TABLET(S): at 09:12

## 2020-01-01 RX ADMIN — PANTOPRAZOLE SODIUM 40 MILLIGRAM(S): 20 TABLET, DELAYED RELEASE ORAL at 12:15

## 2020-01-01 RX ADMIN — Medication 50 MILLILITER(S): at 11:50

## 2020-01-01 RX ADMIN — VASOPRESSIN 1.2 UNIT(S)/MIN: 20 INJECTION INTRAVENOUS at 19:35

## 2020-01-01 RX ADMIN — INSULIN GLARGINE 45 UNIT(S): 100 INJECTION, SOLUTION SUBCUTANEOUS at 21:18

## 2020-01-01 RX ADMIN — Medication 50 MILLILITER(S): at 05:56

## 2020-01-01 RX ADMIN — Medication 4.23 MICROGRAM(S)/KG/MIN: at 18:49

## 2020-01-01 RX ADMIN — MAGNESIUM OXIDE 400 MG ORAL TABLET 400 MILLIGRAM(S): 241.3 TABLET ORAL at 18:02

## 2020-01-01 RX ADMIN — PANTOPRAZOLE SODIUM 40 MILLIGRAM(S): 20 TABLET, DELAYED RELEASE ORAL at 05:31

## 2020-01-01 RX ADMIN — Medication 100 MILLIEQUIVALENT(S): at 12:31

## 2020-01-01 RX ADMIN — OCTREOTIDE ACETATE 100 MICROGRAM(S): 200 INJECTION, SOLUTION INTRAVENOUS; SUBCUTANEOUS at 21:49

## 2020-01-01 RX ADMIN — Medication 50 GRAM(S): at 02:25

## 2020-01-01 RX ADMIN — Medication 1 TABLET(S): at 12:15

## 2020-01-01 RX ADMIN — Medication 30 MILLILITER(S): at 13:04

## 2020-01-01 RX ADMIN — Medication 50 MILLILITER(S): at 06:11

## 2020-01-01 RX ADMIN — OCTREOTIDE ACETATE 100 MICROGRAM(S): 200 INJECTION, SOLUTION INTRAVENOUS; SUBCUTANEOUS at 21:59

## 2020-01-01 RX ADMIN — Medication 40 MILLIEQUIVALENT(S): at 22:26

## 2020-01-01 RX ADMIN — INSULIN GLARGINE 87 UNIT(S): 100 INJECTION, SOLUTION SUBCUTANEOUS at 22:09

## 2020-01-01 RX ADMIN — PANTOPRAZOLE SODIUM 40 MILLIGRAM(S): 20 TABLET, DELAYED RELEASE ORAL at 05:06

## 2020-01-01 RX ADMIN — Medication 4.23 MICROGRAM(S)/KG/MIN: at 20:55

## 2020-01-01 RX ADMIN — Medication 50 MILLILITER(S): at 00:11

## 2020-01-01 RX ADMIN — Medication 100 MILLIGRAM(S): at 08:26

## 2020-01-01 RX ADMIN — MIDODRINE HYDROCHLORIDE 5 MILLIGRAM(S): 2.5 TABLET ORAL at 18:14

## 2020-01-01 RX ADMIN — Medication 20 MILLIGRAM(S): at 12:35

## 2020-01-01 RX ADMIN — Medication 65.63 GRAM(S): at 14:27

## 2020-01-01 RX ADMIN — VASOPRESSIN 1.2 UNIT(S)/MIN: 20 INJECTION INTRAVENOUS at 03:50

## 2020-01-01 RX ADMIN — MEROPENEM 100 MILLIGRAM(S): 1 INJECTION INTRAVENOUS at 05:43

## 2020-01-01 RX ADMIN — OCTREOTIDE ACETATE 100 MICROGRAM(S): 200 INJECTION, SOLUTION INTRAVENOUS; SUBCUTANEOUS at 05:48

## 2020-01-01 RX ADMIN — Medication 30 MILLILITER(S): at 22:38

## 2020-01-01 RX ADMIN — Medication 50 MILLILITER(S): at 18:08

## 2020-01-01 RX ADMIN — Medication 100 GRAM(S): at 18:47

## 2020-01-01 RX ADMIN — FENTANYL CITRATE 100 MICROGRAM(S): 50 INJECTION INTRAVENOUS at 07:00

## 2020-01-01 RX ADMIN — MIDODRINE HYDROCHLORIDE 5 MILLIGRAM(S): 2.5 TABLET ORAL at 18:02

## 2020-01-01 RX ADMIN — Medication 1: at 17:56

## 2020-01-01 RX ADMIN — Medication 11 UNIT(S): at 08:40

## 2020-01-01 RX ADMIN — CHLORHEXIDINE GLUCONATE 15 MILLILITER(S): 213 SOLUTION TOPICAL at 17:42

## 2020-01-01 RX ADMIN — MIDODRINE HYDROCHLORIDE 5 MILLIGRAM(S): 2.5 TABLET ORAL at 17:42

## 2020-01-01 RX ADMIN — INSULIN GLARGINE 52 UNIT(S): 100 INJECTION, SOLUTION SUBCUTANEOUS at 22:03

## 2020-01-01 RX ADMIN — MEROPENEM 100 MILLIGRAM(S): 1 INJECTION INTRAVENOUS at 10:00

## 2020-01-01 RX ADMIN — Medication 50 MILLILITER(S): at 05:57

## 2020-01-01 RX ADMIN — Medication 2: at 22:03

## 2020-01-01 RX ADMIN — Medication 8 UNIT(S): at 18:05

## 2020-01-01 RX ADMIN — Medication 14 UNIT(S): at 08:54

## 2020-01-01 RX ADMIN — Medication 6: at 08:40

## 2020-01-01 RX ADMIN — SIMETHICONE 80 MILLIGRAM(S): 80 TABLET, CHEWABLE ORAL at 05:56

## 2020-01-01 RX ADMIN — OCTREOTIDE ACETATE 100 MICROGRAM(S): 200 INJECTION, SOLUTION INTRAVENOUS; SUBCUTANEOUS at 14:01

## 2020-01-01 RX ADMIN — PANTOPRAZOLE SODIUM 40 MILLIGRAM(S): 20 TABLET, DELAYED RELEASE ORAL at 05:51

## 2020-01-01 RX ADMIN — Medication 1 PACKET(S): at 14:05

## 2020-01-01 RX ADMIN — PROPOFOL 16.2 MICROGRAM(S)/KG/MIN: 10 INJECTION, EMULSION INTRAVENOUS at 22:30

## 2020-01-01 RX ADMIN — Medication 131.25 GRAM(S): at 17:32

## 2020-01-01 RX ADMIN — Medication 1 TABLET(S): at 21:59

## 2020-01-01 RX ADMIN — Medication 50 MILLILITER(S): at 05:08

## 2020-01-01 RX ADMIN — Medication 50 GRAM(S): at 08:19

## 2020-01-01 RX ADMIN — PANTOPRAZOLE SODIUM 40 MILLIGRAM(S): 20 TABLET, DELAYED RELEASE ORAL at 05:11

## 2020-01-01 RX ADMIN — MIDODRINE HYDROCHLORIDE 5 MILLIGRAM(S): 2.5 TABLET ORAL at 13:14

## 2020-01-01 RX ADMIN — Medication 10 MILLIGRAM(S): at 12:43

## 2020-01-01 RX ADMIN — ZINC SULFATE TAB 220 MG (50 MG ZINC EQUIVALENT) 220 MILLIGRAM(S): 220 (50 ZN) TAB at 06:09

## 2020-01-01 RX ADMIN — Medication 100 GRAM(S): at 22:26

## 2020-01-01 RX ADMIN — Medication 30 MILLILITER(S): at 05:39

## 2020-01-01 RX ADMIN — Medication 1: at 08:35

## 2020-01-01 RX ADMIN — Medication 500 MILLIGRAM(S): at 06:17

## 2020-01-01 RX ADMIN — Medication 50 MILLILITER(S): at 06:18

## 2020-01-01 RX ADMIN — Medication 50 MILLILITER(S): at 18:12

## 2020-01-01 RX ADMIN — Medication 4: at 13:02

## 2020-01-01 RX ADMIN — INSULIN GLARGINE 60 UNIT(S): 100 INJECTION, SOLUTION SUBCUTANEOUS at 21:44

## 2020-01-01 RX ADMIN — ZINC SULFATE TAB 220 MG (50 MG ZINC EQUIVALENT) 220 MILLIGRAM(S): 220 (50 ZN) TAB at 06:12

## 2020-01-01 RX ADMIN — Medication 6: at 17:46

## 2020-01-01 RX ADMIN — PANTOPRAZOLE SODIUM 40 MILLIGRAM(S): 20 TABLET, DELAYED RELEASE ORAL at 05:46

## 2020-01-01 RX ADMIN — INSULIN GLARGINE 78 UNIT(S): 100 INJECTION, SOLUTION SUBCUTANEOUS at 22:26

## 2020-01-01 RX ADMIN — Medication 50 MILLILITER(S): at 06:21

## 2020-01-01 RX ADMIN — Medication 50 MILLILITER(S): at 01:12

## 2020-01-01 RX ADMIN — OCTREOTIDE ACETATE 100 MICROGRAM(S): 200 INJECTION, SOLUTION INTRAVENOUS; SUBCUTANEOUS at 22:38

## 2020-01-01 RX ADMIN — Medication 50 MILLILITER(S): at 00:38

## 2020-01-01 RX ADMIN — Medication 100 MILLIEQUIVALENT(S): at 22:01

## 2020-01-01 RX ADMIN — SIMETHICONE 80 MILLIGRAM(S): 80 TABLET, CHEWABLE ORAL at 13:07

## 2020-01-01 RX ADMIN — Medication 500 MILLIGRAM(S): at 05:08

## 2020-01-01 RX ADMIN — OCTREOTIDE ACETATE 100 MICROGRAM(S): 200 INJECTION, SOLUTION INTRAVENOUS; SUBCUTANEOUS at 13:38

## 2020-01-01 RX ADMIN — Medication 3: at 13:07

## 2020-01-01 RX ADMIN — Medication 6: at 17:38

## 2020-01-01 RX ADMIN — VASOPRESSIN 1.2 UNIT(S)/MIN: 20 INJECTION INTRAVENOUS at 00:40

## 2020-01-01 RX ADMIN — PANTOPRAZOLE SODIUM 40 MILLIGRAM(S): 20 TABLET, DELAYED RELEASE ORAL at 05:21

## 2020-01-01 RX ADMIN — Medication 100 MILLIEQUIVALENT(S): at 05:07

## 2020-01-01 RX ADMIN — Medication 40 MILLIEQUIVALENT(S): at 11:59

## 2020-01-01 RX ADMIN — Medication 50 MILLILITER(S): at 18:47

## 2020-01-01 RX ADMIN — Medication 100 MILLIGRAM(S): at 13:18

## 2020-01-01 RX ADMIN — CHLORHEXIDINE GLUCONATE 1 APPLICATION(S): 213 SOLUTION TOPICAL at 05:30

## 2020-01-01 RX ADMIN — Medication 1: at 08:59

## 2020-01-01 RX ADMIN — CEFTRIAXONE 100 MILLIGRAM(S): 500 INJECTION, POWDER, FOR SOLUTION INTRAMUSCULAR; INTRAVENOUS at 01:17

## 2020-01-01 RX ADMIN — Medication 50 MILLILITER(S): at 12:49

## 2020-01-01 RX ADMIN — Medication 100 MILLIGRAM(S): at 14:26

## 2020-01-01 RX ADMIN — Medication 16 UNIT(S): at 08:34

## 2020-01-01 RX ADMIN — Medication 100 GRAM(S): at 08:36

## 2020-01-01 RX ADMIN — Medication 40 MILLIEQUIVALENT(S): at 09:28

## 2020-01-01 RX ADMIN — PANTOPRAZOLE SODIUM 40 MILLIGRAM(S): 20 TABLET, DELAYED RELEASE ORAL at 06:35

## 2020-01-01 RX ADMIN — Medication 50 MILLILITER(S): at 17:48

## 2020-01-01 RX ADMIN — Medication 50 MILLILITER(S): at 23:50

## 2020-01-01 RX ADMIN — Medication 100 MILLIEQUIVALENT(S): at 14:21

## 2020-01-01 RX ADMIN — CHLORHEXIDINE GLUCONATE 15 MILLILITER(S): 213 SOLUTION TOPICAL at 05:01

## 2020-01-01 RX ADMIN — Medication 40 MILLIEQUIVALENT(S): at 12:43

## 2020-01-01 RX ADMIN — Medication 15 GRAM(S): at 08:48

## 2020-01-01 RX ADMIN — ZINC SULFATE TAB 220 MG (50 MG ZINC EQUIVALENT) 220 MILLIGRAM(S): 220 (50 ZN) TAB at 17:43

## 2020-01-01 RX ADMIN — Medication 100 MILLIGRAM(S): at 12:24

## 2020-01-01 RX ADMIN — Medication 1 TABLET(S): at 13:13

## 2020-01-01 RX ADMIN — PANTOPRAZOLE SODIUM 40 MILLIGRAM(S): 20 TABLET, DELAYED RELEASE ORAL at 05:07

## 2020-01-01 RX ADMIN — MAGNESIUM OXIDE 400 MG ORAL TABLET 400 MILLIGRAM(S): 241.3 TABLET ORAL at 18:00

## 2020-01-01 RX ADMIN — Medication 2: at 10:30

## 2020-01-01 RX ADMIN — NADOLOL 20 MILLIGRAM(S): 80 TABLET ORAL at 14:27

## 2020-01-01 RX ADMIN — Medication 1 PACKET(S): at 05:45

## 2020-01-01 RX ADMIN — CHOLESTYRAMINE 4 GRAM(S): 4 POWDER, FOR SUSPENSION ORAL at 08:19

## 2020-01-01 RX ADMIN — Medication 2: at 13:39

## 2020-01-01 RX ADMIN — INSULIN GLARGINE 72 UNIT(S): 100 INJECTION, SOLUTION SUBCUTANEOUS at 22:26

## 2020-01-01 RX ADMIN — OCTREOTIDE ACETATE 100 MICROGRAM(S): 200 INJECTION, SOLUTION INTRAVENOUS; SUBCUTANEOUS at 13:05

## 2020-01-01 RX ADMIN — Medication 2: at 12:43

## 2020-01-01 RX ADMIN — OCTREOTIDE ACETATE 100 MICROGRAM(S): 200 INJECTION, SOLUTION INTRAVENOUS; SUBCUTANEOUS at 21:33

## 2020-01-01 RX ADMIN — LACTULOSE 200 GRAM(S): 10 SOLUTION ORAL at 05:00

## 2020-01-01 RX ADMIN — SPIRONOLACTONE 50 MILLIGRAM(S): 25 TABLET, FILM COATED ORAL at 12:35

## 2020-01-01 RX ADMIN — MIDODRINE HYDROCHLORIDE 5 MILLIGRAM(S): 2.5 TABLET ORAL at 21:41

## 2020-01-01 RX ADMIN — Medication 30 MILLILITER(S): at 21:37

## 2020-01-01 RX ADMIN — Medication 1: at 13:34

## 2020-01-01 RX ADMIN — Medication 30 MILLILITER(S): at 00:27

## 2020-01-01 RX ADMIN — MIDODRINE HYDROCHLORIDE 5 MILLIGRAM(S): 2.5 TABLET ORAL at 12:55

## 2020-01-01 RX ADMIN — OCTREOTIDE ACETATE 10 MICROGRAM(S)/HR: 200 INJECTION, SOLUTION INTRAVENOUS; SUBCUTANEOUS at 03:45

## 2020-01-01 RX ADMIN — Medication 85 MILLIMOLE(S): at 02:23

## 2020-01-01 RX ADMIN — Medication 65.63 GRAM(S): at 22:46

## 2020-01-01 RX ADMIN — Medication 50 MILLILITER(S): at 13:27

## 2020-01-01 RX ADMIN — Medication 50 MILLILITER(S): at 20:59

## 2020-01-01 RX ADMIN — Medication 30 MILLILITER(S): at 06:12

## 2020-01-01 RX ADMIN — Medication 100 MILLIGRAM(S): at 12:31

## 2020-01-01 RX ADMIN — CEFTRIAXONE 100 MILLIGRAM(S): 500 INJECTION, POWDER, FOR SOLUTION INTRAMUSCULAR; INTRAVENOUS at 21:15

## 2020-01-01 RX ADMIN — Medication 8.46 MICROGRAM(S)/KG/MIN: at 02:00

## 2020-01-01 RX ADMIN — PANTOPRAZOLE SODIUM 40 MILLIGRAM(S): 20 TABLET, DELAYED RELEASE ORAL at 18:19

## 2020-01-01 RX ADMIN — MIDODRINE HYDROCHLORIDE 5 MILLIGRAM(S): 2.5 TABLET ORAL at 14:18

## 2020-01-01 RX ADMIN — PANTOPRAZOLE SODIUM 40 MILLIGRAM(S): 20 TABLET, DELAYED RELEASE ORAL at 05:57

## 2020-01-01 RX ADMIN — Medication 1 MILLIGRAM(S): at 12:31

## 2020-01-01 RX ADMIN — URSODIOL 500 MILLIGRAM(S): 250 TABLET, FILM COATED ORAL at 05:06

## 2020-01-01 RX ADMIN — PIPERACILLIN AND TAZOBACTAM 25 GRAM(S): 4; .5 INJECTION, POWDER, LYOPHILIZED, FOR SOLUTION INTRAVENOUS at 18:19

## 2020-01-01 RX ADMIN — OCTREOTIDE ACETATE 100 MICROGRAM(S): 200 INJECTION, SOLUTION INTRAVENOUS; SUBCUTANEOUS at 05:32

## 2020-01-01 RX ADMIN — MIDODRINE HYDROCHLORIDE 5 MILLIGRAM(S): 2.5 TABLET ORAL at 14:22

## 2020-01-01 RX ADMIN — Medication 50 MILLILITER(S): at 12:42

## 2020-01-01 RX ADMIN — SIMETHICONE 80 MILLIGRAM(S): 80 TABLET, CHEWABLE ORAL at 05:51

## 2020-01-01 RX ADMIN — Medication 30 MILLILITER(S): at 23:19

## 2020-01-01 RX ADMIN — Medication 4: at 08:36

## 2020-01-01 RX ADMIN — PIPERACILLIN AND TAZOBACTAM 200 GRAM(S): 4; .5 INJECTION, POWDER, LYOPHILIZED, FOR SOLUTION INTRAVENOUS at 18:18

## 2020-01-01 RX ADMIN — Medication 1 TABLET(S): at 12:31

## 2020-01-01 RX ADMIN — Medication 11 UNIT(S): at 12:25

## 2020-01-01 RX ADMIN — Medication 2 TABLET(S): at 08:35

## 2020-01-01 RX ADMIN — Medication 1 TABLET(S): at 12:57

## 2020-01-01 RX ADMIN — Medication 2: at 17:31

## 2020-01-01 RX ADMIN — CHLORHEXIDINE GLUCONATE 1 APPLICATION(S): 213 SOLUTION TOPICAL at 06:35

## 2020-01-01 RX ADMIN — Medication 1 TABLET(S): at 13:05

## 2020-01-01 RX ADMIN — Medication 1: at 08:31

## 2020-01-01 RX ADMIN — OCTREOTIDE ACETATE 100 MICROGRAM(S): 200 INJECTION, SOLUTION INTRAVENOUS; SUBCUTANEOUS at 05:08

## 2020-01-01 RX ADMIN — Medication 1 PACKET(S): at 21:18

## 2020-01-01 RX ADMIN — Medication 50 MILLILITER(S): at 05:30

## 2020-01-01 RX ADMIN — Medication 50 MILLILITER(S): at 00:02

## 2020-01-01 RX ADMIN — Medication 50 MILLILITER(S): at 00:23

## 2020-01-01 RX ADMIN — Medication 50 MILLILITER(S): at 11:38

## 2020-01-01 RX ADMIN — Medication 50 MILLILITER(S): at 05:05

## 2020-01-01 RX ADMIN — Medication 100 MILLIEQUIVALENT(S): at 16:47

## 2020-01-01 RX ADMIN — Medication 50 MILLILITER(S): at 05:50

## 2020-01-01 RX ADMIN — MIDODRINE HYDROCHLORIDE 5 MILLIGRAM(S): 2.5 TABLET ORAL at 13:27

## 2020-01-01 RX ADMIN — CEFTRIAXONE 100 MILLIGRAM(S): 500 INJECTION, POWDER, FOR SOLUTION INTRAMUSCULAR; INTRAVENOUS at 13:26

## 2020-01-01 RX ADMIN — Medication 50 MILLILITER(S): at 08:16

## 2020-01-01 RX ADMIN — Medication 1: at 12:28

## 2020-01-01 RX ADMIN — INSULIN GLARGINE 87 UNIT(S): 100 INJECTION, SOLUTION SUBCUTANEOUS at 22:10

## 2020-01-01 RX ADMIN — ZINC SULFATE TAB 220 MG (50 MG ZINC EQUIVALENT) 220 MILLIGRAM(S): 220 (50 ZN) TAB at 17:56

## 2020-01-01 RX ADMIN — Medication 2: at 14:07

## 2020-01-01 RX ADMIN — PANTOPRAZOLE SODIUM 40 MILLIGRAM(S): 20 TABLET, DELAYED RELEASE ORAL at 06:52

## 2020-01-01 RX ADMIN — Medication 8 UNIT(S): at 08:31

## 2020-01-01 RX ADMIN — CHOLESTYRAMINE 4 GRAM(S): 4 POWDER, FOR SUSPENSION ORAL at 08:34

## 2020-01-01 RX ADMIN — PANTOPRAZOLE SODIUM 40 MILLIGRAM(S): 20 TABLET, DELAYED RELEASE ORAL at 06:13

## 2020-01-01 RX ADMIN — Medication 4: at 21:48

## 2020-01-01 RX ADMIN — Medication 30 MILLILITER(S): at 06:22

## 2020-01-01 RX ADMIN — Medication 40 MILLIEQUIVALENT(S): at 05:10

## 2020-01-01 RX ADMIN — MIDODRINE HYDROCHLORIDE 5 MILLIGRAM(S): 2.5 TABLET ORAL at 05:42

## 2020-01-01 RX ADMIN — CEFTRIAXONE 100 MILLIGRAM(S): 500 INJECTION, POWDER, FOR SOLUTION INTRAMUSCULAR; INTRAVENOUS at 15:18

## 2020-01-01 RX ADMIN — Medication 1 MILLIGRAM(S): at 12:25

## 2020-01-01 RX ADMIN — Medication 2: at 13:26

## 2020-01-01 RX ADMIN — Medication 50 MILLILITER(S): at 16:59

## 2020-01-01 RX ADMIN — Medication 1 MILLIGRAM(S): at 12:15

## 2020-01-01 RX ADMIN — Medication 166.67 MILLIGRAM(S): at 10:57

## 2020-01-01 RX ADMIN — MAGNESIUM OXIDE 400 MG ORAL TABLET 400 MILLIGRAM(S): 241.3 TABLET ORAL at 09:12

## 2020-01-01 RX ADMIN — Medication 5 UNIT(S): at 17:48

## 2020-01-01 RX ADMIN — MAGNESIUM OXIDE 400 MG ORAL TABLET 400 MILLIGRAM(S): 241.3 TABLET ORAL at 12:47

## 2020-01-01 RX ADMIN — Medication 8.46 MICROGRAM(S)/KG/MIN: at 09:30

## 2020-01-01 RX ADMIN — Medication 20 MILLIEQUIVALENT(S): at 06:58

## 2020-01-01 RX ADMIN — SODIUM CHLORIDE 100 MILLILITER(S): 9 INJECTION INTRAMUSCULAR; INTRAVENOUS; SUBCUTANEOUS at 14:21

## 2020-01-01 RX ADMIN — Medication 1 TABLET(S): at 19:51

## 2020-01-01 RX ADMIN — Medication 1 MILLIGRAM(S): at 09:28

## 2020-01-01 RX ADMIN — Medication 40 MILLIEQUIVALENT(S): at 17:00

## 2020-01-01 RX ADMIN — Medication 100 MILLIGRAM(S): at 12:43

## 2020-01-01 RX ADMIN — Medication 50 MILLILITER(S): at 13:03

## 2020-01-01 RX ADMIN — Medication 8 UNIT(S): at 12:35

## 2020-01-01 RX ADMIN — Medication 100 MILLIEQUIVALENT(S): at 22:36

## 2020-01-01 RX ADMIN — Medication 50 MILLILITER(S): at 06:35

## 2020-01-01 RX ADMIN — Medication 1 PACKET(S): at 13:13

## 2020-01-01 RX ADMIN — Medication 50 MILLILITER(S): at 06:52

## 2020-01-01 RX ADMIN — CHOLESTYRAMINE 4 GRAM(S): 4 POWDER, FOR SUSPENSION ORAL at 09:09

## 2020-01-01 RX ADMIN — Medication 1: at 18:28

## 2020-01-01 RX ADMIN — Medication 500 MILLIGRAM(S): at 17:38

## 2020-01-01 RX ADMIN — Medication 1 MILLIGRAM(S): at 11:59

## 2020-01-01 RX ADMIN — URSODIOL 500 MILLIGRAM(S): 250 TABLET, FILM COATED ORAL at 14:33

## 2020-01-01 RX ADMIN — Medication 50 MILLILITER(S): at 08:54

## 2020-01-01 RX ADMIN — PANTOPRAZOLE SODIUM 40 MILLIGRAM(S): 20 TABLET, DELAYED RELEASE ORAL at 05:56

## 2020-01-01 RX ADMIN — Medication 50 MILLILITER(S): at 18:28

## 2020-01-01 RX ADMIN — Medication 50 MILLILITER(S): at 13:08

## 2020-01-01 RX ADMIN — URSODIOL 500 MILLIGRAM(S): 250 TABLET, FILM COATED ORAL at 05:08

## 2020-01-01 RX ADMIN — CHOLESTYRAMINE 4 GRAM(S): 4 POWDER, FOR SUSPENSION ORAL at 13:13

## 2020-01-01 RX ADMIN — OCTREOTIDE ACETATE 10 MICROGRAM(S)/HR: 200 INJECTION, SOLUTION INTRAVENOUS; SUBCUTANEOUS at 00:40

## 2020-01-01 RX ADMIN — Medication 30 MILLILITER(S): at 17:56

## 2020-01-01 RX ADMIN — OCTREOTIDE ACETATE 100 MICROGRAM(S): 200 INJECTION, SOLUTION INTRAVENOUS; SUBCUTANEOUS at 06:12

## 2020-01-01 RX ADMIN — CEFTRIAXONE 100 MILLIGRAM(S): 500 INJECTION, POWDER, FOR SOLUTION INTRAMUSCULAR; INTRAVENOUS at 19:30

## 2020-01-01 RX ADMIN — MIDODRINE HYDROCHLORIDE 5 MILLIGRAM(S): 2.5 TABLET ORAL at 22:56

## 2020-01-01 RX ADMIN — PROPOFOL 16.2 MICROGRAM(S)/KG/MIN: 10 INJECTION, EMULSION INTRAVENOUS at 08:00

## 2020-01-01 RX ADMIN — PROPOFOL 16.2 MICROGRAM(S)/KG/MIN: 10 INJECTION, EMULSION INTRAVENOUS at 18:49

## 2020-01-01 RX ADMIN — OCTREOTIDE ACETATE 100 MICROGRAM(S): 200 INJECTION, SOLUTION INTRAVENOUS; SUBCUTANEOUS at 21:28

## 2020-01-01 RX ADMIN — PANTOPRAZOLE SODIUM 10 MG/HR: 20 TABLET, DELAYED RELEASE ORAL at 08:00

## 2020-01-01 RX ADMIN — SPIRONOLACTONE 100 MILLIGRAM(S): 25 TABLET, FILM COATED ORAL at 14:27

## 2020-01-01 RX ADMIN — Medication 8 UNIT(S): at 17:02

## 2020-01-01 RX ADMIN — MAGNESIUM OXIDE 400 MG ORAL TABLET 400 MILLIGRAM(S): 241.3 TABLET ORAL at 18:26

## 2020-01-01 RX ADMIN — Medication 1 TABLET(S): at 21:33

## 2020-01-01 RX ADMIN — Medication 50 MILLILITER(S): at 23:31

## 2020-01-01 RX ADMIN — LACTULOSE 10 GRAM(S): 10 SOLUTION ORAL at 18:05

## 2020-01-01 RX ADMIN — Medication 2: at 22:26

## 2020-01-01 RX ADMIN — CEFTRIAXONE 100 MILLIGRAM(S): 500 INJECTION, POWDER, FOR SOLUTION INTRAMUSCULAR; INTRAVENOUS at 14:13

## 2020-01-01 RX ADMIN — Medication 1 MILLIGRAM(S): at 12:43

## 2020-01-01 RX ADMIN — SIMETHICONE 80 MILLIGRAM(S): 80 TABLET, CHEWABLE ORAL at 13:06

## 2020-01-01 RX ADMIN — INSULIN GLARGINE 40 UNIT(S): 100 INJECTION, SOLUTION SUBCUTANEOUS at 21:57

## 2020-01-01 RX ADMIN — Medication 1: at 12:49

## 2020-01-01 RX ADMIN — PROPOFOL 16.2 MICROGRAM(S)/KG/MIN: 10 INJECTION, EMULSION INTRAVENOUS at 05:00

## 2020-01-01 RX ADMIN — Medication 50 MILLILITER(S): at 18:20

## 2020-01-01 RX ADMIN — Medication 40 MILLIEQUIVALENT(S): at 09:16

## 2020-01-01 RX ADMIN — REPAGLINIDE 1 MILLIGRAM(S): 1 TABLET ORAL at 08:10

## 2020-01-01 RX ADMIN — Medication 40 MILLIEQUIVALENT(S): at 06:20

## 2020-01-01 RX ADMIN — Medication 8: at 17:48

## 2020-01-01 RX ADMIN — SODIUM CHLORIDE 500 MILLILITER(S): 9 INJECTION, SOLUTION INTRAVENOUS at 18:59

## 2020-01-01 RX ADMIN — INSULIN GLARGINE 20 UNIT(S): 100 INJECTION, SOLUTION SUBCUTANEOUS at 09:08

## 2020-01-01 RX ADMIN — Medication 1 TABLET(S): at 09:28

## 2020-01-01 RX ADMIN — Medication 30 MILLILITER(S): at 11:41

## 2020-01-01 RX ADMIN — MIDODRINE HYDROCHLORIDE 5 MILLIGRAM(S): 2.5 TABLET ORAL at 18:15

## 2020-01-01 RX ADMIN — MIDODRINE HYDROCHLORIDE 5 MILLIGRAM(S): 2.5 TABLET ORAL at 15:25

## 2020-01-01 RX ADMIN — Medication 9 UNIT(S): at 17:37

## 2020-01-01 RX ADMIN — Medication 3 MILLILITER(S): at 20:00

## 2020-01-01 RX ADMIN — Medication 102 MILLIGRAM(S): at 08:59

## 2020-01-01 RX ADMIN — Medication 4.23 MICROGRAM(S)/KG/MIN: at 19:35

## 2020-01-01 RX ADMIN — CHOLESTYRAMINE 4 GRAM(S): 4 POWDER, FOR SUSPENSION ORAL at 10:04

## 2020-01-01 RX ADMIN — MAGNESIUM OXIDE 400 MG ORAL TABLET 400 MILLIGRAM(S): 241.3 TABLET ORAL at 17:56

## 2020-01-01 RX ADMIN — MIDODRINE HYDROCHLORIDE 5 MILLIGRAM(S): 2.5 TABLET ORAL at 13:13

## 2020-01-01 RX ADMIN — Medication 8.46 MICROGRAM(S)/KG/MIN: at 06:40

## 2020-01-01 RX ADMIN — OCTREOTIDE ACETATE 100 MICROGRAM(S): 200 INJECTION, SOLUTION INTRAVENOUS; SUBCUTANEOUS at 21:08

## 2020-01-01 RX ADMIN — PROPOFOL 180 MILLIGRAM(S): 10 INJECTION, EMULSION INTRAVENOUS at 06:50

## 2020-01-01 RX ADMIN — PROPOFOL 16.2 MICROGRAM(S)/KG/MIN: 10 INJECTION, EMULSION INTRAVENOUS at 00:39

## 2020-01-01 RX ADMIN — Medication 1 MILLIGRAM(S): at 12:35

## 2020-01-01 RX ADMIN — CHOLESTYRAMINE 4 GRAM(S): 4 POWDER, FOR SUSPENSION ORAL at 09:21

## 2020-01-01 RX ADMIN — Medication 4: at 12:43

## 2020-01-01 RX ADMIN — Medication 50 MILLILITER(S): at 10:44

## 2020-01-01 RX ADMIN — Medication 1 TABLET(S): at 12:35

## 2020-01-01 RX ADMIN — Medication 4.23 MICROGRAM(S)/KG/MIN: at 06:30

## 2020-01-01 RX ADMIN — Medication 50 MILLILITER(S): at 05:34

## 2020-01-01 RX ADMIN — MAGNESIUM OXIDE 400 MG ORAL TABLET 400 MILLIGRAM(S): 241.3 TABLET ORAL at 13:13

## 2020-01-01 RX ADMIN — Medication 40 MILLIEQUIVALENT(S): at 08:31

## 2020-01-01 RX ADMIN — Medication 1: at 13:27

## 2020-01-01 RX ADMIN — Medication 1 TABLET(S): at 12:36

## 2020-01-01 RX ADMIN — Medication 30 MILLILITER(S): at 18:22

## 2020-01-01 RX ADMIN — Medication 100 GRAM(S): at 16:26

## 2020-01-01 RX ADMIN — Medication 1 TABLET(S): at 11:59

## 2020-01-01 RX ADMIN — Medication 1 TABLET(S): at 14:26

## 2020-01-01 RX ADMIN — Medication 30 MILLILITER(S): at 17:42

## 2020-01-01 RX ADMIN — Medication 40 MILLIGRAM(S): at 05:08

## 2020-01-01 RX ADMIN — CHLORHEXIDINE GLUCONATE 15 MILLILITER(S): 213 SOLUTION TOPICAL at 18:19

## 2020-01-01 RX ADMIN — PANTOPRAZOLE SODIUM 40 MILLIGRAM(S): 20 TABLET, DELAYED RELEASE ORAL at 06:10

## 2020-01-01 RX ADMIN — PANTOPRAZOLE SODIUM 40 MILLIGRAM(S): 20 TABLET, DELAYED RELEASE ORAL at 06:11

## 2020-01-01 RX ADMIN — Medication 50 MILLILITER(S): at 05:43

## 2020-01-01 RX ADMIN — Medication 50 MILLILITER(S): at 13:05

## 2020-01-01 RX ADMIN — Medication 8.46 MICROGRAM(S)/KG/MIN: at 23:15

## 2020-01-01 RX ADMIN — Medication 30 MILLILITER(S): at 05:24

## 2020-01-01 RX ADMIN — FENTANYL CITRATE 50 MICROGRAM(S): 50 INJECTION INTRAVENOUS at 07:35

## 2020-01-01 RX ADMIN — INSULIN GLARGINE 40 UNIT(S): 100 INJECTION, SOLUTION SUBCUTANEOUS at 23:47

## 2020-01-01 RX ADMIN — Medication 50 GRAM(S): at 09:16

## 2020-01-01 RX ADMIN — OCTREOTIDE ACETATE 10 MICROGRAM(S)/HR: 200 INJECTION, SOLUTION INTRAVENOUS; SUBCUTANEOUS at 08:00

## 2020-01-01 RX ADMIN — Medication 1 TABLET(S): at 13:07

## 2020-01-01 RX ADMIN — OCTREOTIDE ACETATE 100 MICROGRAM(S): 200 INJECTION, SOLUTION INTRAVENOUS; SUBCUTANEOUS at 06:51

## 2020-01-01 RX ADMIN — Medication 1 TABLET(S): at 08:26

## 2020-01-01 RX ADMIN — Medication 4: at 21:43

## 2020-01-01 RX ADMIN — Medication 6: at 17:53

## 2020-01-01 RX ADMIN — Medication 50 MILLILITER(S): at 17:22

## 2020-01-01 RX ADMIN — Medication 30 MILLILITER(S): at 06:49

## 2020-01-01 RX ADMIN — Medication 100 MILLIEQUIVALENT(S): at 08:36

## 2020-01-01 RX ADMIN — OCTREOTIDE ACETATE 10 MICROGRAM(S)/HR: 200 INJECTION, SOLUTION INTRAVENOUS; SUBCUTANEOUS at 18:50

## 2020-01-01 RX ADMIN — FENTANYL CITRATE 2.26 MICROGRAM(S)/KG/HR: 50 INJECTION INTRAVENOUS at 08:00

## 2020-01-01 RX ADMIN — SIMETHICONE 80 MILLIGRAM(S): 80 TABLET, CHEWABLE ORAL at 21:00

## 2020-01-01 RX ADMIN — Medication 50 MILLILITER(S): at 14:05

## 2020-01-01 RX ADMIN — URSODIOL 500 MILLIGRAM(S): 250 TABLET, FILM COATED ORAL at 06:11

## 2020-01-01 RX ADMIN — Medication 50 MILLILITER(S): at 18:22

## 2020-01-01 RX ADMIN — Medication 1 TABLET(S): at 13:14

## 2020-01-01 RX ADMIN — Medication 1 MILLIGRAM(S): at 13:07

## 2020-01-01 RX ADMIN — Medication 40 MILLIGRAM(S): at 12:25

## 2020-01-01 RX ADMIN — Medication 50 MILLILITER(S): at 12:25

## 2020-01-01 RX ADMIN — Medication 500 MILLIGRAM(S): at 17:52

## 2020-01-01 RX ADMIN — Medication 11 UNIT(S): at 12:43

## 2020-01-01 RX ADMIN — CHLORHEXIDINE GLUCONATE 15 MILLILITER(S): 213 SOLUTION TOPICAL at 03:54

## 2020-01-01 RX ADMIN — Medication 5 MILLIGRAM(S): at 13:28

## 2020-01-01 RX ADMIN — METHYLNALTREXONE BROMIDE 4 MILLIGRAM(S): 12 INJECTION, SOLUTION SUBCUTANEOUS at 11:25

## 2020-01-01 RX ADMIN — ZINC SULFATE TAB 220 MG (50 MG ZINC EQUIVALENT) 220 MILLIGRAM(S): 220 (50 ZN) TAB at 05:55

## 2020-01-01 RX ADMIN — Medication 62.5 MILLIMOLE(S): at 22:00

## 2020-01-01 RX ADMIN — OCTREOTIDE ACETATE 100 MICROGRAM(S): 200 INJECTION, SOLUTION INTRAVENOUS; SUBCUTANEOUS at 15:19

## 2020-01-01 RX ADMIN — Medication 30 MILLILITER(S): at 05:43

## 2020-01-01 RX ADMIN — INSULIN GLARGINE 45 UNIT(S): 100 INJECTION, SOLUTION SUBCUTANEOUS at 21:58

## 2020-01-01 RX ADMIN — MIDODRINE HYDROCHLORIDE 5 MILLIGRAM(S): 2.5 TABLET ORAL at 05:32

## 2020-01-01 RX ADMIN — MIDAZOLAM HYDROCHLORIDE 6 MILLIGRAM(S): 1 INJECTION, SOLUTION INTRAMUSCULAR; INTRAVENOUS at 07:08

## 2020-01-01 RX ADMIN — MAGNESIUM OXIDE 400 MG ORAL TABLET 400 MILLIGRAM(S): 241.3 TABLET ORAL at 09:42

## 2020-01-01 RX ADMIN — OCTREOTIDE ACETATE 100 MICROGRAM(S): 200 INJECTION, SOLUTION INTRAVENOUS; SUBCUTANEOUS at 14:14

## 2020-01-01 RX ADMIN — MIDODRINE HYDROCHLORIDE 5 MILLIGRAM(S): 2.5 TABLET ORAL at 12:13

## 2020-01-01 RX ADMIN — Medication 30 MILLILITER(S): at 21:29

## 2020-01-01 RX ADMIN — Medication 1: at 08:54

## 2020-01-01 RX ADMIN — PANTOPRAZOLE SODIUM 40 MILLIGRAM(S): 20 TABLET, DELAYED RELEASE ORAL at 05:08

## 2020-01-01 RX ADMIN — FENTANYL CITRATE 2.26 MICROGRAM(S)/KG/HR: 50 INJECTION INTRAVENOUS at 00:10

## 2020-01-01 RX ADMIN — MIDODRINE HYDROCHLORIDE 5 MILLIGRAM(S): 2.5 TABLET ORAL at 18:25

## 2020-01-01 RX ADMIN — Medication 50 MILLIGRAM(S): at 01:15

## 2020-01-01 RX ADMIN — CEFTRIAXONE 100 MILLIGRAM(S): 500 INJECTION, POWDER, FOR SOLUTION INTRAMUSCULAR; INTRAVENOUS at 13:28

## 2020-01-01 RX ADMIN — Medication 1 PACKET(S): at 09:28

## 2020-01-01 RX ADMIN — Medication 50 MILLILITER(S): at 16:15

## 2020-01-01 RX ADMIN — PROPOFOL 16.2 MICROGRAM(S)/KG/MIN: 10 INJECTION, EMULSION INTRAVENOUS at 03:45

## 2020-01-01 RX ADMIN — ZINC SULFATE TAB 220 MG (50 MG ZINC EQUIVALENT) 220 MILLIGRAM(S): 220 (50 ZN) TAB at 18:08

## 2020-01-01 RX ADMIN — Medication 1 TABLET(S): at 13:27

## 2020-01-01 RX ADMIN — Medication 2: at 17:40

## 2020-01-01 RX ADMIN — Medication 50 MILLILITER(S): at 23:46

## 2020-01-01 RX ADMIN — PANTOPRAZOLE SODIUM 10 MG/HR: 20 TABLET, DELAYED RELEASE ORAL at 23:24

## 2020-01-01 RX ADMIN — Medication 50 MILLILITER(S): at 14:04

## 2020-01-01 RX ADMIN — MIDODRINE HYDROCHLORIDE 5 MILLIGRAM(S): 2.5 TABLET ORAL at 21:45

## 2020-01-01 RX ADMIN — Medication 50 MILLILITER(S): at 22:37

## 2020-01-01 RX ADMIN — Medication 5 MILLIGRAM(S): at 09:13

## 2020-01-01 RX ADMIN — MIDODRINE HYDROCHLORIDE 5 MILLIGRAM(S): 2.5 TABLET ORAL at 22:38

## 2020-01-01 RX ADMIN — SIMETHICONE 80 MILLIGRAM(S): 80 TABLET, CHEWABLE ORAL at 21:44

## 2020-01-01 RX ADMIN — MAGNESIUM OXIDE 400 MG ORAL TABLET 400 MILLIGRAM(S): 241.3 TABLET ORAL at 18:08

## 2020-01-01 RX ADMIN — PANTOPRAZOLE SODIUM 40 MILLIGRAM(S): 20 TABLET, DELAYED RELEASE ORAL at 05:42

## 2020-01-01 RX ADMIN — Medication 1 PACKET(S): at 05:07

## 2020-01-01 RX ADMIN — Medication 40 MILLIEQUIVALENT(S): at 13:15

## 2020-01-01 RX ADMIN — Medication 1 TABLET(S): at 15:25

## 2020-01-01 RX ADMIN — SIMETHICONE 80 MILLIGRAM(S): 80 TABLET, CHEWABLE ORAL at 22:03

## 2020-01-01 RX ADMIN — Medication 1: at 18:14

## 2020-01-01 RX ADMIN — Medication 50 MILLIGRAM(S): at 07:45

## 2020-01-01 RX ADMIN — Medication 50 MILLILITER(S): at 06:22

## 2020-01-01 RX ADMIN — Medication 40 MILLIEQUIVALENT(S): at 13:26

## 2020-01-01 RX ADMIN — Medication 50 GRAM(S): at 20:34

## 2020-01-01 RX ADMIN — CHLORHEXIDINE GLUCONATE 15 MILLILITER(S): 213 SOLUTION TOPICAL at 05:22

## 2020-01-01 RX ADMIN — Medication 12 UNIT(S): at 13:17

## 2020-01-01 RX ADMIN — Medication 90 MILLIGRAM(S): at 17:20

## 2020-01-01 RX ADMIN — OCTREOTIDE ACETATE 100 MICROGRAM(S): 200 INJECTION, SOLUTION INTRAVENOUS; SUBCUTANEOUS at 05:07

## 2020-01-01 RX ADMIN — Medication 40 MILLIGRAM(S): at 05:46

## 2020-01-01 RX ADMIN — Medication 50 MILLILITER(S): at 15:59

## 2020-01-01 RX ADMIN — MIDODRINE HYDROCHLORIDE 5 MILLIGRAM(S): 2.5 TABLET ORAL at 05:07

## 2020-01-01 RX ADMIN — Medication 50 MILLILITER(S): at 02:41

## 2020-01-01 RX ADMIN — Medication 30 MILLILITER(S): at 21:59

## 2020-01-01 RX ADMIN — Medication 40 MILLIEQUIVALENT(S): at 09:44

## 2020-01-01 RX ADMIN — Medication 50 MILLILITER(S): at 21:10

## 2020-01-01 RX ADMIN — Medication 100 GRAM(S): at 08:35

## 2020-01-01 RX ADMIN — Medication 25 MILLIGRAM(S): at 09:02

## 2020-01-01 RX ADMIN — MIDODRINE HYDROCHLORIDE 5 MILLIGRAM(S): 2.5 TABLET ORAL at 05:25

## 2020-01-01 RX ADMIN — OCTREOTIDE ACETATE 100 MICROGRAM(S): 200 INJECTION, SOLUTION INTRAVENOUS; SUBCUTANEOUS at 22:17

## 2020-01-01 RX ADMIN — PANTOPRAZOLE SODIUM 40 MILLIGRAM(S): 20 TABLET, DELAYED RELEASE ORAL at 18:18

## 2020-01-01 RX ADMIN — PANTOPRAZOLE SODIUM 40 MILLIGRAM(S): 20 TABLET, DELAYED RELEASE ORAL at 17:44

## 2020-01-01 RX ADMIN — Medication 1 MILLIGRAM(S): at 14:26

## 2020-01-01 RX ADMIN — Medication 1 TABLET(S): at 12:24

## 2020-01-01 RX ADMIN — Medication 1: at 18:27

## 2020-01-01 RX ADMIN — Medication 40 MILLIGRAM(S): at 06:18

## 2020-01-01 RX ADMIN — CHLORHEXIDINE GLUCONATE 15 MILLILITER(S): 213 SOLUTION TOPICAL at 06:35

## 2020-01-01 RX ADMIN — CEFTRIAXONE 100 MILLIGRAM(S): 500 INJECTION, POWDER, FOR SOLUTION INTRAMUSCULAR; INTRAVENOUS at 15:38

## 2020-01-01 RX ADMIN — MAGNESIUM OXIDE 400 MG ORAL TABLET 400 MILLIGRAM(S): 241.3 TABLET ORAL at 08:19

## 2020-01-01 RX ADMIN — Medication 16 UNIT(S): at 17:32

## 2020-01-01 RX ADMIN — Medication 100 MILLIEQUIVALENT(S): at 09:33

## 2020-01-01 RX ADMIN — Medication 50 MILLILITER(S): at 18:10

## 2020-01-01 RX ADMIN — Medication 6: at 12:25

## 2020-01-01 RX ADMIN — Medication 4: at 08:26

## 2020-01-01 RX ADMIN — Medication 1: at 17:41

## 2020-01-01 RX ADMIN — Medication 330 GRAM(S): at 16:04

## 2020-01-01 RX ADMIN — Medication 100 MILLIEQUIVALENT(S): at 17:39

## 2020-01-01 RX ADMIN — MIDODRINE HYDROCHLORIDE 5 MILLIGRAM(S): 2.5 TABLET ORAL at 14:01

## 2020-01-01 RX ADMIN — Medication 50 MILLILITER(S): at 17:44

## 2020-01-01 RX ADMIN — Medication 50 MILLIGRAM(S): at 04:15

## 2020-01-01 RX ADMIN — Medication 2: at 13:28

## 2020-01-01 RX ADMIN — Medication 100 MILLIEQUIVALENT(S): at 18:59

## 2020-01-01 RX ADMIN — OCTREOTIDE ACETATE 100 MICROGRAM(S): 200 INJECTION, SOLUTION INTRAVENOUS; SUBCUTANEOUS at 05:24

## 2020-01-01 RX ADMIN — Medication 40 MILLIEQUIVALENT(S): at 22:36

## 2020-01-01 RX ADMIN — FAMOTIDINE 20 MILLIGRAM(S): 10 INJECTION INTRAVENOUS at 16:26

## 2020-01-01 RX ADMIN — Medication 1 TABLET(S): at 08:34

## 2020-01-01 RX ADMIN — Medication 11 UNIT(S): at 17:38

## 2020-01-01 RX ADMIN — MIDODRINE HYDROCHLORIDE 5 MILLIGRAM(S): 2.5 TABLET ORAL at 18:22

## 2020-01-01 RX ADMIN — SIMETHICONE 80 MILLIGRAM(S): 80 TABLET, CHEWABLE ORAL at 05:08

## 2020-01-01 RX ADMIN — Medication 1 PACKET(S): at 10:14

## 2020-01-01 RX ADMIN — Medication 9 UNIT(S): at 09:27

## 2020-01-01 RX ADMIN — Medication 100 MILLIEQUIVALENT(S): at 01:28

## 2020-01-01 RX ADMIN — Medication 50 MILLILITER(S): at 23:30

## 2020-01-01 RX ADMIN — URSODIOL 500 MILLIGRAM(S): 250 TABLET, FILM COATED ORAL at 14:26

## 2020-01-01 RX ADMIN — Medication 30 MILLILITER(S): at 15:27

## 2020-01-01 RX ADMIN — OCTREOTIDE ACETATE 10 MICROGRAM(S)/HR: 200 INJECTION, SOLUTION INTRAVENOUS; SUBCUTANEOUS at 23:24

## 2020-01-01 RX ADMIN — Medication 1 PACKET(S): at 12:43

## 2020-01-01 RX ADMIN — Medication 50 MILLILITER(S): at 02:18

## 2020-01-01 RX ADMIN — Medication 1 MILLIGRAM(S): at 13:18

## 2020-01-01 RX ADMIN — INSULIN GLARGINE 20 UNIT(S): 100 INJECTION, SOLUTION SUBCUTANEOUS at 12:16

## 2020-01-01 RX ADMIN — VASOPRESSIN 1.2 UNIT(S)/MIN: 20 INJECTION INTRAVENOUS at 18:50

## 2020-01-01 RX ADMIN — Medication 100 MILLIGRAM(S): at 12:15

## 2020-01-01 RX ADMIN — Medication 2: at 08:53

## 2020-01-01 RX ADMIN — OCTREOTIDE ACETATE 100 MICROGRAM(S): 200 INJECTION, SOLUTION INTRAVENOUS; SUBCUTANEOUS at 13:08

## 2020-01-01 RX ADMIN — Medication 500 MILLIGRAM(S): at 05:06

## 2020-01-01 RX ADMIN — SPIRONOLACTONE 100 MILLIGRAM(S): 25 TABLET, FILM COATED ORAL at 06:20

## 2020-01-01 RX ADMIN — Medication 1 TABLET(S): at 13:18

## 2020-01-01 RX ADMIN — LACTULOSE 10 GRAM(S): 10 SOLUTION ORAL at 09:12

## 2020-01-01 RX ADMIN — Medication 40 MILLIEQUIVALENT(S): at 12:39

## 2020-01-01 RX ADMIN — CEFTRIAXONE 100 MILLIGRAM(S): 500 INJECTION, POWDER, FOR SOLUTION INTRAMUSCULAR; INTRAVENOUS at 22:45

## 2020-01-01 RX ADMIN — Medication 500 MILLIGRAM(S): at 16:59

## 2020-01-01 RX ADMIN — Medication 1 TABLET(S): at 09:06

## 2020-01-01 RX ADMIN — CHLORHEXIDINE GLUCONATE 15 MILLILITER(S): 213 SOLUTION TOPICAL at 17:45

## 2020-01-01 RX ADMIN — ONDANSETRON 4 MILLIGRAM(S): 8 TABLET, FILM COATED ORAL at 22:55

## 2020-01-01 RX ADMIN — FENTANYL CITRATE 50 MICROGRAM(S): 50 INJECTION INTRAVENOUS at 07:05

## 2020-01-01 RX ADMIN — Medication 10 MILLIGRAM(S): at 11:59

## 2020-01-01 RX ADMIN — MIDODRINE HYDROCHLORIDE 5 MILLIGRAM(S): 2.5 TABLET ORAL at 06:52

## 2020-01-01 RX ADMIN — Medication 100 MILLIEQUIVALENT(S): at 08:51

## 2020-01-01 RX ADMIN — Medication 40 MILLIEQUIVALENT(S): at 18:19

## 2020-01-01 RX ADMIN — ZINC SULFATE TAB 220 MG (50 MG ZINC EQUIVALENT) 220 MILLIGRAM(S): 220 (50 ZN) TAB at 05:46

## 2020-01-01 RX ADMIN — PIPERACILLIN AND TAZOBACTAM 25 GRAM(S): 4; .5 INJECTION, POWDER, LYOPHILIZED, FOR SOLUTION INTRAVENOUS at 10:04

## 2020-01-01 RX ADMIN — Medication 40 MILLIEQUIVALENT(S): at 18:25

## 2020-01-01 RX ADMIN — Medication 2: at 12:44

## 2020-01-01 RX ADMIN — Medication 20 MILLIEQUIVALENT(S): at 08:36

## 2020-01-01 RX ADMIN — Medication 100 MILLIEQUIVALENT(S): at 20:07

## 2020-01-01 RX ADMIN — CEFTRIAXONE 100 MILLIGRAM(S): 500 INJECTION, POWDER, FOR SOLUTION INTRAMUSCULAR; INTRAVENOUS at 17:34

## 2020-01-01 RX ADMIN — Medication 30 MILLILITER(S): at 13:15

## 2020-01-01 RX ADMIN — Medication 1 PACKET(S): at 16:59

## 2020-01-01 RX ADMIN — SIMETHICONE 80 MILLIGRAM(S): 80 TABLET, CHEWABLE ORAL at 06:11

## 2020-01-01 RX ADMIN — MEROPENEM 100 MILLIGRAM(S): 1 INJECTION INTRAVENOUS at 22:01

## 2020-01-01 RX ADMIN — Medication 1: at 09:44

## 2020-01-01 RX ADMIN — Medication 8 UNIT(S): at 08:35

## 2020-01-01 RX ADMIN — Medication 50 MILLILITER(S): at 18:41

## 2020-01-01 RX ADMIN — MIDODRINE HYDROCHLORIDE 5 MILLIGRAM(S): 2.5 TABLET ORAL at 13:07

## 2020-01-01 RX ADMIN — Medication 4.23 MICROGRAM(S)/KG/MIN: at 00:05

## 2020-01-01 RX ADMIN — Medication 5 UNIT(S): at 13:03

## 2020-01-01 RX ADMIN — Medication 14 UNIT(S): at 17:52

## 2020-01-01 RX ADMIN — Medication 50 GRAM(S): at 05:07

## 2020-01-01 RX ADMIN — LACTULOSE 10 GRAM(S): 10 SOLUTION ORAL at 15:28

## 2020-01-01 RX ADMIN — SIMETHICONE 80 MILLIGRAM(S): 80 TABLET, CHEWABLE ORAL at 21:47

## 2020-01-01 RX ADMIN — OCTREOTIDE ACETATE 10 MICROGRAM(S)/HR: 200 INJECTION, SOLUTION INTRAVENOUS; SUBCUTANEOUS at 01:40

## 2020-01-01 RX ADMIN — URSODIOL 500 MILLIGRAM(S): 250 TABLET, FILM COATED ORAL at 22:03

## 2020-01-01 RX ADMIN — Medication 40 MILLIGRAM(S): at 05:06

## 2020-01-01 RX ADMIN — Medication 4: at 12:39

## 2020-01-01 RX ADMIN — Medication 100 MILLIGRAM(S): at 11:59

## 2020-01-01 RX ADMIN — Medication 1 PACKET(S): at 18:21

## 2020-01-01 RX ADMIN — Medication 1: at 18:01

## 2020-01-01 RX ADMIN — Medication 5 MILLIGRAM(S): at 13:31

## 2020-01-01 RX ADMIN — CHOLESTYRAMINE 4 GRAM(S): 4 POWDER, FOR SUSPENSION ORAL at 09:10

## 2020-01-01 RX ADMIN — Medication 16 UNIT(S): at 17:47

## 2020-01-01 RX ADMIN — ZINC SULFATE TAB 220 MG (50 MG ZINC EQUIVALENT) 220 MILLIGRAM(S): 220 (50 ZN) TAB at 18:01

## 2020-01-01 RX ADMIN — Medication 8.46 MICROGRAM(S)/KG/MIN: at 19:30

## 2020-01-01 RX ADMIN — OCTREOTIDE ACETATE 100 MICROGRAM(S): 200 INJECTION, SOLUTION INTRAVENOUS; SUBCUTANEOUS at 21:43

## 2020-01-01 RX ADMIN — PIPERACILLIN AND TAZOBACTAM 25 GRAM(S): 4; .5 INJECTION, POWDER, LYOPHILIZED, FOR SOLUTION INTRAVENOUS at 02:00

## 2020-01-01 RX ADMIN — Medication 50 MILLILITER(S): at 07:30

## 2020-01-10 NOTE — ED ADULT NURSE NOTE - PMH
His blood pressure still elevated so hydralazine would be increased to 25 mg 3 times daily.  I advised him strongly against smoking and drinking.   Alcoholic cirrhosis    Diabetes mellitus type 2, insulin dependent    Esophageal varices    Latent tuberculosis    Vitiligo

## 2020-03-03 NOTE — PATIENT PROFILE ADULT - NSTOBACCOWITHDRW_GEN_A_CORE_SD
Pt states none negative Alert & oriented; no sensory, motor or coordination deficits, normal reflexes

## 2020-05-15 NOTE — H&P ADULT - NSHPSOCIALHISTORY_GEN_ALL_CORE
Lives with fiance. Denies smoking or recreational drug. Former drinker; restarted drink about 1 month ago 6-8 beers a day. Last drink was 3 days ago.

## 2020-05-15 NOTE — H&P ADULT - NSHPPHYSICALEXAM_GEN_ALL_CORE
T(C): 36.7 (15 May 2020 19:50), Max: 36.8 (15 May 2020 16:28)  T(F): 98.1 (15 May 2020 19:50), Max: 98.3 (15 May 2020 16:28)  HR: 62 (15 May 2020 19:50) (62 - 72)  BP: 119/72 (15 May 2020 16:28) (100/62 - 119/72)  RR: 18 (15 May 2020 19:50) (18 - 18)  SpO2: 95% (15 May 2020 19:50) (95% - 98%)    PHYSICAL EXAM:  GENERAL: No acute distress, well-developed  HEAD:  Atraumatic, Normocephalic  EYES: EOMI, PERRLA, conjunctiva and sclera clear  NECK: Supple, no lymphadenopathy, no JVD  CHEST/LUNG: CTAB; No wheezes, rales, or rhonchi  HEART: Regular rate and rhythm; No murmurs, rubs, or gallops  ABDOMEN: Soft, non-tender, non-distended; normal bowel sounds, no organomegaly  EXTREMITIES:  2+ peripheral pulses b/l, No clubbing, cyanosis, or edema  NEUROLOGY: A&O x 3, no focal deficits  SKIN: No rashes or lesions T(C): 36.7 (15 May 2020 19:50), Max: 36.8 (15 May 2020 16:28)  T(F): 98.1 (15 May 2020 19:50), Max: 98.3 (15 May 2020 16:28)  HR: 62 (15 May 2020 19:50) (62 - 72)  BP: 119/72 (15 May 2020 16:28) (100/62 - 119/72)  RR: 18 (15 May 2020 19:50) (18 - 18)  SpO2: 95% (15 May 2020 19:50) (95% - 98%)    PHYSICAL EXAM:  GENERAL: No acute distress, jaundiced   HEAD:  Atraumatic, Normocephalic  EYES: EOMI, R pupil fixed and dilated (s/p corneal txp), L pupil reactive, scleral icterus  NECK: Supple, no lymphadenopathy  CHEST/LUNG: CTAB anteriorly; No wheezes, rales, or rhonchi  HEART: Regular rate and rhythm; No murmurs, rubs, or gallops  ABDOMEN: Soft, non-tender, moderately distended; increased BS+   EXTREMITIES:  2+ peripheral pulses b/l, trace to 1+ pitting edema to mid-shins b/l   NEUROLOGY: A&O x 3, no focal deficits. no asterixis, no tremors.  SKIN: vitiligo, jaundice. LE with small erythematous lesions b/l T(C): 36.7 (15 May 2020 19:50), Max: 36.8 (15 May 2020 16:28)  T(F): 98.1 (15 May 2020 19:50), Max: 98.3 (15 May 2020 16:28)  HR: 62 (15 May 2020 19:50) (62 - 72)  BP: 119/72 (15 May 2020 16:28) (100/62 - 119/72)  RR: 18 (15 May 2020 19:50) (18 - 18)  SpO2: 95% (15 May 2020 19:50) (95% - 98%)    PHYSICAL EXAM:  GENERAL: No acute distress, jaundiced   HEAD:  Atraumatic, Normocephalic  EYES: EOMI, R pupil fixed and dilated (s/p corneal txp), L pupil reactive, scleral icterus  NECK: Supple, no lymphadenopathy  CHEST/LUNG: CTAB anteriorly; No wheezes, rales, or rhonchi  HEART: Regular rate and rhythm; No murmurs, rubs, or gallops 1+ edema b/l LE  ABDOMEN: Soft, non-tender, moderately distended; increased BS+   EXTREMITIES:  2+ peripheral pulses b/l, trace to 1+ pitting edema to mid-shins b/l   NEUROLOGY: A&O x 3, no focal deficits. no asterixis, no tremors.  SKIN: vitiligo, jaundice. LE with small erythematous lesions b/l  PSYCH: flat affect, no si no hi

## 2020-05-15 NOTE — H&P ADULT - NSICDXPASTMEDICALHX_GEN_ALL_CORE_FT
PAST MEDICAL HISTORY:  Alcoholic cirrhosis     Diabetes mellitus type 2, insulin dependent     E. coli bacteremia Recurrent    Esophageal varices     Latent tuberculosis     Vitiligo

## 2020-05-15 NOTE — H&P ADULT - NSHPLABSRESULTS_GEN_ALL_CORE
Imaging, EKG, and labs personally reviewed.    EKG:    < from: CT Abdomen and Pelvis No Cont (05.15.20 @ 17:24) >  IMPRESSION:   Cirrhosis with portal hypertension. Evaluation for hepatocellular carcinoma is limited in the absence of intravenous contrast.  No biliary ductal dilatation.  Small to moderate ascites.  Right colonic wall thickening likely related to portal colopathy.    < from: Xray Chest 1 View- PORTABLE-Urgent (05.15.20 @ 18:36) >  INTERPRETATION:  Clear lungs. Imaging, EKG, and labs personally reviewed.    EKG: Rate 65, , , QTc 513. TWI aVR, V1, V2.     < from: CT Abdomen and Pelvis No Cont (05.15.20 @ 17:24) >  IMPRESSION:   Cirrhosis with portal hypertension. Evaluation for hepatocellular carcinoma is limited in the absence of intravenous contrast.  No biliary ductal dilatation.  Small to moderate ascites.  Right colonic wall thickening likely related to portal colopathy.    < from: Xray Chest 1 View- PORTABLE-Urgent (05.15.20 @ 18:36) >  INTERPRETATION:  Clear lungs.

## 2020-05-15 NOTE — H&P ADULT - PROBLEM SELECTOR PLAN 5
pt on levemir 80U at home, metformin 1000daily. last A1c 7.9 (10/2019)  - will place on ISS and FS qid   - will start on levemir 52 (2/3 home dose) and adjust as needed

## 2020-05-15 NOTE — ED ADULT NURSE REASSESSMENT NOTE - NS ED NURSE REASSESS COMMENT FT1
pharmacy to walk down medication to ED. will continue to monitor. patient comfort and safety provided.

## 2020-05-15 NOTE — CONSULT NOTE ADULT - SUBJECTIVE AND OBJECTIVE BOX
CHIEF COMPLAINT:    HPI:    PAST MEDICAL & SURGICAL HISTORY:  E. coli bacteremia: Recurrent  Vitiligo  Latent tuberculosis  Diabetes mellitus type 2, insulin dependent  Esophageal varices  Alcoholic cirrhosis  Status post corneal transplant      FAMILY HISTORY:  Family history of diabetes mellitus      SOCIAL HISTORY:  Smoking: [ ] Never Smoked [ ] Former Smoker (__ packs x ___ years) [ ] Current Smoker  (__ packs x ___ years)  Substance Use: [ ] Never Used [ ] Used ____  EtOH Use:  Marital Status: [ ] Single [ ]  [ ]  [ ]   Sexual History:   Occupation:  Recent Travel:  Country of Birth:  Advance Directives:    Allergies    levofloxacin (Other (Moderate))    Intolerances        HOME MEDICATIONS:  Home Medications:  cephalexin 500 mg oral capsule: 1 cap(s) orally once a day (18 Apr 2019 09:06)  Levemir 100 units/mL subcutaneous solution: 80 unit(s) subcutaneous once a day (at bedtime) (18 Apr 2019 09:06)  metFORMIN 500 mg oral tablet: 1 tab(s) orally once a day (18 Apr 2019 09:06)  nadolol 20 mg oral tablet: 1 tab(s) orally once a day (18 Apr 2019 09:06)  pantoprazole 40 mg oral delayed release tablet: 1 tab(s) orally once a day (18 Apr 2019 09:06)      REVIEW OF SYSTEMS:  Constitutional: [ ] negative [ ] fevers [ ] chills [ ] weight loss [ ] weight gain  HEENT: [ ] negative [ ] dry eyes [ ] eye irritation [ ] postnasal drip [ ] nasal congestion  CV: [ ] negative  [ ] chest pain [ ] orthopnea [ ] palpitations [ ] murmur  Resp: [ ] negative [ ] cough [ ] shortness of breath [ ] dyspnea [ ] wheezing [ ] sputum [ ] hemoptysis  GI: [ ] negative [ ] nausea [ ] vomiting [ ] diarrhea [ ] constipation [ ] abd pain [ ] dysphagia   : [ ] negative [ ] dysuria [ ] nocturia [ ] hematuria [ ] increased urinary frequency  Musculoskeletal: [ ] negative [ ] back pain [ ] myalgias [ ] arthralgias [ ] fracture  Skin: [ ] negative [ ] rash [ ] itch  Neurological: [ ] negative [ ] headache [ ] dizziness [ ] syncope [ ] weakness [ ] numbness  Psychiatric: [ ] negative [ ] anxiety [ ] depression  Endocrine: [ ] negative [ ] diabetes [ ] thyroid problem  Hematologic/Lymphatic: [ ] negative [ ] anemia [ ] bleeding problem  Allergic/Immunologic: [ ] negative [ ] itchy eyes [ ] nasal discharge [ ] hives [ ] angioedema  [ ] All other systems negative  [ ] Unable to assess ROS because ________    OBJECTIVE:  ICU Vital Signs Last 24 Hrs  T(C): 36.8 (15 May 2020 16:28), Max: 36.8 (15 May 2020 16:28)  T(F): 98.3 (15 May 2020 16:28), Max: 98.3 (15 May 2020 16:28)  HR: 72 (15 May 2020 16:28) (68 - 72)  BP: 119/72 (15 May 2020 16:28) (100/62 - 119/72)  RR: 18 (15 May 2020 16:28) (18 - 18)  SpO2: 98% (15 May 2020 16:28) (96% - 98%)        CAPILLARY BLOOD GLUCOSE          PHYSICAL EXAM:  General:   HEENT:   Lymph Nodes:  Neck:   Respiratory:   Cardiovascular:   Abdomen:   Extremities:   Skin:   Neurological:  Psychiatry:    LINES:     HOSPITAL MEDICATIONS:  Standing Meds:      PRN Meds:      LABS:                        11.7   8.22  )-----------( 61       ( 15 May 2020 14:38 )             33.8     Hgb Trend: 11.7<--  05-15    120<LL>  |  85<L>  |  17  ----------------------------<  150<H>  3.7   |  15<L>  |  3.82<H>    Ca    7.1<L>      15 May 2020 14:38  Phos  2.0     05-15  Mg     1.1     05-15    TPro  6.6  /  Alb  2.8<L>  /  TBili  34.5<H>  /  DBili  >10.0<H>  /  AST  167<H>  /  ALT  44  /  AlkPhos  112  05-15    Creatinine Trend: 3.82<--  PT/INR - ( 15 May 2020 14:38 )   PT: 35.9 sec;   INR: 3.04 ratio         PTT - ( 15 May 2020 14:38 )  PTT:46.2 sec          MICROBIOLOGY:       RADIOLOGY:  [ ] Reviewed and interpreted by me    EKG: CHIEF COMPLAINT:    HPI:  44yM with known alcoholic cirrhosis, decompensated liver failure, presents now with 2-3 days of not feeling well, dizziness, and worse jaundice.   Had been sober for several years but drinking for the last 2 weeks after his mother .          PAST MEDICAL & SURGICAL HISTORY:  E. coli bacteremia: Recurrent  Vitiligo  Latent tuberculosis  Diabetes mellitus type 2, insulin dependent  Esophageal varices  Alcoholic cirrhosis  Status post corneal transplant      FAMILY HISTORY:  Family history of diabetes mellitus      SOCIAL HISTORY:  Smoking: [ X] Never Smoked [ ] Former Smoker (__ packs x ___ years) [ ] Current Smoker  (__ packs x ___ years)  EtOH Use: current drinking  Marital Status: [ ] Single [X ]  [ ]  [ ]     Allergies    levofloxacin (Other (Moderate))    Intolerances        HOME MEDICATIONS:  Home Medications:  cephalexin 500 mg oral capsule: 1 cap(s) orally once a day (2019 09:06)  Levemir 100 units/mL subcutaneous solution: 80 unit(s) subcutaneous once a day (at bedtime) (2019 09:06)  metFORMIN 500 mg oral tablet: 1 tab(s) orally once a day (2019 09:06)  nadolol 20 mg oral tablet: 1 tab(s) orally once a day (2019 09:06)  pantoprazole 40 mg oral delayed release tablet: 1 tab(s) orally once a day (2019 09:06)      REVIEW OF SYSTEMS:  Constitutional: [X ] negative [ ] fevers [ ] chills [ ] weight loss [ ] weight gain  HEENT: [ X] negative [ ] dry eyes [ ] eye irritation [ ] postnasal drip [ ] nasal congestion  CV: X[ ] negative  [ ] chest pain [ ] orthopnea [ ] palpitations [ ] murmur  Resp: [ ] negative [ mild] cough [ no] shortness of breath [no ] dyspnea [ ] wheezing [ ] sputum [ ] hemoptysis  GI: [ ] negative [ X] nausea [X ] vomiting [ ] diarrhea [ ] constipation [ ] abd pain [ ] dysphagia   : [ X] negative [ ] dysuria [ ] nocturia [ ] hematuria [ ] increased urinary frequency  Musculoskeletal: [ X] negative [ ] back pain [ ] myalgias [ ] arthralgias [ ] fracture  Skin: [ X negative [ ] rash [ ] itch  Neurological: [ ] negative [ ] headache [X ] dizziness [ X] syncope [ ] weakness [ ] numbness  Psychiatric: [ ] negative [ ] anxiety [ ] depression  Endocrine: [ ] negative [ ] diabetes [ ] thyroid problem  Hematologic/Lymphatic: [ X] negative [ ] anemia [ ] bleeding problem  Allergic/Immunologic: [X ] negative [ ] itchy eyes [ ] nasal discharge [ ] hives [ ] angioedema  [ X] All other systems negative  [ ] Unable to assess ROS because ________    OBJECTIVE:  ICU Vital Signs Last 24 Hrs  T(C): 36.8 (15 May 2020 16:28), Max: 36.8 (15 May 2020 16:28)  T(F): 98.3 (15 May 2020 16:28), Max: 98.3 (15 May 2020 16:28)  HR: 72 (15 May 2020 16:28) (68 - 72)  BP: 119/72 (15 May 2020 16:28) (100/62 - 119/72)  RR: 18 (15 May 2020 16:28) (18 - 18)  SpO2: 98% (15 May 2020 16:28) (96% - 98%)        CAPILLARY BLOOD GLUCOSE    PHYSICAL EXAM:  General:  ill appearing, jaundiced  Respiratory:  normal effort on room air,   Cardiovascular: warm extremties, mild LE edema  Abdomen: distended, not tender to palpation  Extremities: mild edema  Skin: jaundiced  vitiligo over face and hands  Neurological: awake, alert, oriented, no asterixis  no enceophalopathy  Psychiatry:    LINES:     HOSPITAL MEDICATIONS:  Standing Meds:      PRN Meds:      LABS:                        11.7   8.22  )-----------( 61       ( 15 May 2020 14:38 )             33.8     Hgb Trend: 11.7<--  05-15    120<LL>  |  85<L>  |  17  ----------------------------<  150<H>  3.7   |  15<L>  |  3.82<H>    Ca    7.1<L>      15 May 2020 14:38  Phos  2.0     05-15  Mg     1.1     05-15    TPro  6.6  /  Alb  2.8<L>  /  TBili  34.5<H>  /  DBili  >10.0<H>  /  AST  167<H>  /  ALT  44  /  AlkPhos  112  05-15    Creatinine Trend: 3.82<--  PT/INR - ( 15 May 2020 14:38 )   PT: 35.9 sec;   INR: 3.04 ratio         PTT - ( 15 May 2020 14:38 )  PTT:46.2 sec          MICROBIOLOGY:       RADIOLOGY:  [ ] Reviewed and interpreted by me    EKG:

## 2020-05-15 NOTE — ED PROVIDER NOTE - ATTENDING CONTRIBUTION TO CARE
Attending MD Hendricks:  I personally have seen and examined this patient.  Resident note reviewed and agree on plan of care and except where noted.

## 2020-05-15 NOTE — ED PROVIDER NOTE - CLINICAL SUMMARY MEDICAL DECISION MAKING FREE TEXT BOX
Curly: cirrhotic Curly: 44M pmh alcoholic cirrhosis, was in remission but restarted drinking past two weeks heavily- now 2 days alcohol free with no signs of acute withdrawal (a/ox3, no pain, VSS, no agitation, no tremors). Painless jaundice eyes and skin. Plan: CTAP, coags, electrolytes, tba for worsening liver failure vs pancreatic mass possible vs choledocholithiasis. Curly: 44M pmh alcoholic cirrhosis, was in remission but restarted drinking past two weeks heavily- now 2 days alcohol free with no signs of acute withdrawal (a/ox3, no pain, VSS, no agitation, no tremors). Painless jaundice eyes and skin. Plan: CTAP, coags, electrolytes, tba for worsening liver failure vs pancreatic mass possible vs choledocholithiasis.    Attending MD Hendricks: 45 yo male with PMH for alcohol abuse and cirrhosis with recent binge drinking and now presents with juandice and intermittent abd pain.  No sign of withdrawal, last drink 2 days ago. Abd soft with fluid wave.  Plan: labs, lipase, IVF, banana bag, magnesium.

## 2020-05-15 NOTE — H&P ADULT - ATTENDING COMMENTS
Pt seen and examined at bedside.  I have precepted this case with house staff and agree with resident note above and have edited it where appropriate.  44 M with ETOH abuse, now in relapse, p/w abd distention, multiple electrolyte abn, and decompensated cirrhosis with labs concerning for etoh hepatitis + possible HRS vs. prerenal azotemia. diuretics on hold, s/p small volume resuscitation and aggressive electrolyte repletion (k/mag); sodium correcting at appropriate rate. Scr starting to downtrend. c/w octreotide IVP tid, albumin q6, ceftriaxone. IR eval for possible dx para, though pocket may be inadequate.   Care to be assumed by day hospitalist at 8 am.  This patient was assigned to me by the hospitalist in charge; my involvement in this case has consisted of the initial history, physical, chart review, and management plan.  This patient was previously unknown to me.

## 2020-05-15 NOTE — ED ADULT NURSE REASSESSMENT NOTE - NS ED NURSE REASSESS COMMENT FT1
patient resting on stretcher. patient denies pain/discomfort at this time. will continue to monitor. patient comfort and safety provided.

## 2020-05-15 NOTE — ED ADULT NURSE NOTE - OBJECTIVE STATEMENT
44 year old male presents to the ED c/o fatigue  since yesterday and jaundice  getting progressively worse for one week as well as decreased po intake and vomiting after eating for the ast 3 days.  He has a h/o liver cirrhosis and has been sober for several years, but his mother recently passed away and he has been drinking 6-8 beers per day for the last two weeks.  He said he felt shaley at home when he didn't drink.  Last drink was 2 days ago.  He denies: abdominal pain, shortness of breath, chest pain, nausea, fevers, sick contacts.

## 2020-05-15 NOTE — ED PROVIDER NOTE - PHYSICAL EXAMINATION
Physical Exam:    I have reviewed the triage vital signs.    Gen: NAD, AOx3, non-toxic appearing, able to ambulate without assistance  Head and Neck: NCAT, Neck supple without meningismus   HEENT: EOMI, PEERLA, normal conjunctiva, tongue midline, oral mucosa moist, jaundice.   Lung: CTAB, no respiratory distress, no wheezes/rhonchi/rales B/L, speaking in full sentences  CV: RRR, no murmurs, rubs or gallops  Abd: soft, NT, distended + fluid wave, no guarding, no rigidity, no rebound tenderness, no CVA tenderness, no masses.   MSK: no gross deformities, ROM normal in UE/LE, no back tenderness  Neuro: CNs II-XII grossly intact. No focal sensory or motor deficits  Skin: Warm, well perfused, no rash, no leg swelling. Skin is jaundice.   Psych: Appropriate mood and affect

## 2020-05-15 NOTE — ED ADULT NURSE REASSESSMENT NOTE - NS ED NURSE REASSESS COMMENT FT1
16:20 RN to pharmacy to send down medication to ED for patient. pending medication. will continue to monitor. patient comfort and safety proivded.

## 2020-05-15 NOTE — H&P ADULT - PROBLEM SELECTOR PLAN 7
DVT ppx: SCDs  Diet: dash regular  Dispo: tbd DVT ppx: SCDs given thrombocytopenia  Diet: dash regular  Dispo: tbd

## 2020-05-15 NOTE — H&P ADULT - PROBLEM SELECTOR PLAN 2
Stand pivot transfer performed with modA of 1. Ambulation  Ambulation?: No  Stairs/Curb  Stairs?: No     Balance  Posture: Good  Sitting - Static: Good;-  Sitting - Dynamic: Fair;+  Standing - Static: Fair;- ( 20 seconds)   Comments: Dynamic standing was not assessed on this date          Exercises:  Upper extremity exercises: Bicep curl, shoulder flexion/extension, punches, tricep curl, shoulder abduction/adduction. Reps: x10 with red Tband  BLE quad sets: x5     Goals  Short term goals  Time Frame for Short term goals: 14 visits  Short term goal 1: Pt will be Yared bed mobility  Short term goal 2: Pt will be Yared transfers  Short term goal 3: Pt will be CGA amb 10' RW  Short term goal 4: Pt will be safe to attempt steps  Short term goal 5: Pt to safely complete slide board transfer from bed to [address] [place] [address]  Times per week: 5-6x/wk  Current Treatment Recommendations: Strengthening, Balance Training, Functional Mobility Training, Endurance Training, Transfer Training, Gait Training, Home Exercise Program, Safety Education & Training, Patient/Caregiver Education & Training, Equipment Evaluation, Education, & procurement  Safety Devices  Type of devices: Call light within reach, Patient at risk for falls, Nurse notified, Left in chair, All fall risk precautions in place, Gait belt  Restraints  Initially in place: No     Therapy Time   Individual Concurrent Group Co-treatment   Time In 1346         Time Out 1421         Minutes [address] [place] [address]  Treatment performed by Student PTA under the supervision of co-signing PTA who agrees with all treatment and documentation.    Abhishek Soriano PTA crt 3.82; was 0.55 in 10/2019  - likely prerenal SAM  - will fluid resuscitate with albumin 25% 100mL q6hr   - monitor BMP q6  - f/u urine studies  - f/u US kidney  - monitor Is/Os crt 3.82; was 0.55 in 10/2019  - likely prerenal SAM but will start treatment for HRS with octreotide 100 mcg tid    - will fluid resuscitate with albumin 25% 100mL q6hr   - monitor BMP q6  - f/u urine studies  - f/u US kidney  - monitor Is/Os

## 2020-05-15 NOTE — CONSULT NOTE ADULT - ASSESSMENT
Assessment  Decompensated alcoholic cirrhosis, alcoholic hepatitis, with ascites.   History of varices but no signs of bleeding at this time.   Etiology of decompensation includes most likely alcohol and infection.   Currently hemodynamically stable with SBP in 120s, normal oxygen level on room air.   He does not have hepatic encephalopathy at this time though he is a risk for it.   He is at risk of hepatorenal syndrome, though his current presentation makes hypovolemia more likely given several days of not eating and nausea. His sodium is low  but it has been similarly low in the past.     Recs    Alcoholic hepatitis  Agree with hepatologists recommendations regarding IV albumin for resuscitation and rifaximin, and for antibiotics pending culture results.   - Can also give 10mg midodrine as needed to raise SBP  - check blood and urine cultures.   - diagnostic paracentesis to rule out SBP  - Already received vitamin K    Non-oliguric renal failure  - IV fluid as above.   - Real catheter for close monitoring of urine output and to track response to volume challenge and early signs of oliguria if renal failure progress.   Check urine sodium/Cr    At risk for Alcohol withdrawl  Heavy use over the past 2 weeks, however was previously not drinking according to the patient. If truly only drinking for 2 weeks, unlikely to have severe withdrawl now, but may have been drinking more and for longer than he reports.   - Would monitor and give symptom-triggered lorazepam. Avoid chlordiazepoxide due to liver dysfunction.         Disposition: Not accepted to MICU, please re-consult if needed.

## 2020-05-15 NOTE — H&P ADULT - PROBLEM SELECTOR PLAN 6
pt endorses being sober prior, but started to drink for the last month 6-8 beers, last drink 3 days ago.   - denies tremors or hallucinations currently, CIWA ~1; but does h/o withdrawal in the past   - will place on low-risk symptom trigger ativan

## 2020-05-15 NOTE — H&P ADULT - HISTORY OF PRESENT ILLNESS
45 yo M with PMH of latent TB, h/o C. diff (2016, 2017), T2DM (last HbA1c 7.9% in 10/2019), h/o alcoholic hepatitis, and decompensated EtOH cirrhosis c/b ascites, SBP, and variceal hemorrhage in the remote past, presents with jaundice and weakness x 2 days. Patient is a recovered alcoholic but started drinking again 2 weeks ago because his mother passed away. He has been drinking 6-8 beers a day but has not had alcohol in 2 days. Denies abdominal pain. Patient has noticed yellow skin and eyes. Endorses tremors last night. No agitation, confusion, palpitations, or hallucinations. No fever/chills. No cp. no sob. no cough. no n/v. Some diarrhea. No blood in stool. On medications for cirrhosis. Saw liver doctor last 3 months ago.    In ED vitals: T97.9, HR 70, /62, RR 18, 96% on RA.  Labs notable for thrombocytopenia, elevated INR, elevated creatinine, hyponatremia, elevated lipase. CT A/P and CXR done.   Given: cefotetan 2g, famotidine 20 ivp, vit K 5mg IVP x1, albumin 25% 100 mL x1.   Seen by hepatology and MICU. 43 yo M with PMH of latent TB, h/o C. diff (2016, 2017), T2DM (last HbA1c 7.9% in 10/2019), h/o alcoholic hepatitis, and decompensated EtOH cirrhosis c/b ascites, SBP, and variceal hemorrhage in the remote past, presents with jaundice and weakness x 2 days. Patient is a recovered alcoholic but states he started drinking again about 1 month ago because of mother passed away. He has been drinking 6-8 beers a day, states his last drink was 3 days ago. Endorses some abdominal distension but denies pain. Patient has noticed yellow skin and eyes for the last week. Also has decreased appetite and has not eaten much in the last 3 days, mostly taking in fluids. Endorses tremors last night but not currently. Denies visual/auditory hallucinations. Denies F/C/CP/SOB/cough. No N/V. Some diarrhea, last BM was 2 hours ago. Not sure if there is blood in stools. Last saw Dr. Thakkar (hepatology) several months ago.     In ED vitals: T97.9, HR 70, /62, RR 18, 96% on RA.  Labs notable for thrombocytopenia, elevated INR, elevated creatinine, hyponatremia, elevated lipase. CT A/P and CXR done.   Given: cefotetan 2g, famotidine 20 ivp, vit K 5mg IVP x1, albumin 25% 100 mL x1.   Seen by hepatology and MICU.

## 2020-05-15 NOTE — H&P ADULT - PROBLEM SELECTOR PLAN 1
MELD-Na 43, . acute decompensated cirrhosis 2/2 recent alcohol use  - hepatology recs appreciated  - pt with small-moderate ascites on imaging  - would get diagnostic tap in AM; less likely SBP given lack of abd pain, fever/chills, no leukocytosis but need to r/o   - will hold off on abx for now pending tap as pt nontoxic appearing and HD stable  - f/u cultures  - will start rifaximin 550BID given elevated ammonia although pt does not seem encephalopathic  - c/w albumin 25% 100cc q6  - will replete lytes as needed  - daily CMP/PT/INR MELD-Na 43, . acute decompensated cirrhosis 2/2 recent alcohol use  - hepatology recs appreciated  - pt with small-moderate ascites on imaging  - would get diagnostic tap in AM; less likely SBP given lack of abd pain, fever/chills, no leukocytosis but need to r/o   - will hold off on abx for now pending tap as pt nontoxic appearing and HD stable  - f/u cultures  - will start rifaximin 550BID given elevated ammonia although pt does not seem encephalopathic  - c/w albumin 25% 100cc q6  - will replete lytes as needed  - daily CMP/PT/INR  - f/u US abd duplex MELD-Na 43, . acute decompensated cirrhosis 2/2 recent alcohol use  - hepatology recs appreciated  - pt with small-moderate ascites on imaging  - would get IR diagnostic tap in AM; less likely SBP given lack of abd pain, fever/chills, no leukocytosis but need to r/o   - will start CTX 2g q24; if cultures negative can likely de-escalate   - f/u cultures  - will start rifaximin 550BID given elevated ammonia although pt does not seem encephalopathic  - c/w albumin 25% 100cc q6  - will replete lytes as needed  - daily CMP/PT/INR  - f/u US abd duplex

## 2020-05-15 NOTE — CONSULT NOTE ADULT - ASSESSMENT
44 year old man with hx of EtOH cirrhosis complicated by esophageal varices and diabetes presents with generalized weakness, abdominal distention and jaundice in the setting of decompensated cirrhosis due to recent EtOH use    Impression:  # Decompensated EtOH Cirrhosis - MELD-Na: 43 [5/15/20]  HE: AOx3  Ascites: Distended on exam  HCC: No hx, but overdue for HCC screening  Varices: Small varices on EGD (4/2019)  Coagulopathy: INR: 3.04, Plts: 61  Poor transplant candidate due to EtOH relapse  # DM  # SAM?: Creatinine significantly higher than baseline [0.65 to 3.82]. Differential includes pre-renal vs HRS  # Pancreatitis? Elevated Lipase in the absence of abdominal pain: No imaging done at this time  # Hyponatremia: Suspect hyponatremia [intravascular depletion] driving low Na. Unclear chronicity 44 year old man with hx of EtOH cirrhosis complicated by esophageal varices and diabetes presents with generalized weakness, abdominal distention and jaundice in the setting of decompensated cirrhosis due to recent EtOH use    Impression:  # Decompensated EtOH Cirrhosis - MELD-Na: 43 [5/15/20]  HE: AOx3, no asterixis  Ascites: Distended on exam  HCC: No hx, but overdue for HCC screening  Varices: Small varices on EGD (4/2019)  Coagulopathy: INR: 3.04, Plts: 61  Poor transplant candidate due to EtOH relapse  # DM  # SAM?: Creatinine significantly higher than baseline [0.65 to 3.82]. Differential includes pre-renal vs HRS  # Pancreatitis? Elevated Lipase in the absence of abdominal pain: No imaging done at this time  # Hyponatremia: Suspect hyponatremia [intravascular depletion] driving low Na. Unclear chronicity    Recommendation:  - Diagnostic Paracentesis prior to admission  --- Check Cell count, albumin, protein, cultures  - Check blood cultures, UA, Urine Culture and Chest X-Ray   - Repeat BMP at least Q4-6 to monitor for overcorrection of Na  - Check abdominal ultrasound with dopplers  - 25% Albumin 100cc Q6 for now  - Supportive care per primary team    Sanaz Kumar MD  Gastroenterology Fellow  [phone]  ·	Please page on call fellow on weekends and after 5pm on weekdays 44 year old man with hx of EtOH cirrhosis complicated by esophageal varices and diabetes presents with generalized weakness, abdominal distention and jaundice in the setting of decompensated cirrhosis due to recent EtOH use    Impression:  # Decompensated EtOH Cirrhosis - MELD-Na: 43 [5/15/20]  HE: AOx3, no asterixis  Ascites: Distended on exam  HCC: No hx, but overdue for HCC screening  Varices: Small varices on EGD (4/2019)  Coagulopathy: INR: 3.04, Plts: 61  Poor transplant candidate due to EtOH relapse  # DM  # SAM?: Creatinine significantly higher than baseline [0.65 to 3.82]. Differential includes pre-renal vs HRS  # Pancreatitis? Elevated Lipase in the absence of abdominal pain: No imaging done at this time  # Hyponatremia: Suspect hyponatremia [intravascular depletion] driving low Na. Unclear chronicity    Recommendation:  - Diagnostic Paracentesis prior to admission  --- Check Cell count, albumin, protein, cultures  - Check blood cultures, UA, Urine Culture and Chest X-Ray   - Repeat BMP at least Q4-6 to monitor for overcorrection of Na  --- Check Urine Na, urine osmolarity  - Check abdominal ultrasound with dopplers  - 25% Albumin 100cc Q6 for now  - Vitamin K PO 5mg PO qd x 3 days  - Supportive care per primary team    Sanaz Kumar MD  Gastroenterology Fellow  [phone]  ·	Please page on call fellow on weekends and after 5pm on weekdays 44 year old man with hx of EtOH cirrhosis complicated by esophageal varices and diabetes presents with generalized weakness, abdominal distention and jaundice in the setting of decompensated cirrhosis due to recent EtOH use    Impression:  # Decompensated EtOH Cirrhosis - MELD-Na: 43 [5/15/20]  HE: AOx3, no asterixis  Ascites: Distended on exam  HCC: No hx, but overdue for HCC screening  Varices: Small varices on EGD (4/2019)  Coagulopathy: INR: 3.04, Plts: 61  Poor transplant candidate due to EtOH relapse  # DM  # SAM?: Creatinine significantly higher than baseline [0.65 to 3.82]. Differential includes pre-renal vs HRS  # Pancreatitis? Elevated Lipase in the absence of abdominal pain: No imaging done at this time  # Hyponatremia: Suspect hyponatremia [intravascular depletion] driving low Na. Unclear chronicity    Recommendation:  - Diagnostic Paracentesis prior to admission  --- Check Cell count, albumin, protein, cultures  - Check blood cultures, UA, Urine Culture and Chest X-Ray   - Start broad-spectrum empiric antibiotics pending results of infectious work-up  - Repeat BMP at least Q4-6 to monitor for overcorrection of Na  --- Check Urine Na, urine osmolarity  - Check abdominal ultrasound with dopplers  - 25% Albumin 100cc Q6 for now  - Vitamin K PO 10mg PO qd x 3 days  - Supportive care per primary team    Sanaz Kumar MD  Gastroenterology Fellow  [phone]  ·	Please page on call fellow on weekends and after 5pm on weekdays

## 2020-05-15 NOTE — H&P ADULT - PROBLEM SELECTOR PLAN 3
Na 120; likely hypotonic hypovolemic hyponatremia.  - will resuscitate with albumin  - goal correction <=6-8 meq/24hrs  - monitor BMP q6  - f/u urine studies Na 120; likely hypotonic hypovolemic hyponatremia.  - will resuscitate with albumin  - goal correction <=6-8 meq/24hrs  - monitor BMP q4  - f/u urine studies  - renal c/s in AM

## 2020-05-15 NOTE — CONSULT NOTE ADULT - SUBJECTIVE AND OBJECTIVE BOX
Chief Complaint:  Patient is a 44y old  Male who presents with a chief complaint of jaundice    HPI:  44 year old man with hx of C. Diff, EtOH cirrhosis complicated by esophageal varices and diabetes presents with generalized weakness and jaundice. He reports recently lost his mother, which led to a relapse with alcohol. He reports drinking 6-8 beers daily for the past month. Over the last week he has noticed yellowing of his eyes, skin and darkening of his urine. He denies fevers, chills, chest pain, shortness of breath, hematochezia, hematemesis melena and abdominal pain. He had occasional nausea with meals, but otherwise denies nausea and vomiting. His last paracentesis was ~4 years ago and he states he is not currently on diuretics. He denies any EtOH use in the past 2 days    Allergies:  levofloxacin (Other (Moderate))    Home Medications:  Reviewed    Hospital Medications:  None    PMHX/PSHX:  E. coli bacteremia  Vitiligo  Latent tuberculosis  Diabetes mellitus type 2, insulin dependent  Esophageal varices  Alcoholic cirrhosis  Status post corneal transplant  Alcoholic Cirrhosis  Esophageal Varices    Family history:  Family history of uterine cancer  Family history of diabetes mellitus    Social History:   EtOH abuse [see above]  No drug use or tobacco use    ROS:   General:  No fevers, chills or night sweats.  ENT:  No sore throat or dysphagia  CV:  No pain or palpitations  Resp:  No dyspnea, cough, wheezing  GI:  No pain, No nausea, No vomiting, No diarrhea, No constipation, No weight loss, No pruritis, No rectal bleeding, No tarry stools  Skin:  No rash or edema    PHYSICAL EXAM:   GENERAL:  NAD, Appears stated age  HEENT:  NC/AT,  conjunctivae clear and pink, scleral icteris  CHEST:  CTA B/L, Normal effort  HEART:  RRR S1/S2  ABDOMEN:  Soft, non-tender, + distended, BS+  EXTEREMITIES:  No cyanosis trace edema  SKIN:  Warm & Dry.  NEURO:  Alert, oriented x 3    Vital Signs:  Vital Signs Last 24 Hrs  T(C): 36.8 (15 May 2020 16:28), Max: 36.8 (15 May 2020 16:28)  T(F): 98.3 (15 May 2020 16:28), Max: 98.3 (15 May 2020 16:28)  HR: 72 (15 May 2020 16:28) (68 - 72)  BP: 119/72 (15 May 2020 16:28) (100/62 - 119/72)  BP(mean): --  RR: 18 (15 May 2020 16:28) (18 - 18)  SpO2: 98% (15 May 2020 16:28) (96% - 98%)  Daily Height in cm: 165.1 (15 May 2020 12:38)    Daily     LABS:                        11.7   8.22  )-----------( 61       ( 15 May 2020 14:38 )             33.8     Mean Cell Volume: 72.7 fl (05-15-20 @ 14:38)    05-15    120<LL>  |  85<L>  |  17  ----------------------------<  150<H>  3.7   |  15<L>  |  3.82<H>    Ca    7.1<L>      15 May 2020 14:38  Phos  2.0     05-15  Mg     1.1     05-15    TPro  6.6  /  Alb  2.8<L>  /  TBili  34.5<H>  /  DBili  >10.0<H>  /  AST  167<H>  /  ALT  44  /  AlkPhos  112  05-15    LIVER FUNCTIONS - ( 15 May 2020 14:38 )  Alb: 2.8 g/dL / Pro: 6.6 g/dL / ALK PHOS: 112 U/L / ALT: 44 U/L / AST: 167 U/L / GGT: x           PT/INR - ( 15 May 2020 14:38 )   PT: 35.9 sec;   INR: 3.04 ratio         PTT - ( 15 May 2020 14:38 )  PTT:46.2 sec    Amylase Serum--      Lipase [id number]       [id number]                          11.7   8.22  )-----------( 61       ( 15 May 2020 14:38 )             33.8     Imaging: Chief Complaint:  Patient is a 44y old  Male who presents with a chief complaint of jaundice    HPI:  44 year old man with hx of C. Diff, EtOH cirrhosis complicated by esophageal varices and diabetes presents with generalized weakness and jaundice. He reports recently lost his mother, which led to a relapse with alcohol. He reports drinking 6-8 beers daily for the past month. Over the last week he has noticed yellowing of his eyes, skin and darkening of his urine. He denies fevers, chills, chest pain, shortness of breath, hematochezia, hematemesis melena and abdominal pain. He had occasional nausea with meals, but otherwise denies nausea and vomiting. His last paracentesis was ~4 years ago and he states he is not currently on diuretics. He denies any EtOH use in the past 2 days    Allergies:  levofloxacin (Other (Moderate))    Home Medications:  Reviewed    Hospital Medications:  None    PMHX/PSHX:  E. coli bacteremia  Vitiligo  Latent tuberculosis  Diabetes mellitus type 2, insulin dependent  Esophageal varices  Alcoholic cirrhosis  Status post corneal transplant  Alcoholic Cirrhosis  Esophageal Varices    Family history:  Family history of uterine cancer  Family history of diabetes mellitus    Social History:   EtOH abuse [see above]  No drug use or tobacco use    ROS:   General:  No fevers, chills or night sweats.  ENT:  No sore throat or dysphagia  CV:  No pain or palpitations  Resp:  No dyspnea, cough, wheezing  GI:  No pain, No nausea, No vomiting, No diarrhea, No rectal bleeding, No tarry stools  Skin:  No rash or edema    PHYSICAL EXAM:   GENERAL:  NAD, Appears stated age  HEENT:  NC/AT,  conjunctivae clear and pink, scleral icteris  CHEST:  CTA B/L, Normal effort  HEART:  RRR S1/S2  ABDOMEN:  Soft, non-tender, + distended, BS+  EXTEREMITIES:  No cyanosis trace edema  SKIN:  Warm & Dry.  NEURO:  Alert, oriented x 3    Vital Signs:  Vital Signs Last 24 Hrs  T(C): 36.8 (15 May 2020 16:28), Max: 36.8 (15 May 2020 16:28)  T(F): 98.3 (15 May 2020 16:28), Max: 98.3 (15 May 2020 16:28)  HR: 72 (15 May 2020 16:28) (68 - 72)  BP: 119/72 (15 May 2020 16:28) (100/62 - 119/72)  BP(mean): --  RR: 18 (15 May 2020 16:28) (18 - 18)  SpO2: 98% (15 May 2020 16:28) (96% - 98%)  Daily Height in cm: 165.1 (15 May 2020 12:38)    Daily     LABS:                        11.7   8.22  )-----------( 61       ( 15 May 2020 14:38 )             33.8     Mean Cell Volume: 72.7 fl (05-15-20 @ 14:38)    05-15    120<LL>  |  85<L>  |  17  ----------------------------<  150<H>  3.7   |  15<L>  |  3.82<H>    Ca    7.1<L>      15 May 2020 14:38  Phos  2.0     05-15  Mg     1.1     05-15    TPro  6.6  /  Alb  2.8<L>  /  TBili  34.5<H>  /  DBili  >10.0<H>  /  AST  167<H>  /  ALT  44  /  AlkPhos  112  05-15    LIVER FUNCTIONS - ( 15 May 2020 14:38 )  Alb: 2.8 g/dL / Pro: 6.6 g/dL / ALK PHOS: 112 U/L / ALT: 44 U/L / AST: 167 U/L / GGT: x           PT/INR - ( 15 May 2020 14:38 )   PT: 35.9 sec;   INR: 3.04 ratio         PTT - ( 15 May 2020 14:38 )  PTT:46.2 sec    Amylase Serum--      Lipase [id number]       [id number]                          11.7   8.22  )-----------( 61       ( 15 May 2020 14:38 )             33.8     Imaging: Chief Complaint:  Patient is a 44y old  Male who presents with a chief complaint of jaundice    HPI:  44 year old man with hx of C. Diff, EtOH cirrhosis complicated by esophageal varices and diabetes presents with generalized weakness and jaundice. He reports recently lost his mother, which led to a relapse with alcohol. He reports drinking 6-8 beers daily for the past month. Over the last week he has noticed yellowing of his eyes, skin and darkening of his urine. He denies fevers, chills, chest pain, shortness of breath, hematochezia, hematemesis melena and abdominal pain. +Anorexia. +Fatigue and generalized weakness. +Abdominal distension. He had occasional nausea with meals, but otherwise denies nausea and vomiting. His last paracentesis was ~4 years ago and he states he is not currently on diuretics. He denies any EtOH use in the past 2 days (last drink 5/13/20).    Allergies:  levofloxacin (Other (Moderate))    Home Medications:  Reviewed    Hospital Medications:  None    PMHX/PSHX:  E. coli bacteremia  Vitiligo  Latent tuberculosis  Diabetes mellitus type 2, insulin dependent  Esophageal varices  Alcoholic cirrhosis  Status post corneal transplant  Alcoholic Cirrhosis  Esophageal Varices    Family history:  Family history of uterine cancer  Family history of diabetes mellitus    Social History:   EtOH abuse [see above]  No drug use or tobacco use    ROS: Additional 10-point ROS reviewed with patient and negative except as above, plus: +intermittent dry cough that he attributes to "allergies" x2-4 weeks, +easy bruising.    PHYSICAL EXAM:    Vital Signs:  Vital Signs Last 24 Hrs  T(C): 36.8 (15 May 2020 16:28), Max: 36.8 (15 May 2020 16:28)  T(F): 98.3 (15 May 2020 16:28), Max: 98.3 (15 May 2020 16:28)  HR: 72 (15 May 2020 16:28) (68 - 72)  BP: 119/72 (15 May 2020 16:28) (100/62 - 119/72)  BP(mean): --  RR: 18 (15 May 2020 16:28) (18 - 18)  SpO2: 98% (15 May 2020 16:28) (96% - 98%)  Daily Height in cm: 165.1 (15 May 2020 12:38)    Daily     Constitutional: Well-developed, well-nourished, ill-appearing, lying supine, in no acute distress, with normal voice and communication.  Eyes: +Sclera icteric, conjunctiva normal, PERRL.  ENT: Ears and nose normal in appearance. Oropharynx normal with moist mucous membranes and no thrush.  Neck: Neck normal in appearance, supple, with no palpable thyromegaly or mass.  Lymphatics: The cervical and supraclavicular lymph nodes were non-tender and normal size.  Cardiovascular: Regular rate and rhythm, normal S1 and S2, no JVD, no peripheral edema.  Respiratory: Normal respiratory rhythm and effort, lungs CTAB.  Gastrointestinal: Normal bowel sounds, obese, +moderately distended with shifting dullness, soft, non-tender to palpation, +hepatomegaly, +splenomegaly, no palpable masses.  Musculoskeletal: Normal muscle tone, normal muscle bulk, +mild clubbing of fingernails on hands bilaterally, no cyanosis of the fingernails.  Skin: +Jaundiced, +spider angiomata, +vitiligo, normal skin turgor.  Neurologic: Alert, oriented to person, place, and date, no asterixis.  Psychiatric: Normal affect and mood, insight intact, recent memory was not impaired, remote memory was not impaired.    LABS:                        11.7   8.22  )-----------( 61       ( 15 May 2020 14:38 )             33.8     Mean Cell Volume: 72.7 fl (05-15-20 @ 14:38)    05-15    120<LL>  |  85<L>  |  17  ----------------------------<  150<H>  3.7   |  15<L>  |  3.82<H>    Ca    7.1<L>      15 May 2020 14:38  Phos  2.0     05-15  Mg     1.1     05-15    TPro  6.6  /  Alb  2.8<L>  /  TBili  34.5<H>  /  DBili  >10.0<H>  /  AST  167<H>  /  ALT  44  /  AlkPhos  112  05-15    LIVER FUNCTIONS - ( 15 May 2020 14:38 )  Alb: 2.8 g/dL / Pro: 6.6 g/dL / ALK PHOS: 112 U/L / ALT: 44 U/L / AST: 167 U/L / GGT: x           PT/INR - ( 15 May 2020 14:38 )   PT: 35.9 sec;   INR: 3.04 ratio         PTT - ( 15 May 2020 14:38 )  PTT:46.2 sec    Amylase Serum--      Lipase [id number]       [id number]                          11.7   8.22  )-----------( 61       ( 15 May 2020 14:38 )             33.8     Imaging:

## 2020-05-15 NOTE — ED PROVIDER NOTE - PROGRESS NOTE DETAILS
Curly: Spoke to in house GI regarding MELD score of 40, vitor of 34.5, need to billary stent. GI will call liver transplant team. Will need to coordinate with IR for treatment.

## 2020-05-15 NOTE — H&P ADULT - ASSESSMENT
43 yo M with PMH of latent TB, h/o C. diff (2016, 2017), T2DM (A1c 7.9% in 10/2019), h/o alcoholic hepatitis, and decompensated EtOH cirrhosis c/b ascites, SBP, and variceal hemorrhage in the remote past, presents with jaundice and weakness in setting of recent alcohol relapse, found to have SAM and hyponatremia, admitted for acute decompensated EtOH cirrhosis.

## 2020-05-15 NOTE — H&P ADULT - PROBLEM SELECTOR PLAN 8
Transitions of Care Status:  1.  Name of PCP: Dr. Woods  2.  PCP Contacted on Admission: [s] Y    [ ] N    3.  PCP contacted at Discharge: [ ] Y    [ ] N    [ ] N/A  4.  Post-Discharge Appointment Date and Location:  5.  Summary of Handoff given to PCP:

## 2020-05-15 NOTE — ED PROVIDER NOTE - OBJECTIVE STATEMENT
44M alcoholic cirrhotic, presents with weakness and abdominal gas x 2 days. Patient is recovered alcoholic but restarted 6-8 beers per day for last 2 weeks in setting of mother passing away. Patient has not had alcohol in two days. Denies abd pain. Also noticed yellow skin and eyes. Had tremors last night but not now. No agitation, confusion, palpitations, or hallucinations. No fever/chills. No cp. no sob. no cough. no n/v. Some diarrhea. No blood in stool. On medications for cirrhosis. Saw liver doctor last 3 months ago.

## 2020-05-16 NOTE — PROGRESS NOTE ADULT - SUBJECTIVE AND OBJECTIVE BOX
Patient is a 44y old  Male who presents with a chief complaint of Jaundice (16 May 2020 06:04)      SUBJECTIVE / OVERNIGHT EVENTS: Patient hypoglycemic this AM. After eating breakfast and juice, glucose now 134. Patient without any acute complaints Patient stating that he feels unsteady while waking and standing up. He denies any falls. Denies chest pain, abdominal pain, fevers, diaphoresis, dizziness. He believes that his belly is smaller in size compared to admission.     MEDICATIONS  (STANDING):  albumin human 25% IVPB 100 milliLiter(s) IV Intermittent every 6 hours  cefTRIAXone   IVPB 2000 milliGRAM(s) IV Intermittent every 24 hours  dextrose 5%. 1000 milliLiter(s) (50 mL/Hr) IV Continuous <Continuous>  dextrose 50% Injectable 12.5 Gram(s) IV Push once  dextrose 50% Injectable 25 Gram(s) IV Push once  dextrose 50% Injectable 25 Gram(s) IV Push once  folic acid 1 milliGRAM(s) Oral daily  insulin glargine Injectable (LANTUS) 52 Unit(s) SubCutaneous at bedtime  insulin lispro (HumaLOG) corrective regimen sliding scale   SubCutaneous three times a day before meals  insulin lispro (HumaLOG) corrective regimen sliding scale   SubCutaneous at bedtime  magnesium sulfate  IVPB 2 Gram(s) IV Intermittent once  multivitamin 1 Tablet(s) Oral daily  octreotide  Injectable 100 MICROGram(s) IV Push three times a day  pantoprazole    Tablet 40 milliGRAM(s) Oral before breakfast  phytonadione   Solution 10 milliGRAM(s) Oral daily  potassium chloride    Tablet ER 40 milliEquivalent(s) Oral every 4 hours  rifAXIMin 550 milliGRAM(s) Oral two times a day  thiamine 100 milliGRAM(s) Oral daily    MEDICATIONS  (PRN):  dextrose 40% Gel 15 Gram(s) Oral once PRN Blood Glucose LESS THAN 70 milliGRAM(s)/deciliter  glucagon  Injectable 1 milliGRAM(s) IntraMuscular once PRN Glucose LESS THAN 70 milligrams/deciliter  LORazepam   Injectable 2 milliGRAM(s) IV Push every 2 hours PRN CIWA-Ar score increase by 2 points and a total score of 7 or less  LORazepam   Injectable 2 milliGRAM(s) IV Push every 1 hour PRN CIWA-Ar score 8 or greater      Vital Signs Last 24 Hrs  T(C): 36.8 (16 May 2020 03:41), Max: 36.8 (15 May 2020 16:28)  T(F): 98.3 (16 May 2020 03:41), Max: 98.3 (15 May 2020 16:28)  HR: 63 (16 May 2020 03:41) (62 - 72)  BP: 113/68 (16 May 2020 03:41) (100/62 - 119/72)  BP(mean): --  RR: 18 (16 May 2020 03:41) (18 - 18)  SpO2: 93% (16 May 2020 03:41) (93% - 98%)  CAPILLARY BLOOD GLUCOSE      POCT Blood Glucose.: 79 mg/dL (16 May 2020 09:16)  POCT Blood Glucose.: 61 mg/dL (16 May 2020 08:41)  POCT Blood Glucose.: 61 mg/dL (16 May 2020 08:39)  POCT Blood Glucose.: 128 mg/dL (15 May 2020 21:43)    I&O's Summary    15 May 2020 07:01  -  16 May 2020 07:00  --------------------------------------------------------  IN: 1230 mL / OUT: 400 mL / NET: 830 mL        PHYSICAL EXAM:  GENERAL: NAD, able to speak in full sentences, jaundiced appearing noted all over,   HEAD:  Atraumatic, Normocephalic  EYES: EOMI, PERRLA, profound scleral icterus noted,  NECK: Supple  CHEST/LUNG: Clear to auscultation bilaterally; No wheeze, rales or rhonchi   HEART: Regular rate and rhythm; No murmurs, rubs, or gallops  ABDOMEN: Soft, Nontender, slightly distended; Bowel sounds present in all 4 quads, no organomegaly   EXTREMITIES:  2+ Peripheral Pulses, No  edema  PSYCH: AAOx3  NEUROLOGY: non-focal, able to move all ext., no asterixis   SKIN: jaundice, diffuse hypopigmented lesions throughout body (     LABS:                        10.4   8.14  )-----------( 37       ( 16 May 2020 06:57 )             31.8     05-16    115<LL>  |  79<L>  |  17  ----------------------------<  54<L>  2.7<LL>   |  14<L>  |  3.46<H>    Ca    6.7<L>      16 May 2020 06:57  Phos  3.1       Mg     34.1         TPro  5.2<L>  /  Alb  2.7<L>  /  TBili  31.0<H>  /  DBili  x   /  AST  96<H>  /  ALT  32  /  AlkPhos  89      PT/INR - ( 15 May 2020 14:38 )   PT: 35.9 sec;   INR: 3.04 ratio         PTT - ( 15 May 2020 14:38 )  PTT:46.2 sec      Urinalysis Basic - ( 15 May 2020 21:08 )    Color: Dark Yellow / Appearance: Slightly Turbid / S.006 / pH: x  Gluc: x / Ketone: Negative  / Bili: Large / Urobili: Negative   Blood: x / Protein: Trace / Nitrite: Negative   Leuk Esterase: Small / RBC: 6 /hpf / WBC 3 /HPF   Sq Epi: x / Non Sq Epi: 4 / Bacteria: Negative            < from: CT Abdomen and Pelvis No Cont (05.15.20 @ 17:24) >  IMPRESSION:     Cirrhosis with portal hypertension. Evaluation for hepatocellular carcinoma is limited in the absence of intravenous contrast.    No biliary ductal dilatation.    Small to moderate ascites.    Right colonic wall thickening likely related to portal colopathy.    < end of copied text >  < from: Xray Chest 1 View- PORTABLE-Urgent (05.15.20 @ 18:36) >  IMPRESSION:   Clear lungs.    < end of copied text >

## 2020-05-16 NOTE — PROGRESS NOTE ADULT - PROBLEM SELECTOR PLAN 2
- Cr 3.82; Baseline at of 10/2019 was 0.55   - likely prerenal SAM in setting of hypovolemia 2/2 to decompensated cirrhosis - - started on octreotide 100 mcg bid for   - c/w albumin 25% 100mL q6hr   - monitor BMP q6  - f/u urine studies  - US kidney pending - Cr 3.82; Baseline at of 10/2019 was 0.55   - likely prerenal SAM in setting of hypovolemia 2/2 to decompensated cirrhosis - - started on octreotide 100 mcg bid for ?HRS, but will DC now.   - c/w albumin 25% 100mL q6hr   - monitor BMP q4-6h  - f/u urine studies  - US kidney pending  -May need renal eval.  -Strict I's/O's.

## 2020-05-16 NOTE — PROGRESS NOTE ADULT - PROBLEM SELECTOR PLAN 7
DVT ppx: SCDs given thrombocytopenia  Diet: dash regular  Dispo: tbd DVT ppx: SCDs given thrombocytopenia  Diet: dash regular fluid restricted  Dispo: tbd

## 2020-05-16 NOTE — PROGRESS NOTE ADULT - PROBLEM SELECTOR PLAN 6
- pt endorses being sober prior, but started to drink for the last month 6-8 beers due to death in family, states that last drink 3 days ago.   - denies tremors or hallucinations, CIWA =0, no asterixis, tremors or hallucinations   - c/w low-risk symptom trigger ativan - pt endorses being sober prior, but started to drink for the last month 6-8 beers due to death in family, states that last drink 3 days ago.   - denies tremors or hallucinations, CIWA =0, no asterixis, tremors or hallucinations   - c/w low-risk symptom trigger ativan for now - pt endorses being sober in the past, but started to drink for the last month 6-8 beers due to death in family, states that last drink ~3 days ago.   - denies tremors or hallucinations, CIWA =0, no asterixis, tremors or hallucinations   - c/w low-risk symptom trigger ativan for now PRN  -C/w MVI, thiamine, folic acid.

## 2020-05-16 NOTE — PROVIDER CONTACT NOTE (OTHER) - RECOMMENDATIONS
Repeat FS 79, RN asking if Pt needs additional correction. MD will see Pt at bedside, Pt given additional juice

## 2020-05-16 NOTE — PROGRESS NOTE ADULT - ASSESSMENT
45 yo M with PMH of latent TB, h/o C. diff (2016, 2017), T2DM (A1c 7.9% in 10/2019), h/o alcoholic hepatitis, and decompensated EtOH cirrhosis c/b ascites, SBP, and variceal hemorrhage in the remote past, presents with jaundice and weakness in setting of recent alcohol relapse, found to have SAM and hyponatremia, admitted for acute decompensated EtOH cirrhosis.

## 2020-05-16 NOTE — PROGRESS NOTE ADULT - PROBLEM SELECTOR PLAN 1
MELD-Na 43, , on admission, likely to acute decompensated cirrhosis 2/2 recent alcohol abuse   - hepatology consulted recommending fluid resuscitation with albumin 100ml q6hrs   - on CT, found to have small-moderate ascites; on exam patient abd appears   - c/w CTX 2g q24 for SBP ppx; BCx pending; if negative can consider descalating in setting of no abdominal pain on exam, afebrile, with no elevate WBCs  - ammonia wnl, patient not encephalopathic at this time   - US abd duplex pending to further assess for ascites MELD-Na 43, , on admission, likely to acute decompensated cirrhosis 2/2 recent alcohol abuse   - hepatology consulted recommending fluid resuscitation with albumin 100ml q6hrs   - on CT, found to have small-moderate ascites; on exam patient abd appears   - c/w CTX 2g q24 for SBP ppx; BCx pending; if negative can consider descalating in setting of no abdominal pain on exam, afebrile, with no elevate WBCs  - ammonia wnl, patient not encephalopathic at this time   - d/c octreotide as patient does not appear to be in hepatorenal syndrome at this time.   - US abd duplex pending to further assess for ascites MELD-Na 43, , on admission, likely to acute decompensated cirrhosis 2/2 recent alcohol abuse   - hepatology consulted recommending fluid resuscitation with albumin 100ml q6hrs   - on CT, found to have small-moderate ascites; on exam patient abd appears   - c/w CTX 2g q24 for SBP ppx; BCx pending; if negative can consider deescalating in setting of no abdominal pain on exam, afebrile, with no elevated WBCs  - ammonia wnl, patient not encephalopathic at this time. -F/u repeat NH3 given hemolysis. -C/w rifaximin.   - d/c octreotide as patient does not appear to be in hepatorenal syndrome at this time.   - US abd duplex pending to further assess for ascites. May need paracentesis.  -C/w PPI.

## 2020-05-16 NOTE — PROGRESS NOTE ADULT - SUBJECTIVE AND OBJECTIVE BOX
Chief Complaint: Yellowing of the skin  Reason for consult: Cirrhosis, alcoholic hepatitis    Interval Events:   - No acute events overnight    Allergies:  levofloxacin (Other (Moderate))    Hospital Medications:  albumin human 25% IVPB 100 milliLiter(s) IV Intermittent every 6 hours  cefTRIAXone   IVPB 2000 milliGRAM(s) IV Intermittent every 24 hours  dextrose 40% Gel 15 Gram(s) Oral once PRN  dextrose 5%. 1000 milliLiter(s) IV Continuous <Continuous>  dextrose 50% Injectable 12.5 Gram(s) IV Push once  dextrose 50% Injectable 25 Gram(s) IV Push once  dextrose 50% Injectable 25 Gram(s) IV Push once  folic acid 1 milliGRAM(s) Oral daily  glucagon  Injectable 1 milliGRAM(s) IntraMuscular once PRN  insulin glargine Injectable (LANTUS) 52 Unit(s) SubCutaneous at bedtime  insulin lispro (HumaLOG) corrective regimen sliding scale   SubCutaneous three times a day before meals  insulin lispro (HumaLOG) corrective regimen sliding scale   SubCutaneous at bedtime  LORazepam   Injectable 2 milliGRAM(s) IV Push every 2 hours PRN  LORazepam   Injectable 2 milliGRAM(s) IV Push every 1 hour PRN  multivitamin 1 Tablet(s) Oral daily  octreotide  Injectable 100 MICROGram(s) IV Push three times a day  pantoprazole    Tablet 40 milliGRAM(s) Oral before breakfast  phytonadione   Solution 10 milliGRAM(s) Oral daily  potassium chloride    Tablet ER 40 milliEquivalent(s) Oral every 4 hours  potassium chloride  10 mEq/100 mL IVPB 10 milliEquivalent(s) IV Intermittent every 1 hour  rifAXIMin 550 milliGRAM(s) Oral two times a day  thiamine 100 milliGRAM(s) Oral daily    PMHX/PSHX:  E. coli bacteremia  Vitiligo  Latent tuberculosis  Diabetes mellitus type 2, insulin dependent  Esophageal varices  Alcoholic cirrhosis  Status post corneal transplant  Alcoholic Cirrhosis  Esophageal Varices    Family history:  Family history of uterine cancer  Family history of diabetes mellitus    ROS:     General:  No wt loss, fevers, chills, night sweats, fatigue,   Eyes:  Good vision, no reported pain  ENT:  No sore throat, pain, runny nose, dysphagia  CV:  No pain, palpitations, hypo/hypertension  Pulm:  No dyspnea, + cough, tachypnea, wheezing  GI:  No pain, No nausea, No vomiting, No diarrhea, No constipation, No weight loss, No fever, No pruritis, No rectal bleeding, No tarry stools, No dysphagia  :  No pain, bleeding, incontinence, nocturia  Muscle:  No pain, weakness  Neuro:  No weakness, tingling, memory problems  Psych:  No fatigue, insomnia, mood problems, depression  Endocrine:  No polyuria, polydipsia, cold/heat intolerance  Heme:  No petechiae, ecchymosis, easy bruisability  Skin:  No rash, tattoos, scars, edema    PHYSICAL EXAM:   Vital Signs:  Vital Signs Last 24 Hrs  T(C): 36.8 (16 May 2020 03:41), Max: 36.8 (15 May 2020 16:28)  T(F): 98.3 (16 May 2020 03:41), Max: 98.3 (15 May 2020 16:28)  HR: 63 (16 May 2020 03:41) (62 - 72)  BP: 113/68 (16 May 2020 03:41) (100/62 - 119/72)  BP(mean): --  RR: 18 (16 May 2020 03:41) (18 - 18)  SpO2: 93% (16 May 2020 03:41) (93% - 98%)  Daily Height in cm: 172.72 (15 May 2020 23:30)       GENERAL:  No acute distress  HEENT:  Normocephalic/atraumatic,  + scleral icterus  CHEST:  Normal effort, no accessory muscle use  ABDOMEN:  Soft, non-tender, mildly distended, normoactive bowel sounds, no masses, + hepato-splenomegaly  EXTREMITIES:  No cyanosis, clubbing, or edema  SKIN:  No rash/erythema, + jaundice  NEURO:  Alert and oriented x 3, no asterixis    LABS:                        11.5   7.85  )-----------( 54       ( 15 May 2020 21:08 )             34.4     Mean Cell Volume: 73.3 fl (15-20 @ 21:08)    -16    124<L>  |  89<L>  |  18  ----------------------------<  83  2.7<LL>   |  15<L>  |  3.56<H>    Ca    7.1<L>      16 May 2020 02:46  Phos  3.6     -  Mg     1.5     -16    TPro  6.3  /  Alb  3.0<L>  /  TBili  34.8<H>  /  DBili  x   /  AST  128<H>  /  ALT  42  /  AlkPhos  115  05-15    LIVER FUNCTIONS - ( 15 May 2020 21:08 )  Alb: 3.0 g/dL / Pro: 6.3 g/dL / ALK PHOS: 115 U/L / ALT: 42 U/L / AST: 128 U/L / GGT: x           PT/INR - ( 15 May 2020 14:38 )   PT: 35.9 sec;   INR: 3.04 ratio         PTT - ( 15 May 2020 14:38 )  PTT:46.2 sec  Urinalysis Basic - ( 15 May 2020 21:08 )    Color: Dark Yellow / Appearance: Slightly Turbid / S.006 / pH: x  Gluc: x / Ketone: Negative  / Bili: Large / Urobili: Negative   Blood: x / Protein: Trace / Nitrite: Negative   Leuk Esterase: Small / RBC: 6 /hpf / WBC 3 /HPF   Sq Epi: x / Non Sq Epi: 4 / Bacteria: Negative      Amylase Serum--      Lipase [id number]       [id number]                          11.5   7.85  )-----------( 54       ( 15 May 2020 21:08 )             34.4                         11.7   8.22  )-----------( 61       ( 15 May 2020 14:38 )             33.8       Imaging:    < from: CT Abdomen and Pelvis No Cont (05.15.20 @ 17:24) >    EXAM:  CT ABDOMEN AND PELVIS                            PROCEDURE DATE:  05/15/2020            INTERPRETATION:  CLINICAL INFORMATION: Cirrhosis.. Elevated bilirubin, jaundice    COMPARISON: CT abdomen and pelvis 10/29/2018    PROCEDURE:   CT of theAbdomen and Pelvis was performed without intravenous contrast.   Intravenous contrast: None.  Oral contrast: None.  Sagittal and coronal reformats were performed.    FINDINGS:    LOWER CHEST: Mild bilateral gynecomastia.    LIVER: Cirrhosis. Limited evaluation for hepatocellular cancer given lack of intravenous contrast.  BILE DUCTS: Normal caliber.  GALLBLADDER: Within normal limits.  SPLEEN: Splenomegaly.  PANCREAS: Within normal limits.  ADRENALS: Within normal limits.  KIDNEYS/URETERS: Hypodense foci of the renal impairment bilaterally suggesting nephrocalcinosis. No hydronephrosis.    BLADDER: Within normal limits.  REPRODUCTIVE ORGANS: Prostate within normal limits.    BOWEL: Right colonic thickening likely related to portal colopathy. No bowel obstruction. Appendix is normal.  PERITONEUM: Small to moderate ascites.  VESSELS: Atherosclerotic changes. Extensive large upper abdominal varices including a patent paraumbilical vein and left abdominal wall varices.   RETROPERITONEUM/LYMPHNODES: No lymphadenopathy.    ABDOMINAL WALL: Within normal limits.  BONES: Degenerative changes.    IMPRESSION:     Cirrhosis with portal hypertension. Evaluation for hepatocellular carcinoma is limited in the absence of intravenous contrast.    No biliary ductal dilatation.    Small to moderate ascites.    Right colonic wall thickening likely related to portal colopathy.                JORDON MILLER M.D., RADIOLOGY RESIDENT  This document has been electronically signed.  MARY SUE M.D., ATTENDING RADIOLOGIST  This document has been electronically signed. May 15 2020  6:16PM                < end of copied text >

## 2020-05-16 NOTE — PROGRESS NOTE ADULT - PROBLEM SELECTOR PLAN 5
pt on levemir 80U at home, metformin 1000daily. last A1c 7.9 (10/2019)  - will place on ISS and FS qid   - will start on levemir 52 (2/3 home dose) and adjust as needed pt on levemir 80U at home, metformin 1000daily. last A1c 7.9 (10/2019)  - will place on ISS and FS qid   - will start on levemir 52 units at bedtime (2/3 home dose) and adjust as needed pt on levemir 80U at home, metformin 1000mg daily. last A1c 7.9 (10/2019)  - C/w ISS and FS qid   - Started Lantus 52 units at bedtime (2/3 home dose) and adjust as needed. -Will reduce to 45 units QHS for now given hypoglycemia in the morning.   -Nutrition eval.

## 2020-05-16 NOTE — PROGRESS NOTE ADULT - PROBLEM SELECTOR PLAN 4
- Elevated INR 3.04, with elevated PTT, PT, thrombocytopenia; correlates with decompensated cirrhosis causing overall coagulopathy.   - INR improved, s/p vit K IV   - c/w PO vit K 10mg daily x 3 days  - monitor for signs of bleeding - Elevated INR 3.04 on admission, with elevated PTT, PT, thrombocytopenia; correlates with decompensated cirrhosis causing overall coagulopathy.   - INR improved, s/p vit K IV   - c/w PO vit K 10mg daily x 3 days  - monitor for signs of bleeding

## 2020-05-16 NOTE — SBIRT NOTE ADULT - NSSBIRTBRIEFINTDET_GEN_A_CORE
Patient educated regarding the consequences of his alcohol use. Motivation to cut down/quit explored. Patient reports that he is ready to stop drinking. LCSW offered substance use resources. Patient reports that he was attending outpatient substance abuse treatment in [place] and will follow up to make a new appointment upon discharge.

## 2020-05-16 NOTE — PROGRESS NOTE ADULT - ASSESSMENT
44 year old man with hx of EtOH cirrhosis complicated by esophageal varices and diabetes presents with generalized weakness, abdominal distention and jaundice in the setting of decompensated cirrhosis due to recent EtOH use    Impression:  # Decompensated EtOH Cirrhosis - MELD-Na: 40 on 5/15/20  HE: AOx3, no asterixis  Ascites: Small to moderate ascites on CT A/P  HCC: No hx, but overdue for HCC screening  Varices: Small varices on EGD (4/2019)  Coagulopathy: INR: 3.04, Plts: 61  Not a transplant candidate due to EtOH relapse  # Abnormal liver enzymes: Likely represents alcoholic hepatitis given > 2:1 AST to ALT ratio, however, differential includes acute on chronic liver failure due to underlying infection.  on 5/15/20  # DM  # SAM: Likely pre-renal. Differential includes HRS.  # Hyponatremia: Suspect hyponatremia [intravascular depletion] driving low Na. Unclear chronicity    Recommendation:  - Monitor daily CMP and INR  - Monitor BMPs q4h to prevent overcorrection of serum Na  - Continue rifaximin 550 mg PO BID  - F/U BCx and UA/UC, and official CXR read  - Continue empiric antibiotics pending results of infectious work-up  - If infectious work-up negative x 48 hours, will plan to start steroids for alcoholic hepatitis  - F/U abdominal ultrasound with dopplers  - Continue 25% Albumin 100ml q6h   - Continue Vitamin K PO 10mg PO qd x 3 days  - Rest of care per primary team    Please call [phone] to speak to answering service for on-call GI fellow. Can also e-mail [email or web address]. 44 year old man with hx of EtOH cirrhosis complicated by esophageal varices and diabetes presents with generalized weakness, abdominal distention and jaundice in the setting of decompensated cirrhosis due to recent EtOH use    Impression:  # Decompensated EtOH Cirrhosis - MELD-Na: 40 on 5/15/20  HE: AOx3, no asterixis  Ascites: Small to moderate ascites on CT A/P  HCC: No hx, but overdue for HCC screening  Varices: Small varices on EGD (4/2019)  Coagulopathy: INR: 3.04, Plts: 61  Not a transplant candidate due to EtOH relapse  # Abnormal liver enzymes: Likely represents alcoholic hepatitis given > 2:1 AST to ALT ratio, however, differential includes acute on chronic liver failure due to underlying infection.  on 5/15/20  # DM  # SAM: Likely pre-renal. Differential includes HRS.  # Hyponatremia: Suspect hyponatremia [intravascular depletion] driving low Na. Unclear chronicity    Recommendation:  - Nephrology consult for management of hyponatremia  - Monitor daily CMP and INR  - Monitor BMPs q4h to prevent overcorrection of serum Na  - Continue rifaximin 550 mg PO BID  - F/U BCx and UA/UC, and official CXR read  - Continue empiric antibiotics pending results of infectious work-up  - If infectious work-up negative x 48 hours, will plan to start steroids for alcoholic hepatitis  - F/U abdominal ultrasound with dopplers  - Continue 25% Albumin 100ml q6h   - Continue Vitamin K PO 10mg PO qd x 3 days  - Rest of care per primary team    Please call [phone] to speak to answering service for on-call GI fellow. Can also e-mail [email or web address]. 44 year old man with hx of EtOH cirrhosis complicated by esophageal varices and diabetes presents with generalized weakness, abdominal distention and jaundice in the setting of decompensated cirrhosis due to recent EtOH use    Impression:  # Decompensated EtOH Cirrhosis - MELD-Na: 40 on 5/15/20  HE: AOx3, no asterixis  Ascites: Small to moderate ascites on CT A/P  HCC: No hx, but overdue for HCC screening  Varices: Small varices on EGD (4/2019)  Coagulopathy: INR: 3.04, Plts: 61  Not a transplant candidate due to EtOH relapse  # Abnormal liver enzymes: Likely represents alcoholic hepatitis given > 2:1 AST to ALT ratio, however, differential includes acute on chronic liver failure due to underlying infection.  on 5/15/20  # DM  # SAM: Likely pre-renal. Differential includes HRS.  # Hyponatremia: Suspect hyponatremia [intravascular depletion] driving low Na. Unclear chronicity    Recommendation:  - Nephrology consult for management of hyponatremia if serum Na continues to downtrend  - Monitor daily CMP and INR  - Monitor BMPs q4h to prevent overcorrection of serum Na  - Continue rifaximin 550 mg PO BID  - F/U BCx and UA/UC, and official CXR read  - Continue empiric antibiotics pending results of infectious work-up  - If infectious work-up negative x 48 hours, will plan to start steroids for alcoholic hepatitis  - F/U abdominal ultrasound with dopplers  - Continue 25% Albumin 100ml q6h   - Continue Vitamin K PO 10mg PO qd x 3 days  - Rest of care per primary team    Please call [phone] to speak to answering service for on-call GI fellow. Can also e-mail [email or web address].

## 2020-05-16 NOTE — PROGRESS NOTE ADULT - PROBLEM SELECTOR PLAN 3
Na 120; likely hypotonic hypovolemic hyponatremia.  - c/w IV albumin for resuscitation   - Na 115 this AM, will repeat BMP STAT, if Na continues to downtrend will consult nephrology: Na goal correction <=6-8 meq/24hrs Na 120; likely hypotonic hypovolemic hyponatremia.  - c/w IV albumin for resuscitation   - Na on repeat 124, will follow BMP q4, if Na continues to downtrend will consult   nephrology: Na goal correction <=6-8 meq/24hrs  - continue to monitor Na 120 on admission; ?hypotonic hypovolemic hyponatremia.  - c/w IV albumin for resuscitation   - Na on repeat 124, will follow BMP q4-6H, if Na continues to downtrend will consult nephrology: Na goal correction <=6-8 meq/24hrs  - continue to monitor  -Fluid restrict < 1L per day.

## 2020-05-16 NOTE — PROVIDER CONTACT NOTE (OTHER) - ASSESSMENT
Pt FS 61, repeat 61, Pt given juice and breakfast, RN activated hypoglycemic glucose gel order and gave to Pt, Pt resting in bed, no signs of distress, stable

## 2020-05-17 NOTE — PROGRESS NOTE ADULT - PROBLEM SELECTOR PLAN 4
- Elevated INR 3.04 on admission, with elevated PTT, PT, thrombocytopenia; correlates with decompensated cirrhosis causing overall coagulopathy.   - INR improved, s/p vit K IV   - c/w PO vit K 10mg daily x 3 days  - monitor for signs of bleeding

## 2020-05-17 NOTE — PROGRESS NOTE ADULT - SUBJECTIVE AND OBJECTIVE BOX
Patient is a 44y old  Male who presents with a chief complaint of Jaundice (17 May 2020 07:30)      SUBJECTIVE / OVERNIGHT EVENTS: No acute events overnight.     MEDICATIONS  (STANDING):  albumin human 25% IVPB 100 milliLiter(s) IV Intermittent every 6 hours  cefTRIAXone   IVPB 2000 milliGRAM(s) IV Intermittent every 24 hours  dextrose 5%. 1000 milliLiter(s) (50 mL/Hr) IV Continuous <Continuous>  dextrose 50% Injectable 12.5 Gram(s) IV Push once  dextrose 50% Injectable 25 Gram(s) IV Push once  dextrose 50% Injectable 25 Gram(s) IV Push once  folic acid 1 milliGRAM(s) Oral daily  insulin glargine Injectable (LANTUS) 45 Unit(s) SubCutaneous at bedtime  insulin lispro (HumaLOG) corrective regimen sliding scale   SubCutaneous three times a day before meals  insulin lispro (HumaLOG) corrective regimen sliding scale   SubCutaneous at bedtime  magnesium sulfate  IVPB 2 Gram(s) IV Intermittent once  multivitamin 1 Tablet(s) Oral daily  pantoprazole    Tablet 40 milliGRAM(s) Oral before breakfast  phytonadione   Solution 10 milliGRAM(s) Oral daily  rifAXIMin 550 milliGRAM(s) Oral two times a day  simethicone 80 milliGRAM(s) Chew three times a day  thiamine 100 milliGRAM(s) Oral daily    MEDICATIONS  (PRN):  dextrose 40% Gel 15 Gram(s) Oral once PRN Blood Glucose LESS THAN 70 milliGRAM(s)/deciliter  glucagon  Injectable 1 milliGRAM(s) IntraMuscular once PRN Glucose LESS THAN 70 milligrams/deciliter  LORazepam   Injectable 2 milliGRAM(s) IV Push every 2 hours PRN CIWA-Ar score increase by 2 points and a total score of 7 or less  LORazepam   Injectable 2 milliGRAM(s) IV Push every 1 hour PRN CIWA-Ar score 8 or greater      Vital Signs Last 24 Hrs  T(C): 36.7 (17 May 2020 05:12), Max: 37.2 (16 May 2020 16:17)  T(F): 98 (17 May 2020 05:12), Max: 99 (16 May 2020 16:17)  HR: 69 (17 May 2020 05:12) (58 - 70)  BP: 123/67 (17 May 2020 05:12) (94/56 - 123/67)  BP(mean): --  RR: 18 (17 May 2020 05:12) (18 - 18)  SpO2: 95% (17 May 2020 05:12) (92% - 95%)  CAPILLARY BLOOD GLUCOSE      POCT Blood Glucose.: 248 mg/dL (16 May 2020 21:05)  POCT Blood Glucose.: 249 mg/dL (16 May 2020 17:27)  POCT Blood Glucose.: 184 mg/dL (16 May 2020 12:35)  POCT Blood Glucose.: 79 mg/dL (16 May 2020 09:16)  POCT Blood Glucose.: 61 mg/dL (16 May 2020 08:41)  POCT Blood Glucose.: 61 mg/dL (16 May 2020 08:39)    I&O's Summary    16 May 2020 07:01  -  17 May 2020 07:00  --------------------------------------------------------  IN: 480 mL / OUT: 0 mL / NET: 480 mL        PHYSICAL EXAM:  GENERAL: NAD, able to speak in full sentences, jaundiced appearing noted all over,   HEAD:  Atraumatic, Normocephalic  EYES: EOMI, PERRLA, profound scleral icterus noted,  NECK: Supple  CHEST/LUNG: Clear to auscultation bilaterally; No wheeze, rales or rhonchi   HEART: Regular rate and rhythm; No murmurs, rubs, or gallops  ABDOMEN: Soft, Nontender, slightly distended; Bowel sounds present in all 4 quads, no organomegaly   EXTREMITIES:  2+ Peripheral Pulses, No  edema  PSYCH: AAOx3  NEUROLOGY: non-focal, able to move all ext., no asterixis   SKIN: jaundice, diffuse hypopigmented lesions throughout body (     LABS:                        10.0   5.18  )-----------( 52       ( 17 May 2020 06:03 )             31.8     05-17    125<L>  |  92<L>  |  21  ----------------------------<  226<H>  3.5   |  14<L>  |  3.32<H>    Ca    7.3<L>      17 May 2020 06:03  Phos  3.2     05-  Mg     1.6     05-    TPro  5.2<L>  /  Alb  3.3  /  TBili  37.0<H>  /  DBili  >10.0<H>  /  AST  84<H>  /  ALT  27  /  AlkPhos  82  05-17    PT/INR - ( 16 May 2020 09:18 )   PT: 19.5 sec;   INR: 1.68 ratio         PTT - ( 16 May 2020 09:18 )  PTT:28.4 sec      Urinalysis Basic - ( 15 May 2020 21:08 )    Color: Dark Yellow / Appearance: Slightly Turbid / S.006 / pH: x  Gluc: x / Ketone: Negative  / Bili: Large / Urobili: Negative   Blood: x / Protein: Trace / Nitrite: Negative   Leuk Esterase: Small / RBC: 6 /hpf / WBC 3 /HPF   Sq Epi: x / Non Sq Epi: 4 / Bacteria: Negative        Culture - Blood (collected 15 May 2020 22:30)  Source: .Blood Blood  Preliminary Report (16 May 2020 23:01):    No growth to date.    Culture - Blood (collected 15 May 2020 22:30)  Source: .Blood Blood  Preliminary Report (16 May 2020 23:01):    No growth to date. Patient is a 44y old  Male who presents with a chief complaint of Jaundice (17 May 2020 07:30)      SUBJECTIVE / OVERNIGHT EVENTS: No acute events overnight. Patient states that he feels much improved than on admission. He states that his balance has improved. Denies any abd pain ,CP, HA, dizziness.     MEDICATIONS  (STANDING):  albumin human 25% IVPB 100 milliLiter(s) IV Intermittent every 6 hours  cefTRIAXone   IVPB 2000 milliGRAM(s) IV Intermittent every 24 hours  dextrose 5%. 1000 milliLiter(s) (50 mL/Hr) IV Continuous <Continuous>  dextrose 50% Injectable 12.5 Gram(s) IV Push once  dextrose 50% Injectable 25 Gram(s) IV Push once  dextrose 50% Injectable 25 Gram(s) IV Push once  folic acid 1 milliGRAM(s) Oral daily  insulin glargine Injectable (LANTUS) 45 Unit(s) SubCutaneous at bedtime  insulin lispro (HumaLOG) corrective regimen sliding scale   SubCutaneous three times a day before meals  insulin lispro (HumaLOG) corrective regimen sliding scale   SubCutaneous at bedtime  magnesium sulfate  IVPB 2 Gram(s) IV Intermittent once  multivitamin 1 Tablet(s) Oral daily  pantoprazole    Tablet 40 milliGRAM(s) Oral before breakfast  phytonadione   Solution 10 milliGRAM(s) Oral daily  rifAXIMin 550 milliGRAM(s) Oral two times a day  simethicone 80 milliGRAM(s) Chew three times a day  thiamine 100 milliGRAM(s) Oral daily    MEDICATIONS  (PRN):  dextrose 40% Gel 15 Gram(s) Oral once PRN Blood Glucose LESS THAN 70 milliGRAM(s)/deciliter  glucagon  Injectable 1 milliGRAM(s) IntraMuscular once PRN Glucose LESS THAN 70 milligrams/deciliter  LORazepam   Injectable 2 milliGRAM(s) IV Push every 2 hours PRN CIWA-Ar score increase by 2 points and a total score of 7 or less  LORazepam   Injectable 2 milliGRAM(s) IV Push every 1 hour PRN CIWA-Ar score 8 or greater      Vital Signs Last 24 Hrs  T(C): 36.7 (17 May 2020 05:12), Max: 37.2 (16 May 2020 16:17)  T(F): 98 (17 May 2020 05:12), Max: 99 (16 May 2020 16:17)  HR: 69 (17 May 2020 05:12) (58 - 70)  BP: 123/67 (17 May 2020 05:12) (94/56 - 123/67)  BP(mean): --  RR: 18 (17 May 2020 05:12) (18 - 18)  SpO2: 95% (17 May 2020 05:12) (92% - 95%)  CAPILLARY BLOOD GLUCOSE      POCT Blood Glucose.: 248 mg/dL (16 May 2020 21:05)  POCT Blood Glucose.: 249 mg/dL (16 May 2020 17:27)  POCT Blood Glucose.: 184 mg/dL (16 May 2020 12:35)  POCT Blood Glucose.: 79 mg/dL (16 May 2020 09:16)  POCT Blood Glucose.: 61 mg/dL (16 May 2020 08:41)  POCT Blood Glucose.: 61 mg/dL (16 May 2020 08:39)    I&O's Summary    16 May 2020 07:01  -  17 May 2020 07:00  --------------------------------------------------------  IN: 480 mL / OUT: 0 mL / NET: 480 mL        PHYSICAL EXAM:  GENERAL: NAD, able to speak in full sentences, jaundiced appearing noted all over,   HEAD:  Atraumatic, Normocephalic  EYES: EOMI, PERRLA, profound scleral icterus noted,  NECK: Supple  CHEST/LUNG: Clear to auscultation bilaterally; No wheeze, rales or rhonchi   HEART: Regular rate and rhythm; No murmurs, rubs, or gallops  ABDOMEN: Soft, Nontender, slightly distended; Bowel sounds present in all 4 quads, no organomegaly   EXTREMITIES:  2+ Peripheral Pulses, No  edema  PSYCH: AAOx3  NEUROLOGY: non-focal, able to move all ext., no asterixis   SKIN: jaundice, diffuse hypopigmented lesions throughout body     LABS:                        10.0   5.18  )-----------( 52       ( 17 May 2020 06:03 )             31.8     05-17    125<L>  |  92<L>  |  21  ----------------------------<  226<H>  3.5   |  14<L>  |  3.32<H>    Ca    7.3<L>      17 May 2020 06:03  Phos  3.2       Mg     1.6         TPro  5.2<L>  /  Alb  3.3  /  TBili  37.0<H>  /  DBili  >10.0<H>  /  AST  84<H>  /  ALT  27  /  AlkPhos  82      PT/INR - ( 16 May 2020 09:18 )   PT: 19.5 sec;   INR: 1.68 ratio         PTT - ( 16 May 2020 09:18 )  PTT:28.4 sec      Urinalysis Basic - ( 15 May 2020 21:08 )    Color: Dark Yellow / Appearance: Slightly Turbid / S.006 / pH: x  Gluc: x / Ketone: Negative  / Bili: Large / Urobili: Negative   Blood: x / Protein: Trace / Nitrite: Negative   Leuk Esterase: Small / RBC: 6 /hpf / WBC 3 /HPF   Sq Epi: x / Non Sq Epi: 4 / Bacteria: Negative        Culture - Blood (collected 15 May 2020 22:30)  Source: .Blood Blood  Preliminary Report (16 May 2020 23:01):    No growth to date.    Culture - Blood (collected 15 May 2020 22:30)  Source: .Blood Blood  Preliminary Report (16 May 2020 23:01):    No growth to date.

## 2020-05-17 NOTE — DISCHARGE NOTE PROVIDER - PROVIDER TOKENS
PROVIDER:[TOKEN:[[id number]],FOLLOWUP:[1 week]] PROVIDER:[TOKEN:[[id number]],FOLLOWUP:[1 week]],FREE:[LAST:[Endocrinology],PHONE:[phone]],FAX:[(   )    -],ADDRESS:[[address] [place] [address]]],PROVIDER:[TOKEN:[[id number]]]

## 2020-05-17 NOTE — DIETITIAN INITIAL EVALUATION ADULT. - PERTINENT LABORATORY DATA
Na 125, K+ 3.5, BUN 21, Cr 3.32, , Phos 3.2, AST 84, ALT 27, Mg 1.6, Ca 7.3,     CAPILLARY BLOOD GLUCOSE  POCT Blood Glucose.: 199 mg/dL (17 May 2020 08:17)  POCT Blood Glucose.: 248 mg/dL (16 May 2020 21:05)  POCT Blood Glucose.: 249 mg/dL (16 May 2020 17:27)  POCT Blood Glucose.: 184 mg/dL (16 May 2020 12:35)

## 2020-05-17 NOTE — DISCHARGE NOTE PROVIDER - NSFOLLOWUPCLINICS_GEN_ALL_ED_FT
[facility]  Gastroenterology  [address] [place] [id number]  Phone: [phone]  Fax:   Follow Up Time: 1 week

## 2020-05-17 NOTE — DIETITIAN INITIAL EVALUATION ADULT. - ADD RECOMMEND
change diet to consistent carbohydrate, DASH, 1000mlFluid. monitor need for diet ed reinforcement. change diet to consistent carbohydrate, DASH, 1000mlFluid. call out to Team 1 for changes. monitor need for diet ed reinforcement.

## 2020-05-17 NOTE — PROGRESS NOTE ADULT - ASSESSMENT
44 year old man with hx of EtOH cirrhosis complicated by esophageal varices and diabetes presents with generalized weakness, abdominal distention and jaundice in the setting of decompensated cirrhosis due to recent EtOH use    Impression:  # Decompensated EtOH Cirrhosis - MELD-Na: 40 on 5/15/20  HE: AOx3, no asterixis  Ascites: Small to moderate ascites on CT A/P  HCC: No hx, but overdue for HCC screening  Varices: Small varices on EGD (4/2019)  Coagulopathy: INR: 3.04, Plts: 61  Not a transplant candidate due to EtOH relapse  # Abnormal liver enzymes: Likely represents alcoholic hepatitis given > 2:1 AST to ALT ratio, however, differential includes acute on chronic liver failure due to underlying infection.  on 5/15/20  # DM  # SAM: Likely pre-renal. Differential includes HRS.  # Hyponatremia: Suspect hyponatremia [intravascular depletion] driving low Na. Unclear chronicity    Recommendation:  - Monitor daily CMP and INR  - Monitor BMPs q4h to prevent overcorrection of serum Na  - Continue rifaximin 550 mg PO BID  - F/U BCx and UA/UC, and official CXR read  - Continue empiric antibiotics pending results of infectious work-up  - If infectious work-up negative x 48 hours, will plan to start steroids for alcoholic hepatitis  - F/U abdominal ultrasound with dopplers  - Continue 25% Albumin 100ml q6h   - Continue Vitamin K PO 10mg PO qd x 3 days  - Rest of care per primary team    Please call [phone] to speak to answering service for on-call GI fellow. Can also e-mail [email or web address].

## 2020-05-17 NOTE — DIETITIAN INITIAL EVALUATION ADULT. - PROBLEM SELECTOR PLAN 3
Na 120; likely hypotonic hypovolemic hyponatremia.  - will resuscitate with albumin  - goal correction <=6-8 meq/24hrs  - monitor BMP q4  - f/u urine studies  - renal c/s in AM

## 2020-05-17 NOTE — PROGRESS NOTE ADULT - SUBJECTIVE AND OBJECTIVE BOX
Chief Complaint: Yellowing of the skin  Reason for consult: Cirrhosis, alcoholic hepatitis    Interval Events:   - No acute events overnight    Allergies:  levofloxacin (Other (Moderate))    MEDICATIONS  (STANDING):  albumin human 25% IVPB 100 milliLiter(s) IV Intermittent every 6 hours  cefTRIAXone   IVPB 2000 milliGRAM(s) IV Intermittent every 24 hours  dextrose 5%. 1000 milliLiter(s) (50 mL/Hr) IV Continuous <Continuous>  dextrose 50% Injectable 12.5 Gram(s) IV Push once  dextrose 50% Injectable 25 Gram(s) IV Push once  dextrose 50% Injectable 25 Gram(s) IV Push once  folic acid 1 milliGRAM(s) Oral daily  insulin glargine Injectable (LANTUS) 45 Unit(s) SubCutaneous at bedtime  insulin lispro (HumaLOG) corrective regimen sliding scale   SubCutaneous three times a day before meals  insulin lispro (HumaLOG) corrective regimen sliding scale   SubCutaneous at bedtime  magnesium sulfate  IVPB 2 Gram(s) IV Intermittent once  multivitamin 1 Tablet(s) Oral daily  pantoprazole    Tablet 40 milliGRAM(s) Oral before breakfast  phytonadione   Solution 10 milliGRAM(s) Oral daily  rifAXIMin 550 milliGRAM(s) Oral two times a day  simethicone 80 milliGRAM(s) Chew three times a day  thiamine 100 milliGRAM(s) Oral daily    MEDICATIONS  (PRN):  dextrose 40% Gel 15 Gram(s) Oral once PRN Blood Glucose LESS THAN 70 milliGRAM(s)/deciliter  glucagon  Injectable 1 milliGRAM(s) IntraMuscular once PRN Glucose LESS THAN 70 milligrams/deciliter  LORazepam   Injectable 2 milliGRAM(s) IV Push every 2 hours PRN CIWA-Ar score increase by 2 points and a total score of 7 or less  LORazepam   Injectable 2 milliGRAM(s) IV Push every 1 hour PRN CIWA-Ar score 8 or greater    PMHX/PSHX:  E. coli bacteremia  Vitiligo  Latent tuberculosis  Diabetes mellitus type 2, insulin dependent  Esophageal varices  Alcoholic cirrhosis  Status post corneal transplant  Alcoholic Cirrhosis  Esophageal Varices    Family history:  Family history of uterine cancer  Family history of diabetes mellitus    ROS:     General:  No wt loss, fevers, chills, night sweats, fatigue,   Eyes:  Good vision, no reported pain  ENT:  No sore throat, pain, runny nose, dysphagia  CV:  No pain, palpitations, hypo/hypertension  Pulm:  No dyspnea, + cough, tachypnea, wheezing  GI:  No pain, No nausea, No vomiting, No diarrhea, No constipation, No weight loss, No fever, No pruritis, No rectal bleeding, No tarry stools, No dysphagia  :  No pain, bleeding, incontinence, nocturia  Muscle:  No pain, weakness  Neuro:  No weakness, tingling, memory problems  Psych:  No fatigue, insomnia, mood problems, depression  Endocrine:  No polyuria, polydipsia, cold/heat intolerance  Heme:  No petechiae, ecchymosis, easy bruisability  Skin:  No rash, tattoos, scars, edema    PHYSICAL EXAM:   Vital Signs:  Vital Signs Last 24 Hrs  T(C): 36.7 (17 May 2020 05:12), Max: 37.2 (16 May 2020 16:17)  T(F): 98 (17 May 2020 05:12), Max: 99 (16 May 2020 16:17)  HR: 69 (17 May 2020 05:12) (58 - 70)  BP: 123/67 (17 May 2020 05:12) (94/56 - 123/67)  BP(mean): --  RR: 18 (17 May 2020 05:12) (18 - 18)  SpO2: 95% (17 May 2020 05:12) (92% - 95%)    GENERAL:  No acute distress  HEENT:  Normocephalic/atraumatic,  + scleral icterus  CHEST:  Normal effort, no accessory muscle use  ABDOMEN:  Soft, non-tender, mildly distended, normoactive bowel sounds, no masses, + hepato-splenomegaly  EXTREMITIES:  No cyanosis, clubbing, or edema  SKIN:  No rash/erythema, + jaundice  NEURO:  Alert and oriented x 3, no asterixis    LABS:                        11.5   7.85  )-----------( 54       ( 15 May 2020 21:08 )             34.4     Mean Cell Volume: 73.3 fl (05-15-20 @ 21:08)        124<L>  |  89<L>  |  18  ----------------------------<  83  2.7<LL>   |  15<L>  |  3.56<H>    Ca    7.1<L>      16 May 2020 02:46  Phos  3.6     05-16  Mg     1.5     05-16    TPro  6.3  /  Alb  3.0<L>  /  TBili  34.8<H>  /  DBili  x   /  AST  128<H>  /  ALT  42  /  AlkPhos  115  05-15    LIVER FUNCTIONS - ( 15 May 2020 21:08 )  Alb: 3.0 g/dL / Pro: 6.3 g/dL / ALK PHOS: 115 U/L / ALT: 42 U/L / AST: 128 U/L / GGT: x           PT/INR - ( 15 May 2020 14:38 )   PT: 35.9 sec;   INR: 3.04 ratio         PTT - ( 15 May 2020 14:38 )  PTT:46.2 sec  Urinalysis Basic - ( 15 May 2020 21:08 )    Color: Dark Yellow / Appearance: Slightly Turbid / S.006 / pH: x  Gluc: x / Ketone: Negative  / Bili: Large / Urobili: Negative   Blood: x / Protein: Trace / Nitrite: Negative   Leuk Esterase: Small / RBC: 6 /hpf / WBC 3 /HPF   Sq Epi: x / Non Sq Epi: 4 / Bacteria: Negative      Amylase Serum--      Lipase [id number]       [id number]                          11.5   7.85  )-----------( 54       ( 15 May 2020 21:08 )             34.4                         11.7   8.22  )-----------( 61       ( 15 May 2020 14:38 )             33.8       Imaging:    Reviewed

## 2020-05-17 NOTE — DISCHARGE NOTE PROVIDER - CARE PROVIDER_API CALL
Rj Woods  MEDICINE - GEN [facility] MEDICINE  [address] [place] [address]  Phone: [phone]  Fax: [phone]  Follow Up Time: 1 week Rj Woods  MEDICINE - GEN [facility]ICINE  [address] [place] [address]  Phone: [phone]  Fax: [phone]  Follow Up Time: 1 week    Endocrinology,   [address]  [address]  Phone: [phone]  Fax: (   )    -  Follow Up Time:     Agapito Ayala)  Gastroenterology; Internal Medicine  [address] [place] [address]  Phone: [phone]  Fax: [phone]  Follow Up Time:

## 2020-05-17 NOTE — PROGRESS NOTE ADULT - PROBLEM SELECTOR PLAN 5
pt on levemir 80U at home, metformin 1000mg daily. last A1c 7.9 (10/2019)  - C/w ISS and FS qid   - reduced Lantus to 45 units given hypoglycemia, will adjust as necessary    - Nutrition eval. pt on levemir 80U at home, metformin 1000mg daily. last A1c 7.9 (10/2019)  - C/w ISS and FS qid   - reduced Lantus to 45 units given recent hypoglycemia, will adjust further as necessary    - Nutrition eval.

## 2020-05-17 NOTE — PROGRESS NOTE ADULT - PROBLEM SELECTOR PLAN 2
- Cr 3.82; Baseline at of 10/2019 was 0.55   - Cr improving with fluid resuscitation   - c/w albumin 25% 100mL q6hr   - monitor BMP q4-6h  - f/u urine studies  - US kidney pending  - Strict I's/O's. - Cr 3.82; Baseline at of 10/2019 was 0.55   - Cr improving with albumin resuscitation   - c/w albumin 25% 100mL q6hr   - monitor BMP q12h  - f/u urine studies  - US kidney pending  - Strict I's/O's.

## 2020-05-17 NOTE — PROGRESS NOTE ADULT - PROBLEM SELECTOR PLAN 3
Na 120 on admission; ?hypotonic hypovolemic hyponatremia.  - c/w IV albumin for resuscitation   - Na on repeat 124, will follow BMP q4-6H, if Na continues to downtrend will consult nephrology: Na goal correction <=6-8 meq/24hrs  - continue to monitor  -Fluid restrict < 1L per day. Na 120 on admission; ?hypotonic hypovolemic hyponatremia.  - c/w IV albumin for resuscitation   - Na on repeat 124, will follow BMP q12h, if Na continues to downtrend will consult nephrology: Na goal correction <=6-8 meq/24hrs  - Continue to monitor  - Fluid restrict < 1L per day.

## 2020-05-17 NOTE — DIETITIAN INITIAL EVALUATION ADULT. - PERTINENT MEDS FT
albumin human 25% IVPB 100  cefTRIAXone   IVPB 2000  dextrose 40% Gel 15 PRN  dextrose 5%. 1000  dextrose 50% Injectable 12.5  dextrose 50% Injectable 25  dextrose 50% Injectable 25  folic acid 1  glucagon  Injectable 1 PRN  insulin glargine Injectable (LANTUS) 45  insulin lispro (HumaLOG) corrective regimen sliding scale   insulin lispro (HumaLOG) corrective regimen sliding scale   LORazepam   Injectable 2 PRN  LORazepam   Injectable 2 PRN  magnesium sulfate  IVPB 2  multivitamin 1  pantoprazole    Tablet 40  phytonadione   Solution 10  predniSONE   Tablet 40  rifAXIMin 550  simethicone 80  thiamine 100

## 2020-05-17 NOTE — DISCHARGE NOTE PROVIDER - HOSPITAL COURSE
HPI:    43 yo M with PMH of latent TB, h/o C. diff (2016, 2017), T2DM (last HbA1c 7.9% in 10/2019), h/o alcoholic hepatitis, and decompensated EtOH cirrhosis c/b ascites, SBP, and variceal hemorrhage in the remote past, presents with jaundice and weakness x 2 days. Patient is a recovered alcoholic but states he started drinking again about 1 month ago because of mother passed away. He has been drinking 6-8 beers a day, states his last drink was 3 days ago. Endorses some abdominal distension but denies pain. Patient has noticed yellow skin and eyes for the last week. Also has decreased appetite and has not eaten much in the last 3 days, mostly taking in fluids. Endorses tremors last night but not currently. Denies visual/auditory hallucinations. Denies F/C/CP/SOB/cough. No N/V. Some diarrhea, last BM was 2 hours ago. Not sure if there is blood in stools. Last saw Dr. Thakkar (hepatology) several months ago.         In ED vitals: T97.9, HR 70, /62, RR 18, 96% on RA.    Labs notable for thrombocytopenia, elevated INR, elevated creatinine, hyponatremia, elevated lipase. CT A/P and CXR done.     Given: cefotetan 2g, famotidine 20 ivp, vit K 5mg IVP x1, albumin 25% 100 mL x1.     Seen by hepatology and MICU. (15 May 2020 20:02) HPI:    45 yo M with PMH of latent TB, h/o C. diff (2016, 2017), T2DM (last HbA1c 7.9% in 10/2019), h/o alcoholic hepatitis, and decompensated EtOH cirrhosis c/b ascites, SBP, and variceal hemorrhage in the remote past, presents with jaundice and weakness x 2 days. Patient is a recovered alcoholic but states he started drinking again about 1 month ago because of mother passed away. He has been drinking 6-8 beers a day, states his last drink was 3 days ago. Endorses some abdominal distension but denies pain. Patient has noticed yellow skin and eyes for the last week. Also has decreased appetite and has not eaten much in the last 3 days, mostly taking in fluids. Endorses tremors last night but not currently. Denies visual/auditory hallucinations. Denies F/C/CP/SOB/cough. No N/V. Some diarrhea, last BM was 2 hours ago. Not sure if there is blood in stools. Last saw Dr. Thakkar (hepatology) several months ago.         In ED vitals: T97.9, HR 70, /62, RR 18, 96% on RA.    Labs notable for thrombocytopenia, elevated INR, elevated creatinine, hyponatremia, elevated lipase. CT A/P and CXR done.     Given: cefotetan 2g, famotidine 20 ivp, vit K 5mg IVP x1, albumin 25% 100 mL x1.     Seen by hepatology and MICU. (15 May 2020 20:02)        Hospital course:    On presentation, Mr. Hallman had a MELD-Na score of 40. He was started on steroids for 7 days for treatment of alcoholic hepatitis. However, he did not respond based on his lille score at 7 days. His course of steroids were complicated by hyperglycemia requiring up to lantus 87 and lispro 14 with hyperglycemia. He received a trial of NAC for 4 days, and although his MELD had one measure of 32, it returned to 38 by the completion of NAC. For SBP prophylaxis, he was started on CTX on admission, transitioned to ciprofloxacin PO, but ultimately started on bactrim DS PO daily given history of fluoroquinolone resistant e. coli. His course was complicated by an acute kidney injury that started at 3.8 and plateaued to ~2.0. This likely represents a new baseline. Abdominal ultrasound demonstrated ascites in all four quadrants. He was trialed on spironolactone 100 and lasix 40 once and although his creatinine did not rise significantly the risks of worsening kidney injury outweighed improving asymptomatic ascites. After coming off the steroids, he was optimized on a regimen of lantus 20 U BID and prandin 1 mg before meals to be held without meals.

## 2020-05-17 NOTE — PROGRESS NOTE ADULT - PROBLEM SELECTOR PLAN 1
MELD-Na 43, , on admission, likely to acute decompensated cirrhosis 2/2 recent alcohol abuse   - hepatology consulted recommending fluid resuscitation with albumin 100ml q6hrs   - on CT, found to have small-moderate ascites; on exam patient abd appears not severly disteded, U/s done in ED stating no large pocket for drainage    - c/w CTX 2g q24 for SBP ppx; BCx pending; if negative can consider deescalating in setting of no abdominal pain on exam, afebrile, with no elevated WBCs  - ammonia wnl, patient not encephalopathic at this time. -F/u repeat NH3 given hemolysis. -C/w rifaximin.   - d/c octreotide as patient does not appear to be in hepatorenal syndrome at this time.   - US abd duplex pending to further assess for ascites. May need paracentesis.  -C/w PPI. MELD-Na 43, , on admission, likely to acute decompensated cirrhosis 2/2 recent alcohol abuse   - hepatology consulted recommending fluid resuscitation with albumin 100ml q6hrs   - on CT, found to have small-moderate ascites; on exam patient abd appears not severely distended, U/S done in ED stating no large pocket for drainage    - c/w CTX 2g q24 for SBP ppx; BCx pending; will consider deescalating in setting of no abdominal pain on exam, afebrile, with no elevated WBCs  - ammonia wnl, patient not encephalopathic at this time.  - lactic acidosis, improving, will repeat in AM  - c/w rifaximin.   - d/c octreotide as patient does not appear to be in hepatorenal syndrome at this time.   - U/S abd duplex pending to assess for any signs of thrombosis

## 2020-05-17 NOTE — DIETITIAN INITIAL EVALUATION ADULT. - PROBLEM SELECTOR PLAN 4
elevated INR 3.04, PTT, PT, thrombocytopenia. overall coagulopathy likely in setting of cirrhosis.   - s/p vit K 5 IV   - will continue PO vit K 10mg daily x 3 days  - monitor for signs of bleeding

## 2020-05-17 NOTE — DIETITIAN INITIAL EVALUATION ADULT. - PROBLEM SELECTOR PLAN 2
crt 3.82; was 0.55 in 10/2019  - likely prerenal SAM but will start treatment for HRS with octreotide 100 mcg tid    - will fluid resuscitate with albumin 25% 100mL q6hr   - monitor BMP q6  - f/u urine studies  - f/u US kidney  - monitor Is/Os

## 2020-05-17 NOTE — DISCHARGE NOTE PROVIDER - CARE PROVIDERS DIRECT ADDRESSES
,[email or web address]

## 2020-05-17 NOTE — DISCHARGE NOTE PROVIDER - NSDCCPCAREPLAN_GEN_ALL_CORE_FT
PRINCIPAL DISCHARGE DIAGNOSIS  Diagnosis: Decompensated hepatic cirrhosis  Assessment and Plan of Treatment: You had elevated liver enzymes with abdominal pain. You were found to have worsening of your liver cirrhosis becasue of your increased alcohol use. You were started started on streoies for alcoholic hepatitis      SECONDARY DISCHARGE DIAGNOSES  Diagnosis: Alcoholic hepatitis  Assessment and Plan of Treatment:     Diagnosis: Chronic alcohol abuse  Assessment and Plan of Treatment:     Diagnosis: Hyponatremia  Assessment and Plan of Treatment: You had low sodium levels. You were started on fluids to help increase your sodium and it improved. PRINCIPAL DISCHARGE DIAGNOSIS  Diagnosis: Decompensated hepatic cirrhosis  Assessment and Plan of Treatment: You had elevated liver enzymes with abdominal pain. You were found to have worsening of your liver cirrhosis becasue of your increased alcohol use. You were started started on streoids and when that did not work you were started on acetylcysteine. Unfortunately, based on your lab results, you have a 65% chance of death within the next 3 months. The goal is to get you a liver transplant. Please follow up with your hepatologist and Dr. Woods. Please follow up with an alcohol cessation program. I am also sending Dr. Woods a note that you expressed interest in being seen by the palliative care doctors. With the help of your PCP, hepatologists, and rehab, you will be able to work towards a liver transplant.      SECONDARY DISCHARGE DIAGNOSES  Diagnosis: SAM (acute kidney injury)  Assessment and Plan of Treatment: You have an acute kidney injury that may also be with old kidney disease. Your creatinine is around 2.0 and 1.9. This is higher than it was before. Ultimately, this is probably another effect of your liver disease. Presently, your kidneys are stable. Please follow up with your PCP.    Diagnosis: Type 2 diabetes mellitus with hyperglycemia, with long-term current use of insulin  Assessment and Plan of Treatment: You had high sugars while you were on steroids. The endocrinologists made your diabetes regimen better. Now, take lantus 20 units twice a day. Stop taking metformin. Start taking prandin 1 mg three times a day before meals. Do not take prandin if you do not eat. Continue to check your sugars. Follow up with your endocrinologist as an outpatient.    Diagnosis: Hyponatremia  Assessment and Plan of Treatment: You had low sodium levels. You were started on fluids to help increase your sodium and it improved. PRINCIPAL DISCHARGE DIAGNOSIS  Diagnosis: Decompensated hepatic cirrhosis  Assessment and Plan of Treatment: You had elevated liver enzymes with abdominal pain. You were found to have worsening of your liver cirrhosis becasue of your increased alcohol use. You were started started on streoids and when that did not work you were started on acetylcysteine. Unfortunately, based on your lab results, you have a 65% chance of death within the next 3 months. The goal is to get you a liver transplant. Please follow up with your hepatologist and Dr. Woods. Please follow up with an alcohol cessation program. I am also sending Dr. Woods a note that you expressed interest in being seen by the palliative care doctors. With the help of Dr. Woods, hepatologists, and rehab, you will be able to work towards a liver transplant.      SECONDARY DISCHARGE DIAGNOSES  Diagnosis: Type 2 diabetes mellitus with hyperglycemia, with long-term current use of insulin  Assessment and Plan of Treatment: You had high sugars while you were on steroids. The endocrinologists made your diabetes regimen better. Now, take lantus 20 units twice a day. Stop taking metformin. Start taking prandin 1 mg three times a day before meals. Do not take prandin if you do not eat. Continue to check your sugars. Follow up with your endocrinologist as an outpatient.    Diagnosis: SAM (acute kidney injury)  Assessment and Plan of Treatment: You have an acute kidney injury that may also be with old kidney disease. Your creatinine is around 2.0 and 1.9. This is higher than it was before. Ultimately, this is probably another effect of your liver disease. Presently, your kidneys are stable. Please follow up with  in clinic    Diagnosis: Hyponatremia  Assessment and Plan of Treatment: You had low sodium levels. You were started on fluids to help increase your sodium and it improved.

## 2020-05-17 NOTE — DISCHARGE NOTE PROVIDER - NSDCMRMEDTOKEN_GEN_ALL_CORE_FT
Levemir 100 units/mL subcutaneous solution: 80 unit(s) subcutaneous once a day (at bedtime)  metFORMIN 1000 mg oral tablet: 1 tab(s) orally once a day  Multiple Vitamins oral tablet: 1 tab(s) orally once a day  nadolol 20 mg oral tablet: 1 tab(s) orally once a day  pantoprazole 40 mg oral delayed release tablet: 1 tab(s) orally once a day Levemir 100 units/mL subcutaneous solution: 20 unit(s) subcutaneous 2 times a day  Multiple Vitamins oral tablet: 1 tab(s) orally once a day  pantoprazole 40 mg oral delayed release tablet: 1 tab(s) orally once a day  repaglinide 1 mg oral tablet: 1 tab(s) orally 3 times a day  sulfamethoxazole-trimethoprim 800 mg-160 mg oral tablet: 1 tab(s) orally once a day

## 2020-05-17 NOTE — PROGRESS NOTE ADULT - PROBLEM SELECTOR PLAN 6
- pt endorses being sober in the past, but started to drink for the last month 6-8 beers due to death in family, states that last drink ~3 days ago.   - denies tremors or hallucinations, CIWA =0, no asterixis, tremors or hallucinations   - c/w low-risk symptom trigger ativan for now PRN  - c/w MVI, thiamine, folic acid. - pt endorses being sober in the past, but started to drink for the last month 6-8 beers due to death in family, states that last drink ~3 days ago.   - denies tremors or hallucinations, CIWA =0, no asterixis, tremors or hallucinations  - Maddrey score 76.1, BCX NGTD after 48 hours, started on prednisolone 40mg qd for alcoholic hepatitis , appreciate hepatology recs   - c/w low-risk symptom trigger ativan for now PRN  - c/w MVI, thiamine, folic acid. - pt endorses being sober in the past, but started to drink for the last month 6-8 beers due to death in family, states that last drink ~3+ days ago.   - denies tremors or hallucinations, CIWA =0, no asterixis, tremors or hallucinations  - Maddrey score 76.1, BCX NGTD after 48 hours, started on prednisolone 40mg qd for alcoholic hepatitis , appreciate hepatology recs   - c/w low-risk symptom trigger ativan for now PRN  - c/w MVI, thiamine, folic acid.

## 2020-05-17 NOTE — DIETITIAN INITIAL EVALUATION ADULT. - PROBLEM SELECTOR PLAN 1
MELD-Na 43, . acute decompensated cirrhosis 2/2 recent alcohol use  - hepatology recs appreciated  - pt with small-moderate ascites on imaging  - would get IR diagnostic tap in AM; less likely SBP given lack of abd pain, fever/chills, no leukocytosis but need to r/o   - will start CTX 2g q24; if cultures negative can likely de-escalate   - f/u cultures  - will start rifaximin 550BID given elevated ammonia although pt does not seem encephalopathic  - c/w albumin 25% 100cc q6  - will replete lytes as needed  - daily CMP/PT/INR  - f/u US abd duplex

## 2020-05-17 NOTE — DIETITIAN INITIAL EVALUATION ADULT. - OTHER INFO
Reason for admission :  jaundice  Diet PTA  oatmeal, egg whites, cheese, toast, green tea for breakfast, fruit and salad for lunch, vegetables, salad, chicken, meat for dinner. cookies for snack. he lives with his fiance' and she prepares the meals.   Intake : >75%  Denies nausea/vomit :  Denies difficulty chewing /swallow :  Denies diarrhea/constipation:  Last BM : today  NKFA  IBW +/- 10%=154pounds   Ht: 68"  Ht taken from pt  Usual Weight PTA:189pounds  BMI:35  BMI calculated using wt from EMR heading  BMI calculated using ht from pt  Education Provided : CHO counting, diabetes label reading tips. SAM Nutrition Therapy, Sobriety Nutrition Therapy  pressure injury: none  edema: none

## 2020-05-18 NOTE — PROGRESS NOTE ADULT - PROBLEM SELECTOR PLAN 3
Na 120 on admission; ?hypotonic hypovolemic hyponatremia.  - c/w IV albumin for resuscitation   - Na on repeat 124, will follow BMP q12h, if Na continues to downtrend will consult nephrology: Na goal correction <=6-8 meq/24hrs  - Continue to monitor  - Fluid restrict < 1L per day. Na 120 on admission; Hypervolemic hyponatremia 2/2 cirrhosis improving with albumin.  - Na on repeat 124, will follow BMP q12h, if Na continues to downtrend will consult nephrology: Na goal correction <=6-8 meq/24hrs  - Continue to monitor  - Fluid restrict < 1L per day.

## 2020-05-18 NOTE — PROGRESS NOTE ADULT - SUBJECTIVE AND OBJECTIVE BOX
Chief Complaint: Yellowing of the skin  Reason for consult: Cirrhosis, alcoholic hepatitis    Interval Events:   - No acute events overnight    Allergies:  levofloxacin (Other (Moderate))    MEDICATIONS  (STANDING):  albumin human 25% IVPB 100 milliLiter(s) IV Intermittent every 6 hours  cefTRIAXone   IVPB 2000 milliGRAM(s) IV Intermittent every 24 hours  dextrose 5%. 1000 milliLiter(s) (50 mL/Hr) IV Continuous <Continuous>  dextrose 50% Injectable 12.5 Gram(s) IV Push once  dextrose 50% Injectable 25 Gram(s) IV Push once  dextrose 50% Injectable 25 Gram(s) IV Push once  folic acid 1 milliGRAM(s) Oral daily  insulin glargine Injectable (LANTUS) 45 Unit(s) SubCutaneous at bedtime  insulin lispro (HumaLOG) corrective regimen sliding scale   SubCutaneous three times a day before meals  insulin lispro (HumaLOG) corrective regimen sliding scale   SubCutaneous at bedtime  magnesium sulfate  IVPB 2 Gram(s) IV Intermittent once  multivitamin 1 Tablet(s) Oral daily  pantoprazole    Tablet 40 milliGRAM(s) Oral before breakfast  phytonadione   Solution 10 milliGRAM(s) Oral daily  prednisoLONE    3 mG/mL Solution (ORAPRED) 40 milliGRAM(s) Oral daily  rifAXIMin 550 milliGRAM(s) Oral two times a day  simethicone 80 milliGRAM(s) Chew three times a day  thiamine 100 milliGRAM(s) Oral daily    MEDICATIONS  (PRN):  dextrose 40% Gel 15 Gram(s) Oral once PRN Blood Glucose LESS THAN 70 milliGRAM(s)/deciliter  glucagon  Injectable 1 milliGRAM(s) IntraMuscular once PRN Glucose LESS THAN 70 milligrams/deciliter  LORazepam   Injectable 2 milliGRAM(s) IV Push every 2 hours PRN CIWA-Ar score increase by 2 points and a total score of 7 or less  LORazepam   Injectable 2 milliGRAM(s) IV Push every 1 hour PRN CIWA-Ar score 8 or greater    PMHX/PSHX:  E. coli bacteremia  Vitiligo  Latent tuberculosis  Diabetes mellitus type 2, insulin dependent  Esophageal varices  Alcoholic cirrhosis  Status post corneal transplant  Alcoholic Cirrhosis  Esophageal Varices    Family history:  Family history of uterine cancer  Family history of diabetes mellitus    ROS:     General:  No wt loss, fevers, chills, night sweats, fatigue,   Eyes:  Good vision, no reported pain  ENT:  No sore throat, pain, runny nose, dysphagia  CV:  No pain, palpitations, hypo/hypertension  Pulm:  No dyspnea, + cough, tachypnea, wheezing  GI:  No pain, No nausea, No vomiting, No diarrhea, No constipation, No weight loss, No fever, No pruritis, No rectal bleeding, No tarry stools, No dysphagia  :  No pain, bleeding, incontinence, nocturia  Muscle:  No pain, weakness  Neuro:  No weakness, tingling, memory problems  Psych:  No fatigue, insomnia, mood problems, depression  Endocrine:  No polyuria, polydipsia, cold/heat intolerance  Heme:  No petechiae, ecchymosis, easy bruisability  Skin:  No rash, tattoos, scars, edema    PHYSICAL EXAM:   Vital Signs:  Vital Signs Last 24 Hrs  T(C): 36.9 (18 May 2020 05:02), Max: 37 (17 May 2020 16:51)  T(F): 98.4 (18 May 2020 05:02), Max: 98.6 (17 May 2020 16:51)  HR: 86 (18 May 2020 05:02) (65 - 86)  BP: 112/73 (18 May 2020 05:02) (102/64 - 121/79)  BP(mean): --  RR: 18 (18 May 2020 05:02) (16 - 18)  SpO2: 95% (18 May 2020 05:02) (94% - 98%)  Daily Weight in k.8 (17 May 2020 11:35)    GENERAL:  No acute distress  HEENT:  Normocephalic/atraumatic,  + scleral icterus  CHEST:  Normal effort, no accessory muscle use  ABDOMEN:  Soft, non-tender, mildly distended, normoactive bowel sounds, no masses, + hepato-splenomegaly  EXTREMITIES:  No cyanosis, clubbing, or edema  SKIN:  No rash/erythema, + jaundice  NEURO:  Alert and oriented x 3, no asterixis    LABS:                        11.0   6.41  )-----------( 53       ( 18 May 2020 06:45 )             34.3         123<L>  |  90<L>  |  26<H>  ----------------------------<  464<HH>  3.6   |  13<L>  |  2.99<H>    Ca    7.7<L>      18 May 2020 00:51  Phos  2.7     05-  Mg     1.5     -    TPro  5.2<L>  /  Alb  3.3  /  TBili  37.0<H>  /  DBili  >10.0<H>  /  AST  84<H>  /  ALT  27  /  AlkPhos  82  05-17    LIVER FUNCTIONS - ( 17 May 2020 06:03 )  Alb: 3.3 g/dL / Pro: 5.2 g/dL / ALK PHOS: 82 U/L / ALT: 27 U/L / AST: 84 U/L / GGT: x           PT/INR - ( 16 May 2020 09:18 )   PT: 19.5 sec;   INR: 1.68 ratio       PTT - ( 16 May 2020 09:18 )  PTT:28.4 sec    Imaging:    Reviewed

## 2020-05-18 NOTE — PROGRESS NOTE ADULT - PROBLEM SELECTOR PLAN 1
MELD-Na 43, , on admission, likely to acute decompensated cirrhosis 2/2 recent alcohol abuse   - hepatology consulted recommending fluid resuscitation with albumin 100ml q6hrs   - on CT, found to have small-moderate ascites; on exam patient abd appears not severely distended, U/S done in ED stating no large pocket for drainage    - c/w CTX 2g q24 for SBP ppx; BCx pending; will consider deescalating in setting of no abdominal pain on exam, afebrile, with no elevated WBCs  - ammonia wnl, patient not encephalopathic at this time.  - lactic acidosis, improving, will repeat in AM  - c/w rifaximin.   - d/c octreotide as patient does not appear to be in hepatorenal syndrome at this time.   - U/S abd duplex pending to assess for any signs of thrombosis MELD-Na 43, , on admission, likely to acute decompensated cirrhosis 2/2 recent alcohol abuse   - hepatology consulted recommending fluid resuscitation with albumin 100ml q6hrs   - c/w CTX 2g q24 for SBP ppx; BCx pending; will consider deescalating in setting of no abdominal pain on exam, afebrile, with no elevated WBCs  - ammonia wnl, patient not encephalopathic at this time.  - lactic acidosis, improving, will repeat in AM  - c/w rifaximin.   - d/c octreotide as patient does not appear to be in hepatorenal syndrome at this time.  - U/S abd duplex shows no thrombus  - Methylprednisone 40 mg oral daily (5/17 - )  - For SBP ppx, CTX (5/15-5/18) transitioned to cipro 500 mg BID (5/19 - )

## 2020-05-18 NOTE — PROGRESS NOTE ADULT - PROBLEM SELECTOR PLAN 5
pt on levemir 80U at home, metformin 1000mg daily. last A1c 7.9 (10/2019)  - C/w ISS and FS qid   - reduced Lantus to 45 units given recent hypoglycemia, will adjust further as necessary    - Nutrition eval. Pt on levemir 80U at home, metformin 1000mg daily. last A1c 7.9 (10/2019)  - Increase to Mod ISS and FS qid   - Increase lantus to 60 U daily    - Lispro 5 U TID  - Nutrition eval.

## 2020-05-18 NOTE — CONSULT NOTE ADULT - ASSESSMENT
44 year old male with history of cirrhosis 2/2 EtOH, IDDM, Latent TB, who presented to the hospital on 5/15 for decompensated liver cirrhosis due to recent alcohol use, jaundice and scleral icterus. Nephrology consulted for management of SAM and hyponatremia. Baseline creatinine 0.5-0.6, and baseline normal sodium.

## 2020-05-18 NOTE — PROGRESS NOTE ADULT - PROBLEM SELECTOR PLAN 6
- pt endorses being sober in the past, but started to drink for the last month 6-8 beers due to death in family, states that last drink ~3+ days ago.   - denies tremors or hallucinations, CIWA =0, no asterixis, tremors or hallucinations  - Maddrey score 76.1, BCX NGTD after 48 hours, started on prednisolone 40mg qd for alcoholic hepatitis , appreciate hepatology recs   - c/w low-risk symptom trigger ativan for now PRN  - c/w MVI, thiamine, folic acid.

## 2020-05-18 NOTE — CONSULT NOTE ADULT - PROBLEM SELECTOR RECOMMENDATION 9
Patient with hemodynamically mediated SAM likely in the setting of poor PO intake, nausea/vomiting and diarrhea after recent alcohol use. Patient likely without ATN as patient has had volume resuscitation and creatinine is trending down. Patient states that they are eating and drinking appropriately as well now without nausea/vomiting or diarrhea. Can hold further resuscitation and continue to monitor creatinine trend. Continue to monitor strict I/Os. Patient will likely need to be put back on a diuretic for discharge however can continue to hold. Avoid nephrotoxic agents, renally dose all medications, and avoid hypotensive episodes.

## 2020-05-18 NOTE — PROGRESS NOTE ADULT - PROBLEM SELECTOR PLAN 4
- Elevated INR 3.04 on admission, with elevated PTT, PT, thrombocytopenia; correlates with decompensated cirrhosis causing overall coagulopathy.   - INR improved, s/p vit K IV   - c/w PO vit K 10mg daily x 3 days  - monitor for signs of bleeding Elevated INR 3.04 on admission, with elevated PTT, PT, thrombocytopenia; correlates with decompensated cirrhosis causing overall coagulopathy.   - INR improved  - S/p PO vit K 10mg daily x 3 days

## 2020-05-18 NOTE — CONSULT NOTE ADULT - SUBJECTIVE AND OBJECTIVE BOX
[facility] Division of Kidney Diseases & Hypertension  INITIAL CONSULT NOTE  [phone]--------------------------------------------------------------------------------  HPI: 44 year old male with history of cirrhosis 2/2 EtOH, IDDM, Latent TB, who presented to the hospital on 5/15 for decompensated liver cirrhosis due to recent alcohol use, jaundice and scleral icterus. Patient admitted due to concerns of decompensated cirrhosis, SAM, and hyponatremia. Patient noted to have a creatinine of 0.55 in 10/2019 and 0.65 in 4/2020. Furthermore patient with stable sodium prior to admission to 139 in 4/2020. Nephrology consulted for management of SAM and hyponatremia. When patient seen he states prior to admission he was having nausea, vomiting, diarrhea and decreased PO intake for approximately a week. States otherwise has had no major complaints. Denies difficulty with urination, no recent changes in medication use, no NSAID use. No ACE/ARB use.      PAST HISTORY  --------------------------------------------------------------------------------  PAST MEDICAL & SURGICAL HISTORY:  E. coli bacteremia: Recurrent  Vitiligo  Latent tuberculosis  Diabetes mellitus type 2, insulin dependent  Esophageal varices  Alcoholic cirrhosis  Status post corneal transplant    FAMILY HISTORY:  Family history of diabetes mellitus    PAST SOCIAL HISTORY: Lives at home, continues to drink EtOH.    ALLERGIES & MEDICATIONS  --------------------------------------------------------------------------------  Allergies    levofloxacin (Other (Moderate))    Intolerances      Standing Inpatient Medications  albumin human 25% IVPB 100 milliLiter(s) IV Intermittent every 6 hours  folic acid 1 milliGRAM(s) Oral daily  insulin glargine Injectable (LANTUS) 60 Unit(s) SubCutaneous at bedtime  insulin lispro (HumaLOG) corrective regimen sliding scale   SubCutaneous three times a day before meals  insulin lispro (HumaLOG) corrective regimen sliding scale   SubCutaneous at bedtime  insulin lispro Injectable (HumaLOG) 5 Unit(s) SubCutaneous three times a day before meals  magnesium sulfate  IVPB 2 Gram(s) IV Intermittent once  multivitamin 1 Tablet(s) Oral daily  pantoprazole    Tablet 40 milliGRAM(s) Oral before breakfast  prednisoLONE    3 mG/mL Solution (ORAPRED) 40 milliGRAM(s) Oral daily  rifAXIMin 550 milliGRAM(s) Oral two times a day  simethicone 80 milliGRAM(s) Chew three times a day  thiamine 100 milliGRAM(s) Oral daily    PRN Inpatient Medications  dextrose 40% Gel 15 Gram(s) Oral once PRN  glucagon  Injectable 1 milliGRAM(s) IntraMuscular once PRN      REVIEW OF SYSTEMS  --------------------------------------------------------------------------------  Gen: No  fevers/chills, weakness  Skin: Yellowing of the skin  Head/Eyes/Ears/Mouth: No headache  Respiratory: No dyspnea, cough, wheezing, hemoptysis  CV: No chest pain,   GI: No abdominal pain, diarrhea, constipation, nausea, vomiting  : No increased frequency, dysuria, hematuria, nocturia  MSK: No joint pain/swelling;   Neuro: No dizziness/lightheadedness, weakness, seizures  Psych: No issues with affect    All other systems were reviewed and are negative, except as noted.    VITALS/PHYSICAL EXAM  --------------------------------------------------------------------------------  T(C): 36.6 (05-18-20 @ 08:35), Max: 37 (05-17-20 @ 16:51)  HR: 76 (05-18-20 @ 08:35) (65 - 86)  BP: 119/67 (05-18-20 @ 08:35) (111/78 - 121/79)  RR: 18 (05-18-20 @ 08:35) (18 - 18)  SpO2: 96% (05-18-20 @ 08:35) (95% - 98%)  Wt(kg): --        Physical Exam:  	Gen: NAD, appears lethargic  	HEENT: Scleral icterus  	Pulm: CTA B/L  	CV:  S1S2  	Abd: +BS, soft   	Ext: No B/L Lower ext edema  	Neuro: No focal deficits  	Skin: Jaundice,              Psych: Non-focal  	Vascular: No cyanosis    LABS/STUDIES  --------------------------------------------------------------------------------              11.0   6.41  >-----------<  53       [05-18-20 @ 06:45]              34.3     128  |  92  |  28  ----------------------------<  373      [05-18-20 @ 09:25]  3.2   |  15  |  2.85        Ca     7.9     [05-18-20 @ 09:25]      Mg     1.5     [05-18-20 @ 00:51]      Phos  2.7     [05-18-20 @ 00:51]    TPro  5.6  /  Alb  4.1  /  TBili  41.6  /  DBili  x   /  AST  67  /  ALT  26  /  AlkPhos  64  [05-18-20 @ 09:25]    PT/INR: PT 24.5 , INR 2.08       [05-18-20 @ 09:25]  PTT: 43.5       [05-18-20 @ 09:25]      Creatinine Trend:  SCr 2.85 [05-18 @ 09:25]  SCr 2.99 [05-18 @ 00:51]  SCr 3.24 [05-17 @ 11:26]  SCr 3.32 [05-17 @ 06:03]  SCr 3.32 [05-17 @ 00:29]    Urinalysis - [05-15-20 @ 21:08]      Color Dark Yellow / Appearance Slightly Turbid / SG 1.006 / pH 6.5      Gluc Negative / Ketone Negative  / Bili Large / Urobili Negative       Blood Small / Protein Trace / Leuk Est Small / Nitrite Negative      RBC 6 / WBC 3 / Hyaline 1 / Gran  / Sq Epi  / Non Sq Epi 4 / Bacteria Negative    Urine Creatinine 60      [05-15-20 @ 21:08]  Urine Sodium 38      [05-15-20 @ 21:08]  Urine Osmolality 159      [05-15-20 @ 21:08]

## 2020-05-18 NOTE — PROGRESS NOTE ADULT - SUBJECTIVE AND OBJECTIVE BOX
INCOMPLETE NOTE PROGRESS NOTE:     Patient is a 44y old  Male who presents with a chief complaint of jaundice (18 May 2020 10:04)      SUBJECTIVE / OVERNIGHT EVENTS:  OE: Glucose 435 > 4 U lispro > 449 > 5 U lispro > 383. Gave 1 g NaCl although Na 132 when corrected.  No acute complaints    ADDITIONAL REVIEW OF SYSTEMS:  No fevers, chest pain, shortness of breath, abdominal pain, lower extremity swelling or pain, dysuria, or constipation.    MEDICATIONS  (STANDING):  albumin human 25% IVPB 100 milliLiter(s) IV Intermittent every 6 hours  dextrose 5%. 1000 milliLiter(s) (50 mL/Hr) IV Continuous <Continuous>  dextrose 50% Injectable 12.5 Gram(s) IV Push once  dextrose 50% Injectable 25 Gram(s) IV Push once  dextrose 50% Injectable 25 Gram(s) IV Push once  folic acid 1 milliGRAM(s) Oral daily  insulin glargine Injectable (LANTUS) 60 Unit(s) SubCutaneous at bedtime  insulin lispro (HumaLOG) corrective regimen sliding scale   SubCutaneous three times a day before meals  insulin lispro (HumaLOG) corrective regimen sliding scale   SubCutaneous at bedtime  insulin lispro Injectable (HumaLOG) 5 Unit(s) SubCutaneous three times a day before meals  magnesium sulfate  IVPB 2 Gram(s) IV Intermittent once  multivitamin 1 Tablet(s) Oral daily  pantoprazole    Tablet 40 milliGRAM(s) Oral before breakfast  prednisoLONE    3 mG/mL Solution (ORAPRED) 40 milliGRAM(s) Oral daily  rifAXIMin 550 milliGRAM(s) Oral two times a day  simethicone 80 milliGRAM(s) Chew three times a day  thiamine 100 milliGRAM(s) Oral daily    MEDICATIONS  (PRN):  dextrose 40% Gel 15 Gram(s) Oral once PRN Blood Glucose LESS THAN 70 milliGRAM(s)/deciliter  glucagon  Injectable 1 milliGRAM(s) IntraMuscular once PRN Glucose LESS THAN 70 milligrams/deciliter      CAPILLARY BLOOD GLUCOSE      POCT Blood Glucose.: 242 mg/dL (18 May 2020 12:31)  POCT Blood Glucose.: 331 mg/dL (18 May 2020 08:44)  POCT Blood Glucose.: 383 mg/dL (18 May 2020 05:47)  POCT Blood Glucose.: 449 mg/dL (18 May 2020 04:26)  POCT Blood Glucose.: 435 mg/dL (18 May 2020 03:36)  POCT Blood Glucose.: 440 mg/dL (18 May 2020 02:42)  POCT Blood Glucose.: 317 mg/dL (17 May 2020 21:16)  POCT Blood Glucose.: 254 mg/dL (17 May 2020 17:04)    I&O's Summary      PHYSICAL EXAM:  Vital Signs Last 24 Hrs  T(C): 36.6 (18 May 2020 08:35), Max: 37 (17 May 2020 16:51)  T(F): 97.8 (18 May 2020 08:35), Max: 98.6 (17 May 2020 16:51)  HR: 76 (18 May 2020 08:35) (65 - 86)  BP: 119/67 (18 May 2020 08:35) (111/78 - 121/79)  BP(mean): --  RR: 18 (18 May 2020 08:35) (18 - 18)  SpO2: 96% (18 May 2020 08:35) (95% - 98%)    CONSTITUTIONAL: NAD, well-developed  ENT: Scleral icterus  CARDIOVASCULAR: Regular rate and rhythm. Normal S1 and S2. No murmurs.  RESPIRATORY: Normal respiratory effort, Lungs CTAB  EXTREMITIES: No CHASITY, peripheral pulses intact.  ABDOMEN: Nondistended. Not tense. No fluid wave. No tenderness.  MUSCLOSKELETAL: no clubbing or cyanosis of digits; no joint swelling or tenderness to palpation  PSYCH: A+O to person, place, and time; affect appropriate  NEURO: +asterixis    LABS:                        11.0   6.41  )-----------( 53       ( 18 May 2020 06:45 )             34.3     05-18    128<L>  |  92<L>  |  28<H>  ----------------------------<  373<H>  3.2<L>   |  15<L>  |  2.85<H>    Ca    7.9<L>      18 May 2020 09:25  Phos  2.7     05-18  Mg     1.5     05-18    TPro  5.6<L>  /  Alb  4.1  /  TBili  41.6<H>  /  DBili  x   /  AST  67<H>  /  ALT  26  /  AlkPhos  64  05-18    PT/INR - ( 18 May 2020 09:25 )   PT: 24.5 sec;   INR: 2.08 ratio         PTT - ( 18 May 2020 09:25 )  PTT:43.5 sec          Culture - Urine (collected 16 May 2020 00:35)  Source: .Urine Catheterized  Final Report (17 May 2020 13:39):    10,000 - 49,000 CFU/mL Streptococcus agalactiae (Group B) isolated    Group B streptococci are susceptible to ampicillin,    penicillin and cefazolin, but may be resistant to    erythromycin and clindamycin.    Recommendations for intrapartum prophylaxis for Group B    streptococci are penicillin or ampicillin.    <10,000 CFU/ml Normal Urogenital franklyn present    Culture - Blood (collected 15 May 2020 22:30)  Source: .Blood Blood  Preliminary Report (16 May 2020 23:01):    No growth to date.    Culture - Blood (collected 15 May 2020 22:30)  Source: .Blood Blood  Preliminary Report (16 May 2020 23:01):    No growth to date.        RADIOLOGY & ADDITIONAL TESTS:  Results Reviewed: Na 132 after correction. Creatinine downtrending but remains elevated at 2.85  Imaging Personally Reviewed:  Electrocardiogram Personally Reviewed:    COORDINATION OF CARE:  Care Discussed with Consultants/Other Providers [Y/N]:  Prior or Outpatient Records Reviewed [Y/N]: PROGRESS NOTE:     Patient is a 44y old  Male who presents with a chief complaint of jaundice (18 May 2020 10:04)      SUBJECTIVE / OVERNIGHT EVENTS:  OE: Glucose 435 > 4 U lispro > 449 > 5 U lispro > 383. Gave 1 g NaCl although Na 132 when corrected.  No acute complaints    ADDITIONAL REVIEW OF SYSTEMS:  No fevers, chest pain, shortness of breath, abdominal pain, lower extremity swelling or pain, dysuria, or constipation.    MEDICATIONS  (STANDING):  albumin human 25% IVPB 100 milliLiter(s) IV Intermittent every 6 hours  dextrose 5%. 1000 milliLiter(s) (50 mL/Hr) IV Continuous <Continuous>  dextrose 50% Injectable 12.5 Gram(s) IV Push once  dextrose 50% Injectable 25 Gram(s) IV Push once  dextrose 50% Injectable 25 Gram(s) IV Push once  folic acid 1 milliGRAM(s) Oral daily  insulin glargine Injectable (LANTUS) 60 Unit(s) SubCutaneous at bedtime  insulin lispro (HumaLOG) corrective regimen sliding scale   SubCutaneous three times a day before meals  insulin lispro (HumaLOG) corrective regimen sliding scale   SubCutaneous at bedtime  insulin lispro Injectable (HumaLOG) 5 Unit(s) SubCutaneous three times a day before meals  magnesium sulfate  IVPB 2 Gram(s) IV Intermittent once  multivitamin 1 Tablet(s) Oral daily  pantoprazole    Tablet 40 milliGRAM(s) Oral before breakfast  prednisoLONE    3 mG/mL Solution (ORAPRED) 40 milliGRAM(s) Oral daily  rifAXIMin 550 milliGRAM(s) Oral two times a day  simethicone 80 milliGRAM(s) Chew three times a day  thiamine 100 milliGRAM(s) Oral daily    MEDICATIONS  (PRN):  dextrose 40% Gel 15 Gram(s) Oral once PRN Blood Glucose LESS THAN 70 milliGRAM(s)/deciliter  glucagon  Injectable 1 milliGRAM(s) IntraMuscular once PRN Glucose LESS THAN 70 milligrams/deciliter      CAPILLARY BLOOD GLUCOSE      POCT Blood Glucose.: 242 mg/dL (18 May 2020 12:31)  POCT Blood Glucose.: 331 mg/dL (18 May 2020 08:44)  POCT Blood Glucose.: 383 mg/dL (18 May 2020 05:47)  POCT Blood Glucose.: 449 mg/dL (18 May 2020 04:26)  POCT Blood Glucose.: 435 mg/dL (18 May 2020 03:36)  POCT Blood Glucose.: 440 mg/dL (18 May 2020 02:42)  POCT Blood Glucose.: 317 mg/dL (17 May 2020 21:16)  POCT Blood Glucose.: 254 mg/dL (17 May 2020 17:04)    I&O's Summary      PHYSICAL EXAM:  Vital Signs Last 24 Hrs  T(C): 36.6 (18 May 2020 08:35), Max: 37 (17 May 2020 16:51)  T(F): 97.8 (18 May 2020 08:35), Max: 98.6 (17 May 2020 16:51)  HR: 76 (18 May 2020 08:35) (65 - 86)  BP: 119/67 (18 May 2020 08:35) (111/78 - 121/79)  BP(mean): --  RR: 18 (18 May 2020 08:35) (18 - 18)  SpO2: 96% (18 May 2020 08:35) (95% - 98%)    CONSTITUTIONAL: NAD, well-developed  ENT: Scleral icterus  CARDIOVASCULAR: Regular rate and rhythm. Normal S1 and S2. No murmurs.  RESPIRATORY: Normal respiratory effort, Lungs CTAB  EXTREMITIES: No CHASITY, peripheral pulses intact.  ABDOMEN: Nondistended. Not tense. No fluid wave. No tenderness.  MUSCLOSKELETAL: no clubbing or cyanosis of digits; no joint swelling or tenderness to palpation  PSYCH: A+O to person, place, and time; affect appropriate  NEURO: +asterixis  SKIN: jaundice    LABS:                        11.0   6.41  )-----------( 53       ( 18 May 2020 06:45 )             34.3     05-18    128<L>  |  92<L>  |  28<H>  ----------------------------<  373<H>  3.2<L>   |  15<L>  |  2.85<H>    Ca    7.9<L>      18 May 2020 09:25  Phos  2.7     05-18  Mg     1.5     05-18    TPro  5.6<L>  /  Alb  4.1  /  TBili  41.6<H>  /  DBili  x   /  AST  67<H>  /  ALT  26  /  AlkPhos  64  05-18    PT/INR - ( 18 May 2020 09:25 )   PT: 24.5 sec;   INR: 2.08 ratio         PTT - ( 18 May 2020 09:25 )  PTT:43.5 sec          Culture - Urine (collected 16 May 2020 00:35)  Source: .Urine Catheterized  Final Report (17 May 2020 13:39):    10,000 - 49,000 CFU/mL Streptococcus agalactiae (Group B) isolated    Group B streptococci are susceptible to ampicillin,    penicillin and cefazolin, but may be resistant to    erythromycin and clindamycin.    Recommendations for intrapartum prophylaxis for Group B    streptococci are penicillin or ampicillin.    <10,000 CFU/ml Normal Urogenital franklyn present    Culture - Blood (collected 15 May 2020 22:30)  Source: .Blood Blood  Preliminary Report (16 May 2020 23:01):    No growth to date.    Culture - Blood (collected 15 May 2020 22:30)  Source: .Blood Blood  Preliminary Report (16 May 2020 23:01):    No growth to date.        RADIOLOGY & ADDITIONAL TESTS:  Results Reviewed: Na 132 after correction. Creatinine downtrending but remains elevated at 2.85  Imaging Personally Reviewed:  Electrocardiogram Personally Reviewed:    COORDINATION OF CARE:  Care Discussed with Consultants/Other Providers [Y/N]:  Prior or Outpatient Records Reviewed [Y/N]:

## 2020-05-18 NOTE — PROGRESS NOTE ADULT - ASSESSMENT
44 year old man with hx of EtOH cirrhosis complicated by esophageal varices and diabetes presents with generalized weakness, abdominal distention and jaundice in the setting of decompensated cirrhosis due to recent EtOH use    Impression:  # Decompensated EtOH Cirrhosis - MELD-Na: 40 on 5/15/20  HE: AOx3, no asterixis  Ascites: Small to moderate ascites on CT A/P  HCC: No hx, but overdue for HCC screening  Varices: Small varices on EGD (4/2019)  Coagulopathy: INR: 3.04, Plts: 61  # Abnormal liver enzymes: Likely represents alcoholic hepatitis given > 2:1 AST to ALT ratio, however, differential includes acute on chronic liver failure due to underlying infection.  on 5/15/20. Started on steroids on 5/17/20  # DM  # SAM: Likely pre-renal. Differential includes HRS.  # Hyponatremia: Suspect hyponatremia [intravascular depletion] driving low Na. Unclear chronicity    Recommendation:  - Consult Nephrology for management of hyponatremia  - Continue prednisone/prednisolone 40 mg   - Calculate Day 7 Lille Score  - Monitor daily CMP and INR  - Monitor BMPs q4h to prevent overcorrection of serum Na  - Continue rifaximin 550 mg PO BID  - F/U abdominal ultrasound with dopplers  - Continue 25% Albumin 100ml q6h   - Continue Vitamin K PO 10mg PO qd x 3 days  - Rest of care per primary team    Please call [phone] to speak to answering service for on-call GI fellow. Can also e-mail [email or web address]. 44 year old man with hx of EtOH cirrhosis complicated by esophageal varices and diabetes presents with generalized weakness, abdominal distention and jaundice in the setting of decompensated cirrhosis due to recent EtOH use    Impression:  # Decompensated EtOH Cirrhosis - MELD-Na: 40 on 5/15/20  HE: AOx3, no asterixis  Ascites: Small to moderate ascites on CT A/P  HCC: No hx, but overdue for HCC screening  Varices: Small varices on EGD (4/2019)  Coagulopathy: INR: 3.04, Plts: 61  # Abnormal liver enzymes: Likely represents alcoholic hepatitis given > 2:1 AST to ALT ratio, however, differential includes acute on chronic liver failure due to underlying infection.  on 5/15/20. Started on steroids on 5/17/20  # DM  # SAM: Likely pre-renal. Differential includes HRS.  # Hyponatremia: Suspect hyponatremia [intravascular depletion] driving low Na. Unclear chronicity    Recommendation:  - Continue prednisone/prednisolone 40 mg   - Calculate Day 7 Lille Score  - Monitor daily CMP and INR  - Monitor BMPs q4h to prevent overcorrection of serum Na  - Continue rifaximin 550 mg PO BID  - F/U abdominal ultrasound with dopplers  - Continue 25% Albumin 100ml q6h   - Continue Vitamin K PO 10mg PO qd x 3 days  - Rest of care per primary team    Please call [phone] to speak to answering service for on-call GI fellow. Can also e-mail [email or web address].

## 2020-05-18 NOTE — PROGRESS NOTE ADULT - ASSESSMENT
45 yo M with PMH of latent TB, h/o C. diff (2016, 2017), T2DM (A1c 7.9% in 10/2019), h/o alcoholic hepatitis, and decompensated EtOH cirrhosis c/b ascites, SBP, and variceal hemorrhage in the remote past, presents with jaundice and weakness in setting of recent alcohol relapse, found to have SAM and hyponatremia, admitted for acute decompensated EtOH cirrhosis. 45 yo M with PMH of latent TB, h/o C. diff (2016, 2017), T2DM (A1c 7.9% in 10/2019), h/o alcoholic hepatitis, and decompensated EtOH cirrhosis c/b ascites, SBP, and variceal hemorrhage in the remote past, presents with jaundice and weakness in setting of recent alcohol relapse, admitted for acute decompensated EtOH cirrhosis with hyponatremia and acute kidney injury.

## 2020-05-18 NOTE — CONSULT NOTE ADULT - PROBLEM SELECTOR RECOMMENDATION 2
Patient with chronic asymptomatic hypoosmolar? hypovolemic/euvolemic hyponatremia in the setting of vomiting and diarrhea and alcohol use. Patient with adequate fluid resuscitation now with albumin and sodium trending up. Can hold further resuscitation for now. Continue to monitor sodium with adequate PO intake with salt and protein. Fluid restrict to 1L/day of total intake. Continue to monitor sodium daily. Goal correction of sodium to 134 by 5/19/20. Check AM Cortisol and TSH as well.

## 2020-05-18 NOTE — PROGRESS NOTE ADULT - PROBLEM SELECTOR PLAN 2
- Cr 3.82; Baseline at of 10/2019 was 0.55   - Cr improving with albumin resuscitation   - c/w albumin 25% 100mL q6hr   - monitor BMP q12h  - f/u urine studies  - US kidney pending  - Strict I's/O's.

## 2020-05-19 NOTE — PROGRESS NOTE ADULT - PROBLEM SELECTOR PLAN 1
MELD-Na 43, , on admission, likely to acute decompensated cirrhosis 2/2 recent alcohol abuse   - hepatology consulted recommending fluid resuscitation with albumin 100ml q6hrs   - c/w CTX 2g q24 for SBP ppx; BCx pending; will consider deescalating in setting of no abdominal pain on exam, afebrile, with no elevated WBCs  - ammonia wnl, patient not encephalopathic at this time.  - lactic acidosis, improving, will repeat in AM  - c/w rifaximin.   - d/c octreotide as patient does not appear to be in hepatorenal syndrome at this time.  - U/S abd duplex shows no thrombus  - Methylprednisone 40 mg oral daily (5/17 - )  - For SBP ppx, CTX (5/15-5/18) transitioned to cipro 500 mg BID (5/19 - ) MELD-Na 39 on 5/18, , on admission, likely to acute decompensated cirrhosis 2/2 recent alcohol abuse without thrombus on abd us  - Methylprednisone 40 mg oral daily (5/17 - 5/23) then f/u lille score regarding continuation  - For SBP ppx, CTX (5/15-5/18) transitioned to cipro 500 mg BID (5/19 - )  - C/w rifaxamin  - Trend MELD  - C/w albumin

## 2020-05-19 NOTE — PROGRESS NOTE ADULT - PROBLEM SELECTOR PLAN 4
Elevated INR 3.04 on admission, with elevated PTT, PT, thrombocytopenia; correlates with decompensated cirrhosis causing overall coagulopathy.   - INR improved  - S/p PO vit K 10mg daily x 3 days

## 2020-05-19 NOTE — PROGRESS NOTE ADULT - PROBLEM SELECTOR PLAN 5
Pt on levemir 80U at home, metformin 1000mg daily. last A1c 7.9 (10/2019)  - Increase to Mod ISS and FS qid   - Increase lantus to 60 U daily    - Lispro 5 U TID  - Nutrition eval. Pt on levemir 80U at home, metformin 1000mg daily. last A1c 7.9 (10/2019)  - Increase to Mod ISS and FS qid   - Increase lantus to 72 U daily    - Increase Lispro 9 U TID  - Nutrition eval.  - Will decrease insulin when steroids ends on 5/23

## 2020-05-19 NOTE — PROGRESS NOTE ADULT - PROBLEM SELECTOR PLAN 3
Na 120 on admission; Hypervolemic hyponatremia 2/2 cirrhosis improving with albumin.  - Na on repeat 124, will follow BMP q12h, if Na continues to downtrend will consult nephrology: Na goal correction <=6-8 meq/24hrs  - Continue to monitor  - Fluid restrict < 1L per day. Na 120 on admission; hyponatremia 2/2 cirrhosis improving with albumin now close to resolving  - Continue to monitor  - Fluid restrict < 1L per day.

## 2020-05-19 NOTE — PROGRESS NOTE ADULT - PROBLEM SELECTOR PLAN 2
- Cr 3.82; Baseline at of 10/2019 was 0.55   - Cr improving with albumin resuscitation   - c/w albumin 25% 100mL q6hr   - monitor BMP q12h  - f/u urine studies  - US kidney pending  - Strict I's/O's. Hemodynamically mediated SAM given improvement with albumin and prerenal FENA  - c/w albumin 25% 100mL q6hr   - monitor BMP q12h  - Strict I's/O's.

## 2020-05-19 NOTE — PROGRESS NOTE ADULT - PROBLEM SELECTOR PLAN 6
- pt endorses being sober in the past, but started to drink for the last month 6-8 beers due to death in family, states that last drink ~3+ days ago.   - denies tremors or hallucinations, CIWA =0, no asterixis, tremors or hallucinations  - Maddrey score 76.1, BCX NGTD after 48 hours, started on prednisolone 40mg qd for alcoholic hepatitis , appreciate hepatology recs   - c/w low-risk symptom trigger ativan for now PRN  - c/w MVI, thiamine, folic acid. Previously treated at [facility]  -Being treated for alcoholic hepatitis as above  -C/w thiamine  -C/w folate  -Outpt f/u with prior facility and PCP

## 2020-05-19 NOTE — PROGRESS NOTE ADULT - SUBJECTIVE AND OBJECTIVE BOX
INCOMPLETE NOTE PROGRESS NOTE:     Patient is a 44y old  Male who presents with a chief complaint of jaundice (19 May 2020 09:06)      SUBJECTIVE / OVERNIGHT EVENTS:  K and mag repleted ON  NAC  Does not feel sad. Attributes relapse in alcohol use to loss of mother a month ago. Follows at facility on [facility] for alcohol abuse and with Dr. Woods as outpatient.    ADDITIONAL REVIEW OF SYSTEMS:  No fevers, chest pain, shortness of breath, abdominal pain, lower extremity swelling or pain, dysuria, or constipation.    MEDICATIONS  (STANDING):  albumin human 25% IVPB 100 milliLiter(s) IV Intermittent every 6 hours  ciprofloxacin     Tablet 500 milliGRAM(s) Oral every 12 hours  dextrose 5%. 1000 milliLiter(s) (50 mL/Hr) IV Continuous <Continuous>  dextrose 50% Injectable 12.5 Gram(s) IV Push once  dextrose 50% Injectable 25 Gram(s) IV Push once  dextrose 50% Injectable 25 Gram(s) IV Push once  folic acid 1 milliGRAM(s) Oral daily  insulin glargine Injectable (LANTUS) 72 Unit(s) SubCutaneous at bedtime  insulin lispro (HumaLOG) corrective regimen sliding scale   SubCutaneous three times a day before meals  insulin lispro (HumaLOG) corrective regimen sliding scale   SubCutaneous at bedtime  insulin lispro Injectable (HumaLOG) 9 Unit(s) SubCutaneous three times a day before meals  magnesium sulfate  IVPB 2 Gram(s) IV Intermittent once  multivitamin 1 Tablet(s) Oral daily  pantoprazole    Tablet 40 milliGRAM(s) Oral before breakfast  potassium phosphate / sodium phosphate powder 1 Packet(s) Oral four times a day before meals  prednisoLONE    3 mG/mL Solution (ORAPRED) 40 milliGRAM(s) Oral daily  rifAXIMin 550 milliGRAM(s) Oral two times a day  simethicone 80 milliGRAM(s) Chew three times a day  thiamine 100 milliGRAM(s) Oral daily    MEDICATIONS  (PRN):  dextrose 40% Gel 15 Gram(s) Oral once PRN Blood Glucose LESS THAN 70 milliGRAM(s)/deciliter  glucagon  Injectable 1 milliGRAM(s) IntraMuscular once PRN Glucose LESS THAN 70 milligrams/deciliter      CAPILLARY BLOOD GLUCOSE      POCT Blood Glucose.: 220 mg/dL (19 May 2020 12:32)  POCT Blood Glucose.: 198 mg/dL (19 May 2020 09:15)  POCT Blood Glucose.: 327 mg/dL (18 May 2020 21:12)  POCT Blood Glucose.: 318 mg/dL (18 May 2020 17:34)  POCT Blood Glucose.: 239 mg/dL (18 May 2020 15:47)    I&O's Summary      PHYSICAL EXAM:  Vital Signs Last 24 Hrs  T(C): 36.7 (19 May 2020 05:54), Max: 36.7 (18 May 2020 21:05)  T(F): 98 (19 May 2020 05:54), Max: 98 (18 May 2020 21:05)  HR: 71 (19 May 2020 05:54) (67 - 71)  BP: 116/63 (19 May 2020 05:54) (116/63 - 125/75)  BP(mean): --  RR: 18 (19 May 2020 05:54) (18 - 18)  SpO2: 94% (19 May 2020 05:54) (94% - 97%)    CONSTITUTIONAL: NAD, well-developed  ENT: Scleral icterus  CARDIOVASCULAR: Regular rate and rhythm. Normal S1 and S2. No murmurs.  RESPIRATORY: Normal respiratory effort, Lungs CTAB  EXTREMITIES: No CHASITY, peripheral pulses intact.  ABDOMEN: Nondistended. Not tense. No fluid wave. No tenderness.  MUSCLOSKELETAL: no clubbing or cyanosis of digits; no joint swelling or tenderness to palpation  PSYCH: A+O to person, place, and time; affect appropriate  NEURO: +asterixis  SKIN: jaundice  LABS:                        10.2   7.61  )-----------( 22       ( 19 May 2020 06:35 )             30.5     05-19    130<L>  |  97  |  31<H>  ----------------------------<  278<H>  3.0<L>   |  14<L>  |  2.54<H>    Ca    7.8<L>      19 May 2020 06:36  Phos  2.5     -  Mg     1.6         TPro  5.7<L>  /  Alb  4.2  /  TBili  41.9<H>  /  DBili  x   /  AST  66<H>  /  ALT  27  /  AlkPhos  73  05-19    PT/INR - ( 18 May 2020 09:25 )   PT: 24.5 sec;   INR: 2.08 ratio         PTT - ( 18 May 2020 09:25 )  PTT:43.5 sec      Urinalysis Basic - ( 18 May 2020 23:05 )    Color: Leslie / Appearance: Clear / S.011 / pH: x  Gluc: x / Ketone: Negative  / Bili: Large / Urobili: <2 mg/dL   Blood: x / Protein: 30 mg/dL / Nitrite: Negative   Leuk Esterase: Negative / RBC: 8 /HPF / WBC 0 /HPF   Sq Epi: x / Non Sq Epi: 2 /HPF / Bacteria: Negative          RADIOLOGY & ADDITIONAL TESTS:  Results Reviewed: Corrected Na 133. Plt 22 < 53. Hypokalemia to 3.0 despite repletion ON.  Imaging Personally Reviewed:  Electrocardiogram Personally Reviewed:    COORDINATION OF CARE:  Care Discussed with Consultants/Other Providers [Y/N]:  Prior or Outpatient Records Reviewed [Y/N]:

## 2020-05-19 NOTE — PROGRESS NOTE ADULT - ASSESSMENT
43 yo M with PMH of latent TB, h/o C. diff (2016, 2017), T2DM (A1c 7.9% in 10/2019), h/o alcoholic hepatitis, and decompensated EtOH cirrhosis c/b ascites, SBP, and variceal hemorrhage in the remote past, presents with jaundice and weakness in setting of recent alcohol relapse, admitted for acute decompensated EtOH cirrhosis with hyponatremia and acute kidney injury.

## 2020-05-19 NOTE — PROGRESS NOTE ADULT - SUBJECTIVE AND OBJECTIVE BOX
Chief Complaint:  Patient is a 44y old  Male who presents with a chief complaint of jaundice (18 May 2020 15:12)      Interval Events:   No acute overnight events    Allergies:  levofloxacin (Other (Moderate))      Hospital Medications:  albumin human 25% IVPB 100 milliLiter(s) IV Intermittent every 6 hours  ciprofloxacin     Tablet 500 milliGRAM(s) Oral every 12 hours  dextrose 40% Gel 15 Gram(s) Oral once PRN  dextrose 5%. 1000 milliLiter(s) IV Continuous <Continuous>  dextrose 50% Injectable 12.5 Gram(s) IV Push once  dextrose 50% Injectable 25 Gram(s) IV Push once  dextrose 50% Injectable 25 Gram(s) IV Push once  folic acid 1 milliGRAM(s) Oral daily  glucagon  Injectable 1 milliGRAM(s) IntraMuscular once PRN  insulin glargine Injectable (LANTUS) 72 Unit(s) SubCutaneous at bedtime  insulin lispro (HumaLOG) corrective regimen sliding scale   SubCutaneous three times a day before meals  insulin lispro (HumaLOG) corrective regimen sliding scale   SubCutaneous at bedtime  insulin lispro Injectable (HumaLOG) 9 Unit(s) SubCutaneous three times a day before meals  magnesium sulfate  IVPB 2 Gram(s) IV Intermittent once  multivitamin 1 Tablet(s) Oral daily  pantoprazole    Tablet 40 milliGRAM(s) Oral before breakfast  potassium phosphate / sodium phosphate powder 1 Packet(s) Oral four times a day before meals  prednisoLONE    3 mG/mL Solution (ORAPRED) 40 milliGRAM(s) Oral daily  rifAXIMin 550 milliGRAM(s) Oral two times a day  simethicone 80 milliGRAM(s) Chew three times a day  thiamine 100 milliGRAM(s) Oral daily      PMHX/PSHX:  E. coli bacteremia  Vitiligo  Latent tuberculosis  Diabetes mellitus type 2, insulin dependent  Esophageal varices  Alcoholic cirrhosis  Status post corneal transplant  Alcoholic Cirrhosis  Esophageal Varices      ROS:   General:  No fevers, chills or night sweats  ENT:  No sore throat or dysphagia  CV:  No pain or palpitations  Resp:  No dyspnea, cough or  wheezing  GI:  No pain, No nausea, No vomiting, No diarrhea, No rectal bleeding, No tarry stools,  Skin:  No rash or edema    PHYSICAL EXAM:   Vital Signs:  Vital Signs Last 24 Hrs  T(C): 36.7 (19 May 2020 05:54), Max: 36.8 (18 May 2020 14:00)  T(F): 98 (19 May 2020 05:54), Max: 98.2 (18 May 2020 14:00)  HR: 71 (19 May 2020 05:54) (67 - 72)  BP: 116/63 (19 May 2020 05:54) (116/63 - 125/75)  BP(mean): --  RR: 18 (19 May 2020 05:54) (17 - 18)  SpO2: 94% (19 May 2020 05:54) (94% - 97%)  Daily     Daily     GENERAL:  NAD, Appears stated age  HEENT:  NC/AT,  conjunctivae clear and pink, sclera -anicteric  CHEST:  Normal Effort, Breath sounds clear  HEART:  RRR, S1 + S2, no murmurs  ABDOMEN:  Soft, non-tender, non-distended, BS+  EXTEREMITIES:  no cyanosis  SKIN:  Warm & Dry.  NEURO:  Alert, oriented    LABS:                        10.2   7.61  )-----------( 22       ( 19 May 2020 06:35 )             30.5     Mean Cell Volume: 79.6 fl (20 @ 06:35)        130<L>  |  97  |  31<H>  ----------------------------<  278<H>  3.0<L>   |  14<L>  |  2.54<H>    Ca    7.8<L>      19 May 2020 06:36  Phos  2.5       Mg     1.6     19    TPro  5.7<L>  /  Alb  4.2  /  TBili  41.9<H>  /  DBili  x   /  AST  66<H>  /  ALT  27  /  AlkPhos  73  05-19    LIVER FUNCTIONS - ( 19 May 2020 06:36 )  Alb: 4.2 g/dL / Pro: 5.7 g/dL / ALK PHOS: 73 U/L / ALT: 27 U/L / AST: 66 U/L / GGT: x           PT/INR - ( 18 May 2020 09:25 )   PT: 24.5 sec;   INR: 2.08 ratio         PTT - ( 18 May 2020 09:25 )  PTT:43.5 sec  Urinalysis Basic - ( 18 May 2020 23:05 )    Color: Leslie / Appearance: Clear / S.011 / pH: x  Gluc: x / Ketone: Negative  / Bili: Large / Urobili: <2 mg/dL   Blood: x / Protein: 30 mg/dL / Nitrite: Negative   Leuk Esterase: Negative / RBC: 8 /HPF / WBC 0 /HPF   Sq Epi: x / Non Sq Epi: 2 /HPF / Bacteria: Negative                      10.2   7.61  )-----------( 22       ( 19 May 2020 06:35 )             30.5                         11.0   6.41  )-----------( 53       ( 18 May 2020 06:45 )             34.3                         10.0   5.18  )-----------( 52       ( 17 May 2020 06:03 )             31.8                         11.4   8.89  )-----------( 65       ( 16 May 2020 10:50 )             35.1       Imaging:

## 2020-05-19 NOTE — PROGRESS NOTE ADULT - ASSESSMENT
44 year old man with hx of EtOH cirrhosis complicated by esophageal varices and diabetes presents with generalized weakness, abdominal distention and jaundice in the setting of decompensated cirrhosis due to recent EtOH use    Impression:  # Decompensated EtOH Cirrhosis - MELD-Na: 40 on 5/15/20  HE: AOx3, no asterixis  Ascites: Small to moderate ascites on CT A/P  HCC: No hx, but overdue for HCC screening  Varices: Small varices on EGD (4/2019)  Coagulopathy: INR: 3.04, Plts: 61  # Abnormal liver enzymes: Likely represents alcoholic hepatitis given > 2:1 AST to ALT ratio, however, differential includes acute on chronic liver failure due to underlying infection.  on 5/15/20. Started on steroids on 5/17/20  # DM  # SAM: Likely pre-renal. Differential includes HRS; improving with Albumin  # Hyponatremia: Suspect hyponatremia [intravascular depletion] driving low Na. Unclear chronicity    Recommendation:  - Continue prednisone/prednisolone 40 mg [Day 3], Calculate Lilles score Day 7  - Monitor daily CMP and INR  - Continue rifaximin 550 mg PO BID  - Continue 25% Albumin 100ml q6h   - Rest of care per primary team    Sanaz Kumar MD  Gastroenterology Fellow  [phone]  Please page on call fellow on weekends and after 5pm on weekdays

## 2020-05-20 NOTE — PROGRESS NOTE ADULT - PROBLEM SELECTOR PLAN 7
DVT ppx: SCDs given thrombocytopenia  Diet: dash regular fluid restricted  Dispo: tbd DVT ppx: SCDs given thrombocytopenia  Diet: dash CC fluid restricted  Dispo: tbd

## 2020-05-20 NOTE — PROGRESS NOTE ADULT - PROBLEM SELECTOR PLAN 3
Hyponatremia 2/2 cirrhosis improving with albumin now close to resolving  - Continue to monitor  - Fluid restrict < 1L per day.

## 2020-05-20 NOTE — PROGRESS NOTE ADULT - PROBLEM SELECTOR PLAN 5
Pt on levemir 80U at home, metformin 1000mg daily. last A1c 7.9 (10/2019)  - Increase to Mod ISS and FS qid   - Increase lantus to 72 U daily    - Increase Lispro 9 U TID  - Nutrition eval.  - Will decrease insulin when steroids ends on 5/23 Pt on levemir 80U at home, metformin 1000mg daily. last A1c 7.9 (10/2019)  - Increase to Mod ISS and FS qid   - Increase lantus to 78 U daily    - Increase Lispro 11 U TID  - Nutrition eval.  - Will decrease insulin when steroids ends on 5/23

## 2020-05-20 NOTE — PROGRESS NOTE ADULT - PROBLEM SELECTOR PLAN 6
Previously treated at [facility]  -Being treated for alcoholic hepatitis as above  -C/w thiamine  -C/w folate  -Outpt f/u with prior facility and PCP Previously treated at [facility]  -Being treated for alcoholic hepatitis as above  -C/w thiamine and MVI  -C/w folate  -Outpt f/u with prior facility and PCP

## 2020-05-20 NOTE — PROGRESS NOTE ADULT - PROBLEM SELECTOR PLAN 4
Elevated INR 3.04 on admission, with elevated PTT, PT, thrombocytopenia; correlates with decompensated cirrhosis causing overall coagulopathy.   - F/u INR  - S/p PO vit K 10mg daily x 3 days

## 2020-05-20 NOTE — PROGRESS NOTE ADULT - SUBJECTIVE AND OBJECTIVE BOX
Chief Complaint:  Patient is a 44y old  Male who presents with a chief complaint of jaundice (20 May 2020 09:39)    Interval Events:   No acute overnight events  No nausea, vomiting, diarrhea, melena, hematochezia, hematemesis    Allergies:  levofloxacin (Other (Moderate))    Hospital Medications:  albumin human 25% IVPB 100 milliLiter(s) IV Intermittent every 6 hours  ciprofloxacin     Tablet 500 milliGRAM(s) Oral every 12 hours  dextrose 40% Gel 15 Gram(s) Oral once PRN  dextrose 5%. 1000 milliLiter(s) IV Continuous <Continuous>  dextrose 50% Injectable 12.5 Gram(s) IV Push once  dextrose 50% Injectable 25 Gram(s) IV Push once  dextrose 50% Injectable 25 Gram(s) IV Push once  folic acid 1 milliGRAM(s) Oral daily  glucagon  Injectable 1 milliGRAM(s) IntraMuscular once PRN  insulin glargine Injectable (LANTUS) 78 Unit(s) SubCutaneous at bedtime  insulin lispro (HumaLOG) corrective regimen sliding scale   SubCutaneous three times a day before meals  insulin lispro (HumaLOG) corrective regimen sliding scale   SubCutaneous at bedtime  insulin lispro Injectable (HumaLOG) 11 Unit(s) SubCutaneous three times a day before meals  multivitamin 1 Tablet(s) Oral daily  pantoprazole    Tablet 40 milliGRAM(s) Oral before breakfast  potassium phosphate / sodium phosphate powder 1 Packet(s) Oral four times a day  prednisoLONE    3 mG/mL Solution (ORAPRED) 40 milliGRAM(s) Oral daily  rifAXIMin 550 milliGRAM(s) Oral two times a day  thiamine 100 milliGRAM(s) Oral daily    PMHX/PSHX:  E. coli bacteremia  Vitiligo  Latent tuberculosis  Diabetes mellitus type 2, insulin dependent  Esophageal varices  Alcoholic cirrhosis  Status post corneal transplant  Alcoholic Cirrhosis  Esophageal Varices      ROS:   General:  No fevers, chills or night sweats  ENT:  No sore throat or dysphagia  CV:  No pain or palpitations  Resp:  No dyspnea, cough or  wheezing  GI:  No pain, No nausea, No vomiting, No diarrhea, No rectal bleeding, No tarry stools,  Skin:  No rash or edema    PHYSICAL EXAM:   Vital Signs:  Vital Signs Last 24 Hrs  T(C): 36.4 (20 May 2020 12:19), Max: 36.9 (20 May 2020 05:09)  T(F): 97.6 (20 May 2020 12:19), Max: 98.4 (20 May 2020 05:09)  HR: 96 (20 May 2020 12:19) (74 - 96)  BP: 120/72 (20 May 2020 12:19) (111/71 - 131/78)  BP(mean): --  RR: 18 (20 May 2020 12:19) (18 - 20)  SpO2: 93% (20 May 2020 12:19) (93% - 98%)  Daily     Daily     GENERAL:  NAD, Appears stated age  HEENT:  NC/AT,  conjunctivae clear and pink, sclera -anicteric  CHEST:  Normal Effort, Breath sounds clear  HEART:  RRR, S1 + S2, no murmurs  ABDOMEN:  Soft, non-tender, non-distended, BS+  EXTEREMITIES:  no cyanosis  SKIN:  Warm & Dry.  NEURO:  Alert, oriented    LABS:                        10.2   7.23  )-----------( 59       ( 20 May 2020 06:17 )             32.5     Mean Cell Volume: 81.5 fl (-20 @ 06:17)    05-20    129<L>  |  97  |  30<H>  ----------------------------<  272<H>  3.5   |  12<L>  |  2.19<H>    Ca    9.0      20 May 2020 06:17  Phos  2.1     05-20  Mg     1.8     -20    TPro  6.2  /  Alb  4.7  /  TBili  41.6<H>  /  DBili  x   /  AST  78<H>  /  ALT  31  /  AlkPhos  85  05-20    LIVER FUNCTIONS - ( 20 May 2020 06:17 )  Alb: 4.7 g/dL / Pro: 6.2 g/dL / ALK PHOS: 85 U/L / ALT: 31 U/L / AST: 78 U/L / GGT: x             Urinalysis Basic - ( 18 May 2020 23:05 )    Color: Leslie / Appearance: Clear / S.011 / pH: x  Gluc: x / Ketone: Negative  / Bili: Large / Urobili: <2 mg/dL   Blood: x / Protein: 30 mg/dL / Nitrite: Negative   Leuk Esterase: Negative / RBC: 8 /HPF / WBC 0 /HPF   Sq Epi: x / Non Sq Epi: 2 /HPF / Bacteria: Negative                        10.2   7.23  )-----------( 59       ( 20 May 2020 06:17 )             32.5                         9.5    6.52  )-----------( 59       ( 19 May 2020 18:32 )             29.9                         10.2   7.61  )-----------( 22       ( 19 May 2020 06:35 )             30.5                         11.0   6.41  )-----------( 53       ( 18 May 2020 06:45 )             34.3       Imaging:

## 2020-05-20 NOTE — PROGRESS NOTE ADULT - SUBJECTIVE AND OBJECTIVE BOX
PROGRESS NOTE:     Patient is a 44y old  Male who presents with a chief complaint of jaundice (19 May 2020 09:06)      SUBJECTIVE / OVERNIGHT EVENTS:  ON: Hgb stable. Plt improved. Mg and phos repleted  No acute complaints    ADDITIONAL REVIEW OF SYSTEMS:  No fevers, chest pain, shortness of breath, abdominal pain, lower extremity swelling or pain, dysuria, or constipation.    MEDICATIONS  (STANDING):  albumin human 25% IVPB 100 milliLiter(s) IV Intermittent every 6 hours  ciprofloxacin     Tablet 500 milliGRAM(s) Oral every 12 hours  dextrose 5%. 1000 milliLiter(s) (50 mL/Hr) IV Continuous <Continuous>  dextrose 50% Injectable 12.5 Gram(s) IV Push once  dextrose 50% Injectable 25 Gram(s) IV Push once  dextrose 50% Injectable 25 Gram(s) IV Push once  folic acid 1 milliGRAM(s) Oral daily  insulin glargine Injectable (LANTUS) 72 Unit(s) SubCutaneous at bedtime  insulin lispro (HumaLOG) corrective regimen sliding scale   SubCutaneous three times a day before meals  insulin lispro (HumaLOG) corrective regimen sliding scale   SubCutaneous at bedtime  insulin lispro Injectable (HumaLOG) 9 Unit(s) SubCutaneous three times a day before meals  multivitamin 1 Tablet(s) Oral daily  pantoprazole    Tablet 40 milliGRAM(s) Oral before breakfast  potassium phosphate / sodium phosphate powder 1 Packet(s) Oral four times a day  prednisoLONE    3 mG/mL Solution (ORAPRED) 40 milliGRAM(s) Oral daily  rifAXIMin 550 milliGRAM(s) Oral two times a day  thiamine 100 milliGRAM(s) Oral daily    MEDICATIONS  (PRN):  dextrose 40% Gel 15 Gram(s) Oral once PRN Blood Glucose LESS THAN 70 milliGRAM(s)/deciliter  glucagon  Injectable 1 milliGRAM(s) IntraMuscular once PRN Glucose LESS THAN 70 milligrams/deciliter      CAPILLARY BLOOD GLUCOSE      POCT Blood Glucose.: 207 mg/dL (20 May 2020 07:58)  POCT Blood Glucose.: 250 mg/dL (19 May 2020 21:43)  POCT Blood Glucose.: 256 mg/dL (19 May 2020 17:15)  POCT Blood Glucose.: 220 mg/dL (19 May 2020 12:32)  POCT Blood Glucose.: 198 mg/dL (19 May 2020 09:15)    I&O's Summary    19 May 2020 07:01  -  20 May 2020 07:00  --------------------------------------------------------  IN: 240 mL / OUT: 480 mL / NET: -240 mL        PHYSICAL EXAM:  Vital Signs Last 24 Hrs  T(C): 36.9 (20 May 2020 05:09), Max: 36.9 (20 May 2020 05:09)  T(F): 98.4 (20 May 2020 05:09), Max: 98.4 (20 May 2020 05:09)  HR: 83 (20 May 2020 05:09) (74 - 85)  BP: 117/67 (20 May 2020 05:09) (111/71 - 131/78)  BP(mean): --  RR: 19 (20 May 2020 05:09) (18 - 20)  SpO2: 93% (20 May 2020 05:09) (93% - 98%)    CONSTITUTIONAL: NAD, well-developed  ENT: Scleral icterus  CARDIOVASCULAR: Regular rate and rhythm. Normal S1 and S2. No murmurs.  RESPIRATORY: Normal respiratory effort, Lungs CTAB  EXTREMITIES: No CHASITY, peripheral pulses intact.  ABDOMEN: Nontender to palpation. Nondistended., normoactive bowel sounds, no rebound/guarding; No hepatosplenomegaly. No fluid wave.  PSYCH: A+O to person, place, and time; affect appropriate  Skin: Vitiligo  NEURO: Asterixis    LABS:                        10.2   7.23  )-----------( 59       ( 20 May 2020 06:17 )             32.5     05-20    129<L>  |  97  |  30<H>  ----------------------------<  272<H>  3.5   |  12<L>  |  2.19<H>    Ca    9.0      20 May 2020 06:17  Phos  2.1     05-20  Mg     1.8     05-20    TPro  6.2  /  Alb  4.7  /  TBili  41.6<H>  /  DBili  x   /  AST  78<H>  /  ALT  31  /  AlkPhos  85  05-20    PT/INR - ( 18 May 2020 09:25 )   PT: 24.5 sec;   INR: 2.08 ratio         PTT - ( 18 May 2020 09:25 )  PTT:43.5 sec      Urinalysis Basic - ( 18 May 2020 23:05 )    Color: Leslie / Appearance: Clear / S.011 / pH: x  Gluc: x / Ketone: Negative  / Bili: Large / Urobili: <2 mg/dL   Blood: x / Protein: 30 mg/dL / Nitrite: Negative   Leuk Esterase: Negative / RBC: 8 /HPF / WBC 0 /HPF   Sq Epi: x / Non Sq Epi: 2 /HPF / Bacteria: Negative          RADIOLOGY & ADDITIONAL TESTS:  Results Reviewed: Plt 59. Plt 22 yesterday is an outlier. Hyponatremic to 129.   Imaging Personally Reviewed:  Electrocardiogram Personally Reviewed:    COORDINATION OF CARE:  Care Discussed with Consultants/Other Providers [Y/N]:  Prior or Outpatient Records Reviewed [Y/N]:

## 2020-05-20 NOTE — PROGRESS NOTE ADULT - PROBLEM SELECTOR PLAN 1
MELD-Na 39 on 5/18, , on admission, likely to acute decompensated cirrhosis 2/2 recent alcohol abuse without thrombus on abd us  - Methylprednisone 40 mg oral daily (5/17 - 5/23) then f/u lille score regarding continuation  - For SBP ppx, CTX (5/15-5/18) transitioned to cipro 500 mg BID (5/19 - )  - C/w rifaxamin  - C/w albumin MELD-Na 39 on 5/18, , on admission, likely to acute decompensated cirrhosis 2/2 recent alcohol abuse without thrombus on abd us  - Prednisolone 40 mg oral daily (5/17 - 5/23) then f/u lille score regarding continuation  - For SBP ppx, CTX (5/15-5/18) transitioned to cipro 500 mg BID (5/19 - ) - check on dosing.   - C/w rifaximin  - C/w albumin

## 2020-05-20 NOTE — PROGRESS NOTE ADULT - ASSESSMENT
44 year old man with hx of EtOH cirrhosis complicated by esophageal varices and diabetes presents with generalized weakness, abdominal distention and jaundice in the setting of decompensated cirrhosis due to recent EtOH use    Impression:  # Decompensated EtOH Cirrhosis - MELD-Na: 40 on 5/15/20  HE: AOx3, no asterixis  Ascites: Small to moderate ascites on CT A/P  HCC: No hx, but overdue for HCC screening  Varices: Small varices on EGD (4/2019)  Coagulopathy: INR: 3.04, Plts: 61  # Abnormal liver enzymes: Likely represents alcoholic hepatitis given > 2:1 AST to ALT ratio, however, differential includes acute on chronic liver failure due to underlying infection.  on 5/15/20. Started on steroids on 5/17/20  # DM  # SAM: Likely pre-renal. Differential includes HRS; improving with Albumin  # Hyponatremia: Suspect hyponatremia [intravascular depletion] driving low Na. Unclear chronicity    Recommendation:  - Continue prednisone/prednisolone 40 mg [Day 3], Calculate Lilles score Day 7  - Monitor daily CMP and INR  - Continue rifaximin 550 mg PO BID  - Continue 25% Albumin 100ml q6h   - Rest of care per primary team    Sanaz Kumar MD  Gastroenterology Fellow  [phone]  Please page on call fellow on weekends and after 5pm on weekdays 44 year old man with hx of EtOH cirrhosis complicated by esophageal varices and diabetes presents with generalized weakness, abdominal distention and jaundice in the setting of decompensated cirrhosis due to recent EtOH use    Impression:  # Decompensated EtOH Cirrhosis - MELD-Na: 40 on 5/15/20  HE: AOx3, no asterixis  Ascites: Small to moderate ascites on CT A/P  HCC: No hx, but overdue for HCC screening  Varices: Small varices on EGD (4/2019)  Coagulopathy: INR: 3.04, Plts: 61  # Abnormal liver enzymes: Likely represents alcoholic hepatitis given > 2:1 AST to ALT ratio, however, differential includes acute on chronic liver failure due to underlying infection.  on 5/15/20. Started on steroids on 5/17/20  # DM  # SAM: Likely pre-renal. Differential includes HRS; improving with Albumin  # Hyponatremia: Suspect hyponatremia [intravascular depletion] driving low Na. Unclear chronicity    Recommendation:  - Continue prednisone/prednisolone 40 mg [Day 4], Calculate Lilles score Day 7  - Monitor daily CMP and INR  - Continue rifaximin 550 mg PO BID  - Continue 25% Albumin 100ml q6h   - Rest of care per primary team    Sanaz Kumar MD  Gastroenterology Fellow  [phone]  Please page on call fellow on weekends and after 5pm on weekdays

## 2020-05-21 NOTE — PROGRESS NOTE ADULT - PROBLEM SELECTOR PLAN 1
MELD-Na 39 on 5/18, , on admission, likely to acute decompensated cirrhosis 2/2 recent alcohol abuse without thrombus on abd us  - Prednisolone 40 mg oral daily (5/17 - 5/23) then f/u lille score regarding continuation  - For SBP ppx, CTX (5/15-5/18) transitioned to cipro 500 mg BID (5/19 - ) - check on dosing.   - C/w rifaximin  - C/w albumin

## 2020-05-21 NOTE — PROGRESS NOTE ADULT - PROBLEM SELECTOR PLAN 5
Pt on levemir 80U at home, metformin 1000mg daily. last A1c 7.9 (10/2019)  - Increase to Mod ISS and FS qid   - Increase lantus to 78 U daily    - Increase Lispro 11 U TID  - Nutrition eval.  - Will decrease insulin when steroids ends on 5/23 Pt on levemir 80U at home, metformin 1000mg daily. last A1c 7.9 (10/2019)  - C/w Mod ISS and FS qid   - Incr lantus to 87 U daily    - Increase Lispro 14 U TID  - Nutrition eval.  - Will decrease insulin when steroids ends on 5/23

## 2020-05-21 NOTE — PROGRESS NOTE ADULT - PROBLEM SELECTOR PLAN 2
Hemodynamically mediated SAM given improvement with albumin and prerenal FENA  - c/w albumin 25% 100mL q6hr   - monitor BMP q12h  - Strict I's/O's.

## 2020-05-21 NOTE — CHART NOTE - NSCHARTNOTEFT_GEN_A_CORE
Spoke with guerita Stone over the phone: [phone]    Gave updates and answered all questions.      Can Hu  PGY-2

## 2020-05-21 NOTE — PROGRESS NOTE ADULT - PROBLEM SELECTOR PLAN 6
Previously treated at [facility]  -Being treated for alcoholic hepatitis as above  -C/w thiamine and MVI  -C/w folate  -Outpt f/u with prior facility and PCP

## 2020-05-21 NOTE — PROGRESS NOTE ADULT - SUBJECTIVE AND OBJECTIVE BOX
INCOMPLETE NOTE PROGRESS NOTE:     Patient is a 44y old  Male who presents with a chief complaint of jaundice (21 May 2020 08:49)      SUBJECTIVE / OVERNIGHT EVENTS:  OE: Phos repleted    NAC    ADDITIONAL REVIEW OF SYSTEMS:  No fevers, chest pain, shortness of breath, abdominal pain, lower extremity swelling or pain, dysuria, or constipation.    MEDICATIONS  (STANDING):  albumin human 25% IVPB 100 milliLiter(s) IV Intermittent every 6 hours  ciprofloxacin     Tablet 500 milliGRAM(s) Oral every 12 hours  dextrose 5%. 1000 milliLiter(s) (50 mL/Hr) IV Continuous <Continuous>  dextrose 50% Injectable 12.5 Gram(s) IV Push once  dextrose 50% Injectable 25 Gram(s) IV Push once  dextrose 50% Injectable 25 Gram(s) IV Push once  folic acid 1 milliGRAM(s) Oral daily  insulin glargine Injectable (LANTUS) 78 Unit(s) SubCutaneous at bedtime  insulin lispro (HumaLOG) corrective regimen sliding scale   SubCutaneous three times a day before meals  insulin lispro (HumaLOG) corrective regimen sliding scale   SubCutaneous at bedtime  insulin lispro Injectable (HumaLOG) 11 Unit(s) SubCutaneous three times a day before meals  multivitamin 1 Tablet(s) Oral daily  pantoprazole    Tablet 40 milliGRAM(s) Oral before breakfast  potassium phosphate / sodium phosphate powder 1 Packet(s) Oral four times a day before meals  prednisoLONE    3 mG/mL Solution (ORAPRED) 40 milliGRAM(s) Oral daily  rifAXIMin 550 milliGRAM(s) Oral two times a day  simethicone 80 milliGRAM(s) Chew three times a day  thiamine 100 milliGRAM(s) Oral daily  ursodiol Tablet 500 milliGRAM(s) Oral three times a day    MEDICATIONS  (PRN):  albuterol/ipratropium for Nebulization. 3 milliLiter(s) Nebulizer once PRN Shortness of Breath  dextrose 40% Gel 15 Gram(s) Oral once PRN Blood Glucose LESS THAN 70 milliGRAM(s)/deciliter  glucagon  Injectable 1 milliGRAM(s) IntraMuscular once PRN Glucose LESS THAN 70 milligrams/deciliter      CAPILLARY BLOOD GLUCOSE      POCT Blood Glucose.: 238 mg/dL (21 May 2020 12:36)  POCT Blood Glucose.: 296 mg/dL (21 May 2020 08:37)  POCT Blood Glucose.: 281 mg/dL (20 May 2020 21:58)  POCT Blood Glucose.: 281 mg/dL (20 May 2020 17:22)    I&O's Summary    20 May 2020 07:01  -  21 May 2020 07:00  --------------------------------------------------------  IN: 0 mL / OUT: 2325 mL / NET: -2325 mL        PHYSICAL EXAM:  Vital Signs Last 24 Hrs  T(C): 37.1 (21 May 2020 11:05), Max: 37.5 (21 May 2020 04:15)  T(F): 98.8 (21 May 2020 11:05), Max: 99.5 (21 May 2020 04:15)  HR: 83 (21 May 2020 11:05) (62 - 85)  BP: 117/69 (21 May 2020 11:05) (117/69 - 160/74)  BP(mean): --  RR: 16 (21 May 2020 11:05) (16 - 19)  SpO2: 96% (21 May 2020 11:05) (95% - 97%)    CONSTITUTIONAL: NAD, well-developed  ENT: Scleral icterus  CARDIOVASCULAR: Regular rate and rhythm. Normal S1 and S2. No murmurs.  RESPIRATORY: Normal respiratory effort, Lungs CTAB  EXTREMITIES: No CHASITY, peripheral pulses intact.  ABDOMEN: Nontender to palpation. Nondistended., normoactive bowel sounds, no rebound/guarding; No hepatosplenomegaly. No fluid wave.  PSYCH: A+O to person, place, and time; affect appropriate  Skin: Vitiligo  NEURO: Asterixis    LABS:                        9.2    5.61  )-----------( 54       ( 21 May 2020 05:49 )             28.2     05-21    125<L>  |  96  |  34<H>  ----------------------------<  330<H>  3.3<L>   |  12<L>  |  2.07<H>    Ca    8.4      21 May 2020 05:49  Phos  2.5     05-21  Mg     1.5     05-21    TPro  5.5<L>  /  Alb  4.2  /  TBili  38.4<H>  /  DBili  x   /  AST  78<H>  /  ALT  33  /  AlkPhos  84  05-21    PT/INR - ( 21 May 2020 08:32 )   PT: 24.5 sec;   INR: 2.10 ratio         PTT - ( 21 May 2020 08:32 )  PTT:43.9 sec            RADIOLOGY & ADDITIONAL TESTS:  Results Reviewed:  Hgb stable. Hyponatremia 129 when corrected for hyperglycemia.  Imaging Personally Reviewed:  Electrocardiogram Personally Reviewed:    COORDINATION OF CARE:  Care Discussed with Consultants/Other Providers [Y/N]:  Prior or Outpatient Records Reviewed [Y/N]:

## 2020-05-22 NOTE — CHART NOTE - NSCHARTNOTEFT_GEN_A_CORE
Spoke with guerita Stone over the phone: [phone]    Gave updates and answered all questions.      Can Hu  PGY-2.

## 2020-05-22 NOTE — PROGRESS NOTE ADULT - SUBJECTIVE AND OBJECTIVE BOX
Carlos Codey, PGY2  Pager: [phone] ([facility]), [phone] ([facility])   After 7: Night Float pager    Patient is a 44y old  Male who presents with a chief complaint of jaundice (21 May 2020 08:49)    INCOMPLETE NOTE    SUBJECTIVE / OVERNIGHT EVENTS: no acute event overnight     MEDICATIONS  (STANDING):  albumin human 25% IVPB 100 milliLiter(s) IV Intermittent every 6 hours  ciprofloxacin     Tablet 500 milliGRAM(s) Oral every 12 hours  dextrose 5%. 1000 milliLiter(s) (50 mL/Hr) IV Continuous <Continuous>  dextrose 50% Injectable 12.5 Gram(s) IV Push once  dextrose 50% Injectable 25 Gram(s) IV Push once  dextrose 50% Injectable 25 Gram(s) IV Push once  folic acid 1 milliGRAM(s) Oral daily  insulin glargine Injectable (LANTUS) 87 Unit(s) SubCutaneous at bedtime  insulin lispro (HumaLOG) corrective regimen sliding scale   SubCutaneous three times a day before meals  insulin lispro (HumaLOG) corrective regimen sliding scale   SubCutaneous at bedtime  insulin lispro Injectable (HumaLOG) 14 Unit(s) SubCutaneous three times a day before meals  multivitamin 1 Tablet(s) Oral daily  pantoprazole    Tablet 40 milliGRAM(s) Oral before breakfast  prednisoLONE    3 mG/mL Solution (ORAPRED) 40 milliGRAM(s) Oral daily  rifAXIMin 550 milliGRAM(s) Oral two times a day  simethicone 80 milliGRAM(s) Chew three times a day  thiamine 100 milliGRAM(s) Oral daily  ursodiol Tablet 500 milliGRAM(s) Oral three times a day    MEDICATIONS  (PRN):  albuterol/ipratropium for Nebulization. 3 milliLiter(s) Nebulizer once PRN Shortness of Breath  dextrose 40% Gel 15 Gram(s) Oral once PRN Blood Glucose LESS THAN 70 milliGRAM(s)/deciliter  glucagon  Injectable 1 milliGRAM(s) IntraMuscular once PRN Glucose LESS THAN 70 milligrams/deciliter      Vital Signs Last 24 Hrs  T(C): 36.8 (22 May 2020 04:56), Max: 37.1 (21 May 2020 11:05)  T(F): 98.2 (22 May 2020 04:56), Max: 98.8 (21 May 2020 11:05)  HR: 81 (22 May 2020 04:56) (81 - 89)  BP: 112/63 (22 May 2020 04:56) (112/63 - 129/78)  BP(mean): --  RR: 18 (22 May 2020 04:56) (16 - 18)  SpO2: 96% (22 May 2020 04:56) (95% - 96%)  CAPILLARY BLOOD GLUCOSE      POCT Blood Glucose.: 240 mg/dL (21 May 2020 21:57)  POCT Blood Glucose.: 261 mg/dL (21 May 2020 17:17)  POCT Blood Glucose.: 238 mg/dL (21 May 2020 12:36)  POCT Blood Glucose.: 296 mg/dL (21 May 2020 08:37)    I&O's Summary    21 May 2020 07:01  -  22 May 2020 07:00  --------------------------------------------------------  IN: 440 mL / OUT: 250 mL / NET: 190 mL        PHYSICAL EXAM:  GENERAL: NAD, well-developed  EYES: EOMI, PERRLA, conjunctiva and sclera clear  NECK:  No JVD  CHEST/LUNG: CTABL ; No wheeze  HEART: RRR; No murmurs  ABDOMEN: Soft, Nontender, Nondistended; Bowel sounds present  EXTREMITIES:  2+ Peripheral Pulses, No edema  MUSCULOSKEL:   PSYCH: AAOx   NEUROLOGY: CN 2-12 grossly intact, strength, sensory,   SKIN: No rashes or lesions. No sacral ulcer    LABS:                        9.2    6.52  )-----------( 55       ( 22 May 2020 06:09 )             28.5     05-22    130<L>  |  100  |  38<H>  ----------------------------<  232<H>  3.6   |  10<LL>  |  1.94<H>    Ca    8.8      22 May 2020 06:09  Phos  2.7     05-22  Mg     1.6     05-22    TPro  5.9<L>  /  Alb  4.7  /  TBili  38.3<H>  /  DBili  x   /  AST  76<H>  /  ALT  34  /  AlkPhos  75  05-22    PT/INR - ( 21 May 2020 08:32 )   PT: 24.5 sec;   INR: 2.10 ratio         PTT - ( 21 May 2020 08:32 )  PTT:43.9 sec          Microbiology;      RADIOLOGY & ADDITIONAL TESTS:    Imaging Personally Reviewed:    Consultant(s) Notes Reviewed: Carlos oCdey, PGY2  Pager: [phone] ([facility]), [phone] ([facility])   After 7: Night Float pager    Patient is a 44y old  Male who presents with a chief complaint of jaundice (21 May 2020 08:49)      SUBJECTIVE / OVERNIGHT EVENTS: no acute event overnight. Denies fever, chills, n/v, CP, SOB.      MEDICATIONS  (STANDING):  albumin human 25% IVPB 100 milliLiter(s) IV Intermittent every 6 hours  ciprofloxacin     Tablet 500 milliGRAM(s) Oral every 12 hours  dextrose 5%. 1000 milliLiter(s) (50 mL/Hr) IV Continuous <Continuous>  dextrose 50% Injectable 12.5 Gram(s) IV Push once  dextrose 50% Injectable 25 Gram(s) IV Push once  dextrose 50% Injectable 25 Gram(s) IV Push once  folic acid 1 milliGRAM(s) Oral daily  insulin glargine Injectable (LANTUS) 87 Unit(s) SubCutaneous at bedtime  insulin lispro (HumaLOG) corrective regimen sliding scale   SubCutaneous three times a day before meals  insulin lispro (HumaLOG) corrective regimen sliding scale   SubCutaneous at bedtime  insulin lispro Injectable (HumaLOG) 14 Unit(s) SubCutaneous three times a day before meals  multivitamin 1 Tablet(s) Oral daily  pantoprazole    Tablet 40 milliGRAM(s) Oral before breakfast  prednisoLONE    3 mG/mL Solution (ORAPRED) 40 milliGRAM(s) Oral daily  rifAXIMin 550 milliGRAM(s) Oral two times a day  simethicone 80 milliGRAM(s) Chew three times a day  thiamine 100 milliGRAM(s) Oral daily  ursodiol Tablet 500 milliGRAM(s) Oral three times a day    MEDICATIONS  (PRN):  albuterol/ipratropium for Nebulization. 3 milliLiter(s) Nebulizer once PRN Shortness of Breath  dextrose 40% Gel 15 Gram(s) Oral once PRN Blood Glucose LESS THAN 70 milliGRAM(s)/deciliter  glucagon  Injectable 1 milliGRAM(s) IntraMuscular once PRN Glucose LESS THAN 70 milligrams/deciliter      Vital Signs Last 24 Hrs  T(C): 36.8 (22 May 2020 04:56), Max: 37.1 (21 May 2020 11:05)  T(F): 98.2 (22 May 2020 04:56), Max: 98.8 (21 May 2020 11:05)  HR: 81 (22 May 2020 04:56) (81 - 89)  BP: 112/63 (22 May 2020 04:56) (112/63 - 129/78)  BP(mean): --  RR: 18 (22 May 2020 04:56) (16 - 18)  SpO2: 96% (22 May 2020 04:56) (95% - 96%)  CAPILLARY BLOOD GLUCOSE      POCT Blood Glucose.: 240 mg/dL (21 May 2020 21:57)  POCT Blood Glucose.: 261 mg/dL (21 May 2020 17:17)  POCT Blood Glucose.: 238 mg/dL (21 May 2020 12:36)  POCT Blood Glucose.: 296 mg/dL (21 May 2020 08:37)    I&O's Summary    21 May 2020 07:01  -  22 May 2020 07:00  --------------------------------------------------------  IN: 440 mL / OUT: 250 mL / NET: 190 mL    PHYSICAL EXAM:  GENERAL: NAD, well-developed, + jaundice    EYES: EOMI, + sclera icterus    NECK:  No JVD  CHEST/LUNG: CTABL ; No wheeze  HEART: RRR; No murmurs  ABDOMEN: Soft, Nontender, Nondistended; Bowel sounds present  EXTREMITIES:  2+ Peripheral Pulses, No edema  MUSCULOSKEL: no deformity   PSYCH: AAOx3   NEUROLOGY: no focal deficit, Asterixis  Skin: Vitiligo    LABS:                        9.2    6.52  )-----------( 55       ( 22 May 2020 06:09 )             28.5     05-22    130<L>  |  100  |  38<H>  ----------------------------<  232<H>  3.6   |  10<LL>  |  1.94<H>    Ca    8.8      22 May 2020 06:09  Phos  2.7     05-22  Mg     1.6     05-22    TPro  5.9<L>  /  Alb  4.7  /  TBili  38.3<H>  /  DBili  x   /  AST  76<H>  /  ALT  34  /  AlkPhos  75  05-22    PT/INR - ( 21 May 2020 08:32 )   PT: 24.5 sec;   INR: 2.10 ratio         PTT - ( 21 May 2020 08:32 )  PTT:43.9 sec    Consultant(s) Notes Reviewed:  hepatology

## 2020-05-22 NOTE — PROGRESS NOTE ADULT - PROBLEM SELECTOR PLAN 5
Pt on levemir 80U at home, metformin 1000mg daily. last A1c 7.9 (10/2019)  - C/w Mod ISS and FS qid   - Incr lantus to 87 U daily    - Increase Lispro 14 U TID  - Nutrition eval.  - Will decrease insulin when steroids ends on 5/23 Pt on levemir 80U at home, metformin 1000mg daily. last A1c 7.9 (10/2019)  - C/w Mod ISS and FS qid   - Incr lantus to 87 U daily    - Increase Lispro 16 U TID  - Nutrition eval.  - Will decrease insulin when steroids ends on 5/23  -Would get endo eval.

## 2020-05-22 NOTE — PROGRESS NOTE ADULT - PROBLEM SELECTOR PLAN 6
Previously treated at [facility]  -Being treated for alcoholic hepatitis as above  -C/w thiamine and MVI  -C/w folate  -Outpt f/u with prior facility and PCP

## 2020-05-22 NOTE — PROGRESS NOTE ADULT - PROBLEM SELECTOR PLAN 1
MELD-Na 39 on 5/18, , on admission, likely to acute decompensated cirrhosis 2/2 recent alcohol abuse without thrombus on abd us  - Prednisolone 40 mg oral daily (5/17 - 5/23) then f/u lille score regarding continuation  - For SBP ppx, CTX (5/15-5/18) transitioned to cipro 500 mg BID (5/19 - ) - check on dosing.   - C/w rifaximin  - C/w albumin MELD-Na 39 on 5/18, , on admission, likely to acute decompensated cirrhosis 2/2 recent alcohol abuse without thrombus on abd us  - Prednisolone 40 mg oral daily (5/17 - 5/23) then f/u lille score regarding continuation. -Day 7 tomorrow.   - For SBP ppx, CTX (5/15-5/18) transitioned to cipro 500 mg BID (5/19 - ) - check on dosing.   - C/w rifaximin  - C/w albumin  -F/u hepatology recs.

## 2020-05-22 NOTE — PROGRESS NOTE ADULT - ASSESSMENT
44 year old male with history of cirrhosis 2/2 EtOH, IDDM, Latent TB, who presented to the hospital on 5/15 for decompensated liver cirrhosis due to recent alcohol use, jaundice and scleral icterus. Nephrology consulted for management of SAM and hyponatremia. Baseline creatinine 0.5-0.6, and baseline normal sodium.     Problem/Recommendation - 1:  Problem: SAM (acute kidney injury). Recommendation: Patient with hemodynamically mediated SAM likely in the setting of poor PO intake, nausea/vomiting and diarrhea after recent alcohol use. Patient likely without ATN as patient has had volume resuscitation and creatinine is trending down. Patient states that they are eating and drinking appropriately as well now without nausea/vomiting or diarrhea. Can hold further resuscitation and continue to monitor creatinine trend. Continue to monitor strict I/Os. Patient will likely need to be put back on a diuretic for discharge however can continue to hold. Avoid nephrotoxic agents, renally dose all medications, and avoid hypotensive episodes.     Problem/Recommendation - 2:  ·  Problem: Hyponatremia.  Recommendation: Patient with chronic asymptomatic hypoosmolar? hypovolemic/euvolemic hyponatremia in the setting of vomiting and diarrhea and alcohol use. Patient with adequate fluid resuscitation now with albumin and sodium trending up. Can hold further resuscitation for now. Continue to monitor sodium with adequate PO intake with salt and protein. Fluid restrict to 1L/day of total intake. Continue to monitor sodium daily.    Metabolic Acidosis  - Patient with CO2 of 10 with anion gap, lactate 1.8  - pH stable at 7.35  - No need to replete for now, continue to monitor

## 2020-05-22 NOTE — PROGRESS NOTE ADULT - PROBLEM SELECTOR PLAN 2
Hemodynamically mediated SAM given improvement with albumin and prerenal FENA  - c/w albumin 25% 100mL q6hr   - monitor BMP q12h  - Strict I's/O's. Hemodynamically mediated SAM given improvement with albumin and prerenal FENA  - c/w albumin 25% 100mL q6hr   - monitor BMP q12h  - Strict I's/O's.  -F/u VBG for low CO2.

## 2020-05-22 NOTE — PROGRESS NOTE ADULT - SUBJECTIVE AND OBJECTIVE BOX
Chief Complaint:  Patient is a 44y old  Male who presents with a chief complaint of jaundice (22 May 2020 07:42)    Interval Events:   No acute overnight events    Allergies:  levofloxacin (Other (Moderate))    Hospital Medications:  albumin human 25% IVPB 100 milliLiter(s) IV Intermittent every 6 hours  albuterol/ipratropium for Nebulization. 3 milliLiter(s) Nebulizer once PRN  ciprofloxacin     Tablet 500 milliGRAM(s) Oral every 12 hours  dextrose 40% Gel 15 Gram(s) Oral once PRN  dextrose 5%. 1000 milliLiter(s) IV Continuous <Continuous>  dextrose 50% Injectable 12.5 Gram(s) IV Push once  dextrose 50% Injectable 25 Gram(s) IV Push once  dextrose 50% Injectable 25 Gram(s) IV Push once  folic acid 1 milliGRAM(s) Oral daily  glucagon  Injectable 1 milliGRAM(s) IntraMuscular once PRN  insulin glargine Injectable (LANTUS) 87 Unit(s) SubCutaneous at bedtime  insulin lispro (HumaLOG) corrective regimen sliding scale   SubCutaneous three times a day before meals  insulin lispro (HumaLOG) corrective regimen sliding scale   SubCutaneous at bedtime  insulin lispro Injectable (HumaLOG) 14 Unit(s) SubCutaneous three times a day before meals  multivitamin 1 Tablet(s) Oral daily  pantoprazole    Tablet 40 milliGRAM(s) Oral before breakfast  prednisoLONE    3 mG/mL Solution (ORAPRED) 40 milliGRAM(s) Oral daily  rifAXIMin 550 milliGRAM(s) Oral two times a day  simethicone 80 milliGRAM(s) Chew three times a day  thiamine 100 milliGRAM(s) Oral daily  ursodiol Tablet 500 milliGRAM(s) Oral three times a day    PMHX/PSHX:  E. coli bacteremia  Vitiligo  Latent tuberculosis  Diabetes mellitus type 2, insulin dependent  Esophageal varices  Alcoholic cirrhosis  Status post corneal transplant  Alcoholic Cirrhosis  Esophageal Varices    ROS:   General:  No fevers, chills or night sweats  ENT:  No sore throat or dysphagia  CV:  No pain or palpitations  Resp:  No dyspnea, cough or  wheezing  GI:  No pain, No nausea, No vomiting, No diarrhea, No rectal bleeding, No tarry stools,  Skin:  No rash or edema    PHYSICAL EXAM:   Vital Signs:  Vital Signs Last 24 Hrs  T(C): 36.8 (22 May 2020 04:56), Max: 37.1 (21 May 2020 11:05)  T(F): 98.2 (22 May 2020 04:56), Max: 98.8 (21 May 2020 11:05)  HR: 81 (22 May 2020 04:56) (81 - 89)  BP: 112/63 (22 May 2020 04:56) (112/63 - 129/78)  BP(mean): --  RR: 18 (22 May 2020 04:56) (16 - 18)  SpO2: 96% (22 May 2020 04:56) (95% - 96%)  Daily     Daily     GENERAL:  NAD, Appears stated age  HEENT:  NC/AT,  conjunctivae clear and pink, sclera -anicteric  CHEST:  Normal Effort, Breath sounds clear  HEART:  RRR, S1 + S2, no murmurs  ABDOMEN:  Soft, non-tender, non-distended, BS+  EXTEREMITIES:  no cyanosis  SKIN:  Warm & Dry.  NEURO:  Alert, oriented    LABS:                        9.2    6.52  )-----------( 55       ( 22 May 2020 06:09 )             28.5     Mean Cell Volume: 80.5 fl (05-22-20 @ 06:09)    05-22    130<L>  |  100  |  38<H>  ----------------------------<  232<H>  3.6   |  10<LL>  |  1.94<H>    Ca    8.8      22 May 2020 06:09  Phos  2.7     05-22  Mg     1.6     05-22    TPro  5.9<L>  /  Alb  4.7  /  TBili  38.3<H>  /  DBili  x   /  AST  76<H>  /  ALT  34  /  AlkPhos  75  05-22    LIVER FUNCTIONS - ( 22 May 2020 06:09 )  Alb: 4.7 g/dL / Pro: 5.9 g/dL / ALK PHOS: 75 U/L / ALT: 34 U/L / AST: 76 U/L / GGT: x           PT/INR - ( 22 May 2020 08:15 )   PT: 24.6 sec;   INR: 2.09 ratio      PTT - ( 22 May 2020 08:15 )  PTT:47.6 sec                       9.2    6.52  )-----------( 55       ( 22 May 2020 06:09 )             28.5                         9.2    5.61  )-----------( 54       ( 21 May 2020 05:49 )             28.2                         10.1   6.31  )-----------( 55       ( 20 May 2020 18:52 )             30.0                         10.2   7.23  )-----------( 59       ( 20 May 2020 06:17 )             32.5                         9.5    6.52  )-----------( 59       ( 19 May 2020 18:32 )             29.9       Imaging: Chief Complaint:  Patient is a 44y old  Male who presents with a chief complaint of jaundice (22 May 2020 07:42)    Interval Events:   No acute overnight events  No fevers, chills, nausea, vomiting, diarrhea     Allergies:  levofloxacin (Other (Moderate))    Hospital Medications:  albumin human 25% IVPB 100 milliLiter(s) IV Intermittent every 6 hours  albuterol/ipratropium for Nebulization. 3 milliLiter(s) Nebulizer once PRN  ciprofloxacin     Tablet 500 milliGRAM(s) Oral every 12 hours  dextrose 40% Gel 15 Gram(s) Oral once PRN  dextrose 5%. 1000 milliLiter(s) IV Continuous <Continuous>  dextrose 50% Injectable 12.5 Gram(s) IV Push once  dextrose 50% Injectable 25 Gram(s) IV Push once  dextrose 50% Injectable 25 Gram(s) IV Push once  folic acid 1 milliGRAM(s) Oral daily  glucagon  Injectable 1 milliGRAM(s) IntraMuscular once PRN  insulin glargine Injectable (LANTUS) 87 Unit(s) SubCutaneous at bedtime  insulin lispro (HumaLOG) corrective regimen sliding scale   SubCutaneous three times a day before meals  insulin lispro (HumaLOG) corrective regimen sliding scale   SubCutaneous at bedtime  insulin lispro Injectable (HumaLOG) 14 Unit(s) SubCutaneous three times a day before meals  multivitamin 1 Tablet(s) Oral daily  pantoprazole    Tablet 40 milliGRAM(s) Oral before breakfast  prednisoLONE    3 mG/mL Solution (ORAPRED) 40 milliGRAM(s) Oral daily  rifAXIMin 550 milliGRAM(s) Oral two times a day  simethicone 80 milliGRAM(s) Chew three times a day  thiamine 100 milliGRAM(s) Oral daily  ursodiol Tablet 500 milliGRAM(s) Oral three times a day    PMHX/PSHX:  E. coli bacteremia  Vitiligo  Latent tuberculosis  Diabetes mellitus type 2, insulin dependent  Esophageal varices  Alcoholic cirrhosis  Status post corneal transplant  Alcoholic Cirrhosis  Esophageal Varices    ROS:   General:  No fevers, chills  ENT:  No sore throat or dysphagia  CV:  No pain or palpitations  Resp:  No dyspnea, cough or  wheezing  GI:  No pain, No nausea, No vomiting, , No rectal bleeding, No tarry stools,  Skin:  No rash or edema    PHYSICAL EXAM:   Vital Signs:  Vital Signs Last 24 Hrs  T(C): 36.8 (22 May 2020 04:56), Max: 37.1 (21 May 2020 11:05)  T(F): 98.2 (22 May 2020 04:56), Max: 98.8 (21 May 2020 11:05)  HR: 81 (22 May 2020 04:56) (81 - 89)  BP: 112/63 (22 May 2020 04:56) (112/63 - 129/78)  BP(mean): --  RR: 18 (22 May 2020 04:56) (16 - 18)  SpO2: 96% (22 May 2020 04:56) (95% - 96%)  Daily     Daily     GENERAL:  NAD, Appears stated age  HEENT:  NC/AT,  conjunctivae clear and pink, scleral icteris  CHEST:  Normal Effort, Breath sounds clear  HEART:  RRR, S1 + S2, no murmurs  ABDOMEN:  Soft, non-tender, non-distended, BS+  EXTEREMITIES:  no cyanosis  SKIN:  Warm & Dry.  NEURO:  Alert, oriented    LABS:                        9.2    6.52  )-----------( 55       ( 22 May 2020 06:09 )             28.5     Mean Cell Volume: 80.5 fl (05-22-20 @ 06:09)    05-22    130<L>  |  100  |  38<H>  ----------------------------<  232<H>  3.6   |  10<LL>  |  1.94<H>    Ca    8.8      22 May 2020 06:09  Phos  2.7     05-22  Mg     1.6     05-22    TPro  5.9<L>  /  Alb  4.7  /  TBili  38.3<H>  /  DBili  x   /  AST  76<H>  /  ALT  34  /  AlkPhos  75  05-22    LIVER FUNCTIONS - ( 22 May 2020 06:09 )  Alb: 4.7 g/dL / Pro: 5.9 g/dL / ALK PHOS: 75 U/L / ALT: 34 U/L / AST: 76 U/L / GGT: x           PT/INR - ( 22 May 2020 08:15 )   PT: 24.6 sec;   INR: 2.09 ratio      PTT - ( 22 May 2020 08:15 )  PTT:47.6 sec                       9.2    6.52  )-----------( 55       ( 22 May 2020 06:09 )             28.5                         9.2    5.61  )-----------( 54       ( 21 May 2020 05:49 )             28.2                         10.1   6.31  )-----------( 55       ( 20 May 2020 18:52 )             30.0                         10.2   7.23  )-----------( 59       ( 20 May 2020 06:17 )             32.5                         9.5    6.52  )-----------( 59       ( 19 May 2020 18:32 )             29.9       Imaging: Chief Complaint:  Patient is a 44y old  Male who presents with a chief complaint of jaundice (22 May 2020 07:42)    Interval Events:   No acute overnight events  No fevers, chills, nausea, vomiting, diarrhea     Allergies:  levofloxacin (Other (Moderate))    Hospital Medications:  albumin human 25% IVPB 100 milliLiter(s) IV Intermittent every 6 hours  albuterol/ipratropium for Nebulization. 3 milliLiter(s) Nebulizer once PRN  ciprofloxacin     Tablet 500 milliGRAM(s) Oral every 12 hours  dextrose 40% Gel 15 Gram(s) Oral once PRN  dextrose 5%. 1000 milliLiter(s) IV Continuous <Continuous>  dextrose 50% Injectable 12.5 Gram(s) IV Push once  dextrose 50% Injectable 25 Gram(s) IV Push once  dextrose 50% Injectable 25 Gram(s) IV Push once  folic acid 1 milliGRAM(s) Oral daily  glucagon  Injectable 1 milliGRAM(s) IntraMuscular once PRN  insulin glargine Injectable (LANTUS) 87 Unit(s) SubCutaneous at bedtime  insulin lispro (HumaLOG) corrective regimen sliding scale   SubCutaneous three times a day before meals  insulin lispro (HumaLOG) corrective regimen sliding scale   SubCutaneous at bedtime  insulin lispro Injectable (HumaLOG) 14 Unit(s) SubCutaneous three times a day before meals  multivitamin 1 Tablet(s) Oral daily  pantoprazole    Tablet 40 milliGRAM(s) Oral before breakfast  prednisoLONE    3 mG/mL Solution (ORAPRED) 40 milliGRAM(s) Oral daily  rifAXIMin 550 milliGRAM(s) Oral two times a day  simethicone 80 milliGRAM(s) Chew three times a day  thiamine 100 milliGRAM(s) Oral daily  ursodiol Tablet 500 milliGRAM(s) Oral three times a day    PMHX/PSHX:  E. coli bacteremia  Vitiligo  Latent tuberculosis  Diabetes mellitus type 2, insulin dependent  Esophageal varices  Alcoholic cirrhosis  Status post corneal transplant  Alcoholic Cirrhosis  Esophageal Varices    ROS:   General:  No fevers, chills  HEENT:  No sore throat or dysphagia  CV:  No pain or palpitations  Resp:  No dyspnea, cough or  wheezing  GI:  No pain, No nausea, No vomiting, , No rectal bleeding, No tarry stools,  Skin:  No rash or edema    PHYSICAL EXAM:   Vital Signs:  Vital Signs Last 24 Hrs  T(C): 36.8 (22 May 2020 04:56), Max: 37.1 (21 May 2020 11:05)  T(F): 98.2 (22 May 2020 04:56), Max: 98.8 (21 May 2020 11:05)  HR: 81 (22 May 2020 04:56) (81 - 89)  BP: 112/63 (22 May 2020 04:56) (112/63 - 129/78)  BP(mean): --  RR: 18 (22 May 2020 04:56) (16 - 18)  SpO2: 96% (22 May 2020 04:56) (95% - 96%)  Daily     Daily     GENERAL:  NAD, Appears stated age  HEENT:  NC/AT,  conjunctivae clear and pink, scleral icteris  CHEST:  Normal Effort, Breath sounds clear  HEART:  RRR, S1 + S2, no murmurs  ABDOMEN:  Soft, non-tender, non-distended, BS+  EXTEREMITIES:  no cyanosis  SKIN:  Warm & Dry.  NEURO:  Alert, oriented    LABS:                        9.2    6.52  )-----------( 55       ( 22 May 2020 06:09 )             28.5     Mean Cell Volume: 80.5 fl (05-22-20 @ 06:09)    05-22    130<L>  |  100  |  38<H>  ----------------------------<  232<H>  3.6   |  10<LL>  |  1.94<H>    Ca    8.8      22 May 2020 06:09  Phos  2.7     05-22  Mg     1.6     05-22    TPro  5.9<L>  /  Alb  4.7  /  TBili  38.3<H>  /  DBili  x   /  AST  76<H>  /  ALT  34  /  AlkPhos  75  05-22    LIVER FUNCTIONS - ( 22 May 2020 06:09 )  Alb: 4.7 g/dL / Pro: 5.9 g/dL / ALK PHOS: 75 U/L / ALT: 34 U/L / AST: 76 U/L / GGT: x           PT/INR - ( 22 May 2020 08:15 )   PT: 24.6 sec;   INR: 2.09 ratio      PTT - ( 22 May 2020 08:15 )  PTT:47.6 sec                       9.2    6.52  )-----------( 55       ( 22 May 2020 06:09 )             28.5                         9.2    5.61  )-----------( 54       ( 21 May 2020 05:49 )             28.2                         10.1   6.31  )-----------( 55       ( 20 May 2020 18:52 )             30.0                         10.2   7.23  )-----------( 59       ( 20 May 2020 06:17 )             32.5                         9.5    6.52  )-----------( 59       ( 19 May 2020 18:32 )             29.9       Imaging:

## 2020-05-22 NOTE — PROGRESS NOTE ADULT - SUBJECTIVE AND OBJECTIVE BOX
[facility] Division of Kidney Diseases & Hypertension  FOLLOW UP NOTE  [phone]--------------------------------------------------------------------------------  Chief Complaint:Jaundice      24 hour events/subjective: No acute events overnight. Labs, vitals, medications and imaging reviewed. Vital signs stable. Labs show CO2 of 10 but creatinine now downtrending to 1.94.        PAST HISTORY  --------------------------------------------------------------------------------  No significant changes to PMH, PSH, FHx, SHx, unless otherwise noted    ALLERGIES & MEDICATIONS  --------------------------------------------------------------------------------  Allergies    levofloxacin (Other (Moderate))    Intolerances      Standing Inpatient Medications  albumin human 25% IVPB 100 milliLiter(s) IV Intermittent every 6 hours  ciprofloxacin     Tablet 500 milliGRAM(s) Oral every 12 hours  folic acid 1 milliGRAM(s) Oral daily  insulin glargine Injectable (LANTUS) 87 Unit(s) SubCutaneous at bedtime  insulin lispro (HumaLOG) corrective regimen sliding scale   SubCutaneous three times a day before meals  insulin lispro (HumaLOG) corrective regimen sliding scale   SubCutaneous at bedtime  insulin lispro Injectable (HumaLOG) 14 Unit(s) SubCutaneous three times a day before meals  multivitamin 1 Tablet(s) Oral daily  pantoprazole    Tablet 40 milliGRAM(s) Oral before breakfast  prednisoLONE    3 mG/mL Solution (ORAPRED) 40 milliGRAM(s) Oral daily  rifAXIMin 550 milliGRAM(s) Oral two times a day  simethicone 80 milliGRAM(s) Chew three times a day  thiamine 100 milliGRAM(s) Oral daily  ursodiol Tablet 500 milliGRAM(s) Oral three times a day    PRN Inpatient Medications  albuterol/ipratropium for Nebulization. 3 milliLiter(s) Nebulizer once PRN  dextrose 40% Gel 15 Gram(s) Oral once PRN  glucagon  Injectable 1 milliGRAM(s) IntraMuscular once PRN      REVIEW OF SYSTEMS  --------------------------------------------------------------------------------  Gen: No  fevers/chills  Skin: No rashes  Head/Eyes/Ears/Mouth: No headache; Normal hearing; Normal vision w/o blurriness  Respiratory: No dyspnea, cough, wheezing, hemoptysis  CV: No chest pain, PND, orthopnea  GI: No abdominal pain, diarrhea, constipation, nausea, vomiting  : No increased frequency, dysuria, hematuria, nocturia  MSK: No joint pain/swelling; no back pain; no edema  Neuro: No dizziness/lightheadedness, weakness, seizures, numbness, tingling  Psych: Non-focal      All other systems were reviewed and are negative, except as noted.    VITALS/PHYSICAL EXAM  --------------------------------------------------------------------------------  T(C): 36.9 (05-22-20 @ 11:55), Max: 37.1 (05-21-20 @ 22:15)  HR: 81 (05-22-20 @ 11:55) (81 - 89)  BP: 121/72 (05-22-20 @ 11:55) (112/63 - 129/78)  RR: 18 (05-22-20 @ 11:55) (16 - 18)  SpO2: 97% (05-22-20 @ 11:55) (95% - 97%)  Wt(kg): --        05-21-20 @ 07:01  -  05-22-20 @ 07:00  --------------------------------------------------------  IN: 440 mL / OUT: 250 mL / NET: 190 mL      Physical Exam:  	Gen: NAD, appears lethargic  	HEENT: Scleral icterus  	Pulm: CTA B/L  	CV:  S1S2  	Abd: +BS, soft   	Ext: No B/L Lower ext edema  	Neuro: No focal deficits  	Skin: Jaundice,              Psych: Non-focal  	Vascular: No cyanosis    LABS/STUDIES  --------------------------------------------------------------------------------              9.2    6.52  >-----------<  55       [05-22-20 @ 06:09]              28.5     130  |  100  |  38  ----------------------------<  232      [05-22-20 @ 06:09]  3.6   |  10  |  1.94        Ca     8.8     [05-22-20 @ 06:09]      Mg     1.6     [05-22-20 @ 06:09]      Phos  2.7     [05-22-20 @ 06:09]    TPro  5.9  /  Alb  4.7  /  TBili  38.3  /  DBili  x   /  AST  76  /  ALT  34  /  AlkPhos  75  [05-22-20 @ 06:09]    PT/INR: PT 24.6 , INR 2.09       [05-22-20 @ 08:15]  PTT: 47.6       [05-22-20 @ 08:15]      Creatinine Trend:  SCr 1.94 [05-22 @ 06:09]  SCr 2.07 [05-21 @ 05:49]  SCr 2.14 [05-20 @ 18:44]  SCr 2.19 [05-20 @ 06:17]  SCr 2.25 [05-19 @ 19:01]    Urinalysis - [05-18-20 @ 23:05]      Color Leslie / Appearance Clear / SG 1.011 / pH 6.5      Gluc >= 1000 mg/dL / Ketone Negative  / Bili Large / Urobili <2 mg/dL       Blood Small / Protein 30 mg/dL / Leuk Est Negative / Nitrite Negative      RBC 8 / WBC 0 / Hyaline 0 / Gran  / Sq Epi  / Non Sq Epi 2 / Bacteria Negative    Urine Creatinine 88      [05-22-20 @ 12:18]  Urine Sodium <35      [05-22-20 @ 12:18]  Urine Urea Nitrogen 359      [05-18-20 @ 23:11]  Urine Potassium 34      [05-22-20 @ 12:18]  Urine Chloride <35      [05-22-20 @ 12:18]  Urine Osmolality 159      [05-15-20 @ 21:08]    TSH 0.26      [05-19-20 @ 08:53]    HBsAb Nonreact      [05-21-20 @ 00:40]  HBsAg Nonreact      [05-21-20 @ 00:40]  HBcAb Nonreact      [05-20-20 @ 22:39]

## 2020-05-22 NOTE — PROGRESS NOTE ADULT - ASSESSMENT
44 year old man with hx of EtOH cirrhosis complicated by esophageal varices and diabetes presents with generalized weakness, abdominal distention and jaundice in the setting of decompensated cirrhosis due to recent EtOH use    Impression:  # Decompensated EtOH Cirrhosis - MELD-Na: 36 on 5/22/20  HE: AOx3, no asterixis  Ascites: Small to moderate ascites on CT A/P  HCC: No hx, but overdue for HCC screening  Varices: Small varices on EGD (4/2019)  Coagulopathy: INR: 3.04, Plts: 61  # Abnormal liver enzymes: Likely represents alcoholic hepatitis given > 2:1 AST to ALT ratio, however, differential includes acute on chronic liver failure due to underlying infection.  on 5/15/20. Started on steroids on 5/17/20. Day 5 Lille's 0.377  # DM  # SAM: Likely pre-renal. Differential includes HRS; improving with Albumin  # Hyponatremia: Suspect hyponatremia [intravascular depletion] driving low Na. Unclear chronicity    Recommendation:  - Continue prednisone/prednisolone 40 mg [Day 5], Calculate Lilles score Day 7  - Monitor daily CMP and INR  - Continue rifaximin 550 mg PO BID  - Continue 25% Albumin 100ml q6h   - Rest of care per primary team    Sanaz Kumar MD  Gastroenterology Fellow  [phone]  Please page on call fellow on weekends and after 5pm on weekdays 44 M EtOH cirrhosis complicated by esophageal varices and diabetes presents with generalized weakness, abdominal distention and jaundice in the setting of decompensated cirrhosis due to recent EtOH use    Impression:  # Decompensated EtOH Cirrhosis - MELD-Na: 36 on 5/22/20  HE: AOx3, no asterixis  Ascites: Small to moderate ascites on CT A/P  HCC: No hx, but overdue for HCC screening  Varices: Small varices on EGD (4/2019)  Coagulopathy: INR: 3.04, Plts: 61  # Abnormal liver enzymes: Likely represents alcoholic hepatitis given > 2:1 AST to ALT ratio, however, differential includes acute on chronic liver failure due to underlying infection.  on 5/15/20. Started on steroids on 5/17/20. Day 5 Lille's 0.377  # DM  # SAM: Likely pre-renal. Differential includes HRS; improving with Albumin  # Hyponatremia: Suspect hyponatremia [intravascular depletion] driving low Na. Unclear chronicity    Recommendation:  - Continue prednisone/prednisolone 40 mg [Day 5], Calculate Lilles score Day 7  - Monitor daily CMP and INR  - Continue rifaximin 550 mg PO BID  - Continue 25% Albumin 100ml q6h   - Rest of care per primary team    Sanaz Kumar MD  Gastroenterology Fellow  [phone]  Please page on call fellow on weekends and after 5pm on weekdays 44 M EtOH cirrhosis complicated by esophageal varices and diabetes presents with generalized weakness, abdominal distention and jaundice in the setting of decompensated cirrhosis due to recent EtOH use    Impression:  # Decompensated EtOH Cirrhosis - MELD-Na: 36 on 5/22/20  HE: AOx3, no asterixis  Ascites: Small to moderate ascites on CT A/P  HCC: No hx, but overdue for HCC screening  Varices: Small varices on EGD (4/2019)  Coagulopathy: INR: 3.04, Plts: 61  # Abnormal liver enzymes: Likely represents alcoholic hepatitis given > 2:1 AST to ALT ratio, however, differential includes acute on chronic liver failure due to underlying infection.  on 5/15/20. Started on steroids on 5/17/20. Day 5 Lille's 0.377  # DM  # SAM: Likely pre-renal. Differential includes HRS; improving with Albumin  # Hyponatremia: Suspect hyponatremia [intravascular depletion] driving low Na. Unclear chronicity    Recommendation:  - Can d/c Rifaximin given no hx of hepatic encephalopathy  - Hold Albumin  - Dc PPI  - Continue prednisone/prednisolone 40 mg [Day 6], Calculate Lilles score Day 7  - Monitor daily CMP and INR  - Rest of care per primary team    Sanaz Kumar MD  Gastroenterology Fellow  [phone]  Please page on call fellow on weekends and after 5pm on weekdays 44 M EtOH cirrhosis complicated by esophageal varices and diabetes presents with generalized weakness, abdominal distention and jaundice in the setting of decompensated cirrhosis due to recent EtOH use    Impression:  # Decompensated EtOH Cirrhosis - MELD-Na: 36 on 5/22/20  HE: AOx3, no asterixis  Ascites: Small to moderate ascites on CT A/P  HCC: No hx, but overdue for HCC screening  Varices: Small varices on EGD (4/2019)  Coagulopathy: INR: 3.04, Plts: 61  # Abnormal liver enzymes: Likely represents alcoholic hepatitis given > 2:1 AST to ALT ratio, however, differential includes acute on chronic liver failure due to underlying infection.  on 5/15/20. Started on steroids on 5/17/20. Day 5 Lille's 0.377  # DM  # SAM: Likely pre-renal. improved with Albumin  # Hyponatremia: Suspect hyponatremia [intravascular depletion] driving low Na. Unclear chronicity    Recommendation:  - Can d/c Rifaximin given no hx of hepatic encephalopathy  - Hold Albumin  - Dc PPI  - Continue prednisone/prednisolone 40 mg [Day 6], Calculate Lilles score Day 7  - Monitor daily CMP and INR  - Rest of care per primary team    Sanaz Kumar MD  Gastroenterology Fellow  [phone]  Please page on call fellow on weekends and after 5pm on weekdays 44 M EtOH cirrhosis complicated by esophageal varices and diabetes presents with generalized weakness, abdominal distention and jaundice in the setting of decompensated cirrhosis due to recent EtOH use    Impression:  # Decompensated EtOH Cirrhosis - MELD-Na: 36 on 5/22/20  # severe acute alcoholic hepatitis given > 2:1 AST to ALT ratio, however, differential includes acute on chronic liver failure due to underlying infection.  on 5/15/20. Started on steroids on 5/17/20. Day 5 Lille's 0.377  HE: AOx3, no asterixis  Ascites: Small to moderate ascites on CT A/P, could not be drained  HCC: No hx, but overdue for HCC screening  Varices: Small varices on EGD (4/2019)  Coagulopathy: INR: 3.04, Plts: 61    # DM  # SAM: Likely pre-renal. improved with Albumin  # Hyponatremia: Suspect hyponatremia [intravascular depletion] driving low Na. Unclear chronicity    Recommendation:  - Continue prednisone/prednisolone 40 mg [Day 6], Calculate Lille score Day 7  - d/c Rifaximin given no hx of hepatic encephalopathy  - Hold Albumin  - Dc PPI    - CBC, MELD labs tomorrow      Sanaz Kumar MD  Gastroenterology Fellow  [phone]  Please page on call fellow on weekends and after 5pm on weekdays 44 M EtOH cirrhosis, h/o GI bleeds of uncertain etiology with h/o small EV (2019 and in 2018), portal HTN gastropathy, negative capsule endoscopy presents with generalized weakness, abdominal distention and jaundice in the setting of decompensated cirrhosis after relapse with one month of drinking after his mother .    Impression:  # Decompensated EtOH Cirrhosis - MELD-Na: 36 on 20  # severe acute alcoholic hepatitis given > 2:1 AST to ALT ratio, however, differential includes acute on chronic liver failure due to underlying infection.  on 5/15/20. Started on steroids on 20. Day 5 Lille's 0.377  HE: AOx3, no asterixis  Ascites: Small to moderate ascites on CT A/P, could not be drained. On cipro prophylaxis empirically during hospitalization. Ascites albumin not known.  HCC: No hx, but overdue for HCC screening  Varices: Small varices on EGD (2019)  Coagulopathy: INR: 3.04, Plts: 61    # DM  # SAM: Likely pre-renal. improved with Albumin  # Hyponatremia: Suspect hyponatremia [intravascular depletion] driving low Na. Unclear chronicity    Recommendation:  - Continue prednisone/prednisolone 40 mg [Day 6], Calculate Lille score Day 7  - d/c Rifaximin given no hx of hepatic encephalopathy  - stop Albumin and cipro  - Dc PPI    - CBC, MELD labs tomorrow      Sanaz Kumar MD  Gastroenterology Fellow  [phone]  Please page on call fellow on weekends and after 5pm on weekdays

## 2020-05-23 NOTE — PROGRESS NOTE ADULT - PROBLEM SELECTOR PLAN 5
Pt on levemir 80U at home, metformin 1000mg daily. last A1c 7.9 (10/2019)  - C/w Mod ISS and FS qid   - Incr lantus to 87 U daily    - Increase Lispro 16 U TID  - Nutrition eval.  - Will decrease insulin when steroids ends on 5/23  -Would get endo eval. Pt on levemir 80U at home, metformin 1000mg daily. last A1c 7.9 (10/2019)  - C/w Mod ISS and FS qid   - Incr lantus to 87 U daily    - Increase Lispro 16 U TID  - Nutrition eval.  - Will decrease insulin when steroids ends on 5/23  - Endo evaluation today prior to dc

## 2020-05-23 NOTE — CONSULT NOTE ADULT - PROBLEM SELECTOR PROBLEM 1
SAM (acute kidney injury)
Type 2 diabetes mellitus with hyperglycemia, with long-term current use of insulin

## 2020-05-23 NOTE — CONSULT NOTE ADULT - SUBJECTIVE AND OBJECTIVE BOX
HPI:  45 yo M with PMH of latent TB, h/o C. diff (2016, 2017), T2DM (last HbA1c 7.9% in 10/2019), h/o alcoholic hepatitis, and decompensated EtOH cirrhosis c/b ascites, SBP, and variceal hemorrhage in the remote past, presents with jaundice and weakness x 2 days. Patient is a recovered alcoholic but states he started drinking again about 1 month ago because of mother passed away. He has been drinking 6-8 beers a day, states his last drink was 3 days ago. Endorses some abdominal distension but denies pain. Patient has noticed yellow skin and eyes for the last week. Also has decreased appetite and has not eaten much in the last 3 days, mostly taking in fluids. Endorses tremors last night but not currently. Denies visual/auditory hallucinations. Denies F/C/CP/SOB/cough. No N/V. Some diarrhea, last BM was 2 hours ago. Not sure if there is blood in stools. Last saw Dr. Thakkar (hepatology) several months ago.     In ED vitals: T97.9, HR 70, /62, RR 18, 96% on RA.  Labs notable for thrombocytopenia, elevated INR, elevated creatinine, hyponatremia, elevated lipase. CT A/P and CXR done.   Given: cefotetan 2g, famotidine 20 ivp, vit K 5mg IVP x1, albumin 25% 100 mL x1.   Seen by hepatology and MICU. (15 May 2020 20:02)      PAST MEDICAL & SURGICAL HISTORY:  E. coli bacteremia: Recurrent  Vitiligo  Latent tuberculosis  Diabetes mellitus type 2, insulin dependent  Esophageal varices  Alcoholic cirrhosis  Status post corneal transplant      FAMILY HISTORY:  Family history of diabetes mellitus      Social History:    Outpatient Medications:    MEDICATIONS  (STANDING):  dextrose 5%. 1000 milliLiter(s) (50 mL/Hr) IV Continuous <Continuous>  dextrose 50% Injectable 12.5 Gram(s) IV Push once  dextrose 50% Injectable 25 Gram(s) IV Push once  dextrose 50% Injectable 25 Gram(s) IV Push once  folic acid 1 milliGRAM(s) Oral daily  insulin glargine Injectable (LANTUS) 87 Unit(s) SubCutaneous at bedtime  insulin lispro (HumaLOG) corrective regimen sliding scale   SubCutaneous three times a day before meals  insulin lispro (HumaLOG) corrective regimen sliding scale   SubCutaneous at bedtime  insulin lispro Injectable (HumaLOG) 16 Unit(s) SubCutaneous three times a day before meals  multivitamin 1 Tablet(s) Oral daily  pantoprazole    Tablet 40 milliGRAM(s) Oral before breakfast  prednisoLONE    3 mG/mL Solution (ORAPRED) 40 milliGRAM(s) Oral daily  simethicone 80 milliGRAM(s) Chew three times a day  thiamine 100 milliGRAM(s) Oral daily  ursodiol Tablet 500 milliGRAM(s) Oral three times a day    MEDICATIONS  (PRN):  albuterol/ipratropium for Nebulization. 3 milliLiter(s) Nebulizer once PRN Shortness of Breath  dextrose 40% Gel 15 Gram(s) Oral once PRN Blood Glucose LESS THAN 70 milliGRAM(s)/deciliter  glucagon  Injectable 1 milliGRAM(s) IntraMuscular once PRN Glucose LESS THAN 70 milligrams/deciliter      Allergies    levofloxacin (Other (Moderate))    Intolerances      Review of Systems:  Constitutional: No fever  Eyes: No blurry vision  Neuro: No tremors  HEENT: No pain  Cardiovascular: No chest pain, palpitations  Respiratory: No SOB, no cough  GI: No nausea, vomiting, abdominal pain  : No dysuria  Skin: no rash  Psych: no depression  Endocrine: no polyuria, polydipsia  Hem/lymph: no swelling  Osteoporosis: no fractures    ALL OTHER SYSTEMS REVIEWED AND NEGATIVE    UNABLE TO OBTAIN    PHYSICAL EXAM:  VITALS: T(C): 37.2 (05-23-20 @ 10:16)  T(F): 99 (05-23-20 @ 10:16), Max: 99.1 (05-23-20 @ 04:20)  HR: 79 (05-23-20 @ 10:16) (79 - 88)  BP: 119/71 (05-23-20 @ 10:16) (119/71 - 128/78)  RR:  (18 - 18)  SpO2:  (95% - 98%)  Wt(kg): --  GENERAL: NAD, well-groomed, well-developed  EYES: No proptosis, no lid lag, anicteric  HEENT:  Atraumatic, Normocephalic, moist mucous membranes  THYROID: Normal size, no palpable nodules  RESPIRATORY: Clear to auscultation bilaterally; No rales, rhonchi, wheezing  CARDIOVASCULAR: Regular rate and rhythm; No murmurs; no peripheral edema  GI: Soft, nontender, non distended, normal bowel sounds  SKIN: Dry, intact, No rashes or lesions  MUSCULOSKELETAL: Full range of motion, normal strength  NEURO: sensation intact, extraocular movements intact, no tremor  PSYCH: Alert and oriented x 3, normal affect, normal mood  CUSHING'S SIGNS: no striae    POCT Blood Glucose.: 119 mg/dL (05-23-20 @ 08:23)  POCT Blood Glucose.: 255 mg/dL (05-22-20 @ 21:56)  POCT Blood Glucose.: 254 mg/dL (05-22-20 @ 17:11)  POCT Blood Glucose.: 263 mg/dL (05-22-20 @ 12:58)  POCT Blood Glucose.: 196 mg/dL (05-22-20 @ 08:39)  POCT Blood Glucose.: 240 mg/dL (05-21-20 @ 21:57)  POCT Blood Glucose.: 261 mg/dL (05-21-20 @ 17:17)  POCT Blood Glucose.: 238 mg/dL (05-21-20 @ 12:36)  POCT Blood Glucose.: 296 mg/dL (05-21-20 @ 08:37)  POCT Blood Glucose.: 281 mg/dL (05-20-20 @ 21:58)  POCT Blood Glucose.: 281 mg/dL (05-20-20 @ 17:22)  POCT Blood Glucose.: 294 mg/dL (05-20-20 @ 12:11)                            9.3    8.53  )-----------( 58       ( 23 May 2020 06:02 )             28.9       05-23    129<L>  |  100  |  40<H>  ----------------------------<  175<H>  3.4<L>   |  11<L>  |  1.83<H>    EGFR if : 50<L>  EGFR if non : 43<L>    Ca    9.2      05-23  Mg     1.4     05-23  Phos  3.0     05-23    TPro  5.7<L>  /  Alb  4.6  /  TBili  39.2<H>  /  DBili  x   /  AST  77<H>  /  ALT  40  /  AlkPhos  66  05-23      Thyroid Function Tests:  05-19 @ 08:53 TSH 0.26 FreeT4 -- T3 -- Anti TPO -- Anti Thyroglobulin Ab -- TSI --                Radiology: HPI: 43 yo M with PMH of latent TB, h/o C. diff (2016, 2017), DM2 (last HbA1c 7.9% in 10/2019), h/o alcoholic hepatitis, and decompensated EtOH cirrhosis c/b ascites, SBP, and variceal hemorrhage in the remote past, presents with jaundice and weakness x 2 days. Patient is a recovered alcoholic but states he started drinking again about 1 month ago because of mother passed away. Endocrine consult requested for management of steroid induced hyperglycemia in patient with DM2.   Patient on prednisolone 40mg daily since 5/17/20 for alcohol hepatitis. Per hepatology note, can discontinue today as it is not working.     Endocrine history:  Diagnosed 12 years ago. Follows with PCP, Dr Woods. At time of diagnosis, started on metformin. After few months patient was started on Lantus. Due to insurance, patient switched from Lantus to Levemir. Currently in Levemir 80units qhs and Metformin 2000mg once daily.   Reports infrequently checking. When he does it is in the morning before meals. FS usually 120s. Reports hypoglycemic events once every two months. Reports feeling shaking, with improvement in symptoms when he drinks juice. Reports blurred vision and neuropathy however has never seen ophthalmologist or podiatrist.   Diet   B: egg white, cheese and toast  L: fruit salad  D: meat (beef, chicken), potatoes or one large spoon of rice   snacks on cookies     PAST MEDICAL & SURGICAL HISTORY:  E. coli bacteremia: Recurrent  Vitiligo  Latent tuberculosis  Diabetes mellitus type 2, insulin dependent  Esophageal varices  Alcoholic cirrhosis  Status post corneal transplant    FAMILY HISTORY:  Does not report family history of diabetes mellitus    Social History:  History of alcohol abuse   Does not report history of smoling     Outpatient Medications:  · 	pantoprazole 40 mg oral delayed release tablet: Last Dose Taken:  , 1 tab(s) orally once a day  · 	nadolol 20 mg oral tablet: Last Dose Taken:  , 1 tab(s) orally once a day  · 	Levemir 100 units/mL subcutaneous solution: Last Dose Taken:  , 80 unit(s) subcutaneous once a day (at bedtime)  · 	metFORMIN 1000 mg oral tablet: Last Dose Taken:  , 1 tab(s) orally once a day  · 	Multiple Vitamins oral tablet: Last Dose Taken:  , 1 tab(s) orally once a day      MEDICATIONS  (STANDING):  dextrose 5%. 1000 milliLiter(s) (50 mL/Hr) IV Continuous <Continuous>  dextrose 50% Injectable 12.5 Gram(s) IV Push once  dextrose 50% Injectable 25 Gram(s) IV Push once  dextrose 50% Injectable 25 Gram(s) IV Push once  folic acid 1 milliGRAM(s) Oral daily  insulin glargine Injectable (LANTUS) 87 Unit(s) SubCutaneous at bedtime  insulin lispro (HumaLOG) corrective regimen sliding scale   SubCutaneous three times a day before meals  insulin lispro (HumaLOG) corrective regimen sliding scale   SubCutaneous at bedtime  insulin lispro Injectable (HumaLOG) 16 Unit(s) SubCutaneous three times a day before meals  multivitamin 1 Tablet(s) Oral daily  pantoprazole    Tablet 40 milliGRAM(s) Oral before breakfast  prednisoLONE    3 mG/mL Solution (ORAPRED) 40 milliGRAM(s) Oral daily  simethicone 80 milliGRAM(s) Chew three times a day  thiamine 100 milliGRAM(s) Oral daily  ursodiol Tablet 500 milliGRAM(s) Oral three times a day    MEDICATIONS  (PRN):  albuterol/ipratropium for Nebulization. 3 milliLiter(s) Nebulizer once PRN Shortness of Breath  dextrose 40% Gel 15 Gram(s) Oral once PRN Blood Glucose LESS THAN 70 milliGRAM(s)/deciliter  glucagon  Injectable 1 milliGRAM(s) IntraMuscular once PRN Glucose LESS THAN 70 milligrams/deciliter      Allergies  levofloxacin (Other (Moderate))    Review of Systems:  Constitutional: No fever, + jaundice and weakness  Eyes: No blurry vision  Neuro: No tremors  HEENT: No pain  Cardiovascular: No chest pain, palpitations  Respiratory: No SOB, no cough  GI: No nausea, vomiting, + abdominal pain  : No dysuria  Skin: no rash  Endocrine: no polyuria, polydipsia    ALL OTHER SYSTEMS REVIEWED AND NEGATIVE    PHYSICAL EXAM:  VITALS: T(C): 37.2 (05-23-20 @ 10:16)  T(F): 99 (05-23-20 @ 10:16), Max: 99.1 (05-23-20 @ 04:20)  HR: 79 (05-23-20 @ 10:16) (79 - 88)  BP: 119/71 (05-23-20 @ 10:16) (119/71 - 128/78)  RR:  (18 - 18)  SpO2:  (95% - 98%)  Wt(kg): 105kg   GENERAL: NAD, well-groomed, well-developed  EYES: No proptosis, yellow sclera   HEENT:  Atraumatic, Normocephalic, moist mucous membranes  THYROID: Normal size, no palpable nodules, nontender   RESPIRATORY: Clear to auscultation bilaterally; No rales, rhonchi, wheezing  CARDIOVASCULAR: Regular rate and rhythm; No murmurs; no peripheral edema  GI: Soft, nontender, non distended  SKIN: Dry, intact, No rashes or lesions, jaundiced   MUSCULOSKELETAL: Full range of motion, normal strength  NEURO: sensation intact, extraocular movements intact, no tremor  PSYCH: Alert and oriented x 3, reactive affect     POCT Blood Glucose.: 119 mg/dL (05-23-20 @ 08:23) H16  POCT Blood Glucose.: 255 mg/dL (05-22-20 @ 21:56) H2  POCT Blood Glucose.: 254 mg/dL (05-22-20 @ 17:11) H16+6  POCT Blood Glucose.: 263 mg/dL (05-22-20 @ 12:58) H14+6   POCT Blood Glucose.: 196 mg/dL (05-22-20 @ 08:39) H14+2  POCT Blood Glucose.: 240 mg/dL (05-21-20 @ 21:57)  POCT Blood Glucose.: 261 mg/dL (05-21-20 @ 17:17)  POCT Blood Glucose.: 238 mg/dL (05-21-20 @ 12:36)  POCT Blood Glucose.: 296 mg/dL (05-21-20 @ 08:37)  POCT Blood Glucose.: 281 mg/dL (05-20-20 @ 21:58)  POCT Blood Glucose.: 281 mg/dL (05-20-20 @ 17:22)  POCT Blood Glucose.: 294 mg/dL (05-20-20 @ 12:11)                            9.3    8.53  )-----------( 58       ( 23 May 2020 06:02 )             28.9       05-23    129<L>  |  100  |  40<H>  ----------------------------<  175<H>  3.4<L>   |  11<L>  |  1.83<H>    EGFR if : 50<L>  EGFR if non : 43<L>    Ca    9.2      05-23  Mg     1.4     05-23  Phos  3.0     05-23    TPro  5.7<L>  /  Alb  4.6  /  TBili  39.2<H>  /  DBili  x   /  AST  77<H>  /  ALT  40  /  AlkPhos  66  05-23    Thyroid Function Tests:  05-19 @ 08:53 TSH 0.26                  Radiology: HPI: 43 yo M with PMH of latent TB, h/o C. diff (2016, 2017), DM2 (last HbA1c 7.9% in 10/2019), h/o alcoholic hepatitis, and decompensated EtOH cirrhosis c/b ascites, SBP, and variceal hemorrhage in the remote past, presents with jaundice and weakness x 2 days. Patient is a recovered alcoholic but states he started drinking again about 1 month ago because of mother passed away. Endocrine consult requested for management of steroid induced hyperglycemia in patient with DM2.   Patient on prednisolone 40mg daily since 5/17/20 for alcohol hepatitis. Per hepatology note, can discontinue today as it is not working.     Endocrine history:  Diagnosed 12 years ago. Follows with PCP, Dr Woods. At time of diagnosis, started on metformin. After few months patient was started on Lantus. Due to insurance, patient switched from Lantus to Levemir. Currently in Levemir 80units qhs and Metformin 2000 mg once daily.   Reports infrequently checking. When he does it is in the morning before meals. FS usually 120s. Reports hypoglycemic events once every two months. Reports feeling shaking, with improvement in symptoms when he drinks juice. Reports blurred vision and neuropathy however has never seen ophthalmologist or podiatrist.   Diet   B: egg white, cheese and toast  L: fruit salad  D: meat (beef, chicken), potatoes or one large spoon of rice   snacks on cookies     PAST MEDICAL & SURGICAL HISTORY:  E. coli bacteremia: Recurrent  Vitiligo  Latent tuberculosis  Diabetes mellitus type 2, insulin dependent  Esophageal varices  Alcoholic cirrhosis  Status post corneal transplant    FAMILY HISTORY:  Does not report family history of diabetes mellitus    Social History:  History of alcohol abuse   Does not report history of smoking     Outpatient Medications:  · 	pantoprazole 40 mg oral delayed release tablet: Last Dose Taken:  , 1 tab(s) orally once a day  · 	nadolol 20 mg oral tablet: Last Dose Taken:  , 1 tab(s) orally once a day  · 	Levemir 100 units/mL subcutaneous solution: Last Dose Taken:  , 80 unit(s) subcutaneous once a day (at bedtime)  · 	metFORMIN 1000 mg oral tablet: Last Dose Taken:  , 1 tab(s) orally once a day  · 	Multiple Vitamins oral tablet: Last Dose Taken:  , 1 tab(s) orally once a day      MEDICATIONS  (STANDING):  dextrose 5%. 1000 milliLiter(s) (50 mL/Hr) IV Continuous <Continuous>  dextrose 50% Injectable 12.5 Gram(s) IV Push once  dextrose 50% Injectable 25 Gram(s) IV Push once  dextrose 50% Injectable 25 Gram(s) IV Push once  folic acid 1 milliGRAM(s) Oral daily  insulin glargine Injectable (LANTUS) 87 Unit(s) SubCutaneous at bedtime  insulin lispro (HumaLOG) corrective regimen sliding scale   SubCutaneous three times a day before meals  insulin lispro (HumaLOG) corrective regimen sliding scale   SubCutaneous at bedtime  insulin lispro Injectable (HumaLOG) 16 Unit(s) SubCutaneous three times a day before meals  multivitamin 1 Tablet(s) Oral daily  pantoprazole    Tablet 40 milliGRAM(s) Oral before breakfast  prednisoLONE    3 mG/mL Solution (ORAPRED) 40 milliGRAM(s) Oral daily  simethicone 80 milliGRAM(s) Chew three times a day  thiamine 100 milliGRAM(s) Oral daily  ursodiol Tablet 500 milliGRAM(s) Oral three times a day    MEDICATIONS  (PRN):  albuterol/ipratropium for Nebulization. 3 milliLiter(s) Nebulizer once PRN Shortness of Breath  dextrose 40% Gel 15 Gram(s) Oral once PRN Blood Glucose LESS THAN 70 milliGRAM(s)/deciliter  glucagon  Injectable 1 milliGRAM(s) IntraMuscular once PRN Glucose LESS THAN 70 milligrams/deciliter      Allergies  levofloxacin (Other (Moderate))    Review of Systems:  Constitutional: No fever, + jaundice and weakness  Eyes: No blurry vision  Neuro: No tremors  HEENT: No pain  Cardiovascular: No chest pain, palpitations  Respiratory: No SOB, no cough  GI: No nausea, vomiting, + abdominal pain  : No dysuria  Skin: color chnage  Endocrine: no polyuria, polydipsia    ALL OTHER SYSTEMS REVIEWED AND NEGATIVE    PHYSICAL EXAM:  VITALS: T(C): 37.2 (05-23-20 @ 10:16)  T(F): 99 (05-23-20 @ 10:16), Max: 99.1 (05-23-20 @ 04:20)  HR: 79 (05-23-20 @ 10:16) (79 - 88)  BP: 119/71 (05-23-20 @ 10:16) (119/71 - 128/78)  RR:  (18 - 18)  SpO2:  (95% - 98%)  Wt(kg): 105kg   GENERAL: NAD, well-groomed, well-developed  EYES: No proptosis, yellow sclera   HEENT:  Atraumatic, Normocephalic, moist mucous membranes  THYROID: Normal size, no palpable nodules, nontender   RESPIRATORY: Clear to auscultation bilaterally; No rales, rhonchi, wheezing  CARDIOVASCULAR: Regular rate and rhythm; No murmurs; no peripheral edema  GI: Soft, nontender, non distended  SKIN: Dry, intact, vitiligo noted, jaundiced   MUSCULOSKELETAL: Full range of motion, normal strength  NEURO: sensation intact, extraocular movements intact, no tremor  PSYCH: Alert and oriented x 3, reactive affect     POCT Blood Glucose.: 119 mg/dL (05-23-20 @ 08:23) H16  POCT Blood Glucose.: 255 mg/dL (05-22-20 @ 21:56) H2  POCT Blood Glucose.: 254 mg/dL (05-22-20 @ 17:11) H16+6  POCT Blood Glucose.: 263 mg/dL (05-22-20 @ 12:58) H14+6   POCT Blood Glucose.: 196 mg/dL (05-22-20 @ 08:39) H14+2  POCT Blood Glucose.: 240 mg/dL (05-21-20 @ 21:57)  POCT Blood Glucose.: 261 mg/dL (05-21-20 @ 17:17)  POCT Blood Glucose.: 238 mg/dL (05-21-20 @ 12:36)  POCT Blood Glucose.: 296 mg/dL (05-21-20 @ 08:37)  POCT Blood Glucose.: 281 mg/dL (05-20-20 @ 21:58)  POCT Blood Glucose.: 281 mg/dL (05-20-20 @ 17:22)  POCT Blood Glucose.: 294 mg/dL (05-20-20 @ 12:11)                            9.3    8.53  )-----------( 58       ( 23 May 2020 06:02 )             28.9       05-23    129<L>  |  100  |  40<H>  ----------------------------<  175<H>  3.4<L>   |  11<L>  |  1.83<H>    EGFR if : 50<L>  EGFR if non : 43<L>    Ca    9.2      05-23  Mg     1.4     05-23  Phos  3.0     05-23    TPro  5.7<L>  /  Alb  4.6  /  TBili  39.2<H>  /  DBili  x   /  AST  77<H>  /  ALT  40  /  AlkPhos  66  05-23    Thyroid Function Tests:  05-19 @ 08:53 TSH 0.26                  Radiology:

## 2020-05-23 NOTE — PROGRESS NOTE ADULT - ASSESSMENT
44 M EtOH cirrhosis, h/o GI bleeds of uncertain etiology with h/o small EV (2019 and in 2018), portal HTN gastropathy, negative capsule endoscopy presents with generalized weakness, abdominal distention and jaundice in the setting of decompensated cirrhosis after relapse with one month of drinking after his mother .    Impression:  # Decompensated EtOH Cirrhosis - MELD-Na: 36 on 20  # Severe acute alcoholic hepatitis given > 2:1 AST to ALT ratio, however, differential includes acute-on-chronic liver failure due to underlying infection.  on 5/15/20. Started on steroids on 20. Day 5 Lille score 0.377; Day 7 Lille score 0.850 indicating poor response to steroids.   HE: AOx3, no asterixis  Ascites: Small to moderate ascites on CT A/P but could not be drained. On ciprofloxacin prophylaxis empirically during hospitalization. Ascites albumin not known.  HCC: No hx, but overdue for HCC screening  Varices: Small varices on EGD (2019)  Coagulopathy: INR: 3.04, Plts: 61    # SAM: likely pre-renal, now improving with albumin  # Hyponatremia: Suspect hyponatremia [intravascular depletion] driving low Na. Unclear chronicity  # DM2    Recommendation:  - can stop prednisone given Lille score Day 7 shows no response  - please start N-acetylcysteine protocol over 5 days as below:    Day 1: Give 150 mg per kilogram of body weight in 250 ml of 5% glucose solution over a period of 30 minutes, 50 mg per kilogram in 500 ml of glucose solution over a period of 4 hours, and 100 mg per kilogram in 1000 ml of glucose solution over a period of 16 hours.   Days 2 - 5: Give 100 mg per kilogram per day in 1000 ml of glucose solution.    - daily CBC and CMP for daily MELD-Na score      Tina Tucker PGY-4  Gastroenterology Fellow  Pager #[phone] 44 M EtOH cirrhosis, h/o GI bleeds of uncertain etiology with h/o small EV (2019 and in 2018), portal HTN gastropathy, negative capsule endoscopy presents with generalized weakness, abdominal distention and jaundice in the setting of decompensated cirrhosis after relapse with one month of drinking after his mother .    Impression:  # Decompensated EtOH Cirrhosis - MELD-Na: 36 on 20  # Severe acute alcoholic hepatitis given > 2:1 AST to ALT ratio, however, differential includes acute-on-chronic liver failure due to underlying infection.  on 5/15/20. Started on steroids on 20. Day 5 Lille score 0.377; Day 7 Lille score 0.850 indicating poor response to steroids.   HE: AOx3, no asterixis  Ascites: Small to moderate ascites on CT A/P but could not be drained. On ciprofloxacin prophylaxis empirically during hospitalization. Ascites albumin not known.  HCC: No hx, but overdue for HCC screening  Varices: Small varices on EGD (2019)  Coagulopathy: INR: 3.04, Plts: 61    # SAM: likely pre-renal, now improving with albumin  # Hyponatremia: Suspect hyponatremia [intravascular depletion] driving low Na. Unclear chronicity  # DM2    Recommendation:  - can stop prednisone given Lille score Day 7 shows no response  - can stop Ursodiol today  - please start N-acetylcysteine protocol over 5 days as below:    Day 1: Give 150 mg per kilogram of body weight in 250 ml of 5% glucose solution over a period of 30 minutes, 50 mg per kilogram in 500 ml of glucose solution over a period of 4 hours, and 100 mg per kilogram in 1000 ml of glucose solution over a period of 16 hours.   Days 2 - 5: Give 100 mg per kilogram per day in 1000 ml of glucose solution.    - daily CBC and CMP for daily MELD-Na score      Tina Tucker PGY-4  Gastroenterology Fellow  Pager #[phone] 44 M with vitiligo, alcohol abuse, obesity BMI 35, FELIX/SIMPSON cirrhosis, in 2018 reported to be on transplant list at [facility], h/o small ascites, esophageal varices (small in 2019 and in ), recurrent GI bleed without identified source (last in 10/2028, small EV, neg. capsule), SBP 2015 and multiple admissions for recurrent sepsis and abdominal pain without enough ascites for tap (2018 S. agalactiae bacteremia, 2017 Cx negative, 2017 E. coli, bacteremia 2017 with neg. EUS, 2016 E. coli bacteremia quinolone-resistant),  now admitted on 5/15 with generalized weakness, abdominal distention and jaundice in the setting of decompensated cirrhosis after relapse with one month of drinking after his mother .    Impression:  # Decompensated EtOH Cirrhosis with ascites - MELD-Na: 36 on 20  # Severe acute alcoholic hepatitis given > 2:1 AST to ALT ratio, however, differential includes acute-on-chronic liver failure due to underlying infection.  on 5/15/20. Started on steroids on 20. Day 5 Lille score 0.377; Day 7 Lille score 0.850 indicating poor response to steroids.   HE: AOx3, no asterixis  Ascites: Small to moderate on CT A/P but could not be drained. H/o SBP, including quinolone-resistant E. coli in . On ciprofloxacin prophylaxis empirically during hospitalization. Ascites albumin not known.  HCC: No hx, but overdue for HCC screening  Varices: Small varices on EGD (2019)  Coagulopathy: INR: 3.04, Plts: 61    # SAM: likely pre-renal, now improving with albumin  # Hyponatremia due to SIADH caused by acute liver injury  # DM2  # alcohol hx: patient is not forthcoming about hx of his liver disease. Answers that he drank heavily from age [age] with daily intake of 1 bottle of tequila, 1 bottle of whiskey and a 12-pack of beer. Then 8 years of abstinence, and from age ~39 beer on <1d per week (3-4 beers), now relapse 1 month ago when his mother  in [place] and he could not visit her before due to COVID crisis. Chart review showed multiple hospitalizations as above, apparently did not follow-up with a hepatologist.  # obesity, ~190 lbs, BMI 35. Max weight was 240 lbs around , lost 50 lbs by dieting and some exercise.  # SHx: lives with dwain of 20 years. 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   # transplant candidacy: severe alcoholic hepatitis with unfavorable Lille score has a mortality of ~30% in one month. Will continue to evaluate alcohol history.    Recommendation:  - can stop prednisone given Lille score Day 7 shows no response  - stop Ursodiol today, stop PPI (no indication). Stop simethicone - no proven medical benefit - it reduces bubbles (i.e. lipid membranes), but not amount of gas  - please start N-acetylcysteine protocol over 5 days as below:  Day 1: Give 150 mg per kilogram of body weight in 250 ml of 5% glucose solution over a period of 30 minutes, 50 mg per kilogram in 500 ml of glucose solution over a period of 4 hours, and 100 mg per kilogram in 1000 ml of glucose solution over a period of 16 hours.   Days 2 - 5: Give 100 mg per kilogram per day in 1000 ml of glucose solution.    - daily CBC and CMP for daily MELD-Na score      Tina Tucker PGY-4  Gastroenterology Fellow  Pager #[phone]

## 2020-05-23 NOTE — CONSULT NOTE ADULT - ASSESSMENT
43 yo M with PMH of latent TB, h/o C. diff (2016, 2017), DM2 (last HbA1c 7.9% in 10/2019), h/o alcoholic hepatitis, and decompensated EtOH cirrhosis c/b ascites, SBP, and variceal hemorrhage in the remote past, presents with jaundice and weakness x 2 days. Patient is a recovered alcoholic but states he started drinking again about 1 month ago because of mother passed away. Endocrine consult requested for management of steroid induced hyperglycemia in patient with DM2.   Patient on prednisolone 40mg daily since 5/17/20 for alcohol hepatitis, last dose 5/23 6am. Per hepatology note, can discontinue today as it is not working.     Steroid induced hyperglycemia in patient with uncontrolled DM2   A1c 8.8%, goal <7%  FS premeal and qhs   Goal -180, at goal   Recommend Lantus   Recommend Humalog  Continue moderate premeal and qhs correctional scales   As patient on high dose of insulin on discharge consider Endocrine follow up at  [address] [place] [address] [address] [place] [phone]. Patient to be discharged on insulin and metformin, dose to be determined.   Patient to schedule ophthalmology and podiatry visit       to be discussed with attending 45 yo M with PMH of latent TB, h/o C. diff (2016, 2017), DM2 (last HbA1c 7.9% in 10/2019), h/o alcoholic hepatitis, and decompensated EtOH cirrhosis c/b ascites, SBP, and variceal hemorrhage in the remote past, presents with jaundice and weakness x 2 days. Patient is a recovered alcoholic but states he started drinking again about 1 month ago because of mother passed away. Endocrine consult requested for management of steroid induced hyperglycemia in patient with DM2.   Patient on prednisolone 40mg daily since 5/17/20 for alcohol hepatitis, last dose 5/23 6am. Per hepatology note, can discontinue today as it is not working.     Steroid induced hyperglycemia in patient with uncontrolled DM2   A1c 8.8%, goal <7%  FS premeal and qhs   Goal -180, at goal   Will decrease basal/ bolus dosing as patient will no longer be on prednisolone   Recommend decrease Lantus to 50 units qhs   Recommend decreased Humalog to 12units premeal  Continue moderate premeal and qhs correctional scales   As patient on high doses of insulin, consider Endocrine follow up at  [address] [place] [address] [address] [place] [phone]. Patient to be discharged on insulin and metformin, dose to be determined.   Please obtain c- peptide in am. If present will discontinue metformin and start prandin on discharge   Patient to schedule ophthalmology and podiatry visit       Abnormal thyroid function test  TSH low 0.26  Likely steroid related  Recommend repeat TFT as outpt 43 yo male with PMH of latent TB, h/o C. diff (2016, 2017), DM2 (last HbA1c 7.9% in 10/2019), h/o alcoholic hepatitis, and decompensated EtOH cirrhosis c/b ascites, SBP, and variceal hemorrhage in the remote past, presents with jaundice and weakness x 2 days. Patient is a recovered alcoholic but states he started drinking again about 1 month ago because of mother passed away. Endocrine consult requested for management of steroid induced hyperglycemia in patient with DM2.   Patient on prednisolone 40mg daily since 5/17/20 for alcohol hepatitis, last dose 5/23 6am. Per hepatology note, can discontinue today as it is not working.     Steroid induced hyperglycemia in patient with uncontrolled DM2   A1c 8.8%, goal <7%  FS premeal and qhs   Goal -180, at goal   Will decrease basal/ bolus dosing as patient will no longer be on prednisolone   Recommend decrease Lantus to 50 units qhs   Recommend decreased Humalog to 12units premeal  Continue moderate premeal and qhs correctional scales   As patient on high doses of insulin, consider Endocrine follow up at  [address] [place] [address] [address] [place] [phone]. Patient to be discharged on insulin and metformin, dose to be determined.   Please obtain c- peptide in am. If present will discontinue metformin and start prandin on discharge   Patient to schedule ophthalmology and podiatry visit       Abnormal thyroid function test  TSH low 0.26  Likely steroid related  Recommend repeat TFT as outpt 43 yo male with PMH of latent TB, h/o C. diff (2016, 2017), DM2 (last HbA1c 7.9% in 10/2019), h/o alcoholic hepatitis, and decompensated EtOH cirrhosis c/b ascites, SBP, and variceal hemorrhage in the remote past, presents with jaundice and weakness x 2 days. Patient is a recovered alcoholic but states he started drinking again about 1 month ago because of mother passed away. Endocrine consult requested for management of steroid induced hyperglycemia in patient with DM2.   Patient on prednisolone 40mg daily since 5/17/20 for alcohol hepatitis, last dose 5/23 6am. Per hepatology note, can discontinue today as it is not working.     Steroid induced hyperglycemia in patient with uncontrolled DM2   A1c 8.8%, goal <7%  FS premeal and qhs   Goal -180, at goal   Will decrease basal/ bolus dosing as patient will no longer be on prednisolone   Recommend decrease Lantus to 50 units qhs   Recommend decreased Humalog to 12units premeal  Continue moderate premeal and qhs correctional scales   As patient on high doses of insulin, consider Endocrine follow up at  [address] [place] [address] [address] [place] [phone]. Patient to be discharged on basa/bolu insulin, dose to be determined v basal and po med  Please obtain c- peptide in am. If present will discontinue metformin and start prandin on discharge   Patient to schedule ophthalmology and podiatry visit       Abnormal thyroid function test  TSH low 0.26  Likely steroid related  Recommend repeat TFT as outpt

## 2020-05-23 NOTE — PROGRESS NOTE ADULT - SUBJECTIVE AND OBJECTIVE BOX
Chief Complaint:  Patient is a 44y old  Male who presents with a chief complaint of jaundice (23 May 2020 10:54)      Interval Events:     No acute complaints this morning. Patient states he feels well.     Allergies:  levofloxacin (Other (Moderate))      Hospital Medications:  albuterol/ipratropium for Nebulization. 3 milliLiter(s) Nebulizer once PRN  dextrose 40% Gel 15 Gram(s) Oral once PRN  dextrose 5%. 1000 milliLiter(s) IV Continuous <Continuous>  dextrose 50% Injectable 12.5 Gram(s) IV Push once  dextrose 50% Injectable 25 Gram(s) IV Push once  dextrose 50% Injectable 25 Gram(s) IV Push once  folic acid 1 milliGRAM(s) Oral daily  glucagon  Injectable 1 milliGRAM(s) IntraMuscular once PRN  insulin glargine Injectable (LANTUS) 87 Unit(s) SubCutaneous at bedtime  insulin lispro (HumaLOG) corrective regimen sliding scale   SubCutaneous three times a day before meals  insulin lispro (HumaLOG) corrective regimen sliding scale   SubCutaneous at bedtime  insulin lispro Injectable (HumaLOG) 16 Unit(s) SubCutaneous three times a day before meals  multivitamin 1 Tablet(s) Oral daily  pantoprazole    Tablet 40 milliGRAM(s) Oral before breakfast  prednisoLONE    3 mG/mL Solution (ORAPRED) 40 milliGRAM(s) Oral daily  simethicone 80 milliGRAM(s) Chew three times a day  thiamine 100 milliGRAM(s) Oral daily  ursodiol Tablet 500 milliGRAM(s) Oral three times a day      PMHX/PSHX:  E. coli bacteremia  Vitiligo  Latent tuberculosis  Diabetes mellitus type 2, insulin dependent  Esophageal varices  Alcoholic cirrhosis  Status post corneal transplant  Alcoholic Cirrhosis  Esophageal Varices      Family history:  Family history of uterine cancer  Family history of diabetes mellitus      ROS:     General:  No weight loss, fevers, chills, night sweats, fatigue   Eyes:  No vision changes  ENT:  No sore throat, pain, runny nose  CV:  No chest pain, palpitations, dizziness   Resp:  No SOB, cough, wheezing  GI:  See HPI  :  No burning with urination, hematuria  Muscle:  No pain, weakness  Neuro:  No weakness/tingling, memory problems  Psych:  No fatigue, insomnia, mood problems, depression  Heme:  No easy bruisability  Skin:  No rash, edema      PHYSICAL EXAM:     GENERAL:  Well developed, no distress  HEENT:  NC/AT,  conjunctivae clear, sclera anicteric  CHEST:  Full & symmetric excursion, no increased effort w/ respirations  HEART:  Regular rhythm & rate  ABDOMEN:  Soft, non-tender, non-distended  EXTREMITIES:  no LE  edema  SKIN:  No rash/erythema/ecchymoses/petechiae/wounds/jaundice  NEURO:  Alert, oriented    Vital Signs:  Vital Signs Last 24 Hrs  T(C): 37.2 (23 May 2020 10:16), Max: 37.3 (23 May 2020 04:20)  T(F): 99 (23 May 2020 10:16), Max: 99.1 (23 May 2020 04:20)  HR: 79 (23 May 2020 10:16) (79 - 88)  BP: 119/71 (23 May 2020 10:16) (119/71 - 128/78)  BP(mean): --  RR: 18 (23 May 2020 10:16) (18 - 18)  SpO2: 98% (23 May 2020 10:16) (95% - 98%)  Daily     Daily     LABS:                        9.3    8.53  )-----------( 58       ( 23 May 2020 06:02 )             28.9     05-23    129<L>  |  100  |  40<H>  ----------------------------<  175<H>  3.4<L>   |  11<L>  |  1.83<H>    Ca    9.2      23 May 2020 06:02  Phos  3.0     05-23  Mg     1.4     05-23    TPro  5.7<L>  /  Alb  4.6  /  TBili  39.2<H>  /  DBili  x   /  AST  77<H>  /  ALT  40  /  AlkPhos  66  05-23    LIVER FUNCTIONS - ( 23 May 2020 06:02 )  Alb: 4.6 g/dL / Pro: 5.7 g/dL / ALK PHOS: 66 U/L / ALT: 40 U/L / AST: 77 U/L / GGT: x           PT/INR - ( 23 May 2020 09:00 )   PT: 26.1 sec;   INR: 2.23 ratio         PTT - ( 23 May 2020 09:00 )  PTT:44.8 sec        Imaging:

## 2020-05-23 NOTE — PROGRESS NOTE ADULT - PROBLEM SELECTOR PLAN 1
MELD-Na 39 on 5/18, , on admission, likely to acute decompensated cirrhosis 2/2 recent alcohol abuse without thrombus on abd us  - Prednisolone 40 mg oral daily (5/17 - 5/23) then f/u lille score regarding continuation. -Day 7 tomorrow.   - For SBP ppx, CTX (5/15-5/18) transitioned to cipro 500 mg BID (5/19 - ) - check on dosing.   - C/w rifaximin  - C/w albumin  -F/u hepatology recs. MELD-Na 39 on 5/18, , on admission, likely to acute decompensated cirrhosis 2/2 recent alcohol abuse without thrombus on abd us  - Prednisolone 40 mg oral daily (5/17 - 5/23) then f/u lille score regarding continuation.  - Pending PTT from core lab for lille scoer  - For SBP ppx, s/p CTX (5/15-5/18) transitioned to cipro 500 mg BID (5/19 -  22)  - C/w rifaximin  - D/c'd albumin 5/22  -F/u hepatology recs. - Acute decompensated cirrhosis 2/2 recent alcohol abuse   - Prednisolone 40 mg oral daily (5/17 - 5/23) then f/u lille score regarding continuation.  - Pending PTT from core lab for andrey scoer  - For SBP ppx, s/p CTX (5/15-5/18) transitioned to cipro 500 mg BID (5/19 -  22)  - C/w rifaximin  - D/c'd albumin 5/22  - F/u hepatology recs.

## 2020-05-23 NOTE — PROGRESS NOTE ADULT - PROBLEM SELECTOR PLAN 6
Previously treated at [facility]  -Being treated for alcoholic hepatitis as above  -C/w thiamine and MVI  -C/w folate  -Outpt f/u with prior facility and PCP

## 2020-05-23 NOTE — PROGRESS NOTE ADULT - PROBLEM SELECTOR PLAN 2
Hemodynamically mediated SAM given improvement with albumin and prerenal FENA  - c/w albumin 25% 100mL q6hr   - monitor BMP q12h  - Strict I's/O's.  -F/u VBG for low CO2. Hemodynamically mediated SAM given improvement with albumin and prerenal FENA  - S/p albumin 25% 100mL q6hr and continuing to downtrend off  - monitor BMP q12h  - Strict I's/O's.  -F/u VBG for low CO2. Improving still. Hemodynamically mediated SAM given improvement with albumin and prerenal FENA  - S/p albumin now off.  - Strict I's/O's.

## 2020-05-23 NOTE — PROGRESS NOTE ADULT - SUBJECTIVE AND OBJECTIVE BOX
INCOMPLETE NOTE PROGRESS NOTE:     Patient is a 44y old  Male who presents with a chief complaint of jaundice (23 May 2020 09:07)      SUBJECTIVE / OVERNIGHT EVENTS:  No acute complaints    ADDITIONAL REVIEW OF SYSTEMS:  No fevers, chest pain, shortness of breath, abdominal pain, lower extremity swelling or pain, dysuria, or constipation.    MEDICATIONS  (STANDING):  dextrose 5%. 1000 milliLiter(s) (50 mL/Hr) IV Continuous <Continuous>  dextrose 50% Injectable 12.5 Gram(s) IV Push once  dextrose 50% Injectable 25 Gram(s) IV Push once  dextrose 50% Injectable 25 Gram(s) IV Push once  folic acid 1 milliGRAM(s) Oral daily  insulin glargine Injectable (LANTUS) 87 Unit(s) SubCutaneous at bedtime  insulin lispro (HumaLOG) corrective regimen sliding scale   SubCutaneous three times a day before meals  insulin lispro (HumaLOG) corrective regimen sliding scale   SubCutaneous at bedtime  insulin lispro Injectable (HumaLOG) 16 Unit(s) SubCutaneous three times a day before meals  multivitamin 1 Tablet(s) Oral daily  pantoprazole    Tablet 40 milliGRAM(s) Oral before breakfast  prednisoLONE    3 mG/mL Solution (ORAPRED) 40 milliGRAM(s) Oral daily  simethicone 80 milliGRAM(s) Chew three times a day  thiamine 100 milliGRAM(s) Oral daily  ursodiol Tablet 500 milliGRAM(s) Oral three times a day    MEDICATIONS  (PRN):  albuterol/ipratropium for Nebulization. 3 milliLiter(s) Nebulizer once PRN Shortness of Breath  dextrose 40% Gel 15 Gram(s) Oral once PRN Blood Glucose LESS THAN 70 milliGRAM(s)/deciliter  glucagon  Injectable 1 milliGRAM(s) IntraMuscular once PRN Glucose LESS THAN 70 milligrams/deciliter      CAPILLARY BLOOD GLUCOSE      POCT Blood Glucose.: 119 mg/dL (23 May 2020 08:23)  POCT Blood Glucose.: 255 mg/dL (22 May 2020 21:56)  POCT Blood Glucose.: 254 mg/dL (22 May 2020 17:11)  POCT Blood Glucose.: 263 mg/dL (22 May 2020 12:58)    I&O's Summary    22 May 2020 07:01  -  23 May 2020 07:00  --------------------------------------------------------  IN: 600 mL / OUT: 425 mL / NET: 175 mL        PHYSICAL EXAM:  Vital Signs Last 24 Hrs  T(C): 37.3 (23 May 2020 04:20), Max: 37.3 (23 May 2020 04:20)  T(F): 99.1 (23 May 2020 04:20), Max: 99.1 (23 May 2020 04:20)  HR: 80 (23 May 2020 04:20) (80 - 88)  BP: 128/78 (23 May 2020 04:20) (121/72 - 128/78)  BP(mean): --  RR: 18 (23 May 2020 04:20) (18 - 18)  SpO2: 97% (23 May 2020 04:20) (95% - 97%)    CONSTITUTIONAL: NAD, well-developed  ENT: Scleral icterus  CARDIOVASCULAR: Regular rate and rhythm. Normal S1 and S2. No murmurs.  RESPIRATORY: Normal respiratory effort, Lungs CTAB  EXTREMITIES: No CHASITY, peripheral pulses intact.  ABDOMEN: Nontender to palpation. Nondistended., normoactive bowel sounds, no rebound/guarding; No hepatosplenomegaly. No fluid wave.  PSYCH: A+O to person, place, and time; affect appropriate  Skin: Vitiligo  NEURO: Asterixis      LABS:                        9.3    8.53  )-----------( 58       ( 23 May 2020 06:02 )             28.9     05-23    129<L>  |  100  |  40<H>  ----------------------------<  175<H>  3.4<L>   |  11<L>  |  1.83<H>    Ca    9.2      23 May 2020 06:02  Phos  3.0     05-23  Mg     1.4     05-23    TPro  5.7<L>  /  Alb  4.6  /  TBili  39.2<H>  /  DBili  x   /  AST  77<H>  /  ALT  40  /  AlkPhos  66  05-23    PT/INR - ( 22 May 2020 08:15 )   PT: 24.6 sec;   INR: 2.09 ratio         PTT - ( 22 May 2020 08:15 )  PTT:47.6 sec            RADIOLOGY & ADDITIONAL TESTS:  Results Reviewed: Hyponatremia stable. Plt stable. K 3.4. urine sodium < 35. Pending coags to calculate lille score.  Imaging Personally Reviewed:  Electrocardiogram Personally Reviewed:    COORDINATION OF CARE:  Care Discussed with Consultants/Other Providers [Y/N]:  Prior or Outpatient Records Reviewed [Y/N]: PROGRESS NOTE:     Patient is a 44y old  Male who presents with a chief complaint of jaundice (23 May 2020 09:07)      SUBJECTIVE / OVERNIGHT EVENTS:  No acute complaints, seen walking around unit.    ADDITIONAL REVIEW OF SYSTEMS:  No fevers, chest pain, shortness of breath, abdominal pain, lower extremity swelling or pain, dysuria, or constipation.    MEDICATIONS  (STANDING):  dextrose 5%. 1000 milliLiter(s) (50 mL/Hr) IV Continuous <Continuous>  dextrose 50% Injectable 12.5 Gram(s) IV Push once  dextrose 50% Injectable 25 Gram(s) IV Push once  dextrose 50% Injectable 25 Gram(s) IV Push once  folic acid 1 milliGRAM(s) Oral daily  insulin glargine Injectable (LANTUS) 87 Unit(s) SubCutaneous at bedtime  insulin lispro (HumaLOG) corrective regimen sliding scale   SubCutaneous three times a day before meals  insulin lispro (HumaLOG) corrective regimen sliding scale   SubCutaneous at bedtime  insulin lispro Injectable (HumaLOG) 16 Unit(s) SubCutaneous three times a day before meals  multivitamin 1 Tablet(s) Oral daily  pantoprazole    Tablet 40 milliGRAM(s) Oral before breakfast  prednisoLONE    3 mG/mL Solution (ORAPRED) 40 milliGRAM(s) Oral daily  simethicone 80 milliGRAM(s) Chew three times a day  thiamine 100 milliGRAM(s) Oral daily  ursodiol Tablet 500 milliGRAM(s) Oral three times a day    MEDICATIONS  (PRN):  albuterol/ipratropium for Nebulization. 3 milliLiter(s) Nebulizer once PRN Shortness of Breath  dextrose 40% Gel 15 Gram(s) Oral once PRN Blood Glucose LESS THAN 70 milliGRAM(s)/deciliter  glucagon  Injectable 1 milliGRAM(s) IntraMuscular once PRN Glucose LESS THAN 70 milligrams/deciliter      CAPILLARY BLOOD GLUCOSE      POCT Blood Glucose.: 119 mg/dL (23 May 2020 08:23)  POCT Blood Glucose.: 255 mg/dL (22 May 2020 21:56)  POCT Blood Glucose.: 254 mg/dL (22 May 2020 17:11)  POCT Blood Glucose.: 263 mg/dL (22 May 2020 12:58)    I&O's Summary    22 May 2020 07:01  -  23 May 2020 07:00  --------------------------------------------------------  IN: 600 mL / OUT: 425 mL / NET: 175 mL        PHYSICAL EXAM:  Vital Signs Last 24 Hrs  T(C): 37.3 (23 May 2020 04:20), Max: 37.3 (23 May 2020 04:20)  T(F): 99.1 (23 May 2020 04:20), Max: 99.1 (23 May 2020 04:20)  HR: 80 (23 May 2020 04:20) (80 - 88)  BP: 128/78 (23 May 2020 04:20) (121/72 - 128/78)  BP(mean): --  RR: 18 (23 May 2020 04:20) (18 - 18)  SpO2: 97% (23 May 2020 04:20) (95% - 97%)    CONSTITUTIONAL: NAD, well-developed  ENT: Scleral icterus  CARDIOVASCULAR: Regular rate and rhythm. Normal S1 and S2. No murmurs.  RESPIRATORY: Normal respiratory effort, Lungs CTAB  EXTREMITIES: No CHASITY, peripheral pulses intact.  ABDOMEN: Nontender to palpation. Nondistended., normoactive bowel sounds, no rebound/guarding; No hepatosplenomegaly. No fluid wave.  PSYCH: A+O to person, place, and time; affect appropriate  Skin: Vitiligo, jaundice  NEURO: Asterixis      LABS:                        9.3    8.53  )-----------( 58       ( 23 May 2020 06:02 )             28.9     05-23    129<L>  |  100  |  40<H>  ----------------------------<  175<H>  3.4<L>   |  11<L>  |  1.83<H>    Ca    9.2      23 May 2020 06:02  Phos  3.0     05-23  Mg     1.4     05-23    TPro  5.7<L>  /  Alb  4.6  /  TBili  39.2<H>  /  DBili  x   /  AST  77<H>  /  ALT  40  /  AlkPhos  66  05-23    PT/INR - ( 22 May 2020 08:15 )   PT: 24.6 sec;   INR: 2.09 ratio         PTT - ( 22 May 2020 08:15 )  PTT:47.6 sec            RADIOLOGY & ADDITIONAL TESTS:  Results Reviewed: Hyponatremia stable. Plt stable. K 3.4. urine sodium < 35. Pending coags to calculate lille score.  Imaging Personally Reviewed:  Electrocardiogram Personally Reviewed:    COORDINATION OF CARE:  Care Discussed with Consultants/Other Providers [Y/N]:  Prior or Outpatient Records Reviewed [Y/N]:

## 2020-05-24 NOTE — PROGRESS NOTE ADULT - ASSESSMENT
44 M with vitiligo, alcohol abuse, obesity BMI 35, FELIX/SIMPSON cirrhosis, in 2018 reported to be on transplant list at [facility], h/o small ascites, esophageal varices (small in 2019 and in ), recurrent GI bleed without identified source (last in 10/2028, small EV, neg. capsule), SBP 2015 and multiple admissions for recurrent sepsis and abdominal pain without enough ascites for tap (2018 S. agalactiae bacteremia, 2017 Cx negative, 2017 E. coli, bacteremia 2017 with neg. EUS, 2016 E. coli bacteremia quinolone-resistant),  now admitted on 5/15 with generalized weakness, abdominal distention and jaundice in the setting of decompensated cirrhosis after relapse with one month of drinking after his mother .    Impression:  # Decompensated EtOH Cirrhosis with ascites - MELD-Na: 36 on 20  # Severe acute alcoholic hepatitis given > 2:1 AST to ALT ratio, however, differential includes acute-on-chronic liver failure due to underlying infection.  on 5/15/20.   - Started on steroids on 20. Day 5 Lille score 0.377; Day 7 Lille score 0.850 indicating poor response.  - Started on NAC protocol 20   HE: AOx3, no asterixis  Ascites: Small to moderate on CT A/P but could not be drained. H/o SBP, including quinolone-resistant E. coli in . On ciprofloxacin prophylaxis empirically during hospitalization. Ascites albumin not known.  HCC: No hx, but overdue for HCC screening  Varices: Small varices on EGD (2019)  Coagulopathy: INR: 3.04, Plts: 61    # SAM: likely pre-renal, now improving with albumin  # Hyponatremia due to SIADH caused by acute liver injury  # DM2  # alcohol hx: patient is not forthcoming about hx of his liver disease. Answers that he drank heavily from age [age] with daily intake of 1 bottle of tequila, 1 bottle of whiskey and a 12-pack of beer. Then 8 years of abstinence, and from age ~39 beer on <1d per week (3-4 beers), now relapse 1 month ago when his mother  in [place] and he could not visit her before due to COVID crisis. Chart review showed multiple hospitalizations as above, apparently did not follow-up with a hepatologist.  # obesity, ~190 lbs, BMI 35. Max weight was 240 lbs around 2015, lost 50 lbs by dieting and some exercise.  # SHx: lives with dwain of 20 years. 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   # transplant candidacy: severe alcoholic hepatitis with unfavorable Lille score has a mortality of ~30% in one month. Will continue to evaluate alcohol history.    Recommendation:  - stop simethicone given no proven medical benefit - it reduces bubbles (i.e. lipid membranes), but not amount of gas  - continue N-acetylcysteine protocol over 5 days as below:  Day 1: Give 150 mg per kilogram of body weight in 250 ml of 5% glucose solution over a period of 30 minutes, 50 mg per kilogram in 500 ml of glucose solution over a period of 4 hours, and 100 mg per kilogram in 1000 ml of glucose solution over a period of 16 hours.   Days 2 - 5: Give 100 mg per kilogram per day in 1000 ml of glucose solution.  - daily CBC and CMP for daily MELD-Na score  - Hepatology will continue to follow 44 M with vitiligo, alcohol abuse, obesity BMI 35, FELIX/SIMPSON cirrhosis, in 2018 reported to be on transplant list at [facility], h/o small ascites, esophageal varices (small in 2019 and in ), recurrent GI bleed without identified source (last in 10/2028, small EV, neg. capsule), SBP 2015 and multiple admissions for recurrent sepsis and abdominal pain without enough ascites for tap (2018) S. agalactiae bacteremia, 2017 Cx negative, 2017 E. coli, bacteremia 2017 with neg. EUS, 2016 E. coli bacteremia quinolone-resistant),  now admitted on 5/15 with generalized weakness, abdominal distention and jaundice in the setting of decompensated cirrhosis after relapse with one month of drinking after his mother .    Impression:  # Decompensated EtOH Cirrhosis with ascites - MELD-Na: 36 on 20  # Severe acute alcoholic hepatitis given > 2:1 AST to ALT ratio, however, differential includes acute-on-chronic liver failure due to underlying infection.  on 5/15/20.   - Started on steroids on 20. Day 5 Lille score 0.377; Day 7 Lille score 0.850 indicating poor response.  - Started on NAC protocol 20   HE: AOx3, no asterixis  Ascites: Small to moderate on CT A/P but could not be drained. H/o SBP, including quinolone-resistant E. coli in . On ciprofloxacin prophylaxis empirically during hospitalization. Ascites albumin not known.  HCC: No hx, but overdue for HCC screening  Varices: Small varices on EGD (2019)  Coagulopathy: INR: 3.04, Plts: 61    # SAM: likely pre-renal, now improving with albumin  # Hyponatremia due to SIADH caused by acute liver injury  # DM2  # alcohol hx: patient is not forthcoming about hx of his liver disease. Answers that he drank heavily from age [age] with daily intake of 1 bottle of tequila, 1 bottle of whiskey and a 12-pack of beer. Then 8 years of abstinence, and from age ~39 beer on <1d per week (3-4 beers), now relapse 1 month ago when his mother  in [place] and he could not visit her before due to COVID crisis. Chart review showed multiple hospitalizations as above, apparently did not follow-up with a hepatologist.  # obesity, ~190 lbs, BMI 35. Max weight was 240 lbs around 2015, lost 50 lbs by dieting and some exercise.  # SHx: lives with fitayae of 20 years. 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   # transplant candidacy: severe alcoholic hepatitis with unfavorable Lille score has a mortality of ~30% in one month. Will continue to evaluate alcohol history.    Recommendation:  - stop simethicone given no proven medical benefit - it reduces bubbles (i.e. lipid membranes), but not amount of gas  - continue N-acetylcysteine protocol over 5 days as below:  Day 1: Give 150 mg per kilogram of body weight in 250 ml of 5% glucose solution over a period of 30 minutes, 50 mg per kilogram in 500 ml of glucose solution over a period of 4 hours, and 100 mg per kilogram in 1000 ml of glucose solution over a period of 16 hours.   Days 2 - 5: Give 100 mg per kilogram per day in 1000 ml of glucose solution.  - daily CBC and CMP for daily MELD-Na score  - Hepatology will continue to follow      Tina Tucker PGY-4  Gastroenterology Fellow  Pager #[phone] 44 M with vitiligo, alcohol abuse, obesity BMI 35, FELIX/SIMPSON cirrhosis, in 2018 reported to be on transplant list at [facility], h/o small ascites, esophageal varices (small in 2019 and in ), recurrent GI bleed without identified source (last in 10/2028, small EV, neg. capsule), SBP 2015 and multiple admissions for recurrent sepsis and abdominal pain without enough ascites for tap (2018) S. agalactiae bacteremia, 2017 Cx negative, 2017 E. coli, bacteremia 2017 with neg. EUS, 2016 E. coli bacteremia quinolone-resistant),  now admitted on 5/15 with generalized weakness, abdominal distention and jaundice in the setting of decompensated cirrhosis after relapse with one month of drinking after his mother .    Impression:  # Decompensated EtOH Cirrhosis with ascites - MELD-Na: 36 on 20  # Severe acute alcoholic hepatitis given > 2:1 AST to ALT ratio, however, differential includes acute-on-chronic liver failure due to underlying infection.  on 5/15/20.   - Started on steroids on 20. Day 5 Lille score 0.377; Day 7 Lille score 0.850 indicating poor response.  - Started on NAC protocol 20   HE: AOx3, no asterixis  Ascites: Small to moderate on CT A/P but could not be drained. H/o SBP, including quinolone-resistant E. coli in . On ciprofloxacin prophylaxis empirically during hospitalization. Ascites albumin not known.  HCC: No hx, but overdue for HCC screening  Varices: Small varices on EGD (2019)  Coagulopathy: INR: 3.04, Plts: 61    # SAM: likely pre-renal, now improving with albumin  # Hyponatremia due to SIADH caused by acute liver injury  # DM2  # alcohol hx: patient is not forthcoming about hx of his liver disease. Answered that he drank very heavily from age [age] with daily intake of 1 bottle of tequila, 1 bottle of whiskey and a 12-pack of beer. Then 8 years of abstinence, and from age ~39 beer on <1d per week (3-4 beers), now relapse with heavy drinking for 1 month after his mother  in [place] and he could not visit her before due to COVID crisis. Chart review showed multiple hospitalizations as above, saw Dr. Thakkar in the office. He did attend alcohol rehab for three years until three years ago, and answered that he knew that even his reduced alcohol intake might destroy his liver.  # obesity, ~190 lbs, BMI 35. Max weight was 240 lbs around 2015, lost 50 lbs by dieting and some exercise.  # SHx: lives with dwain of >20 years and their 24 and 26 yo sons. 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   # transplant candidacy: severe alcoholic hepatitis with unfavorable Lille score has a mortality of ~30% in one month. He does not fulfill criteria for liver transplant at our center due to longstanding history of alcohol abuse with recurrent drinking after alcohol rehab. Case also discussed with transplant surgery, Dr. Morocho.    Recommendation:  - continue N-acetylcysteine protocol over 5 days as below:  Day 1: Give 150 mg per kilogram of body weight in 250 ml of 5% glucose solution over a period of 30 minutes, 50 mg per kilogram in 500 ml of glucose solution over a period of 4 hours, and 100 mg per kilogram in 1000 ml of glucose solution over a period of 16 hours.   Days 2 - 5: Give 100 mg per kilogram per day in 1000 ml of glucose solution.  - secondary SBP prophylaxis, h/o quinolone resistant E. coli: switch to bactrim PO, lifelong.    - daily CBC and CMP for daily MELD-Na score  - stop simethicone given no proven medical benefit - it reduces bubbles (i.e. lipid membranes), but not amount of gas  - stop PPI.    Tina Tucker PGY-4  Gastroenterology Fellow  Pager #[phone]

## 2020-05-24 NOTE — PROGRESS NOTE ADULT - PROBLEM SELECTOR PLAN 6
Previously treated at [facility]  -Being treated for alcoholic hepatitis as above  -C/w thiamine and MVI  -C/w folate  -Outpt f/u with prior facility and PCP

## 2020-05-24 NOTE — PROGRESS NOTE ADULT - SUBJECTIVE AND OBJECTIVE BOX
INCOMPLETE NOTE PROGRESS NOTE:     Patient is a 44y old  Male who presents with a chief complaint of jaundice (24 May 2020 08:27)      SUBJECTIVE / OVERNIGHT EVENTS:  No acute complaints    ADDITIONAL REVIEW OF SYSTEMS:  No fevers, chest pain, shortness of breath, abdominal pain, lower extremity swelling or pain, dysuria, or constipation.    MEDICATIONS  (STANDING):  acetylcysteine IVPB 10 Gram(s) IV Intermittent <User Schedule>  dextrose 5%. 1000 milliLiter(s) (50 mL/Hr) IV Continuous <Continuous>  dextrose 50% Injectable 12.5 Gram(s) IV Push once  dextrose 50% Injectable 25 Gram(s) IV Push once  dextrose 50% Injectable 25 Gram(s) IV Push once  folic acid 1 milliGRAM(s) Oral daily  insulin glargine Injectable (LANTUS) 50 Unit(s) SubCutaneous at bedtime  insulin lispro (HumaLOG) corrective regimen sliding scale   SubCutaneous three times a day before meals  insulin lispro (HumaLOG) corrective regimen sliding scale   SubCutaneous at bedtime  insulin lispro Injectable (HumaLOG) 12 Unit(s) SubCutaneous three times a day before meals  multivitamin 1 Tablet(s) Oral daily  pantoprazole    Tablet 40 milliGRAM(s) Oral before breakfast  simethicone 80 milliGRAM(s) Chew three times a day  thiamine 100 milliGRAM(s) Oral daily    MEDICATIONS  (PRN):  albuterol/ipratropium for Nebulization. 3 milliLiter(s) Nebulizer once PRN Shortness of Breath  dextrose 40% Gel 15 Gram(s) Oral once PRN Blood Glucose LESS THAN 70 milliGRAM(s)/deciliter  glucagon  Injectable 1 milliGRAM(s) IntraMuscular once PRN Glucose LESS THAN 70 milligrams/deciliter      CAPILLARY BLOOD GLUCOSE      POCT Blood Glucose.: 239 mg/dL (24 May 2020 08:26)  POCT Blood Glucose.: 302 mg/dL (23 May 2020 21:30)  POCT Blood Glucose.: 249 mg/dL (23 May 2020 16:58)  POCT Blood Glucose.: 172 mg/dL (23 May 2020 14:26)    I&O's Summary    23 May 2020 07:01  -  24 May 2020 07:00  --------------------------------------------------------  IN: 120 mL / OUT: 1450 mL / NET: -1330 mL        PHYSICAL EXAM:  Vital Signs Last 24 Hrs  T(C): 37.1 (24 May 2020 02:07), Max: 37.2 (23 May 2020 10:16)  T(F): 98.8 (24 May 2020 02:07), Max: 99 (23 May 2020 10:16)  HR: 82 (24 May 2020 02:07) (76 - 93)  BP: 120/64 (24 May 2020 02:07) (119/71 - 138/51)  BP(mean): --  RR: 18 (24 May 2020 02:07) (18 - 18)  SpO2: 97% (24 May 2020 02:07) (95% - 100%)    CONSTITUTIONAL: NAD, well-developed.  HEENT: +Scleral icterus  CARDIOVASCULAR: Regular rate and rhythm. Normal S1 and S2. No murmurs.  RESPIRATORY: Normal respiratory effort, Lungs CTAB  EXTREMITIES: No CHASITY, peripheral pulses intact.  ABDOMEN: Possibly slightly increased distension from prior. Nontender to palpation, normoactive bowel sounds, no rebound/guarding; No hepatosplenomegaly  PSYCH: A+O to person, place, and time; affect appropriate  SKIN: Jaundiced, vitiligo  Neuro: +Asterixis    LABS:                        9.5    8.74  )-----------( 53       ( 24 May 2020 06:10 )             27.2     05-24    130<L>  |  99  |  41<H>  ----------------------------<  258<H>  3.3<L>   |  11<L>  |  2.00<H>    Ca    9.5      24 May 2020 06:10  Phos  3.5     05-24  Mg     1.5     05-24    TPro  5.4<L>  /  Alb  4.2  /  TBili  37.2<H>  /  DBili  x   /  AST  68<H>  /  ALT  45  /  AlkPhos  73  05-24    PT/INR - ( 23 May 2020 09:00 )   PT: 26.1 sec;   INR: 2.23 ratio         PTT - ( 23 May 2020 09:00 )  PTT:44.8 sec            RADIOLOGY & ADDITIONAL TESTS:  Results Reviewed: Creatinine worsening. Pending coags for MELD  Imaging Personally Reviewed:  Electrocardiogram Personally Reviewed:    COORDINATION OF CARE:  Care Discussed with Consultants/Other Providers [Y/N]:  Prior or Outpatient Records Reviewed [Y/N]:

## 2020-05-24 NOTE — PROGRESS NOTE ADULT - ASSESSMENT
45 yo M with PMH of latent TB, h/o C. diff (2016, 2017), T2DM (A1c 7.9% in 10/2019), h/o alcoholic hepatitis, and decompensated EtOH cirrhosis c/b ascites, SBP, and variceal hemorrhage in the remote past, presents with jaundice and weakness in setting of recent alcohol relapse, admitted for acute decompensated EtOH cirrhosis with hyponatremia and acute kidney injury.

## 2020-05-24 NOTE — PROGRESS NOTE ADULT - SUBJECTIVE AND OBJECTIVE BOX
Chief Complaint: management of patient with DM2    History: Patient seen and evaluated at bed. Prednisolone discontinued 5/23. Patient report appetite is present however stomach is sensitive.     MEDICATIONS  (STANDING):  acetylcysteine IVPB 10 Gram(s) IV Intermittent <User Schedule>  dextrose 5%. 1000 milliLiter(s) (50 mL/Hr) IV Continuous <Continuous>  dextrose 50% Injectable 12.5 Gram(s) IV Push once  dextrose 50% Injectable 25 Gram(s) IV Push once  dextrose 50% Injectable 25 Gram(s) IV Push once  folic acid 1 milliGRAM(s) Oral daily  insulin glargine Injectable (LANTUS) 50 Unit(s) SubCutaneous at bedtime  insulin lispro (HumaLOG) corrective regimen sliding scale   SubCutaneous three times a day before meals  insulin lispro (HumaLOG) corrective regimen sliding scale   SubCutaneous at bedtime  insulin lispro Injectable (HumaLOG) 8 Unit(s) SubCutaneous three times a day before meals  multivitamin 1 Tablet(s) Oral daily  pantoprazole    Tablet 40 milliGRAM(s) Oral before breakfast  thiamine 100 milliGRAM(s) Oral daily    MEDICATIONS  (PRN):  albuterol/ipratropium for Nebulization. 3 milliLiter(s) Nebulizer once PRN Shortness of Breath  dextrose 40% Gel 15 Gram(s) Oral once PRN Blood Glucose LESS THAN 70 milliGRAM(s)/deciliter  glucagon  Injectable 1 milliGRAM(s) IntraMuscular once PRN Glucose LESS THAN 70 milligrams/deciliter    PHYSICAL EXAM:  VITALS: T(C): 36.8 (05-24-20 @ 17:12)  T(F): 98.2 (05-24-20 @ 17:12), Max: 98.8 (05-24-20 @ 02:07)  HR: 93 (05-24-20 @ 17:12) (82 - 93)  BP: 118/69 (05-24-20 @ 17:12) (114/64 - 133/83)  RR:  (18 - 18)  SpO2:  (95% - 97%)  Wt(kg): --  GENERAL: NAD, well-groomed, well-developed  RESPIRATORY: Clear to auscultation bilaterally;  CARDIOVASCULAR: Regular rate and rhythm; No murmurs; no peripheral edema  GI: Soft, nontender, non distended,  SKIN: jaundiced   MUSCULOSKELETAL: Full range of motion, normal strength  NEURO: sensation intact, extraocular movements intact, no tremor, normal reflexes  PSYCH: Alert and oriented x 3, reactive affect     POCT Blood Glucose.: 73 mg/dL (05-24-20 @ 17:21) H8  POCT Blood Glucose.: 154 mg/dL (05-24-20 @ 13:07) H12+2  POCT Blood Glucose.: 239 mg/dL (05-24-20 @ 08:26) H12+ 4  POCT Blood Glucose.: 302 mg/dL (05-23-20 @ 21:30) L50 H4   POCT Blood Glucose.: 249 mg/dL (05-23-20 @ 16:58) H16+ 4  POCT Blood Glucose.: 172 mg/dL (05-23-20 @ 14:26)  POCT Blood Glucose.: 119 mg/dL (05-23-20 @ 08:23)  POCT Blood Glucose.: 255 mg/dL (05-22-20 @ 21:56)  POCT Blood Glucose.: 254 mg/dL (05-22-20 @ 17:11)  POCT Blood Glucose.: 263 mg/dL (05-22-20 @ 12:58)  POCT Blood Glucose.: 196 mg/dL (05-22-20 @ 08:39)  POCT Blood Glucose.: 240 mg/dL (05-21-20 @ 21:57)      05-24    130<L>  |  99  |  41<H>  ----------------------------<  258<H>  3.3<L>   |  11<L>  |  2.00<H>    EGFR if : 45<L>  EGFR if non : 39<L>    Ca    9.5      05-24  Mg     1.5     05-24  Phos  3.5     05-24    TPro  5.4<L>  /  Alb  4.2  /  TBili  37.2<H>  /  DBili  x   /  AST  68<H>  /  ALT  45  /  AlkPhos  73  05-24    Thyroid Function Tests:  05-19 @ 08:53 TSH 0.26

## 2020-05-24 NOTE — PROGRESS NOTE ADULT - PROBLEM SELECTOR PLAN 1
- Acute decompensated cirrhosis 2/2 recent alcohol abuse   - Prednisolone 40 mg oral daily (5/17 - 5/23) then f/u lille score regarding continuation.  - Pending PTT from core lab for andrey scoer  - For SBP ppx, s/p CTX (5/15-5/18) transitioned to cipro 500 mg BID (5/19 -  22)  - C/w rifaximin  - D/c'd albumin 5/22  - F/u hepatology recs. Acute decompensated cirrhosis 2/2 recent alcohol abuse   - S/p prednisolone 40 mg oral daily (5/17 - 5/23) then lille score showed nonresponse. Now on NAC (5/23-5/27)  - S/p SBP ppx, s/p CTX (5/15-5/18) transitioned to cipro 500 mg BID (5/19 -  22)  - C/w rifaximin  - D/c'd albumin 5/22.  - F/u hepatology recs. Acute decompensated cirrhosis 2/2 recent alcohol abuse   -MELD 5/24 38, 5/23 36  - S/p prednisolone 40 mg oral daily (5/17 - 5/23) then lille score showed nonresponse. Now on NAC (5/23-5/27)  - S/p SBP ppx, s/p CTX (5/15-5/18) transitioned to cipro 500 mg BID (5/19 -  22)  - C/w rifaximin  - D/c'd albumin 5/22.  - F/u hepatology recs.

## 2020-05-24 NOTE — PROGRESS NOTE ADULT - PROBLEM SELECTOR PLAN 5
Pt on levemir 80U at home, metformin 1000mg daily. last A1c 7.9 (10/2019)  - C/w Mod ISS and FS qid   - Incr lantus to 87 U daily    - Increase Lispro 16 U TID  - Nutrition eval.  - Will decrease insulin when steroids ends on 5/23  - Endo evaluation today prior to dc Lantus and lispro decreased  . last A1c 7.9 (10/2019)  - C/w Mod ISS and FS qid   - Incr lantus to 87 U daily    - Increase Lispro 16 U TID  - Nutrition eval.  - Will decrease insulin when steroids ends on 5/23  - Endo evaluation today prior to dc Lantus and lispro decreased  . last A1c 7.9 (10/2019)  - C/w Mod ISS and FS qid   - Lantus 50 U daily off steroids    - Lispro 12 U TID before meals off steroids  - Nutrition eval  - Apprec endo

## 2020-05-24 NOTE — PROGRESS NOTE ADULT - ASSESSMENT
43 yo male with PMH of latent TB, h/o C. diff (2016, 2017), DM2 (last HbA1c 7.9% in 10/2019), h/o alcoholic hepatitis, and decompensated EtOH cirrhosis c/b ascites, SBP, and variceal hemorrhage in the remote past, presents with jaundice and weakness x 2 days. Patient is a recovered alcoholic but states he started drinking again about 1 month ago because of mother passed away. Endocrine consult requested for management of steroid induced hyperglycemia in patient with DM2.   Patient on prednisolone 40mg daily since 5/17/20 for alcohol hepatitis, last dose 5/23 6am. FS trending down after discontinuation of prednisone.     Steroid induced hyperglycemia in patient with uncontrolled DM2   A1c 8.8%, goal <7%  FS premeal and qhs   Goal -180, trending   Will decrease basal/ bolus dosing as patient no longer on prednisolone   Recommend decrease Lantus to 40 units qhs   Recommend decreased Humalog to 8 units premeal  Switch to low premeal and qhs correctional scales   Patient can follow up with Endocrine at  [address] [place] [address] [address] [facility] [id number] [phone]. Patient to be likely discharged on insulin and PO medication, dose to be determined.   As C- peptide present would recommend discontinue metformin on discharge and start prandin   Patient to schedule ophthalmology and podiatry visit       Abnormal thyroid function test  TSH low 0.26  Likely steroid related  Recommend repeat TFT as outpt

## 2020-05-24 NOTE — PROGRESS NOTE ADULT - PROBLEM SELECTOR PLAN 2
Improving still. Hemodynamically mediated SAM given improvement with albumin and prerenal FENA  - S/p albumin now off.  - Strict I's/O's. Worsening off albumin. Hemodynamically mediated SAM given improvement with albumin and prerenal FENA  - Discuss restarting albumin with nephrology/hepatology  - Strict I's/O's. Worsening off albumin. Hemodynamically mediated SAM given improvement with albumin and prerenal FENA  - Discuss restarting albumin with hepatology  - Strict I's/O's.

## 2020-05-24 NOTE — PROGRESS NOTE ADULT - SUBJECTIVE AND OBJECTIVE BOX
Chief Complaint:  Patient is a 44y old  Male who presents with a chief complaint of jaundice (23 May 2020 12:48)      Interval Events:     Started on NAC protocol for acute alcoholic hepatitis yesterday given no benefit from 7-day course of steroids.     Allergies:  levofloxacin (Other (Moderate))      Hospital Medications:  acetylcysteine IVPB 10 Gram(s) IV Intermittent <User Schedule>  albuterol/ipratropium for Nebulization. 3 milliLiter(s) Nebulizer once PRN  dextrose 40% Gel 15 Gram(s) Oral once PRN  dextrose 5%. 1000 milliLiter(s) IV Continuous <Continuous>  dextrose 50% Injectable 12.5 Gram(s) IV Push once  dextrose 50% Injectable 25 Gram(s) IV Push once  dextrose 50% Injectable 25 Gram(s) IV Push once  folic acid 1 milliGRAM(s) Oral daily  glucagon  Injectable 1 milliGRAM(s) IntraMuscular once PRN  insulin glargine Injectable (LANTUS) 50 Unit(s) SubCutaneous at bedtime  insulin lispro (HumaLOG) corrective regimen sliding scale   SubCutaneous three times a day before meals  insulin lispro (HumaLOG) corrective regimen sliding scale   SubCutaneous at bedtime  insulin lispro Injectable (HumaLOG) 12 Unit(s) SubCutaneous three times a day before meals  multivitamin 1 Tablet(s) Oral daily  pantoprazole    Tablet 40 milliGRAM(s) Oral before breakfast  simethicone 80 milliGRAM(s) Chew three times a day  thiamine 100 milliGRAM(s) Oral daily      PMHX/PSHX:  E. coli bacteremia  Vitiligo  Latent tuberculosis  Diabetes mellitus type 2, insulin dependent  Esophageal varices  Alcoholic cirrhosis  Status post corneal transplant  Alcoholic Cirrhosis  Esophageal Varices      Family history:  Family history of uterine cancer  Family history of diabetes mellitus      ROS:     General:  No weight loss, fevers, chills, night sweats, fatigue   Eyes:  No vision changes  ENT:  No sore throat, pain, runny nose  CV:  No chest pain, palpitations, dizziness   Resp:  No SOB, cough, wheezing  GI:  See HPI  :  No burning with urination, hematuria  Muscle:  No pain, weakness  Neuro:  No weakness/tingling, memory problems  Psych:  No fatigue, insomnia, mood problems, depression  Heme:  No easy bruisability  Skin:  No rash, edema      PHYSICAL EXAM:     GENERAL:  Well developed, no distress  HEENT:  NC/AT,  conjunctivae clear, sclera anicteric  CHEST:  Full & symmetric excursion, no increased effort w/ respirations  HEART:  Regular rhythm & rate  ABDOMEN:  Soft, non-tender, non-distended  EXTREMITIES:  no LE  edema  SKIN:  No rash/erythema/ecchymoses/petechiae/wounds/jaundice  NEURO:  Alert, oriented      Vital Signs:  Vital Signs Last 24 Hrs  T(C): 37.1 (24 May 2020 02:07), Max: 37.2 (23 May 2020 10:16)  T(F): 98.8 (24 May 2020 02:07), Max: 99 (23 May 2020 10:16)  HR: 82 (24 May 2020 02:07) (76 - 93)  BP: 120/64 (24 May 2020 02:07) (119/71 - 138/51)  BP(mean): --  RR: 18 (24 May 2020 02:07) (18 - 18)  SpO2: 97% (24 May 2020 02:07) (95% - 100%)  Daily     Daily     LABS:                        9.5    8.74  )-----------( 53       ( 24 May 2020 06:10 )             27.2     05-24    130<L>  |  99  |  41<H>  ----------------------------<  258<H>  3.3<L>   |  11<L>  |  2.00<H>    Ca    9.5      24 May 2020 06:10  Phos  3.5     05-24  Mg     1.5     05-24    TPro  5.4<L>  /  Alb  4.2  /  TBili  37.2<H>  /  DBili  x   /  AST  68<H>  /  ALT  45  /  AlkPhos  73  05-24    LIVER FUNCTIONS - ( 24 May 2020 06:10 )  Alb: 4.2 g/dL / Pro: 5.4 g/dL / ALK PHOS: 73 U/L / ALT: 45 U/L / AST: 68 U/L / GGT: x           PT/INR - ( 23 May 2020 09:00 )   PT: 26.1 sec;   INR: 2.23 ratio         PTT - ( 23 May 2020 09:00 )  PTT:44.8 sec        Imaging: Chief Complaint:  Patient is a 44y old  Male who presents with a chief complaint of jaundice (23 May 2020 12:48)      Interval Events:     Started on NAC protocol for acute alcoholic hepatitis yesterday given no benefit from 7-day course of steroids.       Allergies:  levofloxacin (Other (Moderate))      Hospital Medications:  acetylcysteine IVPB 10 Gram(s) IV Intermittent <User Schedule>  albuterol/ipratropium for Nebulization. 3 milliLiter(s) Nebulizer once PRN  dextrose 40% Gel 15 Gram(s) Oral once PRN  dextrose 5%. 1000 milliLiter(s) IV Continuous <Continuous>  dextrose 50% Injectable 12.5 Gram(s) IV Push once  dextrose 50% Injectable 25 Gram(s) IV Push once  dextrose 50% Injectable 25 Gram(s) IV Push once  folic acid 1 milliGRAM(s) Oral daily  glucagon  Injectable 1 milliGRAM(s) IntraMuscular once PRN  insulin glargine Injectable (LANTUS) 50 Unit(s) SubCutaneous at bedtime  insulin lispro (HumaLOG) corrective regimen sliding scale   SubCutaneous three times a day before meals  insulin lispro (HumaLOG) corrective regimen sliding scale   SubCutaneous at bedtime  insulin lispro Injectable (HumaLOG) 12 Unit(s) SubCutaneous three times a day before meals  multivitamin 1 Tablet(s) Oral daily  pantoprazole    Tablet 40 milliGRAM(s) Oral before breakfast  simethicone 80 milliGRAM(s) Chew three times a day  thiamine 100 milliGRAM(s) Oral daily      PMHX/PSHX:  E. coli bacteremia  Vitiligo  Latent tuberculosis  Diabetes mellitus type 2, insulin dependent  Esophageal varices  Alcoholic cirrhosis  Status post corneal transplant  Alcoholic Cirrhosis  Esophageal Varices      Family history:  Family history of uterine cancer  Family history of diabetes mellitus      ROS:     General:  No weight loss, fevers, chills, night sweats, fatigue   Eyes:  No vision changes  ENT:  No sore throat, pain, runny nose  CV:  No chest pain, palpitations, dizziness   Resp:  No SOB, cough, wheezing  GI:  See HPI  :  No burning with urination, hematuria  Muscle:  No pain, weakness  Neuro:  No weakness/tingling, memory problems  Psych:  No fatigue, insomnia, mood problems, depression  Heme:  No easy bruisability  Skin:  No rash, edema      PHYSICAL EXAM:     GENERAL:  Well developed, no distress  HEENT:  Conjunctivae clear, sclera anicteric  CHEST:  Full & symmetric excursion, no increased effort w/ respirations  HEART:  Regular rhythm & rate  ABDOMEN:  Soft, non-tender, non-distended  EXTREMITIES:  no LE  edema  SKIN:  No rash/erythema/ecchymoses/petechiae/wounds/jaundice  NEURO:  Alert, orientedx3      Vital Signs:  Vital Signs Last 24 Hrs  T(C): 37.1 (24 May 2020 02:07), Max: 37.2 (23 May 2020 10:16)  T(F): 98.8 (24 May 2020 02:07), Max: 99 (23 May 2020 10:16)  HR: 82 (24 May 2020 02:07) (76 - 93)  BP: 120/64 (24 May 2020 02:07) (119/71 - 138/51)  BP(mean): --  RR: 18 (24 May 2020 02:07) (18 - 18)  SpO2: 97% (24 May 2020 02:07) (95% - 100%)  Daily       LABS:                        9.5    8.74  )-----------( 53       ( 24 May 2020 06:10 )             27.2     05-24    130<L>  |  99  |  41<H>  ----------------------------<  258<H>  3.3<L>   |  11<L>  |  2.00<H>    Ca    9.5      24 May 2020 06:10  Phos  3.5     05-24  Mg     1.5     05-24    TPro  5.4<L>  /  Alb  4.2  /  TBili  37.2<H>  /  DBili  x   /  AST  68<H>  /  ALT  45  /  AlkPhos  73  05-24    LIVER FUNCTIONS - ( 24 May 2020 06:10 )  Alb: 4.2 g/dL / Pro: 5.4 g/dL / ALK PHOS: 73 U/L / ALT: 45 U/L / AST: 68 U/L / GGT: x           PT/INR - ( 23 May 2020 09:00 )   PT: 26.1 sec;   INR: 2.23 ratio    PTT - ( 23 May 2020 09:00 )  PTT:44.8 sec      Imaging:

## 2020-05-25 NOTE — PROVIDER CONTACT NOTE (CRITICAL VALUE NOTIFICATION) - BACKGROUND
Patient is admitted for jaundice. Patient has a history of: DM, and latent tuberculosis.
45 y/o male admitted with cirrhosis
PMH DM2, TB,varices, etoh abuse/cirrhosis
Pt admitted for Jaundice
Pt. admitted 05/15/2020 d/t Jaundice r/t acute decompensated ETOH cirrhosis
See H & P

## 2020-05-25 NOTE — PROVIDER CONTACT NOTE (CRITICAL VALUE NOTIFICATION) - RECOMMENDATIONS
Provider aware for recommendation.
Pt received 2nd round of K at this time, 3rd round to be given when 2nd round complete
NOtify MD
No new orders at this time.
notify provider
provider notified.

## 2020-05-25 NOTE — PROVIDER CONTACT NOTE (CRITICAL VALUE NOTIFICATION) - ASSESSMENT
Patient is A&Ox4, denies any chest pain, HA or SOB.
AO x 4. VSS. Skin yellow/jaundiced.
Pt A&O4. VS as documented .
Pt A&Ox4 VSS nad
Pt. A&Ox4, denies any s/s of hyperglycemia. Pt. resting in bed comfortably

## 2020-05-25 NOTE — PROGRESS NOTE ADULT - ASSESSMENT
44 M with vitiligo, alcohol abuse, obesity BMI 35, DM2, FELIX/SIMPSON cirrhosis, in 2018 reported to be on transplant list at [facility], h/o small ascites, esophageal varices (small in 2019 and in ), recurrent GI bleed without identified source (last in 10/2028, small EV, neg. capsule), SBP 2015 and multiple admissions for recurrent sepsis and abdominal pain without enough ascites for tap (2018) S. agalactiae bacteremia, 2017 Cx negative, 2017 E. coli, bacteremia 2017 with neg. EUS, 2016 E. coli bacteremia quinolone-resistant),  now admitted on 5/15 with generalized weakness, abdominal distention and jaundice in the setting of decompensated cirrhosis after relapse with one month of drinking after his mother .    Impression:  # Decompensated EtOH Cirrhosis with ascites - MELD-Na: 36 on 20  # Severe acute alcoholic hepatitis given > 2:1 AST to ALT ratio, however, differential includes acute-on-chronic liver failure due to underlying infection.  on 5/15/20.   - Started on steroids on 20. Day 5 Lille score 0.377; Day 7 Lille score 0.850 indicating poor response.  - Started on NAC protocol 20   HE: AOx3, no asterixis  Ascites: Small to moderate on CT A/P but could not be drained. H/o SBP, including quinolone-resistant E. coli in . On ciprofloxacin prophylaxis empirically during hospitalization. Ascites albumin not known.  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 Answered that he drank very heavily from age [age] with daily intake of 1 bottle of tequila, 1 bottle of whiskey and a 12-pack of beer. Then 8 years of abstinence, and from age ~39 beer on <1d per week (3-4 beers), now relapse with heavy drinking for 1 month after his mother  in [place] and he could not visit her before due to COVID crisis. Chart review showed multiple hospitalizations as above, saw Dr. Thakkar in the office. He did attend alcohol rehab for three years until three years ago, and answered that he knew that even his reduced alcohol intake might destroy his liver.  # Obesity, ~190 lbs, BMI 35. Max weight was 240 lbs around 2015, lost 50 lbs by dieting and some exercise.  # Social history: lives with gueritae of >20 years and their 24 and 28 yo sons. 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   # Transplant candidacy: severe alcoholic hepatitis with unfavorable Lille score has a mortality of ~30% in one month. He does not fulfill criteria for liver transplant at our center due to longstanding history of alcohol abuse with recurrent drinking after alcohol rehab. Case also discussed with transplant surgery, Dr. Morocho.    Recommendation:  - Continue N-acetylcysteine protocol (day 3/5): 100 mg per kilogram per day in 1000 ml of glucose solution.  - Bactrim PO for lifelong secondary SBP prophylaxis (h/o quinolone resistant E. coli)  - Transplant Surgery evaluation appreciated  - Daily CBC and CMP for MELD-Na score  - After correction of hypokalemia, patient can be discharged today or tomorrow from GI perspective. He can continue  mg PO x 5 tablets every 6 hours to complete 5 day course of NAC      Tina Tucker PGY-4  Gastroenterology Fellow  Pager #[phone] 44 M with vitiligo, alcohol abuse, obesity BMI 35, DM2, FELIX/SIMPSON cirrhosis, in 2018 reported to be on transplant list at [facility], h/o small ascites, esophageal varices (small in 2019 and in ), recurrent GI bleed without identified source (last in 10/2028, small EV, neg. capsule), SBP 2015 and multiple admissions for recurrent sepsis and abdominal pain without enough ascites for tap (2018) S. agalactiae bacteremia, 2017 Cx negative, 2017 E. coli, bacteremia 2017 with neg. EUS, 2016 E. coli bacteremia quinolone-resistant),  now admitted on 5/15 with generalized weakness, abdominal distention and jaundice in the setting of decompensated cirrhosis after relapse with one month of drinking after his mother .    Impression:  # Decompensated EtOH Cirrhosis with ascites - MELD-Na: 36 on 20  # Severe acute alcoholic hepatitis given > 2:1 AST to ALT ratio, however, differential includes acute-on-chronic liver failure due to underlying infection.  on 5/15/20.   - Started on steroids on 20. Day 5 Lille score 0.377; Day 7 Lille score 0.850 indicating poor response.  - Started on NAC protocol 20   HE: AOx3, no asterixis  Ascites: Small to moderate on CT A/P but could not be drained. H/o SBP, including quinolone-resistant E. coli in . On ciprofloxacin prophylaxis empirically during hospitalization. Ascites albumin not known.  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 Answered that he drank very heavily from age [age] with daily intake of 1 bottle of tequila, 1 bottle of whiskey and a 12-pack of beer. Then 8 years of abstinence, and from age ~39 beer on <1d per week (3-4 beers), now relapse with heavy drinking for 1 month after his mother  in [place] and he could not visit her before due to COVID crisis. Chart review showed multiple hospitalizations as above, saw Dr. Thakkar in the office. He did attend alcohol rehab for three years until three years ago, and answered that he knew that even his reduced alcohol intake might destroy his liver.  # Obesity, ~190 lbs, BMI 35. Max weight was 240 lbs around 2015, lost 50 lbs by dieting and some exercise.  # Social history: lives with dwain of >20 years and their 24 and 28 yo sons. 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   # Transplant candidacy: severe alcoholic hepatitis with unfavorable Lille score has a mortality of ~30% in one month. He does not fulfill criteria for liver transplant at our center due to longstanding history of alcohol abuse with recurrent drinking after alcohol rehab. Case also discussed with transplant surgery, Dr. Morocho.    Recommendation:  - Continue N-acetylcysteine protocol (day 3/5): 100 mg per kilogram per day in 1000 ml of glucose solution.  - Bactrim PO for lifelong secondary SBP prophylaxis (h/o quinolone resistant E. coli)  - Transplant Surgery evaluation appreciated  - Daily CBC and CMP for MELD-Na score  - After correction of hypokalemia, patient can be discharged today or tomorrow from GI perspective. He can continue  mg PO x 5 tablets every 6 hours to complete 5 day course of NAC  - needs f/u with Dr. Thakkar in our liver center within 1 week      Tina Tucker PGY-4  Gastroenterology Fellow  Pager #[phone]

## 2020-05-25 NOTE — PROGRESS NOTE ADULT - SUBJECTIVE AND OBJECTIVE BOX
PROGRESS NOTE:     Patient is a 44y old  Male who presents with a chief complaint of jaundice (24 May 2020 17:38)      SUBJECTIVE / OVERNIGHT EVENTS:  DUONG    Complains of a mild abdominal pain to chest from epigastric region to neck. Been persistent since yesterday afternoon. Mild in nature, like muscle soreness in quality.    Increasing abdominal distension.    ADDITIONAL REVIEW OF SYSTEMS:  No fevers, chest pain, shortness of breath, abdominal pain, lower extremity swelling or pain, dysuria, or constipation.    MEDICATIONS  (STANDING):  acetylcysteine IVPB 10 Gram(s) IV Intermittent <User Schedule>  dextrose 5%. 1000 milliLiter(s) (50 mL/Hr) IV Continuous <Continuous>  dextrose 50% Injectable 12.5 Gram(s) IV Push once  dextrose 50% Injectable 25 Gram(s) IV Push once  dextrose 50% Injectable 25 Gram(s) IV Push once  folic acid 1 milliGRAM(s) Oral daily  insulin glargine Injectable (LANTUS) 40 Unit(s) SubCutaneous at bedtime  insulin lispro (HumaLOG) corrective regimen sliding scale   SubCutaneous three times a day before meals  insulin lispro (HumaLOG) corrective regimen sliding scale   SubCutaneous at bedtime  insulin lispro Injectable (HumaLOG) 8 Unit(s) SubCutaneous three times a day before meals  multivitamin 1 Tablet(s) Oral daily  potassium chloride    Tablet ER 40 milliEquivalent(s) Oral once  potassium chloride  10 mEq/100 mL IVPB 10 milliEquivalent(s) IV Intermittent every 1 hour  thiamine 100 milliGRAM(s) Oral daily    MEDICATIONS  (PRN):  dextrose 40% Gel 15 Gram(s) Oral once PRN Blood Glucose LESS THAN 70 milliGRAM(s)/deciliter  glucagon  Injectable 1 milliGRAM(s) IntraMuscular once PRN Glucose LESS THAN 70 milligrams/deciliter      CAPILLARY BLOOD GLUCOSE      POCT Blood Glucose.: 185 mg/dL (24 May 2020 23:44)  POCT Blood Glucose.: 109 mg/dL (24 May 2020 21:09)  POCT Blood Glucose.: 73 mg/dL (24 May 2020 17:21)  POCT Blood Glucose.: 154 mg/dL (24 May 2020 13:07)  POCT Blood Glucose.: 239 mg/dL (24 May 2020 08:26)    I&O's Summary    24 May 2020 07:01  -  25 May 2020 07:00  --------------------------------------------------------  IN: 0 mL / OUT: 1925 mL / NET: -1925 mL        PHYSICAL EXAM:  Vital Signs Last 24 Hrs  T(C): 36.7 (25 May 2020 04:04), Max: 36.8 (24 May 2020 17:12)  T(F): 98.1 (25 May 2020 04:04), Max: 98.2 (24 May 2020 17:12)  HR: 96 (25 May 2020 04:04) (79 - 96)  BP: 108/57 (25 May 2020 04:04) (108/57 - 118/69)  BP(mean): --  RR: 18 (25 May 2020 04:04) (18 - 18)  SpO2: 94% (25 May 2020 04:04) (94% - 97%)    CONSTITUTIONAL: NAD, well-developed  ENT: Scleral icterus  CARDIOVASCULAR: Regular rate and rhythm. Normal S1 and S2. No murmurs.  RESPIRATORY: Normal respiratory effort, Lungs CTAB  EXTREMITIES: No CHASITY, peripheral pulses intact.  ABDOMEN: Nontender to palpation. Nondistended., normoactive bowel sounds, no rebound/guarding; No hepatosplenomegaly. +Fluid wave.  PSYCH: A+O to person, place, and time; affect appropriate  Skin: Vitiligo, jaundice. No rash over chest.  NEURO: Asterixis    LABS:                        8.4    8.22  )-----------( 41       ( 25 May 2020 06:47 )             25.2     05-25    129<L>  |  99  |  41<H>  ----------------------------<  165<H>  2.9<LL>   |  11<L>  |  1.92<H>    Ca    9.2      25 May 2020 06:46  Phos  3.4     05-25  Mg     1.6     05-25    TPro  5.2<L>  /  Alb  4.2  /  TBili  37.1<H>  /  DBili  x   /  AST  79<H>  /  ALT  49<H>  /  AlkPhos  70  05-25    PT/INR - ( 24 May 2020 09:32 )   PT: 30.4 sec;   INR: 2.57 ratio         PTT - ( 24 May 2020 09:32 )  PTT:42.1 sec            RADIOLOGY & ADDITIONAL TESTS:  Results Reviewed:  Hgb drop noted. Creatinine improving, fluctuant.  Imaging Personally Reviewed:  Electrocardiogram Personally Reviewed:    COORDINATION OF CARE:  Care Discussed with Consultants/Other Providers [Y/N]:  Prior or Outpatient Records Reviewed [Y/N]:

## 2020-05-25 NOTE — CONSULT NOTE ADULT - ATTENDING COMMENTS
Acute decompensated liver failure in the setting of chronic alcoholic cirrhosis with acute alcoholic hepatitis, SAM (likely due to hypovolemia vs. HRS), hypomagnesemia, hypophosphatemia, and hyponatremia.     - Hemodynamically stable, on room air with SpO2>95%  - Ammonia is elevated, although without evidence of hepatic encephalopathy at present. Close monitoring as he is at high risk. CIWA monitoring. Thiamine, MVI, folic acid  - IVF hydration as he appears volume depleted with history of not eating/drinking with associated NB/NB vomiting for 3 days  - Would start midodrine, octreotide, and albumin for possible hepatorenal syndrome  - Strict I/O's, consider indwelling urinary catheter to monitor kidney function closely. Check urine sodium  - Infectious workup  - Does not require ICU care at this time, please call back with questions. Discussed with ED team
seen and evaluated.   worsened liver failure in setting of EtOH cirrhosis 2/2 active drinking.  he has been in rehab centers before.  this etoh slip was in the setting of a family member dying.  we discussed the importance of resuming EtOH relapse prevention therapy.   though not a candidate currently for OLTx in setting of active EtOH while knowing of underlying cirrhosis, if he reestablishes care with a rehabilitation program and maintains sobriety, his candidacy can be re-evaluated.
43 yo M with latent TB, history of C diff (2016, 2017), DM2 (with last HbA1c 7.9% in 10/2019), history of alcoholic hepatitis, and decompensated ALD cirrhosis complicated by ascites, SBP, and variceal hemorrhage in the remote past, presenting with acute on chronic liver failure (ACLF) with recent onset of jaundice, coagulopathy, SAM, hyponatremia, and hyperammonemia, all in the context of recent alcohol relapse.    # ACLF: Suspicious for alcoholic hepatitis, but need to exclude infection as the precipitant. Afebrile and hemodynamically stable, but with relative leukocytosis compared to his baseline. COVID-19 PCR negative x1. Recommend urinalysis, urine culture, chest X-ray, and blood cultures x2. Abdomen appears distended on exam, but non-contrast CT with only minimal perihepatic ascites, likely insufficient for diagnostic paracentesis. Pending results of infectious work-up, recommend to start broad-spectrum antibiotics. If infectious work-up is negative after 48h, then we may consider corticosteroid therapy for probable AH.    # SAM: Baseline Cr 0.55 (10/2019), currently with Cr 3.82. Check PVR, urinalysis, and urine lytes. Recommend to start albumin 25% 100 mL iv q6h for rehydration. Hold diuretics and avoid nephrotoxic medications and IV contrast. Monitor strict I/Os. If oliguric, recommend Nephrology consultation.    # Hyponatremia: Suspect intravascular volume depletion. Recommend to start IV albumin, as above. Monitor BMP q6h x24h. Follow-up serum and urine osmolality and urine Na. Mentating normally, but if not improving with IV albumin then would consider Nephrology consultation for consideration of hypertonic saline pending lab results. Goal correction is 6-8 mEq/24h; avoid overcorrection.    # Hyperammonemia: No overt hepatic encephalopathy, but recommend starting rifaximin 550 mg po bid for prophylaxis.    # Alcohol relapse: Last drink 2 days ago. Recommend close monitoring for alcohol withdrawal and institution of CIWA protocol if necessary with lorazepam PRN, but strongly recommend to avoid any long-acting benzodiazepines such as chlordiazepoxide given his significantly impaired hepatic clearance.    # History of variceal hemorrhage: No recent overt bleeding. Not a candidate for beta-blocker prophylaxis at this time given ACLF with SAM and hyponatremia (risks outweigh benefits).    Please don't hesitate to call with any questions/concerns.    Agapito Ayala M.D., Ph.D.  Transplant Hepatology  Cell: [phone]
SAM, Hyponatremia- both improved with volume.
44 y.o. male with h/o uncontrolled Type 2 DM, ETOH abuse associated with cirrhosis presents with jaundice and weakness and given trial of steroids for liver disease with hyperglycemia.    1. Type 2 DM- Blood glucose target is 100 to 180. Will adjust basal bolus regimen to Lantus 50 units QHS and Humalog 12 units QAC since steroids have been discontinued. Will follow closely for insulin regimen adjustment. Upon discharge recommend follow up with endocrinology. Given liver disease and acidosis, not a candidate for Metformin. Will check c peptide and see if patient an candidate for DDP-IV inhibitor vs Prandin in addition to Lantus.     2. Abnormal TSH- Suspect low TSH related to steroids vs non thyroidal illness. Would repeat as outpatient.    Will follow

## 2020-05-25 NOTE — CONSULT NOTE ADULT - ASSESSMENT
45 yo M with PMH of latent TB, h/o C. diff (2016, 2017), T2DM (A1c 7.9% in 10/2019), h/o alcoholic hepatitis, and decompensated EtOH cirrhosis c/b ascites, SBP, and variceal hemorrhage in the remote past, presents with jaundice and weakness in setting of recent alcohol relapse, found to have SAM and hyponatremia, admitted for acute decompensated EtOH cirrhosis.          Recommendations:  At this time pt is not a candidate to be evaluated for liver transplant surgery in the setting of recent recurrent ETOH use.  We would like patient to re-enter into alcohol treatment program and follow up with the transplant clinic for possible  further evaluation and work up.

## 2020-05-25 NOTE — PROGRESS NOTE ADULT - PROBLEM SELECTOR PLAN 3
Worsening abdominal distension, receiving 1 L/day for acetylcysteine  -Start spironolactone 100 mg daily  -Start lasix 40 mg

## 2020-05-25 NOTE — PROGRESS NOTE ADULT - PROBLEM SELECTOR PLAN 1
Acute decompensated cirrhosis 2/2 recent alcohol abuse   -MELD 5/24 38, 5/23 36  - S/p prednisolone 40 mg oral daily (5/17 - 5/23) then lille score showed nonresponse. Now on NAC (5/23-5/27)  - S/p SBP ppx, s/p CTX (5/15-5/18) transitioned to cipro 500 mg BID (5/19 -  22)  - C/w rifaximin  - D/c'd albumin 5/22.  - F/u hepatology recs. Acute decompensated cirrhosis 2/2 recent alcohol abuse   -MELD TREND: 32 (5/25)< 38 (5/24), 36 (5/23)  - S/p prednisolone 40 mg oral daily (5/17 - 5/23) then lille score showed nonresponse. Now on NAC (5/23-5/27)  - S/p SBP ppx, s/p CTX (5/15-5/18) transitioned to cipro 500 mg BID (5/19 -  22)  - C/w rifaximin  - D/c'd albumin 5/22.  - F/u hepatology recs.

## 2020-05-25 NOTE — PROGRESS NOTE ADULT - SUBJECTIVE AND OBJECTIVE BOX
Chief Complaint:  Patient is a 44y old  Male who presents with a chief complaint of jaundice (25 May 2020 12:59)      Interval Events:     Had some chest discomfort relieved with inhaler yesterday. No chest pain, no reflux, no abdominal pain.    Allergies:  levofloxacin (Other (Moderate))      Hospital Medications:  acetylcysteine IVPB 10 Gram(s) IV Intermittent <User Schedule>  dextrose 40% Gel 15 Gram(s) Oral once PRN  dextrose 5%. 1000 milliLiter(s) IV Continuous <Continuous>  dextrose 50% Injectable 12.5 Gram(s) IV Push once  dextrose 50% Injectable 25 Gram(s) IV Push once  dextrose 50% Injectable 25 Gram(s) IV Push once  folic acid 1 milliGRAM(s) Oral daily  furosemide    Tablet 40 milliGRAM(s) Oral once  glucagon  Injectable 1 milliGRAM(s) IntraMuscular once PRN  insulin glargine Injectable (LANTUS) 40 Unit(s) SubCutaneous at bedtime  insulin lispro (HumaLOG) corrective regimen sliding scale   SubCutaneous three times a day before meals  insulin lispro (HumaLOG) corrective regimen sliding scale   SubCutaneous at bedtime  insulin lispro Injectable (HumaLOG) 8 Unit(s) SubCutaneous three times a day before meals  multivitamin 1 Tablet(s) Oral daily  pantoprazole    Tablet 40 milliGRAM(s) Oral daily  spironolactone 100 milliGRAM(s) Oral daily  thiamine 100 milliGRAM(s) Oral daily      PMHX/PSHX:  E. coli bacteremia  Vitiligo  Latent tuberculosis  Diabetes mellitus type 2, insulin dependent  Esophageal varices  Alcoholic cirrhosis  Status post corneal transplant  Alcoholic Cirrhosis  Esophageal Varices      Family history:  Family history of uterine cancer  Family history of diabetes mellitus      ROS:     General:  No weight loss, fevers, chills, night sweats, fatigue   Eyes:  No vision changes  ENT:  No sore throat, pain, runny nose  CV:  No chest pain, palpitations, dizziness   Resp:  No SOB, cough, wheezing  GI:  See HPI  :  No burning with urination, hematuria  Muscle:  No pain, weakness  Neuro:  No weakness/tingling, memory problems  Psych:  No fatigue, insomnia, mood problems, depression  Heme:  No easy bruisability  Skin:  No rash, edema      PHYSICAL EXAM:     GENERAL:  Well developed, no distress  HEENT:  Conjunctivae clear, sclera anicteric  CHEST:  Full & symmetric excursion, no increased effort w/ respirations  HEART:  Regular rhythm & rate  ABDOMEN:  Soft, non-tender, non-distended  EXTREMITIES:  no LE  edema  SKIN:  No rash/erythema/ecchymoses +vitiligo involving face, hands and abdomen  NEURO:  Alert, orientedx3    Vital Signs:  Vital Signs Last 24 Hrs  T(C): 36.8 (25 May 2020 11:44), Max: 36.8 (24 May 2020 17:12)  T(F): 98.3 (25 May 2020 11:44), Max: 98.3 (25 May 2020 11:44)  HR: 99 (25 May 2020 11:44) (79 - 99)  BP: 115/58 (25 May 2020 11:44) (108/57 - 118/69)  BP(mean): --  RR: 18 (25 May 2020 11:44) (18 - 18)  SpO2: 94% (25 May 2020 11:44) (94% - 97%)  Daily     Daily     LABS:                        8.4    8.22  )-----------( 41       ( 25 May 2020 06:47 )             25.2     05-25    129<L>  |  99  |  41<H>  ----------------------------<  165<H>  2.9<LL>   |  11<L>  |  1.92<H>    Ca    9.2      25 May 2020 06:46  Phos  3.4     05-25  Mg     1.6     05-25    TPro  5.2<L>  /  Alb  4.2  /  TBili  37.1<H>  /  DBili  x   /  AST  79<H>  /  ALT  49<H>  /  AlkPhos  70  05-25    LIVER FUNCTIONS - ( 25 May 2020 06:46 )  Alb: 4.2 g/dL / Pro: 5.2 g/dL / ALK PHOS: 70 U/L / ALT: 49 U/L / AST: 79 U/L / GGT: x           PT/INR - ( 25 May 2020 08:01 )   PT: 31.4 sec;   INR: 2.65 ratio    PTT - ( 25 May 2020 08:01 )  PTT:45.2 sec        Imaging:

## 2020-05-25 NOTE — CONSULT NOTE ADULT - SUBJECTIVE AND OBJECTIVE BOX
Chief Complaint:  Patient is a 44y old  Male who presents with a chief complaint of jaundice (25 May 2020 08:09)    E. coli bacteremia  Vitiligo  Latent tuberculosis  Diabetes mellitus type 2, insulin dependent  Esophageal varices  Alcoholic cirrhosis  Status post corneal transplant  Alcoholic Cirrhosis  Esophageal Varices     HPI:  45 yo M with PMH of latent TB, h/o C. diff (2016, 2017), T2DM (last HbA1c 7.9% in 10/2019), h/o alcoholic hepatitis, and decompensated EtOH cirrhosis c/b ascites, SBP, and variceal hemorrhage in the remote past, presents with jaundice and weakness x 2 days. Patient is a recovered alcoholic but states he started drinking again about 1 month ago because of mother passed away. He has been drinking 6-8 beers a day, states his last drink was 3 days ago. Endorses some abdominal distension but denies pain. Patient has noticed yellow skin and eyes for the last week. Also has decreased appetite and has not eaten much in the last 3 days, mostly taking in fluids. Endorses tremors last night but not currently. Denies visual/auditory hallucinations. Denies F/C/CP/SOB/cough. No N/V. Some diarrhea, last BM was 2 hours ago. Not sure if there is blood in stools. Last saw Dr. Thakkar (hepatology) several months ago.     In ED vitals: T97.9, HR 70, /62, RR 18, 96% on RA.  Labs notable for thrombocytopenia, elevated INR, elevated creatinine, hyponatremia, elevated lipase. CT A/P and CXR done.   Given: cefotetan 2g, famotidine 20 ivp, vit K 5mg IVP x1, albumin 25% 100 mL x1.   Seen by hepatology and MICU. (15 May 2020 20:02)    Transplant Surgery consulted to assess candidacy for liver transplant evaluation.  Mr. Hallman is a 45 y/o male w/ h/o cdiff,  decompensated ETOH cirrhosis c/b esophageal varices now admitted for alcoholic hepatitis. On this admission treated with steroids x 1 week w/ minimal improvement.  He is currently being treated with  NAC Has followed with hepatology Dr. Thakkar in remote past.  He states he has a history of alcoholism over an 8 year period and was sober for 5 years up until recently when his mother passed away in [place] and due to covid he couldn't go to see her. He states he has been drinking daily over the last 5 weeks.  He states he was in a ETOH rehab program for 3 years but stopped going some time ago. He lives with his long term fiance and has adult children.  He appears to have good insight as to why he began drinking again and the impact it has on his health.     	        Allergies  levofloxacin (Other (Moderate))      acetylcysteine IVPB 10 Gram(s) IV Intermittent <User Schedule>  dextrose 40% Gel 15 Gram(s) Oral once PRN  dextrose 5%. 1000 milliLiter(s) IV Continuous <Continuous>  dextrose 50% Injectable 12.5 Gram(s) IV Push once  dextrose 50% Injectable 25 Gram(s) IV Push once  dextrose 50% Injectable 25 Gram(s) IV Push once  folic acid 1 milliGRAM(s) Oral daily  furosemide    Tablet 40 milliGRAM(s) Oral once  glucagon  Injectable 1 milliGRAM(s) IntraMuscular once PRN  insulin glargine Injectable (LANTUS) 40 Unit(s) SubCutaneous at bedtime  insulin lispro (HumaLOG) corrective regimen sliding scale   SubCutaneous three times a day before meals  insulin lispro (HumaLOG) corrective regimen sliding scale   SubCutaneous at bedtime  insulin lispro Injectable (HumaLOG) 8 Unit(s) SubCutaneous three times a day before meals  multivitamin 1 Tablet(s) Oral daily  pantoprazole    Tablet 40 milliGRAM(s) Oral daily  spironolactone 100 milliGRAM(s) Oral daily  thiamine 100 milliGRAM(s) Oral daily        FAMILY HISTORY:  Family history of diabetes mellitus        Review of Systems:    General:  No wt loss, fevers, chills, night sweats, fatigue  Eyes:  Good vision, no reported pain  ENT:  No sore throat, pain, runny nose, dysphagia  CV:  No pain, palpitations, no lightheadedness  Resp:  No dyspnea, cough, tachypnea, wheezing  GI: .  :  No pain, bleeding, incontinence, nocturia  Muscle:  No pain, weakness  Neuro:  No weakness, tingling, memory problems  Psych:  No fatigue, insomnia, mood problems, depression  Endocrine:  No polyuria, polydypsia, cold/heat intolerance  Heme:  No petechiae, ecchymosis, easy bruisability  Skin:  No rash, tattoos, scars, edema    Relevant Family History:   n/c    Relevant Social History: n/c      Physical Exam:    Vital Signs:  Vital Signs Last 24 Hrs  T(C): 36.8 (25 May 2020 11:44), Max: 36.8 (24 May 2020 17:12)  T(F): 98.3 (25 May 2020 11:44), Max: 98.3 (25 May 2020 11:44)  HR: 99 (25 May 2020 11:44) (79 - 99)  BP: 115/58 (25 May 2020 11:44) (108/57 - 118/69)  BP(mean): --  RR: 18 (25 May 2020 11:44) (18 - 18)  SpO2: 94% (25 May 2020 11:44) (94% - 97%)  Daily     Daily     General:  Appears stated age, well-groomed, nad  HEENT:  NC/AT,  conjunctivae clear and pink, no thyromegaly, nodules, adenopathy, no JVD  Chest:  Full & symmetric excursion, no increased effort, breath sounds clear  Cardiovascular:  Regular rhythm, S1, S2, no murmur/rub/S3/S4, no abdominal bruit, no edema  Abdomen:  Soft, non-tender, non-distended, normoactive bowel sounds,  no masses ,no hepatosplenomeagaly, no signs of chronic liver disease  Extremities:  no cyanosis,clubbing or edema  Skin:  No rash/erythema/ecchymoses/petechiae/wounds/abscess/warm/dry  Neuro/Psych:  A&O  , no asterixis, no tremor, no encephalopathy    Laboratory:                            8.4    8.22  )-----------( 41       ( 25 May 2020 06:47 )             25.2     05-25    129<L>  |  99  |  41<H>  ----------------------------<  165<H>  2.9<LL>   |  11<L>  |  1.92<H>    Ca    9.2      25 May 2020 06:46  Phos  3.4     05-25  Mg     1.6     05-25    TPro  5.2<L>  /  Alb  4.2  /  TBili  37.1<H>  /  DBili  x   /  AST  79<H>  /  ALT  49<H>  /  AlkPhos  70  05-25    LIVER FUNCTIONS - ( 25 May 2020 06:46 )  Alb: 4.2 g/dL / Pro: 5.2 g/dL / ALK PHOS: 70 U/L / ALT: 49 U/L / AST: 79 U/L / GGT: x           PT/INR - ( 25 May 2020 08:01 )   PT: 31.4 sec;   INR: 2.65 ratio         PTT - ( 25 May 2020 08:01 )  PTT:45.2 sec      Imaging:

## 2020-05-25 NOTE — PROGRESS NOTE ADULT - PROBLEM SELECTOR PLAN 6
Lantus and lispro decreased  . last A1c 7.9 (10/2019)  - C/w Mod ISS and FS qid   - Lantus 40 U daily off steroids    - Lispro 8 U TID before meals off steroids  - Nutrition eval  - Apprec endo

## 2020-05-25 NOTE — PROVIDER CONTACT NOTE (CRITICAL VALUE NOTIFICATION) - SITUATION
Patient has a potassium level of 2.7
Bilirubin - 41.6
Co2:10
Critical value for glucose serum resulted as 464.
Potassium 2.9
Pt dx jaundice

## 2020-05-25 NOTE — PROGRESS NOTE ADULT - PROBLEM SELECTOR PLAN 7
Previously treated at [facility]  -Being treated for alcoholic hepatitis as above  -C/w thiamine and MVI  -C/w folate  -Outpt f/u with prior facility and PCP

## 2020-05-25 NOTE — PROGRESS NOTE ADULT - PROBLEM SELECTOR PLAN 2
Worsening off albumin. Hemodynamically mediated SAM given improvement with albumin and prerenal FENA  - Holding on albumin for now.  - Strict I's/O's.

## 2020-05-26 NOTE — PROGRESS NOTE ADULT - ASSESSMENT
45 yo M w/h/o uncontrolled T2DM (8.8%) on Levemir plus Metformin. DM c/b neuropathy and CKD. Also h/o latent TB, C. diff (2016, 2017), h/o alcoholic hepatitis, and decompensated EtOH cirrhosis c/b ascites, SBP, and variceal hemorrhage in the remote past, presents with jaundice and weakness x 2 days. Patient is a recovered alcoholic but states he started drinking again about 1 month ago because of mother passed away. Pt started on steroids for alcohol hepatitis with steroid induced hyperglycemia> now off steroids. Tolerating POs with glycemic control mostly at goal while on present insulin regimen. No hypoglycemia. Will c/w present insulin doses. Pt taking Levemir 80 units at home PTA. Requiring 50% dose while in hospital.   Abnormal TFT likely due to steroid therapy> needs to f/u as out pt.     Reviewed the following with pt:    -A1c LEVEL: Present and goal  -Blood glucose goals: 100s to 150s as out pt  -Glucose monitoring frequency: ac and hs  -Healthy eating and portion control. Review general PO intake. Pt eats large amounts of fruit > mainly at lunch time pt has a large fruit salad. Reviewed what are carbs and how to include fruits and rpotein on diet throughout the day.   -Insulin(s) action, time of administration and side effects. Levemir is not likely covering 24 hours even though pt has renal disease. Noted hs BG elevated> pt would benefit from split Levemir dosing  -Importance of follow up care.  Pt wants to f/u at endocrine practice  -Alcohol abuse and effect on DM and liver glucose metabolism. Pt states he had stopped drinking beer but when his mother passed he just went robles to drinking beer and got sick again. Pt stays he won't drink anymore.    Spent over 25 minutes providing face to face education in addition to assessing pt/chart/labs/meds and setting plan of care with primary team..

## 2020-05-26 NOTE — PROGRESS NOTE ADULT - PROBLEM SELECTOR PLAN 3
Worsening abdominal distension, receiving 1 L/day for acetylcysteine  -Start spironolactone 100 mg daily  -Start lasix 40 mg Improving abdominal distension, receiving 1 L/day for acetylcysteine  -Decr to spironolactone 50 mg daily  -Decr to lasix 20 mg

## 2020-05-26 NOTE — PROGRESS NOTE ADULT - SUBJECTIVE AND OBJECTIVE BOX
INCOMPLETE NOTE PROGRESS NOTE:     Patient is a 44y old  Male who presents with a chief complaint of jaundice (26 May 2020 08:36)      SUBJECTIVE / OVERNIGHT EVENTS:  DUONG    Chest pain and abdominal distension improved.    ADDITIONAL REVIEW OF SYSTEMS:  No fevers, chest pain, shortness of breath, abdominal pain, lower extremity swelling or pain, dysuria, or constipation.    MEDICATIONS  (STANDING):  acetylcysteine IVPB 10 Gram(s) IV Intermittent <User Schedule>  dextrose 5%. 1000 milliLiter(s) (50 mL/Hr) IV Continuous <Continuous>  dextrose 50% Injectable 12.5 Gram(s) IV Push once  dextrose 50% Injectable 25 Gram(s) IV Push once  dextrose 50% Injectable 25 Gram(s) IV Push once  folic acid 1 milliGRAM(s) Oral daily  furosemide    Tablet 20 milliGRAM(s) Oral daily  insulin glargine Injectable (LANTUS) 40 Unit(s) SubCutaneous at bedtime  insulin lispro (HumaLOG) corrective regimen sliding scale   SubCutaneous three times a day before meals  insulin lispro (HumaLOG) corrective regimen sliding scale   SubCutaneous at bedtime  insulin lispro Injectable (HumaLOG) 8 Unit(s) SubCutaneous three times a day before meals  multivitamin 1 Tablet(s) Oral daily  nadolol 20 milliGRAM(s) Oral daily  pantoprazole    Tablet 40 milliGRAM(s) Oral daily  spironolactone 50 milliGRAM(s) Oral daily  thiamine 100 milliGRAM(s) Oral daily  trimethoprim  160 mG/sulfamethoxazole 800 mG 1 Tablet(s) Oral daily    MEDICATIONS  (PRN):  dextrose 40% Gel 15 Gram(s) Oral once PRN Blood Glucose LESS THAN 70 milliGRAM(s)/deciliter  glucagon  Injectable 1 milliGRAM(s) IntraMuscular once PRN Glucose LESS THAN 70 milligrams/deciliter      CAPILLARY BLOOD GLUCOSE      POCT Blood Glucose.: 181 mg/dL (26 May 2020 08:22)  POCT Blood Glucose.: 220 mg/dL (25 May 2020 21:44)  POCT Blood Glucose.: 146 mg/dL (25 May 2020 16:51)  POCT Blood Glucose.: 134 mg/dL (25 May 2020 12:19)    I&O's Summary    25 May 2020 07:01  -  26 May 2020 07:00  --------------------------------------------------------  IN: 0 mL / OUT: 1250 mL / NET: -1250 mL    26 May 2020 07:01  -  26 May 2020 11:51  --------------------------------------------------------  IN: 240 mL / OUT: 1200 mL / NET: -960 mL        PHYSICAL EXAM:  Vital Signs Last 24 Hrs  T(C): 37.2 (26 May 2020 11:30), Max: 37.4 (25 May 2020 21:50)  T(F): 99 (26 May 2020 11:30), Max: 99.4 (25 May 2020 21:50)  HR: 105 (26 May 2020 11:30) (82 - 105)  BP: 132/68 (26 May 2020 11:30) (106/67 - 132/68)  BP(mean): --  RR: 18 (26 May 2020 11:30) (18 - 18)  SpO2: 92% (26 May 2020 11:30) (92% - 98%)    CONSTITUTIONAL: NAD, well-developed  CARDIOVASCULAR: Regular rate and rhythm. Normal S1 and S2. No murmurs.  RESPIRATORY: Normal respiratory effort, Lungs CTAB  EXTREMITIES: No CHASITY, peripheral pulses intact.  ABDOMEN: Nontender to palpation, normoactive bowel sounds, no rebound/guarding; No hepatosplenomegaly  MUSCLOSKELETAL: no clubbing or cyanosis of digits; no joint swelling or tenderness to palpation  PSYCH: A+O to person, place, and time; affect appropriate    LABS:                        8.7    8.86  )-----------( 42       ( 26 May 2020 07:10 )             25.9     05-26    131<L>  |  100  |  43<H>  ----------------------------<  199<H>  3.2<L>   |  12<L>  |  2.03<H>    Ca    8.7      26 May 2020 07:09  Phos  2.9     05-26  Mg     1.6     05-26    TPro  5.3<L>  /  Alb  3.9  /  TBili  39.7<H>  /  DBili  x   /  AST  74<H>  /  ALT  50<H>  /  AlkPhos  72  05-26    PT/INR - ( 26 May 2020 07:43 )   PT: 30.9 sec;   INR: 2.63 ratio         PTT - ( 26 May 2020 07:43 )  PTT:49.8 sec            RADIOLOGY & ADDITIONAL TESTS:  Results Reviewed: Hgb 8.7. Plt 42 < 42. Hypokalemic.   Imaging Personally Reviewed:  Electrocardiogram Personally Reviewed:    COORDINATION OF CARE:  Care Discussed with Consultants/Other Providers [Y/N]:  Prior or Outpatient Records Reviewed [Y/N]:

## 2020-05-26 NOTE — PROGRESS NOTE ADULT - PROBLEM SELECTOR PLAN 1
Acute decompensated cirrhosis 2/2 recent alcohol abuse   -MELD TREND: 32 (5/25)< 38 (5/24), 36 (5/23)  - S/p prednisolone 40 mg oral daily (5/17 - 5/23) then lille score showed nonresponse. Now on NAC (5/23-5/27)  - S/p SBP ppx, s/p CTX (5/15-5/18) transitioned to cipro 500 mg BID (5/19 -  22)  - C/w rifaximin  - D/c'd albumin 5/22.  - F/u hepatology recs. Acute decompensated cirrhosis 2/2 recent alcohol abuse   -MELD TREND: 32 (5/25)< 38 (5/24), 36 (5/23)  - S/p prednisolone 40 mg oral daily (5/17 - 5/23) then lille score showed nonresponse. Now on NAC (5/23-5/27)  - S/p SBP ppx, s/p CTX (5/15-5/18) transitioned to cipro 500 mg BID (5/19 -  22) and now on indefinite bactrim DS for SBP ppx given hx of flouroquinolone resistant peritonitis.  - C/w rifaximin  - D/c'd albumin 5/22.  - F/u hepatology recs.

## 2020-05-26 NOTE — PROGRESS NOTE ADULT - SUBJECTIVE AND OBJECTIVE BOX
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 Also h/o latent TB, C. diff (2016, 2017), h/o alcoholic hepatitis, and decompensated EtOH cirrhosis c/b ascites, SBP, and variceal hemorrhage in the remote past, presents with jaundice and weakness x 2 days. Patient is a recovered alcoholic but states he started drinking again about 1 month ago because of mother passed away. Pt started on steroids for alcohol hepatitis with steroid induced hyperglycemia> now off steroids. Pt reports feeling better. Reports eating >50% of meals without any GI symptoms except for abdominal distention due to ascites. Glycemic control mostly at goal while on present insulin regimen. No hypoglycemia       Review of Systems:  General: As above  Cardiovascular: No chest pain, palpitations  Respiratory: No SOB, no cough  GI: No nausea, vomiting, abdominal pain  Endocrine: no polyuria, polydipsia or S&Sx of hypoglycemia    Allergies    levofloxacin (Other (Moderate))    Intolerances      MEDICATIONS:  insulin glargine Injectable (LANTUS) 40 Unit(s) SubCutaneous at bedtime  insulin lispro (HumaLOG) corrective regimen sliding scale   SubCutaneous three times a day before meals  insulin lispro (HumaLOG) corrective regimen sliding scale   SubCutaneous at bedtime  insulin lispro Injectable (HumaLOG) 8 Unit(s) SubCutaneous three times a day before meals  trimethoprim  160 mG/sulfamethoxazole 800 mG 1 Tablet(s) Oral daily      PHYSICAL EXAM:  VITALS: T(C): 37.2 (05-26-20 @ 11:30)  T(F): 99 (05-26-20 @ 11:30), Max: 99.4 (05-25-20 @ 21:50)  HR: 105 (05-26-20 @ 11:30) (82 - 105)  BP: 132/68 (05-26-20 @ 11:30) (106/67 - 132/68)  RR:  (18 - 18)  SpO2:  (92% - 98%)  Wt(kg): --  GENERAL: Male laying in bed in NAD  Abdomen: Soft, nontender,  distended> +ascites  Extremities: Warm, no edema in all 4 exts  NEURO: A&O X3. Encounter done in [place]  Skin: + Vitiligo     LABS:  POCT Blood Glucose.: 135 mg/dL (05-26-20 @ 12:28)  POCT Blood Glucose.: 181 mg/dL (05-26-20 @ 08:22)  POCT Blood Glucose.: 220 mg/dL (05-25-20 @ 21:44)  POCT Blood Glucose.: 146 mg/dL (05-25-20 @ 16:51)  POCT Blood Glucose.: 134 mg/dL (05-25-20 @ 12:19)  POCT Blood Glucose.: 156 mg/dL (05-25-20 @ 08:14)  POCT Blood Glucose.: 185 mg/dL (05-24-20 @ 23:44)  POCT Blood Glucose.: 109 mg/dL (05-24-20 @ 21:09)  POCT Blood Glucose.: 73 mg/dL (05-24-20 @ 17:21)  POCT Blood Glucose.: 154 mg/dL (05-24-20 @ 13:07)  POCT Blood Glucose.: 239 mg/dL (05-24-20 @ 08:26)  POCT Blood Glucose.: 302 mg/dL (05-23-20 @ 21:30)  POCT Blood Glucose.: 249 mg/dL (05-23-20 @ 16:58)  POCT Blood Glucose.: 172 mg/dL (05-23-20 @ 14:26)                            8.7    8.86  )-----------( 42       ( 26 May 2020 07:10 )             25.9       05-26    131<L>  |  100  |  43<H>  ----------------------------<  199<H>  3.2<L>   |  12<L>  |  2.03<H>      EGFR if non : 38<L>    Ca    8.7      05-26  Mg     1.6     05-26  Phos  2.9     05-26    TPro  5.3<L>  /  Alb  3.9  /  TBili  39.7<H>  /  DBili  x   /  AST  74<H>  /  ALT  50<H>  /  AlkPhos  72  05-26      Thyroid Function Tests:  05-19 @ 08:53 TSH 0.26 FreeT4 -- T3 -- Anti TPO -- Anti Thyroglobulin Ab -- TSI --      A1C with Estimated Average Glucose Result: 8.8 % (05-16-20 @ 13:14)      Estimated Average Glucose: 206 mg/dL (05-16-20 @ 13:14)    C-Peptide, Serum: 5.1 ng/mL (05-24-20 @ 09:32) with BG  258 which means pt is insulin resistant

## 2020-05-26 NOTE — PROGRESS NOTE ADULT - PROBLEM SELECTOR PLAN 1
-Test BG ac and hs'  - c/w Lantus 40 units qhs   - C/w Humalog 8 units ac meals  -C/w Humalog moderate correction scale premeal and qhs    -Can f/u  at Endocrine follow up at  [address] [facility] [address] [place] [address] [phone]. Please make apt prior discharge.   Patient to be discharged on basal and po med. C- peptide is elevated due to insulin resistance. Will split Levemir to bid to have better adsorption and improve overall control (Levemir 20 bid)  -Add Prandin 1mg ac meals. HOD IF NOT EATING.  -Discontinue Metformin due to ETOH hx and worsening  renal function plus worsening cirrhosis  -Patient needs to schedule ophthalmology and podiatry visit  -Plan discussed with pt/team.  Contact info: [phone] (24/7). pager [phone]

## 2020-05-26 NOTE — PROGRESS NOTE ADULT - SUBJECTIVE AND OBJECTIVE BOX
Chief Complaint:  Patient is a 44y old  Male who presents with a chief complaint of jaundice (26 May 2020 06:37)      Interval Events: No new events. Pt feels well overall. Denies abdominal pain, n/v. Does complains of decreased appetite but is tolerating po. Pt ambulating well.    Allergies:  levofloxacin (Other (Moderate))      Hospital Medications:  acetylcysteine IVPB 10 Gram(s) IV Intermittent <User Schedule>  dextrose 40% Gel 15 Gram(s) Oral once PRN  dextrose 5%. 1000 milliLiter(s) IV Continuous <Continuous>  dextrose 50% Injectable 12.5 Gram(s) IV Push once  dextrose 50% Injectable 25 Gram(s) IV Push once  dextrose 50% Injectable 25 Gram(s) IV Push once  folic acid 1 milliGRAM(s) Oral daily  glucagon  Injectable 1 milliGRAM(s) IntraMuscular once PRN  insulin glargine Injectable (LANTUS) 40 Unit(s) SubCutaneous at bedtime  insulin lispro (HumaLOG) corrective regimen sliding scale   SubCutaneous three times a day before meals  insulin lispro (HumaLOG) corrective regimen sliding scale   SubCutaneous at bedtime  insulin lispro Injectable (HumaLOG) 8 Unit(s) SubCutaneous three times a day before meals  multivitamin 1 Tablet(s) Oral daily  pantoprazole    Tablet 40 milliGRAM(s) Oral daily  spironolactone 100 milliGRAM(s) Oral daily  thiamine 100 milliGRAM(s) Oral daily      PMHX/PSHX:  E. coli bacteremia  Vitiligo  Latent tuberculosis  Diabetes mellitus type 2, insulin dependent  Esophageal varices  Alcoholic cirrhosis  Status post corneal transplant  Alcoholic Cirrhosis  Esophageal Varices      Family history:  Family history of uterine cancer  Family history of diabetes mellitus      ROS:     General:  No wt loss, fevers, chills, night sweats, fatigue,   Eyes:  Good vision, no reported pain  ENT:  No sore throat, pain, runny nose, dysphagia  CV:  No pain, palpitations, hypo/hypertension  Resp:  No dyspnea, cough, tachypnea, wheezing  GI:  See HPI  :  No pain, bleeding, incontinence, nocturia  Muscle:  No pain, weakness  Neuro:  No weakness, tingling, memory problems  Psych:  No fatigue, insomnia, mood problems, depression  Endocrine:  No polyuria, polydipsia, cold/heat intolerance  Heme:  No petechiae, ecchymosis, easy bruisability  Skin:  No rash, edema      PHYSICAL EXAM:     Vital Signs:  Vital Signs Last 24 Hrs  T(C): 36.8 (26 May 2020 04:52), Max: 37.4 (25 May 2020 21:50)  T(F): 98.2 (26 May 2020 04:52), Max: 99.4 (25 May 2020 21:50)  HR: 82 (26 May 2020 04:52) (82 - 99)  BP: 106/67 (26 May 2020 06:18) (106/67 - 120/67)  BP(mean): --  RR: 18 (26 May 2020 04:52) (18 - 18)  SpO2: 98% (26 May 2020 04:52) (94% - 98%)  Daily     Daily     GENERAL:  Well developed, no distress  HEENT:  Conjunctivae clear, +scleral icterus  CHEST:  Full & symmetric excursion, no increased effort w/ respirations  HEART:  Regular rhythm & rate  ABDOMEN:  obese, Soft, non-tender, non-distended  EXTREMITIES:  no edema  SKIN:  No rash/erythema/ecchymoses +vitiligo involving face, hands and abdomen, jaundice  NEURO:  Alert, orientedx3, negative asterixis    LABS:                        8.7    8.86  )-----------( 42       ( 26 May 2020 07:10 )             25.9     05-26    131<L>  |  100  |  43<H>  ----------------------------<  199<H>  3.2<L>   |  12<L>  |  2.03<H>    Ca    8.7      26 May 2020 07:09  Phos  2.9     05-26  Mg     1.6     05-26    TPro  5.3<L>  /  Alb  3.9  /  TBili  39.7<H>  /  DBili  x   /  AST  74<H>  /  ALT  50<H>  /  AlkPhos  72  05-26    LIVER FUNCTIONS - ( 26 May 2020 07:09 )  Alb: 3.9 g/dL / Pro: 5.3 g/dL / ALK PHOS: 72 U/L / ALT: 50 U/L / AST: 74 U/L / GGT: x           PT/INR - ( 25 May 2020 08:01 )   PT: 31.4 sec;   INR: 2.65 ratio         PTT - ( 25 May 2020 08:01 )  PTT:45.2 sec        Imaging: Chief Complaint:  Patient is a 44y old  Male who presents with a chief complaint of jaundice (26 May 2020 06:37)      Interval Events: No new events. Pt feels well overall. Denies abdominal pain, n/v. Does complains of decreased appetite but is tolerating po. Pt ambulating well.    ROS: Additional 10-point ROS reviewed with patient and negative except as above.    Allergies:  levofloxacin (Other (Moderate))    Hospital Medications:  acetylcysteine IVPB 10 Gram(s) IV Intermittent <User Schedule>  dextrose 40% Gel 15 Gram(s) Oral once PRN  dextrose 5%. 1000 milliLiter(s) IV Continuous <Continuous>  dextrose 50% Injectable 12.5 Gram(s) IV Push once  dextrose 50% Injectable 25 Gram(s) IV Push once  dextrose 50% Injectable 25 Gram(s) IV Push once  folic acid 1 milliGRAM(s) Oral daily  glucagon  Injectable 1 milliGRAM(s) IntraMuscular once PRN  insulin glargine Injectable (LANTUS) 40 Unit(s) SubCutaneous at bedtime  insulin lispro (HumaLOG) corrective regimen sliding scale   SubCutaneous three times a day before meals  insulin lispro (HumaLOG) corrective regimen sliding scale   SubCutaneous at bedtime  insulin lispro Injectable (HumaLOG) 8 Unit(s) SubCutaneous three times a day before meals  multivitamin 1 Tablet(s) Oral daily  pantoprazole    Tablet 40 milliGRAM(s) Oral daily  spironolactone 100 milliGRAM(s) Oral daily  thiamine 100 milliGRAM(s) Oral daily      PMHX/PSHX:  E. coli bacteremia  Vitiligo  Latent tuberculosis  Diabetes mellitus type 2, insulin dependent  Esophageal varices  Alcoholic cirrhosis  Status post corneal transplant  Alcoholic Cirrhosis  Esophageal Varices      Family history:  Family history of uterine cancer  Family history of diabetes mellitus    PHYSICAL EXAM:     Vital Signs:  Vital Signs Last 24 Hrs  T(C): 36.8 (26 May 2020 04:52), Max: 37.4 (25 May 2020 21:50)  T(F): 98.2 (26 May 2020 04:52), Max: 99.4 (25 May 2020 21:50)  HR: 82 (26 May 2020 04:52) (82 - 99)  BP: 106/67 (26 May 2020 06:18) (106/67 - 120/67)  BP(mean): --  RR: 18 (26 May 2020 04:52) (18 - 18)  SpO2: 98% (26 May 2020 04:52) (94% - 98%)  Daily     Daily     GENERAL:  Well developed, no distress  HEENT:  Conjunctivae clear, +scleral icterus  CHEST:  Full & symmetric excursion, no increased effort w/ respirations  HEART:  Regular rhythm & rate  ABDOMEN:  obese, Soft, non-tender, non-distended  EXTREMITIES:  no edema  SKIN:  No rash/erythema/ecchymoses +vitiligo involving face, hands and abdomen, jaundice  NEURO:  Alert, orientedx3, negative asterixis    LABS:                        8.7    8.86  )-----------( 42       ( 26 May 2020 07:10 )             25.9     05-26    131<L>  |  100  |  43<H>  ----------------------------<  199<H>  3.2<L>   |  12<L>  |  2.03<H>    Ca    8.7      26 May 2020 07:09  Phos  2.9     05-26  Mg     1.6     05-26    TPro  5.3<L>  /  Alb  3.9  /  TBili  39.7<H>  /  DBili  x   /  AST  74<H>  /  ALT  50<H>  /  AlkPhos  72  05-26    LIVER FUNCTIONS - ( 26 May 2020 07:09 )  Alb: 3.9 g/dL / Pro: 5.3 g/dL / ALK PHOS: 72 U/L / ALT: 50 U/L / AST: 74 U/L / GGT: x           PT/INR - ( 25 May 2020 08:01 )   PT: 31.4 sec;   INR: 2.65 ratio         PTT - ( 25 May 2020 08:01 )  PTT:45.2 sec        Imaging:

## 2020-05-26 NOTE — PROGRESS NOTE ADULT - PROBLEM SELECTOR PLAN 7
Previously treated at [facility]  -Being treated for alcoholic hepatitis as above  -C/w thiamine and MVI  -C/w folate  -Outpt f/u with prior facility and PCP

## 2020-05-27 NOTE — PROGRESS NOTE ADULT - SUBJECTIVE AND OBJECTIVE BOX
Chief Complaint:  Patient is a 44y old  Male who presents with a chief complaint of jaundice (26 May 2020 14:02)      Interval Events: C/o decreased appetite. Otherwise, denies n/v, abdominal pain, fever or chills. Ambulating well. Tolerating po diet.    Allergies:  levofloxacin (Other (Moderate))      Hospital Medications:  acetylcysteine IVPB 10 Gram(s) IV Intermittent <User Schedule>  dextrose 40% Gel 15 Gram(s) Oral once PRN  dextrose 5%. 1000 milliLiter(s) IV Continuous <Continuous>  dextrose 50% Injectable 12.5 Gram(s) IV Push once  dextrose 50% Injectable 25 Gram(s) IV Push once  dextrose 50% Injectable 25 Gram(s) IV Push once  folic acid 1 milliGRAM(s) Oral daily  glucagon  Injectable 1 milliGRAM(s) IntraMuscular once PRN  insulin glargine Injectable (LANTUS) 20 Unit(s) SubCutaneous <User Schedule>  insulin lispro (HumaLOG) corrective regimen sliding scale   SubCutaneous three times a day before meals  insulin lispro (HumaLOG) corrective regimen sliding scale   SubCutaneous at bedtime  insulin lispro Injectable (HumaLOG) 8 Unit(s) SubCutaneous three times a day before meals  multivitamin 1 Tablet(s) Oral daily  pantoprazole    Tablet 40 milliGRAM(s) Oral daily  repaglinide 1 milliGRAM(s) Oral three times a day  thiamine 100 milliGRAM(s) Oral daily  trimethoprim  160 mG/sulfamethoxazole 800 mG 1 Tablet(s) Oral daily      PMHX/PSHX:  E. coli bacteremia  Vitiligo  Latent tuberculosis  Diabetes mellitus type 2, insulin dependent  Esophageal varices  Alcoholic cirrhosis  Status post corneal transplant  Alcoholic Cirrhosis  Esophageal Varices      Family history:  Family history of uterine cancer  Family history of diabetes mellitus      ROS:     General:  No wt loss, fevers, chills, night sweats, fatigue,   Eyes:  Good vision, no reported pain  ENT:  No sore throat, pain, runny nose, dysphagia  CV:  No pain, palpitations, hypo/hypertension  Resp:  No dyspnea, cough, tachypnea, wheezing  GI:  See HPI  :  No pain, bleeding, incontinence, nocturia  Muscle:  No pain, weakness  Neuro:  No weakness, tingling, memory problems  Psych:  No fatigue, insomnia, mood problems, depression  Endocrine:  No polyuria, polydipsia, cold/heat intolerance  Heme:  No petechiae, ecchymosis, easy bruisability  Skin:  No rash, edema      PHYSICAL EXAM:     Vital Signs:  Vital Signs Last 24 Hrs  T(C): 37 (27 May 2020 04:25), Max: 37.5 (26 May 2020 21:33)  T(F): 98.6 (27 May 2020 04:25), Max: 99.5 (26 May 2020 21:33)  HR: 76 (27 May 2020 05:57) (76 - 105)  BP: 108/56 (27 May 2020 05:57) (99/55 - 132/68)  BP(mean): --  RR: 18 (27 May 2020 04:25) (18 - 18)  SpO2: 96% (27 May 2020 04:25) (92% - 96%)  Daily     Daily     GENERAL:  Well developed, no distress  HEENT: +scleral icterus  CHEST:  Full & symmetric excursion, no increased effort w/ respirations  HEART:  Regular rhythm & rate  ABDOMEN:  obese, Soft, non-tender, non-distended  EXTREMITIES:  no edema  SKIN:  +vitiligo involving face, hands and abdomen, jaundice  NEURO:  Alert, orientedx3, negative asterixis    LABS:                        8.7    8.73  )-----------( 45       ( 27 May 2020 06:14 )             26.0     05-27    133<L>  |  102  |  44<H>  ----------------------------<  137<H>  3.2<L>   |  11<L>  |  1.94<H>    Ca    9.1      27 May 2020 06:14  Phos  2.8     05-27  Mg     1.5     05-27    TPro  5.3<L>  /  Alb  4.1  /  TBili  42.6  /  DBili  x   /  AST  76<H>  /  ALT  52<H>  /  AlkPhos  74  05-27    LIVER FUNCTIONS - ( 27 May 2020 06:14 )  Alb: 4.1 g/dL / Pro: 5.3 g/dL / ALK PHOS: 74 U/L / ALT: 52 U/L / AST: 76 U/L / GGT: x           PT/INR - ( 26 May 2020 07:43 )   PT: 30.9 sec;   INR: 2.63 ratio         PTT - ( 26 May 2020 07:43 )  PTT:49.8 sec        Imaging:

## 2020-05-27 NOTE — PROGRESS NOTE ADULT - ASSESSMENT
44 year old male with history of cirrhosis 2/2 EtOH, IDDM, Latent TB, who presented to the hospital on 5/15 for decompensated liver cirrhosis due to recent alcohol use, jaundice and scleral icterus. Nephrology consulted for management of SAM and hyponatremia. Baseline creatinine 0.5-0.6, and baseline normal sodium.     Problem/Recommendation - 1:  Problem: SAM (acute kidney injury). Recommendation: Patient with hemodynamically mediated SAM likely in the setting of poor PO intake, nausea/vomiting and diarrhea after recent alcohol use. Creatinine noted to be plateaued at 1.9 and noted bilirubin to be ~43. Would resend UA to assess for bilirubin again as there was large bilirubin in previous UA. Patient also noted to be started on Bactrim for SBP ppx for resistent E.Coli and this will likely "raise" creatinine due to impaired secretion of creatinine. Will continue to monitor.  Patient will likely need to be put back on a diuretic for discharge however can continue to hold. Avoid nephrotoxic agents, renally dose all medications, and avoid hypotensive episodes.     Problem/Recommendation - 2:  ·  Problem: Hyponatremia.  Recommendation: Patient with chronic asymptomatic hypoosmolar? hypovolemic/euvolemic hyponatremia in the setting of vomiting and diarrhea and alcohol use. Patient with adequate fluid resuscitation now with albumin and sodium trending up. Can hold further resuscitation for now. Continue to monitor sodium with adequate PO intake with salt and protein. Fluid restrict to 1L/day of total intake. Continue to monitor sodium daily.    Metabolic Acidosis  - Patient with CO2 of 11 with anion gap  - Recheck lactate, BHB  - No need to replete for now, continue to monitor

## 2020-05-27 NOTE — PROGRESS NOTE ADULT - SUBJECTIVE AND OBJECTIVE BOX
[facility] Division of Kidney Diseases & Hypertension  FOLLOW UP NOTE  [phone]--------------------------------------------------------------------------------  Chief Complaint:Jaundice      24 hour events/subjective: No acute events overnight. Labs, vitals, medications and imaging reviewed. Vital signs stable. Labs significant for CO2 of 11 and potassium of 3.2. Creatinine stable at ~1.9. Noted patient started on Bactrim for SBP ppx and resistant E.Coli.        PAST HISTORY  --------------------------------------------------------------------------------  No significant changes to PMH, PSH, FHx, SHx, unless otherwise noted    ALLERGIES & MEDICATIONS  --------------------------------------------------------------------------------  Allergies    levofloxacin (Other (Moderate))    Intolerances      Standing Inpatient Medications  acetylcysteine IVPB 10 Gram(s) IV Intermittent <User Schedule>  folic acid 1 milliGRAM(s) Oral daily  insulin glargine Injectable (LANTUS) 20 Unit(s) SubCutaneous <User Schedule>  insulin lispro (HumaLOG) corrective regimen sliding scale   SubCutaneous three times a day before meals  insulin lispro (HumaLOG) corrective regimen sliding scale   SubCutaneous at bedtime  insulin lispro Injectable (HumaLOG) 8 Unit(s) SubCutaneous three times a day before meals  multivitamin 1 Tablet(s) Oral daily  pantoprazole    Tablet 40 milliGRAM(s) Oral daily  potassium chloride    Tablet ER 40 milliEquivalent(s) Oral once  repaglinide 1 milliGRAM(s) Oral three times a day  thiamine 100 milliGRAM(s) Oral daily  trimethoprim  160 mG/sulfamethoxazole 800 mG 1 Tablet(s) Oral daily    PRN Inpatient Medications  dextrose 40% Gel 15 Gram(s) Oral once PRN  glucagon  Injectable 1 milliGRAM(s) IntraMuscular once PRN      REVIEW OF SYSTEMS  --------------------------------------------------------------------------------  Gen: No  fevers/chills  Skin: No rashes  Head/Eyes/Ears/Mouth: No headache; Normal hearing; Normal vision w/o blurriness  Respiratory: No dyspnea, cough, wheezing, hemoptysis  CV: No chest pain, PND, orthopnea  GI: No abdominal pain, diarrhea, constipation, nausea, vomiting  : No increased frequency, dysuria, hematuria, nocturia  MSK: No joint pain/swelling; no back pain; no edema  Neuro: No dizziness/lightheadedness, weakness, seizures, numbness, tingling  Psych: Non-focal      All other systems were reviewed and are negative, except as noted.    VITALS/PHYSICAL EXAM  --------------------------------------------------------------------------------  T(C): 37 (05-27-20 @ 04:25), Max: 37.5 (05-26-20 @ 21:33)  HR: 76 (05-27-20 @ 05:57) (76 - 105)  BP: 108/56 (05-27-20 @ 05:57) (99/55 - 132/68)  RR: 18 (05-27-20 @ 04:25) (18 - 18)  SpO2: 96% (05-27-20 @ 04:25) (92% - 96%)  Wt(kg): --        05-26-20 @ 07:01  -  05-27-20 @ 07:00  --------------------------------------------------------  IN: 480 mL / OUT: 4225 mL / NET: -3745 mL    05-27-20 @ 07:01  -  05-27-20 @ 11:25  --------------------------------------------------------  IN: 0 mL / OUT: 800 mL / NET: -800 mL      Physical Exam:  	Gen: NAD, appears lethargic  	HEENT: Scleral icterus  	Pulm: CTA B/L  	CV:  S1S2  	Abd: +BS, soft   	Ext: No B/L Lower ext edema  	Neuro: No focal deficits  	Skin: Jaundice,              Psych: Non-focal  	Vascular: No cyanosis    LABS/STUDIES  --------------------------------------------------------------------------------              8.7    8.73  >-----------<  45       [05-27-20 @ 06:14]              26.0     133  |  102  |  44  ----------------------------<  137      [05-27-20 @ 06:14]  3.2   |  11  |  1.94        Ca     9.1     [05-27-20 @ 06:14]      Mg     1.5     [05-27-20 @ 06:14]      Phos  2.8     [05-27-20 @ 06:14]    TPro  5.3  /  Alb  4.1  /  TBili  42.6  /  DBili  x   /  AST  76  /  ALT  52  /  AlkPhos  74  [05-27-20 @ 06:14]    PT/INR: PT 30.2 , INR 2.57       [05-27-20 @ 08:13]  PTT: 45.4       [05-27-20 @ 08:13]      Creatinine Trend:  SCr 1.94 [05-27 @ 06:14]  SCr 2.03 [05-26 @ 07:09]  SCr 1.95 [05-25 @ 16:35]  SCr 1.92 [05-25 @ 06:46]  SCr 2.00 [05-24 @ 06:10]      Urine Creatinine 88      [05-22-20 @ 12:18]  Urine Sodium <35      [05-22-20 @ 12:18]  Urine Urea Nitrogen 602      [05-22-20 @ 16:27]  Urine Potassium 34      [05-22-20 @ 12:18]  Urine Chloride <35      [05-22-20 @ 12:18]      HBsAb Nonreact      [05-21-20 @ 00:40]  HBsAg Nonreact      [05-21-20 @ 00:40]  HBcAb Nonreact      [05-20-20 @ 22:39]

## 2020-05-27 NOTE — DISCHARGE NOTE NURSING/CASE MANAGEMENT/SOCIAL WORK - PATIENT PORTAL LINK FT
You can access the FollowMyHealth Patient Portal offered by [facility] by registering at the following website: [email or web address]. By joining [email or web address]’s FollowMyHealth portal, you will also be able to view your health information using other applications (apps) compatible with our system.

## 2020-05-27 NOTE — PROGRESS NOTE ADULT - PROBLEM SELECTOR PLAN 6
Lantus and lispro decreased  . last A1c 7.9 (10/2019)  - C/w Mod ISS and FS qid   - Lantus 40 U daily off steroids    - Lispro 8 U TID before meals off steroids  - Nutrition eval  - Apprec endo Lantus and lispro decreased  . last A1c 7.9 (10/2019)  - C/w Mod ISS and FS qid   - C/w lantus 20 U BID   - Lispro 8 U TID before meals off steroids  - Nutrition eval  - D/c on lantus 20 u BID and prandin 1 mg TID before meals.

## 2020-05-27 NOTE — PROGRESS NOTE ADULT - ATTENDING COMMENTS
Hepatology Staff: Lula Rojas MD    I saw and examined the patient along with  Dr. Kumar 05-20-20 @ 10:14.    Patient Medical Record, hosptial course was reviewed and summarized as below:    Vitals: Vital Signs Last 24 Hrs  T(C): 36.9 (20 May 2020 05:09), Max: 36.9 (20 May 2020 05:09)  T(F): 98.4 (20 May 2020 05:09), Max: 98.4 (20 May 2020 05:09)  HR: 83 (20 May 2020 05:09) (74 - 85)  BP: 117/67 (20 May 2020 05:09) (111/71 - 131/78)    RR: 19 (20 May 2020 05:09) (18 - 20)  SpO2: 93% (20 May 2020 05:09) (93% - 98%)  Medications:  IV Fluids: albumin human 25% IVPB 100 milliLiter(s) IV Intermittent every 6 hours    Labs:Bilirubin Total, Serum: 41.6 mg/dL (05-20-20 @ 06:17)  Creatinine, Serum: 2.19 mg/dL (05-20-20 @ 06:17)  Creatinine, Serum: 2.25 mg/dL (05-19-20 @ 19:01)    I/O: I&O's Summary    19 May 2020 07:01  -  20 May 2020 07:00  --------------------------------------------------------  IN: 240 mL / OUT: 480 mL / NET: -240 mL      Nutritional Status:   Albumin, Serum: 4.7 g/dL (05-20-20 @ 06:17)    Last 24 hour events: No new events. Bili is still > 40. Afebrile, stable hemodynamically.    Recommendations: Would cont with Prednisolone for now. Will reassess after 7 days of steroid. Add Ursodiol 500 mg PO tid.      Plan discussed with Primary team.
Hepatology Staff: Lula Rojas MD    I saw and examined the patient along with  Dr. Kumar 05-19-20 @ 11:12    Patient Medical Record, hosptial course was reviewed and summarized as below:    Vitals: Vital Signs Last 24 Hrs  T(C): 36.7 (19 May 2020 05:54), Max: 36.8 (18 May 2020 14:00)  T(F): 98 (19 May 2020 05:54), Max: 98.2 (18 May 2020 14:00)  HR: 71 (19 May 2020 05:54) (67 - 72)  BP: 116/63 (19 May 2020 05:54) (116/63 - 125/75)    RR: 18 (19 May 2020 05:54) (17 - 18)  SpO2: 94% (19 May 2020 05:54) (94% - 97%)  Medications:  IV Fluids: albumin human 25% IVPB 100 milliLiter(s) IV Intermittent every 6 hours    Antibiotics:ciprofloxacin     Tablet 500 milliGRAM(s) Oral every 12 hours      Labs:Creatinine, Serum: 2.54 mg/dL (05-19-20 @ 06:36)  Bilirubin Total, Serum: 41.9 mg/dL (05-19-20 @ 06:36)  Creatinine, Serum: 2.70 mg/dL (05-18-20 @ 21:13)      Nutritional Status:   Albumin, Serum: 4.2 g/dL (05-19-20 @ 06:36)    Last 24 hour events:  No new events. Blood sugars better controlled- on sliding scale. Bli still high.    Recommendations: Cont with Prednisolone 40 mg daily. Moinitor LFTs. Close monitoring of the blood sugars.      Plan discussed with Primary team.
Hepatology Staff: Lula Rojas MD    I saw and examined the patient along with  Dr. Cadena  05-17-20 @ 09:57    Patient Medical Record, hosptial course was reviewed and summarized as below:  Vitals: Vital Signs Last 24 Hrs  T(C): 36.7 (17 May 2020 09:25), Max: 37.2 (16 May 2020 16:17)  T(F): 98 (17 May 2020 09:25), Max: 99 (16 May 2020 16:17)  HR: 65 (17 May 2020 09:25) (58 - 70)  BP: 102/64 (17 May 2020 09:25) (94/56 - 123/67)  BP(mean): --  RR: 16 (17 May 2020 09:25) (16 - 18)  SpO2: 95% (17 May 2020 09:25) (92% - 95%)  Medications:  IV Fluids: albumin human 25% IVPB 100 milliLiter(s) IV Intermittent every 6 hours    Labs:Creatinine, Serum: 3.32 mg/dL (05-17-20 @ 06:03)  Bilirubin Total, Serum: 37.0 mg/dL (05-17-20 @ 06:03)  Creatinine, Serum: 3.32 mg/dL (05-17-20 @ 00:29)  Creatinine, Serum: 3.43 mg/dL (05-16-20 @ 21:29)  Creatinine, Serum: 3.39 mg/dL (05-16-20 @ 16:27)  Creatinine, Serum: 3.52 mg/dL (05-16-20 @ 10:50)  Bilirubin Total, Serum: 36.4 mg/dL (05-16-20 @ 10:50)    MELD Score:   Imaging Studies:  I/O: I&O's Summary    16 May 2020 07:01  -  17 May 2020 07:00  --------------------------------------------------------  IN: 480 mL / OUT: 0 mL / NET: 480 mL      Nutritional Status:   Albumin, Serum: 3.3 g/dL (05-17-20 @ 06:03)  Albumin, Serum: 3.2 g/dL (05-16-20 @ 10:50)    Last 24 hour events:  Cx has been negative.    Recommendations: Pt with alcoholic hepatitis in the setting of underlying decompensated alcoholic cirrhosis. Would recommend starting Prednisolone 40 mg daily. Cont with empiric antibiotics for now.      Plan discussed with Primary team.
Extensive past documentation reviewed. Please see updated note above.
Hepatology Staff: Lula Rojas MD    I saw and examined the patient along with  Dr. Cadena/ Dr. Luque  05-18-20 @ 09:49.    Patient Medical Record, hosptial course was reviewed and summarized as below:    Vitals: Vital Signs Last 24 Hrs  T(C): 36.6 (18 May 2020 08:35), Max: 37 (17 May 2020 16:51)  T(F): 97.8 (18 May 2020 08:35), Max: 98.6 (17 May 2020 16:51)  HR: 76 (18 May 2020 08:35) (65 - 86)  BP: 119/67 (18 May 2020 08:35) (110/79 - 121/79)  RR: 18 (18 May 2020 08:35) (18 - 18)  SpO2: 96% (18 May 2020 08:35) (94% - 98%)  Medications:  IV Fluids: albumin human 25% IVPB 100 milliLiter(s) IV Intermittent every 6 hours    Labs:Creatinine, Serum: 2.99 mg/dL (05-18-20 @ 00:51)  Creatinine, Serum: 3.24 mg/dL (05-17-20 @ 11:26)      Last 24 hour events:  Noted hyponatremia worsening-likley related to severe hyperglycemia ( steroid induced). LFTs pending from this am.    Recommendations: Cont with Prednisolone 40 mg daily. Optimize blood sugar (consider endocrinology consult). Continue with IV Albumin 25% 25 gm q 6hrs. Cx has been negative so far. Noted primary team d/janell IV Ceftriaxone.  Small ascites on CAT scan. Would recommend switching to Cipro for primary prophylaxis for SBP.      Plan discussed with Primary team.
SAM- Improving with volume. continue   metabolic acidosis with respiratory compensation- gap+ non gap due to diarrhea and renal failure. fluids. ph 7.3- no need for bicarb containing fluids.
43 yo M with latent TB, history of C diff (2016, 2017), DM2 (with last HbA1c 7.9% in 10/2019), past history of alcoholic hepatitis, and decompensated ALD cirrhosis complicated by ascites, SBP, and variceal hemorrhage in the remote past, admitted with acute on chronic liver failure (ACLF) with recent onset of jaundice, coagulopathy, SAM, hyponatremia, and hyperammonemia, all in the context of recent alcohol relapse.    # SAM: Baseline Cr 0.55 (10/2019), admitted with Cr 3.82. Initially improved with holding diuretics and with IV albumin therapy, but recently Cr appears to have plateaued ~2. Recommend to repeat urinalysis and urine lytes for re-evaluation. Recommend to hold diuretics for now, given concern that this may worsen SAM. Recommend limited US to assess for interval development of ascites.    # Hyponatremia: Improved partially since admission. Currently off IV albumin. Continue fluid restriction.     # History of variceal hemorrhage: No recent overt bleeding. Not a candidate for beta-blocker prophylaxis at this time given ACLF with SAM and hyponatremia (risks outweigh benefits). Strongly recommend against resuming home nadolol therapy.    # ACLF: Likely secondary to probable alcoholic hepatitis (AH), as infectious work-up was negative on admission aside 10-49k GBS in urine (s/p antibiotic therapy). He received a trial of medical therapy for AH with steroids (5/17-21), but was a non-responder and therefore steroid therapy was discontinued. He also is completing a 5-day trial of NAC for AH (5/23-27), though there is no evidence that this would be beneficial in a steroid non-responder. High risk for short-term mortality given ACLF and high MELD, as he is not currently a liver transplant candidate per our center's protocol given recent alcohol relapse despite known cirrhosis. Liver transplant candidacy could be re-considered on an outpatient basis once has enrolled in a formal alcohol relapse prevention program and achieves long-term sobriety.    Please don't hesitate to call with any questions/concerns.    Agapito Ayala M.D., Ph.D.  Transplant Hepatology  Cell: [phone]
Hepatology Staff: Lula Rojas MD    I saw and examined the patient along with  Dr. Cadena 05-16-20 @ 10:43.    Patient Medical Record, hosptial course was reviewed and summarized as below:    Vitals: Vital Signs Last 24 Hrs  T(C): 36.8 (16 May 2020 08:10), Max: 36.8 (15 May 2020 16:28)  T(F): 98.2 (16 May 2020 08:10), Max: 98.3 (15 May 2020 16:28)  HR: 59 (16 May 2020 08:10) (59 - 72)  BP: 105/65 (16 May 2020 08:10) (100/62 - 119/72)  BP(mean): --  RR: 18 (16 May 2020 08:10) (18 - 18)  SpO2: 95% (16 May 2020 08:10) (93% - 98%)  Medications:  IV Fluids: albumin human 25% IVPB 100 milliLiter(s) IV Intermittent every 6 hours    Antibiotics:cefTRIAXone   IVPB 2000 milliGRAM(s) IV Intermittent every 24 hours  rifAXIMin 550 milliGRAM(s) Oral two times a day    Diuretics:  Labs:INR: 1.68 ratio (05-16-20 @ 09:18)  Creatinine, Serum: 3.46 mg/dL (05-16-20 @ 06:57)  Bilirubin Total, Serum: 31.0 mg/dL (05-16-20 @ 06:57)  Creatinine, Serum: 3.56 mg/dL (05-16-20 @ 02:46)  Creatinine, Serum: 3.77 mg/dL (05-15-20 @ 21:08)  Bilirubin Total, Serum: 34.8 mg/dL (05-15-20 @ 21:08)  Creatinine, Serum: 3.82 mg/dL (05-15-20 @ 14:38)  Bilirubin Total, Serum: 34.5 mg/dL (05-15-20 @ 14:38)  Bilirubin Total, Serum: 34.5 mg/dL (05-15-20 @ 14:38)  INR: 3.04 ratio (05-15-20 @ 14:38)      I/O: I&O's Summary    15 May 2020 07:01  -  16 May 2020 07:00  --------------------------------------------------------  IN: 1230 mL / OUT: 400 mL / NET: 830 mL      Nutritional Status: Weight (kg): 104.7 (05-15-20 @ 23:30)  BMI (kg/m2): 35.1 (05-15-20 @ 23:30)  Albumin, Serum: 2.7 g/dL (05-16-20 @ 06:57)  Albumin, Serum: 3.0 g/dL (05-15-20 @ 21:08)  Albumin, Serum: 2.8 g/dL (05-15-20 @ 14:38)    Recommendations: Patient with alcoholic hepatitis with underlying Cirrhosis (Acute-on-Chronic Liver Failure Grade II- Liver failure + Renal failure). Infectious w/u underway. If negative would consider Prednisolone. Noted severe hypontremia ( 115). Recommend repeat CMP stat- If still < 120 consider ICU transfer and initiate hypertonic nornal saline ( 3%) under guidance of Nephrology. Would consider slows correction. Fluid restriction < 1L, and cont with IV Albumin 25% 25 g q 6hrs.  Noted pt is COVID-19 negative.    Plan discussed with Primary team.
Patient seen and examined  Acute on chronic decompensated liver failure/alcholic hepatitis: LFTs remains elevated.    Cont with Xifaxan, steroid, and albumin. SBP PPx on cipro   s/p VIt K x3 days for coagulapathy. no evidence of bleeding. h/h stable.   SAM: improving.  Will cont with albumin. monitor fluid status closely. Nephrology consult appreciated.   Hyponatremia: improved with albumin. in setting of hyperglycemia.   DM2: hyperglycemia in setting of steroid. improving. increased basal and premeal insulin   ETOH abuse: cont with MVI, thiamine, FA. SW for outpt follow up.   Overall prognosis guarded.
-Patient seen/examined on 5/22/20. Case/plan discussed with the resident as reviewed/edited by me above and in any comments below.  -Hepatology and renal recs appreciated.   -Endo eval for DM-2 worsened by prednisolone insulin management.  -DCP soon.
-Patient seen/examined on 5/21/20. Case/plan discussed with the intern and resident as reviewed/edited by me above and in any comments below.  -F/u hepatology recs.
-Patient seen/examined on 5/20/20. Case/plan discussed with the intern and resident as reviewed/edited by me above and in any comments below.  -Added ursodiol per hepatology recs.   -Overall improved with IV albumin. -C/w prednisolone per hepatology.  -F/u hepatitis B serologies.  -Need to discuss prognosis with patient.
Patient seen and examined.   Started on NAC x 5 days.   S/p steroid and albumin with minimal response.    Overnight epigastric pain with increasing abd distention.   Will given PPI and diuretics.   Replete K  Monitor h/H given downtrend. no overt bleeding. repeat CBC  SAM stable. seems to have plateaued now maybe new baseline.   Overall prognosis remains very guarded. Not transplant candidate.
Patient seen and examined.   on NAC x 4/5 days.   S/p steroid and albumin with minimal response.    s/p diuresis due to increased ascites improved but now increasing Cr.   Hold diuretics and cont with PPI.   Hypokalemia cont replete.  Monitor h/H  . no overt bleeding.   Overall prognosis remains very guarded. Not transplant candidate.  d/c planning
-Patient seen/examined on 5/17/20. Case/plan discussed with the intern as reviewed/edited by me above and in any comments below.  -Hepatology recs appreciated. -Prednisolone 40mg daily started for alcoholic hepatitis.  -Monitor BG closely. Will likely need more insulin soon.
Patient seen and examined  Acute on chronic decompensated liver failure/alcholic hepatitis: MELD score remains high.    Cont with Xifaxan, steroid, and albumin.    Per GI cont with abx for SBP PPx. No ascitic sample obtained on admission due to sm amount of fluid.    VIt K x3 days for coagulapathy. no evidence of bleeding. h/h stable.   SAM: Given ongoing liver failure concerning for degree of hepatorenal syndrome.    improving Cr slowly. Will cont with albumin. monitor fluid status closely. Nephrology consult.   Hyponatremia: corrected this am 132 given elevated glucose. monitor  DM2: hyperglycemia in setting of steroid. Currently getting Lantus 45 units (home 80 units)    Will give extra 20 units this am and increase bedtime dose to 60 units. add premeal.   ETOH abuse: CIWA remains low. Can stop. DESIRAE eval for outpt follow up. cont with MVI, thiamine, FA.  Overall prognosis guarded.
Patient seen and examined.   Will follow up with hepatology for any additional rec re: decompensated liver failure in setting of alcoholic cirrhosis.   S/p steroid and albumin with minimal response. Clinically asymptomatic except jaundice.   SAM in setting of pre renal state improving now with better PO s/p albumin.   Overall prognosis remains very guarded.
Patient seen and examined.   Started on NAC x 5 days.   S/p steroid and albumin with minimal response. Clinically asymptomatic except jaundice.   SAM in setting of pre renal state improving now with better PO s/p albumin.   Overall prognosis remains very guarded.
Patient seen and examined.   S/p steroid, NAC and albumin with minimal response.    s/p diuresis due to increased ascites improved but off now due to increasing Cr.   Hold diuretics and cont with PPI.   Hypokalemia cont replete.  Monitor h/H  . no overt bleeding.   Overall prognosis remains very guarded. Not transplant candidate.  d/c planning  d/c time 40 mins  need close outpt follow up. alcohol rehab as per SW
-Patient seen/examined on 5/16/20. Case/plan discussed with the intern and resident as reviewed/edited by me above and in any comments below.   -Hepatology recs appreciated.   -C/w IV albumin. Hold octreotide. -Consider renal eval if Cr/Na worsen.   -SW eval for alcoholism.   -C/w CTX for now. -F/u repeat lactate.  -Simethicone for gas discomfort.

## 2020-05-27 NOTE — PROGRESS NOTE ADULT - PROBLEM SELECTOR PROBLEM 4
Elevated INR
Hyponatremia
Elevated INR

## 2020-05-27 NOTE — PROGRESS NOTE ADULT - PROBLEM SELECTOR PROBLEM 8
Discharge planning issues
Need for prophylactic measure
Discharge planning issues

## 2020-05-27 NOTE — PROGRESS NOTE ADULT - ASSESSMENT
43 yo M w/h/o uncontrolled T2DM (8.8%) on Levemir plus Metformin. DM c/b neuropathy and CKD. Also h/o latent TB, C. diff (2016, 2017), h/o alcoholic hepatitis, and decompensated EtOH cirrhosis c/b ascites, SBP, and variceal hemorrhage in the remote past, presents with jaundice and weakness x 2 days. Patient is a recovered alcoholic but states he started drinking again about 1 month ago because of mother passed away. Pt started on steroids for alcohol hepatitis with steroid induced hyperglycemia> now off steroids. Tolerating POs with glycemic control mostly at goal while on present insulin regimen. No hypoglycemia. Will c/w present insulin doses. Pt taking Levemir 80 units at home PTA. Requiring 50% dose while in hospital.   Abnormal TFT likely due to steroid therapy> needs to f/u as out pt.     Reinforced the following with pt:  -Blood glucose goals: 100s to 150s as out pt  -Glucose monitoring frequency: ac and hs  -Healthy eating and portion control. Provided with nutritional written info  -Insulin(s) action, time of administration and side effects. Levemir is not likely covering 24 hours even though pt has renal disease. Noted hs BG still elevated> pt would benefit from split Levemir dosing. Also spoke about Prandin ac meals and how to take it.  -Importance of follow up care.  Endo practice  -Stopping Metformin due to ETOH abuse relapse and also worsening renal/liver disease    Pt able to verbalize understanding about new DM regimena dn importance of F/u care and quitting ETOH abuse.     Spent over 25 minutes providing face to face education in addition to assessing pt/chart/labs/meds and setting plan of care with primary team..

## 2020-05-27 NOTE — PROVIDER CONTACT NOTE (CRITICAL VALUE NOTIFICATION) - ACTION/TREATMENT ORDERED:
Team 1 aware, Pt had Mg running this morning so lab most likely not accurate, will repeat labs after 3rd round of K is complete
no new action at this time, MD aware
Potassium and magnesium ordered and to be administered. Will continue to monitor.
Team 1 aware, cont plan of care, cont to monitor
MD made aware, continue to monitor
MD to order 4u of Humalog to be administered. Reassess glucose within 30mins. of administration.     Will continue to monitor pt.
No new orders at this time.
Provider made aware. provider to order potassium supplementation. Will continue to monitor.

## 2020-05-27 NOTE — PROGRESS NOTE ADULT - PROBLEM SELECTOR PROBLEM 1
Alcoholic cirrhosis of liver with ascites
SAM (acute kidney injury)
SAM (acute kidney injury)
Type 2 diabetes mellitus with hyperglycemia, with long-term current use of insulin
Alcoholic cirrhosis of liver with ascites

## 2020-05-27 NOTE — PROGRESS NOTE ADULT - PROBLEM SELECTOR PLAN 1
-Test BG ac and hs'  - c/w Lantus 40 units qhs   - C/w Humalog 8 units ac meals  -C/w Humalog moderate correction scale premeal and qhs    -Can f/u  at Endocrine follow up at  [address] [facility] [address] [place] [id number] [phone]. Please make apt prior discharge.   Patient to be discharged on:  Levemir 20 units bid   Prandin 1mg ac meals. HOLD IF NOT EATING.  -Discontinue Metformin due to ETOH hx and worsening  renal/liver function plus ETOH relapse  -Has f/u apt on 7/31 at 10:45am with endocrinologist Dr Kothari [address] [facility] [address]. Phone   -Patient needs to schedule ophthalmology and podiatry visit  -Plan discussed with pt/team.  Contact info: [phone] (24/7). pager [phone]

## 2020-05-27 NOTE — PROGRESS NOTE ADULT - PROBLEM SELECTOR PROBLEM 7
Alcohol use
Need for prophylactic measure

## 2020-05-27 NOTE — PROGRESS NOTE ADULT - PROBLEM SELECTOR PLAN 1
Acute decompensated cirrhosis 2/2 recent alcohol abuse   -MELD TREND: 32 (5/25)< 38 (5/24), 36 (5/23)  - S/p prednisolone 40 mg oral daily (5/17 - 5/23) then lille score showed nonresponse. Now on NAC (5/23-5/27)  - S/p SBP ppx, s/p CTX (5/15-5/18) transitioned to cipro 500 mg BID (5/19 -  22) and now on indefinite bactrim DS for SBP ppx given hx of flouroquinolone resistant peritonitis.  - C/w rifaximin  - D/c'd albumin 5/22.  - F/u hepatology recs. Acute decompensated cirrhosis 2/2 recent alcohol abuse   -MELD TREND: 38 65-66% mortality (3/27)< 32 (5/25)< 38 (5/24), 36 (5/23)  - S/p prednisolone 40 mg oral daily (5/17 - 5/23) then lille score showed nonresponse. Now on NAC (5/23-5/26)  - S/p SBP ppx, s/p CTX (5/15-5/18) transitioned to cipro 500 mg BID (5/19 -  22) and now on indefinite bactrim DS for SBP ppx given hx of flouroquinolone resistant peritonitis.  - C/w rifaximin  - D/c'd albumin 5/22.  - F/u hepatology recs.

## 2020-05-27 NOTE — PROGRESS NOTE ADULT - SUBJECTIVE AND OBJECTIVE BOX
DIABETES FOLLOW UP NOTE: Saw pt earlier today  INTERVAL HX: 43 yo M w/h/o uncontrolled T2DM (8.8%) on Levemir plus Metformin. DM c/b neuropathy and CKD. Also h/o latent TB, C. diff (2016, 2017), h/o alcoholic hepatitis, and decompensated EtOH cirrhosis c/b ascites, SBP, and variceal hemorrhage in the remote past, presents with jaundice and weakness x 2 days. Patient is a recovered alcoholic but states he started drinking again about 1 month ago because of mother passed away. Pt started on steroids for alcohol hepatitis with steroid induced hyperglycemia> now off steroids. Tolerating POs with glycemic control mostly at goal while on present insulin regimen. No hypoglycemia. Per primary team pt going home. Pt was NPO this am for abdominal US so received 50% of Lantus dose last night with BG at goal this am .         Review of Systems:  General: As above  Cardiovascular: No chest pain, palpitations  Respiratory: No SOB, no cough  GI: No nausea, vomiting, abdominal pain  Endocrine: no polyuria, polydipsia or S&Sx of hypoglycemia    Allergies    levofloxacin (Other (Moderate))    Intolerances      MEDICATIONS:  insulin glargine Injectable (LANTUS) 20 Unit(s) SubCutaneous <User Schedule>  insulin lispro (HumaLOG) corrective regimen sliding scale   SubCutaneous three times a day before meals  insulin lispro (HumaLOG) corrective regimen sliding scale   SubCutaneous at bedtime  insulin lispro Injectable (HumaLOG) 8 Unit(s) SubCutaneous three times a day before meals      PHYSICAL EXAM:  VITALS: T(C): 37 (05-27-20 @ 04:25)  T(F): 98.6 (05-27-20 @ 04:25), Max: 99.5 (05-26-20 @ 21:33)  HR: 76 (05-27-20 @ 05:57) (76 - 90)  BP: 108/56 (05-27-20 @ 05:57) (99/55 - 125/68)  RR:  (18 - 18)  SpO2:  (96% - 96%)  Wt(kg): --  GENERAL: Male sitting at edge of bed in NAD  HEENT: Icteric sclera  Abdomen: Soft, nontender, non distended. obese  Extremities: Warm, no edema in all 4 exts  NEURO: A&O X3  Skin: Jaundice    LABS:  POCT Blood Glucose.: 139 mg/dL (05-27-20 @ 12:09)  POCT Blood Glucose.: 133 mg/dL (05-27-20 @ 08:17)  POCT Blood Glucose.: 211 mg/dL (05-26-20 @ 21:06)  POCT Blood Glucose.: 131 mg/dL (05-26-20 @ 17:26)  POCT Blood Glucose.: 135 mg/dL (05-26-20 @ 12:28)  POCT Blood Glucose.: 181 mg/dL (05-26-20 @ 08:22)  POCT Blood Glucose.: 220 mg/dL (05-25-20 @ 21:44)  POCT Blood Glucose.: 146 mg/dL (05-25-20 @ 16:51)  POCT Blood Glucose.: 134 mg/dL (05-25-20 @ 12:19)  POCT Blood Glucose.: 156 mg/dL (05-25-20 @ 08:14)  POCT Blood Glucose.: 185 mg/dL (05-24-20 @ 23:44)  POCT Blood Glucose.: 109 mg/dL (05-24-20 @ 21:09)  POCT Blood Glucose.: 73 mg/dL (05-24-20 @ 17:21)                            8.7    8.73  )-----------( 43       ( 27 May 2020 06:14 )             26.0       05-27    133<L>  |  102  |  44<H>  ----------------------------<  137<H>  3.2<L>   |  11<L>  |  1.94<H>      EGFR if non : 40<L>    Ca    9.1      05-27  Mg     1.5     05-27  Phos  2.8     05-27    TPro  5.3<L>  /  Alb  4.1  /  TBili  42.6  /  DBili  x   /  AST  76<H>  /  ALT  52<H>  /  AlkPhos  74  05-27      Thyroid Function Tests:  05-19 @ 08:53 TSH 0.26 FreeT4 -- T3 -- Anti TPO -- Anti Thyroglobulin Ab -- TSI --      A1C with Estimated Average Glucose Result: 8.8 % (05-16-20 @ 13:14)      Estimated Average Glucose: 206 mg/dL (05-16-20 @ 13:14)

## 2020-05-27 NOTE — PROGRESS NOTE ADULT - ASSESSMENT
Impression:  Pt is a 45yo M with vitiligo, alcohol abuse, obesity BMI 35, DM2, FELIX/SIMPSON cirrhosis, in 2018 reported to be on transplant list at [facility], h/o small ascites, esophageal varices (small in 2019 and in ), recurrent GI bleed without identified source (last in 10/2028, small EV, neg. capsule), SBP 2015 and multiple admissions for recurrent sepsis and abdominal pain without enough ascites for tap (2018) S. agalactiae bacteremia, 2017 Cx negative, 2017 E. coli, bacteremia 2017 with neg. EUS, 2016 E. coli bacteremia quinolone-resistant),  now admitted on 5/15 with generalized weakness, abdominal distention and jaundice in the setting of decompensated cirrhosis after relapse with one month of drinking after his mother .    # Acute on Chronic Liver Failure: likely secondary to severe acute alcoholic hepatitis given > AST to ALT ratio.  on 5/15/20.   - Started on steroids on 20. Day 5 Lille score 0.377; Day 7 Lille score 0.850 indicating poor response and steroids discontinued  - Started on NAC protocol 20   # Decompensated EtOH Cirrhosis with ascites - MELD-Na: 38 on 20  HE: AOx3, no asterixis  Ascites: Small to moderate on CT A/P but could not be drained. H/o SBP, including quinolone-resistant E. coli in . On bactrim prophylaxis empirically during hospitalization  HCC: No hx, but overdue for HCC screening; 2019 u/s negative for lesion  Varices: Small varices on EGD (2019)  # SAM: imrpoved from initial presentation although plateauing. Likely was pre-renal, now improved overall with albumin and holding diuretics prior, but slight recent uptrend with restarting diuretics. 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 Answered that he drank very heavily from age [age] with daily intake of 1 bottle of tequila, 1 bottle of whiskey and a 12-pack of beer. Then 8 years of abstinence, and from age ~39 beer on <1d per week (3-4 beers), now relapse with heavy drinking for 1 month after his mother  in [place] and he could not visit her before due to COVID crisis. Chart review showed multiple hospitalizations as above, saw Dr. Thakkar in the office. He did attend alcohol rehab for three years until three years ago, and answered that he knew that even his reduced alcohol intake might destroy his liver.  # Obesity, ~190 lbs, BMI 35. Max weight was 240 lbs around , lost 50 lbs by dieting and some exercise.  # Social history: lives with dwain of >20 years and their 24 and 26 yo sons. 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   # Transplant candidacy: severe alcoholic hepatitis with unfavorable Lille score has a mortality of ~30% in one month. He does not fulfill criteria for liver transplant at our center due to longstanding history of alcohol abuse with recurrent drinking after alcohol rehab. Case also discussed with transplant surgery, Dr. Morocho.    Recommendation:  - Continue N-acetylcysteine protocol (day 5/5): 100 mg per kilogram per day in 1000 ml of glucose solution.  - Bactrim PO for lifelong secondary SBP prophylaxis (h/o quinolone resistant E. coli)  - please recheck urine studies and urine Na  - continue to hold diruetics for now given kidney function  - repeat limited abdominal u/s to assess for ascites  - Daily CBC and CMP for MELD-Na score  - fluid restricted diet given hyponatremia  - at discharge, patient will need f/u with Dr. Thakkar in our liver center within 1 week Impression:  Pt is a 45yo M with vitiligo, alcohol abuse, obesity BMI 35, DM2, FELIX/SIMPSON cirrhosis, in 2018 reported to be on transplant list at [facility], h/o small ascites, esophageal varices (small in 2019 and in ), recurrent GI bleed without identified source (last in 10/2028, small EV, neg. capsule), SBP 2015 and multiple admissions for recurrent sepsis and abdominal pain without enough ascites for tap (2018) S. agalactiae bacteremia, 2017 Cx negative, 2017 E. coli, bacteremia 2017 with neg. EUS, 2016 E. coli bacteremia quinolone-resistant),  now admitted on 5/15 with generalized weakness, abdominal distention and jaundice in the setting of decompensated cirrhosis after relapse with one month of drinking after his mother .    # Acute on Chronic Liver Failure: likely secondary to severe acute alcoholic hepatitis given > AST to ALT ratio.  on 5/15/20.   - Started on steroids on 20. Day 5 Lille score 0.377; Day 7 Lille score 0.850 indicating poor response and steroids discontinued  - Started on NAC protocol 20   # Decompensated EtOH Cirrhosis with ascites - MELD-Na: 38 on 20  HE: AOx3, no asterixis  Ascites: Small to moderate on CT A/P but could not be drained. H/o SBP, including quinolone-resistant E. coli in . On bactrim prophylaxis empirically during hospitalization  HCC: No hx, but overdue for HCC screening; 2019 u/s negative for lesion  Varices: Small varices on EGD (2019)  # SAM: imrpoved from initial presentation although plateauing. Likely was pre-renal, now improved overall with albumin and holding diuretics prior, but slight recent uptrend with restarting diuretics. 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 Answered that he drank very heavily from age [age] with daily intake of 1 bottle of tequila, 1 bottle of whiskey and a 12-pack of beer. Then 8 years of abstinence, and from age ~39 beer on <1d per week (3-4 beers), now relapse with heavy drinking for 1 month after his mother  in [place] and he could not visit her before due to COVID crisis. Chart review showed multiple hospitalizations as above, saw Dr. Thakkar in the office. He did attend alcohol rehab for three years until three years ago, and answered that he knew that even his reduced alcohol intake might destroy his liver.  # Obesity, ~190 lbs, BMI 35. Max weight was 240 lbs around , lost 50 lbs by dieting and some exercise.  # Social history: lives with dwain of >20 years and their 24 and 26 yo sons. 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   # Transplant candidacy: severe alcoholic hepatitis with unfavorable Lille score has a mortality of ~30% in one month. He does not fulfill criteria for liver transplant at our center due to longstanding history of alcohol abuse with recurrent drinking after alcohol rehab. Case also discussed with transplant surgery, Dr. Morocho.    Recommendation:  - can discontinue N-acetylcysteine protocol (today was day 5/5)  - Bactrim PO for lifelong secondary SBP prophylaxis (h/o quinolone resistant E. coli)  - continue to hold diruetics for now given kidney function  - Daily CBC and CMP for MELD-Na score  - fluid restricted diet given hyponatremia  - okay to discharge today   - patient will need f/u with Dr. Thakkar via telehealth liver center within 1 week  - please have social work provide info on tele resources for alcohol cessation

## 2020-05-27 NOTE — PROGRESS NOTE ADULT - PROBLEM SELECTOR PROBLEM 3
Ascites
Hyponatremia

## 2020-05-27 NOTE — PROGRESS NOTE ADULT - PROBLEM SELECTOR PLAN 7
Previously treated at [facility]  -Being treated for alcoholic hepatitis as above  -C/w thiamine and MVI  -C/w folate  -Outpt f/u with prior facility and PCP

## 2020-05-27 NOTE — PROGRESS NOTE ADULT - PROBLEM SELECTOR PROBLEM 2
Hyponatremia
Hyponatremia
Thyroid function test abnormal
SAM (acute kidney injury)
Thyroid function test abnormal
Thyroid function test abnormal
SAM (acute kidney injury)

## 2020-05-27 NOTE — PROGRESS NOTE ADULT - PROBLEM SELECTOR PROBLEM 5
Diabetes mellitus type 2, insulin dependent
Elevated INR
Diabetes mellitus type 2, insulin dependent

## 2020-05-27 NOTE — DISCHARGE NOTE NURSING/CASE MANAGEMENT/SOCIAL WORK - NSDCFUADDAPPT_GEN_ALL_CORE_FT
Please follow up with the hepatology clinic within 1 week of discharge.    Please follow up with Dr. Woods within 1-2 weeks of discharge    Please follow up with endocrinology within 2-4 weeks of discharge    Please follow up with your rehab facility that the  referred you to within 1 week of discharge. If you have difficulties, call the clinic at [address] [place] [address] for a new referral.    Please refer to additional resource packet provided for outpatient rehab.

## 2020-05-27 NOTE — PROGRESS NOTE ADULT - PROBLEM SELECTOR PLAN 8
DVT ppx: SCDs given thrombocytopenia  Diet: dash CC fluid restricted  Dispo: tbd DVT ppx: SCDs given thrombocytopenia  Diet: dash CC fluid restricted  Dispo: Today

## 2020-05-27 NOTE — PROGRESS NOTE ADULT - SUBJECTIVE AND OBJECTIVE BOX
INCOMPLETE NOTE PROGRESS NOTE:     Patient is a 44y old  Male who presents with a chief complaint of jaundice (27 May 2020 11:25)      SUBJECTIVE / OVERNIGHT EVENTS:  No acute complaints  Would like to go home  Feels that yellow in his eyes and size of his belly have decreased.    ADDITIONAL REVIEW OF SYSTEMS:  No fevers, chest pain, shortness of breath, abdominal pain, lower extremity swelling or pain, dysuria, or constipation.    MEDICATIONS  (STANDING):  acetylcysteine IVPB 10 Gram(s) IV Intermittent <User Schedule>  dextrose 5%. 1000 milliLiter(s) (50 mL/Hr) IV Continuous <Continuous>  dextrose 50% Injectable 12.5 Gram(s) IV Push once  dextrose 50% Injectable 25 Gram(s) IV Push once  dextrose 50% Injectable 25 Gram(s) IV Push once  folic acid 1 milliGRAM(s) Oral daily  insulin glargine Injectable (LANTUS) 20 Unit(s) SubCutaneous <User Schedule>  insulin lispro (HumaLOG) corrective regimen sliding scale   SubCutaneous three times a day before meals  insulin lispro (HumaLOG) corrective regimen sliding scale   SubCutaneous at bedtime  insulin lispro Injectable (HumaLOG) 8 Unit(s) SubCutaneous three times a day before meals  multivitamin 1 Tablet(s) Oral daily  pantoprazole    Tablet 40 milliGRAM(s) Oral daily  repaglinide 1 milliGRAM(s) Oral three times a day  thiamine 100 milliGRAM(s) Oral daily  trimethoprim  160 mG/sulfamethoxazole 800 mG 1 Tablet(s) Oral daily    MEDICATIONS  (PRN):  dextrose 40% Gel 15 Gram(s) Oral once PRN Blood Glucose LESS THAN 70 milliGRAM(s)/deciliter  glucagon  Injectable 1 milliGRAM(s) IntraMuscular once PRN Glucose LESS THAN 70 milligrams/deciliter      CAPILLARY BLOOD GLUCOSE      POCT Blood Glucose.: 139 mg/dL (27 May 2020 12:09)  POCT Blood Glucose.: 133 mg/dL (27 May 2020 08:17)  POCT Blood Glucose.: 211 mg/dL (26 May 2020 21:06)  POCT Blood Glucose.: 131 mg/dL (26 May 2020 17:26)    I&O's Summary    26 May 2020 07:01  -  27 May 2020 07:00  --------------------------------------------------------  IN: 480 mL / OUT: 4225 mL / NET: -3745 mL    27 May 2020 07:01  -  27 May 2020 12:31  --------------------------------------------------------  IN: 0 mL / OUT: 800 mL / NET: -800 mL        PHYSICAL EXAM:  Vital Signs Last 24 Hrs  T(C): 37 (27 May 2020 04:25), Max: 37.5 (26 May 2020 21:33)  T(F): 98.6 (27 May 2020 04:25), Max: 99.5 (26 May 2020 21:33)  HR: 76 (27 May 2020 05:57) (76 - 90)  BP: 108/56 (27 May 2020 05:57) (99/55 - 125/68)  BP(mean): --  RR: 18 (27 May 2020 04:25) (18 - 18)  SpO2: 96% (27 May 2020 04:25) (96% - 96%)    CONSTITUTIONAL: NAD, well-developed  HEENT: Scleral icterus stable.  CARDIOVASCULAR: Regular rate and rhythm. Normal S1 and S2. No murmurs.  RESPIRATORY: Normal respiratory effort, Lungs CTAB  EXTREMITIES: No CHASITY, peripheral pulses intact.  ABDOMEN: Abdomen is nontense but slightly distended. Stable from yesterday.  MUSCLOSKELETAL: no clubbing or cyanosis of digits; no joint swelling or tenderness to palpation  PSYCH: Alert and appropriately conversant  Neuro: +Asterixis  Skin: jaundice, vitiligo    LABS:                        8.7    8.73  )-----------( 43       ( 27 May 2020 06:14 )             26.0     05-27    133<L>  |  102  |  44<H>  ----------------------------<  137<H>  3.2<L>   |  11<L>  |  1.94<H>    Ca    9.1      27 May 2020 06:14  Phos  2.8     05-27  Mg     1.5     05-27    TPro  5.3<L>  /  Alb  4.1  /  TBili  42.6  /  DBili  x   /  AST  76<H>  /  ALT  52<H>  /  AlkPhos  74  05-27    PT/INR - ( 27 May 2020 08:13 )   PT: 30.2 sec;   INR: 2.57 ratio         PTT - ( 27 May 2020 08:13 )  PTT:45.4 sec            RADIOLOGY & ADDITIONAL TESTS:  Results Reviewed: Hypokalemia. Creatinine plateau at 1.9-2.0 over last days  Imaging Personally Reviewed:< from: US Abdomen Limited (05.27.20 @ 09:39) >  IMPRESSION: Small ascites seen in all 4 quadrants, largest in the right upper quadrant.    < end of copied text >    Electrocardiogram Personally Reviewed:    COORDINATION OF CARE:  Care Discussed with Consultants/Other Providers [Y/N]:  Prior or Outpatient Records Reviewed [Y/N]:

## 2020-05-27 NOTE — PROGRESS NOTE ADULT - REASON FOR ADMISSION
jaundice
No

## 2020-05-27 NOTE — PROGRESS NOTE ADULT - PROBLEM SELECTOR PLAN 2
Worsening off albumin. Hemodynamically mediated SAM given improvement with albumin and prerenal FENA  - Holding on albumin for now.  - Strict I's/O's. Acute kidney injury now with new chronic baseline.  - Holding on albumin for now.  - Strict I's/O's.

## 2020-05-27 NOTE — PROGRESS NOTE ADULT - PROBLEM SELECTOR PLAN 3
Improving abdominal distension, receiving 1 L/day for acetylcysteine  -Decr to spironolactone 50 mg daily  -Decr to lasix 20 mg Improving abdominal distension  -No diuretics for now  -Bactrim DS daily for indefinite prophylaxis

## 2020-05-27 NOTE — PROGRESS NOTE ADULT - PROBLEM SELECTOR PROBLEM 6
Alcohol use
Diabetes mellitus type 2, insulin dependent
Alcohol use

## 2020-05-29 NOTE — ASSESSMENT
[FreeTextEntry1] : \par Due for labs, ordered in system. Will go to lab for blood draw. Check A1c as well. Also check for COVID-19 Abs for exposure.

## 2020-05-29 NOTE — HISTORY OF PRESENT ILLNESS
[Home] : at home, [unfilled] , at the time of the visit. [Medical Office: ([facility])___] : at the medical office located in  [Verbal consent obtained from patient] : the patient, [unfilled] [de-identified] : Checking sugars regularly, reports good control 130s fasting on average. Feels intermittent dizziness. Trying to refrain from EtOH use, last drink was 2 weeks ago. Denies cravings for alcohol. [FreeTextEntry1] : follow-up diabetes

## 2020-05-29 NOTE — ED ADULT NURSE NOTE - RESPIRATORY ASSESSMENT
Informed patient of results and recommendations.  Patient verbalized understanding of plan of care.  Will call back should questions arise.    WDL

## 2020-06-03 PROBLEM — Z87.898 HISTORY OF BACTEREMIA: Status: RESOLVED | Noted: 2017-03-22 | Resolved: 2020-01-01

## 2020-06-03 PROBLEM — K74.60 CIRRHOSIS: Noted: 2017-02-01

## 2020-06-03 NOTE — REASON FOR VISIT
[Home] : at home, [unfilled] , at the time of the visit. [Other Location: e.g. Home (Enter Location, City,State)___] : at [unfilled] [Verbal consent obtained from patient] : the patient, [unfilled] [Post Hospitalization] : a post hospitalization visit

## 2020-06-03 NOTE — ASSESSMENT
[FreeTextEntry1] : 45 yo M with vitiligo, obesity, IDDM2 (with last HbA1c 7.2% on 6/1/20), and decompensated cirrhosis secondary to ALD/SIMPSON and complicated by ascites, esophageal varices, PHG, and portal hypertensive colopathy, with recent hospitalization for acute on chronic liver failure (ACLF) due to probable alcoholic hepatitis (steroid non-responder) with marked liver synthetic dysfunction, SAM, and hyponatremia.\par \par # Acute on chronic liver failure (ACLF) due to alcoholic hepatitis superimposed on cirrhosis:\par - MELD-Na 38 based on labs from 6/1/20, with increased bilirubin and Cr and slightly decreased INR compared to when he was recently discharged from the hospital.\par - He is at very high risk for short-term mortality in the absence of liver transplantation.\par - He appears very motivated to remain abstinent from alcohol after his recent relapse and promptly engaged in an alcohol relapse prevention program after being discharged from  hospital. Although liver transplant candidacy will likely depend on his maintaining sobriety for some longer duration, will arrange outpatient liver transplant evaluation starting next week so that pre-transplant consultations and testing can be completed.\par \par # Non-oliguric SAM:\par - Differential diagnosis includes bile cast nephropathy and hepatorenal syndrome (HRS).\par - Cr has slightly worsened compared to when he was discharged (Cr 1.94 on 5/27/20 -> 2.31 on 6/1/20).\par - Will re-check labs next week and may need re-admission for IV albumin and consideration of HRS therapies if worsening, particularly if he becomes oliguric.\par \par # Ascites:\par - Has small to moderate ascites, but currently remains off diuretics due to SAM.\par - Continue Bactrim DS 1 tab po daily for SBP prophylaxis (given prior history of quinolone-resistant E. coli infection).\par - Mr. LIM was counseled to adhere to a low sodium (<2 grams of sodium per day) diet by: avoiding adding any salt to meals (removing the salt shaker from the table); eliminating salty foods from his diet; eating more home-cooked meals; choosing fresh or frozen, not canned, vegetables and fruits; and reading ingredient and food labels to choose low sodium foods.\par \par # Esophageal varices:\par - No recent overt GI bleeding.\par - Last EGD (4/2019) with small EV.\par - Will discuss timing of repeat EGD for variceal surveillance at next visit.\par - He is not a candidate for beta-blocker prophylaxis at this time (risks outweigh benefits).\par \par # Health maintenance:\par - Will check HAV IgG with next labs in order to vaccinate if non-immune.\par - He is HBV non-immune and will need vaccination.\par - Mr. LIM was counseled to: abstain from alcohol and all illicit drugs; avoid use of herbal and dietary supplements due to potential hepatotoxicity; limit use of acetaminophen to <2 grams per day; avoid use of nonsteroidal antiinflammatory drugs (NSAIDs) as these can lead to diuretic resistance and precipitate renal dysfunction in patients with advanced liver disease; avoid eating any unpasteurized dairy products; avoid eating any raw or undercooked eggs, fish, poultry, or meat; and avoid eating raw/steamed oysters or other shellfish to avoid risk of Vibrio infection.\par \par He will return for follow-up in 1 week.

## 2020-06-03 NOTE — REVIEW OF SYSTEMS
[Feeling Tired] : feeling tired [As Noted in HPI] : as noted in HPI [FreeTextEntry3] : yellow eyes [Negative] : Heme/Lymph [de-identified] : j

## 2020-06-03 NOTE — CONSULT LETTER
[Dear  ___] : Dear  [unfilled], [FreeTextEntry2] : Dr. Rj Woods [Courtesy Letter:] : I had the pleasure of seeing your patient, [unfilled], in my office today. [Please see my note below.] : Please see my note below. [FreeTextEntry3] : Sincerely,\par \par Agapito Ayaal M.D., Ph.D.\par  of Medicine\par [facility]\par [address] [place] [address]\[place] [address]\par Tel: [phone]\par Fax: [phone]\par Cell: [phone]\par E-mail: [email or web address]\par

## 2020-06-03 NOTE — HISTORY OF PRESENT ILLNESS
[de-identified] : Mr. Hallman is a 43 yo M with vitiligo, obesity, IDDM2 (with last HbA1c 7.2% on 20), and decompensated cirrhosis secondary to ALD/SIMPSON.\par \par He has been a patient of hepatologist Dr. Andre Thakkar for many years and was sober for several years until his mother  recently, when he had a significant alcohol relapse. He was recently hospitalized at [facility] from 5/15/20-20 due to acute on chronic liver failure (ACLF) due to probable alcoholic hepatitis superimposed on his cirrhosis and complicated by non-oliguric SAM (to peak Cr 3.82 on 5/15/20) and hyponatremia. He was a non-responder to corticosteroid therapy (20-20) and N-acetylcysteine (20-20). He did not have sufficient ascites for a diagnostic paracentesis, but was empirically treated with ceftriaxone (5/15/20-20) and then twice daily ciprofloxacin (20-20) until being transitioned to once daily Bactrim DS for prophylaxis given a remote history of quinolone-resistant E. coli infection. Regarding his SAM, initial urine Na was <35 and he received IV albumin with partial improvement for presumed prerenal azotemia. However, his Cr plateaued around 1.9-2.0 during the latter part of his hospitalization (since 20) and IV albumin was discontinued on 20. Repeat urine Na remained <35, concerning for possible hepatorenal syndrome (HRS). Due to his recent alcohol relapse despite known advanced liver disease, he was not felt to be a candidate for expedited inpatient liver transplant evaluation but was strongly advised to enroll in an alcohol relapse prevention program when discharged so that liver transplant candidacy might be able to be re-evaluated on an outpatient basis. He was discharged home on 20.\par \par EGD (19): Small EV, moderate PHG, gastric erosions, and duodenal erosions/erythema.\par \par Colonoscopy (17): Adequate bowel preparation. Mild portal hypertensive colopathy in transverse colon. Otherwise normal ileocolonoscopy.\par \par CT abdomen/pelvis without contrast (5/15/20): Cirrhosis, splenomegaly, small to moderate ascites, extensive large upper abdominal varices including patent periumbilical vein and left abdominal wall varices, mild bilateral gynecomastia.\par \par Doppler US (20): Patent hepatic and portal veins with hepatofugal flow in the very distal main portal vein and anterior and posterior branches of the right portal vein.\par \par Renal US (20): Increased renal parenchymal echogenicity bilaterally. Normal sized kidneys (RK 13.2 cm, LK 13.1 cm).\par \par Limited US (20): Small ascites.\par \par Since being discharged home, he reports feeling fatigued but overall "okay". He gets some nausea whenever he takes the repaglinide but without vomiting and with improvement after eating. He has some early satiety. He continues to have jaundice and scleral icterus. He denies any confusion, difficulty sleeping, overt GI bleeding, dyspnea, abdominal distension, or lower extremity swelling.\par \par He has started participating in a virtual alcohol relapse prevention program (located in [place]). He participate in his second small group session yesterday and has his next one tomorrow. He denies any recent alcohol cravings or slips.

## 2020-06-09 PROBLEM — L29.9 PRURITUS: Status: ACTIVE | Noted: 2020-01-01

## 2020-06-09 NOTE — PLAN
[FreeTextEntry1] : \par 44 y.o M hx decompensated cirrhosis 2/2 ALD/ , Vitiligo, T2DM, treated latent TB  SAM, with MELD 38. Longstanding relapse. Not a candidate at this time\par \par Plan: Relapse prevention, etoh testing, SW, MRI and ECHO. Spoke with patient and Glo in detail.\par

## 2020-06-09 NOTE — ASSESSMENT
[Not a candidate] : not a candidate. We should not proceed with our protocol for evaluation for liver transplantation. [FreeTextEntry1] : Patient agrees to proceed with the full evaluation for liver transplantation. \par \par Patient was explained alternatives, benefits and risk of liver transplantation, including but not limited to infection, bleeding, hepatic artery or portal vein thrombosis, primary dysfunction or primary non-function of the liver allograft, cardiopulmonary arrest, intra-operative death and other surgical, medical and psychosocial risks as outlined in the evaluation consent form.  she understands these risks and is willing to proceed with liver transplantation.\par Patient was also explained the need to remain on lifelong anti-rejection medications.  We discussed the risks and side effects of immunosuppressive medications including, but not limited to infection, cancer, weight gain, new onset or worsening of diabetes or hypertension in a temporary or permanent state, kidney dysfunction, water retention, back pain, constipation, diarrhea, dizziness, headache, joint pain, loss of appetite, nausea, stomach pain or upset, trouble sleeping, vomiting, and mental or mood changes.  An overview of the follow-up protocol was reviewed including outpatient visits, blood tests and the potential for hospital readmission. She understands these risks and is willing to proceed with liver transplantation.\par \par We also discussed the available donor organ pool. We discussed the assessment of the  donor including age, cause of death, cardiac arrest, electrolyte abnormalities, course and length of hospital stay, use of vasopressors, hepatitis and HIV testing. We reviewed organ donor risk factors that could affect the success of the graft or the health of the patient, including, but not limited to, the donor's history, condition or age of the organs used, or the patient's potential risk of bernadine the human immunodeficiency virus and other infectious diseases if the disease cannot be detected in an infected donor.  We discussed and defined the option of an extended criteria for cadaveric donors (Hepatitis B core Ab positive donor, Hepatitis C Ab positive donor, steatosis, older donors, split livers and DCD donors) and early and late outcomes of graft survival after transplantation.\par \par The options of  donor liver transplantation vs. live donor liver transplantation were discussed with them. Differences between donation after cardiac death (DCD) compared to donation after brain death (DBD) liver transplantation were also fully disclosed and included lower graft survival rates, the increased incidence of hepatic artery stenosis, bile duct injury, ischemic cholangiopathy and increased re transplant rates seen in recipients of DCD donor livers.\par The use of the U.S. Public Health Services (PHS) Guideline has defined some donors as "Increased Risk Donors" based on their history which may suggest socially increased risk behaviors was discussed. The patient is aware that if PHS increased risk donor is offered to the candidate, the transplant team will discuss the specifics to assist with making an informed decision. We discussed our post-transplant protocol of serology testing if the candidate receives an organ that is PHS increased risk.\par The patient was made aware that it is against the law to be paid or to pay to donate an organ.  If any money was given or will be given in exchange for receiving an organ, the patient may be subject to criminal prosecution, and any insurance coverage may no longer apply and patient may become personally responsible for all the health care costs associated with the donation, and private health information will be available to law enforcement agencies.\par We explained that we store vessels for subsequent later use in transplants.  Again, we discussed the extensive testing done on  donors prior to donation, however despite an extensive evaluation on the donor, there is potential risk a recipient may contract infectious diseases (HIV or Hepatitis) or cancer if they cannot be detected in the donor. In the cases where PHS Increased Risk donor vessels are used, we test the recipient per protocol between 1-2 months post-transplant for any potential infectious disease transmission. The patient understands that there is the potential of use of  donor vessels and PHS increased risk donor vessels. She understands these risks and is willing to receive potentially PHS increased risk donor vessels.\par \par We also discussed the MELD allocation system in depth and the one-year observed and expected patient and graft survival rates according to data from the Scientific Registry for Transplant Recipients. These center specific outcomes were provided in comparison to the national one-year averages as described in the evaluation consent forms.\par \par Prior to signing consent, patient was given an opportunity to ask questions.  After all concerns were addressed, informed consent was signed. Patient is aware that they may withdraw their consent for transplantation at any time.  Further, the patient is aware of the right to refuse an organ offer without penalty at any time. He has consented for PHS increased risk but not C.\par \par

## 2020-06-09 NOTE — REASON FOR VISIT
[Initial] : an initial visit for [Liver Transplant Evaluation] : liver transplant evaluation [Consent for evaluation completed] : Consent for evaluation completed [Other: _____] : [unfilled] [FreeTextEntry1] : ETOH [Spouse] : spouse

## 2020-06-09 NOTE — REVIEW OF SYSTEMS
[Fatigue] : fatigue [Recent Weight Loss (___ Lbs)] : recent [unfilled] ~Ulb weight loss [Sore throat] : no sore throat [Chest Pain] : no chest pain [Blurred Vision] : blurred vision [SOB] : no shortness of breath [Pleuritic Chest Pain] : no pleuritic chest pain [Vomiting] : no vomiting [Abdominal Pain] : no abdominal pain [Dysuria] : no dysuria [Joint Pain] : no joint pain [Tattoos] : no tattoos [Confusion] : confusion [Negative] : Psychiatric [de-identified] : [place]

## 2020-06-09 NOTE — HISTORY OF PRESENT ILLNESS
[Alcoholic Liver Disease] : Alcoholic Liver Disease [Ascites] : Ascites [Variceal Hemorrhage] : Variceal Hemorrhage [Nonalcoholic Steatohepatitis (SIMPSON)] : Nonalcoholic Steatohepatitis (SIMPSON) [Diabetes] : History of diabetes [Hepatic Encephalopathy] : Hepatic Encephalopathy [FreeTextEntry2] : O [FreeTextEntry1] : 38 [TextBox_42] : 44.y.o [place]  Male here for initial liver transplant evaluation. He has a PMH of vitiligo, positive PPD in his 20's, latent TB treated 1 yr with INH , h/o C. diff (, 2017), T2DM (last HbA1c 7.2% on 20), h/o decompensated liver disease secondary to ALD/ c/b, HE, ascites, SBP?, and variceal hemorrhage 6 yrs ago. His current MELD 38 based on labs dated 20, ABO O.He has had a recent significant relapse of ETOH requiring hospital admission at the end of May. He has entered rehab via telemedicine and will remain in the program, Alvarez to follow.  His ETOH history is extensive with the start at age 19, one detox at [facility] and multiple start and stops. Most recently he  was drinking and started heavier use in March and very heavy use after his Grandmother  in April. In the past 6 yrs ago he had a variceal bleed and was told to stop drinking.\par \par Sx Hx: Left Eye corneal Tx \par \par ETOH Hx: Began drinking at age 19 1 bottle of tequila and 12 pack of beer for about 7yrs. Sober for 8yrs, Resumed drinking age 33  about 10 beers per day until  age [age] stopped for about 2 yrs.  Denies DWI. Multiple attempts to quit, able to quit for 2 yrs then binges. Resumed drinking 2020 and more heavily in 2020  after the death of his grandmother.  Dewayne reports he underwent a Detox treatment in [place], [place] in 2019. \par \par Family Hx of ETOH abuse, Father was drinker, brother and sisters also abuse alcohol. \par \par Last hospitalization At [facility] 5/15/2020-20 due to acute on chronic liver failure (ACLF) due to probable alcoholic hepatitis superimposed on his cirrhosis and complicated by non-oliguric SAM (to peak Cr 3.82 on 5/15/20) and hyponatremia. He did not respond to Corticosteroids and NAC and was empirically treated with Ceftriaxone. He was transitioned to once daily Bactrim DS for prophylaxis given a remote history of quinolone-resistant E. coli infection.\par During his hospitalization he was deferred for expedited transplant evaluation d/t his recent ETOH relapse and was advised to enroll in a relapse prevention program. \par \par RPP-\par \par LABS 20- TB 57, , ALT 63, , Cr 2.31, Na 135, PLT 62 H/H 9.8/29.8, Direct Bili >20, Phosphatidyethanol 109, Covid AB negative\par \par Most recent records reviewed: \par EGD (19): Small EV, moderate PHG, gastric erosions, and duodenal erosions/erythema.\par \par Colonoscopy (17): Adequate bowel preparation. Mild portal hypertensive colopathy in transverse colon. Otherwise normal ileocolonoscopy.\par \par CT abdomen/pelvis without contrast (5/15/20): Cirrhosis, splenomegaly, small to moderate ascites, extensive large upper abdominal varices including patent periumbilical vein and left abdominal wall varices, mild bilateral gynecomastia.\par \par Doppler US (20): Patent hepatic and portal veins with hepatofugal flow in the very distal main portal vein and anterior and posterior branches of the right portal vein.\par \par Renal US (20): Increased renal parenchymal echogenicity bilaterally. Normal sized kidneys (RK 13.2 cm, LK 13.1 cm).\par \par Limited US (20): Small ascites.\par \par Functional Status: able to walk short distance \par Social Hx: Born in [facility], came to [place] age 14. Not working, previously worked as a . \par No biological children, significant other Glo of 20 yrs, 2 stepchildren \par \par Smoker 1-2 cig \par

## 2020-06-09 NOTE — PHYSICAL EXAM
[Alert] : alert [Scleral Icterus] : scleral icterus [Umbilical Hernia] : umbilical hernia [Spider Angioma] : spider angioma [Jaundice] : jaundice [Hepatic Encephalopathy] : hepatic encephalopathy [Asterixis] : asterixis [Abdominal Bruit] : no abdominal bruit [Splenomegaly] : splenomegaly [Soft] : soft [No Edema] : no edema [] : left femoral palpable [de-identified] : 3/6 systolic ejection murmur  [de-identified] : Liver appears to be not as enlarged [de-identified] : g [de-identified] : vitiligo, pruitis, gynecomastia  [FreeTextEntry1] : 44 year old with mild HE, very jaundiced with recent exacerbation of liver disease by ETOH consumption. Longstanding ETOH history. Now in virtual rehab. Very supportive significant other for 20+ years, Glo works for [employer]. Will follow needs longstanding rehab, not a candidate at this time.

## 2020-06-10 NOTE — H&P ADULT - ATTENDING COMMENTS
Patient seen with Sub-intern.  Reports poor appetite for one week, unable to keep food down the past three days.  Now sent in from hepatology clinic for SAM, concern for hepatorenal syndrome.  Labs also remarkable for hypokalemia (but interestingly, no hypokalemia on VBG from the same time).  Also with metabolic acidosis that seems stable from one week ago.  Will treat with Octreotide and Midodrine for possible hepatorenal syndrome.  Supplementing K.  Would repeat BMP now.  Hold Lactulose for now given concern for pre-renal component of this SAM (given recent nausea and decreased PO intake) and given the hypokalemia.  Await renal input.  DM type 2, will cover with sliding scale and monitor blood sugars.    Remainder of plan as outlined above. Patient seen with Sub-intern.  Reports poor appetite for one week, unable to keep food down the past three days.  Now sent in from hepatology clinic for SAM, concern for hepatorenal syndrome.  Labs also remarkable for hypokalemia (but interestingly, no hypokalemia on VBG from the same time).  Also with metabolic acidosis that seems stable from one week ago.  Will treat with Octreotide and Midodrine for possible hepatorenal syndrome.  Supplementing K.  Would repeat BMP now.  Hold Lactulose for now given concern for pre-renal component of this SAM (given recent nausea and decreased PO intake) and given the hypokalemia.  Await renal input.  I was unable to locate EKG in chart, but per team it showed NSR with mild QT prolongation, likely due to hypokalemia.  Supplementing K as above and would repeat EKG in AM.  DM type 2, will cover with sliding scale and monitor blood sugars.    Remainder of plan as outlined above.

## 2020-06-10 NOTE — H&P ADULT - ASSESSMENT
45 y/o M PMHx alcoholic hepatitis, decompensated etOH cirrhosis (c/b ascites, SBP, and variceal hemorrhage),  latent TB (treated), h/o C. diff (2016, 2017), and T2DM (HbA1c 7.9% in 10/2019), sent to ED by hepatology clinic for rise in Cre on outpatient labs, admitted for treatment of hepatorenal syndrome vs. SAM. 43 y/o M PMHx alcoholic hepatitis, decompensated etOH cirrhosis (c/b ascites, SBP, and variceal hemorrhage),  latent TB (treated), h/o C. diff (2016, 2017), and T2DM (HbA1c 7.9% in 10/2019), sent to ED by hepatology clinic for rise in Cre on outpatient labs, admitted for treatment of SAM with concern for hepatorenal syndrome.

## 2020-06-10 NOTE — H&P ADULT - PROBLEM SELECTOR PLAN 3
Patient found to have K of 2.6 upon admission.  -Received 40mEq KCl in the ED  -Will continue to replete with KCl IV at 10meQ/hr Patient found to have K of 2.6 upon admission  -Received 40mEq KCl in the ED  -Will continue to replete with KCl IV at 10meQ/hr

## 2020-06-10 NOTE — H&P ADULT - NSHPLABSRESULTS_GEN_ALL_CORE
CBC Full  -  ( 10 Duran 2020 12:32 )  WBC Count : 6.90 K/uL  RBC Count : 3.24 M/uL  Hemoglobin : 9.0 g/dL  Hematocrit : 25.8 %  Platelet Count - Automated : 72 K/uL  Mean Cell Volume : 79.6 fl  Mean Cell Hemoglobin : 27.8 pg  Mean Cell Hemoglobin Concentration : 34.9 gm/dL  Auto Neutrophil # : x  Auto Lymphocyte # : x  Auto Monocyte # : x  Auto Eosinophil # : x  Auto Basophil # : x  Auto Neutrophil % : x  Auto Lymphocyte % : x  Auto Monocyte % : x  Auto Eosinophil % : x  Auto Basophil % : x    06-10    133<L>  |  104  |  46<H>  ----------------------------<  124<H>  2.6<LL>   |  10<LL>  |  3.81<H>    Ca    8.3<L>      10 Duran 2020 12:32  Phos  3.2     06-10  Mg     2.0     06-10    TPro  5.5<L>  /  Alb  4.1  /  TBili  50.0<H>  /  DBili  x   /  AST  87<H>  /  ALT  48<H>  /  AlkPhos  131<H>  06-10    from: Xray Chest 1 View- PORTABLE-Urgent (06.10.20 @ 13:06) >    INTERPRETATION:  A single chest x-ray was obtained on Radha 10, 2020.    Indication: Chest pain.    Impression:    The heart is normal in size. The lungs are clear. No pleural effusion. No pneumothorax. No acute bony pathology could be identified. No change in the appear of the chest when compared to previous study done on May 15, 2020.        from: US Kidney and Bladder (06.10.20 @ 16:01)    INTERPRETATION:  CLINICAL INFORMATION: Patient with cirrhosis. AK I. Creatinine is 3.81    COMPARISON: 6/22/2017 abdominal sonography    TECHNIQUE: Sonography of the kidneys and bladder.     FINDINGS:  Right kidney:  15.2 cm. No renal mass, hydronephrosis or calculi. Increased parenchymal echogenicity. Renal cortical thickness is preserved.  Left kidney:  13.8 cm. No renal mass, hydronephrosis or calculi. Increased parenchymal echogenicity. Renal cortical thickness is preserved.    Urinary bladder: Underdistended.    Splenomegaly is noted. There is mild ascites.    IMPRESSION:     Renal parenchymal disease. No hydronephrosis.

## 2020-06-10 NOTE — ED PROVIDER NOTE - ATTENDING CONTRIBUTION TO CARE
45 yo M w/h/o uncontrolled T2DM (8.8%) on Levemir plus Metformin. DM c/b neuropathy and CKD. Also h/o latent TB, C. diff (2016, 2017), h/o alcoholic hepatitis, and decompensated EtOH cirrhosis c/b ascites, SBP, and variceal hemorrhage in the remote past, presents with jaundice and weakness x 2 days.  Patient is a recovered alcoholic but states he started drinking again about 1 month ago because of mother passed away. Pt started on steroids for alcohol hepatitis with steroid induced hyperglycemia> now off steroids. pt presents to the ER w/ worsening labs, elevated creatinine, MELD >40

## 2020-06-10 NOTE — CONSULT NOTE ADULT - SUBJECTIVE AND OBJECTIVE BOX
Chief Complaint:  Patient is a 44y old  Male who presents with a chief complaint of     HPI:  43 yo M with PMHx of virtiligo, obesity, DM2, and decompensated cirrhosis 2/2 SIMPSON/ALD (last drink was 2020) now presenting for worsening SAM on outpatient labs.    Patient was recently admitted from 5/15 to  for acute alcoholic hepatitis on chronic liver failure as well as SAM and hyponatremia in the setting of alcohol use due to life stressors at home (mother recently ). He had previously be abstinent for many years. He was given steroids during that admission as well as NAC but was not responsive. He was also started on Bactrim for a known history of fluoroquinolone resistent Ecoli infection. He was never tapped during that admission to r/o SBP. His SAM mildly improved with albumin during the admission but there was additional concern for possible HRS since it did not improve back to baseline. He was not deemed a transplant candidate during the hospitalization but could be as an outpatient. He was discharged on  and then f/u with Dr. Ayala on 6/3 as well as  for cirrhosis management and transplant evaluation.    Following discharge patient reports that he feels okay but has had decreased appetite as well as N/V due to his diabetes medication. He denies any diarrhea or abdominal pain. He has had no fevers or chills. No melena or BRbpR or abdominal distension. No other new medications aside from Bactrim from the prior hospitalization.      Allergies:  levofloxacin (Other (Moderate))      Home Medications:    Hospital Medications:      PMHX/PSHX:  E. coli bacteremia  Vitiligo  Latent tuberculosis  Diabetes mellitus type 2, insulin dependent  Esophageal varices  Alcoholic cirrhosis  Status post corneal transplant  Alcoholic Cirrhosis  Esophageal Varices      Family history:  Family history of uterine cancer  Family history of diabetes mellitus      Social History:     ROS:     General:  No wt loss, fevers, chills, night sweats, fatigue,   Eyes:  Good vision, no reported pain  ENT:  No sore throat, pain, runny nose, dysphagia  CV:  No pain, palpitations, hypo/hypertension  Resp:  No dyspnea, cough, tachypnea, wheezing  GI:  See HPI  :  No pain, bleeding, incontinence, nocturia  Muscle:  No pain, weakness  Neuro:  No weakness, tingling, memory problems  Psych:  No fatigue, insomnia, mood problems, depression  Endocrine:  No polyuria, polydipsia, cold/heat intolerance  Heme:  No petechiae, ecchymosis, easy bruisability  Skin:  No rash, edema      PHYSICAL EXAM:     GENERAL:  NAD  HEENT:  sclera icteric  CHEST:  Full & symmetric excursion  HEART:  Regular rhythm, S1, S2  ABDOMEN:  Soft, non-tender, non-distended,   EXTREMITIES:  no cyanosis,clubbing or edema  SKIN:  +virtiligo  NEURO:  Alert, oriented    Vital Signs:  Vital Signs Last 24 Hrs  T(C): 37.4 (10 Duran 2020 11:43), Max: 37.4 (10 Duran 2020 11:)  T(F): 99.3 (10 Duran 2020 11:), Max: 99.3 (10 Duran 2020 11:)  HR: 81 (10 Duran 2020 11:) (81 - 81)  BP: 116/57 (10 Duran 2020 11:43) (116/57 - 116/57)  BP(mean): --  RR: 18 (10 Duran 2020 11:) (18 - 18)  SpO2: 98% (10 Duran 2020 11:) (98% - 98%)  Daily Height in cm: 172.72 (10 Duran 2020 11:43)    Daily     LABS:                        9.0    6.90  )-----------( 72       ( 10 Duran 2020 12:32 )             25.8       Mg     2.0     06-10        PT/INR - ( 10 Duran 2020 12:32 )   PT: 25.1 sec;   INR: 2.13 ratio         PTT - ( 10 Duran 2020 12:32 )  PTT:47.0 sec        Imaging:

## 2020-06-10 NOTE — CONSULT LETTER
[Dear  ___] : Dear  [unfilled], [Courtesy Letter:] : I had the pleasure of seeing your patient, [unfilled], in my office today. [Please see my note below.] : Please see my note below. [Consult Closing:] : Thank you very much for allowing me to participate in the care of this patient.  If you have any questions, please do not hesitate to contact me. [FreeTextEntry3] : Sincerely,\par \par Agapito Ayala M.D., Ph.D.\par  of Medicine\par [facility]\par [address] [place] [address]\[place] [address]\par Tel: [phone]\par Fax: [phone]\par Cell: [phone]\par E-mail: [email or web address]\par  [FreeTextEntry2] : Dr. Rj Woods

## 2020-06-10 NOTE — ASSESSMENT
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 His last reported alcoholic drink was on 5/12/20. His cirrhosis is complicated by a history of variceal hemorrhage, ascites, portal hypertensive gastropathy and colopathy, and, recently, SAM, hyponatremia, hepatic encephalopathy.\par \par # Hepatic encephalopathy:\par - Currently with [place] grade 2 HE, new since his last visit.\par - Will check labs today to assess for any signs of infection. No localizing symptoms and he does not appear to have sufficient ascites on exam to pursue diagnostic paracentesis.\par - Start lactulose 30 mL po tid. I discussed with Mr. Hallman and Glo how to self-titrate his lactulose frequency in order to achieve and maintain 3-4 BMs/day.\par - Start rifaximin 550 mg po bid.\par - Advised to promptly notify us if not improving or worsening despite the above medications.\par \par # Non-oliguric SAM:\par - Last Cr 2.31 on 6/1/20. Will re-check labs today.\par - Currently off diuretics, and as ascites mild on exam and with no peripheral edema, will defer re-starting diuretics pending laboratory results.\par - No indication to begin midodrine therapy at this time given recent BPs while hospitalized and in office today.\par - If worsening, may need re-admission for consideration of IV albumin therapy.\par \par # Ascites:\par - Small in volume currently, does not appear sufficient for diagnostic paracentesis.\par - Currently holding diuretics, as above.\par - Continue Bactrim DS 1 tab po daily for SBP prophylaxis.\par - Mr. HALLMAN was counseled to adhere to a low sodium (<2 grams of sodium per day) diet by: avoiding adding any salt to meals (removing the salt shaker from the table); eliminating salty foods from his diet; eating more home-cooked meals; choosing fresh or frozen, not canned, vegetables and fruits; and reading ingredient and food labels to choose low sodium foods.\par \par # History of esophageal variceal hemorrhage:\par - Last EGD in 4/2019 with small EV.\par - No recent overt GI bleeding.\par - Defer repeat surveillance EGD for now (risks outweigh benefits) but this will be re-assessed on an ongoing basis.\par - He is not a candidate for beta-blocker prophylaxis at this time (risks outweigh benefits).\par \par # Pruritus: Start trial of cholestyramine 4g po qhs.\par \par # Protein-calorie malnutrition: We discussed the importance of small, frequent meals (5-6 times/day), trying to include 1-2 diabetic-friendly protein shakes such as Glucerna daily, and including a bedtime snack.\par \par # Decompensated alcohol-related cirrhosis with ACLF:\par - Recent MELD-Na 38 (6/1/20). Will re-check labs today.\par - He is at very high risk for short-term mortality in the absence of liver transplantation.\par - MRI ordered today for HCC surveillance and to re-evaluate patency of henrique-mesenteric venous system.\par \par # Health maintenance:\par - Will check HAV IgG with next labs in order to vaccinate if non-immune.\par - He is HBV non-immune and will need vaccination.\par - Mr. HALLMAN was counseled to: abstain from alcohol and all illicit drugs; avoid use of herbal and dietary supplements due to potential hepatotoxicity; limit use of acetaminophen to <2 grams per day; avoid use of nonsteroidal antiinflammatory drugs (NSAIDs) as these can lead to diuretic resistance and precipitate renal dysfunction in patients with advanced liver disease; avoid eating any unpasteurized dairy products; avoid eating any raw or undercooked eggs, fish, poultry, or meat; and avoid eating raw/steamed oysters or other shellfish to avoid risk of Vibrio infection.\par \par # Pre-liver transplant evaluation:\par - Based on my review of Mr. HALLMAN 's history, medical chart, review of systems, and my physical exam, I believe he is is not currently a candidate for liver transplantation given his multiple prior alcohol relapses despite known advanced liver disease, including very recently. However, he may develop into an appropriate candidate for liver transplantation if he remains engaged in a formal alcohol relapse prevention program and is able to achieve a longer duration of alcohol sobriety. Given his high MELD-Na and the hope that he may develop into an appropriate candidate, we should proceed with our protocol for evaluation for liver transplantation.\par \par I reviewed the following areas and provided time for questions to be asked and for information to be clarified and/or repeated.\par \par Evaluation process: I reviewed the results of tests and consults available to date and results of physical evaluation. I discussed the tests/consults that are required to complete an evaluation. I discussed the purpose of specific tests/consults, why they are needed, what the test involves, and discussed choices of where tests can be done. I discussed inclusion and exclusion criteria for organ transplant candidacy at [facility] and provided a copy of selection criteria, if requested. I advised him if alternative treatment options are available to them. I discussed the importance of abstinence from illicit, recreational, and prescriptive drug use. I discussed the importance of abstinence from alcohol use. I discussed the necessity of following a strict medical regimen post-transplant. I discussed the importance of an adequate support system to assist patient through evaluation, listing, and transplant process. The candidate was given the opportunity to ask questions.\par \par Candidate selection: I discussed the Recipient Review Committee (RRC) meeting process and consensus decision making of the team. I discussed that the team will decide if an organ transplant is indicated and if the patient is a suitable candidate medically, surgically, psychosocially, and financially. If alternative treatments are identified by the team, this will be discussed with the patient. The candidate was given the opportunity to ask questions.\par \par Waiting list: I discussed liver allocation and MELD/PELD system, prioritization on the waiting list, and average waiting time for patients based on their disease etiology. I discussed the need for periodic MELD/PELD recertification per OPTN/UNOS regulations and the consequence of not completing the requested tests/procedures on time. I discussed multiple listing options and the option to transfer his waiting time to another center. I discussed the possible progression and complications of their disease, and the possibility that he may not be a candidate in the future for organ transplant. I discussed with the patient that if he uses alcohol or any recreational drugs while on the waiting list or if he is non-adherent with follow-up or medications, he will be removed from the transplant waiting list. I advised the patient of his right to withdraw consent for transplant at any time. The candidate was given the opportunity to ask questions.\par \par Complications of liver disease: I discussed signs and symptoms of progressive liver disease or complications requiring medical attention. I discussed the complication of ascites/edema and generalized fluid overload. I discussed the complication of infections and spontaneous bacterial peritonitis. I discussed the development/worsening of renal insufficiency. I discussed generalized malaise, fatigue, and muscle wasting. I discussed encephalopathy, including the prevention, treatment, and worsening of the condition. I discussed GI bleeding, which is a medical emergency if present, and the need for urgent/emergent medical care. I discussed the possibility of development or worsening of HCC while on the waiting list. I discussed the possibility of progression of HCC beyond criteria for liver transplant. I reviewed the procedure for HCC cases when a donor liver becomes available, namely that the patient will be taken to the OR and explored prior to transplant. If any tumor is found outside of the liver, the transplant procedure will be cancelled and the abdomen will be closed. The candidate was given the opportunity to ask questions.\par \par He will return for follow-up in 1 week.

## 2020-06-10 NOTE — H&P ADULT - NSHPPHYSICALEXAM_GEN_ALL_CORE
PHYSICAL EXAM  GENERAL: NAD, lying comfortably in bed   HEAD:  Atraumatic, Normocephalic  EYES: EOMI b/l, PERRLA b/l, R eye cataract. Scleral icterus present b/l   NECK: Supple, No JVD, No LAD   CHEST/LUNG: Clear to auscultation bilaterally; No crackles or wheezes  HEART: Regular rate and rhythm; S1 and S2 present, No murmurs, rubs, or gallops  ABDOMEN: Distended, Soft, Nontender to palpation. Bowel sounds present. -fluid wave  EXTREMITIES:  2+ Peripheral Pulses, No clubbing, cyanosis, or edema  NEURO: AAOx3, non-focal   SKIN: Profound jaundice present throughout body. hypopigmented patches scattered throughout, consistent with vitiligo pattern PHYSICAL EXAM  GENERAL: NAD, lying comfortably in bed   HEAD:  Atraumatic, Normocephalic  EYES: EOMI b/l, PERRLA b/l, R eye cataract. Scleral icterus present b/l   NECK: Supple, No JVD, No LAD   CHEST/LUNG: Clear to auscultation bilaterally; No crackles or wheezes  HEART: Regular rate and rhythm; S1 and S2 present. Systolic flow murmur appreciated over the L sternal border.   ABDOMEN: Distended, Soft, Nontender to palpation. Bowel sounds present. -fluid wave  EXTREMITIES:  2+ Peripheral Pulses, Clubbing present in the phalanges b/l   NEURO: AAOx3, non-focal   SKIN: Profound jaundice present throughout body. hypopigmented patches scattered throughout, consistent with vitiligo pattern PHYSICAL EXAM  GENERAL: NAD, lying comfortably in bed   HEAD:  Atraumatic, Normocephalic  EYES: EOMI b/l.  Right eye dilated, non-reactive.  Left pupil round, reacts to light. Scleral icterus present b/l   NECK: Supple, No JVD, No LAD   CHEST/LUNG: Clear to auscultation bilaterally; No crackles or wheezes  HEART: Regular rate and rhythm; S1 and S2 present. Systolic flow murmur appreciated over the L sternal border.   ABDOMEN: Distended, Soft, Nontender to palpation. Bowel sounds present. -fluid wave  EXTREMITIES:  2+ Peripheral Pulses, Clubbing present in the phalanges b/l   NEURO: AAOx3, non-focal   SKIN: Profound jaundice present throughout body. hypopigmented patches scattered throughout, consistent with vitiligo pattern

## 2020-06-10 NOTE — H&P ADULT - NSHPSOCIALHISTORY_GEN_ALL_CORE
Patient has history of alcohol use, but has not had a drink since March 2020. He used to smoke 1-2 cigarettes per day for 3 years (ages [age]) but has not smoked since then. He denies any other recreational drug use.

## 2020-06-10 NOTE — H&P ADULT - PROBLEM SELECTOR PLAN 7
1.  Name of PCP: Dr. Woods  2.  PCP Contacted on Admission: [s] Y    [ ] N    3.  PCP contacted at Discharge: [ ] Y    [ ] N    [ ] N/A  4.  Post-Discharge Appointment Date and Location:  5.  Summary of Handoff given to PCP:

## 2020-06-10 NOTE — ED ADULT NURSE NOTE - OBJECTIVE STATEMENT
44 yrs male with h/o alcoholism and liver cirrhosis , present to the ER for abnormal labs. Pt reported that he went for his routine f/u with the hepatologist when he was told to come to the ER to be admitted. Pt icteric +++ and admitted that his last alcoholic beverage was 3 weeks ago. Ascitics noted to abd, denies pain presently.

## 2020-06-10 NOTE — ED PROVIDER NOTE - OBJECTIVE STATEMENT
44y male with PMH of DM, EtOH cirrhosis presenting with elevated creatinine. had labs from 6/9, sent for elevated creatinine. States that he has felt weak and dizzy as well. Last drink was 3 weeks ago when he was admitted for alcoholic cirrhosis, jaundice. Denies fevers, chills, abdominal pain, nausea, vomiting, chest pain, SOB.

## 2020-06-10 NOTE — H&P ADULT - HISTORY OF PRESENT ILLNESS
43 y/o M PMHx alcoholic hepatitis, decompensated etOH cirrhosis (c/b ascites, SBP, and variceal hemorrhage),  latent TB, h/o C. diff (2016, 2017), and T2DM (HbA1c 7.9% in 10/2019),  sent to ED by clinic for rise in Cr on outpatient labs. 43 y/o M PMHx alcoholic hepatitis, decompensated etOH cirrhosis (c/b ascites, SBP, and variceal hemorrhage),  latent TB (treated), h/o C. diff (2016, 2017), and T2DM (HbA1c 7.9% in 10/2019), sent to ED by hepatology clinic for rise in Cre on outpatient labs. Patient describes having nausea with one episode of non-bloody emesis daily for the past 2-3 days that is associated with eating and taking his new diabetes medication, repaglinide. He has felt weak, tired, and dizzy for the past 2-3 days, as well. He has not had fever or any sick contacts. He has had regular, non-bloody BMs. His last alcoholic drink was in March 2020, at which time he was drinking 10-11 beers each night, but he has not had an alcoholic drink since then. 43 y/o M PMHx alcoholic hepatitis, decompensated etOH cirrhosis (c/b ascites, SBP, and variceal hemorrhage),  latent TB (treated), h/o C. diff (2016, 2017), Vitiligo and T2DM (HbA1c 7.9% in 10/2019), sent to ED by hepatology clinic for rise in Creatinine on outpatient labs. Patient describes having nausea with one episode of non-bloody emesis daily for the past 2-3 days that is associated with eating and taking his new diabetes medication, repaglinide. He has felt weak, tired, and dizzy for the past 2-3 days, as well. He has not had fever or any sick contacts. He has had regular, non-bloody BMs. His last alcoholic drink was in March 2020, at which time he was drinking 10-11 beers each night, but he has not had an alcoholic drink since then.

## 2020-06-10 NOTE — CONSULT NOTE ADULT - ASSESSMENT
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      1) Acute on Chronic kidney injury  Cr of 3.81 from 2 on prior admission.  DDx: prerenal (given decreased PO and N/V) vs. medication induced SAM vs. HRS  2) Decompensated liver disease  MELD Na 38 (5/27/2020)  Varices: 4/19/19 EGD showed small EV, portal HTN gastropathy, gastric and duodenal erosions  Ascites: small ascites seen on 5/27/20, no diuretics due to SAM  HE: no history  HCC: CTAP from 5/15/20 showed no HCC  3) Hypokalemia  K of 2.6 on admission    - please check Steffen and Ucreatinine and calculate FENA  - please start Albumin 25% q6 hours  - please hold Bactrim for now  - can start octreotide and midodrine for possible HRS  - renal consult  - replete K, check electrolytes daily and replete PRN  - MELD Na labs daily  - avoid nephrotoxic and hepatotoxic medications  - patient currently being evaluated for transplant by Dr. Ayala, will need to follow up with Dr. Ayala as well as Dr. Frazier about transplant candidacy. 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      1) Acute on Chronic kidney injury  Cr of 3.81 from 2 on prior admission.  DDx: prerenal (given decreased PO and N/V) vs. medication induced SAM vs. HRS  2) Decompensated liver disease  MELD Na 38 (5/27/2020)  Varices: 4/19/19 EGD showed small EV, portal HTN gastropathy, gastric and duodenal erosions  Ascites: small ascites seen on 5/27/20, no diuretics due to SAM  HE: no history  HCC: CTAP from 5/15/20 showed no HCC  3) Hypokalemia  K of 2.6 on admission    - given degree of hyperbilirubinemia, would obtain an MRCP to rule out biliary pathology  - please check Steffen and Ucreatinine and calculate FENA  - please start Albumin 25% q6 hours  - please hold Bactrim for now  - renal consult  - replete K, check electrolytes daily and replete PRN  - MELD Na labs daily  - avoid nephrotoxic and hepatotoxic medications  -will touch base with Dr. Ayala , patient's outpatient liver doctor. However the overall prognosis is guarded 43 yo M with PMHx of virtiligo, obesity, DM2, and 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      1) Acute on Chronic kidney injury  Cr of 3.81 from 2 on prior admission.  DDx: prerenal (given decreased PO and N/V) vs. medication induced SAM vs. HRS  2) Decompensated liver disease  MELD Na 38 (5/27/2020)  Varices: 4/19/19 EGD showed small EV, portal HTN gastropathy, gastric and duodenal erosions  Ascites: small ascites seen on 5/27/20, no diuretics due to SAM  HE: no history  HCC: CTAP from 5/15/20 showed no HCC  3) Hypokalemia  K of 2.6 on admission    - given degree of hyperbilirubinemia, would obtain an MRCP to rule out biliary pathology  - please check Steffen and Ucreatinine and calculate FENA  - please start Albumin 25% q6 hours  - please hold Bactrim for now  - renal consult  - replete K, check electrolytes daily and replete PRN  - MELD Na labs daily  - avoid nephrotoxic and hepatotoxic medications  -will touch base with Dr. Ayala , patient's outpatient liver doctor. However the overall prognosis is guarded  -low Na diet

## 2020-06-10 NOTE — H&P ADULT - PROBLEM SELECTOR PLAN 2
Patient with history of alcoholic cirrhosis with ascites, hx of SBP and variceal bleeding. Evaluated for liver transplant  on 6/9/20 and not considered candidate at this time. Last alcoholic drink in March 2020. Recent admission at the end of May 2020 for jaundice and alcoholic cirrhosis.  -Receiving maintenance fluids for volume resuscitation Patient with history of alcoholic cirrhosis with ascites, hx of SBP and variceal bleeding. Evaluated for liver transplant  on 6/9/20 and not considered candidate at this time. Last alcoholic drink in March 2020. Recent admission at the end of May 2020 for jaundice and alcoholic cirrhosis.  -Receiving maintenance fluids for volume resuscitation  -If ascites worsens, will obtain abdominal US to assess Patient with history of alcoholic cirrhosis with ascites, hx of SBP and variceal bleeding. Evaluated for liver transplant  on 6/9/20 and not considered candidate at this time. Last alcoholic drink in March 2020. Recent admission at the end of May 2020 for jaundice and alcoholic cirrhosis.  -Ascites appears at baseline, will not get another abdominal US at this time  -If ascites worsens, will obtain abdominal US to assess  -MELD 42   -Holding Bactrim for SBP prophylaxis given current SAM   -Will start lactulose and rifaximin  -No evidence of GI bleeding.   -Patient not on diuretics at home given mild ascites

## 2020-06-10 NOTE — HISTORY OF PRESENT ILLNESS
[Alcoholic Liver Disease] : Alcoholic Liver Disease [Ascites] : Ascites [Variceal Hemorrhage] : Variceal Hemorrhage [Hepatic Encephalopathy] : Hepatic Encephalopathy [Other: ___] : [unfilled] [Previous Transplant] : History of previous transplant [Diabetes] : History of diabetes [History of HCC] : No history of hepatocellular carcinoma [History of Local-regional Treatment] : No history of  local-regional treatment [Neoadjuvant Therapy] : No Neoadjuvant therapy [Previous Pancreas Islet Infusion] : No history of previous pancreas islet infusion [Hypertension] : No history of hypertension [Coronary Artery Disease] : No history of coronary artery disease [Previous MI] : No history of previous MI [Dialysis] : No history of dialysis [Dialysis within the past week] : No history of dialysis within the past week [FreeTextEntry1] : 38 (6/1/20) [FreeTextEntry2] : O [TextBox_20] : cornea (left eye) [TextBox_42] : Mr. 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 His last reported alcoholic drink was on 5/12/20. His cirrhosis is complicated by a history of variceal hemorrhage, ascites, portal hypertensive gastropathy and colopathy, and, recently, acute kidney injury (SAM), hyponatremia, and new onset of hepatic encephalopathy (the latter diagnosed today). He does not have any known history of SBP, but did have recurrent bacteremias in the past (including with fluoroquinolone -resistant E. coli) and was previously on prophylaxis with Keflex. He was resumed on antibiotic prophylaxis with Bactrim DS during his recent hospitalization.\par \par He is accompanied today by his long-term domestic partner, Glo. He was last seen by me via telehealth on 6/3/20 for post-hospital discharge follow-up. Today, he is mildly confused. He did not receive or start cholestyramine and has been off diuretics for the last few days. He has noticed some mild but worsening abdominal distension recently. No lower extremity swelling. He denies any orthopnea, PND, or dyspnea at rest, but is only able to walk <1 block before having to stop to rest. His appetite has been very poor recently and Glo reports that he is only eating "kids portions" and has frequent nausea. He had one recent episode of self-induced vomiting to alleviate his nausea. He has early satiety. He denies oliguria, but his urine is still "very dark". He denies any overt GI bleeding.\par \par Social history: Born in [facility], and moved to the [place] age 14. 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 No biological children. Significant other for >20 years is Glo, who has 2 children. He used to smoke 1-2 cigarettes/week but quit. No illicit drug use. He has a long history of alcohol abuse (mainly beer and liquor) starting around age 19. After he was hospitalized for alcoholic hepatitis in 2012, he returned to drinking but quit drinking about 1 year later. He has had periods of sobriety up to 2 years, but has also had prior relapses. He completed an inpatient detox program at [facility] in 11/2018 and then was enrolled in an outpatient relapse prevention program through the [facility] in 1/2019 and went to  after that. However, most recently, he relapsed again after the death of his mother in 4/2020. His last reported alcoholic drink was 5/12/20. Since being discharged from the hospital on 5/27/20, he re-enrolled at the [facility] and has been participating in virtual group sessions 3 times/week. He denies any recent alcohol cravings or slips.\par \par Family history: Strong family history of alcohol abuse (father and multiple siblings).\par \par EGD (4/19/19): Small EV, moderate PHG, gastric erosions, and duodenal erosions/erythema.\par \par Colonoscopy (4/17/17): Adequate bowel preparation. Mild portal hypertensive colopathy in transverse colon. Otherwise normal ileocolonoscopy.\par \par CT abdomen/pelvis without contrast (5/15/20): Cirrhosis, splenomegaly, small to moderate ascites, extensive large upper abdominal varices including patent periumbilical vein and left abdominal wall varices, mild bilateral gynecomastia.\par \par Doppler US (5/18/20): Patent hepatic and portal veins with hepatofugal flow in the very distal main portal vein and anterior and posterior branches of the right portal vein.\par \par Renal US (5/18/20): Increased renal parenchymal echogenicity bilaterally. Normal sized kidneys (RK 13.2 cm, LK 13.1 cm).\par \par Limited US (5/27/20): Small ascites.

## 2020-06-10 NOTE — REVIEW OF SYSTEMS
[Fatigue] : fatigue [Can Walk (___ Blocks)] : can walk [unfilled] blocks [Cough] : cough [Dyspnea on Exertion] : dyspnea on exertion [Nausea] : nausea [Vomiting] : vomiting [Muscle Weakness] : muscle weakness [Itching] : itching [Dizziness] : dizziness [Confusion] : confusion [Easy Bruising] : easy bruising [Negative] : Heme/Lymph [de-identified] : s [FreeTextEntry1] : b

## 2020-06-10 NOTE — H&P ADULT - PROBLEM SELECTOR PLAN 4
Patient found to have platelet count of 72 upon admission. Likely secondary to alcoholic cirrhosis and liver failure. Patient found to have platelet count of 72 upon admission. Likely secondary to alcoholic cirrhosis and liver failure.  -Will continue to monitor with daily CBC

## 2020-06-10 NOTE — H&P ADULT - NSHPREVIEWOFSYSTEMS_GEN_ALL_CORE
Review of Systems:  Constitutional: No fever, No weight loss. +fatigue, +weakness   Head: No headache   Eyes: Yellow eyes present b/l. No blurry vision, No diplopia  Neuro: Asterixis present bilaterally. A&Ox3 (person, place, year). Able to participate in interview and exam without issue.    Cardiovascular: No chest pain, No palpitations  Respiratory: No increased respiratory effort, No SOB, No cough  GI: Non-bloody Nausea & Vomiting for the past 2-3 days postprandial. Belly is at baseline in terms of swelling.   : No dysuria, No hematuria  Skin: Jaundiced throughout body, which is the same as baseline.   MSK: No joint pain   All other systems negative except as per HPI Review of Systems:  Constitutional: No fever, No weight loss. +fatigue, +weakness   Head: No headache   Eyes: Yellow eyes present b/l. No blurry vision, No diplopia  ENT: No sore throat, no dysphagia  Neuro: Asterixis present bilaterally. A&Ox3 (person, place, year). Able to participate in interview and exam without issue.  No HA.  Cardiovascular: No chest pain, No palpitations  Respiratory: No increased respiratory effort, No SOB, No cough  GI: Non-bloody Nausea & Vomiting for the past 2-3 days postprandial. Belly is at baseline in terms of swelling.   : No dysuria, No hematuria  Skin: Jaundiced throughout body, which is the same as baseline.   MSK: No joint pain

## 2020-06-10 NOTE — H&P ADULT - NSICDXPASTMEDICALHX_GEN_ALL_CORE_FT
PAST MEDICAL HISTORY:  Alcoholic cirrhosis     Diabetes mellitus type 2, insulin dependent     E. 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 Patient received 2 years of treatment.    Vitiligo

## 2020-06-10 NOTE — ED PROVIDER NOTE - CLINICAL SUMMARY MEDICAL DECISION MAKING FREE TEXT BOX
44y male with alcoholic cirrhosis and likely hepatorenal syndrome. No fevers, abdominal pain, SBP less likely. Sent for ezra. Will get labs, consult hepatology, admit.

## 2020-06-10 NOTE — H&P ADULT - PROBLEM SELECTOR PLAN 1
Patient found to have elevated Cre of 3.6 at outpatient hepatology clinic, so was sent to the ED. Baseline Cre 2.0.   -Possible SAM vs. hepatorenal syndrome  -Patient receiving maintenance fluids NaCl and D5.   -Renal US did not show obstruction or other evidence of renal disease  -Will obtain UA and f/u results  -Will treat for possible hepatorenal syndrome (octreotide, albumin, midodrine) and will also fluid resuscitate for possible pre-renal SAM etiology  -Nephrology consulted, will f/u on recs Patient found to have elevated Cre of 3.6 at outpatient hepatology clinic, so was sent to the ED. Baseline Cre 2.0. Possibly secondary to hepatorenal syndrome  -Patient receiving maintenance fluids NaCl and D5.   -Renal US did not show obstruction or other evidence of renal disease  -Will obtain UA and f/u results  -Will treat for possible hepatorenal syndrome (octreotide, albumin, midodrine) and will also fluid resuscitate  -Nephrology consulted, will f/u on recs

## 2020-06-10 NOTE — H&P ADULT - PROBLEM SELECTOR PLAN 5
Patient with pH of 7.32 on VBG, bicarb of 10, and elevated anion gap of 19 consistent with high anion gap metabolic acidosis. Possible etiologies include uremia, starvation ketosis. Less likely 2/2 DKA given blood glucose within goal at 124. Patient with pH of 7.32 on VBG, bicarb of 10, and elevated anion gap of 19 consistent with high anion gap metabolic acidosis. Possible etiologies include uremia, starvation ketosis. Less likely 2/2 DKA given blood glucose within goal at 124.  -Will continue to monitor CMP

## 2020-06-10 NOTE — ED PROVIDER NOTE - CARE PLAN
Principal Discharge DX:	Hepatorenal syndrome  Secondary Diagnosis:	Jaundice  Secondary Diagnosis:	Alcoholic cirrhosis of liver with ascites

## 2020-06-10 NOTE — REASON FOR VISIT
[Initial] : an initial visit for [Liver Transplant Evaluation] : liver transplant evaluation [Other: _____] : [unfilled] [Patient Declined  Services] : - None: Patient declined  services [FreeTextEntry2] : Dr. Andre Thakkar [FreeTextEntry3] : Dr. Rj Woods (PCP)

## 2020-06-10 NOTE — H&P ADULT - PROBLEM SELECTOR PLAN 6
Patient with history of T2DM treated with levemir and rapaglinide. HbA1c from discharge on last admission was 8.8%, representing poorly controlled disease.   -Will continue with lispro sliding scale

## 2020-06-11 NOTE — CONSULT NOTE ADULT - ATTENDING COMMENTS
Patient with history of alcoholic cirrhosis and now admitted with renal insufficiency.  Patient is attempting to remain off alcohol. His last beer was in 3/2020.  Suggest patient be evaluated for infection as precipitant for deterioration.  Check abdominal ultrasound for use of paracentesis.  Patient being evaluated for liver transplant.Will continue to support his sobriety , which is critical for transplant candidacy.  Follow renal function as intravascular volume supported with albumin.
45 y/o M PMHx alcoholic hepatitis, decompensated etOH cirrhosis (c/b ascites, SBP, and variceal hemorrhage),  latent TB (treated), h/o C. diff (2016, 2017), Vitiligo and T2DM admitted with SAM and acidoses.  Pt awake and alert, has mild asterixis.  No edema, ascities present but not very distended.  Would cont albumin, octreotide and midodrine.  Would also give 1L of 1/2NS + 75meq of sodium bicarb.  Pt may be slightly volume depleted.

## 2020-06-11 NOTE — PROGRESS NOTE ADULT - ASSESSMENT
45 y/o M PMHx alcoholic hepatitis, decompensated etOH cirrhosis (c/b ascites, SBP, and variceal hemorrhage),  latent TB (treated), h/o C. diff (2016, 2017), and T2DM (HbA1c 7.9% in 10/2019), sent to ED by hepatology clinic for rise in Cre on outpatient labs, admitted for treatment of SAM with concern for hepatorenal syndrome.

## 2020-06-11 NOTE — PROGRESS NOTE ADULT - SUBJECTIVE AND OBJECTIVE BOX
Patient is a 44y old  Male who presents with a chief complaint of Elevated Serum Creatinine at Hepatology clinic  SUBJECTIVE / OVERNIGHT EVENTS: Patient had no acute events overnight, seen and examined at bedside this morning.   Interval history: Patient able to eat and drink well yesterday without feeling nauseated and did not have any emesis. He feels less weak and dizzy compared to presentation, stating he was able to get up and go to the bathroom this morning without feeling weak. He denies fevers, chills, myalgias. He does not complain of abdominal discomfort or worsening abdominal swelling above his baseline   Tele: Normal sinus rhythm 55-80    ROS: [ - ] Chest Pain [ - ] Shortness of breath     MEDICATIONS  (STANDING):  albumin human 25% IVPB 100 milliLiter(s) IV Intermittent every 6 hours  cholestyramine Powder (Sugar-Free) 4 Gram(s) Oral daily  dextrose 5%. 1000 milliLiter(s) (50 mL/Hr) IV Continuous <Continuous>  dextrose 50% Injectable 12.5 Gram(s) IV Push once  dextrose 50% Injectable 25 Gram(s) IV Push once  dextrose 50% Injectable 25 Gram(s) IV Push once  insulin lispro (HumaLOG) corrective regimen sliding scale   SubCutaneous three times a day before meals  insulin lispro (HumaLOG) corrective regimen sliding scale   SubCutaneous at bedtime  midodrine. 5 milliGRAM(s) Oral three times a day  multivitamin 1 Tablet(s) Oral daily  octreotide  Injectable 100 MICROGram(s) SubCutaneous three times a day  pantoprazole    Tablet 40 milliGRAM(s) Oral before breakfast  rifAXIMin 550 milliGRAM(s) Oral two times a day    MEDICATIONS  (PRN):  dextrose 40% Gel 15 Gram(s) Oral once PRN Blood Glucose LESS THAN 70 milliGRAM(s)/deciliter  glucagon  Injectable 1 milliGRAM(s) IntraMuscular once PRN Glucose LESS THAN 70 milligrams/deciliter      Vital Signs Last 24 Hrs  T(C): 36.8 (2020 03:57), Max: 37.4 (10 Duran 2020 11:43)  T(F): 98.3 (2020 03:57), Max: 99.3 (10 Duran 2020 11:43)  HR: 60 (2020 03:57) (60 - 81)  BP: 109/70 (2020 03:57) (109/70 - 132/64)  BP(mean): --  RR: 20 (2020 03:57) (18 - 20)  SpO2: 97% (2020 03:57) (96% - 99%)  CAPILLARY BLOOD GLUCOSE      POCT Blood Glucose.: 143 mg/dL (2020 08:40)  POCT Blood Glucose.: 141 mg/dL (10 Duran 2020 22:06)  POCT Blood Glucose.: 100 mg/dL (10 Duran 2020 17:55)    I&O's Summary    10 Duran 2020 07:01  -  2020 07:00  --------------------------------------------------------  IN: 250 mL / OUT: 1040 mL / NET: -790 mL        PHYSICAL EXAM  GENERAL: NAD, sleeping peacefully in bed.   HEAD:  Atraumatic, Normocephalic  EYES: Scleral icterus present b/l. EOMI  NECK: Supple, No JVD  CHEST/LUNG: Expiratory wheeze appreciated in mid-lung on right side. No increased work of breathing.   HEART: Regular rate and rhythm; Systolic flow murmur appreciated at the R sternal border  ABDOMEN: Distended, Soft and nontender to palpation. Bowel sounds present  EXTREMITIES:  2+ Peripheral Pulses, Clubbing present in the phalanges b/l   NEURO: AAOx4 non-focal (person, place, time, event)  SKIN: Hypopigmented patches throughout body including face. Profoundly jaundiced all over body, particularly evident in hypopigmented patches.       LABS:                        9.0    6.01  )-----------( 59       ( 2020 06:41 )             25.7     06-11    135  |  107  |  43<H>  ----------------------------<  197<H>  3.4<L>   |  10<LL>  |  3.59<H>    Ca    8.5      2020 06:41  Phos  3.2     06-11  Mg     1.9     06-11    TPro  5.1<L>  /  Alb  4.2  /  TBili  44.4<H>  /  DBili  x   /  AST  72<H>  /  ALT  37  /  AlkPhos  108  06-11    PT/INR - ( 2020 06:42 )   PT: 25.8 sec;   INR: 2.19 ratio         PTT - ( 10 Duran 2020 12:32 )  PTT:47.0 sec      Urinalysis Basic - ( 10 Duran 2020 19:28 )    Color: Leslie / Appearance: Slightly Turbid / S.012 / pH: x  Gluc: x / Ketone: Negative  / Bili: Large / Urobili: <2 mg/dL   Blood: x / Protein: Trace / Nitrite: Negative   Leuk Esterase: Negative / RBC: 65 /HPF / WBC 0 /HPF   Sq Epi: x / Non Sq Epi: 1 /HPF / Bacteria: Negative

## 2020-06-11 NOTE — CONSULT NOTE ADULT - SUBJECTIVE AND OBJECTIVE BOX
[facility] DIVISION OF KIDNEY DISEASES AND HYPERTENSION -- INITIAL CONSULT NOTE  --------------------------------------------------------------------------------  HPI:  45 y/o M PMHx alcoholic hepatitis, decompensated etOH cirrhosis (c/b ascites, SBP, and variceal hemorrhage),  latent TB (treated), h/o C. diff (2016, 2017), Vitiligo and T2DM (HbA1c 7.9% in 10/2019) sent to [facility] from hepatology clinic for elevated SCr based on outpatient labs.     Patient was recently admitted from 5/15 to 5/27 for acute alcoholic hepatitis on chronic liver failure as well as SAM and hyponatremia in the setting of alcohol use. He was given steroids during that admission as well as NAC but was not responsive. He was also started on Bactrim for a known history of fluoroquinolone resistant Ecoli infection. He was discharged on 5/27 and then f/u with Dr. Ayala on 6/3 as well as 6/9 for cirrhosis management and transplant evaluation. Patient noted to have a creatinine of 0.55 in 10/2019 and 0.65 in 4/2020. During previous admission, pt noted to have a SCr: 3.8 and was discharge with SCr: 1.9 on 3/27/20.  Outpatient. his SCr increased to 2.31 on 6/1/20 and increased to SCr; 3.6 on repeat.     Patient describes having nausea with one episode of non-bloody emesis daily for the past 2-3 days that is associated with eating and taking his new diabetes medication, repaglinide. He has felt weak, tired, and dizzy for the past 2-3 days, as well. He has not had fever or any sick contacts. He has had regular, non-bloody BMs. His last alcoholic drink was in March 2020, at which time he was drinking 10-11 beers each night, but he has not had an alcoholic drink since then.     PAST HISTORY  --------------------------------------------------------------------------------  PAST MEDICAL & SURGICAL HISTORY:  E. 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 Patient received 2 years of treatment.  Diabetes mellitus type 2, insulin dependent  Esophageal varices  Alcoholic cirrhosis  Status post corneal transplant    FAMILY HISTORY:  Family history of diabetes mellitus    PAST SOCIAL HISTORY:    ALLERGIES & MEDICATIONS  --------------------------------------------------------------------------------  Allergies    levofloxacin (Other (Moderate))    Intolerances      Standing Inpatient Medications  albumin human 25% IVPB 100 milliLiter(s) IV Intermittent every 6 hours  cholestyramine Powder (Sugar-Free) 4 Gram(s) Oral daily  dextrose 5%. 1000 milliLiter(s) IV Continuous <Continuous>  dextrose 50% Injectable 12.5 Gram(s) IV Push once  dextrose 50% Injectable 25 Gram(s) IV Push once  dextrose 50% Injectable 25 Gram(s) IV Push once  insulin lispro (HumaLOG) corrective regimen sliding scale   SubCutaneous three times a day before meals  insulin lispro (HumaLOG) corrective regimen sliding scale   SubCutaneous at bedtime  midodrine. 5 milliGRAM(s) Oral three times a day  multivitamin 1 Tablet(s) Oral daily  octreotide  Injectable 100 MICROGram(s) SubCutaneous three times a day  pantoprazole    Tablet 40 milliGRAM(s) Oral before breakfast  rifAXIMin 550 milliGRAM(s) Oral two times a day  sodium chloride 0.9%. 1000 milliLiter(s) IV Continuous <Continuous>    PRN Inpatient Medications  dextrose 40% Gel 15 Gram(s) Oral once PRN  glucagon  Injectable 1 milliGRAM(s) IntraMuscular once PRN      REVIEW OF SYSTEMS  --------------------------------------------------------------------------------  Gen: No weight changes, fatigue, fevers/chills, weakness  Skin: No rashes  Head/Eyes/Ears/Mouth: No headache; Normal hearing; Normal vision w/o blurriness; No sinus pain/discomfort, sore throat  Respiratory: No dyspnea, cough, wheezing, hemoptysis  CV: No chest pain, PND, orthopnea  GI: No abdominal pain, diarrhea, constipation, nausea, vomiting, melena, hematochezia  : No increased frequency, dysuria, hematuria, nocturia  MSK: No joint pain/swelling; no back pain; no edema  Neuro: No dizziness/lightheadedness, weakness, seizures, numbness, tingling  Heme: No easy bruising or bleeding  Endo: No heat/cold intolerance  Psych: No significant nervousness, anxiety, stress, depression    All other systems were reviewed and are negative, except as noted.    VITALS/PHYSICAL EXAM  --------------------------------------------------------------------------------  T(C): 36.8 (06-11-20 @ 03:57), Max: 37.4 (06-10-20 @ 11:43)  HR: 60 (06-11-20 @ 03:57) (60 - 81)  BP: 109/70 (06-11-20 @ 03:57) (109/70 - 132/64)  RR: 20 (06-11-20 @ 03:57) (18 - 20)  SpO2: 97% (06-11-20 @ 03:57) (96% - 99%)  Wt(kg): --  Height (cm): 172.72 (06-10-20 @ 11:43)  Weight (kg): 86.2 (06-10-20 @ 11:43)  BMI (kg/m2): 28.9 (06-10-20 @ 11:43)  BSA (m2): 2 (06-10-20 @ 11:43)      06-10-20 @ 07:01  -  06-11-20 @ 06:36  --------------------------------------------------------  IN: 250 mL / OUT: 800 mL / NET: -550 mL      Physical Exam:  	Gen: NAD, well-appearing  	HEENT: PERRL, supple neck, clear oropharynx  	Pulm: CTA B/L  	CV: RRR, S1S2; no rub  	Back: No spinal or CVA tenderness; no sacral edema  	Abd: +BS, soft, nontender/nondistended                      Transplant:  	: No suprapubic tenderness  	UE: Warm, FROM, no clubbing, intact strength; no edema; no asterixis  	LE: Warm, FROM, no clubbing, intact strength; no edema  	Neuro: No focal deficits, intact gait  	Psych: Normal affect and mood  	Skin: Warm, without rashes  	Vascular access:    LABS/STUDIES  --------------------------------------------------------------------------------              9.0    6.90  >-----------<  72       [06-10-20 @ 12:32]              25.8     133  |  104  |  44  ----------------------------<  132      [06-10-20 @ 23:54]  3.3   |  11  |  3.56        Ca     8.5     [06-10-20 @ 23:54]      Mg     2.0     [06-10-20 @ 12:32]      Phos  3.2     [06-10-20 @ 14:47]    TPro  5.5  /  Alb  4.1  /  TBili  50.0  /  DBili  x   /  AST  87  /  ALT  48  /  AlkPhos  131  [06-10-20 @ 12:32]    PT/INR: PT 25.1 , INR 2.13       [06-10-20 @ 12:32]  PTT: 47.0       [06-10-20 @ 12:32]      Creatinine Trend:  SCr 3.56 [06-10 @ 23:54]  SCr 3.81 [06-10 @ 12:32]  SCr 1.94 [05-27 @ 06:14]  SCr 2.03 [05-26 @ 07:09]  SCr 1.95 [05-25 @ 16:35]    Urinalysis - [06-10-20 @ 19:28]      Color Leslie / Appearance Slightly Turbid / SG 1.012 / pH 6.5      Gluc Negative / Ketone Negative  / Bili Large / Urobili <2 mg/dL       Blood Negative / Protein Trace / Leuk Est Negative / Nitrite Negative      RBC 65 / WBC 0 / Hyaline 1 / Gran  / Sq Epi  / Non Sq Epi 1 / Bacteria Negative    Urine Creatinine 91      [06-10-20 @ 15:38]  Urine Sodium <35      [06-10-20 @ 15:38]  Urine Osmolality 297      [06-10-20 @ 15:38]    HbA1c 10.5      [04-18-19 @ 11:27]  TSH 0.26      [05-19-20 @ 08:53]    HBsAb Nonreact      [05-21-20 @ 00:40]  HBsAg Nonreact      [05-21-20 @ 00:40]  HBcAb Nonreact      [05-20-20 @ 22:39]  HCV 0.07, Nonreact      [04-15-16 @ 07:42]  HIV Nonreact      [04-16-17 @ 09:15]      Tacrolimus  Cyclosporine  Sirolimus  Mycophenolate  BK PCR  CMV PCR  Parvo PCR  EBV PCR [facility] DIVISION OF KIDNEY DISEASES AND HYPERTENSION -- INITIAL CONSULT NOTE  --------------------------------------------------------------------------------  HPI:  43 y/o M PMHx alcoholic hepatitis, decompensated etOH cirrhosis (c/b ascites, SBP, and variceal hemorrhage),  latent TB (treated), h/o C. diff (2016, 2017), Vitiligo and T2DM (HbA1c 7.9% in 10/2019) sent to [facility] from hepatology clinic for elevated SCr based on outpatient labs.     Patient was recently admitted from 5/15 to 5/27 for acute alcoholic hepatitis on chronic liver failure as well as SAM and hyponatremia in the setting of alcohol use. He was given steroids during that admission as well as NAC but was not responsive. He was also started on Bactrim for a known history of fluoroquinolone resistant Ecoli infection. He was discharged on 5/27 and then f/u with Dr. Ayala on 6/3 as well as 6/9 for cirrhosis management and transplant evaluation. Patient noted to have a creatinine of 0.55 in 10/2019 and 0.65 in 4/2020. During previous admission, pt noted to have a SCr: 3.8 and was discharge with SCr: 1.9 on 5/27/20.  Outpatient. his SCr increased to 2.31 on 6/1/20 and increased to SCr; 3.6 on repeat.     Patient describes having nausea with one episode of non-bloody emesis daily for the past 2-3 days that is associated with eating and taking his new diabetes medication, repaglinide. He has felt weak, tired, and dizzy for the past 2-3 days, as well. He has not had fever or any sick contacts. He has had regular, non-bloody BMs. His last alcoholic drink was in March 2020, at which time he was drinking 10-11 beers each night, but he has not had an alcoholic drink since then.     PAST HISTORY  --------------------------------------------------------------------------------  PAST MEDICAL & SURGICAL HISTORY:  E. 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 Patient received 2 years of treatment.  Diabetes mellitus type 2, insulin dependent  Esophageal varices  Alcoholic cirrhosis  Status post corneal transplant    FAMILY HISTORY:  Family history of diabetes mellitus    PAST SOCIAL HISTORY:    ALLERGIES & MEDICATIONS  --------------------------------------------------------------------------------  Allergies    levofloxacin (Other (Moderate))    Intolerances      Standing Inpatient Medications  albumin human 25% IVPB 100 milliLiter(s) IV Intermittent every 6 hours  cholestyramine Powder (Sugar-Free) 4 Gram(s) Oral daily  dextrose 5%. 1000 milliLiter(s) IV Continuous <Continuous>  dextrose 50% Injectable 12.5 Gram(s) IV Push once  dextrose 50% Injectable 25 Gram(s) IV Push once  dextrose 50% Injectable 25 Gram(s) IV Push once  insulin lispro (HumaLOG) corrective regimen sliding scale   SubCutaneous three times a day before meals  insulin lispro (HumaLOG) corrective regimen sliding scale   SubCutaneous at bedtime  midodrine. 5 milliGRAM(s) Oral three times a day  multivitamin 1 Tablet(s) Oral daily  octreotide  Injectable 100 MICROGram(s) SubCutaneous three times a day  pantoprazole    Tablet 40 milliGRAM(s) Oral before breakfast  rifAXIMin 550 milliGRAM(s) Oral two times a day  sodium chloride 0.9%. 1000 milliLiter(s) IV Continuous <Continuous>    PRN Inpatient Medications  dextrose 40% Gel 15 Gram(s) Oral once PRN  glucagon  Injectable 1 milliGRAM(s) IntraMuscular once PRN      REVIEW OF SYSTEMS  --------------------------------------------------------------------------------  Gen: No weight changes, fatigue, fevers/chills, weakness  Skin: No rashes  Head/Eyes/Ears/Mouth: No headache; Normal hearing; Normal vision w/o blurriness; No sinus pain/discomfort, sore throat  Respiratory: No dyspnea, cough, wheezing, hemoptysis  CV: No chest pain, PND, orthopnea  GI: No abdominal pain, diarrhea, constipation, nausea, vomiting, melena, hematochezia  : No increased frequency, dysuria, hematuria, nocturia  MSK: No joint pain/swelling; no back pain; no edema  Neuro: No dizziness/lightheadedness, weakness, seizures, numbness, tingling  Heme: No easy bruising or bleeding  Endo: No heat/cold intolerance  Psych: No significant nervousness, anxiety, stress, depression    All other systems were reviewed and are negative, except as noted.    VITALS/PHYSICAL EXAM  --------------------------------------------------------------------------------  T(C): 36.8 (06-11-20 @ 03:57), Max: 37.4 (06-10-20 @ 11:43)  HR: 60 (06-11-20 @ 03:57) (60 - 81)  BP: 109/70 (06-11-20 @ 03:57) (109/70 - 132/64)  RR: 20 (06-11-20 @ 03:57) (18 - 20)  SpO2: 97% (06-11-20 @ 03:57) (96% - 99%)  Wt(kg): --  Height (cm): 172.72 (06-10-20 @ 11:43)  Weight (kg): 86.2 (06-10-20 @ 11:43)  BMI (kg/m2): 28.9 (06-10-20 @ 11:43)  BSA (m2): 2 (06-10-20 @ 11:43)      06-10-20 @ 07:01  -  06-11-20 @ 06:36  --------------------------------------------------------  IN: 250 mL / OUT: 800 mL / NET: -550 mL      Physical Exam:  	Gen: NAD, well-appearing  	HEENT: PERRL, supple neck, clear oropharynx  	Pulm: CTA B/L  	CV: RRR, S1S2; no rub  	Back: No spinal or CVA tenderness; no sacral edema  	Abd: +BS, soft, nontender/nondistended                      Transplant:  	: No suprapubic tenderness  	UE: Warm, FROM, no clubbing, intact strength; no edema; no asterixis  	LE: Warm, FROM, no clubbing, intact strength; no edema  	Neuro: No focal deficits, intact gait  	Psych: Normal affect and mood  	Skin: Warm, without rashes  	Vascular access:    LABS/STUDIES  --------------------------------------------------------------------------------              9.0    6.90  >-----------<  72       [06-10-20 @ 12:32]              25.8     133  |  104  |  44  ----------------------------<  132      [06-10-20 @ 23:54]  3.3   |  11  |  3.56        Ca     8.5     [06-10-20 @ 23:54]      Mg     2.0     [06-10-20 @ 12:32]      Phos  3.2     [06-10-20 @ 14:47]    TPro  5.5  /  Alb  4.1  /  TBili  50.0  /  DBili  x   /  AST  87  /  ALT  48  /  AlkPhos  131  [06-10-20 @ 12:32]    PT/INR: PT 25.1 , INR 2.13       [06-10-20 @ 12:32]  PTT: 47.0       [06-10-20 @ 12:32]      Creatinine Trend:  SCr 3.56 [06-10 @ 23:54]  SCr 3.81 [06-10 @ 12:32]  SCr 1.94 [05-27 @ 06:14]  SCr 2.03 [05-26 @ 07:09]  SCr 1.95 [05-25 @ 16:35]    Urinalysis - [06-10-20 @ 19:28]      Color Leslie / Appearance Slightly Turbid / SG 1.012 / pH 6.5      Gluc Negative / Ketone Negative  / Bili Large / Urobili <2 mg/dL       Blood Negative / Protein Trace / Leuk Est Negative / Nitrite Negative      RBC 65 / WBC 0 / Hyaline 1 / Gran  / Sq Epi  / Non Sq Epi 1 / Bacteria Negative    Urine Creatinine 91      [06-10-20 @ 15:38]  Urine Sodium <35      [06-10-20 @ 15:38]  Urine Osmolality 297      [06-10-20 @ 15:38]    HbA1c 10.5      [04-18-19 @ 11:27]  TSH 0.26      [05-19-20 @ 08:53]    HBsAb Nonreact      [05-21-20 @ 00:40]  HBsAg Nonreact      [05-21-20 @ 00:40]  HBcAb Nonreact      [05-20-20 @ 22:39]  HCV 0.07, Nonreact      [04-15-16 @ 07:42]  HIV Nonreact      [04-16-17 @ 09:15]      Tacrolimus  Cyclosporine  Sirolimus  Mycophenolate  BK PCR  CMV PCR  Parvo PCR  EBV PCR [facility] DIVISION OF KIDNEY DISEASES AND HYPERTENSION -- INITIAL CONSULT NOTE  --------------------------------------------------------------------------------  HPI:  43 y/o M PMHx alcoholic hepatitis, decompensated etOH cirrhosis (c/b ascites, SBP, and variceal hemorrhage),  latent TB (treated), h/o C. diff (2016, 2017), Vitiligo and T2DM (HbA1c 7.9% in 10/2019) sent to [facility] from hepatology clinic for elevated SCr based on outpatient labs.     Patient was recently admitted from 5/15 to 5/27 for acute alcoholic hepatitis on chronic liver failure as well as SAM and hyponatremia in the setting of alcohol use. He was given steroids during that admission as well as NAC but was not responsive. He was also started on Bactrim for a known history of fluoroquinolone resistant Ecoli infection. He was discharged on 5/27 and then f/u with Dr. Ayala on 6/3 as well as 6/9 for cirrhosis management and transplant evaluation. Patient noted to have a creatinine of 0.55 in 10/2019 and 0.65 in 4/2020. During previous admission, pt noted to have a SCr: 3.8 and was discharge with SCr: 1.9 on 5/27/20.  Outpatient. his SCr increased to 2.31 on 6/1/20 and increased to SCr; 3.6 on repeat.     Patient describes having nausea with one episode of non-bloody emesis daily for the past 2-3 days that is associated with eating and taking his new diabetes medication, repaglinide. He has felt weak, tired, and dizzy for the past 2-3 days, as well. He has not had fever or any sick contacts. He has had regular, non-bloody BMs. His last alcoholic drink was in March 2020, at which time he was drinking 10-11 beers each night, but he has not had an alcoholic drink since then. Last drink May 13, 2020.     PAST HISTORY  --------------------------------------------------------------------------------  PAST MEDICAL & SURGICAL HISTORY:  E. 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 Patient received 2 years of treatment.  Diabetes mellitus type 2, insulin dependent  Esophageal varices  Alcoholic cirrhosis  Status post corneal transplant    FAMILY HISTORY:  Family history of diabetes mellitus    PAST SOCIAL HISTORY:    ALLERGIES & MEDICATIONS  --------------------------------------------------------------------------------  Allergies    levofloxacin (Other (Moderate))    Intolerances      Standing Inpatient Medications  albumin human 25% IVPB 100 milliLiter(s) IV Intermittent every 6 hours  cholestyramine Powder (Sugar-Free) 4 Gram(s) Oral daily  dextrose 5%. 1000 milliLiter(s) IV Continuous <Continuous>  dextrose 50% Injectable 12.5 Gram(s) IV Push once  dextrose 50% Injectable 25 Gram(s) IV Push once  dextrose 50% Injectable 25 Gram(s) IV Push once  insulin lispro (HumaLOG) corrective regimen sliding scale   SubCutaneous three times a day before meals  insulin lispro (HumaLOG) corrective regimen sliding scale   SubCutaneous at bedtime  midodrine. 5 milliGRAM(s) Oral three times a day  multivitamin 1 Tablet(s) Oral daily  octreotide  Injectable 100 MICROGram(s) SubCutaneous three times a day  pantoprazole    Tablet 40 milliGRAM(s) Oral before breakfast  rifAXIMin 550 milliGRAM(s) Oral two times a day  sodium chloride 0.9%. 1000 milliLiter(s) IV Continuous <Continuous>    PRN Inpatient Medications  dextrose 40% Gel 15 Gram(s) Oral once PRN  glucagon  Injectable 1 milliGRAM(s) IntraMuscular once PRN      REVIEW OF SYSTEMS  --------------------------------------------------------------------------------  Gen: No weight changes, + weakness  Skin: No rashes  Head/Eyes/Ears/Mouth: No headache; Normal hearing  Respiratory: No dyspnea, cough, wheezing, hemoptysis  CV: No chest pain  GI: No abdominal pain, diarrhea, constipation, nausea, vomiting  : No increased frequency, dysuria, hematuria, nocturia  MSK: + LE edema  Neuro: + dizziness, + nausea    All other systems were reviewed and are negative, except as noted.    VITALS/PHYSICAL EXAM  --------------------------------------------------------------------------------  T(C): 36.8 (06-11-20 @ 03:57), Max: 37.4 (06-10-20 @ 11:43)  HR: 60 (06-11-20 @ 03:57) (60 - 81)  BP: 109/70 (06-11-20 @ 03:57) (109/70 - 132/64)  RR: 20 (06-11-20 @ 03:57) (18 - 20)  SpO2: 97% (06-11-20 @ 03:57) (96% - 99%)  Wt(kg): --  Height (cm): 172.72 (06-10-20 @ 11:43)  Weight (kg): 86.2 (06-10-20 @ 11:43)  BMI (kg/m2): 28.9 (06-10-20 @ 11:43)  BSA (m2): 2 (06-10-20 @ 11:43)      06-10-20 @ 07:01  -  06-11-20 @ 06:36  --------------------------------------------------------  IN: 250 mL / OUT: 800 mL / NET: -550 mL      Physical Exam:  	Gen: NAD, well-appearing  	HEENT: PERRL, supple neck, clear oropharynx  	Pulm: CTA B/L  	CV: RRR, S1S2; no rub  	Back: No spinal or CVA tenderness; no sacral edema  	Abd: +BS, soft, nontender/nondistended                      Transplant:  	: No suprapubic tenderness  	UE: Warm, FROM, no clubbing, intact strength; no edema; no asterixis  	LE: Warm, FROM, no clubbing, intact strength; no edema  	Neuro: No focal deficits, intact gait  	Psych: Normal affect and mood  	Skin: Warm, without rashes  	Vascular access:    LABS/STUDIES  --------------------------------------------------------------------------------              9.0    6.90  >-----------<  72       [06-10-20 @ 12:32]              25.8     133  |  104  |  44  ----------------------------<  132      [06-10-20 @ 23:54]  3.3   |  11  |  3.56        Ca     8.5     [06-10-20 @ 23:54]      Mg     2.0     [06-10-20 @ 12:32]      Phos  3.2     [06-10-20 @ 14:47]    TPro  5.5  /  Alb  4.1  /  TBili  50.0  /  DBili  x   /  AST  87  /  ALT  48  /  AlkPhos  131  [06-10-20 @ 12:32]    PT/INR: PT 25.1 , INR 2.13       [06-10-20 @ 12:32]  PTT: 47.0       [06-10-20 @ 12:32]      Creatinine Trend:  SCr 3.56 [06-10 @ 23:54]  SCr 3.81 [06-10 @ 12:32]  SCr 1.94 [05-27 @ 06:14]  SCr 2.03 [05-26 @ 07:09]  SCr 1.95 [05-25 @ 16:35]    Urinalysis - [06-10-20 @ 19:28]      Color Leslie / Appearance Slightly Turbid / SG 1.012 / pH 6.5      Gluc Negative / Ketone Negative  / Bili Large / Urobili <2 mg/dL       Blood Negative / Protein Trace / Leuk Est Negative / Nitrite Negative      RBC 65 / WBC 0 / Hyaline 1 / Gran  / Sq Epi  / Non Sq Epi 1 / Bacteria Negative    Urine Creatinine 91      [06-10-20 @ 15:38]  Urine Sodium <35      [06-10-20 @ 15:38]  Urine Osmolality 297      [06-10-20 @ 15:38]    HbA1c 10.5      [04-18-19 @ 11:27]  TSH 0.26      [05-19-20 @ 08:53]    HBsAb Nonreact      [05-21-20 @ 00:40]  HBsAg Nonreact      [05-21-20 @ 00:40]  HBcAb Nonreact      [05-20-20 @ 22:39]  HCV 0.07, Nonreact      [04-15-16 @ 07:42]  HIV Nonreact      [04-16-17 @ 09:15]      Tacrolimus  Cyclosporine  Sirolimus  Mycophenolate  BK PCR  CMV PCR  Parvo PCR  EBV PCR [facility] DIVISION OF KIDNEY DISEASES AND HYPERTENSION -- INITIAL CONSULT NOTE  --------------------------------------------------------------------------------  HPI:  45 y/o M PMHx alcoholic hepatitis, decompensated etOH cirrhosis (c/b ascites, SBP, and variceal hemorrhage),  latent TB (treated), h/o C. diff (2016, 2017), Vitiligo and T2DM sent to [facility] from hepatology clinic for elevated SCr based on outpatient labs.     Patient was recently admitted from 5/15 to 5/27 for acute alcoholic hepatitis on chronic liver failure as well as SAM and hyponatremia in the setting of alcohol use. He was given steroids during that admission as well as NAC but was not responsive. He was also started on Bactrim for a known history of fluoroquinolone resistant Ecoli infection. He was discharged on 5/27 and then f/u with Dr. Ayala on 6/3 as well as 6/9 for cirrhosis management and transplant evaluation. Patient noted to have a creatinine of 0.55 in 10/2019 and 0.65 in 4/2020. During previous admission, pt noted to have a SCr: 3.8 and was discharge with SCr: 1.9 on 5/27/20.  Outpatient. his SCr increased to 2.31 on 6/1/20 and increased to SCr; 3.6 on repeat.     Patient describes having nausea with one episode of non-bloody emesis daily for the past 2-3 days that is associated with eating and taking his new diabetes medication, repaglinide. He has felt weak, tired, and dizzy for the past 2-3 days, as well. He has not had fever or any sick contacts. He has had regular, non-bloody BMs. His last alcoholic drink was in March 2020, at which time he was drinking 10-11 beers each night, but he has not had an alcoholic drink since then. Last drink May 13, 2020.     PAST HISTORY  --------------------------------------------------------------------------------  PAST MEDICAL & SURGICAL HISTORY:  E. 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 Patient received 2 years of treatment.  Diabetes mellitus type 2, insulin dependent  Esophageal varices  Alcoholic cirrhosis  Status post corneal transplant    FAMILY HISTORY:  Family history of diabetes mellitus    PAST SOCIAL HISTORY:    ALLERGIES & MEDICATIONS  --------------------------------------------------------------------------------  Allergies    levofloxacin (Other (Moderate))    Intolerances      Standing Inpatient Medications  albumin human 25% IVPB 100 milliLiter(s) IV Intermittent every 6 hours  cholestyramine Powder (Sugar-Free) 4 Gram(s) Oral daily  dextrose 5%. 1000 milliLiter(s) IV Continuous <Continuous>  dextrose 50% Injectable 12.5 Gram(s) IV Push once  dextrose 50% Injectable 25 Gram(s) IV Push once  dextrose 50% Injectable 25 Gram(s) IV Push once  insulin lispro (HumaLOG) corrective regimen sliding scale   SubCutaneous three times a day before meals  insulin lispro (HumaLOG) corrective regimen sliding scale   SubCutaneous at bedtime  midodrine. 5 milliGRAM(s) Oral three times a day  multivitamin 1 Tablet(s) Oral daily  octreotide  Injectable 100 MICROGram(s) SubCutaneous three times a day  pantoprazole    Tablet 40 milliGRAM(s) Oral before breakfast  rifAXIMin 550 milliGRAM(s) Oral two times a day  sodium chloride 0.9%. 1000 milliLiter(s) IV Continuous <Continuous>    PRN Inpatient Medications  dextrose 40% Gel 15 Gram(s) Oral once PRN  glucagon  Injectable 1 milliGRAM(s) IntraMuscular once PRN      REVIEW OF SYSTEMS  --------------------------------------------------------------------------------  Gen: No weight changes, + weakness  Skin: No rashes  Head/Eyes/Ears/Mouth: No headache; Normal hearing  Respiratory: No dyspnea, cough, wheezing, hemoptysis  CV: No chest pain  GI: No abdominal pain, diarrhea, constipation, nausea, vomiting  : No increased frequency, dysuria, hematuria, nocturia  MSK: + LE edema  Neuro: + dizziness, + nausea    All other systems were reviewed and are negative, except as noted.    VITALS/PHYSICAL EXAM  --------------------------------------------------------------------------------  T(C): 36.8 (06-11-20 @ 03:57), Max: 37.4 (06-10-20 @ 11:43)  HR: 60 (06-11-20 @ 03:57) (60 - 81)  BP: 109/70 (06-11-20 @ 03:57) (109/70 - 132/64)  RR: 20 (06-11-20 @ 03:57) (18 - 20)  SpO2: 97% (06-11-20 @ 03:57) (96% - 99%)  Wt(kg): --  Height (cm): 172.72 (06-10-20 @ 11:43)  Weight (kg): 86.2 (06-10-20 @ 11:43)  BMI (kg/m2): 28.9 (06-10-20 @ 11:43)  BSA (m2): 2 (06-10-20 @ 11:43)      06-10-20 @ 07:01  -  06-11-20 @ 06:36  --------------------------------------------------------  IN: 250 mL / OUT: 800 mL / NET: -550 mL      Physical Exam:  	Gen: NAD, on RA  	HEENT: dry MMM, scleral icterus  	Pulm: CTA B/L, decreased BS  	CV: RRR, S1S2; no rub  	Abd: +BS, soft, distended  	UE: Warm, FROM, no clubbing, intact strength; no edema; no asterixis  	LE: Warm, no edema  	Neuro: No focal deficits, intact gait  	Psych: Normal affect and mood  	Skin: Warm, + vitiligo, + icterus  	Vascular access: peripheral IV lines    LABS/STUDIES  --------------------------------------------------------------------------------              9.0    6.90  >-----------<  72       [06-10-20 @ 12:32]              25.8     133  |  104  |  44  ----------------------------<  132      [06-10-20 @ 23:54]  3.3   |  11  |  3.56        Ca     8.5     [06-10-20 @ 23:54]      Mg     2.0     [06-10-20 @ 12:32]      Phos  3.2     [06-10-20 @ 14:47]    TPro  5.5  /  Alb  4.1  /  TBili  50.0  /  DBili  x   /  AST  87  /  ALT  48  /  AlkPhos  131  [06-10-20 @ 12:32]    PT/INR: PT 25.1 , INR 2.13       [06-10-20 @ 12:32]  PTT: 47.0       [06-10-20 @ 12:32]      Creatinine Trend:  SCr 3.56 [06-10 @ 23:54]  SCr 3.81 [06-10 @ 12:32]  SCr 1.94 [05-27 @ 06:14]  SCr 2.03 [05-26 @ 07:09]  SCr 1.95 [05-25 @ 16:35]    Urinalysis - [06-10-20 @ 19:28]      Color Leslie / Appearance Slightly Turbid / SG 1.012 / pH 6.5      Gluc Negative / Ketone Negative  / Bili Large / Urobili <2 mg/dL       Blood Negative / Protein Trace / Leuk Est Negative / Nitrite Negative      RBC 65 / WBC 0 / Hyaline 1 / Gran  / Sq Epi  / Non Sq Epi 1 / Bacteria Negative    Urine Creatinine 91      [06-10-20 @ 15:38]  Urine Sodium <35      [06-10-20 @ 15:38]  Urine Osmolality 297      [06-10-20 @ 15:38]    HbA1c 10.5      [04-18-19 @ 11:27]  TSH 0.26      [05-19-20 @ 08:53]    HBsAb Nonreact      [05-21-20 @ 00:40]  HBsAg Nonreact      [05-21-20 @ 00:40]  HBcAb Nonreact      [05-20-20 @ 22:39]  HCV 0.07, Nonreact      [04-15-16 @ 07:42]  HIV Nonreact      [04-16-17 @ 09:15]      Tacrolimus  Cyclosporine  Sirolimus  Mycophenolate  BK PCR  CMV PCR  Parvo PCR  EBV PCR

## 2020-06-11 NOTE — CONSULT NOTE ADULT - ASSESSMENT
43 y/o M PMHx alcoholic hepatitis, decompensated etOH cirrhosis (c/b ascites, SBP, and variceal hemorrhage),  latent TB (treated), h/o C. diff (2016, 2017), Vitiligo and T2DM sent to [facility] from hepatology clinic for elevated SCr based on outpatient labs.     #NISHI  - Pt with hemodynamically mediated Nishi in the setting of nausea/vomiting and poor PO intake. Possible HRS?  - Patient also noted to be on Bactrim for SBP ppx for resistant E.Coli and this will likely "raise" creatinine due to impaired secretion of creatinine.   - SCr 0.55 in 10/2019 and 0.65 in 4/2020. SCr: 1.9 on 5/27/20.  Last outpatient SCr increased to 2.31 on 6/1/20 and increased to SCr; 3.6 on repeat on 6/9/20  - SCr: 3.59 today (6/11/20)  - 6/10 - UA with bilirubin, hematuria, RBC: 35  - 6/10 Ulytes with Steffen: <35  - 6/10  Renal Sono with Renal parenchymal disease. No hydronephrosis.  - MELD Score: 41  - Pt on Octreotide, Albumin and Midodrine 5mg TID  - Continue current management  - Monitor renal function with strict intake and output monitoring  - Check urine spot TP/CR    #Acidemia  - Pt with acidemia in the setting of alcoholic liver cirrhosis  - Serum HCO3: 10 today  - Check VBG and BHB  - On Sodium bicarbonate tabs 1300mg TID  - Consider switching to Bicitra 30ml BID    #Hypokalemia  - Serum K: 3.4 today  - Replete potassium levels  - Monitor serum K      Anni Sims  Nephrology Fellow  Pager: [phone]

## 2020-06-11 NOTE — PROGRESS NOTE ADULT - PROBLEM SELECTOR PLAN 6
Patient with history of T2DM treated with levemir and rapaglinide. HbA1c from discharge on last admission was 8.8%, representing poorly controlled disease.   -Gluc 197 this morning   -Will continue with lispro sliding scale Patient with history of T2DM treated with levemir and rapaglinide. HbA1c from discharge on last admission was 8.8%, representing poorly controlled disease.   -Serum Gluc 197 this morning  -POCT glucose 143 in AM and 190 in afternoon   -Will continue to monitor blood glucose carefully and continue with lispro sliding scale

## 2020-06-11 NOTE — PROGRESS NOTE ADULT - PROBLEM SELECTOR PLAN 4
Patient found to have platelet count of 72 upon admission. Likely secondary to alcoholic cirrhosis and liver failure.  -Will continue to monitor with daily CBC

## 2020-06-11 NOTE — PROGRESS NOTE ADULT - PROBLEM SELECTOR PLAN 5
Thank you for visiting [facility]    Take the Zofran regularly to allow you to stay hydrated.    Continue tylenol or ibuprofen for comfort and fever control.    Get stool sample in unless you don't poop, which is OK too.    We'll let you know your lab results as soon as we can.     Please see me in 1-2  weeks for follow up if not improving.       If you had imaging scheduled please refer to your radiology prep sheet.    Appointment    Date_______________     Time_____________    Day:   M TU W TH F    With____________________________    Location_________________________    If you need medication refills, please contact your pharmacy 3 days before your prescriptions runs out. If you are out of refills, your pharmacy will contact contact the clinic.    Contact us or return if questions or concerns.     -Your Care Team:  MD Elaine Almazna PA-C Folake Falaki, MD Anoshirvan Mazhari, MD Kelly White, CNP    General information about your clinic      Clinic hours:     Lab hours:  Phone [phone]  Monday 7:30 am-7 pm    Monday 8:30 am-6:30 pm  Tuesday-Friday 7:30 am-5 pm   Tuesday-Friday 8:30 am-4:30 pm    Pharmacy hours:  Phone [phone]  Monday 8:30 am-7pm  Tuesday-Friday 8:30am-6 pm                                       Mychart assistance [phone]        We would like to hear from you, how was your visit today?    Ally Molina  Patient Information Supervisor   Patient Care Supervisor  [facility], and [facility], and [facility]  [phone]      Patient with pH of 7.32 on VBG, bicarb of 10, and elevated anion gap of 19 consistent with high anion gap metabolic acidosis. Possible etiologies include uremia, starvation ketosis. Less likely 2/2 DKA given blood glucose within goal at 124.  -Will continue to monitor CMP Bicarb of 10 today, VBG pH 7.35  -Will continue to monitor CMP  -Starting bicitra 30mL PO BID, per Nephrology recs.   -Nephrology following, will f/u on recs Bicarb of 10 today, VBG pH 7.26 (7.35 yesterday)   -Will continue to monitor CMP  -Starting bicitra 30mL PO BID, per Nephrology recs.   -Nephrology following, will f/u on recs

## 2020-06-11 NOTE — PROGRESS NOTE ADULT - PROBLEM SELECTOR PLAN 2
Patient with history of alcoholic cirrhosis with ascites, hx of SBP and variceal bleeding. Evaluated for liver transplant  on 6/9/20 and not considered candidate at this time. Last alcoholic drink in March 2020. Recent admission at the end of May 2020 for jaundice and alcoholic cirrhosis.  -Ascites appears at baseline, will not get another abdominal US at this time  -If ascites worsens, will obtain abdominal US to assess  -MELD 42   -Holding Bactrim for SBP prophylaxis given current SAM   -Starting lactulose and rifaximin   -No evidence of GI bleeding.   -Patient not on diuretics at home given mild ascites Patient with history of alcoholic cirrhosis with ascites, hx of SBP and variceal bleeding. Evaluated for liver transplant  on 6/9/20 and not considered candidate at this time. Last alcoholic drink in March 2020. Recent admission at the end of May 2020 for jaundice and alcoholic cirrhosis.  -Ascites appears at baseline, will not get another abdominal US at this time  -If ascites worsens, will obtain abdominal US to assess  -MELD 42   -Holding Bactrim for SBP prophylaxis given current SAM   -Starting rifaximin, holding lactulose given SAM  -No evidence of GI bleeding.   -Patient not on diuretics at home given mild ascites

## 2020-06-11 NOTE — PROGRESS NOTE ADULT - PROBLEM SELECTOR PLAN 3
Patient found to have K of 2.6 upon admission, Received 40mEq KCl in the ED  -Overnight, K 3.3, given KCl 40mg PO  -K 3.4 this AM   -Will continue to replete with KCl PO as needed Patient found to have K of 2.6 upon admission, Received 40mEq KCl in the ED  -K 3.4 this AM, Overnight, K 3.3, given KCl 40mg PO  -Will continue to monitor and replete with KCl PO as needed

## 2020-06-11 NOTE — PROGRESS NOTE ADULT - PROBLEM SELECTOR PLAN 1
Patient found to have elevated Cre of 3.6 at outpatient hepatology clinic, so was sent to the ED. Baseline Cre 2.0. Possibly secondary to hepatorenal syndrome vs. volume depletion in the setting of n/v for 3 days  -Patient receiving maintenance fluids NaCl and D5.   -Renal US did not show obstruction or other evidence of renal disease  -Will obtain UA and f/u results  -Will treat for possible hepatorenal syndrome (octreotide, albumin, midodrine) and will also fluid resuscitate  -Nephrology consulted, will f/u on recs Creatinine 3.59 today (3.6 upon presentation, baseline 2.0).   -Patient receiving maintenance fluids NaCl and D5.   -Renal US did not show obstruction or other evidence of renal disease  -Will obtain UA and f/u results  -Will treat for possible hepatorenal syndrome (octreotide, albumin, midodrine) and will also fluid resuscitate  -Nephrology consulted, will obtain spot UProt/Cre, f/u on recs

## 2020-06-12 NOTE — DISCHARGE NOTE NURSING/CASE MANAGEMENT/SOCIAL WORK - PATIENT PORTAL LINK FT
You can access the FollowMyHealth Patient Portal offered by [facility] by registering at the following website: [email or web address]. By joining "[employer]’s FollowMyHealth portal, you will also be able to view your health information using other applications (apps) compatible with our system.

## 2020-06-12 NOTE — DISCHARGE NOTE PROVIDER - NSDCFUSCHEDAPPT_GEN_ALL_CORE_FT
LILLIAN LIM ; 07/31/2020 ; [facility] [address] [facility] LILLIAN LIM ; 07/31/2020 ; [facility] [address] [facility] LILLIAN LIM ; 07/31/2020 ; [facility] [address] [facility] LILLIAN LIM ; 07/31/2020 ; [facility] [address] [facility] LILLIAN LIM ; 07/31/2020 ; [facility] [address] [facility] LILLIAN LIM ; 07/31/2020 ; [facility] [address] [facility]

## 2020-06-12 NOTE — PROGRESS NOTE ADULT - PROBLEM SELECTOR PLAN 2
Patient with history of alcoholic cirrhosis with ascites, hx of SBP and variceal bleeding. Evaluated for liver transplant  on 6/9/20 and not considered candidate at this time. Last alcoholic drink in March 2020. Recent admission at the end of May 2020 for jaundice and alcoholic cirrhosis.  -Ascites appears at baseline, will not get another abdominal US at this time  -If ascites worsens, will obtain abdominal US to assess  -MELD 41 today, MELD 42 on admission (6/10)   -Holding Bactrim for SBP prophylaxis given current SAM   -Starting rifaximin, holding lactulose given SAM  -No evidence of GI bleeding. Patient with history of alcoholic cirrhosis with ascites, hx of SBP and variceal bleeding. Evaluated for liver transplant  on 6/9/20 and not considered candidate at this time. Last alcoholic drink in March 2020. Recent admission at the end of May 2020 for jaundice and alcoholic cirrhosis.  -Ascites appears at baseline, will not get another abdominal US at this time  -If ascites worsens, will obtain abdominal US to assess  -MELD 41 today, MELD 42 on admission (6/10)   -Holding Bactrim for SBP prophylaxis given current SAM   -Continuing rifaximin, holding lactulose given SAM  -No evidence of GI bleeding.  -Hepatology following, rec MRCP which we will obtain today.

## 2020-06-12 NOTE — PROGRESS NOTE ADULT - ASSESSMENT
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      1) Acute on Chronic kidney injury  Cr of 3.81 from 2 on prior admission. Improving.  DDx: prerenal (given decreased PO and N/V) vs. medication induced SAM vs. HRS  2) Decompensated liver disease  MELD Na 40  Varices: 4/19/19 EGD showed small EV, portal HTN gastropathy, gastric and duodenal erosions  Ascites: small ascites seen on 5/27/20, no diuretics due to SAM  HE: no history  HCC: CTAP from 5/15/20 showed no HCC  3) Hypokalemia  K of 2.6 on admission    Recommendation:    - given degree of hyperbilirubinemia, would obtain an MRCP to rule out biliary pathology  - continue albumin  - MELD Na labs daily  - avoid nephrotoxic and hepatotoxic medications  -low Na diet 43 yo M with PMHx of virtiligo, obesity, DM2, and 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      1) Acute on Chronic kidney injury  Cr of 3.81 from 2 on prior admission. Improving.  DDx: prerenal (given decreased PO and N/V) vs. medication induced SAM vs. HRS  2) Decompensated liver disease  MELD Na 40  Varices: 4/19/19 EGD showed small EV, portal HTN gastropathy, gastric and duodenal erosions  Ascites: small ascites seen on 5/27/20, no diuretics due to SAM  HE: no history  HCC: CTAP from 5/15/20 showed no HCC  3) Hypokalemia  K of 2.6 on admission    Recommendation:    - given degree of hyperbilirubinemia, would obtain an MRCP to rule out biliary pathology  - continue albumin  - MELD Na labs daily  - avoid nephrotoxic and hepatotoxic medications  -low Na diet  -would send blood cx to complete infection workup

## 2020-06-12 NOTE — PROGRESS NOTE ADULT - PROBLEM SELECTOR PLAN 1
Creatinine 3.11 today (improved from presenting Cre of 3.6. Baseline 2.0)   -Patient receiving maintenance fluids NaCl and D5.   -Renal US did not show obstruction or other evidence of renal disease  -UA shows large bilirubin  -Will treat for possible hepatorenal syndrome (octreotide, albumin, midodrine) and will also fluid resuscitate  -Nephrology consulted, will f/u results of UProt/Cre, f/u on recs

## 2020-06-12 NOTE — PROGRESS NOTE ADULT - PROBLEM SELECTOR PLAN 3
Patient found to have K of 2.6 upon admission, Received 40mEq KCl in the ED  -K 3.6 today, much improved   -Will continue to monitor and replete with KCl PO as needed

## 2020-06-12 NOTE — PROGRESS NOTE ADULT - SUBJECTIVE AND OBJECTIVE BOX
Chief Complaint:  Patient is a 44y old  Male who presents with a chief complaint of Elevated Serum Creatinine at Hepatology clinic (2020 09:48)      Interval Events:   no fever or abd pain    Allergies:  levofloxacin (Other (Moderate))      Hospital Medications:  albumin human 25% IVPB 100 milliLiter(s) IV Intermittent every 6 hours  cholestyramine Powder (Sugar-Free) 4 Gram(s) Oral daily  citric acid/sodium citrate Solution 30 milliLiter(s) Oral two times a day  dextrose 40% Gel 15 Gram(s) Oral once PRN  dextrose 5%. 1000 milliLiter(s) IV Continuous <Continuous>  dextrose 50% Injectable 12.5 Gram(s) IV Push once  dextrose 50% Injectable 25 Gram(s) IV Push once  dextrose 50% Injectable 25 Gram(s) IV Push once  glucagon  Injectable 1 milliGRAM(s) IntraMuscular once PRN  insulin lispro (HumaLOG) corrective regimen sliding scale   SubCutaneous three times a day before meals  insulin lispro (HumaLOG) corrective regimen sliding scale   SubCutaneous at bedtime  midodrine. 5 milliGRAM(s) Oral three times a day  multivitamin 1 Tablet(s) Oral daily  octreotide  Injectable 100 MICROGram(s) SubCutaneous three times a day  pantoprazole    Tablet 40 milliGRAM(s) Oral before breakfast  potassium phosphate / sodium phosphate powder 1 Packet(s) Oral three times a day with meals  rifAXIMin 550 milliGRAM(s) Oral two times a day  sodium bicarbonate  Infusion 0.065 mEq/kG/Hr IV Continuous <Continuous>      PMHX/PSHX:  E. coli bacteremia  Vitiligo  Latent tuberculosis  Diabetes mellitus type 2, insulin dependent  Esophageal varices  Alcoholic cirrhosis  Status post corneal transplant  Alcoholic Cirrhosis  Esophageal Varices      Family history:  Family history of uterine cancer  Family history of diabetes mellitus      ROS:     General:  No wt loss, fevers, chills, night sweats, fatigue,   Eyes:  Good vision, no reported pain  ENT:  No sore throat, pain, runny nose, dysphagia  CV:  No pain, palpitations, hypo/hypertension  Resp:  No dyspnea, cough, tachypnea, wheezing  GI:  See HPI  :  No pain, bleeding, incontinence, nocturia  Muscle:  No pain, weakness  Neuro:  No weakness, tingling, memory problems  Psych:  No fatigue, insomnia, mood problems, depression  Endocrine:  No polyuria, polydipsia, cold/heat intolerance  Heme:  No petechiae, ecchymosis, easy bruisability  Skin:  No rash, edema      PHYSICAL EXAM:     GENERAL:  Appears stated age, well-groomed, well-nourished, no distress  HEENT:  NC/AT,  conjunctivae clear, sclera-icteric  NECK: Trachea midline, supple  CHEST:  Full & symmetric excursion, no increased effort, breath sounds clear  HEART:  Regular rhythm  ABDOMEN:  Soft, non-tender, non-distended, normoactive bowel sounds,  no masses ,no hepato-splenomegaly,   EXTREMITIES:  no cyanosis,clubbing or edema  SKIN:  jaundice/vitiligo  NEURO:  Alert, oriented, no asterixis  RECTAL: Deferred    Vital Signs:  Vital Signs Last 24 Hrs  T(C): 36.7 (2020 04:04), Max: 36.7 (2020 04:04)  T(F): 98 (2020 04:04), Max: 98 (2020 04:04)  HR: 64 (2020 04:04) (58 - 64)  BP: 111/67 (2020 04:04) (105/60 - 111/67)  BP(mean): --  RR: 18 (2020 04:04) (18 - 19)  SpO2: 97% (2020 04:04) (97% - 99%)  Daily     Daily Weight in k.9 (2020 04:04)    LABS:                        8.4    5.09  )-----------( 61       ( 2020 06:49 )             23.6     06-12    137  |  109<H>  |  41<H>  ----------------------------<  161<H>  3.6   |  11<L>  |  3.11<H>    Ca    8.8      2020 06:49  Phos  2.0     06-12  Mg     1.8     06-12    TPro  5.2<L>  /  Alb  4.5  /  TBili  44.3<H>  /  DBili  x   /  AST  64<H>  /  ALT  32  /  AlkPhos  81  06-12    LIVER FUNCTIONS - ( 2020 06:49 )  Alb: 4.5 g/dL / Pro: 5.2 g/dL / ALK PHOS: 81 U/L / ALT: 32 U/L / AST: 64 U/L / GGT: x           PT/INR - ( 2020 06:49 )   PT: 26.6 sec;   INR: 2.27 ratio         PTT - ( 10 Duran 2020 12:32 )  PTT:47.0 sec  Urinalysis Basic - ( 10 Duran 2020 19:28 )    Color: Leslie / Appearance: Slightly Turbid / S.012 / pH: x  Gluc: x / Ketone: Negative  / Bili: Large / Urobili: <2 mg/dL   Blood: x / Protein: Trace / Nitrite: Negative   Leuk Esterase: Negative / RBC: 65 /HPF / WBC 0 /HPF   Sq Epi: x / Non Sq Epi: 1 /HPF / Bacteria: Negative          Imaging:

## 2020-06-12 NOTE — PROGRESS NOTE ADULT - SUBJECTIVE AND OBJECTIVE BOX
[facility] DIVISION OF KIDNEY DISEASES AND HYPERTENSION -- FOLLOW UP NOTE  --------------------------------------------------------------------------------    45 y/o M PMHx alcoholic hepatitis, decompensated etOH cirrhosis (c/b ascites, SBP, and variceal hemorrhage) admitted for elevated SCr possible HRS vs dehydration (n/v/poor PO intake). SCr: 1.9 on 5/27/20.  Outpatient SCr increased SCr: 3.6. Pt on Midodrine albumin and Octreotide.    No acute events overnight  Patient received Sodium bicarbonate infusion  UO: 800 cc over the past 24 hours      PAST HISTORY  --------------------------------------------------------------------------------  No significant changes to PMH, PSH, FHx, SHx, unless otherwise noted    ALLERGIES & MEDICATIONS  --------------------------------------------------------------------------------  Allergies    levofloxacin (Other (Moderate))    Intolerances      Standing Inpatient Medications  albumin human 25% IVPB 100 milliLiter(s) IV Intermittent every 6 hours  cholestyramine Powder (Sugar-Free) 4 Gram(s) Oral daily  citric acid/sodium citrate Solution 30 milliLiter(s) Oral two times a day  dextrose 5%. 1000 milliLiter(s) IV Continuous <Continuous>  dextrose 50% Injectable 12.5 Gram(s) IV Push once  dextrose 50% Injectable 25 Gram(s) IV Push once  dextrose 50% Injectable 25 Gram(s) IV Push once  insulin lispro (HumaLOG) corrective regimen sliding scale   SubCutaneous three times a day before meals  insulin lispro (HumaLOG) corrective regimen sliding scale   SubCutaneous at bedtime  midodrine. 5 milliGRAM(s) Oral three times a day  multivitamin 1 Tablet(s) Oral daily  octreotide  Injectable 100 MICROGram(s) SubCutaneous three times a day  pantoprazole    Tablet 40 milliGRAM(s) Oral before breakfast  potassium phosphate / sodium phosphate powder 1 Packet(s) Oral three times a day with meals  rifAXIMin 550 milliGRAM(s) Oral two times a day  sodium bicarbonate  Infusion 0.065 mEq/kG/Hr IV Continuous <Continuous>    PRN Inpatient Medications  dextrose 40% Gel 15 Gram(s) Oral once PRN  glucagon  Injectable 1 milliGRAM(s) IntraMuscular once PRN        REVIEW OF SYSTEMS  --------------------------------------------------------------------------------  Gen: No weight changes, + weakness  Skin: No rashes  Head/Eyes/Ears/Mouth: No headache; Normal hearing  Respiratory: No dyspnea, cough, wheezing, hemoptysis  CV: No chest pain  GI: No abdominal pain, diarrhea, constipation, nausea, vomiting  : No increased frequency, dysuria, hematuria, nocturia  MSK: + LE edema  Neuro: + dizziness, + nausea    VITALS/PHYSICAL EXAM  --------------------------------------------------------------------------------  T(C): 36.7 (06-12-20 @ 04:04), Max: 36.7 (06-12-20 @ 04:04)  HR: 64 (06-12-20 @ 04:04) (58 - 64)  BP: 111/67 (06-12-20 @ 04:04) (105/60 - 111/67)  RR: 18 (06-12-20 @ 04:04) (18 - 19)  SpO2: 97% (06-12-20 @ 04:04) (97% - 99%)  Wt(kg): --  Height (cm): 172.72 (06-10-20 @ 11:43)  Weight (kg): 86.2 (06-10-20 @ 11:43)  BMI (kg/m2): 28.9 (06-10-20 @ 11:43)  BSA (m2): 2 (06-10-20 @ 11:43)      06-11-20 @ 07:01  -  06-12-20 @ 07:00  --------------------------------------------------------  IN: 610 mL / OUT: 800 mL / NET: -190 mL    Physical Exam:  	Gen: NAD, on RA  	HEENT: dry MMM, scleral icterus  	Pulm: CTA B/L, decreased BS  	CV: RRR, S1S2; no rub  	Abd: +BS, soft, distended  	UE: Warm, FROM, no clubbing, intact strength; no edema; no asterixis  	LE: Warm, no edema  	Neuro: No focal deficits, intact gait  	Psych: Normal affect and mood  	Skin: Warm, + vitiligo, + icterus  	Vascular access: peripheral IV lines    LABS/STUDIES  --------------------------------------------------------------------------------              8.4    5.09  >-----------<  61       [06-12-20 @ 06:49]              23.6     137  |  109  |  41  ----------------------------<  161      [06-12-20 @ 06:49]  3.6   |  11  |  3.11        Ca     8.8     [06-12-20 @ 06:49]      Mg     1.8     [06-12-20 @ 06:49]      Phos  2.0     [06-12-20 @ 06:49]    TPro  5.2  /  Alb  4.5  /  TBili  44.3  /  DBili  x   /  AST  64  /  ALT  32  /  AlkPhos  81  [06-12-20 @ 06:49]    PT/INR: PT 26.6 , INR 2.27       [06-12-20 @ 06:49]  PTT: 47.0       [06-10-20 @ 12:32]      Creatinine Trend:  SCr 3.11 [06-12 @ 06:49]  SCr 3.59 [06-11 @ 06:41]  SCr 3.56 [06-10 @ 23:54]  SCr 3.81 [06-10 @ 12:32]  SCr 1.94 [05-27 @ 06:14]              Urinalysis - [06-10-20 @ 19:28]      Color Leslie / Appearance Slightly Turbid / SG 1.012 / pH 6.5      Gluc Negative / Ketone Negative  / Bili Large / Urobili <2 mg/dL       Blood Negative / Protein Trace / Leuk Est Negative / Nitrite Negative      RBC 65 / WBC 0 / Hyaline 1 / Gran  / Sq Epi  / Non Sq Epi 1 / Bacteria Negative    Urine Creatinine 83      [06-11-20 @ 22:52]  Urine Sodium <35      [06-10-20 @ 15:38]  Urine Urea Nitrogen 417      [06-10-20 @ 19:28]  Urine Osmolality 297      [06-10-20 @ 15:38]    HbA1c 10.5      [04-18-19 @ 11:27]  TSH 0.26      [05-19-20 @ 08:53]    HBsAb Nonreact      [05-21-20 @ 00:40]  HBsAg Nonreact      [05-21-20 @ 00:40]  HBcAb Nonreact      [05-20-20 @ 22:39]

## 2020-06-12 NOTE — DISCHARGE NOTE PROVIDER - NSDCCPCAREPLAN_GEN_ALL_CORE_FT
PRINCIPAL DISCHARGE DIAGNOSIS  Diagnosis: Hepatorenal syndrome  Assessment and Plan of Treatment:       SECONDARY DISCHARGE DIAGNOSES  Diagnosis: Alcoholic cirrhosis of liver with ascites  Assessment and Plan of Treatment:     Diagnosis: Jaundice  Assessment and Plan of Treatment: PRINCIPAL DISCHARGE DIAGNOSIS  Diagnosis: Acute kidney injury  Assessment and Plan of Treatment: You came into the hospital because your Serum Creatinine, which is a marker of kidney function, was elevated. This was likely due to your episodes of vomiting and reduced food intake in the days prior to your admission to the hospital. We gave you IV fluids and did not give you the repaglinide medication that had made you feel nauseated, and your kidney function improved throughout the admission. You were also treated for hepatorenal syndrome, which can occur in people who have liver disease like you, causing the kidneys to become stressed. We gave you medication to treat this, including albumin, midodrine, and octreotide.      SECONDARY DISCHARGE DIAGNOSES  Diagnosis: Alcoholic cirrhosis of liver with ascites  Assessment and Plan of Treatment: You have liver disease that causes you to have increased swelling in your belly due to fluid accumulation. While you were in the hospital, your belly swelling stayed consistent, and you did not show any signs of infections or other complications from the fluid. We gave you an antibiotic through the IV, called ceftriaxone, to help prevent infection. You were also given cholestyramine to help with removing the bile from your body that builds up due to liver disease. You were also started on lactulose, which also helps get rid of bile and toxins that build up in the body in patients with liver disease, once your kidney function improved.    Diagnosis: Jaundice  Assessment and Plan of Treatment: You came in with jaundice, or yellowing, of the skin and eyes. This is due to high bilirubin, which is a product that can build up in patients with liver disease and cause yellow discoloration of the skin. You were given lactulose and cholestyramine to help with these. PRINCIPAL DISCHARGE DIAGNOSIS  Diagnosis: Acute kidney injury  Assessment and Plan of Treatment: You came into the hospital because your Serum Creatinine, which is a marker of kidney function, was elevated. This was likely due to your episodes of vomiting and reduced food intake in the days prior to your admission to the hospital. We gave you IV fluids and did not give you the repaglinide medication that had made you feel nauseated.  You were also treated for hepatorenal syndrome, which can occur in people who have liver disease like you, causing the kidneys to become stressed. We gave you medication to treat this, including albumin, midodrine, and octreotide. Your kidney function improved throughout the admission.      SECONDARY DISCHARGE DIAGNOSES  Diagnosis: Alcoholic cirrhosis of liver with ascites  Assessment and Plan of Treatment: You have liver disease that causes you to have increased swelling in your belly due to fluid accumulation. While you were in the hospital, your belly swelling stayed consistent, and you did not show any signs of infections or other complications from the fluid. We gave you an antibiotic through the IV, called ceftriaxone, to help prevent infection. You were also given cholestyramine to help with removing the bile from your body that builds up due to liver disease. You were also started on lactulose, which also helps get rid of bile and toxins that build up in the body in patients with liver disease, once your kidney function improved.    Diagnosis: Jaundice  Assessment and Plan of Treatment: You came in with jaundice, or yellowing, of the skin and eyes. This is due to high bilirubin, which is a product that can build up in patients with liver disease and cause yellow discoloration of the skin. You were given lactulose and cholestyramine to help with these. PRINCIPAL DISCHARGE DIAGNOSIS  Diagnosis: Acute kidney injury  Assessment and Plan of Treatment: You came into the hospital because your Serum Creatinine, which is a marker of kidney function, was elevated. This was likely due to your episodes of vomiting and reduced food intake in the days prior to your admission to the hospital. We gave you IV fluids and did not give you the repaglinide medication that had made you feel nauseated.  You were also treated for hepatorenal syndrome, which can occur in people who have liver disease like you, causing the kidneys to become stressed. We gave you medication to treat this, including albumin, midodrine, and octreotide. Your kidney function improved throughout the admission, so we stopped giving you the albumin and octreotide.      SECONDARY DISCHARGE DIAGNOSES  Diagnosis: Alcoholic cirrhosis of liver with ascites  Assessment and Plan of Treatment: You have liver disease that causes you to have increased swelling in your belly due to fluid accumulation. While you were in the hospital, your belly swelling stayed consistent, and you did not show any signs of infections or other complications from the fluid. We gave you an antibiotic through the IV, called ceftriaxone, to help prevent infection. You were also given cholestyramine to help with removing the bile from your body that builds up due to liver disease. You were also started on lactulose, which also helps get rid of bile and toxins that build up in the body in patients with liver disease, once your kidney function improved. Please follow-up with Dr. Ayala within 1 week of discharge.    Diagnosis: Jaundice  Assessment and Plan of Treatment: You came in with jaundice, or yellowing, of the skin and eyes. This is due to high bilirubin, which is a product that can build up in patients with liver disease and cause yellow discoloration of the skin. You were given lactulose and cholestyramine to help with these.

## 2020-06-12 NOTE — DISCHARGE NOTE PROVIDER - HOSPITAL COURSE
Mr. Hallman is a 44 yr old man with alcoholic hepatitis, decompensated alcoholic cirrhosis (complicated by SBP, variceal bleed, and ascites), treated latent TB, C.diff (20166, 2017), and T2DM (managed with insulin) who was sent to the ED for an elevated SCre of 3.6 from Hepatology clinic, up from baseline SCre 2.0. Prior to presentation, Mr. Hallman has experienced 1 week of weakness and fatigue that progressed to 3 days of N/V associated with weakness and dizziness. After taking his new diabetes medication ripaglinide (started during recent admission 5/27/20) and eating, he would have a bout of non-bloody emesis, leading to little to no PO intake for 3 days. He did not experience fevers, chills, or increased abdominal swelling uring this time. He states his most recent drink was in March 2020. He was most recently hospitalized on 5/27/20 for jaundice and decompensated cirrhosis. During this admission, his SCre was 3.6 upon admission, consistent with SAM. Renal ultrasound did not demonstrate an obstructive process. In the setting of decreased PO intake and rising Cre, he was started on maintenance fluids for treatment of SAM. SCre was downtrending throughout admission.... Given the patient's extensive liver pathology and presenting MELD of 42, he was also treated for possible hepatorenal syndrome, consisting of albumin repletion, midodrine, and octreotide.     Additionally, he was  found to have a high anion gap metabolic acidosis, with VBG pH of 7.32, bicarb of 10, and elevated anion gap of 19, and was started on Na Bicarb, which was switched to Bicitra and given 75mEq in 1L of Na Bicarb per Nephrology recs. Patient was also hypokalemic upon presentation, with K of 2.6 with EKG demonstrated QT prolongation to 512, which resolved with KCl repletion.     In addition to the renal pathologies, he presented with elevated Total Bilirubin to 50, was profoundly jaundiced, and demonstrated asterixis on physical exam, but was A&0x4. He was started on rifaximin and cholestyramine. Lactulose and Bactrim (for SBP prophylaxis) were held in the setting of SAM. Patients T2DM was well-controlled with HbA1c of 6.6% on admission and POCT glucose within goal of 120-180.     Of note, patient was evaluated by the hepatology transplant team and was not found to be a candidate at this time, given the listing requirement of one-year of alcohol abstinence. Mr. Hallman is a 44 yr old man with alcoholic hepatitis, decompensated alcoholic cirrhosis (complicated by SBP, variceal bleed, and ascites), treated latent TB, C.diff (20166, 2017), and T2DM (managed with insulin) who was sent to the ED for an elevated SCre of 3.6 from Hepatology clinic, up from baseline SCre 2.0. Prior to presentation, Mr. Hallman has experienced 1 week of weakness and fatigue that progressed to 3 days of N/V associated with weakness and dizziness. After taking his new diabetes medication ripaglinide (started during recent admission 5/27/20) and eating, he would have a bout of non-bloody emesis, leading to little to no PO intake for 3 days. He did not experience fevers, chills, or increased abdominal swelling uring this time. He states his most recent drink was in March 2020. He was most recently hospitalized on 5/27/20 for jaundice and decompensated cirrhosis. During this admission, his SCre was 3.6 upon admission, consistent with SAM. Renal ultrasound did not demonstrate an obstructive process. In the setting of decreased PO intake and rising Cre, he was started on maintenance fluids for treatment of SAM. SCre was downtrending throughout admission.... Given the patient's extensive liver pathology and presenting MELD of 42, he was also treated for possible hepatorenal syndrome, consisting of albumin repletion, midodrine, and octreotide.     Additionally, he was  found to have a high anion gap metabolic acidosis, with VBG pH of 7.32, bicarb of 10, and elevated anion gap of 19, and was started on Na Bicarb, which was switched to Bicitra and given 75mEq in 1L of Na Bicarb per Nephrology recs. Patient was also hypokalemic upon presentation, with K of 2.6 with EKG demonstrated QT prolongation to 512, which resolved with KCl repletion.     In addition to the renal pathologies, he presented with elevated Total Bilirubin to 50, was profoundly jaundiced, and demonstrated asterixis on physical exam, but was A&0x4. He was started on rifaximin and cholestyramine. Lactulose and Bactrim (for SBP prophylaxis) were held in the setting of SAM. Ceftriaxone 1g/day was started on 6/15/20 per Hepatology recs for SBP prophylaxis. MELD scores were 42, 41, and 40 throughout admission. Patients T2DM was well-controlled with HbA1c of 6.6% on admission and POCT glucose within goal of 120-180.     Of note, patient was evaluated by the hepatology transplant team prior to admission and was not found to be a candidate at this time, given the listing requirement of one-year of alcohol abstinence. Mr. Hallman is a 44 yr old man with alcoholic hepatitis, decompensated alcoholic cirrhosis (complicated by SBP, variceal bleed, and ascites), treated latent TB, C.diff (20166, 2017), and T2DM (managed with insulin) who was sent to the ED for an elevated SCre of 3.6 from Hepatology clinic, up from baseline SCre 2.0. Prior to presentation, Mr. Hallman has experienced 1 week of weakness and fatigue that progressed to 3 days of N/V associated with weakness and dizziness. After taking his new diabetes medication ripaglinide (started during recent admission 5/27/20) and eating, he would have a bout of non-bloody emesis, leading to little to no PO intake for 3 days. He did not experience fevers, chills, or increased abdominal swelling uring this time. He states his most recent drink was in March 2020. He was most recently hospitalized on 5/27/20 for jaundice and decompensated cirrhosis. During this admission, his SCre was 3.6 upon admission, consistent with SAM. Renal ultrasound did not demonstrate an obstructive process. In the setting of decreased PO intake and rising Cre, he was started on maintenance fluids for treatment of SAM. Given the patient's extensive liver pathology and presenting MELD of 42, he was also treated for possible hepatorenal syndrome, consisting of albumin repletion, midodrine, and octreotide. SCre decreased to 2.13, steady for 3 days prior to discharge.     Additionally, he was  found to have a high anion gap metabolic acidosis, with VBG pH of 7.32, bicarb of 10, and elevated anion gap of 19, and was started on Na Bicarb, which was switched to Bicitra and given 75mEq in 1L of Na Bicarb per Nephrology recs. Acidosis stabilized with bicarbs persistently 15-17 several days prior to discharge. Patient was also hypokalemic upon presentation, with K of 2.6 with EKG demonstrated QT prolongation to 512, which resolved with KCl repletion.     In addition to the renal pathologies, he presented with elevated Total Bilirubin to 50, was profoundly jaundiced, and demonstrated asterixis on physical exam, but was A&0x4. He was started on rifaximin and cholestyramine. Lactulose and Bactrim (for SBP prophylaxis) were held in the setting of SAM. Ceftriaxone 1g/day was started on 6/15/20 per Hepatology recs for SBP prophylaxis. MELD scores were 42, 41, and 40 throughout admission. Patients T2DM was well-controlled with HbA1c of 6.6% on admission and POCT glucose within goal of 120-180.     Of note, patient was evaluated by the hepatology transplant team prior to admission and was not found to be a candidate at this time, given the listing requirement of one-year of alcohol abstinence. Mr. Hallman is a 44 yr old man with alcoholic hepatitis, decompensated alcoholic cirrhosis (complicated by SBP, variceal bleed, and ascites), treated latent TB, C.diff (20166, 2017), and T2DM (managed with insulin) who was sent to the ED for an elevated SCre of 3.6 from Hepatology clinic, up from baseline SCre 2.0. Prior to presentation, Mr. Hallman has experienced 1 week of weakness and fatigue that progressed to 3 days of N/V associated with weakness and dizziness. After taking his new diabetes medication ripaglinide (started during recent admission 5/27/20) and eating, he would have a bout of non-bloody emesis, leading to little to no PO intake for 3 days. He did not experience fevers, chills, or increased abdominal swelling uring this time. He states his most recent drink was in March 2020. He was most recently hospitalized on 5/27/20 for jaundice and decompensated cirrhosis. During this admission, his SCre was 3.6 upon admission, consistent with SAM. Renal ultrasound did not demonstrate an obstructive process. In the setting of decreased PO intake and rising Cre, he was started on maintenance fluids for treatment of SAM. Given the patient's extensive liver pathology and presenting MELD of 42, he was also treated for possible hepatorenal syndrome, consisting of albumin repletion, midodrine, and octreotide. SCre decreased to 2.07, steady for several days prior to discharge. Albumin and octreotide were sequentially discontinued in the setting of stable SCre.     Additionally, he was  found to have a high anion gap metabolic acidosis, with VBG pH of 7.32, bicarb of 10, and elevated anion gap of 19, and was started on Na Bicarb, which was switched to Bicitra and given 75mEq in 1L of Na Bicarb per Nephrology recs. Acidosis stabilized with bicarbs persistently 15-17 several days prior to discharge. Patient was also hypokalemic upon presentation, with K of 2.6 with EKG demonstrated QT prolongation to 512, which resolved with KCl repletion.     In addition to the renal pathologies, he presented with elevated Total Bilirubin to 50, was profoundly jaundiced, and demonstrated asterixis on physical exam, but was A&0x4. He was started on rifaximin and cholestyramine. Lactulose and Bactrim (for SBP prophylaxis) were held in the setting of SAM. Ceftriaxone 1g/day was started on 6/15/20 per Hepatology recs for SBP prophylaxis. MELD scores were 42, 41, and 40 throughout admission. Patients T2DM was well-controlled with HbA1c of 6.6% on admission and POCT glucose within goal of 120-180. He had several, intermittent episodes of postprandial nausea and emesis throughout admission, that seemed to be related to sensitivity to certain condiments on food/types of food, as he also tolerated robust meals, such as steak and potatoes, without incident.     Of note, patient was evaluated by the hepatology transplant team prior to admission and was not found to be a candidate at this time, given the listing requirement of one-year of alcohol abstinence. He is seen as an outpatient by Dr. Ayala and will follow-up with her after discharge. Mr. Hallman is a 44 yr old man with alcoholic hepatitis, decompensated alcoholic cirrhosis (complicated by SBP, variceal bleed, and ascites), treated latent TB, C.diff (20166, 2017), and T2DM (managed with insulin) who was sent to the ED for an elevated Cr of 3.6 from Hepatology clinic, up from baseline Cr 2.0.         Admitted for SAM 2/2 HRS. Started on albumin, octreotide, and midodrine. Cr improved from peak 3.6 back to baseline of around 2. Hepatology and Transplant nephrology were following throughout. Ascites remained at baseline, noted to have asterixes, but AAOx4. Profound hyperbilirubinemia (40s) remained stable. Also noted to have worsening thrombocytopenia, but no bleeding. Home Bactrim held given concern for contribution to rising Cr. Discharged to home on midodrine 5mg TID (to be weaned off as outpatient), will restart home Bactrim on discharge. Will discharge off insulin with close follow up given FS near goal during admission. Patient will follow up with hepatology clinic in 3 days.

## 2020-06-12 NOTE — DISCHARGE NOTE PROVIDER - NSDCMRMEDTOKEN_GEN_ALL_CORE_FT
cholestyramine 4 g/5 g oral powder for reconstitution: 1 application orally once a day  lactulose 10 g/15 mL oral syrup: 15 milliliter(s) orally once a day  Levemir 100 units/mL subcutaneous solution: 20 unit(s) subcutaneous 2 times a day  Multiple Vitamins oral tablet: 1 tab(s) orally once a day  pantoprazole 40 mg oral delayed release tablet: 1 tab(s) orally once a day  repaglinide 1 mg oral tablet: 1 tab(s) orally 3 times a day  sulfamethoxazole-trimethoprim 800 mg-160 mg oral tablet: 1 tab(s) orally once a day  Xifaxan 550 mg oral tablet: 1 tab(s) orally 2 times a day cholestyramine 4 g/5 g oral powder for reconstitution: 1 application orally once a day  lactulose 10 g/15 mL oral syrup: 15 milliliter(s) orally once a day  midodrine 5 mg oral tablet: 1 tab(s) orally every 8 hours  Multiple Vitamins oral tablet: 1 tab(s) orally once a day  pantoprazole 40 mg oral delayed release tablet: 1 tab(s) orally once a day  repaglinide 1 mg oral tablet: 1 tab(s) orally 3 times a day  sodium citrate-citric acid 500 mg-334 mg/5 mL oral solution: 30 milliliter(s) orally 3 times a day   sulfamethoxazole-trimethoprim 800 mg-160 mg oral tablet: 1 tab(s) orally once a day  Xifaxan 550 mg oral tablet: 1 tab(s) orally 2 times a day  zinc sulfate 220 mg oral capsule: 1 cap(s) orally 2 times a day cholestyramine 4 g/5 g oral powder for reconstitution: 4 gram(s) orally 2 times a day   lactulose 10 g/15 mL oral syrup: 15 milliliter(s) orally once a day  midodrine 5 mg oral tablet: 1 tab(s) orally every 8 hours  Multiple Vitamins oral tablet: 1 tab(s) orally once a day  pantoprazole 40 mg oral delayed release tablet: 1 tab(s) orally once a day  repaglinide 1 mg oral tablet: 1 tab(s) orally 3 times a day  sodium citrate-citric acid 500 mg-334 mg/5 mL oral solution: 30 milliliter(s) orally 3 times a day   sulfamethoxazole-trimethoprim 800 mg-160 mg oral tablet: 1 tab(s) orally once a day  Xifaxan 550 mg oral tablet: 1 tab(s) orally 2 times a day  zinc sulfate 220 mg oral capsule: 1 cap(s) orally 2 times a day

## 2020-06-12 NOTE — PROGRESS NOTE ADULT - ASSESSMENT
45 y/o M PMHx alcoholic hepatitis, decompensated etOH cirrhosis (c/b ascites, SBP, and variceal hemorrhage),  latent TB (treated), h/o C. diff (2016, 2017), and T2DM (HbA1c 7.9% in 10/2019), sent to ED by hepatology clinic for rise in Cre on outpatient labs, admitted for treatment of SAM in the setting of 3 days of reduced PO intake 2/2 N/V with concern for hepatorenal syndrome.

## 2020-06-12 NOTE — DISCHARGE NOTE PROVIDER - CARE PROVIDER_API CALL
Agapito Ayala)  Gastroenterology; Internal Medicine  [address] [place] [address]  Phone: [phone]  Fax: [phone]  Follow Up Time:

## 2020-06-12 NOTE — PROGRESS NOTE ADULT - PROBLEM SELECTOR PLAN 6
Patient with history of T2DM treated with levemir and recently rapaglinide. HbA1c 6.6% this admission.   -Serum Gluc 161   -POCT glucose within goal (120-180). One reading 190, returned to within goal    -Will continue to monitor blood glucose carefully and continue with lispro sliding scale

## 2020-06-12 NOTE — PROGRESS NOTE ADULT - ASSESSMENT
45 y/o M PMHx alcoholic hepatitis, decompensated etOH cirrhosis (c/b ascites, SBP, and variceal hemorrhage),  latent TB (treated), h/o C. diff (2016, 2017), Vitiligo and T2DM sent to [facility] from hepatology clinic for elevated SCr based on outpatient labs.     #NISHI  - Pt with hemodynamically mediated Nishi in the setting of nausea/vomiting and poor PO intake. Possible HRS?  - Patient also noted to be on Bactrim for SBP ppx for resistant E.Coli and this will likely "raise" creatinine due to impaired secretion of creatinine.   - SCr 0.55 in 10/2019 and 0.65 in 4/2020. SCr: 1.9 on 5/27/20.  Last outpatient SCr increased to 2.31 on 6/1/20 and increased to SCr; 3.6 on repeat on 6/9/20  - SCr: 3.11 today (6/11/20)  - 6/10 - UA with bilirubin, hematuria, RBC: 35  - 6/10 Ulytes with Steffen: <35  - 6/10  Renal Sono with Renal parenchymal disease. No hydronephrosis.  - MELD Score: 41  - Pt on Octreotide, Albumin and Midodrine 5mg TID  - Continue current management  - Monitor renal function with strict intake and output monitoring  - Check urine spot TP/CR    #Acidemia  - Pt with acidemia in the setting of alcoholic liver cirrhosis  - Serum HCO3: 11 today  - Check  BHB  - Continue  Bicitra 30ml BID        Anni Sims  Nephrology Fellow  Pager: [phone]

## 2020-06-12 NOTE — PROGRESS NOTE ADULT - SUBJECTIVE AND OBJECTIVE BOX
Patient is a 44y old  Male who presents with a chief complaint of Elevated Serum Creatinine at Hepatology clinic   SUBJECTIVE / OVERNIGHT EVENTS: Patient had no acute events overnight. Patient seen and examined at bedside this morning.   Interval history: Patient has a good appetite and is eating/drinking without N/V. Normal, non-bloody BMs without episodes of diarrhea. Does not feel weak or dizzy. Is able to ambulate to and from bathroom without issue.   Tele: Normal sinus rhythm.  Evening run 48-60, returned to 60s-80s overnight and this morning.     ROS: [ - ] Fever [ - ] Chills [ - ] Chest Pain [ - ] Shortness of breath     MEDICATIONS  (STANDING):  albumin human 25% IVPB 100 milliLiter(s) IV Intermittent every 6 hours  cholestyramine Powder (Sugar-Free) 4 Gram(s) Oral daily  citric acid/sodium citrate Solution 30 milliLiter(s) Oral two times a day  dextrose 5%. 1000 milliLiter(s) (50 mL/Hr) IV Continuous <Continuous>  dextrose 50% Injectable 12.5 Gram(s) IV Push once  dextrose 50% Injectable 25 Gram(s) IV Push once  dextrose 50% Injectable 25 Gram(s) IV Push once  insulin lispro (HumaLOG) corrective regimen sliding scale   SubCutaneous three times a day before meals  insulin lispro (HumaLOG) corrective regimen sliding scale   SubCutaneous at bedtime  midodrine. 5 milliGRAM(s) Oral three times a day  multivitamin 1 Tablet(s) Oral daily  octreotide  Injectable 100 MICROGram(s) SubCutaneous three times a day  pantoprazole    Tablet 40 milliGRAM(s) Oral before breakfast  potassium phosphate / sodium phosphate powder 1 Packet(s) Oral three times a day with meals  rifAXIMin 550 milliGRAM(s) Oral two times a day  sodium bicarbonate  Infusion 0.065 mEq/kG/Hr (75 mL/Hr) IV Continuous <Continuous>    MEDICATIONS  (PRN):  dextrose 40% Gel 15 Gram(s) Oral once PRN Blood Glucose LESS THAN 70 milliGRAM(s)/deciliter  glucagon  Injectable 1 milliGRAM(s) IntraMuscular once PRN Glucose LESS THAN 70 milligrams/deciliter      Vital Signs Last 24 Hrs  T(C): 36.7 (2020 04:04), Max: 36.7 (2020 04:04)  T(F): 98 (2020 04:04), Max: 98 (2020 04:04)  HR: 64 (2020 04:04) (58 - 64)  BP: 111/67 (2020 04:04) (105/60 - 111/67)  BP(mean): --  RR: 18 (2020 04:04) (18 - 19)  SpO2: 97% (2020 04:04) (97% - 99%)  CAPILLARY BLOOD GLUCOSE      POCT Blood Glucose.: 160 mg/dL (2020 21:56)  POCT Blood Glucose.: 180 mg/dL (2020 17:46)  POCT Blood Glucose.: 190 mg/dL (2020 12:52)  POCT Blood Glucose.: 143 mg/dL (2020 08:40)    I&O's Summary    2020 07:01  -  2020 07:00  --------------------------------------------------------  IN: 610 mL / OUT: 800 mL / NET: -190 mL        PHYSICAL EXAM  GENERAL: NAD, lying comfortably in bed   HEAD:  Atraumatic, Normocephalic  EYES: EOMI, R pupil decreased reactivity to light (corneal transplant 2/2 cataracts), scleral icterus present b/l   NECK: Supple, No JVD  CHEST/LUNG: Clear to auscultation bilaterally; No wheeze  HEART: Regular rate and rhythm; No murmurs, rubs, or gallops  ABDOMEN: Distended, at baseline. -fluid wave. Soft and nontender to palpation. Bowel sounds present  EXTREMITIES:  2+ Peripheral Pulses, Clubbing in phalanges b/l   NEURO: AAOx4, asterixis present b/l   SKIN: Hypopigmented patches throughout body, consistent with vitiligo. Profoundly jaundiced throughout body      LABS:                        8.4    5.09  )-----------( 61       ( 2020 06:49 )             23.6     06-12    137  |  109<H>  |  41<H>  ----------------------------<  161<H>  3.6   |  11<L>  |  3.11<H>    Ca    8.8      2020 06:49  Phos  2.0     06-12  Mg     1.8     06-12    TPro  5.2<L>  /  Alb  4.5  /  TBili  x   /  DBili  x   /  AST  64<H>  /  ALT  32  /  AlkPhos  81  06-12    PT/INR - ( 2020 06:49 )   PT: 26.6 sec;   INR: 2.27 ratio         PTT - ( 10 Duran 2020 12:32 )  PTT:47.0 sec      Urinalysis Basic - ( 10 Duran 2020 19:28 )    Color: Leslie / Appearance: Slightly Turbid / S.012 / pH: x  Gluc: x / Ketone: Negative  / Bili: Large / Urobili: <2 mg/dL   Blood: x / Protein: Trace / Nitrite: Negative   Leuk Esterase: Negative / RBC: 65 /HPF / WBC 0 /HPF   Sq Epi: x / Non Sq Epi: 1 /HPF / Bacteria: Negative Patient is a 44y old  Male who presents with a chief complaint of Elevated Serum Creatinine at Hepatology clinic   SUBJECTIVE / OVERNIGHT EVENTS: Patient had no acute events overnight. Patient seen and examined at bedside this morning.   Interval history: Patient has a good appetite and is eating/drinking without N/V. Normal, non-bloody BMs without episodes of diarrhea. Does not feel weak or dizzy. Is able to ambulate to and from bathroom without issue.   Tele: Normal sinus rhythm.  Evening run 48-60, returned to 60s-80s overnight and this morning.     ROS: [ - ] Fever [ - ] Chills [ - ] Chest Pain [ - ] Shortness of breath     MEDICATIONS  (STANDING):  albumin human 25% IVPB 100 milliLiter(s) IV Intermittent every 6 hours  cholestyramine Powder (Sugar-Free) 4 Gram(s) Oral daily  citric acid/sodium citrate Solution 30 milliLiter(s) Oral two times a day  dextrose 5%. 1000 milliLiter(s) (50 mL/Hr) IV Continuous <Continuous>  dextrose 50% Injectable 12.5 Gram(s) IV Push once  dextrose 50% Injectable 25 Gram(s) IV Push once  dextrose 50% Injectable 25 Gram(s) IV Push once  insulin lispro (HumaLOG) corrective regimen sliding scale   SubCutaneous three times a day before meals  insulin lispro (HumaLOG) corrective regimen sliding scale   SubCutaneous at bedtime  midodrine. 5 milliGRAM(s) Oral three times a day  multivitamin 1 Tablet(s) Oral daily  octreotide  Injectable 100 MICROGram(s) SubCutaneous three times a day  pantoprazole    Tablet 40 milliGRAM(s) Oral before breakfast  potassium phosphate / sodium phosphate powder 1 Packet(s) Oral three times a day with meals  rifAXIMin 550 milliGRAM(s) Oral two times a day  sodium bicarbonate  Infusion 0.065 mEq/kG/Hr (75 mL/Hr) IV Continuous <Continuous>    MEDICATIONS  (PRN):  dextrose 40% Gel 15 Gram(s) Oral once PRN Blood Glucose LESS THAN 70 milliGRAM(s)/deciliter  glucagon  Injectable 1 milliGRAM(s) IntraMuscular once PRN Glucose LESS THAN 70 milligrams/deciliter      Vital Signs Last 24 Hrs  T(C): 36.7 (2020 04:04), Max: 36.7 (2020 04:04)  T(F): 98 (2020 04:04), Max: 98 (2020 04:04)  HR: 64 (2020 04:04) (58 - 64)  BP: 111/67 (2020 04:04) (105/60 - 111/67)  BP(mean): --  RR: 18 (2020 04:04) (18 - 19)  SpO2: 97% (2020 04:04) (97% - 99%)  CAPILLARY BLOOD GLUCOSE      POCT Blood Glucose.: 160 mg/dL (2020 21:56)  POCT Blood Glucose.: 180 mg/dL (2020 17:46)  POCT Blood Glucose.: 190 mg/dL (2020 12:52)  POCT Blood Glucose.: 143 mg/dL (2020 08:40)    I&O's Summary    2020 07:01  -  2020 07:00  --------------------------------------------------------  IN: 610 mL / OUT: 800 mL / NET: -190 mL        PHYSICAL EXAM  GENERAL: NAD, lying comfortably in bed   HEAD:  Atraumatic, Normocephalic  EYES: EOMI, R pupil decreased reactivity to light (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 -fluid wave. Soft and nontender to palpation. Bowel sounds present  EXTREMITIES:  2+ Peripheral Pulses, Clubbing in phalanges b/l   NEURO: AAOx4, asterixis present b/l   SKIN: Hypopigmented patches throughout body, consistent with vitiligo. Profoundly jaundiced throughout body      LABS:                        8.4    5.09  )-----------( 61       ( 2020 06:49 )             23.6     06-12    137  |  109<H>  |  41<H>  ----------------------------<  161<H>  3.6   |  11<L>  |  3.11<H>    Ca    8.8      2020 06:49  Phos  2.0     06-12  Mg     1.8     06-12    TPro  5.2<L>  /  Alb  4.5  /  TBili  x   /  DBili  x   /  AST  64<H>  /  ALT  32  /  AlkPhos  81  06-12    PT/INR - ( 2020 06:49 )   PT: 26.6 sec;   INR: 2.27 ratio         PTT - ( 10 Duran 2020 12:32 )  PTT:47.0 sec      Urinalysis Basic - ( 10 Duran 2020 19:28 )    Color: Leslie / Appearance: Slightly Turbid / S.012 / pH: x  Gluc: x / Ketone: Negative  / Bili: Large / Urobili: <2 mg/dL   Blood: x / Protein: Trace / Nitrite: Negative   Leuk Esterase: Negative / RBC: 65 /HPF / WBC 0 /HPF   Sq Epi: x / Non Sq Epi: 1 /HPF / Bacteria: Negative Patient is a 44y old  Male who presents with a chief complaint of Elevated Serum Creatinine at Hepatology clinic   SUBJECTIVE / OVERNIGHT EVENTS: Patient had no acute events overnight. Patient seen and examined at bedside this morning.   Interval history: Patient has a good appetite and is eating/drinking without N/V. Patient reports 2 episodes of loose, non-bloody BMs this morning. Does not feel weak or dizzy. Is able to ambulate to and from bathroom without issue.   Tele: Normal sinus rhythm.  Evening run 48-60, returned to 60s-80s overnight and this morning.     ROS: [ - ] Fever [ - ] Chills [ - ] Chest Pain [ - ] Shortness of breath     MEDICATIONS  (STANDING):  albumin human 25% IVPB 100 milliLiter(s) IV Intermittent every 6 hours  cholestyramine Powder (Sugar-Free) 4 Gram(s) Oral daily  citric acid/sodium citrate Solution 30 milliLiter(s) Oral two times a day  dextrose 5%. 1000 milliLiter(s) (50 mL/Hr) IV Continuous <Continuous>  dextrose 50% Injectable 12.5 Gram(s) IV Push once  dextrose 50% Injectable 25 Gram(s) IV Push once  dextrose 50% Injectable 25 Gram(s) IV Push once  insulin lispro (HumaLOG) corrective regimen sliding scale   SubCutaneous three times a day before meals  insulin lispro (HumaLOG) corrective regimen sliding scale   SubCutaneous at bedtime  midodrine. 5 milliGRAM(s) Oral three times a day  multivitamin 1 Tablet(s) Oral daily  octreotide  Injectable 100 MICROGram(s) SubCutaneous three times a day  pantoprazole    Tablet 40 milliGRAM(s) Oral before breakfast  potassium phosphate / sodium phosphate powder 1 Packet(s) Oral three times a day with meals  rifAXIMin 550 milliGRAM(s) Oral two times a day  sodium bicarbonate  Infusion 0.065 mEq/kG/Hr (75 mL/Hr) IV Continuous <Continuous>    MEDICATIONS  (PRN):  dextrose 40% Gel 15 Gram(s) Oral once PRN Blood Glucose LESS THAN 70 milliGRAM(s)/deciliter  glucagon  Injectable 1 milliGRAM(s) IntraMuscular once PRN Glucose LESS THAN 70 milligrams/deciliter      Vital Signs Last 24 Hrs  T(C): 36.7 (2020 04:04), Max: 36.7 (2020 04:04)  T(F): 98 (2020 04:04), Max: 98 (2020 04:04)  HR: 64 (2020 04:04) (58 - 64)  BP: 111/67 (2020 04:04) (105/60 - 111/67)  BP(mean): --  RR: 18 (2020 04:04) (18 - 19)  SpO2: 97% (2020 04:04) (97% - 99%)  CAPILLARY BLOOD GLUCOSE      POCT Blood Glucose.: 160 mg/dL (2020 21:56)  POCT Blood Glucose.: 180 mg/dL (2020 17:46)  POCT Blood Glucose.: 190 mg/dL (2020 12:52)  POCT Blood Glucose.: 143 mg/dL (2020 08:40)    I&O's Summary    2020 07:01  -  2020 07:00  --------------------------------------------------------  IN: 610 mL / OUT: 800 mL / NET: -190 mL        PHYSICAL EXAM  GENERAL: NAD, lying comfortably in bed   HEAD:  Atraumatic, Normocephalic  EYES: EOMI, R pupil decreased reactivity to light (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 -fluid wave. Soft and nontender to palpation. Bowel sounds present  EXTREMITIES:  2+ Peripheral Pulses, Clubbing in phalanges b/l   NEURO: AAOx4, asterixis present b/l   SKIN: Hypopigmented patches throughout body, consistent with vitiligo. Profoundly jaundiced throughout body      LABS:                        8.4    5.09  )-----------( 61       ( 2020 06:49 )             23.6     06-12    137  |  109<H>  |  41<H>  ----------------------------<  161<H>  3.6   |  11<L>  |  3.11<H>    Ca    8.8      2020 06:49  Phos  2.0     06-12  Mg     1.8     06-12    TPro  5.2<L>  /  Alb  4.5  /  TBili  x   /  DBili  x   /  AST  64<H>  /  ALT  32  /  AlkPhos  81  06-12    PT/INR - ( 2020 06:49 )   PT: 26.6 sec;   INR: 2.27 ratio         PTT - ( 10 Duran 2020 12:32 )  PTT:47.0 sec      Urinalysis Basic - ( 10 Duran 2020 19:28 )    Color: Leslie / Appearance: Slightly Turbid / S.012 / pH: x  Gluc: x / Ketone: Negative  / Bili: Large / Urobili: <2 mg/dL   Blood: x / Protein: Trace / Nitrite: Negative   Leuk Esterase: Negative / RBC: 65 /HPF / WBC 0 /HPF   Sq Epi: x / Non Sq Epi: 1 /HPF / Bacteria: Negative

## 2020-06-13 NOTE — PROGRESS NOTE ADULT - PROBLEM SELECTOR PLAN 2
Patient with history of alcoholic cirrhosis with ascites, hx of SBP and variceal bleeding. Evaluated for liver transplant  on 6/9/20 and not considered candidate at this time. Last alcoholic drink in March 2020. Recent admission at the end of May 2020 for jaundice and alcoholic cirrhosis.  -Ascites appears at baseline, will not get another abdominal US at this time  -If ascites worsens, will obtain abdominal US to assess  -MELD 41 today, MELD 42 on admission (6/10)   -Holding Bactrim for SBP prophylaxis given current SAM   -Continuing rifaximin, holding lactulose given SAM  -No evidence of GI bleeding.  -Hepatology following, rec MRCP which we will obtain today.

## 2020-06-13 NOTE — PROGRESS NOTE ADULT - SUBJECTIVE AND OBJECTIVE BOX
[facility] DIVISION OF KIDNEY DISEASES AND HYPERTENSION -- FOLLOW UP NOTE  --------------------------------------------------------------------------------  Chief Complaint:  SAM    24 hour events/subjective:  Pt feels ok, better apatite today.  Renal function improving.       PAST HISTORY  --------------------------------------------------------------------------------  No significant changes to PMH, PSH, FHx, SHx, unless otherwise noted    ALLERGIES & MEDICATIONS  --------------------------------------------------------------------------------  Allergies    levofloxacin (Other (Moderate))    Intolerances      Standing Inpatient Medications  albumin human 25% IVPB 100 milliLiter(s) IV Intermittent every 6 hours  cholestyramine Powder (Sugar-Free) 4 Gram(s) Oral daily  citric acid/sodium citrate Solution 30 milliLiter(s) Oral two times a day  dextrose 5%. 1000 milliLiter(s) IV Continuous <Continuous>  dextrose 50% Injectable 12.5 Gram(s) IV Push once  dextrose 50% Injectable 25 Gram(s) IV Push once  dextrose 50% Injectable 25 Gram(s) IV Push once  insulin lispro (HumaLOG) corrective regimen sliding scale   SubCutaneous three times a day before meals  insulin lispro (HumaLOG) corrective regimen sliding scale   SubCutaneous at bedtime  midodrine. 5 milliGRAM(s) Oral three times a day  multivitamin 1 Tablet(s) Oral daily  octreotide  Injectable 100 MICROGram(s) SubCutaneous three times a day  pantoprazole    Tablet 40 milliGRAM(s) Oral before breakfast  rifAXIMin 550 milliGRAM(s) Oral two times a day    PRN Inpatient Medications  dextrose 40% Gel 15 Gram(s) Oral once PRN  glucagon  Injectable 1 milliGRAM(s) IntraMuscular once PRN      REVIEW OF SYSTEMS  --------------------------------------------------------------------------------  Gen: + fatigue  Skin: jaundice,   Head/Eyes/Ears/Mouth: No headache;No sore throat  Respiratory: No dyspnea, cough,   CV: No chest pain, PND, orthopnea  GI: No abdominal pain, diarrhea, constipation, nausea, vomiting  : No increased frequency, dysuria, hematuria, nocturia  MSK: No joint pain/swelling; no back pain; no edema  Neuro: No dizziness/lightheadedness, weakness, seizures, numbness, tingling  Psych: No significant nervousness, anxiety, stress, depression    All other systems were reviewed and are negative, except as noted.    VITALS/PHYSICAL EXAM  --------------------------------------------------------------------------------  T(C): 37.2 (06-13-20 @ 04:21), Max: 37.2 (06-13-20 @ 04:21)  HR: 62 (06-13-20 @ 04:21) (57 - 77)  BP: 110/64 (06-13-20 @ 04:21) (107/60 - 115/58)  RR: 18 (06-13-20 @ 04:21) (18 - 18)  SpO2: 98% (06-13-20 @ 04:21) (97% - 98%)  Wt(kg): --        06-12-20 @ 07:01  -  06-13-20 @ 07:00  --------------------------------------------------------  IN: 860 mL / OUT: 1350 mL / NET: -490 mL      Physical Exam:  	Gen: NAD  	HEENT: PERRL, supple neck, clear oropharynx  	Pulm: CTA B/L  	CV: RRR, S1S2; no rub  	Back: No spinal or CVA tenderness; no sacral edema  	Abd: +BS, soft, nontender,  + ascites but soft, not tense  	UE: Warm, FROM; no edema;   	LE: Warm, FROM; no edema  	Neuro: No focal deficits  	Psych: Normal affect and mood  	Skin: jaundice, vitiligo       LABS/STUDIES  --------------------------------------------------------------------------------              7.8    4.05  >-----------<  58       [06-13-20 @ 06:09]              22.4     134  |  105  |  36  ----------------------------<  214      [06-13-20 @ 06:09]  3.1   |  12  |  2.77        Ca     8.7     [06-13-20 @ 06:09]      Mg     1.5     [06-13-20 @ 06:09]      Phos  2.2     [06-13-20 @ 06:09]    TPro  5.4  /  Alb  4.6  /  TBili  42.4  /  DBili  x   /  AST  62  /  ALT  30  /  AlkPhos  73  [06-13-20 @ 06:09]    PT/INR: PT 32.6 , INR 2.77       [06-13-20 @ 06:09]      Creatinine Trend:  SCr 2.77 [06-13 @ 06:09]  SCr 3.11 [06-12 @ 06:49]  SCr 3.59 [06-11 @ 06:41]  SCr 3.56 [06-10 @ 23:54]  SCr 3.81 [06-10 @ 12:32]              Urinalysis - [06-10-20 @ 19:28]      Color Leslie / Appearance Slightly Turbid / SG 1.012 / pH 6.5      Gluc Negative / Ketone Negative  / Bili Large / Urobili <2 mg/dL       Blood Negative / Protein Trace / Leuk Est Negative / Nitrite Negative      RBC 65 / WBC 0 / Hyaline 1 / Gran  / Sq Epi  / Non Sq Epi 1 / Bacteria Negative    Urine Creatinine 83      [06-11-20 @ 22:52]  Urine Protein 25      [06-12-20 @ 01:13]  Urine Sodium <35      [06-10-20 @ 15:38]  Urine Urea Nitrogen 417      [06-10-20 @ 19:28]  Urine Osmolality 297      [06-10-20 @ 15:38]    HbA1c 10.5      [04-18-19 @ 11:27]  TSH 0.26      [05-19-20 @ 08:53]    HBsAb Nonreact      [05-21-20 @ 00:40]  HBsAg Nonreact      [05-21-20 @ 00:40]  HBcAb Nonreact      [05-20-20 @ 22:39]

## 2020-06-13 NOTE — PROGRESS NOTE ADULT - PROBLEM SELECTOR PLAN 1
Creatinine 3.11 today (improved from presenting Cre of 3.6. Baseline 2.0)   -Patient receiving maintenance fluids NaCl and D5.   -Renal US did not show obstruction or other evidence of renal disease  -UA shows large bilirubin  -Will treat for possible hepatorenal syndrome (octreotide, albumin, midodrine) and will also fluid resuscitate  -Nephrology consulted, will f/u results of UProt/Cre, f/u on recs  - Bicitra increased to 30 ml TID per Nephrology recs

## 2020-06-13 NOTE — PROGRESS NOTE ADULT - ASSESSMENT
45 y/o M PMHx alcoholic hepatitis, decompensated etOH cirrhosis (c/b ascites, SBP, and variceal hemorrhage),  latent TB (treated), h/o C. diff (2016, 2017), Vitiligo and T2DM sent to [facility] from hepatology clinic for elevated SCr based on outpatient labs.     #NISHI  - Pt with hemodynamically mediated Nishi in the setting of nausea/vomiting and poor PO intake. Possible HRS.    - Patient also noted to be on Bactrim for SBP ppx for resistant E.Coli and this will likely "raise" creatinine due to impaired secretion of creatinine.   - Renal function improving s/p IVF and HRS protocol.  cont midodrine, octreotide and albumin    #Acidemia  - Metabolic acidosis, pH 7.33, well compensated  Cannot determine clear cause in setting of NISHI (also predates NISHI).  Increase bicitra to 30ml TID      Tom Kurtz DO  cell: [phone]  office: [phone]

## 2020-06-13 NOTE — PROGRESS NOTE ADULT - SUBJECTIVE AND OBJECTIVE BOX
CARINA, LILLIAN  44y  Male      Subjective:     No acute overnight events. This am, patient states that he has been able to eat well. Denies fevers, chills, nausea, vomiting diarrhea, chest pain, shortness of breath.      Meds    MEDICATIONS  (STANDING):  albumin human 25% IVPB 100 milliLiter(s) IV Intermittent every 6 hours  cholestyramine Powder (Sugar-Free) 4 Gram(s) Oral daily  citric acid/sodium citrate Solution 30 milliLiter(s) Oral three times a day  dextrose 5%. 1000 milliLiter(s) (50 mL/Hr) IV Continuous <Continuous>  dextrose 50% Injectable 12.5 Gram(s) IV Push once  dextrose 50% Injectable 25 Gram(s) IV Push once  dextrose 50% Injectable 25 Gram(s) IV Push once  insulin lispro (HumaLOG) corrective regimen sliding scale   SubCutaneous three times a day before meals  insulin lispro (HumaLOG) corrective regimen sliding scale   SubCutaneous at bedtime  midodrine. 5 milliGRAM(s) Oral three times a day  multivitamin 1 Tablet(s) Oral daily  octreotide  Injectable 100 MICROGram(s) SubCutaneous three times a day  pantoprazole    Tablet 40 milliGRAM(s) Oral before breakfast  potassium chloride    Tablet ER 40 milliEquivalent(s) Oral every 4 hours  rifAXIMin 550 milliGRAM(s) Oral two times a day    MEDICATIONS  (PRN):  dextrose 40% Gel 15 Gram(s) Oral once PRN Blood Glucose LESS THAN 70 milliGRAM(s)/deciliter  glucagon  Injectable 1 milliGRAM(s) IntraMuscular once PRN Glucose LESS THAN 70 milligrams/deciliter        Vital Signs Last 24 Hrs  T(C): 37.2 (13 Jun 2020 04:21), Max: 37.2 (13 Jun 2020 04:21)  T(F): 98.9 (13 Jun 2020 04:21), Max: 98.9 (13 Jun 2020 04:21)  HR: 62 (13 Jun 2020 04:21) (57 - 77)  BP: 110/64 (13 Jun 2020 04:21) (107/60 - 115/58)  BP(mean): --  RR: 18 (13 Jun 2020 04:21) (18 - 18)  SpO2: 98% (13 Jun 2020 04:21) (97% - 98%)    PHYSICAL EXAM:  GENERAL: Sitting at window sill  HEENT - NC/AT, pupils equal and reactive to light, icteric sclerae  CHEST/LUNG: Clear to auscultation bilaterally; No rales, rhonchi, wheezing  HEART: Regular rate and rhythm; No murmurs, rubs, or gallops  ABDOMEN: Soft, Nontender, Nondistended; Bowel sounds present  EXTREMITIES:  2+ Peripheral Pulses, No clubbing, cyanosis, or edema  NEURO:  No Focal deficits, sensory and motor intact  SKIN: jaundice    Consultant(s) Notes Reviewed:  [x ] YES  [ ] NO  Care Discussed with Consultants/Other Providers [ x] YES  [ ] NO    LABS:          RADIOLOGY & ADDITIONAL TESTS:

## 2020-06-13 NOTE — PROGRESS NOTE ADULT - PROBLEM SELECTOR PLAN 5
Bicarb of 11 today, VBG pH 7.30 (7.26 yesterday)   -Will continue to monitor CMP  -Starting bicitra 30mL PO BID, per Nephrology recs.   -Na bicarb infusion 75meQ in 1L per Nephrology recs   -Will replete Phosphorous - 2.0 today   -Nephrology following, will f/u on recs

## 2020-06-14 NOTE — PROGRESS NOTE ADULT - SUBJECTIVE AND OBJECTIVE BOX
Patient is a 44y old  Male who presents with a chief complaint of Elevated Serum Creatinine at Hepatology clinic  SUBJECTIVE / OVERNIGHT EVENTS: Patient had no acute events overnight. Patient seen and examined at bedside this morning.   Interval history: Patient with no complaints today. Says diarrhea has improved to soft stools, non-bloody. No N/V. States abdominal swelling is at baseline. Does not feel weak or dizzy. Eating and sleeping well.     ROS: [ - ] Fever [ - ] Chills [ - ] Chest Pain [ - ] Shortness of breath     MEDICATIONS  (STANDING):  albumin human 25% IVPB 100 milliLiter(s) IV Intermittent every 6 hours  cholestyramine Powder (Sugar-Free) 4 Gram(s) Oral daily  citric acid/sodium citrate Solution 30 milliLiter(s) Oral three times a day  dextrose 5%. 1000 milliLiter(s) (50 mL/Hr) IV Continuous <Continuous>  dextrose 50% Injectable 12.5 Gram(s) IV Push once  dextrose 50% Injectable 25 Gram(s) IV Push once  dextrose 50% Injectable 25 Gram(s) IV Push once  insulin lispro (HumaLOG) corrective regimen sliding scale   SubCutaneous three times a day before meals  insulin lispro (HumaLOG) corrective regimen sliding scale   SubCutaneous at bedtime  magnesium oxide 400 milliGRAM(s) Oral two times a day with meals  midodrine. 5 milliGRAM(s) Oral three times a day  multivitamin 1 Tablet(s) Oral daily  octreotide  Injectable 100 MICROGram(s) SubCutaneous three times a day  pantoprazole    Tablet 40 milliGRAM(s) Oral before breakfast  potassium acid phosphate/sodium acid phosphate tablet (K-PHOS No. 2) 1 Tablet(s) Oral four times a day with meals  rifAXIMin 550 milliGRAM(s) Oral two times a day    MEDICATIONS  (PRN):  dextrose 40% Gel 15 Gram(s) Oral once PRN Blood Glucose LESS THAN 70 milliGRAM(s)/deciliter  glucagon  Injectable 1 milliGRAM(s) IntraMuscular once PRN Glucose LESS THAN 70 milligrams/deciliter      Vital Signs Last 24 Hrs  T(C): 36.7 (14 Jun 2020 04:02), Max: 36.8 (13 Jun 2020 12:30)  T(F): 98.1 (14 Jun 2020 04:02), Max: 98.3 (13 Jun 2020 12:30)  HR: 57 (14 Jun 2020 04:02) (57 - 84)  BP: 110/62 (14 Jun 2020 04:02) (110/62 - 125/68)  BP(mean): --  RR: 18 (14 Jun 2020 04:02) (18 - 18)  SpO2: 96% (14 Jun 2020 04:02) (93% - 98%)  CAPILLARY BLOOD GLUCOSE      POCT Blood Glucose.: 187 mg/dL (13 Jun 2020 21:45)  POCT Blood Glucose.: 161 mg/dL (13 Jun 2020 17:31)  POCT Blood Glucose.: 197 mg/dL (13 Jun 2020 13:16)  POCT Blood Glucose.: 148 mg/dL (13 Jun 2020 09:14)    I&O's Summary    13 Jun 2020 07:01  -  14 Jun 2020 07:00  --------------------------------------------------------  IN: 680 mL / OUT: 1000 mL / NET: -320 mL    14 Jun 2020 07:01  -  14 Jun 2020 08:22  --------------------------------------------------------  IN: 170 mL / OUT: 0 mL / NET: 170 mL        PHYSICAL EXAM  GENERAL: NAD, lying comfortably in bed   HEAD:  Atraumatic, Normocephalic  EYES: EOMI, PERRLA, scleral icterus present, improved from admission   NECK: Supple, No JVD  CHEST/LUNG: Clear to auscultation bilaterally; No wheeze  HEART: Systolic flow murmur present at L sternal border  ABDOMEN: Distended, at baseline. Soft and nontender to palpation. Bowel sounds present  EXTREMITIES:  2+ Peripheral Pulses, Clubbing of phalanges b/l   NEURO: AAOx3, asterixis in L hand   SKIN: Hypopigmented patches throughout body. Jaundice improving from admission      LABS:                        7.8    4.05  )-----------( 58       ( 13 Jun 2020 06:09 )             22.4     06-14    136  |  108  |  33<H>  ----------------------------<  171<H>  3.7   |  12<L>  |  2.45<H>    Ca    9.0      14 Jun 2020 06:47  Phos  1.9     06-14  Mg     1.5     06-14    TPro  5.1<L>  /  Alb  4.6  /  TBili  40.8<H>  /  DBili  x   /  AST  61<H>  /  ALT  27  /  AlkPhos  60  06-14    PT/INR - ( 14 Jun 2020 06:47 )   PT: 34.7 sec;   INR: 2.94 ratio         PTT - ( 14 Jun 2020 06:47 )  PTT:82.3 sec

## 2020-06-14 NOTE — PROGRESS NOTE ADULT - PROBLEM SELECTOR PLAN 5
Bicarb of 10 today   -Will continue to monitor CMP  -Bicitra 30mL TID, per Nephrology recs.   -Nephrology following, will f/u on recs

## 2020-06-14 NOTE — PROGRESS NOTE ADULT - ASSESSMENT
43 y/o M PMHx alcoholic hepatitis, decompensated etOH cirrhosis (c/b ascites, SBP, and variceal hemorrhage),  latent TB (treated), h/o C. diff (2016, 2017), vitiligo, and T2DM (HbA1c 7.9% in 10/2019), sent to ED by hepatology clinic for rise in Cre on outpatient labs, admitted for treatment of SAM in the setting of 3 days of reduced PO intake 2/2 N/V with concern for hepatorenal syndrome.

## 2020-06-14 NOTE — PROGRESS NOTE ADULT - PROBLEM SELECTOR PLAN 2
Patient with history of alcoholic cirrhosis with ascites, hx of SBP and variceal bleeding. Evaluated for liver transplant  on 6/9/20 and not considered candidate at this time. Last alcoholic drink in March 2020. Recent admission at the end of May 2020 for jaundice and alcoholic cirrhosis.  -Ascites appears at baseline, will not get another abdominal US at this time  -If ascites worsens, will obtain abdominal US to assess  -MELD 41 today (consistent with 6/12), MELD 42 on admission (6/10)   -Holding Bactrim for SBP prophylaxis given current SAM   -Continuing rifaximin and cholestyramine, holding lactulose given SAM  -No evidence of GI bleeding.  -Hepatology following, rec MRCP, will f/u results

## 2020-06-14 NOTE — PROGRESS NOTE ADULT - PROBLEM SELECTOR PLAN 3
Resolved   Patient found to have K of 2.6 upon admission, Received 40mEq KCl in the ED  -K 3.7 today   -Will continue to monitor and replete with KCl PO as needed  -Mg 1.5, Phosph 1.9, will replete today

## 2020-06-14 NOTE — PROGRESS NOTE ADULT - PROBLEM SELECTOR PLAN 1
Creatinine 2.45 today (improved from presenting Cre of 3.6. Baseline 2.0)   -Patient receiving maintenance fluids .9% NaCl and D5.   -Renal US did not show obstruction or other evidence of renal disease  - Will continue treatment for possible hepatorenal syndrome (octreotide, albumin, midodrine)   -Nephrology consulted, will f/u recs  - Bicitra 30 ml TID per Nephrology recs

## 2020-06-14 NOTE — PROVIDER CONTACT NOTE (CRITICAL VALUE NOTIFICATION) - BACKGROUND
Patient admitted for Hepatorenal Syndrome, Acidemia, Diabetes Mellitus, Metabolic Acidosis, Thrombocytopenia.

## 2020-06-14 NOTE — PROGRESS NOTE ADULT - PROBLEM SELECTOR PLAN 6
Patient with history of T2DM treated with levemir and recently rapaglinide. HbA1c 6.6% this admission.   -Serum Gluc 171  -POCTs 187, 161, 197, 148   -POCT glucose within goal (120-180). One reading 190, returned to within goal    -Will continue to monitor blood glucose carefully and continue with lispro sliding scale

## 2020-06-15 NOTE — PROGRESS NOTE ADULT - PROBLEM SELECTOR PLAN 1
Creatinine 2.28 today (improved from presenting Cre of 3.6. Baseline 2.0)   -Patient receiving maintenance fluids .9% NaCl and D5.   -Renal US did not show obstruction or other evidence of renal disease  - Will continue treatment for possible hepatorenal syndrome (octreotide, albumin, midodrine)   -Nephrology consulted, will f/u recs  - Bicitra 30 ml TID per Nephrology recs

## 2020-06-15 NOTE — PROGRESS NOTE ADULT - PROBLEM SELECTOR PLAN 2
Patient with history of alcoholic cirrhosis with ascites, hx of SBP and variceal bleeding. Evaluated for liver transplant  on 6/9/20 and not considered candidate at this time. Last alcoholic drink in March 2020. Recent admission at the end of May 2020 for jaundice and alcoholic cirrhosis.  -Ascites appears at baseline, will not get another abdominal US at this time  -If ascites worsens, will obtain abdominal US to assess  -MELD 40 today, MELD 41 on 6/12, MELD 42 on admission (6/10)   -Holding Bactrim for SBP prophylaxis given current SAM   -Continuing rifaximin and cholestyramine, holding lactulose given SAM  -No evidence of GI bleeding.  -Hepatology following, rec MRCP, still no read Patient with history of alcoholic cirrhosis with ascites, hx of SBP and variceal bleeding. Evaluated for liver transplant  on 6/9/20 and not considered candidate at this time. Last alcoholic drink in March 2020. Recent admission at the end of May 2020 for jaundice and alcoholic cirrhosis.  -Ascites appears at baseline, will not get another abdominal US at this time  -If ascites worsens, will obtain abdominal US to assess  -MELD 40 today, MELD 41 on 6/12, MELD 42 on admission (6/10)   -MRCP completed 6/12 still pending read  -Holding Bactrim for SBP prophylaxis given current SAM. Started CTX 1g/day for SBP prophylaxis today, per Hepatology recs   -Continuing rifaximin and cholestyramine, holding lactulose given SAM  -No evidence of GI bleeding.  -Hepatology following, will continue to f/u recs

## 2020-06-15 NOTE — PROGRESS NOTE ADULT - PROBLEM SELECTOR PLAN 3
Patient found to have K of 2.6 upon admission, Received 40mEq KCl in the ED  -K 3.3 today, will replete   -Will continue to monitor and replete with KCl PO as needed  -Mg and phosph wnl today

## 2020-06-15 NOTE — PROVIDER CONTACT NOTE (CRITICAL VALUE NOTIFICATION) - ACTION/TREATMENT ORDERED:
no orders at this time will continue to monitor and maintain safety
MD aware & assessed patient & reviewed all orders, labs, history & plan. No new orders per MD.
MD made aware. No new orders at this time. Will continue to monitor.
Provider aware, no intervention at this time. Will continue to await further instructions and monitor pt.
Will continue to monitor.

## 2020-06-15 NOTE — PROGRESS NOTE ADULT - SUBJECTIVE AND OBJECTIVE BOX
Chief Complaint:  Patient is a 44y old  Male who presents with a chief complaint of Elevated Serum Creatinine at Hepatology clinic (15 Duran 2020 08:58)    Reason for consult: Cirrhosis, SAM, hyperbilirubinemia    Interval Events: Pt feeling well, no complaints. MRCP done, awaiting read. Cr continues to improve.    Hospital Medications:  albumin human 25% IVPB 100 milliLiter(s) IV Intermittent every 6 hours  cholestyramine Powder (Sugar-Free) 4 Gram(s) Oral daily  citric acid/sodium citrate Solution 30 milliLiter(s) Oral three times a day  dextrose 40% Gel 15 Gram(s) Oral once PRN  dextrose 5%. 1000 milliLiter(s) IV Continuous <Continuous>  dextrose 50% Injectable 12.5 Gram(s) IV Push once  dextrose 50% Injectable 25 Gram(s) IV Push once  dextrose 50% Injectable 25 Gram(s) IV Push once  glucagon  Injectable 1 milliGRAM(s) IntraMuscular once PRN  insulin lispro (HumaLOG) corrective regimen sliding scale   SubCutaneous three times a day before meals  insulin lispro (HumaLOG) corrective regimen sliding scale   SubCutaneous at bedtime  magnesium oxide 400 milliGRAM(s) Oral two times a day with meals  midodrine. 5 milliGRAM(s) Oral three times a day  multivitamin 1 Tablet(s) Oral daily  octreotide  Injectable 100 MICROGram(s) SubCutaneous three times a day  pantoprazole    Tablet 40 milliGRAM(s) Oral before breakfast  rifAXIMin 550 milliGRAM(s) Oral two times a day      ROS:   General:  No  fevers, chills, night sweats, fatigue  Eyes:  Good vision, no reported pain  ENT:  No sore throat, pain, runny nose  CV:  No pain, palpitations  Pulm:  No dyspnea, cough  GI:  See HPI, otherwise negative  :  No  incontinence, nocturia  Muscle:  No pain, weakness  Neuro:  No memory problems  Psych:  No insomnia, mood problems, depression  Endocrine:  No polyuria, polydipsia, cold/heat intolerance  Heme:  No petechiae, ecchymosis, easy bruisability  Skin:  No rash    PHYSICAL EXAM:   Vital Signs:  Vital Signs Last 24 Hrs  T(C): 36.9 (15 Duran 2020 03:58), Max: 36.9 (15 Duran 2020 03:58)  T(F): 98.5 (15 Duran 2020 03:58), Max: 98.5 (15 Duran 2020 03:58)  HR: 66 (15 Duran 2020 09:02) (54 - 67)  BP: 122/70 (15 Duran 2020 09:02) (103/59 - 122/70)  BP(mean): --  RR: 18 (15 Duran 2020 03:58) (18 - 18)  SpO2: 97% (15 Duran 2020 03:58) (93% - 97%)  Daily     Daily     GENERAL: no acute distress  NEURO: alert, no asterixis  HEENT: ++ icteric sclera, no conjunctival pallor appreciated  CHEST: no respiratory distress, no accessory muscle use  CARDIAC: regular rate, rhythm  ABDOMEN: soft, non-tender, +distended, no rebound or guarding  EXTREMITIES: warm, well perfused, no edema  SKIN: +++ jaundice ++ vitiligo     LABS: reviewed                        8.0    5.31  )-----------( 57       ( 15 Duran 2020 06:52 )             22.5     06-15    136  |  106  |  30<H>  ----------------------------<  154<H>  3.3<L>   |  13<L>  |  2.28<H>    Ca    9.6      15 Duran 2020 06:49  Phos  2.5     06-15  Mg     1.8     06-15    TPro  5.6<L>  /  Alb  5.0  /  TBili  44.0<H>  /  DBili  x   /  AST  61<H>  /  ALT  27  /  AlkPhos  60  06-15    LIVER FUNCTIONS - ( 15 Duran 2020 06:49 )  Alb: 5.0 g/dL / Pro: 5.6 g/dL / ALK PHOS: 60 U/L / ALT: 27 U/L / AST: 61 U/L / GGT: x             Interval Diagnostic Studies: see sunrise for full report Chief Complaint:  Patient is a 44y old  Male who presents with a chief complaint of Elevated Serum Creatinine at Hepatology clinic (15 Duran 2020 08:58)    Reason for consult: Cirrhosis, SAM, hyperbilirubinemia    Interval Events: Pt feeling well, no complaints. MRCP done, awaiting read. Cr continues to improve.    Hospital Medications:  albumin human 25% IVPB 100 milliLiter(s) IV Intermittent every 6 hours  cholestyramine Powder (Sugar-Free) 4 Gram(s) Oral daily  citric acid/sodium citrate Solution 30 milliLiter(s) Oral three times a day  dextrose 40% Gel 15 Gram(s) Oral once PRN  dextrose 5%. 1000 milliLiter(s) IV Continuous <Continuous>  dextrose 50% Injectable 12.5 Gram(s) IV Push once  dextrose 50% Injectable 25 Gram(s) IV Push once  dextrose 50% Injectable 25 Gram(s) IV Push once  glucagon  Injectable 1 milliGRAM(s) IntraMuscular once PRN  insulin lispro (HumaLOG) corrective regimen sliding scale   SubCutaneous three times a day before meals  insulin lispro (HumaLOG) corrective regimen sliding scale   SubCutaneous at bedtime  magnesium oxide 400 milliGRAM(s) Oral two times a day with meals  midodrine. 5 milliGRAM(s) Oral three times a day  multivitamin 1 Tablet(s) Oral daily  octreotide  Injectable 100 MICROGram(s) SubCutaneous three times a day  pantoprazole    Tablet 40 milliGRAM(s) Oral before breakfast  rifAXIMin 550 milliGRAM(s) Oral two times a day      ROS:   General:  No  fevers, chills, night sweats, fatigue  Eyes:  Good vision, no reported pain  ENT:  No sore throat, pain, runny nose  CV:  No pain, palpitations  Pulm:  No dyspnea, cough  GI:  See HPI, otherwise negative  :  No  incontinence, nocturia  Muscle:  No pain, weakness  Neuro:  No memory problems  Psych:  No insomnia, mood problems, depression  Endocrine:  No polyuria, polydipsia, cold/heat intolerance  Heme:  No petechiae, ecchymosis, easy bruisability  Skin:  No rash    PHYSICAL EXAM:   Vital Signs:  Vital Signs Last 24 Hrs  T(C): 36.9 (15 Duran 2020 03:58), Max: 36.9 (15 Duran 2020 03:58)  T(F): 98.5 (15 Duran 2020 03:58), Max: 98.5 (15 Duran 2020 03:58)  HR: 66 (15 Duran 2020 09:02) (54 - 67)  BP: 122/70 (15 Duran 2020 09:02) (103/59 - 122/70)  BP(mean): --  RR: 18 (15 Duran 2020 03:58) (18 - 18)  SpO2: 97% (15 Duran 2020 03:58) (93% - 97%)  Daily     Daily     GENERAL: no acute distress  NEURO: alert, + asterixis  HEENT: ++ icteric sclera, no conjunctival pallor appreciated  CHEST: no respiratory distress, no accessory muscle use  CARDIAC: regular rate, rhythm  ABDOMEN: soft, non-tender, +distended, no rebound or guarding  EXTREMITIES: warm, well perfused, no edema  SKIN: +++ jaundice ++ vitiligo     LABS: reviewed                        8.0    5.31  )-----------( 57       ( 15 Duran 2020 06:52 )             22.5     06-15    136  |  106  |  30<H>  ----------------------------<  154<H>  3.3<L>   |  13<L>  |  2.28<H>    Ca    9.6      15 Duran 2020 06:49  Phos  2.5     06-15  Mg     1.8     06-15    TPro  5.6<L>  /  Alb  5.0  /  TBili  44.0<H>  /  DBili  x   /  AST  61<H>  /  ALT  27  /  AlkPhos  60  06-15    LIVER FUNCTIONS - ( 15 Duran 2020 06:49 )  Alb: 5.0 g/dL / Pro: 5.6 g/dL / ALK PHOS: 60 U/L / ALT: 27 U/L / AST: 61 U/L / GGT: x             Interval Diagnostic Studies: see sunrise for full report

## 2020-06-15 NOTE — PROGRESS NOTE ADULT - ASSESSMENT
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    1) Acute on Chronic kidney injury  Cr of 3.81 on admission from 2 on prior admission. Improving.  DDx: prerenal (given decreased PO and N/V) vs. medication induced SAM vs. HRS  2) Decompensated liver disease  MELD Na 40 on 6/15  Varices: 4/19/19 EGD showed small EV, portal HTN gastropathy, gastric and duodenal erosions  Ascites: small ascites seen on 5/27/20, no diuretics due to SAM  HE: none on exam, no hx  HCC: CTAP from 5/15/20 showed no HCC  3) Hyperbilirubinemia to 40s  D/dx includes biliary obstruction (awaiting MRCP read) and infection (though infectious studies neg)    Recommendation:  - f/u MRCP read  - START CTX 1g daily for SBP ppx (pt is on as outpatient) given that we are holding bactrim in setting of SAM  - if possible, please get TTE w/ bubble study and MRI abdomen w/ contrast (MRCP was done w/o contrast) to help w/ transplant evaluation  - c/w albumin, midodrine, octreotide at current doses (pt is having adequate MAPs for renal perfusion)  - f/u nephrology recs  - trend CMP, CBC, INR daily  - low sodium diet    Peter Adams  Gastroenterology Fellow  Available via [employer]  GI Phone# [phone] ([facility])  [facility] on-call GI fellow via  from 5pm-8am and on weekends 43 yo M with PMHx of virtiligo, obesity, DM2, and 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    1) Acute on Chronic kidney injury  Cr of 3.81 on admission from 2 on prior admission. Improving.  DDx: prerenal (given decreased PO and N/V) vs. medication induced SAM vs. HRS  2) Decompensated liver disease  MELD Na 40 on 6/15  Varices: 4/19/19 EGD showed small EV, portal HTN gastropathy, gastric and duodenal erosions  Ascites: small ascites seen on 5/27/20, no diuretics due to SAM  HE: + asterixis on exam 6/15  HCC: CTAP from 5/15/20 showed no HCC  3) Hyperbilirubinemia to 40s  D/dx includes biliary obstruction (awaiting MRCP read) and infection (though infectious studies neg)    Recommendation:  - start lactulose for asterixis  - f/u MRCP read  - START CTX 1g daily for SBP ppx (pt is on as outpatient) given that we are holding bactrim in setting of SAM  - if possible, please get TTE w/ bubble study and MRI abdomen w/ contrast (MRCP was done w/o contrast) to help w/ transplant evaluation  - c/w albumin, midodrine, octreotide at current doses (pt is having adequate MAPs for renal perfusion)  - f/u nephrology recs  - trend CMP, CBC, INR daily  - low sodium diet    Peter Adams  Gastroenterology Fellow  Available via [employer]  GI Phone# [phone] ([facility])  Page on-call GI fellow via  from 5pm-8am and on weekends

## 2020-06-15 NOTE — PROGRESS NOTE ADULT - SUBJECTIVE AND OBJECTIVE BOX
[facility] DIVISION OF KIDNEY DISEASES AND HYPERTENSION -- FOLLOW UP NOTE  --------------------------------------------------------------------------------    43 y/o M PMHx alcoholic hepatitis, decompensated etOH cirrhosis (c/b ascites, SBP, and variceal hemorrhage) admitted for elevated SCr possible HRS vs dehydration (n/v/poor PO intake). SCr: 1.9 on 5/27/20.  Outpatient SCr increased SCr: 3.6. Patient with SCr improved to 2.28 today (6/15/20)  Pt on Midodrine albumin and Octreotide.    24 hour events:  Patient seen and examined at bedside this morning without subjective complaints  Patient had 4-5 watery to formed BMs yesterday, no bleeding. No N/V, eating and drinking well. No abdominal pain, SOB, chest pain.       PAST HISTORY  --------------------------------------------------------------------------------  No significant changes to PMH, PSH, FHx, SHx, unless otherwise noted    ALLERGIES & MEDICATIONS  --------------------------------------------------------------------------------  Allergies    levofloxacin (Other (Moderate))    Intolerances      Standing Inpatient Medications  albumin human 25% IVPB 100 milliLiter(s) IV Intermittent every 6 hours  cefTRIAXone   IVPB 1000 milliGRAM(s) IV Intermittent every 24 hours  cholestyramine Powder (Sugar-Free) 4 Gram(s) Oral daily  citric acid/sodium citrate Solution 30 milliLiter(s) Oral three times a day  dextrose 5%. 1000 milliLiter(s) IV Continuous <Continuous>  dextrose 50% Injectable 12.5 Gram(s) IV Push once  dextrose 50% Injectable 25 Gram(s) IV Push once  dextrose 50% Injectable 25 Gram(s) IV Push once  insulin lispro (HumaLOG) corrective regimen sliding scale   SubCutaneous three times a day before meals  insulin lispro (HumaLOG) corrective regimen sliding scale   SubCutaneous at bedtime  magnesium oxide 400 milliGRAM(s) Oral two times a day with meals  midodrine. 5 milliGRAM(s) Oral three times a day  multivitamin 1 Tablet(s) Oral daily  octreotide  Injectable 100 MICROGram(s) SubCutaneous three times a day  pantoprazole    Tablet 40 milliGRAM(s) Oral before breakfast  potassium chloride    Tablet ER 40 milliEquivalent(s) Oral once  rifAXIMin 550 milliGRAM(s) Oral two times a day    PRN Inpatient Medications  dextrose 40% Gel 15 Gram(s) Oral once PRN  glucagon  Injectable 1 milliGRAM(s) IntraMuscular once PRN      REVIEW OF SYSTEMS  --------------------------------------------------------------------------------  Gen: No weight changes, + weakness  Skin: No rashes  Head/Eyes/Ears/Mouth: No headache; Normal hearing  Respiratory: No dyspnea, cough, wheezing, hemoptysis  CV: No chest pain  GI: No abdominal pain, diarrhea, constipation, nausea, vomiting  : No increased frequency, dysuria, hematuria, nocturia  MSK: no edema  Neuro: + dizziness, + nausea  VITALS/PHYSICAL EXAM  --------------------------------------------------------------------------------  T(C): 36.9 (06-15-20 @ 03:58), Max: 36.9 (06-15-20 @ 03:58)  HR: 66 (06-15-20 @ 09:02) (54 - 67)  BP: 122/70 (06-15-20 @ 09:02) (103/59 - 122/70)  RR: 18 (06-15-20 @ 03:58) (18 - 18)  SpO2: 97% (06-15-20 @ 03:58) (93% - 97%)  Wt(kg): --        06-14-20 @ 07:01  -  06-15-20 @ 07:00  --------------------------------------------------------  IN: 1310 mL / OUT: 2120 mL / NET: -810 mL    06-15-20 @ 07:01  -  06-15-20 @ 11:03  --------------------------------------------------------  IN: 0 mL / OUT: 150 mL / NET: -150 mL    Physical Exam:  	Gen: NAD, on RA  	HEENT: dry MMM, scleral icterus  	Pulm: CTA B/L, decreased BS  	CV: RRR, S1S2; no rub  	Abd: +BS, soft, distended  	UE: Warm, FROM, no clubbing, intact strength; no edema; no asterixis  	LE: Warm, no edema  	Neuro: No focal deficits, intact gait  	Psych: Normal affect and mood  	Skin: Warm, + vitiligo, + icterus  	Vascular access: peripheral IV lines    LABS/STUDIES  --------------------------------------------------------------------------------              8.0    5.31  >-----------<  57       [06-15-20 @ 06:52]              22.5     136  |  106  |  30  ----------------------------<  154      [06-15-20 @ 06:49]  3.3   |  13  |  2.28        Ca     9.6     [06-15-20 @ 06:49]      Mg     1.8     [06-15-20 @ 06:49]      Phos  2.5     [06-15-20 @ 06:49]    TPro  5.6  /  Alb  5.0  /  TBili  44.0  /  DBili  x   /  AST  61  /  ALT  27  /  AlkPhos  60  [06-15-20 @ 06:49]    PT/INR: PT 33.2 , INR 2.80       [06-15-20 @ 06:51]  PTT: 82.3       [06-14-20 @ 06:47]      Creatinine Trend:  SCr 2.28 [06-15 @ 06:49]  SCr 2.45 [06-14 @ 06:47]  SCr 2.77 [06-13 @ 06:09]  SCr 3.11 [06-12 @ 06:49]  SCr 3.59 [06-11 @ 06:41]              Urinalysis - [06-10-20 @ 19:28]      Color Leslie / Appearance Slightly Turbid / SG 1.012 / pH 6.5      Gluc Negative / Ketone Negative  / Bili Large / Urobili <2 mg/dL       Blood Negative / Protein Trace / Leuk Est Negative / Nitrite Negative      RBC 65 / WBC 0 / Hyaline 1 / Gran  / Sq Epi  / Non Sq Epi 1 / Bacteria Negative    Urine Creatinine 83      [06-11-20 @ 22:52]  Urine Protein 25      [06-12-20 @ 01:13]  Urine Sodium <35      [06-10-20 @ 15:38]  Urine Urea Nitrogen 417      [06-10-20 @ 19:28]  Urine Osmolality 297      [06-10-20 @ 15:38]    HbA1c 10.5      [04-18-19 @ 11:27]  TSH 0.26      [05-19-20 @ 08:53]    HBsAb Nonreact      [05-21-20 @ 00:40]  HBsAg Nonreact      [05-21-20 @ 00:40]  HBcAb Nonreact      [05-20-20 @ 22:39]

## 2020-06-15 NOTE — PROGRESS NOTE ADULT - PROBLEM SELECTOR PLAN 6
Patient with history of T2DM treated with levemir and recently rapaglinide. HbA1c 6.6% this admission.   -Serum Gluc 154  -POCT 149 this AM   -POCT glucose within goal (120-180).   -Will continue to monitor blood glucose carefully and continue with lispro sliding scale

## 2020-06-15 NOTE — PROGRESS NOTE ADULT - SUBJECTIVE AND OBJECTIVE BOX
Patient is a 44y old  Male who presents with a chief complaint of Elevated Serum Creatinine at Hepatology clinic   SUBJECTIVE / OVERNIGHT EVENTS: Patient had no acute events overnight. Patient seen and examined at bedside this morning.   Interval history: Patient had 4-5 watery to formed BMs yesterday, no bleeding. No N/V, eating and drinking well. No abdominal pain, SOB, chest pain.   Reports L hand tremor yesterday when typing a text message, resolved on own and has not recurred.     ROS: [ - ] Fever [ - ] Chills     MEDICATIONS  (STANDING):  albumin human 25% IVPB 100 milliLiter(s) IV Intermittent every 6 hours  cholestyramine Powder (Sugar-Free) 4 Gram(s) Oral daily  citric acid/sodium citrate Solution 30 milliLiter(s) Oral three times a day  dextrose 5%. 1000 milliLiter(s) (50 mL/Hr) IV Continuous <Continuous>  dextrose 50% Injectable 12.5 Gram(s) IV Push once  dextrose 50% Injectable 25 Gram(s) IV Push once  dextrose 50% Injectable 25 Gram(s) IV Push once  insulin lispro (HumaLOG) corrective regimen sliding scale   SubCutaneous three times a day before meals  insulin lispro (HumaLOG) corrective regimen sliding scale   SubCutaneous at bedtime  magnesium oxide 400 milliGRAM(s) Oral two times a day with meals  midodrine. 5 milliGRAM(s) Oral three times a day  multivitamin 1 Tablet(s) Oral daily  octreotide  Injectable 100 MICROGram(s) SubCutaneous three times a day  pantoprazole    Tablet 40 milliGRAM(s) Oral before breakfast  rifAXIMin 550 milliGRAM(s) Oral two times a day    MEDICATIONS  (PRN):  dextrose 40% Gel 15 Gram(s) Oral once PRN Blood Glucose LESS THAN 70 milliGRAM(s)/deciliter  glucagon  Injectable 1 milliGRAM(s) IntraMuscular once PRN Glucose LESS THAN 70 milligrams/deciliter      Vital Signs Last 24 Hrs  T(C): 36.9 (15 Duran 2020 03:58), Max: 36.9 (15 Duran 2020 03:58)  T(F): 98.5 (15 Duran 2020 03:58), Max: 98.5 (15 Duran 2020 03:58)  HR: 58 (15 Duran 2020 03:58) (54 - 67)  BP: 108/67 (15 Duran 2020 03:58) (103/59 - 113/68)  BP(mean): --  RR: 18 (15 Duran 2020 03:58) (18 - 18)  SpO2: 97% (15 Duran 2020 03:58) (93% - 97%)  CAPILLARY BLOOD GLUCOSE      POCT Blood Glucose.: 149 mg/dL (15 Duran 2020 08:55)  POCT Blood Glucose.: 193 mg/dL (14 Jun 2020 21:32)  POCT Blood Glucose.: 152 mg/dL (14 Jun 2020 17:12)  POCT Blood Glucose.: 167 mg/dL (14 Jun 2020 12:40)  POCT Blood Glucose.: 164 mg/dL (14 Jun 2020 09:01)    I&O's Summary    14 Jun 2020 07:01  -  15 Duran 2020 07:00  --------------------------------------------------------  IN: 1310 mL / OUT: 2120 mL / NET: -810 mL        PHYSICAL EXAM  GENERAL: NAD, lying comfortably in bed   HEAD:  Atraumatic, Normocephalic  EYES: EOMI, PERRLA, scleral icterus present, improved from admission   NECK: Supple, No JVD  CHEST/LUNG: Clear to auscultation bilaterally; No wheeze  HEART: Systolic flow murmur present at L sternal border  ABDOMEN: Distended, at baseline. Soft and nontender to palpation. Bowel sounds present  EXTREMITIES:  2+ Peripheral Pulses, Clubbing of phalanges b/l   NEURO: AAOx3, asterixis in L hand   SKIN: Hypopigmented patches throughout body. Jaundice improving from admission    LABS:                        8.0    5.31  )-----------( 57       ( 15 Duran 2020 06:52 )             22.5     06-15    136  |  106  |  30<H>  ----------------------------<  154<H>  3.3<L>   |  13<L>  |  2.28<H>    Ca    9.6      15 Duran 2020 06:49  Phos  2.5     06-15  Mg     1.8     06-15    TPro  5.6<L>  /  Alb  5.0  /  TBili  44.0<H>  /  DBili  x   /  AST  61<H>  /  ALT  27  /  AlkPhos  60  06-15    PT/INR - ( 15 Duran 2020 06:51 )   PT: 33.2 sec;   INR: 2.80 ratio         PTT - ( 14 Jun 2020 06:47 )  PTT:82.3 sec Patient is a 44y old  Male who presents with a chief complaint of Elevated Serum Creatinine at Hepatology clinic   SUBJECTIVE / OVERNIGHT EVENTS: Patient had no acute events overnight. Patient seen and examined at bedside this morning.   Interval history: Patient had 4-5 watery to formed BMs yesterday, no bleeding. No N/V, eating and drinking well. No abdominal pain, SOB, chest pain.   Reports L hand tremor yesterday when typing a text message, resolved on own and has not recurred.   Tele: NSR, 50s-60s    ROS: [ - ] Fever [ - ] Chills     MEDICATIONS  (STANDING):  albumin human 25% IVPB 100 milliLiter(s) IV Intermittent every 6 hours  cholestyramine Powder (Sugar-Free) 4 Gram(s) Oral daily  citric acid/sodium citrate Solution 30 milliLiter(s) Oral three times a day  dextrose 5%. 1000 milliLiter(s) (50 mL/Hr) IV Continuous <Continuous>  dextrose 50% Injectable 12.5 Gram(s) IV Push once  dextrose 50% Injectable 25 Gram(s) IV Push once  dextrose 50% Injectable 25 Gram(s) IV Push once  insulin lispro (HumaLOG) corrective regimen sliding scale   SubCutaneous three times a day before meals  insulin lispro (HumaLOG) corrective regimen sliding scale   SubCutaneous at bedtime  magnesium oxide 400 milliGRAM(s) Oral two times a day with meals  midodrine. 5 milliGRAM(s) Oral three times a day  multivitamin 1 Tablet(s) Oral daily  octreotide  Injectable 100 MICROGram(s) SubCutaneous three times a day  pantoprazole    Tablet 40 milliGRAM(s) Oral before breakfast  rifAXIMin 550 milliGRAM(s) Oral two times a day    MEDICATIONS  (PRN):  dextrose 40% Gel 15 Gram(s) Oral once PRN Blood Glucose LESS THAN 70 milliGRAM(s)/deciliter  glucagon  Injectable 1 milliGRAM(s) IntraMuscular once PRN Glucose LESS THAN 70 milligrams/deciliter      Vital Signs Last 24 Hrs  T(C): 36.9 (15 Duran 2020 03:58), Max: 36.9 (15 Duran 2020 03:58)  T(F): 98.5 (15 Duran 2020 03:58), Max: 98.5 (15 Duran 2020 03:58)  HR: 58 (15 Duran 2020 03:58) (54 - 67)  BP: 108/67 (15 Duran 2020 03:58) (103/59 - 113/68)  BP(mean): --  RR: 18 (15 Duran 2020 03:58) (18 - 18)  SpO2: 97% (15 Duran 2020 03:58) (93% - 97%)  CAPILLARY BLOOD GLUCOSE      POCT Blood Glucose.: 149 mg/dL (15 Duran 2020 08:55)  POCT Blood Glucose.: 193 mg/dL (14 Jun 2020 21:32)  POCT Blood Glucose.: 152 mg/dL (14 Jun 2020 17:12)  POCT Blood Glucose.: 167 mg/dL (14 Jun 2020 12:40)  POCT Blood Glucose.: 164 mg/dL (14 Jun 2020 09:01)    I&O's Summary    14 Jun 2020 07:01  -  15 Duran 2020 07:00  --------------------------------------------------------  IN: 1310 mL / OUT: 2120 mL / NET: -810 mL        PHYSICAL EXAM  GENERAL: NAD, lying comfortably in bed   HEAD:  Atraumatic, Normocephalic  EYES: EOMI, PERRLA, scleral icterus present, improved from admission   NECK: Supple, No JVD  CHEST/LUNG: Clear to auscultation bilaterally; No wheeze  HEART: Systolic flow murmur present at L sternal border  ABDOMEN: Distended, at baseline. Soft and nontender to palpation. Bowel sounds present  EXTREMITIES:  2+ Peripheral Pulses, Clubbing of phalanges b/l   NEURO: AAOx3, asterixis in L hand   SKIN: Hypopigmented patches throughout body. Jaundice improving from admission    LABS:                        8.0    5.31  )-----------( 57       ( 15 Duran 2020 06:52 )             22.5     06-15    136  |  106  |  30<H>  ----------------------------<  154<H>  3.3<L>   |  13<L>  |  2.28<H>    Ca    9.6      15 Duran 2020 06:49  Phos  2.5     06-15  Mg     1.8     06-15    TPro  5.6<L>  /  Alb  5.0  /  TBili  44.0<H>  /  DBili  x   /  AST  61<H>  /  ALT  27  /  AlkPhos  60  06-15    PT/INR - ( 15 Duran 2020 06:51 )   PT: 33.2 sec;   INR: 2.80 ratio         PTT - ( 14 Jun 2020 06:47 )  PTT:82.3 sec

## 2020-06-16 NOTE — DIETITIAN INITIAL EVALUATION ADULT. - PROBLEM SELECTOR PLAN 2
Patient with history of alcoholic cirrhosis with ascites, hx of SBP and variceal bleeding. Evaluated for liver transplant  on 6/9/20 and not considered candidate at this time. Last alcoholic drink in March 2020. Recent admission at the end of May 2020 for jaundice and alcoholic cirrhosis.  -Ascites appears at baseline, will not get another abdominal US at this time  -If ascites worsens, will obtain abdominal US to assess  -MELD 42   -Holding Bactrim for SBP prophylaxis given current SAM   -Will start lactulose and rifaximin  -No evidence of GI bleeding.   -Patient not on diuretics at home given mild ascites

## 2020-06-16 NOTE — PROGRESS NOTE ADULT - SUBJECTIVE AND OBJECTIVE BOX
[facility] DIVISION OF KIDNEY DISEASES AND HYPERTENSION -- FOLLOW UP NOTE  --------------------------------------------------------------------------------  45 y/o M PMHx alcoholic hepatitis, decompensated etOH cirrhosis (c/b ascites, SBP, and variceal hemorrhage) admitted for elevated SCr possible HRS vs dehydration (n/v/poor PO intake). SCr: 1.9 on 5/27/20.  Outpatient SCr increased SCr: 3.6. Patient with SCr improved to 2.1 today (6/16/20)  Pt on Midodrine albumin and Octreotide.    24 hour events:  Patient seen and examined at bedside this morning without subjective complaints  No acute significant events overnight  UO: 500 cc over past 24 hours    PAST HISTORY  --------------------------------------------------------------------------------  No significant changes to PMH, PSH, FHx, SHx, unless otherwise noted    ALLERGIES & MEDICATIONS  --------------------------------------------------------------------------------  Allergies    levofloxacin (Other (Moderate))    Intolerances      Standing Inpatient Medications  albumin human 25% IVPB 100 milliLiter(s) IV Intermittent every 6 hours  cefTRIAXone   IVPB 1000 milliGRAM(s) IV Intermittent every 24 hours  cholestyramine Powder (Sugar-Free) 4 Gram(s) Oral daily  citric acid/sodium citrate Solution 30 milliLiter(s) Oral three times a day  dextrose 5%. 1000 milliLiter(s) IV Continuous <Continuous>  dextrose 50% Injectable 12.5 Gram(s) IV Push once  dextrose 50% Injectable 25 Gram(s) IV Push once  dextrose 50% Injectable 25 Gram(s) IV Push once  insulin lispro (HumaLOG) corrective regimen sliding scale   SubCutaneous three times a day before meals  insulin lispro (HumaLOG) corrective regimen sliding scale   SubCutaneous at bedtime  lactulose Syrup 10 Gram(s) Oral daily  midodrine. 5 milliGRAM(s) Oral three times a day  multivitamin 1 Tablet(s) Oral daily  octreotide  Injectable 100 MICROGram(s) SubCutaneous three times a day  pantoprazole    Tablet 40 milliGRAM(s) Oral before breakfast  potassium acid phosphate/sodium acid phosphate tablet (K-PHOS No. 2) 1 Tablet(s) Oral four times a day with meals  rifAXIMin 550 milliGRAM(s) Oral two times a day    PRN Inpatient Medications  dextrose 40% Gel 15 Gram(s) Oral once PRN  glucagon  Injectable 1 milliGRAM(s) IntraMuscular once PRN  ondansetron Injectable 4 milliGRAM(s) IV Push every 6 hours PRN    REVIEW OF SYSTEMS  --------------------------------------------------------------------------------  Gen: No weight changes, + weakness  Skin: No rashes  Head/Eyes/Ears/Mouth: No headache; Normal hearing  Respiratory: No dyspnea, cough, wheezing, hemoptysis  CV: No chest pain  GI: No abdominal pain, diarrhea, constipation, nausea, vomiting  : No increased frequency, dysuria, hematuria, nocturia  MSK: no edema  Neuro: + dizziness, + nausea - improved    VITALS/PHYSICAL EXAM  --------------------------------------------------------------------------------  T(C): 36.8 (06-16-20 @ 04:52), Max: 36.8 (06-15-20 @ 12:00)  HR: 58 (06-16-20 @ 04:52) (57 - 65)  BP: 114/66 (06-16-20 @ 04:52) (114/66 - 125/85)  RR: 18 (06-16-20 @ 04:52) (18 - 18)  SpO2: 96% (06-16-20 @ 04:52) (96% - 99%)  Wt(kg): --        06-15-20 @ 07:01  -  06-16-20 @ 07:00  --------------------------------------------------------  IN: 380 mL / OUT: 550 mL / NET: -170 mL      Physical Exam:  	Gen: NAD, on RA  	HEENT: MMM  	Pulm: CTA B/L, decreased BS  	CV: RRR, S1S2; no rub  	Abd: +BS, soft, distended  	UE: Warm, FROM, no clubbing, intact strength; no edema; no asterixis  	LE: Warm, no edema  	Neuro: No focal deficits, intact gait  	Psych: Normal affect and mood  	Skin: Warm, + vitiligo, + icterus  	Vascular access: peripheral IV lines    LABS/STUDIES  --------------------------------------------------------------------------------              7.5    5.41  >-----------<  47       [06-16-20 @ 07:02]              21.9     138  |  106  |  27  ----------------------------<  197      [06-16-20 @ 07:01]  3.9   |  15  |  2.12        Ca     9.5     [06-16-20 @ 07:01]      Mg     1.8     [06-16-20 @ 07:01]      Phos  2.3     [06-16-20 @ 07:01]    TPro  5.5  /  Alb  4.9  /  TBili  42.0  /  DBili  x   /  AST  54  /  ALT  25  /  AlkPhos  51  [06-16-20 @ 07:01]    PT/INR: PT 35.1 , INR 2.98       [06-16-20 @ 07:02]      Creatinine Trend:  SCr 2.12 [06-16 @ 07:01]  SCr 2.28 [06-15 @ 06:49]  SCr 2.45 [06-14 @ 06:47]  SCr 2.77 [06-13 @ 06:09]  SCr 3.11 [06-12 @ 06:49]              Urinalysis - [06-10-20 @ 19:28]      Color Leslie / Appearance Slightly Turbid / SG 1.012 / pH 6.5      Gluc Negative / Ketone Negative  / Bili Large / Urobili <2 mg/dL       Blood Negative / Protein Trace / Leuk Est Negative / Nitrite Negative      RBC 65 / WBC 0 / Hyaline 1 / Gran  / Sq Epi  / Non Sq Epi 1 / Bacteria Negative    Urine Creatinine 83      [06-11-20 @ 22:52]  Urine Protein 25      [06-12-20 @ 01:13]  Urine Sodium <35      [06-10-20 @ 15:38]  Urine Urea Nitrogen 417      [06-10-20 @ 19:28]  Urine Osmolality 297      [06-10-20 @ 15:38]    HbA1c 10.5      [04-18-19 @ 11:27]  TSH 0.26      [05-19-20 @ 08:53]    HBsAb Nonreact      [05-21-20 @ 00:40]  HBsAg Nonreact      [05-21-20 @ 00:40]  HBcAb Nonreact      [05-20-20 @ 22:39]

## 2020-06-16 NOTE — DIETITIAN INITIAL EVALUATION ADULT. - ADD RECOMMEND
recommend continue consistent carbohydrate, if creatinine not improved consider 60 gram protein restriction. Provided diabetes diet ed-monitor need for reinforcement.

## 2020-06-16 NOTE — PROGRESS NOTE ADULT - PROBLEM SELECTOR PLAN 6
Patient with history of T2DM treated with levemir and recently rapaglinide. HbA1c 6.6% this admission.   -Serum Gluc 197 this AM  -Will continue to monitor blood glucose carefully and continue with lispro sliding scale

## 2020-06-16 NOTE — PROGRESS NOTE ADULT - ASSESSMENT
45 y/o M PMHx alcoholic hepatitis, decompensated etOH cirrhosis (c/b ascites, SBP, and variceal hemorrhage),  latent TB (treated), h/o C. diff (2016, 2017), Vitiligo and T2DM sent to [facility] from hepatology clinic for elevated SCr based on outpatient labs.     #NISHI  - Pt with hemodynamically mediated Nishi in the setting of nausea/vomiting and poor PO intake. Possible HRS?  - Patient also noted to be on Bactrim for SBP ppx for resistant E.Coli and this will likely "raise" creatinine due to impaired secretion of creatinine.   - SCr 0.55 in 10/2019 and 0.65 in 4/2020. SCr: 1.9 on 5/27/20.  Last outpatient SCr increased to 2.31 on 6/1/20 and increased to SCr; 3.6 on repeat on 6/9/20  - SCr: 21 today decreased (6/16/20)  - 6/10 - UA with bilirubin, hematuria, RBC: 35  - 6/10 Ulytes with Steffen: <35  - 6/10  Renal Sono with Renal parenchymal disease. No hydronephrosis.  - MELD Na 40 on 6/15 s/p MRCP on 6/12/20  - Pt on Octreotide, Albumin and Midodrine 5mg TID  - Continue current management  - Monitor renal function with strict intake and output monitoring      #Acidemia  - Pt with acidemia in the setting of alcoholic liver cirrhosis  - Serum HCO3: 15 today  - Continue  Bicitra 30ml TID        Anni Sims  Nephrology Fellow  Pager: [phone]

## 2020-06-16 NOTE — DIETITIAN INITIAL EVALUATION ADULT. - PERTINENT MEDS FT
albumin human 25% IVPB 100  cefTRIAXone   IVPB 1000  cholestyramine Powder (Sugar-Free) 4  citric acid/sodium citrate Solution 30  dextrose 40% Gel 15 PRN  dextrose 5%. 1000  dextrose 50% Injectable 12.5  dextrose 50% Injectable 25  dextrose 50% Injectable 25  glucagon  Injectable 1 PRN  insulin lispro (HumaLOG) corrective regimen sliding scale   insulin lispro (HumaLOG) corrective regimen sliding scale   lactulose Syrup 10  midodrine. 5  multivitamin 1  octreotide  Injectable 100  ondansetron Injectable 4 PRN  pantoprazole    Tablet 40  potassium acid phosphate/sodium acid phosphate tablet (K-PHOS No. 2) 1  rifAXIMin 550

## 2020-06-16 NOTE — PROGRESS NOTE ADULT - PROBLEM SELECTOR PLAN 5
Bicarb of 15 today   -Will continue to monitor CMP  -Bicitra 30mL TID, per Nephrology recs.   -Nephrology following, will f/u on recs

## 2020-06-16 NOTE — PROGRESS NOTE ADULT - PROBLEM SELECTOR PLAN 2
Patient with history of alcoholic cirrhosis with ascites, hx of SBP and variceal bleeding. Evaluated for liver transplant  on 6/9/20 and not considered candidate at this time. Last alcoholic drink in March 2020. Recent admission at the end of May 2020 for jaundice and alcoholic cirrhosis.  -Ascites appears at baseline, will not get another abdominal US at this time  -If ascites worsens, will obtain abdominal US to assess  -MELD 40 6/15, MELD 41 on 6/12, MELD 42 on admission (6/10)   -MRCP from 6/12, no evidence of biliary obstruction   -Holding Bactrim for SBP prophylaxis given current SAM. Started CTX 1g/day for SBP prophylaxis today, per Hepatology recs   -Continuing rifaximin and cholestyramine, restarted lactulose yesterday for asterixis, per Hepatology recs   -No evidence of GI bleeding.  -Hepatology following, will continue to f/u recs

## 2020-06-16 NOTE — PROGRESS NOTE ADULT - SUBJECTIVE AND OBJECTIVE BOX
Chief Complaint:  Patient is a 44y old  Male who presents with a chief complaint of Elevated Serum Creatinine at Hepatology clinic (2020 11:50)    Reason for consult: Cirrhosis    Interval Events: Pt had 6 BMs yesterday, feels better mentally. Cr continues to improve    Hospital Medications:  albumin human 25% IVPB 100 milliLiter(s) IV Intermittent every 6 hours  cefTRIAXone   IVPB 1000 milliGRAM(s) IV Intermittent every 24 hours  cholestyramine Powder (Sugar-Free) 4 Gram(s) Oral daily  citric acid/sodium citrate Solution 30 milliLiter(s) Oral three times a day  dextrose 40% Gel 15 Gram(s) Oral once PRN  dextrose 5%. 1000 milliLiter(s) IV Continuous <Continuous>  dextrose 50% Injectable 12.5 Gram(s) IV Push once  dextrose 50% Injectable 25 Gram(s) IV Push once  dextrose 50% Injectable 25 Gram(s) IV Push once  glucagon  Injectable 1 milliGRAM(s) IntraMuscular once PRN  insulin lispro (HumaLOG) corrective regimen sliding scale   SubCutaneous three times a day before meals  insulin lispro (HumaLOG) corrective regimen sliding scale   SubCutaneous at bedtime  lactulose Syrup 10 Gram(s) Oral daily  midodrine. 5 milliGRAM(s) Oral three times a day  multivitamin 1 Tablet(s) Oral daily  octreotide  Injectable 100 MICROGram(s) SubCutaneous three times a day  ondansetron Injectable 4 milliGRAM(s) IV Push every 6 hours PRN  pantoprazole    Tablet 40 milliGRAM(s) Oral before breakfast  potassium acid phosphate/sodium acid phosphate tablet (K-PHOS No. 2) 1 Tablet(s) Oral four times a day with meals  rifAXIMin 550 milliGRAM(s) Oral two times a day      ROS:   General:  No  fevers, chills, night sweats, fatigue  Eyes:  Good vision, no reported pain  ENT:  No sore throat, pain, runny nose  CV:  No pain, palpitations  Pulm:  No dyspnea, cough  GI:  See HPI, otherwise negative  :  No  incontinence, nocturia  Muscle:  No pain, weakness  Neuro:  No memory problems  Psych:  No insomnia, mood problems, depression  Endocrine:  No polyuria, polydipsia, cold/heat intolerance  Heme:  No petechiae, ecchymosis, easy bruisability  Skin:  No rash    PHYSICAL EXAM:   Vital Signs:  Vital Signs Last 24 Hrs  T(C): 37.2 (2020 12:07), Max: 37.2 (2020 12:07)  T(F): 99 (2020 12:07), Max: 99 (2020 12:07)  HR: 60 (2020 12:07) (58 - 65)  BP: 106/54 (2020 12:07) (106/54 - 125/85)  BP(mean): --  RR: 18 (:) (18 - 18)  SpO2: 100% (:07) (96% - 100%)  Daily     Daily Weight in k.8 (2020 08:31)    GENERAL: no acute distress  NEURO: alert, + asterixis  HEENT: icteric sclera, no conjunctival pallor appreciated  CHEST: no respiratory distress, no accessory muscle use  CARDIAC: regular rate, rhythm  ABDOMEN: soft, non-tender, distended, no rebound or guarding  EXTREMITIES: warm, well perfused, no edema  SKIN: + vitiligo, + jaundice    LABS: reviewed                        7.5    5.41  )-----------( 47       ( 2020 07:02 )             21.9     06-16    138  |  106  |  27<H>  ----------------------------<  197<H>  3.9   |  15<L>  |  2.12<H>    Ca    9.5      2020 07:01  Phos  2.3     06-16  Mg     1.8     06-16    TPro  5.5<L>  /  Alb  4.9  /  TBili  42.0<H>  /  DBili  x   /  AST  54<H>  /  ALT  25  /  AlkPhos  51  06-16    LIVER FUNCTIONS - ( 2020 07:01 )  Alb: 4.9 g/dL / Pro: 5.5 g/dL / ALK PHOS: 51 U/L / ALT: 25 U/L / AST: 54 U/L / GGT: x             Interval Diagnostic Studies: see sunrise for full report

## 2020-06-16 NOTE — DIETITIAN INITIAL EVALUATION ADULT. - PERTINENT LABORATORY DATA
Na 138, K+ 3.9, BUN 27, Cr 2.12, , Phos 2.3,  AST 54, ALT 25, Mg 1.8, Ca 9.5  5/16: HgbA1c: 8.8    CAPILLARY BLOOD GLUCOSE  POCT Blood Glucose.: 132 mg/dL (15 Duran 2020 22:25)  POCT Blood Glucose.: 158 mg/dL (15 Duran 2020 18:14)  POCT Blood Glucose.: 232 mg/dL (15 Duran 2020 13:29)  POCT Blood Glucose.: 149 mg/dL (15 Duran 2020 08:55)

## 2020-06-16 NOTE — PROGRESS NOTE ADULT - PROBLEM SELECTOR PLAN 1
Creatinine 2.12 today (improved from presenting Cre of 3.6. Baseline 2.0)   -Patient receiving maintenance fluids .9% NaCl and D5.   -Renal US did not show obstruction or other evidence of renal disease  - Will continue treatment for possible hepatorenal syndrome (octreotide, albumin, midodrine)   -Nephrology consulted, will f/u recs  - Bicitra 30 ml TID per Nephrology recs

## 2020-06-16 NOTE — DIETITIAN INITIAL EVALUATION ADULT. - PROBLEM SELECTOR PLAN 1
Patient found to have elevated Cre of 3.6 at outpatient hepatology clinic, so was sent to the ED. Baseline Cre 2.0. Possibly secondary to hepatorenal syndrome  -Patient receiving maintenance fluids NaCl and D5.   -Renal US did not show obstruction or other evidence of renal disease  -Will obtain UA and f/u results  -Will treat for possible hepatorenal syndrome (octreotide, albumin, midodrine) and will also fluid resuscitate  -Nephrology consulted, will f/u on recs

## 2020-06-16 NOTE — PROGRESS NOTE ADULT - SUBJECTIVE AND OBJECTIVE BOX
Patient is a 44y old  Male who presents with a chief complaint of Elevated Serum Creatinine at Hepatology clinic (15 Duran 2020 11:02)  SUBJECTIVE / OVERNIGHT EVENTS: Patient had no acute events overnight. Patient seen and examined at bedside this morning.   Interval history: Patient reports two bouts of non-bloody emesis yesterday, both postprandially (one after lunch, one after dinner). He does not feel nauseated or vomit other than when he tries to eat. He has been able to keep down ginger ale and saltine crackers. He reports 7-8 loose stools yesterday. No abdominal pain. Does not report any episodes of tremor in his hand.     ROS: [ - ] Fever [ - ] Chills [ - ] Chest Pain [ - ] Shortness of breath     MEDICATIONS  (STANDING):  albumin human 25% IVPB 100 milliLiter(s) IV Intermittent every 6 hours  cefTRIAXone   IVPB 1000 milliGRAM(s) IV Intermittent every 24 hours  cholestyramine Powder (Sugar-Free) 4 Gram(s) Oral daily  citric acid/sodium citrate Solution 30 milliLiter(s) Oral three times a day  dextrose 5%. 1000 milliLiter(s) (50 mL/Hr) IV Continuous <Continuous>  dextrose 50% Injectable 12.5 Gram(s) IV Push once  dextrose 50% Injectable 25 Gram(s) IV Push once  dextrose 50% Injectable 25 Gram(s) IV Push once  insulin lispro (HumaLOG) corrective regimen sliding scale   SubCutaneous three times a day before meals  insulin lispro (HumaLOG) corrective regimen sliding scale   SubCutaneous at bedtime  lactulose Syrup 10 Gram(s) Oral daily  midodrine. 5 milliGRAM(s) Oral three times a day  multivitamin 1 Tablet(s) Oral daily  octreotide  Injectable 100 MICROGram(s) SubCutaneous three times a day  pantoprazole    Tablet 40 milliGRAM(s) Oral before breakfast  potassium acid phosphate/sodium acid phosphate tablet (K-PHOS No. 2) 1 Tablet(s) Oral four times a day with meals  rifAXIMin 550 milliGRAM(s) Oral two times a day    MEDICATIONS  (PRN):  dextrose 40% Gel 15 Gram(s) Oral once PRN Blood Glucose LESS THAN 70 milliGRAM(s)/deciliter  glucagon  Injectable 1 milliGRAM(s) IntraMuscular once PRN Glucose LESS THAN 70 milligrams/deciliter  ondansetron Injectable 4 milliGRAM(s) IV Push every 6 hours PRN Nausea and/or Vomiting      Vital Signs Last 24 Hrs  T(C): 36.8 (16 Jun 2020 04:52), Max: 36.8 (15 Duran 2020 12:00)  T(F): 98.2 (16 Jun 2020 04:52), Max: 98.2 (15 Duran 2020 12:00)  HR: 58 (16 Jun 2020 04:52) (57 - 66)  BP: 114/66 (16 Jun 2020 04:52) (114/66 - 125/85)  BP(mean): --  RR: 18 (16 Jun 2020 04:52) (18 - 18)  SpO2: 96% (16 Jun 2020 04:52) (96% - 99%)  CAPILLARY BLOOD GLUCOSE      POCT Blood Glucose.: 132 mg/dL (15 Duran 2020 22:25)  POCT Blood Glucose.: 158 mg/dL (15 Duran 2020 18:14)  POCT Blood Glucose.: 232 mg/dL (15 Duran 2020 13:29)  POCT Blood Glucose.: 149 mg/dL (15 Duran 2020 08:55)    I&O's Summary    15 Duran 2020 07:01  -  16 Jun 2020 07:00  --------------------------------------------------------  IN: 380 mL / OUT: 550 mL / NET: -170 mL        PHYSICAL EXAM  GENERAL: NAD, lying comfortably in bed   HEAD:  Atraumatic, Normocephalic  EYES: EOMI, PERRLA, scleral icterus present, improved from admission   NECK: Supple, No JVD  CHEST/LUNG: Clear to auscultation bilaterally; No wheeze  HEART: Systolic flow murmur present at L sternal border  ABDOMEN: Distended, at baseline. Soft and nontender to palpation. Bowel sounds present  EXTREMITIES:  2+ Peripheral Pulses, Clubbing of phalanges b/l   NEURO: AAOx3, asterixis present  SKIN: Hypopigmented patches throughout body. Jaundice improving from admission, unchanged from yesterday.       LABS:                        7.5    5.41  )-----------( 47       ( 16 Jun 2020 07:02 )             21.9     06-16    138  |  106  |  27<H>  ----------------------------<  197<H>  3.9   |  15<L>  |  2.12<H>    Ca    9.5      16 Jun 2020 07:01  Phos  2.3     06-16  Mg     1.8     06-16    TPro  x   /  Alb  x   /  TBili  x   /  DBili  x   /  AST  54<H>  /  ALT  25  /  AlkPhos  51  06-16    PT/INR - ( 16 Jun 2020 07:02 )   PT: 35.1 sec;   INR: 2.98 ratio               < from: MR MRCP No Cont (06.12.20 @ 18:05) >  PROCEDURE:   MRI of the abdomen was performed without intravenous contrast.  MRCP was performed.  No intravenous contrast was requested as per GI team.    FINDINGS:  LOWER CHEST: Within normal limits.    LIVER: Cirrhosis. Limited evaluation for hepatocellular cancer given lack of intravenous contrast.  BILE DUCTS: MRCP is limited. Despite this limitation, there is no biliary ductal dilatation.  GALLBLADDER: Distended with layering sludge.  SPLEEN: Splenomegaly.  PANCREAS: Within normal limits.  ADRENALS: Within normal limits.  KIDNEYS/URETERS: Within normal limits.    VISUALIZED PORTIONS:  BOWEL: Within normal limits.   PERITONEUM: Small ascites.  VESSELS: Portal hypertension as evidenced by a large patent paraumbilical vein.  RETROPERITONEUM/LYMPH NODES: No lymphadenopathy.    ABDOMINAL WALL: Within normal limits.  BONES: Within normal limits.    IMPRESSION:   Cirrhosis with evidence of portal hypertension. Small-volume ascites.    Limited evaluation for hepatocellular cancer given lack of intravenous contrast.    Limited MRCP. No biliary ductal dilatation.

## 2020-06-16 NOTE — PROGRESS NOTE ADULT - ASSESSMENT
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    1) Acute on Chronic kidney injury  Cr of 3.81 on admission from 2 on prior admission. Improving.  DDx: prerenal (given decreased PO and N/V) vs. medication induced SAM vs. HRS  2) Decompensated liver disease  MELD Na 40 on 6/15  Varices: 4/19/19 EGD showed small EV, portal HTN gastropathy, gastric and duodenal erosions  Ascites: small ascites seen on 5/27/20, no diuretics due to SAM  HE: + asterixis on exam 6/15  HCC: CTAP from 5/15/20 showed no HCC  3) Hyperbilirubinemia to 40s  D/dx includes biliary obstruction (awaiting MRCP read) and infection (though infectious studies neg)    Recommendation:  - c/w lactulose, titrate to 3-4 BMs per day  - c/w CTX 1g daily for SBP ppx (pt is on as outpatient) given that we are holding bactrim in setting of SAM  - if possible, please get TTE w/ bubble study and MRI abdomen w/ contrast (MRCP was done w/o contrast) to help w/ transplant evaluation  - c/w albumin, midodrine, octreotide at current doses (pt is having adequate MAPs for renal perfusion), will continue until Cr plateaus   - f/u nephrology recs  - trend CMP, CBC, INR daily  - low sodium diet  - rest of care per primary team  - hepatology will follow    Peter Adams  Gastroenterology Fellow  Available via [employer]  GI Phone# [phone] ([facility])  Page on-call GI fellow via  from 5pm-8am and on weekends

## 2020-06-16 NOTE — DIETITIAN INITIAL EVALUATION ADULT. - PROBLEM SELECTOR PLAN 5
Patient with pH of 7.32 on VBG, bicarb of 10, and elevated anion gap of 19 consistent with high anion gap metabolic acidosis. Possible etiologies include uremia, starvation ketosis. Less likely 2/2 DKA given blood glucose within goal at 124.  -Will continue to monitor CMP

## 2020-06-16 NOTE — DIETITIAN INITIAL EVALUATION ADULT. - PROBLEM SELECTOR PLAN 3
Patient found to have K of 2.6 upon admission  -Received 40mEq KCl in the ED  -Will continue to replete with KCl IV at 10meQ/hr

## 2020-06-16 NOTE — DIETITIAN INITIAL EVALUATION ADULT. - OTHER INFO
Reason for admission : Elevated Serum Creatinine at Hepatology clinic  Diet PTA : egg whites, cheese and 1/2 bagel for breakfast, fruit salad for lunch, protein and rice for dinner. snacks on cake and cookies at night. he lives with his wife and she prepares the meals.   Intake : >75%  Denies nausea/vomit : he did experience some nausea yesterday, but not today   Denies difficulty chewing /swallow :  Denies diarrhea/constipation:  Last BM : yesterday  NKFA  IBW +/- 10%=pounds   Ht: 68"  Ht taken from pt  Usual Weight PTA: 190pounds  BMI: 29.4  BMI calculated using wt from  flow sheet  BMI calculated using ht from pt  Education Provided : N/A  pressure injury: no pressure injury  edema: none

## 2020-06-17 NOTE — PROGRESS NOTE ADULT - PROBLEM SELECTOR PLAN 3
Patient with HGB 9 upon admission, stable in 8.0-9.0 range for most of admission, downtrending last two days with HGB 7.0 today. Patient has been hemodynamically stable throughout entirety of admission with no SOB, weakness, dizziness  -Patient will receive transfusion of 1 unit PRBCs over 3hrs today for treatment  -Will f/u with Hepatology regarding potential scope to evaluate further

## 2020-06-17 NOTE — PROGRESS NOTE ADULT - PROBLEM SELECTOR PLAN 1
Creatinine not resulted yet today, 2.12 yesterday (improved from presenting Cre of 3.6. Baseline 2.0)   -Patient receiving maintenance fluids .9% NaCl and D5.   -Renal US did not show obstruction or other evidence of renal disease  - Will continue treatment for possible hepatorenal syndrome (octreotide, albumin, midodrine) until SCre plateaus, per Hepatology recs   -Nephrology consulted, will f/u recs  - Bicitra 30 ml TID per Nephrology recs

## 2020-06-17 NOTE — PROGRESS NOTE ADULT - ASSESSMENT
43 y/o M PMHx alcoholic hepatitis, decompensated etOH cirrhosis (c/b ascites, SBP, and variceal hemorrhage),  latent TB (treated), h/o C. diff (2016, 2017), Vitiligo and T2DM sent to [facility] from hepatology clinic for elevated SCr based on outpatient labs.     #NISHI  - Pt with hemodynamically mediated Nishi in the setting of nausea/vomiting and poor PO intake. Possible HRS?  - Patient also noted to be on Bactrim for SBP ppx for resistant E.Coli and this will likely "raise" creatinine due to impaired secretion of creatinine.   - SCr 0.55 in 10/2019 and 0.65 in 4/2020. SCr: 1.9 on 5/27/20.  Last outpatient SCr increased to 2.31 on 6/1/20 and increased to SCr; 3.6 on repeat on 6/9/20  - SCr: today pending   - 6/10 - UA with bilirubin, hematuria, RBC: 35  - 6/10 Ulytes with Steffen: <35  - 6/10  Renal Sono with Renal parenchymal disease. No hydronephrosis.  - MELD Na 40 on 6/15 s/p MRCP on 6/12/20  - Pt on Octreotide, Albumin and Midodrine 5mg TID  - Continue current management  - Monitor renal function with strict intake and output monitoring, awaiting AM labs. Would c/w holding diuretics for now      #Acidemia  - Pt with acidemia in the setting of alcoholic liver cirrhosis  - Serum HCO3: 15 today  - Continue  Bicitra 30ml TID        Manny Parra   Nephrology Fellow  Pager NS: [phone]/ [facility]: [phone]  (After 5 pm or on weekends please page the on-call fellow)

## 2020-06-17 NOTE — PROGRESS NOTE ADULT - PROBLEM SELECTOR PLAN 6
Bicarb of 15 today, steady   -Will continue to monitor CMP  -Bicitra 30mL TID, per Nephrology recs.   -Nephrology following, will f/u on recs

## 2020-06-17 NOTE — PROGRESS NOTE ADULT - PROBLEM SELECTOR PLAN 2
Patient with history of alcoholic cirrhosis with ascites, hx of SBP and variceal bleeding. Evaluated for liver transplant  on 6/9/20 and not considered candidate at this time. Last alcoholic drink in March 2020. Recent admission at the end of May 2020 for jaundice and alcoholic cirrhosis.  -Ascites appears at baseline, will not get another abdominal US at this time  -If ascites worsens, will obtain abdominal US to assess  -MELD 40 6/15, MELD 41 on 6/12, MELD 42 on admission (6/10)   -MRCP from 6/12, no evidence of biliary obstruction   -Holding Bactrim for SBP prophylaxis given current SAM. Will continue with CTX 1g/day for SBP prophylaxis today, per Hepatology recs   -Continuing rifaximin and cholestyramine, lactulose given for asterixis, per Hepatology recs   -No evidence of GI bleeding.  -Hepatology following, will continue to f/u recs

## 2020-06-17 NOTE — PROGRESS NOTE ADULT - ASSESSMENT
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    1) Acute on Chronic kidney injury  Cr of 3.81 on admission from 2 on prior admission. Improving to 2.1, now stable for last 2d  DDx: prerenal (given decreased PO and N/V) vs. medication induced SAM vs. HRS  2) Decompensated liver disease  MELD Na 40 on 6/15  Varices: 4/19/19 EGD showed small EV, portal HTN gastropathy, gastric and duodenal erosions  Ascites: small ascites seen on 5/27/20, no diuretics due to SAM  HE: + asterixis on exam 6/15  HCC: CTAP from 5/15/20 showed no HCC  3) Hyperbilirubinemia to 40s  Chronic, no biliary obstruction on MRCP, infectious studies neg    Recommendation:  - c/w lactulose, titrate to 3-4 BMs per day  - c/w CTX 1g daily for SBP ppx (pt is on as outpatient) given that we are holding bactrim in setting of SAM, will be able to restart bactrim on discharge at prior home dose  - if possible, please get TTE w/ bubble study and MRI abdomen w/ contrast (MRCP was done w/o contrast) to help w/ transplant evaluation  - c/w albumin, midodrine, octreotide at current doses, if Cr remains stable for next 1-2 days will consider discharge at that time  - f/u nephrology recs  - trend CMP, CBC, INR daily  - low sodium diet  - rest of care per primary team  - hepatology will follow    Peter Adams  Gastroenterology Fellow  Available via [employer] Phone# [phone] ([facility]) 45 yo M with PMHx of virtiligo, obesity, DM2, and 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    1) Acute on Chronic kidney injury  Cr of 3.81 on admission from 2 on prior admission. Improving to 2.1, now stable for last 2d  DDx: prerenal (given decreased PO and N/V) vs. medication induced SAM vs. HRS  2) Decompensated liver disease  MELD Na 40 on 6/15  Varices: 4/19/19 EGD showed small EV, portal HTN gastropathy, gastric and duodenal erosions  Ascites: small ascites seen on 5/27/20, no diuretics due to SAM  HE: + asterixis on exam 6/15  HCC: CTAP from 5/15/20 showed no HCC  3) Hyperbilirubinemia to 40s  Chronic, no biliary obstruction on MRCP, infectious studies neg    Recommendation:  - c/w lactulose, titrate to 3-4 BMs per day  - c/w CTX 1g daily for SBP ppx (pt is on as outpatient) given that we are holding bactrim in setting of SAM, will be able to restart bactrim on discharge at prior home dose  - if possible, please get TTE w/ bubble study and MRI abdomen w/ contrast (MRCP was done w/o contrast) to help w/ transplant evaluation  - c/w albumin, midodrine, octreotide at current doses, if Cr remains stable for next 1-2 days will consider discharge at that time  - f/u nephrology recs  - trend CMP, CBC, INR daily  - low sodium diet  - pt not candidate for non-emergent EGD given overall illness, risks >> benefits at this time  - rest of care per primary team  - hepatology will follow    Peter dAams  Gastroenterology Fellow  Available via [employer] Phone# [phone] ([facility])

## 2020-06-17 NOTE — PROGRESS NOTE ADULT - SUBJECTIVE AND OBJECTIVE BOX
Chief Complaint:  Patient is a 44y old  Male who presents with a chief complaint of Elevated Serum Creatinine at Hepatology clinic (2020 10:37)    Reason for consult: Elevated Cr    Interval Events: Pt w/o complaints, eating well. Cr stabilized.     Hospital Medications:  albumin human 25% IVPB 100 milliLiter(s) IV Intermittent every 6 hours  cefTRIAXone   IVPB 1000 milliGRAM(s) IV Intermittent every 24 hours  cholestyramine Powder (Sugar-Free) 4 Gram(s) Oral daily  citric acid/sodium citrate Solution 30 milliLiter(s) Oral three times a day  dextrose 40% Gel 15 Gram(s) Oral once PRN  dextrose 5%. 1000 milliLiter(s) IV Continuous <Continuous>  dextrose 50% Injectable 12.5 Gram(s) IV Push once  dextrose 50% Injectable 25 Gram(s) IV Push once  dextrose 50% Injectable 25 Gram(s) IV Push once  glucagon  Injectable 1 milliGRAM(s) IntraMuscular once PRN  insulin lispro (HumaLOG) corrective regimen sliding scale   SubCutaneous three times a day before meals  insulin lispro (HumaLOG) corrective regimen sliding scale   SubCutaneous at bedtime  lactulose Syrup 10 Gram(s) Oral daily  midodrine. 5 milliGRAM(s) Oral three times a day  multivitamin 1 Tablet(s) Oral daily  octreotide  Injectable 100 MICROGram(s) SubCutaneous three times a day  ondansetron Injectable 4 milliGRAM(s) IV Push every 6 hours PRN  pantoprazole    Tablet 40 milliGRAM(s) Oral before breakfast  rifAXIMin 550 milliGRAM(s) Oral two times a day  zinc sulfate 220 milliGRAM(s) Oral two times a day      ROS:   General:  No  fevers, chills, night sweats, fatigue  Eyes:  Good vision, no reported pain  ENT:  No sore throat, pain, runny nose  CV:  No pain, palpitations  Pulm:  No dyspnea, cough  GI:  See HPI, otherwise negative  :  No  incontinence, nocturia  Muscle:  No pain, weakness  Neuro:  No memory problems  Psych:  No insomnia, mood problems, depression  Endocrine:  No polyuria, polydipsia, cold/heat intolerance  Heme:  No petechiae, ecchymosis, easy bruisability  Skin:  No rash    PHYSICAL EXAM:   Vital Signs:  Vital Signs Last 24 Hrs  T(C): 37 (2020 13:10), Max: 37.1 (2020 21:21)  T(F): 98.6 (2020 13:10), Max: 98.8 (2020 21:21)  HR: 62 (2020 13:10) (54 - 62)  BP: 127/69 (2020 13:10) (103/62 - 127/69)  BP(mean): --  RR: 18 (2020 13:10) (17 - 18)  SpO2: 97% (2020 13:10) (96% - 98%)  Daily     Daily Weight in k.7 (2020 04:16)    GENERAL: no acute distress  NEURO: alert, oriented, + asterixis  HEENT: icteric sclera, no conjunctival pallor appreciated  CHEST: no respiratory distress, no accessory muscle use  CARDIAC: regular rate, rhythm  ABDOMEN: soft, non-tender, non-distended, no rebound or guarding  EXTREMITIES: warm, well perfused, no edema  SKIN: + vitiligo, +++ jaundice    LABS: reviewed                        7.8    4.85  )-----------( 44       ( 2020 14:06 )             22.7     06-17    137  |  104  |  29<H>  ----------------------------<  170<H>  3.5   |  15<L>  |  2.19<H>    Ca    9.4      2020 07:01  Phos  2.8     06-17  Mg     1.6     06-17    TPro  5.6<L>  /  Alb  5.2<H>  /  TBili  39.5<H>  /  DBili  x   /  AST  49<H>  /  ALT  23  /  AlkPhos  48  06-17    LIVER FUNCTIONS - ( 2020 07:01 )  Alb: 5.2 g/dL / Pro: 5.6 g/dL / ALK PHOS: 48 U/L / ALT: 23 U/L / AST: 49 U/L / GGT: x             Interval Diagnostic Studies: see sunrise for full report

## 2020-06-17 NOTE — PROGRESS NOTE ADULT - PROBLEM SELECTOR PLAN 7
Patient with history of T2DM treated with levemir and recently rapaglinide. HbA1c 6.6% this admission.   -Serum Gluc 170 this AM  -Will continue to monitor blood glucose carefully and continue with lispro sliding scale

## 2020-06-17 NOTE — PROGRESS NOTE ADULT - PROBLEM SELECTOR PLAN 4
Patient found to have K of 2.6 upon admission, Received 40mEq KCl in the ED  -K 3.5 today, WNL   -Will continue to monitor and replete with KCl PO as needed  -Mg and phosph wnl today

## 2020-06-17 NOTE — PROGRESS NOTE ADULT - SUBJECTIVE AND OBJECTIVE BOX
[facility] DIVISION OF KIDNEY DISEASES AND HYPERTENSION -- FOLLOW UP NOTE  --------------------------------------------------------------------------------  Manny Parra   Nephrology Fellow  Pager NS: [phone]/ [facility]: [phone]  (After 5 pm or on weekends please page the on-call fellow)    43 y/o M PMHx alcoholic hepatitis, decompensated etOH cirrhosis (c/b ascites, SBP, and variceal hemorrhage) admitted for elevated SCr possible HRS vs dehydration (n/v/poor PO intake). SCr: 1.9 on 5/27/20.  Outpatient SCr increased SCr: 3.6. Patient with SCr improved to 2.1 today (6/16/20)  Pt on Midodrine albumin and Octreotide.    24 hour events:  Patient seen and examined at bedside this morning without subjective complaints  No acute significant events overnight  UO: 1.1L over past 24 hours  Hgb low at 7, receiving 1UpRBC, awaiting Scr to result      PAST HISTORY  --------------------------------------------------------------------------------  No significant changes to PMH, PSH, FHx, SHx, unless otherwise noted    ALLERGIES & MEDICATIONS  --------------------------------------------------------------------------------  Allergies    levofloxacin (Other (Moderate))    Intolerances      Standing Inpatient Medications  albumin human 25% IVPB 100 milliLiter(s) IV Intermittent every 6 hours  cefTRIAXone   IVPB 1000 milliGRAM(s) IV Intermittent every 24 hours  cholestyramine Powder (Sugar-Free) 4 Gram(s) Oral daily  citric acid/sodium citrate Solution 30 milliLiter(s) Oral three times a day  dextrose 5%. 1000 milliLiter(s) IV Continuous <Continuous>  dextrose 50% Injectable 12.5 Gram(s) IV Push once  dextrose 50% Injectable 25 Gram(s) IV Push once  dextrose 50% Injectable 25 Gram(s) IV Push once  insulin lispro (HumaLOG) corrective regimen sliding scale   SubCutaneous three times a day before meals  insulin lispro (HumaLOG) corrective regimen sliding scale   SubCutaneous at bedtime  lactulose Syrup 10 Gram(s) Oral daily  midodrine. 5 milliGRAM(s) Oral three times a day  multivitamin 1 Tablet(s) Oral daily  octreotide  Injectable 100 MICROGram(s) SubCutaneous three times a day  pantoprazole    Tablet 40 milliGRAM(s) Oral before breakfast  rifAXIMin 550 milliGRAM(s) Oral two times a day  zinc sulfate 220 milliGRAM(s) Oral two times a day    PRN Inpatient Medications  dextrose 40% Gel 15 Gram(s) Oral once PRN  glucagon  Injectable 1 milliGRAM(s) IntraMuscular once PRN  ondansetron Injectable 4 milliGRAM(s) IV Push every 6 hours PRN      REVIEW OF SYSTEMS  --------------------------------------------------------------------------------  Gen: No fevers/chills  Head/Eyes/Ears/Mouth: No headache; Normal hearing; Normal vision    Respiratory: No dyspnea, cough  CV: No chest pain  GI: No abdominal pain, diarrhea, constipation, nausea, vomiting  : No increased frequency, dysuria  MSK: No joint pain/swelling; no edema    All other systems were reviewed and are negative, except as noted.    VITALS/PHYSICAL EXAM  --------------------------------------------------------------------------------  T(C): 37.1 (06-17-20 @ 04:16), Max: 37.2 (06-16-20 @ 12:07)  HR: 56 (06-17-20 @ 06:10) (54 - 70)  BP: 113/68 (06-17-20 @ 06:10) (103/62 - 119/70)  RR: 18 (06-17-20 @ 04:16) (17 - 18)  SpO2: 98% (06-17-20 @ 04:16) (97% - 100%)  Wt(kg): --        06-16-20 @ 07:01  -  06-17-20 @ 07:00  --------------------------------------------------------  IN: 940 mL / OUT: 1125 mL / NET: -185 mL      Physical Exam:  	Gen: NAD, on RA  	HEENT: MMM  	Pulm: CTA B/L, decreased BS in bases  	CV: RRR, S1S2; no rub  	Abd: +BS, soft, distended  	MSK: No edema  	Neuro: Awake  	Psych: Normal affect and mood  	Skin: Warm, + vitiligo, + icterus  	Vascular access: peripheral IV lines      LABS/STUDIES  --------------------------------------------------------------------------------              7.0    4.56  >-----------<  45       [06-17-20 @ 07:01]              20.5     137  |  104  |  29  ----------------------------<  170      [06-17-20 @ 07:01]  3.5   |  15  |  x         Ca     9.4     [06-17-20 @ 07:01]      Mg     1.6     [06-17-20 @ 07:01]      Phos  2.8     [06-17-20 @ 07:01]    TPro  5.6  /  Alb  5.2  /  TBili  39.5  /  DBili  x   /  AST  49  /  ALT  23  /  AlkPhos  48  [06-17-20 @ 07:01]    PT/INR: PT 39.5 , INR 3.34       [06-17-20 @ 07:01]      Creatinine Trend:  SCr 2.12 [06-16 @ 07:01]  SCr 2.28 [06-15 @ 06:49]  SCr 2.45 [06-14 @ 06:47]  SCr 2.77 [06-13 @ 06:09]  SCr 3.11 [06-12 @ 06:49]              Urinalysis - [06-10-20 @ 19:28]      Color Leslie / Appearance Slightly Turbid / SG 1.012 / pH 6.5      Gluc Negative / Ketone Negative  / Bili Large / Urobili <2 mg/dL       Blood Negative / Protein Trace / Leuk Est Negative / Nitrite Negative      RBC 65 / WBC 0 / Hyaline 1 / Gran  / Sq Epi  / Non Sq Epi 1 / Bacteria Negative    Urine Creatinine 83      [06-11-20 @ 22:52]  Urine Protein 25      [06-12-20 @ 01:13]  Urine Sodium <35      [06-10-20 @ 15:38]  Urine Urea Nitrogen 417      [06-10-20 @ 19:28]  Urine Osmolality 297      [06-10-20 @ 15:38]    HbA1c 10.5      [04-18-19 @ 11:27]  TSH 0.26      [05-19-20 @ 08:53]    HBsAb Nonreact      [05-21-20 @ 00:40]  HBsAg Nonreact      [05-21-20 @ 00:40]  HBcAb Nonreact      [05-20-20 @ 22:39]

## 2020-06-17 NOTE — PROGRESS NOTE ADULT - SUBJECTIVE AND OBJECTIVE BOX
Patient is a 44y old  Male who presents with a chief complaint of Elevated Serum Creatinine at Hepatology clinic (16 Jun 2020 13:44)  SUBJECTIVE / OVERNIGHT EVENTS: Patient had no acute events overnight. Patient seen and examined at bedside this morning.   Interval history: Patient did not eat lunch or dinner yesterday due to not liking the condiments on the food. He did eat a turkey sandwich without episode of emesis. No N/V. Had 5-6 formed, non-bloody BMs yesterday. No weakness, dizziness, SOB.     ROS: [ - ] Fever [ - ] Chills [ - ] Shortness of breath     MEDICATIONS  (STANDING):  albumin human 25% IVPB 100 milliLiter(s) IV Intermittent every 6 hours  cefTRIAXone   IVPB 1000 milliGRAM(s) IV Intermittent every 24 hours  cholestyramine Powder (Sugar-Free) 4 Gram(s) Oral daily  citric acid/sodium citrate Solution 30 milliLiter(s) Oral three times a day  dextrose 5%. 1000 milliLiter(s) (50 mL/Hr) IV Continuous <Continuous>  dextrose 50% Injectable 12.5 Gram(s) IV Push once  dextrose 50% Injectable 25 Gram(s) IV Push once  dextrose 50% Injectable 25 Gram(s) IV Push once  insulin lispro (HumaLOG) corrective regimen sliding scale   SubCutaneous three times a day before meals  insulin lispro (HumaLOG) corrective regimen sliding scale   SubCutaneous at bedtime  lactulose Syrup 10 Gram(s) Oral daily  midodrine. 5 milliGRAM(s) Oral three times a day  multivitamin 1 Tablet(s) Oral daily  octreotide  Injectable 100 MICROGram(s) SubCutaneous three times a day  pantoprazole    Tablet 40 milliGRAM(s) Oral before breakfast  rifAXIMin 550 milliGRAM(s) Oral two times a day    MEDICATIONS  (PRN):  dextrose 40% Gel 15 Gram(s) Oral once PRN Blood Glucose LESS THAN 70 milliGRAM(s)/deciliter  glucagon  Injectable 1 milliGRAM(s) IntraMuscular once PRN Glucose LESS THAN 70 milligrams/deciliter  ondansetron Injectable 4 milliGRAM(s) IV Push every 6 hours PRN Nausea and/or Vomiting      Vital Signs Last 24 Hrs  T(C): 37.1 (17 Jun 2020 04:16), Max: 37.2 (16 Jun 2020 12:07)  T(F): 98.7 (17 Jun 2020 04:16), Max: 99 (16 Jun 2020 12:07)  HR: 56 (17 Jun 2020 06:10) (54 - 70)  BP: 113/68 (17 Jun 2020 06:10) (103/62 - 119/70)  BP(mean): --  RR: 18 (17 Jun 2020 04:16) (17 - 18)  SpO2: 98% (17 Jun 2020 04:16) (97% - 100%)  CAPILLARY BLOOD GLUCOSE      POCT Blood Glucose.: 165 mg/dL (16 Jun 2020 21:05)  POCT Blood Glucose.: 204 mg/dL (16 Jun 2020 17:22)  POCT Blood Glucose.: 241 mg/dL (16 Jun 2020 12:48)  POCT Blood Glucose.: 180 mg/dL (16 Jun 2020 08:38)    I&O's Summary    16 Jun 2020 07:01  -  17 Jun 2020 07:00  --------------------------------------------------------  IN: 940 mL / OUT: 1125 mL / NET: -185 mL        PHYSICAL EXAM  GENERAL: NAD, lying comfortably in bed   HEAD:  Atraumatic, Normocephalic  EYES: EOMI, PERRLA, scleral icterus present, improved from admission   NECK: Supple, No JVD  CHEST/LUNG: Clear to auscultation bilaterally; No wheeze  HEART: Systolic flow murmur present at L sternal border  ABDOMEN: Distended, at baseline. Soft and nontender to palpation. Bowel sounds present  EXTREMITIES:  2+ Peripheral Pulses, Clubbing of phalanges b/l   NEURO: AAOx3, asterixis present  SKIN: Hypopigmented patches throughout body. Jaundice improving from admission, unchanged from yesterday.       LABS:                        7.0    4.56  )-----------( 45       ( 17 Jun 2020 07:01 )             20.5     06-17    137  |  104  |  29<H>  ----------------------------<  170<H>  3.5   |  15<L>  |  x     Ca    9.4      17 Jun 2020 07:01  Phos  2.8     06-17  Mg     1.6     06-17    TPro  5.6<L>  /  Alb  x   /  TBili  x   /  DBili  x   /  AST  49<H>  /  ALT  23  /  AlkPhos  48  06-17    PT/INR - ( 16 Jun 2020 07:02 )   PT: 35.1 sec;   INR: 2.98 ratio Patient is a 44y old  Male who presents with a chief complaint of Elevated Serum Creatinine at Hepatology clinic (16 Jun 2020 13:44)  SUBJECTIVE / OVERNIGHT EVENTS: Patient had HGB 7.0 this morning, but remains hemodynamically stable, transfusing 1 unit PRBCs this AM. Patient seen and examined at bedside this morning.   Interval history: Patient did not eat lunch or dinner yesterday due to not liking the condiments on the food. He did eat a turkey sandwich without episode of emesis. No N/V. Had 5-6 formed, non-bloody BMs yesterday. No weakness, dizziness, SOB.     ROS: [ - ] Fever [ - ] Chills [ - ] Shortness of breath     MEDICATIONS  (STANDING):  albumin human 25% IVPB 100 milliLiter(s) IV Intermittent every 6 hours  cefTRIAXone   IVPB 1000 milliGRAM(s) IV Intermittent every 24 hours  cholestyramine Powder (Sugar-Free) 4 Gram(s) Oral daily  citric acid/sodium citrate Solution 30 milliLiter(s) Oral three times a day  dextrose 5%. 1000 milliLiter(s) (50 mL/Hr) IV Continuous <Continuous>  dextrose 50% Injectable 12.5 Gram(s) IV Push once  dextrose 50% Injectable 25 Gram(s) IV Push once  dextrose 50% Injectable 25 Gram(s) IV Push once  insulin lispro (HumaLOG) corrective regimen sliding scale   SubCutaneous three times a day before meals  insulin lispro (HumaLOG) corrective regimen sliding scale   SubCutaneous at bedtime  lactulose Syrup 10 Gram(s) Oral daily  midodrine. 5 milliGRAM(s) Oral three times a day  multivitamin 1 Tablet(s) Oral daily  octreotide  Injectable 100 MICROGram(s) SubCutaneous three times a day  pantoprazole    Tablet 40 milliGRAM(s) Oral before breakfast  rifAXIMin 550 milliGRAM(s) Oral two times a day    MEDICATIONS  (PRN):  dextrose 40% Gel 15 Gram(s) Oral once PRN Blood Glucose LESS THAN 70 milliGRAM(s)/deciliter  glucagon  Injectable 1 milliGRAM(s) IntraMuscular once PRN Glucose LESS THAN 70 milligrams/deciliter  ondansetron Injectable 4 milliGRAM(s) IV Push every 6 hours PRN Nausea and/or Vomiting      Vital Signs Last 24 Hrs  T(C): 37.1 (17 Jun 2020 04:16), Max: 37.2 (16 Jun 2020 12:07)  T(F): 98.7 (17 Jun 2020 04:16), Max: 99 (16 Jun 2020 12:07)  HR: 56 (17 Jun 2020 06:10) (54 - 70)  BP: 113/68 (17 Jun 2020 06:10) (103/62 - 119/70)  BP(mean): --  RR: 18 (17 Jun 2020 04:16) (17 - 18)  SpO2: 98% (17 Jun 2020 04:16) (97% - 100%)  CAPILLARY BLOOD GLUCOSE      POCT Blood Glucose.: 165 mg/dL (16 Jun 2020 21:05)  POCT Blood Glucose.: 204 mg/dL (16 Jun 2020 17:22)  POCT Blood Glucose.: 241 mg/dL (16 Jun 2020 12:48)  POCT Blood Glucose.: 180 mg/dL (16 Jun 2020 08:38)    I&O's Summary    16 Jun 2020 07:01  -  17 Jun 2020 07:00  --------------------------------------------------------  IN: 940 mL / OUT: 1125 mL / NET: -185 mL        PHYSICAL EXAM  GENERAL: NAD, lying comfortably in bed   HEAD:  Atraumatic, Normocephalic  EYES: EOMI, PERRLA, scleral icterus present, improved from admission   NECK: Supple, No JVD  CHEST/LUNG: Clear to auscultation bilaterally; No wheeze  HEART: Systolic flow murmur present at L sternal border  ABDOMEN: Distended, at baseline. Soft and nontender to palpation. Bowel sounds present  EXTREMITIES:  2+ Peripheral Pulses, Clubbing of phalanges b/l   NEURO: AAOx3, asterixis present  SKIN: Hypopigmented patches throughout body. Jaundice improving from admission, unchanged from yesterday.       LABS:                        7.0    4.56  )-----------( 45       ( 17 Jun 2020 07:01 )             20.5     06-17    137  |  104  |  29<H>  ----------------------------<  170<H>  3.5   |  15<L>  |  x     Ca    9.4      17 Jun 2020 07:01  Phos  2.8     06-17  Mg     1.6     06-17    TPro  5.6<L>  /  Alb  x   /  TBili  x   /  DBili  x   /  AST  49<H>  /  ALT  23  /  AlkPhos  48  06-17    PT/INR - ( 16 Jun 2020 07:02 )   PT: 35.1 sec;   INR: 2.98 ratio

## 2020-06-18 NOTE — PROGRESS NOTE ADULT - PROBLEM SELECTOR PLAN 3
Patient with HGB 9 upon admission, stable in 8.0-9.0 range for most of admission, downtrending last two days with HGB 7.0 yesterday. Patient has been hemodynamically stable throughout entirety of admission with no SOB, weakness, dizziness  -Patient received transfusion of 1 unit PRBCs over 3hrs treatment  -Will f/u with Hepatology regarding potential scope to evaluate further Patient with HGB 9 upon admission, stable in 8.0-9.0 range for most of admission, downtrending last two days with HGB 7.0 yesterday, received 1 unit PRBCs. Patient has been hemodynamically stable throughout entirety of admission with no SOB, weakness, dizziness  -HGB 7.6   - Hepatology does not rec Endoscopy now given risks outweigh benefits in hemodynamically stable

## 2020-06-18 NOTE — PROGRESS NOTE ADULT - SUBJECTIVE AND OBJECTIVE BOX
Chief Complaint:  Patient is a 44y old  Male who presents with a chief complaint of Elevated Serum Creatinine at Hepatology clinic (2020 07:46)    Reason for consult: Cirrhosis, SAM    Interval Events: Cr stable, pt w/o complaints this AM. 6 BMs in last 24h, non-bloody. Tolerating PO w/o issue.    Hospital Medications:  albumin human 25% IVPB 100 milliLiter(s) IV Intermittent every 6 hours  cefTRIAXone   IVPB 1000 milliGRAM(s) IV Intermittent every 24 hours  cholestyramine Powder (Sugar-Free) 4 Gram(s) Oral daily  citric acid/sodium citrate Solution 30 milliLiter(s) Oral three times a day  dextrose 40% Gel 15 Gram(s) Oral once PRN  dextrose 5%. 1000 milliLiter(s) IV Continuous <Continuous>  dextrose 50% Injectable 12.5 Gram(s) IV Push once  dextrose 50% Injectable 25 Gram(s) IV Push once  dextrose 50% Injectable 25 Gram(s) IV Push once  glucagon  Injectable 1 milliGRAM(s) IntraMuscular once PRN  insulin lispro (HumaLOG) corrective regimen sliding scale   SubCutaneous three times a day before meals  insulin lispro (HumaLOG) corrective regimen sliding scale   SubCutaneous at bedtime  lactulose Syrup 10 Gram(s) Oral daily  magnesium oxide 400 milliGRAM(s) Oral three times a day with meals  midodrine. 5 milliGRAM(s) Oral three times a day  multivitamin 1 Tablet(s) Oral daily  octreotide  Injectable 100 MICROGram(s) SubCutaneous three times a day  ondansetron Injectable 4 milliGRAM(s) IV Push every 6 hours PRN  pantoprazole    Tablet 40 milliGRAM(s) Oral before breakfast  rifAXIMin 550 milliGRAM(s) Oral two times a day  zinc sulfate 220 milliGRAM(s) Oral two times a day      ROS:   General:  No  fevers, chills, night sweats, fatigue  Eyes:  Good vision, no reported pain  ENT:  No sore throat, pain, runny nose  CV:  No pain, palpitations  Pulm:  No dyspnea, cough  GI:  See HPI, otherwise negative  :  No  incontinence, nocturia  Muscle:  No pain, weakness  Neuro:  No memory problems  Psych:  No insomnia, mood problems, depression  Endocrine:  No polyuria, polydipsia, cold/heat intolerance  Heme:  No petechiae, ecchymosis, easy bruisability  Skin:  No rash    PHYSICAL EXAM:   Vital Signs:  Vital Signs Last 24 Hrs  T(C): 36.8 (2020 03:56), Max: 37 (2020 13:10)  T(F): 98.2 (2020 03:56), Max: 98.6 (2020 13:10)  HR: 70 (2020 05:54) (59 - 70)  BP: 116/68 (2020 05:54) (107/66 - 127/69)  BP(mean): --  RR: 18 (2020 03:56) (18 - 18)  SpO2: 96% (2020 03:56) (95% - 97%)  Daily     Daily Weight in k.8 (2020 03:11)    GENERAL: no acute distress  NEURO: alert, oriented x 3, + asterixis  HEENT: anicteric sclera, no conjunctival pallor appreciated  CHEST: no respiratory distress, no accessory muscle use  CARDIAC: regular rate, rhythm  ABDOMEN: soft, non-tender, +distended, no rebound or guarding  EXTREMITIES: warm, well perfused, no edema  SKIN: + vitiligo, +++ jaundice    LABS: reviewed                        7.6    4.82  )-----------( 40       ( 2020 06:53 )             21.9     06-18    138  |  104  |  28<H>  ----------------------------<  168<H>  3.6   |  17<L>  |  2.13<H>    Ca    9.2      2020 06:53  Phos  3.0     06-18  Mg     1.6     06-18    TPro  5.8<L>  /  Alb  5.2<H>  /  TBili  38.6<H>  /  DBili  x   /  AST  46<H>  /  ALT  21  /  AlkPhos  49  06-18    LIVER FUNCTIONS - ( 2020 06:53 )  Alb: 5.2 g/dL / Pro: 5.8 g/dL / ALK PHOS: 49 U/L / ALT: 21 U/L / AST: 46 U/L / GGT: x             Interval Diagnostic Studies: see sunrise for full report

## 2020-06-18 NOTE — PROVIDER CONTACT NOTE (OTHER) - ACTION/TREATMENT ORDERED:
MD aware & assessed patient & reviewed all orders, labs, history & plan. MD ordered Vitamin K. No additional orders at this time. MD ordered next lab draws at 06:00 on 19-Jun-2020.

## 2020-06-18 NOTE — PROGRESS NOTE ADULT - PROBLEM SELECTOR PLAN 6
Bicarb today   -Will continue to monitor CMP  -Bicitra 30mL TID, per Nephrology recs.   -Nephrology following, will f/u on recs Bicarb today 17  -Will continue to monitor CMP  -Bicitra 30mL TID, per Nephrology recs.   -Nephrology following, will f/u on recs

## 2020-06-18 NOTE — PROGRESS NOTE ADULT - PROBLEM SELECTOR PLAN 4
Patient found to have K of 2.6 upon admission, Received 40mEq KCl in the ED  -Will continue to monitor and replete with KCl PO as needed  -Mg and phosph wnl today Patient found to have K of 2.6 upon admission, Received 40mEq KCl in the ED  -Will continue to monitor and replete with KCl PO as needed  -K 3.6 today, WNL   -Mg and phosph wnl today

## 2020-06-18 NOTE — PROGRESS NOTE ADULT - PROBLEM SELECTOR PLAN 1
Creatinine not resulted yet today, 2.19 yesterday (improved from presenting Cre of 3.6. Baseline 2.0)   -Patient receiving maintenance fluids .9% NaCl and D5.   -Renal US did not show obstruction or other evidence of renal disease  - Will continue treatment for possible hepatorenal syndrome (octreotide, albumin, midodrine) until SCre plateaus, per Hepatology recs   -Nephrology consulted, will f/u recs  - Bicitra 30 ml TID per Nephrology recs Creatinine 2.13 today, 2.19 yesterday (improved from presenting Cre of 3.6. Baseline 2.0)   -Patient receiving maintenance fluids .9% NaCl and D5.   -Renal US did not show obstruction or other evidence of renal disease  - Will continue treatment for possible hepatorenal syndrome (octreotide, albumin, midodrine) until SCre plateaus, per Hepatology recs   -Nephrology consulted, will f/u recs  - Bicitra 30 ml TID per Nephrology recs Creatinine 2.13 today, 2.19 yesterday (improved from presenting Cre of 3.6. Baseline 2.0)   -Renal US did not show obstruction or other evidence of renal disease  - Will continue treatment for possible hepatorenal syndrome (octreotide, albumin, midodrine) until SCre plateaus, per Hepatology recs   - Bicitra 30 ml TID per Nephrology recs

## 2020-06-18 NOTE — PROGRESS NOTE ADULT - PROBLEM SELECTOR PLAN 2
Patient with history of alcoholic cirrhosis with ascites, hx of SBP and variceal bleeding. Evaluated for liver transplant  on 6/9/20 and not considered candidate at this time. Last alcoholic drink in March 2020. Recent admission at the end of May 2020 for jaundice and alcoholic cirrhosis.  -Ascites appears at baseline, will not get another abdominal US at this time  -If ascites worsens, will obtain abdominal US to assess  -MELD 40 6/15, MELD 41 on 6/12, MELD 42 on admission (6/10)   -MRCP from 6/12, no evidence of biliary obstruction   -Holding Bactrim for SBP prophylaxis given current SAM. Will continue with CTX 1g/day for SBP prophylaxis today, per Hepatology recs   -Continuing rifaximin and cholestyramine, lactulose given for asterixis, per Hepatology recs   -No evidence of GI bleeding.  -Hepatology following, will continue to f/u recs Patient with history of alcoholic cirrhosis with ascites, hx of SBP and variceal bleeding. Evaluated for liver transplant on 6/9/20 and not considered candidate at this time. Last alcoholic drink in March 2020. Recent admission at the end of May 2020 for jaundice and alcoholic cirrhosis.  -Ascites appears at baseline, will not get another abdominal US at this time  -If ascites worsens, will obtain abdominal US to assess  -MELD 40 6/15, MELD 41 on 6/12, MELD 42 on admission (6/10)   -MRCP from 6/12, no evidence of biliary obstruction   -Holding Bactrim for SBP prophylaxis given current SAM. Will continue with CTX 1g/day for SBP prophylaxis today, per Hepatology recs   -Continuing rifaximin and cholestyramine, lactulose given for asterixis, per Hepatology recs   -No evidence of GI bleeding.  -Hepatology following, will continue to f/u recs

## 2020-06-18 NOTE — PROGRESS NOTE ADULT - PROBLEM SELECTOR PLAN 7
Patient with history of T2DM treated with levemir and recently rapaglinide. HbA1c 6.6% this admission.    -Will continue to monitor blood glucose carefully and continue with lispro sliding scale Patient with history of T2DM treated with levemir and recently rapaglinide. HbA1c 6.6% this admission.   -SGluc 168 today  -Will continue to monitor blood glucose carefully and continue with lispro sliding scale

## 2020-06-18 NOTE — PROVIDER CONTACT NOTE (OTHER) - BACKGROUND
Patient admitted for Hepatorenal Syndrome, Acute Kidney Injury, Jaundice, Acidemia, Diabetes, Thrombocytopenia, Alcoholic Cirrhosis, Ascites.

## 2020-06-18 NOTE — PROGRESS NOTE ADULT - ASSESSMENT
45 y/o M PMHx alcoholic hepatitis, decompensated etOH cirrhosis (c/b ascites, SBP, and variceal hemorrhage),  latent TB (treated), h/o C. diff (2016, 2017), vitiligo, and T2DM (HbA1c 7.9% in 10/2019), sent to ED by hepatology clinic for rise in Cre on outpatient labs, admitted for treatment of SAM in the setting of 3 days of reduced PO intake 2/2 N/V with concern for hepatorenal syndrome.

## 2020-06-18 NOTE — PROVIDER CONTACT NOTE (OTHER) - ASSESSMENT
Patient is asymptomatic. Patient is Alert and Oriented times Four. Patient is free form signs and symptoms of bleeding. No bleeding noted. Patient's Lab Results: RBC Count 2.62. Hemoglobin 7.6. Hematocrit 21.9. Platelet Count - Automated 40. INR 3.42. Patient is asymptomatic. Patient is Alert and Oriented times Four. Patient is free from signs and symptoms of bleeding. No bleeding noted. Patient's Lab Results: RBC Count 2.62. Hemoglobin 7.6. Hematocrit 21.9. Platelet Count - Automated 40. INR 3.42.

## 2020-06-18 NOTE — PROGRESS NOTE ADULT - ASSESSMENT
45 yo M with latent TB, history of C diff (2016, 2017), DM2, obesity, past history of alcoholic hepatitis, and decompensated ALD cirrhosis complicated by ascites, SBP, and variceal hemorrhage in the remote past, recently admitted with acute on chronic liver failure (ACLF) after alcohol relapse and complicated by SAM, now re-admitted with worsened SAM as well as new onset hepatic encephalopathy.    1) Acute on Chronic kidney injury  Cr of 3.81 on admission from 2 on prior admission. Improving to 2.1, now stable for last 3d  DDx: prerenal (given decreased PO and N/V) vs. medication induced SAM vs. HRS  Tx: albumin/midodrine/octreotide  2) EtOH cirrhosis decompensated by ascites w/ SBP and variceal bleeding  MELD Na 40 on 6/17  Varices: 4/19/19 EGD showed small EV, portal HTN gastropathy, gastric and duodenal erosions  Ascites: small ascites seen on 5/27/20, no diuretics due to SAM, on SBP ppx w/ CTX  HE: + asterixis on exam 6/15, tx w/ lactulose  HCC: CTAP from 5/15/20 showed no HCC  3) Hyperbilirubinemia to 40s  Chronic, no biliary obstruction on MRCP, infectious studies neg    Recommendation:  - c/w lactulose, titrate to 3-4 BMs per day  - c/w CTX 1g daily for SBP ppx (pt is on as outpatient) given that we are holding bactrim in setting of SAM, will be able to restart bactrim on discharge at prior home dose  - if possible, please get TTE w/ bubble study and MRI abdomen w/ contrast (MRCP was done w/o contrast) to help w/ transplant evaluation  - c/w albumin, midodrine, octreotide at current doses, if Cr remains stable for next 1-2 days will consider discharge at that time  - f/u nephrology recs  - trend CMP, CBC, INR daily  - low sodium diet  - pt not candidate for non-emergent EGD given overall illness, risks >> benefits at this time  - rest of care per primary team  - hepatology will follow  - outpatient follow up with Dr. Agapito Ayala in hepatology clinic on discharge ([phone])    Peter Adams  Gastroenterology Fellow  Available via [employer] Phone# [phone] ([facility]) 43 yo M with latent TB, history of C diff (2016, 2017), DM2, obesity, past history of alcoholic hepatitis, and decompensated ALD cirrhosis complicated by ascites, SBP, and variceal hemorrhage in the remote past, recently admitted with acute on chronic liver failure (ACLF) after alcohol relapse and complicated by SAM, now re-admitted with worsened SAM as well as new onset hepatic encephalopathy.    1) Acute on Chronic kidney injury  Cr of 3.81 on admission from 2 on prior admission. Improving to 2.1, now stable for last 3d  DDx: prerenal (given decreased PO and N/V) vs. medication induced SAM vs. HRS  Tx: albumin/midodrine/octreotide  2) EtOH cirrhosis decompensated by ascites w/ SBP and variceal bleeding  MELD Na 40 on 6/17  Varices: 4/19/19 EGD showed small EV, portal HTN gastropathy, gastric and duodenal erosions  Ascites: small ascites seen on 5/27/20, no diuretics due to SAM, on SBP ppx w/ CTX  HE: + asterixis on exam 6/15, tx w/ lactulose  HCC: CTAP from 5/15/20 showed no HCC  3) Hyperbilirubinemia to 40s  Chronic, no biliary obstruction on MRCP, infectious studies neg    Recommendation:  - c/w lactulose, titrate to 3-4 BMs per day  - c/w CTX 1g daily for SBP ppx (pt is on as outpatient) given that we are holding bactrim in setting of SAM, will be able to restart bactrim on discharge at prior home dose  - d/c albumin today  - c/w midodrine, octreotide at current doses, if Cr remains stable will d/c octreotide tomorrow  - f/u nephrology recs  - trend CMP, CBC, INR daily  - low sodium diet  - pt not candidate for non-emergent EGD given overall illness, risks >> benefits at this time  - rest of care per primary team  - hepatology will follow  - outpatient follow up with Dr. Agapito Ayala in hepatology clinic on discharge ([phone])    Peter Adams  Gastroenterology Fellow  Available via [employer] Phone# [phone] ([facility])

## 2020-06-19 NOTE — PROGRESS NOTE ADULT - PROBLEM SELECTOR PLAN 8
1.  Name of PCP: Dr. Woods  2.  PCP Contacted on Admission: [s] Y    [ ] N    3.  PCP contacted at Discharge: [ ] Y    [ ] N    [ ] N/A  4.  Post-Discharge Appointment Date and Location:  5.  Summary of Handoff given to PCP: 1.  Name of PCP: Dr. Woods. Dr. Ayala is outpatient hepatologist ([phone]) to f/u after discharge.   2.  PCP Contacted on Admission: [s] Y    [ ] N    3.  PCP contacted at Discharge: [ ] Y    [ ] N    [ ] N/A  4.  Post-Discharge Appointment Date and Location:  5.  Summary of Handoff given to PCP:

## 2020-06-19 NOTE — PROGRESS NOTE ADULT - PROBLEM SELECTOR PLAN 1
Creatinine 2.07 today, stable past several days (improved from presenting Cre of 3.6).   -For potential HRS: Per hepatology team, stopped albumin yesterday, given stable Cre can consider d/c octreotide today. May be d/c on Midodrine.   - Bicitra 30 ml TID per Nephrology recs Creatinine 2.07 today, stable past several days (improved from presenting Cre of 3.6).   -For potential HRS: Per hepatology team, stopped albumin yesterday, given stable Cre and d/c octreotide today (per Hepatology and Nephro). May be discharged on Midodrine  - Bicitra 30 ml TID per Nephrology recs

## 2020-06-19 NOTE — PROGRESS NOTE ADULT - SUBJECTIVE AND OBJECTIVE BOX
Chief Complaint:  Patient is a 44y old  Male who presents with a chief complaint of Elevated Serum Creatinine at Hepatology clinic (19 Jun 2020 08:57)    Reason for consult: SAM, cirrhosis    Interval Events: Pt feels well, 5 BMs yesterday, Cr stable.     Hospital Medications:  cefTRIAXone   IVPB 1000 milliGRAM(s) IV Intermittent every 24 hours  cholestyramine Powder (Sugar-Free) 4 Gram(s) Oral daily  citric acid/sodium citrate Solution 30 milliLiter(s) Oral three times a day  dextrose 40% Gel 15 Gram(s) Oral once PRN  dextrose 5%. 1000 milliLiter(s) IV Continuous <Continuous>  dextrose 50% Injectable 12.5 Gram(s) IV Push once  dextrose 50% Injectable 25 Gram(s) IV Push once  dextrose 50% Injectable 25 Gram(s) IV Push once  glucagon  Injectable 1 milliGRAM(s) IntraMuscular once PRN  insulin lispro (HumaLOG) corrective regimen sliding scale   SubCutaneous three times a day before meals  insulin lispro (HumaLOG) corrective regimen sliding scale   SubCutaneous at bedtime  lactulose Syrup 10 Gram(s) Oral daily  magnesium oxide 400 milliGRAM(s) Oral three times a day with meals  midodrine. 5 milliGRAM(s) Oral every 8 hours  multivitamin 1 Tablet(s) Oral daily  octreotide  Injectable 100 MICROGram(s) SubCutaneous three times a day  ondansetron Injectable 4 milliGRAM(s) IV Push every 6 hours PRN  pantoprazole    Tablet 40 milliGRAM(s) Oral before breakfast  potassium chloride    Tablet ER 40 milliEquivalent(s) Oral every 4 hours  rifAXIMin 550 milliGRAM(s) Oral two times a day  zinc sulfate 220 milliGRAM(s) Oral two times a day      ROS:   General:  No  fevers, chills, night sweats, fatigue  Eyes:  Good vision, no reported pain  ENT:  No sore throat, pain, runny nose  CV:  No pain, palpitations  Pulm:  No dyspnea, cough  GI:  See HPI, otherwise negative  :  No  incontinence, nocturia  Muscle:  No pain, weakness  Neuro:  No memory problems  Psych:  No insomnia, mood problems, depression  Endocrine:  No polyuria, polydipsia, cold/heat intolerance  Heme:  No petechiae, ecchymosis, easy bruisability  Skin:  No rash    PHYSICAL EXAM:   Vital Signs:  Vital Signs Last 24 Hrs  T(C): 36.8 (19 Jun 2020 04:00), Max: 36.8 (19 Jun 2020 04:00)  T(F): 98.2 (19 Jun 2020 04:00), Max: 98.2 (19 Jun 2020 04:00)  HR: 75 (19 Jun 2020 04:00) (60 - 75)  BP: 123/74 (19 Jun 2020 04:00) (112/63 - 123/74)  BP(mean): --  RR: 18 (19 Jun 2020 04:00) (18 - 18)  SpO2: 99% (19 Jun 2020 04:00) (95% - 99%)  Daily     Daily     GENERAL: no acute distress  NEURO: alert, no asterixis  HEENT: anicteric sclera, no conjunctival pallor appreciated  CHEST: no respiratory distress, no accessory muscle use  CARDIAC: regular rate, rhythm  ABDOMEN: soft, non-tender, non-distended, no rebound or guarding  EXTREMITIES: warm, well perfused, no edema  SKIN: + vitiligo, +++ jaundice    LABS: reviewed                        7.9    4.98  )-----------( 37       ( 19 Jun 2020 07:12 )             22.6     06-19    137  |  102  |  30<H>  ----------------------------<  196<H>  3.3<L>   |  17<L>  |  2.07<H>    Ca    9.0      19 Jun 2020 07:12  Phos  2.7     06-19  Mg     1.6     06-19    TPro  5.7<L>  /  Alb  4.9  /  TBili  38.7<H>  /  DBili  x   /  AST  49<H>  /  ALT  21  /  AlkPhos  52  06-19    LIVER FUNCTIONS - ( 19 Jun 2020 07:12 )  Alb: 4.9 g/dL / Pro: 5.7 g/dL / ALK PHOS: 52 U/L / ALT: 21 U/L / AST: 49 U/L / GGT: x             Interval Diagnostic Studies: see sunrise for full report

## 2020-06-19 NOTE — PROGRESS NOTE ADULT - PROBLEM SELECTOR PLAN 3
Patient with HGB 9 upon admission, stable in 8.0-9.0 range for most of admission, downtrending last two days with HGB 7.0 6/17, received 1 unit PRBCs. Patient has been hemodynamically stable throughout entirety of admission with no SOB, weakness, dizziness  -HGB 7.9 today, stable last 2 days.   - Hepatology does not rec Endoscopy now given risks outweigh benefits in hemodynamically stable patient

## 2020-06-19 NOTE — PROGRESS NOTE ADULT - SUBJECTIVE AND OBJECTIVE BOX
Patient is a 44y old  Male who presents with a chief complaint of Elevated Serum Creatinine at Hepatology clinic  SUBJECTIVE / OVERNIGHT EVENTS: Patient had no acute events overnight. Patient seen and examined at bedside this morning.   Interval history: Patient without N/V yesterday, ate steak and sweet potatoes without issues. 6 BMs yesterday, formed to watery without blood. No HA, weakness, dizziness.  ROS: [ - ] Fever [ - ] Chills [ - ] Nausea/Vomiting [ - ] Chest Pain [ - ] Shortness of breath     MEDICATIONS  (STANDING):  cefTRIAXone   IVPB 1000 milliGRAM(s) IV Intermittent every 24 hours  cholestyramine Powder (Sugar-Free) 4 Gram(s) Oral daily  citric acid/sodium citrate Solution 30 milliLiter(s) Oral three times a day  dextrose 5%. 1000 milliLiter(s) (50 mL/Hr) IV Continuous <Continuous>  dextrose 50% Injectable 12.5 Gram(s) IV Push once  dextrose 50% Injectable 25 Gram(s) IV Push once  dextrose 50% Injectable 25 Gram(s) IV Push once  insulin lispro (HumaLOG) corrective regimen sliding scale   SubCutaneous three times a day before meals  insulin lispro (HumaLOG) corrective regimen sliding scale   SubCutaneous at bedtime  lactulose Syrup 10 Gram(s) Oral daily  magnesium oxide 400 milliGRAM(s) Oral three times a day with meals  midodrine. 5 milliGRAM(s) Oral every 8 hours  multivitamin 1 Tablet(s) Oral daily  octreotide  Injectable 100 MICROGram(s) SubCutaneous three times a day  pantoprazole    Tablet 40 milliGRAM(s) Oral before breakfast  potassium chloride    Tablet ER 40 milliEquivalent(s) Oral every 4 hours  rifAXIMin 550 milliGRAM(s) Oral two times a day  zinc sulfate 220 milliGRAM(s) Oral two times a day    MEDICATIONS  (PRN):  dextrose 40% Gel 15 Gram(s) Oral once PRN Blood Glucose LESS THAN 70 milliGRAM(s)/deciliter  glucagon  Injectable 1 milliGRAM(s) IntraMuscular once PRN Glucose LESS THAN 70 milligrams/deciliter  ondansetron Injectable 4 milliGRAM(s) IV Push every 6 hours PRN Nausea and/or Vomiting      Vital Signs Last 24 Hrs  T(C): 36.8 (19 Jun 2020 04:00), Max: 36.8 (19 Jun 2020 04:00)  T(F): 98.2 (19 Jun 2020 04:00), Max: 98.2 (19 Jun 2020 04:00)  HR: 75 (19 Jun 2020 04:00) (60 - 75)  BP: 123/74 (19 Jun 2020 04:00) (112/63 - 123/74)  BP(mean): --  RR: 18 (19 Jun 2020 04:00) (18 - 18)  SpO2: 99% (19 Jun 2020 04:00) (95% - 99%)  CAPILLARY BLOOD GLUCOSE      POCT Blood Glucose.: 169 mg/dL (19 Jun 2020 08:48)  POCT Blood Glucose.: 156 mg/dL (18 Jun 2020 21:44)  POCT Blood Glucose.: 228 mg/dL (18 Jun 2020 17:47)  POCT Blood Glucose.: 235 mg/dL (18 Jun 2020 12:51)    I&O's Summary    18 Jun 2020 07:01  -  19 Jun 2020 07:00  --------------------------------------------------------  IN: 816 mL / OUT: 1480 mL / NET: -664 mL      PHYSICAL EXAM  GENERAL: NAD, lying comfortably in bed   HEAD:  Atraumatic, Normocephalic  EYES: EOMI, PERRLA, scleral icterus present, improved from admission   NECK: Supple, No JVD  CHEST/LUNG: Clear to auscultation bilaterally; No W/R/R  HEART: Regular rate rhythm, no murmurs or rubs appreciated.   ABDOMEN: Distended, at baseline. Soft and nontender to palpation. Bowel sounds present  EXTREMITIES:  2+ Peripheral Pulses, Clubbing of phalanges b/l   NEURO: AAOx3, asterixis present b/l today   SKIN: Hypopigmented patches throughout body. Jaundice present, stable.     LABS:                        7.9    4.98  )-----------( 37       ( 19 Jun 2020 07:12 )             22.6     06-19    137  |  102  |  30<H>  ----------------------------<  196<H>  3.3<L>   |  17<L>  |  2.07<H>    Ca    9.0      19 Jun 2020 07:12  Phos  2.7     06-19  Mg     1.6     06-19    TPro  5.7<L>  /  Alb  4.9  /  TBili  38.7<H>  /  DBili  x   /  AST  49<H>  /  ALT  21  /  AlkPhos  52  06-19    PT/INR - ( 19 Jun 2020 07:12 )   PT: 37.5 sec;   INR: 3.15 ratio

## 2020-06-19 NOTE — PROGRESS NOTE ADULT - PROBLEM SELECTOR PLAN 7
Patient with history of T2DM treated with levemir and recently rapaglinide. HbA1c 6.6% this admission.   -SGluc 196 today  -Will continue to monitor blood glucose carefully and continue with lispro sliding scale

## 2020-06-19 NOTE — PROGRESS NOTE ADULT - SUBJECTIVE AND OBJECTIVE BOX
[facility] DIVISION OF KIDNEY DISEASES AND HYPERTENSION -- FOLLOW UP NOTE  --------------------------------------------------------------------------------  Manny Parra   Nephrology Fellow  Pager NS: [phone]/ [facility]: [phone]  (After 5 pm or on weekends please page the on-call fellow)    45 y/o M PMHx alcoholic hepatitis, decompensated etOH cirrhosis (c/b ascites, SBP, and variceal hemorrhage) admitted for elevated SCr possible HRS vs dehydration (n/v/poor PO intake). SCr: 1.9 on 5/27/20.  Outpatient SCr increased SCr: 3.6. Tx for HRS ongoing.      24 hour events:  Patient seen and examined at bedside this morning without subjective complaints  No acute significant events overnight  D/c'ed albumin yest  BP stable.  UO: 1.48L over past 24 hours  Labs with stable Scr of 2.07mg/dl        PAST HISTORY  --------------------------------------------------------------------------------  No significant changes to PMH, PSH, FHx, SHx, unless otherwise noted    ALLERGIES & MEDICATIONS  --------------------------------------------------------------------------------  Allergies    levofloxacin (Other (Moderate))    Intolerances      Standing Inpatient Medications  cefTRIAXone   IVPB 1000 milliGRAM(s) IV Intermittent every 24 hours  cholestyramine Powder (Sugar-Free) 4 Gram(s) Oral daily  citric acid/sodium citrate Solution 30 milliLiter(s) Oral three times a day  dextrose 5%. 1000 milliLiter(s) IV Continuous <Continuous>  dextrose 50% Injectable 12.5 Gram(s) IV Push once  dextrose 50% Injectable 25 Gram(s) IV Push once  dextrose 50% Injectable 25 Gram(s) IV Push once  insulin lispro (HumaLOG) corrective regimen sliding scale   SubCutaneous three times a day before meals  insulin lispro (HumaLOG) corrective regimen sliding scale   SubCutaneous at bedtime  lactulose Syrup 10 Gram(s) Oral daily  magnesium oxide 400 milliGRAM(s) Oral three times a day with meals  midodrine. 5 milliGRAM(s) Oral every 8 hours  multivitamin 1 Tablet(s) Oral daily  octreotide  Injectable 100 MICROGram(s) SubCutaneous three times a day  pantoprazole    Tablet 40 milliGRAM(s) Oral before breakfast  potassium chloride    Tablet ER 40 milliEquivalent(s) Oral every 4 hours  rifAXIMin 550 milliGRAM(s) Oral two times a day  zinc sulfate 220 milliGRAM(s) Oral two times a day    PRN Inpatient Medications  dextrose 40% Gel 15 Gram(s) Oral once PRN  glucagon  Injectable 1 milliGRAM(s) IntraMuscular once PRN  ondansetron Injectable 4 milliGRAM(s) IV Push every 6 hours PRN      REVIEW OF SYSTEMS  --------------------------------------------------------------------------------  Gen: No fevers/chills  Head/Eyes/Ears/Mouth: No headache; Normal hearing; Normal vision    Respiratory: No dyspnea, cough  CV: No chest pain  GI: No abdominal pain, diarrhea, constipation, nausea, vomiting  : No increased frequency, dysuria  MSK: No joint pain/swelling; no edema    All other systems were reviewed and are negative, except as noted.    VITALS/PHYSICAL EXAM  --------------------------------------------------------------------------------  T(C): 36.8 (06-19-20 @ 04:00), Max: 36.8 (06-19-20 @ 04:00)  HR: 75 (06-19-20 @ 04:00) (60 - 75)  BP: 123/74 (06-19-20 @ 04:00) (112/63 - 123/74)  RR: 18 (06-19-20 @ 04:00) (18 - 18)  SpO2: 99% (06-19-20 @ 04:00) (95% - 99%)  Wt(kg): --        06-18-20 @ 07:01  -  06-19-20 @ 07:00  --------------------------------------------------------  IN: 816 mL / OUT: 1480 mL / NET: -664 mL      Physical Exam:  	  	Gen: NAD, on RA  	HEENT: + Scleral icterus   	Pulm: CTA B/L anteriorly   	CV: RRR, S1S2; no rub  	Abd: +BS, soft, distended  	MSK: No edema  	Neuro: Awake  	Psych: Normal affect and mood  	Skin: Warm, + vitiligo, + icterus  	Vascular access: peripheral IV lines      LABS/STUDIES  --------------------------------------------------------------------------------              7.9    4.98  >-----------<  37       [06-19-20 @ 07:12]              22.6     137  |  102  |  30  ----------------------------<  196      [06-19-20 @ 07:12]  3.3   |  17  |  2.07        Ca     9.0     [06-19-20 @ 07:12]      Mg     1.6     [06-19-20 @ 07:12]      Phos  2.7     [06-19-20 @ 07:12]    TPro  5.7  /  Alb  4.9  /  TBili  38.7  /  DBili  x   /  AST  49  /  ALT  21  /  AlkPhos  52  [06-19-20 @ 07:12]    PT/INR: PT 37.5 , INR 3.15       [06-19-20 @ 07:12]      Creatinine Trend:  SCr 2.07 [06-19 @ 07:12]  SCr 2.13 [06-18 @ 06:53]  SCr 2.19 [06-17 @ 07:01]  SCr 2.12 [06-16 @ 07:01]  SCr 2.28 [06-15 @ 06:49]              Urinalysis - [06-10-20 @ 19:28]      Color Leslie / Appearance Slightly Turbid / SG 1.012 / pH 6.5      Gluc Negative / Ketone Negative  / Bili Large / Urobili <2 mg/dL       Blood Negative / Protein Trace / Leuk Est Negative / Nitrite Negative      RBC 65 / WBC 0 / Hyaline 1 / Gran  / Sq Epi  / Non Sq Epi 1 / Bacteria Negative      HbA1c 10.5      [04-18-19 @ 11:27]  TSH 0.26      [05-19-20 @ 08:53]    HBsAb Nonreact      [05-21-20 @ 00:40]  HBsAg Nonreact      [05-21-20 @ 00:40]  HBcAb Nonreact      [05-20-20 @ 22:39]

## 2020-06-19 NOTE — PROGRESS NOTE ADULT - PROBLEM SELECTOR PLAN 4
Patient found to have K of 2.6 upon admission, Received 40mEq KCl in the ED  -Will continue to monitor and replete with KCl PO as needed  -K 3.3 today, will replete with KCl  -Mg and phosph wnl today

## 2020-06-19 NOTE — PROGRESS NOTE ADULT - ASSESSMENT
45 yo M with latent TB, history of C diff (2016, 2017), DM2, obesity, past history of alcoholic hepatitis, and decompensated ALD cirrhosis complicated by ascites, SBP, and variceal hemorrhage in the remote past, recently admitted with acute on chronic liver failure (ACLF) after alcohol relapse and complicated by SAM, now re-admitted with worsened SAM.     1) Acute on Chronic kidney injury  Cr of 3.81 on admission from 2 on prior admission. Improving to 2.1, now stable for last 3d  DDx: prerenal (given decreased PO and N/V) vs. medication induced SAM vs. HRS  Tx: albumin/midodrine/octreotide  2) EtOH cirrhosis decompensated by ascites w/ SBP and variceal bleeding; very severe, poor prognosis  MELD Na 40 on 6/17  Varices: 4/19/19 EGD showed small EV, portal HTN gastropathy, gastric and duodenal erosions  Ascites: small ascites seen on 5/27/20, no diuretics due to SAM, on SBP ppx w/ CTX  HE: asterixis resolved 6/19 w/ lactulose  HCC: CTAP from 5/15/20 showed no HCC  3) Hyperbilirubinemia to 40s  Chronic, no biliary obstruction on MRCP, infectious studies neg    Recommendation:  - c/w lactulose, titrate to 3-4 BMs per day  - c/w CTX 1g daily for SBP ppx (pt is on as outpatient) given that we are holding bactrim in setting of SAM, will be able to restart bactrim on discharge at prior home dose  - DISCONTINUE octreotide today (albumin stopped 6/18)  - c/w midodrine at current dose  - continue to trend Cr  - f/u nephrology recs  - trend CMP, CBC, INR daily  - low sodium diet  - pt not candidate for non-emergent EGD given overall illness, risks >> benefits at this time  - rest of care per primary team  - hepatology will follow  - outpatient follow up with Dr. Agapito Ayala in hepatology clinic on discharge ([phone])    Peter Adams  Gastroenterology Fellow  Available via [employer]  Call [phone] ([facility]) or Page [phone] ([facility]) from 8am-5pm M-F  Please page on-call GI fellow via the  at night and on weekends

## 2020-06-19 NOTE — PROGRESS NOTE ADULT - ASSESSMENT
43 y/o M PMHx alcoholic hepatitis, decompensated etOH cirrhosis (c/b ascites, SBP, and variceal hemorrhage),  latent TB (treated), h/o C. diff (2016, 2017), Vitiligo and T2DM sent to [facility] from hepatology clinic for elevated SCr based on outpatient labs.     #NISHI  - Pt with hemodynamically mediated Nishi in the setting of nausea/vomiting and poor PO intake and HRS?  - Patient also noted to be on Bactrim for SBP ppx for resistant E.Coli and this will likely "raise" creatinine due to impaired secretion of creatinine.   - SCr 0.55 in 10/2019 and 0.65 in 4/2020. SCr: 1.9 on 5/27/20.  Last outpatient SCr increased to 2.31 on 6/1/20 and increased to SCr; 3.6 now downtrending Scr: 2.07mg/dl which is near his baseline from May    - 6/10 - UA with bilirubin, hematuria, RBC: 35  - 6/10 Ulytes with Steffen: <35  - 6/10  Renal Sono with Renal parenchymal disease. No hydronephrosis.  - MELD Na 40 on 6/15 s/p MRCP on 6/12/20  - Pt on Octreotide, Albumin- d/c'ed on 6/18 and Midodrine 5mg TID. Recommend to D/c octreotide and monitor pt on midodrine. Would c/w holding diuretics for now  - Monitor renal function with strict intake and output monitoring,        #Acidemia  - Pt with acidemia in the setting of alcoholic liver cirrhosis  - Serum HCO3: 157today  - Bicitra 30ml TID can increase to 4 times a day        Manny Parra   Nephrology Fellow  Pager NS: [phone]/ [facility]: [phone]  (After 5 pm or on weekends please page the on-call fellow)

## 2020-06-19 NOTE — PROGRESS NOTE ADULT - PROBLEM SELECTOR PLAN 6
Bicarb today 17, stable last 2 days   -Will continue to monitor CMP  -Bicitra 30mL TID, per Nephrology recs.   -Nephrology following, will f/u on recs

## 2020-06-20 NOTE — PROGRESS NOTE ADULT - PROBLEM SELECTOR PLAN 3
Patient with HGB 9 upon admission, stable in 8.0-9.0 range for most of admission, downtrending last two days with HGB 7.0 6/17, received 1 unit PRBCs. Patient has been hemodynamically stable throughout entirety of admission with no SOB, weakness, dizziness  -HGB 7.8 today, stable last 3 days.   - Hepatology does not rec Endoscopy now given risks outweigh benefits in hemodynamically stable patient

## 2020-06-20 NOTE — PROGRESS NOTE ADULT - PROBLEM SELECTOR PLAN 1
Creatinine 1.87 today, stable past several days (improved from presenting Cre of 3.6).   -For potential HRS: Per hepatology team, stopped albumin, given stable Cre and d/c octreotide yesterday (per Hepatology and Nephro). May be discharged on Midodrine  - Bicitra 30 ml TID per Nephrology recs

## 2020-06-20 NOTE — PROGRESS NOTE ADULT - SUBJECTIVE AND OBJECTIVE BOX
***************************************************************  Damarisjaclyn Hurt, PGY1  Internal Medicine   pager: [phone] / [phone]  ***************************************************************    PROGRESS NOTE:     Patient is a 44y old  Male who presents with a chief complaint of Elevated Serum Creatinine at Hepatology clinic (19 Jun 2020 09:08)      SUBJECTIVE / OVERNIGHT EVENTS: No acute events overnight. Pt w/o n/v, ha, weakness, dizziness. Reports 7 stools yesterday, formed to watery, non-bloody. Appetite is good.     ADDITIONAL REVIEW OF SYSTEMS:    MEDICATIONS  (STANDING):  cefTRIAXone   IVPB 1000 milliGRAM(s) IV Intermittent every 24 hours  cholestyramine Powder (Sugar-Free) 4 Gram(s) Oral daily  citric acid/sodium citrate Solution 30 milliLiter(s) Oral four times a day  dextrose 5%. 1000 milliLiter(s) (50 mL/Hr) IV Continuous <Continuous>  dextrose 50% Injectable 12.5 Gram(s) IV Push once  dextrose 50% Injectable 25 Gram(s) IV Push once  dextrose 50% Injectable 25 Gram(s) IV Push once  insulin lispro (HumaLOG) corrective regimen sliding scale   SubCutaneous three times a day before meals  insulin lispro (HumaLOG) corrective regimen sliding scale   SubCutaneous at bedtime  lactulose Syrup 10 Gram(s) Oral daily  magnesium oxide 400 milliGRAM(s) Oral three times a day with meals  midodrine. 5 milliGRAM(s) Oral every 8 hours  multivitamin 1 Tablet(s) Oral daily  pantoprazole    Tablet 40 milliGRAM(s) Oral before breakfast  potassium phosphate / sodium phosphate Powder (PHOS-NaK) 1 Packet(s) Oral three times a day with meals  rifAXIMin 550 milliGRAM(s) Oral two times a day  zinc sulfate 220 milliGRAM(s) Oral two times a day    MEDICATIONS  (PRN):  dextrose 40% Gel 15 Gram(s) Oral once PRN Blood Glucose LESS THAN 70 milliGRAM(s)/deciliter  glucagon  Injectable 1 milliGRAM(s) IntraMuscular once PRN Glucose LESS THAN 70 milligrams/deciliter  ondansetron Injectable 4 milliGRAM(s) IV Push every 6 hours PRN Nausea and/or Vomiting      CAPILLARY BLOOD GLUCOSE      POCT Blood Glucose.: 191 mg/dL (19 Jun 2020 21:45)  POCT Blood Glucose.: 187 mg/dL (19 Jun 2020 17:26)  POCT Blood Glucose.: 187 mg/dL (19 Jun 2020 12:49)  POCT Blood Glucose.: 169 mg/dL (19 Jun 2020 08:48)    I&O's Summary    19 Jun 2020 07:01  -  20 Jun 2020 07:00  --------------------------------------------------------  IN: 1410 mL / OUT: 820 mL / NET: 590 mL        PHYSICAL EXAM:  Vital Signs Last 24 Hrs  T(C): 36.8 (20 Jun 2020 04:25), Max: 36.8 (19 Jun 2020 12:27)  T(F): 98.2 (20 Jun 2020 04:25), Max: 98.3 (19 Jun 2020 20:57)  HR: 55 (20 Jun 2020 04:25) (52 - 56)  BP: 114/53 (20 Jun 2020 04:25) (114/53 - 128/75)  BP(mean): --  RR: 18 (20 Jun 2020 04:25) (18 - 18)  SpO2: 96% (20 Jun 2020 04:25) (96% - 97%)    GENERAL: NAD, lying comfortably in bed   HEAD:  Atraumatic, Normocephalic  EYES: EOMI, PERRLA, scleral icterus present, improved from admission   NECK: Supple, No JVD  CHEST/LUNG: Clear to auscultation bilaterally; No W/R/R  HEART: Regular rate rhythm, no murmurs or rubs appreciated.   ABDOMEN: Distended, at baseline. Soft and nontender to palpation. Bowel sounds present  EXTREMITIES:  2+ Peripheral Pulses, Clubbing of phalanges b/l   NEURO: AAOx3, asterixis present b/l today   SKIN: Hypopigmented patches throughout body. Jaundice present, stable.       LABS:                        7.8    5.08  )-----------( 35       ( 20 Jun 2020 06:51 )             22.4     06-20    136  |  103  |  29<H>  ----------------------------<  141<H>  3.6   |  17<L>  |  1.87<H>    Ca    8.9      20 Jun 2020 06:51  Phos  2.2     06-20  Mg     1.5     06-20    TPro  5.4<L>  /  Alb  4.9  /  TBili  39.4<H>  /  DBili  x   /  AST  51<H>  /  ALT  22  /  AlkPhos  50  06-20    PT/INR - ( 20 Jun 2020 06:51 )   PT: 40.8 sec;   INR: 3.44 ratio                     RADIOLOGY & ADDITIONAL TESTS:  Results Reviewed:   Imaging Personally Reviewed:  Electrocardiogram Personally Reviewed:    COORDINATION OF CARE:  Care Discussed with Consultants/Other Providers [Y/N]:  Prior or Outpatient Records Reviewed [Y/N]: ***************************************************************  Damarisjaclyn Hurt, PGY1  Internal Medicine   pager: [phone]  ***************************************************************    PROGRESS NOTE:     Patient is a 44y old  Male who presents with a chief complaint of Elevated Serum Creatinine at Hepatology clinic (19 Jun 2020 09:08)      SUBJECTIVE / OVERNIGHT EVENTS: No acute events overnight. Pt w/o n/v, ha, weakness, dizziness. Reports 7 stools yesterday, formed to watery, non-bloody. Appetite is good. Lactulose decreased to every other day given increased # of BMs.     ADDITIONAL REVIEW OF SYSTEMS:    MEDICATIONS  (STANDING):  cefTRIAXone   IVPB 1000 milliGRAM(s) IV Intermittent every 24 hours  cholestyramine Powder (Sugar-Free) 4 Gram(s) Oral daily  citric acid/sodium citrate Solution 30 milliLiter(s) Oral four times a day  dextrose 5%. 1000 milliLiter(s) (50 mL/Hr) IV Continuous <Continuous>  dextrose 50% Injectable 12.5 Gram(s) IV Push once  dextrose 50% Injectable 25 Gram(s) IV Push once  dextrose 50% Injectable 25 Gram(s) IV Push once  insulin lispro (HumaLOG) corrective regimen sliding scale   SubCutaneous three times a day before meals  insulin lispro (HumaLOG) corrective regimen sliding scale   SubCutaneous at bedtime  lactulose Syrup 10 Gram(s) Oral daily  magnesium oxide 400 milliGRAM(s) Oral three times a day with meals  midodrine. 5 milliGRAM(s) Oral every 8 hours  multivitamin 1 Tablet(s) Oral daily  pantoprazole    Tablet 40 milliGRAM(s) Oral before breakfast  potassium phosphate / sodium phosphate Powder (PHOS-NaK) 1 Packet(s) Oral three times a day with meals  rifAXIMin 550 milliGRAM(s) Oral two times a day  zinc sulfate 220 milliGRAM(s) Oral two times a day    MEDICATIONS  (PRN):  dextrose 40% Gel 15 Gram(s) Oral once PRN Blood Glucose LESS THAN 70 milliGRAM(s)/deciliter  glucagon  Injectable 1 milliGRAM(s) IntraMuscular once PRN Glucose LESS THAN 70 milligrams/deciliter  ondansetron Injectable 4 milliGRAM(s) IV Push every 6 hours PRN Nausea and/or Vomiting      CAPILLARY BLOOD GLUCOSE      POCT Blood Glucose.: 191 mg/dL (19 Jun 2020 21:45)  POCT Blood Glucose.: 187 mg/dL (19 Jun 2020 17:26)  POCT Blood Glucose.: 187 mg/dL (19 Jun 2020 12:49)  POCT Blood Glucose.: 169 mg/dL (19 Jun 2020 08:48)    I&O's Summary    19 Jun 2020 07:01  -  20 Jun 2020 07:00  --------------------------------------------------------  IN: 1410 mL / OUT: 820 mL / NET: 590 mL        PHYSICAL EXAM:  Vital Signs Last 24 Hrs  T(C): 36.8 (20 Jun 2020 04:25), Max: 36.8 (19 Jun 2020 12:27)  T(F): 98.2 (20 Jun 2020 04:25), Max: 98.3 (19 Jun 2020 20:57)  HR: 55 (20 Jun 2020 04:25) (52 - 56)  BP: 114/53 (20 Jun 2020 04:25) (114/53 - 128/75)  BP(mean): --  RR: 18 (20 Jun 2020 04:25) (18 - 18)  SpO2: 96% (20 Jun 2020 04:25) (96% - 97%)    GENERAL: NAD, lying comfortably in bed   HEAD:  Atraumatic, Normocephalic  EYES: EOMI, PERRLA, scleral icterus present, improved from admission   NECK: Supple, No JVD  CHEST/LUNG: Clear to auscultation bilaterally; No W/R/R  HEART: Regular rate rhythm, no murmurs or rubs appreciated.   ABDOMEN: Distended, at baseline. Soft and nontender to palpation. Bowel sounds present  EXTREMITIES:  2+ Peripheral Pulses, Clubbing of phalanges b/l   NEURO: AAOx3, asterixis present b/l today   SKIN: Hypopigmented patches throughout body. Jaundice present, stable.       LABS:                        7.8    5.08  )-----------( 35       ( 20 Jun 2020 06:51 )             22.4     06-20    136  |  103  |  29<H>  ----------------------------<  141<H>  3.6   |  17<L>  |  1.87<H>    Ca    8.9      20 Jun 2020 06:51  Phos  2.2     06-20  Mg     1.5     06-20    TPro  5.4<L>  /  Alb  4.9  /  TBili  39.4<H>  /  DBili  x   /  AST  51<H>  /  ALT  22  /  AlkPhos  50  06-20    PT/INR - ( 20 Jun 2020 06:51 )   PT: 40.8 sec;   INR: 3.44 ratio                     RADIOLOGY & ADDITIONAL TESTS:  Results Reviewed:   Imaging Personally Reviewed:  Electrocardiogram Personally Reviewed:    COORDINATION OF CARE:  Care Discussed with Consultants/Other Providers [Y/N]:  Prior or Outpatient Records Reviewed [Y/N]: ***************************************************************  Damarisjaclyn Hurt, PGY1  Internal Medicine   pager: [phone] / [phone]  ***************************************************************    PROGRESS NOTE:     Patient is a 44y old  Male who presents with a chief complaint of Elevated Serum Creatinine at Hepatology clinic (19 Jun 2020 09:08)      SUBJECTIVE / OVERNIGHT EVENTS: No acute events overnight. Pt w/o n/v, ha, weakness, dizziness. Reports 7 stools yesterday, formed to watery, non-bloody. Appetite is good. Lactulose decreased to every other day given increased # of BMs. Given vit k x1.     ADDITIONAL REVIEW OF SYSTEMS:    MEDICATIONS  (STANDING):  cefTRIAXone   IVPB 1000 milliGRAM(s) IV Intermittent every 24 hours  cholestyramine Powder (Sugar-Free) 4 Gram(s) Oral daily  citric acid/sodium citrate Solution 30 milliLiter(s) Oral four times a day  dextrose 5%. 1000 milliLiter(s) (50 mL/Hr) IV Continuous <Continuous>  dextrose 50% Injectable 12.5 Gram(s) IV Push once  dextrose 50% Injectable 25 Gram(s) IV Push once  dextrose 50% Injectable 25 Gram(s) IV Push once  insulin lispro (HumaLOG) corrective regimen sliding scale   SubCutaneous three times a day before meals  insulin lispro (HumaLOG) corrective regimen sliding scale   SubCutaneous at bedtime  lactulose Syrup 10 Gram(s) Oral daily  magnesium oxide 400 milliGRAM(s) Oral three times a day with meals  midodrine. 5 milliGRAM(s) Oral every 8 hours  multivitamin 1 Tablet(s) Oral daily  pantoprazole    Tablet 40 milliGRAM(s) Oral before breakfast  potassium phosphate / sodium phosphate Powder (PHOS-NaK) 1 Packet(s) Oral three times a day with meals  rifAXIMin 550 milliGRAM(s) Oral two times a day  zinc sulfate 220 milliGRAM(s) Oral two times a day    MEDICATIONS  (PRN):  dextrose 40% Gel 15 Gram(s) Oral once PRN Blood Glucose LESS THAN 70 milliGRAM(s)/deciliter  glucagon  Injectable 1 milliGRAM(s) IntraMuscular once PRN Glucose LESS THAN 70 milligrams/deciliter  ondansetron Injectable 4 milliGRAM(s) IV Push every 6 hours PRN Nausea and/or Vomiting      CAPILLARY BLOOD GLUCOSE      POCT Blood Glucose.: 191 mg/dL (19 Jun 2020 21:45)  POCT Blood Glucose.: 187 mg/dL (19 Jun 2020 17:26)  POCT Blood Glucose.: 187 mg/dL (19 Jun 2020 12:49)  POCT Blood Glucose.: 169 mg/dL (19 Jun 2020 08:48)    I&O's Summary    19 Jun 2020 07:01  -  20 Jun 2020 07:00  --------------------------------------------------------  IN: 1410 mL / OUT: 820 mL / NET: 590 mL        PHYSICAL EXAM:  Vital Signs Last 24 Hrs  T(C): 36.8 (20 Jun 2020 04:25), Max: 36.8 (19 Jun 2020 12:27)  T(F): 98.2 (20 Jun 2020 04:25), Max: 98.3 (19 Jun 2020 20:57)  HR: 55 (20 Jun 2020 04:25) (52 - 56)  BP: 114/53 (20 Jun 2020 04:25) (114/53 - 128/75)  BP(mean): --  RR: 18 (20 Jun 2020 04:25) (18 - 18)  SpO2: 96% (20 Jun 2020 04:25) (96% - 97%)    GENERAL: NAD, lying comfortably in bed   HEAD:  Atraumatic, Normocephalic  EYES: EOMI, PERRLA, scleral icterus present, improved from admission   NECK: Supple, No JVD  CHEST/LUNG: Clear to auscultation bilaterally; No W/R/R  HEART: Regular rate rhythm, no murmurs or rubs appreciated.   ABDOMEN: Distended, at baseline. Soft and nontender to palpation. Bowel sounds present  EXTREMITIES:  2+ Peripheral Pulses, Clubbing of phalanges b/l   NEURO: AAOx3, asterixis present b/l today   SKIN: Hypopigmented patches throughout body. Jaundice present, stable.       LABS:                        7.8    5.08  )-----------( 35       ( 20 Jun 2020 06:51 )             22.4     06-20    136  |  103  |  29<H>  ----------------------------<  141<H>  3.6   |  17<L>  |  1.87<H>    Ca    8.9      20 Jun 2020 06:51  Phos  2.2     06-20  Mg     1.5     06-20    TPro  5.4<L>  /  Alb  4.9  /  TBili  39.4<H>  /  DBili  x   /  AST  51<H>  /  ALT  22  /  AlkPhos  50  06-20    PT/INR - ( 20 Jun 2020 06:51 )   PT: 40.8 sec;   INR: 3.44 ratio                     RADIOLOGY & ADDITIONAL TESTS:  Results Reviewed:   Imaging Personally Reviewed:  Electrocardiogram Personally Reviewed:    COORDINATION OF CARE:  Care Discussed with Consultants/Other Providers [Y/N]:  Prior or Outpatient Records Reviewed [Y/N]:

## 2020-06-20 NOTE — PROGRESS NOTE ADULT - PROBLEM SELECTOR PLAN 8
1.  Name of PCP: Dr. Woods. Dr. Ayala is outpatient hepatologist ([phone]) to f/u after discharge.   2.  PCP Contacted on Admission: [s] Y    [ ] N    3.  PCP contacted at Discharge: [ ] Y    [ ] N    [ ] N/A  4.  Post-Discharge Appointment Date and Location:  5.  Summary of Handoff given to PCP:

## 2020-06-20 NOTE — PROGRESS NOTE ADULT - PROBLEM SELECTOR PLAN 2
Patient with history of alcoholic cirrhosis with ascites, hx of SBP and variceal bleeding. Evaluated for liver transplant on 6/9/20 and not considered candidate at this time. Last alcoholic drink in March 2020. Recent admission at the end of May 2020 for jaundice and alcoholic cirrhosis.  -Ascites appears at baseline, will not get another abdominal US at this time  -If ascites worsens, will obtain abdominal US to assess  -MELD 40 6/20, MELD 41 on 6/12, MELD 42 on admission (6/10)   -MRCP from 6/12, no evidence of biliary obstruction   -Holding Bactrim for SBP prophylaxis given current SAM. Will continue with CTX 1g/day for SBP prophylaxis today, per Hepatology recs   -Continuing rifaximin and cholestyramine, lactulose given for asterixis, per Hepatology recs   -No evidence of GI bleeding.  -Hepatology following, will continue to f/u recs Patient with history of alcoholic cirrhosis with ascites, hx of SBP and variceal bleeding. Evaluated for liver transplant on 6/9/20 and not considered candidate at this time. Last alcoholic drink in March 2020. Recent admission at the end of May 2020 for jaundice and alcoholic cirrhosis.  -Ascites appears at baseline, will not get another abdominal US at this time  -If ascites worsens, will obtain abdominal US to assess  -MELD 40 6/20, MELD 41 on 6/12, MELD 42 on admission (6/10)   -MRCP from 6/12, no evidence of biliary obstruction   -Holding Bactrim for SBP prophylaxis given current SAM. Will continue with CTX 1g/day for SBP prophylaxis today, per Hepatology recs   -Continuing rifaximin and cholestyramine, lactulose given for asterixis, per Hepatology recs  -No evidence of GI bleeding.  -Hepatology following, will continue to f/u recs

## 2020-06-20 NOTE — PROGRESS NOTE ADULT - PROBLEM SELECTOR PLAN 4
Patient found to have K of 2.6 upon admission, Received 40mEq KCl in the ED  -Will continue to monitor and replete with KCl PO as needed  -K 3.6 today, will replete with KCl  -c/w Mg and phosph repletion today Patient found to have K of 2.6 upon admission, Received 40mEq KCl in the ED  -Will continue to monitor and replete with KCl PO as needed  -K 3.6 today   -c/w Mg and phosph repletion today

## 2020-06-21 NOTE — PROGRESS NOTE ADULT - SUBJECTIVE AND OBJECTIVE BOX
PROGRESS NOTE:   Archie Jordan MD, PGY-2  [facility] Pager [phone]  [facility] Pager [phone]    Patient is a 44y old  Male who presents with a chief complaint of Elevated Serum Creatinine at Hepatology clinic (20 Jun 2020 08:25)      SUBJECTIVE / OVERNIGHT EVENTS:    ADDITIONAL REVIEW OF SYSTEMS:    MEDICATIONS  (STANDING):  cefTRIAXone   IVPB 1000 milliGRAM(s) IV Intermittent every 24 hours  cholestyramine Powder (Sugar-Free) 4 Gram(s) Oral daily  citric acid/sodium citrate Solution 30 milliLiter(s) Oral four times a day  dextrose 5%. 1000 milliLiter(s) (50 mL/Hr) IV Continuous <Continuous>  dextrose 50% Injectable 12.5 Gram(s) IV Push once  dextrose 50% Injectable 25 Gram(s) IV Push once  dextrose 50% Injectable 25 Gram(s) IV Push once  insulin lispro (HumaLOG) corrective regimen sliding scale   SubCutaneous three times a day before meals  insulin lispro (HumaLOG) corrective regimen sliding scale   SubCutaneous at bedtime  lactulose Syrup 10 Gram(s) Oral <User Schedule>  magnesium sulfate  IVPB 1 Gram(s) IV Intermittent once  midodrine. 5 milliGRAM(s) Oral every 8 hours  multivitamin 1 Tablet(s) Oral daily  pantoprazole    Tablet 40 milliGRAM(s) Oral before breakfast  potassium phosphate / sodium phosphate Tablet (K-PHOS No. 2) 2 Tablet(s) Oral four times a day with meals  rifAXIMin 550 milliGRAM(s) Oral two times a day  zinc sulfate 220 milliGRAM(s) Oral two times a day    MEDICATIONS  (PRN):  dextrose 40% Gel 15 Gram(s) Oral once PRN Blood Glucose LESS THAN 70 milliGRAM(s)/deciliter  glucagon  Injectable 1 milliGRAM(s) IntraMuscular once PRN Glucose LESS THAN 70 milligrams/deciliter  ondansetron Injectable 4 milliGRAM(s) IV Push every 6 hours PRN Nausea and/or Vomiting      CAPILLARY BLOOD GLUCOSE      POCT Blood Glucose.: 183 mg/dL (20 Jun 2020 21:53)  POCT Blood Glucose.: 210 mg/dL (20 Jun 2020 17:29)  POCT Blood Glucose.: 227 mg/dL (20 Jun 2020 13:22)  POCT Blood Glucose.: 142 mg/dL (20 Jun 2020 09:00)    I&O's Summary    20 Jun 2020 07:01  -  21 Jun 2020 07:00  --------------------------------------------------------  IN: 660 mL / OUT: 200 mL / NET: 460 mL        PHYSICAL EXAM:  Vital Signs Last 24 Hrs  T(C): 37.1 (21 Jun 2020 04:05), Max: 37.1 (21 Jun 2020 04:05)  T(F): 98.7 (21 Jun 2020 04:05), Max: 98.7 (21 Jun 2020 04:05)  HR: 67 (21 Jun 2020 05:39) (56 - 67)  BP: 116/68 (21 Jun 2020 05:39) (94/56 - 120/68)  BP(mean): --  RR: 18 (21 Jun 2020 04:05) (18 - 18)  SpO2: 93% (21 Jun 2020 04:05) (93% - 95%)    CONSTITUTIONAL: NAD, lying in bed  RESPIRATORY: Normal respiratory effort; clear to auscultation bilaterally, no wheezes or rales  CARDIOVASCULAR: Regular rate and rhythm, normal S1 and S2, no murmur/rub/gallop, no LE edema  ABDOMEN: nontender, nondistended, normoactive bowel sounds, no rebound/guarding  MUSCLOSKELETAL: no clubbing or cyanosis of digits; no joint swelling or tenderness to palpation  NEURO: +mild asterixes, MONET, no focal deficits, strength grossly intact all extremities  PSYCH: AAOx3; affect appropriate  SKIN: +jaundice, +vitiligo       LABS:                        7.7    4.81  )-----------( 31       ( 21 Jun 2020 06:04 )             21.9     06-21    136  |  102  |  28<H>  ----------------------------<  144<H>  3.5   |  18<L>  |  1.85<H>    Ca    9.0      21 Jun 2020 06:04  Phos  2.2     06-21  Mg     1.5     06-21    TPro  5.3<L>  /  Alb  4.9  /  TBili  40.0<H>  /  DBili  x   /  AST  52<H>  /  ALT  21  /  AlkPhos  52  06-21    PT/INR - ( 21 Jun 2020 06:04 )   PT: 39.0 sec;   INR: 3.27 ratio          RADIOLOGY & ADDITIONAL TESTS:  Results Reviewed: Yes  Imaging Personally Reviewed: Yes  Electrocardiogram Personally Reviewed: Yes    COORDINATION OF CARE:  Care Discussed with Consultants/Other Providers [Y/N]:  Y  Prior or Outpatient Records Reviewed [Y/N]: Y PROGRESS NOTE:   Archie Jordan MD, PGY-2  [facility] Pager [phone]  [facility] Pager [phone]    Patient is a 44y old  Male who presents with a chief complaint of Elevated Serum Creatinine at Hepatology clinic (20 Jun 2020 08:25)      SUBJECTIVE / OVERNIGHT EVENTS:  No nausea or vomiting, no diarrhea. No confusion, abdominal pain, CP, or SOB.     MEDICATIONS  (STANDING):  cefTRIAXone   IVPB 1000 milliGRAM(s) IV Intermittent every 24 hours  cholestyramine Powder (Sugar-Free) 4 Gram(s) Oral daily  citric acid/sodium citrate Solution 30 milliLiter(s) Oral four times a day  dextrose 5%. 1000 milliLiter(s) (50 mL/Hr) IV Continuous <Continuous>  dextrose 50% Injectable 12.5 Gram(s) IV Push once  dextrose 50% Injectable 25 Gram(s) IV Push once  dextrose 50% Injectable 25 Gram(s) IV Push once  insulin lispro (HumaLOG) corrective regimen sliding scale   SubCutaneous three times a day before meals  insulin lispro (HumaLOG) corrective regimen sliding scale   SubCutaneous at bedtime  lactulose Syrup 10 Gram(s) Oral <User Schedule>  magnesium sulfate  IVPB 1 Gram(s) IV Intermittent once  midodrine. 5 milliGRAM(s) Oral every 8 hours  multivitamin 1 Tablet(s) Oral daily  pantoprazole    Tablet 40 milliGRAM(s) Oral before breakfast  potassium phosphate / sodium phosphate Tablet (K-PHOS No. 2) 2 Tablet(s) Oral four times a day with meals  rifAXIMin 550 milliGRAM(s) Oral two times a day  zinc sulfate 220 milliGRAM(s) Oral two times a day    MEDICATIONS  (PRN):  dextrose 40% Gel 15 Gram(s) Oral once PRN Blood Glucose LESS THAN 70 milliGRAM(s)/deciliter  glucagon  Injectable 1 milliGRAM(s) IntraMuscular once PRN Glucose LESS THAN 70 milligrams/deciliter  ondansetron Injectable 4 milliGRAM(s) IV Push every 6 hours PRN Nausea and/or Vomiting      CAPILLARY BLOOD GLUCOSE      POCT Blood Glucose.: 183 mg/dL (20 Jun 2020 21:53)  POCT Blood Glucose.: 210 mg/dL (20 Jun 2020 17:29)  POCT Blood Glucose.: 227 mg/dL (20 Jun 2020 13:22)  POCT Blood Glucose.: 142 mg/dL (20 Jun 2020 09:00)    I&O's Summary    20 Jun 2020 07:01  -  21 Jun 2020 07:00  --------------------------------------------------------  IN: 660 mL / OUT: 200 mL / NET: 460 mL        PHYSICAL EXAM:  Vital Signs Last 24 Hrs  T(C): 37.1 (21 Jun 2020 04:05), Max: 37.1 (21 Jun 2020 04:05)  T(F): 98.7 (21 Jun 2020 04:05), Max: 98.7 (21 Jun 2020 04:05)  HR: 67 (21 Jun 2020 05:39) (56 - 67)  BP: 116/68 (21 Jun 2020 05:39) (94/56 - 120/68)  BP(mean): --  RR: 18 (21 Jun 2020 04:05) (18 - 18)  SpO2: 93% (21 Jun 2020 04:05) (93% - 95%)    CONSTITUTIONAL: NAD, lying in bed  RESPIRATORY: Normal respiratory effort; clear to auscultation bilaterally, no wheezes or rales  CARDIOVASCULAR: Regular rate and rhythm, normal S1 and S2, no murmur/rub/gallop, no LE edema  ABDOMEN: nontender, nondistended, normoactive bowel sounds, no rebound/guarding  MUSCLOSKELETAL: no clubbing or cyanosis of digits; no joint swelling or tenderness to palpation  NEURO: +mild asterixes, MONET, no focal deficits, strength grossly intact all extremities  PSYCH: AAOx3; affect appropriate  SKIN: +jaundice, +vitiligo       LABS:                        7.7    4.81  )-----------( 31       ( 21 Jun 2020 06:04 )             21.9     06-21    136  |  102  |  28<H>  ----------------------------<  144<H>  3.5   |  18<L>  |  1.85<H>    Ca    9.0      21 Jun 2020 06:04  Phos  2.2     06-21  Mg     1.5     06-21    TPro  5.3<L>  /  Alb  4.9  /  TBili  40.0<H>  /  DBili  x   /  AST  52<H>  /  ALT  21  /  AlkPhos  52  06-21    PT/INR - ( 21 Jun 2020 06:04 )   PT: 39.0 sec;   INR: 3.27 ratio          RADIOLOGY & ADDITIONAL TESTS:  Results Reviewed: Yes  Imaging Personally Reviewed: Yes  Electrocardiogram Personally Reviewed: Yes    COORDINATION OF CARE:  Care Discussed with Consultants/Other Providers [Y/N]:  Y  Prior or Outpatient Records Reviewed [Y/N]: Y

## 2020-06-21 NOTE — PROVIDER CONTACT NOTE (OTHER) - ACTION/TREATMENT ORDERED:
MD aware and assessed patient & patient's Right First Toe and reviewed all orders, labs, history & plan.

## 2020-06-21 NOTE — PROGRESS NOTE ADULT - ASSESSMENT
43 yo M with latent TB, history of C diff (2016, 2017), DM2, obesity, past history of alcoholic hepatitis, and decompensated ALD cirrhosis complicated by ascites, SBP, and variceal hemorrhage in the remote past, recently admitted with acute on chronic liver failure (ACLF) after alcohol relapse and complicated by SAM, now re-admitted with worsened SAM.     1) Acute on Chronic kidney injury  Cr of 3.81 on admission from 2 on prior admission. Improving to 2.1, now stable for last 3d  DDx: prerenal (given decreased PO and N/V) vs. medication induced SAM vs. HRS  Tx: albumin/midodrine/octreotide  2) EtOH cirrhosis decompensated by ascites w/ SBP and variceal bleeding; very severe, poor prognosis  MELD Na 40 on 6/17  Varices: 4/19/19 EGD showed small EV, portal HTN gastropathy, gastric and duodenal erosions  Ascites: small ascites seen on 5/27/20, no diuretics due to SAM, on SBP ppx w/ CTX  HE: asterixis resolved 6/19 w/ lactulose  HCC: CTAP from 5/15/20 showed no HCC  3) Hyperbilirubinemia to 40s  Chronic, no biliary obstruction on MRCP, infectious studies neg    Recommendation:  - can be discharged today from hepatology perspective  - f/u w/ Dr. Agapito Ayala on Wednesday, already arranged  - restart Bactrim  - d/c on current lactulose  - c/w current midodrine dose  - d/w pt and primary team    Peter Adams  Gastroenterology Fellow  Available via [employer] Teams  Call [phone] ([facility]) or Page [phone] ([facility]) from 8am-5pm M-F  Please page on-call GI fellow via the  at night and on weekends 45 yo M with latent TB, history of C diff (2016, 2017), DM2, obesity, past history of alcoholic hepatitis, and decompensated ALD cirrhosis complicated by ascites, SBP, and variceal hemorrhage in the remote past, recently admitted with acute on chronic liver failure (ACLF) after alcohol relapse and complicated by SAM, now re-admitted with worsened SAM.     1) Acute on Chronic kidney injury  Cr of 3.81 on admission from 2 on prior admission. Improving to 2.1, now stable for last 3d  DDx: prerenal (given decreased PO and N/V) vs. medication induced SAM vs. HRS  Tx: albumin/midodrine/octreotide  2) EtOH cirrhosis decompensated by ascites w/ SBP and variceal bleeding; very severe, poor prognosis  MELD Na 40 on 6/17  Varices: 4/19/19 EGD showed small EV, portal HTN gastropathy, gastric and duodenal erosions  Ascites: small ascites seen on 5/27/20, no diuretics due to SAM, on SBP ppx w/ CTX  HE: asterixis resolved 6/19 w/ lactulose  HCC: CTAP from 5/15/20 showed no HCC  3) Hyperbilirubinemia to 40s  Chronic, no biliary obstruction on MRCP, infectious studies neg    Recommendation:  - can be discharged today from hepatology perspective  - f/u w/ Dr. Agapito Ayala on Wednesday, already arranged  - restart Bactrim  - d/c off diuretics  - d/c on current lactulose  - c/w current midodrine dose  - d/w pt and primary team    Peter Adams  Gastroenterology Fellow  Available via [employer] Teams  Call [phone] ([facility]) or Page [phone] ([facility]) from 8am-5pm M-F  Please page on-call GI fellow via the  at night and on weekends

## 2020-06-21 NOTE — PROGRESS NOTE ADULT - PROBLEM SELECTOR PROBLEM 1
Acute kidney injury

## 2020-06-21 NOTE — PROGRESS NOTE ADULT - PROBLEM SELECTOR PLAN 7
Patient with history of T2DM treated with levemir and recently rapaglinide. HbA1c 6.6% this admission.   - ISS, FS qAC/qHS

## 2020-06-21 NOTE — PROGRESS NOTE ADULT - PROBLEM SELECTOR PLAN 2
Patient with history of alcoholic cirrhosis with ascites, hx of SBP and variceal bleeding. Evaluated for liver transplant on 6/9/20 and not considered candidate at this time. Last alcoholic drink in March 2020. Recent admission at the end of May 2020 for jaundice and alcoholic cirrhosis.  -Ascites appears at baseline  -MELD 40 6/20, MELD 41 on 6/12, MELD 42 on admission (6/10)   -MRCP from 6/12, no evidence of biliary obstruction   -Holding Bactrim for SBP prophylaxis, will discuss with hepatology about restarting on d/c  -Hepatology following, will continue to f/u recs  - c/w lactulose/rifaximin

## 2020-06-21 NOTE — PROGRESS NOTE ADULT - PROBLEM SELECTOR PROBLEM 7
Diabetes mellitus type 2, insulin dependent
Discharge planning issues
Diabetes mellitus type 2, insulin dependent

## 2020-06-21 NOTE — PROVIDER CONTACT NOTE (OTHER) - SITUATION
Patient states that he stubbed his Right First Toe at the edge of the bed when getting out of bed last night.

## 2020-06-21 NOTE — PROGRESS NOTE ADULT - ATTENDING COMMENTS
43 yo M with latent TB, history of C diff (2016, 2017), DM2, obesity, past history of alcoholic hepatitis, and decompensated ALD cirrhosis complicated by ascites, SBP, and variceal hemorrhage in the remote past, recently admitted with acute on chronic liver failure (ACLF) after alcohol relapse and complicated by SAM, now re-admitted with worsened SAM as well as new onset hepatic encephalopathy.    # SAM: Baseline Cr 0.55 (10/2019). Peak Cr 3.8 on last admission, improved with IV albumin therapy, then worsened again once outpatient. Peak this admission was Cr 3.8 again, gradually improved with IV albumin, midodrine, and octreotide for presumed HRS, but Cr now appears to have reached a plateau around Cr 2.1. Albumin discontinued on 6/18 and octreotide discontinued today. Will re-assess renal function in AM. Continue midodrine for now. Continue to hold diuretics. Transplant Nephrology input appreciated.    # Hepatic encephalopathy: Remains alert and oriented but still with very faint asterixis, consistent with [place] grade 2 HE. Continue rifaximin, lactulose, and zinc sulfate.    # Ascites: Small in volume. Currently off diuretics given SAM, as above. 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    # History of variceal hemorrhage: No recent overt bleeding. Not a candidate for beta-blocker prophylaxis at this time given ACLF with SAM and recent hyponatremia (risks outweigh benefits). Needs eventual EGD.    # Decompensated alcohol-related cirrhosis with ACLF: Very high MELD. Recently started liver transplant evaluation as an outpatient, but given multiple prior alcohol relapses despite known advanced cirrhosis, it is felt that he needs to remain in his alcohol relapse prevention program and achieve a longer duration of maintained sobriety before he would be considered for transplantation. He will continue to be followed closely in the Hepatology / Transplant office when discharged. High risk for short-term mortality.    Please don't hesitate to call with any questions/concerns.    Agapito Ayala M.D., Ph.D.  Transplant Hepatology  Cell: [phone]
45 yo M with latent TB, history of C diff (2016, 2017), DM2, obesity, past history of alcoholic hepatitis, and decompensated ALD cirrhosis complicated by ascites, SBP, and variceal hemorrhage in the remote past, recently admitted with acute on chronic liver failure (ACLF) after alcohol relapse and complicated by SAM, now re-admitted with worsened SAM as well as new onset hepatic encephalopathy.    # SAM: Baseline Cr 0.55 (10/2019). Peak Cr 3.8 on last admission, improved with IV albumin therapy, then worsened again once outpatient. Peak this admission was Cr 3.8 again, now improving with IV albumin, midodrine, and octreotide for presumed HRS. Continue to hold diuretics.    # Hepatic encephalopathy: Improved today, but still with [place] grade 2 HE with asterixis today. Continue rifaximin. Titrate lactulose as needed to achieve and maintain 3-4 BMs/day. Add zinc sulfate 220 mg po bid.    # History of variceal hemorrhage: No recent overt bleeding. Not a candidate for beta-blocker prophylaxis at this time given ACLF with SAM and recent hyponatremia (risks outweigh benefits). Needs eventual EGD.    # Decompensated alcohol-related cirrhosis with ACLF: Current MELD-Na 40. Recently started liver transplant evaluation as an outpatient, but given multiple prior alcohol relapses despite known advanced cirrhosis, it is felt that he needs to remain in his alcohol relapse prevention program and achieve a longer duration of maintained sobriety before he would be considered for transplantation. High risk for short-term mortality.    Please don't hesitate to call with any questions/concerns.    Agapito Ayala M.D., Ph.D.  Transplant Hepatology  Cell: [phone]
43 yo M with latent TB, history of C diff (2016, 2017), DM2, obesity, past history of alcoholic hepatitis, and decompensated ALD cirrhosis complicated by ascites, SBP, and variceal hemorrhage in the remote past, recently admitted with acute on chronic liver failure (ACLF) after alcohol relapse and complicated by SAM, now re-admitted with worsened SAM as well as new onset hepatic encephalopathy.    # SAM: Baseline Cr 0.55 (10/2019). Peak Cr 3.8 on last admission, improved with IV albumin therapy, then worsened again once outpatient. Peak this admission was Cr 3.8 again, gradually improving with IV albumin, midodrine, and octreotide for presumed HRS, but Cr now appears to have reached a plateau around Cr 2.1. Will discontinue IV albumin today and re-assess renal function in AM. If stable, can discontinue octreotide. Continue to hold diuretics.    # Hepatic encephalopathy: Remains alert and oriented but still with mild asterixis, consistent with [place] grade 2 HE. Continue rifaximin, lactulose, and zinc sulfate.    # Ascites: Small in volume. Currently off diuretics given SAM, as above. 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    # History of variceal hemorrhage: No recent overt bleeding. Not a candidate for beta-blocker prophylaxis at this time given ACLF with SAM and recent hyponatremia (risks outweigh benefits). Needs eventual EGD.    # Decompensated alcohol-related cirrhosis with ACLF: Current MELD-Na 41. Recently started liver transplant evaluation as an outpatient, but given multiple prior alcohol relapses despite known advanced cirrhosis, it is felt that he needs to remain in his alcohol relapse prevention program and achieve a longer duration of maintained sobriety before he would be considered for transplantation. High risk for short-term mortality.    Please don't hesitate to call with any questions/concerns.    Agapito Ayala M.D., Ph.D.  Transplant Hepatology  Cell: [phone]
45 yo M with latent TB, history of C diff (2016, 2017), DM2, obesity, past history of alcoholic hepatitis, and decompensated ALD cirrhosis complicated by ascites, SBP, and variceal hemorrhage in the remote past, recently admitted with acute on chronic liver failure (ACLF) after alcohol relapse and complicated by SAM, now re-admitted with worsened SAM as well as new onset hepatic encephalopathy.    # SAM: Baseline Cr 0.55 (10/2019). Peak Cr 3.8 on last admission, improved with IV albumin therapy, then worsened again once outpatient. Peak this admission was Cr 3.8 again, now gradually improving with IV albumin, midodrine, and octreotide for presumed HRS. Cr 2.12 -> 2.19 today. Transplant Nephrology input appreciated. Continue HRS regimen and continue to hold diuretics for now.    # Hepatic encephalopathy: Remains alert and oriented but still with mild asterixis, consistent with [place] grade 2 HE. Continue rifaximin, lactulose, and zinc sulfate.    # Ascites: Small in volume. Currently off diuretics given SAM, as above. 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    # History of variceal hemorrhage: No recent overt bleeding. Not a candidate for beta-blocker prophylaxis at this time given ACLF with SAM and recent hyponatremia (risks outweigh benefits). Needs eventual EGD.    # Decompensated alcohol-related cirrhosis with ACLF: Current MELD-Na 41. Recently started liver transplant evaluation as an outpatient, but given multiple prior alcohol relapses despite known advanced cirrhosis, it is felt that he needs to remain in his alcohol relapse prevention program and achieve a longer duration of maintained sobriety before he would be considered for transplantation. High risk for short-term mortality.    Please don't hesitate to call with any questions/concerns.    Agapito Ayala M.D., Ph.D.  Transplant Hepatology  Cell: [phone]
Patient with renal function showing improvement, Cr 3.1. continue albumin support. Await MRCP, given bilirubin of 44. Patient alert without encephalopathy and continues to be ambulatory.
45 yo M with latent TB, history of C diff (2016, 2017), DM2, obesity, past history of alcoholic hepatitis, and decompensated ALD cirrhosis complicated by ascites, SBP, and variceal hemorrhage in the remote past, recently admitted with acute on chronic liver failure (ACLF) after alcohol relapse and complicated by SAM, now re-admitted with worsened SAM as well as new onset hepatic encephalopathy.    # SAM: Baseline Cr 0.55 (10/2019). Peak Cr 3.8 on last admission, improved with IV albumin therapy, then worsened again once outpatient. Peak this admission was Cr 3.8 again, gradually improved with IV albumin, midodrine, and octreotide for presumed HRS, but Cr now appears to have reached a plateau around Cr 1.8-2.1. Albumin discontinued on 6/18 and octreotide discontinued on 6/20, with Cr still stable/improving. Can be discharged on midodrine therapy with plan for re-check of BMP later this week as an outpatient.    # Hepatic encephalopathy: Resolved, with no overt HE today. Continue rifaximin, lactulose, and zinc sulfate.    # Ascites: Small in volume. Currently off diuretics given SAM, and recommend to be discharged off diuretics for now. 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    # History of variceal hemorrhage: No recent overt bleeding. Not a candidate for beta-blocker prophylaxis at this time given ACLF with SAM and recent hyponatremia (risks outweigh benefits). Needs eventual EGD.    # Decompensated alcohol-related cirrhosis with ACLF: Very high MELD. Recently started liver transplant evaluation as an outpatient, but given multiple prior alcohol relapses despite known advanced cirrhosis, it is felt that he needs to remain in his alcohol relapse prevention program and achieve a longer duration of maintained sobriety before he would be considered for transplantation. He will continue to be followed closely in the Hepatology / Transplant office when discharged, with appointment being scheduled on Wednesday (6/24) with me.    Please don't hesitate to call with any questions/concerns.    Agapito Ayala M.D., Ph.D.  Transplant Hepatology  Cell: [phone]
43 y/o man with ETOH cirrhosis complicated by ascites, SBP, variceal hemorrhage in the past. He is admitted with SAM serum creatinine up to 3.6mg/dL from 1.9mg/dL on 5/27 and 0.6mg/dL in 4/2020. Diuretics held. Started on midodrine, albumin and Octreotide with improvement in renal function.   He reports feeling well. Voiding well. No SOB. No NVD. Appetite fair.     On exam:   + icterus, skin with hypopigmented patches (vitiligo), lungs clear, Abdomen distended but soft, no LE edema, alert and oriented, mild asterixis unchanged.     K 3.3   Bicarb  17  Cr 2.07    Impression:- SAM likely pre renal/low effective arterial volume in the setting or diuresis. Creatinine stable at 2mg/dL with  holding diuretics and improved hemodynamics. Has ascites but no other signs of significant fluid overload.   Continue to hold diuretics   Continue Midodrine  Continue Bicitra for metabolic acidosis   Replete K
43 y/o man with ETOH cirrhosis complicated by ascites, SBP, variceal hemorrhage in the past. He is admitted with SAM serum creatinine up to 3.6mg/dL from 1.9mg/dL on 5/27 and 0.6mg/dL in 4/2020. Diuretics held. Started on midodrine, albumin and Octreotide. Creatinine slowly improving.   He reports feeling well. Voiding well. No SOB. No NVD. Appetite fair.     On exam: -125 systolci,  + icterus, skin with hypopigmented patches (vitiligo), lungs clear, Abdomen distended but soft, no LE edema, alert and oriented, mild asterixis,   Labs reviewed scr improving Scr down to 2.1mg/dL today. Metabolic acidosis improving  Hco3 15. On Bicitra. Hypokalemia improved.     Impression:- SAM likely pre renal/low effective arterial volume in the setting or diuresis. Creatinine slowly improving with holding diuretics and improved hemodynamics. Has ascites but no other signs of significant fluid overload.   Continue Midodrine, Albumin and Octreotide. No need for diuretics at this time.
45 y/o man with ETOH cirrhosis complicated by ascites, SBP, variceal hemorrhage in the past. He is admitted with SAM serum creatinine up to 3.6mg/dL from 1.9mg/dL on 5/27 and 0.6mg/dL in 4/2020. Diuretics held. Started on midodrine, albumin and Octreotide with improvemetn in renal function.   He reports feeling well. Voiding well. No SOB. No NVD. Appetite fair.     On exam:   + icterus, skin with hypopigmented patches (vitiligo), lungs clear, Abdomen distended but soft, no LE edema, alert and oriented, mild asterixis unchanged.   Cr not done today. Metabolic acidosis improving on Bicitra.     Impression:- SAM likely pre renal/low effective arterial volume in the setting or diuresis. Creatinine slowly improving with holding diuretics and improved hemodynamics. Has ascites but no other signs of significant fluid overload.   Continue Midodrine, Albumin and Octreotide. No need for diuretics at this time. Continue Bicitra for metabolic acidosis.
Renal function slightly improved today  Pts volume status appears ok  Would continue HRS treatment and monitor.  No additional fluids or diuretics.
45 y/o man with ETOH cirrhosis complicated by ascites, SBP, variceal hemorrhage in the past. He is admitted with SAM serum creatinine up to 3.6mg/dL from 1.9mg/dL on 5/27 and 0.6mg/dL in 4/2020. Diuretics held. Started on midodrine, albumin and Octreotide. Creatinine slowly improving.   He reports feeling well. Voiding well. No SOB. No NVD. Appetite fair.   Abdomen distended but soft, no LE edema, mild asterixis   Labs reviewed scr improving Scr down to 2.2mg/dL today. Metabolic acidosis stable Hco3 13, PH 7.3. On Bicitra. Hypokalemia 3.3today   Continue Midodrine, Albumin and Octreotide. No need for diuretics at this time. WIll continue to follow.  Replete K
Feeling well.  Noted to have nausea yesterday, denies any nausea today.  Tolerating meals.  No SOB.  No abd pain.  NAD.  SAM with metabolic acidosis.  Renal input appreciated.  Suspected pre-renal SAM from recent N/V and poor PO intake with possible element of hepatorenal syndrome as well.  Recent Bactrim use may have also raised the creatinine level.  SAM resolving.  Continue Octreotide, Midodrine, and Albumin for possible hepatorenal syndrome.  Patient given 1Liter IV with bicarb on 6/11, now monitoring off IVF.  Decompensated cirrhosis 2/2 SIMPSON/ALD.  Hepatology f/u appreciated.  Continue Rifaximin.  Added Lactulose and Rocephin for SBP prophylaxis on 6/15/2020.  MRCP without evidence of biliary obstruction.  DM type 2, continue to cover with sliding scale insulin.  A1c 6.6.  Will need help with getting home FS testing supplies at discharge.  Remainder of plan as outlined above.
Mentation is stable , no bleed , patient states that he had 3 stools today .  Pt is currently not a candidate for liver Transplant .  Cont current management .  F/p with Hepatology, Nephro teams .        Sada JimenesMetroHealth Cleveland Heights Medical Centerist   [phone]
Sada Justice   HOspitalist  
Feeling well.  Good appetite.  No SOB.  No abd pain.  NAD.  No events on telemetry.  SAM with metabolic acidosis.  Renal input appreciated.  Suspected pre-renal SAM from recent N/V and poor PO intake with possible element of hepatorenal syndrome as well.  Recent Bactrim use may have also raised the creatinine level.  Continue Octreotide and Midodrine for possible hepatorenal syndrome.  Patient given 1Liter IV with bicarb on 6/11, now monitoring off IVF.  Decompensated cirrhosis 2/2 SIMPSON/ALD.  Hepatology f/u appreciated.  Continue Rifaximin,  Adding Lactulose and Rocephin for SBP prophylaxis.  MRCP without evidence of biliary obstruction.  Hypokalemia, supplementing.  DM type 2, continue to cover with sliding scale insulin.  A1c 6.6.  Will need help with getting home FS testing supplies at discharge.  Remainder of plan as outlined above.
Seen with sub-I and team.  Feeling well.  Good appetite.  No SOB.  No abd pain.  NAD.  NSR on tele.  SAM with metabolic acidosis.  Renal input appreciated.  Suspected pre-renal SAM from recent N/V and poor PO intake with possible element of hepatorenal syndrome as well.  Recent Bactrim use may have also raised the creatinine level.  Continue Octreotide and Midodrine for possible hepatorenal syndrome.  Patient given 1Liter IV with bicarb yesterday, now monitoring off IVF.  Decompensated cirrhosis 2/2 SIMPSON/ALD.  Case d/w hepatology fellow, will check MRCP given elevated bilirubin level to r/o biliary obstruction.  Hypokalemia resolving with supplementation.  Holding Lactulose for now given concern for pre-renal component of this SAM and given the hypokalemia.  DM type 2, continue to cover with sliding scale insulin.  A1c 6.6.  Patient reports he cannot afford home FS testing supplies, will ask SW to see on consult.  Remainder of plan as outlined above.
Feeling well.  Reports no further nausea since the episode he had two days ago that he now attributes to the condiments on the food.  Tolerating meals.  No SOB.  No abd pain.  NAD.  SAM with metabolic acidosis.  Renal input appreciated.  Suspected pre-renal SAM from recent N/V and poor PO intake with possible element of hepatorenal syndrome as well.  Recent Bactrim use may have also raised the creatinine level.  SAM has been resolving.  F/u pending AM creatinine level.  Continue Octreotide, Midodrine, and Albumin for possible hepatorenal syndrome.  Patient given 1Liter IV with bicarb on 6/11, now monitoring off IVF.  Decompensated cirrhosis 2/2 SIMPSON/ALD.  Hepatology f/u appreciated.  Continue Rifaximin and Lactulose.  Continue Rocephin for SBP prophylaxis.  MRCP without evidence of biliary obstruction.  Anemia, Hgb downtrending.  No overt bleeding.  Transfusing one unit PRBCs.  DM type 2, continue to cover with sliding scale insulin.  A1c 6.6.  Will need help with getting home FS testing supplies at discharge.  Case d/w Hepatology fellow.  Remainder of plan as outlined above.
Pt was seen in AM with no complaints.  Hepatology team recommended to dc patient home and he will follow up with the hepatology team as outpatient .  DC time 46 mns .  Patient understands that he needs to follow up with the hepatology team and to call 911 as needed .      Sada Justice   hospitalist   [phone]
Resting in bed.  Reports good appetite today.  Three loose bowel movements thus far today.  No SOB.  No abd pain.  NAD.  SAM with metabolic acidosis.  Suspected pre-renal SAM from recent N/V and poor PO intake with possible element of hepatorenal syndrome as well.  Recent Bactrim use may have also raised the creatinine level.  SAM has been resolving.  Renal and Hepatology input appreciated.  Was treated with Albumin, Octreotide and Midodrine for possible hepatorenal syndrome.  Albumin held yesterday, holding Octreotide today.  Continue to trend creatinine.  Might ultimately discharge on Midodrine.  Decompensated cirrhosis 2/2 SIMPSON/ALD.  Continue Rifaximin and Lactulose.  Continue Rocephin for SBP prophylaxis.  MRCP without evidence of biliary obstruction.  Anemia.  No overt bleeding.  Hgb improved s/p 1 unit PRBCs.  No plans for endoscopy.  Intermittent poor appetite felt to be secondary to patient's advanced liver disease.  Hypokalemia, supplemented.  DM type 2, continue to cover with sliding scale insulin.  A1c 6.6.  Will need help with getting home FS testing supplies at discharge.  Remainder of plan as outlined above.
Feeling well.  Good appetite.  No SOB.  No abd pain.  NAD.  NSR on tele.  SAM with metabolic acidosis.  Renal input appreciated.  Suspect hepatorenal syndrome with pre-renal component from recent N/V and poor PO intake.  Recent Bactrim use may have also raised the creatinine level.  Continue Octreotide and Midodrine for possible hepatorenal syndrome.  Will also give 1Liter IV with bicarb per renal.  Hypokalemia resolving with supplementation.  Holding Lactulose for now given concern for pre-renal component of this SAM and given the hypokalemia.  DM type 2, continue to cover with sliding scale insulin.  A1c 6.6.
No bleeding , NO diarrhea today as per patient .  WIll cont current treatment plan.  Once stable , patient might be soon dc home as he is not a candidate for liver transplant.
Resting in bed.  Patient tells me that he had a large breakfast but now has no appetite for lunch.  No SOB.  No abd pain.  NAD.  SAM with metabolic acidosis.  Suspected pre-renal SAM from recent N/V and poor PO intake with possible element of hepatorenal syndrome as well.  Recent Bactrim use may have also raised the creatinine level.  SAM has been resolving.  Case d/w Dr. Ayala (Hepatology) and renal fellow.  Continue Octreotide and Midodrine for possible hepatorenal syndrome.  Hold Albumin.  Next step would be to stop Octreotide.  Might ultimately discharge on Midodrine.  Decompensated cirrhosis 2/2 SIMPSON/ALD.  Continue Rifaximin and Lactulose.  Continue Rocephin for SBP prophylaxis.  MRCP without evidence of biliary obstruction.  Anemia.  No overt bleeding.  Hgb improved s/p 1 unit PRBCs.  No plans for endoscopy.  Intermittent poor appetite felt to be secondary to patient's advanced liver disease.  DM type 2, continue to cover with sliding scale insulin.  A1c 6.6.  Will need help with getting home FS testing supplies at discharge.  Remainder of plan as outlined above.

## 2020-06-21 NOTE — PROGRESS NOTE ADULT - PROBLEM SELECTOR PLAN 5
Patient found to have platelet count of 72 upon admission. Likely secondary to alcoholic cirrhosis and liver failure.   - downtrending platelets without active bleeding, in setting of cirrhosis/ sequestration  - close outpatient follow up

## 2020-06-21 NOTE — PROGRESS NOTE ADULT - PROBLEM SELECTOR PLAN 1
Creatinine 1.85 today, stable past several days around his baseline  - albumin and octreotide d/c'd  - will discuss with Hepatology, possible d/c today  - likely will d/c on midodrine   - Bicitra 30 ml TID per Nephrology recs

## 2020-06-21 NOTE — PROGRESS NOTE ADULT - REASON FOR ADMISSION
Elevated Serum Creatinine at Hepatology clinic

## 2020-06-21 NOTE — PROGRESS NOTE ADULT - PROBLEM SELECTOR PLAN 8
1.  Name of PCP: Dr. Woods. Dr. Ayala is outpatient hepatologist ([phone]) to f/u after discharge.   2.  PCP Contacted on Admission: [s] Y    [ ] N    3.  PCP contacted at Discharge: [ ] Y    [ ] N    [ ] N/A  4.  Post-Discharge Appointment Date and Location:  5.  Summary of Handoff given to PCP:

## 2020-06-21 NOTE — PROGRESS NOTE ADULT - SUBJECTIVE AND OBJECTIVE BOX
Chief Complaint:  Patient is a 44y old  Male who presents with a chief complaint of Elevated Serum Creatinine at Hepatology clinic (21 Jun 2020 07:19)    Reason for consult: SAM, cirrhosis    Interval Events: No complaints, Cr stable.     Hospital Medications:  cefTRIAXone   IVPB 1000 milliGRAM(s) IV Intermittent every 24 hours  cholestyramine Powder (Sugar-Free) 4 Gram(s) Oral daily  citric acid/sodium citrate Solution 30 milliLiter(s) Oral four times a day  dextrose 40% Gel 15 Gram(s) Oral once PRN  dextrose 5%. 1000 milliLiter(s) IV Continuous <Continuous>  dextrose 50% Injectable 12.5 Gram(s) IV Push once  dextrose 50% Injectable 25 Gram(s) IV Push once  dextrose 50% Injectable 25 Gram(s) IV Push once  glucagon  Injectable 1 milliGRAM(s) IntraMuscular once PRN  insulin lispro (HumaLOG) corrective regimen sliding scale   SubCutaneous three times a day before meals  insulin lispro (HumaLOG) corrective regimen sliding scale   SubCutaneous at bedtime  lactulose Syrup 10 Gram(s) Oral <User Schedule>  midodrine. 5 milliGRAM(s) Oral every 8 hours  multivitamin 1 Tablet(s) Oral daily  ondansetron Injectable 4 milliGRAM(s) IV Push every 6 hours PRN  pantoprazole    Tablet 40 milliGRAM(s) Oral before breakfast  potassium phosphate / sodium phosphate Tablet (K-PHOS No. 2) 2 Tablet(s) Oral four times a day with meals  rifAXIMin 550 milliGRAM(s) Oral two times a day  zinc sulfate 220 milliGRAM(s) Oral two times a day      ROS:   General:  No  fevers, chills, night sweats, fatigue  Eyes:  Good vision, no reported pain  ENT:  No sore throat, pain, runny nose  CV:  No pain, palpitations  Pulm:  No dyspnea, cough  GI:  See HPI, otherwise negative  :  No  incontinence, nocturia  Muscle:  No pain, weakness  Neuro:  No memory problems  Psych:  No insomnia, mood problems, depression  Endocrine:  No polyuria, polydipsia, cold/heat intolerance  Heme:  No petechiae, ecchymosis, easy bruisability  Skin:  No rash    PHYSICAL EXAM:   Vital Signs:  Vital Signs Last 24 Hrs  T(C): 36.8 (21 Jun 2020 11:37), Max: 37.1 (21 Jun 2020 04:05)  T(F): 98.3 (21 Jun 2020 11:37), Max: 98.7 (21 Jun 2020 04:05)  HR: 63 (21 Jun 2020 11:37) (56 - 67)  BP: 108/71 (21 Jun 2020 11:37) (94/56 - 120/68)  BP(mean): --  RR: 18 (21 Jun 2020 11:37) (18 - 18)  SpO2: 100% (21 Jun 2020 11:37) (93% - 100%)  Daily     Daily     GENERAL: no acute distress  NEURO: alert, no asterixis  HEENT: icteric sclera, no conjunctival pallor appreciated  CHEST: no respiratory distress, no accessory muscle use  CARDIAC: regular rate, rhythm  ABDOMEN: soft, non-tender, non-distended, no rebound or guarding  EXTREMITIES: warm, well perfused, no edema  SKIN: + vitiligo, +++ jaundice    LABS: reviewed                        7.7    4.81  )-----------( 31       ( 21 Jun 2020 06:04 )             21.9     06-21    136  |  102  |  28<H>  ----------------------------<  144<H>  3.5   |  18<L>  |  1.85<H>    Ca    9.0      21 Jun 2020 06:04  Phos  2.2     06-21  Mg     1.5     06-21    TPro  5.3<L>  /  Alb  4.9  /  TBili  40.0<H>  /  DBili  x   /  AST  52<H>  /  ALT  21  /  AlkPhos  52  06-21    LIVER FUNCTIONS - ( 21 Jun 2020 06:04 )  Alb: 4.9 g/dL / Pro: 5.3 g/dL / ALK PHOS: 52 U/L / ALT: 21 U/L / AST: 52 U/L / GGT: x             Interval Diagnostic Studies: see sunrise for full report Chief Complaint:  Patient is a 44y old  Male who presents with a chief complaint of Elevated Serum Creatinine at Hepatology clinic (21 Jun 2020 07:19)    Reason for consult: SAM, cirrhosis    Interval Events: No complaints, Cr stable.     Hospital Medications:  cefTRIAXone   IVPB 1000 milliGRAM(s) IV Intermittent every 24 hours  cholestyramine Powder (Sugar-Free) 4 Gram(s) Oral daily  citric acid/sodium citrate Solution 30 milliLiter(s) Oral four times a day  dextrose 40% Gel 15 Gram(s) Oral once PRN  dextrose 5%. 1000 milliLiter(s) IV Continuous <Continuous>  dextrose 50% Injectable 12.5 Gram(s) IV Push once  dextrose 50% Injectable 25 Gram(s) IV Push once  dextrose 50% Injectable 25 Gram(s) IV Push once  glucagon  Injectable 1 milliGRAM(s) IntraMuscular once PRN  insulin lispro (HumaLOG) corrective regimen sliding scale   SubCutaneous three times a day before meals  insulin lispro (HumaLOG) corrective regimen sliding scale   SubCutaneous at bedtime  lactulose Syrup 10 Gram(s) Oral <User Schedule>  midodrine. 5 milliGRAM(s) Oral every 8 hours  multivitamin 1 Tablet(s) Oral daily  ondansetron Injectable 4 milliGRAM(s) IV Push every 6 hours PRN  pantoprazole    Tablet 40 milliGRAM(s) Oral before breakfast  potassium phosphate / sodium phosphate Tablet (K-PHOS No. 2) 2 Tablet(s) Oral four times a day with meals  rifAXIMin 550 milliGRAM(s) Oral two times a day  zinc sulfate 220 milliGRAM(s) Oral two times a day      ROS:   General:  No  fevers, chills, night sweats, fatigue  Eyes:  Good vision, no reported pain  ENT:  No sore throat, pain, runny nose  CV:  No pain, palpitations  Pulm:  No dyspnea, cough  GI:  See HPI, otherwise negative  :  No  incontinence, nocturia  Muscle:  No pain, weakness  Neuro:  No memory problems  Psych:  No insomnia, mood problems, depression  Endocrine:  No polyuria, polydipsia, cold/heat intolerance  Heme:  No petechiae, ecchymosis, easy bruisability  Skin:  No rash    PHYSICAL EXAM:   Vital Signs:  Vital Signs Last 24 Hrs  T(C): 36.8 (21 Jun 2020 11:37), Max: 37.1 (21 Jun 2020 04:05)  T(F): 98.3 (21 Jun 2020 11:37), Max: 98.7 (21 Jun 2020 04:05)  HR: 63 (21 Jun 2020 11:37) (56 - 67)  BP: 108/71 (21 Jun 2020 11:37) (94/56 - 120/68)  BP(mean): --  RR: 18 (21 Jun 2020 11:37) (18 - 18)  SpO2: 100% (21 Jun 2020 11:37) (93% - 100%)  Daily     Daily     GENERAL: no acute distress  NEURO: alert, oriented x4, no asterixis  HEENT: icteric sclera, no conjunctival pallor appreciated  CHEST: no respiratory distress, no accessory muscle use  CARDIAC: regular rate, rhythm, no JVD  ABDOMEN: soft, non-tender, non-distended, no rebound or guarding  EXTREMITIES: warm, well perfused, +trace pedal edema  SKIN: + vitiligo, +++ jaundice    LABS: reviewed                        7.7    4.81  )-----------( 31       ( 21 Jun 2020 06:04 )             21.9     06-21    136  |  102  |  28<H>  ----------------------------<  144<H>  3.5   |  18<L>  |  1.85<H>    Ca    9.0      21 Jun 2020 06:04  Phos  2.2     06-21  Mg     1.5     06-21    TPro  5.3<L>  /  Alb  4.9  /  TBili  40.0<H>  /  DBili  x   /  AST  52<H>  /  ALT  21  /  AlkPhos  52  06-21    LIVER FUNCTIONS - ( 21 Jun 2020 06:04 )  Alb: 4.9 g/dL / Pro: 5.3 g/dL / ALK PHOS: 52 U/L / ALT: 21 U/L / AST: 52 U/L / GGT: x             Interval Diagnostic Studies: see sunrise for full report

## 2020-06-21 NOTE — PROGRESS NOTE ADULT - PROBLEM SELECTOR PLAN 3
Patient with HGB 9 upon admission, stable in 8.0-9.0 range for most of admission, downtrending last two days with HGB 7.0 6/17, received 1 unit PRBCs. Patient has been hemodynamically stable throughout entirety of admission with no SOB, weakness, dizziness  -HGB stable  - Hepatology does not rec Endoscopy now given risks outweigh benefits in hemodynamically stable patient

## 2020-06-21 NOTE — PROGRESS NOTE ADULT - PROBLEM SELECTOR PROBLEM 3
Anemia, unspecified type
Hypokalemia
Anemia, unspecified type

## 2020-06-21 NOTE — PROGRESS NOTE ADULT - PROBLEM SELECTOR PROBLEM 4
Hypokalemia
Thrombocytopenia
Hypokalemia

## 2020-06-21 NOTE — PROGRESS NOTE ADULT - PROBLEM SELECTOR PROBLEM 6
Diabetes mellitus type 2, insulin dependent
Metabolic acidosis
Diabetes mellitus type 2, insulin dependent
Metabolic acidosis

## 2020-06-21 NOTE — PROGRESS NOTE ADULT - PROBLEM SELECTOR PROBLEM 2
Acidemia
Alcoholic cirrhosis of liver with ascites

## 2020-06-21 NOTE — PROVIDER CONTACT NOTE (OTHER) - ASSESSMENT
Patient's Right First Toe noted to have a scant amount of dry blood at the edge and a scant amount of the top patient's Right First Toe nail fell off. Positive pulses noted.

## 2020-06-21 NOTE — PROGRESS NOTE ADULT - PROBLEM SELECTOR PLAN 4
Patient found to have K of 2.6 upon admission, Received 40mEq KCl in the ED  -Will continue to monitor and replete with KCl PO as needed

## 2020-06-21 NOTE — PROGRESS NOTE ADULT - PROBLEM SELECTOR PROBLEM 5
Metabolic acidosis
Thrombocytopenia
Metabolic acidosis
Thrombocytopenia

## 2020-06-24 NOTE — DISCUSSION/SUMMARY
[FreeTextEntry1] : attempted to reach pt multiple times. has pending apt w hepatology. will mail out f/u letter. pcp notified

## 2020-06-25 NOTE — REVIEW OF SYSTEMS
[Fatigue] : fatigue [Can Walk (___ Blocks)] : can walk [unfilled] blocks [Nausea] : nausea [Dyspnea on Exertion] : dyspnea on exertion [Muscle Weakness] : muscle weakness [Itching] : itching [Confusion] : confusion [Easy Bruising] : easy bruising [Negative] : Psychiatric

## 2020-06-25 NOTE — CONSULT LETTER
[Please see my note below.] : Please see my note below. [Courtesy Letter:] : I had the pleasure of seeing your patient, [unfilled], in my office today. [Dear  ___] : Dear  [unfilled], [Consult Closing:] : Thank you very much for allowing me to participate in the care of this patient.  If you have any questions, please do not hesitate to contact me. [FreeTextEntry2] : Dr. Rj Woods [FreeTextEntry3] : Sincerely,\par \par Agapito Ayala M.D., Ph.D.\par  of Medicine\par [facility]\par [address] [place] [address]\[place] [address]\par Tel: [phone]\par Fax: [phone]\par Cell: [phone]\par E-mail: [email or web address]\par

## 2020-06-25 NOTE — REASON FOR VISIT
[Follow-Up] : a follow-up visit for [Other: _____] : [unfilled] [Patient Declined  Services] : - None: Patient declined  services [FreeTextEntry1] : decompensated alcohol-related cirrhosis [FreeTextEntry2] : Dr. Andre Thakkar [FreeTextEntry3] : Dr. Rj Woods (PCP)

## 2020-06-25 NOTE — ASSESSMENT
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 His last reported alcoholic drink was on 5/12/20. His cirrhosis is complicated by a history of variceal hemorrhage, hepatic encephalopathy, ascites, history of SBP, portal hypertensive gastropathy and colopathy, acute kidney injury (SAM) likely due to hepatorenal syndrome (HRS), and hyponatremia.\par \par # Hepatic encephalopathy:\par - Currently with subclinical ([place] grade 1) hepatic encephalopathy, likely due to inadequate dosing of lactulose at home since his recent hospital discharge.\par - Increase lactulose to 15 mL po bid. I discussed with Mr. Hallman and Glo how to self-titrate his lactulose frequency in order to achieve and maintain 3-4 BMs/day.\par - Continue rifaximin 550 mg po bid.\par - Advised to promptly notify us if any overt hepatic encephalopathy that does not improve with extra lactulose.\par \par # Non-oliguric SAM:\par - Last Cr 1.85 on day of discharge (6/21/20). Will re-check labs today.\par - Should remain off diuretics.\par - Continue midodrine 5 mg po tid.\par - Continue sodium citrate-citric acid for associated metabolic acidosis, as per recent Transplant Nephrology recommendations.\par - If worsening, may need re-admission for resumed IV albumin +/- octreotide.\par \par # Ascites:\par - Small in volume currently, was insufficient for ascites during recent hospitalization.\par - Continue holding diuretics, as above.\par - Continue Bactrim DS 1 tab po daily for SBP prophylaxis.\par - Mr. HALLMAN was counseled to adhere to a low sodium (<2 grams of sodium per day) diet by: avoiding adding any salt to meals (removing the salt shaker from the table); eliminating salty foods from his diet; eating more home-cooked meals; choosing fresh or frozen, not canned, vegetables and fruits; and reading ingredient and food labels to choose low sodium foods.\par \par # History of esophageal variceal hemorrhage:\par - Last EGD in 4/2019 with small EV.\par - No recent overt GI bleeding.\par - Defer repeat surveillance EGD for now (risks outweigh benefits) but this will be re-assessed on an ongoing basis.\par - He is not a candidate for beta-blocker prophylaxis at this time (risks outweigh benefits).\par \par # Pruritus: Improved with cholestyramine 4g po qhs, will continue.\par \par # Protein-calorie malnutrition: We discussed the importance of small, frequent meals (5-6 times/day), trying to include 1-2 diabetic-friendly protein shakes such as Glucerna daily, and including a bedtime snack.\par \par # Decompensated alcohol-related cirrhosis with ACLF:\par - Recent MELD-Na 41. Will re-check labs today.\par - He is at very high risk for short-term mortality in the absence of liver transplantation.\par - Needs MRI abdomen with and without contrast for HCC surveillance and to re-evaluate patency of henrique-mesenteric venous system; this was not done during his recent hospitalization due to his SAM.\par \par # Health maintenance:\par - HAV immune.\par - He is HBV non-immune and will need vaccination.\par - Mr. HALLMAN was counseled to: abstain from alcohol and all illicit drugs; avoid use of herbal and dietary supplements due to potential hepatotoxicity; limit use of acetaminophen to <2 grams per day; avoid use of nonsteroidal antiinflammatory drugs (NSAIDs) as these can lead to diuretic resistance and precipitate renal dysfunction in patients with advanced liver disease; avoid eating any unpasteurized dairy products; avoid eating any raw or undercooked eggs, fish, poultry, or meat; and avoid eating raw/steamed oysters or other shellfish to avoid risk of Vibrio infection.\par \par # Pre-liver transplant evaluation:\par - Based on my review of Mr. HALLMAN 's history, medical chart, review of systems, and my physical exam, I believe he is is not currently a candidate for liver transplantation given his multiple prior alcohol relapses despite known advanced liver disease, including very recently. However, he may develop into an appropriate candidate for liver transplantation if he remains engaged in a formal alcohol relapse prevention program and is able to achieve a longer duration of alcohol sobriety. Given his very high MELD-Na and the hope that he may develop into an appropriate candidate, we should continue with our protocol for evaluation for liver transplantation.\par \par I reviewed the following areas and provided time for questions to be asked and for information to be clarified and/or repeated.\par \par Evaluation process: I reviewed the results of tests and consults available to date and results of physical evaluation. I discussed the tests/consults that are required to complete an evaluation. I discussed the purpose of specific tests/consults, why they are needed, what the test involves, and discussed choices of where tests can be done. I discussed inclusion and exclusion criteria for organ transplant candidacy at [facility] and provided a copy of selection criteria, if requested. I advised him if alternative treatment options are available to them. I discussed the importance of abstinence from illicit, recreational, and prescriptive drug use. I discussed the importance of abstinence from alcohol use. I discussed the necessity of following a strict medical regimen post-transplant. I discussed the importance of an adequate support system to assist patient through evaluation, listing, and transplant process. The candidate was given the opportunity to ask questions.\par \par Candidate selection: I discussed the Recipient Review Committee (RRC) meeting process and consensus decision making of the team. I discussed that the team will decide if an organ transplant is indicated and if the patient is a suitable candidate medically, surgically, psychosocially, and financially. If alternative treatments are identified by the team, this will be discussed with the patient. The candidate was given the opportunity to ask questions.\par \par Waiting list: I discussed liver allocation and MELD/PELD system, prioritization on the waiting list, and average waiting time for patients based on their disease etiology. I discussed the need for periodic MELD/PELD recertification per OPTN/UNOS regulations and the consequence of not completing the requested tests/procedures on time. I discussed multiple listing options and the option to transfer his waiting time to another center. I discussed the possible progression and complications of their disease, and the possibility that he may not be a candidate in the future for organ transplant. I discussed with the patient that if he uses alcohol or any recreational drugs while on the waiting list or if he is non-adherent with follow-up or medications, he will be removed from the transplant waiting list. I advised the patient of his right to withdraw consent for transplant at any time. The candidate was given the opportunity to ask questions.\par \par Complications of liver disease: I discussed signs and symptoms of progressive liver disease or complications requiring medical attention. I discussed the complication of ascites/edema and generalized fluid overload. I discussed the complication of infections and spontaneous bacterial peritonitis. I discussed the development/worsening of renal insufficiency. I discussed generalized malaise, fatigue, and muscle wasting. I discussed encephalopathy, including the prevention, treatment, and worsening of the condition. I discussed GI bleeding, which is a medical emergency if present, and the need for urgent/emergent medical care. I discussed the possibility of development or worsening of HCC while on the waiting list. I discussed the possibility of progression of HCC beyond criteria for liver transplant. I reviewed the procedure for HCC cases when a donor liver becomes available, namely that the patient will be taken to the OR and explored prior to transplant. If any tumor is found outside of the liver, the transplant procedure will be cancelled and the abdomen will be closed. The candidate was given the opportunity to ask questions.\par \par He will return for follow-up in 1 week.

## 2020-06-25 NOTE — PHYSICAL EXAM
[Normal Voice/Communication] : normal voice communication [No Acute Distress] : no acute distress [Healthy Appearing] : healthy appearing [Scleral Icterus] : scleral icterus [Normal Outer Ear/Nose] : the ears and nose were normal in appearance [EOMI] : extra ocular movement intact [Normal Lips/Gums] : lips/gums were normal [Normal Oropharynx] : normal oropharynx [Normal Hearing] : normal hearing [Normal Appearance] : normal appearance [No Neck Mass] : no neck mass [Supple] : the neck was supple [Thyroid Not Enlarged] : thyroid not enlarged [No JVD] : no jugular venous distention [No Accessory Muscle Use] : no accessory muscle use [No Thyroid Nodules] : no thyroid nodules [No Respiratory Distress] : no respiratory distress [Normal Rhythm and Effort] : normal rhythm and effort [Normal S1, S2] : normal S1 and S2 [Clear to Auscultation] : lungs were clear to auscultation bilaterally [No Gallops] : no gallops [No Rubs] : no rubs [Normal Rate/Rhythm] : normal rate/rhythm [Carotids Normal] : carotids normal [No Edema] : no edema [Pedal Pulses Normal] : pedal pulses normal [Splenomegaly] : splenomegaly [Abdominal Ascites] : abdominal ascites [Non-Tender] : Liver Edge: non-tender [Liver Palpable ___ Finger Breadths Below Costal Margin] : liver palpable [unfilled]  finger breadths below costal margin [Irregular] : Liver Edge: irregular [Normal Bowel Sounds] : normal bowel sounds [Non Tender] : non-tender [No Masses] : no abdominal mass palpated [Normal Post Cervical Nodes] : no posterior cervical lymphadenopathy [Soft] : soft [Normal Inguinal Nodes] : no inguinal lymphadenopathy [Normal Anterior Cervical Nodes] : no anterior cervical lymphadenopathy [Normal Supraclavicular Nodes] : no supraclavicular lymphadenopathy [No CVA Tenderness] : no ~M costovertebral angle tenderness [No Spinal Tenderness] : no spinal tenderness [Normal Gait] : normal gait [Spider Angioma] : spider angioma [Normal Strength/Tone] : muscle strength and tone were normal [No Clubbing, Cyanosis] : no clubbing  or cyanosis of the fingernails [Jaundice] : jaundice [Location of Spider Angiomas: ___] : location of spider angiomas: [unfilled] [Normal Color/Pigmentation] : normal color/pigmentation [No Rash] : no rash [No Focal Deficits] : no focal deficits [Normal Turgor] : normal turgor [No Motor Deficits] : no motor deficits [Normal Reflexes] : normal reflexes [Normal Affect] : normal affect [Remote Memory Intact] : remote memory intact [Normal Insight/Judgement] : normal insight/judgement [Hepatojugular Reflux] : no hepatojugular reflux [Abdominal Bruit] : no abdominal bruit [Ascites Fluid Wave] : no ascites fluid wave [Ascites Tense] : no ascites tense [Caput Medusae] : no caput medusae [Changes in behavior with minimal change in level of consciousness (+1)] : Changes in behavior with minimal change in level of consciousness (+1) [Palmar Erythema] : no palmar erythema [Ascites Shifting Dullness] : no ascites shifting dullness [de-identified] : Mild bitemporal wasting [de-identified] : Good dentition [de-identified] : +3/6 systolic murmur heard best at LUSB [de-identified] : Mild bilateral gynecomastia [de-identified] : Able to recite months of the year backwards (in Chinese) correctly but with mild psychomotor slowing noted. Alert and oriented x4.

## 2020-06-25 NOTE — HISTORY OF PRESENT ILLNESS
[Alcoholic Liver Disease] : Alcoholic Liver Disease [Ascites] : Ascites [Hepatic Encephalopathy] : Hepatic Encephalopathy [Variceal Hemorrhage] : Variceal Hemorrhage [Spontaneous Bacterial Peritonitis] : Spontaneous Bacterial Peritonitis [Other: ___] : [unfilled] [Previous Transplant] : History of previous transplant [Diabetes] : History of diabetes [Neoadjuvant Therapy] : No Neoadjuvant therapy [History of Local-regional Treatment] : No history of  local-regional treatment [History of HCC] : No history of hepatocellular carcinoma [Previous Pancreas Islet Infusion] : No history of previous pancreas islet infusion [Coronary Artery Disease] : No history of coronary artery disease [Hypertension] : No history of hypertension [Previous MI] : No history of previous MI [Dialysis] : No history of dialysis [Dialysis within the past week] : No history of dialysis within the past week [FreeTextEntry1] : 41 [TextBox_20] : cornea (left eye) [TextBox_42] : Mr. 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 His last reported alcoholic drink was on 5/12/20. His cirrhosis is complicated by a history of variceal hemorrhage, hepatic encephalopathy, ascites, history of SBP, portal hypertensive gastropathy and colopathy, acute kidney injury (SAM) likely due to hepatorenal syndrome (HRS), and hyponatremia.\par \par He previously had a prolonged hospitalization at [facility] from 5/15/20-5/27/20. After being discharged, he began participating in a virtual alcohol relapse prevention program (with online groups sessions on Tuesdays and Thursdays), and then started his liver transplant evaluation on 6/10/20. At that date, it was felt that because of his multiple prior alcohol relapses despite known advanced liver disease, that he would need to continue to engage in his alcohol program and have a longer duration of sobriety before he would be able to listed, assuming no other contraindications were identified during the evaluation process.\par \par He was re-admitted to [facility] from 6/10/20-6/21/20 due to worsening SAM, to peak Cr 3.81 (6/10/20), with associated metabolic acidosis. His SAM was treated with IV albumin, octreotide, and midodrine for presumed HRS, with partial improvement as seen during his prior hospitalization, to Cr 1.85 (6/21/20). His Cr remained relatively stable around 1.9-2.1 even after the albumin and octreotide were discontinued. His acidosis improved with sodium citrate-citric acid therapy as per Transplant Nephrology, and he was discharged on that medication plus midodrine. Infectious work-up was negative. He had insufficient ascites for paracentesis during his hospitalization and remained off diuretics. His hospital course was complicated by mild hepatic encephalopathy, managed with rifaximin and lactulose.\par \par Today, he reports feeling well since being discharged, apart from chronic fatigue and early satiety. His pruritus has been better with cholestyramine. He denies any worsened abdominal distension or lower extremity swelling. He has been having some episodes of mild confusion at home despite taking lactulose 15 mL po nightly (but did not realize he could take more frequent doses if constipated). He was hoping to go back to work soon, but we discussed that he is too sick currently. Since being discharged home on Sunday, he promptly re-engaged in his alcohol RPP and participated in a group session yesterday. [FreeTextEntry2] : O

## 2020-06-30 NOTE — REVIEW OF SYSTEMS
[Fatigue] : fatigue [Can Walk (___ Blocks)] : can walk [unfilled] blocks [Dyspnea on Exertion] : dyspnea on exertion [Nausea] : nausea [Muscle Weakness] : muscle weakness [Itching] : itching [Easy Bruising] : easy bruising [Negative] : Psychiatric [FreeTextEntry7] : early satiety, abdominal distension

## 2020-06-30 NOTE — ASSESSMENT
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 His last reported alcoholic drink was on 5/12/20. His cirrhosis is complicated by a history of variceal hemorrhage, hepatic encephalopathy, ascites, history of SBP, portal hypertensive gastropathy and colopathy, acute kidney injury (SAM) likely due to hepatorenal syndrome (HRS), and hyponatremia.\par \par # Hepatic encephalopathy:\par - Now appears to be well-controlled with increased lactulose at home.\par - Continue lactulose to 15 mL po bid, with extra doses as needed to achieve and maintain 3-4 BMs/day.\par - Continue rifaximin 550 mg po bid.\par - Advised to promptly notify us if any overt hepatic encephalopathy that does not improve with extra lactulose.\par \par # Non-oliguric SAM:\par - Stable on last labs, off diuretics.\par - Continue midodrine 5 mg po tid.\par - Continue sodium citrate-citric acid for associated metabolic acidosis, as per recent Transplant Nephrology recommendations.\par - Re-check labs today.\par - If worsening, may need re-admission for resumed IV albumin +/- octreotide.\par \par # Ascites:\par - Appears moderately distended on exam, but weight stable compared to last week. Previously had insufficient ascites during both recent hospitalizations for paracentesis.\par - Continue holding diuretics given SAM, as above.\par - Continue Bactrim DS 1 tab po daily for SBP prophylaxis.\par - Mr. LIM was counseled to adhere to a low sodium (<2 grams of sodium per day) diet by: avoiding adding any salt to meals (removing the salt shaker from the table); eliminating salty foods from his diet; eating more home-cooked meals; choosing fresh or frozen, not canned, vegetables and fruits; and reading ingredient and food labels to choose low sodium foods.\par \par # History of esophageal variceal hemorrhage:\par - Last EGD in 4/2019 with small EV.\par - No recent overt GI bleeding.\par - Defer repeat surveillance EGD for now (risks outweigh benefits) but this will be re-assessed on an ongoing basis.\par - He is not a candidate for beta-blocker prophylaxis at this time (risks outweigh benefits).\par \par # Pruritus: Improved with cholestyramine 4g po qhs, will continue.\par \par # Protein-calorie malnutrition: Continue small, frequent meals (5-6 times/day), trying to include 1-2 diabetic-friendly protein shakes such as Glucerna daily, and including a bedtime snack.\par \par # Decompensated alcohol-related cirrhosis with ACLF:\par - Recent MELD-Na 41. Will re-check labs today.\par - He is at very high risk for short-term mortality in the absence of liver transplantation.\par - Needs MRI abdomen with and without contrast for HCC surveillance and to re-evaluate patency of henrique-mesenteric venous system; this was not done during his recent hospitalization due to his SAM.\par \par # Health maintenance:\par - HAV immune.\par - He is HBV non-immune and will need vaccination.\par - Mr. LIM was counseled to: abstain from alcohol and all illicit drugs; avoid use of herbal and dietary supplements due to potential hepatotoxicity; limit use of acetaminophen to <2 grams per day; avoid use of nonsteroidal antiinflammatory drugs (NSAIDs) as these can lead to diuretic resistance and precipitate renal dysfunction in patients with advanced liver disease; avoid eating any unpasteurized dairy products; avoid eating any raw or undercooked eggs, fish, poultry, or meat; and avoid eating raw/steamed oysters or other shellfish to avoid risk of Vibrio infection.\par \par # Pre-liver transplant evaluation:\par - Based on my review of Mr. LIM 's history, medical chart, review of systems, and my physical exam, I believe he is is not currently a candidate for liver transplantation given his multiple prior alcohol relapses despite known advanced liver disease, including very recently. However, he may develop into an appropriate candidate for liver transplantation if he remains engaged in a formal alcohol relapse prevention program and is able to achieve a longer duration of alcohol sobriety. Given his very high MELD-Na and the hope that he may develop into an appropriate candidate, we should continue with our protocol for evaluation for liver transplantation.\par - Awaiting MRI (as above) as well as Transplant Cardiology evaluation and DSE, then if evaluation completed will plan to discuss his candidacy at our multidisciplinary selection meeting.\par \par I reviewed the following areas and provided time for questions to be asked and for information to be clarified and/or repeated.\par \par Evaluation process: I reviewed the results of tests and consults available to date and results of physical evaluation. I discussed the tests/consults that are required to complete an evaluation. I discussed the purpose of specific tests/consults, why they are needed, what the test involves, and discussed choices of where tests can be done. I discussed inclusion and exclusion criteria for organ transplant candidacy at [facility] and provided a copy of selection criteria, if requested. I advised him if alternative treatment options are available to them. I discussed the importance of abstinence from illicit, recreational, and prescriptive drug use. I discussed the importance of abstinence from alcohol use. I discussed the necessity of following a strict medical regimen post-transplant. I discussed the importance of an adequate support system to assist patient through evaluation, listing, and transplant process. The candidate was given the opportunity to ask questions.\par \par Candidate selection: I discussed the Recipient Review Committee (RRC) meeting process and consensus decision making of the team. I discussed that the team will decide if an organ transplant is indicated and if the patient is a suitable candidate medically, surgically, psychosocially, and financially. If alternative treatments are identified by the team, this will be discussed with the patient. The candidate was given the opportunity to ask questions.\par \par Waiting list: I discussed liver allocation and MELD/PELD system, prioritization on the waiting list, and average waiting time for patients based on their disease etiology. I discussed the need for periodic MELD/PELD recertification per OPTN/UNOS regulations and the consequence of not completing the requested tests/procedures on time. I discussed multiple listing options and the option to transfer his waiting time to another center. I discussed the possible progression and complications of their disease, and the possibility that he may not be a candidate in the future for organ transplant. I discussed with the patient that if he uses alcohol or any recreational drugs while on the waiting list or if he is non-adherent with follow-up or medications, he will be removed from the transplant waiting list. I advised the patient of his right to withdraw consent for transplant at any time. The candidate was given the opportunity to ask questions.\par \par Complications of liver disease: I discussed signs and symptoms of progressive liver disease or complications requiring medical attention. I discussed the complication of ascites/edema and generalized fluid overload. I discussed the complication of infections and spontaneous bacterial peritonitis. I discussed the development/worsening of renal insufficiency. I discussed generalized malaise, fatigue, and muscle wasting. I discussed encephalopathy, including the prevention, treatment, and worsening of the condition. I discussed GI bleeding, which is a medical emergency if present, and the need for urgent/emergent medical care. I discussed the possibility of development or worsening of HCC while on the waiting list. I discussed the possibility of progression of HCC beyond criteria for liver transplant. I reviewed the procedure for HCC cases when a donor liver becomes available, namely that the patient will be taken to the OR and explored prior to transplant. If any tumor is found outside of the liver, the transplant procedure will be cancelled and the abdomen will be closed. The candidate was given the opportunity to ask questions.\par \par He will return for follow-up in 2 weeks.

## 2020-06-30 NOTE — HISTORY OF PRESENT ILLNESS
[Alcoholic Liver Disease] : Alcoholic Liver Disease [Ascites] : Ascites [Variceal Hemorrhage] : Variceal Hemorrhage [Hepatic Encephalopathy] : Hepatic Encephalopathy [Spontaneous Bacterial Peritonitis] : Spontaneous Bacterial Peritonitis [Other: ___] : [unfilled] [Previous Transplant] : History of previous transplant [Diabetes] : History of diabetes [History of HCC] : No history of hepatocellular carcinoma [History of Local-regional Treatment] : No history of  local-regional treatment [Neoadjuvant Therapy] : No Neoadjuvant therapy [Previous Pancreas Islet Infusion] : No history of previous pancreas islet infusion [Coronary Artery Disease] : No history of coronary artery disease [Hypertension] : No history of hypertension [Previous MI] : No history of previous MI [Dialysis] : No history of dialysis [Dialysis within the past week] : No history of dialysis within the past week [FreeTextEntry1] : 41 [FreeTextEntry2] : O [TextBox_42] : Mr. 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 His last reported alcoholic drink was on 5/12/20. His cirrhosis is complicated by a history of variceal hemorrhage, hepatic encephalopathy, ascites, history of SBP, portal hypertensive gastropathy and colopathy, acute kidney injury (SAM) likely due to hepatorenal syndrome (HRS), and hyponatremia.\par \par He previously had a prolonged hospitalization at [facility] from 5/15/20-5/27/20. After being discharged, he began participating in a virtual alcohol relapse prevention program (with online groups sessions on Tuesdays and Thursdays), and then started his liver transplant evaluation on 6/10/20. At that date, it was felt that because of his multiple prior alcohol relapses despite known advanced liver disease, that he would need to continue to engage in his alcohol program and have a longer duration of sobriety before he would be able to listed, assuming no other contraindications were identified during the evaluation process.\par \par He was re-admitted to [facility] from 6/10/20-6/21/20 due to worsening SAM, to peak Cr 3.81 (6/10/20), with associated metabolic acidosis. His SAM was treated with IV albumin, octreotide, and midodrine for presumed HRS, with partial improvement as seen during his prior hospitalization, to Cr 1.85 (6/21/20). His Cr remained relatively stable around 1.9-2.1 even after the albumin and octreotide were discontinued. His acidosis improved with sodium citrate-citric acid therapy as per Transplant Nephrology, and he was discharged on that medication plus midodrine. Infectious work-up was negative. He had insufficient ascites for paracentesis during his hospitalization and remained off diuretics. His hospital course was complicated by mild hepatic encephalopathy, managed with rifaximin and lactulose.\par \par He was last seen by me in the office 1 week ago and is here today for close follow-up given his very high MELD-Na and multiple recent complications. He reports feeling well today aside from some abdominal distension and early satiety. His appetite remains poor, though he makes sure to eat small, frequent meals with protein daily. His weight is stable compared to last week. No lower extremity swelling. No recent episodes of confusion. His pruritus has been okay with cholestyramine. He is continuing to attend his virtual alcohol RPP twice weekly (Tuesdays and Thursdays) and denies any recent alcohol cravings or slips. [TextBox_20] : cornea (left eye)

## 2020-06-30 NOTE — PHYSICAL EXAM
[Healthy Appearing] : healthy appearing [No Acute Distress] : no acute distress [Scleral Icterus] : scleral icterus [Normal Voice/Communication] : normal voice communication [EOMI] : extra ocular movement intact [Normal Outer Ear/Nose] : the ears and nose were normal in appearance [Normal Hearing] : normal hearing [Normal Lips/Gums] : lips/gums were normal [Normal Oropharynx] : normal oropharynx [Normal Appearance] : normal appearance [No Neck Mass] : no neck mass [Thyroid Not Enlarged] : thyroid not enlarged [Supple] : the neck was supple [No JVD] : no jugular venous distention [No Thyroid Nodules] : no thyroid nodules [No Accessory Muscle Use] : no accessory muscle use [No Respiratory Distress] : no respiratory distress [Clear to Auscultation] : lungs were clear to auscultation bilaterally [Normal Rhythm and Effort] : normal rhythm and effort [Normal Rate/Rhythm] : normal rate/rhythm [Normal S1, S2] : normal S1 and S2 [No Gallops] : no gallops [Carotids Normal] : carotids normal [No Rubs] : no rubs [No Edema] : no edema [Pedal Pulses Normal] : pedal pulses normal [Splenomegaly] : splenomegaly [Abdominal Ascites] : abdominal ascites [Liver Palpable ___ Finger Breadths Below Costal Margin] : liver palpable [unfilled]  finger breadths below costal margin [Non-Tender] : Liver Edge: non-tender [Irregular] : Liver Edge: irregular [No Masses] : no abdominal mass palpated [Normal Bowel Sounds] : normal bowel sounds [Non Tender] : non-tender [Soft] : soft [Normal Supraclavicular Nodes] : no supraclavicular lymphadenopathy [Normal Post Cervical Nodes] : no posterior cervical lymphadenopathy [Normal Anterior Cervical Nodes] : no anterior cervical lymphadenopathy [Normal Inguinal Nodes] : no inguinal lymphadenopathy [No CVA Tenderness] : no ~M costovertebral angle tenderness [Normal Gait] : normal gait [No Spinal Tenderness] : no spinal tenderness [No Clubbing, Cyanosis] : no clubbing  or cyanosis of the fingernails [Spider Angioma] : spider angioma [Normal Strength/Tone] : muscle strength and tone were normal [Location of Spider Angiomas: ___] : location of spider angiomas: [unfilled] [Jaundice] : jaundice [Normal Color/Pigmentation] : normal color/pigmentation [No Rash] : no rash [Normal Turgor] : normal turgor [No Focal Deficits] : no focal deficits [No Motor Deficits] : no motor deficits [Normal Reflexes] : normal reflexes [Normal Affect] : normal affect [Remote Memory Intact] : remote memory intact [Normal Insight/Judgement] : normal insight/judgement [Hepatojugular Reflux] : no hepatojugular reflux [Ascites Fluid Wave] : no ascites fluid wave [Abdominal Bruit] : no abdominal bruit [Ascites Shifting Dullness] : no ascites shifting dullness [Caput Medusae] : no caput medusae [Ascites Tense] : no ascites tense [Palmar Erythema] : no palmar erythema [Asterixis] : no asterixis [de-identified] : Mild bitemporal wasting [de-identified] : Mild bilateral gynecomastia [de-identified] : +3/6 systolic murmur heard best at LUSB [de-identified] : Good dentition [de-identified] : Alert and oriented x4, no psychomotor slowing

## 2020-06-30 NOTE — CONSULT LETTER
[Dear  ___] : Dear  [unfilled], [Courtesy Letter:] : I had the pleasure of seeing your patient, [unfilled], in my office today. [Please see my note below.] : Please see my note below. [Consult Closing:] : Thank you very much for allowing me to participate in the care of this patient.  If you have any questions, please do not hesitate to contact me. [FreeTextEntry3] : Sincerely,\par \par Agapito Ayala M.D., Ph.D.\par  of Medicine\par [facility]\par [address] [place] [address]\[place] [address]\par Tel: [phone]\par Fax: [phone]\par Cell: [phone]\par E-mail: [email or web address]  [FreeTextEntry2] : Dr. Rj Woods

## 2020-06-30 NOTE — REASON FOR VISIT
[Follow-Up] : a follow-up visit for [Other: _____] : [unfilled] [Patient Declined  Services] : - None: Patient declined  services [FreeTextEntry1] : decompensated alcohol-related cirrhosis [FreeTextEntry3] : Dr. Rj Woods (PCP) [FreeTextEntry2] : Dr. Andre Thakkar

## 2020-07-06 NOTE — CARDIOLOGY SUMMARY
[___] : [unfilled] [Normal] : normal [Mild] : mild pulmonary hypertension [None] : normal LV function [LVEF ___%] : LVEF [unfilled]% [Enlarged] : enlarged LA size

## 2020-07-08 NOTE — HISTORY OF PRESENT ILLNESS
[de-identified] : This is a 44 year old man with a history of diabetes, obesity, latent TB, alcoholic hepatitis since 2012, jaundice, drug induced liver injury secondary to levofloxacin, decompensated cirrhosis, SAM creatinine 1.9-2.1.  He is being followed by the transplant service at [facility].  Hg had been declining.    \par \par Baseline hemoglobin from 2015 in the 13-14 range.  platelets in the 90's, INR 1.5-1.6  Over the next few years trended down.  In 8623-1357 Hg 9-10g/dl range platelets 50-90 INR in the still in the 1.6-1.9 range.  By this  year. Hg in the 9-10 range, platelets in the 50-60 range and INR 1.5-3 without anticoagulation.  Most recently hemoglobin further dropped to 7.1 and has been that way for 2 weeks (7.4- 7.9-7.1g/dl) MCV was low in June 2019 in the 75-79 range but more recently in the 90's.  Has been low at several points over the past 5 year suggesting intermittent iron deficiency, ferritin has not been checked since 2018.  \par \par Appetite has been poor lately.  \par \par Last endoscopy was 4 years ago, had not had one recently.  \par \par Patient on levimir insulin 20 units.  \par \par Quit smoking in his 20's.  \par Quit alcohol in March 2020.

## 2020-07-08 NOTE — REVIEW OF SYSTEMS
[Negative] : Neurological [FreeTextEntry2] : weakness and fatigue [FreeTextEntry3] : jaundice [FreeTextEntry7] : ascites

## 2020-07-08 NOTE — PHYSICAL EXAM
[Normal] : grossly intact [de-identified] : Severe jaundice [de-identified] : severe vitaligo on face hands legs.  Severe jaundice [de-identified] : No asterexis

## 2020-07-08 NOTE — ASSESSMENT
[FreeTextEntry1] : This is a 44 year old man with a history of IDDM, corneal transplants, severe Cirrhosis prolonged PT, low albumin, abdominal ascites, thrombocytopenia. Patient presents for sever anemia.  Suspected etiology is from chronic GI bleeding for esophageal varices, has not had an endoscopy in a while. Patient had an MCV that was low in various points but corrected after blood transfusions.  Has not had ferritin checked in 2 years. Unclear if IV iron would help at this time.  Check ferritin, will set up for 2 units PRBC transfusion which would more quickly and assuredly help with the severe anemia he has. The quickest appointment for this that I could procure is this upcoming Friday.  Explained that due to the procedure of the type and cross and availability of the infusion room, I can not set this up for today.    Given his discharge from the hospital and the Hg that dropped to 7.4 down from the 9's range after 2 weeks, suspect that he will need 1-2 units every 2 weeks to maintain a reasonable Hg in the 8's range.  Will have him set up to come by every 2 weeks \par \par Explained to patient that due to his cirrhosis and liver function that has not improved, suspect that his anemia will not improve either and he is probably now transfusion dependant. Will check ferritin, inflammatory panel.  B12, folic acid and retic count LDH and haptoglobin to see if we can find a reversible etiology to his anemia, however clinically find this unlikely.  \par \par Spent > 45 minutes in direct patient care and and addressed all questions and concerns.  >50% of this time was in direct face to face contact with patient during exam and counseling.

## 2020-07-15 NOTE — REASON FOR VISIT
[Initial Evaluation] : an initial evaluation of [Other: _____] : [unfilled] [FreeTextEntry2] : pretransplant cardiac evaluation prior to possible liver transplant

## 2020-07-15 NOTE — PHYSICAL EXAM
[Well Groomed] : well groomed [General Appearance - Well Developed] : well developed [Normal Appearance] : normal appearance [General Appearance - Well Nourished] : well nourished [No Deformities] : no deformities [Normal] : the eyelids were normal bilaterally [Conjunctiva] : the conjunctiva were normal in both eyes [PERRL] : pupils were equal in size, round, and reactive to light [Yellow Sclera (Icteric)] : scleral icterus was noted bilaterally [EOM Intact] : extraocular movements were intact [No Oral Cyanosis] : no oral cyanosis [No Oral Pallor] : no oral pallor [Normal Oral Mucosa] : normal oral mucosa [Normal Oropharynx] : normal oropharynx [Normal Jugular Venous V Waves Present] : normal jugular venous V waves present [Normal Jugular Venous A Waves Present] : normal jugular venous A waves present [No Jugular Venous Arce A Waves] : no jugular venous arce A waves [Heart Rate And Rhythm] : heart rate and rhythm were normal [Heart Sounds] : normal S1 and S2 [Murmurs] : no murmurs present [Edema] : no peripheral edema present [] : no respiratory distress [Auscultation Breath Sounds / Voice Sounds] : lungs were clear to auscultation bilaterally [Respiration, Rhythm And Depth] : normal respiratory rhythm and effort [Exaggerated Use Of Accessory Muscles For Inspiration] : no accessory muscle use [Abnormal Walk] : normal gait [Bowel Sounds] : normal bowel sounds [Abdomen Tenderness] : non-tender [Abdomen Soft] : soft [Gait - Sufficient For Exercise Testing] : the gait was sufficient for exercise testing [Nail Clubbing] : no clubbing of the fingernails [Cyanosis, Localized] : no localized cyanosis [No Xanthoma] : no  xanthoma was observed [Oriented To Time, Place, And Person] : oriented to person, place, and time [Impaired Insight] : insight and judgment were intact [Affect] : the affect was normal [No Anxiety] : not feeling anxious [Mood] : the mood was normal [FreeTextEntry1] : scattered vitiligo

## 2020-07-15 NOTE — DISCUSSION/SUMMARY
[FreeTextEntry1] : Patient is a 44 year-old gentleman with cardiovascular risk factors as above - including insulin dependent type II diabetes - presents for cardiac evaluation prior to possible liver transplant.\par \par Follow-up dobutamine stress echo to evaluate structure and function of heart, filling pressures, and assess for ischemic heart disease.

## 2020-07-15 NOTE — HISTORY OF PRESENT ILLNESS
[FreeTextEntry1] : Patient is a 44 year-old gentleman with known insulin dependent type II diabetes who has know alcoholic liver disease with cirrhosis, variceal bleeding status post banding (not currently on beta-blocker), hepatic encephalopathy, now being evaluated for liver transplant.\par His last drink was April or May 2020 (and he was hospitalized in May 2020 for acute on chronic liver failure.\par Patient does not exercise, but he tries to walk with his fiance.\par He gets short of breath or tired easily.\par No chest pain. No recent bleeding.\par \par PMD: Rj Woods MD [phone]\par Hepatologist: Andre Thakkar MD and Agapito Ayala MD [phone]

## 2020-07-15 NOTE — ADDENDUM
[FreeTextEntry1] : On July 14, 2020, patient presented for dobutamine stress echo. \par With administration of dobutamine, patient had a drop in blood pressure and the test was stopped prior to achieving target heart rate.\par Patient will be referred for left heart catheterization prior to liver transplant.

## 2020-07-16 PROBLEM — R11.2 NAUSEA AND VOMITING: Status: ACTIVE | Noted: 2020-01-01

## 2020-07-16 PROBLEM — Z01.818 PRE-TRANSPLANT EVALUATION FOR LIVER TRANSPLANT: Status: ACTIVE | Noted: 2020-01-01

## 2020-07-16 PROBLEM — K72.90 HEPATIC ENCEPHALOPATHY: Status: ACTIVE | Noted: 2020-01-01

## 2020-07-16 PROBLEM — E46 PROTEIN-CALORIE MALNUTRITION: Status: ACTIVE | Noted: 2020-01-01

## 2020-07-16 PROBLEM — K70.31 ALCOHOLIC CIRRHOSIS OF LIVER WITH ASCITES: Status: ACTIVE | Noted: 2020-01-01

## 2020-07-16 PROBLEM — N17.9 ACUTE KIDNEY INJURY: Status: ACTIVE | Noted: 2020-01-01

## 2020-07-16 NOTE — CONSULT LETTER
[Dear  ___] : Dear  [unfilled], [Courtesy Letter:] : I had the pleasure of seeing your patient, [unfilled], in my office today. [Please see my note below.] : Please see my note below. [Consult Closing:] : Thank you very much for allowing me to participate in the care of this patient.  If you have any questions, please do not hesitate to contact me. [FreeTextEntry2] : Dr. Rj Woods [FreeTextEntry3] : Sincerely,\par \par Agapito Ayala M.D., Ph.D.\par  of Medicine\par [facility]\par [address] [place] [address]\[place] [address]\par Tel: [phone]\par Fax: [phone]\par Cell: [phone]\par E-mail: [email or web address]\par

## 2020-07-16 NOTE — ASSESSMENT
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 His last reported alcoholic drink was on 5/12/20. His cirrhosis is complicated by a history of variceal hemorrhage, hepatic encephalopathy, ascites, history of SBP, portal hypertensive gastropathy and colopathy, acute kidney injury (SAM) likely due to hepatorenal syndrome (HRS), and hyponatremia.\par \par # Hepatic encephalopathy:\par - Currently well-controlled.\par - Continue lactulose to 15 mL po bid, with extra doses as needed to achieve and maintain 3-4 BMs/day.\par - Continue rifaximin 550 mg po bid.\par - Advised to promptly notify us if any overt hepatic encephalopathy that does not improve with extra lactulose.\par \par # Non-oliguric SAM:\par - Stable on last labs, off diuretics.\par - Continue midodrine 5 mg po tid.\par - Continue sodium citrate-citric acid for associated metabolic acidosis, as per recent Transplant Nephrology recommendations.\par - Re-check labs today.\par - If worsening, may need re-admission for resumed IV albumin +/- octreotide.\par \par # Ascites:\par - Appears moderately distended on exam, but weight actually decreased compared to last week. Previously had insufficient ascites during both recent hospitalizations for paracentesis.\par - Continue holding diuretics given SAM, as above.\par - Bactrim DS discontinued as per Hematology recommendations due to concern for drug-related hemolytic anemia. Currently on Keflex for SBP prophylaxis, awaiting further recommendations by Transplant ID Dr. Gerry Wilkerson, who he is seeing today.\par - Mr. LIM was counseled to adhere to a low sodium (<2 grams of sodium per day) diet by: avoiding adding any salt to meals (removing the salt shaker from the table); eliminating salty foods from his diet; eating more home-cooked meals; choosing fresh or frozen, not canned, vegetables and fruits; and reading ingredient and food labels to choose low sodium foods.\par \par # History of esophageal variceal hemorrhage:\par - Last EGD in 4/2019 with small EV.\par - No recent overt GI bleeding.\par - Defer repeat surveillance EGD for now (risks outweigh benefits) but this will be re-assessed on an ongoing basis.\par - He is not a candidate for beta-blocker prophylaxis at this time (risks outweigh benefits).\par \par # Pruritus: Improved with cholestyramine 4g po qhs, will continue.\par \par # Postprandial nausea/vomiting: Concerning for electrolyte abnormalities and/or worsening renal dysfunction. Re-check labs today. Advised to monitor fingersticks (needs to buy lancets) in case of hypoglycemia. Will check urinalysis, urine culture, and blood culture today with labs. Start Zofran 4 mg po q4h prn nausea/vomiting.\par \par # Protein-calorie malnutrition: Continue small, frequent meals (5-6 times/day), trying to include 1-2 diabetic-friendly protein shakes such as Glucerna daily, and including a bedtime snack.\par \par # Decompensated alcohol-related cirrhosis with ACLF:\par - Recent MELD-Na 40+. 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 LIM was counseled to: abstain from alcohol and all illicit drugs; avoid use of herbal and dietary supplements due to potential hepatotoxicity; limit use of acetaminophen to <2 grams per day; avoid use of nonsteroidal antiinflammatory drugs (NSAIDs) as these can lead to diuretic resistance and precipitate renal dysfunction in patients with advanced liver disease; avoid eating any unpasteurized dairy products; avoid eating any raw or undercooked eggs, fish, poultry, or meat; and avoid eating raw/steamed oysters or other shellfish to avoid risk of Vibrio infection.\par \par # Pre-liver transplant evaluation:\par - Based on my review of Mr. LIM 's history, medical chart, review of systems, and my physical exam, I believe he is a fair candidate for liver transplantation given his ongoing engagement with his alcohol relapse prevention program and increasing length of sobriety since last relapse. Once his evaluation is completed, will plan to discuss his candidacy at our multidisciplinary selection meeting.\par \par I reviewed the following areas and provided time for questions to be asked and for information to be clarified and/or repeated.\par \par Evaluation process: I reviewed the results of tests and consults available to date and results of physical evaluation. I discussed the tests/consults that are required to complete an evaluation. I discussed the purpose of specific tests/consults, why they are needed, what the test involves, and discussed choices of where tests can be done. I discussed inclusion and exclusion criteria for organ transplant candidacy at [facility] and provided a copy of selection criteria, if requested. I advised him if alternative treatment options are available to them. I discussed the importance of abstinence from illicit, recreational, and prescriptive drug use. I discussed the importance of abstinence from alcohol use. I discussed the necessity of following a strict medical regimen post-transplant. I discussed the importance of an adequate support system to assist patient through evaluation, listing, and transplant process. The candidate was given the opportunity to ask questions.\par \par Candidate selection: I discussed the Recipient Review Committee (RRC) meeting process and consensus decision making of the team. I discussed that the team will decide if an organ transplant is indicated and if the patient is a suitable candidate medically, surgically, psychosocially, and financially. If alternative treatments are identified by the team, this will be discussed with the patient. The candidate was given the opportunity to ask questions.\par \par Waiting list: I discussed liver allocation and MELD/PELD system, prioritization on the waiting list, and average waiting time for patients based on their disease etiology. I discussed the need for periodic MELD/PELD recertification per OPTN/UNOS regulations and the consequence of not completing the requested tests/procedures on time. I discussed multiple listing options and the option to transfer his waiting time to another center. I discussed the possible progression and complications of their disease, and the possibility that he may not be a candidate in the future for organ transplant. I discussed with the patient that if he uses alcohol or any recreational drugs while on the waiting list or if he is non-adherent with follow-up or medications, he will be removed from the transplant waiting list. I advised the patient of his right to withdraw consent for transplant at any time. The candidate was given the opportunity to ask questions.\par \par Complications of liver disease: I discussed signs and symptoms of progressive liver disease or complications requiring medical attention. I discussed the complication of ascites/edema and generalized fluid overload. I discussed the complication of infections and spontaneous bacterial peritonitis. I discussed the development/worsening of renal insufficiency. I discussed generalized malaise, fatigue, and muscle wasting. I discussed encephalopathy, including the prevention, treatment, and worsening of the condition. I discussed GI bleeding, which is a medical emergency if present, and the need for urgent/emergent medical care. I discussed the possibility of development or worsening of HCC while on the waiting list. I discussed the possibility of progression of HCC beyond criteria for liver transplant. I reviewed the procedure for HCC cases when a donor liver becomes available, namely that the patient will be taken to the OR and explored prior to transplant. If any tumor is found outside of the liver, the transplant procedure will be cancelled and the abdomen will be closed. The candidate was given the opportunity to ask questions.\par \par He will return for follow-up in 1 weeks.

## 2020-07-16 NOTE — HISTORY OF PRESENT ILLNESS
[Variceal Hemorrhage] : Variceal Hemorrhage [Alcoholic Liver Disease] : Alcoholic Liver Disease [Ascites] : Ascites [Hepatic Encephalopathy] : Hepatic Encephalopathy [Spontaneous Bacterial Peritonitis] : Spontaneous Bacterial Peritonitis [Other: ___] : [unfilled] [Previous Transplant] : History of previous transplant [Diabetes] : History of diabetes [History of HCC] : No history of hepatocellular carcinoma [Neoadjuvant Therapy] : No Neoadjuvant therapy [History of Local-regional Treatment] : No history of  local-regional treatment [Previous Pancreas Islet Infusion] : No history of previous pancreas islet infusion [Hypertension] : No history of hypertension [Coronary Artery Disease] : No history of coronary artery disease [Previous MI] : No history of previous MI [Dialysis] : No history of dialysis [Dialysis within the past week] : No history of dialysis within the past week [FreeTextEntry1] : 40+ [FreeTextEntry2] : O [TextBox_20] : cornea (left eye) [TextBox_42] : Mr. 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 His last reported alcoholic drink was on 5/12/20. His cirrhosis is complicated by a history of variceal hemorrhage, hepatic encephalopathy, ascites, history of SBP, portal hypertensive gastropathy and colopathy, acute kidney injury (SAM) likely due to hepatorenal syndrome (HRS), and hyponatremia.\par \par He previously had a prolonged hospitalization at [facility] from 5/15/20-5/27/20. After being discharged, he began participating in a virtual alcohol relapse prevention program (with online groups sessions on Tuesdays and Thursdays), and then started his liver transplant evaluation on 6/10/20. At that date, it was felt that because of his multiple prior alcohol relapses despite known advanced liver disease, that he would need to continue to engage in his alcohol program and have a longer duration of sobriety before he would be able to listed, assuming no other contraindications were identified during the evaluation process.\par \par He was re-admitted to [facility] from 6/10/20-6/21/20 due to worsening SAM, to peak Cr 3.81 (6/10/20), with associated metabolic acidosis. His SAM was treated with IV albumin, octreotide, and midodrine for presumed HRS, with partial improvement as seen during his prior hospitalization, to Cr 1.85 (6/21/20). His Cr remained relatively stable around 1.9-2.1 even after the albumin and octreotide were discontinued. His acidosis improved with sodium citrate-citric acid therapy as per Transplant Nephrology, and he was discharged on that medication plus midodrine. Infectious work-up was negative. He had insufficient ascites for paracentesis during his hospitalization and remained off diuretics. His hospital course was complicated by mild hepatic encephalopathy, managed with rifaximin and lactulose.\par \par He was last seen by me in the office 1 week ago and is here today for continued close follow-up given his very high MELD-Na and multiple recent complications. Today, he complains of feeling cold all the time (but no chills or fevers), generalized weakness, fatigue, and frequent postprandial vomiting on a daily basis. He is able to tolerate iced coffee or Ensures (1-2 per day) okay, but often vomits after eating solid food. He is losing weight, down 6 lbs since last week. No abdominal pain, aside from occasional RUQ discomfort provoked with movement such as getting out of bed, turning, or coughing. He has not been able to check his fingersticks at home recently, as his insurance has not been covering the lancets and he was reluctant to pay for them out of pocket. He continues to attend his RPP virtually on Tuesdays and Thursdays and denies missing any sessions in the past few weeks, after initial having technology-related connection problems in early June. He denies any recent alcohol cravings or slips.\par \par He underwent a dobutamine stress echocardiogram on 7/14/20 as part of his pre-liver transplant evaluation, but it was aborted due to hypotension and Dr. Herman is planning to schedule a left heart catheterization next week instead.

## 2020-07-16 NOTE — PHYSICAL EXAM
[Healthy Appearing] : healthy appearing [No Acute Distress] : no acute distress [Normal Voice/Communication] : normal voice communication [Scleral Icterus] : scleral icterus [Normal Outer Ear/Nose] : the ears and nose were normal in appearance [EOMI] : extra ocular movement intact [Normal Hearing] : normal hearing [Normal Lips/Gums] : lips/gums were normal [No Neck Mass] : no neck mass [Normal Appearance] : normal appearance [Normal Oropharynx] : normal oropharynx [Supple] : the neck was supple [Thyroid Not Enlarged] : thyroid not enlarged [No Thyroid Nodules] : no thyroid nodules [No JVD] : no jugular venous distention [No Accessory Muscle Use] : no accessory muscle use [No Respiratory Distress] : no respiratory distress [Normal Rhythm and Effort] : normal rhythm and effort [Normal S1, S2] : normal S1 and S2 [Clear to Auscultation] : lungs were clear to auscultation bilaterally [Normal Rate/Rhythm] : normal rate/rhythm [No Gallops] : no gallops [No Rubs] : no rubs [Carotids Normal] : carotids normal [No Edema] : no edema [Pedal Pulses Normal] : pedal pulses normal [Splenomegaly] : splenomegaly [Abdominal Ascites] : abdominal ascites [Liver Palpable ___ Finger Breadths Below Costal Margin] : liver palpable [unfilled]  finger breadths below costal margin [Irregular] : Liver Edge: irregular [Non-Tender] : Liver Edge: non-tender [Normal Bowel Sounds] : normal bowel sounds [Non Tender] : non-tender [No Masses] : no abdominal mass palpated [Soft] : soft [Normal Supraclavicular Nodes] : no supraclavicular lymphadenopathy [Normal Post Cervical Nodes] : no posterior cervical lymphadenopathy [Normal Anterior Cervical Nodes] : no anterior cervical lymphadenopathy [Normal Inguinal Nodes] : no inguinal lymphadenopathy [No CVA Tenderness] : no ~M costovertebral angle tenderness [No Spinal Tenderness] : no spinal tenderness [Normal Gait] : normal gait [No Clubbing, Cyanosis] : no clubbing  or cyanosis of the fingernails [Normal Strength/Tone] : muscle strength and tone were normal [Spider Angioma] : spider angioma [Jaundice] : jaundice [Location of Spider Angiomas: ___] : location of spider angiomas: [unfilled] [No Rash] : no rash [Normal Turgor] : normal turgor [Normal Color/Pigmentation] : normal color/pigmentation [No Focal Deficits] : no focal deficits [No Motor Deficits] : no motor deficits [Normal Reflexes] : normal reflexes [Normal Affect] : normal affect [Normal Insight/Judgement] : normal insight/judgement [Remote Memory Intact] : remote memory intact [Hepatojugular Reflux] : no hepatojugular reflux [Abdominal Bruit] : no abdominal bruit [Caput Medusae] : no caput medusae [Ascites Fluid Wave] : no ascites fluid wave [Ascites Tense] : no ascites tense [Ascites Shifting Dullness] : no ascites shifting dullness [Asterixis] : no asterixis [Palmar Erythema] : no palmar erythema [de-identified] : Good dentition [de-identified] : Bitemporal wasting [de-identified] : +3/6 systolic murmur heard best at LUSB [de-identified] : Mild bilateral gynecomastia [de-identified] : Alert and oriented x4, no psychomotor slowing

## 2020-07-16 NOTE — REVIEW OF SYSTEMS
[Fatigue] : fatigue [Nausea] : nausea [Can Walk (___ Blocks)] : can walk [unfilled] blocks [Dyspnea on Exertion] : dyspnea on exertion [Muscle Weakness] : muscle weakness [Easy Bruising] : easy bruising [Negative] : Heme/Lymph [Abdominal Pain] : abdominal pain [Vomiting] : vomiting [Recent Weight Loss (___ Lbs)] : recent [unfilled] ~Ulb weight loss [FreeTextEntry7] : early satiety

## 2020-07-18 NOTE — H&P ADULT - ASSESSMENT
44M PMHx decompensated EtOH cirrhosis c/b ascites, SBP (hx of fluoroquinolone resistant E. coli), h/o esophageal variceal hemorrhage in remote past, alcoholic hepatitis, latent TB, A/W with blood stools and hematemesis. Now transferred to the MICU for elective intubation for EGD.     Neuro:   Potential for hepatic encephelopathy   - Monitor serum ammonia level   -will resume rifaximin and lactulose once cleared by GI   - Neuro checks per ICU protocol   - PT/OT once clinically appropriate     CV:   Hemodynamically stable at this time   - Potential for hemorrhagic shock    - Type and Screen   - CBC q 6 - will transfuse for hg goal of 7-8   - continue with telemetry   - EKG and CE on admission     Pulm:   No active pulmonary issues at this time   -Maintain aspiration precautions at all times   - f/u with COVID 19 PCR   - Elective intubation today for planned EGD   - Mechanical vent settings 12/470/5 and fio2 30- 40   - will obtain Chest x ray post intubation and Blood Gas     GI:   (+) Hematemesis with melena   - Maintain NPO   - CBC q 6 hrs Transfuse as above   - FFP Vs Kcentra if hemodynamic instability   -  Octreotide / PPI gtt 44M PMHx decompensated EtOH cirrhosis c/b ascites, SBP (hx of fluoroquinolone resistant E. coli), h/o esophageal variceal hemorrhage in remote past, alcoholic hepatitis, latent TB, A/W with blood stools and hematemesis. Now transferred to the MICU for elective intubation for EGD.     Neuro:   Potential for hepatic encephelopathy   - Monitor serum ammonia level   -will resume rifaximin and lactulose once cleared by GI   - Neuro checks per ICU protocol   - PT/OT once clinically appropriate     CV:   Hemodynamically stable at this time   - Potential for hemorrhagic shock    - Type and Screen   - CBC q 6 - will transfuse for hg goal of 7-8   - continue with telemetry   - EKG and CE on admission     Pulm:   No active pulmonary issues at this time   -Maintain aspiration precautions at all times   - f/u with COVID 19 PCR   - Elective intubation today for planned EGD   - Mechanical vent settings 12/470/5 and fio2 30- 40   - will obtain Chest x ray post intubation and Blood Gas     GI:   (+) Hematemesis with melena   - Maintain NPO   - CBC q 6 hrs Transfuse as above   - FFP Vs Kcentra if hemodynamic instability   -  Octreotide / PPI gtt   - GI f/u for EGD   -transplant team f/u     Ascitics   - will continue with empiric Ceftriaxone   - will monitor may need therapeutic tap if hemodynamics are stable post EGD   - hold diuresis while GI bleeding     :  - Real cath during procedure   - will follow urine out put - creat/ Bun   - Monitor electrolytes / supplement as needed    - will d/c Real cath in 24 hrs     ID:   - will send Procal level   - low suspicion for infectious   - will continue with Ceftriaxone at this time     DVT PPX:  - mechanical devices 44M PMHx decompensated EtOH cirrhosis c/b ascites, SBP (hx of fluoroquinolone resistant E. coli), h/o esophageal variceal hemorrhage in remote past, alcoholic hepatitis, latent TB, A/W with blood stools and hematemesis. Now transferred to the MICU for elective intubation for EGD.     Neuro:   Potential for hepatic encephelopathy   - Monitor serum ammonia level   -will resume rifaximin and lactulose once cleared by GI   - Neuro checks per ICU protocol   - PT/OT once clinically appropriate     CV:   Hemodynamically stable at this time   - Potential for hemorrhagic shock    - Type and Screen   - CBC q 6 - will transfuse for hg goal of 7-8   - continue with telemetry   - EKG and CE on admission     Pulm:   No active pulmonary issues at this time   -Maintain aspiration precautions at all times   - f/u with COVID 19 PCR   - Elective intubation today for planned EGD   - Mechanical vent settings 12/470/5 and fio2 30- 40   - will obtain Chest x ray post intubation and Blood Gas   - wean to extubate when clinically appropriate   GI:   (+) Hematemesis with melena   - Maintain NPO   - CBC q 6 hrs Transfuse as above   - FFP Vs Kcentra if hemodynamic instability   -  Octreotide / PPI gtt   - GI f/u for EGD   -transplant team f/u     Ascitics   - will continue with empiric Ceftriaxone   - will monitor may need therapeutic tap if hemodynamics are stable post EGD   - hold diuresis while GI bleeding     :  - Real cath during procedure   - will follow urine out put - creat/ Bun   - Monitor electrolytes / supplement as needed    - will d/c Real cath in 24 hrs     ID:   - will send Procal level   - low suspicion for infectious   - will continue with Ceftriaxone at this time     DVT PPX:  - mechanical devices 44M PMHx decompensated EtOH cirrhosis c/b ascites, SBP (hx of fluoroquinolone resistant E. coli), h/o esophageal variceal hemorrhage in remote past, alcoholic hepatitis, latent TB, A/W with blood stools and hematemesis. Now transferred to the MICU for elective intubation for EGD.     Neuro:   #Potential for hepatic encephelopathy   - Neuro checks per ICU protocol   - Monitor serum ammonia level   - will resume rifaximin and lactulose once cleared by GI     CV:   - Hemodynamically stable at this time   - Potential for hemorrhagic shock    - Type and Screen   - CBC q6h - will transfuse for hg goal of 7-8   - Continue with telemetry   - EKG on admission     Pulm:   No active pulmonary issues at this time   -Maintain aspiration precautions at all times   - f/u with COVID 19 PCR   - Elective intubation today for planned EGD   - Mechanical vent settings 12/470/5 and FiO2 30- 40   - will obtain Chest x ray post intubation, ABG   - wean to extubate when clinically appropriate   GI:   (+) Hematemesis with melena, Hgb 8.4  - Maintain NPO   - CBC q 6 hrs, transfuse as above   - FFP or PCC if hemodynamic instability   - Octreotide / PPI gtt   - GI f/u for EGD   - Transplant hepatology team f/u     Ascites  - will continue with empiric Ceftriaxone   - will monitor may need therapeutic tap if hemodynamics are stable post EGD   - hold diuresis while GI bleeding     :  - Real cath during procedure   - will follow urine out put - creat/ Bun   - Monitor electrolytes / supplement as needed    - will d/c Real cath in 24 hrs     HEME:  - TEG:     ID:   - will send Procal level   - low suspicion for infectious   - will continue with Ceftriaxone at this time     DVT PPX:  - mechanical devices 44M PMHx decompensated EtOH cirrhosis c/b ascites, SBP (hx of fluoroquinolone resistant E. coli), h/o esophageal variceal hemorrhage in remote past, alcoholic hepatitis, latent TB, A/W with blood stools and hematemesis. Now transferred to the MICU for elective intubation for EGD.     Neuro:   #Potential for hepatic encephelopathy   - Neuro checks per ICU protocol   - Monitor serum ammonia level   - will resume rifaximin and lactulose once cleared by GI     CV:   - Hemodynamically stable at this time   - Potential for hemorrhagic shock    - Continue with telemetry   - EKG on admission NSR and T wave inversions stable from previous EKGs.    Pulm:   - No active pulmonary issues at this time   - Maintain aspiration precautions at all times   - f/u with COVID 19 PCR   - Elective intubation today for planned EGD   - Mechanical vent settings 12/470/5 and FiO2 30- 40   - will obtain Chest x ray post intubation, ABG   - wean to extubate when clinically appropriate     GI:   (+) Hematemesis with melena, Hgb 8.4  - Maintain NPO   - CBC q 6 hrs, transfuse as above   - FFP or PCC if hemodynamic instability   - Octreotide / PPI gtt   - GI f/u for EGD   - Transplant hepatology team f/u     Ascites  - will continue with empiric Ceftriaxone   - will monitor may need therapeutic tap if hemodynamics are stable post EGD   - hold diuresis while GI bleeding     :  - Real cath during procedure   - will follow urine out put - creat/ Bun   - Monitor electrolytes / supplement as needed    - will d/c Real cath in 24 hrs     HEME:  - TEG:     ID:   - will send Procal level   - low suspicion for infectious   - will continue with Ceftriaxone at this time     DVT PPX:  - mechanical devices 44M PMHx decompensated cirrhosis (c/b ascites, SBP w/ hx of fluoroquinolone resistant E. coli, small esophageal varices, portal hypertensive gastropathy) secondary to alcoholic hepatitis with recent acute on chronic liver failure in 5/2020 from alcohol relapse p/w with blood stools and hematemesis. Now transferred to the MICU for elective intubation for EGD.     Neuro:   #Potential for hepatic encephelopathy  - Neuro checks per ICU protocol   - Monitor serum ammonia level   - Will resume rifaximin and lactulose once cleared by GI     CV:   - Hemodynamically stable at this time   - Potential for hemorrhagic shock    - Continue with telemetry   - EKG on admission NSR and T wave inversions stable from previous EKGs.    Pulm:   - Elective intubation today for planned EGD   - Obtain CXR post intubation, ABG   - No active pulmonary issues at this time   - Maintain aspiration precautions at all times   - f/u with COVID 19 PCR     GI:   #Hematemesis with melena, Hgb 8.4  - GI to do EGD for suspected esophageal variceal bleeding  - Maintain NPO   - CBC q6h, transfuse as above   - Vitamin K IV 10mg  - FFP or PCC if hemodynamic instability, since INR 3.79  - Octreotide gtt  - PPI gtt  - holding home cephalexin for SBP prophylaxis; start ceftriaxone 1 g daily for total 5 day course  - Transplant hepatology team f/u     Ascites  - will continue with empiric Ceftriaxone for SBP ppx   - may need therapeutic tap if hemodynamics are stable post EGD   - hold diuresis while GI bleeding     /RENAL:  - Real cath during procedure   - will follow urine out put  - Cr 1.85, appears to be at baseline   - Monitor electrolytes / supplement as needed    - will d/c Real cath in 24 hrs     HEME:  #Hematemesis with melena, Hgb 8.4  - CBC q6h, transfuse as needed    #Coagulopathy, secondary to liver dysfunction  - Vitamin K IV 10  - FFP or PCC if hemodynamic instability, since INR 3.79  - Deficiencies in coagulation factors, fibrinogen, platelets. TEG: R 11.1, K 4.8, Angle 53.4, MA <40.0    - DVT PPX: mechanical devices    ID:   - c/w Ceftriaxone for SBP ppx    ENDO:  - no active issues 44M PMHx decompensated cirrhosis (c/b ascites, SBP w/ hx of fluoroquinolone resistant E. coli, small esophageal varices, portal hypertensive gastropathy) secondary to alcoholic hepatitis with recent acute on chronic liver failure in 5/2020 from alcohol relapse p/w with blood stools and hematemesis. Now transferred to the MICU for elective intubation for EGD.     Neuro:   #Potential for hepatic encephelopathy  - Neuro checks per ICU protocol   - Monitor serum ammonia level   - Will resume rifaximin and lactulose once cleared by GI     CV:   - Hemodynamically stable at this time   - Potential for hemorrhagic shock    - Continue with telemetry   - EKG on admission NSR and T wave inversions stable from previous EKGs.    Pulm:   - Elective intubation today for planned EGD   - Obtain CXR post intubation, ABG   - No active pulmonary issues at this time   - Maintain aspiration precautions at all times   - f/u with COVID 19 PCR     GI:   #Hematemesis with melena, Hgb 8.4  - GI to do EGD for suspected esophageal variceal bleeding  - Maintain NPO   - CBC q6h, transfuse as above   - Vitamin K IV 10mg  - FFP or PCC if hemodynamic instability, since INR 3.79  - Octreotide gtt  - PPI gtt  - holding home cephalexin for SBP prophylaxis; start ceftriaxone 1 g daily for total 5 day course  - Transplant hepatology team f/u; per chart review, given frequent alcohol relapse, pt needs to abstain for >3m to be listed for transplant    Ascites  - will continue with empiric Ceftriaxone for SBP ppx   - may need therapeutic tap if hemodynamics are stable post EGD   - hold diuresis while GI bleeding     /RENAL:  - Real cath during procedure   - will follow urine out put  - Cr 1.85, appears to be at baseline   - Monitor electrolytes / supplement as needed    - will d/c Real cath in 24 hrs     HEME:  #Hematemesis with melena, Hgb 8.4  - CBC q6h, transfuse as needed    #Coagulopathy, secondary to liver dysfunction  - INR 3.79  - Deficiencies primarily in fibrinogen, platelets. TEG: R 11.1, K 4.8, Angle 53.4, MA <40.0  - Will give FFP and Cryoprecipitate     - DVT PPX: mechanical devices    ID:   - c/w Ceftriaxone 1g daily x 5 days for SBP ppx    ENDO:  - SSI    Full code 44M PMHx decompensated cirrhosis (c/b ascites, SBP w/ hx of fluoroquinolone resistant E. coli, small esophageal varices, portal hypertensive gastropathy) secondary to alcoholic hepatitis with recent acute on chronic liver failure in 5/2020 from alcohol relapse p/w with blood stools and hematemesis. Now transferred to the MICU for elective intubation for EGD.     Neuro:   #Potential for hepatic encephelopathy  - Neuro checks per ICU protocol   - Monitor serum ammonia level   - Will resume rifaximin and lactulose once cleared by GI     CV:   - Hemodynamically stable at this time   - Potential for hemorrhagic shock    - Continue with telemetry   - EKG on admission NSR and T wave inversions stable from previous EKGs.    Pulm:   - Elective intubation today for planned EGD   - Obtain CXR post intubation, ABG   - No active pulmonary issues at this time   - Maintain aspiration precautions at all times   - f/u with COVID 19 PCR     GI:   #Hematemesis with melena, Hgb 8.4  - GI to do EGD for suspected esophageal variceal bleeding  - Maintain NPO   - CBC q6h, transfuse as above   - Vitamin K IV 10mg  - FFP or PCC if hemodynamic instability, since INR 3.79  - Octreotide gtt  - PPI gtt  - holding home cephalexin for SBP prophylaxis; start ceftriaxone 1 g daily for total 5 day course  - Transplant hepatology team f/u; per chart review, given frequent alcohol relapse, pt needs to abstain for >3m to be listed for transplant    Ascites  - will continue with empiric Ceftriaxone for SBP ppx   - may need therapeutic tap if hemodynamics are stable post EGD   - hold diuresis while GI bleeding     /RENAL:  - Real cath during procedure   - will follow urine out put  - Cr 1.85, appears to be at baseline   - Monitor electrolytes / supplement as needed    - will d/c Real cath in 24 hrs     HEME:  #Hematemesis with melena, Hgb 8.4  - CBC q6h, transfuse as needed    #Coagulopathy, secondary to liver dysfunction  - INR 3.79  - Deficiencies primarily in fibrinogen, platelets. TEG: R 11.1, K 4.8, Angle 53.4, MA <40.0  - Will give FFP and Cryoprecipitate     - DVT PPX: mechanical devices    ID:   - c/w Ceftriaxone 1g daily x 5 days for SBP ppx    ENDO:  - T2DM, non-insulin dependent. On repaglinide at home.    Full code 44M PMHx decompensated cirrhosis (c/b ascites, SBP w/ hx of fluoroquinolone resistant E. coli, small esophageal varices, portal hypertensive gastropathy) secondary to alcoholic hepatitis with recent acute on chronic liver failure in 5/2020 from alcohol relapse p/w with blood stools and hematemesis. Now transferred to the MICU for elective intubation for EGD.     Neuro:   #Potential for hepatic encephelopathy  - Neuro checks per ICU protocol   - Monitor serum ammonia level   - Sedation for intubation  - Will resume rifaximin and lactulose once cleared by GI     CV:   - Hemodynamically stable at this time   - Potential for hemorrhagic shock    - Continue with telemetry   - EKG on admission NSR and T wave inversions stable from previous EKGs.    Pulm:   - Elective intubation today for planned EGD   - Obtain CXR post intubation, ABG   - No active pulmonary issues at this time   - Maintain aspiration precautions at all times   - f/u with COVID 19 PCR     GI:   #Hematemesis with melena, Hgb 8.4  - GI to do EGD for suspected esophageal variceal bleeding  - Maintain NPO   - CBC q6h, transfuse as above   - Vitamin K IV 10mg  - FFP or PCC if hemodynamic instability, since INR 3.79  - Octreotide gtt  - PPI gtt  - holding home cephalexin for SBP prophylaxis; start ceftriaxone 1 g daily for total 5 day course  - Transplant hepatology team f/u; per chart review, given frequent alcohol relapse, pt needs to abstain for >3m to be listed for transplant    Ascites  - will continue with empiric Ceftriaxone for SBP ppx   - may need therapeutic tap if hemodynamics are stable post EGD   - hold diuresis while GI bleeding     /RENAL:  - Real cath during procedure   - will follow urine out put  - Cr 1.85, appears to be at baseline   - Monitor electrolytes / supplement as needed    - will d/c Real cath in 24 hrs     HEME:  #Hematemesis with melena, Hgb 8.4  - CBC q6h, transfuse as needed    #Coagulopathy, secondary to liver dysfunction  - INR 3.79  - Deficiencies primarily in fibrinogen, platelets. TEG: R 11.1, K 4.8, Angle 53.4, MA <40.0  - Will give FFP and Cryoprecipitate + benadryl given pt's hx of rash post-transfusion    - DVT PPX: mechanical devices    ID:   - c/w Ceftriaxone 1g daily x 5 days for SBP ppx    ENDO:  - T2DM, on repaglinide at home.  - FS goal 140-180, at goal    Full code

## 2020-07-18 NOTE — H&P ADULT - NSHPOUTPATIENTPROVIDERS_GEN_ALL_CORE
Dr. Rj Woods (PCP)  Dr. Agapito Ayala (Hepatology)  Dr. Andre Herman (Cardiology)  Dr. Mona Kothari (Endocrinology)

## 2020-07-18 NOTE — ED ADULT NURSE NOTE - CHIEF COMPLAINT QUOTE
2 episodes of vomiting & bloody stools since 9 AM today. SOB and weakness. C/o dizziness. Hx of cirrhosis.  Recent transfusion on Wednesday due to low hgb.

## 2020-07-18 NOTE — ED PROVIDER NOTE - NS ED ROS FT
Gen: Denies fever, chills  CV: Denies chest pain  Skin: jaundice  Resp: Denies SOB, cough  ENT: Denies nasal congestion  Eyes: Denies blurry vision  GI: +nausea, vomiting, melena  Msk: Denies LE swelling  : Denies dysuria  Neuro: Denies headache

## 2020-07-18 NOTE — ED PROVIDER NOTE - OBJECTIVE STATEMENT
43 yo M with pmhx of alcoholic hepatitis, decompensated etOH cirrhosis (c/b ascites, SBP, and variceal hemorrhage),  latent TB (treated), h/o C. diff (2016, 2017), vitiligo, and T2DM (HbA1c 7.9% in 10/2019), presents with few episodes of black stools and hematemesis since this morning. Denies abdominal pain, fever, chills, diarrhea. Had a blood transfusion in past one week.

## 2020-07-18 NOTE — CONSULT NOTE ADULT - CONSULT REASON
Continuity of Care Form    Patient Name: Sara Lopez   :  1989  MRN:  [id number]    Admit date:  2/3/2020  Discharge date:  ***    Code Status Order: No Order   Advance Directives:     Admitting Physician:  No admitting provider for patient encounter. PCP: No primary care provider on file. Discharging Nurse: [facility] Unit/Room#:   Discharging Unit Phone Number: ***    Emergency Contact:   Extended Emergency Contact Information  [facility] Phone: [phone]  Relation: Spouse    Past Surgical History:  History reviewed. No pertinent surgical history. Immunization History: There is no immunization history on file for this patient. Active Problems: There is no problem list on file for this patient.       Isolation/Infection:   Isolation          No Isolation        Patient Infection Status     None to display          Nurse Assessment:  Last Vital Signs: BP (!) 145/90   Pulse 105   Temp 99.1 °F (37.3 °C) (Oral)   Resp 17   Ht 6' 2\" (1.88 m)   Wt 111.1 kg (245 lb)   SpO2 98%   BMI 31.46 kg/m²     Last documented pain score (0-10 scale):    Last Weight:   Wt Readings from Last 1 Encounters:   20 111.1 kg (245 lb)     Mental Status:  {IP PT MENTAL STATUS:}    IV Access:  { ERIC IV ACCESS:870690747}    Nursing Mobility/ADLs:  Walking   {CHP DME SUVU:825709119}  Transfer  {CHP DME NJDI:364262339}  Bathing  {CHP DME RXDN:330352586}  Dressing  {CHP DME CAV}  Toileting  {CHP DME EXHL:572683454}  Feeding  {CHP DME PRTD:164386570}  Med Admin  {CHP DME PSXY:325911177}  Med Delivery   { ERIC MED Delivery:367206458}    Wound Care Documentation and Therapy:        Elimination:  Continence:   · Bowel: {YES / RI:92192}  · Bladder: {YES / TN:05468}  Urinary Catheter: {Urinary Catheter:223703871}   Colostomy/Ileostomy/Ileal Conduit: {YES / ANNITA:10580}       Date of Last BM: ***  No intake or output data in the 24 hours ending 20 1741  No intake/output data recorded.     Safety Concerns:     [address] RE[address] Safety Concerns:519648232}    Impairments/Disabilities:      [address] REYES Impairments/Disabilities:179127139}    Nutrition Therapy:  Current Nutrition Therapy:   50[address] REYES Diet List:923281344}    Routes of Feeding: {CHP DME Other Feedings:487685704}  Liquids: {Slp liquid thickness:78963}  Daily Fluid Restriction: {CHP DME Yes amt example:445804211}  Last Modified Barium Swallow with Video (Video Swallowing Test): {Done Not Done JTXK:953744921}    Treatments at the Time of Hospital Discharge:   Respiratory Treatments: ***  Oxygen Therapy:  {Therapy; copd oxygen:71407}  Ventilator:    { CC Vent HOV}    Rehab Therapies: {THERAPEUTIC INTERVENTION:0256773194}  Weight Bearing Status/Restrictions: Noelle Guzman  Weight Bearin}  Other Medical Equipment (for information only, NOT a DME order):  {EQUIPMENT:719919398}  Other Treatments: ***    Patient's personal belongings (please select all that are sent with patient):  {CHP DME Belongings:778772293}    RN SIGNATURE:  {Esignature:310934337}    CASE MANAGEMENT/SOCIAL WORK SECTION    Inpatient Status Date: ***    Readmission Risk Assessment Score:  Readmission Risk              Risk of Unplanned Readmission:        0           Discharging to Facility/ Agency   · Name:   · Address:  · Phone:  · Fax:    Dialysis Facility (if applicable)   · Name:  · Address:  · Dialysis Schedule:  · Phone:  · Fax:    / signature: {Esignature:273109221}    PHYSICIAN SECTION    Prognosis: {Prognosis:9747181278}    Condition at Discharge: 50Noemy Guzman Patient Condition:191797621}    Rehab Potential (if transferring to Rehab): {Prognosis:1886787754}    Recommended Labs or Other Treatments After Discharge: ***    Physician Certification: I certify the above information and transfer of Demetrice Kearney  is necessary for the continuing treatment of the diagnosis listed and that he requires Hematemesis and melena {Admit to Appropriate Level of Care:95222} for {GREATER/LESS:836815638} 30 days.      Update Admission H&P: {CHP DME Changes in [facility]:949800593}    PHYSICIAN SIGNATURE:  {Esignature:906566261}

## 2020-07-18 NOTE — ASSESSMENT
[Treatment Education] : treatment education [Rx Dose / Side Effects] : Rx dose/side effects [Treatment Adherence] : treatment adherence [Nutritional / Food Issues] : nutritional/food issues [Risk Reduction] : risk reduction [Anticipatory Guidance] : anticipatory guidance [Drug Interactions / Side Effects] : drug interactions/side effects [Disclosure of Diagnosis] : disclosure of diagnosis [Education Materials Provided] : Eduction materials were provided [FreeTextEntry1] : 45 yo man with a history of ETOH cirrhosis undergoing pre OLTx evaluation. HIstory of SBP but intolerant to quinolones and possibly with anemia secondary to bactrim-tolerating keflex to date\par \par History of latent Tb- treated with likely INH/B6 pt thinks he was treated for a year. Will obtain CT of chest to see if evidence of old vs active disease. IF old will consider repeat tx for latent TB post transplant\par \par Serologies to date:\par HIV non reactive\par syphilis- nonreactive\par toxo IGGab+\par CMV IGG ab+\par HSV 1 +, HSV 2 -\par HepB not immune\par Hep A  IGG +\par HCV ab -\par EBV- past exposure\par \par \par Will send COVID ab\par will check strongyloides ab\par Will check T.cruzi ab\par \par \par Will give Prevnar today and 8 weeks later PNVX-23\par Will begin Hepislav series as he is not immune to hep B despite vaccination- will need second dose 1-2 mos\par Can receive Shingrix vaccine at a later visit\par Tdap booster next visit when he RTC to see Dr Ayala in 2 weeks\par \par  [Universal Precautions] : universal precautions [Medical Care Issues] : medical care issues

## 2020-07-18 NOTE — H&P ADULT - HISTORY OF PRESENT ILLNESS
44M PMHx decompensated EtOH cirrhosis c/b ascites, SBP (hx of fluoroquinolone resistant E. coli), h/o esophageal variceal hemorrhage in remote past, alcoholic hepatitis, latent TB, presents with blood stools and hematemesis. Patient notes 2 episodes of emesis since 9AM on day of admission with concomitant SOB, weakness, and dizziness. Patient denies    Of note patient had hospitalization in May 2020 for juandice and weakness x 2 days in setting of increased drinking 44M PMHx decompensated EtOH cirrhosis c/b ascites, SBP (hx of fluoroquinolone resistant E. coli), h/o esophageal variceal hemorrhage in remote past, alcoholic hepatitis, latent TB, presents with blood stools and hematemesis. Patient notes 2 episodes of emesis since 9AM on day of admission with concomitant SOB, weakness, and dizziness. Patient denies any chest pain, fevers or recent travel. Most recent blood transfusion was on 7/15/2020     Of note patient had hospitalization in May 2020 for juandice and weakness x 2 days in setting of increased drinking at that time MELD score was 40 s/p tx for  Alcoholic hepatitis his hospital course at that time was c/b Flouroquinolone resistant  e coli. An US was performed which revealed. 44M PMHx decompensated EtOH cirrhosis c/b ascites, SBP (hx of fluoroquinolone resistant E. coli), h/o esophageal variceal hemorrhage in remote past, alcoholic hepatitis, latent TB, presents with blood stools and hematemesis. Patient notes 2 episodes of emesis since 9AM on day of admission with concomitant SOB, weakness, and dizziness. Patient denies any chest pain, fevers or recent travel. Most recent blood transfusion was on 7/15/2020     Of note patient had hospitalization in May 2020 for juandice and weakness x 2 days in setting of increased drinking at that time MELD score was 40 s/p tx for  Alcoholic hepatitis. An US was performed which revealed moderate ascites Aldactone and Lasix was given and the patient was discharged.     In the ED VS 99 Hrt rate 102 /73  The patient c/o Nausea and has 1 episode of hematemesis and was given Zofran x 1. Octreotide gtt  and PPI dggt was initiated. Stat dose of ceftriaxone was also given. GI was consulted and the patient will be transferred to the MICU for intubation for planned  EGD. 44M PMHx decompensated EtOH cirrhosis c/b ascites, SBP (hx of fluoroquinolone resistant E. coli), h/o esophageal variceal hemorrhage in remote past, alcoholic hepatitis, latent TB, presents with blood stools and hematemesis. Patient notes 2 episodes of emesis since 9AM on day of admission with concomitant SOB, weakness, and dizziness. Patient denies any chest pain, fevers or recent travel. Most recent blood transfusion was on 7/15/2020     Of note patient had hospitalization in May 2020 for juandice and weakness x 2 days in setting of increased drinking at that time MELD score was 40 s/p tx for alcoholic hepatitis. An US was performed which revealed moderate ascites. Aldactone and Lasix was given and the patient was discharged.     In the ED VS 99 Hrt rate 102 /73  The patient c/o nausea and has 1 episode of hematemesis and was given Zofran x 1. Octreotide gtt and PPI ggt was initiated. Stat dose of ceftriaxone was also given. GI was consulted and the patient will be transferred to the MICU for intubation for planned EGD. 44M PMHx decompensated cirrhosis (c/b ascites, SBP w/ hx of fluoroquinolone resistant E. coli, small esophageal varices) secondary to alcoholic hepatitis, latent TB, DM2, vitiligo presents with hematemesis and bloody stools. Patient notes 2 episodes of emesis since 9AM on day of admission with concomitant SOB, weakness, and dizziness. Patient denies any chest pain, fevers or recent travel. Most recent blood transfusion was on 7/15/2020. Last drink was in 5/2020.    Of note patient had hospitalization in May 2020 for juandice and weakness x 2 days in setting of increased drinking at that time MELD score was 40 s/p tx for alcoholic hepatitis. An US was performed which revealed moderate ascites. Spironolactone and Lasix was given and the patient was discharged.     In the ED VS 99 Hrt rate 102 /73  The patient c/o nausea and has 1 episode of hematemesis and was given Zofran x 1. Octreotide gtt and PPI ggt was initiated. Stat dose of ceftriaxone was also given. GI was consulted and the patient will be transferred to the MICU for intubation for planned EGD. 44M PMHx decompensated cirrhosis (c/b ascites, SBP w/ hx of fluoroquinolone resistant E. coli, small esophageal varices) secondary to alcoholic hepatitis, latent TB, DM2, vitiligo presents with hematemesis and bloody stools. Patient notes 2 episodes of emesis since 9AM on day of admission with concomitant SOB, weakness, and dizziness. Patient denies any chest pain, fevers or recent travel. Most recent blood transfusion was on 7/15/2020. Last drink was in 5/2020.    Of note patient had hospitalization in May 2020 for jaundice and weakness x 2 days in setting of increased drinking at that time MELD score was 40 s/p tx for alcoholic hepatitis. An US was performed which revealed moderate ascites. Spironolactone and Lasix was given and the patient was discharged.     In the ED VS 99 Hrt rate 102 /73  The patient c/o nausea and has 1 episode of hematemesis and was given Zofran x 1. Octreotide gtt and PPI ggt was initiated. Stat dose of ceftriaxone was also given. GI was consulted and the patient will be transferred to the MICU for intubation for planned EGD.

## 2020-07-18 NOTE — HISTORY OF PRESENT ILLNESS
[Monogamous] : monogamous [Sexually Active] : The patient is sexually active [Women] : with women [FreeTextEntry1] : 45 yo man born in [place] in a small town but has lived in [place] area x 17yrs.  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

## 2020-07-18 NOTE — ED ADULT NURSE NOTE - NSIMPLEMENTINTERV_GEN_ALL_ED
Implemented All Fall with Harm Risk Interventions:  [place] to call system. Call bell, personal items and telephone within reach. Instruct patient to call for assistance. Room bathroom lighting operational. Non-slip footwear when patient is off stretcher. Physically safe environment: no spills, clutter or unnecessary equipment. Stretcher in lowest position, wheels locked, appropriate side rails in place. Provide visual cue, wrist band, yellow gown, etc. Monitor gait and stability. Monitor for mental status changes and reorient to person, place, and time. Review medications for side effects contributing to fall risk. Reinforce activity limits and safety measures with patient and family. Provide visual clues: red socks.

## 2020-07-18 NOTE — ED ADULT NURSE NOTE - PMH
Alcoholic cirrhosis    Diabetes mellitus type 2, insulin dependent    E. coli bacteremia  Recurrent  Esophageal varices    Latent tuberculosis  Diagnosed with +PPD in early twenties. Patient received 2 years of treatment.  Vitiligo

## 2020-07-18 NOTE — ED PROVIDER NOTE - CLINICAL SUMMARY MEDICAL DECISION MAKING FREE TEXT BOX
Pt with hx of cirrhosis and variceal bleeding presents with hematemesis and melena. VSS. Labs, ceftriaxone, zofran. Type and screen/coags - if blood transfusion is needed. WIll continue to monitor.

## 2020-07-18 NOTE — ED ADULT NURSE NOTE - OBJECTIVE STATEMENT
43 yo male with a PMH of cirrhosis due to ETOH use, esophageal varices, alcoholic hepatitis, latent TB, DM, presents to the ED via waiting room from home complaining of bloody stools and vomit today. Patient states he went to the bathroom this morning at around 0900 to pass stool and noticed it was black. Patient thought it was one episode, but continued to go to the bathroom and passed about a total of 4 stools--all well formed, but bloody or black. Patient also states that he had two episodes of vomiting and noticed there was blood in the vomit. Endorses having two units of blood transfused last week due to low hgb. Upon examination, abdomen is distended. States that abdomen has been distended for about a month. Denies any pain associated abdomen distention. Patient endorsing feeling weaker after episodes of bloody stools and vomit. Patient appears to be jaundiced. Denies headache, dizziness, vision changes, chest pain, shortness of breath, abdominal pain, diarrhea, fevers, chills, dysuria, hematuria, recent illness travel or fall. 43 yo male with a PMH of cirrhosis due to ETOH use, esophageal varices, alcoholic hepatitis, latent TB, DM, presents to the ED via waiting room from home complaining of bloody stools and vomit today. Patient states he went to the bathroom this morning at around 0900 to pass stool and noticed it was black. Patient thought it was one episode, but continued to go to the bathroom and passed about a total of 4 stools--all well formed, but bloody or black. Patient also states that he had two episodes of vomiting and noticed there was blood in the vomit. Endorses having two units of blood transfused last week due to low hgb. Upon examination, abdomen is distended. States that abdomen has been distended for about a month. Denies any pain associated abdomen distention. Patient endorsing feeling weaker after episodes of bloody stools and vomit. Patient appears to be jaundiced. States last alcoholic drink was in March. Denies headache, dizziness, vision changes, chest pain, shortness of breath, abdominal pain, diarrhea, fevers, chills, dysuria, hematuria, recent illness travel or fall.

## 2020-07-18 NOTE — CONSULT NOTE ADULT - SUBJECTIVE AND OBJECTIVE BOX
Chief Complaint:  Patient is a 44y old  Male who presents with a chief complaint of Hematemesis (18 Jul 2020 22:10)      HPI:    44 year old man with of vitiligo, obesity, DM2, history of alcoholic hepatitis (2012; 2020) and decompensated cirrhosis (complicated by small EV, portal hypertensive gastropathy, ascites and SBP) secondary to alcohol-related liver disease, with recent acute-on-chronic liver failure in 05/2020 secondary to alcohol relapse presenting for one day of black stools and bloody vomiting. Patient reports black, loose stools starting earlier today for total of 5 episodes. He developed nausea in the early evening and vomited a mixture of black and bright red blood. He became symptomatic with weakness, dizziness and some shortness of breath on exertion. Since presenting to the ED, he has had an additional episode of black stool and hematemesis. He has no prior history of GI bleeding.       Allergies:  levofloxacin (Other (Moderate))      Home Medications:    · 	sulfamethoxazole-trimethoprim 800 mg-160 mg oral tablet: 1 tab(s) orally once a day  · 	Xifaxan 550 mg oral tablet: 1 tab(s) orally 2 times a day  · 	lactulose 10 g/15 mL oral syrup: 15 milliliter(s) orally once a day  · 	cholestyramine 4 g/5 g oral powder for reconstitution: 4 gram(s) orally 2 times a day   · 	midodrine 5 mg oral tablet: 1 tab(s) orally every 8 hours  · 	zinc sulfate 220 mg oral capsule: 1 cap(s) orally 2 times a day  · 	sodium citrate-citric acid 500 mg-334 mg/5 mL oral solution: 30 milliliter(s) orally 3 times a day   · 	repaglinide 1 mg oral tablet: 1 tab(s) orally 3 times a day  · 	pantoprazole 40 mg oral delayed release tablet: 1 tab(s) orally once a day  · 	Multiple Vitamins oral tablet: 1 tab(s) orally once a day    Hospital Medications:  chlorhexidine 4% Liquid 1 Application(s) Topical <User Schedule>  octreotide  Infusion 50 MICROgram(s)/Hr IV Continuous <Continuous>  pantoprazole Infusion 8 mG/Hr IV Continuous <Continuous>      PMHX/PSHX:  E. coli bacteremia  Vitiligo  Latent tuberculosis  Diabetes mellitus type 2, insulin dependent  Esophageal varices  Alcoholic cirrhosis  Status post corneal transplant  Alcoholic Cirrhosis  Esophageal Varices      Family history:  Family history of uterine cancer  Family history of diabetes mellitus      Social History: History of alcohol abuse - last drink 5/11/20. No tobacco use.     ROS:     General:  No weight loss, fevers, chills, night sweats, fatigue  Eyes:  No vision changes, no yellowing of eyes   ENT:  No throat pain, runny nose  CV:  No chest pain, palpitations  Resp:  No SOB, cough, wheezing  GI:  See HPI  :  No burning with urination, no hematuria   Muscle:  No muscle pain, weakness  Neuro:  No numbness/tingling, memory problems  Psych:  No fatigue, insomnia, mood problems  Heme:  No easy bruisability  Skin:  No rash, itching       PHYSICAL EXAM:     GENERAL:  Appears stated age, well-groomed, well-nourished, no distress  HEENT:  NC/AT,  conjunctivae clear and pink,  no JVD  CHEST:  Full & symmetric excursion, no increased effort, breath sounds clear  HEART:  Regular rhythm, S1, S2, no murmur/rub/S3/S4, no abdominal bruit, no edema  ABDOMEN:  Soft, non-tender, non-distended, normoactive bowel sounds,  no masses ,  EXTREMITIES:  no cyanosis,clubbing or edema  SKIN:  No rash/erythema/ecchymoses/petechiae/wounds/abscess/warm/dry  NEURO:  Alert, oriented    Vital Signs:  Vital Signs Last 24 Hrs  T(C): 37.2 (18 Jul 2020 21:28), Max: 37.2 (18 Jul 2020 21:28)  T(F): 99 (18 Jul 2020 21:28), Max: 99 (18 Jul 2020 21:28)  HR: 90 (18 Jul 2020 22:00) (90 - 102)  BP: 134/77 (18 Jul 2020 22:00) (111/73 - 134/77)  BP(mean): --  RR: 18 (18 Jul 2020 22:00) (18 - 19)  SpO2: 100% (18 Jul 2020 22:00) (91% - 100%)  Daily Height in cm: 172.72 (18 Jul 2020 21:28)    Daily     LABS:                        8.7    8.42  )-----------( x        ( 18 Jul 2020 22:23 )             25.2             PT/INR - ( 18 Jul 2020 22:23 )   PT: 42.3 sec;   INR: 3.79 ratio         PTT - ( 18 Jul 2020 22:23 )  PTT:69.4 sec        Imaging: Chief Complaint:  Patient is a 44y old  Male who presents with a chief complaint of Hematemesis (18 Jul 2020 22:10)      HPI:    44 year old man with of vitiligo, obesity, DM2, history of alcoholic hepatitis (2012; 2020) and decompensated cirrhosis (complicated by small EV, portal hypertensive gastropathy, ascites and SBP) secondary to alcohol-related liver disease, with recent acute-on-chronic liver failure in 05/2020 secondary to alcohol relapse presenting for one day of black stools and bloody vomiting. Patient reports black, loose stools starting earlier today for total of 5 episodes. He developed nausea in the early evening and vomited a mixture of black and bright red blood. He became symptomatic with weakness, dizziness and some shortness of breath on exertion. Since presenting to the ED, he has had an additional episode of black stool and hematemesis. He has no prior history of GI bleeding.       Allergies:  levofloxacin (Other (Moderate))      Home Medications:    · 	sulfamethoxazole-trimethoprim 800 mg-160 mg oral tablet: 1 tab(s) orally once a day  · 	Xifaxan 550 mg oral tablet: 1 tab(s) orally 2 times a day  · 	lactulose 10 g/15 mL oral syrup: 15 milliliter(s) orally once a day  · 	cholestyramine 4 g/5 g oral powder for reconstitution: 4 gram(s) orally 2 times a day   · 	midodrine 5 mg oral tablet: 1 tab(s) orally every 8 hours  · 	zinc sulfate 220 mg oral capsule: 1 cap(s) orally 2 times a day  · 	sodium citrate-citric acid 500 mg-334 mg/5 mL oral solution: 30 milliliter(s) orally 3 times a day   · 	repaglinide 1 mg oral tablet: 1 tab(s) orally 3 times a day  · 	pantoprazole 40 mg oral delayed release tablet: 1 tab(s) orally once a day  · 	Multiple Vitamins oral tablet: 1 tab(s) orally once a day    Hospital Medications:  chlorhexidine 4% Liquid 1 Application(s) Topical <User Schedule>  octreotide  Infusion 50 MICROgram(s)/Hr IV Continuous <Continuous>  pantoprazole Infusion 8 mG/Hr IV Continuous <Continuous>      PMHX/PSHX:  E. coli bacteremia  Vitiligo  Latent tuberculosis  Diabetes mellitus type 2, insulin dependent  Esophageal varices  Alcoholic cirrhosis  Status post corneal transplant  Alcoholic Cirrhosis  Esophageal Varices      Family history:  Family history of uterine cancer  Family history of diabetes mellitus      Social History: History of alcohol abuse - last drink 5/11/20. No tobacco use.     ROS:     General:  No weight loss, fevers, chills, night sweats, fatigue  Eyes:  No vision changes, no yellowing of eyes   ENT:  No throat pain, runny nose  CV:  No chest pain, palpitations  Resp:  No SOB, cough, wheezing  GI:  See HPI  :  No burning with urination, no hematuria   Muscle:  No muscle pain, weakness  Neuro:  No numbness/tingling, memory problems  Psych:  No fatigue, insomnia, mood problems  Heme:  No easy bruisability  Skin:  No rash, itching       PHYSICAL EXAM:     GENERAL:  Appears stated age, well-groomed, well-nourished, no distress  HEENT:  NC/AT,  conjunctivae clear and pink,  no JVD  CHEST:  Full & symmetric excursion, no increased effort, breath sounds clear  HEART:  Regular rhythm, S1, S2, no murmur/rub/S3/S4, no abdominal bruit, no edema  ABDOMEN:  Soft, non-tender, non-distended, normoactive bowel sounds,  no masses ,  EXTREMITIES:  no cyanosis,clubbing or edema  SKIN:  No rash/erythema/ecchymoses/petechiae/wounds/abscess/warm/dry  NEURO:  Alert, oriented    Vital Signs:  Vital Signs Last 24 Hrs  T(C): 37.2 (18 Jul 2020 21:28), Max: 37.2 (18 Jul 2020 21:28)  T(F): 99 (18 Jul 2020 21:28), Max: 99 (18 Jul 2020 21:28)  HR: 90 (18 Jul 2020 22:00) (90 - 102)  BP: 134/77 (18 Jul 2020 22:00) (111/73 - 134/77)  BP(mean): --  RR: 18 (18 Jul 2020 22:00) (18 - 19)  SpO2: 100% (18 Jul 2020 22:00) (91% - 100%)  Daily Height in cm: 172.72 (18 Jul 2020 21:28)    Daily     LABS:                        8.7    8.42  )-----------( x        ( 18 Jul 2020 22:23 )             25.2             PT/INR - ( 18 Jul 2020 22:23 )   PT: 42.3 sec;   INR: 3.79 ratio    PTT - ( 18 Jul 2020 22:23 )  PTT:69.4 sec      Imaging:      EXAM:  MR ABDOMEN WAW IC        PROCEDURE DATE:  07/09/2020           INTERPRETATION:  CLINICAL INFORMATION: Cirrhosis in the setting of alcoholic hepatitis and drug-induced liver injury. Evaluate for HCC.    COMPARISON: MR abdomen 6/12/2020.    PROCEDURE:   MRI of the abdomen was performed with and without intravenous contrast.  IV Contrast: Gadavist. 10 cc administered, 0 cc discarded.  MRCP was performed.    FINDINGS:    The study is limited by motion and ascites.    LOWER CHEST: Symmetric gynecomastia.    LIVER: Cirrhosis. No steatosis. No focal lesion.  BILE DUCTS: Normal caliber. Very limited MRCP.  GALLBLADDER: Distended with sludge, unchanged.  SPLEEN: Splenomegaly.  PANCREAS: Within normal limits.  ADRENALS: Within normal limits.  KIDNEYS/URETERS: Small right interpolar renal cyst.    VISUALIZED PORTIONS:  BOWEL: Within normal limits.   PERITONEUM: Moderate to large volume ascites.  VESSELS: Upper abdominal periesophageal and abdominal wall varices and large patent paraumbilical vein. The portal vein is enlarged and patent. The hepatic veins are attenuated. There is focal aneurysmal dilatation of the right renal artery in the hilum to 1.2 cm.  RETROPERITONEUM/LYMPH NODES: No lymphadenopathy.    ABDOMINAL WALL: Small umbilical hernia.    IMPRESSION:   Limited study.    Cirrhosis with ascites and portal hypertension, as described above. No evidence of hepatic mass.    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

## 2020-07-18 NOTE — ED ADULT TRIAGE NOTE - CHIEF COMPLAINT QUOTE
2 episodes of vomiting & bloody stools since 9 AM today. SOB and weakness. C/o dizziness. 2 episodes of vomiting & bloody stools since 9 AM today. SOB and weakness. C/o dizziness. Hx of cirrhosis.  Recent transfusion on Wednesday due to low hgb.

## 2020-07-18 NOTE — ED PROVIDER NOTE - PROGRESS NOTE DETAILS
Resident Le: pt mentioned he contacted his GI physician before coming here. GI arrived at bedside with MICU team. Dr. Caro would like to admit the patient to MICU with a plan to do non-emergent endoscopy tomorrow as patient is stable at this time. 45 y/o M with h/o alcoholic cirrhosis here today with multiple episodes of hematemesis and melanotic stool. No episodes while in ED. Known varices and thrombocytopenia. No abdominal pain. Pt hemodynamically stable. Likely upper GI bleed secondary to varices. Will check labs including type & screen, discuss with GI, likely admission.  -Dr. Hernandez Mary MDM: 45 y/o M with h/o alcoholic cirrhosis here today with multiple episodes of hematemesis and melanotic stool. No episodes while in ED. Known varices and thrombocytopenia. No abdominal pain currently, distended abdomen. Pt hemodynamically stable. Likely upper GI bleed secondary to varices. Will check labs including type & screen, PPI, octreotide, Ctx ppx, discuss with GI, Admit.  -Dr. Hernandez

## 2020-07-18 NOTE — ED PROVIDER NOTE - PHYSICAL EXAMINATION
Gen: well developed and well nourished, jaundiced  Eyes: PERRL, scleral icterus  ENT: airway patent, mmm  CV: RRR, +S1/S2, no M/R/G  Resp: CTAB, symmetric breath sounds  GI: abdomen mildly firmly distended, NTTP  Extremities -  symmetric pulses, no edema  Skin: jaundice, vitiligo  Neuro: A&Ox3, following commands, speech clear, moving all four extremities spontaneously  Psych: appropriate mood, normal insight

## 2020-07-18 NOTE — H&P ADULT - NSHPLABSRESULTS_GEN_ALL_CORE
8.7    8.42  )-----------( x        ( 18 Jul 2020 22:23 )             25.2                           PT/INR - ( 18 Jul 2020 22:23 )   PT: 42.3 sec;   INR: 3.79 ratio         PTT - ( 18 Jul 2020 22:23 )  PTT:69.4 sec    Lactate Trend            CAPILLARY BLOOD GLUCOSE    < from: MR Abdomen w/wo IV Cont (07.09.20 @ 20:26) >    IMPRESSION:   Limited study.    Cirrhosis with ascites and portal hypertension, as described above. No evidence of hepatic mass.    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 LABS: Personally reviewed labs, imaging, and ECG                          8.7    8.42  )-----------( 37       ( 18 Jul 2020 22:23 )             25.2       07-18    138  |  102  |  x   ----------------------------<  x   3.6   |  x   |  x                           PT/INR - ( 18 Jul 2020 22:23 )   PT: 42.3 sec;   INR: 3.79 ratio         PTT - ( 18 Jul 2020 22:23 )  PTT:69.4 sec    Lactate Trend            CAPILLARY BLOOD GLUCOSE      POCT Blood Glucose.: 181 mg/dL (19 Jul 2020 03:22)            RADIOLOGY & ADDITIONAL TESTS:   < from: MR Abdomen w/wo IV Cont (07.09.20 @ 20:26) >    IMPRESSION:   Limited study.    Cirrhosis with ascites and portal hypertension, as described above. No evidence of hepatic mass.    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 LABS: Personally reviewed labs, imaging, and ECG                          8.7    8.42  )-----------( 37       ( 18 Jul 2020 22:23 )             25.2       07-18    138  |  102  |  x   ----------------------------<  x   3.6   |  x   |  x                           PT/INR - ( 18 Jul 2020 22:23 )   PT: 42.3 sec;   INR: 3.79 ratio         PTT - ( 18 Jul 2020 22:23 )  PTT:69.4 sec      TEG:  R 11.1  K 4.8  Angle 53.4  MA  <40.0      CAPILLARY BLOOD GLUCOSE      POCT Blood Glucose.: 181 mg/dL (19 Jul 2020 03:22)            RADIOLOGY & ADDITIONAL TESTS:   < from: MR Abdomen w/wo IV Cont (07.09.20 @ 20:26) >    IMPRESSION:   Limited study.    Cirrhosis with ascites and portal hypertension, as described above. No evidence of hepatic mass.    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

## 2020-07-18 NOTE — H&P ADULT - NSHPREVIEWOFSYSTEMS_GEN_ALL_CORE
REVIEW OF SYSTEMS:    CONSTITUTIONAL: No weakness, fevers or chills  EYES/ENT: No visual changes;  (+) vertigo No throat pain   NECK: No pain or stiffness  RESPIRATORY: No cough, wheezing, hemoptysis; No shortness of breath  CARDIOVASCULAR: No chest pain or palpitations  GASTROINTESTINAL: No abdominal or epigastric pain. No nausea, (+) vomiting, no hematemesis; No diarrhea or constipation. (+) melena  GENITOURINARY: No dysuria, frequency or hematuria  NEUROLOGICAL: No numbness or weakness  SKIN: No itching, rashes

## 2020-07-18 NOTE — H&P ADULT - NSHPPHYSICALEXAM_GEN_ALL_CORE
General: WN/WD NAD  Neurology: A&Ox3, nonfocal, MONET x 4  Eyes: PERRLA, Gross vision intact Scleral icterus   HEENT: Neck supple, trachea midline, (+) JVD, Gross hearing intact  Respiratory: CTA B/L, No wheezing, rales, rhonchi  CV: RRR, S1S2, no murmurs, rubs or gallops  Abdominal: Soft, NT, ND +BS grossly distended   Extremities: No edema, + peripheral pulses spot vitiligo no edema noted   Skin: No Rashes, Hematoma, Ecchymosis complete Jaundice     Lines/drains/airway:  PIV ML General: NAD  Neurology: A&Ox3, nonfocal, MONET x 4  Eyes: PERRLA, Scleral icterus   HEENT: Neck supple, Gross hearing intact  Respiratory: CTAB, No wheezing, rales, rhonchi  CV: RRR, S1S2, no murmurs, rubs or gallops  Abdominal: Soft, NT, +BS grossly distended   Extremities: No edema, + peripheral pulses, spot vitiligo no edema noted   Skin: No Rashes, Hematoma, Ecchymosis. Jaundice.     Lines/drains/airway:  PIV ML

## 2020-07-18 NOTE — PHYSICAL EXAM
[Outer Ear] : the ears and nose were normal in appearance [Oropharynx] : the oropharynx was normal with no thrush [Neck Cervical Mass (___cm)] : no neck mass was observed [Neck Appearance] : the appearance of the neck was normal [Thyroid Diffuse Enlargement] : the thyroid was not enlarged [Jugular Venous Distention Increased] : there was no jugular-venous distention [] : no respiratory distress [Auscultation Breath Sounds / Voice Sounds] : lungs were clear to auscultation bilaterally [Edema] : there was no peripheral edema [Full Pulse] : the pedal pulses are present [Abdomen Tenderness] : non-tender [No Palpable Adenopathy] : no palpable adenopathy [Musculoskeletal - Swelling] : no joint swelling [Nail Clubbing] : no clubbing  or cyanosis of the fingernails [Motor Tone] : muscle strength and tone were normal [Deep Tendon Reflexes (DTR)] : deep tendon reflexes were 2+ and symmetric [Sensation] : the sensory exam was normal to light touch and pinprick [No Focal Deficits] : no focal deficits [Oriented To Time, Place, And Person] : oriented to person, place, and time [Affect] : the affect was normal [FreeTextEntry1] : juandiced and vitiligo

## 2020-07-19 NOTE — PROGRESS NOTE ADULT - SUBJECTIVE AND OBJECTIVE BOX
Bernard Ureña Sandy, PGY1  Pager [phone]    INTERVAL HPI/OVERNIGHT EVENTS: Intubated this AM for EGD.    SUBJECTIVE: Patient seen and examined at bedside.       OBJECTIVE:    VITAL SIGNS:  ICU Vital Signs Last 24 Hrs  T(C): 37 (19 Jul 2020 06:00), Max: 37.3 (19 Jul 2020 02:09)  T(F): 98.6 (19 Jul 2020 06:00), Max: 99.1 (19 Jul 2020 02:09)  HR: 93 (19 Jul 2020 07:03) (76 - 102)  BP: 159/92 (19 Jul 2020 07:00) (111/73 - 159/92)  BP(mean): 120 (19 Jul 2020 07:00) (87 - 120)  ABP: --  ABP(mean): --  RR: 33 (19 Jul 2020 07:00) (15 - 33)  SpO2: 96% (19 Jul 2020 07:03) (90% - 100%)    Mode: AC/ CMV (Assist Control/ Continuous Mandatory Ventilation), RR (machine): 16, TV (machine): 420, FiO2: 50, PEEP: 5, ITime: 1, MAP: 10, PIP: 41    07-18 @ 07:01  -  07-19 @ 07:00  --------------------------------------------------------  IN: 80 mL / OUT: 0 mL / NET: 80 mL      CAPILLARY BLOOD GLUCOSE      POCT Blood Glucose.: 181 mg/dL (19 Jul 2020 06:19)      PHYSICAL EXAM:  General: sedated  HEENT: NCAT, PERRL, clear conjunctiva, sclera anicteric  Neck: supple, no JVD  Respiratory: CTAB  Cardiovascular: RRR, S1S2, no m/r/g  Abdomen: soft, nontender, nondistended, normal bowel sounds  Extremities: no edema, no cyanosis  Skin: warm, perfused  Neurological: nonfocal    MEDICATIONS:  MEDICATIONS  (STANDING):  cefTRIAXone   IVPB 1000 milliGRAM(s) IV Intermittent every 24 hours  chlorhexidine 0.12% Liquid 15 milliLiter(s) Oral Mucosa every 12 hours  chlorhexidine 4% Liquid 1 Application(s) Topical <User Schedule>  octreotide  Infusion 50 MICROgram(s)/Hr (10 mL/Hr) IV Continuous <Continuous>  pantoprazole Infusion 8 mG/Hr (10 mL/Hr) IV Continuous <Continuous>  phytonadione  IVPB 10 milliGRAM(s) IV Intermittent once    MEDICATIONS  (PRN):      ALLERGIES:  Allergies    levofloxacin (Other (Moderate))    Intolerances        LABS:                        8.7    8.42  )-----------( 37       ( 18 Jul 2020 22:23 )             25.2     07-18    138  |  102  |  26<H>  ----------------------------<  175<H>  3.6   |  15<L>  |  1.85<H>    Ca    9.9      18 Jul 2020 22:23    TPro  5.4<L>  /  Alb  4.2  /  TBili  54.6<H>  /  DBili  x   /  AST  58<H>  /  ALT  23  /  AlkPhos  98  07-18    PT/INR - ( 18 Jul 2020 22:23 )   PT: 42.3 sec;   INR: 3.79 ratio         PTT - ( 18 Jul 2020 22:23 )  PTT:69.4 sec      RADIOLOGY & ADDITIONAL TESTS: Reviewed. INTERVAL HPI/OVERNIGHT EVENTS: Intubated this AM for EGD.    SUBJECTIVE: Patient seen and examined at bedside.       OBJECTIVE:    VITAL SIGNS:  ICU Vital Signs Last 24 Hrs  T(C): 37 (19 Jul 2020 06:00), Max: 37.3 (19 Jul 2020 02:09)  T(F): 98.6 (19 Jul 2020 06:00), Max: 99.1 (19 Jul 2020 02:09)  HR: 93 (19 Jul 2020 07:03) (76 - 102)  BP: 159/92 (19 Jul 2020 07:00) (111/73 - 159/92)  BP(mean): 120 (19 Jul 2020 07:00) (87 - 120)  ABP: --  ABP(mean): --  RR: 33 (19 Jul 2020 07:00) (15 - 33)  SpO2: 96% (19 Jul 2020 07:03) (90% - 100%)    Mode: AC/ CMV (Assist Control/ Continuous Mandatory Ventilation), RR (machine): 16, TV (machine): 420, FiO2: 50, PEEP: 5, ITime: 1, MAP: 10, PIP: 41    07-18 @ 07:01  -  07-19 @ 07:00  --------------------------------------------------------  IN: 80 mL / OUT: 0 mL / NET: 80 mL      CAPILLARY BLOOD GLUCOSE      POCT Blood Glucose.: 181 mg/dL (19 Jul 2020 06:19)      PHYSICAL EXAM:  General: sedated  HEENT: NCAT, PERRL, clear conjunctiva, sclera anicteric  Neck: supple, no JVD  Respiratory: CTAB  Cardiovascular: RRR, S1S2, no m/r/g  Abdomen: soft, nontender, nondistended, normal bowel sounds  Extremities: no edema, no cyanosis  Skin: warm, perfused  Neurological: nonfocal    MEDICATIONS:  MEDICATIONS  (STANDING):  cefTRIAXone   IVPB 1000 milliGRAM(s) IV Intermittent every 24 hours  chlorhexidine 0.12% Liquid 15 milliLiter(s) Oral Mucosa every 12 hours  chlorhexidine 4% Liquid 1 Application(s) Topical <User Schedule>  octreotide  Infusion 50 MICROgram(s)/Hr (10 mL/Hr) IV Continuous <Continuous>  pantoprazole Infusion 8 mG/Hr (10 mL/Hr) IV Continuous <Continuous>  phytonadione  IVPB 10 milliGRAM(s) IV Intermittent once    MEDICATIONS  (PRN):      ALLERGIES:  Allergies    levofloxacin (Other (Moderate))    Intolerances        LABS:                        8.7    8.42  )-----------( 37       ( 18 Jul 2020 22:23 )             25.2     07-18    138  |  102  |  26<H>  ----------------------------<  175<H>  3.6   |  15<L>  |  1.85<H>    Ca    9.9      18 Jul 2020 22:23    TPro  5.4<L>  /  Alb  4.2  /  TBili  54.6<H>  /  DBili  x   /  AST  58<H>  /  ALT  23  /  AlkPhos  98  07-18    PT/INR - ( 18 Jul 2020 22:23 )   PT: 42.3 sec;   INR: 3.79 ratio         PTT - ( 18 Jul 2020 22:23 )  PTT:69.4 sec      RADIOLOGY & ADDITIONAL TESTS: Reviewed. INTERVAL HPI/OVERNIGHT EVENTS: Intubated this AM for EGD.    SUBJECTIVE: Patient seen and examined at bedside. Patient remains intubated and sedated.       OBJECTIVE:    VITAL SIGNS:  ICU Vital Signs Last 24 Hrs  T(C): 37 (19 Jul 2020 06:00), Max: 37.3 (19 Jul 2020 02:09)  T(F): 98.6 (19 Jul 2020 06:00), Max: 99.1 (19 Jul 2020 02:09)  HR: 93 (19 Jul 2020 07:03) (76 - 102)  BP: 159/92 (19 Jul 2020 07:00) (111/73 - 159/92)  BP(mean): 120 (19 Jul 2020 07:00) (87 - 120)  ABP: --  ABP(mean): --  RR: 33 (19 Jul 2020 07:00) (15 - 33)  SpO2: 96% (19 Jul 2020 07:03) (90% - 100%)    Mode: AC/ CMV (Assist Control/ Continuous Mandatory Ventilation), RR (machine): 16, TV (machine): 420, FiO2: 50, PEEP: 5, ITime: 1, MAP: 10, PIP: 41    07-18 @ 07:01  -  07-19 @ 07:00  --------------------------------------------------------  IN: 80 mL / OUT: 0 mL / NET: 80 mL      CAPILLARY BLOOD GLUCOSE      POCT Blood Glucose.: 181 mg/dL (19 Jul 2020 06:19)      PHYSICAL EXAM:  General: NAD  Neurology: A&Ox3, nonfocal.  Eyes: PERRLA, Scleral icterus   HEENT: Neck supple, Gross hearing intact  Respiratory: CTAB, No wheezing, rales, rhonchi  CV: RRR, S1S2, no murmurs, rubs or gallops  Abdominal: Soft, NT, +BS grossly distended   Extremities: No edema, + peripheral pulses, spot vitiligo no edema noted   Skin: No Rashes, Hematoma, Ecchymosis. Jaundice.     MEDICATIONS:  MEDICATIONS  (STANDING):  cefTRIAXone   IVPB 1000 milliGRAM(s) IV Intermittent every 24 hours  chlorhexidine 0.12% Liquid 15 milliLiter(s) Oral Mucosa every 12 hours  chlorhexidine 4% Liquid 1 Application(s) Topical <User Schedule>  octreotide  Infusion 50 MICROgram(s)/Hr (10 mL/Hr) IV Continuous <Continuous>  pantoprazole Infusion 8 mG/Hr (10 mL/Hr) IV Continuous <Continuous>  phytonadione  IVPB 10 milliGRAM(s) IV Intermittent once    MEDICATIONS  (PRN):      ALLERGIES:  Allergies    levofloxacin (Other (Moderate))    Intolerances        LABS:                        8.7    8.42  )-----------( 37       ( 18 Jul 2020 22:23 )             25.2     07-18    138  |  102  |  26<H>  ----------------------------<  175<H>  3.6   |  15<L>  |  1.85<H>    Ca    9.9      18 Jul 2020 22:23    TPro  5.4<L>  /  Alb  4.2  /  TBili  54.6<H>  /  DBili  x   /  AST  58<H>  /  ALT  23  /  AlkPhos  98  07-18    PT/INR - ( 18 Jul 2020 22:23 )   PT: 42.3 sec;   INR: 3.79 ratio         PTT - ( 18 Jul 2020 22:23 )  PTT:69.4 sec      RADIOLOGY & ADDITIONAL TESTS: Reviewed.

## 2020-07-19 NOTE — PROGRESS NOTE ADULT - ASSESSMENT
44M PMHx decompensated cirrhosis (c/b ascites, SBP w/ hx of fluoroquinolone resistant E. coli, small esophageal varices, portal hypertensive gastropathy) secondary to alcoholic hepatitis with recent acute on chronic liver failure in 5/2020 from alcohol relapse p/w with blood stools and hematemesis. Now transferred to the MICU for elective intubation for EGD.     Neuro:   #Potential for hepatic encephelopathy  - Neuro checks per ICU protocol   - Monitor serum ammonia level   - Sedation for intubation  - Will resume rifaximin and lactulose once cleared by GI     CV:   - Hemodynamically stable at this time   - Potential for hemorrhagic shock    - Continue with telemetry   - EKG on admission NSR and T wave inversions stable from previous EKGs.    Pulm:   - Elective intubation today for planned EGD   - Obtain CXR post intubation, ABG   - No active pulmonary issues at this time   - Maintain aspiration precautions at all times   - f/u with COVID 19 PCR     GI:   #Hematemesis with melena, Hgb 8.4  - GI to do EGD for suspected esophageal variceal bleeding  - Maintain NPO   - CBC q6h, transfuse as above   - Vitamin K IV 10mg  - FFP or PCC if hemodynamic instability, since INR 3.79  - Octreotide gtt  - PPI gtt  - holding home cephalexin for SBP prophylaxis; start ceftriaxone 1 g daily for total 5 day course  - Transplant hepatology team f/u; per chart review, given frequent alcohol relapse, pt needs to abstain for >3m to be listed for transplant    Ascites  - will continue with empiric Ceftriaxone for SBP ppx   - may need therapeutic tap if hemodynamics are stable post EGD   - hold diuresis while GI bleeding     /RENAL:  - Real cath during procedure   - will follow urine out put  - Cr 1.85, appears to be at baseline   - Monitor electrolytes / supplement as needed    - will d/c Real cath in 24 hrs     HEME:  #Hematemesis with melena, Hgb 8.4  - CBC q6h, transfuse as needed    #Coagulopathy, secondary to liver dysfunction  - INR 3.79  - Deficiencies primarily in fibrinogen, platelets. TEG: R 11.1, K 4.8, Angle 53.4, MA <40.0  - Will give FFP and Cryoprecipitate + benadryl given pt's hx of rash post-transfusion    - DVT PPX: mechanical devices    ID:   - c/w Ceftriaxone 1g daily x 5 days for SBP ppx    ENDO:  - T2DM, on repaglinide at home.  - FS goal 140-180, at goal    Full code 44M PMHx decompensated cirrhosis (c/b ascites, SBP w/ hx of fluoroquinolone resistant E. coli, small esophageal varices, portal hypertensive gastropathy) secondary to alcoholic hepatitis with recent acute on chronic liver failure in 5/2020 from alcohol relapse p/w with blood stools and hematemesis. Now transferred to the MICU for elective intubation for EGD.     Neuro:   #Potential for hepatic encephelopathy  - Neuro checks per ICU protocol   - Monitor serum ammonia level   - Sedation for intubation  - Will resume rifaximin and lactulose once cleared by GI, and once patient has parenteral access.     CV:   - Hemodynamically stable at this time   - Potential for hemorrhagic shock    - Continue with telemetry   - EKG on admission NSR and T wave inversions stable from previous EKGs.    Pulm:   - Elective intubation today for planned EGD   -  CXR post intubation with no focal pulmonary consolidations or pneumothorax.  - ABG   - No active pulmonary issues at this time   - Maintain aspiration precautions at all times   - f/u with COVID 19 PCR   - f/u repeat ABG.    GI:   #Hematemesis with melena, Hgb 8.4  - GI to do EGD for suspected esophageal variceal bleeding. GI for scope tomorrow. Patient to remain intubated until that time.   - Maintain NPO   - CBC q6h, transfuse as above   - Vitamin K IV 10mg  - FFP or PCC if hemodynamic instability, since INR 3.79  - Octreotide gtt  - PPI gtt  - holding home cephalexin for SBP prophylaxis; start ceftriaxone 1 g daily for total 5 day course  - Transplant hepatology team f/u; per chart review, given frequent alcohol relapse, pt needs to abstain for >3m to be listed for transplant    Ascites  - will continue with empiric Ceftriaxone for SBP ppx   - may need therapeutic tap if hemodynamics are stable post EGD   - hold diuresis while GI bleeding     /RENAL:  - Real cath during procedure   - will follow urine out put  - Cr 1.85, appears to be at baseline   - Monitor electrolytes / supplement as needed    - will d/c Real cath in 24 hrs     HEME:  #Hematemesis with melena, Hgb 8.4  - CBC q6h, transfuse as needed    #Coagulopathy, secondary to liver dysfunction  - INR 3.79  - Deficiencies primarily in fibrinogen, platelets. TEG: R 11.1, K 4.8, Angle 53.4, MA <40.0  - s/p 1u FFP and Cryoprecipitate + benadryl given pt's hx of rash post-transfusion  - Will obtain PM CBC, TEG, coags, if elevated will give additional cryo and vitamin K.     - DVT PPX: mechanical devices    ID:   - c/w Ceftriaxone 1g daily x 5 days for SBP ppx    ENDO:  - T2DM, on repaglinide at home.  - FS goal 140-180, at goal    Full code 44M PMHx decompensated cirrhosis (c/b ascites, SBP w/ hx of fluoroquinolone resistant E. coli, small esophageal varices, portal hypertensive gastropathy) secondary to alcoholic hepatitis with recent acute on chronic liver failure in 5/2020 from alcohol relapse p/w with blood stools and hematemesis. Now transferred to the MICU for elective intubation for EGD.     Neuro:   #Potential for hepatic encephelopathy  - Neuro checks per ICU protocol   - Monitor serum ammonia level   - Sedation for intubation  - Will resume rifaximin and lactulose once cleared by GI, and once patient has parenteral access.     CV:   - Hemodynamically stable at this time   - Potential for hemorrhagic shock    - Continue with telemetry   - EKG on admission NSR and T wave inversions stable from previous EKGs.    Pulm:   - Elective intubation today for planned EGD   -  CXR post intubation with no focal pulmonary consolidations or pneumothorax.  - ABG   - No active pulmonary issues at this time   - Maintain aspiration precautions at all times   - f/u with COVID 19 PCR   - f/u repeat ABG.    GI:   #Hematemesis with melena, Hgb 8.4  - GI to do EGD for suspected esophageal variceal bleeding. GI for scope tomorrow. Patient to remain intubated until that time.   - Maintain NPO   - CBC q6h, transfuse as above   - Vitamin K IV 10mg  - FFP or PCC if hemodynamic instability, since INR 3.79  - Octreotide gtt  - PPI gtt  - MR Abdomen with portal hypertension and no evidence of hepatic mass.   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 TEG: R 11.1, K 4.8, Angle 53.4, MA <40.0  - s/p 1u FFP and Cryoprecipitate + benadryl given pt's hx of rash post-transfusion  - Will obtain PM CBC, TEG, coags, if elevated will give additional cryo and vitamin K.     - DVT PPX: mechanical devices    ID:   - c/w Ceftriaxone 1g daily x 5 days for SBP ppx    ENDO:  - T2DM, on repaglinide at home.  - FS goal 140-180, at goal    Full code

## 2020-07-19 NOTE — CHART NOTE - NSCHARTNOTEFT_GEN_A_CORE
Pt is excepted to ICU. Location pending COVID swab for room assignment.  Can be kept IN the ER with close monitoring pending Room assignment.

## 2020-07-19 NOTE — ED ADULT NURSE REASSESSMENT NOTE - NS ED NURSE REASSESS COMMENT FT1
Teletracking shows same bed assignment as before. Called lab--40 minutes until results for covid. Per SEBLE Elam, will wait until swab results and give report.

## 2020-07-19 NOTE — ED ADULT NURSE REASSESSMENT NOTE - NS ED NURSE REASSESS COMMENT FT1
Attempted to give report to MICU, was told that they cannot take patient until covid swab results. SEBLE Elam and charge RN aware and notified.

## 2020-07-20 NOTE — DIETITIAN INITIAL EVALUATION ADULT. - ADD RECOMMEND
Trend wt, labs, NPO status. When extubated and diet advanced, recommend Consistent Carbohydrate, low Na diet.

## 2020-07-20 NOTE — DIETITIAN INITIAL EVALUATION ADULT. - ENERGY NEEDS
Ht: 68"  Wt: 190 (usual)  BMI: 28.9 kg/m2   IBW: 154 (+/-10%)     123% IBW  Edema: 1+ B/L ankle        Skin: no pressure injuries documented

## 2020-07-20 NOTE — PROGRESS NOTE ADULT - ASSESSMENT
44M PMHx decompensated cirrhosis (c/b ascites, SBP w/ hx of fluoroquinolone resistant E. coli, small esophageal varices, portal hypertensive gastropathy) secondary to alcoholic hepatitis with recent acute on chronic liver failure in 5/2020 from alcohol relapse p/w with blood stools and hematemesis. Now transferred to the MICU for elective intubation for EGD.     Neuro:   #Potential for hepatic encephelopathy  - Neuro checks per ICU protocol   - Monitor serum ammonia level   - Sedation for intubation  - Will resume rifaximin and lactulose once cleared by GI, and once patient has parenteral access.     CV:   - Hemodynamically stable at this time   - Potential for hemorrhagic shock    - Continue with telemetry   - EKG on admission NSR and T wave inversions stable from previous EKGs.    Pulm:   - Elective intubation today for planned EGD   -  CXR post intubation with no focal pulmonary consolidations or pneumothorax.  - ABG   - No active pulmonary issues at this time   - Maintain aspiration precautions at all times   - f/u with COVID 19 PCR   - f/u repeat ABG.    GI:   #Hematemesis with melena, Hgb 8.4  - GI to do EGD for suspected esophageal variceal bleeding. GI for scope tomorrow. Patient to remain intubated until that time.   - Maintain NPO   - CBC q6h, transfuse as above   - Vitamin K IV 10mg  - FFP or PCC if hemodynamic instability, since INR 3.79  - Octreotide gtt  - PPI gtt  - MR Abdomen with portal hypertension and no evidence of hepatic mass.   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 TEG: R 11.1, K 4.8, Angle 53.4, MA <40.0  - s/p 1u FFP and Cryoprecipitate + benadryl given pt's hx of rash post-transfusion  - Will obtain PM CBC, TEG, coags, if elevated will give additional cryo and vitamin K.     - DVT PPX: mechanical devices    ID:   - c/w Ceftriaxone 1g daily x 5 days for SBP ppx    ENDO:  - T2DM, on repaglinide at home.  - FS goal 140-180, at goal    Full code 44M PMHx decompensated cirrhosis (c/b ascites, SBP w/ hx of fluoroquinolone resistant E. coli, small esophageal varices, portal hypertensive gastropathy) secondary to alcoholic hepatitis with recent acute on chronic liver failure in 5/2020 from alcohol relapse p/w with blood stools and hematemesis. Now transferred to the MICU for elective intubation for EGD.     Neuro:   #Potential for hepatic encephelopathy  - Neuro checks per ICU protocol   - Monitor serum ammonia level   - Sedation for intubation, will wean as able following procedure.   - Will resume rifaximin and lactulose once cleared by GI, and once patient has parenteral access.     CV:   - Hemodynamically stable at this time   - Potential for hemorrhagic shock    - Continue with telemetry   - EKG on admission NSR and T wave inversions stable from previous EKGs.    Pulm:   - Elective intubation today for planned EGD   -  CXR post intubation with no focal pulmonary consolidations or pneumothorax.  - ABG   - No active pulmonary issues at this time   - Maintain aspiration precautions at all times   - f/u with COVID 19 PCR   - f/u repeat ABG.    GI:   #Hematemesis with melena, Hgb 8.4  - GI to do EGD for suspected esophageal variceal bleeding today.   - Maintain NPO   - CBC q6h, transfuse as above   - FFP or PCC if hemodynamic instability, since INR 3.79  - Octreotide gtt  - PPI gtt  - MR Abdomen with portal hypertension and no evidence of hepatic mass.   - holding home cephalexin for SBP prophylaxis; start ceftriaxone 1 g daily for total 5 day course  - Transplant hepatology team f/u; per chart review, given frequent alcohol relapse, pt needs to abstain for >3m to be listed for transplant  - will f/u ammonia levels.   -  start albumin.   - MELD of 44 today.      Ascites  - will continue with empiric Ceftriaxone for SBP ppx   - paracentesis to be done today. 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  - bedside U/S with decreased flow to right kidney.   - f/u urine lytes     Acidosis  - pH: 7.29 on ABG  - infectious process vs acute renal failure vs starvation ketosis.   - f/u lactate, BHB      HEME:  #Hematemesis with melena, Hgb 8.4  - CBC q6h, transfuse as needed    #Coagulopathy, secondary to liver dysfunction  - INR 3.79  - Deficiencies primarily in fibrinogen, platelets. TEG: R 11.1, K 4.8, Angle 53.4, MA <40.0  - s/p 1u FFP and Cryoprecipitate + benadryl given pt's hx of rash post-transfusion  - follow up TEG this AM with fibrinogen decreased at 6.    - DVT PPX: mechanical devices    ID:   - c/w Ceftriaxone 1g daily x 5 days for SBP ppx  - procalcitonin elevated at 6.32    ENDO:  - T2DM, on repaglinide at home.  - FS goal 140-180, at goal    Full code 44M PMHx decompensated cirrhosis (c/b ascites, SBP w/ hx of fluoroquinolone resistant E. coli, small esophageal varices, portal hypertensive gastropathy) secondary to alcoholic hepatitis with recent acute on chronic liver failure in 5/2020 from alcohol relapse p/w with blood stools and hematemesis. Now transferred to the MICU for elective intubation for EGD.     Neuro:   #Potential for hepatic encephelopathy  - Neuro checks per ICU protocol   - Monitor serum ammonia level   - Sedation for intubation, will wean as able following procedure.   - Currently holding lactulose and rifaximin as concern for bleeding with NGT placement due to coagulopathy.    CV:   - Hemodynamically stable at this time   - Potential for hemorrhagic shock    - Continue with telemetry   - EKG on admission NSR and T wave inversions stable from previous EKGs.  - patient on levophed for pressor support    Pulm:   - Electively intubated for planned EGD   -  CXR post intubation with no focal pulmonary consolidations or pneumothorax.  - No active pulmonary issues at this time   - Maintain aspiration precautions at all times   - COVID 19 PCR negativee       GI:   #Hematemesis with melena, Hgb 8.4  - GI to do EGD for suspected esophageal variceal bleeding today.   - Maintain NPO   - CBC q6h, transfuse as above   - Octreotide gtt  - PPI gtt  - MR Abdomen with portal hypertension and no evidence of hepatic mass.   - holding home cephalexin for SBP prophylaxis; start ceftriaxone 1 g daily for total 5 day course  - Transplant hepatology team f/u; per chart review, given frequent alcohol relapse, pt needs to abstain for >3m to be listed for transplant  - will f/u ammonia levels.   -  start albumin.   - MELD of 44 today.      Ascites  - will continue with empiric Ceftriaxone for SBP ppx   - paracentesis to be done today. 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  - bedside U/S with decreased flow to right kidney.   - f/u urine lytes     Acidosis  - pH: 7.29 on ABG  - infectious process vs acute renal failure vs starvation ketosis.   - f/u lactate, BHB      HEME:  #Hematemesis with melena, Hgb 8.4  - CBC q6h, transfuse as needed    #Coagulopathy, secondary to liver dysfunction  - INR 3.79  - Deficiencies primarily in fibrinogen, platelets. TEG: R 11.1, K 4.8, Angle 53.4, MA <40.0  - s/p 1u FFP and Cryoprecipitate + benadryl given pt's hx of rash post-transfusion  - follow up TEG this AM with fibrinogen decreased at 6.  - DVT PPX: mechanical devices    ID:   - c/w Ceftriaxone 1g daily x 5 days for SBP ppx  - procalcitonin elevated at 6.32    ENDO:  - T2DM, on repaglinide at home.  - FS goal 140-180, at goal    Full code

## 2020-07-20 NOTE — PROGRESS NOTE ADULT - SUBJECTIVE AND OBJECTIVE BOX
INTERVAL HPI/OVERNIGHT EVENTS:    SUBJECTIVE: Patient seen and examined at bedside.       VITAL SIGNS:  ICU Vital Signs Last 24 Hrs  T(C): 37 (20 Jul 2020 04:00), Max: 37 (20 Jul 2020 04:00)  T(F): 98.6 (20 Jul 2020 04:00), Max: 98.6 (20 Jul 2020 04:00)  HR: 105 (20 Jul 2020 06:30) (68 - 108)  BP: 91/52 (20 Jul 2020 06:30) (89/53 - 159/92)  BP(mean): 70 (20 Jul 2020 06:30) (67 - 120)  ABP: --  ABP(mean): --  RR: 22 (20 Jul 2020 06:30) (12 - 33)  SpO2: 95% (20 Jul 2020 06:30) (92% - 100%)    Mode: AC/ CMV (Assist Control/ Continuous Mandatory Ventilation), RR (machine): 22, TV (machine): 420, FiO2: 50, PEEP: 5, ITime: 1, MAP: 12, PIP: 30  Plateau pressure:   P/F ratio:     07-18 @ 07:01  -  07-19 @ 07:00  --------------------------------------------------------  IN: 120 mL / OUT: 300 mL / NET: -180 mL    07-19 @ 07:01  -  07-20 @ 06:47  --------------------------------------------------------  IN: 3591.3 mL / OUT: 370 mL / NET: 3221.3 mL      CAPILLARY BLOOD GLUCOSE      POCT Blood Glucose.: 126 mg/dL (20 Jul 2020 05:53)    ECG:    PHYSICAL EXAM:    General:   HEENT:   Neck:   Respiratory:   Cardiovascular:   Abdomen:   Extremities:  Neurological:    MEDICATIONS:  MEDICATIONS  (STANDING):  cefTRIAXone   IVPB 1000 milliGRAM(s) IV Intermittent every 24 hours  chlorhexidine 0.12% Liquid 15 milliLiter(s) Oral Mucosa every 12 hours  chlorhexidine 4% Liquid 1 Application(s) Topical <User Schedule>  dextrose 5%. 1000 milliLiter(s) (50 mL/Hr) IV Continuous <Continuous>  dextrose 50% Injectable 12.5 Gram(s) IV Push once  dextrose 50% Injectable 25 Gram(s) IV Push once  dextrose 50% Injectable 25 Gram(s) IV Push once  fentaNYL   Infusion... 0.5 MICROgram(s)/kG/Hr (2.26 mL/Hr) IV Continuous <Continuous>  insulin lispro (HumaLOG) corrective regimen sliding scale   SubCutaneous every 6 hours  norepinephrine Infusion 0.05 MICROgram(s)/kG/Min (8.46 mL/Hr) IV Continuous <Continuous>  octreotide  Infusion 50 MICROgram(s)/Hr (10 mL/Hr) IV Continuous <Continuous>  pantoprazole Infusion 8 mG/Hr (10 mL/Hr) IV Continuous <Continuous>  propofol Infusion 30 MICROgram(s)/kG/Min (16.2 mL/Hr) IV Continuous <Continuous>    MEDICATIONS  (PRN):  dextrose 40% Gel 15 Gram(s) Oral once PRN Blood Glucose LESS THAN 70 milliGRAM(s)/deciliter  glucagon  Injectable 1 milliGRAM(s) IntraMuscular once PRN Glucose LESS THAN 70 milligrams/deciliter      ALLERGIES:  Allergies    levofloxacin (Other (Moderate))    Intolerances        LABS:                        8.3    18.35 )-----------( 67       ( 20 Jul 2020 05:59 )             24.3     07-20    136  |  103  |  42<H>  ----------------------------<  142<H>  4.0   |  13<L>  |  2.90<H>    Ca    9.3      20 Jul 2020 00:12  Phos  4.2     07-20  Mg     1.6     07-20    TPro  4.7<L>  /  Alb  3.6  /  TBili  45.9<H>  /  DBili  x   /  AST  54<H>  /  ALT  21  /  AlkPhos  76  07-20    PT/INR - ( 20 Jul 2020 00:12 )   PT: 37.6 sec;   INR: 3.35 ratio         PTT - ( 20 Jul 2020 00:12 )  PTT:50.6 sec      RADIOLOGY & ADDITIONAL TESTS: Reviewed. INTERVAL HPI/OVERNIGHT EVENTS:    SUBJECTIVE: Patient seen and examined at bedside. Patient remains intubated and sedated.       VITAL SIGNS:  ICU Vital Signs Last 24 Hrs  T(C): 37 (20 Jul 2020 04:00), Max: 37 (20 Jul 2020 04:00)  T(F): 98.6 (20 Jul 2020 04:00), Max: 98.6 (20 Jul 2020 04:00)  HR: 105 (20 Jul 2020 06:30) (68 - 108)  BP: 91/52 (20 Jul 2020 06:30) (89/53 - 159/92)  BP(mean): 70 (20 Jul 2020 06:30) (67 - 120)  ABP: --  ABP(mean): --  RR: 22 (20 Jul 2020 06:30) (12 - 33)  SpO2: 95% (20 Jul 2020 06:30) (92% - 100%)    Mode: AC/ CMV (Assist Control/ Continuous Mandatory Ventilation), RR (machine): 22, TV (machine): 420, FiO2: 50, PEEP: 5, ITime: 1, MAP: 12, PIP: 30  Plateau pressure:   P/F ratio:     07-18 @ 07:01  -  07-19 @ 07:00  --------------------------------------------------------  IN: 120 mL / OUT: 300 mL / NET: -180 mL    07-19 @ 07:01  -  07-20 @ 06:47  --------------------------------------------------------  IN: 3591.3 mL / OUT: 370 mL / NET: 3221.3 mL      CAPILLARY BLOOD GLUCOSE      POCT Blood Glucose.: 126 mg/dL (20 Jul 2020 05:53)    ECG:    PHYSICAL EXAM:    General:   HEENT:   Neck:   Respiratory:   Cardiovascular:   Abdomen:   Extremities:  Neurological:    MEDICATIONS:  MEDICATIONS  (STANDING):  cefTRIAXone   IVPB 1000 milliGRAM(s) IV Intermittent every 24 hours  chlorhexidine 0.12% Liquid 15 milliLiter(s) Oral Mucosa every 12 hours  chlorhexidine 4% Liquid 1 Application(s) Topical <User Schedule>  dextrose 5%. 1000 milliLiter(s) (50 mL/Hr) IV Continuous <Continuous>  dextrose 50% Injectable 12.5 Gram(s) IV Push once  dextrose 50% Injectable 25 Gram(s) IV Push once  dextrose 50% Injectable 25 Gram(s) IV Push once  fentaNYL   Infusion... 0.5 MICROgram(s)/kG/Hr (2.26 mL/Hr) IV Continuous <Continuous>  insulin lispro (HumaLOG) corrective regimen sliding scale   SubCutaneous every 6 hours  norepinephrine Infusion 0.05 MICROgram(s)/kG/Min (8.46 mL/Hr) IV Continuous <Continuous>  octreotide  Infusion 50 MICROgram(s)/Hr (10 mL/Hr) IV Continuous <Continuous>  pantoprazole Infusion 8 mG/Hr (10 mL/Hr) IV Continuous <Continuous>  propofol Infusion 30 MICROgram(s)/kG/Min (16.2 mL/Hr) IV Continuous <Continuous>    MEDICATIONS  (PRN):  dextrose 40% Gel 15 Gram(s) Oral once PRN Blood Glucose LESS THAN 70 milliGRAM(s)/deciliter  glucagon  Injectable 1 milliGRAM(s) IntraMuscular once PRN Glucose LESS THAN 70 milligrams/deciliter      ALLERGIES:  Allergies    levofloxacin (Other (Moderate))    Intolerances        LABS:                        8.3    18.35 )-----------( 67       ( 20 Jul 2020 05:59 )             24.3     07-20    136  |  103  |  42<H>  ----------------------------<  142<H>  4.0   |  13<L>  |  2.90<H>    Ca    9.3      20 Jul 2020 00:12  Phos  4.2     07-20  Mg     1.6     07-20    TPro  4.7<L>  /  Alb  3.6  /  TBili  45.9<H>  /  DBili  x   /  AST  54<H>  /  ALT  21  /  AlkPhos  76  07-20    PT/INR - ( 20 Jul 2020 00:12 )   PT: 37.6 sec;   INR: 3.35 ratio         PTT - ( 20 Jul 2020 00:12 )  PTT:50.6 sec      RADIOLOGY & ADDITIONAL TESTS: Reviewed. INTERVAL HPI/OVERNIGHT EVENTS:  Overnight, patient received 1U pRBC. Urine output decreasing, and patient received 500 cc LR bolus.   SUBJECTIVE: Patient seen and examined at bedside. Patient remains intubated and sedated.       VITAL SIGNS:  ICU Vital Signs Last 24 Hrs  T(C): 37 (20 Jul 2020 04:00), Max: 37 (20 Jul 2020 04:00)  T(F): 98.6 (20 Jul 2020 04:00), Max: 98.6 (20 Jul 2020 04:00)  HR: 105 (20 Jul 2020 06:30) (68 - 108)  BP: 91/52 (20 Jul 2020 06:30) (89/53 - 159/92)  BP(mean): 70 (20 Jul 2020 06:30) (67 - 120)  ABP: --  ABP(mean): --  RR: 22 (20 Jul 2020 06:30) (12 - 33)  SpO2: 95% (20 Jul 2020 06:30) (92% - 100%)    Mode: AC/ CMV (Assist Control/ Continuous Mandatory Ventilation), RR (machine): 22, TV (machine): 420, FiO2: 50, PEEP: 5, ITime: 1, MAP: 12, PIP: 30  Plateau pressure:   P/F ratio:     07-18 @ 07:01  -  07-19 @ 07:00  --------------------------------------------------------  IN: 120 mL / OUT: 300 mL / NET: -180 mL    07-19 @ 07:01  -  07-20 @ 06:47  --------------------------------------------------------  IN: 3591.3 mL / OUT: 370 mL / NET: 3221.3 mL      CAPILLARY BLOOD GLUCOSE      POCT Blood Glucose.: 126 mg/dL (20 Jul 2020 05:53)    PHYSICAL EXAM:  General: intubated and sedated  Neurology: sedated  Eyes: Scleral icterus   HEENT: Neck supple  Respiratory: vented breath sounds  CV: RRR, S1S2,  Abdominal: grossly distended   Extremities: No edema, + peripheral pulses, spot vitiligo, no edema noted   Skin: No Rashes, Ecchymosis. Jaundice.     MEDICATIONS:  MEDICATIONS  (STANDING):  cefTRIAXone   IVPB 1000 milliGRAM(s) IV Intermittent every 24 hours  chlorhexidine 0.12% Liquid 15 milliLiter(s) Oral Mucosa every 12 hours  chlorhexidine 4% Liquid 1 Application(s) Topical <User Schedule>  dextrose 5%. 1000 milliLiter(s) (50 mL/Hr) IV Continuous <Continuous>  dextrose 50% Injectable 12.5 Gram(s) IV Push once  dextrose 50% Injectable 25 Gram(s) IV Push once  dextrose 50% Injectable 25 Gram(s) IV Push once  fentaNYL   Infusion... 0.5 MICROgram(s)/kG/Hr (2.26 mL/Hr) IV Continuous <Continuous>  insulin lispro (HumaLOG) corrective regimen sliding scale   SubCutaneous every 6 hours  norepinephrine Infusion 0.05 MICROgram(s)/kG/Min (8.46 mL/Hr) IV Continuous <Continuous>  octreotide  Infusion 50 MICROgram(s)/Hr (10 mL/Hr) IV Continuous <Continuous>  pantoprazole Infusion 8 mG/Hr (10 mL/Hr) IV Continuous <Continuous>  propofol Infusion 30 MICROgram(s)/kG/Min (16.2 mL/Hr) IV Continuous <Continuous>    MEDICATIONS  (PRN):  dextrose 40% Gel 15 Gram(s) Oral once PRN Blood Glucose LESS THAN 70 milliGRAM(s)/deciliter  glucagon  Injectable 1 milliGRAM(s) IntraMuscular once PRN Glucose LESS THAN 70 milligrams/deciliter      ALLERGIES:  Allergies    levofloxacin (Other (Moderate))    Intolerances        LABS:                        8.3    18.35 )-----------( 67       ( 20 Jul 2020 05:59 )             24.3     07-20    136  |  103  |  42<H>  ----------------------------<  142<H>  4.0   |  13<L>  |  2.90<H>    Ca    9.3      20 Jul 2020 00:12  Phos  4.2     07-20  Mg     1.6     07-20    TPro  4.7<L>  /  Alb  3.6  /  TBili  45.9<H>  /  DBili  x   /  AST  54<H>  /  ALT  21  /  AlkPhos  76  07-20    PT/INR - ( 20 Jul 2020 00:12 )   PT: 37.6 sec;   INR: 3.35 ratio         PTT - ( 20 Jul 2020 00:12 )  PTT:50.6 sec      RADIOLOGY & ADDITIONAL TESTS: Reviewed. INTERVAL HPI/OVERNIGHT EVENTS:  Overnight, patient received 1U pRBC. Urine output decreasing, and patient received 500 cc LR bolus. Also, has one bloody stool overnight.  SUBJECTIVE: Patient seen and examined at bedside. Patient remains intubated and sedated.       VITAL SIGNS:  ICU Vital Signs Last 24 Hrs  T(C): 37 (20 Jul 2020 04:00), Max: 37 (20 Jul 2020 04:00)  T(F): 98.6 (20 Jul 2020 04:00), Max: 98.6 (20 Jul 2020 04:00)  HR: 105 (20 Jul 2020 06:30) (68 - 108)  BP: 91/52 (20 Jul 2020 06:30) (89/53 - 159/92)  BP(mean): 70 (20 Jul 2020 06:30) (67 - 120)  ABP: --  ABP(mean): --  RR: 22 (20 Jul 2020 06:30) (12 - 33)  SpO2: 95% (20 Jul 2020 06:30) (92% - 100%)    Mode: AC/ CMV (Assist Control/ Continuous Mandatory Ventilation), RR (machine): 22, TV (machine): 420, FiO2: 50, PEEP: 5, ITime: 1, MAP: 12, PIP: 30  Plateau pressure:   P/F ratio:     07-18 @ 07:01  -  07-19 @ 07:00  --------------------------------------------------------  IN: 120 mL / OUT: 300 mL / NET: -180 mL    07-19 @ 07:01  -  07-20 @ 06:47  --------------------------------------------------------  IN: 3591.3 mL / OUT: 370 mL / NET: 3221.3 mL      CAPILLARY BLOOD GLUCOSE      POCT Blood Glucose.: 126 mg/dL (20 Jul 2020 05:53)    PHYSICAL EXAM:  General: intubated and sedated  Neurology: sedated  Eyes: Scleral icterus   HEENT: Neck supple  Respiratory: vented breath sounds  CV: RRR, S1S2,  Abdominal: grossly distended   Extremities: No edema, + peripheral pulses, spot vitiligo, no edema noted   Skin: No Rashes, Ecchymosis. Jaundice.     MEDICATIONS:  MEDICATIONS  (STANDING):  cefTRIAXone   IVPB 1000 milliGRAM(s) IV Intermittent every 24 hours  chlorhexidine 0.12% Liquid 15 milliLiter(s) Oral Mucosa every 12 hours  chlorhexidine 4% Liquid 1 Application(s) Topical <User Schedule>  dextrose 5%. 1000 milliLiter(s) (50 mL/Hr) IV Continuous <Continuous>  dextrose 50% Injectable 12.5 Gram(s) IV Push once  dextrose 50% Injectable 25 Gram(s) IV Push once  dextrose 50% Injectable 25 Gram(s) IV Push once  fentaNYL   Infusion... 0.5 MICROgram(s)/kG/Hr (2.26 mL/Hr) IV Continuous <Continuous>  insulin lispro (HumaLOG) corrective regimen sliding scale   SubCutaneous every 6 hours  norepinephrine Infusion 0.05 MICROgram(s)/kG/Min (8.46 mL/Hr) IV Continuous <Continuous>  octreotide  Infusion 50 MICROgram(s)/Hr (10 mL/Hr) IV Continuous <Continuous>  pantoprazole Infusion 8 mG/Hr (10 mL/Hr) IV Continuous <Continuous>  propofol Infusion 30 MICROgram(s)/kG/Min (16.2 mL/Hr) IV Continuous <Continuous>    MEDICATIONS  (PRN):  dextrose 40% Gel 15 Gram(s) Oral once PRN Blood Glucose LESS THAN 70 milliGRAM(s)/deciliter  glucagon  Injectable 1 milliGRAM(s) IntraMuscular once PRN Glucose LESS THAN 70 milligrams/deciliter      ALLERGIES:  Allergies    levofloxacin (Other (Moderate))    Intolerances        LABS:                        8.3    18.35 )-----------( 67       ( 20 Jul 2020 05:59 )             24.3     07-20    136  |  103  |  42<H>  ----------------------------<  142<H>  4.0   |  13<L>  |  2.90<H>    Ca    9.3      20 Jul 2020 00:12  Phos  4.2     07-20  Mg     1.6     07-20    TPro  4.7<L>  /  Alb  3.6  /  TBili  45.9<H>  /  DBili  x   /  AST  54<H>  /  ALT  21  /  AlkPhos  76  07-20    PT/INR - ( 20 Jul 2020 00:12 )   PT: 37.6 sec;   INR: 3.35 ratio         PTT - ( 20 Jul 2020 00:12 )  PTT:50.6 sec      RADIOLOGY & ADDITIONAL TESTS: Reviewed.

## 2020-07-20 NOTE — DIETITIAN INITIAL EVALUATION ADULT. - OTHER INFO
Pt seen for: MICU Length Of Stay   Adm dx:  hemetemasis, elective intubation for EGD. S/P paracentesis this am, 3L removed. H/O cirrhosis 2/2 ETOH hepatitis.   GI issues: abd distended  Last BM: had black liquid BM today   Food allergies/Intolerances: NKFA   Vit/supplement PTA: multivitamin, Zn per H+P    Diet PTA: unable to assess at this time noted last A1c 6/11 6.6     Subjective/Objective information: pt intubated, no visitors at bedside. Per previous RD note 6/16 pt had reported usual wt of 190 lb w/ dosing wt 194 lb. Dosing wt this adm 199 lb    Education: Not indicated at this time, reassess for education needs when pt able to participate.

## 2020-07-20 NOTE — DIETITIAN INITIAL EVALUATION ADULT. - PERTINENT MEDS FT
MEDICATIONS  (STANDING):  albumin human 25% IVPB 50 milliLiter(s) IV Intermittent every 6 hours  cefTRIAXone   IVPB 1000 milliGRAM(s) IV Intermittent every 24 hours  chlorhexidine 0.12% Liquid 15 milliLiter(s) Oral Mucosa every 12 hours  chlorhexidine 4% Liquid 1 Application(s) Topical <User Schedule>  dextrose 5%. 1000 milliLiter(s) (50 mL/Hr) IV Continuous <Continuous>  dextrose 50% Injectable 12.5 Gram(s) IV Push once  dextrose 50% Injectable 25 Gram(s) IV Push once  dextrose 50% Injectable 25 Gram(s) IV Push once  fentaNYL   Infusion... 0.5 MICROgram(s)/kG/Hr (2.26 mL/Hr) IV Continuous <Continuous>  insulin lispro (HumaLOG) corrective regimen sliding scale   SubCutaneous every 6 hours  norepinephrine Infusion 0.05 MICROgram(s)/kG/Min (8.46 mL/Hr) IV Continuous <Continuous>  octreotide  Infusion 50 MICROgram(s)/Hr (10 mL/Hr) IV Continuous <Continuous>  pantoprazole Infusion 8 mG/Hr (10 mL/Hr) IV Continuous <Continuous>  propofol Infusion 30 MICROgram(s)/kG/Min (16.2 mL/Hr) IV Continuous <Continuous>

## 2020-07-20 NOTE — PROGRESS NOTE ADULT - ASSESSMENT
44 year old man with vitiligo, obesity, DM2, and decompensated cirrhosis (complicated by small EV, portal hypertensive gastropathy, ascites and SBP) secondary to alcohol-related liver disease who presents for new onset melena and hematemesis likely secondary to upper GI bleeding from known esophageal varices.     #Hematemesis and melena: s/p EGD with 4 bands placed on varcies. Patient with red whale signs c/f recent bleeding.   #Acute on chronic kidney injury:  Cr of 3.81 from 2 on prior admission.  DDx: prerenal (given decreased PO and N/V) vs. medication induced SAM vs. HRS  #Decompensated alcoholic liver disease: MELD-Na pending laboratory studies.   -Varices: s/p bands on 7/20 for large varcices with stigmata of recent bleeding. upper abdominal periesophageal and abdominal wall varices visualized on MRI abdomen from 07/2020.   -Ascites: Moderate to large volume ascites on MRI abdomen 07/2020.  -HE: no history  -HCC: No evidence of hepatic mass on MRI abdomen 07/2020.   -PSE: initially had mild asterixis on exam. On lactulose. Now intubated    Recommendations:  - maintain intubated overnight  - monitor CBC, CMP, INR daily  - continue with octreotide infusion at 50 mcg/hr for total 5 day course  - continue with ppi 40mg IV BID  - monitor BMs  - transfuse to maintain goal Hb 7.0  - continue with ceftriaxone 1 g daily for total 5 day course  - check urine lytes, urine sodium, ur cr.  - monitor I/Os and weights daily  - continue with albumin 25% in 100ml q6hrs  - supportive care

## 2020-07-20 NOTE — PHARMACOTHERAPY INTERVENTION NOTE - COMMENTS
Patient was initiated on norepinephrine to target SBP of 100-110, in addition patient also received albumin for hepatorenal syndrome. Recommended 1 g/kg of albumin 25% for the first day and 20-40 g/day for days 2 & 3 as per guideline recommendation. Patient was initiated on norepinephrine to target SBP of 100-110, in addition patient also received albumin for hepatorenal syndrome. Recommended 1 g/kg (Adjusted BW = 77kg) of albumin 25% for the first day and 20-40 g/day for days 2 & 3 as per guideline recommendation.

## 2020-07-20 NOTE — PROGRESS NOTE ADULT - SUBJECTIVE AND OBJECTIVE BOX
Chief Complaint:  Patient is a 44y old  Male who presents with a chief complaint of Hematemesis (2020 06:47)      Interval Events: Pt remained intubated and sedated. Had maroon stools overnight    Allergies:  levofloxacin (Other (Moderate))      Hospital Medications:  albumin human 25% IVPB 100 milliLiter(s) IV Intermittent every 6 hours  cefTRIAXone   IVPB 1000 milliGRAM(s) IV Intermittent every 24 hours  chlorhexidine 0.12% Liquid 15 milliLiter(s) Oral Mucosa every 12 hours  chlorhexidine 4% Liquid 1 Application(s) Topical <User Schedule>  dextrose 40% Gel 15 Gram(s) Oral once PRN  dextrose 5%. 1000 milliLiter(s) IV Continuous <Continuous>  dextrose 50% Injectable 12.5 Gram(s) IV Push once  dextrose 50% Injectable 25 Gram(s) IV Push once  dextrose 50% Injectable 25 Gram(s) IV Push once  fentaNYL   Infusion... 0.5 MICROgram(s)/kG/Hr IV Continuous <Continuous>  glucagon  Injectable 1 milliGRAM(s) IntraMuscular once PRN  insulin lispro (HumaLOG) corrective regimen sliding scale   SubCutaneous every 6 hours  norepinephrine Infusion 0.05 MICROgram(s)/kG/Min IV Continuous <Continuous>  octreotide  Infusion 50 MICROgram(s)/Hr IV Continuous <Continuous>  pantoprazole Infusion 8 mG/Hr IV Continuous <Continuous>  propofol Infusion 30 MICROgram(s)/kG/Min IV Continuous <Continuous>      PMHX/PSHX:  E. coli bacteremia  Vitiligo  Latent tuberculosis  Diabetes mellitus type 2, insulin dependent  Esophageal varices  Alcoholic cirrhosis  Status post corneal transplant  Alcoholic Cirrhosis  Esophageal Varices      Family history:  Family history of uterine cancer  Family history of diabetes mellitus      ROS: unable to assess    PHYSICAL EXAM:     Vital Signs:  Vital Signs Last 24 Hrs  T(C): 37.6 (2020 12:00), Max: 37.6 (2020 12:00)  T(F): 99.7 (2020 12:00), Max: 99.7 (2020 12:00)  HR: 112 (2020 15:30) (69 - 119)  BP: 109/53 (2020 15:30) (82/47 - 125/60)  BP(mean): 77 (2020 15:30) (62 - 87)  RR: 22 (2020 15:30) (22 - 23)  SpO2: 98% (2020 15:30) (92% - 100%)  Daily     Daily Weight in k.4 (2020 11:47)    GENERAL:  intubated and sedatd  HEENT:  NC/AT,  +scleral icterus, blood noted in oropharynx  CHEST:  Full & symmetric excursion, no increased effort, breath sounds clear  HEART:  Regular rhythm  ABDOMEN:  Soft, non-tender, non-distended, normoactive bowel sounds,  no masses ,  EXTREMITIES:  no cyanosis,clubbing or edema  SKIN: no rash  NEURO:  sedated/intubated    LABS:                        8.2    24.80 )-----------( 84       ( 2020 11:38 )             24.5     07-20    136  |  103  |  42<H>  ----------------------------<  142<H>  4.0   |  13<L>  |  2.90<H>    Ca    9.3      2020 00:12  Phos  4.2     07-20  Mg     1.6     07-20    TPro  4.7<L>  /  Alb  3.6  /  TBili  45.9<H>  /  DBili  x   /  AST  54<H>  /  ALT  21  /  AlkPhos  76  07-20    LIVER FUNCTIONS - ( 2020 00:12 )  Alb: 3.6 g/dL / Pro: 4.7 g/dL / ALK PHOS: 76 U/L / ALT: 21 U/L / AST: 54 U/L / GGT: x           PT/INR - ( 2020 00:12 )   PT: 37.6 sec;   INR: 3.35 ratio         PTT - ( 2020 00:12 )  PTT:50.6 sec    Amylase Serum92      Lipase serum89       Ammonia--      Imaging:

## 2020-07-21 NOTE — PROGRESS NOTE ADULT - SUBJECTIVE AND OBJECTIVE BOX
Chief Complaint:  Patient is a 44y old  Male who presents with a chief complaint of Hematemesis (2020 07:33)      Interval Events: Pt with increased pressor requirments. No having frequent bloody BMs but had 2 episodes of maroon stool. Remain intubated.    Allergies:  levofloxacin (Other (Moderate))      Hospital Medications:  cefTRIAXone   IVPB      cefTRIAXone   IVPB 2000 milliGRAM(s) IV Intermittent every 24 hours  chlorhexidine 0.12% Liquid 15 milliLiter(s) Oral Mucosa every 12 hours  chlorhexidine 4% Liquid 1 Application(s) Topical <User Schedule>  dextrose 40% Gel 15 Gram(s) Oral once PRN  dextrose 5%. 1000 milliLiter(s) IV Continuous <Continuous>  dextrose 50% Injectable 12.5 Gram(s) IV Push once  dextrose 50% Injectable 25 Gram(s) IV Push once  dextrose 50% Injectable 25 Gram(s) IV Push once  fentaNYL   Infusion... 0.5 MICROgram(s)/kG/Hr IV Continuous <Continuous>  glucagon  Injectable 1 milliGRAM(s) IntraMuscular once PRN  insulin lispro (HumaLOG) corrective regimen sliding scale   SubCutaneous every 6 hours  norepinephrine Infusion 0.05 MICROgram(s)/kG/Min IV Continuous <Continuous>  octreotide  Infusion 50 MICROgram(s)/Hr IV Continuous <Continuous>  pantoprazole  Injectable 40 milliGRAM(s) IV Push two times a day  propofol Infusion 30 MICROgram(s)/kG/Min IV Continuous <Continuous>      PMHX/PSHX:  E. coli bacteremia  Vitiligo  Latent tuberculosis  Diabetes mellitus type 2, insulin dependent  Esophageal varices  Alcoholic cirrhosis  Status post corneal transplant  Alcoholic Cirrhosis  Esophageal Varices      Family history:  Family history of uterine cancer  Family history of diabetes mellitus      ROS: unable to assess      PHYSICAL EXAM:     Vital Signs:  Vital Signs Last 24 Hrs  T(C): 37 (2020 07:00), Max: 37.7 (2020 16:00)  T(F): 98.6 (2020 07:00), Max: 99.9 (2020 16:00)  HR: 107 (2020 07:45) (104 - 119)  BP: 102/51 (2020 07:45) (82/47 - 125/60)  BP(mean): 73 (2020 07:45) (62 - 87)  RR: 22 (2020 07:45) (22 - 26)  SpO2: 96% (2020 07:45) (94% - 100%)  Daily     Daily Weight in k.1 (2020 05:00)    GENERAL:  intubated and sedatd  HEENT:  NC/AT,  +scleral icterus  CHEST:  Full & symmetric excursion, no increased effort, breath sounds clear  HEART:  Regular rhythm  ABDOMEN:  Soft, non-tender, non-distended, normoactive bowel sounds,  no masses ,  EXTREMITIES:  no cyanosis,clubbing or edema  SKIN: no rash  NEURO:  sedated/intubated    LABS:                        7.1    20.34 )-----------( 103      ( 2020 23:57 )             21.5     07-20    139  |  102  |  55<H>  ----------------------------<  146<H>  4.5   |  11<L>  |  4.59<H>    Ca    9.1      2020 23:57  Phos  6.9     07-20  Mg     1.6     -20    TPro  5.1<L>  /  Alb  4.0  /  TBili  45.5<H>  /  DBili  x   /  AST  60<H>  /  ALT  22  /  AlkPhos  80  07-20    LIVER FUNCTIONS - ( 2020 23:57 )  Alb: 4.0 g/dL / Pro: 5.1 g/dL / ALK PHOS: 80 U/L / ALT: 22 U/L / AST: 60 U/L / GGT: x           PT/INR - ( 2020 23:57 )   PT: 38.1 sec;   INR: 3.39 ratio         PTT - ( 2020 23:57 )  PTT:77.2 sec    Amylase Serum--      Lipase serum--       [id number]      Imaging:  < from: Upper Endoscopy (20 @ 14:06) >  Impression:          - Recently bleeding large (> 5 mm) esophageal varices, s/p band ligation.                       - Retained liquid gastric contents suggestive of gastroparesis or ileus.                       - Portal hypertensive gastropathy.                       - Normal examined duodenum.                       - No specimens collected.  Recommendation:      - Continue care in MICU.                       - Continue octreotide infusion of 50 micrograms per hour for 5 days.                       - Use a proton pump inhibitor once daily.                       - Continue ceftriaxone for 7 days to decrease risks of infection,                        re-bleeding, and mortality.                       - Will need repeat EGD for surviellance in 2-4 weeks.    < end of copied text > Chief Complaint:  Patient is a 44y old  Male who presents with a chief complaint of Hematemesis (2020 07:33)      Interval Events: Pt with increased pressor requirments. No having frequent bloody BMs but had 1 episode of maroon stool yesterday. Remain intubated.    Allergies:  levofloxacin (Other (Moderate))      Hospital Medications:  cefTRIAXone   IVPB      cefTRIAXone   IVPB 2000 milliGRAM(s) IV Intermittent every 24 hours  chlorhexidine 0.12% Liquid 15 milliLiter(s) Oral Mucosa every 12 hours  chlorhexidine 4% Liquid 1 Application(s) Topical <User Schedule>  dextrose 40% Gel 15 Gram(s) Oral once PRN  dextrose 5%. 1000 milliLiter(s) IV Continuous <Continuous>  dextrose 50% Injectable 12.5 Gram(s) IV Push once  dextrose 50% Injectable 25 Gram(s) IV Push once  dextrose 50% Injectable 25 Gram(s) IV Push once  fentaNYL   Infusion... 0.5 MICROgram(s)/kG/Hr IV Continuous <Continuous>  glucagon  Injectable 1 milliGRAM(s) IntraMuscular once PRN  insulin lispro (HumaLOG) corrective regimen sliding scale   SubCutaneous every 6 hours  norepinephrine Infusion 0.05 MICROgram(s)/kG/Min IV Continuous <Continuous>  octreotide  Infusion 50 MICROgram(s)/Hr IV Continuous <Continuous>  pantoprazole  Injectable 40 milliGRAM(s) IV Push two times a day  propofol Infusion 30 MICROgram(s)/kG/Min IV Continuous <Continuous>      PMHX/PSHX:  E. coli bacteremia  Vitiligo  Latent tuberculosis  Diabetes mellitus type 2, insulin dependent  Esophageal varices  Alcoholic cirrhosis  Status post corneal transplant  Alcoholic Cirrhosis  Esophageal Varices      Family history:  Family history of uterine cancer  Family history of diabetes mellitus      ROS: unable to assess      PHYSICAL EXAM:     Vital Signs:  Vital Signs Last 24 Hrs  T(C): 37 (2020 07:00), Max: 37.7 (2020 16:00)  T(F): 98.6 (2020 07:00), Max: 99.9 (2020 16:00)  HR: 107 (2020 07:45) (104 - 119)  BP: 102/51 (2020 07:45) (82/47 - 125/60)  BP(mean): 73 (2020 07:45) (62 - 87)  RR: 22 (2020 07:45) (22 - 26)  SpO2: 96% (2020 07:45) (94% - 100%)  Daily     Daily Weight in k.1 (2020 05:00)    GENERAL:  intubated and sedatd  HEENT:  NC/AT,  +scleral icterus  CHEST:  Full & symmetric excursion, no increased effort, breath sounds clear  HEART:  Regular rhythm  ABDOMEN:  Soft, non-tender, non-distended, normoactive bowel sounds,  no masses ,  EXTREMITIES:  no cyanosis,clubbing or edema  SKIN: no rash  NEURO:  sedated/intubated    LABS:                        7.1    20.34 )-----------( 103      ( 2020 23:57 )             21.5     07-    139  |  102  |  55<H>  ----------------------------<  146<H>  4.5   |  11<L>  |  4.59<H>    Ca    9.1      2020 23:57  Phos  6.9     -20  Mg     1.6     20    TPro  5.1<L>  /  Alb  4.0  /  TBili  45.5<H>  /  DBili  x   /  AST  60<H>  /  ALT  22  /  AlkPhos  80  07-20    LIVER FUNCTIONS - ( 2020 23:57 )  Alb: 4.0 g/dL / Pro: 5.1 g/dL / ALK PHOS: 80 U/L / ALT: 22 U/L / AST: 60 U/L / GGT: x           PT/INR - ( 2020 23:57 )   PT: 38.1 sec;   INR: 3.39 ratio         PTT - ( 2020 23:57 )  PTT:77.2 sec    Amylase Serum--      Lipase serum--       [id number]      Imaging:  < from: Upper Endoscopy (20 @ 14:06) >  Impression:          - Recently bleeding large (> 5 mm) esophageal varices, s/p band ligation.                       - Retained liquid gastric contents suggestive of gastroparesis or ileus.                       - Portal hypertensive gastropathy.                       - Normal examined duodenum.                       - No specimens collected.  Recommendation:      - Continue care in MICU.                       - Continue octreotide infusion of 50 micrograms per hour for 5 days.                       - Use a proton pump inhibitor once daily.                       - Continue ceftriaxone for 7 days to decrease risks of infection,                        re-bleeding, and mortality.                       - Will need repeat EGD for surviellance in 2-4 weeks.    < end of copied text >

## 2020-07-21 NOTE — PROCEDURE NOTE - ADDITIONAL PROCEDURE DETAILS
Ultrasound guided vein access with needle  Wire visualized in vein before dilation  Bilateral lung sliding present before and after procedure
Ultrasound guided paracentesis with intraabdominal pressure monitoring.   Initial intra-abdominal pressure was greater than 35cm H20 using manometer\    After removal of 1500cc, pressure was 29 cmH20    After removal of 3000cc, pressure was not detectable due to lack of ascites fluid.     No color doppler flow before paracentesis in right kidney  Afterwards, minimal flow present.     No complications

## 2020-07-21 NOTE — PROGRESS NOTE ADULT - ASSESSMENT
44 year old man with vitiligo, obesity, DM2, and decompensated cirrhosis (complicated by small EV, portal hypertensive gastropathy, ascites and SBP) secondary to alcohol-related liver disease who presents for new onset melena and hematemesis likely secondary to upper GI bleeding from known esophageal varices.     #Hematemesis and melena: s/p EGD with 4 bands placed on varcies. No active bleeding during EGD. Patient with red whale signs c/f recent bleeding. Hgb slight downtrend to 7.1 from 7.6.   #Acute on chronic kidney injury: Patient anuric yesterday and Cr up to 4.6. DDx acute tubular necrosis from hemodynamic instability (bleeding +/- sepsis) vs HRS. Has had HRS prior.  #Decompensated alcoholic liver disease: MELD-Na 48 (7/20/2020)  -Varices: on octreotide and ppi now (D2/5) s/p bands on 7/20 for large varcices with stigmata of recent bleeding. upper abdominal periesophageal and abdominal wall varices visualized on MRI abdomen from 07/2020.   -Ascites: Moderate to large volume ascites on MRI abdomen 07/2020.  -HE: no history  -HCC: No evidence of hepatic mass on MRI abdomen 07/2020.   -PSE: initially had mild asterixis on exam. Not receiving lactulose as pt without NGT with recent bands. Now intubated    Recommendations:  - please ween from vent per MICU team  - continue with levophed with MAP goal of > 70  - continue with albumin 25% in 100ml q6hrs  - transplant nephrology consult given anuria and worsening SAM  - consider broaden Abx (Zosyn) given persistent shock state with EGD yesterday w/o active bleeding  - continue with octreotide infusion at 50 mcg/hr for total 5 day course (D2/5)  - continue with ppi 40mg IV BID  - monitor BMs  - transfuse to maintain goal Hb 7.0  - monitor I/Os and weights daily  - monitor CBC, CMP, INR daily  - pending clinical course, will consider transfer to [facility] for second opinion  - supportive care 44 year old man with vitiligo, obesity, DM2, and decompensated cirrhosis (complicated by small EV, portal hypertensive gastropathy, ascites and SBP) secondary to alcohol-related liver disease who presents for new onset melena and hematemesis likely secondary to upper GI bleeding from known esophageal varices.     #Hematemesis and melena: s/p EGD with 4 bands placed on varcies. No active bleeding during EGD. Patient with red whale signs c/f recent bleeding. Hgb slight downtrend to 7.1 from 7.6.   #Acute on chronic kidney injury: Patient anuric yesterday and Cr up to 4.6. DDx acute tubular necrosis from hemodynamic instability (bleeding +/- sepsis) vs HRS. Has had HRS prior.  #Decompensated alcoholic liver disease: MELD-Na 48 (7/20/2020)  -Varices: on octreotide and ppi now (D2/5) s/p bands on 7/20 for large varcices with stigmata of recent bleeding. upper abdominal periesophageal and abdominal wall varices visualized on MRI abdomen from 07/2020.   -Ascites: Moderate to large volume ascites on MRI abdomen 07/2020.  -HE: no history  -HCC: No evidence of hepatic mass on MRI abdomen 07/2020.   -PSE: initially had mild asterixis on exam. Not receiving lactulose as pt without NGT with recent bands. Now intubated    Recommendations:  - please ween from vent per MICU team  - continue with levophed with MAP goal of > 70  - stop albumin given patient anuric and c/f potential for fluid overload  - transplant nephrology consult given anuria and worsening SAM  - consider broaden Abx (Zosyn) given persistent shock state with EGD yesterday w/o active bleeding  - continue with octreotide infusion at 50 mcg/hr for total 5 day course (D2/5)  - continue with ppi 40mg IV BID  - monitor BMs  - transfuse to maintain goal Hb 7.0  - monitor I/Os and weights daily  - monitor CBC, CMP, INR daily  - pending clinical course, will consider transfer to [facility] for second opinion  - supportive care 44 year old man with vitiligo, obesity, DM2, and decompensated cirrhosis (complicated by small EV, portal hypertensive gastropathy, ascites and SBP) secondary to alcohol-related liver disease who presents for new onset melena and hematemesis likely secondary to upper GI bleeding from known esophageal varices.     #Hematemesis and melena: s/p EGD with 4 bands placed on varcies. No active bleeding during EGD. Patient with red whale signs c/f recent bleeding. Hgb slight downtrend to 7.1 from 7.6.   #Acute on chronic kidney injury: Patient anuric yesterday and Cr up to 4.6. DDx acute tubular necrosis from hemodynamic instability (bleeding +/- sepsis) vs HRS. Has had HRS prior.  #Decompensated alcoholic liver disease: MELD-Na 48 (7/20/2020)  -Varices: on octreotide and ppi now (D2/5) s/p bands on 7/20 for large varcices with stigmata of recent bleeding. upper abdominal periesophageal and abdominal wall varices visualized on MRI abdomen from 07/2020.   -Ascites: Moderate to large volume ascites on MRI abdomen 07/2020.  -HE: no history  -HCC: No evidence of hepatic mass on MRI abdomen 07/2020.   -PSE: initially had mild asterixis on exam. Not receiving lactulose as pt without NGT with recent bands. Now intubated    Recommendations:  - please ween from vent per MICU team  - continue with levophed with MAP goal of > 70  - stop albumin given patient anuric and c/f potential for fluid overload  - transplant nephrology consult given anuria and worsening SAM  - consider broaden Abx (Meropenem) given persistent shock state with EGD yesterday w/o active bleeding  - continue with octreotide infusion at 50 mcg/hr for total 5 day course (D2/5)  - continue with ppi 40mg IV BID  - monitor BMs  - transfuse to maintain goal Hb 7.0  - monitor I/Os and weights daily  - monitor CBC, CMP, INR daily  - pending clinical course, will consider transfer to  for second opinion  - supportive care

## 2020-07-21 NOTE — PHARMACOTHERAPY INTERVENTION NOTE - COMMENTS
Patient is in persistence shock state and bleeding. Antibiotics was switched from ceftriaxone to piperacillin/tazobactam. Patient is anuric and CVVHD was initiated today. Suggested to dose zosyn as 3.375g IV q8h (extended infusion).

## 2020-07-21 NOTE — PROCEDURE NOTE - NSINFORMCONSENT_GEN_A_CORE
Benefits, risks, and possible complications of procedure explained to patient/caregiver who verbalized understanding and gave verbal consent.
This was an emergent procedure.
Benefits, risks, and possible complications of procedure explained to patient/caregiver who verbalized understanding and gave verbal consent.
This was an emergent procedure.
This was an emergent procedure.

## 2020-07-21 NOTE — CONSULT NOTE ADULT - SUBJECTIVE AND OBJECTIVE BOX
[facility] DIVISION OF KIDNEY DISEASES AND HYPERTENSION -- [phone]  -- INITIAL CONSULT NOTE  --------------------------------------------------------------------------------  HPI: 44-year-old male with liver cirrhosis secondary to EtOH abuse, was admitted to [facility] on 7/18/20 for hematemesis and melena. Pt. was started on PPI and octreotide infusion for suspect variceal bleeding. Pt. was intubated on 7/19 for airway protection and prior to EGD, transferred to MICU. EGD on 7/19 showed no active bleeding. Pt. went for repeat EGD on 7/20 with 4 bands placed. Pt. also underwent paracentesis on 7/20 which showed elevated abdominal pressures and pt. had 3L ascitic fluid removed. Pt. was transfused 1 unit PRBCs and IV Albumin on 7/20. Transplant Nephrology consulted for kidney failure and anuria. Upon review of labs on [facility]. pt. has had multiple AKIs in the past. Pt. was recently hospitalized from 6/10/20-6/21/20. Scr was 3.81 on admission, and improved to 1.85 on discharge. Scr on arrival during this hospitalization was 1.85, which has increased to 4.59 today.    Pt. seen and examined this AM. Pt. remains sedated, intubated (FiO2 stable at 50, PEEP stable at 5), and on IV vasopressor support. Pt. is anuric 0mL of UOP in the past 24 hours.     PAST HISTORY  --------------------------------------------------------------------------------  PAST MEDICAL & SURGICAL HISTORY:  E. 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 Patient received 2 years of treatment.  Diabetes mellitus type 2, insulin dependent  Esophageal varices  Alcoholic cirrhosis  Status post corneal transplant    FAMILY HISTORY:  Family history of diabetes mellitus    PAST SOCIAL HISTORY: , former EtOH user    ALLERGIES & MEDICATIONS  --------------------------------------------------------------------------------  Allergies    levofloxacin (Other (Moderate))    Intolerances    Standing Inpatient Medications  cefTRIAXone   IVPB      cefTRIAXone   IVPB 2000 milliGRAM(s) IV Intermittent every 24 hours  chlorhexidine 0.12% Liquid 15 milliLiter(s) Oral Mucosa every 12 hours  chlorhexidine 4% Liquid 1 Application(s) Topical <User Schedule>  dextrose 5%. 1000 milliLiter(s) IV Continuous <Continuous>  dextrose 50% Injectable 12.5 Gram(s) IV Push once  dextrose 50% Injectable 25 Gram(s) IV Push once  dextrose 50% Injectable 25 Gram(s) IV Push once  fentaNYL   Infusion... 0.5 MICROgram(s)/kG/Hr IV Continuous <Continuous>  insulin lispro (HumaLOG) corrective regimen sliding scale   SubCutaneous every 6 hours  norepinephrine Infusion 0.05 MICROgram(s)/kG/Min IV Continuous <Continuous>  octreotide  Infusion 50 MICROgram(s)/Hr IV Continuous <Continuous>  pantoprazole  Injectable 40 milliGRAM(s) IV Push two times a day  propofol Infusion 30 MICROgram(s)/kG/Min IV Continuous <Continuous>    REVIEW OF SYSTEMS  --------------------------------------------------------------------------------  Unable to obtain due to medical condition    VITALS/PHYSICAL EXAM  --------------------------------------------------------------------------------  T(C): 36.5 (07-21-20 @ 08:00), Max: 37.7 (07-20-20 @ 16:00)  HR: 103 (07-21-20 @ 10:30) (103 - 119)  BP: 103/51 (07-21-20 @ 10:30) (91/45 - 119/55)  RR: 28 (07-21-20 @ 10:30) (22 - 38)  SpO2: 97% (07-21-20 @ 10:30) (94% - 98%)  Wt(kg): --    07-20-20 @ 07:01  -  07-21-20 @ 07:00  --------------------------------------------------------  IN: 3484.2 mL / OUT: 3000 mL / NET: 484.2 mL    07-21-20 @ 07:01  -  07-21-20 @ 11:00  --------------------------------------------------------  IN: 140.7 mL / OUT: 0 mL / NET: 140.7 mL    Physical Exam:  	Gen: sedated  	HEENT: intubated  	Pulm: good air entry bilaterally  	CV: S1S2  	Abd: Soft, +BS              : + javed catheter   	Ext: No LE edema B/L  	Neuro: sedated  	Skin: ? vitiligo  	  LABS/STUDIES  --------------------------------------------------------------------------------              7.1    20.34 >-----------<  103      [07-20-20 @ 23:57]              21.5     139  |  102  |  55  ----------------------------<  146      [07-20-20 @ 23:57]  4.5   |  11  |  4.59        Ca     9.1     [07-20-20 @ 23:57]      Mg     1.6     [07-20-20 @ 23:57]      Phos  6.9     [07-20-20 @ 23:57]    TPro  5.1  /  Alb  4.0  /  TBili  45.5  /  DBili  x   /  AST  60  /  ALT  22  /  AlkPhos  80  [07-20-20 @ 23:57]    PT/INR: PT 38.1 , INR 3.39       [07-20-20 @ 23:57]  PTT: 77.2       [07-20-20 @ 23:57]    Creatinine Trend:  SCr 4.59 [07-20 @ 23:57]  SCr 2.90 [07-20 @ 00:12]  SCr 1.85 [07-18 @ 22:23]

## 2020-07-21 NOTE — CONSULT NOTE ADULT - ATTENDING COMMENTS
Patient seen and examined with Dr Tucker last night. I agree with the plan as above
I have personally seen, examined and participated in the care of this patient. I have reviewed all pertinent clinical information including history, physical exam , plan and fellow's note and agree with the plan.    44 year old male with liver cirrhosis secondary to EtOH abuse with anuric SAM     Plan to start  CVVHD once access is placed

## 2020-07-21 NOTE — PROCEDURE NOTE - NSPROCDETAILS_GEN_ALL_CORE
patient pre-oxygenated, tube inserted, placement confirmed
location identified, sterile technique used, catheter introduced, fluid drained/ultrasound utilization
ultrasound guidance/guidewire recovered/lumen(s) aspirated and flushed/sterile dressing applied/sterile technique, catheter placed
sutured in place/location identified, draped/prepped, sterile technique used, needle inserted/introduced/positive blood return obtained via catheter/connected to a pressurized flush line/hemostasis with direct pressure, dressing applied/Seldinger technique/all materials/supplies accounted for at end of procedure/ultrasound guidance
lumen(s) aspirated and flushed/sterile dressing applied/sterile technique, catheter placed/ultrasound guidance/guidewire recovered

## 2020-07-21 NOTE — PROCEDURE NOTE - NSINDICATIONS_GEN_A_CORE
airway protection
abdominal compartment syndrome/anuria/oliguria/ascites
arterial puncture to obtain ABG's/blood sampling/monitoring purposes
emergency venous access/hypertonic/irritant infusion/critical illness
critical illness/dialysis/CRRT

## 2020-07-21 NOTE — CONSULT NOTE ADULT - PROBLEM SELECTOR RECOMMENDATION 2
In setting of advanced kidney failure, sepsis, and elevated lactate. Serum bicarbonate noted to be 11 today. Will initiate CRRT today. Monitor serum bicarbonate.    If any questions, please feel free to contact me  Kai Lemon   Nephrology Fellow  [phone]  (After 5 pm or on weekends please page the on-call fellow)

## 2020-07-21 NOTE — PROGRESS NOTE ADULT - ASSESSMENT
44M PMHx decompensated cirrhosis (c/b ascites, SBP w/ hx of fluoroquinolone resistant E. coli, small esophageal varices, portal hypertensive gastropathy) secondary to alcoholic hepatitis with recent acute on chronic liver failure in 5/2020 from alcohol relapse p/w with blood stools and hematemesis. Now transferred to the MICU for elective intubation for EGD.     Neuro:   #Potential for hepatic encephelopathy  - Neuro checks per ICU protocol   - Monitor serum ammonia level   - Sedation for intubation, will wean as able following procedure.   - Currently holding lactulose and rifaximin as concern for bleeding with NGT placement due to coagulopathy.    CV:   - Hemodynamically stable at this time   - Potential for hemorrhagic shock    - Continue with telemetry   - EKG on admission NSR and T wave inversions stable from previous EKGs.  - patient on levophed for pressor support    Pulm:   - Electively intubated for planned EGD   -  CXR post intubation with no focal pulmonary consolidations or pneumothorax.  - No active pulmonary issues at this time   - Maintain aspiration precautions at all times   - COVID 19 PCR negativee       GI:   #Hematemesis with melena, Hgb 8.4  - GI to do EGD for suspected esophageal variceal bleeding today.   - Maintain NPO   - CBC q6h, transfuse as above   - Octreotide gtt  - PPI gtt  - MR Abdomen with portal hypertension and no evidence of hepatic mass.   - holding home cephalexin for SBP prophylaxis; start ceftriaxone 1 g daily for total 5 day course  - Transplant hepatology team f/u; per chart review, given frequent alcohol relapse, pt needs to abstain for >3m to be listed for transplant  - will f/u ammonia levels.   -  start albumin.   - MELD of 44 today.      Ascites  - will continue with empiric Ceftriaxone for SBP ppx   - paracentesis to be done today. 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  - bedside U/S with decreased flow to right kidney.   - f/u urine lytes     Acidosis  - pH: 7.29 on ABG  - infectious process vs acute renal failure vs starvation ketosis.   - f/u lactate, BHB      HEME:  #Hematemesis with melena, Hgb 8.4  - CBC q6h, transfuse as needed    #Coagulopathy, secondary to liver dysfunction  - INR 3.79  - Deficiencies primarily in fibrinogen, platelets. TEG: R 11.1, K 4.8, Angle 53.4, MA <40.0  - s/p 1u FFP and Cryoprecipitate + benadryl given pt's hx of rash post-transfusion  - follow up TEG this AM with fibrinogen decreased at 6.  - DVT PPX: mechanical devices    ID:   - c/w Ceftriaxone 1g daily x 5 days for SBP ppx  - procalcitonin elevated at 6.32    ENDO:  - T2DM, on repaglinide at home.  - FS goal 140-180, at goal    Full code 44M PMHx decompensated cirrhosis (c/b ascites, SBP w/ hx of fluoroquinolone resistant E. coli, small esophageal varices, portal hypertensive gastropathy) secondary to alcoholic hepatitis with recent acute on chronic liver failure in 5/2020 from alcohol relapse p/w with blood stools and hematemesis. Now transferred to the MICU for elective intubation for EGD.     Neuro:   #Potential for hepatic encephelopathy  - Serum ammonia level at 175  - Sedation for intubation, will wean as able following procedure.   - Currently holding lactulose and rifaximin as concern for bleeding with NGT placement due to coagulopathy.    CV:   - Potential for hemorrhagic shock, from multiple varices  - Continue with telemetry   - EKG on admission NSR and T wave inversions stable from previous EKGs.  - patient on levophed for pressor support- increasing dose requirement    Pulm:   - Electively intubated for EGD   -  CXR post intubation with no focal pulmonary consolidations or pneumothorax.  - No active pulmonary issues at this time   - Maintain aspiration precautions at all times   - COVID 19 PCR negativee       GI:   #Hematemesis with melena  - s/p EGD 7/19 w/o banding and EGD 7/20 with 4 varices banded.  - Maintain NPO   - CBC q6h, transfuse as above   - Octreotide gtt  - PPI gtt  - MR Abdomen with portal hypertension and no evidence of hepatic mass.   - holding home cephalexin for SBP prophylaxis; start ceftriaxone 1 g daily for total 5 day course  - Transplant hepatology team f/u; per chart review, given frequent alcohol relapse, pt needs to abstain for >3m to be listed for transplant  - will f/u ammonia levels.   -  start albumin.   - MELD of 44 today.      Ascites  - will continue with empiric Ceftriaxone for SBP ppx   - paracentesis to be done today. 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  - bedside U/S with decreased flow to right kidney.   - f/u urine lytes     Acidosis  - pH: 7.29 on ABG  - infectious process vs acute renal failure vs starvation ketosis.   - f/u lactate, BHB      HEME:  #Hematemesis with melena, Hgb 8.4  - CBC q6h, transfuse as needed    #Coagulopathy, secondary to liver dysfunction  - INR 3.79  - Deficiencies primarily in fibrinogen, platelets. TEG: R 11.1, K 4.8, Angle 53.4, MA <40.0  - s/p 1u FFP and Cryoprecipitate + benadryl given pt's hx of rash post-transfusion  - follow up TEG this AM with fibrinogen decreased at 6.  - DVT PPX: mechanical devices    ID:   - c/w Ceftriaxone 1g daily x 5 days for SBP ppx  - procalcitonin elevated at 6.32    ENDO:  - T2DM, on repaglinide at home.  - FS goal 140-180, at goal    Full code 44M PMHx decompensated cirrhosis (c/b ascites, SBP w/ hx of fluoroquinolone resistant E. coli, small esophageal varices, portal hypertensive gastropathy) secondary to alcoholic hepatitis with recent acute on chronic liver failure in 5/2020 from alcohol relapse p/w with blood stools and hematemesis. Now transferred to the MICU for elective intubation for EGD.     Neuro:   #Potential for hepatic encephelopathy  - Serum ammonia level at 175  - Sedation for intubation, will wean as able following procedure.   - Currently holding lactulose and rifaximin as concern for bleeding with NGT placement due to coagulopathy.    CV:   - Potential for hemorrhagic shock, from multiple varices  - Continue with telemetry   - EKG on admission NSR and T wave inversions stable from previous EKGs.  - patient on levophed for pressor support- increasing dose requirement    Pulm:   - Electively intubated for EGD   -  CXR post intubation with no focal pulmonary consolidations or pneumothorax.  - No active pulmonary issues at this time   - Maintain aspiration precautions at all times   - COVID 19 PCR negativee       GI:   #Hematemesis with melena  - s/p EGD 7/19 w/o banding and EGD 7/20 with 4 varices banded.  - Maintain NPO   - CBC q6h, transfuse as above   - Octreotide gtt  - PPI gtt  - MR Abdomen with portal hypertension and no evidence of hepatic mass.   - holding home cephalexin for SBP prophylaxis; start ceftriaxone 1 g daily for total 5 day course  - Transplant hepatology team f/u; per chart review, given frequent alcohol relapse, pt needs to abstain for >3m to be listed for transplant  - will f/u ammonia levels.   -  start albumin.   - MELD of 44 today.      Ascites  - will continue with empiric Ceftriaxone for SBP ppx   - paracentesis to be done today. 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   -Patient with no urine output overnight.  - bedside U/S with decreased flow to right kidney.   - Will c/s nephrology for recommendation on urgent need for dialysis  - f/u urine lytes     Anion-Gap Metabolic Acidosis  - infectious process vs acute renal failure vs starvation ketosis.   -  AG increased to 26.  - lactate increased to 5.3 from 4.7.  - BHB 0.2 (r/o starvation ketosis)      HEME:  #Hematemesis with melena, Hgb 8.4  - CBC q6h, transfuse as needed    #Coagulopathy, secondary to liver dysfunction  - INR 3.79  - Deficiencies primarily in fibrinogen, platelets. TEG: R 11.1, K 4.8, Angle 53.4, MA <40.0  - s/p 1u FFP and Cryoprecipitate + benadryl given pt's hx of rash post-transfusion  - follow up TEG this AM with fibrinogen of 12.   - DVT PPX: mechanical devices    ID:   - will broaden antibiotics to meropenem.   - procalcitonin elevated at 6.32    ENDO:  - T2DM, on repaglinide at home.  - FS goal 140-180, at goal    Full code

## 2020-07-21 NOTE — CONSULT NOTE ADULT - PROBLEM SELECTOR RECOMMENDATION 9
Pt. with multifactorial SAM on CKD in the setting of abdominal hypertension, anemia, hypotension, and sepsis. Upon lab reviews, pt. Scr on 6/21/20 1.85. Scr on arrival was 1.85 but has increased to 4.59 today. Pt. with likely hemodynamically mediated anuric SAM ? ATN. Check UA, urine electrolytes, and spot urine TP/Cr ratio. Obtain kidney sonogram. Labs reviewed, will initiate CRRT with net even balance after HD catheter placement by MICU team. Phone consent obtained from wife. Monitor labs and urine output. Avoid potential nephrotoxins. Dose medications as per CRRT.

## 2020-07-21 NOTE — PROGRESS NOTE ADULT - SUBJECTIVE AND OBJECTIVE BOX
INTERVAL HPI/OVERNIGHT EVENTS:    SUBJECTIVE: Patient seen and examined at bedside.       VITAL SIGNS:  ICU Vital Signs Last 24 Hrs  T(C): 37 (21 Jul 2020 07:00), Max: 37.7 (20 Jul 2020 16:00)  T(F): 98.6 (21 Jul 2020 07:00), Max: 99.9 (20 Jul 2020 16:00)  HR: 107 (21 Jul 2020 07:00) (104 - 119)  BP: 102/50 (21 Jul 2020 07:00) (82/47 - 125/60)  BP(mean): 72 (21 Jul 2020 07:00) (62 - 87)  ABP: --  ABP(mean): --  RR: 22 (21 Jul 2020 07:00) (22 - 26)  SpO2: 96% (21 Jul 2020 07:00) (94% - 100%)    Mode: AC/ CMV (Assist Control/ Continuous Mandatory Ventilation), RR (machine): 22, TV (machine): 420, FiO2: 50, PEEP: 5, ITime: 1, MAP: 10, PIP: 24  Plateau pressure:   P/F ratio:     07-20 @ 07:01  -  07-21 @ 07:00  --------------------------------------------------------  IN: 3484.2 mL / OUT: 3000 mL / NET: 484.2 mL      CAPILLARY BLOOD GLUCOSE  132 (21 Jul 2020 06:00)      POCT Blood Glucose.: 132 mg/dL (21 Jul 2020 05:53)    ECG:    PHYSICAL EXAM:    General:   HEENT:   Neck:   Respiratory:   Cardiovascular:   Abdomen:   Extremities:  Neurological:    MEDICATIONS:  MEDICATIONS  (STANDING):  cefTRIAXone   IVPB      cefTRIAXone   IVPB 2000 milliGRAM(s) IV Intermittent every 24 hours  chlorhexidine 0.12% Liquid 15 milliLiter(s) Oral Mucosa every 12 hours  chlorhexidine 4% Liquid 1 Application(s) Topical <User Schedule>  dextrose 5%. 1000 milliLiter(s) (50 mL/Hr) IV Continuous <Continuous>  dextrose 50% Injectable 12.5 Gram(s) IV Push once  dextrose 50% Injectable 25 Gram(s) IV Push once  dextrose 50% Injectable 25 Gram(s) IV Push once  fentaNYL   Infusion... 0.5 MICROgram(s)/kG/Hr (2.26 mL/Hr) IV Continuous <Continuous>  insulin lispro (HumaLOG) corrective regimen sliding scale   SubCutaneous every 6 hours  norepinephrine Infusion 0.05 MICROgram(s)/kG/Min (8.46 mL/Hr) IV Continuous <Continuous>  octreotide  Infusion 50 MICROgram(s)/Hr (10 mL/Hr) IV Continuous <Continuous>  pantoprazole  Injectable 40 milliGRAM(s) IV Push two times a day  propofol Infusion 30 MICROgram(s)/kG/Min (16.2 mL/Hr) IV Continuous <Continuous>    MEDICATIONS  (PRN):  dextrose 40% Gel 15 Gram(s) Oral once PRN Blood Glucose LESS THAN 70 milliGRAM(s)/deciliter  glucagon  Injectable 1 milliGRAM(s) IntraMuscular once PRN Glucose LESS THAN 70 milligrams/deciliter      ALLERGIES:  Allergies    levofloxacin (Other (Moderate))    Intolerances        LABS:                        7.1    20.34 )-----------( 103      ( 20 Jul 2020 23:57 )             21.5     07-20    139  |  102  |  55<H>  ----------------------------<  146<H>  4.5   |  11<L>  |  4.59<H>    Ca    9.1      20 Jul 2020 23:57  Phos  6.9     07-20  Mg     1.6     07-20    TPro  5.1<L>  /  Alb  4.0  /  TBili  45.5<H>  /  DBili  x   /  AST  60<H>  /  ALT  22  /  AlkPhos  80  07-20    PT/INR - ( 20 Jul 2020 23:57 )   PT: 38.1 sec;   INR: 3.39 ratio         PTT - ( 20 Jul 2020 23:57 )  PTT:77.2 sec      RADIOLOGY & ADDITIONAL TESTS: Reviewed. INTERVAL HPI/OVERNIGHT EVENTS:  Patient with no UOP overnight.   SUBJECTIVE: Patient seen and examined at bedside. Remains intubated and sedated.       VITAL SIGNS:  ICU Vital Signs Last 24 Hrs  T(C): 37 (21 Jul 2020 07:00), Max: 37.7 (20 Jul 2020 16:00)  T(F): 98.6 (21 Jul 2020 07:00), Max: 99.9 (20 Jul 2020 16:00)  HR: 107 (21 Jul 2020 07:00) (104 - 119)  BP: 102/50 (21 Jul 2020 07:00) (82/47 - 125/60)  BP(mean): 72 (21 Jul 2020 07:00) (62 - 87)  ABP: --  ABP(mean): --  RR: 22 (21 Jul 2020 07:00) (22 - 26)  SpO2: 96% (21 Jul 2020 07:00) (94% - 100%)    Mode: AC/ CMV (Assist Control/ Continuous Mandatory Ventilation), RR (machine): 22, TV (machine): 420, FiO2: 50, PEEP: 5, ITime: 1, MAP: 10, PIP: 24  Plateau pressure:   P/F ratio:     07-20 @ 07:01  -  07-21 @ 07:00  --------------------------------------------------------  IN: 3484.2 mL / OUT: 3000 mL / NET: 484.2 mL      CAPILLARY BLOOD GLUCOSE  132 (21 Jul 2020 06:00)      POCT Blood Glucose.: 132 mg/dL (21 Jul 2020 05:53)    PHYSICAL EXAM:  General: intubated and sedated  Neurology: sedated  Eyes: Scleral icterus   HEENT: Neck supple  Respiratory: vented breath sounds  CV: RRR, S1S2,  Abdominal: grossly distended   Extremities: No edema, + peripheral pulses, spot vitiligo, no edema noted   Skin: No Rashes, Ecchymosis. Jaundice.     MEDICATIONS:  MEDICATIONS  (STANDING):  cefTRIAXone   IVPB      cefTRIAXone   IVPB 2000 milliGRAM(s) IV Intermittent every 24 hours  chlorhexidine 0.12% Liquid 15 milliLiter(s) Oral Mucosa every 12 hours  chlorhexidine 4% Liquid 1 Application(s) Topical <User Schedule>  dextrose 5%. 1000 milliLiter(s) (50 mL/Hr) IV Continuous <Continuous>  dextrose 50% Injectable 12.5 Gram(s) IV Push once  dextrose 50% Injectable 25 Gram(s) IV Push once  dextrose 50% Injectable 25 Gram(s) IV Push once  fentaNYL   Infusion... 0.5 MICROgram(s)/kG/Hr (2.26 mL/Hr) IV Continuous <Continuous>  insulin lispro (HumaLOG) corrective regimen sliding scale   SubCutaneous every 6 hours  norepinephrine Infusion 0.05 MICROgram(s)/kG/Min (8.46 mL/Hr) IV Continuous <Continuous>  octreotide  Infusion 50 MICROgram(s)/Hr (10 mL/Hr) IV Continuous <Continuous>  pantoprazole  Injectable 40 milliGRAM(s) IV Push two times a day  propofol Infusion 30 MICROgram(s)/kG/Min (16.2 mL/Hr) IV Continuous <Continuous>    MEDICATIONS  (PRN):  dextrose 40% Gel 15 Gram(s) Oral once PRN Blood Glucose LESS THAN 70 milliGRAM(s)/deciliter  glucagon  Injectable 1 milliGRAM(s) IntraMuscular once PRN Glucose LESS THAN 70 milligrams/deciliter      ALLERGIES:  Allergies    levofloxacin (Other (Moderate))    Intolerances        LABS:                        7.1    20.34 )-----------( 103      ( 20 Jul 2020 23:57 )             21.5     07-20    139  |  102  |  55<H>  ----------------------------<  146<H>  4.5   |  11<L>  |  4.59<H>    Ca    9.1      20 Jul 2020 23:57  Phos  6.9     07-20  Mg     1.6     07-20    TPro  5.1<L>  /  Alb  4.0  /  TBili  45.5<H>  /  DBili  x   /  AST  60<H>  /  ALT  22  /  AlkPhos  80  07-20    PT/INR - ( 20 Jul 2020 23:57 )   PT: 38.1 sec;   INR: 3.39 ratio         PTT - ( 20 Jul 2020 23:57 )  PTT:77.2 sec      RADIOLOGY & ADDITIONAL TESTS: Reviewed. INTERVAL HPI/OVERNIGHT EVENTS:  Patient with no UOP overnight.   SUBJECTIVE: Patient seen and examined at bedside. Remains intubated and sedated.       VITAL SIGNS:  ICU Vital Signs Last 24 Hrs  T(C): 37 (21 Jul 2020 07:00), Max: 37.7 (20 Jul 2020 16:00)  T(F): 98.6 (21 Jul 2020 07:00), Max: 99.9 (20 Jul 2020 16:00)  HR: 107 (21 Jul 2020 07:00) (104 - 119)  BP: 102/50 (21 Jul 2020 07:00) (82/47 - 125/60)  BP(mean): 72 (21 Jul 2020 07:00) (62 - 87)  ABP: --  ABP(mean): --  RR: 22 (21 Jul 2020 07:00) (22 - 26)  SpO2: 96% (21 Jul 2020 07:00) (94% - 100%)    Mode: AC/ CMV (Assist Control/ Continuous Mandatory Ventilation), RR (machine): 22, TV (machine): 420, FiO2: 50, PEEP: 5, ITime: 1, MAP: 10, PIP: 24  Plateau pressure:   P/F ratio:     07-20 @ 07:01  -  07-21 @ 07:00  --------------------------------------------------------  IN: 3484.2 mL / OUT: 3000 mL / NET: 484.2 mL      CAPILLARY BLOOD GLUCOSE  132 (21 Jul 2020 06:00)      POCT Blood Glucose.: 132 mg/dL (21 Jul 2020 05:53)    PHYSICAL EXAM:  General: intubated and sedated  Neurology: sedated  Eyes: Scleral icterus   HEENT: Neck supple  Respiratory: vented breath sounds  CV: RRR, S1S2,  Abdominal: grossly distended   Extremities: No edema, + peripheral pulses, spot vitiligo, no edema noted   Skin: No Rashes, Ecchymosis. Jaundice. Vitiligo    MEDICATIONS:  MEDICATIONS  (STANDING):  cefTRIAXone   IVPB      cefTRIAXone   IVPB 2000 milliGRAM(s) IV Intermittent every 24 hours  chlorhexidine 0.12% Liquid 15 milliLiter(s) Oral Mucosa every 12 hours  chlorhexidine 4% Liquid 1 Application(s) Topical <User Schedule>  dextrose 5%. 1000 milliLiter(s) (50 mL/Hr) IV Continuous <Continuous>  dextrose 50% Injectable 12.5 Gram(s) IV Push once  dextrose 50% Injectable 25 Gram(s) IV Push once  dextrose 50% Injectable 25 Gram(s) IV Push once  fentaNYL   Infusion... 0.5 MICROgram(s)/kG/Hr (2.26 mL/Hr) IV Continuous <Continuous>  insulin lispro (HumaLOG) corrective regimen sliding scale   SubCutaneous every 6 hours  norepinephrine Infusion 0.05 MICROgram(s)/kG/Min (8.46 mL/Hr) IV Continuous <Continuous>  octreotide  Infusion 50 MICROgram(s)/Hr (10 mL/Hr) IV Continuous <Continuous>  pantoprazole  Injectable 40 milliGRAM(s) IV Push two times a day  propofol Infusion 30 MICROgram(s)/kG/Min (16.2 mL/Hr) IV Continuous <Continuous>    MEDICATIONS  (PRN):  dextrose 40% Gel 15 Gram(s) Oral once PRN Blood Glucose LESS THAN 70 milliGRAM(s)/deciliter  glucagon  Injectable 1 milliGRAM(s) IntraMuscular once PRN Glucose LESS THAN 70 milligrams/deciliter      ALLERGIES:  Allergies    levofloxacin (Other (Moderate))    Intolerances        LABS:                        7.1    20.34 )-----------( 103      ( 20 Jul 2020 23:57 )             21.5     07-20    139  |  102  |  55<H>  ----------------------------<  146<H>  4.5   |  11<L>  |  4.59<H>    Ca    9.1      20 Jul 2020 23:57  Phos  6.9     07-20  Mg     1.6     07-20    TPro  5.1<L>  /  Alb  4.0  /  TBili  45.5<H>  /  DBili  x   /  AST  60<H>  /  ALT  22  /  AlkPhos  80  07-20    PT/INR - ( 20 Jul 2020 23:57 )   PT: 38.1 sec;   INR: 3.39 ratio         PTT - ( 20 Jul 2020 23:57 )  PTT:77.2 sec      RADIOLOGY & ADDITIONAL TESTS: Reviewed.

## 2020-07-22 NOTE — PROGRESS NOTE ADULT - SUBJECTIVE AND OBJECTIVE BOX
Chief Complaint:  Patient is a 44y old  Male who presents with a chief complaint of Hematemesis (2020 09:54)      Interval Events: Pt with ongoing hypotension requiring increased pressors over past 24 hours. No BMs. Remains intubated    Allergies:  levofloxacin (Other (Moderate))      Hospital Medications:  chlorhexidine 0.12% Liquid 15 milliLiter(s) Oral Mucosa every 12 hours  chlorhexidine 4% Liquid 1 Application(s) Topical <User Schedule>  chlorhexidine 4% Liquid 1 Application(s) Topical <User Schedule>  CRRT Treatment    <Continuous>  CRRT Treatment    <Continuous>  dextrose 40% Gel 15 Gram(s) Oral once PRN  dextrose 5%. 1000 milliLiter(s) IV Continuous <Continuous>  dextrose 50% Injectable 12.5 Gram(s) IV Push once  dextrose 50% Injectable 25 Gram(s) IV Push once  dextrose 50% Injectable 25 Gram(s) IV Push once  fentaNYL   Infusion... 0.5 MICROgram(s)/kG/Hr IV Continuous <Continuous>  glucagon  Injectable 1 milliGRAM(s) IntraMuscular once PRN  insulin lispro (HumaLOG) corrective regimen sliding scale   SubCutaneous every 6 hours  norepinephrine Infusion 0.05 MICROgram(s)/kG/Min IV Continuous <Continuous>  octreotide  Infusion 50 MICROgram(s)/Hr IV Continuous <Continuous>  pantoprazole  Injectable 40 milliGRAM(s) IV Push two times a day  piperacillin/tazobactam IVPB.. 3.375 Gram(s) IV Intermittent every 8 hours  PrismaSOL Filtration BGK 4 / 2.5 5000 milliLiter(s) CRRT <Continuous>  PrismaSOL Filtration BGK 4 / 2.5 5000 milliLiter(s) CRRT <Continuous>  propofol Infusion 30 MICROgram(s)/kG/Min IV Continuous <Continuous>  sodium chloride 0.9% lock flush 10 milliLiter(s) IV Push every 1 hour PRN  vasopressin Infusion 0.02 Unit(s)/Min IV Continuous <Continuous>      PMHX/PSHX:  E. coli bacteremia  Vitiligo  Latent tuberculosis  Diabetes mellitus type 2, insulin dependent  Esophageal varices  Alcoholic cirrhosis  Status post corneal transplant  Alcoholic Cirrhosis  Esophageal Varices      Family history:  Family history of uterine cancer  Family history of diabetes mellitus      ROS: unable to asses      PHYSICAL EXAM:     Vital Signs:  Vital Signs Last 24 Hrs  T(C): 37.6 (2020 12:00), Max: 37.9 (2020 04:00)  T(F): 99.6 (2020 12:00), Max: 100.2 (2020 04:00)  HR: 107 (2020 13:30) (100 - 113)  BP: 141/65 (2020 17:45) (98/47 - 141/65)  BP(mean): 93 (2020 17:45) (68 - 93)  RR: 28 (2020 13:30) (28 - 28)  SpO2: 99% (2020 13:30) (96% - 100%)  Daily     Daily Weight in k.4 (2020 04:15)    GENERAL:  intubated and sedatd  HEENT:  NC/AT,  +scleral icterus, blood in oropharynx  CHEST:  Full & symmetric excursion, no increased effort, breath sounds clear  HEART:  Regular rhythm  ABDOMEN:  Soft, non-tender, distended, normoactive bowel sounds,  no masses ,  EXTREMITIES: + BLLE edema  SKIN: jaundice  NEURO:  sedated/intubated  LABS:                        7.4    10.37 )-----------(        ( 2020 12:26 )             20.7     07-22    138  |  103  |  39<H>  ----------------------------<  96  3.7   |  17<L>  |  3.02<H>    Ca    8.2<L>      2020 12:26  Phos  2.8       Mg     2.1         TPro  5.1<L>  /  Alb  3.7  /  TBili  38.2<H>  /  DBili  x   /  AST  152<H>  /  ALT  46<H>  /  AlkPhos  87  07-22    LIVER FUNCTIONS - ( 2020 12:26 )  Alb: 3.7 g/dL / Pro: 5.1 g/dL / ALK PHOS: 87 U/L / ALT: 46 U/L / AST: 152 U/L / GGT: x           PT/INR - ( 2020 12:29 )   PT: 28.0 sec;   INR: 2.46 ratio         PTT - ( 2020 12:29 )  PTT:58.9 sec    Amylase Serum--      Lipase serum--       [id number]      Imaging:    < from: Upper Endoscopy (20 @ 14:06) >  Impression:          - Recently bleeding large (> 5 mm) esophageal varices, s/p band ligation.                       - Retained liquid gastric contents suggestive of gastroparesis or ileus.                       - Portal hypertensive gastropathy.                       - Normal examined duodenum.                       - No specimens collected.  Recommendation:      - Continue care in MICU.                       - Continue octreotide infusion of 50 micrograms per hour for 5 days.                       - Use a proton pump inhibitor once daily.                       - Continue ceftriaxone for 7 days to decrease risks of infection,                        re-bleeding, and mortality.                       - Will need repeat EGD for surviellance in 2-4 weeks.                                                                       < end of copied text >

## 2020-07-22 NOTE — PROGRESS NOTE ADULT - ASSESSMENT
44 year old man with vitiligo, obesity, DM2, and decompensated cirrhosis (complicated by small EV, portal hypertensive gastropathy, ascites and SBP) secondary to alcohol-related liver disease who presents for new onset melena and hematemesis likely secondary to upper GI bleeding from known esophageal varices.     #Hematemesis and melena: Patient with EGD x3. Most recent EGD 7/22 with large esophageal ulcer from prior bands as well as 3 bands noted, other band dislodged. Had smaller varices. No active bleed but stigmata of recent bleeding with red [place] sign. s/p 2 bands. Prior EGD 7/20 with 4 bands placed on varcies. No active bleeding during EGD. Patient with red whale signs c/f recent bleeding. Required 2 u prbc without propr response but now hgb appears stable. suspect will have passage of blood via stool given old blood pooled in stomach/bowel.  #Acute on chronic kidney injury: Now on CRRT. DDx acute tubular necrosis from hemodynamic instability (bleeding +/- sepsis) vs HRS. Has had HRS prior.  #Decompensated alcoholic liver disease: MELD-Na 48 (7/20/2020)  -Varices: on octreotide and ppi now (D3) s/p bands on 7/22 and 7/20 for varices with stigmata of recent bleeding. upper abdominal periesophageal and abdominal wall varices visualized on MRI abdomen from 07/2020.   -Ascites: Moderate to large volume ascites on MRI abdomen 07/2020.  -HE: no history  -HCC: No evidence of hepatic mass on MRI abdomen 07/2020.   -PSE: initially had mild asterixis on exam. Not receiving lactulose as pt without NGT with recent bands. Now intubated    Recommendations:  - continue with octreotide gtt  - maintain on vent for today  - hold off on NGT/OGT given recent bands  - continue with levophed with MAP goal of > 70  - transplant nephrology consult given anuria and worsening SAM, CRRT per renal  - consider broaden Abx (Meropenem) given persistent shock state  - continue with ppi 40mg IV BID  - monitor BMs, susepct melena will occur given has old blood in bowel  - transfuse to maintain goal Hb 7.0  - monitor I/Os and weights daily  - monitor CBC, CMP, INR daily  - pending clinical course, will consider transfer to [facility] for second opinion  - supportive care

## 2020-07-22 NOTE — PROGRESS NOTE ADULT - SUBJECTIVE AND OBJECTIVE BOX
INTERVAL HPI/OVERNIGHT EVENTS:    SUBJECTIVE: Patient seen and examined at bedside.       VITAL SIGNS:  ICU Vital Signs Last 24 Hrs  T(C): 37.5 (22 Jul 2020 06:00), Max: 37.9 (22 Jul 2020 04:00)  T(F): 99.5 (22 Jul 2020 06:00), Max: 100.2 (22 Jul 2020 04:00)  HR: 109 (22 Jul 2020 06:45) (95 - 113)  BP: 141/65 (21 Jul 2020 17:45) (96/47 - 141/65)  BP(mean): 93 (21 Jul 2020 17:45) (68 - 93)  ABP: 130/54 (22 Jul 2020 06:45) (100/35 - 142/55)  ABP(mean): 77 (22 Jul 2020 06:45) (55 - 79)  RR: 28 (22 Jul 2020 06:45) (22 - 38)  SpO2: 98% (22 Jul 2020 06:45) (95% - 100%)    Mode: AC/ CMV (Assist Control/ Continuous Mandatory Ventilation), RR (machine): 28, TV (machine): 500, FiO2: 50, PEEP: 5, ITime: 1, MAP: 12, PIP: 22  Plateau pressure:   P/F ratio:     07-20 @ 07:01  -  07-21 @ 07:00  --------------------------------------------------------  IN: 3484.2 mL / OUT: 3000 mL / NET: 484.2 mL    07-21 @ 07:01  -  07-22 @ 06:48  --------------------------------------------------------  IN: 5323.3 mL / OUT: 2791 mL / NET: 2532.3 mL      CAPILLARY BLOOD GLUCOSE  95 (22 Jul 2020 06:00)      POCT Blood Glucose.: 95 mg/dL (22 Jul 2020 06:09)    ECG:    PHYSICAL EXAM:    General:   HEENT:   Neck:   Respiratory:   Cardiovascular:   Abdomen:   Extremities:  Neurological:    MEDICATIONS:  MEDICATIONS  (STANDING):  chlorhexidine 0.12% Liquid 15 milliLiter(s) Oral Mucosa every 12 hours  chlorhexidine 4% Liquid 1 Application(s) Topical <User Schedule>  chlorhexidine 4% Liquid 1 Application(s) Topical <User Schedule>  CRRT Treatment    <Continuous>  dextrose 5%. 1000 milliLiter(s) (50 mL/Hr) IV Continuous <Continuous>  dextrose 50% Injectable 12.5 Gram(s) IV Push once  dextrose 50% Injectable 25 Gram(s) IV Push once  dextrose 50% Injectable 25 Gram(s) IV Push once  fentaNYL   Infusion... 0.5 MICROgram(s)/kG/Hr (2.26 mL/Hr) IV Continuous <Continuous>  insulin lispro (HumaLOG) corrective regimen sliding scale   SubCutaneous every 6 hours  norepinephrine Infusion 0.05 MICROgram(s)/kG/Min (4.23 mL/Hr) IV Continuous <Continuous>  octreotide  Infusion 50 MICROgram(s)/Hr (10 mL/Hr) IV Continuous <Continuous>  pantoprazole  Injectable 40 milliGRAM(s) IV Push two times a day  piperacillin/tazobactam IVPB.. 3.375 Gram(s) IV Intermittent every 8 hours  PrismaSOL Filtration BGK 4 / 2.5 5000 milliLiter(s) (2000 mL/Hr) CRRT <Continuous>  propofol Infusion 30 MICROgram(s)/kG/Min (16.2 mL/Hr) IV Continuous <Continuous>  vasopressin Infusion 0.02 Unit(s)/Min (1.2 mL/Hr) IV Continuous <Continuous>    MEDICATIONS  (PRN):  dextrose 40% Gel 15 Gram(s) Oral once PRN Blood Glucose LESS THAN 70 milliGRAM(s)/deciliter  glucagon  Injectable 1 milliGRAM(s) IntraMuscular once PRN Glucose LESS THAN 70 milligrams/deciliter  sodium chloride 0.9% lock flush 10 milliLiter(s) IV Push every 1 hour PRN Pre/post blood products, medications, blood draw, and to maintain line patency      ALLERGIES:  Allergies    levofloxacin (Other (Moderate))    Intolerances        LABS:                        6.9    12.49 )-----------( 32       ( 22 Jul 2020 00:09 )             19.9     07-22    139  |  103  |  50<H>  ----------------------------<  120<H>  3.7   |  12<L>  |  4.05<H>    Ca    8.4      22 Jul 2020 00:09  Phos  4.2     07-22  Mg     2.0     07-22    TPro  4.9<L>  /  Alb  3.9  /  TBili  41.9<H>  /  DBili  x   /  AST  132<H>  /  ALT  42  /  AlkPhos  82  07-22    PT/INR - ( 22 Jul 2020 00:09 )   PT: 29.6 sec;   INR: 2.60 ratio         PTT - ( 22 Jul 2020 00:09 )  PTT:53.9 sec      RADIOLOGY & ADDITIONAL TESTS: Reviewed. INTERVAL HPI/OVERNIGHT EVENTS:  Overnight, hemoglobin at 6.2 and platelets at 32. Patient received 5U cryo, 1U pRBC, and 1U platelets.     SUBJECTIVE: Patient seen and examined at bedside. Patient remains intubated and sedated.       VITAL SIGNS:  ICU Vital Signs Last 24 Hrs  T(C): 37.5 (22 Jul 2020 06:00), Max: 37.9 (22 Jul 2020 04:00)  T(F): 99.5 (22 Jul 2020 06:00), Max: 100.2 (22 Jul 2020 04:00)  HR: 109 (22 Jul 2020 06:45) (95 - 113)  BP: 141/65 (21 Jul 2020 17:45) (96/47 - 141/65)  BP(mean): 93 (21 Jul 2020 17:45) (68 - 93)  ABP: 130/54 (22 Jul 2020 06:45) (100/35 - 142/55)  ABP(mean): 77 (22 Jul 2020 06:45) (55 - 79)  RR: 28 (22 Jul 2020 06:45) (22 - 38)  SpO2: 98% (22 Jul 2020 06:45) (95% - 100%)    Mode: AC/ CMV (Assist Control/ Continuous Mandatory Ventilation), RR (machine): 28, TV (machine): 500, FiO2: 50, PEEP: 5, ITime: 1, MAP: 12, PIP: 22  Plateau pressure:   P/F ratio:     07-20 @ 07:01  -  07-21 @ 07:00  --------------------------------------------------------  IN: 3484.2 mL / OUT: 3000 mL / NET: 484.2 mL    07-21 @ 07:01  -  07-22 @ 06:48  --------------------------------------------------------  IN: 5323.3 mL / OUT: 2791 mL / NET: 2532.3 mL      CAPILLARY BLOOD GLUCOSE  95 (22 Jul 2020 06:00)      POCT Blood Glucose.: 95 mg/dL (22 Jul 2020 06:09)    PHYSICAL EXAM:  General: intubated and sedated  Neurology: sedated  Eyes: Scleral icterus   HEENT: Neck supple  Respiratory: vented breath sounds  CV: RRR, S1S2,  Abdominal: grossly distended   Extremities: No edema, + peripheral pulses, spot vitiligo, no edema noted   Skin: No Rashes, Ecchymosis. Jaundice. Vitiligo    MEDICATIONS:  MEDICATIONS  (STANDING):  chlorhexidine 0.12% Liquid 15 milliLiter(s) Oral Mucosa every 12 hours  chlorhexidine 4% Liquid 1 Application(s) Topical <User Schedule>  chlorhexidine 4% Liquid 1 Application(s) Topical <User Schedule>  CRRT Treatment    <Continuous>  dextrose 5%. 1000 milliLiter(s) (50 mL/Hr) IV Continuous <Continuous>  dextrose 50% Injectable 12.5 Gram(s) IV Push once  dextrose 50% Injectable 25 Gram(s) IV Push once  dextrose 50% Injectable 25 Gram(s) IV Push once  fentaNYL   Infusion... 0.5 MICROgram(s)/kG/Hr (2.26 mL/Hr) IV Continuous <Continuous>  insulin lispro (HumaLOG) corrective regimen sliding scale   SubCutaneous every 6 hours  norepinephrine Infusion 0.05 MICROgram(s)/kG/Min (4.23 mL/Hr) IV Continuous <Continuous>  octreotide  Infusion 50 MICROgram(s)/Hr (10 mL/Hr) IV Continuous <Continuous>  pantoprazole  Injectable 40 milliGRAM(s) IV Push two times a day  piperacillin/tazobactam IVPB.. 3.375 Gram(s) IV Intermittent every 8 hours  PrismaSOL Filtration BGK 4 / 2.5 5000 milliLiter(s) (2000 mL/Hr) CRRT <Continuous>  propofol Infusion 30 MICROgram(s)/kG/Min (16.2 mL/Hr) IV Continuous <Continuous>  vasopressin Infusion 0.02 Unit(s)/Min (1.2 mL/Hr) IV Continuous <Continuous>    MEDICATIONS  (PRN):  dextrose 40% Gel 15 Gram(s) Oral once PRN Blood Glucose LESS THAN 70 milliGRAM(s)/deciliter  glucagon  Injectable 1 milliGRAM(s) IntraMuscular once PRN Glucose LESS THAN 70 milligrams/deciliter  sodium chloride 0.9% lock flush 10 milliLiter(s) IV Push every 1 hour PRN Pre/post blood products, medications, blood draw, and to maintain line patency      ALLERGIES:  Allergies    levofloxacin (Other (Moderate))    Intolerances        LABS:                        6.9    12.49 )-----------( 32       ( 22 Jul 2020 00:09 )             19.9     07-22    139  |  103  |  50<H>  ----------------------------<  120<H>  3.7   |  12<L>  |  4.05<H>    Ca    8.4      22 Jul 2020 00:09  Phos  4.2     07-22  Mg     2.0     07-22    TPro  4.9<L>  /  Alb  3.9  /  TBili  41.9<H>  /  DBili  x   /  AST  132<H>  /  ALT  42  /  AlkPhos  82  07-22    PT/INR - ( 22 Jul 2020 00:09 )   PT: 29.6 sec;   INR: 2.60 ratio         PTT - ( 22 Jul 2020 00:09 )  PTT:53.9 sec      RADIOLOGY & ADDITIONAL TESTS: Reviewed.

## 2020-07-22 NOTE — PROGRESS NOTE ADULT - SUBJECTIVE AND OBJECTIVE BOX
[facility] DIVISION OF KIDNEY DISEASES AND HYPERTENSION -- FOLLOW UP NOTE  --------------------------------------------------------------------------------  HPI: 44-year-old male with liver cirrhosis secondary to EtOH abuse, was admitted to [facility] on 7/18/20 for hematemesis and melena. Pt. was started on PPI and octreotide infusion for suspected variceal bleeding. Pt. was intubated on 7/19 for airway protection and prior to EGD, transferred to MICU. EGD on 7/19 showed no active bleeding. Pt. went for repeat EGD on 7/20 with 4 bands placed. Pt. also underwent paracentesis on 7/20 which showed elevated abdominal pressures and pt. had 3L ascitic fluid removed. Transplant Nephrology consulted for kidney failure and anuria. Scr on arrival during this hospitalization was 1.85, which had increased to 4.59 yesterday and pt. was initiated on CRRT.    Pt. seen and examined this AM. Pt. remains sedated, intubated (FiO2 stable at 50, PEEP stable at 5), and on increasing IV vasopressor support. No issues with CRRT overnight.     PAST HISTORY  --------------------------------------------------------------------------------  No significant changes to PMH, PSH, FHx, SHx, unless otherwise noted    ALLERGIES & MEDICATIONS  --------------------------------------------------------------------------------  Allergies    levofloxacin (Other (Moderate))    Intolerances    Standing Inpatient Medications  chlorhexidine 0.12% Liquid 15 milliLiter(s) Oral Mucosa every 12 hours  chlorhexidine 4% Liquid 1 Application(s) Topical <User Schedule>  chlorhexidine 4% Liquid 1 Application(s) Topical <User Schedule>  CRRT Treatment    <Continuous>  CRRT Treatment    <Continuous>  dextrose 5%. 1000 milliLiter(s) IV Continuous <Continuous>  dextrose 50% Injectable 12.5 Gram(s) IV Push once  dextrose 50% Injectable 25 Gram(s) IV Push once  dextrose 50% Injectable 25 Gram(s) IV Push once  fentaNYL   Infusion... 0.5 MICROgram(s)/kG/Hr IV Continuous <Continuous>  insulin lispro (HumaLOG) corrective regimen sliding scale   SubCutaneous every 6 hours  methylnaltrexone Injectable 4 milliGRAM(s) SubCutaneous once  norepinephrine Infusion 0.05 MICROgram(s)/kG/Min IV Continuous <Continuous>  octreotide  Infusion 50 MICROgram(s)/Hr IV Continuous <Continuous>  pantoprazole  Injectable 40 milliGRAM(s) IV Push two times a day  piperacillin/tazobactam IVPB.. 3.375 Gram(s) IV Intermittent every 8 hours  PrismaSOL Filtration BGK 4 / 2.5 5000 milliLiter(s) CRRT <Continuous>  PrismaSOL Filtration BGK 4 / 2.5 5000 milliLiter(s) CRRT <Continuous>  propofol Infusion 30 MICROgram(s)/kG/Min IV Continuous <Continuous>  vasopressin Infusion 0.02 Unit(s)/Min IV Continuous <Continuous>    REVIEW OF SYSTEMS  --------------------------------------------------------------------------------  Unable to obtain due to medical condition    VITALS/PHYSICAL EXAM  --------------------------------------------------------------------------------  T(C): 37.2 (07-22-20 @ 09:15), Max: 37.9 (07-22-20 @ 04:00)  HR: 109 (07-22-20 @ 09:45) (95 - 113)  BP: 141/65 (07-21-20 @ 17:45) (96/47 - 141/65)  RR: 28 (07-22-20 @ 09:45) (28 - 38)  SpO2: 98% (07-22-20 @ 09:45) (95% - 100%)  Wt(kg): --    07-21-20 @ 07:01  -  07-22-20 @ 07:00  --------------------------------------------------------  IN: 5323.3 mL / OUT: 2791 mL / NET: 2532.3 mL    07-22-20 @ 07:01  -  07-22-20 @ 09:54  --------------------------------------------------------  IN: 245.4 mL / OUT: 247 mL / NET: -1.6 mL    Physical Exam:  	Gen: sedated  	HEENT: intubated  	Pulm: good air entry bilaterally  	CV: S1S2  	Abd: Soft, +BS              : + javed catheter   	Ext: No LE edema B/L  	Neuro: sedated  	Skin: vitiligo              Vascular: RIJ non tunneled HD catheter +     LABS/STUDIES  --------------------------------------------------------------------------------              7.3    11.68 >-----------<  30       [07-22-20 @ 06:47]              20.6     137  |  102  |  43  ----------------------------<  102      [07-22-20 @ 06:47]  3.7   |  16  |  3.42        Ca     8.4     [07-22-20 @ 06:47]      Mg     2.0     [07-22-20 @ 06:47]      Phos  3.0     [07-22-20 @ 06:47]    Creatinine Trend:  SCr 3.42 [07-22 @ 06:47]  SCr 4.05 [07-22 @ 00:09]  SCr 4.47 [07-21 @ 17:56]  SCr 4.59 [07-20 @ 23:57]  SCr 2.90 [07-20 @ 00:12]    HbA1c 10.5      [04-18-19 @ 11:27]  TSH 0.26      [05-19-20 @ 08:53]

## 2020-07-22 NOTE — PROGRESS NOTE ADULT - ASSESSMENT
44M PMHx decompensated cirrhosis (c/b ascites, SBP w/ hx of fluoroquinolone resistant E. coli, small esophageal varices, portal hypertensive gastropathy) secondary to alcoholic hepatitis with recent acute on chronic liver failure in 5/2020 from alcohol relapse p/w with blood stools and hematemesis. Now transferred to the MICU for elective intubation for EGD.     Neuro:   #Potential for hepatic encephelopathy  - Serum ammonia level at 175  - Sedation for intubation, will wean as able following procedure.   - Currently holding lactulose and rifaximin as concern for bleeding with NGT placement due to coagulopathy.    CV:   - Potential for hemorrhagic shock, from multiple varices  - Continue with telemetry   - EKG on admission NSR and T wave inversions stable from previous EKGs.  - patient on levophed for pressor support- increasing dose requirement    Pulm:   - Electively intubated for EGD   -  CXR post intubation with no focal pulmonary consolidations or pneumothorax.  - No active pulmonary issues at this time   - Maintain aspiration precautions at all times   - COVID 19 PCR negativee       GI:   #Hematemesis with melena  - s/p EGD 7/19 w/o banding and EGD 7/20 with 4 varices banded.  - Maintain NPO   - CBC q6h, transfuse as above   - Octreotide gtt  - PPI gtt  - MR Abdomen with portal hypertension and no evidence of hepatic mass.   - holding home cephalexin for SBP prophylaxis; start ceftriaxone 1 g daily for total 5 day course  - Transplant hepatology team f/u; per chart review, given frequent alcohol relapse, pt needs to abstain for >3m to be listed for transplant  - will f/u ammonia levels.   -  start albumin.   - MELD of 44 today.      Ascites  - will continue with empiric Ceftriaxone for SBP ppx   - paracentesis to be done today. 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   -Patient with no urine output overnight.  - bedside U/S with decreased flow to right kidney.   - Will c/s nephrology for recommendation on urgent need for dialysis  - f/u urine lytes     Anion-Gap Metabolic Acidosis  - infectious process vs acute renal failure vs starvation ketosis.   -  AG increased to 26.  - lactate increased to 5.3 from 4.7.  - BHB 0.2 (r/o starvation ketosis)      HEME:  #Hematemesis with melena, Hgb 8.4  - CBC q6h, transfuse as needed    #Coagulopathy, secondary to liver dysfunction  - INR 3.79  - Deficiencies primarily in fibrinogen, platelets. TEG: R 11.1, K 4.8, Angle 53.4, MA <40.0  - s/p 1u FFP and Cryoprecipitate + benadryl given pt's hx of rash post-transfusion  - follow up TEG this AM with fibrinogen of 12.   - DVT PPX: mechanical devices    ID:   - will broaden antibiotics to meropenem.   - procalcitonin elevated at 6.32    ENDO:  - T2DM, on repaglinide at home.  - FS goal 140-180, at goal    Full code 44M PMHx decompensated cirrhosis (c/b ascites, SBP w/ hx of fluoroquinolone resistant E. coli, small esophageal varices, portal hypertensive gastropathy) secondary to alcoholic hepatitis with recent acute on chronic liver failure in 5/2020 from alcohol relapse p/w with blood stools and hematemesis. Now transferred to the MICU for elective intubation for EGD.     Neuro:   #Potential for hepatic encephelopathy  - Serum ammonia level at 175 ->126  - Sedation for intubation, will wean as able following procedure.   - Currently holding lactulose and rifaximin as concern for bleeding with NGT placement due to coagulopathy.    CV:   - Potential for hemorrhagic shock, from multiple varices  - Continue with telemetry   - EKG on admission NSR and T wave inversions stable from previous EKGs.  - patient on norepi and vasopressin for pressor support- increasing dose requirement    Pulm:   - Electively intubated for EGD   -  CXR post intubation with no focal pulmonary consolidations or pneumothorax.  - No active pulmonary issues at this time   - Maintain aspiration precautions at all times   - COVID 19 PCR negativee       GI:   #Hematemesis with melena  - s/p EGD 7/19 w/o banding and EGD 7/20 with 4 varices banded.  - Maintain NPO   - CBC q6h, transfuse as above   - Octreotide gtt  - PPI injectable  - MR Abdomen with portal hypertension and no evidence of hepatic mass.   - holding home cephalexin for SBP prophylaxis; start ceftriaxone 1 g daily for total 5 day course  - Transplant hepatology team f/u; per chart review, given frequent alcohol relapse, pt needs to abstain for >3m to be listed for transplant  - will f/u ammonia levels.   -  start albumin.   - MELD of 44 today.      Ascites  - will continue with empiric Ceftriaxone for SBP ppx   - paracentesis to be done today. 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   -Patient with no urine output overnight.  - bedside U/S with decreased flow to right kidney.   - Will c/s nephrology for recommendation on urgent need for dialysis  - f/u urine lytes     Anion-Gap Metabolic Acidosis  - infectious process vs acute renal failure vs starvation ketosis.   -  AG increased to 26.  - lactate increased to 5.3 from 4.7.  - BHB 0.2 (r/o starvation ketosis)      HEME:  #Hematemesis with melena, Hgb 8.4  - CBC q6h, transfuse as needed    #Coagulopathy, secondary to liver dysfunction  - INR 3.79  - Deficiencies primarily in fibrinogen, platelets. TEG: R 11.1, K 4.8, Angle 53.4, MA <40.0  - s/p 1u FFP and Cryoprecipitate + benadryl given pt's hx of rash post-transfusion  - follow up TEG this AM with fibrinogen of 12.   - DVT PPX: mechanical devices    ID:   - will broaden antibiotics to meropenem.   - procalcitonin elevated at 6.32    ENDO:  - T2DM, on repaglinide at home.  - FS goal 140-180, at goal    Full code 44M PMHx decompensated cirrhosis (c/b ascites, SBP w/ hx of fluoroquinolone resistant E. coli, small esophageal varices, portal hypertensive gastropathy) secondary to alcoholic hepatitis with recent acute on chronic liver failure in 5/2020 from alcohol relapse p/w with blood stools and hematemesis. Now transferred to the MICU for elective intubation for EGD.     Neuro:   #Potential for hepatic encephelopathy  - Serum ammonia level at 175 ->126  - Sedation for intubation, will wean as able following procedure.   - Currently holding lactulose and rifaximin as concern for bleeding with NGT placement due to coagulopathy.    CV:   - Potential for hemorrhagic shock, from multiple varices  - Continue with telemetry   - EKG on admission NSR and T wave inversions stable from previous EKGs.  - patient on norepi and vasopressin for pressor support- increasing dose requirement    Pulm:   - Electively intubated for EGD   -  CXR post intubation with no focal pulmonary consolidations or pneumothorax.  - No active pulmonary issues at this time   - Maintain aspiration precautions at all times   - COVID 19 PCR negativee       GI:   #Hematemesis with melena  - s/p EGD 7/19 w/o banding and EGD 7/20 with 4 varices banded.  - Maintain NPO   - CBC q6h, transfuse as above   - Octreotide gtt  - PPI injectable  - MR Abdomen with portal hypertension and no evidence of hepatic mass.   - holding home cephalexin for SBP prophylaxis; previously on ceftriaxone  - Transplant hepatology team f/u; per chart review, given frequent alcohol relapse, pt needs to abstain for >3m to be listed for transplant  -  d/c albumin.   - MELD of 44 today.    - For GI scope today.       Ascites  - previously on empiric Ceftriaxone for SBP ppx   - paracentesis results w/o evidence of SBP  - hold diuresis while GI bleeding     Bowel-wall edema  - bedside u/s with decreased peristalsis and increased bowel- wall edema  - one dose methylnaltrexone.     /RENAL:  - no urine output overnight  - Cr at 3.42 today. CVVHD started today with goal of net even.   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  - bedside U/S with decreased flow to right kidney.   - f/u nephrology recs  - f/u urine lytes when/if urine is made     Anion-Gap Metabolic Acidosis  - infectious process vs acute renal failure vs starvation ketosis.   -  AG down to 19 this AM.   - lactate at 3.6 today  - BHB 0.2 (r/o starvation ketosis)      HEME:  #Hematemesis with melena  - CBC q6h, transfuse as needed    #Coagulopathy, secondary to liver dysfunction  - INR 2.33  - Deficiencies primarily in fibrinogen, platelets. TEG: R 11.1, K 4.8, Angle 53.4, MA <40.0  - s/p 1u FFP and Cryoprecipitate + benadryl given pt's hx of rash post-transfusion  - follow up TEG this AM with fibrinogen of 12.  - DVT PPX: mechanical devices    ID:   - WBC count 12.49  - antibiotics broadened to zosyn   - procalcitonin elevated at 6.32    ENDO:  - T2DM, on repaglinide at home.  - FS goal 140-180, at goal    Full code

## 2020-07-22 NOTE — CHART NOTE - NSCHARTNOTEFT_GEN_A_CORE
:  Helena Banerjee MD    INDICATION:    Respiratory Failure (J96.00)  Shock (R57.9)  Abdominal distension (R14.0)      PROCEDURE:  [X] LIMITED ECHO  [X] LIMITED CHEST  [X] LIMITED RETROPERITONEAL  [X] LIMITED ABDOMINAL  [ ] LIMITED DVT  [ ] NEEDLE GUIDANCE VASCULAR  [ ] NEEDLE GUIDANCE THORACENTESIS  [ ] NEEDLE GUIDANCE PARACENTESIS  [ ] NEEDLE GUIDANCE PERICARDIOCENTESIS  [ ] OTHER    FINDINGS:    Bilateral A line pattern anteriorly.  B's at the bases    Normal LV systolic function.     Moderate ascites    No urine in bladder, collapsed around Real.     INTERPRETATION:    anuric renal failure.   Vasoplegic shock  ascites due to liver failure    Images stored in [email or web address]

## 2020-07-23 NOTE — CHART NOTE - NSCHARTNOTEFT_GEN_A_CORE
Goals of Care Note       Spoke with patient's wife, Glo, extensively about the patient's critical course. I explained to her that the  patient is requiring increasingly more blood pressure support medications, and despite that, his blood pressure continues to fall. I also explained to her that should the time come where his heart were to stop beating, that CPR would most likely do him more harm than good and would not reverse the damage to his liver that has already been done. Patient agreed and asked what would happen once his blood pressure falls too low for us to control. I explained that with the drop in blood pressure, his body would not receive enough oxygen and that his heart would ultimately stop beating.  She stated that her sons and patient's mother were on their way to visit the patient.  I told her that [facility] support services were available to her and her family should they want it.     Nelda Le   PGY-1, Internal Medicine

## 2020-07-23 NOTE — PROGRESS NOTE ADULT - PROBLEM SELECTOR PLAN 2
In setting of advanced kidney failure, sepsis, and elevated lactate. Serum CO2 improved to 17 today with CRRT. Continue CRRT today. Monitor serum CO2.    If any questions, please feel free to contact me  Kai Lemon   Nephrology Fellow  [phone]  (After 5 pm or on weekends please page the on-call fellow)
In setting of advanced kidney failure, sepsis, and elevated lactate. Serum CO2 improved to 16 today with CRRT. Continue CRRT today. Monitor serum CO2.    If any questions, please feel free to contact me  Kai Lemon   Nephrology Fellow  [phone]  (After 5 pm or on weekends please page the on-call fellow)

## 2020-07-23 NOTE — PROGRESS NOTE ADULT - SUBJECTIVE AND OBJECTIVE BOX
Chief Complaint:  Patient is a 44y old  Male who presents with a chief complaint of Hematemesis (23 Jul 2020 07:32)      Interval Events: No BMs overnight. Had small amount of mucous passage. Now on 3 pressors. No fevers. Remains intubated and sedated.    Allergies:  levofloxacin (Other (Moderate))      Hospital Medications:  albumin human 25% IVPB 50 milliLiter(s) IV Intermittent every 6 hours  chlorhexidine 0.12% Liquid 15 milliLiter(s) Oral Mucosa every 12 hours  chlorhexidine 4% Liquid 1 Application(s) Topical <User Schedule>  chlorhexidine 4% Liquid 1 Application(s) Topical <User Schedule>  CRRT Treatment    <Continuous>  dextrose 40% Gel 15 Gram(s) Oral once PRN  dextrose 5%. 1000 milliLiter(s) IV Continuous <Continuous>  dextrose 50% Injectable 12.5 Gram(s) IV Push once  dextrose 50% Injectable 25 Gram(s) IV Push once  dextrose 50% Injectable 25 Gram(s) IV Push once  glucagon  Injectable 1 milliGRAM(s) IntraMuscular once PRN  insulin lispro (HumaLOG) corrective regimen sliding scale   SubCutaneous every 6 hours  meropenem  IVPB      meropenem  IVPB 1000 milliGRAM(s) IV Intermittent every 12 hours  norepinephrine Infusion 0.05 MICROgram(s)/kG/Min IV Continuous <Continuous>  octreotide  Infusion 50 MICROgram(s)/Hr IV Continuous <Continuous>  pantoprazole  Injectable 40 milliGRAM(s) IV Push two times a day  phenylephrine    Infusion 0.9 MICROgram(s)/kG/Min IV Continuous <Continuous>  PrismaSOL Filtration BGK 4 / 2.5 5000 milliLiter(s) CRRT <Continuous>  propofol Infusion 30 MICROgram(s)/kG/Min IV Continuous <Continuous>  sodium chloride 0.9% lock flush 10 milliLiter(s) IV Push every 1 hour PRN  vancomycin  IVPB      vasopressin Infusion 0.02 Unit(s)/Min IV Continuous <Continuous>      PMHX/PSHX:  E. coli bacteremia  Vitiligo  Latent tuberculosis  Diabetes mellitus type 2, insulin dependent  Esophageal varices  Alcoholic cirrhosis  Status post corneal transplant  Alcoholic Cirrhosis  Esophageal Varices      Family history:  Family history of uterine cancer  Family history of diabetes mellitus      ROS: unable to asses      PHYSICAL EXAM:     Vital Signs:  Vital Signs Last 24 Hrs  T(C): 36.7 (23 Jul 2020 12:00), Max: 36.7 (23 Jul 2020 12:00)  T(F): 98.1 (23 Jul 2020 12:00), Max: 98.1 (23 Jul 2020 12:00)  HR: 117 (23 Jul 2020 12:30) (88 - 117)  BP: --  BP(mean): --  RR: 28 (23 Jul 2020 12:30) (28 - 33)  SpO2: 98% (23 Jul 2020 12:30) (95% - 100%)  Daily     Daily     GENERAL:  intubated and sedated  HEENT:  NC/AT,  +scleral icterus, Dry MM with dry blood on mouth  CHEST:  Full & symmetric excursion, no increased effort, breath sounds clear  HEART:  Regular rhythm  ABDOMEN:  Soft, non-tender, distended, normoactive bowel sounds,  no masses ,  EXTREMITIES: + BLLE edema  SKIN: jaundice  NEURO:  sedated/intubated    LABS:                        6.9    8.77  )-----------( 27       ( 23 Jul 2020 11:53 )             19.5     07-23    138  |  102  |  28<H>  ----------------------------<  88  3.6   |  17<L>  |  x     Ca    8.3<L>      23 Jul 2020 11:53  Phos  2.4     07-23  Mg     2.4     07-23    TPro  x   /  Alb  3.5  /  TBili  x   /  DBili  x   /  AST  155<H>  /  ALT  44  /  AlkPhos  91  07-23    LIVER FUNCTIONS - ( 23 Jul 2020 11:53 )  Alb: 3.5 g/dL / Pro: x     / ALK PHOS: 91 U/L / ALT: 44 U/L / AST: 155 U/L / GGT: x           PT/INR - ( 23 Jul 2020 11:53 )   PT: 30.3 sec;   INR: 2.67 ratio         PTT - ( 23 Jul 2020 11:53 )  PTT:61.2 sec    Amylase Serum--      Lipase serum--       [id number]      Imaging:    < from: Upper Endoscopy (07.22.20 @ 09:48) >  Impression:          - Recently bleeding grade II esophageal varices. Completely eradicated.                        Banded.                       - Esophageal ulcers from prior banding.                       - Red blood in the gastric fundus.                       - Portal hypertensive gastropathy.                       - No specimens collected.  Recommendation:      - Return patient to ICU for ongoing care.                       - Hold off on NGT/OGT for now                       - Continue with octreotide infusion                       - Monitor CBC and BMs                       - Transfuse Hgb > 7, Plt > 50                       - Continue with PPI 40mg IV BID

## 2020-07-23 NOTE — PROGRESS NOTE ADULT - SUBJECTIVE AND OBJECTIVE BOX
INTERVAL HPI/OVERNIGHT EVENTS:    SUBJECTIVE: Patient seen and examined at bedside. Patient remains intubated and sedated.       VITAL SIGNS:  ICU Vital Signs Last 24 Hrs  T(C): 36.1 (23 Jul 2020 04:00), Max: 37.6 (22 Jul 2020 12:00)  T(F): 96.9 (23 Jul 2020 04:00), Max: 99.6 (22 Jul 2020 12:00)  HR: 106 (23 Jul 2020 07:00) (88 - 114)  BP: --  BP(mean): --  ABP: 105/41 (23 Jul 2020 07:00) (105/41 - 137/63)  ABP(mean): 60 (23 Jul 2020 07:00) (60 - 85)  RR: 28 (23 Jul 2020 07:00) (28 - 28)  SpO2: 96% (23 Jul 2020 07:00) (96% - 100%)    Mode: AC/ CMV (Assist Control/ Continuous Mandatory Ventilation), RR (machine): 28, TV (machine): 500, FiO2: 50, PEEP: 5, ITime: 1, MAP: 12, PIP: 23  Plateau pressure:   P/F ratio:     07-22 @ 07:01  -  07-23 @ 07:00  --------------------------------------------------------  IN: 3488.2 mL / OUT: 3489 mL / NET: -0.8 mL      CAPILLARY BLOOD GLUCOSE  95 (22 Jul 2020 06:00)      POCT Blood Glucose.: 95 mg/dL (22 Jul 2020 06:09)    PHYSICAL EXAM:  General: intubated and sedated  Neurology: sedated  Eyes: Scleral icterus   HEENT: Neck supple  Respiratory: vented breath sounds  CV: RRR, S1S2,  Abdominal: grossly distended   Extremities: No edema, + peripheral pulses, spot vitiligo, no edema noted   Skin: No Rashes, Ecchymosis. Jaundice. Vitiligo    MEDICATIONS:  MEDICATIONS  (STANDING):  albumin human 25% IVPB 50 milliLiter(s) IV Intermittent every 6 hours  chlorhexidine 0.12% Liquid 15 milliLiter(s) Oral Mucosa every 12 hours  chlorhexidine 4% Liquid 1 Application(s) Topical <User Schedule>  chlorhexidine 4% Liquid 1 Application(s) Topical <User Schedule>  CRRT Treatment    <Continuous>  dextrose 5%. 1000 milliLiter(s) (50 mL/Hr) IV Continuous <Continuous>  dextrose 50% Injectable 12.5 Gram(s) IV Push once  dextrose 50% Injectable 25 Gram(s) IV Push once  dextrose 50% Injectable 25 Gram(s) IV Push once  insulin lispro (HumaLOG) corrective regimen sliding scale   SubCutaneous every 6 hours  norepinephrine Infusion 0.05 MICROgram(s)/kG/Min (4.23 mL/Hr) IV Continuous <Continuous>  octreotide  Infusion 50 MICROgram(s)/Hr (10 mL/Hr) IV Continuous <Continuous>  pantoprazole  Injectable 40 milliGRAM(s) IV Push two times a day  phenylephrine    Infusion 0.9 MICROgram(s)/kG/Min (15.2 mL/Hr) IV Continuous <Continuous>  piperacillin/tazobactam IVPB.. 3.375 Gram(s) IV Intermittent every 8 hours  PrismaSOL Filtration BGK 4 / 2.5 5000 milliLiter(s) (2000 mL/Hr) CRRT <Continuous>  propofol Infusion 30 MICROgram(s)/kG/Min (16.2 mL/Hr) IV Continuous <Continuous>  vasopressin Infusion 0.02 Unit(s)/Min (1.2 mL/Hr) IV Continuous <Continuous>    MEDICATIONS  (PRN):  dextrose 40% Gel 15 Gram(s) Oral once PRN Blood Glucose LESS THAN 70 milliGRAM(s)/deciliter  glucagon  Injectable 1 milliGRAM(s) IntraMuscular once PRN Glucose LESS THAN 70 milligrams/deciliter  sodium chloride 0.9% lock flush 10 milliLiter(s) IV Push every 1 hour PRN Pre/post blood products, medications, blood draw, and to maintain line patency      ALLERGIES:  Allergies    levofloxacin (Other (Moderate))    Intolerances        LABS:                        7.3    9.78  )-----------( 27 ( 23 Jul 2020 06:56 )             20.3     07-23    136  |  102  |  32<H>  ----------------------------<  115<H>  3.6   |  17<L>  |  2.40<H>    Ca    8.2<L>      23 Jul 2020 00:13  Phos  2.7     07-23  Mg     2.2     07-23    TPro  4.8<L>  /  Alb  3.4  /  TBili  38.1<H>  /  DBili  x   /  AST  142<H>  /  ALT  47<H>  /  AlkPhos  92  07-23    PT/INR - ( 23 Jul 2020 00:13 )   PT: 30.4 sec;   INR: 2.68 ratio         PTT - ( 23 Jul 2020 00:13 )  PTT:64.1 sec      RADIOLOGY & ADDITIONAL TESTS: Reviewed. INTERVAL HPI/OVERNIGHT EVENTS:  Patient requiring additional pressor support overnight.    SUBJECTIVE: Patient seen and examined at bedside. Patient remains intubated.      VITAL SIGNS:  ICU Vital Signs Last 24 Hrs  T(C): 36.1 (23 Jul 2020 04:00), Max: 37.6 (22 Jul 2020 12:00)  T(F): 96.9 (23 Jul 2020 04:00), Max: 99.6 (22 Jul 2020 12:00)  HR: 106 (23 Jul 2020 07:00) (88 - 114)  BP: --  BP(mean): --  ABP: 105/41 (23 Jul 2020 07:00) (105/41 - 137/63)  ABP(mean): 60 (23 Jul 2020 07:00) (60 - 85)  RR: 28 (23 Jul 2020 07:00) (28 - 28)  SpO2: 96% (23 Jul 2020 07:00) (96% - 100%)    Mode: AC/ CMV (Assist Control/ Continuous Mandatory Ventilation), RR (machine): 28, TV (machine): 500, FiO2: 50, PEEP: 5, ITime: 1, MAP: 12, PIP: 23  Plateau pressure:   P/F ratio:     07-22 @ 07:01  -  07-23 @ 07:00  --------------------------------------------------------  IN: 3488.2 mL / OUT: 3489 mL / NET: -0.8 mL      CAPILLARY BLOOD GLUCOSE  95 (22 Jul 2020 06:00)      POCT Blood Glucose.: 95 mg/dL (22 Jul 2020 06:09)    PHYSICAL EXAM:  General: intubated  Neurology: sedated  Eyes: Scleral icterus   HEENT: Neck supple  Respiratory: vented breath sounds  CV: RRR, S1S2,  Abdominal: grossly distended   Extremities: No edema, + peripheral pulses, spot vitiligo, no edema noted   Skin: No Rashes, Ecchymosis. Jaundice. Vitiligo    MEDICATIONS:  MEDICATIONS  (STANDING):  albumin human 25% IVPB 50 milliLiter(s) IV Intermittent every 6 hours  chlorhexidine 0.12% Liquid 15 milliLiter(s) Oral Mucosa every 12 hours  chlorhexidine 4% Liquid 1 Application(s) Topical <User Schedule>  chlorhexidine 4% Liquid 1 Application(s) Topical <User Schedule>  CRRT Treatment    <Continuous>  dextrose 5%. 1000 milliLiter(s) (50 mL/Hr) IV Continuous <Continuous>  dextrose 50% Injectable 12.5 Gram(s) IV Push once  dextrose 50% Injectable 25 Gram(s) IV Push once  dextrose 50% Injectable 25 Gram(s) IV Push once  insulin lispro (HumaLOG) corrective regimen sliding scale   SubCutaneous every 6 hours  norepinephrine Infusion 0.05 MICROgram(s)/kG/Min (4.23 mL/Hr) IV Continuous <Continuous>  octreotide  Infusion 50 MICROgram(s)/Hr (10 mL/Hr) IV Continuous <Continuous>  pantoprazole  Injectable 40 milliGRAM(s) IV Push two times a day  phenylephrine    Infusion 0.9 MICROgram(s)/kG/Min (15.2 mL/Hr) IV Continuous <Continuous>  piperacillin/tazobactam IVPB.. 3.375 Gram(s) IV Intermittent every 8 hours  PrismaSOL Filtration BGK 4 / 2.5 5000 milliLiter(s) (2000 mL/Hr) CRRT <Continuous>  propofol Infusion 30 MICROgram(s)/kG/Min (16.2 mL/Hr) IV Continuous <Continuous>  vasopressin Infusion 0.02 Unit(s)/Min (1.2 mL/Hr) IV Continuous <Continuous>    MEDICATIONS  (PRN):  dextrose 40% Gel 15 Gram(s) Oral once PRN Blood Glucose LESS THAN 70 milliGRAM(s)/deciliter  glucagon  Injectable 1 milliGRAM(s) IntraMuscular once PRN Glucose LESS THAN 70 milligrams/deciliter  sodium chloride 0.9% lock flush 10 milliLiter(s) IV Push every 1 hour PRN Pre/post blood products, medications, blood draw, and to maintain line patency      ALLERGIES:  Allergies    levofloxacin (Other (Moderate))    Intolerances        LABS:                        7.3    9.78  )-----------( 27 ( 23 Jul 2020 06:56 )             20.3     07-23    136  |  102  |  32<H>  ----------------------------<  115<H>  3.6   |  17<L>  |  2.40<H>    Ca    8.2<L>      23 Jul 2020 00:13  Phos  2.7     07-23  Mg     2.2     07-23    TPro  4.8<L>  /  Alb  3.4  /  TBili  38.1<H>  /  DBili  x   /  AST  142<H>  /  ALT  47<H>  /  AlkPhos  92  07-23    PT/INR - ( 23 Jul 2020 00:13 )   PT: 30.4 sec;   INR: 2.68 ratio         PTT - ( 23 Jul 2020 00:13 )  PTT:64.1 sec      RADIOLOGY & ADDITIONAL TESTS: Reviewed.

## 2020-07-23 NOTE — PROGRESS NOTE ADULT - ASSESSMENT
44 year old man with vitiligo, obesity, DM2, and decompensated cirrhosis (complicated by small EV, portal hypertensive gastropathy, ascites and SBP) secondary to alcohol-related liver disease who presents for new onset melena and hematemesis likely secondary to upper GI bleeding from known esophageal varices.    #Hematemesis and melena: No overt bleed in >24 hours. Hgb has been relatively stable. Patient with EGD x3. Most recent EGD 7/22 with large esophageal ulcer from prior bands as well as 3 bands noted, other band dislodged. Had smaller varices. No active bleed but stigmata of recent bleeding with red delonte sign. s/p 2 bands. Prior EGD 7/20 with 4 bands placed on varcies. No active bleeding during EGD. Patient with red whale signs c/f recent bleeding. Required 2 u prbc without propr response but now hgb appears stable. suspect will have passage of blood via stool given old blood pooled in stomach/bowel.  #Acute on chronic kidney injury: Now on CRRT. DDx acute tubular necrosis from hemodynamic instability (bleeding +/- sepsis) vs HRS. Has had HRS prior.  #Decompensated alcoholic liver disease: MELD-Na 48 (7/20/2020). Now with likely acute tubular necrosis vs HRS on CRRT. Patient with poor prognosis given overall clinical picture.  -Varices: on octreotide and ppi now (D3) s/p bands on 7/22 and 7/20 for varices with stigmata of recent bleeding. upper abdominal periesophageal and abdominal wall varices visualized on MRI abdomen from 07/2020.   -Ascites: Moderate to large volume ascites on MRI abdomen 07/2020.  -HE: no history  -HCC: No evidence of hepatic mass on MRI abdomen 07/2020.   -PSE: initially had mild asterixis on exam. Not receiving lactulose as pt without NGT with recent bands. Now intubated  # Shock: unclear cause. Likely multifactorial from hemorrhagic and sepsis. Unclear source of sepsis. Has hx of ESBL infections.     Recommendations:  - continue with octreotide gtt  - hold off on NGT/OGT given recent bands  - continue with levophed with MAP goal of > 70  - transplant nephrology consult given anuria and worsening SAM, CRRT per renal  - broaden Abx to Meropenem given persistent shock state  - continue with ppi 40mg IV BID  - monitor BMs, susepct melena will occur given has old blood in bowel  - transfuse to maintain goal Hb 7.0  - monitor I/Os and weights daily  - monitor CBC, CMP, INR daily  - prognosis is guarded, discussed with wife; would recommend ongoing GOC discussions  - supportive care

## 2020-07-23 NOTE — PROGRESS NOTE ADULT - ASSESSMENT
44M PMHx decompensated cirrhosis (c/b ascites, SBP w/ hx of fluoroquinolone resistant E. coli, small esophageal varices, portal hypertensive gastropathy) secondary to alcoholic hepatitis with recent acute on chronic liver failure in 5/2020 from alcohol relapse p/w with blood stools and hematemesis. Now transferred to the MICU for elective intubation for EGD.     Neuro:   #Potential for hepatic encephelopathy  - Serum ammonia level at 175 ->126  - Sedation for intubation, will wean as able following procedure.   - Currently holding lactulose and rifaximin as concern for bleeding with NGT placement due to coagulopathy.    CV:   - Potential for hemorrhagic shock, from multiple varices  - Continue with telemetry   - EKG on admission NSR and T wave inversions stable from previous EKGs.  - patient on norepi and vasopressin for pressor support- increasing dose requirement    Pulm:   - Electively intubated for EGD   -  CXR post intubation with no focal pulmonary consolidations or pneumothorax.  - No active pulmonary issues at this time   - Maintain aspiration precautions at all times   - COVID 19 PCR negativee       GI:   #Hematemesis with melena  - s/p EGD 7/19 w/o banding and EGD 7/20 with 4 varices banded.  - Maintain NPO   - CBC q6h, transfuse as above   - Octreotide gtt  - PPI injectable  - MR Abdomen with portal hypertension and no evidence of hepatic mass.   - holding home cephalexin for SBP prophylaxis; previously on ceftriaxone  - Transplant hepatology team f/u; per chart review, given frequent alcohol relapse, pt needs to abstain for >3m to be listed for transplant  -  d/c albumin.   - MELD of 44 today.    - For GI scope today.       Ascites  - previously on empiric Ceftriaxone for SBP ppx   - paracentesis results w/o evidence of SBP  - hold diuresis while GI bleeding     Bowel-wall edema  - bedside u/s with decreased peristalsis and increased bowel- wall edema  - one dose methylnaltrexone.     /RENAL:  - no urine output overnight  - Cr at 3.42 today. CVVHD started today with goal of net even.   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  - bedside U/S with decreased flow to right kidney.   - f/u nephrology recs  - f/u urine lytes when/if urine is made     Anion-Gap Metabolic Acidosis  - infectious process vs acute renal failure vs starvation ketosis.   -  AG down to 19 this AM.   - lactate at 3.6 today  - BHB 0.2 (r/o starvation ketosis)      HEME:  #Hematemesis with melena  - CBC q6h, transfuse as needed    #Coagulopathy, secondary to liver dysfunction  - INR 2.33  - Deficiencies primarily in fibrinogen, platelets. TEG: R 11.1, K 4.8, Angle 53.4, MA <40.0  - s/p 1u FFP and Cryoprecipitate + benadryl given pt's hx of rash post-transfusion  - follow up TEG this AM with fibrinogen of 12.  - DVT PPX: mechanical devices    ID:   - WBC count 12.49  - antibiotics broadened to zosyn   - procalcitonin elevated at 6.32    ENDO:  - T2DM, on repaglinide at home.  - FS goal 140-180, at goal    Full code 44M PMHx decompensated cirrhosis (c/b ascites, SBP w/ hx of fluoroquinolone resistant E. coli, small esophageal varices, portal hypertensive gastropathy) secondary to alcoholic hepatitis with recent acute on chronic liver failure in 5/2020 from alcohol relapse p/w with blood stools and hematemesis. Now transferred to the MICU for elective intubation for EGD.     Neuro:   #Potential for hepatic encephelopathy  - Serum ammonia level at 175 ->126 -> 30 following CVVHD  - Sedation for intubation, will wean as able following procedure.   - Currently holding lactulose and rifaximin as concern for bleeding with NGT placement due to coagulopathy.    CV:   - Potential for hemorrhagic shock, from multiple varices  - Continue with telemetry   - EKG on admission NSR and T wave inversions stable from previous EKGs.  - patient on norepi and vasopressin for pressor support- increasing dose requirement  - vasoplegic shock-> now requiring 3 pressor support medications.     Pulm:   - Electively intubated for EGD   -  CXR post intubation with no focal pulmonary consolidations or pneumothorax.  - No active pulmonary issues at this time   - Maintain aspiration precautions at all times   - COVID 19 PCR negativee       GI:   #Hematemesis with melena  - s/p EGD 7/19 w/o banding and EGD 7/20 with 4 varices banded.  - Maintain NPO   - CBC q6h, transfuse as above   - Octreotide gtt  - PPI injectable  - MR Abdomen with portal hypertension and no evidence of hepatic mass.   - holding home cephalexin for SBP prophylaxis; previously on ceftriaxone  - Transplant hepatology team f/u; per chart review, given frequent alcohol relapse, pt needs to abstain for >3m to be listed for transplant  - albumin started overnight.    - Additional bands placed by GI yesterday. With about 1 liter of blood found in stomach,       Ascites  - previously on empiric Ceftriaxone for SBP ppx   - paracentesis results w/o evidence of SBP  - hold diuresis while GI bleeding     Bowel-wall edema  - bedside u/s with decreased peristalsis and increased bowel- wall edema  - one dose methylnaltrexone.   - mucous stool yesterday.     /RENAL:  - no urine output overnight  - Cr at 2.40 today. CVVHD started today with goal of net even.   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 Farhan.     Acute Renal Failure  -patient on CVVHD  - concern for hepatorenal syndrome  - Cr at 2.40  -Patient with no urine output overnight.  - bedside U/S with decreased flow to right kidney.   - f/u nephrology recs  - f/u urine lytes when/if urine is made     Anion-Gap Metabolic Acidosis  - infectious process vs acute renal failure vs starvation ketosis.   -  AG down to 17 this AM.   - lactate at 3.9 today  - BHB 0.2 (r/o starvation ketosis)      HEME:  #Hematemesis with melena  - CBC q6h, transfuse as needed  - CBC stable at 7.4-7.7    #Coagulopathy, secondary to liver dysfunction  - INR 2.68  - Deficiencies primarily in fibrinogen, platelets. TEG: R 11.1, K 4.8, Angle 53.4, MA <40.0  - s/p 1u FFP and Cryoprecipitate + benadryl given pt's hx of rash post-transfusion  - follow up TEG this AM with fibrinogen of 12.  - DVT PPX: mechanical devices    ID:   - WBC count 10.3  - d/c zosyn  - antibiotics broadened to meropenem and vanc  - procalcitonin elevated at 6.32    ENDO:  - T2DM, on repaglinide at home.  - FS goal 140-180, at goal    Full code

## 2020-07-23 NOTE — PROGRESS NOTE ADULT - SUBJECTIVE AND OBJECTIVE BOX
[facility] DIVISION OF KIDNEY DISEASES AND HYPERTENSION -- FOLLOW UP NOTE  --------------------------------------------------------------------------------  HPI: 44-year-old male with liver cirrhosis secondary to EtOH abuse, was admitted to [facility] on 7/18/20 for hematemesis and melena. Pt. was intubated on 7/19 for airway protection and prior to EGD, transferred to MICU. EGD on 7/19 showed no active bleeding. Pt. went for repeat EGD on 7/20 with 4 bands placed. Pt. also underwent paracentesis on 7/20 which showed elevated abdominal pressures and pt. had 3L ascitic fluid removed. Transplant Nephrology consulted for kidney failure and anuria. Scr on arrival during this hospitalization was 1.85, which had increased to 4.59 on 7/21/20 and pt. was initiated on CRRT.    Pt. seen and examined this AM. Pt. remains sedated, intubated (FiO2 stable at 50, PEEP stable at 5), and on increasing IV vasopressor support. No issues with CRRT overnight.     PAST HISTORY  --------------------------------------------------------------------------------  No significant changes to PMH, PSH, FHx, SHx, unless otherwise noted    ALLERGIES & MEDICATIONS  --------------------------------------------------------------------------------  Allergies    levofloxacin (Other (Moderate))    Intolerances    Standing Inpatient Medications  albumin human 25% IVPB 50 milliLiter(s) IV Intermittent every 6 hours  chlorhexidine 0.12% Liquid 15 milliLiter(s) Oral Mucosa every 12 hours  chlorhexidine 4% Liquid 1 Application(s) Topical <User Schedule>  chlorhexidine 4% Liquid 1 Application(s) Topical <User Schedule>  CRRT Treatment    <Continuous>  dextrose 5%. 1000 milliLiter(s) IV Continuous <Continuous>  dextrose 50% Injectable 12.5 Gram(s) IV Push once  dextrose 50% Injectable 25 Gram(s) IV Push once  dextrose 50% Injectable 25 Gram(s) IV Push once  insulin lispro (HumaLOG) corrective regimen sliding scale   SubCutaneous every 6 hours  norepinephrine Infusion 0.05 MICROgram(s)/kG/Min IV Continuous <Continuous>  octreotide  Infusion 50 MICROgram(s)/Hr IV Continuous <Continuous>  pantoprazole  Injectable 40 milliGRAM(s) IV Push two times a day  phenylephrine    Infusion 0.9 MICROgram(s)/kG/Min IV Continuous <Continuous>  piperacillin/tazobactam IVPB.. 3.375 Gram(s) IV Intermittent every 8 hours  PrismaSOL Filtration BGK 4 / 2.5 5000 milliLiter(s) CRRT <Continuous>  propofol Infusion 30 MICROgram(s)/kG/Min IV Continuous <Continuous>  vasopressin Infusion 0.02 Unit(s)/Min IV Continuous <Continuous>    REVIEW OF SYSTEMS  --------------------------------------------------------------------------------  Unable to obtain due to medical condition    VITALS/PHYSICAL EXAM  --------------------------------------------------------------------------------  T(C): 36.1 (07-23-20 @ 04:00), Max: 37.6 (07-22-20 @ 12:00)  HR: 106 (07-23-20 @ 07:00) (88 - 114)  BP: --  RR: 28 (07-23-20 @ 07:00) (28 - 28)  SpO2: 96% (07-23-20 @ 07:00) (96% - 100%)  Wt(kg): --    07-22-20 @ 07:01  -  07-23-20 @ 07:00  --------------------------------------------------------  IN: 3488.2 mL / OUT: 3489 mL / NET: -0.8 mL    Physical Exam:  	Gen: sedated  	HEENT: intubated  	Pulm: good air entry bilaterally  	CV: S1S2  	Abd: Soft, +BS              : + javed catheter   	Ext: No LE edema B/L  	Neuro: sedated  	Skin: vitiligo              Vascular: RIMAKENZIE non tunneled HD catheter +    LABS/STUDIES  --------------------------------------------------------------------------------              7.4    10.33 >-----------<  25       [07-23-20 @ 00:13]              21.0     136  |  102  |  32  ----------------------------<  115      [07-23-20 @ 00:13]  3.6   |  17  |  2.40        Ca     8.2     [07-23-20 @ 00:13]      Mg     2.2     [07-23-20 @ 00:13]      Phos  2.7     [07-23-20 @ 00:13]    Creatinine Trend:  SCr 2.40 [07-23 @ 00:13]  SCr 2.68 [07-22 @ 18:33]  SCr 3.02 [07-22 @ 12:26]  SCr 3.42 [07-22 @ 06:47]  SCr 4.05 [07-22 @ 00:09]    HbA1c 10.5      [04-18-19 @ 11:27]  TSH 0.26      [05-19-20 @ 08:53]

## 2020-07-24 NOTE — PROGRESS NOTE ADULT - SUBJECTIVE AND OBJECTIVE BOX
[facility] DIVISION OF KIDNEY DISEASES AND HYPERTENSION -- FOLLOW UP NOTE  --------------------------------------------------------------------------------  HPI: 44-year-old male with liver cirrhosis secondary to EtOH abuse, was admitted to [facility] on 7/18/20 for hematemesis and melena. Pt. was intubated on 7/19 for airway protection and prior to EGD, transferred to MICU. EGD on 7/19 showed no active bleeding. Pt. went for repeat EGD on 7/20 with 4 bands placed. Pt. also underwent paracentesis on 7/20 which showed elevated abdominal pressures and pt. had 3L ascitic fluid removed. Transplant Nephrology consulted for kidney failure and anuria. Scr on arrival during this hospitalization was 1.85, which had increased to 4.59 on 7/21/20 and pt. was initiated on CRRT.    Pt. seen and examined this AM. Pt. remains sedated, intubated (FiO2 stable at 50, PEEP stable at 5), and on increasing IV vasopressor support. CRRT discontinued due to hypotension.    PAST HISTORY  --------------------------------------------------------------------------------  No significant changes to PMH, PSH, FHx, SHx, unless otherwise noted    ALLERGIES & MEDICATIONS  --------------------------------------------------------------------------------  Allergies    levofloxacin (Other (Moderate))    Intolerances    Standing Inpatient Medications  albumin human 25% IVPB 50 milliLiter(s) IV Intermittent every 6 hours  chlorhexidine 0.12% Liquid 15 milliLiter(s) Oral Mucosa every 12 hours  chlorhexidine 4% Liquid 1 Application(s) Topical <User Schedule>  chlorhexidine 4% Liquid 1 Application(s) Topical <User Schedule>  fentaNYL   Infusion... 0.5 MICROgram(s)/kG/Hr IV Continuous <Continuous>  meropenem  IVPB      meropenem  IVPB 1000 milliGRAM(s) IV Intermittent every 12 hours  norepinephrine Infusion 5 MICROgram(s)/kG/Min IV Continuous <Continuous>  octreotide  Infusion 50 MICROgram(s)/Hr IV Continuous <Continuous>  pantoprazole  Injectable 40 milliGRAM(s) IV Push two times a day  phenylephrine    Infusion 0.9 MICROgram(s)/kG/Min IV Continuous <Continuous>  vancomycin  IVPB      vancomycin  IVPB 1250 milliGRAM(s) IV Intermittent daily  vasopressin Infusion 0.02 Unit(s)/Min IV Continuous <Continuous>    REVIEW OF SYSTEMS  --------------------------------------------------------------------------------  Unable to obtain due to medical condition    All other systems were reviewed and are negative, except as noted.    VITALS/PHYSICAL EXAM  --------------------------------------------------------------------------------  T(C): 37.1 (07-24-20 @ 08:00), Max: 37.1 (07-23-20 @ 16:00)  HR: 97 (07-24-20 @ 08:45) (97 - 125)  BP: --  RR: 28 (07-24-20 @ 08:45) (23 - 28)  SpO2: 94% (07-24-20 @ 08:45) (93% - 100%)  Wt(kg): --    07-23-20 @ 07:01  -  07-24-20 @ 07:00  --------------------------------------------------------  IN: 17418.5 mL / OUT: 6689 mL / NET: 5858.5 mL    07-24-20 @ 07:01  -  07-24-20 @ 09:02  --------------------------------------------------------  IN: 606.7 mL / OUT: 0 mL / NET: 606.7 mL    Physical Exam:  	Gen: sedated  	HEENT: intubated  	Pulm: good air entry bilaterally  	CV: S1S2  	Abd: Soft, +BS              : + javed catheter   	Ext: No LE edema B/L  	Neuro: sedated  	Skin: vitiligo              Vascular: RIJ non tunneled HD catheter +    LABS/STUDIES  --------------------------------------------------------------------------------              6.9    8.77  >-----------<  27       [07-23-20 @ 11:53]              19.5     138  |  102  |  28  ----------------------------<  88      [07-23-20 @ 11:53]  3.6   |  17  |  2.08        Ca     8.3     [07-23-20 @ 11:53]      Mg     2.4     [07-23-20 @ 11:53]      Phos  2.5     [07-23-20 @ 11:53]    Creatinine Trend:  SCr 2.08 [07-23 @ 11:53]  SCr 2.21 [07-23 @ 06:56]  SCr 2.40 [07-23 @ 00:13]  SCr 2.68 [07-22 @ 18:33]  SCr 3.02 [07-22 @ 12:26]

## 2020-07-24 NOTE — PROGRESS NOTE ADULT - ASSESSMENT
44M PMHx decompensated cirrhosis (c/b ascites, SBP w/ hx of fluoroquinolone resistant E. coli, small esophageal varices, portal hypertensive gastropathy) secondary to alcoholic hepatitis with recent acute on chronic liver failure in 5/2020 from alcohol relapse p/w with blood stools and hematemesis. Now transferred to the MICU for elective intubation for EGD.     Neuro:   #Potential for hepatic encephelopathy  - Serum ammonia level at 175 ->126 -> 30 following CVVHD  - Sedation for intubation, will wean as able following procedure.   - Currently holding lactulose and rifaximin as concern for bleeding with NGT placement due to coagulopathy.    CV:   - Potential for hemorrhagic shock, from multiple varices  - Continue with telemetry   - EKG on admission NSR and T wave inversions stable from previous EKGs.  - patient on norepi and vasopressin for pressor support- increasing dose requirement  - vasoplegic shock-> now requiring 3 pressor support medications.     Pulm:   - Electively intubated for EGD   -  CXR post intubation with no focal pulmonary consolidations or pneumothorax.  - No active pulmonary issues at this time   - Maintain aspiration precautions at all times   - COVID 19 PCR negativee       GI:   #Hematemesis with melena  - s/p EGD 7/19 w/o banding and EGD 7/20 with 4 varices banded.  - Maintain NPO   - CBC q6h, transfuse as above   - Octreotide gtt  - PPI injectable  - MR Abdomen with portal hypertension and no evidence of hepatic mass.   - holding home cephalexin for SBP prophylaxis; previously on ceftriaxone  - Transplant hepatology team f/u; per chart review, given frequent alcohol relapse, pt needs to abstain for >3m to be listed for transplant  - albumin started overnight.    - Additional bands placed by GI yesterday. With about 1 liter of blood found in stomach,       Ascites  - previously on empiric Ceftriaxone for SBP ppx   - paracentesis results w/o evidence of SBP  - hold diuresis while GI bleeding     Bowel-wall edema  - bedside u/s with decreased peristalsis and increased bowel- wall edema  - one dose methylnaltrexone.   - mucous stool yesterday.     /RENAL:  - no urine output overnight  - Cr at 2.40 today. CVVHD started today with goal of net even.   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 Farhan.     Acute Renal Failure  -patient on CVVHD  - concern for hepatorenal syndrome  - Cr at 2.40  -Patient with no urine output overnight.  - bedside U/S with decreased flow to right kidney.   - f/u nephrology recs  - f/u urine lytes when/if urine is made     Anion-Gap Metabolic Acidosis  - infectious process vs acute renal failure vs starvation ketosis.   -  AG down to 17 this AM.   - lactate at 3.9 today  - BHB 0.2 (r/o starvation ketosis)      HEME:  #Hematemesis with melena  - CBC q6h, transfuse as needed  - CBC stable at 7.4-7.7    #Coagulopathy, secondary to liver dysfunction  - INR 2.68  - Deficiencies primarily in fibrinogen, platelets. TEG: R 11.1, K 4.8, Angle 53.4, MA <40.0  - s/p 1u FFP and Cryoprecipitate + benadryl given pt's hx of rash post-transfusion  - follow up TEG this AM with fibrinogen of 12.  - DVT PPX: mechanical devices    ID:   - WBC count 10.3  - d/c zosyn  - antibiotics broadened to meropenem and vanc  - procalcitonin elevated at 6.32    ENDO:  - T2DM, on repaglinide at home.  - FS goal 140-180, at goal    Full code

## 2020-07-24 NOTE — CHART NOTE - NSCHARTNOTEFT_GEN_A_CORE
DEATH NOTE    Called to bedside to evaluate patient.     On physical exam, patient did not respond to verbal or noxious stimuli.  No spontaneous respirations.  Absent heart and breath sounds.  Absent radial and carotid pulses.   Pupils are fixed and dilated, no corneal reflex.  EKG rhythm strip shows asystole.   Patient pronounced dead at 10:11AM.  Attending notified.  Family did not wish to pursue autopsy.    -Nelda Le  PGY-1, Internal Medicine DEATH NOTE    Called to bedside to evaluate patient.     On physical exam, patient did not respond to verbal or noxious stimuli.  No spontaneous respirations.  Absent heart and breath sounds.  Absent radial and carotid pulses.   Pupils are fixed and dilated, no corneal reflex.  EKG rhythm strip shows asystole.   Patient pronounced  at 10:11AM.  Attending notified.  Family did not wish to pursue autopsy.    -Nelda Le  PGY-1, Internal Medicine

## 2020-07-24 NOTE — PROGRESS NOTE ADULT - REASON FOR ADMISSION
Hematemesis

## 2020-07-24 NOTE — PROGRESS NOTE ADULT - ATTENDING COMMENTS
Patient examined and case reviewed in detail on bedside rounds  Critically ill on vent with refractory shock state despite maximal therapy Family at bedside and aware of hopeless situation  Frequent bedside visits with therapy change today. Crit Care Time Today 35 min +
Patient examined and case reviewed in detail on bedside rounds  Critically ill on vent/pressors with severe renal failure and UGIB that may be recurrent post endoscopy GI and renal consult on close f/u  Frequent bedside visits with therapy change today. Crit Care Time Today 35 min
45 yo M with latent TB, history of C diff (2016, 2017), DM2, obesity, prior alcoholic hepatitis, and decompensated ALD cirrhosis complicated by ascites, SBP, prior variceal hemorrhage, and SAM-HRS after recent alcohol relapse (with last drink 2 months ago, currently enrolled in an outpatient virtual alcohol RPP), re-admitted due to acute upper GI hemorrhage, intubated for airway protection and with hospital course complicated by shock and worsening SAM.    # Upper GI hemorrhage: S/p repeat EGD today with large EV s/p EVL x4. Continue antibiotics, octreotide drip, and once daily PPI. No NGT or OGT x72h after band ligation to prevent dislodging bands. Trend Hb/HCT. Weaning/extubation per MICU team when he meets criteria.    # Shock: No evidence of ongoing hemorrhage, and with rising leukocytosis concerning for septic shock. Recommend broadening antibiotic coverage (currently on ceftriaxone only). No evidence of SBP on paracentesis done today per ascitic fluid PMN count. Follow-up cultures.     # SAM: Remote baseline Cr 0.55 (10/2019), but has had new baseline Cr 1.8-2.1 during two prior hospitalizations and as outpatient recently in setting of SAM-HRS. Currently with worsened SAM with concern for progression of HRS vs ATN in the context of sepsis/recent hemorrhage. Agree with norepinephrine infusion as needed to maintain MAP ~70 mmHg, and continuation of octreotide drip. Recommend adding albumin 25% 100 mL iv q6h as well for intravascular volume support. Monitor I/Os.    # Decompensated alcohol-related cirrhosis with ACLF: Current MELD-Na 45. He has been closely followed by our transplant team, but is not felt to be a liver transplant candidate at our center yet due to his alcohol history and team consensus that he needs a longer duration of maintained sobriety and continued participation in his alcohol RPP. Given his very high MELD-Na score and high risk of short-term mortality in the absence of liver transplantation, I previously reached out (prior to his being hospitalized on 7/18/20) to [facility] with tentative plan for him to obtain a second opinion there this week re: transplant candidacy. Once he is stabilized, I will see whether they would consider accepting him instead for inpatient transfer for expedited inpatient liver transplant evaluation there.    Please don't hesitate to call with any questions/concerns.    Agapito Ayala M.D., Ph.D.  Transplant Hepatology  Cell: [phone]
Mr. Hallman remains critically ill in multiorgan failure, currently requiring 2 pressors, intubated/mechanically ventilated, on CVVHD for anuric renal failure, and with progressive acute-on-chronic liver failure (ACLF), with current MELD-Na 44. He had recurrent upper GI hemorrhage requiring 2 units PRBCs in past 24h, in addition to FFP and cryoprecipitate transfusions, s/p repeat EGD today with suctioning of >1L blood from the stomach and repeat esophageal band ligation. His prognosis is grim, as he is currently too sick to be transferred for second opinion re: undergoing liver transplantation. This would first require significant improvements in his hemodynamic stability with improving pressor requirements and no further upper GI hemorrhage, at a minimum.    Agapito Ayala M.D., Ph.D.  Transplant Hepatology  Cell: [phone]
Mr. Hallman remains critically ill in multiorgan failure, intubated/mechanically ventilated, on CVVHD for anuric renal failure, with progressive acute on chronic liver failure (ACLF) with current MELD-Na 45, and with vasoplegic shock secondary to ACLF and presumed septic shock, with increasing pressor requirements over the past 24h despite maximal support and broad antibiotic coverage. He is too sick to be transferred for second opinion re: undergoing liver transplantation and unfortunately appears to be terminal. We will continue to offer emotional support to his long-time partner, Glo, and his family.     Agapito Ayala M.D., Ph.D.  Transplant Hepatology  Cell: [phone]
Patient examined and case reviewed in detail on bedside rounds  Critically ill on vent/pressors/HD with hepatic failure  Frequent bedside visits with therapy change today. Crit Care Time Today 35 min +
Patient examined and case reviewed in detail on bedside rounds  Critically ill on vent/pressors/CVVHD with UGIB   Frequent bedside visits with therapy change today. Crit Care Time Today 35 min +
44M PMHx decompensated cirrhosis (c/b ascites, SBP w/ hx of fluoroquinolone resistant E. coli, small esophageal varices, portal hypertensive gastropathy) secondary to alcoholic hepatitis  p/w UGIB. Significant bleeding and electively intubated for airway protection and procedure. EGD done this AM showed varices and portal gastropathy but no active bleeding. Has remained hemodynamically stable. Not tx prbc but required cryo and plt.  Cont ppi, octreotide. CTX for sbp ppx. Plan for repeat EGD tomorrow. Repeat cbc, coags and reverse coag as needed for procedure. Cont sedation goal rass -1 to -2. Improved abg after inc MV.
Patient examined and case reviewed in detail on bedside rounds  Critically ill on vent with UGID For re-endoscopy today  Frequent bedside visits with therapy change today. Crit Care Time Today 35 min
I have personally seen, examined and participated in the care of this patient. I have reviewed all pertinent clinical information including history, physical exam , plan and fellow's note and agree with the plan.    44 year old male with liver cirrhosis secondary to EtOH abuse admitted with hematemesis and melena     Anuric SAM on CVVHD . tolerating with no issues. Labs reviewed.
I have personally seen, examined and participated in the care of this patient. I have reviewed all pertinent clinical information including history, physical exam , plan and fellow's note and agree with the plan.    44 year old male with liver cirrhosis secondary to EtOH abuse admitted with hematemesis and melena     Anuric SAM- on CVVHD . Vital signs stable on pressors. Labs reviewed.

## 2020-07-24 NOTE — PROGRESS NOTE ADULT - PROVIDER SPECIALTY LIST ADULT
MICU
Transplant Hepatology
Transplant Nephrology
Transplant Nephrology
MICU
Transplant Nephrology

## 2020-07-24 NOTE — PROGRESS NOTE ADULT - PROBLEM SELECTOR PLAN 1
Pt. with multifactorial SAM on CKD in the setting of abdominal hypertension, anemia, hypotension, and sepsis. Upon lab reviews, pt. Scr on 6/21/20 1.85. Scr on arrival was 1.85. Scr increased to 4.59 on 7/21/20 and Pt. was initiated on CRRT. Pt. with likely hemodynamically mediated anuric SAM ? ATN. Check UA, urine electrolytes, and spot urine TP/Cr ratio. Obtain kidney sonogram. Labs reviewed. Continue CVVHD today, net even balance. Pt. on IV Albumin q 6 hours. Monitor labs and urine output. Avoid potential nephrotoxins. Dose medications as per CRRT
Pt. with multifactorial SAM on CKD in the setting of abdominal hypertension, anemia, hypotension, and sepsis. Upon lab reviews, pt. Scr on 6/21/20 1.85. Scr on arrival was 1.85, and increased to 4.59 yesterday. Pt. initiated on CRRT (7/21/20). Pt. with likely hemodynamically mediated anuric SAM ? ATN. Check UA, urine electrolytes, and spot urine TP/Cr ratio. Obtain kidney sonogram. Labs reviewed. Continue CVVHD today, net even balance. Consider IV hydration to improve urine output. Monitor labs and urine output. Avoid potential nephrotoxins. Dose medications as per CRRT
Pt. with multifactorial SAM on CKD in the setting of abdominal hypertension, anemia, hypotension, and sepsis. Upon lab reviews, pt. Scr on 6/21/20 1.85. Scr on arrival was 1.85. Scr increased to 4.59 on 7/21/20 and Pt. was initiated on CRRT. Pt. with increasing pressor support, CRRT discontinued overnight. Guarded prognosis. Risks of CRRT outweigh benefits.     Will sign off pt. at this time. Please re-consult if any change in clinical course.  If any questions, please feel free to contact me  Kai Lemon   Nephrology Fellow  [phone]  (After 5 pm or on weekends please page the on-call fellow)

## 2020-07-24 NOTE — PROGRESS NOTE ADULT - SUBJECTIVE AND OBJECTIVE BOX
INTERVAL HPI/OVERNIGHT EVENTS:    SUBJECTIVE: Patient seen and examined at bedside.       VITAL SIGNS:  ICU Vital Signs Last 24 Hrs  T(C): 36.6 (24 Jul 2020 04:00), Max: 37.1 (23 Jul 2020 16:00)  T(F): 97.9 (24 Jul 2020 04:00), Max: 98.8 (23 Jul 2020 16:00)  HR: 108 (24 Jul 2020 06:30) (99 - 125)  BP: --  BP(mean): --  ABP: 92/30 (24 Jul 2020 06:30) (85/32 - 140/60)  ABP(mean): 49 (24 Jul 2020 06:30) (47 - 86)  RR: 28 (24 Jul 2020 06:30) (23 - 33)  SpO2: 100% (24 Jul 2020 06:30) (93% - 100%)    Mode: AC/ CMV (Assist Control/ Continuous Mandatory Ventilation), RR (machine): 28, TV (machine): 500, FiO2: 50, PEEP: 5, ITime: 1, MAP: 11, PIP: 23  Plateau pressure:   P/F ratio:     07-22 @ 07:01  -  07-23 @ 07:00  --------------------------------------------------------  IN: 3488.2 mL / OUT: 3647 mL / NET: -158.8 mL    07-23 @ 07:01 - 07-24 @ 06:46  --------------------------------------------------------  IN: 93141.5 mL / OUT: 6689 mL / NET: 5858.5 mL      CAPILLARY BLOOD GLUCOSE      POCT Blood Glucose.: 72 mg/dL (23 Jul 2020 17:50)    ECG:    PHYSICAL EXAM:    General:   HEENT:   Neck:   Respiratory:   Cardiovascular:   Abdomen:   Extremities:  Neurological:    MEDICATIONS:  MEDICATIONS  (STANDING):  albumin human 25% IVPB 50 milliLiter(s) IV Intermittent every 6 hours  chlorhexidine 0.12% Liquid 15 milliLiter(s) Oral Mucosa every 12 hours  chlorhexidine 4% Liquid 1 Application(s) Topical <User Schedule>  chlorhexidine 4% Liquid 1 Application(s) Topical <User Schedule>  fentaNYL   Infusion... 0.5 MICROgram(s)/kG/Hr (2.26 mL/Hr) IV Continuous <Continuous>  meropenem  IVPB      meropenem  IVPB 1000 milliGRAM(s) IV Intermittent every 12 hours  norepinephrine Infusion 0.05 MICROgram(s)/kG/Min (4.23 mL/Hr) IV Continuous <Continuous>  octreotide  Infusion 50 MICROgram(s)/Hr (10 mL/Hr) IV Continuous <Continuous>  pantoprazole  Injectable 40 milliGRAM(s) IV Push two times a day  phenylephrine    Infusion 0.9 MICROgram(s)/kG/Min (15.2 mL/Hr) IV Continuous <Continuous>  vancomycin  IVPB      vancomycin  IVPB 1250 milliGRAM(s) IV Intermittent daily  vasopressin Infusion 0.02 Unit(s)/Min (1.2 mL/Hr) IV Continuous <Continuous>    MEDICATIONS  (PRN):  sodium chloride 0.9% lock flush 10 milliLiter(s) IV Push every 1 hour PRN Pre/post blood products, medications, blood draw, and to maintain line patency      ALLERGIES:  Allergies    levofloxacin (Other (Moderate))    Intolerances        LABS:                        6.9    8.77  )-----------( 27       ( 23 Jul 2020 11:53 )             19.5     07-23    138  |  102  |  28<H>  ----------------------------<  88  3.6   |  17<L>  |  2.08<H>    Ca    8.3<L>      23 Jul 2020 11:53  Phos  2.5     07-23  Mg     2.4     07-23    TPro  4.7<L>  /  Alb  3.5  /  TBili  36.6<H>  /  DBili  x   /  AST  155<H>  /  ALT  44  /  AlkPhos  91  07-23    PT/INR - ( 23 Jul 2020 11:53 )   PT: 30.3 sec;   INR: 2.67 ratio         PTT - ( 23 Jul 2020 11:53 )  PTT:61.2 sec      RADIOLOGY & ADDITIONAL TESTS: Reviewed.

## 2020-07-25 LAB
CULTURE RESULTS: SIGNIFICANT CHANGE UP
SPECIMEN SOURCE: SIGNIFICANT CHANGE UP

## 2020-07-28 NOTE — DIETITIAN INITIAL EVALUATION ADULT. - RD TO REMAIN AVAILABLE
Detail Level: Detailed Depth Of Biopsy: dermis Was A Bandage Applied: Yes Size Of Lesion In Cm: 0.5 X Size Of Lesion In Cm: 0 Biopsy Type: H and E Biopsy Method: sterile single edge surgical blade Anesthesia Type: 1% lidocaine with epinephrine Hemostasis: Drysol Wound Care: Petrolatum Dressing: bandage Destruction After The Procedure: No Type Of Destruction Used: Curettage Curettage Text: The wound bed was treated with curettage after the biopsy was performed. Cryotherapy Text: The wound bed was treated with cryotherapy after the biopsy was performed. Electrodesiccation Text: The wound bed was treated with electrodesiccation after the biopsy was performed. Electrodesiccation And Curettage Text: The wound bed was treated with electrodesiccation and curettage after the biopsy was performed. Silver Nitrate Text: The wound bed was treated with silver nitrate after the biopsy was performed. Lab: [id number] Lab Facility: [id number] Consent: Verbal consent was obtained and risks were reviewed including but not limited to scarring, infection, bleeding, scabbing, incomplete removal, nerve damage and allergy to anesthesia. Post-Care Instructions: I reviewed with the patient in detail post-care instructions. Patient is to keep the biopsy site dry overnight, and then apply vaseline or aquaphor ointment along with a bandaid twice daily until healed. Notification Instructions: Patient will be notified of biopsy results. However, patient instructed to call the office if not contacted within 2 weeks. Billing Type: Third-Party Bill Information: Selecting Yes will display possible errors in your note based on the variables you have selected. This validation is only offered as a suggestion for you. PLEASE NOTE THAT THE VALIDATION TEXT WILL BE REMOVED WHEN YOU FINALIZE YOUR NOTE. IF YOU WANT TO FAX A PRELIMINARY NOTE YOU WILL NEED TO TOGGLE THIS TO 'NO' IF YOU DO NOT WANT IT IN YOUR FAXED NOTE. Bhargavi Bryson MA, RD, CDN #[phone]/yes

## 2020-07-31 ENCOUNTER — APPOINTMENT (OUTPATIENT)
Dept: ENDOCRINOLOGY | Facility: CLINIC | Age: 45
End: 2020-07-31

## 2020-08-05 ENCOUNTER — APPOINTMENT (OUTPATIENT)
Dept: HEMATOLOGY ONCOLOGY | Facility: CLINIC | Age: 45
End: 2020-08-05

## 2020-08-06 ENCOUNTER — APPOINTMENT (OUTPATIENT)
Dept: INFUSION THERAPY | Facility: HOSPITAL | Age: 45
End: 2020-08-06

## 2020-08-07 ENCOUNTER — APPOINTMENT (OUTPATIENT)
Dept: INFUSION THERAPY | Facility: HOSPITAL | Age: 45
End: 2020-08-07

## 2020-08-19 ENCOUNTER — APPOINTMENT (OUTPATIENT)
Dept: HEMATOLOGY ONCOLOGY | Facility: CLINIC | Age: 45
End: 2020-08-19

## 2020-08-20 ENCOUNTER — APPOINTMENT (OUTPATIENT)
Dept: INFUSION THERAPY | Facility: HOSPITAL | Age: 45
End: 2020-08-20

## 2020-08-21 ENCOUNTER — APPOINTMENT (OUTPATIENT)
Dept: INFUSION THERAPY | Facility: HOSPITAL | Age: 45
End: 2020-08-21

## 2020-09-02 ENCOUNTER — APPOINTMENT (OUTPATIENT)
Dept: HEMATOLOGY ONCOLOGY | Facility: CLINIC | Age: 45
End: 2020-09-02

## 2020-09-03 ENCOUNTER — APPOINTMENT (OUTPATIENT)
Dept: INFUSION THERAPY | Facility: HOSPITAL | Age: 45
End: 2020-09-03

## 2020-09-04 ENCOUNTER — APPOINTMENT (OUTPATIENT)
Dept: INFUSION THERAPY | Facility: HOSPITAL | Age: 45
End: 2020-09-04

## 2020-09-14 NOTE — DIETITIAN INITIAL EVALUATION ADULT. - PERTINENT LABORATORY DATA
6/26/17: glucose - 154;   6/22/17: HbA1c - 7.4;  F/S: 6/27/17: 103;  6/26/17: 101-291;   6/25/17: 175-274;  6/24/17: 172-275
English

## 2020-09-15 NOTE — ED ADULT NURSE NOTE - EENT WDL
Please let patient know about her normal results and BV on pap smear. She was asymptomatic at my visit with her, but if she is having symptoms now, please send rx for Flagyl. Repeat pap in 5 years, pelvic exam yearly. Thank you. Eyes with no visual disturbances.  Ears clean and dry and no hearing difficulties. Nose with pink mucosa and no drainage.  Mouth mucous membranes moist and pink.  No tenderness or swelling to throat or neck.

## 2020-09-16 ENCOUNTER — APPOINTMENT (OUTPATIENT)
Dept: HEMATOLOGY ONCOLOGY | Facility: CLINIC | Age: 45
End: 2020-09-16

## 2020-09-17 ENCOUNTER — APPOINTMENT (OUTPATIENT)
Dept: INFUSION THERAPY | Facility: HOSPITAL | Age: 45
End: 2020-09-17

## 2020-09-18 ENCOUNTER — APPOINTMENT (OUTPATIENT)
Dept: INFUSION THERAPY | Facility: HOSPITAL | Age: 45
End: 2020-09-18

## 2020-09-28 ENCOUNTER — RX RENEWAL (OUTPATIENT)
Age: 45
End: 2020-09-28

## 2020-09-30 ENCOUNTER — APPOINTMENT (OUTPATIENT)
Dept: HEMATOLOGY ONCOLOGY | Facility: CLINIC | Age: 45
End: 2020-09-30

## 2020-10-01 ENCOUNTER — APPOINTMENT (OUTPATIENT)
Dept: INFUSION THERAPY | Facility: HOSPITAL | Age: 45
End: 2020-10-01

## 2020-10-02 ENCOUNTER — APPOINTMENT (OUTPATIENT)
Dept: INFUSION THERAPY | Facility: HOSPITAL | Age: 45
End: 2020-10-02

## 2020-10-05 ENCOUNTER — APPOINTMENT (OUTPATIENT)
Dept: HEMATOLOGY ONCOLOGY | Facility: CLINIC | Age: 45
End: 2020-10-05

## 2020-12-10 NOTE — ED ADULT TRIAGE NOTE - NS ED NURSE DIRECT TO ROOM YN
Patient called in her list of medication for her record.     Galantamine 8MG    Latuda 30MG    Lamotrigine 200MG     ALL MEDICATIONS ARE TAKEN 1X DAILY.                 
No

## 2021-01-01 NOTE — DISCHARGE NOTE PROVIDER - CARE PROVIDERS DIRECT ADDRESSES
,[email or web address] Breasts contour/Breast size/Breast color/Breast symmetry/Breasts without milk/Signs of inflammation or tenderness/Nipple size/Nipple shape/Nipple number and spacing/Axillary exam normal

## 2021-02-09 NOTE — DISCHARGE NOTE NURSING/CASE MANAGEMENT/SOCIAL WORK - NSTOBACCONEVERSMOKERY/N_GEN_A
No O-L Flap Text: The defect edges were debeveled with a #15 scalpel blade.  Given the location of the defect, shape of the defect and the proximity to free margins an O-L flap was deemed most appropriate.  Using a sterile surgical marker, an appropriate advancement flap was drawn incorporating the defect and placing the expected incisions within the relaxed skin tension lines where possible.    The area thus outlined was incised deep to adipose tissue with a #15 scalpel blade.  The skin margins were undermined to an appropriate distance in all directions utilizing iris scissors.

## 2021-03-17 NOTE — ED PROVIDER NOTE - NEURO NEGATIVE STATEMENT, MLM
Patient discharged home with family. VSS at discharge no loss of consciousness, no gait abnormality, no headache, no sensory deficits, and no weakness.

## 2021-05-27 NOTE — PHYSICAL EXAM
Still no follow up appointment scheduled with patient. Can we please reach out to schedule MTM f/u appt?     Thank you  Saba Gallegos, PharmD  Medication Therapy Management Pharmacist  [facility]   [Healthy Appearing] : healthy appearing [No Acute Distress] : no acute distress [Normal Voice/Communication] : normal voice communication [Scleral Icterus] : scleral icterus [EOMI] : extra ocular movement intact [Normal Outer Ear/Nose] : the ears and nose were normal in appearance [Normal Hearing] : normal hearing [Normal Lips/Gums] : lips/gums were normal [Normal Oropharynx] : normal oropharynx [Normal Appearance] : normal appearance [No Neck Mass] : no neck mass [Thyroid Not Enlarged] : thyroid not enlarged [Supple] : the neck was supple [No JVD] : no jugular venous distention [No Thyroid Nodules] : no thyroid nodules [No Respiratory Distress] : no respiratory distress [No Accessory Muscle Use] : no accessory muscle use [Normal Rhythm and Effort] : normal rhythm and effort [Clear to Auscultation] : lungs were clear to auscultation bilaterally [Normal S1, S2] : normal S1 and S2 [Normal Rate/Rhythm] : normal rate/rhythm [No Gallops] : no gallops [No Rubs] : no rubs [Carotids Normal] : carotids normal [No Edema] : no edema [Pedal Pulses Normal] : pedal pulses normal [Splenomegaly] : splenomegaly [Abdominal Ascites] : abdominal ascites [Liver Palpable ___ Finger Breadths Below Costal Margin] : liver palpable [unfilled]  finger breadths below costal margin [Non-Tender] : Liver Edge: non-tender [Irregular] : Liver Edge: irregular [Normal Bowel Sounds] : normal bowel sounds [Non Tender] : non-tender [No Masses] : no abdominal mass palpated [Soft] : soft [Normal Post Cervical Nodes] : no posterior cervical lymphadenopathy [Normal Supraclavicular Nodes] : no supraclavicular lymphadenopathy [Normal Anterior Cervical Nodes] : no anterior cervical lymphadenopathy [Normal Inguinal Nodes] : no inguinal lymphadenopathy [No Spinal Tenderness] : no spinal tenderness [No CVA Tenderness] : no ~M costovertebral angle tenderness [Normal Gait] : normal gait [No Clubbing, Cyanosis] : no clubbing  or cyanosis of the fingernails [Normal Strength/Tone] : muscle strength and tone were normal [Spider Angioma] : spider angioma [Location of Spider Angiomas: ___] : location of spider angiomas: [unfilled] [Jaundice] : jaundice [Normal Color/Pigmentation] : normal color/pigmentation [No Rash] : no rash [Normal Turgor] : normal turgor [Asterixis] : asterixis [No Focal Deficits] : no focal deficits [Normal Reflexes] : normal reflexes [No Motor Deficits] : no motor deficits [Gross disorientation, drowsiness, possibly asterixis, inappropriate behavior (+2)] : Gross disorientation, drowsiness, possibly asterixis, inappropriate behavior (+2) [Normal Affect] : normal affect [Remote Memory Intact] : remote memory intact [Normal Insight/Judgement] : normal insight/judgement [Hepatojugular Reflux] : no hepatojugular reflux [Abdominal Bruit] : no abdominal bruit [Ascites Fluid Wave] : no ascites fluid wave [Ascites Tense] : no ascites tense [Caput Medusae] : no caput medusae [Ascites Shifting Dullness] : no ascites shifting dullness [Palmar Erythema] : no palmar erythema [de-identified] : Mild bitemporal wasting [de-identified] : Good dentition [de-identified] : Mild bilateral gynecomastia [de-identified] : +3/6 systolic murmur heard best at LUSB [de-identified] : Able to recite months of the year backwards (in Georgian) correctly but with psychomotor slowing noted. Alert and oriented x4.

## 2021-06-16 NOTE — DISCHARGE NOTE FOR THE EXPIRED PATIENT - TIME PATIENT HAD NO SPONTANEOUS RESPIRATION AND ABSENCE OF PULSE
24-Jul-2020 10:11 [Follow-Up - Clinic] : a clinic follow-up of [FreeTextEntry1] : Atypical chest pain

## 2021-07-28 NOTE — PROGRESS NOTE ADULT - SUBJECTIVE AND OBJECTIVE BOX
Called due to no show for appointment today.  Asked patient to call back to confirm if he wanted to be discharged or to confirm his next appointment on Friday. Left clinic number [id number].    Patient is a 44y old  Male who presents with a chief complaint of Elevated Serum Creatinine at Hepatology clinic (17 Jun 2020 14:37)  SUBJECTIVE / OVERNIGHT EVENTS: Patient had no acute events overnight. Patient seen and examined at bedside this morning.   Interval history: Patient is still feeling N/V, stating that the smell of the food is making him feel nauseated. He was able to eat a little bit of protein yesterday, and states he thinks bland foods would sit well in his stomach. He is not having any burning pain or blood in his emesis. He had 5 formed, non-bloody BMs yesterday.     ROS: [ - ] Fever [ - ] Chills [ - ] Chest Pain [ - ] Shortness of breath     MEDICATIONS  (STANDING):  albumin human 25% IVPB 100 milliLiter(s) IV Intermittent every 6 hours  cefTRIAXone   IVPB 1000 milliGRAM(s) IV Intermittent every 24 hours  cholestyramine Powder (Sugar-Free) 4 Gram(s) Oral daily  citric acid/sodium citrate Solution 30 milliLiter(s) Oral three times a day  dextrose 5%. 1000 milliLiter(s) (50 mL/Hr) IV Continuous <Continuous>  dextrose 50% Injectable 12.5 Gram(s) IV Push once  dextrose 50% Injectable 25 Gram(s) IV Push once  dextrose 50% Injectable 25 Gram(s) IV Push once  insulin lispro (HumaLOG) corrective regimen sliding scale   SubCutaneous three times a day before meals  insulin lispro (HumaLOG) corrective regimen sliding scale   SubCutaneous at bedtime  lactulose Syrup 10 Gram(s) Oral daily  midodrine. 5 milliGRAM(s) Oral three times a day  multivitamin 1 Tablet(s) Oral daily  octreotide  Injectable 100 MICROGram(s) SubCutaneous three times a day  pantoprazole    Tablet 40 milliGRAM(s) Oral before breakfast  rifAXIMin 550 milliGRAM(s) Oral two times a day  zinc sulfate 220 milliGRAM(s) Oral two times a day    MEDICATIONS  (PRN):  dextrose 40% Gel 15 Gram(s) Oral once PRN Blood Glucose LESS THAN 70 milliGRAM(s)/deciliter  glucagon  Injectable 1 milliGRAM(s) IntraMuscular once PRN Glucose LESS THAN 70 milligrams/deciliter  ondansetron Injectable 4 milliGRAM(s) IV Push every 6 hours PRN Nausea and/or Vomiting      Vital Signs Last 24 Hrs  T(C): 36.8 (18 Jun 2020 03:56), Max: 37 (17 Jun 2020 13:10)  T(F): 98.2 (18 Jun 2020 03:56), Max: 98.6 (17 Jun 2020 13:10)  HR: 70 (18 Jun 2020 05:54) (59 - 70)  BP: 116/68 (18 Jun 2020 05:54) (107/66 - 127/69)  BP(mean): --  RR: 18 (18 Jun 2020 03:56) (18 - 18)  SpO2: 96% (18 Jun 2020 03:56) (95% - 97%)  CAPILLARY BLOOD GLUCOSE      POCT Blood Glucose.: 212 mg/dL (17 Jun 2020 21:44)  POCT Blood Glucose.: 219 mg/dL (17 Jun 2020 17:11)  POCT Blood Glucose.: 273 mg/dL (17 Jun 2020 12:36)  POCT Blood Glucose.: 166 mg/dL (17 Jun 2020 08:43)    I&O's Summary    17 Jun 2020 07:01  -  18 Jun 2020 07:00  --------------------------------------------------------  IN: 1040 mL / OUT: 800 mL / NET: 240 mL        PHYSICAL EXAM  GENERAL: NAD, lying comfortably in bed   HEAD:  Atraumatic, Normocephalic  EYES: EOMI, PERRLA, scleral icterus present, improved from admission   NECK: Supple, No JVD  CHEST/LUNG: Clear to auscultation bilaterally; No wheeze  HEART: Regular rate rhythm, no murmurs or rubs appreciated.   ABDOMEN: Distended, at baseline. Soft and nontender to palpation. Bowel sounds present  EXTREMITIES:  2+ Peripheral Pulses, Clubbing of phalanges b/l   NEURO: AAOx3, no asterixis today (resolved from prior days)   SKIN: Hypopigmented patches throughout body. Jaundice improving from admission, unchanged from yesterday.       LABS:                        7.8    4.85  )-----------( 44       ( 17 Jun 2020 14:06 )             22.7     06-17    137  |  104  |  29<H>  ----------------------------<  170<H>  3.5   |  15<L>  |  2.19<H>    Ca    9.4      17 Jun 2020 07:01  Phos  2.8     06-17  Mg     1.6     06-17    TPro  5.6<L>  /  Alb  5.2<H>  /  TBili  39.5<H>  /  DBili  x   /  AST  49<H>  /  ALT  23  /  AlkPhos  48  06-17    PT/INR - ( 18 Jun 2020 06:53 )   PT: 40.5 sec;   INR: 3.42 ratio Patient is a 44y old  Male who presents with a chief complaint of Elevated Serum Creatinine at Hepatology clinic (17 Jun 2020 14:37)  SUBJECTIVE / OVERNIGHT EVENTS: Patient had no acute events overnight. Patient seen and examined at bedside this morning.   Interval history: Patient is still feeling N/V, stating that the smell of the food is making him feel nauseated. He was able to eat a little bit of protein yesterday, and states he thinks bland foods would sit well in his stomach. He is not having any burning pain or blood in his emesis. He had 5 formed, non-bloody BMs yesterday.     ROS: [ - ] Fever [ - ] Chills [ - ] Chest Pain [ - ] Shortness of breath     MEDICATIONS  (STANDING):  albumin human 25% IVPB 100 milliLiter(s) IV Intermittent every 6 hours  cefTRIAXone   IVPB 1000 milliGRAM(s) IV Intermittent every 24 hours  cholestyramine Powder (Sugar-Free) 4 Gram(s) Oral daily  citric acid/sodium citrate Solution 30 milliLiter(s) Oral three times a day  dextrose 5%. 1000 milliLiter(s) (50 mL/Hr) IV Continuous <Continuous>  dextrose 50% Injectable 12.5 Gram(s) IV Push once  dextrose 50% Injectable 25 Gram(s) IV Push once  dextrose 50% Injectable 25 Gram(s) IV Push once  insulin lispro (HumaLOG) corrective regimen sliding scale   SubCutaneous three times a day before meals  insulin lispro (HumaLOG) corrective regimen sliding scale   SubCutaneous at bedtime  lactulose Syrup 10 Gram(s) Oral daily  midodrine. 5 milliGRAM(s) Oral three times a day  multivitamin 1 Tablet(s) Oral daily  octreotide  Injectable 100 MICROGram(s) SubCutaneous three times a day  pantoprazole    Tablet 40 milliGRAM(s) Oral before breakfast  rifAXIMin 550 milliGRAM(s) Oral two times a day  zinc sulfate 220 milliGRAM(s) Oral two times a day    MEDICATIONS  (PRN):  dextrose 40% Gel 15 Gram(s) Oral once PRN Blood Glucose LESS THAN 70 milliGRAM(s)/deciliter  glucagon  Injectable 1 milliGRAM(s) IntraMuscular once PRN Glucose LESS THAN 70 milligrams/deciliter  ondansetron Injectable 4 milliGRAM(s) IV Push every 6 hours PRN Nausea and/or Vomiting      Vital Signs Last 24 Hrs  T(C): 36.8 (18 Jun 2020 03:56), Max: 37 (17 Jun 2020 13:10)  T(F): 98.2 (18 Jun 2020 03:56), Max: 98.6 (17 Jun 2020 13:10)  HR: 70 (18 Jun 2020 05:54) (59 - 70)  BP: 116/68 (18 Jun 2020 05:54) (107/66 - 127/69)  BP(mean): --  RR: 18 (18 Jun 2020 03:56) (18 - 18)  SpO2: 96% (18 Jun 2020 03:56) (95% - 97%)  CAPILLARY BLOOD GLUCOSE      POCT Blood Glucose.: 212 mg/dL (17 Jun 2020 21:44)  POCT Blood Glucose.: 219 mg/dL (17 Jun 2020 17:11)  POCT Blood Glucose.: 273 mg/dL (17 Jun 2020 12:36)  POCT Blood Glucose.: 166 mg/dL (17 Jun 2020 08:43)    I&O's Summary    17 Jun 2020 07:01  -  18 Jun 2020 07:00  --------------------------------------------------------  IN: 1040 mL / OUT: 800 mL / NET: 240 mL        PHYSICAL EXAM  GENERAL: NAD, lying comfortably in bed   HEAD:  Atraumatic, Normocephalic  EYES: EOMI, PERRLA, scleral icterus present, improved from admission   NECK: Supple, No JVD  CHEST/LUNG: Clear to auscultation bilaterally; No W/R/R  HEART: Regular rate rhythm, no murmurs or rubs appreciated.   ABDOMEN: Distended, at baseline. Soft and nontender to palpation. Bowel sounds present  EXTREMITIES:  2+ Peripheral Pulses, Clubbing of phalanges b/l   NEURO: AAOx3, no asterixis today (resolved from prior days)   SKIN: Hypopigmented patches throughout body. Jaundice improving from admission, unchanged from yesterday.       LABS:                        7.8    4.85  )-----------( 44       ( 17 Jun 2020 14:06 )             22.7     06-17    137  |  104  |  29<H>  ----------------------------<  170<H>  3.5   |  15<L>  |  2.19<H>    Ca    9.4      17 Jun 2020 07:01  Phos  2.8     06-17  Mg     1.6     06-17    TPro  5.6<L>  /  Alb  5.2<H>  /  TBili  39.5<H>  /  DBili  x   /  AST  49<H>  /  ALT  23  /  AlkPhos  48  06-17    PT/INR - ( 18 Jun 2020 06:53 )   PT: 40.5 sec;   INR: 3.42 ratio

## 2021-09-02 NOTE — DISCHARGE NOTE ADULT - NS TRANSFER PATIENT BELONGINGS
Purple DH (Discharge Huddle; Vulnerable Patient)
Other belongings//Clothing/Cell Phone/PDA (specify)

## 2022-01-23 NOTE — DISCHARGE NOTE ADULT - VISION (WITH CORRECTIVE LENSES IF THE PATIENT USUALLY WEARS THEM):
"[facility].    Discharge instructions, which include signs and symptoms to monitor patient for, as well as detailed information regarding concussion provided.  All questions and concerns addressed at this time.      Follow up visit with PCP encouraged.  Dr. Tolliver's office contact information with phone number and address provided.     Patient leaves ER in no apparent distress. This RN provided education regarding returning to the ER for any new concerns or changes in patient's condition.      /70   Pulse 77   Temp 36.4 °C (97.6 °F) (Temporal)   Resp 18   Ht 1.92 m (6' 3.59\")   Wt 105 kg (231 lb 7.7 oz)   SpO2 98%   BMI 28.48 kg/m²   " Normal vision: sees adequately in most situations; can see medication labels, newsprint

## 2022-05-05 NOTE — PATIENT PROFILE ADULT - DO YOU FEEL UNSAFE AT SCHOOL?
Calcium score received and was ordered by an outside provider and/or patient does not have a PCP. Results need to be reviewed with cardiologist. Please review and advise on results of calcium score below and route back to nursing pool. Thank you.       no

## 2022-11-01 NOTE — ED ADULT TRIAGE NOTE - CADM TRG TX PRIOR TO ARRIVAL
[FreeTextEntry6] : 5 year old female presents with lower abdominal pain x 2 weeks, afebrile and has not had fever. No hx of constipation per mom. Her appetite is normal. She has been complaining consistently and even complained while at school. She says it hurts when she does certain movements like go from laying down to sitting up. She points to her central lower abdomen. Denies dysuria. none

## 2022-11-30 NOTE — ED ADULT NURSE REASSESSMENT NOTE - NS ED NURSE REASSESS COMMENT FT1
Received report from previous shift RN. Patient resting comfortably in bed, not in apparent pain or distress. Patient denies abd pain, chest pain, n/v/d. Pt notified to leave stool sample when possible. Denies any pain or discomfort at this time. VSS. Quality 130: Documentation Of Current Medications In The Medical Record: Current Medications Documented Detail Level: Detailed

## 2022-12-16 NOTE — DISCHARGE NOTE ADULT - MEDICATION SUMMARY - MEDICATIONS TO CHANGE
The patient is Stable - Low risk of patient condition declining or worsening    Shift Goals  Clinical Goals: Monitor cath sites, pt will be hemodynamically stable  Patient Goals: Be able to get up and move.  Family Goals: Monitor cath bleeding-previous issues (Per daughter on telephone call)    Progress made toward(s) clinical / shift goals:    Problem: Knowledge Deficit - Standard  Goal: Patient and family/care givers will demonstrate understanding of plan of care, disease process/condition, diagnostic tests and medications  Outcome: Progressing     Problem: Respiratory  Goal: Patient will achieve/maintain optimum respiratory ventilation and gas exchange  Outcome: Progressing     Problem: Fall Risk  Goal: Patient will remain free from falls  Outcome: Progressing       Patient is not progressing towards the following goals:       I will SWITCH the dose or number of times a day I take the medications listed below when I get home from the hospital:    Levemir 100 units/mL subcutaneous solution  -- 65 unit(s) subcutaneous once a day (at bedtime)

## 2023-02-28 NOTE — H&P ADULT - PROBLEM SELECTOR PROBLEM 3
Changes In Treatment Protocol: Increase ^ 40 mJ per treatment protocol. Patient aware. Total Body Time: 1:41 Protocol For Photochemotherapy: Triamcinolone Ointment And Nbuvb: The patient received Photochemotherapy: Triamcinolone and NBUVB (triamcinolone ointment applied to all lesions prior to phototherapy). Protocol For Bath Puva: The patient received Bath PUVA. Protocol For Photochemotherapy: Tar And Broad Band Uvb (Goeckerman Treatment): The patient received Photochemotherapy: Tar and Broad Band UVB (Goeckerman treatment). Protocol For Uva: The patient received UVA. Protocol For Photochemotherapy: Petrolatum And Nbuvb: The patient received Photochemotherapy: Petrolatum and NBUVB (petrolatum applied to all lesions prior to phototherapy). Protocol For Photochemotherapy For Severe Photoresponsive Dermatoses: Petrolatum And Broad Band Uvb: The patient received Photochemotherapyfor severe photoresponsive dermatoses: Petrolatum and Broad Band UVB requiring at least 4 to 8 hours of care under direct physician supervision. Name Of Supervising Technician: TAMY Post-Care Instructions: I reviewed with the patient in detail post-care instructions. Patient is to wear sun protection. Patients may expect sunburn like redness, discomfort and scabbing. Comments: Skin type III\\nIncrease ^ 40 mJ as tolerated \\nTarget dose 1000 mJ Protocol For Photochemotherapy: Petrolatum And Broad Band Uvb: The patient received Photochemotherapy: Petrolatum and Broad Band UVB. Irradiance (Optional- Include Units): 4.6 Protocol For Protocol For Photochemotherapy For Severe Photoresponsive Dermatoses: Bath Puva: The patient received Photochemotherapy for severe photoresponsive dermatoses: Bath PUVA requiring at least 4 to 8 hours of care under direct physician supervision. Protocol For Uva1: The patient received UVA1. Protocol For Photochemotherapy: Baby Oil And Nbuvb: The patient received Photochemotherapy: Baby Oil and NBUVB (baby oil applied to all lesions prior to phototherapy). Skin Type: III Protocol For Puva: The patient received PUVA. Protocol For Photochemotherapy For Severe Photoresponsive Dermatoses: Tar And Nbuvb (Goeckerman Treatment): The patient received Photochemotherapy for severe photoresponsive dermatoses: Tar and NBUVB (Goeckerman treatment) requiring at least 4 to 8 hours of care under direct physician supervision. Protocol For Photochemotherapy: Mineral Oil And Broad Band Uvb: The patient received Photochemotherapy: Mineral Oil and Broad Band UVB. Protocol For Photochemotherapy For Severe Photoresponsive Dermatoses: Petrolatum And Nbuvb: The patient received Photochemotherapy for severe photoresponsive dermatoses: Petrolatum and NBUVB requiring at least 4 to 8 hours of care under direct physician supervision. Treatment Number: [id number] [id number] Consent: The risks were reviewed with the patient including but not limited to: burn, pigmentary changes, pain, blistering, scabbing, redness, increased risk of skin cancers, and the remote possibility of scarring. Protocol For Nbuvb: The patient received NBUVB. Protocol For Photochemotherapy For Severe Photoresponsive Dermatoses: Puva: The patient received Photochemotherapy for severe photoresponsive dermatoses: PUVA requiring at least 4 to 8 hours of care under direct physician supervision. Detail Level: Generalized Protocol For Broad Band Uvb: The patient received Broad Band UVB. Render Post-Care In The Note: no Protocol: Photochemotherapy: Baby Oil and NBUVB Protocol For Photochemotherapy: Tar And Nbuvb (Goeckerman Treatment): The patient received Photochemotherapy: Tar and NBUVB (Goeckerman treatment). Protocol For Photochemotherapy: Mineral Oil And Nbuvb: The patient received Photochemotherapy: Mineral Oil and NBUVB (mineral oil applied to all lesions prior to phototherapy). Comments On Previous Treatment: Patient states he tolerated the previous treatment well. Total Body Energy: 465 mJ Protocol For Photochemotherapy For Severe Photoresponsive Dermatoses: Tar And Broad Band Uvb (Goeckerman Treatment): The patient received Photochemotherapy for severe photoresponsive dermatoses: Tar and Broad Band UVB (Goeckerman treatment) requiring at least 4 to 8 hours of care under direct physician supervision. Protocol For Nb Uva: The patient received NB UVA. Hypoxia

## 2023-04-04 NOTE — ED ADULT TRIAGE NOTE - CCCP TRG CHIEF CMPLNT
1. Fever, unspecified fever cause  Probable viral URI, advised to treat symptoms, especially tylenol/ibuprofen  Follow up if not improving    - Influenza A & B Antigen - Clinic Collect  - Symptomatic COVID-19 Virus (Coronavirus) by PCR Nose  - Streptococcus A Rapid Screen w/Reflex to PCR - Clinic Collect  - Group A Streptococcus PCR Throat Swab    2. Acute pharyngitis, unspecified etiology  Probable viral in origin, push fluids, salt water gargles, throat lozenges    - Influenza A & B Antigen - Clinic Collect  - Symptomatic COVID-19 Virus (Coronavirus) by PCR Nose  - Streptococcus A Rapid Screen w/Reflex to PCR - Clinic Collect  - Group A Streptococcus PCR Throat Swab    3. Moderate persistent asthma with exacerbation  Will treat asthma flare with burst of prednisone  Will renew her albuterol and symbicort (she has been out of that for a while)    - Influenza A & B Antigen - Clinic Collect  - Symptomatic COVID-19 Virus (Coronavirus) by PCR Nose  - Streptococcus A Rapid Screen w/Reflex to PCR - Clinic Collect  - Group A Streptococcus PCR Throat Swab  - predniSONE (DELTASONE) 20 MG tablet; Take 2 tablets (40 mg) by mouth daily for 5 days  Dispense: 10 tablet; Refill: 0  - albuterol (PROAIR HFA/PROVENTIL HFA/VENTOLIN HFA) 108 (90 Base) MCG/ACT inhaler; Inhale 2 puffs into the lungs every 4 hours as needed for shortness of breath, wheezing or cough  Dispense: 18 g; Refill: 4  - budesonide-formoterol (SYMBICORT) 80-4.5 MCG/ACT Inhaler; Inhale 2 puffs into the lungs daily  Dispense: 10.2 g; Refill: 4      Subjective   Briseida is a 16 year old, presenting for the following health issues:  Cough and Fever (Pt c/o productive cough and fever x 3-4 days.)         View : No data to display.              Cough  Associated symptoms include ear pain, sore throat and wheezing.   Fever  Associated symptoms include congestion, coughing, a fever, nausea, a sore throat and vomiting.   History of Present Illness       Reason for visit:  " Cough and fever  Symptom onset:  1-3 days ago    She eats 2-3 servings of fruits and vegetables daily.She consumes 2 sweetened beverage(s) daily.She exercises with enough effort to increase her heart rate 20 to 29 minutes per day.  She exercises with enough effort to increase her heart rate 3 or less days per week. She is missing 7 dose(s) of medications per week.       Asthma Follow-Up    Was ACT completed today?  Yes        4/4/2023    10:46 AM   ACT Total Scores   ACT TOTAL SCORE (Goal Greater than or Equal to 20) 18   In the past 12 months, how many times did you visit the emergency room for your asthma without being admitted to the hospital? 1   In the past 12 months, how many times were you hospitalized overnight because of your asthma? 0         How many days per week do you miss taking your asthma controller medication?  0    Please describe any recent triggers for your asthma: smoke and cold air    Have you had any Emergency Room Visits, Urgent Care Visits, or Hospital Admissions since your last office visit?  No        4 day hx of fever, cough, asthma flare  Her last asthma flare was in Jan 2023 when she had Covid  Using her albuterol right now q 4 hrs    Review of Systems   Constitutional: Positive for fever.   HENT: Positive for congestion, ear pain and sore throat. Negative for sinus pressure and sinus pain.    Respiratory: Positive for cough and wheezing.    Gastrointestinal: Positive for nausea and vomiting.            Objective    BP 90/68 (BP Location: Right arm, Patient Position: Sitting, Cuff Size: Adult Regular)   Pulse 120   Temp 100.1  F (37.8  C) (Tympanic)   Resp 20   Ht 1.727 m (5' 8\")   Wt 58.5 kg (129 lb)   SpO2 94%   BMI 19.61 kg/m    Body mass index is 19.61 kg/m .  Physical Exam  Vitals reviewed.   Constitutional:       Appearance: Normal appearance.   HENT:      Head:      Comments: No sinus tenderness     Right Ear: Tympanic membrane normal.      Left Ear: Tympanic membrane " normal.      Mouth/Throat:      Mouth: Mucous membranes are moist.      Pharynx: Posterior oropharyngeal erythema present. No oropharyngeal exudate.   Cardiovascular:      Rate and Rhythm: Normal rate and regular rhythm.      Pulses: Normal pulses.      Heart sounds: Normal heart sounds.   Pulmonary:      Effort: Pulmonary effort is normal.      Breath sounds: Rhonchi (mild diffuse) present.   Musculoskeletal:      Cervical back: Normal range of motion. No tenderness.   Lymphadenopathy:      Cervical: No cervical adenopathy.   Neurological:      Mental Status: She is alert.            Results for orders placed or performed in visit on 04/04/23 (from the past 24 hour(s))   Influenza A & B Antigen - Clinic Collect    Specimen: Nasopharyngeal; Swab   Result Value Ref Range    Influenza A antigen Negative Negative    Influenza B antigen Negative Negative    Narrative    Test results must be correlated with clinical data. If necessary, results should be confirmed by a molecular assay or viral culture.   Streptococcus A Rapid Screen w/Reflex to PCR - Clinic Collect    Specimen: Throat; Swab   Result Value Ref Range    Group A Strep antigen Negative Negative                    abdominal pain

## 2023-05-16 NOTE — DIETITIAN INITIAL EVALUATION ADULT. - PROBLEM SELECTOR PLAN 6
alert/oriented to person/oriented to place/oriented to time/oriented to situation/recall memory is intact/recent memory is intact/behavior seems appropriate to situation
will continue patient on ISS and monitor FS.

## 2023-08-10 NOTE — PROGRESS NOTE ADULT - PROBLEM/PLAN-8
[FreeTextEntry1] : 64-year-old man with a history of single acute seizure in the context of benzodiazepine withdrawal.  He has been taking Ambien for many many years and he missed that for 5 days just prior to the seizure. I believe that that is likely the etiology of his single seizure.  In terms of his memory he is a smoker.  He is very abnormal and he has had difficulties for several years.  He clearly has MCI. 
DISPLAY PLAN FREE TEXT

## 2023-08-28 NOTE — PROGRESS NOTE ADULT - ASSESSMENT
41 year old male with history of alcoholic cirrhosis, esophageal varices, DM Type 2, vitiligo, s/p corneal transplant, h/o severe c.diff, h/o e.coli bacteremia, recently treated for colitis/SBP with full course of antibiotics admitted with fever, found to have E. coli bacteremia O-T Advancement Flap Text: The defect edges were debeveled with a #15 scalpel blade. Given the location of the defect, shape of the defect and the proximity to free margins an O-T advancement flap was deemed most appropriate. Using a sterile surgical marker, an appropriate advancement flap was drawn incorporating the defect and placing the expected incisions within the relaxed skin tension lines where possible. The area thus outlined was incised deep to adipose tissue with a #15 scalpel blade. The skin margins were undermined to an appropriate distance in all directions utilizing iris scissors. Following this, the designed flap was advanced and carried over into the primary defect and sutured into place.

## 2023-11-23 NOTE — PATIENT PROFILE ADULT - DISASTER - NSPROGENPREVTRANSF_GEN_A_NUR
Up out of bed to chair SPO2:    11:56 - SPO2@88% on RA, standing. Denies symptoms.    12:05 - SpO2@90% on RA, sitting up in chair. Denies symptoms.     Will continue to f/u.    no

## 2023-12-27 NOTE — PROVIDER CONTACT NOTE (OTHER) - ASSESSMENT
Statement Selected
Pt A&Ox4, VSS, no c/o pain/discomfort
Pt A&Ox4, no c/o dizziness
Pt A&Ox4, no c/o polydipsia or polyuria
Pt remains asymptomatic of hypotension
pt a+ox4, vitals as per flow sheet 
vital signs as charted
27-Dec-2023 07:14

## 2024-02-26 NOTE — DISCHARGE NOTE ADULT - NS AS DC STROKE DX YN
----- Message from Anna Bagley MD sent at 2/25/2024 10:00 PM CST -----  Bone density scan showed signs of bone loss.  Pt should you take adequate calcium and vitamin D, and do some weight bearing exercises (walking, small weights, etc.). Pt make an appointment with primary care provider for any other recommendations and continued management.  
Called patient, results given and patient verbalized understanding.   
no

## 2024-03-26 NOTE — ED ADULT NURSE REASSESSMENT NOTE - NS ED NURSE REASSESS COMMENT FT1
Contacted lab regarding thromboelastography hemostasis lab. Lab is down at the moment, was told to call again regarding tube. Regular Diet - No restrictions

## 2024-05-03 NOTE — CONSULT NOTE ADULT - CONSULT REQUESTED BY NAME
Dr Booth [facility]     Electrophysiology Procedure Note       Date of Procedure: 5/3/2024  Patient's Name: Irving Pascual  YOB: 1953   Medical Record Number: [id number]  Referring Physician: Eldon Goldstein MD  Procedure Performed by: Eldon Goldstein MD    Procedures performed:     Insertion of MRI compatible right ventricular  lead under fluoroscopy.  Insertion of MRI compatible right atrial lead under fluoroscopy  Insertion of a MRI compatible  dual  chamber Pacemaker/   IV sedation.  Programming and analysis of the device      Indication of the procedure: Non-reversible symptomatic bradycardia, sinus node dysfunction, second/third degree AV block     Irving Pascual is a 71 y.o. male with   presented to the hospital with syncope and found to have bradycardia.   Sick sinus syndrome      Ventricular tachycardia fibrillation with no reversible cause      A dual chamber pacemaker was implanted for  non-reversible symptomatic bradycardia due to sinus node dysfunction,   to  provide physiological pacing, the need for atrial pacing and avoiding ventricular pacing in the setting of heart failure, and avoid pacemaker syndrome.      Details of procedure:     The patient was brought to the electrophysiology laboratory in stable condition. The patient was in a fasting and non-sedated state. The risks, benefits and alternatives of the procedure were discussed with the patient  . The risks including, but not limited to, the risks of vascular injury, bleeding, infection, device malfunction, lead dislodgement, radiation exposure, injury to cardiac and surrounding structures (including pneumothorax), stroke, myocardial infarction and death were discussed in detail. Patient opted to proceed with the device implantation. Written informed consent was signed and placed in the chart.  Prophylactic antibiotic was given.         An independent trained observer pushed medications at my direction. We monitored the patient's level  of consciousness and vital signs/physiologic status throughout the procedure duration (see start and stop times below).  Sedation:  4 mg Versed, 200 mcg Fentanyl  Sedation start: 1300  Sedation stop: 1415       Access was done using ultrasound    The patient was prepped and draped in a sterile fashion. A timeout protocol was completed to identify the patient and the procedure being performed. Pre-sedation evaluation and airway assessment (Mallampatti classification, class lI) was completed.   IV sedation was provided with IV Versed, Fentanyl. An incision was made in the left pectoral area after administration of lidocaine. Using electrocautery and blunt dissection, a pocket was created. Central venous access into the left axillary vein was obtained using the modified Seldinger technique. After central venous access was obtained, a sheath was placed in the axillary vein.     A right ventricular pacing lead was advanced into position on the septum  apical septum under fluoroscopic guidance  .  3830-lead was implanted on the septum using the Pre-curved sheath.  The lead was actively fixated. After confirming appropriate function, the sheath was split and removed. The lead was secured to the underlying tissue using suture material.     A new sheath was advanced over a second previously placed wire into the vein. The atrial lead was advanced to the right atrial appendage under fluoroscopic guidance. The lead was actively fixated. After confirming appropriate function, the sheath was split and removed. The lead was secured to the underlying tissue using suture. The pocket was irrigated with saline solution.     The leads were then connected to the new pulse generator,  dual/  chamber pacemaker , which was then placed into the cleaned pocket.       The pulse generator was sutured inside the pocket. The pocket was then closed in three separate subcutaneous layers using  3-0 Vicryl and subcuticular layer using 4-0 Vicryl  .The skin was covered with pressure dressing. Steristrips was used on the skin.       Antibiotic envelope was used.    All sponge and needle counts were reported as correct at the end of the procedure.    The patient tolerated the procedure well and there were no complications. Post-sedation evaluation was completed.  Patient was transported to the holding area in stable condition.     Blood loss<20 cc  No complication  Lead and device information:         Plan:     The patient will be observed for short period of time and discharge if stable and have usual post-implant care, including chest x-ray,  and interrogation of the device.

## 2024-05-06 NOTE — ED PROVIDER NOTE - RESPIRATORY [-], MLM
Alert-The patient is alert, awake and responds to voice. The patient is oriented to time, place, and person. The triage nurse is able to obtain subjective information. no cough/no hemoptysis/no orthopnea/no exertional dyspnea

## 2024-05-27 NOTE — DIETITIAN INITIAL EVALUATION ADULT. - PROBLEM SELECTOR PLAN 6
134 Patient with history of T2DM treated with levemir and rapaglinide. HbA1c from discharge on last admission was 8.8%, representing poorly controlled disease.   -Will continue with lispro sliding scale

## 2024-08-15 NOTE — CURRENT MEDS
What Type Of Note Output Would You Prefer (Optional)?: Standard Output How Severe Is Your Skin Lesion?: mild [Takes medication as prescribed] : takes Has Your Skin Lesion Been Treated?: not been treated [None] : Patient does not have any barriers to medication adherence Is This A New Presentation, Or A Follow-Up?: Skin Lesions

## 2024-08-25 NOTE — DIETITIAN INITIAL EVALUATION ADULT. - ORAL INTAKE PTA
Pt reports consuming 3 meals and snacking throughout commute. Pt reports no micronutrient supplementation and denies nutritional shake supplementation. Pt with NKFA./fair
Lives at home with wife  former smoker, quit 30 yrs ago, 1 ppd xx30 yrs  no alcohol use

## 2024-09-17 NOTE — PATIENT PROFILE ADULT. - PRO MENTAL HEALTH SX RECENT
Detail Level: Detailed Quality 130: Documentation Of Current Medications In The Medical Record: Current Medications Documented Quality 110: Preventive Care And Screening: Influenza Immunization: Influenza Immunization Administered during Influenza season Quality 131: Pain Assessment And Follow-Up: Pain assessment using a standardized tool is documented as negative, no follow-up plan required show none

## 2024-11-19 NOTE — ED PROCEDURE NOTE - PROCEDURE NAME, MLM
General Problem: Mobility Impaired (Adult and Pediatric)  Goal: *Acute Goals and Plan of Care (Insert Text)  Description: Physical Therapy Goals  Initiated 1/16/2023 and to be accomplished within 1 day(s)  1. Patient will move from supine to sit and sit to supine , scoot up and down, and roll side to side in bed with modified independence. 2.  Patient will transfer from bed to chair and chair to bed with modified independence using the least restrictive device. 3.  Patient will perform sit to stand with modified independence. 4.  Patient will ambulate with modified independence for 200 feet with the least restrictive device. 5.  Patient will ascend/descend 4 stairs with 1 handrail(s) with modified independence. PLOF: Community ambulator, no aD, (I) ADLs. Outcome: Progressing Towards Goal   PHYSICAL THERAPY TREATMENT    Patient: Sulaiman Valentino (45 y.o. male)  Date: 1/18/2023   Start Time: 0928   Stop Time: 0955        Primary Diagnosis: Osteoarthritis of right knee, unspecified osteoarthritis type [M17.11]  OA (osteoarthritis) of knee [M17.9]  S/P total knee replacement, right [Z96.651]  Procedure(s) (LRB):  RIGHT TKA (INPATIENT) (Right) 2 Days Post-Op   Precautions:  WBAT    ASSESSMENT :  Pt is eager to participate with PT. Performed mobility task without difficulty. Pt is mod I with transfers and ambulation. Prior to tx, BP was taken while in supine, sitting, and in standing. Pt performed one bout of ambulation using RW. Pt then performed therex while sitting in chair to further increase strength and ROM in R knee. See vitals flow for details. See below for treatment details. Progression toward goals: All goals met.    [x]      Improving appropriately and progressing toward goals  []      Improving slowly and progressing toward goals  []      Not making progress toward goals and plan of care will be adjusted          PLAN :  Patient continues to benefit from skilled intervention to address the above normal mood with appropriate affect impairments. Continue treatment per established plan of care. Recommendations and Planned Interventions:   Patient to be seen 1-2x/day, 4-7 days/wk. Discharge Recommendations: Outpatient  AM-PAC: 20/24  Further Equipment Recommendations for Discharge: N/A     SUBJECTIVE:   Patient reported feeling increased pain during knee flexion. OBJECTIVE DATA SUMMARY:     Functional Mobility Training:  Bed Mobility:  Rolling: Modified independent  Supine to Sit: Modified independent     Scooting: Modified independent         Transfers:  Sit to Stand: Supervision;Modified independent (safety for syncope)  Stand to Sit: Supervision;Modified independent  Balance:  Sitting: Intact  Standing: Intact     Ambulation/Gait Training:  Distance (ft): 275 Feet (ft)  Assistive Device: Walker, rolling  Ambulation - Level of Assistance: Modified independent;Supervision        Gait Abnormalities: Antalgic  HEP handout given and reviewed. EXERCISE   Sets   Reps   Active Active Assist   Passive Self ROM   Comments   Ankle Pumps 1 20  [x] [] [] []    Seated Heel Slides 2 10 [x] [] [] []    Long Arc Quads 2 10 [x] [] [] []       Pain:  Pain level pre-treatment: 8/10   Pain level post-treatment: 7/10  Pain Location: R knee  Pain Intervention(s): Medication (see MAR); Rest, Ice, Repositioning   Response to intervention: Nurse notified, See doc flow    Activity Tolerance:   Good  Please refer to the flowsheet for vital signs taken during this treatment. After treatment:   [x]         Patient left in no apparent distress sitting up in chair  [x]         Patient left in bed with nursing to recover from syncope episode  [x]         Call bell left within reach  []         Nursing notified  []         Caregiver present  []         Bed alarm activated  []         SCDs applied    COMMUNICATION/EDUCATION:   [x]         Role of Physical Therapy in the acute care setting.   [x]         Fall prevention education was provided and the patient/caregiver indicated understanding. [x]         Patient/family have participated as able in goal setting and plan of care. [x]         Patient/family agree to work toward stated goals and plan of care. []         Patient understands intent and goals of therapy, but is neutral about his/her participation. []         Patient is unable to participate in goal setting/plan of care: ongoing with therapy staff.  []         Other:     Thank you for this referral.  PRICILLA De Anda, SPTA   Time Calculation: 27 mins

## 2024-12-04 NOTE — ED ADULT NURSE NOTE - CAS DISCH ACCOMP BY
Mohs Case Number: [id number] Date Of Previous Biopsy (Optional): 07/17/2024 Previous Accession (Optional): [id number] Biopsy Photograph Reviewed: Yes Referring Physician (Optional): Eduardo Buenrostro MD Consent Type: Consent 1 (Standard) Eye Shield Used: No Surgeon Performing Repair (Optional): Lonnie Lao MD Initial Size Of Lesion: 1.4 X Size Of Lesion In Cm (Optional): 1.3 Number Of Stages: 1 Primary Defect Length In Cm (Final Defect Size - Required For Flaps/Grafts): 1.7 Primary Defect Width In Cm (Final Defect Size - Required For Flaps/Grafts): 1.6 Primary Defect Depth In Cm (Optional But Required For Some Insurers): 0 Repair Type: Flap Which Instrument Did You Use For Dermabrasion?: Wire Brush Which Eyelid Repair Cpt Are You Using?: 83161 Oculoplastic Surgeon Procedure Text (A): After obtaining clear surgical margins the patient was sent to oculoplastics for surgical repair.  The patient understands they will receive post-surgical care and follow-up from the referring physician's office. Otolaryngologist Procedure Text (A): After obtaining clear surgical margins the patient was sent to otolaryngology for surgical repair.  The patient understands they will receive post-surgical care and follow-up from the referring physician's office. Plastic Surgeon Procedure Text (A): After obtaining clear surgical margins the patient was sent to plastics for surgical repair.  The patient understands they will receive post-surgical care and follow-up from the referring physician's office. Mid-Level Procedure Text (A): After obtaining clear surgical margins the patient was sent to a mid-level provider for surgical repair.  The patient understands they will receive post-surgical care and follow-up from the mid-level provider. Provider Procedure Text (A): After obtaining clear surgical margins the defect was repaired by another provider. Asc Procedure Text (A): After obtaining clear surgical margins the patient was sent to an ASC for surgical repair.  The patient understands they will receive post-surgical care and follow-up from the ASC physician. Suturegard Retention Suture: 2-0 Nylon Retention Suture Bite Size: 3 mm Length To Time In Minutes Device Was In Place: 10 Undermining Type: Entire Wound Debridement Text: The wound edges were debrided prior to proceeding with the closure to facilitate wound healing. Helical Rim Text: The closure involved the helical rim. Vermilion Border Text: The closure involved the vermilion border. Nostril Rim Text: The closure involved the nostril rim. Retention Suture Text: Retention sutures were placed to support the closure and prevent dehiscence. Flap Type: Advancement Flap (Double) Secondary Defect Length In Cm (Required For Flaps): 7.2 Secondary Defect Width In Cm (Required For Flaps): 4.8 Area H Indication Text: Tumors in this location are included in Area H (eyelids, eyebrows, nose, lips, chin, ear, pre-auricular, post-auricular, temple, genitalia, hands, feet, ankles and areola).  Tissue conservation is critical in these anatomic locations. Area M Indication Text: Tumors in this location are included in Area M (cheek, forehead, scalp, neck, jawline and pretibial skin).  Mohs surgery is indicated for tumors in these anatomic locations. Area L Indication Text: Tumors in this location are included in Area L (trunk and extremities).  Mohs surgery is indicated for larger tumors, or tumors with aggressive histologic features, in these anatomic locations. Tumor Debulked?: scalpel Special Stains Stage 1 - Results: Base On Clearance Noted Above Stage 2: Additional Anesthesia Type: 1% lidocaine with epinephrine Staging Info: By selecting yes to the question above you will include information on AJCC 8 tumor staging in your Mohs note. Information on tumor staging will be automatically added for SCCs on the head and neck. AJCC 8 includes tumor size, tumor depth, perineural involvement and bone invasion. Tumor Depth: Less than 6mm from granular layer and no invasion beyond the subcutaneous fat Why Was The Change Made?: Please Select the Appropriate Response Medical Necessity Statement: Based on my medical judgement, Mohs surgery is the most appropriate treatment for this cancer compared to other treatments. Alternatives Discussed Intro (Do Not Add Period): I discussed alternative treatments to Mohs surgery and specifically discussed the risks and benefits of Consent 1/Introductory Paragraph: The rationale for Mohs was explained to the patient and consent was obtained. The risks, benefits and alternatives to therapy were discussed in detail. Specifically, the risks of infection, scarring, bleeding, prolonged wound healing, incomplete removal, allergy to anesthesia, nerve injury and recurrence were addressed. Prior to the procedure, the treatment site was clearly identified and confirmed by the patient. All components of Universal Protocol/PAUSE Rule completed. Consent 2/Introductory Paragraph: Mohs surgery was explained to the patient and consent was obtained. The risks, benefits and alternatives to therapy were discussed in detail. Specifically, the risks of infection, scarring, bleeding, prolonged wound healing, incomplete removal, allergy to anesthesia, nerve injury and recurrence were addressed. Prior to the procedure, the treatment site was clearly identified and confirmed by the patient. All components of Universal Protocol/PAUSE Rule completed. Consent 3/Introductory Paragraph: I gave the patient a chance to ask questions they had about the procedure.  Following this I explained the Mohs procedure and consent was obtained. The risks, benefits and alternatives to therapy were discussed in detail. Specifically, the risks of infection, scarring, bleeding, prolonged wound healing, incomplete removal, allergy to anesthesia, nerve injury and recurrence were addressed. Prior to the procedure, the treatment site was clearly identified and confirmed by the patient. All components of Universal Protocol/PAUSE Rule completed. Consent (Temporal Branch)/Introductory Paragraph: The rationale for Mohs was explained to the patient and consent was obtained. The risks, benefits and alternatives to therapy were discussed in detail. Specifically, the risks of damage to the temporal branch of the facial nerve, infection, scarring, bleeding, prolonged wound healing, incomplete removal, allergy to anesthesia, and recurrence were addressed. Prior to the procedure, the treatment site was clearly identified and confirmed by the patient. All components of Universal Protocol/PAUSE Rule completed. Consent (Marginal Mandibular)/Introductory Paragraph: The rationale for Mohs was explained to the patient and consent was obtained. The risks, benefits and alternatives to therapy were discussed in detail. Specifically, the risks of damage to the marginal mandibular branch of the facial nerve, infection, scarring, bleeding, prolonged wound healing, incomplete removal, allergy to anesthesia, and recurrence were addressed. Prior to the procedure, the treatment site was clearly identified and confirmed by the patient. All components of Universal Protocol/PAUSE Rule completed. Consent (Spinal Accessory)/Introductory Paragraph: The rationale for Mohs was explained to the patient and consent was obtained. The risks, benefits and alternatives to therapy were discussed in detail. Specifically, the risks of damage to the spinal accessory nerve, infection, scarring, bleeding, prolonged wound healing, incomplete removal, allergy to anesthesia, and recurrence were addressed. Prior to the procedure, the treatment site was clearly identified and confirmed by the patient. All components of Universal Protocol/PAUSE Rule completed. Consent (Near Eyelid Margin)/Introductory Paragraph: The rationale for Mohs was explained to the patient and consent was obtained. The risks, benefits and alternatives to therapy were discussed in detail. Specifically, the risks of ectropion or eyelid deformity, infection, scarring, bleeding, prolonged wound healing, incomplete removal, allergy to anesthesia, nerve injury and recurrence were addressed. Prior to the procedure, the treatment site was clearly identified and confirmed by the patient. All components of Universal Protocol/PAUSE Rule completed. Consent (Ear)/Introductory Paragraph: The rationale for Mohs was explained to the patient and consent was obtained. The risks, benefits and alternatives to therapy were discussed in detail. Specifically, the risks of ear deformity, infection, scarring, bleeding, prolonged wound healing, incomplete removal, allergy to anesthesia, nerve injury and recurrence were addressed. Prior to the procedure, the treatment site was clearly identified and confirmed by the patient. All components of Universal Protocol/PAUSE Rule completed. Consent (Nose)/Introductory Paragraph: The rationale for Mohs was explained to the patient and consent was obtained. The risks, benefits and alternatives to therapy were discussed in detail. Specifically, the risks of nasal deformity, changes in the flow of air through the nose, infection, scarring, bleeding, prolonged wound healing, incomplete removal, allergy to anesthesia, nerve injury and recurrence were addressed. Prior to the procedure, the treatment site was clearly identified and confirmed by the patient. All components of Universal Protocol/PAUSE Rule completed. Consent (Lip)/Introductory Paragraph: The rationale for Mohs was explained to the patient and consent was obtained. The risks, benefits and alternatives to therapy were discussed in detail. Specifically, the risks of lip deformity, changes in the oral aperture, infection, scarring, bleeding, prolonged wound healing, incomplete removal, allergy to anesthesia, nerve injury and recurrence were addressed. Prior to the procedure, the treatment site was clearly identified and confirmed by the patient. All components of Universal Protocol/PAUSE Rule completed. Consent (Scalp)/Introductory Paragraph: The rationale for Mohs was explained to the patient and consent was obtained. The risks, benefits and alternatives to therapy were discussed in detail. Specifically, the risks of changes in hair growth pattern secondary to repair, infection, scarring, bleeding, prolonged wound healing, incomplete removal, allergy to anesthesia, nerve injury and recurrence were addressed. Prior to the procedure, the treatment site was clearly identified and confirmed by the patient. All components of Universal Protocol/PAUSE Rule completed. Detail Level: Detailed Postop Diagnosis: same Anesthesia Type: 1% lidocaine with 1:100,000 epinephrine and 408mcg clindamycin/ml and a 1:10 solution of 8.4% sodium bicarbonate Anesthesia Volume In Cc: 3 Additional Anesthesia Volume In Cc: 5 Hemostasis: Electrocautery Estimated Blood Loss (Cc): minimal Repair Anesthesia Method: local infiltration Anesthesia Volume In Cc: 4 Brow Lift Text: A midfrontal incision was made medially to the defect to allow access to the tissues just superior to the left eyebrow. Following careful dissection inferiorly in a supraperiosteal plane to the level of the left eyebrow, several 3-0 monocryl sutures were used to resuspend the eyebrow orbicularis oculi muscular unit to the superior frontal bone periosteum. This resulted in an appropriate reapproximation of static eyebrow symmetry and correction of the left brow ptosis. Deep Sutures: 3-0 PGLC Epidermal Sutures: 4-0 Chromic Gut Epidermal Closure: running Suturegard Intro: Intraoperative tissue expansion was performed, utilizing the SUTUREGARD device, in order to reduce wound tension. Suturegard Body: The suture ends were repeatedly re-tightened and re-clamped to achieve the desired tissue expansion. Hemigard Intro: Due to skin fragility and wound tension, it was decided to use HEMIGARD adhesive retention suture devices to permit a linear closure. The skin was cleaned and dried for a 6cm distance away from the wound. Excessive hair, if present, was removed to allow for adhesion. Hemigard Postcare Instructions: The HEMIGARD strips are to remain completely dry for at least 5-7 days. Donor Site Anesthesia Type: same as repair anesthesia Graft Donor Site Bandage (Optional-Leave Blank If You Don't Want In Note): A Pressure bandage were applied to the donor site. Closure 2 Information: This tab is for additional flaps and grafts, including complex repair and grafts and complex repair and flaps. You can also specify a different location for the additional defect, if the location is the same you do not need to select a new one. We will insert the automated text for the repair you select below just as we do for solitary flaps and grafts. Please note that at this time if you select a location with a different insurance zone you will need to override the ICD10 and CPT if appropriate. Closure 3 Information: This tab is for additional flaps and grafts above and beyond our usual structured repairs.  Please note if you enter information here it will not currently bill and you will need to add the billing information manually. Wound Care: No ointment Dressing: pressure dressing with telfa RN Wound Care (No Sutures): Petrolatum Dressing (No Sutures): dry sterile dressing Unna Boot Text: An Unna boot was placed to help immobilize the limb and facilitate more rapid healing. Home Suture Removal Text: Patient was provided instructions on removing sutures and will remove their sutures at home.  If they have any questions or difficulties they will call the office. Post-Care Instructions: I reviewed with the patient in detail post-care instructions. Patient is not to engage in any heavy lifting, exercise, or swimming for the next 14 days. Should the patient develop any fevers, chills, bleeding, severe pain patient will contact the office immediately. Pain Refusal Text: I offered to prescribe pain medication but the patient refused to take this medication. Mauc Instructions: By selecting yes to the question below the MAUC number will be added into the note.  This will be calculated automatically based on the diagnosis chosen, the size entered, the body zone selected (H,M,L) and the specific indications you chose. You will also have the option to override the Mohs AUC if you disagree with the automatically calculated number and this option is found in the Case Summary tab. Where Do You Want The Question To Include Opioid Counseling Located?: Case Summary Tab Eye Protection Verbiage: Before proceeding with the stage, a plastic scleral shield was inserted. The globe was anesthetized with a few drops of 1% lidocaine with 1:100,000 epinephrine. Then, an appropriate sized scleral shield was chosen and coated with lacrilube ointment. The shield was gently inserted and left in place for the duration of each stage. After the stage was completed, the shield was gently removed. Mohs Method Verbiage: An incision at a 45 degree angle following the standard Mohs approach was done and the specimen was harvested as a microscopic controlled layer. Surgeon/Pathologist Verbiage (Will Incorporate Name Of Surgeon From Intro If Not Blank): operated in two distinct and integrated capacities as the surgeon and pathologist. Mohs Histo Method Verbiage: Each section was then chromacoded and processed in the Mohs lab using the Mohs protocol and submitted for frozen section. Subsequent Stages Histo Method Verbiage: Using a similar technique to that described above, a thin layer of tissue was removed from all areas where tumor was visible on the previous stage.  The tissue was again oriented, mapped, dyed, and processed as above. Mohs Rapid Report Verbiage: The area of clinically evident tumor was marked with skin marking ink and appropriately hatched.  The initial incision was made following the Mohs approach through the skin.  The specimen was taken to the lab, divided into the necessary number of pieces, chromacoded and processed according to the Mohs protocol.  This was repeated in successive stages until a tumor free defect was achieved. Complex Repair Preamble Text (Leave Blank If You Do Not Want): Extensive wide undermining was performed. Intermediate Repair Preamble Text (Leave Blank If You Do Not Want): Undermining was performed with blunt dissection. Graft Cartilage Fenestration Text: The cartilage was fenestrated with a 2mm punch biopsy to help facilitate graft survival and healing. Non-Graft Cartilage Fenestration Text: The cartilage was fenestrated with a 2mm punch biopsy to help facilitate healing. Secondary Intention Text (Leave Blank If You Do Not Want): The defect will heal with secondary intention. No Repair - Repaired With Adjacent Surgical Defect Text (Leave Blank If You Do Not Want): After obtaining clear surgical margins the defect was repaired concurrently with another surgical defect which was in close approximation. Adjacent Tissue Transfer Text: The defect edges were debeveled with a #15 scalpel blade.  Given the location of the defect and the proximity to free margins an adjacent tissue transfer was deemed most appropriate.  Using a sterile surgical marker, an appropriate flap was drawn incorporating the defect and placing the expected incisions within the relaxed skin tension lines where possible.    The area thus outlined was incised deep to adipose tissue with a #15 scalpel blade.  The skin margins were undermined to an appropriate distance in all directions utilizing iris scissors. Advancement Flap (Single) Text: The defect edges were debeveled with a #15 scalpel blade.  Given the location of the defect and the proximity to free margins a single advancement flap was deemed most appropriate.  Using a sterile surgical marker, an appropriate advancement flap was drawn incorporating the defect and placing the expected incisions within the relaxed skin tension lines where possible.    The area thus outlined was incised deep to adipose tissue with a #15 scalpel blade.  The skin margins were undermined to an appropriate distance in all directions utilizing iris scissors. Advancement Flap (Double) Text: The defect edges were debeveled with a #15 scalpel blade.  Given the location of the defect and the proximity to free margins a double advancement flap was deemed most appropriate.  Using a sterile surgical marker, the appropriate advancement flaps were drawn incorporating the defect and placing the expected incisions within the relaxed skin tension lines where possible.    The area thus outlined was incised deep to adipose tissue with a #15 scalpel blade.  The skin margins were undermined to an appropriate distance in all directions utilizing iris scissors. Advancement-Rotation Flap Text: The defect edges were debeveled with a #15 scalpel blade.  Given the location of the defect, shape of the defect and the proximity to free margins an advancement-rotation flap was deemed most appropriate.  Using a sterile surgical marker, an appropriate flap was drawn incorporating the defect and placing the expected incisions within the relaxed skin tension lines where possible. The area thus outlined was incised deep to adipose tissue with a #15 scalpel blade.  The skin margins were undermined to an appropriate distance in all directions utilizing iris scissors. Alar Island Pedicle Flap Text: The defect edges were debeveled with a #15 scalpel blade.  Given the location of the defect, shape of the defect and the proximity to the alar rim an island pedicle advancement flap was deemed most appropriate.  Using a sterile surgical marker, an appropriate advancement flap was drawn incorporating the defect, outlining the appropriate donor tissue and placing the expected incisions within the nasal ala running parallel to the alar rim. The area thus outlined was incised with a #15 scalpel blade.  The skin margins were undermined minimally to an appropriate distance in all directions around the primary defect and laterally outward around the island pedicle utilizing iris scissors.  There was minimal undermining beneath the pedicle flap. A-T Advancement Flap Text: The defect edges were debeveled with a #15 scalpel blade.  Given the location of the defect, shape of the defect and the proximity to free margins an A-T advancement flap was deemed most appropriate.  Using a sterile surgical marker, an appropriate advancement flap was drawn incorporating the defect and placing the expected incisions within the relaxed skin tension lines where possible.    The area thus outlined was incised deep to adipose tissue with a #15 scalpel blade.  The skin margins were undermined to an appropriate distance in all directions utilizing iris scissors. Banner Transposition Flap Text: The defect edges were debeveled with a #15 scalpel blade.  Given the location of the defect and the proximity to free margins a Banner transposition flap was deemed most appropriate.  Using a sterile surgical marker, an appropriate flap drawn around the defect. The area thus outlined was incised deep to adipose tissue with a #15 scalpel blade.  The skin margins were undermined to an appropriate distance in all directions utilizing iris scissors. Bilateral Helical Rim Advancement Flap Text: The defect edges were debeveled with a #15 blade scalpel.  Given the location of the defect and the proximity to free margins (helical rim) a bilateral helical rim advancement flap was deemed most appropriate.  Using a sterile surgical marker, the appropriate advancement flaps were drawn incorporating the defect and placing the expected incisions between the helical rim and antihelix where possible.  The area thus outlined was incised through and through with a #15 scalpel blade.  With a skin hook and iris scissors, the flaps were gently and sharply undermined and freed up. Bilateral Rotation Flap Text: The defect edges were debeveled with a #15 scalpel blade. Given the location of the defect, shape of the defect and the proximity to free margins a bilateral rotation flap was deemed most appropriate. Using a sterile surgical marker, an appropriate rotation flap was drawn incorporating the defect and placing the expected incisions within the relaxed skin tension lines where possible. The area thus outlined was incised deep to adipose tissue with a #15 scalpel blade. The skin margins were undermined to an appropriate distance in all directions utilizing iris scissors. Following this, the designed flap was carried over into the primary defect and sutured into place. Bilobed Flap Text: The defect edges were debeveled with a #15 scalpel blade.  Given the location of the defect and the proximity to free margins a bilobe flap was deemed most appropriate.  Using a sterile surgical marker, an appropriate bilobe flap drawn around the defect.    The area thus outlined was incised deep to adipose tissue with a #15 scalpel blade.  The skin margins were undermined to an appropriate distance in all directions utilizing iris scissors. Bilobed Transposition Flap Text: The defect edges were debeveled with a #15 scalpel blade.  Given the location of the defect and the proximity to free margins a bilobed transposition flap was deemed most appropriate.  Using a sterile surgical marker, an appropriate bilobe flap drawn around the defect.    The area thus outlined was incised deep to adipose tissue with a #15 scalpel blade.  The skin margins were undermined to an appropriate distance in all directions utilizing iris scissors. Bi-Rhombic Flap Text: The defect edges were debeveled with a #15 scalpel blade.  Given the location of the defect and the proximity to free margins a bi-rhombic flap was deemed most appropriate.  Using a sterile surgical marker, an appropriate rhombic flap was drawn incorporating the defect. The area thus outlined was incised deep to adipose tissue with a #15 scalpel blade.  The skin margins were undermined to an appropriate distance in all directions utilizing iris scissors. Burow's Advancement Flap Text: The defect edges were debeveled with a #15 scalpel blade.  Given the location of the defect and the proximity to free margins a Burow's advancement flap was deemed most appropriate.  Using a sterile surgical marker, the appropriate advancement flap was drawn incorporating the defect and placing the expected incisions within the relaxed skin tension lines where possible.    The area thus outlined was incised deep to adipose tissue with a #15 scalpel blade.  The skin margins were undermined to an appropriate distance in all directions utilizing iris scissors. Chonodrocutaneous Helical Advancement Flap Text: The defect edges were debeveled with a #15 scalpel blade.  Given the location of the defect and the proximity to free margins a chondrocutaneous helical advancement flap was deemed most appropriate.  Using a sterile surgical marker, the appropriate advancement flap was drawn incorporating the defect and placing the expected incisions within the relaxed skin tension lines where possible.    The area thus outlined was incised deep to adipose tissue with a #15 scalpel blade.  The skin margins were undermined to an appropriate distance in all directions utilizing iris scissors. Crescentic Advancement Flap Text: The defect edges were debeveled with a #15 scalpel blade.  Given the location of the defect and the proximity to free margins a crescentic advancement flap was deemed most appropriate.  Using a sterile surgical marker, the appropriate advancement flap was drawn incorporating the defect and placing the expected incisions within the relaxed skin tension lines where possible.    The area thus outlined was incised deep to adipose tissue with a #15 scalpel blade.  The skin margins were undermined to an appropriate distance in all directions utilizing iris scissors. Dorsal Nasal Flap Text: The defect edges were debeveled with a #15 scalpel blade.  Given the location of the defect and the proximity to free margins a dorsal nasal flap was deemed most appropriate.  Using a sterile surgical marker, an appropriate dorsal nasal flap was drawn around the defect.    The area thus outlined was incised deep to adipose tissue with a #15 scalpel blade.  The skin margins were undermined to an appropriate distance in all directions utilizing iris scissors. Double Island Pedicle Flap Text: The defect edges were debeveled with a #15 scalpel blade.  Given the location of the defect, shape of the defect and the proximity to free margins a double island pedicle advancement flap was deemed most appropriate.  Using a sterile surgical marker, an appropriate advancement flap was drawn incorporating the defect, outlining the appropriate donor tissue and placing the expected incisions within the relaxed skin tension lines where possible.    The area thus outlined was incised deep to adipose tissue with a #15 scalpel blade.  The skin margins were undermined to an appropriate distance in all directions around the primary defect and laterally outward around the island pedicle utilizing iris scissors.  There was minimal undermining beneath the pedicle flap. Double O-Z Flap Text: The defect edges were debeveled with a #15 scalpel blade.  Given the location of the defect, shape of the defect and the proximity to free margins a Double O-Z flap was deemed most appropriate.  Using a sterile surgical marker, an appropriate transposition flap was drawn incorporating the defect and placing the expected incisions within the relaxed skin tension lines where possible. The area thus outlined was incised deep to adipose tissue with a #15 scalpel blade.  The skin margins were undermined to an appropriate distance in all directions utilizing iris scissors. Double O-Z Plasty Text: The defect edges were debeveled with a #15 scalpel blade.  Given the location of the defect, shape of the defect and the proximity to free margins a Double O-Z plasty (double transposition flap) was deemed most appropriate.  Using a sterile surgical marker, the appropriate transposition flaps were drawn incorporating the defect and placing the expected incisions within the relaxed skin tension lines where possible. The area thus outlined was incised deep to adipose tissue with a #15 scalpel blade.  The skin margins were undermined to an appropriate distance in all directions utilizing iris scissors.  Hemostasis was achieved with electrocautery.  The flaps were then transposed into place, one clockwise and the other counterclockwise, and anchored with interrupted buried subcutaneous sutures. Double Z Plasty Text: The lesion was extirpated to the level of the fat with a #15 scalpel blade. Given the location of the defect, shape of the defect and the proximity to free margins a double Z-plasty was deemed most appropriate for repair. Using a sterile surgical marker, the appropriate transposition arms of the double Z-plasty were drawn incorporating the defect and placing the expected incisions within the relaxed skin tension lines where possible. The area thus outlined was incised deep to adipose tissue with a #15 scalpel blade. The skin margins were undermined to an appropriate distance in all directions utilizing iris scissors. The opposing transposition arms were then transposed and carried over into place in opposite direction and anchored with interrupted buried subcutaneous sutures. Ear Star Wedge Flap Text: The defect edges were debeveled with a #15 blade scalpel.  Given the location of the defect and the proximity to free margins (helical rim) an ear star wedge flap was deemed most appropriate.  Using a sterile surgical marker, the appropriate flap was drawn incorporating the defect and placing the expected incisions between the helical rim and antihelix where possible.  The area thus outlined was incised through and through with a #15 scalpel blade. Flip-Flop Flap Text: The defect edges were debeveled with a #15 blade scalpel.  Given the location of the defect and the proximity to free margins a flip-flop flap was deemed most appropriate. Using a sterile surgical marker, the appropriate flap was drawn incorporating the defect and placing the expected incisions between the helical rim and antihelix where possible.  The area thus outlined was incised through and through with a #15 scalpel blade. Following this, the designed flap was carried over into the primary defect and sutured into place. Hatchet Flap Text: The defect edges were debeveled with a #15 scalpel blade.  Given the location of the defect, shape of the defect and the proximity to free margins a hatchet flap was deemed most appropriate.  Using a sterile surgical marker, an appropriate hatchet flap was drawn incorporating the defect and placing the expected incisions within the relaxed skin tension lines where possible.    The area thus outlined was incised deep to adipose tissue with a #15 scalpel blade.  The skin margins were undermined to an appropriate distance in all directions utilizing iris scissors. Helical Rim Advancement Flap Text: The defect edges were debeveled with a #15 blade scalpel.  Given the location of the defect and the proximity to free margins (helical rim) a double helical rim advancement flap was deemed most appropriate.  Using a sterile surgical marker, the appropriate advancement flaps were drawn incorporating the defect and placing the expected incisions between the helical rim and antihelix where possible.  The area thus outlined was incised through and through with a #15 scalpel blade.  With a skin hook and iris scissors, the flaps were gently and sharply undermined and freed up. H Plasty Text: Given the location of the defect, shape of the defect and the proximity to free margins a H-plasty was deemed most appropriate for repair.  Using a sterile surgical marker, the appropriate advancement arms of the H-plasty were drawn incorporating the defect and placing the expected incisions within the relaxed skin tension lines where possible. The area thus outlined was incised deep to adipose tissue with a #15 scalpel blade. The skin margins were undermined to an appropriate distance in all directions utilizing iris scissors.  The opposing advancement arms were then advanced into place in opposite direction and anchored with interrupted buried subcutaneous sutures. Island Pedicle Flap Text: The defect edges were debeveled with a #15 scalpel blade.  Given the location of the defect, shape of the defect and the proximity to free margins an island pedicle advancement flap was deemed most appropriate.  Using a sterile surgical marker, an appropriate advancement flap was drawn incorporating the defect, outlining the appropriate donor tissue and placing the expected incisions within the relaxed skin tension lines where possible.    The area thus outlined was incised deep to adipose tissue with a #15 scalpel blade.  The skin margins were undermined to an appropriate distance in all directions around the primary defect and laterally outward around the island pedicle utilizing iris scissors.  There was minimal undermining beneath the pedicle flap. Island Pedicle Flap With Canthal Suspension Text: The defect edges were debeveled with a #15 scalpel blade.  Given the location of the defect, shape of the defect and the proximity to free margins an island pedicle advancement flap was deemed most appropriate.  Using a sterile surgical marker, an appropriate advancement flap was drawn incorporating the defect, outlining the appropriate donor tissue and placing the expected incisions within the relaxed skin tension lines where possible. The area thus outlined was incised deep to adipose tissue with a #15 scalpel blade.  The skin margins were undermined to an appropriate distance in all directions around the primary defect and laterally outward around the island pedicle utilizing iris scissors.  There was minimal undermining beneath the pedicle flap. A suspension suture was placed in the canthal tendon to prevent tension and prevent ectropion. Island Pedicle Flap-Requiring Vessel Identification Text: The defect edges were debeveled with a #15 scalpel blade.  Given the location of the defect, shape of the defect and the proximity to free margins an island pedicle advancement flap was deemed most appropriate.  Using a sterile surgical marker, an appropriate advancement flap was drawn, based on the axial vessel mentioned above, incorporating the defect, outlining the appropriate donor tissue and placing the expected incisions within the relaxed skin tension lines where possible.    The area thus outlined was incised deep to adipose tissue with a #15 scalpel blade.  The skin margins were undermined to an appropriate distance in all directions around the primary defect and laterally outward around the island pedicle utilizing iris scissors.  There was minimal undermining beneath the pedicle flap. Keystone Flap Text: The defect edges were debeveled with a #15 scalpel blade.  Given the location of the defect, shape of the defect a keystone flap was deemed most appropriate.  Using a sterile surgical marker, an appropriate keystone flap was drawn incorporating the defect, outlining the appropriate donor tissue and placing the expected incisions within the relaxed skin tension lines where possible. The area thus outlined was incised deep to adipose tissue with a #15 scalpel blade.  The skin margins were undermined to an appropriate distance in all directions around the primary defect and laterally outward around the flap utilizing iris scissors. Melolabial Transposition Flap Text: The defect edges were debeveled with a #15 scalpel blade.  Given the location of the defect and the proximity to free margins a melolabial flap was deemed most appropriate.  Using a sterile surgical marker, an appropriate melolabial transposition flap was drawn incorporating the defect.    The area thus outlined was incised deep to adipose tissue with a #15 scalpel blade.  The skin margins were undermined to an appropriate distance in all directions utilizing iris scissors. Mercedes Flap Text: The defect edges were debeveled with a #15 scalpel blade.  Given the location of the defect, shape of the defect and the proximity to free margins a Mercedes flap was deemed most appropriate.  Using a sterile surgical marker, an appropriate advancement flap was drawn incorporating the defect and placing the expected incisions within the relaxed skin tension lines where possible. The area thus outlined was incised deep to adipose tissue with a #15 scalpel blade.  The skin margins were undermined to an appropriate distance in all directions utilizing iris scissors. Modified Advancement Flap Text: The defect edges were debeveled with a #15 scalpel blade.  Given the location of the defect, shape of the defect and the proximity to free margins a modified advancement flap was deemed most appropriate.  Using a sterile surgical marker, an appropriate advancement flap was drawn incorporating the defect and placing the expected incisions within the relaxed skin tension lines where possible.    The area thus outlined was incised deep to adipose tissue with a #15 scalpel blade.  The skin margins were undermined to an appropriate distance in all directions utilizing iris scissors. Mucosal Advancement Flap Text: Given the location of the defect, shape of the defect and the proximity to free margins a mucosal advancement flap was deemed most appropriate. Incisions were made with a 15 blade scalpel in the appropriate fashion along the cutaneous vermilion border and the mucosal lip. The remaining actinically damaged mucosal tissue was excised.  The mucosal advancement flap was then elevated to the gingival sulcus with care taken to preserve the neurovascular structures and advanced into the primary defect. Care was taken to ensure that precise realignment of the vermilion border was achieved. Muscle Hinge Flap Text: The defect edges were debeveled with a #15 scalpel blade.  Given the size, depth and location of the defect and the proximity to free margins a muscle hinge flap was deemed most appropriate.  Using a sterile surgical marker, an appropriate hinge flap was drawn incorporating the defect. The area thus outlined was incised with a #15 scalpel blade.  The skin margins were undermined to an appropriate distance in all directions utilizing iris scissors. Mustarde Flap Text: The defect edges were debeveled with a #15 scalpel blade.  Given the size, depth and location of the defect and the proximity to free margins a Mustarde flap was deemed most appropriate.  Using a sterile surgical marker, an appropriate flap was drawn incorporating the defect. The area thus outlined was incised with a #15 scalpel blade.  The skin margins were undermined to an appropriate distance in all directions utilizing iris scissors. Nasal Turnover Hinge Flap Text: The defect edges were debeveled with a #15 scalpel blade.  Given the size, depth, location of the defect and the defect being full thickness a nasal turnover hinge flap was deemed most appropriate.  Using a sterile surgical marker, an appropriate hinge flap was drawn incorporating the defect. The area thus outlined was incised with a #15 scalpel blade. The flap was designed to recreate the nasal mucosal lining and the alar rim. The skin margins were undermined to an appropriate distance in all directions utilizing iris scissors. Nasalis-Muscle-Based Myocutaneous Island Pedicle Flap Text: Using a #15 blade, an incision was made around the donor flap to the level of the nasalis muscle. Wide lateral undermining was then performed in both the subcutaneous plane above the nasalis muscle, and in a submuscular plane just above periosteum. This allowed the formation of a free nasalis muscle axial pedicle (based on the angular artery) which was still attached to the actual cutaneous flap, increasing its mobility and vascular viability. Hemostasis was obtained with pinpoint electrocoagulation. The flap was mobilized into position and the pivotal anchor points positioned and stabilized with buried interrupted sutures. Subcutaneous and dermal tissues were closed in a multilayered fashion with sutures. Tissue redundancies were excised, and the epidermal edges were apposed without significant tension and sutured with sutures. Nasalis Myocutaneous Flap Text: Using a #15 blade, an incision was made around the donor flap to the level of the nasalis muscle. Wide lateral undermining was then performed in both the subcutaneous plane above the nasalis muscle, and in a submuscular plane just above periosteum. This allowed the formation of a free nasalis muscle axial pedicle which was still attached to the actual cutaneous flap, increasing its mobility and vascular viability. Hemostasis was obtained with pinpoint electrocoagulation. The flap was mobilized into position and the pivotal anchor points positioned and stabilized with buried interrupted sutures. Subcutaneous and dermal tissues were closed in a multilayered fashion with sutures. Tissue redundancies were excised, and the epidermal edges were apposed without significant tension and sutured with sutures. Nasolabial Transposition Flap Text: The defect edges were debeveled with a #15 scalpel blade.  Given the size, depth and location of the defect and the proximity to free margins a nasolabial transposition flap was deemed most appropriate. Using a sterile surgical marker, an appropriate flap was drawn incorporating the defect. The area thus outlined was incised with a #15 scalpel blade. The skin margins were undermined to an appropriate distance in all directions utilizing iris scissors. Following this, the designed flap was carried into the primary defect and sutured into place. Orbicularis Oris Muscle Flap Text: The defect edges were debeveled with a #15 scalpel blade.  Given that the defect affected the competency of the oral sphincter an orbicularis oris muscle flap was deemed most appropriate to restore this competency and normal muscle function.  Using a sterile surgical marker, an appropriate flap was drawn incorporating the defect. The area thus outlined was incised with a #15 scalpel blade. O-T Advancement Flap Text: The defect edges were debeveled with a #15 scalpel blade.  Given the location of the defect, shape of the defect and the proximity to free margins an O-T advancement flap was deemed most appropriate.  Using a sterile surgical marker, an appropriate advancement flap was drawn incorporating the defect and placing the expected incisions within the relaxed skin tension lines where possible.    The area thus outlined was incised deep to adipose tissue with a #15 scalpel blade.  The skin margins were undermined to an appropriate distance in all directions utilizing iris scissors. O-T Plasty Text: The defect edges were debeveled with a #15 scalpel blade.  Given the location of the defect, shape of the defect and the proximity to free margins an O-T plasty was deemed most appropriate.  Using a sterile surgical marker, an appropriate O-T plasty was drawn incorporating the defect and placing the expected incisions within the relaxed skin tension lines where possible.    The area thus outlined was incised deep to adipose tissue with a #15 scalpel blade.  The skin margins were undermined to an appropriate distance in all directions utilizing iris scissors. O-L Flap Text: The defect edges were debeveled with a #15 scalpel blade.  Given the location of the defect, shape of the defect and the proximity to free margins an O-L flap was deemed most appropriate.  Using a sterile surgical marker, an appropriate advancement flap was drawn incorporating the defect and placing the expected incisions within the relaxed skin tension lines where possible.    The area thus outlined was incised deep to adipose tissue with a #15 scalpel blade.  The skin margins were undermined to an appropriate distance in all directions utilizing iris scissors. O-Z Flap Text: The defect edges were debeveled with a #15 scalpel blade.  Given the location of the defect, shape of the defect and the proximity to free margins an O-Z flap was deemed most appropriate.  Using a sterile surgical marker, an appropriate transposition flap was drawn incorporating the defect and placing the expected incisions within the relaxed skin tension lines where possible. The area thus outlined was incised deep to adipose tissue with a #15 scalpel blade.  The skin margins were undermined to an appropriate distance in all directions utilizing iris scissors. O-Z Plasty Text: The defect edges were debeveled with a #15 scalpel blade.  Given the location of the defect, shape of the defect and the proximity to free margins an O-Z plasty (double transposition flap) was deemed most appropriate.  Using a sterile surgical marker, the appropriate transposition flaps were drawn incorporating the defect and placing the expected incisions within the relaxed skin tension lines where possible.    The area thus outlined was incised deep to adipose tissue with a #15 scalpel blade.  The skin margins were undermined to an appropriate distance in all directions utilizing iris scissors.  Hemostasis was achieved with electrocautery.  The flaps were then transposed into place, one clockwise and the other counterclockwise, and anchored with interrupted buried subcutaneous sutures. Peng Advancement Flap Text: The defect edges were debeveled with a #15 scalpel blade.  Given the location of the defect, shape of the defect and the proximity to free margins a Peng advancement flap was deemed most appropriate.  Using a sterile surgical marker, an appropriate advancement flap was drawn incorporating the defect and placing the expected incisions within the relaxed skin tension lines where possible. The area thus outlined was incised deep to adipose tissue with a #15 scalpel blade.  The skin margins were undermined to an appropriate distance in all directions utilizing iris scissors. Rectangular Flap Text: The defect edges were debeveled with a #15 scalpel blade. Given the location of the defect and the proximity to free margins a rectangular flap was deemed most appropriate. Using a sterile surgical marker, an appropriate rectangular flap was drawn incorporating the defect. The area thus outlined was incised deep to adipose tissue with a #15 scalpel blade. The skin margins were undermined to an appropriate distance in all directions utilizing iris scissors. Following this, the designed flap was carried over into the primary defect and sutured into place. Rhombic Flap Text: The defect edges were debeveled with a #15 scalpel blade.  Given the location of the defect and the proximity to free margins a rhombic flap was deemed most appropriate.  Using a sterile surgical marker, an appropriate rhombic flap was drawn incorporating the defect.    The area thus outlined was incised deep to adipose tissue with a #15 scalpel blade.  The skin margins were undermined to an appropriate distance in all directions utilizing iris scissors. Rhomboid Transposition Flap Text: The defect edges were debeveled with a #15 scalpel blade.  Given the location of the defect and the proximity to free margins a rhomboid transposition flap was deemed most appropriate.  Using a sterile surgical marker, an appropriate rhomboid flap was drawn incorporating the defect.    The area thus outlined was incised deep to adipose tissue with a #15 scalpel blade.  The skin margins were undermined to an appropriate distance in all directions utilizing iris scissors. Rotation Flap Text: The defect edges were debeveled with a #15 scalpel blade.  Given the location of the defect, shape of the defect and the proximity to free margins a rotation flap was deemed most appropriate.  Using a sterile surgical marker, an appropriate rotation flap was drawn incorporating the defect and placing the expected incisions within the relaxed skin tension lines where possible.    The area thus outlined was incised deep to adipose tissue with a #15 scalpel blade.  The skin margins were undermined to an appropriate distance in all directions utilizing iris scissors. Spiral Flap Text: The defect edges were debeveled with a #15 scalpel blade.  Given the location of the defect, shape of the defect and the proximity to free margins a spiral flap was deemed most appropriate.  Using a sterile surgical marker, an appropriate rotation flap was drawn incorporating the defect and placing the expected incisions within the relaxed skin tension lines where possible. The area thus outlined was incised deep to adipose tissue with a #15 scalpel blade.  The skin margins were undermined to an appropriate distance in all directions utilizing iris scissors. Staged Advancement Flap Text: The defect edges were debeveled with a #15 scalpel blade.  Given the location of the defect, shape of the defect and the proximity to free margins a staged advancement flap was deemed most appropriate.  Using a sterile surgical marker, an appropriate advancement flap was drawn incorporating the defect and placing the expected incisions within the relaxed skin tension lines where possible. The area thus outlined was incised deep to adipose tissue with a #15 scalpel blade.  The skin margins were undermined to an appropriate distance in all directions utilizing iris scissors. Star Wedge Flap Text: The defect edges were debeveled with a #15 scalpel blade.  Given the location of the defect, shape of the defect and the proximity to free margins a star wedge flap was deemed most appropriate.  Using a sterile surgical marker, an appropriate rotation flap was drawn incorporating the defect and placing the expected incisions within the relaxed skin tension lines where possible. The area thus outlined was incised deep to adipose tissue with a #15 scalpel blade.  The skin margins were undermined to an appropriate distance in all directions utilizing iris scissors. Transposition Flap Text: The defect edges were debeveled with a #15 scalpel blade.  Given the location of the defect and the proximity to free margins a transposition flap was deemed most appropriate.  Using a sterile surgical marker, an appropriate transposition flap was drawn incorporating the defect.    The area thus outlined was incised deep to adipose tissue with a #15 scalpel blade.  The skin margins were undermined to an appropriate distance in all directions utilizing iris scissors. Trilobed Flap Text: The defect edges were debeveled with a #15 scalpel blade.  Given the location of the defect and the proximity to free margins a trilobed flap was deemed most appropriate.  Using a sterile surgical marker, an appropriate trilobed flap drawn around the defect.    The area thus outlined was incised deep to adipose tissue with a #15 scalpel blade.  The skin margins were undermined to an appropriate distance in all directions utilizing iris scissors. V-Y Flap Text: The defect edges were debeveled with a #15 scalpel blade.  Given the location of the defect, shape of the defect and the proximity to free margins a V-Y flap was deemed most appropriate.  Using a sterile surgical marker, an appropriate advancement flap was drawn incorporating the defect and placing the expected incisions within the relaxed skin tension lines where possible.    The area thus outlined was incised deep to adipose tissue with a #15 scalpel blade.  The skin margins were undermined to an appropriate distance in all directions utilizing iris scissors. V-Y Plasty Text: The defect edges were debeveled with a #15 scalpel blade.  Given the location of the defect, shape of the defect and the proximity to free margins an V-Y advancement flap was deemed most appropriate.  Using a sterile surgical marker, an appropriate advancement flap was drawn incorporating the defect and placing the expected incisions within the relaxed skin tension lines where possible.    The area thus outlined was incised deep to adipose tissue with a #15 scalpel blade.  The skin margins were undermined to an appropriate distance in all directions utilizing iris scissors. W Plasty Text: The lesion was extirpated to the level of the fat with a #15 scalpel blade.  Given the location of the defect, shape of the defect and the proximity to free margins a W-plasty was deemed most appropriate for repair.  Using a sterile surgical marker, the appropriate transposition arms of the W-plasty were drawn incorporating the defect and placing the expected incisions within the relaxed skin tension lines where possible.    The area thus outlined was incised deep to adipose tissue with a #15 scalpel blade.  The skin margins were undermined to an appropriate distance in all directions utilizing iris scissors.  The opposing transposition arms were then transposed into place in opposite direction and anchored with interrupted buried subcutaneous sutures. Z Plasty Text: The lesion was extirpated to the level of the fat with a #15 scalpel blade.  Given the location of the defect, shape of the defect and the proximity to free margins a Z-plasty was deemed most appropriate for repair.  Using a sterile surgical marker, the appropriate transposition arms of the Z-plasty were drawn incorporating the defect and placing the expected incisions within the relaxed skin tension lines where possible.    The area thus outlined was incised deep to adipose tissue with a #15 scalpel blade.  The skin margins were undermined to an appropriate distance in all directions utilizing iris scissors.  The opposing transposition arms were then transposed into place in opposite direction and anchored with interrupted buried subcutaneous sutures. Zygomaticofacial Flap Text: Given the location of the defect, shape of the defect and the proximity to free margins a zygomaticofacial flap was deemed most appropriate for repair.  Using a sterile surgical marker, the appropriate flap was drawn incorporating the defect and placing the expected incisions within the relaxed skin tension lines where possible. The area thus outlined was incised deep to adipose tissue with a #15 scalpel blade with preservation of a vascular pedicle.  The skin margins were undermined to an appropriate distance in all directions utilizing iris scissors.  The flap was then placed into the defect and anchored with interrupted buried subcutaneous sutures. Abbe Flap (Lower To Upper Lip) Text: The defect of the upper lip was assessed and measured.  Given the location and size of the defect, an Abbe flap was deemed most appropriate.  Using a sterile surgical marker, an appropriate Abbe flap was measured and drawn on the lower lip. Local anesthesia was then infiltrated. A scalpel was then used to incise the upper lip through and through the skin, vermilion, muscle and mucosa, leaving the flap pedicled on the opposite side.  The flap was then rotated and transferred to the lower lip defect.  The flap was then sutured into place with a three layer technique, closing the orbicularis oris muscle layer with subcutaneous buried sutures, followed by a mucosal layer and an epidermal layer. Abbe Flap (Upper To Lower Lip) Text: The defect of the lower lip was assessed and measured.  Given the location and size of the defect, an Abbe flap was deemed most appropriate.  Using a sterile surgical marker, an appropriate Abbe flap was measured and drawn on the upper lip. Local anesthesia was then infiltrated.  A scalpel was then used to incise the upper lip through and through the skin, vermilion, muscle and mucosa, leaving the flap pedicled on the opposite side.  The flap was then rotated and transferred to the lower lip defect.  The flap was then sutured into place with a three layer technique, closing the orbicularis oris muscle layer with subcutaneous buried sutures, followed by a mucosal layer and an epidermal layer. Cheek Interpolation Flap Text: A decision was made to reconstruct the defect utilizing an interpolation axial flap and a staged reconstruction.  A telfa template was made of the defect.  This telfa template was then used to outline the Cheek Interpolation flap.  The donor area for the pedicle flap was then injected with anesthesia.  The flap was excised through the skin and subcutaneous tissue down to the layer of the underlying musculature.  The interpolation flap was carefully excised within this deep plane to maintain its blood supply.  The edges of the donor site were undermined.   The donor site was closed in a primary fashion.  The pedicle was then rotated into position and sutured.  Once the tube was sutured into place, adequate blood supply was confirmed with blanching and refill.  The pedicle was then wrapped with xeroform gauze and dressed appropriately with a telfa and gauze bandage to ensure continued blood supply and protect the attached pedicle. Cheek-To-Nose Interpolation Flap Text: A decision was made to reconstruct the defect utilizing an interpolation axial flap and a staged reconstruction.  A telfa template was made of the defect.  This telfa template was then used to outline the Cheek-To-Nose Interpolation flap.  The donor area for the pedicle flap was then injected with anesthesia.  The flap was excised through the skin and subcutaneous tissue down to the layer of the underlying musculature.  The interpolation flap was carefully excised within this deep plane to maintain its blood supply.  The edges of the donor site were undermined.   The donor site was closed in a primary fashion.  The pedicle was then rotated into position and sutured.  Once the tube was sutured into place, adequate blood supply was confirmed with blanching and refill.  The pedicle was then wrapped with xeroform gauze and dressed appropriately with a telfa and gauze bandage to ensure continued blood supply and protect the attached pedicle. Estlander Flap (Lower To Upper Lip) Text: The defect of the lower lip was assessed and measured.  Given the location and size of the defect, an Estlander flap was deemed most appropriate.  Using a sterile surgical marker, an appropriate Estlander flap was measured and drawn on the upper lip. Local anesthesia was then infiltrated. A scalpel was then used to incise the lateral aspect of the flap, through skin, muscle and mucosa, leaving the flap pedicled medially.  The flap was then rotated and positioned to fill the lower lip defect.  The flap was then sutured into place with a three layer technique, closing the orbicularis oris muscle layer with subcutaneous buried sutures, followed by a mucosal layer and an epidermal layer. Interpolation Flap Text: A decision was made to reconstruct the defect utilizing an interpolation axial flap and a staged reconstruction.  A telfa template was made of the defect.  This telfa template was then used to outline the interpolation flap.  The donor area for the pedicle flap was then injected with anesthesia.  The flap was excised through the skin and subcutaneous tissue down to the layer of the underlying musculature.  The interpolation flap was carefully excised within this deep plane to maintain its blood supply.  The edges of the donor site were undermined.   The donor site was closed in a primary fashion.  The pedicle was then rotated into position and sutured.  Once the tube was sutured into place, adequate blood supply was confirmed with blanching and refill.  The pedicle was then wrapped with xeroform gauze and dressed appropriately with a telfa and gauze bandage to ensure continued blood supply and protect the attached pedicle. Melolabial Interpolation Flap Text: A decision was made to reconstruct the defect utilizing an interpolation axial flap and a staged reconstruction.  A telfa template was made of the defect.  This telfa template was then used to outline the melolabial interpolation flap.  The donor area for the pedicle flap was then injected with anesthesia.  The flap was excised through the skin and subcutaneous tissue down to the layer of the underlying musculature.  The pedicle flap was carefully excised within this deep plane to maintain its blood supply.  The edges of the donor site were undermined.   The donor site was closed in a primary fashion.  The pedicle was then rotated into position and sutured.  Once the tube was sutured into place, adequate blood supply was confirmed with blanching and refill.  The pedicle was then wrapped with xeroform gauze and dressed appropriately with a telfa and gauze bandage to ensure continued blood supply and protect the attached pedicle. Mastoid Interpolation Flap Text: A decision was made to reconstruct the defect utilizing an interpolation axial flap and a staged reconstruction.  A telfa template was made of the defect.  This telfa template was then used to outline the mastoid interpolation flap.  The donor area for the pedicle flap was then injected with anesthesia.  The flap was excised through the skin and subcutaneous tissue down to the layer of the underlying musculature.  The pedicle flap was carefully excised within this deep plane to maintain its blood supply.  The edges of the donor site were undermined.   The donor site was closed in a primary fashion.  The pedicle was then rotated into position and sutured.  Once the tube was sutured into place, adequate blood supply was confirmed with blanching and refill.  The pedicle was then wrapped with xeroform gauze and dressed appropriately with a telfa and gauze bandage to ensure continued blood supply and protect the attached pedicle. Paramedian Forehead Flap Text: A decision was made to reconstruct the defect utilizing an interpolation axial flap and a staged reconstruction.  A telfa template was made of the defect.  This telfa template was then used to outline the paramedian forehead pedicle flap.  The donor area for the pedicle flap was then injected with anesthesia.  The flap was excised through the skin and subcutaneous tissue down to the layer of the underlying musculature.  The pedicle flap was carefully excised within this deep plane to maintain its blood supply.  The edges of the donor site were undermined.   The donor site was closed in a primary fashion.  The pedicle was then rotated into position and sutured.  Once the tube was sutured into place, adequate blood supply was confirmed with blanching and refill.  The pedicle was then wrapped with xeroform gauze and dressed appropriately with a telfa and gauze bandage to ensure continued blood supply and protect the attached pedicle. Posterior Auricular Interpolation Flap Text: A decision was made to reconstruct the defect utilizing an interpolation axial flap and a staged reconstruction.  A telfa template was made of the defect.  This telfa template was then used to outline the posterior auricular interpolation flap.  The donor area for the pedicle flap was then injected with anesthesia.  The flap was excised through the skin and subcutaneous tissue down to the layer of the underlying musculature.  The pedicle flap was carefully excised within this deep plane to maintain its blood supply.  The edges of the donor site were undermined.   The donor site was closed in a primary fashion.  The pedicle was then rotated into position and sutured.  Once the tube was sutured into place, adequate blood supply was confirmed with blanching and refill.  The pedicle was then wrapped with xeroform gauze and dressed appropriately with a telfa and gauze bandage to ensure continued blood supply and protect the attached pedicle. Cheiloplasty (Complex) Text: A decision was made to reconstruct the defect with a  cheiloplasty.  The defect was undermined extensively.  Additional orbicularis oris muscle was excised with a 15 blade scalpel.  The defect was converted into a full thickness wedge to facilite a better cosmetic result.  Small vessels were then tied off with 5-0 monocyrl. The orbicularis oris, superficial fascia, adipose and dermis were then reapproximated.  After the deeper layers were approximated the epidermis was reapproximated with particular care given to realign the vermilion border. Cheiloplasty (Less Than 50%) Text: A decision was made to reconstruct the defect with a  cheiloplasty.  The defect was undermined extensively.  Additional orbicularis oris muscle was excised with a 15 blade scalpel.  The defect was converted into a full thickness wedge, of less than 50% of the vertical height of the lip, to facilite a better cosmetic result.  Small vessels were then tied off with 5-0 monocyrl. The orbicularis oris, superficial fascia, adipose and dermis were then reapproximated.  After the deeper layers were approximated the epidermis was reapproximated with particular care given to realign the vermilion border. Ear Wedge Repair Text: A wedge excision was completed by carrying down an excision through the full thickness of the ear and cartilage with an inward facing Burow's triangle. The wound was then closed in a layered fashion. Full Thickness Lip Wedge Repair (Flap) Text: Given the location of the defect and the proximity to free margins a full thickness wedge repair was deemed most appropriate.  Using a sterile surgical marker, the appropriate repair was drawn incorporating the defect and placing the expected incisions perpendicular to the vermilion border.  The vermilion border was also meticulously outlined to ensure appropriate reapproximation during the repair.  The area thus outlined was incised through and through with a #15 scalpel blade.  The muscularis and dermis were reaproximated with deep sutures following hemostasis. Care was taken to realign the vermilion border before proceeding with the superficial closure.  Once the vermilion was realigned the superfical and mucosal closure was finished. Burow's Graft Text: The defect edges were debeveled with a #15 scalpel blade.  Given the location of the defect, shape of the defect, the proximity to free margins and the presence of a standing cone deformity a Burow's skin graft was deemed most appropriate. The standing cone was removed and this tissue was then trimmed to the shape of the primary defect. The adipose tissue was also removed until only dermis and epidermis were left.  The skin margins of the secondary defect were undermined to an appropriate distance in all directions utilizing iris scissors.  The secondary defect was closed with interrupted buried subcutaneous sutures.  The skin edges were then re-apposed with running  sutures.  The skin graft was then placed in the primary defect and oriented appropriately. Cartilage Graft Text: The defect edges were debeveled with a #15 scalpel blade.  Given the location of the defect, shape of the defect, the fact the defect involved a full thickness cartilage defect a cartilage graft was deemed most appropriate.  An appropriate donor site was identified, cleansed, and anesthetized. The cartilage graft was then harvested and transferred to the recipient site, oriented appropriately and then sutured into place.  The secondary defect was then repaired using a primary closure. Composite Graft Text: The defect edges were debeveled with a #15 scalpel blade.  Given the location of the defect, shape of the defect, the proximity to free margins and the fact the defect was full thickness a composite graft was deemed most appropriate.  The defect was outline and then transferred to the donor site.  A full thickness graft was then excised from the donor site. The graft was then placed in the primary defect, oriented appropriately and then sutured into place.  The secondary defect was then repaired using a primary closure. Epidermal Autograft Text: The defect edges were debeveled with a #15 scalpel blade.  Given the location of the defect, shape of the defect and the proximity to free margins an epidermal autograft was deemed most appropriate.  Using a sterile surgical marker, the primary defect shape was transferred to the donor site. The epidermal graft was then harvested.  The skin graft was then placed in the primary defect and oriented appropriately. Dermal Autograft Text: The defect edges were debeveled with a #15 scalpel blade.  Given the location of the defect, shape of the defect and the proximity to free margins a dermal autograft was deemed most appropriate.  Using a sterile surgical marker, the primary defect shape was transferred to the donor site. The area thus outlined was incised deep to adipose tissue with a #15 scalpel blade.  The harvested graft was then trimmed of adipose and epidermal tissue until only dermis was left.  The skin graft was then placed in the primary defect and oriented appropriately. Ftsg Text: The defect edges were debeveled with a #15 scalpel blade.  Given the location of the defect, shape of the defect and the proximity to free margins a full thickness skin graft was deemed most appropriate.  Using a sterile surgical marker, the primary defect shape was transferred to the donor site. The area thus outlined was incised deep to adipose tissue with a #15 scalpel blade.  The harvested graft was then trimmed of adipose tissue until only dermis and epidermis was left.  The skin margins of the secondary defect were undermined to an appropriate distance in all directions utilizing iris scissors.  The secondary defect was closed with interrupted buried subcutaneous sutures.  The skin edges were then re-apposed with running  sutures.  The skin graft was then placed in the primary defect and oriented appropriately. Pinch Graft Text: The defect edges were debeveled with a #15 scalpel blade. Given the location of the defect, shape of the defect and the proximity to free margins a pinch graft was deemed most appropriate. Using a sterile surgical marker, the primary defect shape was transferred to the donor site. The area thus outlined was incised deep to adipose tissue with a #15 scalpel blade.  The harvested graft was then trimmed of adipose tissue until only dermis and epidermis was left. The skin margins of the secondary defect were undermined to an appropriate distance in all directions utilizing iris scissors.  The secondary defect was closed with interrupted buried subcutaneous sutures.  The skin edges were then re-apposed with running  sutures.  The skin graft was then placed in the primary defect and oriented appropriately. Skin Substitute Text: The defect edges were debeveled with a #15 scalpel blade.  Given the location of the defect, shape of the defect and the proximity to free margins a skin substitute graft was deemed most appropriate.  The graft material was trimmed to fit the size of the defect. The graft was then placed in the primary defect and oriented appropriately. Split-Thickness Skin Graft Text: The defect edges were debeveled with a #15 scalpel blade.  Given the location of the defect, shape of the defect and the proximity to free margins a split thickness skin graft was deemed most appropriate.  Using a sterile surgical marker, the primary defect shape was transferred to the donor site. The split thickness graft was then harvested.  The skin graft was then placed in the primary defect and oriented appropriately. Tissue Cultured Epidermal Autograft Text: The defect edges were debeveled with a #15 scalpel blade.  Given the location of the defect, shape of the defect and the proximity to free margins a tissue cultured epidermal autograft was deemed most appropriate.  The graft was then trimmed to fit the size of the defect.  The graft was then placed in the primary defect and oriented appropriately. Xenograft Text: The defect edges were debeveled with a #15 scalpel blade.  Given the location of the defect, shape of the defect and the proximity to free margins a xenograft was deemed most appropriate.  The graft was then trimmed to fit the size of the defect.  The graft was then placed in the primary defect and oriented appropriately. Complex Repair And Flap Additional Text (Will Appearing After The Standard Complex Repair Text): The complex repair was not sufficient to completely close the primary defect. The remaining additional defect was repaired with the flap mentioned below. Complex Repair And Graft Additional Text (Will Appearing After The Standard Complex Repair Text): The complex repair was not sufficient to completely close the primary defect. The remaining additional defect was repaired with the graft mentioned below. Eyelid Full Thickness Repair - [id number]: The eyelid defect was full thickness which required a wedge repair of the eyelid. Special care was taken to ensure that the eyelid margin was realligned when placing sutures. Eyelid Partial Thickness Repair - 92731: The eyelid defect was partial thickness which required a wedge repair of the eyelid. Special care was taken to ensure that the eyelid margin was realligned when placing sutures. Intermediate Repair And Flap Additional Text (Will Appearing After The Standard Complex Repair Text): The intermediate repair was not sufficient to completely close the primary defect. The remaining additional defect was repaired with the flap mentioned below. Intermediate Repair And Graft Additional Text (Will Appearing After The Standard Complex Repair Text): The intermediate repair was not sufficient to completely close the primary defect. The remaining additional defect was repaired with the graft mentioned below. Localized Dermabrasion With 15 Blade Text: The patient was draped in routine manner.  Localized dermabrasion using a 15 blade was performed in routine manner to papillary dermis. This spot dermabrasion is being performed to complete skin cancer reconstruction. It also will eliminate the other sun damaged precancerous cells that are known to be part of the regional effect of a lifetime's worth of sun exposure. This localized dermabrasion is therapeutic and should not be considered cosmetic in any regard. Localized Dermabrasion With Sand Papertext: The patient was draped in routine manner.  Localized dermabrasion using sterile sand paper was performed in routine manner to papillary dermis. This spot dermabrasion is being performed to complete skin cancer reconstruction. It also will eliminate the other sun damaged precancerous cells that are known to be part of the regional effect of a lifetime's worth of sun exposure. This localized dermabrasion is therapeutic and should not be considered cosmetic in any regard. Localized Dermabrasion With Wire Brush Text: The patient was draped in routine manner.  Localized dermabrasion using 3 x 17 mm wire brush was performed in routine manner to papillary dermis. This spot dermabrasion is being performed to complete skin cancer reconstruction. It also will eliminate the other sun damaged precancerous cells that are known to be part of the regional effect of a lifetime's worth of sun exposure. This localized dermabrasion is therapeutic and should not be considered cosmetic in any regard. Purse String (Simple) Text: Given the location of the defect and the characteristics of the surrounding skin a purse string closure was deemed most appropriate.  Undermining was performed circumferentially around the surgical defect.  A purse string suture was then placed and tightened. Purse String (Intermediate) Text: Given the location of the defect and the characteristics of the surrounding skin a purse string intermediate closure was deemed most appropriate.  Undermining was performed circumferentially around the surgical defect.  A purse string suture was then placed and tightened. Partial Purse String (Simple) Text: Given the location of the defect and the characteristics of the surrounding skin a simple purse string closure was deemed most appropriate.  Undermining was performed circumferentially around the surgical defect.  A purse string suture was then placed and tightened. Wound tension only allowed a partial closure of the circular defect. Partial Purse String (Intermediate) Text: Given the location of the defect and the characteristics of the surrounding skin an intermediate purse string closure was deemed most appropriate.  Undermining was performed circumferentially around the surgical defect.  A purse string suture was then placed and tightened. Wound tension only allowed a partial closure of the circular defect. Tarsorrhaphy Text: A tarsorrhaphy was performed using Frost sutures. Manual Repair Warning Statement: We plan on removing the manually selected variable below in favor of our much easier automatic structured text blocks found in the previous tab. We decided to do this to help make the flow better and give you the full power of structured data. Manual selection is never going to be ideal in our platform and I would encourage you to avoid using manual selection from this point on, especially since I will be sunsetting this feature. It is important that you do one of two things with the customized text below. First, you can save all of the text in a word file so you can have it for future reference. Second, transfer the text to the appropriate area in the Library tab. Lastly, if there is a flap or graft type which we do not have you need to let us know right away so I can add it in before the variable is hidden. No need to panic, we plan to give you roughly 6 months to make the change. Same Histology In Subsequent Stages Text: The pattern and morphology of the tumor is as described in the first stage. No Residual Tumor Seen Histology Text: There were no malignant cells seen in the sections examined. Inflammation Suggestive Of Cancer Camouflage Histology Text: There was a dense lymphocytic infiltrate which prevented adequate histologic evaluation of adjacent structures. Bcc Histology Text: There were numerous aggregates of basaloid cells. Bcc Infiltrative Histology Text: There were numerous aggregates of basaloid cells demonstrating an infiltrative pattern. Mart-1 - Positive Histology Text: MART-1 staining demonstrates areas of higher density and clustering of melanocytes with Pagetoid spread upwards within the epidermis. The surgical margins are positive for tumor cells. Mart-1 - Negative Histology Text: MART-1 staining demonstrates a normal density and pattern of melanocytes along the dermal-epidermal junction. The surgical margins are negative for tumor cells. Information: Selecting Yes will display possible errors in your note based on the variables you have selected. This validation is only offered as a suggestion for you. PLEASE NOTE THAT THE VALIDATION TEXT WILL BE REMOVED WHEN YOU FINALIZE YOUR NOTE. IF YOU WANT TO FAX A PRELIMINARY NOTE YOU WILL NEED TO TOGGLE THIS TO 'NO' IF YOU DO NOT WANT IT IN YOUR FAXED NOTE. Bill 59 Modifier?: No - Continue to Bill 79 Modifier Mohs Case Number: [id number] Initial Size Of Lesion: 1.2 X Size Of Lesion In Cm (Optional): 1.1 Primary Defect Length In Cm (Final Defect Size - Required For Flaps/Grafts): 1.9 Primary Defect Width In Cm (Final Defect Size - Required For Flaps/Grafts): 1.8 Repair Type: No repair - repaired with adjacent defect Anesthesia Volume In Cc: 2

## 2025-01-04 NOTE — PROGRESS NOTE ADULT - PROBLEM SELECTOR PROBLEM 8
Patient has longstanding history of poor concentration, staying focused, and completing task, he takes Adderall 20 mg as needed to help improve his symptoms, refills sent to pharmacy.   Need for prophylactic measure

## 2025-04-15 NOTE — DISCHARGE NOTE PROVIDER - NSDCFUADDAPPT_GEN_ALL_CORE_FT
methylPREDNISolone acetate (DEPO-MEDROL) injection 40 mg  -     tiZANidine (ZANAFLEX) 4 MG tablet; Take 1 tablet by mouth every 8 hours as needed (spasm)    Type 2 diabetes mellitus without complication, with long-term current use of insulin    Monitor blood sugar        Jimbo Maldonado DO    4/15/2025  12:17 PM    
Please follow up with the hepatology clinic within 1 week of discharge.    Please follow up with Dr. Woods within 1-2 weeks of discharge    Please follow up with endocrinology within 2-4 weeks of discharge    Please follow up with your rehab facility that the  referred you to within 1 week of discharge. If you have difficulties, call the clinic at [address] [place] [address] for a new referral.

## 2025-04-28 NOTE — PROGRESS NOTE ADULT - PROBLEM SELECTOR PLAN 5
[phone] ([employer])   Bicarb of 11 today, VBG pH 7.30 (7.26 yesterday)   -Will continue to monitor CMP  -Starting bicitra 30mL PO BID, per Nephrology recs.   -Na bicarb infusion 75meQ in 1L per Nephrology recs   -Will replete Phosphorous - 2.0 today   -Nephrology following, will f/u on recs

## 2025-05-08 NOTE — PATIENT PROFILE ADULT - NSPROEDALEARNPREF_GEN_A_NUR
Detail Level: Detailed Size Of Lesion: 2 mm black macule Size Of Lesion: 3mm clear papule audio/skill demonstration
